# Patient Record
Sex: MALE | Race: WHITE | NOT HISPANIC OR LATINO | URBAN - METROPOLITAN AREA
[De-identification: names, ages, dates, MRNs, and addresses within clinical notes are randomized per-mention and may not be internally consistent; named-entity substitution may affect disease eponyms.]

---

## 2017-09-07 ENCOUNTER — INPATIENT (INPATIENT)
Facility: HOSPITAL | Age: 82
LOS: 6 days | Discharge: NURSING, REHAB OR LONG-TERM CARE | DRG: 871 | End: 2017-09-14
Attending: INTERNAL MEDICINE | Admitting: INTERNAL MEDICINE
Payer: MEDICARE

## 2017-09-07 VITALS
HEIGHT: 72 IN | RESPIRATION RATE: 15 BRPM | TEMPERATURE: 98 F | HEART RATE: 100 BPM | DIASTOLIC BLOOD PRESSURE: 62 MMHG | SYSTOLIC BLOOD PRESSURE: 120 MMHG | WEIGHT: 164.02 LBS | OXYGEN SATURATION: 98 %

## 2017-09-07 DIAGNOSIS — D72.829 ELEVATED WHITE BLOOD CELL COUNT, UNSPECIFIED: ICD-10-CM

## 2017-09-07 DIAGNOSIS — I10 ESSENTIAL (PRIMARY) HYPERTENSION: ICD-10-CM

## 2017-09-07 DIAGNOSIS — A41.9 SEPSIS, UNSPECIFIED ORGANISM: ICD-10-CM

## 2017-09-07 DIAGNOSIS — F01.50 VASCULAR DEMENTIA WITHOUT BEHAVIORAL DISTURBANCE: ICD-10-CM

## 2017-09-07 DIAGNOSIS — I50.22 CHRONIC SYSTOLIC (CONGESTIVE) HEART FAILURE: ICD-10-CM

## 2017-09-07 DIAGNOSIS — C61 MALIGNANT NEOPLASM OF PROSTATE: ICD-10-CM

## 2017-09-07 DIAGNOSIS — I48.2 CHRONIC ATRIAL FIBRILLATION: ICD-10-CM

## 2017-09-07 LAB
ALBUMIN SERPL ELPH-MCNC: 3.4 G/DL — SIGNIFICANT CHANGE UP (ref 3.3–5)
ALP SERPL-CCNC: 101 U/L — SIGNIFICANT CHANGE UP (ref 40–120)
ALT FLD-CCNC: 18 U/L — SIGNIFICANT CHANGE UP (ref 12–78)
ANION GAP SERPL CALC-SCNC: 10 MMOL/L — SIGNIFICANT CHANGE UP (ref 5–17)
APTT BLD: 37.6 SEC — HIGH (ref 27.5–37.4)
AST SERPL-CCNC: 13 U/L — LOW (ref 15–37)
BILIRUB SERPL-MCNC: 0.6 MG/DL — SIGNIFICANT CHANGE UP (ref 0.2–1.2)
BUN SERPL-MCNC: 17 MG/DL — SIGNIFICANT CHANGE UP (ref 7–23)
CALCIUM SERPL-MCNC: 9 MG/DL — SIGNIFICANT CHANGE UP (ref 8.5–10.1)
CHLORIDE SERPL-SCNC: 98 MMOL/L — SIGNIFICANT CHANGE UP (ref 96–108)
CO2 SERPL-SCNC: 30 MMOL/L — SIGNIFICANT CHANGE UP (ref 22–31)
CREAT SERPL-MCNC: 0.68 MG/DL — SIGNIFICANT CHANGE UP (ref 0.5–1.3)
GLUCOSE SERPL-MCNC: 152 MG/DL — HIGH (ref 70–99)
HCT VFR BLD CALC: 40.4 % — SIGNIFICANT CHANGE UP (ref 39–50)
HGB BLD-MCNC: 14.1 G/DL — SIGNIFICANT CHANGE UP (ref 13–17)
INR BLD: 3.22 RATIO — HIGH (ref 0.88–1.16)
LACTATE SERPL-SCNC: 1.5 MMOL/L — SIGNIFICANT CHANGE UP (ref 0.7–2)
LEGIONELLA AG UR QL: NEGATIVE — SIGNIFICANT CHANGE UP
LYMPHOCYTES # BLD AUTO: 2 % — LOW (ref 13–44)
MCHC RBC-ENTMCNC: 32.8 PG — SIGNIFICANT CHANGE UP (ref 27–34)
MCHC RBC-ENTMCNC: 34.9 GM/DL — SIGNIFICANT CHANGE UP (ref 32–36)
MCV RBC AUTO: 94 FL — SIGNIFICANT CHANGE UP (ref 80–100)
MONOCYTES NFR BLD AUTO: 6 % — SIGNIFICANT CHANGE UP (ref 1–9)
NEUTROPHILS NFR BLD AUTO: 70 % — SIGNIFICANT CHANGE UP (ref 43–77)
PLATELET # BLD AUTO: 294 K/UL — SIGNIFICANT CHANGE UP (ref 150–400)
POTASSIUM SERPL-MCNC: 4.1 MMOL/L — SIGNIFICANT CHANGE UP (ref 3.5–5.3)
POTASSIUM SERPL-SCNC: 4.1 MMOL/L — SIGNIFICANT CHANGE UP (ref 3.5–5.3)
PROCALCITONIN SERPL-MCNC: 0.18 NG/ML — HIGH (ref 0–0.04)
PROT SERPL-MCNC: 6.9 G/DL — SIGNIFICANT CHANGE UP (ref 6–8.3)
PROTHROM AB SERPL-ACNC: 36 SEC — HIGH (ref 9.8–12.7)
RAPID RVP RESULT: DETECTED
RBC # BLD: 4.3 M/UL — SIGNIFICANT CHANGE UP (ref 4.2–5.8)
RBC # FLD: 12.6 % — SIGNIFICANT CHANGE UP (ref 10.3–14.5)
RV+EV RNA SPEC QL NAA+PROBE: DETECTED
SODIUM SERPL-SCNC: 138 MMOL/L — SIGNIFICANT CHANGE UP (ref 135–145)
WBC # BLD: 16.7 K/UL — HIGH (ref 3.8–10.5)
WBC # FLD AUTO: 16.7 K/UL — HIGH (ref 3.8–10.5)

## 2017-09-07 PROCEDURE — 71010: CPT | Mod: 26

## 2017-09-07 PROCEDURE — 93010 ELECTROCARDIOGRAM REPORT: CPT

## 2017-09-07 PROCEDURE — 99285 EMERGENCY DEPT VISIT HI MDM: CPT

## 2017-09-07 RX ORDER — DEXTROSE 50 % IN WATER 50 %
25 SYRINGE (ML) INTRAVENOUS ONCE
Qty: 0 | Refills: 0 | Status: DISCONTINUED | OUTPATIENT
Start: 2017-09-07 | End: 2017-09-14

## 2017-09-07 RX ORDER — DONEPEZIL HYDROCHLORIDE 10 MG/1
5 TABLET, FILM COATED ORAL AT BEDTIME
Qty: 0 | Refills: 0 | Status: DISCONTINUED | OUTPATIENT
Start: 2017-09-07 | End: 2017-09-14

## 2017-09-07 RX ORDER — SODIUM CHLORIDE 9 MG/ML
1000 INJECTION INTRAMUSCULAR; INTRAVENOUS; SUBCUTANEOUS ONCE
Qty: 0 | Refills: 0 | Status: COMPLETED | OUTPATIENT
Start: 2017-09-07 | End: 2017-09-07

## 2017-09-07 RX ORDER — AZITHROMYCIN 500 MG/1
500 TABLET, FILM COATED ORAL ONCE
Qty: 0 | Refills: 0 | Status: COMPLETED | OUTPATIENT
Start: 2017-09-07 | End: 2017-09-07

## 2017-09-07 RX ORDER — SODIUM CHLORIDE 9 MG/ML
1000 INJECTION, SOLUTION INTRAVENOUS
Qty: 0 | Refills: 0 | Status: DISCONTINUED | OUTPATIENT
Start: 2017-09-07 | End: 2017-09-12

## 2017-09-07 RX ORDER — INSULIN LISPRO 100/ML
VIAL (ML) SUBCUTANEOUS
Qty: 0 | Refills: 0 | Status: DISCONTINUED | OUTPATIENT
Start: 2017-09-07 | End: 2017-09-14

## 2017-09-07 RX ORDER — DEXTROSE 50 % IN WATER 50 %
1 SYRINGE (ML) INTRAVENOUS ONCE
Qty: 0 | Refills: 0 | Status: DISCONTINUED | OUTPATIENT
Start: 2017-09-07 | End: 2017-09-14

## 2017-09-07 RX ORDER — VANCOMYCIN HCL 1 G
1000 VIAL (EA) INTRAVENOUS ONCE
Qty: 0 | Refills: 0 | Status: COMPLETED | OUTPATIENT
Start: 2017-09-07 | End: 2017-09-07

## 2017-09-07 RX ORDER — PIPERACILLIN AND TAZOBACTAM 4; .5 G/20ML; G/20ML
3.38 INJECTION, POWDER, LYOPHILIZED, FOR SOLUTION INTRAVENOUS ONCE
Qty: 0 | Refills: 0 | Status: COMPLETED | OUTPATIENT
Start: 2017-09-07 | End: 2017-09-07

## 2017-09-07 RX ORDER — LISINOPRIL 2.5 MG/1
5 TABLET ORAL DAILY
Qty: 0 | Refills: 0 | Status: DISCONTINUED | OUTPATIENT
Start: 2017-09-07 | End: 2017-09-14

## 2017-09-07 RX ORDER — SIMVASTATIN 20 MG/1
20 TABLET, FILM COATED ORAL AT BEDTIME
Qty: 0 | Refills: 0 | Status: DISCONTINUED | OUTPATIENT
Start: 2017-09-07 | End: 2017-09-14

## 2017-09-07 RX ORDER — AZITHROMYCIN 500 MG/1
500 TABLET, FILM COATED ORAL EVERY 24 HOURS
Qty: 0 | Refills: 0 | Status: DISCONTINUED | OUTPATIENT
Start: 2017-09-08 | End: 2017-09-12

## 2017-09-07 RX ORDER — VENLAFAXINE HCL 75 MG
75 CAPSULE, EXT RELEASE 24 HR ORAL
Qty: 0 | Refills: 0 | Status: DISCONTINUED | OUTPATIENT
Start: 2017-09-07 | End: 2017-09-07

## 2017-09-07 RX ORDER — VENLAFAXINE HCL 75 MG
75 CAPSULE, EXT RELEASE 24 HR ORAL
Qty: 0 | Refills: 0 | Status: DISCONTINUED | OUTPATIENT
Start: 2017-09-07 | End: 2017-09-14

## 2017-09-07 RX ORDER — ACETAMINOPHEN 500 MG
650 TABLET ORAL ONCE
Qty: 0 | Refills: 0 | Status: COMPLETED | OUTPATIENT
Start: 2017-09-07 | End: 2017-09-07

## 2017-09-07 RX ORDER — SODIUM CHLORIDE 9 MG/ML
3 INJECTION INTRAMUSCULAR; INTRAVENOUS; SUBCUTANEOUS ONCE
Qty: 0 | Refills: 0 | Status: COMPLETED | OUTPATIENT
Start: 2017-09-07 | End: 2017-09-07

## 2017-09-07 RX ORDER — GLUCAGON INJECTION, SOLUTION 0.5 MG/.1ML
1 INJECTION, SOLUTION SUBCUTANEOUS ONCE
Qty: 0 | Refills: 0 | Status: DISCONTINUED | OUTPATIENT
Start: 2017-09-07 | End: 2017-09-14

## 2017-09-07 RX ORDER — SODIUM CHLORIDE 9 MG/ML
1000 INJECTION, SOLUTION INTRAVENOUS
Qty: 0 | Refills: 0 | Status: DISCONTINUED | OUTPATIENT
Start: 2017-09-07 | End: 2017-09-14

## 2017-09-07 RX ORDER — PIPERACILLIN AND TAZOBACTAM 4; .5 G/20ML; G/20ML
3.38 INJECTION, POWDER, LYOPHILIZED, FOR SOLUTION INTRAVENOUS EVERY 8 HOURS
Qty: 0 | Refills: 0 | Status: DISCONTINUED | OUTPATIENT
Start: 2017-09-07 | End: 2017-09-12

## 2017-09-07 RX ORDER — DEXTROSE 50 % IN WATER 50 %
12.5 SYRINGE (ML) INTRAVENOUS ONCE
Qty: 0 | Refills: 0 | Status: DISCONTINUED | OUTPATIENT
Start: 2017-09-07 | End: 2017-09-14

## 2017-09-07 RX ORDER — AZITHROMYCIN 500 MG/1
TABLET, FILM COATED ORAL
Qty: 0 | Refills: 0 | Status: DISCONTINUED | OUTPATIENT
Start: 2017-09-07 | End: 2017-09-12

## 2017-09-07 RX ORDER — DIGOXIN 250 MCG
0.25 TABLET ORAL DAILY
Qty: 0 | Refills: 0 | Status: DISCONTINUED | OUTPATIENT
Start: 2017-09-07 | End: 2017-09-12

## 2017-09-07 RX ADMIN — SODIUM CHLORIDE 2000 MILLILITER(S): 9 INJECTION INTRAMUSCULAR; INTRAVENOUS; SUBCUTANEOUS at 10:23

## 2017-09-07 RX ADMIN — SODIUM CHLORIDE 3 MILLILITER(S): 9 INJECTION INTRAMUSCULAR; INTRAVENOUS; SUBCUTANEOUS at 10:15

## 2017-09-07 RX ADMIN — PIPERACILLIN AND TAZOBACTAM 25 GRAM(S): 4; .5 INJECTION, POWDER, LYOPHILIZED, FOR SOLUTION INTRAVENOUS at 19:39

## 2017-09-07 RX ADMIN — Medication 30 MILLIGRAM(S): at 17:40

## 2017-09-07 RX ADMIN — Medication 75 MILLIGRAM(S): at 17:38

## 2017-09-07 RX ADMIN — Medication 250 MILLIGRAM(S): at 11:45

## 2017-09-07 RX ADMIN — AZITHROMYCIN 255 MILLIGRAM(S): 500 TABLET, FILM COATED ORAL at 13:40

## 2017-09-07 RX ADMIN — SODIUM CHLORIDE 2000 MILLILITER(S): 9 INJECTION INTRAMUSCULAR; INTRAVENOUS; SUBCUTANEOUS at 10:29

## 2017-09-07 RX ADMIN — SIMVASTATIN 20 MILLIGRAM(S): 20 TABLET, FILM COATED ORAL at 21:23

## 2017-09-07 RX ADMIN — SODIUM CHLORIDE 100 MILLILITER(S): 9 INJECTION, SOLUTION INTRAVENOUS at 15:24

## 2017-09-07 RX ADMIN — PIPERACILLIN AND TAZOBACTAM 25 GRAM(S): 4; .5 INJECTION, POWDER, LYOPHILIZED, FOR SOLUTION INTRAVENOUS at 19:38

## 2017-09-07 RX ADMIN — SODIUM CHLORIDE 2000 MILLILITER(S): 9 INJECTION INTRAMUSCULAR; INTRAVENOUS; SUBCUTANEOUS at 10:20

## 2017-09-07 RX ADMIN — Medication 650 MILLIGRAM(S): at 10:21

## 2017-09-07 RX ADMIN — PIPERACILLIN AND TAZOBACTAM 200 GRAM(S): 4; .5 INJECTION, POWDER, LYOPHILIZED, FOR SOLUTION INTRAVENOUS at 11:06

## 2017-09-07 RX ADMIN — DONEPEZIL HYDROCHLORIDE 5 MILLIGRAM(S): 10 TABLET, FILM COATED ORAL at 21:23

## 2017-09-07 NOTE — ED ADULT NURSE NOTE - OBJECTIVE STATEMENT
Presents to ER w fever/cough.  Rectal temp here in .3. Sepsis workup obtained. Pt has a hx of dementia and is currently A.O x2. Knows his name and is aware he is in the hospital. Presents to ER w fever/cough.  Rectal temp here in .3. Unproductive wet cough noted. BSC bilat. Sepsis workup obtained. Pt has a hx of dementia and is currently A.O x2. Knows his name and is aware he is in the hospital.

## 2017-09-07 NOTE — ED PROVIDER NOTE - ATTENDING CONTRIBUTION TO CARE
Cough and fever from ANF. Exam revealed male in NAD, febrile with coarse BSs. I agree with plan and management outlined by R-1

## 2017-09-07 NOTE — H&P ADULT - PMH
Atrial fibrillation    CHF (congestive heart failure)    Dementia    Diabetes    Hypertension    Prostate ca

## 2017-09-07 NOTE — ED ADULT NURSE NOTE - PMH
Atrial fibrillation    Dementia    Diabetes    Hypertension    Prostate ca Atrial fibrillation    CHF (congestive heart failure)    Dementia    Diabetes    Hypertension    Prostate ca

## 2017-09-07 NOTE — H&P ADULT - NSHPLABSRESULTS_GEN_ALL_CORE
14.1   16.7  )-----------( 294      ( 07 Sep 2017 10:14 )             40.4     07 Sep 2017 10:24    138    |  98     |  17     ----------------------------<  152    4.1     |  30     |  0.68     Ca    9.0        07 Sep 2017 10:24    TPro  6.9    /  Alb  3.4    /  TBili  0.6    /  DBili  x      /  AST  13     /  ALT  18     /  AlkPhos  101    07 Sep 2017 10:24    LIVER FUNCTIONS - ( 07 Sep 2017 10:24 )  Alb: 3.4 g/dL / Pro: 6.9 g/dL / ALK PHOS: 101 U/L / ALT: 18 U/L / AST: 13 U/L / GGT: x           PT/INR - ( 07 Sep 2017 10:14 )   PT: 36.0 sec;   INR: 3.22 ratio         PTT - ( 07 Sep 2017 10:14 )  PTT:37.6 sec  CAPILLARY BLOOD GLUCOSE  118 (07 Sep 2017 15:58)

## 2017-09-07 NOTE — ED ADULT TRIAGE NOTE - CHIEF COMPLAINT QUOTE
sent from [facility] fellowship to be evaluated for fever they had 101.4  oral at 8 am  now in 98.4 oral.  pt also c/o weakness

## 2017-09-07 NOTE — ED ADULT NURSE NOTE - CHIEF COMPLAINT QUOTE
sent from [facility] fellowship to be evaluated for fever they had 101.4  oral at 8 am  now in 98.4 oral.  pt also c/o weakness

## 2017-09-07 NOTE — CONSULT NOTE ADULT - SUBJECTIVE AND OBJECTIVE BOX
Chart reviewed: Assessment plan is as below Note will be updated as more  patient data is gathered         Patient                                            TERESA FRANCIS [facility]   [phone] 10/16/1926  CONTACT Vinicio Bradford [phone]  ALLERGY Late[employer]  REASON FOR VISIT   Initial evaluation/Pulmonary consultation requested  9/7/2017 by Dr Ring from Dr Quintana   Patient examined chart reviewed  HOSPITAL ADMISSION [facility] P Dr Ring 9/7/2017  PATIENT CAME  FROM (if information available)  CF home  VITALS/LABS  9/7 9a 102f 90 116/60  9/7/2017 W 16.7 Hb 14.1 Plt 294  Na 138 K 4.1 CO23 30 Cr .6   PATIENT DESCRIPTION PMH SOCIAL   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 [facility] HO  injury L shin remote past 3/18/2014 V duplex legs –ben Chr swelling leg  OBS Prostate CA LV Systolic dysfunction CHF 10/24/2014 ECHO EF 50% Mild hypokinesia septum PASP 40 Mod MR    A fib  (on coumadin) Htn Depression Lower extr edema Chr swelling L leg DM      LUNG NODULE 10/21/2014 CT Ch  1 cm hypoattenuating thyroid nodule   Trace symmetrical layering bl pl effusions with dependent atelectasis bases  Ca granuloma both RML and l lower lobes  5 mm subpleural parenchymal nodule R apex    Admitted [facility] 10/20-10/24/2014 with fever 102 Poss 2/2 Viral syndrome Had episode hypoglycemia 10/21 Rxed Rocephin Zithro empirically CT H CT ch  10/21/14  n 10/22 DW dr Perlman Endo Adv Metformin 500 bid and dc Sulfonylureas 10/22 Speech  mervin Reg thin liq   9/7/2017 Patient was sent from CF home for cough URT symptoms x 2 d  Was supposed to be started on Zpak 9/7 but was noted to have T 101   HOSPITAL COURSE PLAN 9/7/2017 ADMISSION [facility]   SEPSIS Source unclear Likely Resp tract infection Zosyn vanco started by ER MD 9/7 pending bld c uc will continue NS 3l given 9/7   DEHYDRATION IVF   DM Sl scale   PROBLEM ASSESSMENT PLAN   RESP   9/7 RA 95%  SEPSIS   9/7 W 16.7   9/7 LA 1.5   9/7 CXR NAPD   CHF  10/24/2014 ECHO EF 50% Mild hypokinesia septum PASP 40 Mod MR   Lisinopril 5 (3/19)   A FIB   Dig .25 (3/19) Coumadin  CAD  Simvastatin 20  (9/7)   ANXIETY    Buspar 30 x 2 (9/7)    OBS   Donepezil 5 (9/7)  DEPRESSION  Venlafaxine 75 x 2 (9/7)   GLOBAL ISSUE/BEST PRACTICE:        PROBLEM:      Analgesia:     na                        PROBLEM: Sedation:     na               PROBLEM: HOB elevation:   y             PROBLEM: Stress ulcer proph:    na                      PROBLEM: VTE prophylaxis:      On coumadin                   PROBLEM: Glycemic control:    na   PROBLEM: Nutrition:    diabetic (9/7)           PROBLEM: Advanced directive: na     PROBLEM: Allergies:  na  TIME SPENT Over 55 minutes aggregate  care time spent on encounter; activities included   direct patient care, counseling and/or coordinating care reviewing notes, lab data/ imaging , discussion with multidisciplinary team/ patient  /family. Risks, benefits, alternatives  discussed in detail. Questions/concerns  were addressed  to the best of my ability .

## 2017-09-07 NOTE — ED PROVIDER NOTE - OBJECTIVE STATEMENT
89yo male with PMH of dementia, HTN, CHF, DM2, prostate ca, and Afib BIB EMS for evaluation of cough and fever. As per EMS, nursing home reported that the patient had been coughing and had a temp of 101.4 this morning. Unable to obtain history or ROS.

## 2017-09-07 NOTE — H&P ADULT - HISTORY OF PRESENT ILLNESS
This is a 89 YO W Male brought to ER because of cough, fever, weakness and increased confusion. Patient with past medical history of dementia, HTN, CHF, DM2, prostate CA, and A [facility],    Nursing home reported that the patient had been coughing and had a temp of 101.4 this morning.

## 2017-09-08 DIAGNOSIS — Z29.9 ENCOUNTER FOR PROPHYLACTIC MEASURES, UNSPECIFIED: ICD-10-CM

## 2017-09-08 DIAGNOSIS — F03.90 UNSPECIFIED DEMENTIA, UNSPECIFIED SEVERITY, WITHOUT BEHAVIORAL DISTURBANCE, PSYCHOTIC DISTURBANCE, MOOD DISTURBANCE, AND ANXIETY: ICD-10-CM

## 2017-09-08 LAB
ALBUMIN SERPL ELPH-MCNC: 2.9 G/DL — LOW (ref 3.3–5)
ALP SERPL-CCNC: 74 U/L — SIGNIFICANT CHANGE UP (ref 40–120)
ALT FLD-CCNC: 17 U/L — SIGNIFICANT CHANGE UP (ref 12–78)
ANION GAP SERPL CALC-SCNC: 7 MMOL/L — SIGNIFICANT CHANGE UP (ref 5–17)
AST SERPL-CCNC: 16 U/L — SIGNIFICANT CHANGE UP (ref 15–37)
BILIRUB DIRECT SERPL-MCNC: 0.2 MG/DL — SIGNIFICANT CHANGE UP (ref 0.05–0.2)
BILIRUB INDIRECT FLD-MCNC: 0.5 MG/DL — SIGNIFICANT CHANGE UP (ref 0.2–1)
BILIRUB SERPL-MCNC: 0.7 MG/DL — SIGNIFICANT CHANGE UP (ref 0.2–1.2)
BUN SERPL-MCNC: 13 MG/DL — SIGNIFICANT CHANGE UP (ref 7–23)
CALCIUM SERPL-MCNC: 8.3 MG/DL — LOW (ref 8.5–10.1)
CHLORIDE SERPL-SCNC: 102 MMOL/L — SIGNIFICANT CHANGE UP (ref 96–108)
CO2 SERPL-SCNC: 31 MMOL/L — SIGNIFICANT CHANGE UP (ref 22–31)
CREAT SERPL-MCNC: 0.56 MG/DL — SIGNIFICANT CHANGE UP (ref 0.5–1.3)
DIGOXIN SERPL-MCNC: 1.4 NG/ML — SIGNIFICANT CHANGE UP (ref 0.8–2)
GLUCOSE SERPL-MCNC: 98 MG/DL — SIGNIFICANT CHANGE UP (ref 70–99)
HBA1C BLD-MCNC: 5.5 % — SIGNIFICANT CHANGE UP (ref 4–5.6)
HCT VFR BLD CALC: 32.4 % — LOW (ref 39–50)
HGB BLD-MCNC: 11.1 G/DL — LOW (ref 13–17)
INR BLD: 2.95 RATIO — HIGH (ref 0.88–1.16)
MAGNESIUM SERPL-MCNC: 1.3 MG/DL — LOW (ref 1.6–2.6)
MCHC RBC-ENTMCNC: 32.3 PG — SIGNIFICANT CHANGE UP (ref 27–34)
MCHC RBC-ENTMCNC: 34.3 GM/DL — SIGNIFICANT CHANGE UP (ref 32–36)
MCV RBC AUTO: 94.3 FL — SIGNIFICANT CHANGE UP (ref 80–100)
PHOSPHATE SERPL-MCNC: 1.9 MG/DL — LOW (ref 2.5–4.5)
PLATELET # BLD AUTO: 210 K/UL — SIGNIFICANT CHANGE UP (ref 150–400)
POTASSIUM SERPL-MCNC: 3.8 MMOL/L — SIGNIFICANT CHANGE UP (ref 3.5–5.3)
POTASSIUM SERPL-SCNC: 3.8 MMOL/L — SIGNIFICANT CHANGE UP (ref 3.5–5.3)
PROT SERPL-MCNC: 5.9 G/DL — LOW (ref 6–8.3)
PROTHROM AB SERPL-ACNC: 32.9 SEC — HIGH (ref 9.8–12.7)
RBC # BLD: 3.44 M/UL — LOW (ref 4.2–5.8)
RBC # FLD: 12.5 % — SIGNIFICANT CHANGE UP (ref 10.3–14.5)
SODIUM SERPL-SCNC: 140 MMOL/L — SIGNIFICANT CHANGE UP (ref 135–145)
WBC # BLD: 8.3 K/UL — SIGNIFICANT CHANGE UP (ref 3.8–10.5)
WBC # FLD AUTO: 8.3 K/UL — SIGNIFICANT CHANGE UP (ref 3.8–10.5)

## 2017-09-08 RX ADMIN — Medication 75 MILLIGRAM(S): at 08:30

## 2017-09-08 RX ADMIN — Medication 75 MILLIGRAM(S): at 17:34

## 2017-09-08 RX ADMIN — AZITHROMYCIN 255 MILLIGRAM(S): 500 TABLET, FILM COATED ORAL at 12:51

## 2017-09-08 RX ADMIN — Medication 1: at 12:25

## 2017-09-08 RX ADMIN — SIMVASTATIN 20 MILLIGRAM(S): 20 TABLET, FILM COATED ORAL at 21:11

## 2017-09-08 RX ADMIN — LISINOPRIL 5 MILLIGRAM(S): 2.5 TABLET ORAL at 05:26

## 2017-09-08 RX ADMIN — Medication 30 MILLIGRAM(S): at 05:26

## 2017-09-08 RX ADMIN — PIPERACILLIN AND TAZOBACTAM 25 GRAM(S): 4; .5 INJECTION, POWDER, LYOPHILIZED, FOR SOLUTION INTRAVENOUS at 21:11

## 2017-09-08 RX ADMIN — Medication 0.25 MILLIGRAM(S): at 05:26

## 2017-09-08 RX ADMIN — PIPERACILLIN AND TAZOBACTAM 25 GRAM(S): 4; .5 INJECTION, POWDER, LYOPHILIZED, FOR SOLUTION INTRAVENOUS at 05:25

## 2017-09-08 RX ADMIN — PIPERACILLIN AND TAZOBACTAM 25 GRAM(S): 4; .5 INJECTION, POWDER, LYOPHILIZED, FOR SOLUTION INTRAVENOUS at 14:10

## 2017-09-08 RX ADMIN — Medication 30 MILLIGRAM(S): at 17:34

## 2017-09-08 RX ADMIN — DONEPEZIL HYDROCHLORIDE 5 MILLIGRAM(S): 10 TABLET, FILM COATED ORAL at 21:11

## 2017-09-08 NOTE — PROGRESS NOTE ADULT - SUBJECTIVE AND OBJECTIVE BOX
PROGRESS NOTE    OVERNIGHT    SUBJECTIVE    PAST MEDICAL & SURGICAL HISTORY:  CHF (congestive heart failure)  Dementia  Prostate ca  Diabetes  Hypertension  Atrial fibrillation  No significant past surgical history    HEALTH ISSUES - PROBLEM Dx:  Leukocytosis, unspecified type: Leukocytosis, unspecified type  Chronic systolic congestive heart failure: Chronic systolic congestive heart failure  Prostate ca: Prostate ca  Vascular dementia without behavioral disturbance: Vascular dementia without behavioral disturbance  Essential hypertension: Essential hypertension  Chronic atrial fibrillation: Chronic atrial fibrillation  Sepsis, due to unspecified organism: Sepsis, due to unspecified organism          MEDICATIONS  (STANDING):  sodium chloride 0.45%. 1000 milliLiter(s) (100 mL/Hr) IV Continuous <Continuous>  insulin lispro (HumaLOG) corrective regimen sliding scale   SubCutaneous three times a day before meals  dextrose 5%. 1000 milliLiter(s) (50 mL/Hr) IV Continuous <Continuous>  dextrose 50% Injectable 12.5 Gram(s) IV Push once  dextrose 50% Injectable 25 Gram(s) IV Push once  dextrose 50% Injectable 25 Gram(s) IV Push once  piperacillin/tazobactam IVPB. 3.375 Gram(s) IV Intermittent every 8 hours  azithromycin  IVPB   IV Intermittent   azithromycin  IVPB 500 milliGRAM(s) IV Intermittent every 24 hours  lisinopril 5 milliGRAM(s) Oral daily  digoxin     Tablet 0.25 milliGRAM(s) Oral daily  simvastatin 20 milliGRAM(s) Oral at bedtime  busPIRone 30 milliGRAM(s) Oral two times a day  donepezil 5 milliGRAM(s) Oral at bedtime  venlafaxine 75 milliGRAM(s) Oral two times a day with meals    MEDICATIONS  (PRN):  dextrose Gel 1 Dose(s) Oral once PRN Blood Glucose LESS THAN 70 milliGRAM(s)/deciliter  glucagon  Injectable 1 milliGRAM(s) IntraMuscular once PRN Glucose LESS THAN 70 milligrams/deciliter      OBJECTIVE    T(C): 37.4 (09-08-17 @ 05:31), Max: 37.4 (09-08-17 @ 05:31)  HR: 71 (09-08-17 @ 05:31) (59 - 71)  BP: 106/56 (09-08-17 @ 05:33) (101/50 - 120/64)  RR: 18 (09-08-17 @ 05:31) (17 - 18)  SpO2: 96% (09-08-17 @ 05:31) (92% - 96%)  Wt(kg): --  I&O's Summary    07 Sep 2017 07:01  -  08 Sep 2017 07:00  --------------------------------------------------------  IN: 540 mL / OUT: 0 mL / NET: 540 mL          REVIEW OF SYSTEMS:  CONSTITUTIONAL: No fever, weight loss, or fatigue  EYES: No eye pain, visual disturbances, or discharge  ENMT:  No difficulty hearing, tinnitus, vertigo; No sinus or throat pain  NECK: No pain or stiffness  BREASTS: No pain, masses, or nipple discharge  RESPIRATORY: No cough, wheezing, chills or hemoptysis; No shortness of breath  CARDIOVASCULAR: No chest pain, palpitations, dizziness, or leg swelling  GASTROINTESTINAL: No abdominal pain. No nausea, vomiting; No diarrhea or constipation. No melena or hematochezia.  GENITOURINARY: No dysuria, frequency, hematuria, or incontinence  NEUROLOGICAL: No headaches, memory loss, loss of strength, numbness, or tremors  SKIN: No itching, burning, rashes, or lesions   LYMPH NODES: No enlarged glands  MUSCULOSKELETAL: No joint pain or swelling; No muscle, back, or extremity pain  HEME/LYMPH: No easy bruising, or bleeding gums  ALLERY AND IMMUNOLOGIC: No hives or eczema      PHYSICAL EXAM:  Appearance: NAD.   HEENT:   Moist oral mucosa. Conjunctiva clear b/l.   Neck : supple  Cardiovascular: RRR ,S1S2 present  Respiratory: Lungs CTAB.  Gastrointestinal:  Soft, nontender. No rebound. No rigidity. BS present	  Skin: No rashes, No ecchymoses, No cyanosis.	  Neurologic: Non-focal. Moving all extremities. Following commands.  Extremities: No edema. No erythema. No calf tenderness.  	  LABS:                        11.1   8.3   )-----------( 210      ( 08 Sep 2017 06:50 )             32.4     09-08    140  |  102  |  13  ----------------------------<  98  3.8   |  31  |  0.56    Ca    8.3<L>      08 Sep 2017 06:50  Phos  1.9     09-08  Mg     1.3     09-08    TPro  5.9<L>  /  Alb  2.9<L>  /  TBili  0.7  /  DBili  .20  /  AST  16  /  ALT  17  /  AlkPhos  74  09-08    PT/INR - ( 08 Sep 2017 06:49 )   PT: 32.9 sec;   INR: 2.95 ratio         PTT - ( 07 Sep 2017 10:14 )  PTT:37.6 sec      RADIOLOGY & ADDITIONAL TESTS:    ASSESSMENT/PLAN: PROGRESS NOTE    OVERNIGHT  No new issues reported by medical staff . All above noted SUBJECTIVE Patient is resting in a bed comfortably .Confused ,poor mentation .No distress noted     PAST MEDICAL & SURGICAL HISTORY:  CHF (congestive heart failure)  Dementia  Prostate ca  Diabetes  Hypertension  Atrial fibrillation  No significant past surgical history    HEALTH ISSUES - PROBLEM Dx:  Leukocytosis, unspecified type: Leukocytosis, unspecified type  Chronic systolic congestive heart failure: Chronic systolic congestive heart failure  Prostate ca: Prostate ca  Vascular dementia without behavioral disturbance: Vascular dementia without behavioral disturbance  Essential hypertension: Essential hypertension  Chronic atrial fibrillation: Chronic atrial fibrillation  Sepsis, due to unspecified organism: Sepsis, due to unspecified organism          MEDICATIONS  (STANDING):  sodium chloride 0.45%. 1000 milliLiter(s) (100 mL/Hr) IV Continuous <Continuous>  insulin lispro (HumaLOG) corrective regimen sliding scale   SubCutaneous three times a day before meals  dextrose 5%. 1000 milliLiter(s) (50 mL/Hr) IV Continuous <Continuous>  dextrose 50% Injectable 12.5 Gram(s) IV Push once  dextrose 50% Injectable 25 Gram(s) IV Push once  dextrose 50% Injectable 25 Gram(s) IV Push once  piperacillin/tazobactam IVPB. 3.375 Gram(s) IV Intermittent every 8 hours  azithromycin  IVPB   IV Intermittent   azithromycin  IVPB 500 milliGRAM(s) IV Intermittent every 24 hours  lisinopril 5 milliGRAM(s) Oral daily  digoxin     Tablet 0.25 milliGRAM(s) Oral daily  simvastatin 20 milliGRAM(s) Oral at bedtime  busPIRone 30 milliGRAM(s) Oral two times a day  donepezil 5 milliGRAM(s) Oral at bedtime  venlafaxine 75 milliGRAM(s) Oral two times a day with meals    MEDICATIONS  (PRN):  dextrose Gel 1 Dose(s) Oral once PRN Blood Glucose LESS THAN 70 milliGRAM(s)/deciliter  glucagon  Injectable 1 milliGRAM(s) IntraMuscular once PRN Glucose LESS THAN 70 milligrams/deciliter      OBJECTIVE    T(C): 37.4 (09-08-17 @ 05:31), Max: 37.4 (09-08-17 @ 05:31)  HR: 71 (09-08-17 @ 05:31) (59 - 71)  BP: 106/56 (09-08-17 @ 05:33) (101/50 - 120/64)  RR: 18 (09-08-17 @ 05:31) (17 - 18)  SpO2: 96% (09-08-17 @ 05:31) (92% - 96%)  Wt(kg): --  I&O's Summary    07 Sep 2017 07:01  -  08 Sep 2017 07:00  --------------------------------------------------------  IN: 540 mL / OUT: 0 mL / NET: 540 mL          REVIEW OF SYSTEMS:  ROS is unobtainable dur to lethargy and confusion   PHYSICAL EXAM:  Appearance: NAD.   HEENT:   Moist oral mucosa. Conjunctiva clear b/l.   Neck : supple  Cardiovascular: RRR ,S1S2 present  Respiratory: Lungs CTAB.  Gastrointestinal:  Soft, nontender. No rebound. No rigidity. BS present	  Skin: No rashes, No ecchymoses, No cyanosis.	  Neurologic: Non-focal. Moving all extremities. Following commands.  Extremities: No edema. No erythema. No calf tenderness.  	  LABS:                        11.1   8.3   )-----------( 210      ( 08 Sep 2017 06:50 )             32.4     09-08    140  |  102  |  13  ----------------------------<  98  3.8   |  31  |  0.56    Ca    8.3<L>      08 Sep 2017 06:50  Phos  1.9     09-08  Mg     1.3     09-08    TPro  5.9<L>  /  Alb  2.9<L>  /  TBili  0.7  /  DBili  .20  /  AST  16  /  ALT  17  /  AlkPhos  74  09-08    PT/INR - ( 08 Sep 2017 06:49 )   PT: 32.9 sec;   INR: 2.95 ratio         PTT - ( 07 Sep 2017 10:14 )  PTT:37.6 sec      RADIOLOGY & ADDITIONAL TESTS:    ASSESSMENT/PLAN:

## 2017-09-08 NOTE — PROGRESS NOTE ADULT - SUBJECTIVE AND OBJECTIVE BOX
Patient seen and examined today Plan discussed/Questions answered  (with patient/ancillary staff/colleagues) Chart notes reviewd More detailed Pulm/Critical Care notes, assessment and plan  will be added later today as needed after reviewing further labs as they become available     Notable interval events reviewed    ROS/PE  . REVIEW OF SYMPTOMS    Able to give ROS  Yes     RELIABLE NO   Weakness Yes   Chills No   Vision changes No  Lymph nodes No enlarged glands   Endocrine No unexplained hair loss No het or cold intolerance   Allergy No hives   Sore throat No   Coughing blood No  Headache No  Confusion YES  Chest pain No  Palpitations No   Pain abdomen NO   Shortness of breath YES  Vomiting NO  Pain neck No  Pain abdomen NO     PHYSICAL EXAM    HEENT Unremarkable PERRLA atraumatic  RESP Fair air entry EXP prolonged    Harsh breath sound Resp distres mild  CARDIAC S1 S2 No S3     NO JVD   Awake Alert and ORIENTED x tw  ABDOMEN SOFT BS PRESENT NOT DISTENDED No hepatosplenomegaly  PEDAL EDEMA present No calf tenderness  NO rash GENERAL Not TOXIC looking  . Patient seen and examined today Plan discussed/Questions answered  (with patient/ancillary staff/colleagues) Chart notes reviewd More detailed Pulm/Critical Care notes, assessment and plan  will be added later today as needed after reviewing further labs as they become available     Notable interval events reviewed    ROS/PE  . REVIEW OF SYMPTOMS    Able to give ROS  Yes     RELIABLE NO   Weakness Yes   Chills No   Vision changes No  Lymph nodes No enlarged glands   Endocrine No unexplained hair loss No het or cold intolerance   Allergy No hives   Sore throat No   Coughing blood No  Headache No  Confusion YES  Chest pain No  Palpitations No   Pain abdomen NO   Shortness of breath YES  Vomiting NO  Pain neck No  Pain abdomen NO     PHYSICAL EXAM    HEENT Unremarkable PERRLA atraumatic  RESP Fair air entry EXP prolonged    Harsh breath sound Resp distres mild  CARDIAC S1 S2 No S3     NO JVD   Awake Alert and ORIENTED x tw  ABDOMEN SOFT BS PRESENT NOT DISTENDED No hepatosplenomegaly  PEDAL EDEMA present No calf tenderness  NO rash GENERAL Not TOXIC looking  . VITALS/LABS  9/8/2017 99f 71 100/50 18 96%   9/8 acc 157-99   9/8 IV 1/2  (9/7)   9/8/2017 W 8.3 Hb 11.1 Plt 210  Na 140 K 3.8 CO2 31 Cr .5 PO4 1.9   PROBLEM ASSESSMENT PLAN   RESP   9/7 RA 95%  SEPSIS   9/7 W 16.7   9/7 LA 1.5  9/7 PCT .18 9/7 RVP Entero/Rhinovirus detect  9/7 Leg n   9/7 CXR NAPD   CHF  10/24/2014 ECHO EF 50% Mild hypokinesia septum PASP 40 Mod MR   Lisinopril 5 (3/19)   A FIB   Dig .25 (3/19) Coumadin  CAD  Simvastatin 20  (9/7)   ANXIETY    Buspar 30 x 2 (9/7)    OBS   Donepezil 5 (9/7)  DEPRESSION  Venlafaxine 75 x 2 (9/7)   GLOBAL ISSUE/BEST PRACTICE:        PROBLEM:      Analgesia:     na                        PROBLEM: Sedation:     na               PROBLEM: HOB elevation:   y             PROBLEM: Stress ulcer proph:    na                      PROBLEM: VTE prophylaxis:      On coumadin                   PROBLEM: Glycemic control:    na   PROBLEM: Nutrition:    diabetic (9/7)           PROBLEM: Advanced directive: na     PROBLEM: Allergies:  na  PATIENT DESCRIPTION [facility] SOCIAL   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 [facility] HO  injury L shin remote past 3/18/2014 V duplex legs –ben Chr swelling leg  OBS Prostate CA LV Systolic dysfunction CHF 10/24/2014 ECHO EF 50% Mild hypokinesia septum PASP 40 Mod MR    A fib  (on coumadin) Htn Depression Lower extr edema Chr swelling L leg DM      LUNG NODULE 10/21/2014 CT Ch  1 cm hypoattenuating thyroid nodule  Trace symmetrical layering bl pl effusions with dependent atelectasis bases  Ca granuloma both RML and l lower lobes  5 mm subpleural parenchymal nodule R apex    9/7/2017 Patient was sent from [facility] home for cough URT symptoms x 2 d  Was supposed to be started on Zpak 9/7 but was noted to have T 101   HOSPITAL COURSE PLAN 9/7/2017 ADMISSION [facility]   SEPSIS Source unclear Likely Resp tract infection Zosyn vanco started by ER MD 9/7 pending bld c uc will continue NS 3l given 9/7 9/7 PCT .18 9/7 RVP Entero/Rhinovirus detect  9/7 Leg n Rxed Zithro (9/7) zosyn (9/7)   DEHYDRATION IVF   DM Sl scale   TIME SPENT Over 25 minutes aggregate  care time spent on encounter; activities included   direct patient care, counseling and/or coordinating care reviewing notes, lab data/ imaging , discussion with multidisciplinary team/ patient  /family. Risks, benefits, alternatives  discussed in detail. Questions/concerns  were addressed  to the best of my ability .

## 2017-09-08 NOTE — PROGRESS NOTE ADULT - PROBLEM SELECTOR PLAN 1
IV Abx, Blood and Urine C&S Admitted for septic workup and evaluation,send blood and urine cx,serial lactate levels,monitor vitals closley,ivfs hydration,monitor urine output and renal profile,iv abx initiated IV Abx, Blood and Urine C&S

## 2017-09-09 LAB
ANION GAP SERPL CALC-SCNC: 12 MMOL/L — SIGNIFICANT CHANGE UP (ref 5–17)
BUN SERPL-MCNC: 9 MG/DL — SIGNIFICANT CHANGE UP (ref 7–23)
CALCIUM SERPL-MCNC: 8.5 MG/DL — SIGNIFICANT CHANGE UP (ref 8.5–10.1)
CHLORIDE SERPL-SCNC: 101 MMOL/L — SIGNIFICANT CHANGE UP (ref 96–108)
CO2 SERPL-SCNC: 25 MMOL/L — SIGNIFICANT CHANGE UP (ref 22–31)
CREAT SERPL-MCNC: 0.49 MG/DL — LOW (ref 0.5–1.3)
CULTURE RESULTS: SIGNIFICANT CHANGE UP
GLUCOSE SERPL-MCNC: 134 MG/DL — HIGH (ref 70–99)
HCT VFR BLD CALC: 33.2 % — LOW (ref 39–50)
HGB BLD-MCNC: 11.5 G/DL — LOW (ref 13–17)
INR BLD: 1.86 RATIO — HIGH (ref 0.88–1.16)
MCHC RBC-ENTMCNC: 32.4 PG — SIGNIFICANT CHANGE UP (ref 27–34)
MCHC RBC-ENTMCNC: 34.6 GM/DL — SIGNIFICANT CHANGE UP (ref 32–36)
MCV RBC AUTO: 93.8 FL — SIGNIFICANT CHANGE UP (ref 80–100)
PLATELET # BLD AUTO: 203 K/UL — SIGNIFICANT CHANGE UP (ref 150–400)
POTASSIUM SERPL-MCNC: 3.2 MMOL/L — LOW (ref 3.5–5.3)
POTASSIUM SERPL-SCNC: 3.2 MMOL/L — LOW (ref 3.5–5.3)
PROTHROM AB SERPL-ACNC: 20.5 SEC — HIGH (ref 9.8–12.7)
RBC # BLD: 3.54 M/UL — LOW (ref 4.2–5.8)
RBC # FLD: 12.2 % — SIGNIFICANT CHANGE UP (ref 10.3–14.5)
SODIUM SERPL-SCNC: 138 MMOL/L — SIGNIFICANT CHANGE UP (ref 135–145)
SPECIMEN SOURCE: SIGNIFICANT CHANGE UP
WBC # BLD: 6.1 K/UL — SIGNIFICANT CHANGE UP (ref 3.8–10.5)
WBC # FLD AUTO: 6.1 K/UL — SIGNIFICANT CHANGE UP (ref 3.8–10.5)

## 2017-09-09 RX ORDER — POTASSIUM CHLORIDE 20 MEQ
40 PACKET (EA) ORAL ONCE
Qty: 0 | Refills: 0 | Status: COMPLETED | OUTPATIENT
Start: 2017-09-09 | End: 2017-09-09

## 2017-09-09 RX ORDER — WARFARIN SODIUM 2.5 MG/1
5 TABLET ORAL ONCE
Qty: 0 | Refills: 0 | Status: COMPLETED | OUTPATIENT
Start: 2017-09-09 | End: 2017-09-09

## 2017-09-09 RX ADMIN — WARFARIN SODIUM 5 MILLIGRAM(S): 2.5 TABLET ORAL at 21:42

## 2017-09-09 RX ADMIN — Medication 75 MILLIGRAM(S): at 11:43

## 2017-09-09 RX ADMIN — AZITHROMYCIN 255 MILLIGRAM(S): 500 TABLET, FILM COATED ORAL at 13:22

## 2017-09-09 RX ADMIN — Medication 75 MILLIGRAM(S): at 18:11

## 2017-09-09 RX ADMIN — Medication 30 MILLIGRAM(S): at 06:00

## 2017-09-09 RX ADMIN — SIMVASTATIN 20 MILLIGRAM(S): 20 TABLET, FILM COATED ORAL at 21:42

## 2017-09-09 RX ADMIN — DONEPEZIL HYDROCHLORIDE 5 MILLIGRAM(S): 10 TABLET, FILM COATED ORAL at 21:58

## 2017-09-09 RX ADMIN — PIPERACILLIN AND TAZOBACTAM 25 GRAM(S): 4; .5 INJECTION, POWDER, LYOPHILIZED, FOR SOLUTION INTRAVENOUS at 13:25

## 2017-09-09 RX ADMIN — LISINOPRIL 5 MILLIGRAM(S): 2.5 TABLET ORAL at 06:00

## 2017-09-09 RX ADMIN — Medication 30 MILLIGRAM(S): at 18:11

## 2017-09-09 RX ADMIN — Medication 0.25 MILLIGRAM(S): at 06:00

## 2017-09-09 RX ADMIN — Medication 40 MILLIEQUIVALENT(S): at 18:11

## 2017-09-09 RX ADMIN — PIPERACILLIN AND TAZOBACTAM 25 GRAM(S): 4; .5 INJECTION, POWDER, LYOPHILIZED, FOR SOLUTION INTRAVENOUS at 06:00

## 2017-09-09 RX ADMIN — Medication 1: at 13:04

## 2017-09-09 RX ADMIN — PIPERACILLIN AND TAZOBACTAM 25 GRAM(S): 4; .5 INJECTION, POWDER, LYOPHILIZED, FOR SOLUTION INTRAVENOUS at 21:44

## 2017-09-09 NOTE — PROGRESS NOTE ADULT - SUBJECTIVE AND OBJECTIVE BOX
Patient is a 89y old  Male who presents with a chief complaint of Weakness and increased confusion. (07 Sep 2017 17:35)      INTERVAL OVER NIGHT EVENTS:    MEDICATIONS  (STANDING):  sodium chloride 0.45%. 1000 milliLiter(s) (100 mL/Hr) IV Continuous <Continuous>  insulin lispro (HumaLOG) corrective regimen sliding scale   SubCutaneous three times a day before meals  dextrose 5%. 1000 milliLiter(s) (50 mL/Hr) IV Continuous <Continuous>  dextrose 50% Injectable 12.5 Gram(s) IV Push once  dextrose 50% Injectable 25 Gram(s) IV Push once  dextrose 50% Injectable 25 Gram(s) IV Push once  azithromycin  IVPB 500 milliGRAM(s) IV Intermittent every 24 hours  piperacillin/tazobactam IVPB. 3.375 Gram(s) IV Intermittent every 8 hours  azithromycin  IVPB   IV Intermittent   lisinopril 5 milliGRAM(s) Oral daily  digoxin     Tablet 0.25 milliGRAM(s) Oral daily  simvastatin 20 milliGRAM(s) Oral at bedtime  busPIRone 30 milliGRAM(s) Oral two times a day  donepezil 5 milliGRAM(s) Oral at bedtime  venlafaxine 75 milliGRAM(s) Oral two times a day with meals  warfarin 5 milliGRAM(s) Oral once  magnesium sulfate  IVPB 1 Gram(s) IV Intermittent once    MEDICATIONS  (PRN):  dextrose Gel 1 Dose(s) Oral once PRN Blood Glucose LESS THAN 70 milliGRAM(s)/deciliter  glucagon  Injectable 1 milliGRAM(s) IntraMuscular once PRN Glucose LESS THAN 70 milligrams/deciliter      Allergies    latex (Rash)  No Known Drug Allergies    Intolerances        REVIEW OF SYSTEMS:  CONSTITUTIONAL: No fever, weight loss, or fatigue  EYES: No eye pain, visual disturbances, or discharge  ENMT:  No difficulty hearing, tinnitus, vertigo; No sinus or throat pain  NECK: No pain or stiffness  BREASTS: No pain, masses, or nipple discharge  RESPIRATORY: No cough, wheezing, chills or hemoptysis; No shortness of breath  CARDIOVASCULAR: No chest pain, palpitations, dizziness, or leg swelling  GASTROINTESTINAL: No abdominal or epigastric pain. No nausea, vomiting, or hematemesis; No diarrhea or constipation. No melena or hematochezia.  GENITOURINARY: No dysuria, frequency, hematuria, or incontinence  NEUROLOGICAL: No headaches, memory loss, loss of strength, numbness, or tremors  SKIN: No itching, burning, rashes, or lesions   LYMPH NODES: No enlarged glands  ENDOCRINE: No heat or cold intolerance; No hair loss  MUSCULOSKELETAL: No joint pain or swelling; No muscle, back, or extremity pain  PSYCHIATRIC: No depression, anxiety, mood swings, or difficulty sleeping  HEME/LYMPH: No easy bruising, or bleeding gums  ALLERGY AND IMMUNOLOGIC: No hives or eczema    Vital Signs Last 24 Hrs  T(C): 36.7 (10 Sep 2017 19:49), Max: 37.2 (10 Sep 2017 14:30)  T(F): 98.1 (10 Sep 2017 19:49), Max: 99 (10 Sep 2017 14:30)  HR: 66 (10 Sep 2017 19:49) (66 - 84)  BP: 122/60 (10 Sep 2017 19:49) (122/60 - 133/83)  BP(mean): --  RR: 16 (10 Sep 2017 19:49) (16 - 18)  SpO2: 94% (10 Sep 2017 19:49) (94% - 99%)    PHYSICAL EXAM:  GENERAL: NAD, well-groomed, well-developed  HEAD:  Atraumatic, Normocephalic  EYES: EOMI, PERRLA, conjunctiva and sclera clear  ENMT: No tonsillar erythema, exudates, or enlargement; Moist mucous membranes, Good dentition, No lesions  NECK: Supple, No JVD, Normal thyroid  NERVOUS SYSTEM:  Alert & Oriented X3, Motor Strength 5/5 B/L upper and lower extremities; DTRs 2+ intact and symmetric  CHEST/LUNG: Clear to percussion bilaterally; No rales, rhonchi, wheezing, or rubs  HEART: Regular rate and rhythm; No murmurs, rubs, or gallops  ABDOMEN: Soft, Nontender, Nondistended; Bowel sounds present  EXTREMITIES:  2+ Peripheral Pulses, No clubbing, cyanosis, or edema  LYMPH: No lymphadenopathy noted  SKIN: No rashes or lesions    LABS:    CAPILLARY BLOOD GLUCOSE    118 (09 Sep 2017 21:34)          RADIOLOGY & ADDITIONAL TESTS:    Imaging Personally Reviewed:  [x] YES  [ ] NO    Consultant(s) Notes Reviewed:  [x] YES  [ ] NO    Care Discussed with Consultants/Other Providers [x] YES  [ ] NO

## 2017-09-09 NOTE — PROGRESS NOTE ADULT - SUBJECTIVE AND OBJECTIVE BOX
Patient seen and examined today Plan discussed/Questions answered  (with patient/ancillary staff/colleagues) Chart notes reviewd More detailed Pulm/Critical Care notes, assessment and plan  will be added later today as needed after reviewing further labs as they become available     Notable interval events reviewed    ROS/PE  . REVIEW OF SYMPTOMS    Able to give ROS  Yes     RELIABLE NO   Weakness Yes   Chills No   Vision changes No  Lymph nodes No enlarged glands   Endocrine No unexplained hair loss No het or cold intolerance   Allergy No hives   Sore throat No   Coughing blood No  Headache No  Confusion YES  Chest pain No  Palpitations No   Pain abdomen NO   Shortness of breath YES  Vomiting NO  Pain neck No  Pain abdomen NO     PHYSICAL EXAM    HEENT Unremarkable PERRLA atraumatic  RESP Fair air entry EXP prolonged    Harsh breath sound Resp distres mild  CARDIAC S1 S2 No S3     NO JVD   Awake Alert and ORIENTED x tw  ABDOMEN SOFT BS PRESENT NOT DISTENDED No hepatosplenomegaly  PEDAL EDEMA present No calf tenderness  NO rash GENERAL Not TOXIC looking  . Patient seen and examined today Plan discussed/Questions answered  (with patient/ancillary staff/colleagues) Chart notes reviewd More detailed Pulm/Critical Care notes, assessment and plan  will be added later today as needed after reviewing further labs as they become available     Notable interval events reviewed    ROS/PE  . REVIEW OF SYMPTOMS    Able to give ROS  Yes     RELIABLE NO   Weakness Yes   Chills No   Vision changes No  Lymph nodes No enlarged glands   Endocrine No unexplained hair loss No het or cold intolerance   Allergy No hives   Sore throat No   Coughing blood No  Headache No  Confusion YES  Chest pain No  Palpitations No   Pain abdomen NO   Shortness of breath YES  Vomiting NO  Pain neck No  Pain abdomen NO     PHYSICAL EXAM    HEENT Unremarkable PERRLA atraumatic  RESP Fair air entry EXP prolonged    Harsh breath sound Resp distres mild  CARDIAC S1 S2 No S3     NO JVD   Awake Alert and ORIENTED x tw  ABDOMEN SOFT BS PRESENT NOT DISTENDED No hepatosplenomegaly  PEDAL EDEMA present No calf tenderness    VITALS/LABS  9/9/2017 afeb 64 120/60 18 90%  9/9/2017 acc [id number]   9/9/2017 IV 1/2  (9/7)   9/9/2017 W 6.1 Hb 11.5 Plt 203  Na 138 K 3.2 CO2 25 Cr .4  PROBLEM ASSESSMENT PLAN   RESP   9/7 RA 95% 9/8 RA 90%   SEPSIS   Zithro (9/7) zosyn (9/7) 9/7 bc n 9.7 uc n   9/7-9/9 W 16.7-6.1   9/7 LA 1.5  9/7 PCT .18 9/7 RVP Entero/Rhinovirus detect  9/7 Leg n   9/7 CXR NAPD   CHF  10/24/2014 ECHO EF 50% Mild hypokinesia septum PASP 40 Mod MR   Lisinopril 5 (3/19)   A FIB   Dig .25 (3/19) Coumadin  CAD  Simvastatin 20  (9/7)   ANXIETY    Buspar 30 x 2 (9/7)    OBS   Donepezil 5 (9/7)  DEPRESSION  Venlafaxine 75 x 2 (9/7)   GLOBAL ISSUE/BEST PRACTICE:        PROBLEM:      Analgesia:     na                        PROBLEM: Sedation:     na               PROBLEM: HOB elevation:   y             PROBLEM: Stress ulcer proph:    na                      PROBLEM: VTE prophylaxis:      On coumadin                   PROBLEM: Glycemic control:    na   PROBLEM: Nutrition:    diabetic (9/7)           PROBLEM: Advanced directive: na     PROBLEM: Allergies:  na  PATIENT DESCRIPTION PMH SOCIAL   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 [facility] [employer] HO  injury L shin remote past 3/18/2014 V duplex legs –ben Chr swelling leg  OBS Prostate CA LV Systolic dysfunction CHF 10/24/2014 ECHO EF 50% Mild hypokinesia septum PASP 40 Mod MR    A fib  (on coumadin) Htn Depression Lower extr edema Chr swelling L leg DM      LUNG NODULE 10/21/2014 CT Ch  1 cm hypoattenuating thyroid nodule  Trace symmetrical layering bl pl effusions with dependent atelectasis bases  Ca granuloma both RML and l lower lobes  5 mm subpleural parenchymal nodule R apex    9/7/2017 Patient was sent from [facility] home for cough URT symptoms x 2 d  Was supposed to be started on Zpak 9/7 but was noted to have T 101   HOSPITAL COURSE PLAN 9/7/2017 ADMISSION [facility]   SEPSIS Source unclear Likely Resp tract infection Zosyn vanco started by ER MD 9/7 pending bld c uc will continue NS 3l given 9/7 9/7 PCT .18 9/7 RVP Entero/Rhinovirus detect  9/7 Leg n Rxed Zithro (9/7) zosyn (9/7)   DEHYDRATION IVF   DM Sl scale   TIME SPENT Over 25 minutes aggregate  care time spent on encounter; activities included   direct patient care, counseling and/or coordinating care reviewing notes, lab data/ imaging , discussion with multidisciplinary team/ patient  /family. Risks, benefits, alternatives  discussed in detail. Questions/concerns  were addressed  to the best of my ability .    NO rash GENERAL Not TOXIC looking  .

## 2017-09-10 LAB
ALBUMIN SERPL ELPH-MCNC: 2.8 G/DL — LOW (ref 3.3–5)
ALP SERPL-CCNC: 79 U/L — SIGNIFICANT CHANGE UP (ref 40–120)
ALT FLD-CCNC: 16 U/L — SIGNIFICANT CHANGE UP (ref 12–78)
ANION GAP SERPL CALC-SCNC: 9 MMOL/L — SIGNIFICANT CHANGE UP (ref 5–17)
AST SERPL-CCNC: 15 U/L — SIGNIFICANT CHANGE UP (ref 15–37)
BILIRUB DIRECT SERPL-MCNC: 0.3 MG/DL — HIGH (ref 0.05–0.2)
BILIRUB INDIRECT FLD-MCNC: 0.4 MG/DL — SIGNIFICANT CHANGE UP (ref 0.2–1)
BILIRUB SERPL-MCNC: 0.7 MG/DL — SIGNIFICANT CHANGE UP (ref 0.2–1.2)
BUN SERPL-MCNC: 8 MG/DL — SIGNIFICANT CHANGE UP (ref 7–23)
CALCIUM SERPL-MCNC: 9 MG/DL — SIGNIFICANT CHANGE UP (ref 8.5–10.1)
CHLORIDE SERPL-SCNC: 101 MMOL/L — SIGNIFICANT CHANGE UP (ref 96–108)
CO2 SERPL-SCNC: 28 MMOL/L — SIGNIFICANT CHANGE UP (ref 22–31)
CREAT SERPL-MCNC: 0.53 MG/DL — SIGNIFICANT CHANGE UP (ref 0.5–1.3)
GLUCOSE SERPL-MCNC: 139 MG/DL — HIGH (ref 70–99)
HCT VFR BLD CALC: 36.4 % — LOW (ref 39–50)
HGB BLD-MCNC: 12.3 G/DL — LOW (ref 13–17)
INR BLD: 1.76 RATIO — HIGH (ref 0.88–1.16)
MAGNESIUM SERPL-MCNC: 1.4 MG/DL — LOW (ref 1.6–2.6)
MCHC RBC-ENTMCNC: 31.2 PG — SIGNIFICANT CHANGE UP (ref 27–34)
MCHC RBC-ENTMCNC: 33.8 GM/DL — SIGNIFICANT CHANGE UP (ref 32–36)
MCV RBC AUTO: 92.5 FL — SIGNIFICANT CHANGE UP (ref 80–100)
PHOSPHATE SERPL-MCNC: 2.6 MG/DL — SIGNIFICANT CHANGE UP (ref 2.5–4.5)
PLATELET # BLD AUTO: 242 K/UL — SIGNIFICANT CHANGE UP (ref 150–400)
POTASSIUM SERPL-MCNC: 3.9 MMOL/L — SIGNIFICANT CHANGE UP (ref 3.5–5.3)
POTASSIUM SERPL-SCNC: 3.9 MMOL/L — SIGNIFICANT CHANGE UP (ref 3.5–5.3)
PROT SERPL-MCNC: 6.3 G/DL — SIGNIFICANT CHANGE UP (ref 6–8.3)
PROTHROM AB SERPL-ACNC: 19.4 SEC — HIGH (ref 9.8–12.7)
RBC # BLD: 3.94 M/UL — LOW (ref 4.2–5.8)
RBC # FLD: 12 % — SIGNIFICANT CHANGE UP (ref 10.3–14.5)
S PNEUM AG SER QL: SIGNIFICANT CHANGE UP
SODIUM SERPL-SCNC: 138 MMOL/L — SIGNIFICANT CHANGE UP (ref 135–145)
WBC # BLD: 7.7 K/UL — SIGNIFICANT CHANGE UP (ref 3.8–10.5)
WBC # FLD AUTO: 7.7 K/UL — SIGNIFICANT CHANGE UP (ref 3.8–10.5)

## 2017-09-10 RX ORDER — WARFARIN SODIUM 2.5 MG/1
5 TABLET ORAL ONCE
Qty: 0 | Refills: 0 | Status: COMPLETED | OUTPATIENT
Start: 2017-09-10 | End: 2017-09-10

## 2017-09-10 RX ORDER — MAGNESIUM SULFATE 500 MG/ML
1 VIAL (ML) INJECTION ONCE
Qty: 0 | Refills: 0 | Status: COMPLETED | OUTPATIENT
Start: 2017-09-10 | End: 2017-09-10

## 2017-09-10 RX ADMIN — Medication 0.25 MILLIGRAM(S): at 05:48

## 2017-09-10 RX ADMIN — PIPERACILLIN AND TAZOBACTAM 25 GRAM(S): 4; .5 INJECTION, POWDER, LYOPHILIZED, FOR SOLUTION INTRAVENOUS at 13:32

## 2017-09-10 RX ADMIN — SODIUM CHLORIDE 100 MILLILITER(S): 9 INJECTION, SOLUTION INTRAVENOUS at 08:49

## 2017-09-10 RX ADMIN — PIPERACILLIN AND TAZOBACTAM 25 GRAM(S): 4; .5 INJECTION, POWDER, LYOPHILIZED, FOR SOLUTION INTRAVENOUS at 21:44

## 2017-09-10 RX ADMIN — PIPERACILLIN AND TAZOBACTAM 25 GRAM(S): 4; .5 INJECTION, POWDER, LYOPHILIZED, FOR SOLUTION INTRAVENOUS at 05:49

## 2017-09-10 RX ADMIN — DONEPEZIL HYDROCHLORIDE 5 MILLIGRAM(S): 10 TABLET, FILM COATED ORAL at 21:45

## 2017-09-10 RX ADMIN — AZITHROMYCIN 255 MILLIGRAM(S): 500 TABLET, FILM COATED ORAL at 13:18

## 2017-09-10 RX ADMIN — Medication 75 MILLIGRAM(S): at 08:50

## 2017-09-10 RX ADMIN — Medication 30 MILLIGRAM(S): at 17:18

## 2017-09-10 RX ADMIN — SIMVASTATIN 20 MILLIGRAM(S): 20 TABLET, FILM COATED ORAL at 21:44

## 2017-09-10 RX ADMIN — Medication 1: at 13:18

## 2017-09-10 RX ADMIN — LISINOPRIL 5 MILLIGRAM(S): 2.5 TABLET ORAL at 05:48

## 2017-09-10 RX ADMIN — Medication 75 MILLIGRAM(S): at 17:18

## 2017-09-10 RX ADMIN — WARFARIN SODIUM 5 MILLIGRAM(S): 2.5 TABLET ORAL at 21:47

## 2017-09-10 RX ADMIN — Medication 30 MILLIGRAM(S): at 05:48

## 2017-09-10 RX ADMIN — Medication 100 GRAM(S): at 21:43

## 2017-09-10 NOTE — PROGRESS NOTE ADULT - PROBLEM SELECTOR PLAN 9
Gastrointestina stress ulcer prophylaxis l and DVT prophylaxis administrated
Gastrointestina stress ulcer prophylaxis l and DVT prophylaxis administrated

## 2017-09-10 NOTE — PROGRESS NOTE ADULT - PROBLEM SELECTOR PLAN 1
Admitted for septic workup and evaluation,send blood and urine cx,serial lactate levels,monitor vitals closley,ivfs hydration,monitor urine output and renal profile,iv abx initiated IV Abx, Blood and Urine C&S

## 2017-09-10 NOTE — PROGRESS NOTE ADULT - SUBJECTIVE AND OBJECTIVE BOX
Patient is a 89y old  Male who presents with a chief complaint of Weakness and increased confusion. (07 Sep 2017 17:35)      INTERVAL OVER NIGHT EVENTS:    MEDICATIONS  (STANDING):  sodium chloride 0.45%. 1000 milliLiter(s) (100 mL/Hr) IV Continuous <Continuous>  insulin lispro (HumaLOG) corrective regimen sliding scale   SubCutaneous three times a day before meals  dextrose 5%. 1000 milliLiter(s) (50 mL/Hr) IV Continuous <Continuous>  dextrose 50% Injectable 12.5 Gram(s) IV Push once  dextrose 50% Injectable 25 Gram(s) IV Push once  dextrose 50% Injectable 25 Gram(s) IV Push once  azithromycin  IVPB 500 milliGRAM(s) IV Intermittent every 24 hours  piperacillin/tazobactam IVPB. 3.375 Gram(s) IV Intermittent every 8 hours  azithromycin  IVPB   IV Intermittent   lisinopril 5 milliGRAM(s) Oral daily  digoxin     Tablet 0.25 milliGRAM(s) Oral daily  simvastatin 20 milliGRAM(s) Oral at bedtime  busPIRone 30 milliGRAM(s) Oral two times a day  donepezil 5 milliGRAM(s) Oral at bedtime  venlafaxine 75 milliGRAM(s) Oral two times a day with meals    MEDICATIONS  (PRN):  dextrose Gel 1 Dose(s) Oral once PRN Blood Glucose LESS THAN 70 milliGRAM(s)/deciliter  glucagon  Injectable 1 milliGRAM(s) IntraMuscular once PRN Glucose LESS THAN 70 milligrams/deciliter      Allergies    latex (Rash)  No Known Drug Allergies    Intolerances        Vital Signs Last 24 Hrs  T(C): 36.7 (10 Sep 2017 19:49), Max: 37.2 (10 Sep 2017 14:30)  T(F): 98.1 (10 Sep 2017 19:49), Max: 99 (10 Sep 2017 14:30)  HR: 66 (10 Sep 2017 19:49) (66 - 84)  BP: 122/60 (10 Sep 2017 19:49) (122/60 - 133/83)  BP(mean): --  RR: 16 (10 Sep 2017 19:49) (16 - 18)  SpO2: 94% (10 Sep 2017 19:49) (94% - 99%)    PHYSICAL EXAM:  GENERAL: NAD, well-groomed, well-developed  HEAD:  Atraumatic, Normocephalic  EYES: EOMI, PERRLA, conjunctiva and sclera clear  ENMT: No tonsillar erythema, exudates, or enlargement; Moist mucous membranes, Good dentition, No lesions  NECK: Supple, No JVD, Normal thyroid  NERVOUS SYSTEM:  Alert & Oriented X3, Motor Strength weakness upper and lower extremities; DTRs 2+ intact and symmetric  CHEST/LUNG: Clear to percussion bilaterally; No rales, rhonchi, wheezing, or rubs  HEART: Regular rate and rhythm; No murmurs, rubs, or gallops  ABDOMEN: Soft, Nontender, Nondistended; Bowel sounds present  EXTREMITIES:  2+ Peripheral Pulses, No clubbing, cyanosis, or edema  LYMPH: No lymphadenopathy noted  SKIN: No rashes or lesions    LABS:                        12.3   7.7   )-----------( 242      ( 10 Sep 2017 06:48 )             36.4     10 Sep 2017 06:48    138    |  101    |  8      ----------------------------<  139    3.9     |  28     |  0.53     Ca    9.0        10 Sep 2017 06:48  Phos  2.6       10 Sep 2017 06:48  Mg     1.4       10 Sep 2017 06:48    TPro  6.3    /  Alb  2.8    /  TBili  0.7    /  DBili  .30    /  AST  15     /  ALT  16     /  AlkPhos  79     10 Sep 2017 06:48    LIVER FUNCTIONS - ( 10 Sep 2017 06:48 )  Alb: 2.8 g/dL / Pro: 6.3 g/dL / ALK PHOS: 79 U/L / ALT: 16 U/L / AST: 15 U/L / GGT: x           PT/INR - ( 10 Sep 2017 06:48 )   PT: 19.4 sec;   INR: 1.76 ratio           CAPILLARY BLOOD GLUCOSE  117 (10 Sep 2017 21:17)  147 (10 Sep 2017 16:59)  183 (10 Sep 2017 12:41)  128 (10 Sep 2017 07:57)                                  12.3   7.7   )-----------( 242      ( 10 Sep 2017 06:48 )             36.4   )  147 (10 Sep 2017 16:59)  183 (10 Sep 2017 12:41)  128 (10 Sep 2017 07:57)          RADIOLOGY & ADDITIONAL TESTS:    Imaging Personally Reviewed:  [ ] YES  [ ] NO    Consultant(s) Notes Reviewed:  [ ] YES  [ ] NO    Care Discussed with Consultants/Other Providers [ ] YES  [ ] NO Patient is a 89y old  Male who presents with a chief complaint of Weakness and increased confusion. (07 Sep 2017 17:35)      INTERVAL OVER NIGHT EVENTS:    MEDICATIONS  (STANDING):  sodium chloride 0.45%. 1000 milliLiter(s) (100 mL/Hr) IV Continuous <Continuous>  insulin lispro (HumaLOG) corrective regimen sliding scale   SubCutaneous three times a day before meals  dextrose 5%. 1000 milliLiter(s) (50 mL/Hr) IV Continuous <Continuous>  dextrose 50% Injectable 12.5 Gram(s) IV Push once  dextrose 50% Injectable 25 Gram(s) IV Push once  dextrose 50% Injectable 25 Gram(s) IV Push once  azithromycin  IVPB 500 milliGRAM(s) IV Intermittent every 24 hours  piperacillin/tazobactam IVPB. 3.375 Gram(s) IV Intermittent every 8 hours  azithromycin  IVPB   IV Intermittent   lisinopril 5 milliGRAM(s) Oral daily  digoxin     Tablet 0.25 milliGRAM(s) Oral daily  simvastatin 20 milliGRAM(s) Oral at bedtime  busPIRone 30 milliGRAM(s) Oral two times a day  donepezil 5 milliGRAM(s) Oral at bedtime  venlafaxine 75 milliGRAM(s) Oral two times a day with meals    MEDICATIONS  (PRN):  dextrose Gel 1 Dose(s) Oral once PRN Blood Glucose LESS THAN 70 milliGRAM(s)/deciliter  glucagon  Injectable 1 milliGRAM(s) IntraMuscular once PRN Glucose LESS THAN 70 milligrams/deciliter      Allergies    latex (Rash)  No Known Drug Allergies    Intolerances        Vital Signs Last 24 Hrs  T(C): 36.7 (10 Sep 2017 19:49), Max: 37.2 (10 Sep 2017 14:30)  T(F): 98.1 (10 Sep 2017 19:49), Max: 99 (10 Sep 2017 14:30)  HR: 66 (10 Sep 2017 19:49) (66 - 84)  BP: 122/60 (10 Sep 2017 19:49) (122/60 - 133/83)  BP(mean): --  RR: 16 (10 Sep 2017 19:49) (16 - 18)  SpO2: 94% (10 Sep 2017 19:49) (94% - 99%)    PHYSICAL EXAM:  GENERAL: NAD, well-groomed, well-developed  HEAD:  Atraumatic, Normocephalic  EYES: EOMI, PERRLA, conjunctiva and sclera clear  ENMT: No tonsillar erythema, exudates, or enlargement; Moist mucous membranes, Good dentition, No lesions  NECK: Supple, No JVD, Normal thyroid  NERVOUS SYSTEM:  Alert & Oriented X3, Motor Strength weakness upper and lower extremities; DTRs 2+ intact and symmetric  CHEST/LUNG: Clear to percussion bilaterally; No rales, rhonchi, wheezing, or rubs  HEART: Regular rate and rhythm; No murmurs, rubs, or gallops  ABDOMEN: Soft, Nontender, Nondistended; Bowel sounds present  EXTREMITIES:  2+ Peripheral Pulses, No clubbing, cyanosis, or edema  LYMPH: No lymphadenopathy noted  SKIN: No rashes or lesions    LABS:                        12.3   7.7   )-----------( 242      ( 10 Sep 2017 06:48 )             36.4     10 Sep 2017 06:48    138    |  101    |  8      ----------------------------<  139    3.9     |  28     |  0.53     Ca    9.0        10 Sep 2017 06:48  Phos  2.6       10 Sep 2017 06:48  Mg     1.4       10 Sep 2017 06:48    TPro  6.3    /  Alb  2.8    /  TBili  0.7    /  DBili  .30    /  AST  15     /  ALT  16     /  AlkPhos  79     10 Sep 2017 06:48    LIVER FUNCTIONS - ( 10 Sep 2017 06:48 )  Alb: 2.8 g/dL / Pro: 6.3 g/dL / ALK PHOS: 79 U/L / ALT: 16 U/L / AST: 15 U/L / GGT: x           PT/INR - ( 10 Sep 2017 06:48 )   PT: 19.4 sec;   INR: 1.76 ratio           CAPILLARY BLOOD GLUCOSE  117 (10 Sep 2017 21:17)  147 (10 Sep 2017 16:59)  183 (10 Sep 2017 12:41)  128 (10 Sep 2017 07:57)                        12.3   7.7   )-----------( 242      ( 10 Sep 2017 06:48 )             36.4   )  147 (10 Sep 2017 16:59)  183 (10 Sep 2017 12:41)  128 (10 Sep 2017 07:57)          RADIOLOGY & ADDITIONAL TESTS:    Imaging Personally Reviewed:  [ ] YES  [ ] NO    Consultant(s) Notes Reviewed:  [ ] YES  [ ] NO    Care Discussed with Consultants/Other Providers [ ] YES  [ ] NO

## 2017-09-10 NOTE — PROGRESS NOTE ADULT - SUBJECTIVE AND OBJECTIVE BOX
Patient seen and examined today Plan discussed/Questions answered  (with patient/ancillary staff/colleagues) Chart notes reviewd More detailed Pulm/Critical Care notes, assessment and plan  will be added later today as needed after reviewing further labs as they become available     Notable interval events reviewed    ROS/PE  . REVIEW OF SYMPTOMS    Able to give ROS  Yes     RELIABLE NO   Weakness Yes   Chills No   Vision changes No  Lymph nodes No enlarged glands   Endocrine No unexplained hair loss No het or cold intolerance   Allergy No hives   Sore throat No   Coughing blood No  Headache No  Confusion YES  Chest pain No  Palpitations No   Pain abdomen NO   Shortness of breath YES  Vomiting NO  Pain neck No  Pain abdomen NO     PHYSICAL EXAM    HEENT Unremarkable PERRLA atraumatic  RESP Fair air entry EXP prolonged    Harsh breath sound Resp distres mild  CARDIAC S1 S2 No S3     NO JVD   Awake Alert and ORIENTED x tw  ABDOMEN SOFT BS PRESENT NOT DISTENDED No hepatosplenomegaly  PEDAL EDEMA present No calf tenderness  NO rash GENERAL Not TOXIC looking  . Patient seen and examined today Plan discussed/Questions answered  (with patient/ancillary staff/colleagues) Chart notes reviewd More detailed Pulm/Critical Care notes, assessment and plan  will be added later today as needed after reviewing further labs as they become available     Notable interval events reviewed    ROS/PE  . REVIEW OF SYMPTOMS    Able to give ROS  Yes     RELIABLE NO   Weakness Yes   Chills No   Vision changes No  Lymph nodes No enlarged glands   Endocrine No unexplained hair loss No het or cold intolerance   Allergy No hives   Sore throat No   Coughing blood No  Headache No  Confusion YES  Chest pain No  Palpitations No   Pain abdomen NO   Shortness of breath YES  Vomiting NO  Pain neck No  Pain abdomen NO     PHYSICAL EXAM    HEENT Unremarkable PERRLA atraumatic  RESP Fair air entry EXP prolonged    Harsh breath sound Resp distres mild  CARDIAC S1 S2 No S3     NO JVD   Awake Alert and ORIENTED x tw  ABDOMEN SOFT BS PRESENT NOT DISTENDED No hepatosplenomegaly  PEDAL EDEMA present No calf tenderness    VITALS/LABS  9/10/2017 99f 84 130/70 16 99%   9/10/2017 W 7.7 Hb 12.3 Plt 242  Na 138 K 3.9 CO2 28 Cr .5 G 139 Mg 1.4   PROBLEM ASSESSMENT PLAN   RESP   9/7 RA 95% 9/8 RA 90%   SEPSIS   Zithro (9/7) zosyn (9/7) 9/7 bc n 9.7 uc n   9/7-9/9 W 16.7-6.1   9/7 LA 1.5  9/7 PCT .18 9/7 RVP Entero/Rhinovirus detect  9/7 Leg n 9/8 Str pneum n  9/7 bc n 9/7 uc n   9/7 CXR NAPD   CHF  10/24/2014 ECHO EF 50% Mild hypokinesia septum PASP 40 Mod MR   Lisinopril 5 (3/19)   A FIB   Dig .25 (3/19) Coumadin  CAD  Simvastatin 20  (9/7)   ANXIETY    Buspar 30 x 2 (9/7)    OBS   Donepezil 5 (9/7)  DEPRESSION  Venlafaxine 75 x 2 (9/7)   GLOBAL ISSUE/BEST PRACTICE:        PROBLEM:      Analgesia:     na                        PROBLEM: Sedation:     na               PROBLEM: HOB elevation:   y             PROBLEM: Stress ulcer proph:    na                      PROBLEM: VTE prophylaxis:      On coumadin                   PROBLEM: Glycemic control:    na   PROBLEM: Nutrition:    diabetic (9/7)           PROBLEM: Advanced directive: na     PROBLEM: Allergies:  na  PATIENT DESCRIPTION PMH SOCIAL   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 [facility] HO  injury L shin remote past 3/18/2014 V duplex legs –ben Chr swelling leg  OBS Prostate CA LV Systolic dysfunction CHF 10/24/2014 ECHO EF 50% Mild hypokinesia septum PASP 40 Mod MR    A fib  (on coumadin) Htn Depression Lower extr edema Chr swelling L leg DM      LUNG NODULE 10/21/2014 CT Ch  1 cm hypoattenuating thyroid nodule  Trace symmetrical layering bl pl effusions with dependent atelectasis bases  Ca granuloma both RML and l lower lobes  5 mm subpleural parenchymal nodule R apex    9/7/2017 Patient was sent from  home for cough URT symptoms x 2 d  Was supposed to be started on Zpak 9/7 but was noted to have T 101   HOSPITAL COURSE PLAN 9/7/2017 ADMISSION [facility] P   SEPSIS Source unclear Likely Resp tract infection Zosyn vanco started by ER MD 9/7 pending bld c uc will continue NS 3l given 9/7 9/7 PCT .18 9/7 RVP Entero/Rhinovirus detect  9/7 Leg n Rxed Zithro (9/7) zosyn (9/7)   DEHYDRATION IVF   DM Sl scale   TIME SPENT Over 25 minutes aggregate  care time spent on encounter; activities included   direct patient care, counseling and/or coordinating care reviewing notes, lab data/ imaging , discussion with multidisciplinary team/ patient  /family. Risks, benefits, alternatives  discussed in detail. Questions/concerns  were addressed  to the best of my ability .    NO rash GENERAL Not TOXIC looking  .

## 2017-09-11 LAB
ALBUMIN SERPL ELPH-MCNC: 2.6 G/DL — LOW (ref 3.3–5)
ALP SERPL-CCNC: 78 U/L — SIGNIFICANT CHANGE UP (ref 40–120)
ALT FLD-CCNC: 11 U/L — LOW (ref 12–78)
ANION GAP SERPL CALC-SCNC: 10 MMOL/L — SIGNIFICANT CHANGE UP (ref 5–17)
AST SERPL-CCNC: 12 U/L — LOW (ref 15–37)
BILIRUB SERPL-MCNC: 0.6 MG/DL — SIGNIFICANT CHANGE UP (ref 0.2–1.2)
BUN SERPL-MCNC: 6 MG/DL — LOW (ref 7–23)
CALCIUM SERPL-MCNC: 8.7 MG/DL — SIGNIFICANT CHANGE UP (ref 8.5–10.1)
CHLORIDE SERPL-SCNC: 101 MMOL/L — SIGNIFICANT CHANGE UP (ref 96–108)
CO2 SERPL-SCNC: 27 MMOL/L — SIGNIFICANT CHANGE UP (ref 22–31)
CREAT SERPL-MCNC: 0.52 MG/DL — SIGNIFICANT CHANGE UP (ref 0.5–1.3)
GLUCOSE SERPL-MCNC: 127 MG/DL — HIGH (ref 70–99)
HCT VFR BLD CALC: 35.1 % — LOW (ref 39–50)
HGB BLD-MCNC: 12.3 G/DL — LOW (ref 13–17)
INR BLD: 2.84 RATIO — HIGH (ref 0.88–1.16)
MAGNESIUM SERPL-MCNC: 1.7 MG/DL — SIGNIFICANT CHANGE UP (ref 1.6–2.6)
MCHC RBC-ENTMCNC: 32.6 PG — SIGNIFICANT CHANGE UP (ref 27–34)
MCHC RBC-ENTMCNC: 35 GM/DL — SIGNIFICANT CHANGE UP (ref 32–36)
MCV RBC AUTO: 93.2 FL — SIGNIFICANT CHANGE UP (ref 80–100)
PHOSPHATE SERPL-MCNC: 1.7 MG/DL — LOW (ref 2.5–4.5)
PLATELET # BLD AUTO: 264 K/UL — SIGNIFICANT CHANGE UP (ref 150–400)
POTASSIUM SERPL-MCNC: 3.5 MMOL/L — SIGNIFICANT CHANGE UP (ref 3.5–5.3)
POTASSIUM SERPL-SCNC: 3.5 MMOL/L — SIGNIFICANT CHANGE UP (ref 3.5–5.3)
PROT SERPL-MCNC: 5.9 G/DL — LOW (ref 6–8.3)
PROTHROM AB SERPL-ACNC: 31.6 SEC — HIGH (ref 9.8–12.7)
RBC # BLD: 3.77 M/UL — LOW (ref 4.2–5.8)
RBC # FLD: 12 % — SIGNIFICANT CHANGE UP (ref 10.3–14.5)
SODIUM SERPL-SCNC: 138 MMOL/L — SIGNIFICANT CHANGE UP (ref 135–145)
WBC # BLD: 8 K/UL — SIGNIFICANT CHANGE UP (ref 3.8–10.5)
WBC # FLD AUTO: 8 K/UL — SIGNIFICANT CHANGE UP (ref 3.8–10.5)

## 2017-09-11 RX ORDER — WARFARIN SODIUM 2.5 MG/1
3 TABLET ORAL ONCE
Qty: 0 | Refills: 0 | Status: DISCONTINUED | OUTPATIENT
Start: 2017-09-11 | End: 2017-09-11

## 2017-09-11 RX ORDER — POTASSIUM PHOSPHATE, MONOBASIC POTASSIUM PHOSPHATE, DIBASIC 236; 224 MG/ML; MG/ML
15 INJECTION, SOLUTION INTRAVENOUS ONCE
Qty: 0 | Refills: 0 | Status: COMPLETED | OUTPATIENT
Start: 2017-09-11 | End: 2017-09-11

## 2017-09-11 RX ADMIN — Medication 0.25 MILLIGRAM(S): at 05:16

## 2017-09-11 RX ADMIN — SIMVASTATIN 20 MILLIGRAM(S): 20 TABLET, FILM COATED ORAL at 21:37

## 2017-09-11 RX ADMIN — Medication 30 MILLIGRAM(S): at 05:16

## 2017-09-11 RX ADMIN — Medication 1: at 17:11

## 2017-09-11 RX ADMIN — LISINOPRIL 5 MILLIGRAM(S): 2.5 TABLET ORAL at 05:16

## 2017-09-11 RX ADMIN — PIPERACILLIN AND TAZOBACTAM 25 GRAM(S): 4; .5 INJECTION, POWDER, LYOPHILIZED, FOR SOLUTION INTRAVENOUS at 13:45

## 2017-09-11 RX ADMIN — POTASSIUM PHOSPHATE, MONOBASIC POTASSIUM PHOSPHATE, DIBASIC 63.75 MILLIMOLE(S): 236; 224 INJECTION, SOLUTION INTRAVENOUS at 17:16

## 2017-09-11 RX ADMIN — Medication 75 MILLIGRAM(S): at 17:22

## 2017-09-11 RX ADMIN — SODIUM CHLORIDE 100 MILLILITER(S): 9 INJECTION, SOLUTION INTRAVENOUS at 22:08

## 2017-09-11 RX ADMIN — AZITHROMYCIN 255 MILLIGRAM(S): 500 TABLET, FILM COATED ORAL at 17:10

## 2017-09-11 RX ADMIN — Medication 75 MILLIGRAM(S): at 09:20

## 2017-09-11 RX ADMIN — PIPERACILLIN AND TAZOBACTAM 25 GRAM(S): 4; .5 INJECTION, POWDER, LYOPHILIZED, FOR SOLUTION INTRAVENOUS at 21:37

## 2017-09-11 RX ADMIN — SODIUM CHLORIDE 100 MILLILITER(S): 9 INJECTION, SOLUTION INTRAVENOUS at 17:22

## 2017-09-11 RX ADMIN — DONEPEZIL HYDROCHLORIDE 5 MILLIGRAM(S): 10 TABLET, FILM COATED ORAL at 21:37

## 2017-09-11 RX ADMIN — PIPERACILLIN AND TAZOBACTAM 25 GRAM(S): 4; .5 INJECTION, POWDER, LYOPHILIZED, FOR SOLUTION INTRAVENOUS at 05:16

## 2017-09-11 RX ADMIN — Medication 30 MILLIGRAM(S): at 17:22

## 2017-09-11 NOTE — PROGRESS NOTE ADULT - SUBJECTIVE AND OBJECTIVE BOX
Chart reviewed: Assessment plan is as below Note will be updated as more  patient data is gathered Chart reviewed: Assessment plan is as below Note will be updated as more  patient data is gathered       Patient seen and examined today Plan discussed/Questions answered  (with patient/ancillary staff/colleagues) Chart notes reviewd More detailed Pulm/Critical Care notes, assessment and plan  will be added later today as needed after reviewing further labs as they become available     Notable interval events reviewed    ROS/PE  . REVIEW OF SYMPTOMS    Able to give ROS  Yes     RELIABLE NO   Weakness Yes   Chills No   Vision changes No  Lymph nodes No enlarged glands   Endocrine No unexplained hair loss No het or cold intolerance   Allergy No hives   Sore throat No   Coughing blood No  Headache No  Confusion YES  Chest pain No  Palpitations No   Pain abdomen NO   Shortness of breath YES  Vomiting NO  Pain neck No  Pain abdomen NO    PHYSICAL EXAM    HEENT Unremarkable PERRLA atraumatic  RESP Fair air entry EXP prolonged    Harsh breath sound Resp distres mild  CARDIAC S1 S2 No S3     NO JVD   Awake Alert and ORIENTED x on  ABDOMEN SOFT BS PRESENT NOT DISTENDED No hepatosplenomegaly  PEDAL EDEMA present No calf tenderness  NO rash GENERAL Not TOXIC looking  .  VITALS/LABS  9/11/2017 99f 66 120/70 18 94%   9/11/2017 W 8 Hb 12.3 Plt 264  Na 138 K 3.5 CO2 27 Cr .52   9/11 KPO4 replaced   PROBLEM ASSESSMENT PLAN   RESP   9/7 RA 95% 9/8 RA 90%   SEPSIS   Zithro (9/7) zosyn (9/7) 9/7 bc n 9.7 uc n   9/7-9/9 W 16.7-6.1   9/7 LA 1.5  9/7 PCT .18 9/7 RVP Entero/Rhinovirus detect  9/7 Leg n 9/8 Str pneum n  9/7 bc n 9/7 uc n   9/7 CXR NAPD   CHF  10/24/2014 ECHO EF 50% Mild hypokinesia septum PASP 40 Mod MR   Lisinopril 5 (3/19)   A FIB   Dig .25 (3/19) Coumadin  CAD  Simvastatin 20  (9/7)   ANXIETY    Buspar 30 x 2 (9/7)    OBS   Donepezil 5 (9/7)  DEPRESSION  Venlafaxine 75 x 2 (9/7)   GLOBAL ISSUE/BEST PRACTICE:        PROBLEM:      Analgesia:     na                        PROBLEM: Sedation:     na               PROBLEM: HOB elevation:   y             PROBLEM: Stress ulcer proph:    na                      PROBLEM: VTE prophylaxis:      On coumadin                   PROBLEM: Glycemic control:    na   PROBLEM: Nutrition:    diabetic (9/7)           PROBLEM: Advanced directive: na     PROBLEM: Allergies:  na  PATIENT DESCRIPTION PMH SOCIAL   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 PMH HO  injury L shin remote past 3/18/2014 V duplex legs –ben Chr swelling leg  OBS Prostate CA LV Systolic dysfunction CHF 10/24/2014 ECHO EF 50% Mild hypokinesia septum PASP 40 Mod MR    A fib  (on coumadin) Htn Depression Lower extr edema Chr swelling L leg DM      LUNG NODULE 10/21/2014 CT Ch  1 cm hypoattenuating thyroid nodule  Trace symmetrical layering bl pl effusions with dependent atelectasis bases  Ca granuloma both RML and l lower lobes  5 mm subpleural parenchymal nodule R apex    9/7/2017 Patient was sent from CF home for cough URT symptoms x 2 d  Was supposed to be started on Zpak 9/7 but was noted to have T 101   HOSPITAL COURSE PLAN 9/7/2017 ADMISSION [facility] P   SEPSIS Source unclear Likely Resp tract infection Zosyn vanco started by ER MD 9/7 pending bld c uc will continue NS 3l given 9/7 9/7 PCT .18 9/7 RVP Entero/Rhinovirus detect  9/7 Leg n Rxed Zithro (9/7) zosyn (9/7)   DEHYDRATION IVF   DM Sl scale   TIME SPENT Over 25 minutes aggregate  care time spent on encounter; activities included   direct patient care, counseling and/or coordinating care reviewing notes, lab data/ imaging , discussion with multidisciplinary team/ patient  /family. Risks, benefits, alternatives  discussed in detail. Questions/concerns  were addressed  to the best of my ability .

## 2017-09-11 NOTE — PROGRESS NOTE ADULT - SUBJECTIVE AND OBJECTIVE BOX
Late entry--due to malfunctioning [employer] Clinical  downtime       Patient seen and examined today Plan discussed/Questions answered  (with patient/ancillary staff/colleagues) Chart notes reviewd   No new issues reported by medical staff . All above noted Patient is resting in a bed comfortably .Confused ,poor mentation .No distress noted   Notable interval events reviewed    ROS/PE  . REVIEW OF SYMPTOMS    Able to give ROS  Yes     RELIABLE NO   Weakness Yes   Chills No   Vision changes No  Lymph nodes No enlarged glands   Endocrine No unexplained hair loss No het or cold intolerance   Allergy No hives   Sore throat No   Coughing blood No  Headache No  Confusion YES  Chest pain No  Palpitations No   Pain abdomen NO   Shortness of breath YES  Vomiting NO  Pain neck No  Pain abdomen NO    PHYSICAL EXAM    HEENT Unremarkable PERRLA atraumatic  RESP Fair air entry EXP prolonged    Harsh breath sound Resp distres mild  CARDIAC S1 S2 No S3     NO JVD   Awake Alert and ORIENTED x on  ABDOMEN SOFT BS PRESENT NOT DISTENDED No hepatosplenomegaly  PEDAL EDEMA present No calf tenderness  NO rash GENERAL Not TOXIC looking  .  VITALS/LABS  9/11/2017 99f 66 120/70 18 94%   9/11/2017 W 8 Hb 12.3 Plt 264  Na 138 K 3.5 CO2 27 Cr .52   9/11 KPO4 replaced   PROBLEM ASSESSMENT PLAN   RESP   9/7 RA 95% 9/8 RA 90%   SEPSIS   Zithro (9/7) zosyn (9/7) 9/7 bc n 9.7 uc n   9/7-9/9 W 16.7-6.1   9/7 LA 1.5  9/7 PCT .18 9/7 RVP Entero/Rhinovirus detect  9/7 Leg n 9/8 Str pneum n  9/7 bc n 9/7 uc n   9/7 CXR NAPD   CHF  10/24/2014 ECHO EF 50% Mild hypokinesia septum PASP 40 Mod MR   Lisinopril 5 (3/19)   A FIB   Dig .25 (3/19) Coumadin  CAD  Simvastatin 20  (9/7)   ANXIETY    Buspar 30 x 2 (9/7)    OBS   Donepezil 5 (9/7)  DEPRESSION  Venlafaxine 75 x 2 (9/7)     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 PMH HO  injury L shin remote past 3/18/2014 V duplex legs –ben Chr swelling leg  OBS Prostate CA LV Systolic dysfunction CHF 10/24/2014 ECHO EF 50% Mild hypokinesia septum PASP 40 Mod MR    A fib  (on coumadin) Htn Depression Lower extr edema Chr swelling L leg DM      LUNG NODULE 10/21/2014 CT Ch  1 cm hypoattenuating thyroid nodule  Trace symmetrical layering bl pl effusions with dependent atelectasis bases  Ca granuloma both RML and l lower lobes  5 mm subpleural parenchymal nodule R apex    9/7/2017 Patient was sent from CF home for cough URT symptoms x 2 d  Was supposed to be started on Zpak 9/7 but was noted to have T 101   HOSPITAL COURSE PLAN 9/7/2017 ADMISSION [facility]   SEPSIS Source unclear Likely Resp tract infection Zosyn vanco started by ER MD 9/7 pending bld c uc will continue NS 3l given 9/7 9/7 PCT .18 9/7 RVP Entero/Rhinovirus detect  9/7 Leg n Rxed Zithro (9/7) zosyn (9/7)   DEHYDRATION IVF   DM Sl scale

## 2017-09-12 ENCOUNTER — TRANSCRIPTION ENCOUNTER (OUTPATIENT)
Age: 82
End: 2017-09-12

## 2017-09-12 DIAGNOSIS — J40 BRONCHITIS, NOT SPECIFIED AS ACUTE OR CHRONIC: ICD-10-CM

## 2017-09-12 DIAGNOSIS — E11.9 TYPE 2 DIABETES MELLITUS WITHOUT COMPLICATIONS: ICD-10-CM

## 2017-09-12 LAB
ANION GAP SERPL CALC-SCNC: 7 MMOL/L — SIGNIFICANT CHANGE UP (ref 5–17)
BUN SERPL-MCNC: 5 MG/DL — LOW (ref 7–23)
CALCIUM SERPL-MCNC: 8.4 MG/DL — LOW (ref 8.5–10.1)
CHLORIDE SERPL-SCNC: 101 MMOL/L — SIGNIFICANT CHANGE UP (ref 96–108)
CO2 SERPL-SCNC: 29 MMOL/L — SIGNIFICANT CHANGE UP (ref 22–31)
CREAT SERPL-MCNC: 0.43 MG/DL — LOW (ref 0.5–1.3)
CULTURE RESULTS: SIGNIFICANT CHANGE UP
DIGOXIN SERPL-MCNC: 0.8 NG/ML — SIGNIFICANT CHANGE UP (ref 0.8–2)
GLUCOSE SERPL-MCNC: 118 MG/DL — HIGH (ref 70–99)
HCT VFR BLD CALC: 34.2 % — LOW (ref 39–50)
HGB BLD-MCNC: 11.7 G/DL — LOW (ref 13–17)
INR BLD: 2.66 RATIO — HIGH (ref 0.88–1.16)
MCHC RBC-ENTMCNC: 31.9 PG — SIGNIFICANT CHANGE UP (ref 27–34)
MCHC RBC-ENTMCNC: 34.2 GM/DL — SIGNIFICANT CHANGE UP (ref 32–36)
MCV RBC AUTO: 93 FL — SIGNIFICANT CHANGE UP (ref 80–100)
NT-PROBNP SERPL-SCNC: 1793 PG/ML — HIGH (ref 0–450)
PLATELET # BLD AUTO: 271 K/UL — SIGNIFICANT CHANGE UP (ref 150–400)
POTASSIUM SERPL-MCNC: 3.7 MMOL/L — SIGNIFICANT CHANGE UP (ref 3.5–5.3)
POTASSIUM SERPL-SCNC: 3.7 MMOL/L — SIGNIFICANT CHANGE UP (ref 3.5–5.3)
PROTHROM AB SERPL-ACNC: 29.6 SEC — HIGH (ref 9.8–12.7)
RBC # BLD: 3.68 M/UL — LOW (ref 4.2–5.8)
RBC # FLD: 12 % — SIGNIFICANT CHANGE UP (ref 10.3–14.5)
SODIUM SERPL-SCNC: 137 MMOL/L — SIGNIFICANT CHANGE UP (ref 135–145)
SPECIMEN SOURCE: SIGNIFICANT CHANGE UP
WBC # BLD: 8.7 K/UL — SIGNIFICANT CHANGE UP (ref 3.8–10.5)
WBC # FLD AUTO: 8.7 K/UL — SIGNIFICANT CHANGE UP (ref 3.8–10.5)

## 2017-09-12 PROCEDURE — 71010: CPT | Mod: 26

## 2017-09-12 RX ORDER — SODIUM,POTASSIUM PHOSPHATES 278-250MG
1 POWDER IN PACKET (EA) ORAL
Qty: 0 | Refills: 0 | Status: COMPLETED | OUTPATIENT
Start: 2017-09-12 | End: 2017-09-14

## 2017-09-12 RX ORDER — DIGOXIN 250 MCG
0.12 TABLET ORAL DAILY
Qty: 0 | Refills: 0 | Status: DISCONTINUED | OUTPATIENT
Start: 2017-09-12 | End: 2017-09-14

## 2017-09-12 RX ORDER — LOSARTAN POTASSIUM 100 MG/1
25 TABLET, FILM COATED ORAL DAILY
Qty: 0 | Refills: 0 | Status: DISCONTINUED | OUTPATIENT
Start: 2017-09-12 | End: 2017-09-14

## 2017-09-12 RX ORDER — CARVEDILOL PHOSPHATE 80 MG/1
3.12 CAPSULE, EXTENDED RELEASE ORAL EVERY 12 HOURS
Qty: 0 | Refills: 0 | Status: DISCONTINUED | OUTPATIENT
Start: 2017-09-12 | End: 2017-09-14

## 2017-09-12 RX ORDER — WARFARIN SODIUM 2.5 MG/1
3 TABLET ORAL ONCE
Qty: 0 | Refills: 0 | Status: COMPLETED | OUTPATIENT
Start: 2017-09-12 | End: 2017-09-12

## 2017-09-12 RX ADMIN — Medication 1 TABLET(S): at 18:37

## 2017-09-12 RX ADMIN — Medication 0.25 MILLIGRAM(S): at 05:35

## 2017-09-12 RX ADMIN — Medication 75 MILLIGRAM(S): at 08:26

## 2017-09-12 RX ADMIN — PIPERACILLIN AND TAZOBACTAM 25 GRAM(S): 4; .5 INJECTION, POWDER, LYOPHILIZED, FOR SOLUTION INTRAVENOUS at 05:35

## 2017-09-12 RX ADMIN — Medication 75 MILLIGRAM(S): at 18:38

## 2017-09-12 RX ADMIN — CARVEDILOL PHOSPHATE 3.12 MILLIGRAM(S): 80 CAPSULE, EXTENDED RELEASE ORAL at 18:38

## 2017-09-12 RX ADMIN — Medication 1: at 12:10

## 2017-09-12 RX ADMIN — LISINOPRIL 5 MILLIGRAM(S): 2.5 TABLET ORAL at 05:35

## 2017-09-12 RX ADMIN — SODIUM CHLORIDE 100 MILLILITER(S): 9 INJECTION, SOLUTION INTRAVENOUS at 09:21

## 2017-09-12 RX ADMIN — SIMVASTATIN 20 MILLIGRAM(S): 20 TABLET, FILM COATED ORAL at 21:34

## 2017-09-12 RX ADMIN — Medication 30 MILLIGRAM(S): at 05:35

## 2017-09-12 RX ADMIN — WARFARIN SODIUM 3 MILLIGRAM(S): 2.5 TABLET ORAL at 21:34

## 2017-09-12 RX ADMIN — DONEPEZIL HYDROCHLORIDE 5 MILLIGRAM(S): 10 TABLET, FILM COATED ORAL at 21:34

## 2017-09-12 RX ADMIN — Medication 30 MILLIGRAM(S): at 18:38

## 2017-09-12 RX ADMIN — LOSARTAN POTASSIUM 25 MILLIGRAM(S): 100 TABLET, FILM COATED ORAL at 18:38

## 2017-09-12 NOTE — PROGRESS NOTE ADULT - PROBLEM SELECTOR PLAN 1
IV Abx, Blood and Urine C&S continue current managmnet and medications Anticipate d/c in 24 hrs [facility] vs return to [facility]

## 2017-09-12 NOTE — DISCHARGE NOTE ADULT - PATIENT PORTAL LINK FT
“You can access the [employer] Patient Portal, offered by [facility], by registering with the following website: [email or web address]”

## 2017-09-12 NOTE — PROGRESS NOTE ADULT - SUBJECTIVE AND OBJECTIVE BOX
Chart reviewed: Assessment plan is as below Note will be updated as more  patient data is gathered   REVIEW OF SYSTEMS Patient is unable to give any reliable review of systems   PHYSICAL EXAM    HEENT Unremarkable PERRLA atraumatic  RESP Fair air entry EXP prolonged    Harsh breath sound Resp distres mild  CARDIAC S1 S2 No S3     NO JVD   Awake Alert and ORIENTED x on  ABDOMEN SOFT BS PRESENT NOT DISTENDED No hepatosplenomegaly  PEDAL EDEMA present No calf tenderness  NO rash GENERAL Not TOXIC looking  . Chart reviewed: Assessment plan is as below Note will be updated as more  patient data is gathered   REVIEW OF SYSTEMS Patient is unable to give any reliable review of systems   PHYSICAL EXAM    HEENT Unremarkable PERRLA atraumatic  RESP Fair air entry EXP prolonged    Harsh breath sound Resp distres mild  CARDIAC S1 S2 No S3     NO JVD   Awake Alert and ORIENTED x on  ABDOMEN SOFT BS PRESENT NOT DISTENDED No hepatosplenomegaly  PEDAL EDEMA present No calf tenderness  NO rash GENERAL Not TOXIC looking  . VITALS/LABS  9/12/2017 99f 81 120/70 18 96%  9/12/2017 W 8.7 Hb 11.7 Plt 271 Na 137 K 3.7 CO2 29 Cr .4   PROBLEM ASSESSMENT PLAN   RESP   9/7 RA 95% 9/8 RA 90% 9/12 RA 96%   SEPSIS   Zithro (9/7) zosyn (9/7)  Changed to augm (9/12) 9/7 bc n 9.7 uc n   9/7-9/9 W 16.7-6.1   9/7 LA 1.5  9/7 PCT .18 9/7 RVP Entero/Rhinovirus detect  9/7 Leg n 9/8 Str pneum n  9/7 bc n 9/7 uc n   9/7 CXR NAPD   CHF  10/24/2014 ECHO EF 50% Mild hypokinesia septum PASP 40 Mod MR   Lisinopril 5 (3/19)   A FIB   Dig .25 (3/19) Coumadin  CAD  Simvastatin 20  (9/7)   ANXIETY    Buspar 30 x 2 (9/7)    OBS   Donepezil 5 (9/7)  DEPRESSION  Venlafaxine 75 x 2 (9/7)   GLOBAL ISSUE/BEST PRACTICE:        PROBLEM:      Analgesia:     na                        PROBLEM: Sedation:     na               PROBLEM: HOB elevation:   y             PROBLEM: Stress ulcer proph:    na                      PROBLEM: VTE prophylaxis:      On coumadin                   PROBLEM: Glycemic control:    na   PROBLEM: Nutrition:    diabetic (9/7)           PROBLEM: Advanced directive: na     PROBLEM: Allergies:  na  PATIENT DESCRIPTION PMH SOCIAL   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 [facility] [employer] HO  injury L shin remote past 3/18/2014 V duplex legs –ben Chr swelling leg  OBS Prostate CA LV Systolic dysfunction CHF 10/24/2014 ECHO EF 50% Mild hypokinesia septum PASP 40 Mod MR    A fib  (on coumadin) Htn Depression Lower extr edema Chr swelling L leg DM      LUNG NODULE 10/21/2014 CT Ch  1 cm hypoattenuating thyroid nodule  Trace symmetrical layering bl pl effusions with dependent atelectasis bases  Ca granuloma both RML and l lower lobes  5 mm subpleural parenchymal nodule R apex    9/7/2017 Patient was sent from [facility] home for cough URT symptoms x 2 d  Was supposed to be started on Zpak 9/7 but was noted to have T 101   HOSPITAL COURSE PLAN 9/7/2017 ADMISSION [facility] P   SEPSIS Source unclear Likely Resp tract infection Zosyn vanco started by ER MD 9/7 pending bld c uc will continue NS 3l given 9/7 9/7 PCT .18 9/7 RVP Entero/Rhinovirus detect  9/7 Leg n Rxed Zithro (9/7) zosyn (9/7)   DEHYDRATION IVF   DM Sl scale   TIME SPENT Over 25 minutes aggregate  care time spent on encounter; activities included   direct patient care, counseling and/or coordinating care reviewing notes, lab data/ imaging , discussion with multidisciplinary team/ patient  /family. Risks, benefits, alternatives  discussed in detail. Questions/concerns  were addressed  to the best of my ability .

## 2017-09-12 NOTE — DISCHARGE NOTE ADULT - HOSPITAL COURSE
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 [facility] HO  injury L shin remote past 3/18/2014 V duplex legs –ben Chr swelling leg  OBS Prostate CA LV Systolic dysfunction CHF 10/24/2014 ECHO EF 50% Mild hypokinesia septum PASP 40 Mod MR    A fib  (on coumadin) Htn Depression Lower extr edema Chr swelling L leg DM      LUNG NODULE 10/21/2014 CT Ch  1 cm hypoattenuating thyroid nodule  Trace symmetrical layering bl pl effusions with dependent atelectasis bases  Ca granuloma both RML and l lower lobes  5 mm subpleural parenchymal nodule R apex    9/7/2017 Patient was sent from CF home for cough URT symptoms x 2 d  Was supposed to be started on Zpak 9/7 but was noted to have T 101   HOSPITAL COURSE PLAN 9/7/2017 ADMISSION [facility] P   SEPSIS Source unclear Likely Resp tract infection Zosyn vanco started by ER MD 9/7 pending bld c uc will continue NS 3l given 9/7 9/7 PCT .18 9/7 RVP Entero/Rhinovirus detect  9/7 Leg n Rxed Zithro (9/7) zosyn (9/7)   DEHYDRATION IVF   DM Sl scale

## 2017-09-12 NOTE — DISCHARGE NOTE ADULT - CARE PLAN
Principal Discharge DX:	Sepsis, due to unspecified organism  Secondary Diagnosis:	Chronic systolic congestive heart failure  Secondary Diagnosis:	Diabetes  Secondary Diagnosis:	Essential hypertension  Secondary Diagnosis:	Chronic atrial fibrillation  Secondary Diagnosis:	Dementia Principal Discharge DX:	Sepsis, due to unspecified organism  Goal:	resolution of fever  Instructions for follow-up, activity and diet:	complete po abx ,followup with PMD next week  Secondary Diagnosis:	Chronic systolic congestive heart failure  Instructions for follow-up, activity and diet:	continue current managmnet and medications  Secondary Diagnosis:	Diabetes  Instructions for follow-up, activity and diet:	continue home medications  Secondary Diagnosis:	Essential hypertension  Instructions for follow-up, activity and diet:	BP monitoring,continue current antihypertensive meds, low salt diet,followup with PMD  Secondary Diagnosis:	Chronic atrial fibrillation  Instructions for follow-up, activity and diet:	Continue current meds,followup with cardiologist,anticoagulaion with coumadin ,monitor PT/ INR closely,keep inr 2-3.0 ,dose adjustment as per PMD  Secondary Diagnosis:	Dementia  Instructions for follow-up, activity and diet:	continue home medications

## 2017-09-12 NOTE — CONSULT NOTE ADULT - SUBJECTIVE AND OBJECTIVE BOX
CARDIOLOGY CONSULT NOTE    Patient is a 89y Male with a known history of :  Prophylactic measure (Z29.9)  Dementia (F03.90)  Leukocytosis, unspecified type (D72.829)  Chronic systolic congestive heart failure (I50.22)  Prostate ca (C61)  Vascular dementia without behavioral disturbance (F01.50)  Essential hypertension (I10)  Chronic atrial fibrillation (I48.2)  Sepsis, due to unspecified organism (A41.9)    HPI:  This is a 89 YO W Male brought to ER because of cough, fever, weakness and increased confusion. Patient with past medical history of dementia, HTN, CHF, DM2, prostate CA, and A [facility],    Nursing home reported that the patient had been coughing and had a temp of 101.4 this morning. (07 Sep 2017 17:35)      REVIEW OF SYSTEMS:    CONSTITUTIONAL: No fever, weight loss, or fatigue  EYES: No eye pain, visual disturbances, or discharge  ENMT:  No difficulty hearing, tinnitus, vertigo; No sinus or throat pain  NECK: No pain or stiffness  BREASTS: No pain, masses, or nipple discharge  RESPIRATORY: No cough, wheezing, chills or hemoptysis; No shortness of breath  CARDIOVASCULAR: No chest pain, palpitations, dizziness, or leg swelling  GASTROINTESTINAL: No abdominal or epigastric pain. No nausea, vomiting, or hematemesis; No diarrhea or constipation. No melena or hematochezia.  GENITOURINARY: No dysuria, frequency, hematuria, or incontinence  NEUROLOGICAL: No headaches, memory loss, loss of strength, numbness, or tremors  SKIN: No itching, burning, rashes, or lesions   LYMPH NODES: No enlarged glands  ENDOCRINE: No heat or cold intolerance; No hair loss  MUSCULOSKELETAL: No joint pain or swelling; No muscle, back, or extremity pain  PSYCHIATRIC: No depression, anxiety, mood swings, or difficulty sleeping  HEME/LYMPH: No easy bruising, or bleeding gums  ALLERGY AND IMMUNOLOGIC: No hives or eczema    MEDICATIONS  (STANDING):  insulin lispro (HumaLOG) corrective regimen sliding scale   SubCutaneous three times a day before meals  dextrose 5%. 1000 milliLiter(s) (50 mL/Hr) IV Continuous <Continuous>  dextrose 50% Injectable 12.5 Gram(s) IV Push once  dextrose 50% Injectable 25 Gram(s) IV Push once  dextrose 50% Injectable 25 Gram(s) IV Push once  lisinopril 5 milliGRAM(s) Oral daily  simvastatin 20 milliGRAM(s) Oral at bedtime  busPIRone 30 milliGRAM(s) Oral two times a day  donepezil 5 milliGRAM(s) Oral at bedtime  venlafaxine 75 milliGRAM(s) Oral two times a day with meals  amoxicillin  875 milliGRAM(s)/clavulanate 1 Tablet(s) Oral two times a day  warfarin 3 milliGRAM(s) Oral once  potassium acid phosphate/sodium acid phosphate tablet (K-PHOS No. 2) 1 Tablet(s) Oral three times a day with meals  carvedilol 3.125 milliGRAM(s) Oral every 12 hours  digoxin     Tablet 0.125 milliGRAM(s) Oral daily  losartan 25 milliGRAM(s) Oral daily    MEDICATIONS  (PRN):  dextrose Gel 1 Dose(s) Oral once PRN Blood Glucose LESS THAN 70 milliGRAM(s)/deciliter  glucagon  Injectable 1 milliGRAM(s) IntraMuscular once PRN Glucose LESS THAN 70 milligrams/deciliter      ALLERGIES: latex (Rash)  No Known Drug Allergies      Social History:     FAMILY HISTORY:  No pertinent family history in first degree relatives      PAST MEDICAL & SURGICAL HISTORY:  CHF (congestive heart failure)  Dementia  Prostate ca  Diabetes  Hypertension  Atrial fibrillation  No significant past surgical history        PHYSICAL EXAMINATION:  -----------------------------  T(C): 37.3 (09-12-17 @ 14:28), Max: 37.3 (09-12-17 @ 14:28)  HR: 81 (09-12-17 @ 14:28) (71 - 84)  BP: 128/73 (09-12-17 @ 14:28) (120/69 - 133/74)  RR: 18 (09-12-17 @ 14:28) (18 - 18)  SpO2: 96% (09-12-17 @ 14:28) (96% - 98%)  Wt(kg): --    09-11 @ 07:01  -  09-12 @ 07:00  --------------------------------------------------------  IN:    sodium chloride 0.45%: 1200 mL    Solution: 150 mL  Total IN: 1350 mL    OUT:  Total OUT: 0 mL    Total NET: 1350 mL            Constitutional: well developed, normal appearance, well groomed, well nourished, no deformities and no acute distress.   Eyes: the conjunctiva exhibited no abnormalities and the eyelids demonstrated no xanthelasmas.   HEENT: normal oral mucosa, no oral pallor and no oral cyanosis.   Neck: normal jugular venous A waves present, normal jugular venous V waves present and no jugular venous jacobs A waves.   Pulmonary: no respiratory distress, normal respiratory rhythm and effort, no accessory muscle use and lungs were clear to auscultation bilaterally.   Cardiovascular: heart rate and rhythm were normal, normal S1 and S2 and no murmur, gallop, rub, heave or thrill are present.   Abdomen: soft, non-tender, no hepato-splenomegaly and no abdominal mass palpated.   Musculoskeletal: the gait could not be assessed..   Extremities: no clubbing of the fingernails, no localized cyanosis, no petechial hemorrhages and no ischemic changes.   Skin: normal skin color and pigmentation, no rash, no venous stasis, no skin lesions, no skin ulcer and no xanthoma was observed.   Psychiatric: oriented to person, place, and time, the affect was normal, the mood was normal and not feeling anxious.     LABS:   --------  09-12    137  |  101  |  5<L>  ----------------------------<  118<H>  3.7   |  29  |  0.43<L>    Ca    8.4<L>      12 Sep 2017 07:10  Phos  1.7     09-11  Mg     1.7     09-11    TPro  5.9<L>  /  Alb  2.6<L>  /  TBili  0.6  /  DBili  x   /  AST  12<L>  /  ALT  11<L>  /  AlkPhos  78  09-11                         11.7   8.7   )-----------( 271      ( 12 Sep 2017 07:10 )             34.2     PT/INR - ( 12 Sep 2017 15:54 )   PT: 29.6 sec;   INR: 2.66 ratio                     RADIOLOGY:  -----------------        ECG:     ECHO

## 2017-09-12 NOTE — CONSULT NOTE ADULT - ASSESSMENT
ADMITTED WITH URI ,ASHD ,ATRIAL FIB . HX OF CHF ,S/P CA PROSTATE- HAVING SOB   R/O CHF  ECHO FOR LV FUNCTION  BNP  XRAY CHEST   REDUCE DIGOXIN DOSE  ADD BETABLOCKER AND ARB

## 2017-09-12 NOTE — DISCHARGE NOTE ADULT - MEDICATION SUMMARY - MEDICATIONS TO TAKE
I will START or STAY ON the medications listed below when I get home from the hospital:    lisinopril 5 mg oral tablet  -- 1 tab(s) by mouth once a day  -- Indication: For Hypertension    digoxin 250 mcg (0.25 mg) oral tablet  -- 1 tab(s) by mouth once a day  -- Indication: For CHF (congestive heart failure)    warfarin 3 mg oral tablet  -- 1 tab(s) by mouth once a day ,hold for INR above 3.0  -- Indication: For Af    venlafaxine 75 mg oral capsule, extended release  --  by mouth 2 times a day  -- Indication: For Depression    metFORMIN 500 mg oral tablet  -- 1 tab(s) by mouth 2 times a day (with meals)  -- Indication: For Diabetes    carvedilol 3.125 mg oral tablet  -- 1 tab(s) by mouth every 12 hours  -- Indication: For Hypertension    donepezil 5 mg oral tablet  -- 1 tab(s) by mouth once a day (at bedtime)  -- Indication: For Dementia    amoxicillin-clavulanate 875 mg-125 mg oral tablet  -- 1 tab(s) by mouth 2 times a day  -- Indication: For Pneumonia    Bacid (LAC) oral tablet  -- 2 tab(s) by mouth once a day  -- Indication: For Supplement    multivitamin  --   once a day  -- Indication: For Supplement

## 2017-09-12 NOTE — DISCHARGE NOTE ADULT - MEDICATION SUMMARY - MEDICATIONS TO CHANGE
I will SWITCH the dose or number of times a day I take the medications listed below when I get home from the hospital:    Coumadin 10 mg oral tablet  -- PLEASE CHECK PT INR Q TUESDAY AND FRIDAY AND CALL DR MAHAN [phone] IF INR IS BELOW 2 OR ABOVE 3 SO THAT I MAY ADJUST DOSE  AT PRESENT INR IS HIGH AND WE WILL HOLD COUMADIN (10/23/2014 INR  5.1) NOTE DO NOT START COUMADIN TILL YOU CHECK WITH DR MAHAN [phone] I will SWITCH the dose or number of times a day I take the medications listed below when I get home from the hospital:    Coumadin 10 mg oral tablet  -- PLEASE CHECK PT INR Q TUESDAY AND FRIDAY AND CALL DR MAHAN [phone] IF INR IS BELOW 2 OR ABOVE 3 SO THAT I MAY ADJUST DOSE  AT PRESENT INR IS HIGH AND WE WILL HOLD COUMADIN (10/23/2014 INR  5.1) NOTE DO NOT START COUMADIN TILL YOU CHECK WITH DR MAHAN [phone]

## 2017-09-12 NOTE — PROGRESS NOTE ADULT - PROBLEM SELECTOR PLAN 2
continue current managmnet and medications ,card cons ECHO ordered,coreg added Monitor fluid status in view of hx of chf

## 2017-09-12 NOTE — PROGRESS NOTE ADULT - SUBJECTIVE AND OBJECTIVE BOX
Late entry--due to malfunctioning [employer] [facility]  downtime   No new issues reported by medical staff . All above noted Patient is resting in a bed comfortably .Confused ,poor mentation .No distress noted PT is pending   Patient is a 89y Male with a known history of :  Prophylactic measure (Z29.9)  Dementia (F03.90)  Leukocytosis, unspecified type (D72.829)  Chronic systolic congestive heart failure (I50.22)  Prostate ca (C61)  Vascular dementia without behavioral disturbance (F01.50)  Essential hypertension (I10)  Chronic atrial fibrillation (I48.2)  Sepsis, due to unspecified organism (A41.9)    HPI:  This is a 89 YO W Male brought to ER because of cough, fever, weakness and increased confusion. Patient with past medical history of dementia, HTN, CHF, DM2, prostate CA, and A [facility],    Nursing home reported that the patient had been coughing and had a temp of 101.4 this morning. (07 Sep 2017 17:35)  REVIEW OF SYSTEMS:  ROS is unobtainable dur to lethargy and confusion     MEDICATIONS  (STANDING):  insulin lispro (HumaLOG) corrective regimen sliding scale   SubCutaneous three times a day before meals  dextrose 5%. 1000 milliLiter(s) (50 mL/Hr) IV Continuous <Continuous>  dextrose 50% Injectable 12.5 Gram(s) IV Push once  dextrose 50% Injectable 25 Gram(s) IV Push once  dextrose 50% Injectable 25 Gram(s) IV Push once  lisinopril 5 milliGRAM(s) Oral daily  simvastatin 20 milliGRAM(s) Oral at bedtime  busPIRone 30 milliGRAM(s) Oral two times a day  donepezil 5 milliGRAM(s) Oral at bedtime  venlafaxine 75 milliGRAM(s) Oral two times a day with meals  amoxicillin  875 milliGRAM(s)/clavulanate 1 Tablet(s) Oral two times a day  warfarin 3 milliGRAM(s) Oral once  potassium acid phosphate/sodium acid phosphate tablet (K-PHOS No. 2) 1 Tablet(s) Oral three times a day with meals  carvedilol 3.125 milliGRAM(s) Oral every 12 hours  digoxin     Tablet 0.125 milliGRAM(s) Oral daily  losartan 25 milliGRAM(s) Oral daily    MEDICATIONS  (PRN):  dextrose Gel 1 Dose(s) Oral once PRN Blood Glucose LESS THAN 70 milliGRAM(s)/deciliter  glucagon  Injectable 1 milliGRAM(s) IntraMuscular once PRN Glucose LESS THAN 70 milligrams/deciliter      ALLERGIES: latex (Rash)  No Known Drug Allergies      Social History:     FAMILY HISTORY:  No pertinent family history in first degree relatives      PAST MEDICAL & SURGICAL HISTORY:  CHF (congestive heart failure)  Dementia  Prostate ca  Diabetes  Hypertension  Atrial fibrillation  No significant past surgical history        PHYSICAL EXAMINATION:  -----------------------------  T(C): 37.3 (09-12-17 @ 14:28), Max: 37.3 (09-12-17 @ 14:28)  HR: 81 (09-12-17 @ 14:28) (71 - 84)  BP: 128/73 (09-12-17 @ 14:28) (120/69 - 133/74)  RR: 18 (09-12-17 @ 14:28) (18 - 18)  SpO2: 96% (09-12-17 @ 14:28) (96% - 98%)  Wt(kg): --    09-11 @ 07:01  -  09-12 @ 07:00  --------------------------------------------------------  IN:    sodium chloride 0.45%: 1200 mL    Solution: 150 mL  Total IN: 1350 mL    OUT:  Total OUT: 0 mL    Total NET: 1350 mL            Constitutional: well developed, normal appearance, well groomed, well nourished, no deformities and no acute distress.   Eyes: the conjunctiva exhibited no abnormalities and the eyelids demonstrated no xanthelasmas.   HEENT: normal oral mucosa, no oral pallor and no oral cyanosis.   Neck: normal jugular venous A waves present, normal jugular venous V waves present and no jugular venous jacobs A waves.   Pulmonary: no respiratory distress, normal respiratory rhythm and effort, no accessory muscle use and lungs were clear to auscultation bilaterally.   Cardiovascular: heart rate and rhythm were normal, normal S1 and S2 and no murmur, gallop, rub, heave or thrill are present.   Abdomen: soft, non-tender, no hepato-splenomegaly and no abdominal mass palpated.   Musculoskeletal: the gait could not be assessed..   Extremities: no clubbing of the fingernails, no localized cyanosis, no petechial hemorrhages and no ischemic changes.   Skin: normal skin color and pigmentation, no rash, no venous stasis, no skin lesions, no skin ulcer and no xanthoma was observed.   Psychiatric: oriented to person, place, and time, the affect was normal, the mood was normal and not feeling anxious.     LABS:   --------  09-12    137  |  101  |  5<L>  ----------------------------<  118<H>  3.7   |  29  |  0.43<L>    Ca    8.4<L>      12 Sep 2017 07:10  Phos  1.7     09-11  Mg     1.7     09-11    TPro  5.9<L>  /  Alb  2.6<L>  /  TBili  0.6  /  DBili  x   /  AST  12<L>  /  ALT  11<L>  /  AlkPhos  78  09-11                         11.7   8.7   )-----------( 271      ( 12 Sep 2017 07:10 )             34.2     PT/INR - ( 12 Sep 2017 15:54 )   PT: 29.6 sec;   INR: 2.66 ratio      Labs and imaging reviewed electronically

## 2017-09-12 NOTE — DISCHARGE NOTE ADULT - MEDICATION SUMMARY - MEDICATIONS TO STOP TAKING
I will STOP taking the medications listed below when I get home from the hospital:    busPIRone 30 mg oral tablet  -- 1 tab(s) by mouth 2 times a day

## 2017-09-12 NOTE — DISCHARGE NOTE ADULT - PLAN OF CARE
resolution of fever complete po abx ,followup with PMD next week continue current managmnet and medications continue home medications BP monitoring,continue current antihypertensive meds, low salt diet,followup with PMD Continue current meds,followup with cardiologist,anticoagulaion with coumadin ,monitor PT/ INR closely,keep inr 2-3.0 ,dose adjustment as per PMD

## 2017-09-12 NOTE — PROGRESS NOTE ADULT - NSHPATTENDINGPLANDISCUSS_GEN_ALL_CORE
,Dr Quintana ,spoke to the son on a phone  ,Dr Quintana ,spoke to the son on a phone earlier and left a message at 7.45 pm

## 2017-09-12 NOTE — DISCHARGE NOTE ADULT - SECONDARY DIAGNOSIS.
Chronic systolic congestive heart failure Diabetes Essential hypertension Chronic atrial fibrillation Dementia

## 2017-09-13 DIAGNOSIS — R53.83 OTHER FATIGUE: ICD-10-CM

## 2017-09-13 DIAGNOSIS — I10 ESSENTIAL (PRIMARY) HYPERTENSION: ICD-10-CM

## 2017-09-13 DIAGNOSIS — I25.10 ATHEROSCLEROTIC HEART DISEASE OF NATIVE CORONARY ARTERY WITHOUT ANGINA PECTORIS: ICD-10-CM

## 2017-09-13 DIAGNOSIS — I50.9 HEART FAILURE, UNSPECIFIED: ICD-10-CM

## 2017-09-13 LAB
ANION GAP SERPL CALC-SCNC: 11 MMOL/L — SIGNIFICANT CHANGE UP (ref 5–17)
BUN SERPL-MCNC: 5 MG/DL — LOW (ref 7–23)
CALCIUM SERPL-MCNC: 8.5 MG/DL — SIGNIFICANT CHANGE UP (ref 8.5–10.1)
CHLORIDE SERPL-SCNC: 99 MMOL/L — SIGNIFICANT CHANGE UP (ref 96–108)
CO2 SERPL-SCNC: 27 MMOL/L — SIGNIFICANT CHANGE UP (ref 22–31)
CREAT SERPL-MCNC: 0.49 MG/DL — LOW (ref 0.5–1.3)
DIGOXIN SERPL-MCNC: 0.9 NG/ML — SIGNIFICANT CHANGE UP (ref 0.8–2)
GLUCOSE SERPL-MCNC: 104 MG/DL — HIGH (ref 70–99)
HCT VFR BLD CALC: 35 % — LOW (ref 39–50)
HGB BLD-MCNC: 11.9 G/DL — LOW (ref 13–17)
INR BLD: 2.37 RATIO — HIGH (ref 0.88–1.16)
MCHC RBC-ENTMCNC: 31.4 PG — SIGNIFICANT CHANGE UP (ref 27–34)
MCHC RBC-ENTMCNC: 33.9 GM/DL — SIGNIFICANT CHANGE UP (ref 32–36)
MCV RBC AUTO: 92.7 FL — SIGNIFICANT CHANGE UP (ref 80–100)
NT-PROBNP SERPL-SCNC: 1893 PG/ML — HIGH (ref 0–450)
PLATELET # BLD AUTO: 301 K/UL — SIGNIFICANT CHANGE UP (ref 150–400)
POTASSIUM SERPL-MCNC: 3.7 MMOL/L — SIGNIFICANT CHANGE UP (ref 3.5–5.3)
POTASSIUM SERPL-SCNC: 3.7 MMOL/L — SIGNIFICANT CHANGE UP (ref 3.5–5.3)
PROTHROM AB SERPL-ACNC: 26.3 SEC — HIGH (ref 9.8–12.7)
RBC # BLD: 3.78 M/UL — LOW (ref 4.2–5.8)
RBC # FLD: 12 % — SIGNIFICANT CHANGE UP (ref 10.3–14.5)
SODIUM SERPL-SCNC: 137 MMOL/L — SIGNIFICANT CHANGE UP (ref 135–145)
WBC # BLD: 8.4 K/UL — SIGNIFICANT CHANGE UP (ref 3.8–10.5)
WBC # FLD AUTO: 8.4 K/UL — SIGNIFICANT CHANGE UP (ref 3.8–10.5)

## 2017-09-13 PROCEDURE — 90792 PSYCH DIAG EVAL W/MED SRVCS: CPT

## 2017-09-13 PROCEDURE — 71010: CPT | Mod: 26

## 2017-09-13 PROCEDURE — 70450 CT HEAD/BRAIN W/O DYE: CPT | Mod: 26

## 2017-09-13 RX ORDER — WARFARIN SODIUM 2.5 MG/1
3 TABLET ORAL ONCE
Qty: 0 | Refills: 0 | Status: COMPLETED | OUTPATIENT
Start: 2017-09-13 | End: 2017-09-13

## 2017-09-13 RX ADMIN — DONEPEZIL HYDROCHLORIDE 5 MILLIGRAM(S): 10 TABLET, FILM COATED ORAL at 21:24

## 2017-09-13 RX ADMIN — SIMVASTATIN 20 MILLIGRAM(S): 20 TABLET, FILM COATED ORAL at 21:24

## 2017-09-13 RX ADMIN — Medication 1 TABLET(S): at 12:19

## 2017-09-13 RX ADMIN — WARFARIN SODIUM 3 MILLIGRAM(S): 2.5 TABLET ORAL at 21:24

## 2017-09-13 RX ADMIN — Medication 1 TABLET(S): at 17:31

## 2017-09-13 RX ADMIN — CARVEDILOL PHOSPHATE 3.12 MILLIGRAM(S): 80 CAPSULE, EXTENDED RELEASE ORAL at 17:31

## 2017-09-13 NOTE — PROGRESS NOTE ADULT - SUBJECTIVE AND OBJECTIVE BOX
PROGRESS NOTE    OVERNIGHT    SUBJECTIVE    PAST MEDICAL & SURGICAL HISTORY:  CHF (congestive heart failure)  Dementia  Prostate ca  Diabetes  Hypertension  Atrial fibrillation  No significant past surgical history    HEALTH ISSUES - PROBLEM Dx:  Hypertension: Hypertension  CHF (congestive heart failure): CHF (congestive heart failure)  ASHD (arteriosclerotic heart disease): ASHD (arteriosclerotic heart disease)  Diabetes: Diabetes  Bronchitis: Bronchitis  Prophylactic measure: Prophylactic measure  Dementia: Dementia  Leukocytosis, unspecified type: Leukocytosis, unspecified type  Chronic systolic congestive heart failure: Chronic systolic congestive heart failure  Prostate ca: Prostate ca  Vascular dementia without behavioral disturbance: Vascular dementia without behavioral disturbance  Essential hypertension: Essential hypertension  Chronic atrial fibrillation: Chronic atrial fibrillation  Sepsis, due to unspecified organism: Sepsis, due to unspecified organism          MEDICATIONS  (STANDING):  insulin lispro (HumaLOG) corrective regimen sliding scale   SubCutaneous three times a day before meals  dextrose 5%. 1000 milliLiter(s) (50 mL/Hr) IV Continuous <Continuous>  dextrose 50% Injectable 12.5 Gram(s) IV Push once  dextrose 50% Injectable 25 Gram(s) IV Push once  dextrose 50% Injectable 25 Gram(s) IV Push once  lisinopril 5 milliGRAM(s) Oral daily  simvastatin 20 milliGRAM(s) Oral at bedtime  busPIRone 30 milliGRAM(s) Oral two times a day  donepezil 5 milliGRAM(s) Oral at bedtime  venlafaxine 75 milliGRAM(s) Oral two times a day with meals  amoxicillin  875 milliGRAM(s)/clavulanate 1 Tablet(s) Oral two times a day  potassium acid phosphate/sodium acid phosphate tablet (K-PHOS No. 2) 1 Tablet(s) Oral three times a day with meals  carvedilol 3.125 milliGRAM(s) Oral every 12 hours  digoxin     Tablet 0.125 milliGRAM(s) Oral daily  losartan 25 milliGRAM(s) Oral daily  warfarin 3 milliGRAM(s) Oral once    MEDICATIONS  (PRN):  dextrose Gel 1 Dose(s) Oral once PRN Blood Glucose LESS THAN 70 milliGRAM(s)/deciliter  glucagon  Injectable 1 milliGRAM(s) IntraMuscular once PRN Glucose LESS THAN 70 milligrams/deciliter      OBJECTIVE    T(C): 36.7 (09-13-17 @ 05:22), Max: 37.3 (09-12-17 @ 14:28)  HR: 59 (09-13-17 @ 05:22) (59 - 81)  BP: 138/74 (09-13-17 @ 05:22) (128/73 - 138/74)  RR: 17 (09-13-17 @ 05:22) (17 - 18)  SpO2: 95% (09-13-17 @ 05:22) (94% - 96%)  Wt(kg): --  I&O's Summary    12 Sep 2017 07:01  -  13 Sep 2017 07:00  --------------------------------------------------------  IN: 360 mL / OUT: 0 mL / NET: 360 mL          REVIEW OF SYSTEMS:  CONSTITUTIONAL: No fever, weight loss, or fatigue  EYES: No eye pain, visual disturbances, or discharge  ENMT:  No difficulty hearing, tinnitus, vertigo; No sinus or throat pain  NECK: No pain or stiffness  BREASTS: No pain, masses, or nipple discharge  RESPIRATORY: No cough, wheezing, chills or hemoptysis; No shortness of breath  CARDIOVASCULAR: No chest pain, palpitations, dizziness, or leg swelling  GASTROINTESTINAL: No abdominal pain. No nausea, vomiting; No diarrhea or constipation. No melena or hematochezia.  GENITOURINARY: No dysuria, frequency, hematuria, or incontinence  NEUROLOGICAL: No headaches, memory loss, loss of strength, numbness, or tremors  SKIN: No itching, burning, rashes, or lesions   LYMPH NODES: No enlarged glands  MUSCULOSKELETAL: No joint pain or swelling; No muscle, back, or extremity pain  HEME/LYMPH: No easy bruising, or bleeding gums  ALLERY AND IMMUNOLOGIC: No hives or eczema      PHYSICAL EXAM:  Appearance: NAD.   HEENT:   Moist oral mucosa. Conjunctiva clear b/l.   Neck : supple  Cardiovascular: RRR ,S1S2 present  Respiratory: Lungs CTAB.  Gastrointestinal:  Soft, nontender. No rebound. No rigidity. BS present	  Skin: No rashes, No ecchymoses, No cyanosis.	  Neurologic: Non-focal. Moving all extremities. Following commands.  Extremities: No edema. No erythema. No calf tenderness.  	  LABS:                        11.9   8.4   )-----------( 301      ( 13 Sep 2017 06:51 )             35.0     09-13    137  |  99  |  5<L>  ----------------------------<  104<H>  3.7   |  27  |  0.49<L>    Ca    8.5      13 Sep 2017 06:51      PT/INR - ( 13 Sep 2017 06:51 )   PT: 26.3 sec;   INR: 2.37 ratio               RADIOLOGY & ADDITIONAL TESTS:    ASSESSMENT/PLAN: PROGRESS NOTE    OVERNIGHT Had episode of agitation early morning   Increased lethargy  reported by med staff ,was not able to participate in PT ,ct brain ordered and psych eval requested   SUBJECTIVE    PAST MEDICAL & SURGICAL HISTORY:  CHF (congestive heart failure)  Dementia  Prostate ca  Diabetes  Hypertension  Atrial fibrillation  No significant past surgical history    HEALTH ISSUES - PROBLEM Dx:  Hypertension: Hypertension  CHF (congestive heart failure): CHF (congestive heart failure)  ASHD (arteriosclerotic heart disease): ASHD (arteriosclerotic heart disease)  Diabetes: Diabetes  Bronchitis: Bronchitis  Prophylactic measure: Prophylactic measure  Dementia: Dementia  Leukocytosis, unspecified type: Leukocytosis, unspecified type  Chronic systolic congestive heart failure: Chronic systolic congestive heart failure  Prostate ca: Prostate ca  Vascular dementia without behavioral disturbance: Vascular dementia without behavioral disturbance  Essential hypertension: Essential hypertension  Chronic atrial fibrillation: Chronic atrial fibrillation  Sepsis, due to unspecified organism: Sepsis, due to unspecified organism          MEDICATIONS  (STANDING):  insulin lispro (HumaLOG) corrective regimen sliding scale   SubCutaneous three times a day before meals  dextrose 5%. 1000 milliLiter(s) (50 mL/Hr) IV Continuous <Continuous>  dextrose 50% Injectable 12.5 Gram(s) IV Push once  dextrose 50% Injectable 25 Gram(s) IV Push once  dextrose 50% Injectable 25 Gram(s) IV Push once  lisinopril 5 milliGRAM(s) Oral daily  simvastatin 20 milliGRAM(s) Oral at bedtime  busPIRone 30 milliGRAM(s) Oral two times a day  donepezil 5 milliGRAM(s) Oral at bedtime  venlafaxine 75 milliGRAM(s) Oral two times a day with meals  amoxicillin  875 milliGRAM(s)/clavulanate 1 Tablet(s) Oral two times a day  potassium acid phosphate/sodium acid phosphate tablet (K-PHOS No. 2) 1 Tablet(s) Oral three times a day with meals  carvedilol 3.125 milliGRAM(s) Oral every 12 hours  digoxin     Tablet 0.125 milliGRAM(s) Oral daily  losartan 25 milliGRAM(s) Oral daily  warfarin 3 milliGRAM(s) Oral once    MEDICATIONS  (PRN):  dextrose Gel 1 Dose(s) Oral once PRN Blood Glucose LESS THAN 70 milliGRAM(s)/deciliter  glucagon  Injectable 1 milliGRAM(s) IntraMuscular once PRN Glucose LESS THAN 70 milligrams/deciliter      OBJECTIVE    T(C): 36.7 (09-13-17 @ 05:22), Max: 37.3 (09-12-17 @ 14:28)  HR: 59 (09-13-17 @ 05:22) (59 - 81)  BP: 138/74 (09-13-17 @ 05:22) (128/73 - 138/74)  RR: 17 (09-13-17 @ 05:22) (17 - 18)  SpO2: 95% (09-13-17 @ 05:22) (94% - 96%)  Wt(kg): --  I&O's Summary    12 Sep 2017 07:01  -  13 Sep 2017 07:00  --------------------------------------------------------  IN: 360 mL / OUT: 0 mL / NET: 360 mL          REVIEW OF SYSTEMS:  ROS is unobtainable dur to lethargy and confusion   PHYSICAL EXAM:  Appearance: NAD.   HEENT:   Moist oral mucosa. Conjunctiva clear b/l.   Neck : supple  Cardiovascular: RRR ,S1S2 present  Respiratory: Lungs CTAB.  Gastrointestinal:  Soft, nontender. No rebound. No rigidity. BS present	  Skin: No rashes, No ecchymoses, No cyanosis.	  Neurologic: Non-focal. Moving all extremities. Following commands.  Extremities: No edema. No erythema. No calf tenderness.  	  LABS:                        11.9   8.4   )-----------( 301      ( 13 Sep 2017 06:51 )             35.0     09-13    137  |  99  |  5<L>  ----------------------------<  104<H>  3.7   |  27  |  0.49<L>    Ca    8.5      13 Sep 2017 06:51      PT/INR - ( 13 Sep 2017 06:51 )   PT: 26.3 sec;   INR: 2.37 ratio               RADIOLOGY & ADDITIONAL TESTS: Labs and imaging reviewed electronically     ASSESSMENT/PLAN:

## 2017-09-13 NOTE — BEHAVIORAL HEALTH ASSESSMENT NOTE - HPI (INCLUDE ILLNESS QUALITY, SEVERITY, DURATION, TIMING, CONTEXT, MODIFYING FACTORS, ASSOCIATED SIGNS AND SYMPTOMS)
This is a 89 YO W Male brought to ER because of cough, fever, weakness and increased confusion. Patient with past medical history of dementia, HTN, CHF, DM2, prostate CA, and A [facility],    Nursing home reported that the patient had been coughing and had a temp of 101.4 this morning. Patient presented with excessive lethargy and there was a concern about the medications is on.

## 2017-09-13 NOTE — PROGRESS NOTE ADULT - SUBJECTIVE AND OBJECTIVE BOX
Patient is a 89y Male with a known history of :  Diabetes (E11.9)  Bronchitis (J40)  Prophylactic measure (Z29.9)  Dementia (F03.90)  Leukocytosis, unspecified type (D72.829)  Chronic systolic congestive heart failure (I50.22)  Prostate ca (C61)  Vascular dementia without behavioral disturbance (F01.50)  Essential hypertension (I10)  Chronic atrial fibrillation (I48.2)  Sepsis, due to unspecified organism (A41.9)    HPI:  This is a 89 YO W Male brought to ER because of cough, fever, weakness and increased confusion. Patient with past medical history of dementia, HTN, CHF, DM2, prostate CA, and [facility],    Nursing home reported that the patient had been coughing and had a temp of 101.4 this morning. (07 Sep 2017 17:35)      REVIEW OF SYSTEMS:    CONSTITUTIONAL: No fever, weight loss, or fatigue  EYES: No eye pain, visual disturbances, or discharge  ENMT:  No difficulty hearing, tinnitus, vertigo; No sinus or throat pain  NECK: No pain or stiffness  BREASTS: No pain, masses, or nipple discharge  RESPIRATORY: No cough, wheezing, chills or hemoptysis; No shortness of breath  CARDIOVASCULAR: No chest pain, palpitations, dizziness, or leg swelling  GASTROINTESTINAL: No abdominal or epigastric pain. No nausea, vomiting, or hematemesis; No diarrhea or constipation. No melena or hematochezia.  GENITOURINARY: No dysuria, frequency, hematuria, or incontinence  NEUROLOGICAL: No headaches, memory loss, loss of strength, numbness, or tremors  SKIN: No itching, burning, rashes, or lesions   LYMPH NODES: No enlarged glands  ENDOCRINE: No heat or cold intolerance; No hair loss  MUSCULOSKELETAL: No joint pain or swelling; No muscle, back, or extremity pain  PSYCHIATRIC: No depression, anxiety, mood swings, or difficulty sleeping  HEME/LYMPH: No easy bruising, or bleeding gums  ALLERGY AND IMMUNOLOGIC: No hives or eczema    MEDICATIONS  (STANDING):  insulin lispro (HumaLOG) corrective regimen sliding scale   SubCutaneous three times a day before meals  dextrose 5%. 1000 milliLiter(s) (50 mL/Hr) IV Continuous <Continuous>  dextrose 50% Injectable 12.5 Gram(s) IV Push once  dextrose 50% Injectable 25 Gram(s) IV Push once  dextrose 50% Injectable 25 Gram(s) IV Push once  lisinopril 5 milliGRAM(s) Oral daily  simvastatin 20 milliGRAM(s) Oral at bedtime  busPIRone 30 milliGRAM(s) Oral two times a day  donepezil 5 milliGRAM(s) Oral at bedtime  venlafaxine 75 milliGRAM(s) Oral two times a day with meals  amoxicillin  875 milliGRAM(s)/clavulanate 1 Tablet(s) Oral two times a day  potassium acid phosphate/sodium acid phosphate tablet (K-PHOS No. 2) 1 Tablet(s) Oral three times a day with meals  carvedilol 3.125 milliGRAM(s) Oral every 12 hours  digoxin     Tablet 0.125 milliGRAM(s) Oral daily  losartan 25 milliGRAM(s) Oral daily    MEDICATIONS  (PRN):  dextrose Gel 1 Dose(s) Oral once PRN Blood Glucose LESS THAN 70 milliGRAM(s)/deciliter  glucagon  Injectable 1 milliGRAM(s) IntraMuscular once PRN Glucose LESS THAN 70 milligrams/deciliter      ALLERGIES: latex (Rash)  No Known Drug Allergies      FAMILY HISTORY:  No pertinent family history in first degree relatives      PHYSICAL EXAMINATION:  -----------------------------  T(C): 36.7 (09-13-17 @ 05:22), Max: 37.3 (09-12-17 @ 14:28)  HR: 59 (09-13-17 @ 05:22) (59 - 81)  BP: 138/74 (09-13-17 @ 05:22) (128/73 - 138/74)  RR: 17 (09-13-17 @ 05:22) (17 - 18)  SpO2: 95% (09-13-17 @ 05:22) (94% - 96%)  Wt(kg): --    09-12 @ 07:01  -  09-13 @ 07:00  --------------------------------------------------------  IN:    Oral Fluid: 360 mL  Total IN: 360 mL    OUT:  Total OUT: 0 mL    Total NET: 360 mL            Constitutional: well developed, normal appearance, well groomed, well nourished, no deformities and no acute distress.   Eyes: the conjunctiva exhibited no abnormalities and the eyelids demonstrated no xanthelasmas.   HEENT: normal oral mucosa, no oral pallor and no oral cyanosis.   Neck: normal jugular venous A waves present, normal jugular venous V waves present and no jugular venous jacobs A waves.   Pulmonary: no respiratory distress, normal respiratory rhythm and effort, no accessory muscle use and lungs were clear to auscultation bilaterally.   Cardiovascular: heart rate and rhythm were normal, normal S1 and S2 and no murmur, gallop, rub, heave or thrill are present.   Abdomen: soft, non-tender, no hepato-splenomegaly and no abdominal mass palpated.   Musculoskeletal: the gait could not be assessed..   Extremities: no clubbing of the fingernails, no localized cyanosis, no petechial hemorrhages and no ischemic changes.   Skin: normal skin color and pigmentation, no rash, no venous stasis, no skin lesions, no skin ulcer and no xanthoma was observed.   Psychiatric: oriented to person, place, and time, the affect was normal, the mood was normal and not feeling anxious.     LABS:   --------  09-13    137  |  99  |  5<L>  ----------------------------<  104<H>  3.7   |  27  |  0.49<L>    Ca    8.5      13 Sep 2017 06:51                           11.9   8.4   )-----------( 301      ( 13 Sep 2017 06:51 )             35.0     PT/INR - ( 13 Sep 2017 06:51 )   PT: 26.3 sec;   INR: 2.37 ratio           09-13 @ 06:51 BNP: 1893 pg/mL  09-12 @ 18:22 BNP: 1793 pg/mL            RADIOLOGY:  -----------------        ECG:

## 2017-09-13 NOTE — PROVIDER CONTACT NOTE (MEDICATION) - SITUATION
Upon entering room for morning meds pt was not arousing to voice or commands. VSS. Pt was restless and talking a lot until 4am when he fell asleep. MD Guy aware.

## 2017-09-13 NOTE — PROGRESS NOTE ADULT - SUBJECTIVE AND OBJECTIVE BOX
Patient seen and examined today Plan discussed/Questions answered  (with patient/ancillary staff/colleagues) Chart notes reviewd More detailed Pulm/Critical Care notes, assessment and plan  will be added later today as needed after reviewing further labs as they become available     Notable interval events reviewed    ROS/PE  . REVIEW OF SYMPTOMS    Able to give ROS  Yes     RELIABLE NO   Weakness Yes   Chills No   Vision changes No  Lymph nodes No enlarged glands   Endocrine No unexplained hair loss No het or cold intolerance   Allergy No hives   Sore throat No   Coughing blood No  Headache No  Confusion YES  Chest pain No  Palpitations No   Pain abdomen NO   Shortness of breath YES  Vomiting NO  Pain neck No  Pain abdomen NO     PHYSICAL EXAM    HEENT Unremarkable PERRLA atraumatic  RESP Fair air entry EXP prolonged    Harsh breath sound Resp distres mild  CARDIAC S1 S2 No S3     NO JVD   Awake Alert and ORIENTED x tw  ABDOMEN SOFT BS PRESENT NOT DISTENDED No hepatosplenomegaly  PEDAL EDEMA present No calf tenderness  NO rash GENERAL Not TOXIC looking  . Patient seen and examined today Plan discussed/Questions answered  (with patient/ancillary staff/colleagues) Chart notes reviewd More detailed Pulm/Critical Care notes, assessment and plan  will be added later today as needed after reviewing further labs as they become available     Notable interval events reviewed    ROS/PE  . REVIEW OF SYMPTOMS    Able to give ROS  Yes     RELIABLE NO   Weakness Yes   Chills No   Vision changes No  Lymph nodes No enlarged glands   Endocrine No unexplained hair loss No het or cold intolerance   Allergy No hives   Sore throat No   Coughing blood No  Headache No  Confusion YES  Chest pain No  Palpitations No   Pain abdomen NO   Shortness of breath YES  Vomiting NO  Pain neck No  Pain abdomen NO     PHYSICAL EXAM    HEENT Unremarkable PERRLA atraumatic  RESP Fair air entry EXP prolonged    Harsh breath sound Resp distres mild  CARDIAC S1 S2 No S3     NO JVD   Awake Alert and ORIENTED x tw  ABDOMEN SOFT BS PRESENT NOT DISTENDED No hepatosplenomegaly  PEDAL EDEMA present No calf tenderness  VITALS/LABS  9/13/2017 afeb 59 138/74 17 95% 74  9/13/2017 W 8.4 Hb 11.9 Plt 301  Na 137 K 3.7 CO2 27 Cr .4   PROBLEM ASSESSMENT PLAN   RESP   9/7 RA 95% 9/8 RA 90% 9/12 RA 96%   SEPSIS   Zithro (9/7) zosyn (9/7)  Changed to augm (9/12) 9/7 bc n 9.7 uc n   9/7-9/9 W 16.7-6.1   9/7 LA 1.5  9/7 PCT .18 9/7 RVP Entero/Rhinovirus detect  9/7 Leg n 9/8 Str pneum n  9/7 bc n 9/7 uc n   9/7 CXR NAPD 9/13 CXR napd   CHF  10/24/2014 ECHO EF 50% Mild hypokinesia septum PASP 40 Mod MR   Lisinopril 5 (3/19)   A FIB   Dig .125 (9/12) Coumadin coreg 3125x2 (9/12)   CAD  Simvastatin 20  (9/7)   ANXIETY    Buspar 30 x 2 (9/7)    OBS   Donepezil 5 (9/7)  DEPRESSION  Venlafaxine 75 x 2 (9/7)   GLOBAL ISSUE/BEST PRACTICE:        PROBLEM:      Analgesia:     na                        PROBLEM: Sedation:     na               PROBLEM: HOB elevation:   y             PROBLEM: Stress ulcer proph:    na                      PROBLEM: VTE prophylaxis:      On coumadin                   PROBLEM: Glycemic control:    na   PROBLEM: Nutrition:    diabetic (9/7)           PROBLEM: Advanced directive: na     PROBLEM: Allergies:  na  PATIENT DESCRIPTION PMH SOCIAL   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 PMH HO  injury L shin remote past 3/18/2014 V duplex legs –ben Chr swelling leg  OBS Prostate CA LV Systolic dysfunction CHF 10/24/2014 ECHO EF 50% Mild hypokinesia septum PASP 40 Mod MR    A fib  (on coumadin) Htn Depression Lower extr edema Chr swelling L leg DM      LUNG NODULE 10/21/2014 CT Ch  1 cm hypoattenuating thyroid nodule  Trace symmetrical layering bl pl effusions with dependent atelectasis bases  Ca granuloma both RML and l lower lobes  5 mm subpleural parenchymal nodule R apex    9/7/2017 Patient was sent from  home for cough URT symptoms x 2 d  Was supposed to be started on Zpak 9/7 but was noted to have T 101   HOSPITAL COURSE PLAN 9/7/2017 ADMISSION [facility] P   SEPSIS Source unclear Likely Resp tract infection Zosyn vanco started by ER MD 9/7 pending bld c uc will continue NS 3l given 9/7 9/7 PCT .18 9/7 RVP Entero/Rhinovirus detect  9/7 Leg n Rxed Zithro (9/7) zosyn (9/7)   DEHYDRATION IVF   DM Sl scale   TIME SPENT Over 25 minutes aggregate  care time spent on encounter; activities included   direct patient care, counseling and/or coordinating care reviewing notes, lab data/ imaging , discussion with multidisciplinary team/ patient  /family. Risks, benefits, alternatives  discussed in detail. Questions/concerns  were addressed  to the best of my ability .      NO rash GENERAL Not TOXIC looking  .

## 2017-09-13 NOTE — PROVIDER CONTACT NOTE (MEDICATION) - ACTION/TREATMENT ORDERED:
Dr. Guy assessed pt. Pt did not react to voice or commands. Pt reacted to deep sternum rub. Pt refused all morning medications and MD aware. Continue to monitor.

## 2017-09-13 NOTE — SWALLOW BEDSIDE ASSESSMENT ADULT - COMMENTS
This is a 89 YO W Male brought to ER because of cough, fever, weakness and increased confusion. Patient with past medical history of dementia, HTN, CHF, DM2, prostate CA, and A fib.  pt awake but fatigued, pt c poor labial strippage of puree c anterior oral holding of trials c poor trigger of swallow

## 2017-09-14 VITALS
OXYGEN SATURATION: 94 % | SYSTOLIC BLOOD PRESSURE: 115 MMHG | HEART RATE: 86 BPM | RESPIRATION RATE: 18 BRPM | TEMPERATURE: 98 F | DIASTOLIC BLOOD PRESSURE: 72 MMHG

## 2017-09-14 LAB
ALBUMIN SERPL ELPH-MCNC: 2.7 G/DL — LOW (ref 3.3–5)
ALP SERPL-CCNC: 86 U/L — SIGNIFICANT CHANGE UP (ref 40–120)
ALT FLD-CCNC: 17 U/L — SIGNIFICANT CHANGE UP (ref 12–78)
ANION GAP SERPL CALC-SCNC: 9 MMOL/L — SIGNIFICANT CHANGE UP (ref 5–17)
AST SERPL-CCNC: 20 U/L — SIGNIFICANT CHANGE UP (ref 15–37)
BILIRUB SERPL-MCNC: 0.4 MG/DL — SIGNIFICANT CHANGE UP (ref 0.2–1.2)
BUN SERPL-MCNC: 6 MG/DL — LOW (ref 7–23)
CALCIUM SERPL-MCNC: 8.5 MG/DL — SIGNIFICANT CHANGE UP (ref 8.5–10.1)
CHLORIDE SERPL-SCNC: 102 MMOL/L — SIGNIFICANT CHANGE UP (ref 96–108)
CO2 SERPL-SCNC: 28 MMOL/L — SIGNIFICANT CHANGE UP (ref 22–31)
CREAT SERPL-MCNC: 0.51 MG/DL — SIGNIFICANT CHANGE UP (ref 0.5–1.3)
GLUCOSE SERPL-MCNC: 122 MG/DL — HIGH (ref 70–99)
HCT VFR BLD CALC: 37.6 % — LOW (ref 39–50)
HGB BLD-MCNC: 12.7 G/DL — LOW (ref 13–17)
INR BLD: 2.15 RATIO — HIGH (ref 0.88–1.16)
MAGNESIUM SERPL-MCNC: 1.6 MG/DL — SIGNIFICANT CHANGE UP (ref 1.6–2.6)
MCHC RBC-ENTMCNC: 31.6 PG — SIGNIFICANT CHANGE UP (ref 27–34)
MCHC RBC-ENTMCNC: 33.9 GM/DL — SIGNIFICANT CHANGE UP (ref 32–36)
MCV RBC AUTO: 93.1 FL — SIGNIFICANT CHANGE UP (ref 80–100)
PHOSPHATE SERPL-MCNC: 2.4 MG/DL — LOW (ref 2.5–4.5)
PLATELET # BLD AUTO: 318 K/UL — SIGNIFICANT CHANGE UP (ref 150–400)
POTASSIUM SERPL-MCNC: 3.8 MMOL/L — SIGNIFICANT CHANGE UP (ref 3.5–5.3)
POTASSIUM SERPL-SCNC: 3.8 MMOL/L — SIGNIFICANT CHANGE UP (ref 3.5–5.3)
PROT SERPL-MCNC: 6 G/DL — SIGNIFICANT CHANGE UP (ref 6–8.3)
PROTHROM AB SERPL-ACNC: 23.8 SEC — HIGH (ref 9.8–12.7)
RBC # BLD: 4.04 M/UL — LOW (ref 4.2–5.8)
RBC # FLD: 12.6 % — SIGNIFICANT CHANGE UP (ref 10.3–14.5)
SODIUM SERPL-SCNC: 139 MMOL/L — SIGNIFICANT CHANGE UP (ref 135–145)
WBC # BLD: 8 K/UL — SIGNIFICANT CHANGE UP (ref 3.8–10.5)
WBC # FLD AUTO: 8 K/UL — SIGNIFICANT CHANGE UP (ref 3.8–10.5)

## 2017-09-14 RX ORDER — LACTOBACILLUS ACIDOPHILUS 100MM CELL
2 CAPSULE ORAL
Qty: 20 | Refills: 0
Start: 2017-09-14 | End: 2017-09-24

## 2017-09-14 RX ORDER — WARFARIN SODIUM 2.5 MG/1
1 TABLET ORAL
Qty: 5 | Refills: 0
Start: 2017-09-14 | End: 2017-09-19

## 2017-09-14 RX ORDER — CARVEDILOL PHOSPHATE 80 MG/1
1 CAPSULE, EXTENDED RELEASE ORAL
Qty: 60 | Refills: 0
Start: 2017-09-14 | End: 2017-10-14

## 2017-09-14 RX ADMIN — Medication 1: at 12:25

## 2017-09-14 RX ADMIN — Medication 75 MILLIGRAM(S): at 08:43

## 2017-09-14 RX ADMIN — Medication 1 TABLET(S): at 08:43

## 2017-09-14 RX ADMIN — CARVEDILOL PHOSPHATE 3.12 MILLIGRAM(S): 80 CAPSULE, EXTENDED RELEASE ORAL at 05:38

## 2017-09-14 RX ADMIN — Medication 1 TABLET(S): at 05:38

## 2017-09-14 RX ADMIN — LOSARTAN POTASSIUM 25 MILLIGRAM(S): 100 TABLET, FILM COATED ORAL at 05:42

## 2017-09-14 RX ADMIN — Medication 0.12 MILLIGRAM(S): at 05:38

## 2017-09-14 RX ADMIN — Medication 1 TABLET(S): at 11:49

## 2017-09-14 RX ADMIN — LISINOPRIL 5 MILLIGRAM(S): 2.5 TABLET ORAL at 05:38

## 2017-09-14 NOTE — PROGRESS NOTE ADULT - PROBLEM SELECTOR PROBLEM 7
Leukocytosis, unspecified type
Prophylactic measure
Leukocytosis, unspecified type
Prophylactic measure
Chronic systolic congestive heart failure
Hypertension
Hypertension

## 2017-09-14 NOTE — PROGRESS NOTE ADULT - SUBJECTIVE AND OBJECTIVE BOX
PROGRESS NOTE    OVERNIGHT    SUBJECTIVE    PAST MEDICAL & SURGICAL HISTORY:  CHF (congestive heart failure)  Dementia  Prostate ca  Diabetes  Hypertension  Atrial fibrillation  No significant past surgical history    HEALTH ISSUES - PROBLEM Dx:  Lethargy: Lethargy  Hypertension: Hypertension  CHF (congestive heart failure): CHF (congestive heart failure)  ASHD (arteriosclerotic heart disease): ASHD (arteriosclerotic heart disease)  Diabetes: Diabetes  Bronchitis: Bronchitis  Prophylactic measure: Prophylactic measure  Dementia: Dementia  Leukocytosis, unspecified type: Leukocytosis, unspecified type  Chronic systolic congestive heart failure: Chronic systolic congestive heart failure  Prostate ca: Prostate ca  Vascular dementia without behavioral disturbance: Vascular dementia without behavioral disturbance  Essential hypertension: Essential hypertension  Chronic atrial fibrillation: Chronic atrial fibrillation  Sepsis, due to unspecified organism: Sepsis, due to unspecified organism          MEDICATIONS  (STANDING):  insulin lispro (HumaLOG) corrective regimen sliding scale   SubCutaneous three times a day before meals  dextrose 5%. 1000 milliLiter(s) (50 mL/Hr) IV Continuous <Continuous>  dextrose 50% Injectable 12.5 Gram(s) IV Push once  dextrose 50% Injectable 25 Gram(s) IV Push once  dextrose 50% Injectable 25 Gram(s) IV Push once  lisinopril 5 milliGRAM(s) Oral daily  simvastatin 20 milliGRAM(s) Oral at bedtime  donepezil 5 milliGRAM(s) Oral at bedtime  venlafaxine 75 milliGRAM(s) Oral two times a day with meals  amoxicillin  875 milliGRAM(s)/clavulanate 1 Tablet(s) Oral two times a day  potassium acid phosphate/sodium acid phosphate tablet (K-PHOS No. 2) 1 Tablet(s) Oral three times a day with meals  carvedilol 3.125 milliGRAM(s) Oral every 12 hours  digoxin     Tablet 0.125 milliGRAM(s) Oral daily  losartan 25 milliGRAM(s) Oral daily    MEDICATIONS  (PRN):  dextrose Gel 1 Dose(s) Oral once PRN Blood Glucose LESS THAN 70 milliGRAM(s)/deciliter  glucagon  Injectable 1 milliGRAM(s) IntraMuscular once PRN Glucose LESS THAN 70 milligrams/deciliter      OBJECTIVE    T(C): 36.6 (09-14-17 @ 05:35), Max: 36.8 (09-13-17 @ 14:15)  HR: 67 (09-14-17 @ 05:35) (64 - 74)  BP: 142/90 (09-14-17 @ 05:35) (103/63 - 142/90)  RR: 18 (09-14-17 @ 05:35) (18 - 18)  SpO2: 92% (09-14-17 @ 05:35) (92% - 94%)  Wt(kg): --  I&O's Summary        REVIEW OF SYSTEMS:  CONSTITUTIONAL: No fever, weight loss, or fatigue  EYES: No eye pain, visual disturbances, or discharge  ENMT:  No difficulty hearing, tinnitus, vertigo; No sinus or throat pain  NECK: No pain or stiffness  BREASTS: No pain, masses, or nipple discharge  RESPIRATORY: No cough, wheezing, chills or hemoptysis; No shortness of breath  CARDIOVASCULAR: No chest pain, palpitations, dizziness, or leg swelling  GASTROINTESTINAL: No abdominal pain. No nausea, vomiting; No diarrhea or constipation. No melena or hematochezia.  GENITOURINARY: No dysuria, frequency, hematuria, or incontinence  NEUROLOGICAL: No headaches, memory loss, loss of strength, numbness, or tremors  SKIN: No itching, burning, rashes, or lesions   LYMPH NODES: No enlarged glands  MUSCULOSKELETAL: No joint pain or swelling; No muscle, back, or extremity pain  HEME/LYMPH: No easy bruising, or bleeding gums  ALLERY AND IMMUNOLOGIC: No hives or eczema      PHYSICAL EXAM:  Appearance: NAD.   HEENT:   Moist oral mucosa. Conjunctiva clear b/l.   Neck : supple  Cardiovascular: RRR ,S1S2 present  Respiratory: Lungs CTAB.  Gastrointestinal:  Soft, nontender. No rebound. No rigidity. BS present	  Skin: No rashes, No ecchymoses, No cyanosis.	  Neurologic: Non-focal. Moving all extremities. Following commands.  Extremities: No edema. No erythema. No calf tenderness.  	  LABS:                        12.7   8.0   )-----------( 318      ( 14 Sep 2017 06:24 )             37.6     09-14    139  |  102  |  6<L>  ----------------------------<  122<H>  3.8   |  28  |  0.51    Ca    8.5      14 Sep 2017 06:24  Phos  2.4     09-14  Mg     1.6     09-14    TPro  6.0  /  Alb  2.7<L>  /  TBili  0.4  /  DBili  x   /  AST  20  /  ALT  17  /  AlkPhos  86  09-14    PT/INR - ( 14 Sep 2017 06:24 )   PT: 23.8 sec;   INR: 2.15 ratio               RADIOLOGY & ADDITIONAL TESTS:    ASSESSMENT/PLAN: PROGRESS NOTE    OVERNIGHT  No new issues reported by medical staff . All above noted Buspar stopped by psych cons yesterday   SUBJECTIVE  Feels better,ambulated 40 feet on PT session   PAST MEDICAL & SURGICAL HISTORY:  CHF (congestive heart failure)  Dementia  Prostate ca  Diabetes  Hypertension  Atrial fibrillation  No significant past surgical history    HEALTH ISSUES - PROBLEM Dx:  Lethargy: Lethargy  Hypertension: Hypertension  CHF (congestive heart failure): CHF (congestive heart failure)  ASHD (arteriosclerotic heart disease): ASHD (arteriosclerotic heart disease)  Diabetes: Diabetes  Bronchitis: Bronchitis  Prophylactic measure: Prophylactic measure  Dementia: Dementia  Leukocytosis, unspecified type: Leukocytosis, unspecified type  Chronic systolic congestive heart failure: Chronic systolic congestive heart failure  Prostate ca: Prostate ca  Vascular dementia without behavioral disturbance: Vascular dementia without behavioral disturbance  Essential hypertension: Essential hypertension  Chronic atrial fibrillation: Chronic atrial fibrillation  Sepsis, due to unspecified organism: Sepsis, due to unspecified organism          MEDICATIONS  (STANDING):  insulin lispro (HumaLOG) corrective regimen sliding scale   SubCutaneous three times a day before meals  dextrose 5%. 1000 milliLiter(s) (50 mL/Hr) IV Continuous <Continuous>  dextrose 50% Injectable 12.5 Gram(s) IV Push once  dextrose 50% Injectable 25 Gram(s) IV Push once  dextrose 50% Injectable 25 Gram(s) IV Push once  lisinopril 5 milliGRAM(s) Oral daily  simvastatin 20 milliGRAM(s) Oral at bedtime  donepezil 5 milliGRAM(s) Oral at bedtime  venlafaxine 75 milliGRAM(s) Oral two times a day with meals  amoxicillin  875 milliGRAM(s)/clavulanate 1 Tablet(s) Oral two times a day  potassium acid phosphate/sodium acid phosphate tablet (K-PHOS No. 2) 1 Tablet(s) Oral three times a day with meals  carvedilol 3.125 milliGRAM(s) Oral every 12 hours  digoxin     Tablet 0.125 milliGRAM(s) Oral daily  losartan 25 milliGRAM(s) Oral daily    MEDICATIONS  (PRN):  dextrose Gel 1 Dose(s) Oral once PRN Blood Glucose LESS THAN 70 milliGRAM(s)/deciliter  glucagon  Injectable 1 milliGRAM(s) IntraMuscular once PRN Glucose LESS THAN 70 milligrams/deciliter      OBJECTIVE    T(C): 36.6 (09-14-17 @ 05:35), Max: 36.8 (09-13-17 @ 14:15)  HR: 67 (09-14-17 @ 05:35) (64 - 74)  BP: 142/90 (09-14-17 @ 05:35) (103/63 - 142/90)  RR: 18 (09-14-17 @ 05:35) (18 - 18)  SpO2: 92% (09-14-17 @ 05:35) (92% - 94%)  Wt(kg): --  I&O's Summary        REVIEW OF SYSTEMS:  ROS is unobtainable due confusion       PHYSICAL EXAM:  Appearance: NAD.   HEENT:   Moist oral mucosa. Conjunctiva clear b/l.   Neck : supple  Cardiovascular: RRR ,S1S2 present  Respiratory: Lungs CTAB.  Gastrointestinal:  Soft, nontender. No rebound. No rigidity. BS present	  Skin: No rashes, No ecchymoses, No cyanosis.	  Neurologic: Non-focal. Moving all extremities. Following commands.  Extremities: No edema. No erythema. No calf tenderness.  	  LABS:                        12.7   8.0   )-----------( 318      ( 14 Sep 2017 06:24 )             37.6     09-14    139  |  102  |  6<L>  ----------------------------<  122<H>  3.8   |  28  |  0.51    Ca    8.5      14 Sep 2017 06:24  Phos  2.4     09-14  Mg     1.6     09-14    TPro  6.0  /  Alb  2.7<L>  /  TBili  0.4  /  DBili  x   /  AST  20  /  ALT  17  /  AlkPhos  86  09-14    PT/INR - ( 14 Sep 2017 06:24 )   PT: 23.8 sec;   INR: 2.15 ratio               RADIOLOGY & ADDITIONAL TESTS:    ASSESSMENT/PLAN:

## 2017-09-14 NOTE — PROGRESS NOTE ADULT - PROBLEM SELECTOR PLAN 7
as above
Gastrointestina stress ulcer prophylaxis l and DVT prophylaxis administrated
Gastrointestina stress ulcer prophylaxis l and DVT prophylaxis administrated
as above
continue current managmnet and medications
continue current managmnet and medications ,card followup ,check bnp in am
controlled
controlled

## 2017-09-14 NOTE — PROGRESS NOTE ADULT - SUBJECTIVE AND OBJECTIVE BOX
Patient is a 89y Male with a known history of :  Lethargy (R53.83)  Hypertension (I10)  CHF (congestive heart failure) (I50.9)  ASHD (arteriosclerotic heart disease) (I25.10)  Diabetes (E11.9)  Bronchitis (J40)  Prophylactic measure (Z29.9)  Dementia (F03.90)  Leukocytosis, unspecified type (D72.829)  Chronic systolic congestive heart failure (I50.22)  Prostate ca (C61)  Vascular dementia without behavioral disturbance (F01.50)  Essential hypertension (I10)  Chronic atrial fibrillation (I48.2)  Sepsis, due to unspecified organism (A41.9)    HPI:  This is a 89 YO W Male brought to ER because of cough, fever, weakness and increased confusion. Patient with past medical history of dementia, HTN, CHF, DM2, prostate CA, and [facility],    Nursing home reported that the patient had been coughing and had a temp of 101.4 this morning. (07 Sep 2017 17:35)      REVIEW OF SYSTEMS:    CONSTITUTIONAL: No fever, weight loss, or fatigue  EYES: No eye pain, visual disturbances, or discharge  ENMT:  No difficulty hearing, tinnitus, vertigo; No sinus or throat pain  NECK: No pain or stiffness  BREASTS: No pain, masses, or nipple discharge  RESPIRATORY: No cough, wheezing, chills or hemoptysis; No shortness of breath  CARDIOVASCULAR: No chest pain, palpitations, dizziness, or leg swelling  GASTROINTESTINAL: No abdominal or epigastric pain. No nausea, vomiting, or hematemesis; No diarrhea or constipation. No melena or hematochezia.  GENITOURINARY: No dysuria, frequency, hematuria, or incontinence  NEUROLOGICAL: No headaches, memory loss, loss of strength, numbness, or tremors  SKIN: No itching, burning, rashes, or lesions   LYMPH NODES: No enlarged glands  ENDOCRINE: No heat or cold intolerance; No hair loss  MUSCULOSKELETAL: No joint pain or swelling; No muscle, back, or extremity pain  PSYCHIATRIC: No depression, anxiety, mood swings, or difficulty sleeping  HEME/LYMPH: No easy bruising, or bleeding gums  ALLERGY AND IMMUNOLOGIC: No hives or eczema    MEDICATIONS  (STANDING):  insulin lispro (HumaLOG) corrective regimen sliding scale   SubCutaneous three times a day before meals  dextrose 5%. 1000 milliLiter(s) (50 mL/Hr) IV Continuous <Continuous>  dextrose 50% Injectable 12.5 Gram(s) IV Push once  dextrose 50% Injectable 25 Gram(s) IV Push once  dextrose 50% Injectable 25 Gram(s) IV Push once  lisinopril 5 milliGRAM(s) Oral daily  simvastatin 20 milliGRAM(s) Oral at bedtime  donepezil 5 milliGRAM(s) Oral at bedtime  venlafaxine 75 milliGRAM(s) Oral two times a day with meals  amoxicillin  875 milliGRAM(s)/clavulanate 1 Tablet(s) Oral two times a day  potassium acid phosphate/sodium acid phosphate tablet (K-PHOS No. 2) 1 Tablet(s) Oral three times a day with meals  carvedilol 3.125 milliGRAM(s) Oral every 12 hours  digoxin     Tablet 0.125 milliGRAM(s) Oral daily  losartan 25 milliGRAM(s) Oral daily    MEDICATIONS  (PRN):  dextrose Gel 1 Dose(s) Oral once PRN Blood Glucose LESS THAN 70 milliGRAM(s)/deciliter  glucagon  Injectable 1 milliGRAM(s) IntraMuscular once PRN Glucose LESS THAN 70 milligrams/deciliter      ALLERGIES: latex (Rash)  No Known Drug Allergies      FAMILY HISTORY:  No pertinent family history in first degree relatives      PHYSICAL EXAMINATION:  -----------------------------  T(C): 36.6 (09-14-17 @ 05:35), Max: 36.8 (09-13-17 @ 14:15)  HR: 67 (09-14-17 @ 05:35) (64 - 74)  BP: 142/90 (09-14-17 @ 05:35) (103/63 - 142/90)  RR: 18 (09-14-17 @ 05:35) (18 - 18)  SpO2: 92% (09-14-17 @ 05:35) (92% - 94%)  Wt(kg): --        Constitutional: well developed, normal appearance, well groomed, well nourished, no deformities and no acute distress.   Eyes: the conjunctiva exhibited no abnormalities and the eyelids demonstrated no xanthelasmas.   HEENT: normal oral mucosa, no oral pallor and no oral cyanosis.   Neck: normal jugular venous A waves present, normal jugular venous V waves present and no jugular venous jacobs A waves.   Pulmonary: no respiratory distress, normal respiratory rhythm and effort, no accessory muscle use and lungs were clear to auscultation bilaterally.   Cardiovascular: heart rate and rhythm were normal, normal S1 and S2 and no murmur, gallop, rub, heave or thrill are present.   Abdomen: soft, non-tender, no hepato-splenomegaly and no abdominal mass palpated.   Musculoskeletal: the gait could not be assessed..   Extremities: no clubbing of the fingernails, no localized cyanosis, no petechial hemorrhages and no ischemic changes.   Skin: normal skin color and pigmentation, no rash, no venous stasis, no skin lesions, no skin ulcer and no xanthoma was observed.   Psychiatric: oriented to person, place, and time, the affect was normal, the mood was normal and not feeling anxious.     LABS:   --------  09-14    139  |  102  |  6<L>  ----------------------------<  122<H>  3.8   |  28  |  0.51    Ca    8.5      14 Sep 2017 06:24  Phos  2.4     09-14  Mg     1.6     09-14    TPro  6.0  /  Alb  2.7<L>  /  TBili  0.4  /  DBili  x   /  AST  20  /  ALT  17  /  AlkPhos  86  09-14                         12.7   8.0   )-----------( 318      ( 14 Sep 2017 06:24 )             37.6     PT/INR - ( 14 Sep 2017 06:24 )   PT: 23.8 sec;   INR: 2.15 ratio                     RADIOLOGY:  -----------------        ECG:

## 2017-09-14 NOTE — PROGRESS NOTE ADULT - PROBLEM SELECTOR PLAN 5
continue home medications
Cholesterase inhibitor
on coverage insulin
on coverage insulin
continue home medications

## 2017-09-14 NOTE — PHYSICAL THERAPY INITIAL EVALUATION ADULT - ADDITIONAL COMMENTS
Pt lives at [facility] . Pt was ambulating with a cane to meals prior to admission

## 2017-09-14 NOTE — PROGRESS NOTE ADULT - PROBLEM SELECTOR PROBLEM 1
Sepsis, due to unspecified organism
Bronchitis
Bronchitis
Sepsis, due to unspecified organism

## 2017-09-14 NOTE — PROGRESS NOTE ADULT - ATTENDING COMMENTS
Patient's DTR called and she wants to keep her mother comfortable.  AS known wishes of patient she does wants any aggressive measures to keep her alive. Patient has MOLST form which signed by patient.  No medications including IV fluid, No other medications except Pain meds ie, Tylenol. All other meds d/josie. Patient is DNR/DNI.
Patient was seen and examined on a day of discharge . Plan of care , discharge medications and recommendations discussed with consultants and clearance for discharge obtained .Social service , case management  and medical staff are aware of plan. Family is notified. Discharge summary  is  prepared electronically

## 2017-09-14 NOTE — PROGRESS NOTE ADULT - PROBLEM SELECTOR PROBLEM 3
Essential hypertension
ASHD (arteriosclerotic heart disease)
ASHD (arteriosclerotic heart disease)
Chronic atrial fibrillation
Essential hypertension

## 2017-09-14 NOTE — PROGRESS NOTE ADULT - PROBLEM SELECTOR PROBLEM 2
Chronic atrial fibrillation
Lethargy
Chronic atrial fibrillation

## 2017-09-14 NOTE — PROGRESS NOTE ADULT - PROBLEM SELECTOR PLAN 4
Cholesterase inhibitor
Stable
on coreg and arb
on coreg and arb  xray chest 9/13 - no gross chf  to have echo
Cholesterase inhibitor

## 2017-09-14 NOTE — DIETITIAN INITIAL EVALUATION ADULT. - OTHER INFO
Pt's LOS 7 days. Pt's appetite noted to be fair ~50% of meals consumed. Pt noted to sleep through meals at times. 9/13 speech and swallow eval rx for pt to be NPO for now. To follow.

## 2017-09-14 NOTE — PROGRESS NOTE ADULT - PROBLEM SELECTOR PROBLEM 4
Vascular dementia without behavioral disturbance
CHF (congestive heart failure)
CHF (congestive heart failure)
Essential hypertension
Vascular dementia without behavioral disturbance

## 2017-09-14 NOTE — PROGRESS NOTE ADULT - PROBLEM SELECTOR PROBLEM 5
Prostate ca
Diabetes
Diabetes
Vascular dementia without behavioral disturbance
Prostate ca

## 2017-09-14 NOTE — PROGRESS NOTE ADULT - PROBLEM SELECTOR PROBLEM 6
Chronic systolic congestive heart failure
Dementia
Dementia
Prostate ca

## 2017-09-14 NOTE — PROGRESS NOTE ADULT - PROBLEM SELECTOR PLAN 1
IV Abx, Blood and Urine C&S continue current managmnet and medications Anticipate d/c in 24 hrs [facility] vs return to [facility] likely secondary to aspiration pneumonia ,resolving D/w Dr Quintana ,complet course of abx at home Dysphagia diet as per speech and swallow eval advised

## 2017-09-14 NOTE — PROGRESS NOTE ADULT - SUBJECTIVE AND OBJECTIVE BOX
Patient seen and examined today Plan discussed/Questions answered  (with patient/ancillary staff/colleagues) Chart notes reviewd More detailed Pulm/Critical Care notes, assessment and plan  will be added later today as needed after reviewing further labs as they become available     Notable interval events reviewed    ROS/PE  . REVIEW OF SYMPTOMS    Able to give ROS  Yes     RELIABLE NO   Weakness Yes   Chills No   Vision changes No  Lymph nodes No enlarged glands   Endocrine No unexplained hair loss No het or cold intolerance   Allergy No hives   Sore throat No   Coughing blood No  Headache No  Confusion YES  Chest pain No  Palpitations No   Pain abdomen NO   Shortness of breath YES  Vomiting NO  Pain neck No  Pain abdomen NO     PHYSICAL EXAM    HEENT Unremarkable PERRLA atraumatic  RESP Fair air entry EXP prolonged    Harsh breath sound Resp distres mild  CARDIAC S1 S2 No S3     NO JVD   Awake Alert and ORIENTED x one  ABDOMEN SOFT BS PRESENT NOT DISTENDED No hepatosplenomegaly  PEDAL EDEMA present No calf tenderness  NO rash GENERAL Not TOXIC looking  . Patient seen and examined today Plan discussed/Questions answered  (with patient/ancillary staff/colleagues) Chart notes reviewd More detailed Pulm/Critical Care notes, assessment and plan  will be added later today as needed after reviewing further labs as they become available     Notable interval events reviewed    ROS/PE  . REVIEW OF SYMPTOMS    Able to give ROS  Yes     RELIABLE NO   Weakness Yes   Chills No   Vision changes No  Lymph nodes No enlarged glands   Endocrine No unexplained hair loss No het or cold intolerance   Allergy No hives   Sore throat No   Coughing blood No  Headache No  Confusion YES  Chest pain No  Palpitations No   Pain abdomen NO   Shortness of breath YES  Vomiting NO  Pain neck No  Pain abdomen NO     PHYSICAL EXAM    HEENT Unremarkable PERRLA atraumatic  RESP Fair air entry EXP prolonged    Harsh breath sound Resp distres mild  CARDIAC S1 S2 No S3     NO JVD   Awake Alert and ORIENTED x one  ABDOMEN SOFT BS PRESENT NOT DISTENDED No hepatosplenomegaly  PEDAL EDEMA present No calf tenderness  NO rash GENERAL Not TOXIC looking  . VITALS/LABS  9/14/2017 afeb 86 115/70 18 94%   9/14 acc 122-162   9/14/2017 W 8 Hb 12.7 Plt 318  Na 139 K 3.8 CO2 28 Cr .5   PROBLEM ASSESSMENT PLAN   RESP   9/7 RA 95% 9/8 RA 90% 9/12 RA 96%   SEPSIS   Zithro (9/7) zosyn (9/7)  Changed to augm (9/12) 9/7 bc n 9.7 uc n   9/7-9/9 W 16.7-6.1   9/7 LA 1.5  9/7 PCT .18 9/7 RVP Entero/Rhinovirus detect  9/7 Leg n 9/8 Str pneum n  9/7 bc n 9/7 uc n   9/7 CXR NAPD 9/13 CXR napd   CHF  10/24/2014 ECHO EF 50% Mild hypokinesia septum PASP 40 Mod MR   Lisinopril 5 (3/19)   A FIB   Dig .125 (9/12) Coumadin coreg 3125x2 (9/12)   CAD  Simvastatin 20  (9/7)   ANXIETY    Buspar 30 x 2 (9/7)    OBS   Donepezil 5 (9/7) 9/13 CT H n   DEPRESSION  Venlafaxine 75 x 2 (9/7)   GLOBAL ISSUE/BEST PRACTICE:        PROBLEM:      Analgesia:     na                        PROBLEM: Sedation:     na               PROBLEM: HOB elevation:   y             PROBLEM: Stress ulcer proph:    na                      PROBLEM: VTE prophylaxis:      On coumadin                   PROBLEM: Glycemic control:    na   PROBLEM: Nutrition:    diabetic (9/7)           PROBLEM: Advanced directive: na     PROBLEM: Allergies:  na  PATIENT DESCRIPTION PMH SOCIAL   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 PM HO  injury L shin remote past 3/18/2014 V duplex legs –ben Chr swelling leg  OBS Prostate CA LV Systolic dysfunction CHF 10/24/2014 ECHO EF 50% Mild hypokinesia septum PASP 40 Mod MR    A fib  (on coumadin) Htn Depression Lower extr edema Chr swelling L leg DM      LUNG NODULE 10/21/2014 CT Ch  1 cm hypoattenuating thyroid nodule  Trace symmetrical layering bl pl effusions with dependent atelectasis bases  Ca granuloma both RML and l lower lobes  5 mm subpleural parenchymal nodule R apex    9/7/2017 Patient was sent from  home for cough URT symptoms x 2 d  Was supposed to be started on Zpak 9/7 but was noted to have T 101   HOSPITAL COURSE PLAN 9/7/2017 ADMISSION [facility] P   SEPSIS Source unclear Likely Resp tract infection Zosyn vanco started by ER MD 9/7 pending bld c uc will continue NS 3l given 9/7 9/7 PCT .18 9/7 RVP Entero/Rhinovirus detect  9/7 Leg n Rxed Zithro (9/7) zosyn (9/7)   DEHYDRATION IVF   DM Sl scale   TIME SPENT Over 25 minutes aggregate  care time spent on encounter; activities included   direct patient care, counseling and/or coordinating care reviewing notes, lab data/ imaging , discussion with multidisciplinary team/ patient  /family. Risks, benefits, alternatives  discussed in detail. Questions/concerns  were addressed  to the best of my ability .

## 2017-09-14 NOTE — PROGRESS NOTE ADULT - PROBLEM SELECTOR PLAN 6
continue current managmnet and medications ,card followup ,check bnp in am continue current managmnet and medications Chest xray is stable

## 2017-09-14 NOTE — PROGRESS NOTE ADULT - PROBLEM SELECTOR PLAN 8
continue home medications
Gastrointestina stress ulcer prophylaxis l and DVT prophylaxis administrated
stable
stable
continue home medications

## 2017-09-16 DIAGNOSIS — J69.0 PNEUMONITIS DUE TO INHALATION OF FOOD AND VOMIT: ICD-10-CM

## 2017-09-16 DIAGNOSIS — I48.2 CHRONIC ATRIAL FIBRILLATION: ICD-10-CM

## 2017-09-16 DIAGNOSIS — E11.9 TYPE 2 DIABETES MELLITUS WITHOUT COMPLICATIONS: ICD-10-CM

## 2017-09-16 DIAGNOSIS — F01.50 VASCULAR DEMENTIA WITHOUT BEHAVIORAL DISTURBANCE: ICD-10-CM

## 2017-09-16 DIAGNOSIS — A41.9 SEPSIS, UNSPECIFIED ORGANISM: ICD-10-CM

## 2017-09-16 DIAGNOSIS — J40 BRONCHITIS, NOT SPECIFIED AS ACUTE OR CHRONIC: ICD-10-CM

## 2017-09-16 DIAGNOSIS — Z79.01 LONG TERM (CURRENT) USE OF ANTICOAGULANTS: ICD-10-CM

## 2017-09-16 DIAGNOSIS — F41.9 ANXIETY DISORDER, UNSPECIFIED: ICD-10-CM

## 2017-09-16 DIAGNOSIS — E86.0 DEHYDRATION: ICD-10-CM

## 2017-09-16 DIAGNOSIS — I34.0 NONRHEUMATIC MITRAL (VALVE) INSUFFICIENCY: ICD-10-CM

## 2017-09-16 DIAGNOSIS — B97.10 UNSPECIFIED ENTEROVIRUS AS THE CAUSE OF DISEASES CLASSIFIED ELSEWHERE: ICD-10-CM

## 2017-09-16 DIAGNOSIS — Z79.4 LONG TERM (CURRENT) USE OF INSULIN: ICD-10-CM

## 2017-09-16 DIAGNOSIS — I25.10 ATHEROSCLEROTIC HEART DISEASE OF NATIVE CORONARY ARTERY WITHOUT ANGINA PECTORIS: ICD-10-CM

## 2017-09-16 DIAGNOSIS — Z91.040 LATEX ALLERGY STATUS: ICD-10-CM

## 2017-09-16 DIAGNOSIS — I11.0 HYPERTENSIVE HEART DISEASE WITH HEART FAILURE: ICD-10-CM

## 2017-09-16 DIAGNOSIS — F03.90 UNSPECIFIED DEMENTIA, UNSPECIFIED SEVERITY, WITHOUT BEHAVIORAL DISTURBANCE, PSYCHOTIC DISTURBANCE, MOOD DISTURBANCE, AND ANXIETY: ICD-10-CM

## 2017-09-16 DIAGNOSIS — C61 MALIGNANT NEOPLASM OF PROSTATE: ICD-10-CM

## 2017-09-16 DIAGNOSIS — I50.22 CHRONIC SYSTOLIC (CONGESTIVE) HEART FAILURE: ICD-10-CM

## 2017-10-19 PROCEDURE — 87449 NOS EACH ORGANISM AG IA: CPT

## 2017-10-19 PROCEDURE — 84100 ASSAY OF PHOSPHORUS: CPT

## 2017-10-19 PROCEDURE — 87040 BLOOD CULTURE FOR BACTERIA: CPT

## 2017-10-19 PROCEDURE — 80162 ASSAY OF DIGOXIN TOTAL: CPT

## 2017-10-19 PROCEDURE — 36415 COLL VENOUS BLD VENIPUNCTURE: CPT

## 2017-10-19 PROCEDURE — 84145 PROCALCITONIN (PCT): CPT

## 2017-10-19 PROCEDURE — 93005 ELECTROCARDIOGRAM TRACING: CPT

## 2017-10-19 PROCEDURE — 87486 CHLMYD PNEUM DNA AMP PROBE: CPT

## 2017-10-19 PROCEDURE — 87899 AGENT NOS ASSAY W/OPTIC: CPT

## 2017-10-19 PROCEDURE — 70450 CT HEAD/BRAIN W/O DYE: CPT

## 2017-10-19 PROCEDURE — 97162 PT EVAL MOD COMPLEX 30 MIN: CPT

## 2017-10-19 PROCEDURE — 83605 ASSAY OF LACTIC ACID: CPT

## 2017-10-19 PROCEDURE — 85730 THROMBOPLASTIN TIME PARTIAL: CPT

## 2017-10-19 PROCEDURE — 83036 HEMOGLOBIN GLYCOSYLATED A1C: CPT

## 2017-10-19 PROCEDURE — 87798 DETECT AGENT NOS DNA AMP: CPT

## 2017-10-19 PROCEDURE — 82248 BILIRUBIN DIRECT: CPT

## 2017-10-19 PROCEDURE — 87581 M.PNEUMON DNA AMP PROBE: CPT

## 2017-10-19 PROCEDURE — 85027 COMPLETE CBC AUTOMATED: CPT

## 2017-10-19 PROCEDURE — 99285 EMERGENCY DEPT VISIT HI MDM: CPT | Mod: 25

## 2017-10-19 PROCEDURE — 96365 THER/PROPH/DIAG IV INF INIT: CPT

## 2017-10-19 PROCEDURE — 96367 TX/PROPH/DG ADDL SEQ IV INF: CPT

## 2017-10-19 PROCEDURE — 80053 COMPREHEN METABOLIC PANEL: CPT

## 2017-10-19 PROCEDURE — 83735 ASSAY OF MAGNESIUM: CPT

## 2017-10-19 PROCEDURE — 71045 X-RAY EXAM CHEST 1 VIEW: CPT

## 2017-10-19 PROCEDURE — 80048 BASIC METABOLIC PNL TOTAL CA: CPT

## 2017-10-19 PROCEDURE — 87633 RESP VIRUS 12-25 TARGETS: CPT

## 2017-10-19 PROCEDURE — 83880 ASSAY OF NATRIURETIC PEPTIDE: CPT

## 2017-10-19 PROCEDURE — 85610 PROTHROMBIN TIME: CPT

## 2017-10-19 PROCEDURE — 87086 URINE CULTURE/COLONY COUNT: CPT

## 2018-01-25 ENCOUNTER — EMERGENCY (EMERGENCY)
Facility: HOSPITAL | Age: 83
LOS: 1 days | Discharge: HOME | End: 2018-01-25
Attending: EMERGENCY MEDICINE | Admitting: INTERNAL MEDICINE
Payer: MEDICARE

## 2018-01-25 VITALS
RESPIRATION RATE: 16 BRPM | WEIGHT: 179.9 LBS | TEMPERATURE: 99 F | OXYGEN SATURATION: 98 % | DIASTOLIC BLOOD PRESSURE: 74 MMHG | HEART RATE: 75 BPM | SYSTOLIC BLOOD PRESSURE: 120 MMHG

## 2018-01-25 LAB
ALBUMIN SERPL ELPH-MCNC: 3.2 G/DL — LOW (ref 3.3–5)
ALP SERPL-CCNC: 111 U/L — SIGNIFICANT CHANGE UP (ref 40–120)
ALT FLD-CCNC: 21 U/L — SIGNIFICANT CHANGE UP (ref 12–78)
AMYLASE P1 CFR SERPL: 54 U/L — SIGNIFICANT CHANGE UP (ref 25–115)
ANION GAP SERPL CALC-SCNC: 6 MMOL/L — SIGNIFICANT CHANGE UP (ref 5–17)
APPEARANCE UR: ABNORMAL
APTT BLD: 36.9 SEC — SIGNIFICANT CHANGE UP (ref 27.5–37.4)
AST SERPL-CCNC: 28 U/L — SIGNIFICANT CHANGE UP (ref 15–37)
BASOPHILS # BLD AUTO: 0.1 K/UL — SIGNIFICANT CHANGE UP (ref 0–0.2)
BASOPHILS NFR BLD AUTO: 0.7 % — SIGNIFICANT CHANGE UP (ref 0–2)
BILIRUB SERPL-MCNC: 0.5 MG/DL — SIGNIFICANT CHANGE UP (ref 0.2–1.2)
BILIRUB UR-MCNC: NEGATIVE — SIGNIFICANT CHANGE UP
BUN SERPL-MCNC: 22 MG/DL — SIGNIFICANT CHANGE UP (ref 7–23)
CALCIUM SERPL-MCNC: 8.8 MG/DL — SIGNIFICANT CHANGE UP (ref 8.5–10.1)
CHLORIDE SERPL-SCNC: 100 MMOL/L — SIGNIFICANT CHANGE UP (ref 96–108)
CO2 SERPL-SCNC: 30 MMOL/L — SIGNIFICANT CHANGE UP (ref 22–31)
COLOR SPEC: YELLOW — SIGNIFICANT CHANGE UP
CREAT SERPL-MCNC: 0.55 MG/DL — SIGNIFICANT CHANGE UP (ref 0.5–1.3)
DIFF PNL FLD: ABNORMAL
EOSINOPHIL # BLD AUTO: 0 K/UL — SIGNIFICANT CHANGE UP (ref 0–0.5)
EOSINOPHIL NFR BLD AUTO: 0.6 % — SIGNIFICANT CHANGE UP (ref 0–6)
GLUCOSE SERPL-MCNC: 101 MG/DL — HIGH (ref 70–99)
GLUCOSE UR QL: NEGATIVE — SIGNIFICANT CHANGE UP
HCT VFR BLD CALC: 37.8 % — LOW (ref 39–50)
HGB BLD-MCNC: 12.5 G/DL — LOW (ref 13–17)
INR BLD: 2.58 RATIO — HIGH (ref 0.88–1.16)
KETONES UR-MCNC: ABNORMAL
LACTATE SERPL-SCNC: 1.8 MMOL/L — SIGNIFICANT CHANGE UP (ref 0.7–2)
LEUKOCYTE ESTERASE UR-ACNC: ABNORMAL
LIDOCAIN IGE QN: 188 U/L — SIGNIFICANT CHANGE UP (ref 73–393)
LYMPHOCYTES # BLD AUTO: 0.7 K/UL — LOW (ref 1–3.3)
LYMPHOCYTES # BLD AUTO: 8.5 % — LOW (ref 13–44)
MCHC RBC-ENTMCNC: 29.8 PG — SIGNIFICANT CHANGE UP (ref 27–34)
MCHC RBC-ENTMCNC: 33 GM/DL — SIGNIFICANT CHANGE UP (ref 32–36)
MCV RBC AUTO: 90.1 FL — SIGNIFICANT CHANGE UP (ref 80–100)
MONOCYTES # BLD AUTO: 0.6 K/UL — SIGNIFICANT CHANGE UP (ref 0–0.9)
MONOCYTES NFR BLD AUTO: 7.4 % — SIGNIFICANT CHANGE UP (ref 1–9)
NEUTROPHILS # BLD AUTO: 6.7 K/UL — SIGNIFICANT CHANGE UP (ref 1.8–7.4)
NEUTROPHILS NFR BLD AUTO: 82.8 % — HIGH (ref 43–77)
NITRITE UR-MCNC: NEGATIVE — SIGNIFICANT CHANGE UP
PH UR: 6.5 — SIGNIFICANT CHANGE UP (ref 5–8)
PLATELET # BLD AUTO: 244 K/UL — SIGNIFICANT CHANGE UP (ref 150–400)
POTASSIUM SERPL-MCNC: 5 MMOL/L — SIGNIFICANT CHANGE UP (ref 3.5–5.3)
POTASSIUM SERPL-SCNC: 5 MMOL/L — SIGNIFICANT CHANGE UP (ref 3.5–5.3)
PROCALCITONIN SERPL-MCNC: <0.05 — SIGNIFICANT CHANGE UP (ref 0–0.04)
PROT SERPL-MCNC: 6.7 G/DL — SIGNIFICANT CHANGE UP (ref 6–8.3)
PROT UR-MCNC: 25 MG/DL
PROTHROM AB SERPL-ACNC: 28.7 SEC — HIGH (ref 9.8–12.7)
RAPID RVP RESULT: SIGNIFICANT CHANGE UP
RBC # BLD: 4.2 M/UL — SIGNIFICANT CHANGE UP (ref 4.2–5.8)
RBC # FLD: 12.7 % — SIGNIFICANT CHANGE UP (ref 10.3–14.5)
SODIUM SERPL-SCNC: 136 MMOL/L — SIGNIFICANT CHANGE UP (ref 135–145)
SP GR SPEC: 1.01 — SIGNIFICANT CHANGE UP (ref 1.01–1.02)
UROBILINOGEN FLD QL: NEGATIVE — SIGNIFICANT CHANGE UP
WBC # BLD: 8.1 K/UL — SIGNIFICANT CHANGE UP (ref 3.8–10.5)
WBC # FLD AUTO: 8.1 K/UL — SIGNIFICANT CHANGE UP (ref 3.8–10.5)

## 2018-01-25 PROCEDURE — 83605 ASSAY OF LACTIC ACID: CPT

## 2018-01-25 PROCEDURE — 71045 X-RAY EXAM CHEST 1 VIEW: CPT | Mod: 26

## 2018-01-25 PROCEDURE — 71045 X-RAY EXAM CHEST 1 VIEW: CPT

## 2018-01-25 PROCEDURE — 74176 CT ABD & PELVIS W/O CONTRAST: CPT

## 2018-01-25 PROCEDURE — 81001 URINALYSIS AUTO W/SCOPE: CPT

## 2018-01-25 PROCEDURE — 87798 DETECT AGENT NOS DNA AMP: CPT

## 2018-01-25 PROCEDURE — 85027 COMPLETE CBC AUTOMATED: CPT

## 2018-01-25 PROCEDURE — 93005 ELECTROCARDIOGRAM TRACING: CPT

## 2018-01-25 PROCEDURE — 80053 COMPREHEN METABOLIC PANEL: CPT

## 2018-01-25 PROCEDURE — 99284 EMERGENCY DEPT VISIT MOD MDM: CPT

## 2018-01-25 PROCEDURE — 87086 URINE CULTURE/COLONY COUNT: CPT

## 2018-01-25 PROCEDURE — 87486 CHLMYD PNEUM DNA AMP PROBE: CPT

## 2018-01-25 PROCEDURE — 87040 BLOOD CULTURE FOR BACTERIA: CPT

## 2018-01-25 PROCEDURE — 82150 ASSAY OF AMYLASE: CPT

## 2018-01-25 PROCEDURE — 96374 THER/PROPH/DIAG INJ IV PUSH: CPT

## 2018-01-25 PROCEDURE — 85610 PROTHROMBIN TIME: CPT

## 2018-01-25 PROCEDURE — 84145 PROCALCITONIN (PCT): CPT

## 2018-01-25 PROCEDURE — 83690 ASSAY OF LIPASE: CPT

## 2018-01-25 PROCEDURE — 99284 EMERGENCY DEPT VISIT MOD MDM: CPT | Mod: 25

## 2018-01-25 PROCEDURE — 87633 RESP VIRUS 12-25 TARGETS: CPT

## 2018-01-25 PROCEDURE — 85730 THROMBOPLASTIN TIME PARTIAL: CPT

## 2018-01-25 PROCEDURE — 87581 M.PNEUMON DNA AMP PROBE: CPT

## 2018-01-25 RX ORDER — SODIUM CHLORIDE 9 MG/ML
3 INJECTION INTRAMUSCULAR; INTRAVENOUS; SUBCUTANEOUS ONCE
Qty: 0 | Refills: 0 | Status: COMPLETED | OUTPATIENT
Start: 2018-01-25 | End: 2018-01-25

## 2018-01-25 RX ORDER — ACETAMINOPHEN 500 MG
650 TABLET ORAL ONCE
Qty: 0 | Refills: 0 | Status: COMPLETED | OUTPATIENT
Start: 2018-01-25 | End: 2018-01-25

## 2018-01-25 RX ORDER — SODIUM CHLORIDE 9 MG/ML
1000 INJECTION INTRAMUSCULAR; INTRAVENOUS; SUBCUTANEOUS ONCE
Qty: 0 | Refills: 0 | Status: COMPLETED | OUTPATIENT
Start: 2018-01-25 | End: 2018-01-25

## 2018-01-25 RX ORDER — IBUPROFEN 200 MG
600 TABLET ORAL ONCE
Qty: 0 | Refills: 0 | Status: COMPLETED | OUTPATIENT
Start: 2018-01-25 | End: 2018-01-25

## 2018-01-25 RX ORDER — CEFTRIAXONE 500 MG/1
1 INJECTION, POWDER, FOR SOLUTION INTRAMUSCULAR; INTRAVENOUS ONCE
Qty: 0 | Refills: 0 | Status: COMPLETED | OUTPATIENT
Start: 2018-01-25 | End: 2018-01-25

## 2018-01-25 RX ADMIN — Medication 650 MILLIGRAM(S): at 20:47

## 2018-01-25 RX ADMIN — SODIUM CHLORIDE 3 MILLILITER(S): 9 INJECTION INTRAMUSCULAR; INTRAVENOUS; SUBCUTANEOUS at 20:40

## 2018-01-25 RX ADMIN — Medication 600 MILLIGRAM(S): at 23:02

## 2018-01-25 RX ADMIN — SODIUM CHLORIDE 1000 MILLILITER(S): 9 INJECTION INTRAMUSCULAR; INTRAVENOUS; SUBCUTANEOUS at 20:40

## 2018-01-25 RX ADMIN — CEFTRIAXONE 100 GRAM(S): 500 INJECTION, POWDER, FOR SOLUTION INTRAMUSCULAR; INTRAVENOUS at 23:01

## 2018-01-25 NOTE — ED ADULT NURSE NOTE - OBJECTIVE STATEMENT
@2015 Received and assumed care of pt at this time alert awake disoriented ( pt with hx of dementia unsure of baseline), in no apparent distress, follows all commands, febrile TMAX 102.7 rectally. 89 y/o m sent from [facility] for evaluation of shivering. seen and evaluated by dr. Ruelas. iv established with labs/cultures/lactate collected & sent, IVF's infusing, tylenol given as ordered, ekg & cxr called, will continue to monitor , dispo pending

## 2018-01-25 NOTE — ED PROVIDER NOTE - OBJECTIVE STATEMENT
Pt is a 89 yo male who presents to the ED with a cc of shaking chills.  PMHx of A-fib on Coumadin, CHF unknown EF, dementia, DM, HTN, prostate cancer.  Pt suffers from advanced dementia and is unable to provide history.  He resides at [facility].  Per staff notes pt left the facility to have dinner with family and when he returned he was noted to have shaking chills.  He was sent to the ED or further work up Pt is a 89 yo male who presents to the ED with a cc of shaking chills.  PMHx of A-fib on Coumadin, CHF unknown EF, dementia, DM, HTN, prostate cancer.  Pt suffers from advanced dementia and is unable to provide history.  He resides at [facility].  Per staff notes pt left the facility to have dinner with family and when he returned he was noted to have shaking chills.  He was sent to the ED for further work up

## 2018-01-25 NOTE — ED ADULT TRIAGE NOTE - CHIEF COMPLAINT QUOTE
Pt sent from [facility] for shivering-EMS state afebrile pt was outside with son and began shivering stopped when he went inside -

## 2018-01-25 NOTE — ED PROVIDER NOTE - CHIEF COMPLAINT
The patient is a 89y Male complaining of see chief complaint quote. The patient is a 89y Male complaining of shaking chills

## 2018-01-25 NOTE — ED ADULT NURSE NOTE - CHIEF COMPLAINT QUOTE
Pt sent from [facility] for shivering-EMS state afebrile pt was outside with son and began shivering stopped when he went inside -

## 2018-01-26 VITALS
HEART RATE: 63 BPM | DIASTOLIC BLOOD PRESSURE: 71 MMHG | RESPIRATION RATE: 16 BRPM | SYSTOLIC BLOOD PRESSURE: 130 MMHG | TEMPERATURE: 98 F | OXYGEN SATURATION: 96 %

## 2018-01-26 PROBLEM — I50.9 HEART FAILURE, UNSPECIFIED: Chronic | Status: ACTIVE | Noted: 2017-09-07

## 2018-01-26 PROCEDURE — 74176 CT ABD & PELVIS W/O CONTRAST: CPT | Mod: 26

## 2018-01-26 RX ORDER — CEFUROXIME AXETIL 250 MG
1 TABLET ORAL
Qty: 20 | Refills: 0
Start: 2018-01-26 | End: 2018-02-04

## 2018-01-26 RX ADMIN — Medication 600 MILLIGRAM(S): at 00:48

## 2018-01-26 NOTE — ED ADULT NURSE REASSESSMENT NOTE - NS ED NURSE REASSESS COMMENT FT1
incontinence care provided, awaiting ems transport back to facility, will continue to monitor.
patient resting comfortably, dispo pending, will continue to monitor
patient resting comfortably, sent to ct scan, dispo pending, will continue to monitor.

## 2018-01-27 LAB
CULTURE RESULTS: NO GROWTH — SIGNIFICANT CHANGE UP
SPECIMEN SOURCE: SIGNIFICANT CHANGE UP

## 2018-01-31 LAB
CULTURE RESULTS: SIGNIFICANT CHANGE UP
CULTURE RESULTS: SIGNIFICANT CHANGE UP
SPECIMEN SOURCE: SIGNIFICANT CHANGE UP
SPECIMEN SOURCE: SIGNIFICANT CHANGE UP

## 2018-04-06 ENCOUNTER — INPATIENT (INPATIENT)
Facility: HOSPITAL | Age: 83
LOS: 4 days | Discharge: HOME HEALTH | DRG: 243 | End: 2018-04-11
Attending: FAMILY MEDICINE | Admitting: FAMILY MEDICINE
Payer: MEDICARE

## 2018-04-06 VITALS
TEMPERATURE: 97 F | WEIGHT: 160.06 LBS | DIASTOLIC BLOOD PRESSURE: 64 MMHG | OXYGEN SATURATION: 95 % | SYSTOLIC BLOOD PRESSURE: 121 MMHG | HEART RATE: 51 BPM | RESPIRATION RATE: 14 BRPM

## 2018-04-06 DIAGNOSIS — C61 MALIGNANT NEOPLASM OF PROSTATE: ICD-10-CM

## 2018-04-06 DIAGNOSIS — Z29.9 ENCOUNTER FOR PROPHYLACTIC MEASURES, UNSPECIFIED: ICD-10-CM

## 2018-04-06 DIAGNOSIS — F32.9 MAJOR DEPRESSIVE DISORDER, SINGLE EPISODE, UNSPECIFIED: ICD-10-CM

## 2018-04-06 DIAGNOSIS — I50.22 CHRONIC SYSTOLIC (CONGESTIVE) HEART FAILURE: ICD-10-CM

## 2018-04-06 DIAGNOSIS — I48.91 UNSPECIFIED ATRIAL FIBRILLATION: ICD-10-CM

## 2018-04-06 DIAGNOSIS — E11.9 TYPE 2 DIABETES MELLITUS WITHOUT COMPLICATIONS: ICD-10-CM

## 2018-04-06 DIAGNOSIS — R53.1 WEAKNESS: ICD-10-CM

## 2018-04-06 DIAGNOSIS — R00.1 BRADYCARDIA, UNSPECIFIED: ICD-10-CM

## 2018-04-06 DIAGNOSIS — F03.90 UNSPECIFIED DEMENTIA WITHOUT BEHAVIORAL DISTURBANCE: ICD-10-CM

## 2018-04-06 DIAGNOSIS — I10 ESSENTIAL (PRIMARY) HYPERTENSION: ICD-10-CM

## 2018-04-06 LAB
ALBUMIN SERPL ELPH-MCNC: 3.5 G/DL — SIGNIFICANT CHANGE UP (ref 3.3–5)
ALP SERPL-CCNC: 123 U/L — HIGH (ref 40–120)
ALT FLD-CCNC: 17 U/L — SIGNIFICANT CHANGE UP (ref 12–78)
ANION GAP SERPL CALC-SCNC: 6 MMOL/L — SIGNIFICANT CHANGE UP (ref 5–17)
APPEARANCE UR: CLEAR — SIGNIFICANT CHANGE UP
APTT BLD: 41.6 SEC — HIGH (ref 27.5–37.4)
AST SERPL-CCNC: 19 U/L — SIGNIFICANT CHANGE UP (ref 15–37)
BASOPHILS # BLD AUTO: 0.1 K/UL — SIGNIFICANT CHANGE UP (ref 0–0.2)
BASOPHILS NFR BLD AUTO: 1.1 % — SIGNIFICANT CHANGE UP (ref 0–2)
BILIRUB SERPL-MCNC: 0.4 MG/DL — SIGNIFICANT CHANGE UP (ref 0.2–1.2)
BILIRUB UR-MCNC: NEGATIVE — SIGNIFICANT CHANGE UP
BUN SERPL-MCNC: 13 MG/DL — SIGNIFICANT CHANGE UP (ref 7–23)
CALCIUM SERPL-MCNC: 9.1 MG/DL — SIGNIFICANT CHANGE UP (ref 8.5–10.1)
CHLORIDE SERPL-SCNC: 103 MMOL/L — SIGNIFICANT CHANGE UP (ref 96–108)
CO2 SERPL-SCNC: 28 MMOL/L — SIGNIFICANT CHANGE UP (ref 22–31)
COLOR SPEC: YELLOW — SIGNIFICANT CHANGE UP
CREAT SERPL-MCNC: 0.57 MG/DL — SIGNIFICANT CHANGE UP (ref 0.5–1.3)
DIFF PNL FLD: NEGATIVE — SIGNIFICANT CHANGE UP
DIGOXIN SERPL-MCNC: 0.8 NG/ML — SIGNIFICANT CHANGE UP (ref 0.8–2)
EOSINOPHIL # BLD AUTO: 0 K/UL — SIGNIFICANT CHANGE UP (ref 0–0.5)
EOSINOPHIL NFR BLD AUTO: 0.8 % — SIGNIFICANT CHANGE UP (ref 0–6)
GLUCOSE SERPL-MCNC: 110 MG/DL — HIGH (ref 70–99)
GLUCOSE UR QL: NEGATIVE — SIGNIFICANT CHANGE UP
HCT VFR BLD CALC: 39.8 % — SIGNIFICANT CHANGE UP (ref 39–50)
HGB BLD-MCNC: 13.3 G/DL — SIGNIFICANT CHANGE UP (ref 13–17)
INR BLD: 2.67 RATIO — HIGH (ref 0.88–1.16)
KETONES UR-MCNC: NEGATIVE — SIGNIFICANT CHANGE UP
LEUKOCYTE ESTERASE UR-ACNC: NEGATIVE — SIGNIFICANT CHANGE UP
LIDOCAIN IGE QN: 152 U/L — SIGNIFICANT CHANGE UP (ref 73–393)
LYMPHOCYTES # BLD AUTO: 1.4 K/UL — SIGNIFICANT CHANGE UP (ref 1–3.3)
LYMPHOCYTES # BLD AUTO: 26.7 % — SIGNIFICANT CHANGE UP (ref 13–44)
MCHC RBC-ENTMCNC: 30.4 PG — SIGNIFICANT CHANGE UP (ref 27–34)
MCHC RBC-ENTMCNC: 33.4 GM/DL — SIGNIFICANT CHANGE UP (ref 32–36)
MCV RBC AUTO: 91 FL — SIGNIFICANT CHANGE UP (ref 80–100)
MONOCYTES # BLD AUTO: 0.5 K/UL — SIGNIFICANT CHANGE UP (ref 0–0.9)
MONOCYTES NFR BLD AUTO: 9.9 % — HIGH (ref 1–9)
NEUTROPHILS # BLD AUTO: 3.2 K/UL — SIGNIFICANT CHANGE UP (ref 1.8–7.4)
NEUTROPHILS NFR BLD AUTO: 61.5 % — SIGNIFICANT CHANGE UP (ref 43–77)
NITRITE UR-MCNC: NEGATIVE — SIGNIFICANT CHANGE UP
NT-PROBNP SERPL-SCNC: 680 PG/ML — HIGH (ref 0–450)
PH UR: 7 — SIGNIFICANT CHANGE UP (ref 5–8)
PLATELET # BLD AUTO: 225 K/UL — SIGNIFICANT CHANGE UP (ref 150–400)
POTASSIUM SERPL-MCNC: 4.5 MMOL/L — SIGNIFICANT CHANGE UP (ref 3.5–5.3)
POTASSIUM SERPL-SCNC: 4.5 MMOL/L — SIGNIFICANT CHANGE UP (ref 3.5–5.3)
PROCALCITONIN SERPL-MCNC: <0.05 — SIGNIFICANT CHANGE UP (ref 0–0.04)
PROT SERPL-MCNC: 7.3 G/DL — SIGNIFICANT CHANGE UP (ref 6–8.3)
PROT UR-MCNC: NEGATIVE — SIGNIFICANT CHANGE UP
PROTHROM AB SERPL-ACNC: 29.7 SEC — HIGH (ref 9.8–12.7)
RAPID RVP RESULT: SIGNIFICANT CHANGE UP
RBC # BLD: 4.37 M/UL — SIGNIFICANT CHANGE UP (ref 4.2–5.8)
RBC # FLD: 13.2 % — SIGNIFICANT CHANGE UP (ref 10.3–14.5)
SODIUM SERPL-SCNC: 137 MMOL/L — SIGNIFICANT CHANGE UP (ref 135–145)
SP GR SPEC: 1 — LOW (ref 1.01–1.02)
UROBILINOGEN FLD QL: NEGATIVE — SIGNIFICANT CHANGE UP
WBC # BLD: 5.2 K/UL — SIGNIFICANT CHANGE UP (ref 3.8–10.5)
WBC # FLD AUTO: 5.2 K/UL — SIGNIFICANT CHANGE UP (ref 3.8–10.5)

## 2018-04-06 PROCEDURE — 99223 1ST HOSP IP/OBS HIGH 75: CPT | Mod: AI,GC

## 2018-04-06 PROCEDURE — 99285 EMERGENCY DEPT VISIT HI MDM: CPT

## 2018-04-06 RX ORDER — DEXTROSE 50 % IN WATER 50 %
25 SYRINGE (ML) INTRAVENOUS ONCE
Qty: 0 | Refills: 0 | Status: DISCONTINUED | OUTPATIENT
Start: 2018-04-06 | End: 2018-04-09

## 2018-04-06 RX ORDER — DEXTROSE 50 % IN WATER 50 %
12.5 SYRINGE (ML) INTRAVENOUS ONCE
Qty: 0 | Refills: 0 | Status: DISCONTINUED | OUTPATIENT
Start: 2018-04-06 | End: 2018-04-09

## 2018-04-06 RX ORDER — INSULIN LISPRO 100/ML
VIAL (ML) SUBCUTANEOUS AT BEDTIME
Qty: 0 | Refills: 0 | Status: DISCONTINUED | OUTPATIENT
Start: 2018-04-06 | End: 2018-04-09

## 2018-04-06 RX ORDER — GLUCAGON INJECTION, SOLUTION 0.5 MG/.1ML
1 INJECTION, SOLUTION SUBCUTANEOUS ONCE
Qty: 0 | Refills: 0 | Status: DISCONTINUED | OUTPATIENT
Start: 2018-04-06 | End: 2018-04-09

## 2018-04-06 RX ORDER — DEXTROSE 50 % IN WATER 50 %
1 SYRINGE (ML) INTRAVENOUS ONCE
Qty: 0 | Refills: 0 | Status: DISCONTINUED | OUTPATIENT
Start: 2018-04-06 | End: 2018-04-09

## 2018-04-06 RX ORDER — VENLAFAXINE HCL 75 MG
75 CAPSULE, EXT RELEASE 24 HR ORAL
Qty: 0 | Refills: 0 | Status: DISCONTINUED | OUTPATIENT
Start: 2018-04-06 | End: 2018-04-09

## 2018-04-06 RX ORDER — SIMVASTATIN 20 MG/1
20 TABLET, FILM COATED ORAL AT BEDTIME
Qty: 0 | Refills: 0 | Status: DISCONTINUED | OUTPATIENT
Start: 2018-04-06 | End: 2018-04-09

## 2018-04-06 RX ORDER — DONEPEZIL HYDROCHLORIDE 10 MG/1
5 TABLET, FILM COATED ORAL AT BEDTIME
Qty: 0 | Refills: 0 | Status: DISCONTINUED | OUTPATIENT
Start: 2018-04-06 | End: 2018-04-09

## 2018-04-06 RX ORDER — WARFARIN SODIUM 2.5 MG/1
10 TABLET ORAL ONCE
Qty: 0 | Refills: 0 | Status: COMPLETED | OUTPATIENT
Start: 2018-04-06 | End: 2018-04-06

## 2018-04-06 RX ORDER — SODIUM CHLORIDE 9 MG/ML
1000 INJECTION, SOLUTION INTRAVENOUS
Qty: 0 | Refills: 0 | Status: DISCONTINUED | OUTPATIENT
Start: 2018-04-06 | End: 2018-04-09

## 2018-04-06 RX ORDER — VENLAFAXINE HCL 75 MG
75 CAPSULE, EXT RELEASE 24 HR ORAL
Qty: 0 | Refills: 0 | Status: DISCONTINUED | OUTPATIENT
Start: 2018-04-06 | End: 2018-04-06

## 2018-04-06 RX ORDER — DIGOXIN 250 MCG
0.25 TABLET ORAL DAILY
Qty: 0 | Refills: 0 | Status: DISCONTINUED | OUTPATIENT
Start: 2018-04-06 | End: 2018-04-07

## 2018-04-06 RX ORDER — INSULIN LISPRO 100/ML
VIAL (ML) SUBCUTANEOUS
Qty: 0 | Refills: 0 | Status: DISCONTINUED | OUTPATIENT
Start: 2018-04-06 | End: 2018-04-09

## 2018-04-06 RX ORDER — LISINOPRIL 2.5 MG/1
5 TABLET ORAL DAILY
Qty: 0 | Refills: 0 | Status: DISCONTINUED | OUTPATIENT
Start: 2018-04-06 | End: 2018-04-09

## 2018-04-06 RX ADMIN — WARFARIN SODIUM 10 MILLIGRAM(S): 2.5 TABLET ORAL at 21:36

## 2018-04-06 RX ADMIN — Medication 75 MILLIGRAM(S): at 18:35

## 2018-04-06 RX ADMIN — SIMVASTATIN 20 MILLIGRAM(S): 20 TABLET, FILM COATED ORAL at 21:36

## 2018-04-06 RX ADMIN — DONEPEZIL HYDROCHLORIDE 5 MILLIGRAM(S): 10 TABLET, FILM COATED ORAL at 21:36

## 2018-04-06 NOTE — H&P ADULT - PROBLEM SELECTOR PLAN 8
DVT PPx: pt is therpeutically anticoagulated with coumadin for afib  IMPROVE VTE Individual Risk Assessment          RISK       Points    [  ] Previous VTE 3  [  ] Thrombophilia  2  [  ] Lower limb paralysis  2        (unable to hold up >15 seconds)    [  ] Current Cancer    2         (within 6 months)  [  ] Immobilization > 24 hrs 1  [  ] ICU/CCU stay > 24 hours   1  [ x ] Age > 60  1  IMPROVE VTE Score: 1 -will substitute effexor 75mg BID immediate release for home extended release dose as ER is non-formulary -chronic, stable  -will substitute effexor 75mg BID immediate release for home extended release dose as ER is non-formulary

## 2018-04-06 NOTE — H&P ADULT - PROBLEM SELECTOR PLAN 7
-will substitute effexor 75mg BID immediate release for home extended release dose as ER is non-formulary -chronic, controlled  -continue lisinopril with hold parameters

## 2018-04-06 NOTE — ED PROVIDER NOTE - NEUROLOGICAL, MLM
Alert to person and place confused to year and recent events pt with dementia, pt following commands at this time

## 2018-04-06 NOTE — ED PROVIDER NOTE - MEDICAL DECISION MAKING DETAILS
cbc, cmp, lipase, BNP, procalcitonin, INR, RVP, EKG, chest x-ray, UA, urine culture, saline lock, observation

## 2018-04-06 NOTE — ED PROVIDER NOTE - CARE PLAN
Principal Discharge DX:	Bradycardia  Assessment and plan of treatment:	admit  Secondary Diagnosis:	CHF (congestive heart failure)  Secondary Diagnosis:	Dementia

## 2018-04-06 NOTE — H&P ADULT - PROBLEM SELECTOR PLAN 6
-chronic, controlled  -continue lisinopril with hold parameters -continue home donepezil -chronic  -continue home donepezil

## 2018-04-06 NOTE — ED PROVIDER NOTE - CONDUCTED A DETAILED DISCUSSION WITH PATIENT AND/OR GUARDIAN REGARDING, MDM
lab results/need to admit/return to ED if symptoms worsen, persist or questions arise/radiology results

## 2018-04-06 NOTE — H&P ADULT - PROBLEM SELECTOR PLAN 4
-hold home metformin  -low dose SSI -unable to find recent TTE/EF.    -continue home digoxin, lisinpril -unable to find recent TTE/EF.    -continue home digoxin, lisinopril

## 2018-04-06 NOTE — H&P ADULT - ATTENDING COMMENTS
89M PMH Prostate Ca, systolic CHF (no recent TTE in records), anxiety/depression, DM type 2, afib (on carvedilol, coumadin), HTN, DM2, chronic LE edema, dementia sent from [facility] for complaint of weakness.  Admitted for bradycardia, likely medication induced. Plan: monitor on tele, apprec cardio recs, correct underlying bradycardia, consider PT if weakness not improved, monitor clinical course, anticoagulation management as above

## 2018-04-06 NOTE — H&P ADULT - ASSESSMENT
89M PMH Prostate Ca, systolic CHF (no recent TTE in records), anxiety/depression, DM type 2, afib (on carvedilol, coumadin), HTN, DM2, chronic LE edema, dementia sent from [facility] for complaint of weakness.  Admitted for bradycardia, likely medication induced. 89M PMH Prostate Ca, systolic CHF (no recent TTE in records), anxiety/depression, DM type 2, chronic afib (on carvedilol, coumadin), HTN, DM2, chronic LE edema, dementia sent from [facility] for complaint of weakness.  Admitted for bradycardia, likely medication induced.

## 2018-04-06 NOTE — ED PROVIDER NOTE - OBJECTIVE STATEMENT
Pt is a 89 yo male who presents to the ED with a cc of weakness.  PMHx of A-fib on Coumadin, CHF, dementia, DM, HTN, prostate cancer.  Pt suffers from advanced dementia and is unable to provide history.  Per transfer reports pt was sent to the ED for generalized weakness

## 2018-04-06 NOTE — H&P ADULT - PROBLEM SELECTOR PLAN 3
-unable to find recent TTE/EF.    -continue home digoxin, lisinpril -continue home warfarin  -hold home carvedilol as above  -monitor HR closely   -INR in am; titrate coumadin dose as appropriate -chronic  -continue home warfarin dose confirmed by pharmacy with facility pt takes 10mg daily, at therapeutic level of inr 2-3 range  -hold home carvedilol as above  -monitor HR closely   -INR in am; apprec pharmacy recommendations Brandon in ED

## 2018-04-06 NOTE — ED PROVIDER NOTE - CONSTITUTIONAL MENTATION
awake/alert/ALTERED LOC/alert to person and place confused to year and recent events pt with dementia

## 2018-04-06 NOTE — H&P ADULT - NSHPSOCIALHISTORY_GEN_ALL_CORE
Pt lives at [facility]. States he walks with cane at baseline but unsure if this is accurate given mental status.   Tobacco: former smoker  Alcohol: patient denies

## 2018-04-06 NOTE — H&P ADULT - PROBLEM SELECTOR PLAN 5
-continue home donepezil -hold home metformin  -low dose SSI, FS, hypoglycemia protocol  -consistent carb/DASH TLC diet

## 2018-04-06 NOTE — ED ADULT NURSE NOTE - CHPI ED SYMPTOMS NEG
no chills/no fever/no tingling/no numbness/no nausea/no pain/no vomiting/no dizziness/no decreased eating/drinking

## 2018-04-06 NOTE — H&P ADULT - PROBLEM SELECTOR PLAN 1
-admit to tele  -symptomatic/weakness  -will hold carvedilol for now; monitor HR   -cardio (Dr. López) aware; f/u recs  -continue home digoxin  -f/u am labs  -PT consult for deconditioning -admit to tele  -symptomatic/weakness; unclear etiology but may be 2/2 medication  -will hold carvedilol for now; monitor HR   -cardio (Dr. López) aware; f/u recs  -continue home digoxin  -f/u am labs  -PT consult for deconditioning -admit to tele  -symptomatic/weakness; unclear etiology but may be 2/2 medication  -will hold carvedilol for now; monitor HR   -cardio (Dr. Foreman aware; f/u recs  -continue home digoxin  -f/u am labs  -PT consult for deconditioning

## 2018-04-06 NOTE — H&P ADULT - NSHPPHYSICALEXAM_GEN_ALL_CORE
Physical Exam:  General: Well developed, well nourished, NAD  HEENT: NCAT, EOMI bl, moist mucous membranes   Neck: Supple, nontender, no mass  Neurology: A&Ox1(person), CN II-XII grossly intact  Respiratory: CTA B/L, No W/R/R  CV: IRRR, bradycardic, +S1/S2, no murmurs, rubs or gallops  Abdominal: Soft, NT, ND +BSx4  Extremities: No C/C/E, + peripheral pulses  MSK: no joint erythema or warmth, no joint swelling   Skin: warm, dry, normal color, no rash or abnormal lesions Physical Exam:  General: Well developed, well nourished, NAD  HEENT: NCAT, EOMI bl, moist mucous membranes   Neck: Supple, nontender, no mass  Neurology: A&Ox1 (person), CN II-XII grossly intact  Respiratory: CTA B/L, No W/R/R  CV: IRRR, bradycardic, +S1/S2, no murmurs, rubs or gallops  Abdominal: Soft, NT, ND +BSx4  Extremities: No C/C/E, + peripheral pulses  MSK: no joint erythema or warmth, no joint swelling   Skin: warm, dry, normal color, no rash or abnormal lesions Physical Exam:  General: elder, confused,  NAD  HEENT: NCAT, EOMI bl, moist mucous membranes   Neck: Supple, nontender, no mass  Neurology: A&Ox1 (person), CN II-XII grossly intact  Respiratory: CTA B/L, No W/R/R  CV: IRRR, bradycardic, +S1/S2, no murmurs, rubs or gallops  Abdominal: Soft, NT, ND +BSx4  Extremities: No C/C/E, + peripheral pulses  MSK: no joint erythema or warmth, no joint swelling   Skin: warm, dry, normal color, no rash or abnormal lesions

## 2018-04-06 NOTE — H&P ADULT - PROBLEM SELECTOR PLAN 2
-continue home warfarin  -hold home carvedilol as above  -monitor HR closely   -INR in am; titrate coumadin dose as appropriate -likely 2/2 bradycardia  -f/u cardio recs  -f/u thyroid panel

## 2018-04-06 NOTE — ED ADULT NURSE NOTE - OBJECTIVE STATEMENT
patient presenting to ED for evaluation and treatment of generalized weakness, pt denies pain or discomfort, will continue to monitor.

## 2018-04-06 NOTE — H&P ADULT - HISTORY OF PRESENT ILLNESS
89M PMH Prostate Ca, systolic CHF (no recent TTE in records), anxiety/depression, afib (on carvedilol, coumadin), HTN, DM2, chronic LE edema, dementia sent from [facility] for complaint of weakness.  Patient suffers from advanced dementia and is unable to provide additional history.  Transfer paperwork indicates pt was sent to ED fr generalized weakness.      ED Course:   Vitals on presentation: T97.3F oral, HR51, /64, RR14, SpO2 95% on RA.  Labs notable for: CBC WNL, INR 2.67, CMP grossly normal, pro-calcitonin <0.05, pro-, UA (-), Dig level 0.8 (therapeutic), RVP (-), CXR: no active disease. EKG: Afib w/ slow RVR @46bpm.  Urine culture collected. 89M PMH Prostate Ca, systolic CHF (no recent TTE in records), anxiety/depression, DM type 2, afib (on carvedilol, coumadin), HTN, DM2, chronic LE edema, dementia sent from [facility] for complaint of weakness.  Patient suffers from advanced dementia and is unable to provide additional history.  Transfer paperwork indicates pt was sent to ED fr generalized weakness.      ED Course:   Vitals on presentation: T97.3F oral, HR51, /64, RR14, SpO2 95% on RA.  Labs notable for: CBC WNL, INR 2.67, CMP grossly normal, pro-calcitonin <0.05, pro-, UA (-), Dig level 0.8 (therapeutic), RVP (-), CXR: no active disease. EKG: Afib w/ slow RVR @46bpm.  Urine culture collected. 89M PMH Prostate Ca, systolic CHF (no recent TTE in records), anxiety/depression, DM type 2, afib (on carvedilol, coumadin), HTN, DM2, chronic LE edema, dementia sent from [facility] for complaint of weakness.  Patient suffers from advanced dementia and is unable to provide additional history.  Transfer paperwork indicates pt was sent to ED for generalized weakness.      ED Course:   Vitals on presentation: T97.3F oral, HR51, /64, RR14, SpO2 95% on RA.  Labs notable for: CBC WNL, INR 2.67, CMP grossly normal, pro-calcitonin <0.05, pro-, UA (-), Dig level 0.8 (therapeutic), RVP (-), CXR: no active disease. EKG: Afib w/ slow RVR @46bpm.  Urine culture collected.

## 2018-04-06 NOTE — H&P ADULT - PROBLEM SELECTOR PLAN 9
DVT PPx: pt is therapeutically anticoagulated with coumadin for afib  IMPROVE VTE Individual Risk Assessment          RISK       Points    [  ] Previous VTE 3  [  ] Thrombophilia  2  [  ] Lower limb paralysis  2        (unable to hold up >15 seconds)    [  ] Current Cancer    2         (within 6 months)  [  ] Immobilization > 24 hrs 1  [  ] ICU/CCU stay > 24 hours   1  [ x ] Age > 60  1  IMPROVE VTE Score: 1

## 2018-04-07 LAB
ALBUMIN SERPL ELPH-MCNC: 3.2 G/DL — LOW (ref 3.3–5)
ALP SERPL-CCNC: 114 U/L — SIGNIFICANT CHANGE UP (ref 40–120)
ALT FLD-CCNC: 19 U/L — SIGNIFICANT CHANGE UP (ref 12–78)
ANION GAP SERPL CALC-SCNC: 8 MMOL/L — SIGNIFICANT CHANGE UP (ref 5–17)
APTT BLD: 38.7 SEC — HIGH (ref 27.5–37.4)
AST SERPL-CCNC: 16 U/L — SIGNIFICANT CHANGE UP (ref 15–37)
BASOPHILS # BLD AUTO: 0.1 K/UL — SIGNIFICANT CHANGE UP (ref 0–0.2)
BASOPHILS NFR BLD AUTO: 1.1 % — SIGNIFICANT CHANGE UP (ref 0–2)
BILIRUB SERPL-MCNC: 0.4 MG/DL — SIGNIFICANT CHANGE UP (ref 0.2–1.2)
BUN SERPL-MCNC: 14 MG/DL — SIGNIFICANT CHANGE UP (ref 7–23)
CALCIUM SERPL-MCNC: 8.9 MG/DL — SIGNIFICANT CHANGE UP (ref 8.5–10.1)
CHLORIDE SERPL-SCNC: 106 MMOL/L — SIGNIFICANT CHANGE UP (ref 96–108)
CO2 SERPL-SCNC: 28 MMOL/L — SIGNIFICANT CHANGE UP (ref 22–31)
CREAT SERPL-MCNC: 0.53 MG/DL — SIGNIFICANT CHANGE UP (ref 0.5–1.3)
CULTURE RESULTS: NO GROWTH — SIGNIFICANT CHANGE UP
EOSINOPHIL # BLD AUTO: 0.1 K/UL — SIGNIFICANT CHANGE UP (ref 0–0.5)
EOSINOPHIL NFR BLD AUTO: 0.9 % — SIGNIFICANT CHANGE UP (ref 0–6)
GLUCOSE SERPL-MCNC: 99 MG/DL — SIGNIFICANT CHANGE UP (ref 70–99)
HBA1C BLD-MCNC: 6 % — HIGH (ref 4–5.6)
HCT VFR BLD CALC: 38.2 % — LOW (ref 39–50)
HGB BLD-MCNC: 12.8 G/DL — LOW (ref 13–17)
INR BLD: 2.64 RATIO — HIGH (ref 0.88–1.16)
LYMPHOCYTES # BLD AUTO: 1.5 K/UL — SIGNIFICANT CHANGE UP (ref 1–3.3)
LYMPHOCYTES # BLD AUTO: 24.5 % — SIGNIFICANT CHANGE UP (ref 13–44)
MCHC RBC-ENTMCNC: 30.3 PG — SIGNIFICANT CHANGE UP (ref 27–34)
MCHC RBC-ENTMCNC: 33.5 GM/DL — SIGNIFICANT CHANGE UP (ref 32–36)
MCV RBC AUTO: 90.5 FL — SIGNIFICANT CHANGE UP (ref 80–100)
MONOCYTES # BLD AUTO: 0.6 K/UL — SIGNIFICANT CHANGE UP (ref 0–0.9)
MONOCYTES NFR BLD AUTO: 10.3 % — HIGH (ref 1–9)
NEUTROPHILS # BLD AUTO: 3.8 K/UL — SIGNIFICANT CHANGE UP (ref 1.8–7.4)
NEUTROPHILS NFR BLD AUTO: 63.2 % — SIGNIFICANT CHANGE UP (ref 43–77)
PLATELET # BLD AUTO: 204 K/UL — SIGNIFICANT CHANGE UP (ref 150–400)
POTASSIUM SERPL-MCNC: 4.1 MMOL/L — SIGNIFICANT CHANGE UP (ref 3.5–5.3)
POTASSIUM SERPL-SCNC: 4.1 MMOL/L — SIGNIFICANT CHANGE UP (ref 3.5–5.3)
PROT SERPL-MCNC: 6.6 G/DL — SIGNIFICANT CHANGE UP (ref 6–8.3)
PROTHROM AB SERPL-ACNC: 29.4 SEC — HIGH (ref 9.8–12.7)
RBC # BLD: 4.22 M/UL — SIGNIFICANT CHANGE UP (ref 4.2–5.8)
RBC # FLD: 13.2 % — SIGNIFICANT CHANGE UP (ref 10.3–14.5)
SODIUM SERPL-SCNC: 142 MMOL/L — SIGNIFICANT CHANGE UP (ref 135–145)
SPECIMEN SOURCE: SIGNIFICANT CHANGE UP
T3 SERPL-MCNC: 94 NG/DL — SIGNIFICANT CHANGE UP (ref 80–200)
T4 AB SER-ACNC: 7.3 UG/DL — SIGNIFICANT CHANGE UP (ref 4.6–12)
WBC # BLD: 6.1 K/UL — SIGNIFICANT CHANGE UP (ref 3.8–10.5)
WBC # FLD AUTO: 6.1 K/UL — SIGNIFICANT CHANGE UP (ref 3.8–10.5)

## 2018-04-07 PROCEDURE — 99233 SBSQ HOSP IP/OBS HIGH 50: CPT

## 2018-04-07 RX ORDER — WARFARIN SODIUM 2.5 MG/1
10 TABLET ORAL ONCE
Qty: 0 | Refills: 0 | Status: COMPLETED | OUTPATIENT
Start: 2018-04-07 | End: 2018-04-07

## 2018-04-07 RX ADMIN — Medication 0.25 MILLIGRAM(S): at 05:09

## 2018-04-07 RX ADMIN — LISINOPRIL 5 MILLIGRAM(S): 2.5 TABLET ORAL at 05:09

## 2018-04-07 RX ADMIN — SIMVASTATIN 20 MILLIGRAM(S): 20 TABLET, FILM COATED ORAL at 22:14

## 2018-04-07 RX ADMIN — Medication 75 MILLIGRAM(S): at 09:12

## 2018-04-07 RX ADMIN — DONEPEZIL HYDROCHLORIDE 5 MILLIGRAM(S): 10 TABLET, FILM COATED ORAL at 22:14

## 2018-04-07 RX ADMIN — Medication 75 MILLIGRAM(S): at 17:13

## 2018-04-07 RX ADMIN — WARFARIN SODIUM 10 MILLIGRAM(S): 2.5 TABLET ORAL at 22:14

## 2018-04-07 NOTE — PROGRESS NOTE ADULT - PROBLEM SELECTOR PLAN 1
-admit to tele  -symptomatic/weakness; unclear etiology but may be 2/2 medication  -will hold carvedilol for now; monitor HR   -cardio (Dr. Foreman aware; f/u recs  -continue home digoxin  -f/u am labs  -PT consult for deconditioning

## 2018-04-07 NOTE — PHYSICAL THERAPY INITIAL EVALUATION ADULT - PERTINENT HX OF CURRENT PROBLEM, REHAB EVAL
89M PMH Prostate Ca, systolic CHF (no recent TTE in records), anxiety/depression, DM type 2, afib (on carvedilol, coumadin), HTN, DM2, chronic LE edema, dementia sent from [facility] for complaint of weakness.  Patient suffers from advanced dementia and is unable to provide additional history.  Transfer paperwork indicates pt was sent to ED for generalized weakness.

## 2018-04-07 NOTE — PROGRESS NOTE ADULT - ASSESSMENT
pt with atrial fib - slow vr  had 3.6 seconds pause    r/o sick sinus syndrome  no more pauses since yesterday 3/6  to monitor for any pauses

## 2018-04-07 NOTE — PROGRESS NOTE ADULT - SUBJECTIVE AND OBJECTIVE BOX
Patient is a 89y Male with a known history of :  Prostate ca (C61)  Weakness (R53.1)  Need for prophylactic measure (Z29.9)  Depression (F32.9)  Hypertension (I10)  Dementia (F03.90)  Diabetes (E11.9)  Chronic systolic congestive heart failure (I50.22)  Atrial fibrillation (I48.91)  Bradycardia (R00.1)    HPI:  89M PMH Prostate Ca, systolic CHF (no recent TTE in records), anxiety/depression, DM type 2, afib (on carvedilol, coumadin), HTN, DM2, chronic LE edema, dementia sent from [facility] for complaint of weakness.  Patient suffers from advanced dementia and is unable to provide additional history.  Transfer paperwork indicates pt was sent to ED for generalized weakness.      ED Course:   Vitals on presentation: T97.3F oral, HR51, /64, RR14, SpO2 95% on RA.  Labs notable for: CBC WNL, INR 2.67, CMP grossly normal, pro-calcitonin <0.05, pro-, UA (-), Dig level 0.8 (therapeutic), RVP (-), CXR: no active disease. EKG: Afib w/ slow RVR @46bpm.  Urine culture collected. (06 Apr 2018 14:41)      REVIEW OF SYSTEMS:    CONSTITUTIONAL: No fever, weight loss, or fatigue  EYES: No eye pain, visual disturbances, or discharge  ENMT:  No difficulty hearing, tinnitus, vertigo; No sinus or throat pain  NECK: No pain or stiffness  BREASTS: No pain, masses, or nipple discharge  RESPIRATORY: No cough, wheezing, chills or hemoptysis; No shortness of breath  CARDIOVASCULAR: No chest pain, palpitations, dizziness, or leg swelling  GASTROINTESTINAL: No abdominal or epigastric pain. No nausea, vomiting, or hematemesis; No diarrhea or constipation. No melena or hematochezia.  GENITOURINARY: No dysuria, frequency, hematuria, or incontinence  NEUROLOGICAL: No headaches, memory loss, loss of strength, numbness, or tremors  SKIN: No itching, burning, rashes, or lesions   LYMPH NODES: No enlarged glands  ENDOCRINE: No heat or cold intolerance; No hair loss  MUSCULOSKELETAL: No joint pain or swelling; No muscle, back, or extremity pain  PSYCHIATRIC: No depression, anxiety, mood swings, or difficulty sleeping  HEME/LYMPH: No easy bruising, or bleeding gums  ALLERGY AND IMMUNOLOGIC: No hives or eczema    MEDICATIONS  (STANDING):  dextrose 5%. 1000 milliLiter(s) (50 mL/Hr) IV Continuous <Continuous>  dextrose 50% Injectable 12.5 Gram(s) IV Push once  dextrose 50% Injectable 25 Gram(s) IV Push once  dextrose 50% Injectable 25 Gram(s) IV Push once  donepezil 5 milliGRAM(s) Oral at bedtime  insulin lispro (HumaLOG) corrective regimen sliding scale   SubCutaneous three times a day before meals  insulin lispro (HumaLOG) corrective regimen sliding scale   SubCutaneous at bedtime  lisinopril 5 milliGRAM(s) Oral daily  simvastatin 20 milliGRAM(s) Oral at bedtime  venlafaxine 75 milliGRAM(s) Oral two times a day with meals    MEDICATIONS  (PRN):  dextrose Gel 1 Dose(s) Oral once PRN Blood Glucose LESS THAN 70 milliGRAM(s)/deciliter  glucagon  Injectable 1 milliGRAM(s) IntraMuscular once PRN Glucose LESS THAN 70 milligrams/deciliter      ALLERGIES: latex (Rash)  No Known Drug Allergies      FAMILY HISTORY:  No pertinent family history in first degree relatives      PHYSICAL EXAMINATION:  -----------------------------  T(C): 36.4 (04-07-18 @ 07:47), Max: 36.9 (04-06-18 @ 20:04)  HR: 62 (04-07-18 @ 07:47) (50 - 71)  BP: 123/73 (04-07-18 @ 07:47) (108/69 - 127/66)  RR: 18 (04-07-18 @ 07:47) (14 - 18)  SpO2: 96% (04-07-18 @ 07:47) (94% - 100%)  Wt(kg): --      Weight (kg): 72.6 (04-06 @ 11:08)    Constitutional: well developed, normal appearance, well groomed, well nourished, no deformities and no acute distress.   Eyes: the conjunctiva exhibited no abnormalities and the eyelids demonstrated no xanthelasmas.   HEENT: normal oral mucosa, no oral pallor and no oral cyanosis.   Neck: normal jugular venous A waves present, normal jugular venous V waves present and no jugular venous jacobs A waves.   Pulmonary: no respiratory distress, normal respiratory rhythm and effort, no accessory muscle use and lungs were clear to auscultation bilaterally.   Cardiovascular: heart rate and rhythm were normal, normal S1 and S2 and no murmur, gallop, rub, heave or thrill are present.   Abdomen: soft, non-tender, no hepato-splenomegaly and no abdominal mass palpated.   Musculoskeletal: the gait could not be assessed..   Extremities: no clubbing of the fingernails, no localized cyanosis, no petechial hemorrhages and no ischemic changes.   Skin: normal skin color and pigmentation, no rash, no venous stasis, no skin lesions, no skin ulcer and no xanthoma was observed.   Psychiatric: oriented to person, place, and time, the affect was normal, the mood was normal and not feeling anxious.     LABS:   --------  04-07    142  |  106  |  14  ----------------------------<  99  4.1   |  28  |  0.53    Ca    8.9      07 Apr 2018 06:30    TPro  6.6  /  Alb  3.2<L>  /  TBili  0.4  /  DBili  x   /  AST  16  /  ALT  19  /  AlkPhos  114  04-07                         12.8   6.1   )-----------( 204      ( 07 Apr 2018 06:30 )             38.2     PT/INR - ( 07 Apr 2018 06:30 )   PT: 29.4 sec;   INR: 2.64 ratio         PTT - ( 07 Apr 2018 06:30 )  PTT:38.7 sec  04-06 @ 12:10 BNP: 680 pg/mL            RADIOLOGY:  -----------------        ECG:

## 2018-04-07 NOTE — PROGRESS NOTE ADULT - SUBJECTIVE AND OBJECTIVE BOX
Patient is a 89y old  Male who presents with a chief complaint of Weakness, Bradycardia (2018 20:12)      INTERVAL HPI: Pt seen and examined. Pt has dementia, history limited, denies any acute complaints at this time.     OVERNIGHT EVENTS: none noted  T(F): 97.4 (18 @ 12:08), Max: 98.4 (18 @ 20:04)  HR: 54 (18 @ 12:08) (50 - 71)  BP: 117/73 (18 @ 12:08) (112/61 - 127/66)  RR: 18 (18 @ 12:08) (16 - 18)  SpO2: 98% (18 @ 12:08) (94% - 100%)  I&O's Summary      REVIEW OF SYSTEMS: unable as pt has dementia    PHYSICAL EXAM:  GENERAL: NAD, well-groomed, well-developed  HEAD:  Atraumatic, Normocephalic  EYES: EOMI, PERRLA, conjunctiva and sclera clear  ENMT: No tonsillar erythema, exudates, or enlargement; Moist mucous membranes, Good dentition, No lesions  NECK: Supple, No JVD  NERVOUS SYSTEM:  Alert & Oriented X1, Good concentration; Motor Strength 4/5 B/L upper and lower extremities  CHEST/LUNG: Clear to percussion bilaterally; No rales, rhonchi, wheezing, or rubs  HEART: Regular rate and irregular rhythm; No murmurs, rubs, or gallops  ABDOMEN: Soft, Nontender, Nondistended; Bowel sounds present  EXTREMITIES:  2+ Peripheral Pulses, No clubbing, cyanosis, or edema  SKIN: No rashes or lesions    LABS:                        12.8   6.1   )-----------( 204      ( 2018 06:30 )             38.2         142  |  106  |  14  ----------------------------<  99  4.1   |  28  |  0.53    Ca    8.9      2018 06:30    TPro  6.6  /  Alb  3.2<L>  /  TBili  0.4  /  DBili  x   /  AST  16  /  ALT  19  /  AlkPhos  114      PT/INR - ( 2018 06:30 )   PT: 29.4 sec;   INR: 2.64 ratio         PTT - ( 2018 06:30 )  PTT:38.7 sec  Urinalysis Basic - ( 2018 12:29 )    Color: Yellow / Appearance: Clear / S.005 / pH: x  Gluc: x / Ketone: Negative  / Bili: Negative / Urobili: Negative   Blood: x / Protein: Negative / Nitrite: Negative   Leuk Esterase: Negative / RBC: x / WBC x   Sq Epi: x / Non Sq Epi: x / Bacteria: x      CAPILLARY BLOOD GLUCOSE      POCT Blood Glucose.: 138 mg/dL (2018 12:49)  POCT Blood Glucose.: 161 mg/dL (2018 11:24)  POCT Blood Glucose.: 101 mg/dL (2018 08:22)  POCT Blood Glucose.: 153 mg/dL (2018 21:40)  POCT Blood Glucose.: 99 mg/dL (2018 17:08)  POCT Blood Glucose.: 103 mg/dL (2018 15:56)              MEDICATIONS  (STANDING):  dextrose 5%. 1000 milliLiter(s) (50 mL/Hr) IV Continuous <Continuous>  dextrose 50% Injectable 12.5 Gram(s) IV Push once  dextrose 50% Injectable 25 Gram(s) IV Push once  dextrose 50% Injectable 25 Gram(s) IV Push once  donepezil 5 milliGRAM(s) Oral at bedtime  insulin lispro (HumaLOG) corrective regimen sliding scale   SubCutaneous three times a day before meals  insulin lispro (HumaLOG) corrective regimen sliding scale   SubCutaneous at bedtime  lisinopril 5 milliGRAM(s) Oral daily  simvastatin 20 milliGRAM(s) Oral at bedtime  venlafaxine 75 milliGRAM(s) Oral two times a day with meals  warfarin 10 milliGRAM(s) Oral once    MEDICATIONS  (PRN):  dextrose Gel 1 Dose(s) Oral once PRN Blood Glucose LESS THAN 70 milliGRAM(s)/deciliter  glucagon  Injectable 1 milliGRAM(s) IntraMuscular once PRN Glucose LESS THAN 70 milligrams/deciliter

## 2018-04-07 NOTE — PROGRESS NOTE ADULT - PROBLEM SELECTOR PLAN 3
-chronic  -continue home warfarin dose confirmed by pharmacy with facility pt takes 10mg daily, at therapeutic level of inr 2-3 range; as no pauses last night and rate improving will continue if needs ppm monday for persistent jessica or pauses detected on tele will give vit  k in anticipation for monday procedure if clinically indicated; cardio Dr. Alexis berry  -hold home carvedilol as above  -monitor HR closely on tele  -INR in am; apprec pharmacy recommendations Brandon in ED

## 2018-04-07 NOTE — PROGRESS NOTE ADULT - ASSESSMENT
89M PMH Prostate Ca, systolic CHF (no recent TTE in records), anxiety/depression, DM type 2, chronic afib (on carvedilol, coumadin), HTN, DM2, chronic LE edema, dementia sent from [facility] for complaint of weakness.  Admitted for bradycardia, likely medication induced.

## 2018-04-08 ENCOUNTER — TRANSCRIPTION ENCOUNTER (OUTPATIENT)
Age: 83
End: 2018-04-08

## 2018-04-08 LAB
ANION GAP SERPL CALC-SCNC: 7 MMOL/L — SIGNIFICANT CHANGE UP (ref 5–17)
BLD GP AB SCN SERPL QL: SIGNIFICANT CHANGE UP
BUN SERPL-MCNC: 17 MG/DL — SIGNIFICANT CHANGE UP (ref 7–23)
CALCIUM SERPL-MCNC: 8.7 MG/DL — SIGNIFICANT CHANGE UP (ref 8.5–10.1)
CHLORIDE SERPL-SCNC: 103 MMOL/L — SIGNIFICANT CHANGE UP (ref 96–108)
CO2 SERPL-SCNC: 29 MMOL/L — SIGNIFICANT CHANGE UP (ref 22–31)
CREAT SERPL-MCNC: 0.48 MG/DL — LOW (ref 0.5–1.3)
GLUCOSE SERPL-MCNC: 102 MG/DL — HIGH (ref 70–99)
HCT VFR BLD CALC: 38.6 % — LOW (ref 39–50)
HGB BLD-MCNC: 12.8 G/DL — LOW (ref 13–17)
INR BLD: 2.78 RATIO — HIGH (ref 0.88–1.16)
INR BLD: 2.86 RATIO — HIGH (ref 0.88–1.16)
MCHC RBC-ENTMCNC: 29.8 PG — SIGNIFICANT CHANGE UP (ref 27–34)
MCHC RBC-ENTMCNC: 33.1 GM/DL — SIGNIFICANT CHANGE UP (ref 32–36)
MCV RBC AUTO: 89.9 FL — SIGNIFICANT CHANGE UP (ref 80–100)
PLATELET # BLD AUTO: 196 K/UL — SIGNIFICANT CHANGE UP (ref 150–400)
POTASSIUM SERPL-MCNC: 4.1 MMOL/L — SIGNIFICANT CHANGE UP (ref 3.5–5.3)
POTASSIUM SERPL-SCNC: 4.1 MMOL/L — SIGNIFICANT CHANGE UP (ref 3.5–5.3)
PROTHROM AB SERPL-ACNC: 30.9 SEC — HIGH (ref 9.8–12.7)
PROTHROM AB SERPL-ACNC: 31.8 SEC — HIGH (ref 9.8–12.7)
RBC # BLD: 4.3 M/UL — SIGNIFICANT CHANGE UP (ref 4.2–5.8)
RBC # FLD: 12.9 % — SIGNIFICANT CHANGE UP (ref 10.3–14.5)
SODIUM SERPL-SCNC: 139 MMOL/L — SIGNIFICANT CHANGE UP (ref 135–145)
WBC # BLD: 7.2 K/UL — SIGNIFICANT CHANGE UP (ref 3.8–10.5)
WBC # FLD AUTO: 7.2 K/UL — SIGNIFICANT CHANGE UP (ref 3.8–10.5)

## 2018-04-08 PROCEDURE — 99233 SBSQ HOSP IP/OBS HIGH 50: CPT

## 2018-04-08 RX ORDER — PHYTONADIONE (VIT K1) 5 MG
5 TABLET ORAL ONCE
Qty: 0 | Refills: 0 | Status: DISCONTINUED | OUTPATIENT
Start: 2018-04-08 | End: 2018-04-08

## 2018-04-08 RX ORDER — PHYTONADIONE (VIT K1) 5 MG
5 TABLET ORAL ONCE
Qty: 0 | Refills: 0 | Status: COMPLETED | OUTPATIENT
Start: 2018-04-08 | End: 2018-04-08

## 2018-04-08 RX ORDER — PHYTONADIONE (VIT K1) 5 MG
2.5 TABLET ORAL ONCE
Qty: 0 | Refills: 0 | Status: COMPLETED | OUTPATIENT
Start: 2018-04-08 | End: 2018-04-08

## 2018-04-08 RX ADMIN — DONEPEZIL HYDROCHLORIDE 5 MILLIGRAM(S): 10 TABLET, FILM COATED ORAL at 21:40

## 2018-04-08 RX ADMIN — Medication 100.5 MILLIGRAM(S): at 22:50

## 2018-04-08 RX ADMIN — Medication 75 MILLIGRAM(S): at 08:02

## 2018-04-08 RX ADMIN — Medication 75 MILLIGRAM(S): at 17:12

## 2018-04-08 RX ADMIN — Medication 101 MILLIGRAM(S): at 11:36

## 2018-04-08 RX ADMIN — SIMVASTATIN 20 MILLIGRAM(S): 20 TABLET, FILM COATED ORAL at 21:40

## 2018-04-08 NOTE — PROGRESS NOTE ADULT - ASSESSMENT
89M PMH Prostate Ca, systolic CHF (no recent TTE in records), anxiety/depression, DM type 2, chronic afib (on carvedilol, coumadin), HTN, DM2, chronic LE edema, dementia sent from [facility] for complaint of weakness.  Admitted for bradycardia, likely medication induced requiring pacemaker on 4/9

## 2018-04-08 NOTE — PROGRESS NOTE ADULT - PROBLEM SELECTOR PLAN 5
-type 2  -hold home metformin  -low dose SSI, FS, hypoglycemia protocol, npo after mno  -consistent carb/DASH TLC diet

## 2018-04-08 NOTE — CONSULT NOTE ADULT - ASSESSMENT
PT WAS ADMITTED WITH WEAKNESS - HAD A FIB SLOW VR   HAS HAD ATRIAL FIB -WAS ON COREG AND DIGOXIN  DM2  DYSLIPIDEMIA  S/P CA PROSTATE  HX OF SYNCOPE- HAD HAD DIZZIINESS  COEG AND DIGOXIN ARE WITHHELD  HAD OVER 3 SECONDS PAUSE   R/O SIC SINUS SYNDROME  DISCUSSED WITH SON AT BED SIDE   MAY NEED PACEMAKER
89 y.o. male with atrial  fibrillation present with sick sinus syndrome and symptomatic bradyarrhythmias. He is scheduled for PPM tomorrow. The patient and his son (Vinicio) were given informed consent. The case was discussed with Dr. Foreman and Dr. Leblanc who concur with the planned procedure.

## 2018-04-08 NOTE — CONSULT NOTE ADULT - SUBJECTIVE AND OBJECTIVE BOX
CARDIOLOGY CONSULT NOTE    Patient is a 89y Male with a known history of :  Weakness (R53.1)  Need for prophylactic measure (Z29.9)  Depression (F32.9)  Hypertension (I10)  Dementia (F03.90)  Diabetes (E11.9)  Chronic systolic congestive heart failure (I50.22)  Atrial fibrillation (I48.91)  Bradycardia (R00.1)    HPI:  89M PMH Prostate Ca, systolic CHF (no recent TTE in records), anxiety/depression, DM type 2, afib (on carvedilol, coumadin), HTN, DM2, chronic LE edema, dementia sent from [facility] for complaint of weakness.  Patient suffers from advanced dementia and is unable to provide additional history.  Transfer paperwork indicates pt was sent to ED for generalized weakness.      ED Course:   Vitals on presentation: T97.3F oral, HR51, /64, RR14, SpO2 95% on RA.  Labs notable for: CBC WNL, INR 2.67, CMP grossly normal, pro-calcitonin <0.05, pro-, UA (-), Dig level 0.8 (therapeutic), RVP (-), CXR: no active disease. EKG: Afib w/ slow RVR @46bpm.  Urine culture collected. (06 Apr 2018 14:41)      REVIEW OF SYSTEMS:    CONSTITUTIONAL: No fever, weight loss, or fatigue  EYES: No eye pain, visual disturbances, or discharge  ENMT:  No difficulty hearing, tinnitus, vertigo; No sinus or throat pain  NECK: No pain or stiffness  BREASTS: No pain, masses, or nipple discharge  RESPIRATORY: No cough, wheezing, chills or hemoptysis; No shortness of breath  CARDIOVASCULAR: No chest pain, palpitations, dizziness, or leg swelling  GASTROINTESTINAL: No abdominal or epigastric pain. No nausea, vomiting, or hematemesis; No diarrhea or constipation. No melena or hematochezia.  GENITOURINARY: No dysuria, frequency, hematuria, or incontinence  NEUROLOGICAL: No headaches, memory loss, loss of strength, numbness, or tremors  SKIN: No itching, burning, rashes, or lesions   LYMPH NODES: No enlarged glands  ENDOCRINE: No heat or cold intolerance; No hair loss  MUSCULOSKELETAL: No joint pain or swelling; No muscle, back, or extremity pain  PSYCHIATRIC: No depression, anxiety, mood swings, or difficulty sleeping  HEME/LYMPH: No easy bruising, or bleeding gums  ALLERGY AND IMMUNOLOGIC: No hives or eczema    MEDICATIONS  (STANDING):  dextrose 5%. 1000 milliLiter(s) (50 mL/Hr) IV Continuous <Continuous>  dextrose 50% Injectable 12.5 Gram(s) IV Push once  dextrose 50% Injectable 25 Gram(s) IV Push once  dextrose 50% Injectable 25 Gram(s) IV Push once  digoxin     Tablet 0.25 milliGRAM(s) Oral daily  donepezil 5 milliGRAM(s) Oral at bedtime  insulin lispro (HumaLOG) corrective regimen sliding scale   SubCutaneous three times a day before meals  insulin lispro (HumaLOG) corrective regimen sliding scale   SubCutaneous at bedtime  lisinopril 5 milliGRAM(s) Oral daily  simvastatin 20 milliGRAM(s) Oral at bedtime  venlafaxine 75 milliGRAM(s) Oral two times a day with meals  warfarin 10 milliGRAM(s) Oral once    MEDICATIONS  (PRN):  dextrose Gel 1 Dose(s) Oral once PRN Blood Glucose LESS THAN 70 milliGRAM(s)/deciliter  glucagon  Injectable 1 milliGRAM(s) IntraMuscular once PRN Glucose LESS THAN 70 milligrams/deciliter      ALLERGIES: latex (Rash)  No Known Drug Allergies      Social History:     FAMILY HISTORY:  No pertinent family history in first degree relatives      PAST MEDICAL & SURGICAL HISTORY:  CHF (congestive heart failure)  Dementia  Prostate ca  Diabetes  Hypertension  Atrial fibrillation  No significant past surgical history        PHYSICAL EXAMINATION:  -----------------------------  T(C): 36.6 (04-06-18 @ 16:47), Max: 36.7 (04-06-18 @ 15:21)  HR: 52 (04-06-18 @ 16:47) (51 - 63)  BP: 123/67 (04-06-18 @ 16:47) (108/69 - 123/67)  RR: 16 (04-06-18 @ 16:47) (14 - 16)  SpO2: 94% (04-06-18 @ 16:47) (94% - 97%)  Wt(kg): --      Weight (kg): 72.6 (04-06 @ 11:08)    Constitutional: well developed, normal appearance, well groomed, well nourished, no deformities and no acute distress.   Eyes: the conjunctiva exhibited no abnormalities and the eyelids demonstrated no xanthelasmas.   HEENT: normal oral mucosa, no oral pallor and no oral cyanosis.   Neck: normal jugular venous A waves present, normal jugular venous V waves present and no jugular venous jacobs A waves.   Pulmonary: no respiratory distress, normal respiratory rhythm and effort, no accessory muscle use and lungs were clear to auscultation bilaterally.   Cardiovascular: heart rate and rhythm were normal, normal S1 and S2 and no murmur, gallop, rub, heave or thrill are present.   Abdomen: soft, non-tender, no hepato-splenomegaly and no abdominal mass palpated.   Musculoskeletal: the gait could not be assessed..   Extremities: no clubbing of the fingernails, no localized cyanosis, no petechial hemorrhages and no ischemic changes.   Skin: normal skin color and pigmentation, no rash, no venous stasis, no skin lesions, no skin ulcer and no xanthoma was observed.   Psychiatric: oriented to person, place, and time, the affect was normal, the mood was normal and not feeling anxious.     LABS:   --------  04-06    137  |  103  |  13  ----------------------------<  110<H>  4.5   |  28  |  0.57    Ca    9.1      06 Apr 2018 12:10    TPro  7.3  /  Alb  3.5  /  TBili  0.4  /  DBili  x   /  AST  19  /  ALT  17  /  AlkPhos  123<H>  04-06                         13.3   5.2   )-----------( 225      ( 06 Apr 2018 12:10 )             39.8     PT/INR - ( 06 Apr 2018 12:10 )   PT: 29.7 sec;   INR: 2.67 ratio         PTT - ( 06 Apr 2018 12:10 )  PTT:41.6 sec  04-06 @ 12:10 BNP: 680 pg/mL            RADIOLOGY:  -----------------        ECG:     ECHO
History of Present Illness:  89y Male with a history of  atrial fibrillation ,diabetes , prostate Ca and dementia  was admitted with generalized weakness and found to have significant bradyarrhythmias and 3 second pauses. He was seen by Dr. Foreman who has recommended a PPM.  I was requested to evaluate him for PPM. He denies chest pain or SOB.    PAST MEDICAL & SURGICAL HISTORY:  CHF (congestive heart failure)  Dementia  Prostate ca  Diabetes  Hypertension  Atrial fibrillation  No significant past surgical history      Allergies    latex (Rash)  No Known Drug Allergies    Intolerances        MEDICATIONS  (STANDING):  dextrose 5%. 1000 milliLiter(s) (50 mL/Hr) IV Continuous <Continuous>  dextrose 50% Injectable 12.5 Gram(s) IV Push once  dextrose 50% Injectable 25 Gram(s) IV Push once  dextrose 50% Injectable 25 Gram(s) IV Push once  donepezil 5 milliGRAM(s) Oral at bedtime  insulin lispro (HumaLOG) corrective regimen sliding scale   SubCutaneous three times a day before meals  insulin lispro (HumaLOG) corrective regimen sliding scale   SubCutaneous at bedtime  lisinopril 5 milliGRAM(s) Oral daily  simvastatin 20 milliGRAM(s) Oral at bedtime  venlafaxine 75 milliGRAM(s) Oral two times a day with meals    MEDICATIONS  (PRN):  dextrose Gel 1 Dose(s) Oral once PRN Blood Glucose LESS THAN 70 milliGRAM(s)/deciliter  glucagon  Injectable 1 milliGRAM(s) IntraMuscular once PRN Glucose LESS THAN 70 milligrams/deciliter      Social History:  Smoking History: denies  Alcohol Use: denies    REVIEW OF SYSTEMS:  CONSTITUTIONAL: +++ weakness. No  fevers or chills  EYES/ENT: No visual changes;  No vertigo or throat pain   NECK: No pain or stiffness  RESPIRATORY: No cough, wheezing, hemoptysis; No shortness of breath  CARDIOVASCULAR: No chest pain or palpitations  GASTROINTESTINAL: No abdominal or epigastric pain. No nausea, vomiting, or hematemesis; No diarrhea or constipation. No melena or hematochezia.  GENITOURINARY: No dysuria, frequency or hematuria  NEUROLOGICAL: No numbness or weakness  SKIN: No itching, burning, rashes, or lesions   Vascular:  No lower extremity claudication, pedal rest pain or digital ulcers  All other review of systems is negative unless indicated above.    PHYSICAL EXAM:  General:  On exam, the patient is awake  Male in no acute distress.  Vital Signs Last 24 Hrs  T(C): 36.3 (08 Apr 2018 08:09), Max: 36.5 (07 Apr 2018 19:32)  T(F): 97.4 (08 Apr 2018 08:09), Max: 97.7 (07 Apr 2018 19:32)  HR: 65 (08 Apr 2018 08:09) (58 - 94)  BP: 112/64 (08 Apr 2018 08:09) (101/62 - 112/64)  BP(mean): --  RR: 18 (08 Apr 2018 08:09) (17 - 18)  SpO2: 94% (08 Apr 2018 08:09) (94% - 98%)    Neck:  4+/4+ bilateral carotid pulses; no carotid bruit, no palpable cervical masses.  Heart: Irregular at 46/ min, no murmurs, rubs or gallops.    Lungs:  Clear to auscultation.    Chest:  No chest wall deformities  Symmetrical chest expansion.   Abdomen: Soft and nontender.  No palpable masses.  No rebound, guarding or rigidity.  Extremities:  Feet are warm, pink with normal capillary refill times.  There are no digital ulcers or heel decubiti.  The calf and thigh muscles are soft and nontender.  There are no palpable cords or limb cellulitis.  Elisabet's sign is negative bilaterally.  There is no lower extremity edema, cyanosis, or rubor.  On examination of the peripheral pulses:  Left leg femoral pulse is 4/4   , popliteal pulse is 4/4   ,PT Pulse is 3/4   , DP Pulse is 3/4   Right leg femoral pulse is 4/4   ,popliteal pulse is  4/4  , PT Pulse is 3/4  , DP Pulse is 3/4   Neurological:  There are no motor or sensory deficits in either lower extremity.                          12.8   7.2   )-----------( 196      ( 08 Apr 2018 06:48 )             38.6     04-08    139  |  103  |  17  ----------------------------<  102<H>  4.1   |  29  |  0.48<L>    Ca    8.7      08 Apr 2018 06:48    TPro  6.6  /  Alb  3.2<L>  /  TBili  0.4  /  DBili  x   /  AST  16  /  ALT  19  /  AlkPhos  114  04-07        PT/INR - ( 08 Apr 2018 06:48 )   PT: 30.9 sec;   INR: 2.78 ratio         PTT - ( 07 Apr 2018 06:30 )  PTT:38.7 sec    Radiology:

## 2018-04-08 NOTE — PROGRESS NOTE ADULT - PROBLEM SELECTOR PLAN 1
-acute  -symptomatic/weakness; due to progressive cardiac disease requiring pacemaker by vascular Dr. Farris  -medical optimization to be performed by primary team tomrorow  -will hold carvedilol for now; monitor HR   -cardio (Dr. Foreman aware; f/u recs  -continue home digoxin  -f/u am labs  -PT/INR to be monitored for value of INR <2.0 for surgical optimization  -PT consult for deconditioning  -npo mno

## 2018-04-08 NOTE — PROGRESS NOTE ADULT - SUBJECTIVE AND OBJECTIVE BOX
Patient is a 89y old  Male who presents with a chief complaint of Weakness, Bradycardia (06 Apr 2018 20:12)      INTERVAL HPI: Pt seen and examined. unable to obtain history as pt confused    OVERNIGHT EVENTS: pt had another pause and bradycardic episode on monitor  T(F): 97.8 (04-08-18 @ 19:19), Max: 97.8 (04-08-18 @ 19:19)  HR: 57 (04-08-18 @ 19:19) (57 - 94)  BP: 114/63 (04-08-18 @ 19:19) (101/62 - 127/77)  RR: 17 (04-08-18 @ 19:19) (16 - 18)  SpO2: 98% (04-08-18 @ 19:19) (94% - 98%)  I&O's Summary    07 Apr 2018 07:01  -  08 Apr 2018 07:00  --------------------------------------------------------  IN: 200 mL / OUT: 0 mL / NET: 200 mL        REVIEW OF SYSTEMS: unable to obtain as pt confused    PHYSICAL EXAM:  GENERAL: NAD, elder  HEAD:  Atraumatic, Normocephalic  EYES: EOMI, PERRLA, conjunctiva and sclera clear  ENMT: No tonsillar erythema, exudates, or enlargement; Moist mucous membranes, Good dentition, No lesions  NECK: Supple, No JVD  NERVOUS SYSTEM:  Alert & Oriented X1, Good concentration; Motor Strength 3/5 B/L upper and lower extremities  CHEST/LUNG: Clear to percussion bilaterally; No rales, rhonchi, wheezing, or rubs  HEART: slow rate and rhythm; No murmurs, rubs, or gallops  ABDOMEN: Soft, Nontender, Nondistended; Bowel sounds present  EXTREMITIES:  2+ Peripheral Pulses, No clubbing, cyanosis, or edema  SKIN: No rashes or lesions    LABS:                        12.8   7.2   )-----------( 196      ( 08 Apr 2018 06:48 )             38.6     04-08    139  |  103  |  17  ----------------------------<  102<H>  4.1   |  29  |  0.48<L>    Ca    8.7      08 Apr 2018 06:48    TPro  6.6  /  Alb  3.2<L>  /  TBili  0.4  /  DBili  x   /  AST  16  /  ALT  19  /  AlkPhos  114  04-07    PT/INR - ( 08 Apr 2018 06:48 )   PT: 30.9 sec;   INR: 2.78 ratio         PTT - ( 07 Apr 2018 06:30 )  PTT:38.7 sec    CAPILLARY BLOOD GLUCOSE      POCT Blood Glucose.: 116 mg/dL (08 Apr 2018 16:56)  POCT Blood Glucose.: 149 mg/dL (08 Apr 2018 11:35)  POCT Blood Glucose.: 98 mg/dL (08 Apr 2018 07:14)  POCT Blood Glucose.: 146 mg/dL (07 Apr 2018 21:29)      04-06 @ 16:41   No growth  --  --          MEDICATIONS  (STANDING):  dextrose 5%. 1000 milliLiter(s) (50 mL/Hr) IV Continuous <Continuous>  dextrose 50% Injectable 12.5 Gram(s) IV Push once  dextrose 50% Injectable 25 Gram(s) IV Push once  dextrose 50% Injectable 25 Gram(s) IV Push once  donepezil 5 milliGRAM(s) Oral at bedtime  insulin lispro (HumaLOG) corrective regimen sliding scale   SubCutaneous three times a day before meals  insulin lispro (HumaLOG) corrective regimen sliding scale   SubCutaneous at bedtime  lisinopril 5 milliGRAM(s) Oral daily  simvastatin 20 milliGRAM(s) Oral at bedtime  venlafaxine 75 milliGRAM(s) Oral two times a day with meals    MEDICATIONS  (PRN):  dextrose Gel 1 Dose(s) Oral once PRN Blood Glucose LESS THAN 70 milliGRAM(s)/deciliter  glucagon  Injectable 1 milliGRAM(s) IntraMuscular once PRN Glucose LESS THAN 70 milligrams/deciliter

## 2018-04-08 NOTE — PROGRESS NOTE ADULT - PROBLEM SELECTOR PLAN 3
-chronic  -pt given IV vit K 5mg, last dose was on 4/7 10mg home dose, repeat vit K at 2100hrs, if INR still elevated give another dose of vit k pending on INR level, intern resident physician Dr. Polo to coordinate with pharmacy, as per vascular ok to proceed with procedure once INR <2, repeat PT/INR in AM as well; restart procedure post ppm placement as per cardio and primary team  -hold home carvedilol as above  -monitor HR closely on tele  -INR in am; apprec pharmacy recommendations Brandon in ED

## 2018-04-08 NOTE — PROGRESS NOTE ADULT - SUBJECTIVE AND OBJECTIVE BOX
Patient is a 89y Male with a known history of :  Prostate ca (C61)  Weakness (R53.1)  Need for prophylactic measure (Z29.9)  Depression (F32.9)  Hypertension (I10)  Dementia (F03.90)  Diabetes (E11.9)  Chronic systolic congestive heart failure (I50.22)  Atrial fibrillation (I48.91)  Bradycardia (R00.1)    HPI:  89M PMH Prostate Ca, systolic CHF (no recent TTE in records), anxiety/depression, DM type 2, afib (on carvedilol, coumadin), HTN, DM2, chronic LE edema, dementia sent from [facility] for complaint of weakness.  Patient suffers from advanced dementia and is unable to provide additional history.  Transfer paperwork indicates pt was sent to ED for generalized weakness.      ED Course:   Vitals on presentation: T97.3F oral, HR51, /64, RR14, SpO2 95% on RA.  Labs notable for: CBC WNL, INR 2.67, CMP grossly normal, pro-calcitonin <0.05, pro-, UA (-), Dig level 0.8 (therapeutic), RVP (-), CXR: no active disease. EKG: Afib w/ slow RVR @46bpm.  Urine culture collected. (06 Apr 2018 14:41)      REVIEW OF SYSTEMS:    CONSTITUTIONAL: No fever, weight loss, or fatigue  EYES: No eye pain, visual disturbances, or discharge  ENMT:  No difficulty hearing, tinnitus, vertigo; No sinus or throat pain  NECK: No pain or stiffness  BREASTS: No pain, masses, or nipple discharge  RESPIRATORY: No cough, wheezing, chills or hemoptysis; No shortness of breath  CARDIOVASCULAR: No chest pain, palpitations, dizziness, or leg swelling  GASTROINTESTINAL: No abdominal or epigastric pain. No nausea, vomiting, or hematemesis; No diarrhea or constipation. No melena or hematochezia.  GENITOURINARY: No dysuria, frequency, hematuria, or incontinence  NEUROLOGICAL: No headaches, memory loss, loss of strength, numbness, or tremors  SKIN: No itching, burning, rashes, or lesions   LYMPH NODES: No enlarged glands  ENDOCRINE: No heat or cold intolerance; No hair loss  MUSCULOSKELETAL: No joint pain or swelling; No muscle, back, or extremity pain  PSYCHIATRIC: No depression, anxiety, mood swings, or difficulty sleeping  HEME/LYMPH: No easy bruising, or bleeding gums  ALLERGY AND IMMUNOLOGIC: No hives or eczema    MEDICATIONS  (STANDING):  dextrose 5%. 1000 milliLiter(s) (50 mL/Hr) IV Continuous <Continuous>  dextrose 50% Injectable 12.5 Gram(s) IV Push once  dextrose 50% Injectable 25 Gram(s) IV Push once  dextrose 50% Injectable 25 Gram(s) IV Push once  donepezil 5 milliGRAM(s) Oral at bedtime  insulin lispro (HumaLOG) corrective regimen sliding scale   SubCutaneous three times a day before meals  insulin lispro (HumaLOG) corrective regimen sliding scale   SubCutaneous at bedtime  lisinopril 5 milliGRAM(s) Oral daily  simvastatin 20 milliGRAM(s) Oral at bedtime  venlafaxine 75 milliGRAM(s) Oral two times a day with meals    MEDICATIONS  (PRN):  dextrose Gel 1 Dose(s) Oral once PRN Blood Glucose LESS THAN 70 milliGRAM(s)/deciliter  glucagon  Injectable 1 milliGRAM(s) IntraMuscular once PRN Glucose LESS THAN 70 milligrams/deciliter      ALLERGIES: latex (Rash)  No Known Drug Allergies      FAMILY HISTORY:  No pertinent family history in first degree relatives      PHYSICAL EXAMINATION:  -----------------------------  T(C): 36.3 (04-08-18 @ 08:09), Max: 36.5 (04-07-18 @ 19:32)  HR: 65 (04-08-18 @ 08:09) (54 - 94)  BP: 112/64 (04-08-18 @ 08:09) (101/62 - 117/73)  RR: 18 (04-08-18 @ 08:09) (17 - 18)  SpO2: 94% (04-08-18 @ 08:09) (94% - 98%)  Wt(kg): --    04-07 @ 07:01  -  04-08 @ 07:00  --------------------------------------------------------  IN:    Oral Fluid: 200 mL  Total IN: 200 mL    OUT:  Total OUT: 0 mL    Total NET: 200 mL            Constitutional: well developed, normal appearance, well groomed, well nourished, no deformities and no acute distress.   Eyes: the conjunctiva exhibited no abnormalities and the eyelids demonstrated no xanthelasmas.   HEENT: normal oral mucosa, no oral pallor and no oral cyanosis.   Neck: normal jugular venous A waves present, normal jugular venous V waves present and no jugular venous jacobs A waves.   Pulmonary: no respiratory distress, normal respiratory rhythm and effort, no accessory muscle use and lungs were clear to auscultation bilaterally.   Cardiovascular: heart rate and rhythm were normal, normal S1 and S2 and no murmur, gallop, rub, heave or thrill are present.   Abdomen: soft, non-tender, no hepato-splenomegaly and no abdominal mass palpated.   Musculoskeletal: the gait could not be assessed..   Extremities: no clubbing of the fingernails, no localized cyanosis, no petechial hemorrhages and no ischemic changes.   Skin: normal skin color and pigmentation, no rash, no venous stasis, no skin lesions, no skin ulcer and no xanthoma was observed.   Psychiatric: oriented to person, place, and time, the affect was normal, the mood was normal and not feeling anxious.     LABS:   --------  04-08    139  |  103  |  17  ----------------------------<  102<H>  4.1   |  29  |  0.48<L>    Ca    8.7      08 Apr 2018 06:48    TPro  6.6  /  Alb  3.2<L>  /  TBili  0.4  /  DBili  x   /  AST  16  /  ALT  19  /  AlkPhos  114  04-07                         12.8   7.2   )-----------( 196      ( 08 Apr 2018 06:48 )             38.6     PT/INR - ( 08 Apr 2018 06:48 )   PT: 30.9 sec;   INR: 2.78 ratio         PTT - ( 07 Apr 2018 06:30 )  PTT:38.7 sec        Culture Results:   No growth (04-06 @ 16:41)      RADIOLOGY:  -----------------        ECG:

## 2018-04-09 LAB
ANION GAP SERPL CALC-SCNC: 8 MMOL/L — SIGNIFICANT CHANGE UP (ref 5–17)
BUN SERPL-MCNC: 14 MG/DL — SIGNIFICANT CHANGE UP (ref 7–23)
CALCIUM SERPL-MCNC: 8.6 MG/DL — SIGNIFICANT CHANGE UP (ref 8.5–10.1)
CHLORIDE SERPL-SCNC: 102 MMOL/L — SIGNIFICANT CHANGE UP (ref 96–108)
CO2 SERPL-SCNC: 30 MMOL/L — SIGNIFICANT CHANGE UP (ref 22–31)
CREAT SERPL-MCNC: 0.46 MG/DL — LOW (ref 0.5–1.3)
GLUCOSE SERPL-MCNC: 147 MG/DL — HIGH (ref 70–99)
HCT VFR BLD CALC: 38.7 % — LOW (ref 39–50)
HGB BLD-MCNC: 12.9 G/DL — LOW (ref 13–17)
INR BLD: 1.98 RATIO — HIGH (ref 0.88–1.16)
MCHC RBC-ENTMCNC: 30.2 PG — SIGNIFICANT CHANGE UP (ref 27–34)
MCHC RBC-ENTMCNC: 33.3 GM/DL — SIGNIFICANT CHANGE UP (ref 32–36)
MCV RBC AUTO: 90.6 FL — SIGNIFICANT CHANGE UP (ref 80–100)
PLATELET # BLD AUTO: 232 K/UL — SIGNIFICANT CHANGE UP (ref 150–400)
POTASSIUM SERPL-MCNC: 4 MMOL/L — SIGNIFICANT CHANGE UP (ref 3.5–5.3)
POTASSIUM SERPL-SCNC: 4 MMOL/L — SIGNIFICANT CHANGE UP (ref 3.5–5.3)
PROTHROM AB SERPL-ACNC: 21.9 SEC — HIGH (ref 9.8–12.7)
RBC # BLD: 4.27 M/UL — SIGNIFICANT CHANGE UP (ref 4.2–5.8)
RBC # FLD: 12.8 % — SIGNIFICANT CHANGE UP (ref 10.3–14.5)
SODIUM SERPL-SCNC: 140 MMOL/L — SIGNIFICANT CHANGE UP (ref 135–145)
WBC # BLD: 8.2 K/UL — SIGNIFICANT CHANGE UP (ref 3.8–10.5)
WBC # FLD AUTO: 8.2 K/UL — SIGNIFICANT CHANGE UP (ref 3.8–10.5)

## 2018-04-09 PROCEDURE — 71045 X-RAY EXAM CHEST 1 VIEW: CPT | Mod: 26

## 2018-04-09 PROCEDURE — 99233 SBSQ HOSP IP/OBS HIGH 50: CPT | Mod: GC

## 2018-04-09 RX ORDER — CEFAZOLIN SODIUM 1 G
2000 VIAL (EA) INJECTION ONCE
Qty: 0 | Refills: 0 | Status: COMPLETED | OUTPATIENT
Start: 2018-04-09 | End: 2018-04-09

## 2018-04-09 RX ORDER — ONDANSETRON 8 MG/1
4 TABLET, FILM COATED ORAL ONCE
Qty: 0 | Refills: 0 | Status: DISCONTINUED | OUTPATIENT
Start: 2018-04-09 | End: 2018-04-09

## 2018-04-09 RX ORDER — LISINOPRIL 2.5 MG/1
5 TABLET ORAL DAILY
Qty: 0 | Refills: 0 | Status: DISCONTINUED | OUTPATIENT
Start: 2018-04-09 | End: 2018-04-11

## 2018-04-09 RX ORDER — SIMVASTATIN 20 MG/1
20 TABLET, FILM COATED ORAL AT BEDTIME
Qty: 0 | Refills: 0 | Status: DISCONTINUED | OUTPATIENT
Start: 2018-04-09 | End: 2018-04-11

## 2018-04-09 RX ORDER — DONEPEZIL HYDROCHLORIDE 10 MG/1
5 TABLET, FILM COATED ORAL AT BEDTIME
Qty: 0 | Refills: 0 | Status: DISCONTINUED | OUTPATIENT
Start: 2018-04-09 | End: 2018-04-11

## 2018-04-09 RX ORDER — OXYCODONE AND ACETAMINOPHEN 5; 325 MG/1; MG/1
1 TABLET ORAL EVERY 4 HOURS
Qty: 0 | Refills: 0 | Status: DISCONTINUED | OUTPATIENT
Start: 2018-04-09 | End: 2018-04-11

## 2018-04-09 RX ORDER — OXYCODONE AND ACETAMINOPHEN 5; 325 MG/1; MG/1
2 TABLET ORAL EVERY 6 HOURS
Qty: 0 | Refills: 0 | Status: DISCONTINUED | OUTPATIENT
Start: 2018-04-09 | End: 2018-04-11

## 2018-04-09 RX ORDER — SODIUM CHLORIDE 9 MG/ML
1000 INJECTION, SOLUTION INTRAVENOUS
Qty: 0 | Refills: 0 | Status: DISCONTINUED | OUTPATIENT
Start: 2018-04-09 | End: 2018-04-09

## 2018-04-09 RX ORDER — INSULIN LISPRO 100/ML
VIAL (ML) SUBCUTANEOUS
Qty: 0 | Refills: 0 | Status: DISCONTINUED | OUTPATIENT
Start: 2018-04-09 | End: 2018-04-11

## 2018-04-09 RX ORDER — VENLAFAXINE HCL 75 MG
75 CAPSULE, EXT RELEASE 24 HR ORAL
Qty: 0 | Refills: 0 | Status: DISCONTINUED | OUTPATIENT
Start: 2018-04-09 | End: 2018-04-11

## 2018-04-09 RX ORDER — ONDANSETRON 8 MG/1
4 TABLET, FILM COATED ORAL EVERY 6 HOURS
Qty: 0 | Refills: 0 | Status: DISCONTINUED | OUTPATIENT
Start: 2018-04-09 | End: 2018-04-11

## 2018-04-09 RX ORDER — HYDROMORPHONE HYDROCHLORIDE 2 MG/ML
0.25 INJECTION INTRAMUSCULAR; INTRAVENOUS; SUBCUTANEOUS
Qty: 0 | Refills: 0 | Status: DISCONTINUED | OUTPATIENT
Start: 2018-04-09 | End: 2018-04-09

## 2018-04-09 RX ADMIN — DONEPEZIL HYDROCHLORIDE 5 MILLIGRAM(S): 10 TABLET, FILM COATED ORAL at 21:25

## 2018-04-09 RX ADMIN — SIMVASTATIN 20 MILLIGRAM(S): 20 TABLET, FILM COATED ORAL at 21:25

## 2018-04-09 NOTE — PROGRESS NOTE ADULT - PROBLEM SELECTOR PLAN 3
-chronic  -rate controlled, AC resume after OR   -hold home carvedilol as above  -monitor HR closely on tele  -INR in am; apprec pharmacy recommendations Brandon in ED -chronic  -rate controlled, AC resume tomorrow 4/10 after OR   -hold home carvedilol as above  -monitor HR closely on tele  -INR in am

## 2018-04-09 NOTE — PROGRESS NOTE ADULT - ATTENDING COMMENTS
Patient went to OR today for PPM placement for symptomatic bradycardia. Resume anticoagulation with Coumadin tomorrow.   PT eval for deconditioning.

## 2018-04-09 NOTE — PROGRESS NOTE ADULT - SUBJECTIVE AND OBJECTIVE BOX
Resident Progress Note    Patient is a 89y old  Male who presents with a chief complaint of Weakness, Bradycardia (06 Apr 2018 20:12)    HPI: Patient seen and examined at bedside. No acute events overnight. Pt does now have any acute complaints at this time.  Pt is going to OR for pacemaker by Dr. Farris today.       ROS:  Constitutional: denies fever, chills, diaphoresis   HEENT: denies blurry vision, difficulty hearing  Respiratory: denies SOB, ABRAHAM, cough,   Cardiovascular: denies chest pain, palpitations, edema  Gastrointestinal: denies nausea, vomiting, diarrhea, constipation, abdominal pain  Genitourinary: denies dysuria, frequency, urgency, hematuria   Musculoskeletal: denies myalgias,   Neurologic: denies headache, weakness, dizziness, paresthesias, numbness/tingling  ROS negative except as noted above    Vital Signs Last 24 Hrs  T(C): 37 (04-09-18 @ 14:33), Max: 37 (04-09-18 @ 00:46)  T(F): 98.6 (04-09-18 @ 14:33), Max: 98.6 (04-09-18 @ 00:46)  HR: 72 (04-09-18 @ 14:33) (57 - 98)  BP: 122/70 (04-09-18 @ 14:33) (105/67 - 129/81)  BP(mean): --  RR: 14 (04-09-18 @ 14:33) (14 - 17)  SpO2: 97% (04-09-18 @ 14:33) (94% - 99%)    PHYSICAL EXAM:  GENERAL: NAD, elder  HEAD:  Atraumatic, Normocephalic  EYES: EOMI, PERRLA, conjunctiva and sclera clear  ENMT: No tonsillar erythema, exudates, or enlargement; Moist mucous membranes, Good dentition, No lesions  NECK: Supple, No JVD  NERVOUS SYSTEM:  Alert & Oriented X2, Good concentration; Motor Strength 3/5 B/L upper and lower extremities  CHEST/LUNG: Clear to auscultation bilaterally; No rales, rhonchi, wheezing, or rubs  HEART: slow rate and rhythm; No murmurs, rubs, or gallops  ABDOMEN: Soft, Nontender, Nondistended; Bowel sounds present  EXTREMITIES:  2+ Peripheral Pulses, No clubbing, cyanosis, or edema  SKIN: No rashes or lesions      LABS:                        12.9   8.2   )-----------( 232      ( 09 Apr 2018 06:54 )             38.7     09 Apr 2018 06:54    140    |  102    |  14     ----------------------------<  147    4.0     |  30     |  0.46     Ca    8.6        09 Apr 2018 06:54      PT/INR - ( 09 Apr 2018 06:54 )   PT: 21.9 sec;   INR: 1.98 ratio        CAPILLARY BLOOD GLUCOSE    POCT Blood Glucose.: 103 mg/dL (09 Apr 2018 16:17)  POCT Blood Glucose.: 148 mg/dL (09 Apr 2018 06:00)  POCT Blood Glucose.: 134 mg/dL (08 Apr 2018 21:19)    RADIOLOGY & ADDITIONAL TESTS:        Allergies    latex (Rash)  No Known Drug Allergies    Intolerances Resident Progress Note    Patient is a 89y old  Male who presents with a chief complaint of Weakness, Bradycardia (06 Apr 2018 20:12)    HPI: Patient seen and examined at bedside. No acute events overnight. Pt does now have any acute complaints at this time.  Pt is going to OR for pacemaker by Dr. Farris today.       ROS:  Constitutional: denies fever, chills, diaphoresis   HEENT: denies blurry vision, difficulty hearing  Respiratory: denies SOB, ABRAHAM, cough,   Cardiovascular: denies chest pain, palpitations, edema  Gastrointestinal: denies nausea, vomiting, diarrhea, constipation, abdominal pain  Genitourinary: denies dysuria, frequency, urgency, hematuria   Musculoskeletal: denies myalgias,   Neurologic: denies headache, weakness, dizziness, paresthesias, numbness/tingling  ROS negative except as noted above    Vital Signs Last 24 Hrs  T(C): 37 (04-09-18 @ 14:33), Max: 37 (04-09-18 @ 00:46)  T(F): 98.6 (04-09-18 @ 14:33), Max: 98.6 (04-09-18 @ 00:46)  HR: 72 (04-09-18 @ 14:33) (57 - 98)  BP: 122/70 (04-09-18 @ 14:33) (105/67 - 129/81)  BP(mean): --  RR: 14 (04-09-18 @ 14:33) (14 - 17)  SpO2: 97% (04-09-18 @ 14:33) (94% - 99%)    PHYSICAL EXAM:  GENERAL: NAD, elder  HEAD:  Atraumatic, Normocephalic  EYES: EOMI, PERRLA, conjunctiva and sclera clear  ENMT: No tonsillar erythema, exudates, or enlargement; Moist mucous membranes, Good dentition, No lesions  NECK: Supple, No JVD  NERVOUS SYSTEM:  Alert & Oriented X1 (self), Good concentration; Motor Strength 3/5 B/L upper and lower extremities  CHEST/LUNG: Clear to auscultation bilaterally; No rales, rhonchi, wheezing, or rubs  HEART: slow rate and rhythm; No murmurs, rubs, or gallops  ABDOMEN: Soft, Nontender, Nondistended; Bowel sounds present  EXTREMITIES:  2+ Peripheral Pulses, No clubbing, cyanosis, or edema  SKIN: No rashes or lesions      LABS:                        12.9   8.2   )-----------( 232      ( 09 Apr 2018 06:54 )             38.7     09 Apr 2018 06:54    140    |  102    |  14     ----------------------------<  147    4.0     |  30     |  0.46     Ca    8.6        09 Apr 2018 06:54      PT/INR - ( 09 Apr 2018 06:54 )   PT: 21.9 sec;   INR: 1.98 ratio        CAPILLARY BLOOD GLUCOSE    POCT Blood Glucose.: 103 mg/dL (09 Apr 2018 16:17)  POCT Blood Glucose.: 148 mg/dL (09 Apr 2018 06:00)  POCT Blood Glucose.: 134 mg/dL (08 Apr 2018 21:19)    RADIOLOGY & ADDITIONAL TESTS:        Allergies    latex (Rash)  No Known Drug Allergies    Intolerances

## 2018-04-09 NOTE — PROGRESS NOTE ADULT - PROBLEM SELECTOR PLAN 9
DVT PPx: pt is therapeutically anticoagulated with coumadin for afib DVT PPx: SCDs until back on Coumadin

## 2018-04-09 NOTE — PROGRESS NOTE ADULT - PROBLEM SELECTOR PLAN 5
-type 2  -hold home metformin  -low dose SSI, FS, hypoglycemia protocol, NPO   -consistent carb/DASH TLC diet

## 2018-04-09 NOTE — PROGRESS NOTE ADULT - PROBLEM SELECTOR PLAN 1
-acute, had over 3 second pauses previously, r/o sick sinus syndrome,   -monitor for any pauses  -symptomatic / weakness, due to progressive cardiac disease requiring pacemaker by vascular Dr. Farris  -Patient is at elevated risk due to his comorbidities and advanced age, but is optimized for PPM today  -will hold carvedilol for now; monitor HR   -cardio Dr. Foreman aware  -continue home digoxin  -f/u am labs  -PT/INR to be monitored for value of INR <2.0 for surgical optimization  -PT consult for deconditioning

## 2018-04-09 NOTE — PROGRESS NOTE ADULT - SUBJECTIVE AND OBJECTIVE BOX
Patient is a 89y Male with a known history of :  Prostate ca (C61)  Weakness (R53.1)  Need for prophylactic measure (Z29.9)  Depression (F32.9)  Hypertension (I10)  Dementia (F03.90)  Diabetes (E11.9)  Chronic systolic congestive heart failure (I50.22)  Atrial fibrillation (I48.91)  Bradycardia (R00.1)    HPI:  89M PMH Prostate Ca, systolic CHF (no recent TTE in records), anxiety/depression, DM type 2, afib (on carvedilol, coumadin), HTN, DM2, chronic LE edema, dementia sent from [facility] for complaint of weakness.  Patient suffers from advanced dementia and is unable to provide additional history.  Transfer paperwork indicates pt was sent to ED for generalized weakness.      ED Course:   Vitals on presentation: T97.3F oral, HR51, /64, RR14, SpO2 95% on RA.  Labs notable for: CBC WNL, INR 2.67, CMP grossly normal, pro-calcitonin <0.05, pro-, UA (-), Dig level 0.8 (therapeutic), RVP (-), CXR: no active disease. EKG: Afib w/ slow RVR @46bpm.  Urine culture collected. (06 Apr 2018 14:41)      REVIEW OF SYSTEMS:    CONSTITUTIONAL: No fever, weight loss, or fatigue  EYES: No eye pain, visual disturbances, or discharge  ENMT:  No difficulty hearing, tinnitus, vertigo; No sinus or throat pain  NECK: No pain or stiffness  BREASTS: No pain, masses, or nipple discharge  RESPIRATORY: No cough, wheezing, chills or hemoptysis; No shortness of breath  CARDIOVASCULAR: No chest pain, palpitations, dizziness, or leg swelling  GASTROINTESTINAL: No abdominal or epigastric pain. No nausea, vomiting, or hematemesis; No diarrhea or constipation. No melena or hematochezia.  GENITOURINARY: No dysuria, frequency, hematuria, or incontinence  NEUROLOGICAL: No headaches, memory loss, loss of strength, numbness, or tremors  SKIN: No itching, burning, rashes, or lesions   LYMPH NODES: No enlarged glands  ENDOCRINE: No heat or cold intolerance; No hair loss  MUSCULOSKELETAL: No joint pain or swelling; No muscle, back, or extremity pain  PSYCHIATRIC: No depression, anxiety, mood swings, or difficulty sleeping  HEME/LYMPH: No easy bruising, or bleeding gums  ALLERGY AND IMMUNOLOGIC: No hives or eczema    MEDICATIONS  (STANDING):  dextrose 5%. 1000 milliLiter(s) (50 mL/Hr) IV Continuous <Continuous>  dextrose 50% Injectable 12.5 Gram(s) IV Push once  dextrose 50% Injectable 25 Gram(s) IV Push once  dextrose 50% Injectable 25 Gram(s) IV Push once  donepezil 5 milliGRAM(s) Oral at bedtime  insulin lispro (HumaLOG) corrective regimen sliding scale   SubCutaneous three times a day before meals  insulin lispro (HumaLOG) corrective regimen sliding scale   SubCutaneous at bedtime  lisinopril 5 milliGRAM(s) Oral daily  simvastatin 20 milliGRAM(s) Oral at bedtime  venlafaxine 75 milliGRAM(s) Oral two times a day with meals    MEDICATIONS  (PRN):  dextrose Gel 1 Dose(s) Oral once PRN Blood Glucose LESS THAN 70 milliGRAM(s)/deciliter  glucagon  Injectable 1 milliGRAM(s) IntraMuscular once PRN Glucose LESS THAN 70 milligrams/deciliter      ALLERGIES: latex (Rash)  No Known Drug Allergies      FAMILY HISTORY:  No pertinent family history in first degree relatives      PHYSICAL EXAMINATION:  -----------------------------  T(C): 36.8 (04-09-18 @ 08:00), Max: 36.8 (04-09-18 @ 08:00)  HR: 74 (04-09-18 @ 08:00) (57 - 80)  BP: 116/63 (04-09-18 @ 08:00) (104/69 - 129/81)  RR: 16 (04-09-18 @ 08:00) (16 - 17)  SpO2: 96% (04-09-18 @ 08:00) (95% - 99%)  Wt(kg): --    04-08 @ 07:01  -  04-09 @ 07:00  --------------------------------------------------------  IN:    Oral Fluid: 100 mL  Total IN: 100 mL    OUT:  Total OUT: 0 mL    Total NET: 100 mL            Constitutional: well developed, normal appearance, well groomed, well nourished, no deformities and no acute distress.   Eyes: the conjunctiva exhibited no abnormalities and the eyelids demonstrated no xanthelasmas.   HEENT: normal oral mucosa, no oral pallor and no oral cyanosis.   Neck: normal jugular venous A waves present, normal jugular venous V waves present and no jugular venous jacobs A waves.   Pulmonary: no respiratory distress, normal respiratory rhythm and effort, no accessory muscle use and lungs were clear to auscultation bilaterally.   Cardiovascular: heart rate and rhythm were normal, normal S1 and S2 and no murmur, gallop, rub, heave or thrill are present.   Abdomen: soft, non-tender, no hepato-splenomegaly and no abdominal mass palpated.   Musculoskeletal: the gait could not be assessed..   Extremities: no clubbing of the fingernails, no localized cyanosis, no petechial hemorrhages and no ischemic changes.   Skin: normal skin color and pigmentation, no rash, no venous stasis, no skin lesions, no skin ulcer and no xanthoma was observed.   Psychiatric: oriented to person, place, and time, the affect was normal, the mood was normal and not feeling anxious.     LABS:   --------  04-09    140  |  102  |  14  ----------------------------<  147<H>  4.0   |  30  |  0.46<L>    Ca    8.6      09 Apr 2018 06:54                           12.9   8.2   )-----------( 232      ( 09 Apr 2018 06:54 )             38.7     PT/INR - ( 09 Apr 2018 06:54 )   PT: 21.9 sec;   INR: 1.98 ratio                     RADIOLOGY:  -----------------        ECG:

## 2018-04-09 NOTE — BRIEF OPERATIVE NOTE - PROCEDURE
<<-----Click on this checkbox to enter Procedure Pacemaker insertion  04/09/2018  SINGLE LEAD PACEMAKER  Active  HNOOROLLAH

## 2018-04-09 NOTE — PROGRESS NOTE ADULT - PROBLEM SELECTOR PLAN 4
-unable to find recent TTE/EF,   - TTecho  -continue home digoxin, lisinopril -unable to find recent TTE/EF,   - echo  -continue home digoxin, lisinopril

## 2018-04-09 NOTE — PROGRESS NOTE ADULT - ASSESSMENT
89M PMH Prostate Ca, systolic CHF (no recent TTE in records), anxiety/depression, DM type 2, chronic afib (on carvedilol, coumadin), HTN, DM2, chronic LE edema, dementia sent from [facility] for complaint of weakness.  Admitted for bradycardia, likely medication induced requiring pacemaker in OR 4/9 with Dr. Farris, 89M PMH Prostate Ca, systolic CHF (no recent TTE in records), anxiety/depression, DM type 2, chronic afib (on carvedilol, coumadin), HTN, DM2, chronic LE edema, dementia sent from [facility] for complaint of weakness.  Admitted for bradycardia requiring pacemaker placement

## 2018-04-09 NOTE — CHART NOTE - NSCHARTNOTEFT_GEN_A_CORE
Patient seen and examined. Labs, vital signs, and telemetry events reviewed. Patient is at elevated risk due to his comorbidities and advanced age, but is optimized for PPM today. Cardiology has cleared as well.

## 2018-04-09 NOTE — PROGRESS NOTE ADULT - ASSESSMENT
pt with atrial fib - slow vr  had 3.6 seconds pause    r/o sick sinus syndrome  no more pauses since yesterday 3/6  to monitor for any pauses   noted 2 more episodes of pauses over 3 seconds-has had dizziness and syncope in the past   needs pacemaker  discussed with son  pt with sick sinus syndrome.  discussed with dr barney for pacemaker  inr being corrected  cardiac status stable for pacemaker

## 2018-04-10 ENCOUNTER — TRANSCRIPTION ENCOUNTER (OUTPATIENT)
Age: 83
End: 2018-04-10

## 2018-04-10 LAB
ANION GAP SERPL CALC-SCNC: 7 MMOL/L — SIGNIFICANT CHANGE UP (ref 5–17)
BUN SERPL-MCNC: 12 MG/DL — SIGNIFICANT CHANGE UP (ref 7–23)
CALCIUM SERPL-MCNC: 8.8 MG/DL — SIGNIFICANT CHANGE UP (ref 8.5–10.1)
CHLORIDE SERPL-SCNC: 101 MMOL/L — SIGNIFICANT CHANGE UP (ref 96–108)
CO2 SERPL-SCNC: 31 MMOL/L — SIGNIFICANT CHANGE UP (ref 22–31)
CREAT SERPL-MCNC: 0.58 MG/DL — SIGNIFICANT CHANGE UP (ref 0.5–1.3)
GLUCOSE SERPL-MCNC: 99 MG/DL — SIGNIFICANT CHANGE UP (ref 70–99)
HCT VFR BLD CALC: 37.7 % — LOW (ref 39–50)
HGB BLD-MCNC: 12.6 G/DL — LOW (ref 13–17)
INR BLD: 1.43 RATIO — HIGH (ref 0.88–1.16)
MCHC RBC-ENTMCNC: 30.4 PG — SIGNIFICANT CHANGE UP (ref 27–34)
MCHC RBC-ENTMCNC: 33.5 GM/DL — SIGNIFICANT CHANGE UP (ref 32–36)
MCV RBC AUTO: 90.6 FL — SIGNIFICANT CHANGE UP (ref 80–100)
PLATELET # BLD AUTO: 227 K/UL — SIGNIFICANT CHANGE UP (ref 150–400)
POTASSIUM SERPL-MCNC: 4 MMOL/L — SIGNIFICANT CHANGE UP (ref 3.5–5.3)
POTASSIUM SERPL-SCNC: 4 MMOL/L — SIGNIFICANT CHANGE UP (ref 3.5–5.3)
PROTHROM AB SERPL-ACNC: 15.7 SEC — HIGH (ref 9.8–12.7)
RBC # BLD: 4.16 M/UL — LOW (ref 4.2–5.8)
RBC # FLD: 12.8 % — SIGNIFICANT CHANGE UP (ref 10.3–14.5)
SODIUM SERPL-SCNC: 139 MMOL/L — SIGNIFICANT CHANGE UP (ref 135–145)
WBC # BLD: 7.8 K/UL — SIGNIFICANT CHANGE UP (ref 3.8–10.5)
WBC # FLD AUTO: 7.8 K/UL — SIGNIFICANT CHANGE UP (ref 3.8–10.5)

## 2018-04-10 PROCEDURE — 99233 SBSQ HOSP IP/OBS HIGH 50: CPT | Mod: GC

## 2018-04-10 RX ORDER — CARVEDILOL PHOSPHATE 80 MG/1
3.12 CAPSULE, EXTENDED RELEASE ORAL EVERY 12 HOURS
Qty: 0 | Refills: 0 | Status: DISCONTINUED | OUTPATIENT
Start: 2018-04-10 | End: 2018-04-11

## 2018-04-10 RX ORDER — WARFARIN SODIUM 2.5 MG/1
10 TABLET ORAL ONCE
Qty: 0 | Refills: 0 | Status: COMPLETED | OUTPATIENT
Start: 2018-04-10 | End: 2018-04-10

## 2018-04-10 RX ORDER — DIGOXIN 250 MCG
0.25 TABLET ORAL DAILY
Qty: 0 | Refills: 0 | Status: DISCONTINUED | OUTPATIENT
Start: 2018-04-10 | End: 2018-04-11

## 2018-04-10 RX ADMIN — Medication 0.25 MILLIGRAM(S): at 17:23

## 2018-04-10 RX ADMIN — DONEPEZIL HYDROCHLORIDE 5 MILLIGRAM(S): 10 TABLET, FILM COATED ORAL at 21:44

## 2018-04-10 RX ADMIN — CARVEDILOL PHOSPHATE 3.12 MILLIGRAM(S): 80 CAPSULE, EXTENDED RELEASE ORAL at 17:23

## 2018-04-10 RX ADMIN — Medication 75 MILLIGRAM(S): at 08:48

## 2018-04-10 RX ADMIN — Medication 2: at 12:02

## 2018-04-10 RX ADMIN — Medication 75 MILLIGRAM(S): at 17:23

## 2018-04-10 RX ADMIN — LISINOPRIL 5 MILLIGRAM(S): 2.5 TABLET ORAL at 05:05

## 2018-04-10 RX ADMIN — SIMVASTATIN 20 MILLIGRAM(S): 20 TABLET, FILM COATED ORAL at 21:44

## 2018-04-10 RX ADMIN — WARFARIN SODIUM 10 MILLIGRAM(S): 2.5 TABLET ORAL at 21:44

## 2018-04-10 NOTE — DISCHARGE NOTE ADULT - PLAN OF CARE
resolutoin -you received a pacemaker and your heart rate has been stable since  -Please follow up with your PMD as prescribed -continue your mediations carvedilol for rate control and your warfarin blood thinner  -please follow up with your PMD and cardiologist in 1-2 weeks -you have been stable, please continue your mediations digoxin, lisinopril, carvedilol as prescribed   -please see your cardiologist to review your echocardiogram results in 1-2 weeks  -please follow up with your PMD -continue your metformin medications  -please follow up with your PMD in 1-2 weeks -continue your donepezil medications  -please follow up with your PMD in 1-2 weeks -continue your lisinopril, carvedilol medications  -please follow up with your PMD in 1-2 weeks --continue your medication venlafaxine  -please follow up with your PMD in 1-2 weeks resolution -you received a pacemaker and your heart rate has been stable   -Please follow up with your PMD and cardiologist in 1-2 weeks

## 2018-04-10 NOTE — PROGRESS NOTE ADULT - PROBLEM SELECTOR PLAN 1
-acute, had over 3 second pauses previously, r/o sick sinus syndrome, symptomatic / weakness,   -POD1 s/p pacemaker placement by vascular Dr. Farris  -may resume coumadin, -PT/INR to be monitored for value of INR  -will resume carvidelol, monitor HR   -cardio Dr. Foreman aware  -continue home digoxin    -PT consult for deconditioning -acute, had over 3 second pauses previously, r/o sick sinus syndrome, symptomatic, weakness,   -s/p pacemaker placement by vascular Dr. Farris, now POD1  -may resume coumadin, PT/INR to be monitored for value of INR  -will resume carvedilol, monitor HR   -cardio Dr. Foreman aware  -continue home digoxin  -PT consult for deconditioning -acute, had over 3 second pauses previously, r/o sick sinus syndrome, symptomatic, weakness,   -s/p pacemaker placement by vascular Dr. Farris, now POD1  -restarted on coumadin, PT/INR to be monitored for INR range 2-3  -will resume carvedilol, digoxin, monitor HR   -cardio Dr. Foreman aware  -PT consult for deconditioning -acute, had over 3 second pauses previously, likely sick sinus syndrome, symptomatic with weakness  -s/p pacemaker placement by vascular Dr. Farris, now POD1  -restarted on coumadin, PT/INR to be monitored for INR range 2-3  -will resume carvedilol, digoxin, monitor HR   -cardio Dr. Foreman aware  -PT re-consult for deconditioning

## 2018-04-10 NOTE — DISCHARGE NOTE ADULT - MEDICATION SUMMARY - MEDICATIONS TO TAKE
I will START or STAY ON the medications listed below when I get home from the hospital:    lisinopril 5 mg oral tablet  -- 1 tab(s) by mouth once a day  -- Indication: For Hypertension    digoxin 250 mcg (0.25 mg) oral tablet  -- 1 tab(s) by mouth once a day  -- Indication: For CHF (congestive heart failure)    warfarin 10 mg oral tablet  -- 1 tab(s) by mouth once a day  -- Indication: For Atrial fibrillation    venlafaxine 75 mg oral capsule, extended release  --  by mouth 2 times a day  -- Indication: For Depression    metFORMIN 500 mg oral tablet  -- 1 tab(s) by mouth 2 times a day (with meals)  -- Indication: For Diabetes    simvastatin 20 mg oral tablet  -- 1 tab(s) by mouth once a day (at bedtime)  -- Indication: For Hyperlipidemia    carvedilol 3.125 mg oral tablet  -- 1 tab(s) by mouth every 12 hours  -- Indication: For Atrial fibrillation    donepezil 5 mg oral tablet  -- 1 tab(s) by mouth once a day (at bedtime)  -- Indication: For Dementia    multivitamin  --   once a day  -- Indication: For Preventitive

## 2018-04-10 NOTE — PROGRESS NOTE ADULT - PROBLEM SELECTOR PLAN 2
-likely 2/2 bradycardia  -cardio recs, pacemaker today  -thyroid panel T4 T3 wnl -likely 2/2 bradycardia  -cardio recs, pacemaker placement  -thyroid panel T4 T3 wnl  -PT eval

## 2018-04-10 NOTE — DISCHARGE NOTE ADULT - CARE PROVIDER_API CALL
Nadia Espinoza), Internal Medicine  [address] [facility]  [address]  [place] [address]  Phone: [phone]  Fax: [phone]    Kamran Berry), Cardiovascular Disease; Internal Medicine  [address] [place] [address]  [address] [place] [address]  Phone: [phone]  Fax: [phone]    Kamran Jaimes (DPBRIAN), Podiatric Medicine and Surgery  [address] [place] [address]  Phone: [phone]  Fax: [phone]

## 2018-04-10 NOTE — DISCHARGE NOTE ADULT - ADDITIONAL INSTRUCTIONS
Left lower leg wound:  -please follow up with Dr. DAVID Jaimes Wound care/ podiatry for drainange of the wound in 1-2 weeks. Left lower leg wound:  -please follow up with Dr. DAVID Jaimes Wound care/ podiatry for drainage of the wound in 1-2 weeks Left lower leg sebatious cyst.   -please follow up with Dr. DAVID Jaimes Wound care/ podiatry for drainage of the wound in 1-2 weeks

## 2018-04-10 NOTE — PROGRESS NOTE ADULT - ASSESSMENT
pt with atrial fib - slow vr  had 3.6 seconds pause    r/o sick sinus syndrome  no more pauses since yesterday 3/6  to monitor for any pauses   noted 2 more episodes of pauses over 3 seconds-has had dizziness and syncope in the past   needs pacemaker  discussed with son  pt with sick sinus syndrome.  discussed with dr barney for pacemaker  inr being corrected  cardiac status stable for pacemaker  pt had implantation of ppm3/9  tolerated it well  restart coumadin and betablocker

## 2018-04-10 NOTE — PROGRESS NOTE ADULT - PROBLEM SELECTOR PLAN 5
-type 2  -hold home metformin  -low dose SSI, FS, hypoglycemia protocol, NPO   -consistent carb/DASH TLC diet -type 2  -hold home metformin  -low dose SSI, FS, hypoglycemia protocol  -consistent carb/DASH TLC diet

## 2018-04-10 NOTE — PROGRESS NOTE ADULT - SUBJECTIVE AND OBJECTIVE BOX
DEPARTMENT OF ANESTHESIA  POST ANESTHETIC EVALUATION    The Patient was interviewed and evaluated    Vital Signs Last 24 Hrs  T(C): 36.4 (10 Apr 2018 07:33), Max: 37.3 (09 Apr 2018 23:50)  T(F): 97.6 (10 Apr 2018 07:33), Max: 99.1 (09 Apr 2018 23:50)  HR: 79 (10 Apr 2018 07:33) (72 - 98)  BP: 97/58 (10 Apr 2018 07:33) (97/58 - 134/84)  BP(mean): --  RR: 18 (10 Apr 2018 07:33) (12 - 18)  SpO2: 96% (10 Apr 2018 07:33) (94% - 100%)    Evaluation:  s/p PPM    (x ) No apparent complications regarding anesthesia care at this time    Condition:  (x ) Stable      ( ) Guarded      ( ) Critical    Recommendations:  (x ) None     ( ) Other:

## 2018-04-10 NOTE — PROGRESS NOTE ADULT - SUBJECTIVE AND OBJECTIVE BOX
Resident Progress Note    Patient is a 89y old  Male who presents with a chief complaint of Weakness, Bradycardia (06 Apr 2018 20:12)      HPI: Patient seen and examined at bedside. No acute events overnight. Reports. Denies.    ROS:  Constitutional: denies fever, chills, diaphoresis   HEENT: denies blurry vision, difficulty hearing  Respiratory: denies SOB, ABRAHAM, cough,   Cardiovascular: denies chest pain, palpitations, edema  Gastrointestinal: denies nausea, vomiting, diarrhea, constipation, abdominal pain  Genitourinary: denies dysuria, frequency, urgency, hematuria   Musculoskeletal: denies myalgias,   Neurologic: denies headache, weakness, dizziness, paresthesias, numbness/tingling  ROS negative except as noted above    Vital Signs Last 24 Hrs  T(C): 36.7 (04-10-18 @ 05:08), Max: 37.3 (04-09-18 @ 23:50)  T(F): 98 (04-10-18 @ 05:08), Max: 99.1 (04-09-18 @ 23:50)  HR: 88 (04-10-18 @ 05:08) (72 - 98)  BP: 115/63 (04-10-18 @ 05:08) (103/79 - 134/84)  BP(mean): --  RR: 16 (04-10-18 @ 05:08) (12 - 17)  SpO2: 94% (04-10-18 @ 05:08) (94% - 100%)    Physical Exam:  General: Well developed, well nourished, NAD  HEENT: NCAT, PERRLA, EOMI bl, moist mucous membranes   Neck: Supple, nontender, no mass  Neurology: A&Ox3, nonfocal, CN II-XII grossly intact, sensation intact, no gait abnormalities   Respiratory: CTA B/L, No W/R/R  CV: RRR, +S1/S2, no murmurs, rubs or gallops  Abdominal: Soft, NT, ND +BSx4  Extremities: No C/C/E, + peripheral pulses  MSK: Normal ROM, no joint erythema or warmth, no joint swelling   Skin: warm, dry, normal color, no rash or abnormal lesions    LABS:                        12.9   8.2   )-----------( 232      ( 09 Apr 2018 06:54 )             38.7     09 Apr 2018 06:54    140    |  102    |  14     ----------------------------<  147    4.0     |  30     |  0.46     Ca    8.6        09 Apr 2018 06:54      PT/INR - ( 09 Apr 2018 06:54 )   PT: 21.9 sec;   INR: 1.98 ratio                 CAPILLARY BLOOD GLUCOSE      POCT Blood Glucose.: 118 mg/dL (09 Apr 2018 21:41)  POCT Blood Glucose.: 115 mg/dL (09 Apr 2018 18:02)  POCT Blood Glucose.: 103 mg/dL (09 Apr 2018 16:17)        RADIOLOGY & ADDITIONAL TESTS:    MEDICATIONS  (STANDING):  donepezil 5 milliGRAM(s) Oral at bedtime  insulin lispro (HumaLOG) corrective regimen sliding scale   SubCutaneous three times a day before meals  lisinopril 5 milliGRAM(s) Oral daily  simvastatin 20 milliGRAM(s) Oral at bedtime  venlafaxine 75 milliGRAM(s) Oral two times a day with meals    MEDICATIONS  (PRN):  ondansetron Injectable 4 milliGRAM(s) IV Push every 6 hours PRN Nausea  oxyCODONE    5 mG/acetaminophen 325 mG 1 Tablet(s) Oral every 4 hours PRN Moderate Pain  oxyCODONE    5 mG/acetaminophen 325 mG 2 Tablet(s) Oral every 6 hours PRN Severe Pain      Allergies    latex (Rash)  No Known Drug Allergies    Intolerances Resident Progress Note    Patient is a 89y old  Male who presents with a chief complaint of Weakness, Bradycardia (06 Apr 2018 20:12)    HPI: Patient seen and examined at bedside. Pt was agitated and confused over night and remained in the left arm brace. Pt reports mild headache. HR stable in 70s-80s. Denies any pain.    ROS:  Constitutional: denies fever, chills,   HEENT: denies blurry vision, difficulty hearing  Respiratory: denies SOB, ABRAHAM,   Cardiovascular: denies chest pain, palpitations,   Gastrointestinal: denies nausea, vomiting, diarrhea, constipation, abdominal pain  Genitourinary: denies dysuria, frequency, urgency, h  Musculoskeletal: denies myalgias,   Neurologic: denies headache, weakness, dizziness,   ROS negative except as noted above    Vital Signs Last 24 Hrs  T(C): 36.7 (04-10-18 @ 05:08), Max: 37.3 (04-09-18 @ 23:50)  T(F): 98 (04-10-18 @ 05:08), Max: 99.1 (04-09-18 @ 23:50)  HR: 88 (04-10-18 @ 05:08) (72 - 98)  BP: 115/63 (04-10-18 @ 05:08) (103/79 - 134/84)  BP(mean): --  RR: 16 (04-10-18 @ 05:08) (12 - 17)  SpO2: 94% (04-10-18 @ 05:08) (94% - 100%)    Physical Exam:  General: confused, NAD  HEENT: NCAT, PERRL, EOMI bl, moist mucous membranes   Neck: Supple, nontender, no mass  Neurology: A&Ox1-2, nonfocal, sensation intact,   Respiratory: CTA B/L, No W/R/R  CV: RRR, +S1/S2, no murmurs, rubs or gallops  Abdominal: Soft, NT, ND +BS  Extremities: No C/C/E, + peripheral pulses  Chest: L anterior chest wall, dressing clean dry intact, No bleeding or hematoma noted  Skin: warm, dry, normal color,    LABS:                        12.9   8.2   )-----------( 232      ( 09 Apr 2018 06:54 )             38.7     09 Apr 2018 06:54    140    |  102    |  14     ----------------------------<  147    4.0     |  30     |  0.46     Ca    8.6        09 Apr 2018 06:54      PT/INR - ( 09 Apr 2018 06:54 )   PT: 21.9 sec;   INR: 1.98 ratio      CAPILLARY BLOOD GLUCOSE    POCT Blood Glucose.: 118 mg/dL (09 Apr 2018 21:41)  POCT Blood Glucose.: 115 mg/dL (09 Apr 2018 18:02)  POCT Blood Glucose.: 103 mg/dL (09 Apr 2018 16:17)      RADIOLOGY & ADDITIONAL TESTS:    MEDICATIONS  (STANDING):  donepezil 5 milliGRAM(s) Oral at bedtime  insulin lispro (HumaLOG) corrective regimen sliding scale   SubCutaneous three times a day before meals  lisinopril 5 milliGRAM(s) Oral daily  simvastatin 20 milliGRAM(s) Oral at bedtime  venlafaxine 75 milliGRAM(s) Oral two times a day with meals    MEDICATIONS  (PRN):  ondansetron Injectable 4 milliGRAM(s) IV Push every 6 hours PRN Nausea  oxyCODONE    5 mG/acetaminophen 325 mG 1 Tablet(s) Oral every 4 hours PRN Moderate Pain  oxyCODONE    5 mG/acetaminophen 325 mG 2 Tablet(s) Oral every 6 hours PRN Severe Pain      Allergies  latex (Rash)  No Known Drug Allergies    Intolerances Resident Progress Note    Patient is a 89y old  Male who presents with a chief complaint of Weakness, Bradycardia (06 Apr 2018 20:12)    HPI: Patient seen and examined at bedside. Pt was agitated and confused over night and remained in the left arm brace. Pt reports mild headache. HR stable in 70s-80s. Denies any pain.    ROS:  Constitutional: denies fever, chills,   HEENT: denies blurry vision, difficulty hearing  Respiratory: denies SOB, ABRAHAM,   Cardiovascular: denies chest pain, palpitations,   Gastrointestinal: denies nausea, vomiting, diarrhea, constipation, abdominal pain  Genitourinary: denies dysuria, frequency, urgency, h  Musculoskeletal: denies myalgias,   Neurologic: denies headache, weakness, dizziness,   ROS negative except as noted above    Vital Signs Last 24 Hrs  T(C): 36.7 (04-10-18 @ 05:08), Max: 37.3 (04-09-18 @ 23:50)  T(F): 98 (04-10-18 @ 05:08), Max: 99.1 (04-09-18 @ 23:50)  HR: 88 (04-10-18 @ 05:08) (72 - 98)  BP: 115/63 (04-10-18 @ 05:08) (103/79 - 134/84)  BP(mean): --  RR: 16 (04-10-18 @ 05:08) (12 - 17)  SpO2: 94% (04-10-18 @ 05:08) (94% - 100%)    Physical Exam:  General: confused, NAD  HEENT: NCAT, PERRL, EOMI bl, moist mucous membranes   Neck: Supple, nontender, no mass  Neurology: A&Ox1-2, nonfocal, sensation intact,   Respiratory: CTA B/L, No W/R/R  CV: RRR, +S1/S2, no murmurs, rubs or gallops  Abdominal: Soft, NT, ND +BS  Extremities: No C/C/E, + peripheral pulses  Chest: L anterior chest wall, dressing clean dry intact, No bleeding   Skin: warm, dry, normal color,    LABS:               12.6   7.8   )-----------( 227      ( 10 Apr 2018 06:42 )             37.7     10 Apr 2018 06:42    139    |  101    |  12     ----------------------------<  99     4.0     |  31     |  0.58     Ca    8.8        10 Apr 2018 06:42      PT/INR - ( 10 Apr 2018 06:42 )   PT: 15.7 sec;   INR: 1.43 ratio        CAPILLARY BLOOD GLUCOSE      POCT Blood Glucose.: 241 mg/dL (10 Apr 2018 11:49)  POCT Blood Glucose.: 96 mg/dL (10 Apr 2018 07:29)  POCT Blood Glucose.: 118 mg/dL (09 Apr 2018 21:41)  POCT Blood Glucose.: 115 mg/dL (09 Apr 2018 18:02)  POCT Blood Glucose.: 103 mg/dL (09 Apr 2018 16:17)      MEDICATIONS  (STANDING):  donepezil 5 milliGRAM(s) Oral at bedtime  insulin lispro (HumaLOG) corrective regimen sliding scale   SubCutaneous three times a day before meals  lisinopril 5 milliGRAM(s) Oral daily  simvastatin 20 milliGRAM(s) Oral at bedtime  venlafaxine 75 milliGRAM(s) Oral two times a day with meals    MEDICATIONS  (PRN):  ondansetron Injectable 4 milliGRAM(s) IV Push every 6 hours PRN Nausea  oxyCODONE    5 mG/acetaminophen 325 mG 1 Tablet(s) Oral every 4 hours PRN Moderate Pain  oxyCODONE    5 mG/acetaminophen 325 mG 2 Tablet(s) Oral every 6 hours PRN Severe Pain      Allergies  latex (Rash)  No Known Drug Allergies    Intolerances Resident Progress Note    Patient is a 89y old  Male who presents with a chief complaint of Weakness, Bradycardia (06 Apr 2018 20:12)    HPI: Patient seen and examined at bedside. Pt was agitated and confused over night and remained in the left arm brace. Pt reports mild headache. HR stable in 70s-80s. Denies any pain.    ROS:  Constitutional: denies fever, chills,   Respiratory: denies SOB, ABRAHAM  Cardiovascular: denies chest pain, palpitations  Gastrointestinal: denies nausea, vomiting, diarrhea, constipation, abdominal pain  Genitourinary: denies dysuria, frequency, urgency, hematuria  Musculoskeletal: denies myalgias,   Neurologic: denies headache, weakness, dizziness  ROS negative except as noted above    Notably, patient is a poor historian.    Vital Signs Last 24 Hrs  T(C): 36.7 (04-10-18 @ 05:08), Max: 37.3 (04-09-18 @ 23:50)  T(F): 98 (04-10-18 @ 05:08), Max: 99.1 (04-09-18 @ 23:50)  HR: 88 (04-10-18 @ 05:08) (72 - 98)  BP: 115/63 (04-10-18 @ 05:08) (103/79 - 134/84)  BP(mean): --  RR: 16 (04-10-18 @ 05:08) (12 - 17)  SpO2: 94% (04-10-18 @ 05:08) (94% - 100%)    Physical Exam:  General: confused, NAD  HEENT: NCAT, PERRL, EOMI bl, moist mucous membranes   Neck: Supple, nontender, no mass  Neurology: A&Ox1-2, nonfocal, sensation intact   Respiratory: CTA B/L, No W/R/R  CV: RRR, +S1/S2, no murmurs, rubs or gallops  Abdominal: Soft, NT, ND +BS  Extremities: No C/C/E, + peripheral pulses  Chest: L anterior chest wall, dressing clean dry intact, No bleeding   Skin: warm, dry, normal color,    LABS:               12.6   7.8   )-----------( 227      ( 10 Apr 2018 06:42 )             37.7     10 Apr 2018 06:42    139    |  101    |  12     ----------------------------<  99     4.0     |  31     |  0.58     Ca    8.8        10 Apr 2018 06:42      PT/INR - ( 10 Apr 2018 06:42 )   PT: 15.7 sec;   INR: 1.43 ratio        CAPILLARY BLOOD GLUCOSE      POCT Blood Glucose.: 241 mg/dL (10 Apr 2018 11:49)  POCT Blood Glucose.: 96 mg/dL (10 Apr 2018 07:29)  POCT Blood Glucose.: 118 mg/dL (09 Apr 2018 21:41)  POCT Blood Glucose.: 115 mg/dL (09 Apr 2018 18:02)  POCT Blood Glucose.: 103 mg/dL (09 Apr 2018 16:17)      MEDICATIONS  (STANDING):  donepezil 5 milliGRAM(s) Oral at bedtime  insulin lispro (HumaLOG) corrective regimen sliding scale   SubCutaneous three times a day before meals  lisinopril 5 milliGRAM(s) Oral daily  simvastatin 20 milliGRAM(s) Oral at bedtime  venlafaxine 75 milliGRAM(s) Oral two times a day with meals    MEDICATIONS  (PRN):  ondansetron Injectable 4 milliGRAM(s) IV Push every 6 hours PRN Nausea  oxyCODONE    5 mG/acetaminophen 325 mG 1 Tablet(s) Oral every 4 hours PRN Moderate Pain  oxyCODONE    5 mG/acetaminophen 325 mG 2 Tablet(s) Oral every 6 hours PRN Severe Pain      Allergies  latex (Rash)  No Known Drug Allergies    Intolerances

## 2018-04-10 NOTE — DISCHARGE NOTE ADULT - CARE PLAN
Principal Discharge DX:	Bradycardia  Secondary Diagnosis:	Atrial fibrillation  Secondary Diagnosis:	CHF (congestive heart failure)  Secondary Diagnosis:	Diabetes  Secondary Diagnosis:	Dementia  Secondary Diagnosis:	Hypertension  Secondary Diagnosis:	Depression Principal Discharge DX:	Bradycardia  Goal:	resolutoin  Assessment and plan of treatment:	-you received a pacemaker and your heart rate has been stable since  -Please follow up with your PMD as prescribed  Secondary Diagnosis:	Atrial fibrillation  Assessment and plan of treatment:	-continue your mediations carvedilol for rate control and your warfarin blood thinner  -please follow up with your PMD and cardiologist in 1-2 weeks  Secondary Diagnosis:	CHF (congestive heart failure)  Assessment and plan of treatment:	-you have been stable, please continue your mediations digoxin, lisinopril, carvedilol as prescribed   -please see your cardiologist to review your echocardiogram results in 1-2 weeks  -please follow up with your PMD  Secondary Diagnosis:	Diabetes  Assessment and plan of treatment:	-continue your metformin medications  -please follow up with your PMD in 1-2 weeks  Secondary Diagnosis:	Dementia  Assessment and plan of treatment:	-continue your donepezil medications  -please follow up with your PMD in 1-2 weeks  Secondary Diagnosis:	Hypertension  Assessment and plan of treatment:	-continue your lisinopril, carvedilol medications  -please follow up with your PMD in 1-2 weeks  Secondary Diagnosis:	Depression  Assessment and plan of treatment:	--continue your medication venlafaxine  -please follow up with your PMD in 1-2 weeks Principal Discharge DX:	Bradycardia  Goal:	resolution  Assessment and plan of treatment:	-you received a pacemaker and your heart rate has been stable   -Please follow up with your PMD and cardiologist in 1-2 weeks  Secondary Diagnosis:	Atrial fibrillation  Assessment and plan of treatment:	-continue your mediations carvedilol for rate control and your warfarin blood thinner  -please follow up with your PMD and cardiologist in 1-2 weeks  Secondary Diagnosis:	CHF (congestive heart failure)  Assessment and plan of treatment:	-you have been stable, please continue your mediations digoxin, lisinopril, carvedilol as prescribed   -please see your cardiologist to review your echocardiogram results in 1-2 weeks  -please follow up with your PMD  Secondary Diagnosis:	Diabetes  Assessment and plan of treatment:	-continue your metformin medications  -please follow up with your PMD in 1-2 weeks  Secondary Diagnosis:	Dementia  Assessment and plan of treatment:	-continue your donepezil medications  -please follow up with your PMD in 1-2 weeks  Secondary Diagnosis:	Hypertension  Assessment and plan of treatment:	-continue your lisinopril, carvedilol medications  -please follow up with your PMD in 1-2 weeks  Secondary Diagnosis:	Depression  Assessment and plan of treatment:	--continue your medication venlafaxine  -please follow up with your PMD in 1-2 weeks

## 2018-04-10 NOTE — DISCHARGE NOTE ADULT - CARE PROVIDERS DIRECT ADDRESSES
,DirectAddress_Unknown,[email or web address],DirectAddress_Unknown

## 2018-04-10 NOTE — PROGRESS NOTE ADULT - PROBLEM SELECTOR PLAN 7
-chronic, controlled  -continue lisinopril with hold parameters -chronic, controlled  -continue lisinopril, carvedilol with hold parameters

## 2018-04-10 NOTE — DISCHARGE NOTE ADULT - HOSPITAL COURSE
89M PMH Prostate Ca, systolic CHF (no recent TTE in records), anxiety/depression, DM type 2, afib (on carvedilol, coumadin), HTN, DM2, chronic LE edema, dementia sent from [facility] for complaint of weakness.  Patient suffers from advanced dementia and is unable to provide additional history.  Transfer paperwork indicates pt was sent to ED for generalized weakness.      ED Course: Vitals on presentation: T97.3F oral, HR51, /64, RR14, SpO2 95% on RA.  Labs notable for: CBC WNL, INR 2.67, CMP grossly normal, pro-calcitonin <0.05, pro-, UA (-), Dig level 0.8 (therapeutic), RVP (-), CXR: no active disease. EKG: Afib w/ slow RVR @46bpm.  Urine culture collected.    Admitted for symptomatic bradycardia, likely medication induced. Initially CCB were held. After cardiac and medical clearance, Pt underwent pacemaker placement with Dr. Farris. Pt resumed anticoagulation with coumadin, INR monitored for therapeutic level 2-3.     A Fib restarted on coumadin after procedure for anticoagulation, carvedilol for rate control. CHF Systolic dysfunction continue on home digoxin and lisinopril. Pt was started on low dose sliding scale for Diabetes, and continued on carb consistent DASH diet. Pt continued donepezil for demential. Pt continued to take lisinopril for HTN. Depression was controlled with effexor. 89M PMH Prostate Ca, systolic CHF (no recent TTE in records), anxiety/depression, DM type 2, afib (on carvedilol, coumadin), HTN, DM2, chronic LE edema, dementia sent from [facility] for complaint of weakness.  Patient suffers from advanced dementia and is unable to provide additional history.  Transfer paperwork indicates pt was sent to ED for generalized weakness.      ED Course: Vitals on presentation: T97.3F oral, HR51, /64, RR14, SpO2 95% on RA.  Labs notable for: CBC WNL, INR 2.67, CMP grossly normal, pro-calcitonin <0.05, pro-, UA (-), Dig level 0.8 (therapeutic), RVP (-), CXR: no active disease. EKG: Afib w/ slow RVR @46bpm.  Urine culture collected.    Admitted for symptomatic bradycardia, likely medication induced. Initially CCB were held. After cardiac and medical clearance, Pt underwent pacemaker placement with Dr. Farris. Pt resumed anticoagulation with coumadin, INR monitored for therapeutic level 2-3. For A Fib restarted on coumadin after procedure for anticoagulation, carvedilol for rate control. CHF Systolic dysfunction continue on home digoxin and lisinopril. Pt was started on low dose sliding scale for Diabetes, and continued on carb consistent DASH diet. Pt continued donepezil for dementia. Pt continued to take lisinopril for HTN. For depression was controlled with effexor.     Vital Signs Last 24 Hrs  T(C): 36.4 (04-11-18 @ 11:54)  T(F): 97.6 (04-11-18 @ 11:54), Max: 97.6 (04-11-18 @ 11:54)  HR: 76 (04-11-18 @ 11:54) (64 - 76)  BP: 125/74 (04-11-18 @ 11:54)  BP(mean): --  RR: 18 (04-11-18 @ 11:54) (16 - 18)  SpO2: 94% (04-11-18 @ 11:54) (94% - 100%)      Physical Exam:  General: drowsy, NAD  HEENT: NCAT, PERRL, EOMI bl, moist mucous membranes   Neck: Supple, nontender, no mass  Neurology: A&Ox1-2, nonfocal, sensation intact   Respiratory: CTA B/L, No W/R/R  CV: Irregular , +S1/S2, no murmurs, rubs or gallops  Abdominal: Soft, NT, ND +BS  Extremities: No C/C/E, + peripheral pulses  Chest: L anterior chest wall, dressing clean dry intact, No bleeding   Skin: warm, dry, normal color, 89M PMH Prostate Ca, systolic CHF (no recent TTE in records), anxiety/depression, DM type 2, afib (on carvedilol, coumadin), HTN, DM2, chronic LE edema, dementia sent from [facility] for complaint of weakness.  Patient suffers from advanced dementia and is unable to provide additional history.  Transfer paperwork indicates pt was sent to ED for generalized weakness.      ED Course: Vitals on presentation: T97.3F oral, HR51, /64, RR14, SpO2 95% on RA.  Labs notable for: CBC WNL, INR 2.67, CMP grossly normal, pro-calcitonin <0.05, pro-, UA (-), Dig level 0.8 (therapeutic), RVP (-), CXR: no active disease. EKG: Afib w/ slow RVR @46bpm.  Urine culture collected.    Admitted for symptomatic bradycardia, now though to be likely due to sick sinus syndrome. Initially CCB was held as it was the suspected cause. After cardiac and medical clearance, Pt underwent pacemaker placement with Dr. Farris. Pt resumed anticoagulation with coumadin, INR monitored for therapeutic level 2-3. For A Fib restarted on coumadin after procedure for anticoagulation, carvedilol for rate control. CHF Systolic dysfunction continue on home digoxin and lisinopril. Pt was started on low dose sliding scale for Diabetes, and continued on carb consistent DASH diet. Pt continued donepezil for dementia. Pt continued to take lisinopril for HTN. For depression was controlled with effexor.     Seen and examined on day of discharge:    Vital Signs Last 24 Hrs  T(C): 36.4 (04-11-18 @ 11:54)  T(F): 97.6 (04-11-18 @ 11:54), Max: 97.6 (04-11-18 @ 11:54)  HR: 76 (04-11-18 @ 11:54) (64 - 76)  BP: 125/74 (04-11-18 @ 11:54)  BP(mean): --  RR: 18 (04-11-18 @ 11:54) (16 - 18)  SpO2: 94% (04-11-18 @ 11:54) (94% - 100%)      Physical Exam:  General: drowsy, NAD  HEENT: NCAT, PERRL, EOMI bl, moist mucous membranes   Neck: Supple, nontender, no mass  Neurology: A&Ox1-2, nonfocal, sensation intact   Respiratory: CTA B/L, No W/R/R  CV: Irregular , +S1/S2, no murmurs, rubs or gallops  Abdominal: Soft, NT, ND +BS  Extremities: No C/C/E, + peripheral pulses +sebacious cyst vs comedone on left mid shin. No signs of infection or bleeding.  Chest: L anterior chest wall, dressing clean dry intact, No bleeding   Skin: warm, dry, normal color,    Patient stable for discharge back to [facility]. PCP Jamal will be notified--d/c to be faxed to him.

## 2018-04-10 NOTE — PROGRESS NOTE ADULT - PROBLEM SELECTOR PLAN 3
-chronic  -rate controlled, AC resume tomorrow 4/10 after OR   -hold home carvedilol as above  -monitor HR closely on tele  -INR in am -chronic  -rate controlled carvedilol, AC with warfarin tonight   -monitor HR closely on tele  -F/U INR

## 2018-04-10 NOTE — PROGRESS NOTE ADULT - ASSESSMENT
89M PMH Prostate Ca, systolic CHF (no recent TTE in records), anxiety/depression, DM type 2, chronic A fib (on carvedilol, coumadin), HTN, chronic LE edema, dementia sent from [facility] for complaint of weakness.  Admitted for bradycardia S/P pacemaker placement, now POD1 89M PMH Prostate Ca, systolic CHF (no recent TTE in records), anxiety/depression, DM type 2, chronic A fib (on carvedilol, coumadin), HTN, chronic LE edema, dementia sent from [facility] assisted living for complaint of weakness.  Admitted for bradycardia, now S/P pacemaker placement, POD1.

## 2018-04-10 NOTE — GOALS OF CARE CONVERSATION - PERSONAL ADVANCE DIRECTIVE - CONVERSATION DETAILS
met pt, confirmed hcp from prior hospital EMR. hcp placed on chart. spoke to pt son, pt w some dementia, sonVinicio confirms hcp.

## 2018-04-10 NOTE — DISCHARGE NOTE ADULT - NS AS ACTIVITY OBS
Walking-Indoors allowed/Walking-Outdoors allowed/Bathing allowed/Showering allowed/No Heavy lifting/straining

## 2018-04-10 NOTE — DISCHARGE NOTE ADULT - PATIENT PORTAL LINK FT
You can access the [facility] Portal, offered by [facility], by registering with the following website: [email or web address]

## 2018-04-10 NOTE — PROGRESS NOTE ADULT - PROBLEM SELECTOR PLAN 4
-unable to find recent TTE/EF,   - echo  -continue home digoxin, lisinopril -unable to find recent TTE/EF,   -F/U echo  -continue home digoxin, lisinopril, carvedilol

## 2018-04-10 NOTE — PROGRESS NOTE ADULT - SUBJECTIVE AND OBJECTIVE BOX
TITO RAY  89y  MRN-[id number]    VASCULAR SUGJUNIOR/ DR. FAUSTIN    POD # 1    Vital Signs Last 24 Hrs  T(C): 36.7 (10 Apr 2018 05:08), Max: 37.3 (09 Apr 2018 23:50)  T(F): 98 (10 Apr 2018 05:08), Max: 99.1 (09 Apr 2018 23:50)  HR: 88 (10 Apr 2018 05:08) (72 - 98)  BP: 115/63 (10 Apr 2018 05:08) (103/79 - 134/84)  BP(mean): --  RR: 16 (10 Apr 2018 05:08) (12 - 17)  SpO2: 94% (10 Apr 2018 05:08) (94% - 100%)     LEFT ANTERIOR CHEST WALL , DRESSING DRY AND INTACT  NO BLEEDING / HEMATOMA  NOTED     ASSESSMENT AND PLAN       POD # 1 S/P PACEMAKER INSERTION    STABLE  MAY RESUME COUMADIN TODAY   WILL F/U

## 2018-04-10 NOTE — PROGRESS NOTE ADULT - SUBJECTIVE AND OBJECTIVE BOX
Patient is a 89y Male with a known history of :  Prostate ca (C61)  Weakness (R53.1)  Need for prophylactic measure (Z29.9)  Depression (F32.9)  Hypertension (I10)  Dementia (F03.90)  Diabetes (E11.9)  Chronic systolic congestive heart failure (I50.22)  Atrial fibrillation (I48.91)  Bradycardia (R00.1)    HPI:  89M PMH Prostate Ca, systolic CHF (no recent TTE in records), anxiety/depression, DM type 2, afib (on carvedilol, coumadin), HTN, DM2, chronic LE edema, dementia sent from [facility] for complaint of weakness.  Patient suffers from advanced dementia and is unable to provide additional history.  Transfer paperwork indicates pt was sent to ED for generalized weakness.      ED Course:   Vitals on presentation: T97.3F oral, HR51, /64, RR14, SpO2 95% on RA.  Labs notable for: CBC WNL, INR 2.67, CMP grossly normal, pro-calcitonin <0.05, pro-, UA (-), Dig level 0.8 (therapeutic), RVP (-), CXR: no active disease. EKG: Afib w/ slow RVR @46bpm.  Urine culture collected. (06 Apr 2018 14:41)      REVIEW OF SYSTEMS:    CONSTITUTIONAL: No fever, weight loss, or fatigue  EYES: No eye pain, visual disturbances, or discharge  ENMT:  No difficulty hearing, tinnitus, vertigo; No sinus or throat pain  NECK: No pain or stiffness  BREASTS: No pain, masses, or nipple discharge  RESPIRATORY: No cough, wheezing, chills or hemoptysis; No shortness of breath  CARDIOVASCULAR: No chest pain, palpitations, dizziness, or leg swelling  GASTROINTESTINAL: No abdominal or epigastric pain. No nausea, vomiting, or hematemesis; No diarrhea or constipation. No melena or hematochezia.  GENITOURINARY: No dysuria, frequency, hematuria, or incontinence  NEUROLOGICAL: No headaches, memory loss, loss of strength, numbness, or tremors  SKIN: No itching, burning, rashes, or lesions   LYMPH NODES: No enlarged glands  ENDOCRINE: No heat or cold intolerance; No hair loss  MUSCULOSKELETAL: No joint pain or swelling; No muscle, back, or extremity pain  PSYCHIATRIC: No depression, anxiety, mood swings, or difficulty sleeping  HEME/LYMPH: No easy bruising, or bleeding gums  ALLERGY AND IMMUNOLOGIC: No hives or eczema    MEDICATIONS  (STANDING):  carvedilol 3.125 milliGRAM(s) Oral every 12 hours  donepezil 5 milliGRAM(s) Oral at bedtime  insulin lispro (HumaLOG) corrective regimen sliding scale   SubCutaneous three times a day before meals  lisinopril 5 milliGRAM(s) Oral daily  simvastatin 20 milliGRAM(s) Oral at bedtime  venlafaxine 75 milliGRAM(s) Oral two times a day with meals    MEDICATIONS  (PRN):  ondansetron Injectable 4 milliGRAM(s) IV Push every 6 hours PRN Nausea  oxyCODONE    5 mG/acetaminophen 325 mG 1 Tablet(s) Oral every 4 hours PRN Moderate Pain  oxyCODONE    5 mG/acetaminophen 325 mG 2 Tablet(s) Oral every 6 hours PRN Severe Pain      ALLERGIES: latex (Rash)  No Known Drug Allergies      FAMILY HISTORY:  No pertinent family history in first degree relatives      PHYSICAL EXAMINATION:  -----------------------------  T(C): 36.4 (04-10-18 @ 07:33), Max: 37.3 (04-09-18 @ 23:50)  HR: 79 (04-10-18 @ 07:33) (72 - 98)  BP: 97/58 (04-10-18 @ 07:33) (97/58 - 134/84)  RR: 18 (04-10-18 @ 07:33) (12 - 18)  SpO2: 96% (04-10-18 @ 07:33) (94% - 100%)  Wt(kg): --    04-09 @ 07:01  -  04-10 @ 07:00  --------------------------------------------------------  IN:    lactated ringers.: 60 mL    Oral Fluid: 50 mL  Total IN: 110 mL    OUT:  Total OUT: 0 mL    Total NET: 110 mL        Height (cm): 182.88 (04-09 @ 14:33)  Weight (kg): 72.6 (04-09 @ 14:33)  BMI (kg/m2): 21.7 (04-09 @ 14:33)  BSA (m2): 1.94 (04-09 @ 14:33)    Constitutional: well developed, normal appearance, well groomed, well nourished, no deformities and no acute distress.   Eyes: the conjunctiva exhibited no abnormalities and the eyelids demonstrated no xanthelasmas.   HEENT: normal oral mucosa, no oral pallor and no oral cyanosis.   Neck: normal jugular venous A waves present, normal jugular venous V waves present and no jugular venous jacobs A waves.   Pulmonary: no respiratory distress, normal respiratory rhythm and effort, no accessory muscle use and lungs were clear to auscultation bilaterally.   Cardiovascular: heart rate and rhythm were normal, normal S1 and S2 and no murmur, gallop, rub, heave or thrill are present.   Abdomen: soft, non-tender, no hepato-splenomegaly and no abdominal mass palpated.   Musculoskeletal: the gait could not be assessed..   Extremities: no clubbing of the fingernails, no localized cyanosis, no petechial hemorrhages and no ischemic changes.   Skin: normal skin color and pigmentation, no rash, no venous stasis, no skin lesions, no skin ulcer and no xanthoma was observed.   Psychiatric: oriented to person, place, and time, the affect was normal, the mood was normal and not feeling anxious.     LABS:   --------  04-10    139  |  101  |  12  ----------------------------<  99  4.0   |  31  |  0.58    Ca    8.8      10 Apr 2018 06:42                           12.6   7.8   )-----------( 227      ( 10 Apr 2018 06:42 )             37.7     PT/INR - ( 10 Apr 2018 06:42 )   PT: 15.7 sec;   INR: 1.43 ratio                     RADIOLOGY:  -----------------        ECG:

## 2018-04-11 VITALS
SYSTOLIC BLOOD PRESSURE: 108 MMHG | RESPIRATION RATE: 18 BRPM | TEMPERATURE: 97 F | OXYGEN SATURATION: 99 % | DIASTOLIC BLOOD PRESSURE: 69 MMHG | HEART RATE: 62 BPM

## 2018-04-11 LAB
ANION GAP SERPL CALC-SCNC: 8 MMOL/L — SIGNIFICANT CHANGE UP (ref 5–17)
APTT BLD: 30.1 SEC — SIGNIFICANT CHANGE UP (ref 27.5–37.4)
BUN SERPL-MCNC: 20 MG/DL — SIGNIFICANT CHANGE UP (ref 7–23)
CALCIUM SERPL-MCNC: 9 MG/DL — SIGNIFICANT CHANGE UP (ref 8.5–10.1)
CHLORIDE SERPL-SCNC: 100 MMOL/L — SIGNIFICANT CHANGE UP (ref 96–108)
CO2 SERPL-SCNC: 29 MMOL/L — SIGNIFICANT CHANGE UP (ref 22–31)
CREAT SERPL-MCNC: 0.56 MG/DL — SIGNIFICANT CHANGE UP (ref 0.5–1.3)
GLUCOSE SERPL-MCNC: 113 MG/DL — HIGH (ref 70–99)
HCT VFR BLD CALC: 40.1 % — SIGNIFICANT CHANGE UP (ref 39–50)
HGB BLD-MCNC: 13.5 G/DL — SIGNIFICANT CHANGE UP (ref 13–17)
INR BLD: 1.27 RATIO — HIGH (ref 0.88–1.16)
MCHC RBC-ENTMCNC: 30.6 PG — SIGNIFICANT CHANGE UP (ref 27–34)
MCHC RBC-ENTMCNC: 33.7 GM/DL — SIGNIFICANT CHANGE UP (ref 32–36)
MCV RBC AUTO: 90.8 FL — SIGNIFICANT CHANGE UP (ref 80–100)
PLATELET # BLD AUTO: 207 K/UL — SIGNIFICANT CHANGE UP (ref 150–400)
POTASSIUM SERPL-MCNC: 4.1 MMOL/L — SIGNIFICANT CHANGE UP (ref 3.5–5.3)
POTASSIUM SERPL-SCNC: 4.1 MMOL/L — SIGNIFICANT CHANGE UP (ref 3.5–5.3)
PROTHROM AB SERPL-ACNC: 13.9 SEC — HIGH (ref 9.8–12.7)
RBC # BLD: 4.41 M/UL — SIGNIFICANT CHANGE UP (ref 4.2–5.8)
RBC # FLD: 12.7 % — SIGNIFICANT CHANGE UP (ref 10.3–14.5)
SODIUM SERPL-SCNC: 137 MMOL/L — SIGNIFICANT CHANGE UP (ref 135–145)
WBC # BLD: 7.7 K/UL — SIGNIFICANT CHANGE UP (ref 3.8–10.5)
WBC # FLD AUTO: 7.7 K/UL — SIGNIFICANT CHANGE UP (ref 3.8–10.5)

## 2018-04-11 PROCEDURE — 86850 RBC ANTIBODY SCREEN: CPT

## 2018-04-11 PROCEDURE — 99285 EMERGENCY DEPT VISIT HI MDM: CPT | Mod: 25

## 2018-04-11 PROCEDURE — 96376 TX/PRO/DX INJ SAME DRUG ADON: CPT

## 2018-04-11 PROCEDURE — 97116 GAIT TRAINING THERAPY: CPT

## 2018-04-11 PROCEDURE — 84480 ASSAY TRIIODOTHYRONINE (T3): CPT

## 2018-04-11 PROCEDURE — 80048 BASIC METABOLIC PNL TOTAL CA: CPT

## 2018-04-11 PROCEDURE — 86901 BLOOD TYPING SEROLOGIC RH(D): CPT

## 2018-04-11 PROCEDURE — 85027 COMPLETE CBC AUTOMATED: CPT

## 2018-04-11 PROCEDURE — 86900 BLOOD TYPING SEROLOGIC ABO: CPT

## 2018-04-11 PROCEDURE — 97530 THERAPEUTIC ACTIVITIES: CPT

## 2018-04-11 PROCEDURE — C1786: CPT

## 2018-04-11 PROCEDURE — 80053 COMPREHEN METABOLIC PANEL: CPT

## 2018-04-11 PROCEDURE — 85610 PROTHROMBIN TIME: CPT

## 2018-04-11 PROCEDURE — 99239 HOSP IP/OBS DSCHRG MGMT >30: CPT

## 2018-04-11 PROCEDURE — 83036 HEMOGLOBIN GLYCOSYLATED A1C: CPT

## 2018-04-11 PROCEDURE — 82962 GLUCOSE BLOOD TEST: CPT

## 2018-04-11 PROCEDURE — 93306 TTE W/DOPPLER COMPLETE: CPT

## 2018-04-11 PROCEDURE — 76000 FLUOROSCOPY <1 HR PHYS/QHP: CPT

## 2018-04-11 PROCEDURE — 97162 PT EVAL MOD COMPLEX 30 MIN: CPT

## 2018-04-11 PROCEDURE — 85730 THROMBOPLASTIN TIME PARTIAL: CPT

## 2018-04-11 PROCEDURE — 71045 X-RAY EXAM CHEST 1 VIEW: CPT

## 2018-04-11 PROCEDURE — C1898: CPT

## 2018-04-11 PROCEDURE — C1894: CPT

## 2018-04-11 PROCEDURE — 84436 ASSAY OF TOTAL THYROXINE: CPT

## 2018-04-11 PROCEDURE — 96374 THER/PROPH/DIAG INJ IV PUSH: CPT

## 2018-04-11 RX ADMIN — CARVEDILOL PHOSPHATE 3.12 MILLIGRAM(S): 80 CAPSULE, EXTENDED RELEASE ORAL at 05:23

## 2018-04-11 RX ADMIN — Medication 0.5 MILLIGRAM(S): at 02:23

## 2018-04-11 RX ADMIN — LISINOPRIL 5 MILLIGRAM(S): 2.5 TABLET ORAL at 05:23

## 2018-04-11 RX ADMIN — Medication 75 MILLIGRAM(S): at 07:30

## 2018-04-11 RX ADMIN — Medication 1: at 12:04

## 2018-04-11 RX ADMIN — Medication 0.25 MILLIGRAM(S): at 05:23

## 2018-04-11 NOTE — PROGRESS NOTE ADULT - PROBLEM SELECTOR PLAN 6
-chronic  -continue home donepezil -chronic,  -confused/agitated overnight, please give zyprexa or haldol instead of ativan   -continue home donepezil

## 2018-04-11 NOTE — PROGRESS NOTE ADULT - PROBLEM SELECTOR PLAN 2
-likely 2/2 bradycardia  -pacemaker placement  -thyroid panel T4 T3 within normal limites  -PT eval: Return to previous facility with in-house PT with a RW

## 2018-04-11 NOTE — PROGRESS NOTE ADULT - PROBLEM SELECTOR PLAN 8
-chronic, stable  -will substitute effexor 75mg BID immediate release for home extended release dose as ER is non-formulary

## 2018-04-11 NOTE — PROGRESS NOTE ADULT - SUBJECTIVE AND OBJECTIVE BOX
Patient is a 89y Male with a known history of :  Prostate ca (C61)  Weakness (R53.1)  Need for prophylactic measure (Z29.9)  Depression (F32.9)  Hypertension (I10)  Dementia (F03.90)  Diabetes (E11.9)  Chronic systolic congestive heart failure (I50.22)  Atrial fibrillation (I48.91)  Bradycardia (R00.1)    HPI:  89M PMH Prostate Ca, systolic CHF (no recent TTE in records), anxiety/depression, DM type 2, afib (on carvedilol, coumadin), HTN, DM2, chronic LE edema, dementia sent from [facility] for complaint of weakness.  Patient suffers from advanced dementia and is unable to provide additional history.  Transfer paperwork indicates pt was sent to ED for generalized weakness.      ED Course:   Vitals on presentation: T97.3F oral, HR51, /64, RR14, SpO2 95% on RA.  Labs notable for: CBC WNL, INR 2.67, CMP grossly normal, pro-calcitonin <0.05, pro-, UA (-), Dig level 0.8 (therapeutic), RVP (-), CXR: no active disease. EKG: Afib w/ slow RVR @46bpm.  Urine culture collected. (06 Apr 2018 14:41)      REVIEW OF SYSTEMS:    CONSTITUTIONAL: No fever, weight loss, or fatigue  EYES: No eye pain, visual disturbances, or discharge  ENMT:  No difficulty hearing, tinnitus, vertigo; No sinus or throat pain  NECK: No pain or stiffness  BREASTS: No pain, masses, or nipple discharge  RESPIRATORY: No cough, wheezing, chills or hemoptysis; No shortness of breath  CARDIOVASCULAR: No chest pain, palpitations, dizziness, or leg swelling  GASTROINTESTINAL: No abdominal or epigastric pain. No nausea, vomiting, or hematemesis; No diarrhea or constipation. No melena or hematochezia.  GENITOURINARY: No dysuria, frequency, hematuria, or incontinence  NEUROLOGICAL: No headaches, memory loss, loss of strength, numbness, or tremors  SKIN: No itching, burning, rashes, or lesions   LYMPH NODES: No enlarged glands  ENDOCRINE: No heat or cold intolerance; No hair loss  MUSCULOSKELETAL: No joint pain or swelling; No muscle, back, or extremity pain  PSYCHIATRIC: No depression, anxiety, mood swings, or difficulty sleeping  HEME/LYMPH: No easy bruising, or bleeding gums  ALLERGY AND IMMUNOLOGIC: No hives or eczema    MEDICATIONS  (STANDING):  carvedilol 3.125 milliGRAM(s) Oral every 12 hours  digoxin     Tablet 0.25 milliGRAM(s) Oral daily  donepezil 5 milliGRAM(s) Oral at bedtime  insulin lispro (HumaLOG) corrective regimen sliding scale   SubCutaneous three times a day before meals  lisinopril 5 milliGRAM(s) Oral daily  simvastatin 20 milliGRAM(s) Oral at bedtime  venlafaxine 75 milliGRAM(s) Oral two times a day with meals    MEDICATIONS  (PRN):  ondansetron Injectable 4 milliGRAM(s) IV Push every 6 hours PRN Nausea  oxyCODONE    5 mG/acetaminophen 325 mG 1 Tablet(s) Oral every 4 hours PRN Moderate Pain  oxyCODONE    5 mG/acetaminophen 325 mG 2 Tablet(s) Oral every 6 hours PRN Severe Pain      ALLERGIES: latex (Rash)  No Known Drug Allergies      FAMILY HISTORY:  No pertinent family history in first degree relatives      PHYSICAL EXAMINATION:  -----------------------------  T(C): 35.9 (04-11-18 @ 07:19), Max: 37.1 (04-11-18 @ 00:03)  HR: 64 (04-11-18 @ 07:19) (64 - 85)  BP: 126/68 (04-11-18 @ 07:19) (98/58 - 126/70)  RR: 18 (04-11-18 @ 07:19) (16 - 18)  SpO2: 97% (04-11-18 @ 07:19) (93% - 100%)  Wt(kg): --    04-10 @ 07:01  -  04-11 @ 07:00  --------------------------------------------------------  IN:    Oral Fluid: 880 mL  Total IN: 880 mL    OUT:  Total OUT: 0 mL    Total NET: 880 mL            Constitutional: well developed, normal appearance, well groomed, well nourished, no deformities and no acute distress.   Eyes: the conjunctiva exhibited no abnormalities and the eyelids demonstrated no xanthelasmas.   HEENT: normal oral mucosa, no oral pallor and no oral cyanosis.   Neck: normal jugular venous A waves present, normal jugular venous V waves present and no jugular venous jacobs A waves.   Pulmonary: no respiratory distress, normal respiratory rhythm and effort, no accessory muscle use and lungs were clear to auscultation bilaterally.   Cardiovascular: heart rate and rhythm were normal, normal S1 and S2 and no murmur, gallop, rub, heave or thrill are present.   Abdomen: soft, non-tender, no hepato-splenomegaly and no abdominal mass palpated.   Musculoskeletal: the gait could not be assessed..   Extremities: no clubbing of the fingernails, no localized cyanosis, no petechial hemorrhages and no ischemic changes.   Skin: normal skin color and pigmentation, no rash, no venous stasis, no skin lesions, no skin ulcer and no xanthoma was observed.   Psychiatric: oriented to person, place, and time, the affect was normal, the mood was normal and not feeling anxious.     LABS:   --------  04-11    137  |  100  |  20  ----------------------------<  113<H>  4.1   |  29  |  0.56    Ca    9.0      11 Apr 2018 05:46                           13.5   7.7   )-----------( 207      ( 11 Apr 2018 05:46 )             40.1     PT/INR - ( 11 Apr 2018 05:46 )   PT: 13.9 sec;   INR: 1.27 ratio         PTT - ( 11 Apr 2018 05:46 )  PTT:30.1 sec            RADIOLOGY:  -----------------        ECG:

## 2018-04-11 NOTE — PROGRESS NOTE ADULT - PROBLEM SELECTOR PROBLEM 2
Bradycardia
Weakness
Bradycardia
Weakness
Weakness

## 2018-04-11 NOTE — PROGRESS NOTE ADULT - PROBLEM SELECTOR PLAN 1
-acute, improved  -s/p pacemaker placement by vascular Dr. Farris, now POD 2  -restarted on coumadin,  INR 1.27 now, INR to be monitored for range 2-3,  -will resume carvedilol, digoxin, monitor HR   -cardio Dr. Foreman aware  -PT: Return to previous facility with in-house PT with a SANDEEP

## 2018-04-11 NOTE — PROGRESS NOTE ADULT - ASSESSMENT
89M PMH Prostate Ca, systolic CHF (no recent TTE in records), anxiety/depression, DM type 2, chronic A fib (on carvedilol, coumadin), HTN, chronic LE edema, dementia sent from [facility] assisted living for complaint of weakness.  Admitted for bradycardia, now S/P pacemaker placement, POD 2

## 2018-04-11 NOTE — PROGRESS NOTE ADULT - PROBLEM SELECTOR PLAN 5
-type 2  -hold home metformin  -low dose SSI, FS, hypoglycemia protocol  -consistent carb/DASH TLC diet

## 2018-04-11 NOTE — PROGRESS NOTE ADULT - PROBLEM SELECTOR PROBLEM 4
Chronic systolic congestive heart failure

## 2018-04-11 NOTE — PROGRESS NOTE ADULT - PROBLEM SELECTOR PLAN 3
-chronic  -rate controlled carvedilol, AC with warfarin tonight   -monitor HR closely on tele  -F/U INR

## 2018-04-11 NOTE — PROGRESS NOTE ADULT - PROVIDER SPECIALTY LIST ADULT
Anesthesia
Cardiology
Hospitalist
Vascular Surgery
Cardiology
Hospitalist
Cardiology
Hospitalist

## 2018-04-11 NOTE — PROGRESS NOTE ADULT - PROBLEM SELECTOR PROBLEM 1
Atrial fibrillation
Bradycardia
Atrial fibrillation
Atrial fibrillation
Bradycardia
Bradycardia

## 2018-04-11 NOTE — PROGRESS NOTE ADULT - SUBJECTIVE AND OBJECTIVE BOX
Patient is a 89y old  Male who presents with a chief complaint of Weakness, Bradycardia (10 Apr 2018 15:05)    HPI:  Patient seen and examined at bedside. Pt was agitated and confused over night recieved ativan 0.5 at about 2:30 AM. Pt has been sleeping since. Pt denies any pain.   Tele monitor: A. Fib at 60-70s, no acute events    ROS:  Constitutional: denies fever, chills,   Respiratory: denies SOB, ABRAHAM  Cardiovascular: denies chest pain, palpitations  Gastrointestinal: denies nausea, vomiting, diarrhea, constipation, abdominal pain  Genitourinary: denies dysuria, frequency, urgency, hematuria  Musculoskeletal: denies myalgias,   Neurologic: denies headache, weakness, dizziness  ROS negative except as noted above    Vital Signs Last 24 Hrs  T(C): 35.9 (04-11-18 @ 07:19), Max: 37.1 (04-11-18 @ 00:03)  T(F): 96.7 (04-11-18 @ 07:19), Max: 98.7 (04-11-18 @ 00:03)  HR: 64 (04-11-18 @ 07:19) (64 - 85)  BP: 126/68 (04-11-18 @ 07:19) (98/58 - 126/70)  BP(mean): --  RR: 18 (04-11-18 @ 07:19) (16 - 18)  SpO2: 97% (04-11-18 @ 07:19) (93% - 100%)    Physical Exam:  General: drowsy, NAD  HEENT: NCAT, PERRL, EOMI bl, moist mucous membranes   Neck: Supple, nontender, no mass  Neurology: A&Ox1-2, nonfocal, sensation intact   Respiratory: CTA B/L, No W/R/R  CV: Irregular , +S1/S2, no murmurs, rubs or gallops  Abdominal: Soft, NT, ND +BS  Extremities: No C/C/E, + peripheral pulses  Chest: L anterior chest wall, dressing clean dry intact, No bleeding   Skin: warm, dry, normal color,      LABS:                        13.5   7.7   )-----------( 207      ( 11 Apr 2018 05:46 )             40.1     11 Apr 2018 05:46    137    |  100    |  20     ----------------------------<  113    4.1     |  29     |  0.56     Ca    9.0        11 Apr 2018 05:46      PT/INR - ( 11 Apr 2018 05:46 )   PT: 13.9 sec;   INR: 1.27 ratio         PTT - ( 11 Apr 2018 05:46 )  PTT:30.1 sec        CAPILLARY BLOOD GLUCOSE      POCT Blood Glucose.: 109 mg/dL (11 Apr 2018 07:14)  POCT Blood Glucose.: 138 mg/dL (10 Apr 2018 21:53)  POCT Blood Glucose.: 129 mg/dL (10 Apr 2018 16:40)  POCT Blood Glucose.: 241 mg/dL (10 Apr 2018 11:49)        RADIOLOGY & ADDITIONAL TESTS:    MEDICATIONS  (STANDING):  carvedilol 3.125 milliGRAM(s) Oral every 12 hours  digoxin     Tablet 0.25 milliGRAM(s) Oral daily  donepezil 5 milliGRAM(s) Oral at bedtime  insulin lispro (HumaLOG) corrective regimen sliding scale   SubCutaneous three times a day before meals  lisinopril 5 milliGRAM(s) Oral daily  simvastatin 20 milliGRAM(s) Oral at bedtime  venlafaxine 75 milliGRAM(s) Oral two times a day with meals    MEDICATIONS  (PRN):  ondansetron Injectable 4 milliGRAM(s) IV Push every 6 hours PRN Nausea  oxyCODONE    5 mG/acetaminophen 325 mG 1 Tablet(s) Oral every 4 hours PRN Moderate Pain  oxyCODONE    5 mG/acetaminophen 325 mG 2 Tablet(s) Oral every 6 hours PRN Severe Pain      Allergies    latex (Rash)  No Known Drug Allergies    Intolerances

## 2018-04-11 NOTE — PROGRESS NOTE ADULT - PROBLEM SELECTOR PROBLEM 6
Dementia
Dementia
Prostate ca
Dementia
Prostate ca
Prostate ca

## 2018-07-19 ENCOUNTER — EMERGENCY (EMERGENCY)
Facility: HOSPITAL | Age: 83
LOS: 0 days | Discharge: HOME | End: 2018-07-19
Attending: EMERGENCY MEDICINE
Payer: MEDICARE

## 2018-07-19 VITALS
OXYGEN SATURATION: 100 % | SYSTOLIC BLOOD PRESSURE: 128 MMHG | TEMPERATURE: 98 F | HEART RATE: 58 BPM | RESPIRATION RATE: 18 BRPM | DIASTOLIC BLOOD PRESSURE: 88 MMHG

## 2018-07-19 VITALS
DIASTOLIC BLOOD PRESSURE: 67 MMHG | HEART RATE: 60 BPM | TEMPERATURE: 98 F | SYSTOLIC BLOOD PRESSURE: 113 MMHG | RESPIRATION RATE: 18 BRPM | OXYGEN SATURATION: 100 %

## 2018-07-19 DIAGNOSIS — Z79.01 LONG TERM (CURRENT) USE OF ANTICOAGULANTS: ICD-10-CM

## 2018-07-19 DIAGNOSIS — I10 ESSENTIAL (PRIMARY) HYPERTENSION: ICD-10-CM
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
## 2018-07-19 DIAGNOSIS — R94.31 ABNORMAL ELECTROCARDIOGRAM [ECG] [EKG]: ICD-10-CM

## 2018-07-19 DIAGNOSIS — M50.30 OTHER CERVICAL DISC DEGENERATION, UNSPECIFIED CERVICAL REGION: ICD-10-CM

## 2018-07-19 DIAGNOSIS — Z23 ENCOUNTER FOR IMMUNIZATION: ICD-10-CM

## 2018-07-19 DIAGNOSIS — Z95.0 PRESENCE OF CARDIAC PACEMAKER: ICD-10-CM

## 2018-07-19 DIAGNOSIS — Z79.84 LONG TERM (CURRENT) USE OF ORAL HYPOGLYCEMIC DRUGS: ICD-10-CM

## 2018-07-19 DIAGNOSIS — E78.5 HYPERLIPIDEMIA, UNSPECIFIED: ICD-10-CM

## 2018-07-19 DIAGNOSIS — S09.90XA UNSPECIFIED INJURY OF HEAD, INITIAL ENCOUNTER: ICD-10-CM
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
## 2018-07-19 DIAGNOSIS — E11.9 TYPE 2 DIABETES MELLITUS WITHOUT COMPLICATIONS: ICD-10-CM

## 2018-07-19 DIAGNOSIS — I50.9 HEART FAILURE, UNSPECIFIED: ICD-10-CM

## 2018-07-19 DIAGNOSIS — F03.90 UNSPECIFIED DEMENTIA WITHOUT BEHAVIORAL DISTURBANCE: ICD-10-CM
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
## 2018-07-19 LAB
ALBUMIN SERPL ELPH-MCNC: 3.4 G/DL — SIGNIFICANT CHANGE UP (ref 3.3–5)
ALP SERPL-CCNC: 107 U/L — SIGNIFICANT CHANGE UP (ref 40–120)
ALT FLD-CCNC: 22 U/L — SIGNIFICANT CHANGE UP (ref 12–78)
ANION GAP SERPL CALC-SCNC: 7 MMOL/L — SIGNIFICANT CHANGE UP (ref 5–17)
APPEARANCE UR: CLEAR — SIGNIFICANT CHANGE UP
APTT BLD: 36.6 SEC — SIGNIFICANT CHANGE UP (ref 27.5–37.4)
AST SERPL-CCNC: 19 U/L — SIGNIFICANT CHANGE UP (ref 15–37)
BASOPHILS # BLD AUTO: 0.02 K/UL — SIGNIFICANT CHANGE UP (ref 0–0.2)
BASOPHILS NFR BLD AUTO: 0.3 % — SIGNIFICANT CHANGE UP (ref 0–2)
BILIRUB SERPL-MCNC: 0.4 MG/DL — SIGNIFICANT CHANGE UP (ref 0.2–1.2)
BILIRUB UR-MCNC: NEGATIVE — SIGNIFICANT CHANGE UP
BUN SERPL-MCNC: 22 MG/DL — SIGNIFICANT CHANGE UP (ref 7–23)
CALCIUM SERPL-MCNC: 9.1 MG/DL — SIGNIFICANT CHANGE UP (ref 8.5–10.1)
CHLORIDE SERPL-SCNC: 100 MMOL/L — SIGNIFICANT CHANGE UP (ref 96–108)
CO2 SERPL-SCNC: 31 MMOL/L — SIGNIFICANT CHANGE UP (ref 22–31)
COLOR SPEC: YELLOW — SIGNIFICANT CHANGE UP
CREAT SERPL-MCNC: 0.58 MG/DL — SIGNIFICANT CHANGE UP (ref 0.5–1.3)
DIFF PNL FLD: ABNORMAL
EOSINOPHIL # BLD AUTO: 0.05 K/UL — SIGNIFICANT CHANGE UP (ref 0–0.5)
EOSINOPHIL NFR BLD AUTO: 0.6 % — SIGNIFICANT CHANGE UP (ref 0–6)
GLUCOSE SERPL-MCNC: 134 MG/DL — HIGH (ref 70–99)
GLUCOSE UR QL: 250 MG/DL
HCT VFR BLD CALC: 39.6 % — SIGNIFICANT CHANGE UP (ref 39–50)
HGB BLD-MCNC: 13.2 G/DL — SIGNIFICANT CHANGE UP (ref 13–17)
IMM GRANULOCYTES NFR BLD AUTO: 0.6 % — SIGNIFICANT CHANGE UP (ref 0–1.5)
INR BLD: 2.4 RATIO — HIGH (ref 0.88–1.16)
KETONES UR-MCNC: NEGATIVE — SIGNIFICANT CHANGE UP
LEUKOCYTE ESTERASE UR-ACNC: NEGATIVE — SIGNIFICANT CHANGE UP
LYMPHOCYTES # BLD AUTO: 1.44 K/UL — SIGNIFICANT CHANGE UP (ref 1–3.3)
LYMPHOCYTES # BLD AUTO: 18.5 % — SIGNIFICANT CHANGE UP (ref 13–44)
MCHC RBC-ENTMCNC: 30.3 PG — SIGNIFICANT CHANGE UP (ref 27–34)
MCHC RBC-ENTMCNC: 33.3 GM/DL — SIGNIFICANT CHANGE UP (ref 32–36)
MCV RBC AUTO: 90.8 FL — SIGNIFICANT CHANGE UP (ref 80–100)
MONOCYTES # BLD AUTO: 0.83 K/UL — SIGNIFICANT CHANGE UP (ref 0–0.9)
MONOCYTES NFR BLD AUTO: 10.6 % — SIGNIFICANT CHANGE UP (ref 2–14)
NEUTROPHILS # BLD AUTO: 5.41 K/UL — SIGNIFICANT CHANGE UP (ref 1.8–7.4)
NEUTROPHILS NFR BLD AUTO: 69.4 % — SIGNIFICANT CHANGE UP (ref 43–77)
NITRITE UR-MCNC: NEGATIVE — SIGNIFICANT CHANGE UP
PH UR: 5 — SIGNIFICANT CHANGE UP (ref 5–8)
PLATELET # BLD AUTO: 245 K/UL — SIGNIFICANT CHANGE UP (ref 150–400)
POTASSIUM SERPL-MCNC: 4.7 MMOL/L — SIGNIFICANT CHANGE UP (ref 3.5–5.3)
POTASSIUM SERPL-SCNC: 4.7 MMOL/L — SIGNIFICANT CHANGE UP (ref 3.5–5.3)
PROT SERPL-MCNC: 7.4 GM/DL — SIGNIFICANT CHANGE UP (ref 6–8.3)
PROT UR-MCNC: 15 MG/DL
PROTHROM AB SERPL-ACNC: 26.4 SEC — HIGH (ref 9.8–12.7)
RBC # BLD: 4.36 M/UL — SIGNIFICANT CHANGE UP (ref 4.2–5.8)
RBC # FLD: 13.4 % — SIGNIFICANT CHANGE UP (ref 10.3–14.5)
SODIUM SERPL-SCNC: 138 MMOL/L — SIGNIFICANT CHANGE UP (ref 135–145)
SP GR SPEC: 1.01 — SIGNIFICANT CHANGE UP (ref 1.01–1.02)
TYPE + AB SCN PNL BLD: SIGNIFICANT CHANGE UP
UROBILINOGEN FLD QL: NEGATIVE MG/DL — SIGNIFICANT CHANGE UP
WBC # BLD: 7.8 K/UL — SIGNIFICANT CHANGE UP (ref 3.8–10.5)
WBC # FLD AUTO: 7.8 K/UL — SIGNIFICANT CHANGE UP (ref 3.8–10.5)

## 2018-07-19 PROCEDURE — 70450 CT HEAD/BRAIN W/O DYE: CPT | Mod: 26

## 2018-07-19 PROCEDURE — 73080 X-RAY EXAM OF ELBOW: CPT | Mod: 26,RT

## 2018-07-19 PROCEDURE — 99285 EMERGENCY DEPT VISIT HI MDM: CPT

## 2018-07-19 PROCEDURE — 72125 CT NECK SPINE W/O DYE: CPT | Mod: 26

## 2018-07-19 PROCEDURE — 93010 ELECTROCARDIOGRAM REPORT: CPT

## 2018-07-19 PROCEDURE — 71045 X-RAY EXAM CHEST 1 VIEW: CPT | Mod: 26

## 2018-07-19 PROCEDURE — 72190 X-RAY EXAM OF PELVIS: CPT | Mod: 26

## 2018-07-19 RX ORDER — TETANUS TOXOID, REDUCED DIPHTHERIA TOXOID AND ACELLULAR PERTUSSIS VACCINE, ADSORBED 5; 2.5; 8; 8; 2.5 [IU]/.5ML; [IU]/.5ML; UG/.5ML; UG/.5ML; UG/.5ML
0.5 SUSPENSION INTRAMUSCULAR ONCE
Qty: 0 | Refills: 0 | Status: COMPLETED | OUTPATIENT
Start: 2018-07-19 | End: 2018-07-19

## 2018-07-19 RX ORDER — ACETAMINOPHEN 500 MG
650 TABLET ORAL ONCE
Qty: 0 | Refills: 0 | Status: COMPLETED | OUTPATIENT
Start: 2018-07-19 | End: 2018-07-19

## 2018-07-19 RX ADMIN — TETANUS TOXOID, REDUCED DIPHTHERIA TOXOID AND ACELLULAR PERTUSSIS VACCINE, ADSORBED 0.5 MILLILITER(S): 5; 2.5; 8; 8; 2.5 SUSPENSION INTRAMUSCULAR at 13:12

## 2018-07-19 RX ADMIN — Medication 650 MILLIGRAM(S): at 13:12

## 2018-07-19 NOTE — ED PROVIDER NOTE - OBJECTIVE STATEMENT
89 y/o male with a PMHx of DM on Metformin, HTN on Coumadin, HLD, CHF, pacemaker, defibrillator, dementia presents to the ED s/p trip and fall today. As per EMS, pt fell today walking in his backyard and hit his head on concrete. The fall was witnessed by family member. No LOC. 89 y/o male with a PMHx of DM on Metformin, HTN on Coumadin, HLD, CHF, pacemaker, defibrillator, dementia presents to the ED s/p trip and fall today. As per EMS, pt fell today walking in his backyard and hit his head on concrete. The fall was witnessed by family member. No LOC. pt was able to get up and ambulate into the house. now pt with abrasion to right forearm. pt arrived in c-collar

## 2018-07-19 NOTE — ED PROVIDER NOTE - CARE PLAN
Principal Discharge DX:	Fall, initial encounter  Assessment and plan of treatment:	1. return for worsening symptoms or anything concerning to you  2. take all home meds as prescribed  3. follow up with your pmd call to make an appointment  Secondary Diagnosis:	Closed head injury, initial encounter

## 2018-07-19 NOTE — ED ADULT TRIAGE NOTE - CHIEF COMPLAINT QUOTE
Pt was a witnessed fall on coumadin with increased lethargy. Confused at baseline. Trauma alert called at 1205

## 2018-07-19 NOTE — ED ADULT NURSE NOTE - OBJECTIVE STATEMENT
BIBA s/p fall. Alert & oriented to self. Hx dementia. Currently on coumadin. Skin tear noted to right arm/elbow, minimal bleeding noted. Drsg applied. Pt denies pain/discomfort at this time. Unable to recall fall. ROM of extremities noted. FINESSE s/p fall. From [facility]. Alert & oriented to self. Hx dementia. Currently on coumadin. Skin tear noted to right arm/elbow, minimal bleeding noted. Drsg applied. Pt denies pain/discomfort at this time. Unable to recall fall. ROM of extremities noted. C-collar on & in place. Labs, CT, & xray obtained. Will continue to monitor BIBA s/p fall hit head on concrete, no LOC according to EMS. From [facility]. Alert & oriented to self. Hx dementia. Currently on coumadin. Skin tear noted to right arm/elbow, minimal bleeding noted. Drsg applied. Pt denies pain/discomfort at this time. Unable to recall fall. ROM of extremities noted. C-collar on & in place. Labs, CT, & xray obtained. Will continue to monitor

## 2018-07-19 NOTE — ED ADULT NURSE REASSESSMENT NOTE - NS ED NURSE REASSESS COMMENT FT1
patient ambulated in hallway greater than 200 ft with 2 person assist for safety.  No SOB, dizziness or change in ECG pattern with ambulation.  son at bedside, updated on plan of care and in agreement with patient being sent back to assisted living.

## 2018-07-19 NOTE — ED PROVIDER NOTE - NS_ ATTENDINGSCRIBEDETAILS _ED_A_ED_FT
I, Nael Alas MD,  performed the initial face to face bedside interview with this patient regarding history of present illness, review of symptoms and relevant past medical, social and family history.  I completed an independent physical examination.  I was the initial provider who evaluated this patient.  The history, relevant review of systems, past medical and surgical history, medical decision making, and physical examination was documented by the scribe in my presence and I attest to the accuracy of the documentation.

## 2018-07-19 NOTE — ED PROVIDER NOTE - PROGRESS NOTE DETAILS
workup negative. pt ambulating without issue. wound cleaned wrapped and right arm placed in sling for comfort. will d/c with follow up. Nael Alas M.D., Attending Physician

## 2018-07-19 NOTE — ED PROVIDER NOTE - MEDICAL DECISION MAKING DETAILS
89 year old M hx HTN, DM, HLD, AFib on Coumadin, dementia presents to the ED s/p trip and fall, hit R head and elbow. Witnessed by family member pt able to get up and ambulate. Now pt has abrasion over R elbow but no pother complaint will obtain CT head and neck XR Chest and pevlix XR right elbow update tetauns Sx control and reassess. 89 year old M hx HTN, DM, HLD, AFib on Coumadin, dementia presents to the ED s/p trip and fall, hit R head and elbow. Witnessed by family member pt able to get up and ambulate. Now pt has abrasion over R elbow but no other complaint will obtain CT head and neck XR Chest and pevlix XR right elbow update tetauns Sx control and reassess.

## 2018-07-19 NOTE — ED PROVIDER NOTE - PHYSICAL EXAMINATION
AAOx1. Pt arrived in collar. Abrasion to R 5th digit. Skin tear to R forearm. No bony TTP. No midspinal TTP. no TTP of chest wall. Pelvis is stable. FROM of hips no pain of axial loading. Constitutional: mild distress AAOx1  Pt arrived in collar.  Eyes: PERRLA EOMI  Head: Normocephalic atraumatic  Mouth: MMM  Cardiac: regular rate   Resp: Lungs CTAB  GI: Abd s/nt/nd  Neuro: CN2-12 intact  Skin: Abrasion to R 5th digit. Skin tear to R forearm.   MSK: No bony TTP. No spinal TTP. no TTP of chest wall. Pelvis is stable. FROM of hips no pain of axial loading.

## 2019-01-30 ENCOUNTER — INPATIENT (INPATIENT)
Facility: HOSPITAL | Age: 84
LOS: 6 days | Discharge: OTHER INSTITUTION | DRG: 871 | End: 2019-02-06
Attending: INTERNAL MEDICINE | Admitting: INTERNAL MEDICINE
Payer: MEDICARE

## 2019-01-30 VITALS
OXYGEN SATURATION: 95 % | DIASTOLIC BLOOD PRESSURE: 66 MMHG | TEMPERATURE: 98 F | HEART RATE: 76 BPM | RESPIRATION RATE: 14 BRPM | SYSTOLIC BLOOD PRESSURE: 108 MMHG

## 2019-01-30 DIAGNOSIS — I48.91 UNSPECIFIED ATRIAL FIBRILLATION: ICD-10-CM

## 2019-01-30 DIAGNOSIS — J20.5 ACUTE BRONCHITIS DUE TO RESPIRATORY SYNCYTIAL VIRUS: ICD-10-CM

## 2019-01-30 DIAGNOSIS — I10 ESSENTIAL (PRIMARY) HYPERTENSION: ICD-10-CM

## 2019-01-30 DIAGNOSIS — F03.90 UNSPECIFIED DEMENTIA WITHOUT BEHAVIORAL DISTURBANCE: ICD-10-CM

## 2019-01-30 DIAGNOSIS — Z29.9 ENCOUNTER FOR PROPHYLACTIC MEASURES, UNSPECIFIED: ICD-10-CM

## 2019-01-30 DIAGNOSIS — I50.9 HEART FAILURE, UNSPECIFIED: ICD-10-CM

## 2019-01-30 PROBLEM — E11.9 TYPE 2 DIABETES MELLITUS WITHOUT COMPLICATIONS: Chronic | Status: ACTIVE | Noted: 2018-07-19

## 2019-01-30 PROBLEM — F32.9 MAJOR DEPRESSIVE DISORDER, SINGLE EPISODE, UNSPECIFIED: Chronic | Status: ACTIVE | Noted: 2018-07-19

## 2019-01-30 PROBLEM — E78.5 HYPERLIPIDEMIA, UNSPECIFIED: Chronic | Status: ACTIVE | Noted: 2018-07-19

## 2019-01-30 LAB
ALBUMIN SERPL ELPH-MCNC: 3.4 G/DL — SIGNIFICANT CHANGE UP (ref 3.3–5)
ALP SERPL-CCNC: 117 U/L — SIGNIFICANT CHANGE UP (ref 40–120)
ALT FLD-CCNC: 17 U/L — SIGNIFICANT CHANGE UP (ref 12–78)
ANION GAP SERPL CALC-SCNC: 7 MMOL/L — SIGNIFICANT CHANGE UP (ref 5–17)
APPEARANCE UR: CLEAR — SIGNIFICANT CHANGE UP
APTT BLD: 34 SEC — SIGNIFICANT CHANGE UP (ref 28.5–37)
AST SERPL-CCNC: 15 U/L — SIGNIFICANT CHANGE UP (ref 15–37)
BASOPHILS # BLD AUTO: 0.01 K/UL — SIGNIFICANT CHANGE UP (ref 0–0.2)
BASOPHILS NFR BLD AUTO: 0.1 % — SIGNIFICANT CHANGE UP (ref 0–2)
BILIRUB SERPL-MCNC: 0.6 MG/DL — SIGNIFICANT CHANGE UP (ref 0.2–1.2)
BILIRUB UR-MCNC: NEGATIVE — SIGNIFICANT CHANGE UP
BUN SERPL-MCNC: 19 MG/DL — SIGNIFICANT CHANGE UP (ref 7–23)
CALCIUM SERPL-MCNC: 8.5 MG/DL — SIGNIFICANT CHANGE UP (ref 8.5–10.1)
CHLORIDE SERPL-SCNC: 100 MMOL/L — SIGNIFICANT CHANGE UP (ref 96–108)
CO2 SERPL-SCNC: 30 MMOL/L — SIGNIFICANT CHANGE UP (ref 22–31)
COLOR SPEC: YELLOW — SIGNIFICANT CHANGE UP
CREAT SERPL-MCNC: 0.77 MG/DL — SIGNIFICANT CHANGE UP (ref 0.5–1.3)
DIFF PNL FLD: ABNORMAL
DIGOXIN SERPL-MCNC: 1.4 NG/ML — SIGNIFICANT CHANGE UP (ref 0.8–2)
EOSINOPHIL # BLD AUTO: 0 K/UL — SIGNIFICANT CHANGE UP (ref 0–0.5)
EOSINOPHIL NFR BLD AUTO: 0 % — SIGNIFICANT CHANGE UP (ref 0–6)
FLU A RESULT: SIGNIFICANT CHANGE UP
FLU A RESULT: SIGNIFICANT CHANGE UP
FLUAV AG NPH QL: SIGNIFICANT CHANGE UP
FLUBV AG NPH QL: SIGNIFICANT CHANGE UP
GLUCOSE SERPL-MCNC: 139 MG/DL — HIGH (ref 70–99)
GLUCOSE UR QL: NEGATIVE — SIGNIFICANT CHANGE UP
HCT VFR BLD CALC: 35.5 % — LOW (ref 39–50)
HGB BLD-MCNC: 11.8 G/DL — LOW (ref 13–17)
IMM GRANULOCYTES NFR BLD AUTO: 0.6 % — SIGNIFICANT CHANGE UP (ref 0–1.5)
INR BLD: 2.22 RATIO — HIGH (ref 0.88–1.16)
KETONES UR-MCNC: ABNORMAL
LACTATE SERPL-SCNC: 1.4 MMOL/L — SIGNIFICANT CHANGE UP (ref 0.7–2)
LEUKOCYTE ESTERASE UR-ACNC: ABNORMAL
LIDOCAIN IGE QN: 109 U/L — SIGNIFICANT CHANGE UP (ref 73–393)
LYMPHOCYTES # BLD AUTO: 0.59 K/UL — LOW (ref 1–3.3)
LYMPHOCYTES # BLD AUTO: 5.6 % — LOW (ref 13–44)
MCHC RBC-ENTMCNC: 31.2 PG — SIGNIFICANT CHANGE UP (ref 27–34)
MCHC RBC-ENTMCNC: 33.2 GM/DL — SIGNIFICANT CHANGE UP (ref 32–36)
MCV RBC AUTO: 93.9 FL — SIGNIFICANT CHANGE UP (ref 80–100)
MONOCYTES # BLD AUTO: 0.99 K/UL — HIGH (ref 0–0.9)
MONOCYTES NFR BLD AUTO: 9.3 % — SIGNIFICANT CHANGE UP (ref 2–14)
NEUTROPHILS # BLD AUTO: 8.96 K/UL — HIGH (ref 1.8–7.4)
NEUTROPHILS NFR BLD AUTO: 84.4 % — HIGH (ref 43–77)
NITRITE UR-MCNC: NEGATIVE — SIGNIFICANT CHANGE UP
NT-PROBNP SERPL-SCNC: 1123 PG/ML — HIGH (ref 0–450)
PH UR: 5 — SIGNIFICANT CHANGE UP (ref 5–8)
PLATELET # BLD AUTO: 211 K/UL — SIGNIFICANT CHANGE UP (ref 150–400)
POTASSIUM SERPL-MCNC: 4.4 MMOL/L — SIGNIFICANT CHANGE UP (ref 3.5–5.3)
POTASSIUM SERPL-SCNC: 4.4 MMOL/L — SIGNIFICANT CHANGE UP (ref 3.5–5.3)
PROCALCITONIN SERPL-MCNC: 0.11 NG/ML — HIGH (ref 0–0.04)
PROT SERPL-MCNC: 7 G/DL — SIGNIFICANT CHANGE UP (ref 6–8.3)
PROT UR-MCNC: 25 MG/DL
PROTHROM AB SERPL-ACNC: 25.9 SEC — HIGH (ref 10–12.9)
RBC # BLD: 3.78 M/UL — LOW (ref 4.2–5.8)
RBC # FLD: 13.2 % — SIGNIFICANT CHANGE UP (ref 10.3–14.5)
RSV RESULT: DETECTED
RSV RNA RESP QL NAA+PROBE: DETECTED
SODIUM SERPL-SCNC: 137 MMOL/L — SIGNIFICANT CHANGE UP (ref 135–145)
SP GR SPEC: 1.01 — SIGNIFICANT CHANGE UP (ref 1.01–1.02)
UROBILINOGEN FLD QL: 1
WBC # BLD: 10.61 K/UL — HIGH (ref 3.8–10.5)
WBC # FLD AUTO: 10.61 K/UL — HIGH (ref 3.8–10.5)

## 2019-01-30 PROCEDURE — 93010 ELECTROCARDIOGRAM REPORT: CPT

## 2019-01-30 PROCEDURE — 99285 EMERGENCY DEPT VISIT HI MDM: CPT

## 2019-01-30 PROCEDURE — 71045 X-RAY EXAM CHEST 1 VIEW: CPT | Mod: 26

## 2019-01-30 RX ORDER — SODIUM CHLORIDE 9 MG/ML
1000 INJECTION, SOLUTION INTRAVENOUS
Qty: 0 | Refills: 0 | Status: DISCONTINUED | OUTPATIENT
Start: 2019-01-30 | End: 2019-02-06

## 2019-01-30 RX ORDER — CEFTRIAXONE 500 MG/1
1 INJECTION, POWDER, FOR SOLUTION INTRAMUSCULAR; INTRAVENOUS EVERY 24 HOURS
Qty: 0 | Refills: 0 | Status: DISCONTINUED | OUTPATIENT
Start: 2019-01-31 | End: 2019-02-01

## 2019-01-30 RX ORDER — ALBUTEROL 90 UG/1
2.5 AEROSOL, METERED ORAL ONCE
Qty: 0 | Refills: 0 | Status: COMPLETED | OUTPATIENT
Start: 2019-01-30 | End: 2019-01-30

## 2019-01-30 RX ORDER — BUDESONIDE, MICRONIZED 100 %
0.5 POWDER (GRAM) MISCELLANEOUS
Qty: 0 | Refills: 0 | Status: DISCONTINUED | OUTPATIENT
Start: 2019-01-30 | End: 2019-02-06

## 2019-01-30 RX ORDER — DEXTROSE 50 % IN WATER 50 %
12.5 SYRINGE (ML) INTRAVENOUS ONCE
Qty: 0 | Refills: 0 | Status: DISCONTINUED | OUTPATIENT
Start: 2019-01-30 | End: 2019-02-06

## 2019-01-30 RX ORDER — SIMVASTATIN 20 MG/1
20 TABLET, FILM COATED ORAL AT BEDTIME
Qty: 0 | Refills: 0 | Status: DISCONTINUED | OUTPATIENT
Start: 2019-01-30 | End: 2019-02-06

## 2019-01-30 RX ORDER — LACTOBACILLUS ACIDOPHILUS 100MM CELL
1 CAPSULE ORAL EVERY 8 HOURS
Qty: 0 | Refills: 0 | Status: DISCONTINUED | OUTPATIENT
Start: 2019-01-30 | End: 2019-02-06

## 2019-01-30 RX ORDER — CEFTRIAXONE 500 MG/1
1 INJECTION, POWDER, FOR SOLUTION INTRAMUSCULAR; INTRAVENOUS ONCE
Qty: 0 | Refills: 0 | Status: COMPLETED | OUTPATIENT
Start: 2019-01-30 | End: 2019-01-30

## 2019-01-30 RX ORDER — FAMOTIDINE 10 MG/ML
20 INJECTION INTRAVENOUS DAILY
Qty: 0 | Refills: 0 | Status: DISCONTINUED | OUTPATIENT
Start: 2019-01-30 | End: 2019-02-06

## 2019-01-30 RX ORDER — INSULIN LISPRO 100/ML
VIAL (ML) SUBCUTANEOUS
Qty: 0 | Refills: 0 | Status: DISCONTINUED | OUTPATIENT
Start: 2019-01-30 | End: 2019-02-06

## 2019-01-30 RX ORDER — CARVEDILOL PHOSPHATE 80 MG/1
3.12 CAPSULE, EXTENDED RELEASE ORAL EVERY 12 HOURS
Qty: 0 | Refills: 0 | Status: DISCONTINUED | OUTPATIENT
Start: 2019-01-30 | End: 2019-02-04

## 2019-01-30 RX ORDER — SODIUM CHLORIDE 9 MG/ML
3 INJECTION INTRAMUSCULAR; INTRAVENOUS; SUBCUTANEOUS ONCE
Qty: 0 | Refills: 0 | Status: COMPLETED | OUTPATIENT
Start: 2019-01-30 | End: 2019-01-30

## 2019-01-30 RX ORDER — ACETAMINOPHEN 500 MG
650 TABLET ORAL ONCE
Qty: 0 | Refills: 0 | Status: COMPLETED | OUTPATIENT
Start: 2019-01-30 | End: 2019-01-30

## 2019-01-30 RX ORDER — DEXTROSE 50 % IN WATER 50 %
15 SYRINGE (ML) INTRAVENOUS ONCE
Qty: 0 | Refills: 0 | Status: DISCONTINUED | OUTPATIENT
Start: 2019-01-30 | End: 2019-02-06

## 2019-01-30 RX ORDER — SODIUM CHLORIDE 9 MG/ML
1000 INJECTION, SOLUTION INTRAVENOUS
Qty: 0 | Refills: 0 | Status: DISCONTINUED | OUTPATIENT
Start: 2019-01-30 | End: 2019-01-31

## 2019-01-30 RX ORDER — ACETAMINOPHEN 500 MG
325 TABLET ORAL EVERY 6 HOURS
Qty: 0 | Refills: 0 | Status: DISCONTINUED | OUTPATIENT
Start: 2019-01-30 | End: 2019-02-06

## 2019-01-30 RX ORDER — SODIUM CHLORIDE 9 MG/ML
1000 INJECTION INTRAMUSCULAR; INTRAVENOUS; SUBCUTANEOUS ONCE
Qty: 0 | Refills: 0 | Status: COMPLETED | OUTPATIENT
Start: 2019-01-30 | End: 2019-01-30

## 2019-01-30 RX ORDER — DEXTROSE 50 % IN WATER 50 %
25 SYRINGE (ML) INTRAVENOUS ONCE
Qty: 0 | Refills: 0 | Status: DISCONTINUED | OUTPATIENT
Start: 2019-01-30 | End: 2019-02-06

## 2019-01-30 RX ORDER — LISINOPRIL 2.5 MG/1
5 TABLET ORAL DAILY
Qty: 0 | Refills: 0 | Status: DISCONTINUED | OUTPATIENT
Start: 2019-01-30 | End: 2019-02-02

## 2019-01-30 RX ORDER — IPRATROPIUM/ALBUTEROL SULFATE 18-103MCG
3 AEROSOL WITH ADAPTER (GRAM) INHALATION EVERY 6 HOURS
Qty: 0 | Refills: 0 | Status: DISCONTINUED | OUTPATIENT
Start: 2019-01-30 | End: 2019-02-06

## 2019-01-30 RX ORDER — DONEPEZIL HYDROCHLORIDE 10 MG/1
5 TABLET, FILM COATED ORAL AT BEDTIME
Qty: 0 | Refills: 0 | Status: DISCONTINUED | OUTPATIENT
Start: 2019-01-30 | End: 2019-02-06

## 2019-01-30 RX ORDER — GLUCAGON INJECTION, SOLUTION 0.5 MG/.1ML
1 INJECTION, SOLUTION SUBCUTANEOUS ONCE
Qty: 0 | Refills: 0 | Status: DISCONTINUED | OUTPATIENT
Start: 2019-01-30 | End: 2019-02-06

## 2019-01-30 RX ADMIN — CEFTRIAXONE 100 GRAM(S): 500 INJECTION, POWDER, FOR SOLUTION INTRAMUSCULAR; INTRAVENOUS at 13:55

## 2019-01-30 RX ADMIN — ALBUTEROL 2.5 MILLIGRAM(S): 90 AEROSOL, METERED ORAL at 13:12

## 2019-01-30 RX ADMIN — Medication 0.5 MILLIGRAM(S): at 20:17

## 2019-01-30 RX ADMIN — Medication 3 MILLILITER(S): at 20:17

## 2019-01-30 RX ADMIN — SIMVASTATIN 20 MILLIGRAM(S): 20 TABLET, FILM COATED ORAL at 21:57

## 2019-01-30 RX ADMIN — Medication 650 MILLIGRAM(S): at 12:20

## 2019-01-30 RX ADMIN — DONEPEZIL HYDROCHLORIDE 5 MILLIGRAM(S): 10 TABLET, FILM COATED ORAL at 21:58

## 2019-01-30 RX ADMIN — Medication 650 MILLIGRAM(S): at 11:53

## 2019-01-30 RX ADMIN — SODIUM CHLORIDE 500 MILLILITER(S): 9 INJECTION INTRAMUSCULAR; INTRAVENOUS; SUBCUTANEOUS at 12:13

## 2019-01-30 RX ADMIN — SODIUM CHLORIDE 50 MILLILITER(S): 9 INJECTION, SOLUTION INTRAVENOUS at 18:31

## 2019-01-30 RX ADMIN — SODIUM CHLORIDE 3 MILLILITER(S): 9 INJECTION INTRAMUSCULAR; INTRAVENOUS; SUBCUTANEOUS at 11:31

## 2019-01-30 RX ADMIN — Medication 1 TABLET(S): at 21:58

## 2019-01-30 RX ADMIN — SODIUM CHLORIDE 1000 MILLILITER(S): 9 INJECTION INTRAMUSCULAR; INTRAVENOUS; SUBCUTANEOUS at 13:40

## 2019-01-30 NOTE — CONSULT NOTE ADULT - SUBJECTIVE AND OBJECTIVE BOX
[facility] [place]     Patient is a 89y old  Male who presents with a chief complaint of weakness and lethargy (2019 19:52)      HPI:  Pt is a 89 yo WM  who presents to the ED with   PMHx of Atrial fibrillation on coumadin, h/o CHF, dementia, depression, DM, HLD, HTN, prostate cancer brought to ED from [facility] for evaluation of weakness and lethargy .  Information is obtained via chart and EMS.  Per [facility] pt appeared very weak today and did not eat breakfast.  They also noted that he had a temperature of 100.1 around 9 am.  Pt received a flu vaccine 18.  Currently pt alert to person only falling asleep at bedside and unable to provide history .Found to have RSV infection and diagnosed with COPD exacerbation and acute on chronic bronchitis exacerbation ,puljill biggs requested ,Dr Quintana called [facility] for antibacterial managmnet ,nebulised bronchodilators and pulmonology workup,O2 supply,serial labs and chest xrays,obtain ECHO to evaluate LVEF and rule out chf component Palliative care consult requested ,to discuss advance directives and complete MOLST (2019 17:01)      HPI:    89y Male for Cardiology Consult    PAST MEDICAL & SURGICAL HISTORY:  Dementia  Depression  Diabetes  Hyperlipemia  CHF (congestive heart failure)  Dementia  Prostate ca  Diabetes  Hypertension  Atrial fibrillation  No significant past surgical history      FAMILY HISTORY:  No pertinent family history in first degree relatives      SOCIAL HISTORY:   Alcohol:  Smoking:    Allergies    latex (Rash)  latex (Unknown)  No Known Drug Allergies    Intolerances        MEDICATIONS  (STANDING):  ALBUTerol/ipratropium for Nebulization 3 milliLiter(s) Nebulizer every 6 hours  buDESOnide   0.5 milliGRAM(s) Respule 0.5 milliGRAM(s) Inhalation two times a day  carvedilol 3.125 milliGRAM(s) Oral every 12 hours  dextrose 5%. 1000 milliLiter(s) (50 mL/Hr) IV Continuous <Continuous>  dextrose 50% Injectable 12.5 Gram(s) IV Push once  dextrose 50% Injectable 25 Gram(s) IV Push once  dextrose 50% Injectable 25 Gram(s) IV Push once  donepezil 5 milliGRAM(s) Oral at bedtime  famotidine    Tablet 20 milliGRAM(s) Oral daily  guaiFENesin ER 1200 milliGRAM(s) Oral every 12 hours  insulin lispro (HumaLOG) corrective regimen sliding scale   SubCutaneous three times a day before meals  lactobacillus acidophilus 1 Tablet(s) Oral every 8 hours  lisinopril 5 milliGRAM(s) Oral daily  simvastatin 20 milliGRAM(s) Oral at bedtime  sodium chloride 0.45%. 1000 milliLiter(s) (50 mL/Hr) IV Continuous <Continuous>    MEDICATIONS  (PRN):  acetaminophen  Suppository .. 325 milliGRAM(s) Rectal every 6 hours PRN Temp greater or equal to 38C (100.4F), Mild Pain (1 - 3)  dextrose 40% Gel 15 Gram(s) Oral once PRN Blood Glucose LESS THAN 70 milliGRAM(s)/deciliter  glucagon  Injectable 1 milliGRAM(s) IntraMuscular once PRN Glucose LESS THAN 70 milligrams/deciliter      REVIEW OF SYSTEMS:  CONSTITUTIONAL: No fever, weight loss, chills, shakes, or fat  RESPIRATORY: No cough, wheezing, hemoptysis, or shortness of breath  CARDIOVASCULAR: No chest pain, dyspnea, palpitations, dizziness, syncope, paroxysmal nocturnal dyspnea, orthopnea, or arm or leg swelling  GASTROINTESTINAL: No abdominal  or epigastric pain, nausea, vomiting, hematemesis, diarrhea, constipation, melena or bright red bloo  NEUROLOGICAL: No headaches, memory loss, slurred speech, limb weakness, loss of strength, numbness, or tremors  SKIN: No itching, burning, rashes, or lesions  ENDOCRINE: No heat or cold intolerance, or hair loss  MUSCULOSKELETAL: No joint pain or swelling, muscle, back, or extremity pain  HEME/LYMPH: No easy bruising or bleeding gums  ALLERY AND IMMUNOLOGIC: No hives or rash.    Vital Signs Last 24 Hrs  T(C): 39.1 (2019 23:43), Max: 39.1 (2019 23:43)  T(F): 102.3 (2019 23:43), Max: 102.3 (2019 23:43)  HR: 91 (2019 23:43) (67 - 91)  BP: 109/66 (2019 23:43) (99/33 - 152/78)  BP(mean): --  RR: 17 (2019 23:43) (14 - 20)  SpO2: 90% (2019 23:43) (90% - 99%)    PHYSICAL EXAM:  HEAD:  Atraumatic, Normocephalic  EYES: EOMI, PERRLA, conjunctiva and sclera clear  NECK: Supple and normal thyroid.  No JVD or carotid bruit.   HEART: S1, S2 regular , 1/6 soft ejection systolic murmur in mitral area , no thrill and no gallops .  PULMONARY: Bilateral vesicular breathing , few scattered ronchi and few scattered rales are present .  ABDOMEN: Soft nontender and positive bowl sounds   EXTREMITIES:  No clubbing, cyanosis, or pedal  edema  NEUROLOGICAL: AAOX3 , no focal deficit .    LABS:                        11.8   10.61 )-----------( 211      ( 2019 11:41 )             35.5         137  |  100  |  19  ----------------------------<  139<H>  4.4   |  30  |  0.77    Ca    8.5      2019 11:41    TPro  7.0  /  Alb  3.4  /  TBili  0.6  /  DBili  x   /  AST  15  /  ALT  17  /  AlkPhos  117          PT/INR - ( 2019 11:41 )   PT: 25.9 sec;   INR: 2.22 ratio         PTT - ( 2019 11:41 )  PTT:34.0 sec  Urinalysis Basic - ( 2019 13:20 )    Color: Yellow / Appearance: Clear / S.015 / pH: x  Gluc: x / Ketone: Trace  / Bili: Negative / Urobili: 1   Blood: x / Protein: 25 mg/dL / Nitrite: Negative   Leuk Esterase: Trace / RBC: 11-25 /HPF / WBC 0-2   Sq Epi: x / Non Sq Epi: Occasional / Bacteria: Few      BNPSerum Pro-Brain Natriuretic Peptide: 1123 pg/mL ( @ 11:41)        EKG:  ECHO:  IMAGING:    Assessment and Plan :     Will continue to follow during hospital course and give further recommendations as needed . Thanks for your referral . if any questions please contact me at office [phone] [phone]) [facility] [place]     Patient is a 89y old  Male who presents with a chief complaint of weakness and lethargy (2019 19:52)      HPI:  Pt is a 89 yo WM  who presents to the ED with   PMHx of Atrial fibrillation on coumadin, h/o CHF, dementia, depression, DM, HLD, HTN, prostate cancer brought to ED from [facility] for evaluation of weakness and lethargy .  Information is obtained via chart and EMS.  Per [facility] pt appeared very weak today and did not eat breakfast.  They also noted that he had a temperature of 100.1 around 9 am.  Pt received a flu vaccine 18.  Currently pt alert to person only falling asleep at bedside and unable to provide history .Found to have RSV infection and diagnosed with COPD exacerbation and acute on chronic bronchitis exacerbation ,puljill biggs requested ,Dr Quintana called [facility] for antibacterial managmnet ,nebulised bronchodilators and pulmonology workup,O2 supply,serial labs and chest xrays,obtain ECHO to evaluate LVEF and rule out chf component Palliative care consult requested ,to discuss advance directives and complete MOLST (2019 17:01)      HPI:    89y Male for Cardiology Consult    PAST MEDICAL & SURGICAL HISTORY:  Dementia  Depression  Diabetes  Hyperlipemia  CHF (congestive heart failure)  Dementia  Prostate ca  Diabetes  Hypertension  Atrial fibrillation  No significant past surgical history      FAMILY HISTORY:  No pertinent family history in first degree relatives      SOCIAL HISTORY:   Alcohol:  Smoking:    Allergies    latex (Rash)  latex (Unknown)  No Known Drug Allergies    Intolerances        MEDICATIONS  (STANDING):  ALBUTerol/ipratropium for Nebulization 3 milliLiter(s) Nebulizer every 6 hours  buDESOnide   0.5 milliGRAM(s) Respule 0.5 milliGRAM(s) Inhalation two times a day  carvedilol 3.125 milliGRAM(s) Oral every 12 hours  dextrose 5%. 1000 milliLiter(s) (50 mL/Hr) IV Continuous <Continuous>  dextrose 50% Injectable 12.5 Gram(s) IV Push once  dextrose 50% Injectable 25 Gram(s) IV Push once  dextrose 50% Injectable 25 Gram(s) IV Push once  donepezil 5 milliGRAM(s) Oral at bedtime  famotidine    Tablet 20 milliGRAM(s) Oral daily  guaiFENesin ER 1200 milliGRAM(s) Oral every 12 hours  insulin lispro (HumaLOG) corrective regimen sliding scale   SubCutaneous three times a day before meals  lactobacillus acidophilus 1 Tablet(s) Oral every 8 hours  lisinopril 5 milliGRAM(s) Oral daily  simvastatin 20 milliGRAM(s) Oral at bedtime  sodium chloride 0.45%. 1000 milliLiter(s) (50 mL/Hr) IV Continuous <Continuous>    MEDICATIONS  (PRN):  acetaminophen  Suppository .. 325 milliGRAM(s) Rectal every 6 hours PRN Temp greater or equal to 38C (100.4F), Mild Pain (1 - 3)  dextrose 40% Gel 15 Gram(s) Oral once PRN Blood Glucose LESS THAN 70 milliGRAM(s)/deciliter  glucagon  Injectable 1 milliGRAM(s) IntraMuscular once PRN Glucose LESS THAN 70 milligrams/deciliter        Vital Signs Last 24 Hrs  T(C): 39.1 (2019 23:43), Max: 39.1 (2019 23:43)  T(F): 102.3 (2019 23:43), Max: 102.3 (2019 23:43)  HR: 91 (2019 23:43) (67 - 91)  BP: 109/66 (2019 23:43) (99/33 - 152/78)  BP(mean): --  RR: 17 (2019 23:43) (14 - 20)  SpO2: 90% (  LABS:                        11.8   10.61 )-----------( 211      ( 2019 11:41 )             35.5     -30    137  |  100  |  19  ----------------------------<  139<H>  4.4   |  30  |  0.77    Ca    8.5      2019 11:41    TPro  7.0  /  Alb  3.4  /  TBili  0.6  /  DBili  x   /  AST  15  /  ALT  17  /  AlkPhos  117          PT/INR - ( 2019 11:41 )   PT: 25.9 sec;   INR: 2.22 ratio         PTT - ( 2019 11:41 )  PTT:34.0 sec  Urinalysis Basic - ( 2019 13:20 )    Color: Yellow / Appearance: Clear / S.015 / pH: x  Gluc: x / Ketone: Trace  / Bili: Negative / Urobili: 1   Blood: x / Protein: 25 mg/dL / Nitrite: Negative   Leuk Esterase: Trace / RBC: 11-25 /HPF / WBC 0-2   Sq Epi: x / Non Sq Epi: Occasional / Bacteria: Few      BNPSerum Pro-Brain Natriuretic Peptide: 1123 pg/mL ( @ 11:41)      Assessment and Plan :   FULL CONSULT DICTATED .  89 YEARS old male has SOB , cough , wheezing and has pneumonia , COPD and some element of CHF . Patient has atrial fibrillation .  Will continue to follow during hospital course and give further recommendations as needed . Thanks for your referral . if any questions please contact me at office [phone] [phone])

## 2019-01-30 NOTE — H&P ADULT - NSHPLABSRESULTS_GEN_ALL_CORE
< from: Xray Chest 1 View- PORTABLE-Urgent (01.30.19 @ 11:57) >  EXAM:  XR CHEST PORTABLE URGENT 1V                        PROCEDURE DATE:  01/30/2019    INTERPRETATION:  AP semierect chest on January 30, 2019 11:41 AM. Patient   has weakness and fever.  There may be heart enlargement. Clips in the right upper quadrant again   noted.  Dense calcification in the right upper quadrant laterally again seen.   Left-sided pacemaker again noted.  There is no sense of lung consolidation or layering effusion.  Chest is similar to July 19, 2018.  IMPRESSION: No acute infiltrate. Stable chest as above.  < end of copied text >

## 2019-01-30 NOTE — H&P ADULT - ASSESSMENT
Pt is a 89 yo WM  who presents to the ED with   PMHx of Atrial fibrillation on coumadin, h/o CHF, dementia, depression, DM, HLD, HTN, prostate cancer brought to ED from [facility] for evaluation of weakness and lethargy .  Information is obtained via chart and EMS.  Per [facility] pt appeared very weak today and did not eat breakfast.  They also noted that he had a temperature of 100.1 around 9 am.  Pt received a flu vaccine 11/4/18.  Currently pt alert to person only falling asleep at bedside and unable to provide history .Found to have RSV infection and diagnosed with COPD exacerbation and acute on chronic bronchitis exacerbation ,puljill biggs requested ,Dr Quintana called [facility] for antibacterial managmnet ,nebulised bronchodilators and pulmonology workup,O2 supply,serial labs and chest xrays,obtain ECHO to evaluate LVEF and rule out chf component Palliative care consult requested ,to discuss advance directives and complete MOLST

## 2019-01-30 NOTE — H&P ADULT - HISTORY OF PRESENT ILLNESS
Pt is a 89 yo WM  who presents to the ED with   PMHx of Atrial fibrillation on coumadin, h/o CHF, dementia, depression, DM, HLD, HTN, prostate cancer brought to ED from [facility] for evaluation of weakness and lethargy .  Information is obtained via chart and EMS.  Per [facility] pt appeared very weak today and did not eat breakfast.  They also noted that he had a temperature of 100.1 around 9 am.  Pt received a flu vaccine 11/4/18.  Currently pt alert to person only falling asleep at bedside and unable to provide history .Found to have RSV infection and diagnosed with COPD exacerbation and acute on chronic bronchitis exacerbation ,puljill biggs requested ,Dr Quintana called [facility] for antibacterial managmnet ,nebulised bronchodilators and pulmonology workup,O2 supply,serial labs and chest xrays,obtain ECHO to evaluate LVEF and rule out chf component Palliative care consult requested ,to discuss advance directives and complete MOLST

## 2019-01-30 NOTE — ED ADULT NURSE NOTE - CHIEF COMPLAINT QUOTE
pt from [facility], per staff he has been weak and lethargic, "more out of it" and has had a fever

## 2019-01-30 NOTE — ED ADULT NURSE NOTE - NSIMPLEMENTINTERV_GEN_ALL_ED
Implemented All Fall with Harm Risk Interventions:  [place] to call system. Call bell, personal items and telephone within reach. Instruct patient to call for assistance. Room bathroom lighting operational. Non-slip footwear when patient is off stretcher. Physically safe environment: no spills, clutter or unnecessary equipment. Stretcher in lowest position, wheels locked, appropriate side rails in place. Provide visual cue, wrist band, yellow gown, etc. Monitor gait and stability. Monitor for mental status changes and reorient to person, place, and time. Review medications for side effects contributing to fall risk. Reinforce activity limits and safety measures with patient and family. Provide visual clues: red socks.

## 2019-01-30 NOTE — ED PROVIDER NOTE - OBJECTIVE STATEMENT
Pt is a 89 yo male who presents to the ED with a cc of weakness.  PMHx of Atrial fibrillation on coumadin, h/o CHF, dementia, depression, DM, HLD, HTN, prostate cancer. Pt is a 89 yo male who presents to the ED with a cc of weakness.  PMHx of Atrial fibrillation on coumadin, h/o CHF, dementia, depression, DM, HLD, HTN, prostate cancer.  Information is obtained via chart and EMS.  Per [employer] [facility] pt appeared weak today and would not eat breakfast.  They also noted that he had a temperature of 100.1.  Pt received a flu vaccine 11/4/18. Pt is a 89 yo male who presents to the ED with a cc of weakness.  PMHx of Atrial fibrillation on coumadin, h/o CHF, dementia, depression, DM, HLD, HTN, prostate cancer.  Information is obtained via chart and EMS.  Per [employer] pt appeared weak today and would not eat breakfast.  They also noted that he had a temperature of 100.1 around 9 am.  Pt received a flu vaccine 11/4/18.  Currently pt alert to person only falling asleep at bedside and unable to provide history

## 2019-01-30 NOTE — H&P ADULT - PROBLEM SELECTOR PLAN 2
obtain echo to evaluate LVEF,monitor ins/outs,monitor renal profile and electrolytes closely,cardiology consult,send 3 sets of ensymes,O2 supply,serial chest xrays,monitor weights and oral intake of fluids,nutritionist consult CHECK DIGOXIN SERUM LEVEL

## 2019-01-30 NOTE — ED PROVIDER NOTE - MEDICAL DECISION MAKING DETAILS
Pt is a 89 yo male who presents to the ED with a cc of weakness.  PMHx of Atrial fibrillation on coumadin, h/o CHF, dementia, depression, DM, HLD, HTN, prostate cancer.  Pt with fever, weakness, cough, congestion.  Will obtain screening labs, chest x-ray, control fever, and monitor.  Will likely require admission

## 2019-01-30 NOTE — ED ADULT NURSE NOTE - PMH
Atrial fibrillation    CHF (congestive heart failure)    Dementia    Dementia    Depression    Diabetes    Diabetes    Hyperlipemia    Hypertension    Prostate ca

## 2019-01-30 NOTE — H&P ADULT - PROBLEM SELECTOR PLAN 1
[facility] for antibacterial managmnet nebulised bronchodilators and pulmonology evaluation,O2 supply,serial labs and chest xrays,obtain ECHO to evaluate LVEF and rule out chf component

## 2019-01-30 NOTE — ED ADULT NURSE NOTE - OBJECTIVE STATEMENT
89 y/o M patient presents to ED from [facility] via EMS c/o weakness and fever. As per EMS patient was not eating breakfast this morning and had an oral temperature of 101. Patient A&Ox0, drowsy. crackles auscultated in b/l lobes. redness noted to b/l buttocks. stage 1 pressure ulcer noted to right buttock. abdomen soft, non tender, non distended. Non verbal indicator of pain not present. Safety and comfort measures provided and maintained. MD bedside.

## 2019-01-30 NOTE — ED PROVIDER NOTE - CARE PLAN
Principal Discharge DX:	RSV bronchitis  Assessment and plan of treatment:	admit  Secondary Diagnosis:	Dementia  Secondary Diagnosis:	Fever

## 2019-01-30 NOTE — ED ADULT TRIAGE NOTE - CHIEF COMPLAINT QUOTE
pt from [facility], per staff he has been weak and lethargic, "more out of it" and has had a fever

## 2019-01-30 NOTE — H&P ADULT - RS GEN PE MLT RESP DETAILS PC
diminished breath sounds, R/wheezes/diminished breath sounds, L/breath sounds equal/airway patent/normal

## 2019-01-30 NOTE — CONSULT NOTE ADULT - ASSESSMENT
TERESA FRANCIS [facility]  [phone]    CONTACT Vinicio Bradford [phone]  ALLERGY Latex  REASON FOR VISIT       Initial evaluation/Pulmonary Critical Care consultation requested on 2019   by Dr Frias from Dr Quintana   Patient examined chart reviewed  HOSPITAL ADMISSION [facility] 2019     PATIENT CAME  FROM (if information available)      PAST MEDICAL SURGICAL HISTORY       Past Medical History:  Atrial fibrillation    CHF (congestive heart failure)      Dementia    Depression    Diabetes    Diabetes    Hyperlipemia    Hypertension    Prostate ca.    VITALS/LABS       2019 W 10.6 Hb 11.8 Plt 211  Na 137 K 4.44 CO2 30 Cr .7   2019 pc .11   2019 bnp 1123   2019 la 1.4   2019 rsv detec  2019 ua w 0-2 r 11-25     REVIEW OF SYMPTOMS    Able to give ROS  NO     PHYSICAL EXAM    HEENT Unremarkable PERRLA atraumatic   RESP Fair air entry EXP prolonged    Harsh breath sound Resp distres mild   CARDIAC S1 S2 No S3     NO JVD    ABDOMEN SOFT BS PRESENT NOT DISTENDED No hepatosplenomegaly PEDAL EDEMA present No calf tenderness  NO rash   GENERAL Not TOXIC looking    GLOBAL ISSUE/BEST PRACTICE:      PROBLEM: HOB elevation:   y            PROBLEM: Stress ulcer proph:    pepcid 20 ()                       PROBLEM: VTE prophylaxis:      on coumadin poa 2019   PROBLEM: Glycemic control:    iss ()   PROBLEM: Nutrition:    cons carb dys 1 puree honey ()   PROBLEM: Advanced directive: na     PROBLEM: Allergies:  latex     PATIENT DESCRIPTION PATIENT TERESA FRANCIS  10/16/1926, 2019 ADMISSION [facility] DR ARCADIO FRIAS  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 [facility] HO  injury L shin remote past 3/18/2014 V duplex legs –ben Chr swelling leg  OBS Prostate CA LV Systolic dysfunction CHF 10/24/2014 ECHO EF 50% Mild hypokinesia septum PASP 40 Mod MR    A fib  (on coumadin) Htn Depression Lower extr edema Chr swelling L leg DM      LUNG NODULE THYROID NODULE 10/21/2014 CT Ch  1 cm hypoattenuating thyroid nodule  Trace symmetrical layering bl pl effusions with dependent atelectasis bases  Ca granuloma both RML and l lower lobes  5 mm subpleural parenchymal nodule R apex  was admitted [facility] 2019 with dyspnea fever 100 He did getflu vaccine 2018   Pulm consulted 2019   carolin meds coreg 3125x2 donepezil 5 lisinopril 5 metformin 500.2 dig 25 venlafaxine 75.2 simvastat 20 warfarin 10   er vitals afeb 76 108/66 95%     HOSPITAL COURSE ASSESSMENT PLAN PATIENT TITO RAY 2019 ADMISSION [facility]  DR ARCADIO FRIAS   UTI 2019 ua w 0-2 r 11-25    Rocephin ()   RESP Monitor po Target 90-95%  RSV RTI Supp care   COPD   Duoneb.4 () Pulmicort ()   AF   Coreg 3125.2 ()   S CHF 10/24/2014 ECHO EF 50% Mild hypokinesia septum PASP 40 Mod MR      Lisinopril 5 ()   OBS Doneppezil 5 ()   ABIO   Rocephin ()   IV F   1/2 NS 50 ()                     TIME SPENT Over 55 minutes aggregate care time spent on encounter; activities included   direct patient care, counseling and/or coordinating care reviewing notes, lab data/ imaging , discussion with multidisciplinary team/ patient  /family. Risks, benefits, alternatives  discussed in detail.

## 2019-01-30 NOTE — ED PROVIDER NOTE - INTERPRETATION
Spoke to patient and Ubaldo in [facility]. Patient will be giving herself the injection at home.   demand pacemaker

## 2019-01-31 DIAGNOSIS — N39.0 URINARY TRACT INFECTION, SITE NOT SPECIFIED: ICD-10-CM

## 2019-01-31 DIAGNOSIS — I21.4 NON-ST ELEVATION (NSTEMI) MYOCARDIAL INFARCTION: ICD-10-CM

## 2019-01-31 LAB
ANION GAP SERPL CALC-SCNC: 12 MMOL/L — SIGNIFICANT CHANGE UP (ref 5–17)
BUN SERPL-MCNC: 20 MG/DL — SIGNIFICANT CHANGE UP (ref 7–23)
CALCIUM SERPL-MCNC: 8.4 MG/DL — LOW (ref 8.5–10.1)
CHLORIDE SERPL-SCNC: 102 MMOL/L — SIGNIFICANT CHANGE UP (ref 96–108)
CHOLEST SERPL-MCNC: 102 MG/DL — SIGNIFICANT CHANGE UP (ref 10–199)
CK SERPL-CCNC: 133 U/L — SIGNIFICANT CHANGE UP (ref 26–308)
CK SERPL-CCNC: 191 U/L — SIGNIFICANT CHANGE UP (ref 26–308)
CO2 SERPL-SCNC: 25 MMOL/L — SIGNIFICANT CHANGE UP (ref 22–31)
CREAT SERPL-MCNC: 0.66 MG/DL — SIGNIFICANT CHANGE UP (ref 0.5–1.3)
CULTURE RESULTS: NO GROWTH — SIGNIFICANT CHANGE UP
DIGOXIN SERPL-MCNC: 0.8 NG/ML — SIGNIFICANT CHANGE UP (ref 0.8–2)
GLUCOSE SERPL-MCNC: 132 MG/DL — HIGH (ref 70–99)
HBA1C BLD-MCNC: 6 % — HIGH (ref 4–5.6)
HCT VFR BLD CALC: 37.9 % — LOW (ref 39–50)
HDLC SERPL-MCNC: 43 MG/DL — SIGNIFICANT CHANGE UP
HGB BLD-MCNC: 12.7 G/DL — LOW (ref 13–17)
INR BLD: 2.08 RATIO — HIGH (ref 0.88–1.16)
LIPID PNL WITH DIRECT LDL SERPL: 49 MG/DL — SIGNIFICANT CHANGE UP
MAGNESIUM SERPL-MCNC: 1.3 MG/DL — LOW (ref 1.6–2.6)
MCHC RBC-ENTMCNC: 31.1 PG — SIGNIFICANT CHANGE UP (ref 27–34)
MCHC RBC-ENTMCNC: 33.5 GM/DL — SIGNIFICANT CHANGE UP (ref 32–36)
MCV RBC AUTO: 92.9 FL — SIGNIFICANT CHANGE UP (ref 80–100)
NRBC # BLD: 0 /100 WBCS — SIGNIFICANT CHANGE UP (ref 0–0)
PLATELET # BLD AUTO: 222 K/UL — SIGNIFICANT CHANGE UP (ref 150–400)
POTASSIUM SERPL-MCNC: 3.9 MMOL/L — SIGNIFICANT CHANGE UP (ref 3.5–5.3)
POTASSIUM SERPL-SCNC: 3.9 MMOL/L — SIGNIFICANT CHANGE UP (ref 3.5–5.3)
PROCALCITONIN SERPL-MCNC: 14.83 NG/ML — HIGH (ref 0–0.04)
PROTHROM AB SERPL-ACNC: 24.1 SEC — HIGH (ref 10–12.9)
RBC # BLD: 4.08 M/UL — LOW (ref 4.2–5.8)
RBC # FLD: 13.2 % — SIGNIFICANT CHANGE UP (ref 10.3–14.5)
SODIUM SERPL-SCNC: 139 MMOL/L — SIGNIFICANT CHANGE UP (ref 135–145)
SPECIMEN SOURCE: SIGNIFICANT CHANGE UP
TOTAL CHOLESTEROL/HDL RATIO MEASUREMENT: 2.4 RATIO — LOW (ref 3.4–9.6)
TRIGL SERPL-MCNC: 52 MG/DL — SIGNIFICANT CHANGE UP (ref 10–149)
TROPONIN I SERPL-MCNC: 4.97 NG/ML — HIGH (ref 0.01–0.04)
TSH SERPL-MCNC: 0.38 UIU/ML — SIGNIFICANT CHANGE UP (ref 0.36–3.74)
WBC # BLD: 12.48 K/UL — HIGH (ref 3.8–10.5)
WBC # FLD AUTO: 12.48 K/UL — HIGH (ref 3.8–10.5)

## 2019-01-31 RX ORDER — FUROSEMIDE 40 MG
20 TABLET ORAL
Qty: 0 | Refills: 0 | Status: DISCONTINUED | OUTPATIENT
Start: 2019-01-31 | End: 2019-02-05

## 2019-01-31 RX ORDER — FUROSEMIDE 40 MG
40 TABLET ORAL DAILY
Qty: 0 | Refills: 0 | Status: DISCONTINUED | OUTPATIENT
Start: 2019-01-31 | End: 2019-01-31

## 2019-01-31 RX ORDER — CLOPIDOGREL BISULFATE 75 MG/1
75 TABLET, FILM COATED ORAL DAILY
Qty: 0 | Refills: 0 | Status: DISCONTINUED | OUTPATIENT
Start: 2019-01-31 | End: 2019-02-05

## 2019-01-31 RX ORDER — MAGNESIUM OXIDE 400 MG ORAL TABLET 241.3 MG
400 TABLET ORAL
Qty: 0 | Refills: 0 | Status: DISCONTINUED | OUTPATIENT
Start: 2019-01-31 | End: 2019-02-06

## 2019-01-31 RX ORDER — MAGNESIUM SULFATE 500 MG/ML
1 VIAL (ML) INJECTION ONCE
Qty: 0 | Refills: 0 | Status: COMPLETED | OUTPATIENT
Start: 2019-01-31 | End: 2019-01-31

## 2019-01-31 RX ORDER — METOPROLOL TARTRATE 50 MG
25 TABLET ORAL
Qty: 0 | Refills: 0 | Status: DISCONTINUED | OUTPATIENT
Start: 2019-01-31 | End: 2019-01-31

## 2019-01-31 RX ORDER — POTASSIUM CHLORIDE 20 MEQ
10 PACKET (EA) ORAL DAILY
Qty: 0 | Refills: 0 | Status: DISCONTINUED | OUTPATIENT
Start: 2019-01-31 | End: 2019-02-06

## 2019-01-31 RX ORDER — ASPIRIN/CALCIUM CARB/MAGNESIUM 324 MG
81 TABLET ORAL DAILY
Qty: 0 | Refills: 0 | Status: DISCONTINUED | OUTPATIENT
Start: 2019-01-31 | End: 2019-02-06

## 2019-01-31 RX ORDER — DEXTROSE MONOHYDRATE, SODIUM CHLORIDE, AND POTASSIUM CHLORIDE 50; .745; 4.5 G/1000ML; G/1000ML; G/1000ML
1000 INJECTION, SOLUTION INTRAVENOUS
Qty: 0 | Refills: 0 | Status: DISCONTINUED | OUTPATIENT
Start: 2019-01-31 | End: 2019-02-01

## 2019-01-31 RX ADMIN — DONEPEZIL HYDROCHLORIDE 5 MILLIGRAM(S): 10 TABLET, FILM COATED ORAL at 22:16

## 2019-01-31 RX ADMIN — CARVEDILOL PHOSPHATE 3.12 MILLIGRAM(S): 80 CAPSULE, EXTENDED RELEASE ORAL at 17:29

## 2019-01-31 RX ADMIN — Medication 3 MILLILITER(S): at 08:00

## 2019-01-31 RX ADMIN — Medication 1 TABLET(S): at 13:12

## 2019-01-31 RX ADMIN — MAGNESIUM OXIDE 400 MG ORAL TABLET 400 MILLIGRAM(S): 241.3 TABLET ORAL at 17:31

## 2019-01-31 RX ADMIN — SIMVASTATIN 20 MILLIGRAM(S): 20 TABLET, FILM COATED ORAL at 22:16

## 2019-01-31 RX ADMIN — Medication 1 TABLET(S): at 06:21

## 2019-01-31 RX ADMIN — CEFTRIAXONE 100 GRAM(S): 500 INJECTION, POWDER, FOR SOLUTION INTRAMUSCULAR; INTRAVENOUS at 14:16

## 2019-01-31 RX ADMIN — Medication 1: at 13:10

## 2019-01-31 RX ADMIN — Medication 81 MILLIGRAM(S): at 13:11

## 2019-01-31 RX ADMIN — FAMOTIDINE 20 MILLIGRAM(S): 10 INJECTION INTRAVENOUS at 13:13

## 2019-01-31 RX ADMIN — Medication 0.5 MILLIGRAM(S): at 21:07

## 2019-01-31 RX ADMIN — Medication 325 MILLIGRAM(S): at 00:54

## 2019-01-31 RX ADMIN — Medication 1 TABLET(S): at 22:16

## 2019-01-31 RX ADMIN — Medication 0.5 MILLIGRAM(S): at 08:00

## 2019-01-31 RX ADMIN — Medication 3 MILLILITER(S): at 21:07

## 2019-01-31 RX ADMIN — Medication 10 MILLIEQUIVALENT(S): at 13:12

## 2019-01-31 RX ADMIN — Medication 100 GRAM(S): at 13:13

## 2019-01-31 RX ADMIN — CLOPIDOGREL BISULFATE 75 MILLIGRAM(S): 75 TABLET, FILM COATED ORAL at 13:12

## 2019-01-31 RX ADMIN — Medication 1200 MILLIGRAM(S): at 06:21

## 2019-01-31 RX ADMIN — Medication 3 MILLILITER(S): at 14:35

## 2019-01-31 RX ADMIN — Medication 20 MILLIGRAM(S): at 13:12

## 2019-01-31 RX ADMIN — Medication 1200 MILLIGRAM(S): at 17:30

## 2019-01-31 RX ADMIN — Medication 1: at 17:30

## 2019-01-31 NOTE — SWALLOW BEDSIDE ASSESSMENT ADULT - ASR SWALLOW ASPIRATION MONITOR
change of breathing pattern/position upright (89Y)/cough/gurgly voice/oral hygiene/pneumonia/fever/upper respiratory infection/throat clearing

## 2019-01-31 NOTE — PROGRESS NOTE ADULT - SUBJECTIVE AND OBJECTIVE BOX
Chart and available morning labs /imaging are reviewed electronically , urgent issues addressed . More information  is being added upon completion of rounds , when more information is collected and management discussed with consultants , medical staff and social service/case management on the floor   No new issues reported by medical staff . All above noted Patient is resting in a bed comfortably .Confused ,poor mentation .No distress noted   Troponin and procalcitonin elevation is noted Patient is more awake today   HPI:  Pt is a 89 yo WM  who presents to the ED with   PMHx of Atrial fibrillation on coumadin, h/o CHF, dementia, depression, DM, HLD, HTN, prostate cancer brought to ED from [facility] for evaluation of weakness and lethargy .  Information is obtained via chart and EMS.  Per [facility] pt appeared very weak today and did not eat breakfast.  They also noted that he had a temperature of 100.1 around 9 am.  Pt received a flu vaccine 18.  Currently pt alert to person only falling asleep at bedside and unable to provide history .Found to have RSV infection and diagnosed with COPD exacerbation and acute on chronic bronchitis exacerbation ,puljill biggs requested ,Dr Quintana called [facility] for antibacterial managmnet ,nebulised bronchodilators and pulmonology workup,O2 supply,serial labs and chest xrays,obtain ECHO to evaluate LVEF and rule out chf component Palliative care consult requested ,to discuss advance directives and complete MOLST (2019 17:01)        SUBJECTIVE: Patient SOB and has cough and phlegm       ALLERGIES:  Allergies    latex (Rash)  latex (Unknown)  No Known Drug Allergies    Intolerances          MEDICATIONS  (STANDING):  ALBUTerol/ipratropium for Nebulization 3 milliLiter(s) Nebulizer every 6 hours  aspirin  chewable 81 milliGRAM(s) Oral daily  buDESOnide   0.5 milliGRAM(s) Respule 0.5 milliGRAM(s) Inhalation two times a day  carvedilol 3.125 milliGRAM(s) Oral every 12 hours  cefTRIAXone   IVPB 1 Gram(s) IV Intermittent every 24 hours  clopidogrel Tablet 75 milliGRAM(s) Oral daily  dextrose 5%. 1000 milliLiter(s) (50 mL/Hr) IV Continuous <Continuous>  dextrose 50% Injectable 12.5 Gram(s) IV Push once  dextrose 50% Injectable 25 Gram(s) IV Push once  dextrose 50% Injectable 25 Gram(s) IV Push once  donepezil 5 milliGRAM(s) Oral at bedtime  famotidine    Tablet 20 milliGRAM(s) Oral daily  furosemide    Tablet 40 milliGRAM(s) Oral daily  guaiFENesin ER 1200 milliGRAM(s) Oral every 12 hours  insulin lispro (HumaLOG) corrective regimen sliding scale   SubCutaneous three times a day before meals  lactobacillus acidophilus 1 Tablet(s) Oral every 8 hours  lisinopril 5 milliGRAM(s) Oral daily  magnesium sulfate  IVPB 1 Gram(s) IV Intermittent once  potassium chloride    Tablet ER 10 milliEquivalent(s) Oral daily  simvastatin 20 milliGRAM(s) Oral at bedtime  sodium chloride 0.45%. 1000 milliLiter(s) (50 mL/Hr) IV Continuous <Continuous>    MEDICATIONS  (PRN):  acetaminophen  Suppository .. 325 milliGRAM(s) Rectal every 6 hours PRN Temp greater or equal to 38C (100.4F), Mild Pain (1 - 3)  dextrose 40% Gel 15 Gram(s) Oral once PRN Blood Glucose LESS THAN 70 milliGRAM(s)/deciliter  glucagon  Injectable 1 milliGRAM(s) IntraMuscular once PRN Glucose LESS THAN 70 milligrams/deciliter      REVIEW OF SYSTEMS:  ROS is unobtainable due to lethargy and confusion     Vital Signs Last 24 Hrs  T(C): 37.2 (2019 11:05), Max: 39.1 (2019 23:43)  T(F): 98.9 (2019 11:05), Max: 102.3 (2019 23:43)  HR: 95 (2019 11:05) (73 - 95)  BP: 100/65 (2019 11:05) (98/65 - 152/78)  BP(mean): --  RR: 17 (2019 11:05) (16 - 20)  SpO2: 93% (2019 11:05) (90% - 99%)    PHYSICAL EXAM:  HEAD:  Atraumatic, Normocephalic  NECK: Supple and normal thyroid.  No JVD or carotid bruit.   HEART: S1, S2 irregular , 1/6 soft ejection systolic murmur in mitral area , no thrill and no gallops .  PULMONARY: Bilateral vesicular breathing , few scattered ronchi and few scattered rales are present .  ABDOMEN: Soft nontender and positive bowl sounds   EXTREMITIES:  No clubbing, cyanosis, or pedal  edema  NEUROLOGICAL: AA and confused  , no focal deficit .    LABS:                        12.7   12.48 )-----------( 222      ( 2019 06:34 )             37.9         139  |  102  |  20  ----------------------------<  132<H>  3.9   |  25  |  0.66    Ca    8.4<L>      2019 06:34  Mg     1.3         TPro  7.0  /  Alb  3.4  /  TBili  0.6  /  DBili  x   /  AST  15  /  ALT  17  /  AlkPhos  117      CARDIAC MARKERS ( 2019 06:34 )  5.350 ng/mL / x     / 133 U/L / x     / x          PT/INR - ( 2019 09:42 )   PT: 24.1 sec;   INR: 2.08 ratio         PTT - ( 2019 11:41 )  PTT:34.0 sec  Urinalysis Basic - ( 2019 13:20 )    Color: Yellow / Appearance: Clear / S.015 / pH: x  Gluc: x / Ketone: Trace  / Bili: Negative / Urobili: 1   Blood: x / Protein: 25 mg/dL / Nitrite: Negative   Leuk Esterase: Trace / RBC: 11-25 /HPF / WBC 0-2   Sq Epi: x / Non Sq Epi: Occasional / Bacteria: Few      < from: Xray Chest 1 View- PORTABLE-Urgent (19 @ 11:57) >  EXAM:  XR CHEST PORTABLE URGENT 1V                            PROCEDURE DATE:  2019          INTERPRETATION:  AP semierect chest on 2019 11:41 AM. Patient   has weakness and fever.    There may be heart enlargement. Clips in the right upper quadrant again   noted.    Dense calcification in the right upper quadrant laterally again seen.   Left-sided pacemaker again noted.    There is no sense of lung consolidation or layering effusion.    Chest is similar to 2018.    IMPRESSION: No acute infiltrate. Stable chest as above.    < end of copied text >  < from: TTE Echo Doppler w/o Cont (04.10.18 @ 19:38) >   EXAM:  ECHO TTE W/O CON COMP W/DOPPLR         PROCEDURE DATE:  04/10/2018        INTERPRETATION:  Ordering Physician: CLAIRE IBRAHIM [id number]    Indication: LV function    Study Quality: Difficult study   A complete echocardiographic study was performed utilizing standard   protocol including spectral and color Doppler in all echocardiographic   windows.    Height:182.8 cm  Weight: 72.6 KG  BSA:     1.94 sq m  Blood Pressure: 111/59    MEASUREMENTS  IVS: 1.0cm  PWT: 1.1 cm  LA:    cm 5.3  AO: 3.6cm  AV Cusp Separation:   cm  LVIDd: 5.0cm  LVIDs: 3.7cm  LVOT:    cm    LVEF: 50%  RVSP: 31.9mmHg    FINDINGS  Left Ventricle: Left ventricle was of normal size, mild LV systolic   dysfunction EF about 50%  Aortic Valve: Vertex sclerosis withmild aortic regurgitation  Mitral Valve: Mild mitral regurgitation  Tricuspid Valve: Trace tricuspid regurgitation  Pulmonic Valve: Normal  Left Atrium: Moderate left atrial enlargement  Right Ventricle: Normal  Right Atrium: Normal  Diastolic Function: Normal  Pericardium/Pleura: Normal      CONCLUSIONS:  Left ventricle was of normal size, mild LV systolic dysfunction EF 50%  Aortic sclerosis with trace aortic regurgitation  Mitral valve is thickened with mild mitral regurgitation  Trace tricuspid regurgitation          < end of copied text >

## 2019-01-31 NOTE — DIETITIAN INITIAL EVALUATION ADULT. - PT NOT SOURCE
Pt with dementia, disoriented, poor historian, A&O x 2, unable to answer questions, spoke to RN/confused

## 2019-01-31 NOTE — SWALLOW BEDSIDE ASSESSMENT ADULT - PHARYNGEAL PHASE
Delayed pharyngeal swallow Delayed pharyngeal swallow/Cough post oral intake Cough post oral intake/Delayed pharyngeal swallow

## 2019-01-31 NOTE — GOALS OF CARE CONVERSATION - PERSONAL ADVANCE DIRECTIVE - NS PRO AD PATIENT TYPE
Living Will/Health Care Proxy (HCP) Do Not Resuscitate (DNR)/Living Will/Health Care Proxy (HCP)/Medical Orders for Life-Sustaining Treatment (MOLST)

## 2019-01-31 NOTE — GOALS OF CARE CONVERSATION - PERSONAL ADVANCE DIRECTIVE - CONVERSATION DETAILS
contacted pt sonWilber regarding AD, confirmed hcp from EMR last hospitalization.  further discussion of directives: dnr dni, simple lay terms. son will further discuss with his sister, will return call to PC RN if want dnr dni. await return call. contacted pt sonVinicio regarding AD, confirmed hcp from EMR last hospitalization.  further discussion of directives: dnr dni, simple lay terms. son will further discuss with his sister, will return call to PC RN if want dnr dni. await return call.      1/31/19: 1250hrs: pt sonVinicio contacted PC RN, further discussion of AD, son spoke to his sister, Eris reviewed: son agrees to dnr dni no TF, wants treatment for pt, IVF, antibiotics. Dr Ring to complete molst form, order received. PC will follow as needed.

## 2019-01-31 NOTE — PROGRESS NOTE ADULT - SUBJECTIVE AND OBJECTIVE BOX
TERESA FRANCIS [facility] P  [phone]    CONTACT Vinicio Bradford [phone]  ALLERGY Latex  REASON FOR VISIT     subsequent pulm fu   Initial evaluation/Pulmonary Critical Care consultation requested on 2019   by Dr Frias from Dr Quintana   Patient examined chart reviewed  HOSPITAL ADMISSION [facility] 2019     PATIENT CAME  FROM (if information available)      VITALS/LABS      2019 99 89 98/65 16 90%   2019 W 12.4 Hb 12.7 Plt 222 Na 139 K 3.9 CO2 25 Cr .6    2019 W 10.6 Hb 11.8 Plt 211  Na 137 K 4.44 CO2 30 Cr .7   2019 pc .11     REVIEW OF SYMPTOMS    Able to give ROS  NO     PHYSICAL EXAM    HEENT Unremarkable PERRLA atraumatic   RESP Fair air entry EXP prolonged    Harsh breath sound Resp distres mild   CARDIAC S1 S2 No S3     NO JVD    ABDOMEN SOFT BS PRESENT NOT DISTENDED No hepatosplenomegaly PEDAL EDEMA present No calf tenderness  NO rash   GENERAL Not TOXIC looking    GLOBAL ISSUE/BEST PRACTICE:      PROBLEM: HOB elevation:   y            PROBLEM: Stress ulcer proph:    pepcid 20 ()                       PROBLEM: VTE prophylaxis:      on coumadin poa 2019   PROBLEM: Glycemic control:    iss ()   PROBLEM: Nutrition:    cons carb dys 1 puree honey ()   PROBLEM: Advanced directive: na     PROBLEM: Allergies:  latex     PATIENT DESCRIPTION PATIENT TERESA FRANCIS  10/16/1926, 2019 ADMISSION [facility] DR ARCADIO FRIAS  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 [employer] [facility] [employer] HO  injury L shin remote past 3/18/2014 V duplex legs –ben Chr swelling leg  OBS Prostate CA LV Systolic dysfunction CHF 10/24/2014 ECHO EF 50% Mild hypokinesia septum PASP 40 Mod MR    A fib  (on coumadin) Htn Depression Lower extr edema Chr swelling L leg DM      LUNG NODULE THYROID NODULE 10/21/2014 CT Ch  1 cm hypoattenuating thyroid nodule  Trace symmetrical layering bl pl effusions with dependent atelectasis bases  Ca granuloma both RML and l lower lobes  5 mm subpleural parenchymal nodule R apex  was admitted [facility] 2019 with dyspnea fever 100 He did getflu vaccine 2018   Pulm consulted 2019   [facility] meds coreg 3125x2 donepezil 5 lisinopril 5 metformin 500.2 dig 25 venlafaxine 75.2 simvastat 20 warfarin 10   er vitals afeb 76 108/66 95%     HOSPITAL COURSE   Resp syncytial viral resp tract infection managed with supp care   Troponin elevation was noted Pt kept on ASA Plavix Followed by Dr Humberto EID CHF Chronic managed with lasix 40 () lisinopril 5 (1/3)   COPD managed with bd ics   A fib managed with couamdin     ASSESSMENT PLAN PATIENT YNGSTROM RAY 2019 ADMISSION [facility]  DR ARCADIO FRIAS   UTI 2019 ua w 0-2 r 11-25    Rocephin ()   RESP Monitor po Target 90-95%  RSV RTI Supp care   COPD   Duoneb.4 () Pulmicort ()   AF   Coreg 3125.2 ()   TROPONIN ELEVATION   2019 Tr 1 5.3   ASA 81 () Plavix 75 ()   S CHF 10/24/2014 ECHO EF 50% Mild hypokinesia septum PASP 40 Mod MR      Lisinopril 5 () Lasix 40 ()   OBS Doneppezil 5 ()     PLAN  Supp care for rsv and DC planning     TIME SPENT Over 25 minutes aggregate care time spent on encounter; activities included   direct patient care, counseling and/or coordinating care reviewing notes, lab data/ imaging , discussion with multidisciplinary team/ patient  /family. Risks, benefits, alternatives  discussed in detail.

## 2019-01-31 NOTE — GOALS OF CARE CONVERSATION - PERSONAL ADVANCE DIRECTIVE - NS PRO AD PATIENT TYPE ON CHART
Health Care Proxy (HCP) Health Care Proxy (HCP)/molst completed 1/31/19/Medical Orders for Life-Sustaining Treatment (MOLST)/Do Not Resuscitate (DNR)

## 2019-01-31 NOTE — DIETITIAN INITIAL EVALUATION ADULT. - NS AS NUTRI INTERV FEED ASSISTANCE
Continue to provide pt with feeding assistance with meals. Provide pt with meal encouragement. RD to remain available./Feeding Assistance

## 2019-01-31 NOTE — DIETITIAN INITIAL EVALUATION ADULT. - ADHERENCE
Per SLP note pt was consuming regular texture foods with thin liquids, however question pt's reliability. Per chart pt is on Metformin PTA, current HgbA1c 6.0%, indicates good control./n/a

## 2019-01-31 NOTE — SWALLOW BEDSIDE ASSESSMENT ADULT - DIET PRIOR TO ADMI
Pt reported he ate a regular diet with thin liquids at home PTA, however, pt not a reliable historian

## 2019-01-31 NOTE — SWALLOW BEDSIDE ASSESSMENT ADULT - SWALLOW EVAL: RECOMMENDED FEEDING/EATING TECHNIQUES
alternate food with liquid/crush medication (when feasible)/position upright (90 degrees)/small sips/bites/maintain upright posture during/after eating for 30 mins

## 2019-01-31 NOTE — DIETITIAN INITIAL EVALUATION ADULT. - PERTINENT MEDS FT
Lasix, Magnesium sulfate, Potassium Chloride, Coreg, Pepcid, Lactobacillus acidophilus, Zocor, Humalog sliding scale

## 2019-01-31 NOTE — PROGRESS NOTE ADULT - SUBJECTIVE AND OBJECTIVE BOX
[facility].CZechariah [place]       HPI:  Pt is a 89 yo WM  who presents to the ED with   PMHx of Atrial fibrillation on coumadin, h/o CHF, dementia, depression, DM, HLD, HTN, prostate cancer brought to ED from [facility] for evaluation of weakness and lethargy .  Information is obtained via chart and EMS.  Per [facility] pt appeared very weak today and did not eat breakfast.  They also noted that he had a temperature of 100.1 around 9 am.  Pt received a flu vaccine 18.  Currently pt alert to person only falling asleep at bedside and unable to provide history .Found to have RSV infection and diagnosed with COPD exacerbation and acute on chronic bronchitis exacerbation ,puljill biggs requested ,Dr Quintana called [facility] for antibacterial managmnet ,nebulised bronchodilators and pulmonology workup,O2 supply,serial labs and chest xrays,obtain ECHO to evaluate LVEF and rule out chf component Palliative care consult requested ,to discuss advance directives and complete MOLST (2019 17:01)        SUBJECTIVE:      ALLERGIES:  Allergies    latex (Rash)  latex (Unknown)  No Known Drug Allergies    Intolerances          MEDICATIONS  (STANDING):  ALBUTerol/ipratropium for Nebulization 3 milliLiter(s) Nebulizer every 6 hours  aspirin  chewable 81 milliGRAM(s) Oral daily  buDESOnide   0.5 milliGRAM(s) Respule 0.5 milliGRAM(s) Inhalation two times a day  carvedilol 3.125 milliGRAM(s) Oral every 12 hours  cefTRIAXone   IVPB 1 Gram(s) IV Intermittent every 24 hours  clopidogrel Tablet 75 milliGRAM(s) Oral daily  dextrose 5%. 1000 milliLiter(s) (50 mL/Hr) IV Continuous <Continuous>  dextrose 50% Injectable 12.5 Gram(s) IV Push once  dextrose 50% Injectable 25 Gram(s) IV Push once  dextrose 50% Injectable 25 Gram(s) IV Push once  donepezil 5 milliGRAM(s) Oral at bedtime  famotidine    Tablet 20 milliGRAM(s) Oral daily  furosemide    Tablet 40 milliGRAM(s) Oral daily  guaiFENesin ER 1200 milliGRAM(s) Oral every 12 hours  insulin lispro (HumaLOG) corrective regimen sliding scale   SubCutaneous three times a day before meals  lactobacillus acidophilus 1 Tablet(s) Oral every 8 hours  lisinopril 5 milliGRAM(s) Oral daily  magnesium sulfate  IVPB 1 Gram(s) IV Intermittent once  potassium chloride    Tablet ER 10 milliEquivalent(s) Oral daily  simvastatin 20 milliGRAM(s) Oral at bedtime  sodium chloride 0.45%. 1000 milliLiter(s) (50 mL/Hr) IV Continuous <Continuous>    MEDICATIONS  (PRN):  acetaminophen  Suppository .. 325 milliGRAM(s) Rectal every 6 hours PRN Temp greater or equal to 38C (100.4F), Mild Pain (1 - 3)  dextrose 40% Gel 15 Gram(s) Oral once PRN Blood Glucose LESS THAN 70 milliGRAM(s)/deciliter  glucagon  Injectable 1 milliGRAM(s) IntraMuscular once PRN Glucose LESS THAN 70 milligrams/deciliter      REVIEW OF SYSTEMS:  CONSTITUTIONAL: No fever,  RESPIRATORY: No cough, wheezing, shortness of breath  CARDIOVASCULAR: No chest pain, dyspnea, palpitations, dizziness, syncope, paroxysmal nocturnal dyspnea, orthopnea, or arm or leg swelling  GASTROINTESTINAL: No abdominal  or epigastric pain, nausea, vomiting,  diarrhea  NEUROLOGICAL: No headaches,  loss of strength, numbness, or tremors    Vital Signs Last 24 Hrs  T(C): 37.2 (2019 11:05), Max: 39.1 (2019 23:43)  T(F): 98.9 (2019 11:05), Max: 102.3 (2019 23:43)  HR: 95 (2019 11:05) (73 - 95)  BP: 100/65 (2019 11:05) (98/65 - 152/78)  BP(mean): --  RR: 17 (2019 11:05) (16 - 20)  SpO2: 93% (2019 11:05) (90% - 99%)    PHYSICAL EXAM:  HEAD:  Atraumatic, Normocephalic  NECK: Supple and normal thyroid.  No JVD or carotid bruit.   HEART: S1, S2 regular , 1/6 soft ejection systolic murmur in mitral area , no thrill and no gallops .  PULMONARY: Bilateral vesicular breathing , few scattered ronchi and few scattered rales are present .  ABDOMEN: Soft nontender and positive bowl sounds   EXTREMITIES:  No clubbing, cyanosis, or pedal  edema  NEUROLOGICAL: AAOX3 , no focal deficit .    LABS:                        12.7   12.48 )-----------( 222      ( 2019 06:34 )             37.9         139  |  102  |  20  ----------------------------<  132<H>  3.9   |  25  |  0.66    Ca    8.4<L>      2019 06:34  Mg     1.3         TPro  7.0  /  Alb  3.4  /  TBili  0.6  /  DBili  x   /  AST  15  /  ALT  17  /  AlkPhos  117      CARDIAC MARKERS ( 2019 06:34 )  5.350 ng/mL / x     / 133 U/L / x     / x          PT/INR - ( 2019 09:42 )   PT: 24.1 sec;   INR: 2.08 ratio         PTT - ( 2019 11:41 )  PTT:34.0 sec  Urinalysis Basic - ( 2019 13:20 )    Color: Yellow / Appearance: Clear / S.015 / pH: x  Gluc: x / Ketone: Trace  / Bili: Negative / Urobili: 1   Blood: x / Protein: 25 mg/dL / Nitrite: Negative   Leuk Esterase: Trace / RBC: 11-25 /HPF / WBC 0-2   Sq Epi: x / Non Sq Epi: Occasional / Bacteria: Few      BNP      EKG:  ECHO:  IMAGING:    Assessment/Plan    Will continue to follow during hospital course and give further recommendations as needed . Thanks for your referral . if any questions please contact me at office ([phone] [phone]) [facility]CZechariah [place]       HPI:  Pt is a 89 yo WM  who presents to the ED with   PMHx of Atrial fibrillation on coumadin, h/o CHF, dementia, depression, DM, HLD, HTN, prostate cancer brought to ED from [facility] for evaluation of weakness and lethargy .  Information is obtained via chart and EMS.  Per [facility] pt appeared very weak today and did not eat breakfast.  They also noted that he had a temperature of 100.1 around 9 am.  Pt received a flu vaccine 18.  Currently pt alert to person only falling asleep at bedside and unable to provide history .Found to have RSV infection and diagnosed with COPD exacerbation and acute on chronic bronchitis exacerbation ,puljill biggs requested ,Dr Quintana called [facility] for antibacterial managmnet ,nebulised bronchodilators and pulmonology workup,O2 supply,serial labs and chest xrays,obtain ECHO to evaluate LVEF and rule out chf component Palliative care consult requested ,to discuss advance directives and complete MOLST (2019 17:01)        SUBJECTIVE: Patient SOB and has cough and phlegm       ALLERGIES:  Allergies    latex (Rash)  latex (Unknown)  No Known Drug Allergies    Intolerances          MEDICATIONS  (STANDING):  ALBUTerol/ipratropium for Nebulization 3 milliLiter(s) Nebulizer every 6 hours  aspirin  chewable 81 milliGRAM(s) Oral daily  buDESOnide   0.5 milliGRAM(s) Respule 0.5 milliGRAM(s) Inhalation two times a day  carvedilol 3.125 milliGRAM(s) Oral every 12 hours  cefTRIAXone   IVPB 1 Gram(s) IV Intermittent every 24 hours  clopidogrel Tablet 75 milliGRAM(s) Oral daily  dextrose 5%. 1000 milliLiter(s) (50 mL/Hr) IV Continuous <Continuous>  dextrose 50% Injectable 12.5 Gram(s) IV Push once  dextrose 50% Injectable 25 Gram(s) IV Push once  dextrose 50% Injectable 25 Gram(s) IV Push once  donepezil 5 milliGRAM(s) Oral at bedtime  famotidine    Tablet 20 milliGRAM(s) Oral daily  furosemide    Tablet 40 milliGRAM(s) Oral daily  guaiFENesin ER 1200 milliGRAM(s) Oral every 12 hours  insulin lispro (HumaLOG) corrective regimen sliding scale   SubCutaneous three times a day before meals  lactobacillus acidophilus 1 Tablet(s) Oral every 8 hours  lisinopril 5 milliGRAM(s) Oral daily  magnesium sulfate  IVPB 1 Gram(s) IV Intermittent once  potassium chloride    Tablet ER 10 milliEquivalent(s) Oral daily  simvastatin 20 milliGRAM(s) Oral at bedtime  sodium chloride 0.45%. 1000 milliLiter(s) (50 mL/Hr) IV Continuous <Continuous>    MEDICATIONS  (PRN):  acetaminophen  Suppository .. 325 milliGRAM(s) Rectal every 6 hours PRN Temp greater or equal to 38C (100.4F), Mild Pain (1 - 3)  dextrose 40% Gel 15 Gram(s) Oral once PRN Blood Glucose LESS THAN 70 milliGRAM(s)/deciliter  glucagon  Injectable 1 milliGRAM(s) IntraMuscular once PRN Glucose LESS THAN 70 milligrams/deciliter      REVIEW OF SYSTEMS:  CONSTITUTIONAL: No fever,  RESPIRATORY:  Mild  cough, wheezing, shortness of breath  CARDIOVASCULAR: No chest pain, dyspnea, palpitations, dizziness, syncope, paroxysmal nocturnal dyspnea, orthopnea, or arm or leg swelling  GASTROINTESTINAL: No abdominal  or epigastric pain, nausea, vomiting,  diarrhea  NEUROLOGICAL: No headaches,  loss of strength, numbness, or tremors    Vital Signs Last 24 Hrs  T(C): 37.2 (2019 11:05), Max: 39.1 (2019 23:43)  T(F): 98.9 (2019 11:05), Max: 102.3 (2019 23:43)  HR: 95 (2019 11:05) (73 - 95)  BP: 100/65 (2019 11:05) (98/65 - 152/78)  BP(mean): --  RR: 17 (2019 11:05) (16 - 20)  SpO2: 93% (2019 11:05) (90% - 99%)    PHYSICAL EXAM:  HEAD:  Atraumatic, Normocephalic  NECK: Supple and normal thyroid.  No JVD or carotid bruit.   HEART: S1, S2 irregular , 1/6 soft ejection systolic murmur in mitral area , no thrill and no gallops .  PULMONARY: Bilateral vesicular breathing , few scattered ronchi and few scattered rales are present .  ABDOMEN: Soft nontender and positive bowl sounds   EXTREMITIES:  No clubbing, cyanosis, or pedal  edema  NEUROLOGICAL: AA and confused  , no focal deficit .    LABS:                        12.7   12.48 )-----------( 222      ( 2019 06:34 )             37.9         139  |  102  |  20  ----------------------------<  132<H>  3.9   |  25  |  0.66    Ca    8.4<L>      2019 06:34  Mg     1.3         TPro  7.0  /  Alb  3.4  /  TBili  0.6  /  DBili  x   /  AST  15  /  ALT  17  /  AlkPhos  117      CARDIAC MARKERS ( 2019 06:34 )  5.350 ng/mL / x     / 133 U/L / x     / x          PT/INR - ( 2019 09:42 )   PT: 24.1 sec;   INR: 2.08 ratio         PTT - ( 2019 11:41 )  PTT:34.0 sec  Urinalysis Basic - ( 2019 13:20 )    Color: Yellow / Appearance: Clear / S.015 / pH: x  Gluc: x / Ketone: Trace  / Bili: Negative / Urobili: 1   Blood: x / Protein: 25 mg/dL / Nitrite: Negative   Leuk Esterase: Trace / RBC: 11-25 /HPF / WBC 0-2   Sq Epi: x / Non Sq Epi: Occasional / Bacteria: Few      Assessment/Plan  Patient has :  1) Pneumonia .  2) CHF .  3) NON -STEMI . Troponin I significantly elevated .  4) Dementia ,  5) Atrial fibrillation under control and PPM .  Plan 1) Discussed with son who is health care proxy and do not want cardiac acth and continue medical management for NON -STEMI . 2) Agree with decision , patient very high risk other wise also . 2) Continue Plavix and aspirin also . 3) Rest of medications as such . 4) prognosis guarded .  Will continue to follow during hospital course and give further recommendations as needed . Thanks for your referral . if any questions please contact me at office ([phone] [phone])

## 2019-01-31 NOTE — SWALLOW BEDSIDE ASSESSMENT ADULT - ORAL PHASE
Decreased anterior-posterior movement of the bolus Delayed oral transit time/Decreased anterior-posterior movement of the bolus Decreased anterior-posterior movement of the bolus/Delayed oral transit time

## 2019-01-31 NOTE — DIETITIAN INITIAL EVALUATION ADULT. - OTHER INFO
Pt seen for dysphagia. As per chart pt is a 89 year old male with a PMH of Atrial fibrillation on coumadin, h/o CHF, dementia, depression, DM, HLD, HTN, prostate cancer brought to ED from [facility] for evaluation of weakness and lethargy. Pt admitted with RSV bronchitis. Pt seen eating lunch at time of visit with assistance from nurse. Pt was doing well with the texture, ate well with encouragement and assistance. Pt's admission wt per chart 162.1lbs. Of note pt is currently on Lasix. Per previous RD note pt's weight (9/14/17) 164.2lbs, indicates pt's weight has been fairly stable over the past few years. Pt with no GI distress, +BM 1/30.

## 2019-01-31 NOTE — DIETITIAN INITIAL EVALUATION ADULT. - NS AS NUTRI INTERV MEALS SNACK
General/healthful diet/Continue with current Consistent CHO w/evening snack diet, dysphagia 1 pureed with honey thickened liquids as per SLP recommendations.

## 2019-02-01 DIAGNOSIS — R50.9 FEVER, UNSPECIFIED: ICD-10-CM

## 2019-02-01 LAB
ALBUMIN SERPL ELPH-MCNC: 2.7 G/DL — LOW (ref 3.3–5)
ALBUMIN SERPL ELPH-MCNC: 2.8 G/DL — LOW (ref 3.3–5)
ALP SERPL-CCNC: 77 U/L — SIGNIFICANT CHANGE UP (ref 40–120)
ALP SERPL-CCNC: 86 U/L — SIGNIFICANT CHANGE UP (ref 40–120)
ALT FLD-CCNC: 18 U/L — SIGNIFICANT CHANGE UP (ref 12–78)
ALT FLD-CCNC: 19 U/L — SIGNIFICANT CHANGE UP (ref 12–78)
ANION GAP SERPL CALC-SCNC: 7 MMOL/L — SIGNIFICANT CHANGE UP (ref 5–17)
ANION GAP SERPL CALC-SCNC: 8 MMOL/L — SIGNIFICANT CHANGE UP (ref 5–17)
AST SERPL-CCNC: 39 U/L — HIGH (ref 15–37)
AST SERPL-CCNC: 41 U/L — HIGH (ref 15–37)
BASOPHILS # BLD AUTO: 0.01 K/UL — SIGNIFICANT CHANGE UP (ref 0–0.2)
BASOPHILS NFR BLD AUTO: 0.1 % — SIGNIFICANT CHANGE UP (ref 0–2)
BILIRUB SERPL-MCNC: 0.7 MG/DL — SIGNIFICANT CHANGE UP (ref 0.2–1.2)
BILIRUB SERPL-MCNC: 0.8 MG/DL — SIGNIFICANT CHANGE UP (ref 0.2–1.2)
BUN SERPL-MCNC: 26 MG/DL — HIGH (ref 7–23)
BUN SERPL-MCNC: 27 MG/DL — HIGH (ref 7–23)
CALCIUM SERPL-MCNC: 8.2 MG/DL — LOW (ref 8.5–10.1)
CALCIUM SERPL-MCNC: 8.3 MG/DL — LOW (ref 8.5–10.1)
CHLORIDE SERPL-SCNC: 103 MMOL/L — SIGNIFICANT CHANGE UP (ref 96–108)
CHLORIDE SERPL-SCNC: 105 MMOL/L — SIGNIFICANT CHANGE UP (ref 96–108)
CO2 SERPL-SCNC: 27 MMOL/L — SIGNIFICANT CHANGE UP (ref 22–31)
CO2 SERPL-SCNC: 29 MMOL/L — SIGNIFICANT CHANGE UP (ref 22–31)
CREAT SERPL-MCNC: 0.76 MG/DL — SIGNIFICANT CHANGE UP (ref 0.5–1.3)
CREAT SERPL-MCNC: 0.84 MG/DL — SIGNIFICANT CHANGE UP (ref 0.5–1.3)
EOSINOPHIL # BLD AUTO: 0 K/UL — SIGNIFICANT CHANGE UP (ref 0–0.5)
EOSINOPHIL NFR BLD AUTO: 0 % — SIGNIFICANT CHANGE UP (ref 0–6)
GLUCOSE SERPL-MCNC: 173 MG/DL — HIGH (ref 70–99)
GLUCOSE SERPL-MCNC: 175 MG/DL — HIGH (ref 70–99)
HCT VFR BLD CALC: 33.3 % — LOW (ref 39–50)
HCT VFR BLD CALC: 37.5 % — LOW (ref 39–50)
HGB BLD-MCNC: 11.3 G/DL — LOW (ref 13–17)
HGB BLD-MCNC: 12.3 G/DL — LOW (ref 13–17)
IMM GRANULOCYTES NFR BLD AUTO: 0.3 % — SIGNIFICANT CHANGE UP (ref 0–1.5)
INR BLD: 1.58 RATIO — HIGH (ref 0.88–1.16)
LACTATE SERPL-SCNC: 1.8 MMOL/L — SIGNIFICANT CHANGE UP (ref 0.7–2)
LYMPHOCYTES # BLD AUTO: 0.81 K/UL — LOW (ref 1–3.3)
LYMPHOCYTES # BLD AUTO: 11.1 % — LOW (ref 13–44)
MAGNESIUM SERPL-MCNC: 1.7 MG/DL — SIGNIFICANT CHANGE UP (ref 1.6–2.6)
MAGNESIUM SERPL-MCNC: 1.8 MG/DL — SIGNIFICANT CHANGE UP (ref 1.6–2.6)
MCHC RBC-ENTMCNC: 31.1 PG — SIGNIFICANT CHANGE UP (ref 27–34)
MCHC RBC-ENTMCNC: 31.1 PG — SIGNIFICANT CHANGE UP (ref 27–34)
MCHC RBC-ENTMCNC: 32.8 GM/DL — SIGNIFICANT CHANGE UP (ref 32–36)
MCHC RBC-ENTMCNC: 33.9 GM/DL — SIGNIFICANT CHANGE UP (ref 32–36)
MCV RBC AUTO: 91.7 FL — SIGNIFICANT CHANGE UP (ref 80–100)
MCV RBC AUTO: 94.9 FL — SIGNIFICANT CHANGE UP (ref 80–100)
MONOCYTES # BLD AUTO: 0.58 K/UL — SIGNIFICANT CHANGE UP (ref 0–0.9)
MONOCYTES NFR BLD AUTO: 8 % — SIGNIFICANT CHANGE UP (ref 2–14)
NEUTROPHILS # BLD AUTO: 5.85 K/UL — SIGNIFICANT CHANGE UP (ref 1.8–7.4)
NEUTROPHILS NFR BLD AUTO: 80.5 % — HIGH (ref 43–77)
NRBC # BLD: 0 /100 WBCS — SIGNIFICANT CHANGE UP (ref 0–0)
NT-PROBNP SERPL-SCNC: 8486 PG/ML — HIGH (ref 0–450)
PHOSPHATE SERPL-MCNC: 1.7 MG/DL — LOW (ref 2.5–4.5)
PHOSPHATE SERPL-MCNC: 1.9 MG/DL — LOW (ref 2.5–4.5)
PLATELET # BLD AUTO: 178 K/UL — SIGNIFICANT CHANGE UP (ref 150–400)
PLATELET # BLD AUTO: 195 K/UL — SIGNIFICANT CHANGE UP (ref 150–400)
POTASSIUM SERPL-MCNC: 3.6 MMOL/L — SIGNIFICANT CHANGE UP (ref 3.5–5.3)
POTASSIUM SERPL-MCNC: 3.8 MMOL/L — SIGNIFICANT CHANGE UP (ref 3.5–5.3)
POTASSIUM SERPL-SCNC: 3.6 MMOL/L — SIGNIFICANT CHANGE UP (ref 3.5–5.3)
POTASSIUM SERPL-SCNC: 3.8 MMOL/L — SIGNIFICANT CHANGE UP (ref 3.5–5.3)
PROT SERPL-MCNC: 6 G/DL — SIGNIFICANT CHANGE UP (ref 6–8.3)
PROT SERPL-MCNC: 6.3 G/DL — SIGNIFICANT CHANGE UP (ref 6–8.3)
PROTHROM AB SERPL-ACNC: 18.3 SEC — HIGH (ref 10–12.9)
RBC # BLD: 3.63 M/UL — LOW (ref 4.2–5.8)
RBC # BLD: 3.95 M/UL — LOW (ref 4.2–5.8)
RBC # FLD: 13 % — SIGNIFICANT CHANGE UP (ref 10.3–14.5)
RBC # FLD: 13.2 % — SIGNIFICANT CHANGE UP (ref 10.3–14.5)
SODIUM SERPL-SCNC: 139 MMOL/L — SIGNIFICANT CHANGE UP (ref 135–145)
SODIUM SERPL-SCNC: 140 MMOL/L — SIGNIFICANT CHANGE UP (ref 135–145)
TROPONIN I SERPL-MCNC: 2.22 NG/ML — HIGH (ref 0.01–0.04)
WBC # BLD: 7.27 K/UL — SIGNIFICANT CHANGE UP (ref 3.8–10.5)
WBC # BLD: 7.85 K/UL — SIGNIFICANT CHANGE UP (ref 3.8–10.5)
WBC # FLD AUTO: 7.27 K/UL — SIGNIFICANT CHANGE UP (ref 3.8–10.5)
WBC # FLD AUTO: 7.85 K/UL — SIGNIFICANT CHANGE UP (ref 3.8–10.5)

## 2019-02-01 PROCEDURE — 71045 X-RAY EXAM CHEST 1 VIEW: CPT | Mod: 26,76

## 2019-02-01 RX ORDER — PIPERACILLIN AND TAZOBACTAM 4; .5 G/20ML; G/20ML
4.5 INJECTION, POWDER, LYOPHILIZED, FOR SOLUTION INTRAVENOUS EVERY 8 HOURS
Qty: 0 | Refills: 0 | Status: DISCONTINUED | OUTPATIENT
Start: 2019-02-01 | End: 2019-02-01

## 2019-02-01 RX ORDER — SODIUM CHLORIDE 9 MG/ML
500 INJECTION INTRAMUSCULAR; INTRAVENOUS; SUBCUTANEOUS ONCE
Qty: 0 | Refills: 0 | Status: COMPLETED | OUTPATIENT
Start: 2019-02-01 | End: 2019-02-01

## 2019-02-01 RX ORDER — PIPERACILLIN AND TAZOBACTAM 4; .5 G/20ML; G/20ML
3.38 INJECTION, POWDER, LYOPHILIZED, FOR SOLUTION INTRAVENOUS EVERY 8 HOURS
Qty: 0 | Refills: 0 | Status: DISCONTINUED | OUTPATIENT
Start: 2019-02-01 | End: 2019-02-04

## 2019-02-01 RX ORDER — ACETAMINOPHEN 500 MG
650 TABLET ORAL ONCE
Qty: 0 | Refills: 0 | Status: DISCONTINUED | OUTPATIENT
Start: 2019-02-01 | End: 2019-02-01

## 2019-02-01 RX ORDER — MIDODRINE HYDROCHLORIDE 2.5 MG/1
5 TABLET ORAL EVERY 8 HOURS
Qty: 0 | Refills: 0 | Status: DISCONTINUED | OUTPATIENT
Start: 2019-02-01 | End: 2019-02-06

## 2019-02-01 RX ORDER — WARFARIN SODIUM 2.5 MG/1
2.5 TABLET ORAL ONCE
Qty: 0 | Refills: 0 | Status: COMPLETED | OUTPATIENT
Start: 2019-02-01 | End: 2019-02-01

## 2019-02-01 RX ORDER — SODIUM,POTASSIUM PHOSPHATES 278-250MG
1 POWDER IN PACKET (EA) ORAL
Qty: 0 | Refills: 0 | Status: DISCONTINUED | OUTPATIENT
Start: 2019-02-01 | End: 2019-02-06

## 2019-02-01 RX ORDER — PIPERACILLIN AND TAZOBACTAM 4; .5 G/20ML; G/20ML
3.38 INJECTION, POWDER, LYOPHILIZED, FOR SOLUTION INTRAVENOUS ONCE
Qty: 0 | Refills: 0 | Status: COMPLETED | OUTPATIENT
Start: 2019-02-01 | End: 2019-02-01

## 2019-02-01 RX ORDER — POTASSIUM PHOSPHATE, MONOBASIC POTASSIUM PHOSPHATE, DIBASIC 236; 224 MG/ML; MG/ML
15 INJECTION, SOLUTION INTRAVENOUS ONCE
Qty: 0 | Refills: 0 | Status: COMPLETED | OUTPATIENT
Start: 2019-02-01 | End: 2019-02-01

## 2019-02-01 RX ADMIN — MAGNESIUM OXIDE 400 MG ORAL TABLET 400 MILLIGRAM(S): 241.3 TABLET ORAL at 18:00

## 2019-02-01 RX ADMIN — MIDODRINE HYDROCHLORIDE 5 MILLIGRAM(S): 2.5 TABLET ORAL at 13:35

## 2019-02-01 RX ADMIN — WARFARIN SODIUM 2.5 MILLIGRAM(S): 2.5 TABLET ORAL at 21:15

## 2019-02-01 RX ADMIN — Medication 3 MILLILITER(S): at 15:04

## 2019-02-01 RX ADMIN — DONEPEZIL HYDROCHLORIDE 5 MILLIGRAM(S): 10 TABLET, FILM COATED ORAL at 21:15

## 2019-02-01 RX ADMIN — Medication 325 MILLIGRAM(S): at 20:19

## 2019-02-01 RX ADMIN — Medication 0.5 MILLIGRAM(S): at 20:00

## 2019-02-01 RX ADMIN — Medication 325 MILLIGRAM(S): at 17:00

## 2019-02-01 RX ADMIN — Medication 1 TABLET(S): at 21:15

## 2019-02-01 RX ADMIN — MIDODRINE HYDROCHLORIDE 5 MILLIGRAM(S): 2.5 TABLET ORAL at 21:15

## 2019-02-01 RX ADMIN — CLOPIDOGREL BISULFATE 75 MILLIGRAM(S): 75 TABLET, FILM COATED ORAL at 13:33

## 2019-02-01 RX ADMIN — Medication 1 TABLET(S): at 13:35

## 2019-02-01 RX ADMIN — MAGNESIUM OXIDE 400 MG ORAL TABLET 400 MILLIGRAM(S): 241.3 TABLET ORAL at 13:33

## 2019-02-01 RX ADMIN — Medication 3 MILLILITER(S): at 20:00

## 2019-02-01 RX ADMIN — Medication 81 MILLIGRAM(S): at 13:33

## 2019-02-01 RX ADMIN — MIDODRINE HYDROCHLORIDE 5 MILLIGRAM(S): 2.5 TABLET ORAL at 06:45

## 2019-02-01 RX ADMIN — SODIUM CHLORIDE 1000 MILLILITER(S): 9 INJECTION INTRAMUSCULAR; INTRAVENOUS; SUBCUTANEOUS at 01:37

## 2019-02-01 RX ADMIN — POTASSIUM PHOSPHATE, MONOBASIC POTASSIUM PHOSPHATE, DIBASIC 62.5 MILLIMOLE(S): 236; 224 INJECTION, SOLUTION INTRAVENOUS at 17:59

## 2019-02-01 RX ADMIN — PIPERACILLIN AND TAZOBACTAM 25 GRAM(S): 4; .5 INJECTION, POWDER, LYOPHILIZED, FOR SOLUTION INTRAVENOUS at 18:01

## 2019-02-01 RX ADMIN — Medication 10 MILLIEQUIVALENT(S): at 13:33

## 2019-02-01 RX ADMIN — FAMOTIDINE 20 MILLIGRAM(S): 10 INJECTION INTRAVENOUS at 13:32

## 2019-02-01 RX ADMIN — Medication 1200 MILLIGRAM(S): at 18:00

## 2019-02-01 RX ADMIN — Medication 1 TABLET(S): at 05:53

## 2019-02-01 RX ADMIN — SIMVASTATIN 20 MILLIGRAM(S): 20 TABLET, FILM COATED ORAL at 21:15

## 2019-02-01 RX ADMIN — PIPERACILLIN AND TAZOBACTAM 25 GRAM(S): 4; .5 INJECTION, POWDER, LYOPHILIZED, FOR SOLUTION INTRAVENOUS at 13:32

## 2019-02-01 RX ADMIN — Medication 1200 MILLIGRAM(S): at 05:53

## 2019-02-01 RX ADMIN — PIPERACILLIN AND TAZOBACTAM 200 GRAM(S): 4; .5 INJECTION, POWDER, LYOPHILIZED, FOR SOLUTION INTRAVENOUS at 03:04

## 2019-02-01 RX ADMIN — Medication 1 TABLET(S): at 18:00

## 2019-02-01 RX ADMIN — MAGNESIUM OXIDE 400 MG ORAL TABLET 400 MILLIGRAM(S): 241.3 TABLET ORAL at 13:25

## 2019-02-01 RX ADMIN — Medication 3 MILLILITER(S): at 07:06

## 2019-02-01 RX ADMIN — Medication 1 TABLET(S): at 13:32

## 2019-02-01 RX ADMIN — Medication 3 MILLILITER(S): at 02:03

## 2019-02-01 RX ADMIN — Medication 0.5 MILLIGRAM(S): at 07:06

## 2019-02-01 NOTE — CONSULT NOTE ADULT - SUBJECTIVE AND OBJECTIVE BOX
Patient is a 89y old  Male who presents with a chief complaint of weakness and lethargy (31 Jan 2019 14:30)      The patient  is a 89 yo male with a history of dementia, depression, CHF, DM, Afib and prostate cancer who was brought to ED for weakness and fevers. In the ED he had a fever of 102F. The patient was noted to have very elevated procalcitonin level of 14. He had a positive RSV in the ED but the CXR did not show clear evidence of pneumonia. He was admitted and placed on IV Rocephin. His UA was unremarkable. The patient still had a fever of 101F last night.       PAST MEDICAL & SURGICAL HISTORY:  Dementia  Depression  Diabetes  Hyperlipemia  CHF (congestive heart failure)  Dementia  Prostate ca  Diabetes  Hypertension  Atrial fibrillation  No significant past surgical history      Allergies    latex (Rash)  latex (Unknown)  No Known Drug Allergies    Intolerances        REVIEW OF SYSTEMS:  No diarrhea or vomiting.  No chest pain    HOME MEDICATIONS:    MEDICATIONS  (STANDING):  ALBUTerol/ipratropium for Nebulization 3 milliLiter(s) Nebulizer every 6 hours  aspirin  chewable 81 milliGRAM(s) Oral daily  buDESOnide   0.5 milliGRAM(s) Respule 0.5 milliGRAM(s) Inhalation two times a day  carvedilol 3.125 milliGRAM(s) Oral every 12 hours  clopidogrel Tablet 75 milliGRAM(s) Oral daily  dextrose 5%. 1000 milliLiter(s) (50 mL/Hr) IV Continuous <Continuous>  dextrose 50% Injectable 12.5 Gram(s) IV Push once  dextrose 50% Injectable 25 Gram(s) IV Push once  dextrose 50% Injectable 25 Gram(s) IV Push once  donepezil 5 milliGRAM(s) Oral at bedtime  famotidine    Tablet 20 milliGRAM(s) Oral daily  furosemide   Injectable 20 milliGRAM(s) IV Push two times a day  guaiFENesin ER 1200 milliGRAM(s) Oral every 12 hours  insulin lispro (HumaLOG) corrective regimen sliding scale   SubCutaneous three times a day before meals  lactobacillus acidophilus 1 Tablet(s) Oral every 8 hours  lisinopril 5 milliGRAM(s) Oral daily  magnesium oxide 400 milliGRAM(s) Oral three times a day with meals  midodrine 5 milliGRAM(s) Oral every 8 hours  piperacillin/tazobactam IVPB. 3.375 Gram(s) IV Intermittent every 8 hours  potassium chloride    Tablet ER 10 milliEquivalent(s) Oral daily  simvastatin 20 milliGRAM(s) Oral at bedtime    MEDICATIONS  (PRN):  acetaminophen  Suppository .. 325 milliGRAM(s) Rectal every 6 hours PRN Temp greater or equal to 38C (100.4F), Mild Pain (1 - 3)  dextrose 40% Gel 15 Gram(s) Oral once PRN Blood Glucose LESS THAN 70 milliGRAM(s)/deciliter  glucagon  Injectable 1 milliGRAM(s) IntraMuscular once PRN Glucose LESS THAN 70 milligrams/deciliter      Vital Signs Last 24 Hrs  T(C): 37.6 (01 Feb 2019 04:57), Max: 38.5 (01 Feb 2019 00:32)  T(F): 99.6 (01 Feb 2019 04:57), Max: 101.3 (01 Feb 2019 00:32)  HR: 64 (01 Feb 2019 07:07) (60 - 97)  BP: 88/50 (01 Feb 2019 04:57) (80/52 - 101/61)  BP(mean): --  RR: 17 (01 Feb 2019 04:57) (17 - 18)  SpO2: 94% (01 Feb 2019 04:57) (90% - 96%)    PHYSICAL EXAM:  HEENT: Nc/AT  Neck: Soft  Lungs: Decreased BS bilaterally; no wheezing.   Heart: RRR; no murmurs.  Abdomen: soft; no masses. No tenderness.   Extremities: No ulcers or edema.  Neurologic: Awake.       LABORATORY:                          11.3   7.27  )-----------( 195      ( 01 Feb 2019 02:07 )             33.3       ESR:                   01-31 @ 06:34  --    C-Reactive Protein:     01-31 @ 06:34  --    Procalcitonin:           01-31 @ 06:34   14.83  ESR:                   01-30 @ 11:41  --    C-Reactive Protein:     01-30 @ 11:41  --    Procalcitonin:           01-30 @ 11:41   0.11      02-01    139  |  105  |  27<H>  ----------------------------<  175<H>  3.8   |  27  |  0.76    Ca    8.3<L>      01 Feb 2019 02:07  Phos  1.7     02-01  Mg     1.7     02-01    TPro  6.0  /  Alb  2.7<L>  /  TBili  0.7  /  DBili  x   /  AST  41<H>  /  ALT  18  /  AlkPhos  77  02-01          LABORATORY:    CBC Full  -  ( 01 Feb 2019 02:07 )  WBC Count : 7.27 K/uL  Hemoglobin : 11.3 g/dL  Hematocrit : 33.3 %  Platelet Count - Automated : 195 K/uL  Mean Cell Volume : 91.7 fl  Mean Cell Hemoglobin : 31.1 pg  Mean Cell Hemoglobin Concentration : 33.9 gm/dL  Auto Neutrophil # : 5.85 K/uL  Auto Lymphocyte # : 0.81 K/uL  Auto Monocyte # : 0.58 K/uL  Auto Eosinophil # : 0.00 K/uL  Auto Basophil # : 0.01 K/uL  Auto Neutrophil % : 80.5 %  Auto Lymphocyte % : 11.1 %  Auto Monocyte % : 8.0 %  Auto Eosinophil % : 0.0 %  Auto Basophil % : 0.1 %        ESR:                   01-31 @ 06:34  --    C-Reactive Protein:     01-31 @ 06:34  --    Procalcitonin:           01-31 @ 06:34   14.83    ESR:                   01-30 @ 11:41  --    C-Reactive Protein:     01-30 @ 11:41  --    Procalcitonin:           01-30 @ 11:41   0.11      02-01    139  |  105  |  27<H>  ----------------------------<  175<H>  3.8   |  27  |  0.76    Ca    8.3<L>      01 Feb 2019 02:07  Phos  1.7     02-01  Mg     1.7     02-01    TPro  6.0  /  Alb  2.7<L>  /  TBili  0.7  /  DBili  x   /  AST  41<H>  /  ALT  18  /  AlkPhos  77  02-01                    Wound culture:                01-30 @ 18:41  Organism --  Culture w/ gram stain --  Specimen Source .Urine Clean Catch (Midstream)    Wound culture:                01-30 @ 15:16  Organism --  Culture w/ gram stain --  Specimen Source .Blood Blood-Venous    Wound culture:                01-30 @ 15:12  Organism --  Culture w/ gram stain --  Specimen Source .Blood Blood-Venous        Abscess culture:             01-30 @ 18:41  Organism --  Gram Stain --  Specimen Source .Urine Clean Catch (Midstream)    Abscess culture:             01-30 @ 15:16  Organism --  Gram Stain --  Specimen Source .Blood Blood-Venous    Abscess culture:             01-30 @ 15:12  Organism --  Gram Stain --  Specimen Source .Blood Blood-Venous            Tissue culture:           01-30 @ 18:41  Organism --  Gram Stain --  Specimen Source .Urine Clean Catch (Midstream)    Tissue culture:           01-30 @ 15:16  Organism --  Gram Stain --  Specimen Source .Blood Blood-Venous    Tissue culture:           01-30 @ 15:12  Organism --  Gram Stain --  Specimen Source .Blood Blood-Venous      Assessment and Plan:    1. RSV.  2. Recurrent fevers; r/o sepsis.    . Unclear sources of fevers and elevated procalcitonin level.  . Low suspicion for UTI with unremarkable UA.  . Repeat CXR to evaluate for pneumonia.  . Change IV Rocephin to IV Zosyn.  . Follow all cultures.    Thank you for the courtesy of this consultation.

## 2019-02-01 NOTE — PROGRESS NOTE ADULT - SUBJECTIVE AND OBJECTIVE BOX
[facility] [place]       HPI:  Pt is a 89 yo WM  who presents to the ED with   PMHx of Atrial fibrillation on coumadin, h/o CHF, dementia, depression, DM, HLD, HTN, prostate cancer brought to ED from [facility] for evaluation of weakness and lethargy .  Information is obtained via chart and EMS.  Per [facility] pt appeared very weak today and did not eat breakfast.  They also noted that he had a temperature of 100.1 around 9 am.  Pt received a flu vaccine 18.  Currently pt alert to person only falling asleep at bedside and unable to provide history .Found to have RSV infection and diagnosed with COPD exacerbation and acute on chronic bronchitis exacerbation ,puljill biggs requested ,Dr Quintana called [facility] for antibacterial managmnet ,nebulised bronchodilators and pulmonology workup,O2 supply,serial labs and chest xrays,obtain ECHO to evaluate LVEF and rule out chf component Palliative care consult requested ,to discuss advance directives and complete MOLST (2019 17:01)        SUBJECTIVE:      ALLERGIES:  Allergies    latex (Rash)  latex (Unknown)  No Known Drug Allergies    Intolerances          MEDICATIONS  (STANDING):  ALBUTerol/ipratropium for Nebulization 3 milliLiter(s) Nebulizer every 6 hours  aspirin  chewable 81 milliGRAM(s) Oral daily  buDESOnide   0.5 milliGRAM(s) Respule 0.5 milliGRAM(s) Inhalation two times a day  carvedilol 3.125 milliGRAM(s) Oral every 12 hours  clopidogrel Tablet 75 milliGRAM(s) Oral daily  dextrose 5%. 1000 milliLiter(s) (50 mL/Hr) IV Continuous <Continuous>  dextrose 50% Injectable 12.5 Gram(s) IV Push once  dextrose 50% Injectable 25 Gram(s) IV Push once  dextrose 50% Injectable 25 Gram(s) IV Push once  donepezil 5 milliGRAM(s) Oral at bedtime  famotidine    Tablet 20 milliGRAM(s) Oral daily  furosemide   Injectable 20 milliGRAM(s) IV Push two times a day  guaiFENesin ER 1200 milliGRAM(s) Oral every 12 hours  insulin lispro (HumaLOG) corrective regimen sliding scale   SubCutaneous three times a day before meals  lactobacillus acidophilus 1 Tablet(s) Oral every 8 hours  lisinopril 5 milliGRAM(s) Oral daily  magnesium oxide 400 milliGRAM(s) Oral three times a day with meals  midodrine 5 milliGRAM(s) Oral every 8 hours  piperacillin/tazobactam IVPB. 3.375 Gram(s) IV Intermittent every 8 hours  potassium acid phosphate/sodium acid phosphate tablet (K-PHOS No. 2) 1 Tablet(s) Oral three times a day with meals  potassium chloride    Tablet ER 10 milliEquivalent(s) Oral daily  potassium phosphate IVPB 15 milliMole(s) IV Intermittent once  simvastatin 20 milliGRAM(s) Oral at bedtime  warfarin 2.5 milliGRAM(s) Oral once    MEDICATIONS  (PRN):  acetaminophen  Suppository .. 325 milliGRAM(s) Rectal every 6 hours PRN Temp greater or equal to 38C (100.4F), Mild Pain (1 - 3)  dextrose 40% Gel 15 Gram(s) Oral once PRN Blood Glucose LESS THAN 70 milliGRAM(s)/deciliter  glucagon  Injectable 1 milliGRAM(s) IntraMuscular once PRN Glucose LESS THAN 70 milligrams/deciliter      REVIEW OF SYSTEMS:  CONSTITUTIONAL: No fever,  RESPIRATORY: No cough, wheezing, shortness of breath  CARDIOVASCULAR: No chest pain, dyspnea, palpitations, dizziness, syncope, paroxysmal nocturnal dyspnea, orthopnea, or arm or leg swelling  GASTROINTESTINAL: No abdominal  or epigastric pain, nausea, vomiting,  diarrhea  NEUROLOGICAL: No headaches,  loss of strength, numbness, or tremors    Vital Signs Last 24 Hrs  T(C): 36.8 (2019 09:36), Max: 38.5 (2019 00:32)  T(F): 98.2 (2019 09:36), Max: 101.3 (2019 00:32)  HR: 65 (2019 12:48) (60 - 97)  BP: 102/56 (2019 12:48) (80/52 - 102/56)  BP(mean): --  RR: 18 (2019 12:48) (17 - 18)  SpO2: 97% (2019 12:48) (92% - 100%)    PHYSICAL EXAM:  HEAD:  Atraumatic, Normocephalic  NECK: Supple and normal thyroid.  No JVD or carotid bruit.   HEART: S1, S2 regular , 1/6 soft ejection systolic murmur in mitral area , no thrill and no gallops .  PULMONARY: Bilateral vesicular breathing , few scattered ronchi and few scattered rales are present .  ABDOMEN: Soft nontender and positive bowl sounds   EXTREMITIES:  No clubbing, cyanosis, or pedal  edema  NEUROLOGICAL: AAOX3 , no focal deficit .    LABS:                        12.3   7.85  )-----------( 178      ( 2019 08:09 )             37.5     02-    140  |  103  |  26<H>  ----------------------------<  173<H>  3.6   |  29  |  0.84    Ca    8.2<L>      2019 08:09  Phos  1.9     -  Mg     1.8         TPro  6.3  /  Alb  2.8<L>  /  TBili  0.8  /  DBili  x   /  AST  39<H>  /  ALT  19  /  AlkPhos  86      CARDIAC MARKERS ( 2019 08:09 )  2.220 ng/mL / x     / x     / x     / x      CARDIAC MARKERS ( 2019 16:30 )  4.970 ng/mL / x     / 191 U/L / x     / x      CARDIAC MARKERS ( 2019 06:34 )  5.350 ng/mL / x     / 133 U/L / x     / x          PT/INR - ( 2019 08:09 )   PT: 18.3 sec;   INR: 1.58 ratio           Urinalysis Basic - ( 2019 13:20 )    Color: Yellow / Appearance: Clear / S.015 / pH: x  Gluc: x / Ketone: Trace  / Bili: Negative / Urobili: 1   Blood: x / Protein: 25 mg/dL / Nitrite: Negative   Leuk Esterase: Trace / RBC: 11-25 /HPF / WBC 0-2   Sq Epi: x / Non Sq Epi: Occasional / Bacteria: Few      BNPSerum Pro-Brain Natriuretic Peptide: 8486 pg/mL ( @ 08:09)        EKG:  ECHO:  IMAGING:    Assessment/Plan    Will continue to follow during hospital course and give further recommendations as needed . Thanks for your referral . if any questions please contact me at office [phone])cell [phone]) [facility] [place]       HPI:  Pt is a 89 yo WM  who presents to the ED with   PMHx of Atrial fibrillation on coumadin, h/o CHF, dementia, depression, DM, HLD, HTN, prostate cancer brought to ED from [facility] for evaluation of weakness and lethargy .  Information is obtained via chart and EMS.  Per [facility] pt appeared very weak today and did not eat breakfast.  They also noted that he had a temperature of 100.1 around 9 am.  Pt received a flu vaccine 18.  Currently pt alert to person only falling asleep at bedside and unable to provide history .Found to have RSV infection and diagnosed with COPD exacerbation and acute on chronic bronchitis exacerbation ,puljill biggs requested ,Dr Quintana called [facility] for antibacterial managmnet ,nebulised bronchodilators and pulmonology workup,O2 supply,serial labs and chest xrays,obtain ECHO to evaluate LVEF and rule out chf component Palliative care consult requested ,to discuss advance directives and complete MOLST (2019 17:01)        SUBJECTIVE: Patient mild SOB and also has cough .      ALLERGIES:  Allergies    latex (Rash)  latex (Unknown)  No Known Drug Allergies    Intolerances          MEDICATIONS  (STANDING):  ALBUTerol/ipratropium for Nebulization 3 milliLiter(s) Nebulizer every 6 hours  aspirin  chewable 81 milliGRAM(s) Oral daily  buDESOnide   0.5 milliGRAM(s) Respule 0.5 milliGRAM(s) Inhalation two times a day  carvedilol 3.125 milliGRAM(s) Oral every 12 hours  clopidogrel Tablet 75 milliGRAM(s) Oral daily  dextrose 5%. 1000 milliLiter(s) (50 mL/Hr) IV Continuous <Continuous>  dextrose 50% Injectable 12.5 Gram(s) IV Push once  dextrose 50% Injectable 25 Gram(s) IV Push once  dextrose 50% Injectable 25 Gram(s) IV Push once  donepezil 5 milliGRAM(s) Oral at bedtime  famotidine    Tablet 20 milliGRAM(s) Oral daily  furosemide   Injectable 20 milliGRAM(s) IV Push two times a day  guaiFENesin ER 1200 milliGRAM(s) Oral every 12 hours  insulin lispro (HumaLOG) corrective regimen sliding scale   SubCutaneous three times a day before meals  lactobacillus acidophilus 1 Tablet(s) Oral every 8 hours  lisinopril 5 milliGRAM(s) Oral daily  magnesium oxide 400 milliGRAM(s) Oral three times a day with meals  midodrine 5 milliGRAM(s) Oral every 8 hours  piperacillin/tazobactam IVPB. 3.375 Gram(s) IV Intermittent every 8 hours  potassium acid phosphate/sodium acid phosphate tablet (K-PHOS No. 2) 1 Tablet(s) Oral three times a day with meals  potassium chloride    Tablet ER 10 milliEquivalent(s) Oral daily  potassium phosphate IVPB 15 milliMole(s) IV Intermittent once  simvastatin 20 milliGRAM(s) Oral at bedtime  warfarin 2.5 milliGRAM(s) Oral once    MEDICATIONS  (PRN):  acetaminophen  Suppository .. 325 milliGRAM(s) Rectal every 6 hours PRN Temp greater or equal to 38C (100.4F), Mild Pain (1 - 3)  dextrose 40% Gel 15 Gram(s) Oral once PRN Blood Glucose LESS THAN 70 milliGRAM(s)/deciliter  glucagon  Injectable 1 milliGRAM(s) IntraMuscular once PRN Glucose LESS THAN 70 milligrams/deciliter      REVIEW OF SYSTEMS:  CONSTITUTIONAL: No fever,  RESPIRATORY: Mild  cough, wheezing, shortness of breath  CARDIOVASCULAR: No chest pain, dyspnea, palpitations, dizziness, syncope, paroxysmal nocturnal dyspnea, orthopnea, or arm or leg swelling  GASTROINTESTINAL: No abdominal  or epigastric pain, nausea, vomiting,  diarrhea  NEUROLOGICAL: No headaches,  loss of strength, numbness, or tremors    Vital Signs Last 24 Hrs  T(C): 36.8 (2019 09:36), Max: 38.5 (2019 00:32)  T(F): 98.2 (2019 09:36), Max: 101.3 (2019 00:32)  HR: 65 (2019 12:48) (60 - 97)  BP: 102/56 (2019 12:48) (80/52 - 102/56)  BP(mean): --  RR: 18 (2019 12:48) (17 - 18)  SpO2: 97% (2019 12:48) (92% - 100%)    PHYSICAL EXAM:  HEAD:  Atraumatic, Normocephalic  NECK: Supple and normal thyroid.  No JVD or carotid bruit.   HEART: S1, S2 irregular , 1/6 soft ejection systolic murmur in mitral area , no thrill and no gallops .  PULMONARY: Bilateral vesicular breathing , few scattered ronchi and few scattered rales are present .  ABDOMEN: Soft nontender and positive bowl sounds   EXTREMITIES:  No clubbing, cyanosis, or pedal  edema  NEUROLOGICAL: AA and confused  , no focal deficit .    LABS:                        12.3   7.85  )-----------( 178      ( 2019 08:09 )             37.5         140  |  103  |  26<H>  ----------------------------<  173<H>  3.6   |  29  |  0.84    Ca    8.2<L>      2019 08:09  Phos  1.9       Mg     1.8         TPro  6.3  /  Alb  2.8<L>  /  TBili  0.8  /  DBili  x   /  AST  39<H>  /  ALT  19  /  AlkPhos  86      CARDIAC MARKERS ( 2019 08:09 )  2.220 ng/mL / x     / x     / x     / x      CARDIAC MARKERS ( 2019 16:30 )  4.970 ng/mL / x     / 191 U/L / x     / x      CARDIAC MARKERS ( 2019 06:34 )  5.350 ng/mL / x     / 133 U/L / x     / x          PT/INR - ( 2019 08:09 )   PT: 18.3 sec;   INR: 1.58 ratio           Urinalysis Basic - ( 2019 13:20 )    Color: Yellow / Appearance: Clear / S.015 / pH: x  Gluc: x / Ketone: Trace  / Bili: Negative / Urobili: 1   Blood: x / Protein: 25 mg/dL / Nitrite: Negative   Leuk Esterase: Trace / RBC: 11-25 /HPF / WBC 0-2   Sq Epi: x / Non Sq Epi: Occasional / Bacteria: Few      BNPSerum Pro-Brain Natriuretic Peptide: 8486 pg/mL ( @ 08:09)      Assessment/Plan  Patient has :  1) NON-STEMI .  2) Pneumonia .  3) S/P hypotension .  4) CHF   Plan : 1 ) monitor electrolytes . 2) Rest of medications as such . 3) prognosis guarded .  Will continue to follow during hospital course and give further recommendations as needed . Thanks for your referral . if any questions please contact me at office ([phone] [phone])

## 2019-02-01 NOTE — CHART NOTE - NSCHARTNOTEFT_GEN_A_CORE
PGY-3 On Call Note    Called by RN, pt found to be hypotensive on routine vitals check. Pt seen and examined at bedside. Patient is lethargic, difficult to arouse, he is alert to sternal rub and painful stimuli, however he reports he has no complaints at this time when he answers. Patient has PMHx of Atrial fibrillation on coumadin, Chronic Diastolic CHF with last known EF 50 % in 2018, dementia, depression, DM, HLD, HTN, prostate cancer brought to ED from [facility] for evaluation of weakness and lethargy. Patient is currently being medically managed for Acute NSTEMI, Acute on Chronic CHF exacerbation, due to have repeat ECHO on this admission, Acute RSV Bronchiolitis, and UTI on IV Rocephin.     Vitals:  Temp orally 99.1. Rectal Temp 101.3  HR 73  BP 80/52  RR 17  SpO2 94 % on supplemental O2    T(F): 99.1 (01-31-19 @ 23:35), Max: 99.4 (01-31-19 @ 20:46)  HR: 73 (02-01-19 @ 00:32) (73 - 97)  BP: 80/52 (02-01-19 @ 00:32) (80/52 - 101/61)  RR: 17 (01-31-19 @ 23:35) (17 - 18)  SpO2: 94% (01-31-19 @ 23:35) (92% - 96%)    Physical Exam:  Gen: asleep, lethargic, arousable to sternal rubbing and painful stimuli  HEENT: PERRLA  Cardio: +S1, +S2, RRR  Lungs: CTA B/L, No w/r/r  Abd: soft, NT/ND, +BS x 4 quadrants  Ext: No pedal edema, no calf tenderness                          12.7   12.48 )-----------( 222      ( 31 Jan 2019 06:34 )             37.9     01-31    139  |  102  |  20  ----------------------------<  132<H>  3.9   |  25  |  0.66    Ca    8.4<L>      31 Jan 2019 06:34  Mg     1.3     01-31    TPro  7.0  /  Alb  3.4  /  TBili  0.6  /  DBili  x   /  AST  15  /  ALT  17  /  AlkPhos  117  01-30      A/P:   -Sepsis given Hypotension and Fever in setting of RSV and UTI, hypotension likely also due to overdiuresis for acute on chronic CHF exacerbation.   -Lethargy, possible aspiration pneumonia?   -Will give STAT 500 ml NS bolus  -Will hold AM doses of Lasix, Lisinopril, and Coreg  -Continue dual antiplatelet therapy  -STAT Tylenol suppository for fever  -STAT CBC w diff, CMP, Lactate, Blood cultures x 2, UA, Urine culture, Mg, Phos  -No need to repeat cardiac enzymes at this time.   -STAT Chest x ray  -Continue current antibiotics  -Will make patient NPO. Note patient just seen by speech and swallow and deemed appropriate for pureed diet with thickened liquids which patient was put on.   -Will repeat BP, if BP still low and tolerates 500 ml bolus well, can give additional 250 ml NS bolus.  -Call placed to ID Dr. Pruett, awaiting call back.  -Discussed with Dr. Ring, aware and agrees with current plan. Requests to not change antibiotics at this time, awaiting response from Dr. Pruett.    -Discussed plan with Dr. Paul cardiologist, agrees with plan.  -RN aware of plan, to call with any further changes. PGY-3 On Call Note    Called by RN, pt found to be hypotensive on routine vitals check. Pt seen and examined at bedside. Patient is lethargic, difficult to arouse, he is alert to sternal rub and painful stimuli, however he reports he has no complaints at this time when he answers. Patient has PMHx of Atrial fibrillation on coumadin, Chronic Diastolic CHF with last known EF 50 % in 2018, dementia, depression, DM, HLD, HTN, prostate cancer brought to ED from [facility] for evaluation of weakness and lethargy. Patient is currently being medically managed for Acute NSTEMI, Acute on Chronic CHF exacerbation, due to have repeat ECHO on this admission, Acute RSV Bronchiolitis, and UTI on IV Rocephin.     Vitals:  Temp orally 99.1. Rectal Temp 101.3  HR 73  BP 80/52  RR 17  SpO2 94 % on supplemental O2    T(F): 99.1 (01-31-19 @ 23:35), Max: 99.4 (01-31-19 @ 20:46)  HR: 73 (02-01-19 @ 00:32) (73 - 97)  BP: 80/52 (02-01-19 @ 00:32) (80/52 - 101/61)  RR: 17 (01-31-19 @ 23:35) (17 - 18)  SpO2: 94% (01-31-19 @ 23:35) (92% - 96%)    Physical Exam:  Gen: asleep, lethargic, arousable to sternal rubbing and painful stimuli  HEENT: PERRLA  Cardio: +S1, +S2, RRR  Lungs: CTA B/L, No w/r/r  Abd: soft, NT/ND, +BS x 4 quadrants  Ext: No pedal edema, no calf tenderness                          12.7   12.48 )-----------( 222      ( 31 Jan 2019 06:34 )             37.9     01-31    139  |  102  |  20  ----------------------------<  132<H>  3.9   |  25  |  0.66    Ca    8.4<L>      31 Jan 2019 06:34  Mg     1.3     01-31    TPro  7.0  /  Alb  3.4  /  TBili  0.6  /  DBili  x   /  AST  15  /  ALT  17  /  AlkPhos  117  01-30      A/P:   -Sepsis given Hypotension and Fever in setting of RSV and UTI, hypotension likely also due to overdiuresis for acute on chronic CHF exacerbation.   -Lethargy, possible aspiration pneumonia?   -Will give STAT 500 ml NS bolus  -Will hold AM doses of Lasix, Lisinopril, and Coreg  -Continue dual antiplatelet therapy  -STAT Tylenol suppository for fever  -STAT CBC w diff, CMP, Lactate, Blood cultures x 2, UA, Urine culture, Mg, Phos  -No need to repeat cardiac enzymes at this time.   -STAT Chest x ray  -Continue current antibiotics  -Will make patient NPO. Note patient just seen by speech and swallow and deemed appropriate for pureed diet with thickened liquids which patient was put on.   -Will repeat BP, if BP still low and tolerates 500 ml bolus well, can give additional 250 ml NS bolus.  -Call placed to ID Dr. Pruett, awaiting call back.  -Discussed with Dr. Ring, aware and agrees with current plan. Requests to not change antibiotics at this time, awaiting response from Dr. Pruett.    -Discussed plan with Dr. Paul cardiologist, agrees with plan.  -RN aware of plan, to call with any further changes.    Addendum:  -Note Dr. Pruett placed orders for Zosyn first dose now, discontinued Rocephin.  -Lab results remain pending. Chest x ray unofficial read appears to have new/worsening right lobe infiltrate,  -Repeat BP manually 91/53 so far, will repeat again shortly. note patients baseline SBP around 100.  Repeat Temp 100.3. PGY-3 On Call Note    Called by RN, pt found to be hypotensive on routine vitals check. Pt seen and examined at bedside. Patient is lethargic, difficult to arouse, he is alert to sternal rub and painful stimuli, however he reports he has no complaints at this time when he answers. Patient has PMHx of Atrial fibrillation on coumadin, Chronic Diastolic CHF with last known EF 50 % in 2018, dementia, depression, DM, HLD, HTN, prostate cancer brought to ED from [facility] for evaluation of weakness and lethargy. Patient is currently being medically managed for Acute NSTEMI, Acute on Chronic CHF exacerbation, due to have repeat ECHO on this admission, Acute RSV Bronchiolitis, and UTI on IV Rocephin.     Vitals:  Temp orally 99.1. Rectal Temp 101.3  HR 73  BP 80/52  RR 17  SpO2 94 % on supplemental O2    T(F): 99.1 (01-31-19 @ 23:35), Max: 99.4 (01-31-19 @ 20:46)  HR: 73 (02-01-19 @ 00:32) (73 - 97)  BP: 80/52 (02-01-19 @ 00:32) (80/52 - 101/61)  RR: 17 (01-31-19 @ 23:35) (17 - 18)  SpO2: 94% (01-31-19 @ 23:35) (92% - 96%)    Physical Exam:  Gen: asleep, lethargic, arousable to sternal rubbing and painful stimuli  HEENT: PERRLA  Cardio: +S1, +S2, RRR  Lungs: CTA B/L, No w/r/r  Abd: soft, NT/ND, +BS x 4 quadrants  Ext: No pedal edema, no calf tenderness                          12.7   12.48 )-----------( 222      ( 31 Jan 2019 06:34 )             37.9     01-31    139  |  102  |  20  ----------------------------<  132<H>  3.9   |  25  |  0.66    Ca    8.4<L>      31 Jan 2019 06:34  Mg     1.3     01-31    TPro  7.0  /  Alb  3.4  /  TBili  0.6  /  DBili  x   /  AST  15  /  ALT  17  /  AlkPhos  117  01-30      A/P:   -Sepsis given Hypotension and Fever in setting of RSV and UTI, hypotension likely also due to overdiuresis for acute on chronic CHF exacerbation.   -Lethargy, possible aspiration pneumonia?   -Will give STAT 500 ml NS bolus  -Will hold AM doses of Lasix, Lisinopril, and Coreg  -Continue dual antiplatelet therapy  -STAT Tylenol suppository for fever  -STAT CBC w diff, CMP, Lactate, Blood cultures x 2, UA, Urine culture, Mg, Phos  -No need to repeat cardiac enzymes at this time.   -STAT Chest x ray  -Continue current antibiotics  -Will make patient NPO. Note patient just seen by speech and swallow and deemed appropriate for pureed diet with thickened liquids which patient was put on.   -Will repeat BP, if BP still low and tolerates 500 ml bolus well, can give additional 250 ml NS bolus.  -Call placed to ID Dr. Pruett, awaiting call back.  -Discussed with Dr. Ring, aware and agrees with current plan. Requests to not change antibiotics at this time, awaiting response from Dr. Pruett.    -Discussed plan with Dr. Paul cardiologist, agrees with plan.  -RN aware of plan, to call with any further changes.    Addendum:  -Note Dr. Pruett placed orders for Zosyn first dose now, discontinued Rocephin.  -Lab results remain pending. Chest x ray unofficial read appears to have new/worsening right lobe infiltrate,  -Repeat BP manually 91/53 so far, will repeat again shortly. note patients baseline SBP around 100.  Repeat Temp 100.3.      Update:  RN called regarding patient BP now manually 86/50, notes patient requiring suctioning as well. Discussed with Dr. Paul, will discontinue all IVF and will start Midodrine 5 mg TID first dose STAT. Will continue to monitor patient closely.

## 2019-02-01 NOTE — PROGRESS NOTE ADULT - SUBJECTIVE AND OBJECTIVE BOX
PROGRESS NOTE  Patient is a 89y old  Male who presents with a chief complaint of weakness and lethargy (01 Feb 2019 13:45)  Chart and available morning labs /imaging are reviewed electronically , urgent issues addressed . More information  is being added upon completion of rounds , when more information is collected and management discussed with consultants , medical staff and social service/case management on the floor     OVERNIGHT  Patient is resting in a bed comfortably .Confused ,poor mentation .No distress noted   Febrile overnight   HPI:  Pt is a 89 yo WM  who presents to the ED with   PMHx of Atrial fibrillation on coumadin, h/o CHF, dementia, depression, DM, HLD, HTN, prostate cancer brought to ED from [facility] for evaluation of weakness and lethargy .  Information is obtained via chart and EMS.  Per [facility] pt appeared very weak today and did not eat breakfast.  They also noted that he had a temperature of 100.1 around 9 am.  Pt received a flu vaccine 11/4/18.  Currently pt alert to person only falling asleep at bedside and unable to provide history .Found to have RSV infection and diagnosed with COPD exacerbation and acute on chronic bronchitis exacerbation ,puljill biggs requested ,Dr Quintana called [facility] for antibacterial managmnet ,nebulised bronchodilators and pulmonology workup,O2 supply,serial labs and chest xrays,obtain ECHO to evaluate LVEF and rule out chf component Palliative care consult requested ,to discuss advance directives and complete MOLST (30 Jan 2019 17:01)    PAST MEDICAL & SURGICAL HISTORY:  Dementia  Depression  Diabetes  Hyperlipemia  CHF (congestive heart failure)  Dementia  Prostate ca  Diabetes  Hypertension  Atrial fibrillation  No significant past surgical history      MEDICATIONS  (STANDING):  ALBUTerol/ipratropium for Nebulization 3 milliLiter(s) Nebulizer every 6 hours  aspirin  chewable 81 milliGRAM(s) Oral daily  buDESOnide   0.5 milliGRAM(s) Respule 0.5 milliGRAM(s) Inhalation two times a day  carvedilol 3.125 milliGRAM(s) Oral every 12 hours  clopidogrel Tablet 75 milliGRAM(s) Oral daily  dextrose 5%. 1000 milliLiter(s) (50 mL/Hr) IV Continuous <Continuous>  dextrose 50% Injectable 12.5 Gram(s) IV Push once  dextrose 50% Injectable 25 Gram(s) IV Push once  dextrose 50% Injectable 25 Gram(s) IV Push once  donepezil 5 milliGRAM(s) Oral at bedtime  famotidine    Tablet 20 milliGRAM(s) Oral daily  furosemide   Injectable 20 milliGRAM(s) IV Push two times a day  guaiFENesin ER 1200 milliGRAM(s) Oral every 12 hours  insulin lispro (HumaLOG) corrective regimen sliding scale   SubCutaneous three times a day before meals  lactobacillus acidophilus 1 Tablet(s) Oral every 8 hours  lisinopril 5 milliGRAM(s) Oral daily  magnesium oxide 400 milliGRAM(s) Oral three times a day with meals  midodrine 5 milliGRAM(s) Oral every 8 hours  piperacillin/tazobactam IVPB. 3.375 Gram(s) IV Intermittent every 8 hours  potassium acid phosphate/sodium acid phosphate tablet (K-PHOS No. 2) 1 Tablet(s) Oral three times a day with meals  potassium chloride    Tablet ER 10 milliEquivalent(s) Oral daily  potassium phosphate IVPB 15 milliMole(s) IV Intermittent once  simvastatin 20 milliGRAM(s) Oral at bedtime  warfarin 2.5 milliGRAM(s) Oral once    MEDICATIONS  (PRN):  acetaminophen  Suppository .. 325 milliGRAM(s) Rectal every 6 hours PRN Temp greater or equal to 38C (100.4F), Mild Pain (1 - 3)  dextrose 40% Gel 15 Gram(s) Oral once PRN Blood Glucose LESS THAN 70 milliGRAM(s)/deciliter  glucagon  Injectable 1 milliGRAM(s) IntraMuscular once PRN Glucose LESS THAN 70 milligrams/deciliter      OBJECTIVE    T(C): 36.8 (02-01-19 @ 09:36), Max: 38.5 (02-01-19 @ 00:32)  HR: 65 (02-01-19 @ 12:48) (60 - 97)  BP: 102/56 (02-01-19 @ 12:48) (80/52 - 102/56)  RR: 18 (02-01-19 @ 12:48) (17 - 18)  SpO2: 97% (02-01-19 @ 12:48) (92% - 100%)  Wt(kg): --  I&O's Summary        REVIEW OF SYSTEMS:  ROS is unobtainable due to lethargy and confusion     PHYSICAL EXAM:  Appearance: NAD. VS past 24 hrs -as above   HEENT:   Moist oral mucosa. Conjunctiva clear b/l.   Neck : supple  Respiratory: Lungs -b/basilar rales   Gastrointestinal:  Soft, nontender. No rebound. No rigidity. BS present	  Cardiovascular: RRR ,S1S2 present  Neurologic: Non-focal. Moving all extremities.  Extremities: No edema. No erythema. No calf tenderness.  Skin: No rashes, No ecchymoses, No cyanosis.	  wounds ,skin lesions-See skin assesment flow sheet   LABS:                        12.3   7.85  )-----------( 178      ( 01 Feb 2019 08:09 )             37.5     02-01    140  |  103  |  26<H>  ----------------------------<  173<H>  3.6   |  29  |  0.84    Ca    8.2<L>      01 Feb 2019 08:09  Phos  1.9     02-01  Mg     1.8     02-01    TPro  6.3  /  Alb  2.8<L>  /  TBili  0.8  /  DBili  x   /  AST  39<H>  /  ALT  19  /  AlkPhos  86  02-01    CAPILLARY BLOOD GLUCOSE      POCT Blood Glucose.: 130 mg/dL (01 Feb 2019 11:46)  POCT Blood Glucose.: 146 mg/dL (01 Feb 2019 08:18)  POCT Blood Glucose.: 184 mg/dL (31 Jan 2019 22:00)  POCT Blood Glucose.: 183 mg/dL (31 Jan 2019 16:54)    PT/INR - ( 01 Feb 2019 08:09 )   PT: 18.3 sec;   INR: 1.58 ratio               Culture - Urine (collected 30 Jan 2019 18:41)  Source: .Urine Clean Catch (Midstream)  Final Report (31 Jan 2019 20:02):    No growth    Culture - Blood (collected 30 Jan 2019 15:16)  Source: .Blood Blood-Venous  Preliminary Report (31 Jan 2019 16:01):    No growth to date.    Culture - Blood (collected 30 Jan 2019 15:12)  Source: .Blood Blood-Venous  Preliminary Report (31 Jan 2019 16:01):    No growth to date.      RADIOLOGY & ADDITIONAL TESTS:< from: Xray Chest 1 View AP/PA (02.01.19 @ 09:11) >  EXAM:  XR CHEST AP OR PA 1V                            PROCEDURE DATE:  02/01/2019          INTERPRETATION:  Clinical information: Shortness of breath. Evaluate for   pneumonia.    Technique: Frontal view of the chest.    Comparison: Prior chest x-ray examination from earlier today at 1:28 AM.    Finding: Examination is limited as the patient is rotated to the left and   as the patient's chin obscures the left lung apex.    There is no change in pulmonary edema and/or vascular congestion. The   heart size is at the upper limits of normal. There is a cardiac pacemaker   overlying the left chest wall. Multilevel degenerative changes are noted   within the imaged potions of the spine.     IMPRESSION: As above, no interval change.    < end of copied text >     reviewed elctronically  ASSESSMENT/PLAN:

## 2019-02-02 LAB
ALBUMIN SERPL ELPH-MCNC: 2.7 G/DL — LOW (ref 3.3–5)
ALP SERPL-CCNC: 81 U/L — SIGNIFICANT CHANGE UP (ref 40–120)
ALT FLD-CCNC: 25 U/L — SIGNIFICANT CHANGE UP (ref 12–78)
ANION GAP SERPL CALC-SCNC: 5 MMOL/L — SIGNIFICANT CHANGE UP (ref 5–17)
APPEARANCE UR: CLEAR — SIGNIFICANT CHANGE UP
AST SERPL-CCNC: 34 U/L — SIGNIFICANT CHANGE UP (ref 15–37)
BILIRUB SERPL-MCNC: 0.6 MG/DL — SIGNIFICANT CHANGE UP (ref 0.2–1.2)
BILIRUB UR-MCNC: NEGATIVE — SIGNIFICANT CHANGE UP
BUN SERPL-MCNC: 26 MG/DL — HIGH (ref 7–23)
CALCIUM SERPL-MCNC: 8.6 MG/DL — SIGNIFICANT CHANGE UP (ref 8.5–10.1)
CHLORIDE SERPL-SCNC: 106 MMOL/L — SIGNIFICANT CHANGE UP (ref 96–108)
CO2 SERPL-SCNC: 32 MMOL/L — HIGH (ref 22–31)
COLOR SPEC: YELLOW — SIGNIFICANT CHANGE UP
CREAT SERPL-MCNC: 0.66 MG/DL — SIGNIFICANT CHANGE UP (ref 0.5–1.3)
DIFF PNL FLD: ABNORMAL
GLUCOSE SERPL-MCNC: 131 MG/DL — HIGH (ref 70–99)
GLUCOSE UR QL: NEGATIVE — SIGNIFICANT CHANGE UP
HCT VFR BLD CALC: 34.9 % — LOW (ref 39–50)
HGB BLD-MCNC: 11.5 G/DL — LOW (ref 13–17)
INR BLD: 1.49 RATIO — HIGH (ref 0.88–1.16)
KETONES UR-MCNC: ABNORMAL
LEUKOCYTE ESTERASE UR-ACNC: ABNORMAL
MCHC RBC-ENTMCNC: 31.3 PG — SIGNIFICANT CHANGE UP (ref 27–34)
MCHC RBC-ENTMCNC: 33 GM/DL — SIGNIFICANT CHANGE UP (ref 32–36)
MCV RBC AUTO: 95.1 FL — SIGNIFICANT CHANGE UP (ref 80–100)
MRSA PCR RESULT.: SIGNIFICANT CHANGE UP
NITRITE UR-MCNC: NEGATIVE — SIGNIFICANT CHANGE UP
NRBC # BLD: 0 /100 WBCS — SIGNIFICANT CHANGE UP (ref 0–0)
PH UR: 5 — SIGNIFICANT CHANGE UP (ref 5–8)
PHOSPHATE SERPL-MCNC: 1.8 MG/DL — LOW (ref 2.5–4.5)
PLATELET # BLD AUTO: 195 K/UL — SIGNIFICANT CHANGE UP (ref 150–400)
POTASSIUM SERPL-MCNC: 3.8 MMOL/L — SIGNIFICANT CHANGE UP (ref 3.5–5.3)
POTASSIUM SERPL-SCNC: 3.8 MMOL/L — SIGNIFICANT CHANGE UP (ref 3.5–5.3)
PROT SERPL-MCNC: 6.3 G/DL — SIGNIFICANT CHANGE UP (ref 6–8.3)
PROT UR-MCNC: 25 MG/DL
PROTHROM AB SERPL-ACNC: 17.1 SEC — HIGH (ref 10–12.9)
RBC # BLD: 3.67 M/UL — LOW (ref 4.2–5.8)
RBC # FLD: 12.9 % — SIGNIFICANT CHANGE UP (ref 10.3–14.5)
S AUREUS DNA NOSE QL NAA+PROBE: SIGNIFICANT CHANGE UP
SODIUM SERPL-SCNC: 143 MMOL/L — SIGNIFICANT CHANGE UP (ref 135–145)
SP GR SPEC: 1.02 — SIGNIFICANT CHANGE UP (ref 1.01–1.02)
UROBILINOGEN FLD QL: NEGATIVE — SIGNIFICANT CHANGE UP
WBC # BLD: 7.42 K/UL — SIGNIFICANT CHANGE UP (ref 3.8–10.5)
WBC # FLD AUTO: 7.42 K/UL — SIGNIFICANT CHANGE UP (ref 3.8–10.5)

## 2019-02-02 RX ORDER — WARFARIN SODIUM 2.5 MG/1
4 TABLET ORAL ONCE
Qty: 0 | Refills: 0 | Status: COMPLETED | OUTPATIENT
Start: 2019-02-02 | End: 2019-02-02

## 2019-02-02 RX ADMIN — DONEPEZIL HYDROCHLORIDE 5 MILLIGRAM(S): 10 TABLET, FILM COATED ORAL at 21:41

## 2019-02-02 RX ADMIN — Medication 0.5 MILLIGRAM(S): at 20:40

## 2019-02-02 RX ADMIN — PIPERACILLIN AND TAZOBACTAM 25 GRAM(S): 4; .5 INJECTION, POWDER, LYOPHILIZED, FOR SOLUTION INTRAVENOUS at 03:13

## 2019-02-02 RX ADMIN — Medication 1 TABLET(S): at 11:28

## 2019-02-02 RX ADMIN — MIDODRINE HYDROCHLORIDE 5 MILLIGRAM(S): 2.5 TABLET ORAL at 06:07

## 2019-02-02 RX ADMIN — MAGNESIUM OXIDE 400 MG ORAL TABLET 400 MILLIGRAM(S): 241.3 TABLET ORAL at 17:06

## 2019-02-02 RX ADMIN — Medication 1 TABLET(S): at 06:07

## 2019-02-02 RX ADMIN — CLOPIDOGREL BISULFATE 75 MILLIGRAM(S): 75 TABLET, FILM COATED ORAL at 11:27

## 2019-02-02 RX ADMIN — SIMVASTATIN 20 MILLIGRAM(S): 20 TABLET, FILM COATED ORAL at 21:42

## 2019-02-02 RX ADMIN — Medication 1 TABLET(S): at 13:16

## 2019-02-02 RX ADMIN — MAGNESIUM OXIDE 400 MG ORAL TABLET 400 MILLIGRAM(S): 241.3 TABLET ORAL at 11:28

## 2019-02-02 RX ADMIN — MIDODRINE HYDROCHLORIDE 5 MILLIGRAM(S): 2.5 TABLET ORAL at 21:41

## 2019-02-02 RX ADMIN — Medication 325 MILLIGRAM(S): at 13:16

## 2019-02-02 RX ADMIN — Medication 1200 MILLIGRAM(S): at 06:07

## 2019-02-02 RX ADMIN — PIPERACILLIN AND TAZOBACTAM 25 GRAM(S): 4; .5 INJECTION, POWDER, LYOPHILIZED, FOR SOLUTION INTRAVENOUS at 20:16

## 2019-02-02 RX ADMIN — Medication 3 MILLILITER(S): at 20:40

## 2019-02-02 RX ADMIN — Medication 1 TABLET(S): at 21:41

## 2019-02-02 RX ADMIN — Medication 3 MILLILITER(S): at 07:43

## 2019-02-02 RX ADMIN — Medication 1200 MILLIGRAM(S): at 17:06

## 2019-02-02 RX ADMIN — MAGNESIUM OXIDE 400 MG ORAL TABLET 400 MILLIGRAM(S): 241.3 TABLET ORAL at 07:36

## 2019-02-02 RX ADMIN — WARFARIN SODIUM 4 MILLIGRAM(S): 2.5 TABLET ORAL at 21:41

## 2019-02-02 RX ADMIN — Medication 325 MILLIGRAM(S): at 11:27

## 2019-02-02 RX ADMIN — Medication 3 MILLILITER(S): at 00:42

## 2019-02-02 RX ADMIN — Medication 1 TABLET(S): at 17:05

## 2019-02-02 RX ADMIN — Medication 0.5 MILLIGRAM(S): at 07:43

## 2019-02-02 RX ADMIN — Medication 81 MILLIGRAM(S): at 11:27

## 2019-02-02 RX ADMIN — Medication 1 TABLET(S): at 07:36

## 2019-02-02 RX ADMIN — MIDODRINE HYDROCHLORIDE 5 MILLIGRAM(S): 2.5 TABLET ORAL at 13:15

## 2019-02-02 RX ADMIN — Medication 10 MILLIEQUIVALENT(S): at 11:27

## 2019-02-02 RX ADMIN — PIPERACILLIN AND TAZOBACTAM 25 GRAM(S): 4; .5 INJECTION, POWDER, LYOPHILIZED, FOR SOLUTION INTRAVENOUS at 11:25

## 2019-02-02 RX ADMIN — FAMOTIDINE 20 MILLIGRAM(S): 10 INJECTION INTRAVENOUS at 11:27

## 2019-02-02 RX ADMIN — Medication 3 MILLILITER(S): at 13:03

## 2019-02-02 NOTE — PROGRESS NOTE ADULT - SUBJECTIVE AND OBJECTIVE BOX
[facility] [place]       HPI:  Pt is a 89 yo WM  who presents to the ED with   PMHx of Atrial fibrillation on coumadin, h/o CHF, dementia, depression, DM, HLD, HTN, prostate cancer brought to ED from [facility] for evaluation of weakness and lethargy .  Information is obtained via chart and EMS.  Per [facility] pt appeared very weak today and did not eat breakfast.  They also noted that he had a temperature of 100.1 around 9 am.  Pt received a flu vaccine 18.  Currently pt alert to person only falling asleep at bedside and unable to provide history .Found to have RSV infection and diagnosed with COPD exacerbation and acute on chronic bronchitis exacerbation ,puljill biggs requested ,Dr Quintana called [facility] for antibacterial managmnet ,nebulised bronchodilators and pulmonology workup,O2 supply,serial labs and chest xrays,obtain ECHO to evaluate LVEF and rule out chf component Palliative care consult requested ,to discuss advance directives and complete MOLST (2019 17:01)        SUBJECTIVE: Patient lying in bed and no distress and SOB and cough improved .      ALLERGIES:  Allergies    latex (Rash)  latex (Unknown)  No Known Drug Allergies    Intolerances          MEDICATIONS  (STANDING):  ALBUTerol/ipratropium for Nebulization 3 milliLiter(s) Nebulizer every 6 hours  aspirin  chewable 81 milliGRAM(s) Oral daily  buDESOnide   0.5 milliGRAM(s) Respule 0.5 milliGRAM(s) Inhalation two times a day  carvedilol 3.125 milliGRAM(s) Oral every 12 hours  clopidogrel Tablet 75 milliGRAM(s) Oral daily  dextrose 5%. 1000 milliLiter(s) (50 mL/Hr) IV Continuous <Continuous>  dextrose 50% Injectable 12.5 Gram(s) IV Push once  dextrose 50% Injectable 25 Gram(s) IV Push once  dextrose 50% Injectable 25 Gram(s) IV Push once  donepezil 5 milliGRAM(s) Oral at bedtime  famotidine    Tablet 20 milliGRAM(s) Oral daily  furosemide   Injectable 20 milliGRAM(s) IV Push two times a day  guaiFENesin ER 1200 milliGRAM(s) Oral every 12 hours  insulin lispro (HumaLOG) corrective regimen sliding scale   SubCutaneous three times a day before meals  lactobacillus acidophilus 1 Tablet(s) Oral every 8 hours  lisinopril 5 milliGRAM(s) Oral daily  magnesium oxide 400 milliGRAM(s) Oral three times a day with meals  midodrine 5 milliGRAM(s) Oral every 8 hours  piperacillin/tazobactam IVPB. 3.375 Gram(s) IV Intermittent every 8 hours  potassium acid phosphate/sodium acid phosphate tablet (K-PHOS No. 2) 1 Tablet(s) Oral three times a day with meals  potassium chloride    Tablet ER 10 milliEquivalent(s) Oral daily  simvastatin 20 milliGRAM(s) Oral at bedtime    MEDICATIONS  (PRN):  acetaminophen  Suppository .. 325 milliGRAM(s) Rectal every 6 hours PRN Temp greater or equal to 38C (100.4F), Mild Pain (1 - 3)  dextrose 40% Gel 15 Gram(s) Oral once PRN Blood Glucose LESS THAN 70 milliGRAM(s)/deciliter  glucagon  Injectable 1 milliGRAM(s) IntraMuscular once PRN Glucose LESS THAN 70 milligrams/deciliter      REVIEW OF SYSTEMS:  CONSTITUTIONAL: No fever,  RESPIRATORY: No cough, wheezing, shortness of breath  CARDIOVASCULAR: No chest pain, dyspnea, palpitations, dizziness, syncope, paroxysmal nocturnal dyspnea, orthopnea, or arm or leg swelling  GASTROINTESTINAL: No abdominal  or epigastric pain, nausea, vomiting,  diarrhea  NEUROLOGICAL: No headaches,  loss of strength, numbness, or tremors    Vital Signs Last 24 Hrs  T(C): 36.9 (2019 11:44), Max: 38 (2019 15:51)  T(F): 98.5 (2019 11:44), Max: 100.4 (2019 15:51)  HR: 71 (2019 11:44) (65 - 81)  BP: 105/69 (2019 11:44) (96/53 - 110/66)  BP(mean): --  RR: 20 (2019 11:44) (17 - 21)  SpO2: 97% (2019 11:44) (95% - 98%)    PHYSICAL EXAM:  HEAD:  Atraumatic, Normocephalic  NECK: Supple and normal thyroid.  No JVD or carotid bruit.   HEART: S1, S2 irregular , 1/6 soft ejection systolic murmur in mitral area , no thrill and no gallops .  PULMONARY: Bilateral vesicular breathing , few scattered ronchi and few scattered rales are present .  ABDOMEN: Soft nontender and positive bowl sounds   EXTREMITIES:  No clubbing, cyanosis, or pedal  edema  NEUROLOGICAL: AA and mild confused  , no focal deficit .    LABS:                        11.5   7.42  )-----------( 195      ( 2019 08:59 )             34.9     02-02    143  |  106  |  26<H>  ----------------------------<  131<H>  3.8   |  32<H>  |  0.66    Ca    8.6      2019 08:59  Phos  1.8     02-  Mg     1.8     02-    TPro  6.3  /  Alb  2.7<L>  /  TBili  0.6  /  DBili  x   /  AST  34  /  ALT  25  /  AlkPhos  81  02-02    CARDIAC MARKERS ( 2019 08:09 )  2.220 ng/mL / x     / x     / x     / x      CARDIAC MARKERS ( 2019 16:30 )  4.970 ng/mL / x     / 191 U/L / x     / x          PT/INR - ( 2019 08:59 )   PT: 17.1 sec;   INR: 1.49 ratio           Urinalysis Basic - ( 2019 06:21 )    Color: Yellow / Appearance: Clear / S.020 / pH: x  Gluc: x / Ketone: Small  / Bili: Negative / Urobili: Negative   Blood: x / Protein: 25 mg/dL / Nitrite: Negative   Leuk Esterase: Trace / RBC: 11-25 /HPF / WBC 6-10   Sq Epi: x / Non Sq Epi: Few / Bacteria: Occasional      Assessment/Plan  Patient has :  1) NON-STEMI .  2) CHF and better .  3) Pneumonia .  4) Hypotension and better possible secondary to pneumonia   Plan : 1) ACE inhibitor discontinued due to low BP . 2) Continue rest of medications as such . 3) Discussed with health care proxy son and he does not eant cardiac cath . 4) prognosis guarded .  Will continue to follow during hospital course and give further recommendations as needed . Thanks for your referral . if any questions please contact me at office ([phone]

## 2019-02-02 NOTE — PROGRESS NOTE ADULT - SUBJECTIVE AND OBJECTIVE BOX
Patient was examined on 2/2/2019 Chart will be reviewed Latest data will be evaluated and detailed recommendations will be inserted beneath this paragraph later today Meanwhile please refer to my previous note for Pulmonary assessment recommendations   Remains comfortable Patient was examined on 2019 Chart will be reviewed Latest data will be evaluated and detailed recommendations will be inserted beneath this paragraph later today Meanwhile please refer to my previous note for Pulmonary assessment recommendations   Remains comfortable       TERESA FRANCIS [facility] P  [phone]    CONTACT Vinicio Bradford [phone]  ALLERGY Latex  REASON FOR VISIT     subsequent pulm fu   Initial evaluation/Pulmonary Critical Care consultation requested on 2019   by Dr Frias from Dr Quintana   Patient examined chart reviewed  HOSPITAL ADMISSION [facility] 2019     PATIENT CAME  FROM (if information available)      VITALS/LABS      2019 afeb 66 99/59 28 96%   2019 W 7.4 Hb 11.5 Plt 195  Na 143 K 3.8 CO2 32 Cr .6     GLOBAL ISSUE/BEST PRACTICE:      PROBLEM: HOB elevation:   y            PROBLEM: Stress ulcer proph:    pepcid 20 ()                       PROBLEM: VTE prophylaxis:      on coumadin poa 2019   PROBLEM: Glycemic control:    iss ()   PROBLEM: Nutrition:    cons carb dys 1 puree honey ()   PROBLEM: Advanced directive: na     PROBLEM: Allergies:  latex   EVENT 2019 6 beat v  tach     REVIEW OF SYMPTOMS    Able to give ROS  NO     PHYSICAL EXAM    HEENT Unremarkable PERRLA atraumatic   RESP Fair air entry EXP prolonged    Harsh breath sound Resp distres mild   CARDIAC S1 S2 No S3     NO JVD    ABDOMEN SOFT BS PRESENT NOT DISTENDED No hepatosplenomegaly PEDAL EDEMA present No calf tenderness  NO rash   GENERAL Not TOXIC looking    PATIENT DESCRIPTION PATIENT TERESA FRANCIS  10/16/1926, [address] ADMISS[address] [facility] DR ARCADIO FRIAS  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 [facility] HO  injury L shin remote past 3/18/2014 V duplex legs –ben Chr swelling leg  OBS Prostate CA LV Systolic dysfunction CHF 10/24/2014 ECHO EF 50% Mild hypokinesia septum PASP 40 Mod MR    A fib  (on coumadin) Htn Depression Lower extr edema Chr swelling L leg DM      LUNG NODULE THYROID NODULE 10/21/2014 CT Ch  1 cm hypoattenuating thyroid nodule  Trace symmetrical layering bl pl effusions with dependent atelectasis bases  Ca granuloma both RML and l lower lobes  5 mm subpleural parenchymal nodule R apex  was admitted [facility] 2019 with dyspnea fever 100 He did getflu vaccine 2018   Pulm consulted 2019   [facility] meds coreg 3125x2 donepezil 5 lisinopril 5 metformin 500.2 dig 25 venlafaxine 75.2 simvastat 20 warfarin 10   er vitals afeb 76 108/66 95%     HOSPITAL COURSE   Resp syncytial viral resp tract infection managed with supp care   Troponin elevation was noted Pt kept on ASA Plavix Followed by Dr Paul Lisinopril 5 () added   S CHF Chronic managed with lasix 40 () lisinopril 5 (1/3)   COPD managed with bd ics   A fib managed with couamdin   Pneumonia pc 14 () CX  rll opac Placed on zosyn nu Dr Barrios on 2019     ASSESSMENT PLAN PATIENT YNGSTROM RAY 2019 ADMISSION [facility]  DR ARCADIO FRIAS   UTI 2019 ua w 0-2 r 11-25    Rocephin () ch to zosyn ()   RESP Monitor po Target 90-95%  RSV RTI Supp care   PNEA 2019 w 7.8  pc 14.8 zosyn () (Dr Barrios)   COPD  Duoneb.4 () Pulmicort ()   AF  Coreg 3125.2 ()   TROPONIN ELEVATION POSSIBLE NSTE MI   -2019 Tr 1 5.3 - 4.9 - 2.2   ASA 81 () Plavix 75 () coreg 3125x2 ()   S CHF 10/24/2014 ECHO EF 50% Mild hypokinesia septum PASP 40 Mod MR      Lisinopril 5 () Lasix 40 () ch lasix 20.2 ()   LOW BP Midodrine 5.3 started (2019)   OBS Doneppezil 5 ()   MICROBIO   2019 mrsa pcr n  ABIO   Zosyn ()   Rocephin (-)   IV F    NS 50 (-)                     PLAN  2019 Besides rsv rti pt seems to hav asp pneum pc 14 and nstemi with low bp and   Supp care for rsv and DC planning     TIME SPENT Over 25 minutes aggregate care time spent on encounter; activities included   direct patient care, counseling and/or coordinating care reviewing notes, lab data/ imaging , discussion with multidisciplinary team/ patient  /family. Risks, benefits, alternatives  discussed in detail.

## 2019-02-02 NOTE — PROGRESS NOTE ADULT - SUBJECTIVE AND OBJECTIVE BOX
PROGRESS NOTE  Patient is a 89y old  Male who presents with a chief complaint of weakness and lethargy (2019 11:55)  Chart and available morning labs /imaging are reviewed electronically , urgent issues addressed . More information  is being added upon completion of rounds , when more information is collected and management discussed with consultants , medical staff and social service/case management on the floor     OVERNIGHT  No new issues reported by medical staff . All above noted Patient is resting in a bed comfortably .Confused ,poor mentation .No distress noted     HPI:  Pt is a 89 yo WM  who presents to the ED with   PMHx of Atrial fibrillation on coumadin, h/o CHF, dementia, depression, DM, HLD, HTN, prostate cancer brought to ED from [facility] for evaluation of weakness and lethargy .  Information is obtained via chart and EMS.  Per [facility] pt appeared very weak today and did not eat breakfast.  They also noted that he had a temperature of 100.1 around 9 am.  Pt received a flu vaccine 18.  Currently pt alert to person only falling asleep at bedside and unable to provide history .Found to have RSV infection and diagnosed with COPD exacerbation and acute on chronic bronchitis exacerbation ,puljill biggs requested ,Dr Quintana called [facility] for antibacterial managmnet ,nebulised bronchodilators and pulmonology workup,O2 supply,serial labs and chest xrays,obtain ECHO to evaluate LVEF and rule out chf component Palliative care consult requested ,to discuss advance directives and complete MOLST (2019 17:01)    PAST MEDICAL & SURGICAL HISTORY:  Dementia  Depression  Diabetes  Hyperlipemia  CHF (congestive heart failure)  Dementia  Prostate ca  Diabetes  Hypertension  Atrial fibrillation  No significant past surgical history      MEDICATIONS  (STANDING):  ALBUTerol/ipratropium for Nebulization 3 milliLiter(s) Nebulizer every 6 hours  aspirin  chewable 81 milliGRAM(s) Oral daily  buDESOnide   0.5 milliGRAM(s) Respule 0.5 milliGRAM(s) Inhalation two times a day  carvedilol 3.125 milliGRAM(s) Oral every 12 hours  clopidogrel Tablet 75 milliGRAM(s) Oral daily  dextrose 5%. 1000 milliLiter(s) (50 mL/Hr) IV Continuous <Continuous>  dextrose 50% Injectable 12.5 Gram(s) IV Push once  dextrose 50% Injectable 25 Gram(s) IV Push once  dextrose 50% Injectable 25 Gram(s) IV Push once  donepezil 5 milliGRAM(s) Oral at bedtime  famotidine    Tablet 20 milliGRAM(s) Oral daily  furosemide   Injectable 20 milliGRAM(s) IV Push two times a day  guaiFENesin ER 1200 milliGRAM(s) Oral every 12 hours  insulin lispro (HumaLOG) corrective regimen sliding scale   SubCutaneous three times a day before meals  lactobacillus acidophilus 1 Tablet(s) Oral every 8 hours  magnesium oxide 400 milliGRAM(s) Oral three times a day with meals  midodrine 5 milliGRAM(s) Oral every 8 hours  piperacillin/tazobactam IVPB. 3.375 Gram(s) IV Intermittent every 8 hours  potassium acid phosphate/sodium acid phosphate tablet (K-PHOS No. 2) 1 Tablet(s) Oral three times a day with meals  potassium chloride    Tablet ER 10 milliEquivalent(s) Oral daily  simvastatin 20 milliGRAM(s) Oral at bedtime    MEDICATIONS  (PRN):  acetaminophen  Suppository .. 325 milliGRAM(s) Rectal every 6 hours PRN Temp greater or equal to 38C (100.4F), Mild Pain (1 - 3)  dextrose 40% Gel 15 Gram(s) Oral once PRN Blood Glucose LESS THAN 70 milliGRAM(s)/deciliter  glucagon  Injectable 1 milliGRAM(s) IntraMuscular once PRN Glucose LESS THAN 70 milligrams/deciliter      OBJECTIVE    T(C): 36.9 (19 @ 11:44), Max: 38 (19 @ 15:51)  HR: 74 (19 @ 13:03) (70 - 81)  BP: 105/69 (19 @ 11:44) (96/53 - 110/66)  RR: 20 (19 @ 11:44) (17 - 21)  SpO2: 97% (19 @ 11:44) (95% - 98%)  Wt(kg): --  I&O's Summary    2019 07:01  -  2019 07:00  --------------------------------------------------------  IN: 0 mL / OUT: 150 mL / NET: -150 mL          REVIEW OF SYSTEMS:  ROS is unobtainable due to lethargy and confusion     PHYSICAL EXAM:  Appearance: NAD. VS past 24 hrs -as above   HEENT:   Moist oral mucosa. Conjunctiva clear b/l.   Neck : supple  Respiratory: Lungs CTAB.  Gastrointestinal:  Soft, nontender. No rebound. No rigidity. BS present	  Cardiovascular: RRR ,S1S2 present  Neurologic: Non-focal. Moving all extremities.  Extremities: No edema. No erythema. No calf tenderness.  Skin: No rashes, No ecchymoses, No cyanosis.	  wounds ,skin lesions-See skin assesment flow sheet   LABS:                        11.5   7.42  )-----------( 195      ( 2019 08:59 )             34.9     02    143  |  106  |  26<H>  ----------------------------<  131<H>  3.8   |  32<H>  |  0.66    Ca    8.6      2019 08:59  Phos  1.8       Mg     1.8         TPro  6.3  /  Alb  2.7<L>  /  TBili  0.6  /  DBili  x   /  AST  34  /  ALT  25  /  AlkPhos  81  02-02    CAPILLARY BLOOD GLUCOSE      POCT Blood Glucose.: 122 mg/dL (2019 07:44)  POCT Blood Glucose.: 122 mg/dL (2019 21:18)  POCT Blood Glucose.: 129 mg/dL (2019 17:41)    PT/INR - ( 2019 08:59 )   PT: 17.1 sec;   INR: 1.49 ratio           Urinalysis Basic - ( 2019 06:21 )    Color: Yellow / Appearance: Clear / S.020 / pH: x  Gluc: x / Ketone: Small  / Bili: Negative / Urobili: Negative   Blood: x / Protein: 25 mg/dL / Nitrite: Negative   Leuk Esterase: Trace / RBC: 11-25 /HPF / WBC 6-10   Sq Epi: x / Non Sq Epi: Few / Bacteria: Occasional        Culture - Blood (collected 2019 07:34)  Source: .Blood Blood-Peripheral  Preliminary Report (2019 08:00):    No growth to date.    Culture - Blood (collected 2019 07:34)  Source: .Blood Blood-Peripheral  Preliminary Report (2019 08:00):    No growth to date.    Culture - Urine (collected 2019 18:41)  Source: .Urine Clean Catch (Midstream)  Final Report (2019 20:02):    No growth    Culture - Blood (collected 2019 15:16)  Source: .Blood Blood-Venous  Preliminary Report (2019 16:01):    No growth to date.    Culture - Blood (collected 2019 15:12)  Source: .Blood Blood-Venous  Preliminary Report (2019 16:01):    No growth to date.      RADIOLOGY & ADDITIONAL TESTS:   reviewed elctronically  ASSESSMENT/PLAN:

## 2019-02-03 LAB
ALBUMIN SERPL ELPH-MCNC: 2.6 G/DL — LOW (ref 3.3–5)
ALP SERPL-CCNC: 84 U/L — SIGNIFICANT CHANGE UP (ref 40–120)
ALT FLD-CCNC: 24 U/L — SIGNIFICANT CHANGE UP (ref 12–78)
ANION GAP SERPL CALC-SCNC: 7 MMOL/L — SIGNIFICANT CHANGE UP (ref 5–17)
AST SERPL-CCNC: 30 U/L — SIGNIFICANT CHANGE UP (ref 15–37)
BILIRUB SERPL-MCNC: 0.6 MG/DL — SIGNIFICANT CHANGE UP (ref 0.2–1.2)
BUN SERPL-MCNC: 20 MG/DL — SIGNIFICANT CHANGE UP (ref 7–23)
CALCIUM SERPL-MCNC: 8.5 MG/DL — SIGNIFICANT CHANGE UP (ref 8.5–10.1)
CHLORIDE SERPL-SCNC: 108 MMOL/L — SIGNIFICANT CHANGE UP (ref 96–108)
CO2 SERPL-SCNC: 30 MMOL/L — SIGNIFICANT CHANGE UP (ref 22–31)
CREAT SERPL-MCNC: 0.51 MG/DL — SIGNIFICANT CHANGE UP (ref 0.5–1.3)
CULTURE RESULTS: NO GROWTH — SIGNIFICANT CHANGE UP
GLUCOSE SERPL-MCNC: 128 MG/DL — HIGH (ref 70–99)
HCT VFR BLD CALC: 32.3 % — LOW (ref 39–50)
HGB BLD-MCNC: 10.7 G/DL — LOW (ref 13–17)
INR BLD: 1.98 RATIO — HIGH (ref 0.88–1.16)
MCHC RBC-ENTMCNC: 30.8 PG — SIGNIFICANT CHANGE UP (ref 27–34)
MCHC RBC-ENTMCNC: 33.1 GM/DL — SIGNIFICANT CHANGE UP (ref 32–36)
MCV RBC AUTO: 93.1 FL — SIGNIFICANT CHANGE UP (ref 80–100)
NRBC # BLD: 0 /100 WBCS — SIGNIFICANT CHANGE UP (ref 0–0)
PHOSPHATE SERPL-MCNC: 1.8 MG/DL — LOW (ref 2.5–4.5)
PLATELET # BLD AUTO: 218 K/UL — SIGNIFICANT CHANGE UP (ref 150–400)
POTASSIUM SERPL-MCNC: 3.5 MMOL/L — SIGNIFICANT CHANGE UP (ref 3.5–5.3)
POTASSIUM SERPL-SCNC: 3.5 MMOL/L — SIGNIFICANT CHANGE UP (ref 3.5–5.3)
PROT SERPL-MCNC: 6.1 G/DL — SIGNIFICANT CHANGE UP (ref 6–8.3)
PROTHROM AB SERPL-ACNC: 23 SEC — HIGH (ref 10–12.9)
RBC # BLD: 3.47 M/UL — LOW (ref 4.2–5.8)
RBC # FLD: 13 % — SIGNIFICANT CHANGE UP (ref 10.3–14.5)
SODIUM SERPL-SCNC: 145 MMOL/L — SIGNIFICANT CHANGE UP (ref 135–145)
SPECIMEN SOURCE: SIGNIFICANT CHANGE UP
WBC # BLD: 6.62 K/UL — SIGNIFICANT CHANGE UP (ref 3.8–10.5)
WBC # FLD AUTO: 6.62 K/UL — SIGNIFICANT CHANGE UP (ref 3.8–10.5)

## 2019-02-03 PROCEDURE — 71045 X-RAY EXAM CHEST 1 VIEW: CPT | Mod: 26

## 2019-02-03 RX ORDER — POTASSIUM PHOSPHATE, MONOBASIC POTASSIUM PHOSPHATE, DIBASIC 236; 224 MG/ML; MG/ML
15 INJECTION, SOLUTION INTRAVENOUS ONCE
Qty: 0 | Refills: 0 | Status: COMPLETED | OUTPATIENT
Start: 2019-02-03 | End: 2019-02-03

## 2019-02-03 RX ADMIN — Medication 1 TABLET(S): at 17:11

## 2019-02-03 RX ADMIN — Medication 3 MILLILITER(S): at 00:56

## 2019-02-03 RX ADMIN — FAMOTIDINE 20 MILLIGRAM(S): 10 INJECTION INTRAVENOUS at 07:55

## 2019-02-03 RX ADMIN — Medication 1 TABLET(S): at 11:08

## 2019-02-03 RX ADMIN — Medication 3 MILLILITER(S): at 07:40

## 2019-02-03 RX ADMIN — MIDODRINE HYDROCHLORIDE 5 MILLIGRAM(S): 2.5 TABLET ORAL at 05:50

## 2019-02-03 RX ADMIN — Medication 1: at 17:11

## 2019-02-03 RX ADMIN — Medication 1 TABLET(S): at 05:50

## 2019-02-03 RX ADMIN — Medication 3 MILLILITER(S): at 13:08

## 2019-02-03 RX ADMIN — Medication 0.5 MILLIGRAM(S): at 20:58

## 2019-02-03 RX ADMIN — Medication 81 MILLIGRAM(S): at 11:07

## 2019-02-03 RX ADMIN — Medication 1 TABLET(S): at 07:55

## 2019-02-03 RX ADMIN — Medication 1200 MILLIGRAM(S): at 05:50

## 2019-02-03 RX ADMIN — Medication 1 TABLET(S): at 11:53

## 2019-02-03 RX ADMIN — Medication 1200 MILLIGRAM(S): at 17:10

## 2019-02-03 RX ADMIN — PIPERACILLIN AND TAZOBACTAM 25 GRAM(S): 4; .5 INJECTION, POWDER, LYOPHILIZED, FOR SOLUTION INTRAVENOUS at 02:30

## 2019-02-03 RX ADMIN — CLOPIDOGREL BISULFATE 75 MILLIGRAM(S): 75 TABLET, FILM COATED ORAL at 11:07

## 2019-02-03 RX ADMIN — SIMVASTATIN 20 MILLIGRAM(S): 20 TABLET, FILM COATED ORAL at 21:24

## 2019-02-03 RX ADMIN — PIPERACILLIN AND TAZOBACTAM 25 GRAM(S): 4; .5 INJECTION, POWDER, LYOPHILIZED, FOR SOLUTION INTRAVENOUS at 11:16

## 2019-02-03 RX ADMIN — CARVEDILOL PHOSPHATE 3.12 MILLIGRAM(S): 80 CAPSULE, EXTENDED RELEASE ORAL at 17:12

## 2019-02-03 RX ADMIN — Medication 1 TABLET(S): at 21:24

## 2019-02-03 RX ADMIN — CARVEDILOL PHOSPHATE 3.12 MILLIGRAM(S): 80 CAPSULE, EXTENDED RELEASE ORAL at 05:50

## 2019-02-03 RX ADMIN — Medication 10 MILLIEQUIVALENT(S): at 11:07

## 2019-02-03 RX ADMIN — PIPERACILLIN AND TAZOBACTAM 25 GRAM(S): 4; .5 INJECTION, POWDER, LYOPHILIZED, FOR SOLUTION INTRAVENOUS at 19:34

## 2019-02-03 RX ADMIN — MAGNESIUM OXIDE 400 MG ORAL TABLET 400 MILLIGRAM(S): 241.3 TABLET ORAL at 17:11

## 2019-02-03 RX ADMIN — Medication 0.5 MILLIGRAM(S): at 07:40

## 2019-02-03 RX ADMIN — MAGNESIUM OXIDE 400 MG ORAL TABLET 400 MILLIGRAM(S): 241.3 TABLET ORAL at 07:55

## 2019-02-03 RX ADMIN — Medication 1: at 11:52

## 2019-02-03 RX ADMIN — POTASSIUM PHOSPHATE, MONOBASIC POTASSIUM PHOSPHATE, DIBASIC 62.5 MILLIMOLE(S): 236; 224 INJECTION, SOLUTION INTRAVENOUS at 13:01

## 2019-02-03 RX ADMIN — Medication 20 MILLIGRAM(S): at 05:51

## 2019-02-03 RX ADMIN — DONEPEZIL HYDROCHLORIDE 5 MILLIGRAM(S): 10 TABLET, FILM COATED ORAL at 21:24

## 2019-02-03 RX ADMIN — MIDODRINE HYDROCHLORIDE 5 MILLIGRAM(S): 2.5 TABLET ORAL at 21:24

## 2019-02-03 RX ADMIN — MIDODRINE HYDROCHLORIDE 5 MILLIGRAM(S): 2.5 TABLET ORAL at 13:16

## 2019-02-03 RX ADMIN — Medication 20 MILLIGRAM(S): at 17:10

## 2019-02-03 RX ADMIN — MAGNESIUM OXIDE 400 MG ORAL TABLET 400 MILLIGRAM(S): 241.3 TABLET ORAL at 11:14

## 2019-02-03 RX ADMIN — Medication 3 MILLILITER(S): at 20:58

## 2019-02-03 NOTE — PROGRESS NOTE ADULT - SUBJECTIVE AND OBJECTIVE BOX
Patient is a 89y Male with a known history of :  Febrile illness, acute (R50.9)  UTI (urinary tract infection) (N39.0)  NSTEMI (non-ST elevated myocardial infarction) (I21.4)  Prophylactic measure (Z29.9)  Dementia (F03.90)  Hypertension (I10)  Atrial fibrillation (I48.91)  CHF (congestive heart failure) (I50.9)  RSV bronchitis (J20.5)    HPI:  Pt is a 89 yo WM  who presents to the ED with   PMHx of Atrial fibrillation on coumadin, h/o CHF, dementia, depression, DM, HLD, HTN, prostate cancer brought to ED from [facility] for evaluation of weakness and lethargy .  Information is obtained via chart and EMS.  Per [facility] pt appeared very weak today and did not eat breakfast.  They also noted that he had a temperature of 100.1 around 9 am.  Pt received a flu vaccine 11/4/18.  Currently pt alert to person only falling asleep at bedside and unable to provide history .Found to have RSV infection and diagnosed with COPD exacerbation and acute on chronic bronchitis exacerbation ,puljill biggs requested ,Dr Quintana called [facility] for antibacterial managmnet ,nebulised bronchodilators and pulmonology workup,O2 supply,serial labs and chest xrays,obtain ECHO to evaluate LVEF and rule out chf component Palliative care consult requested ,to discuss advance directives and complete MOLST (30 Jan 2019 17:01)      REVIEW OF SYSTEMS:    CONSTITUTIONAL: No fever, weight loss, or fatigue  EYES: No eye pain, visual disturbances, or discharge  ENMT:  No difficulty hearing, tinnitus, vertigo; No sinus or throat pain  NECK: No pain or stiffness  BREASTS: No pain, masses, or nipple discharge  RESPIRATORY: No cough, wheezing, chills or hemoptysis; No shortness of breath  CARDIOVASCULAR: No chest pain, palpitations, dizziness, or leg swelling  GASTROINTESTINAL: No abdominal or epigastric pain. No nausea, vomiting, or hematemesis; No diarrhea or constipation. No melena or hematochezia.  GENITOURINARY: No dysuria, frequency, hematuria, or incontinence  NEUROLOGICAL: No headaches, memory loss, loss of strength, numbness, or tremors  SKIN: No itching, burning, rashes, or lesions   LYMPH NODES: No enlarged glands  ENDOCRINE: No heat or cold intolerance; No hair loss  MUSCULOSKELETAL: No joint pain or swelling; No muscle, back, or extremity pain  PSYCHIATRIC: No depression, anxiety, mood swings, or difficulty sleeping  HEME/LYMPH: No easy bruising, or bleeding gums  ALLERGY AND IMMUNOLOGIC: No hives or eczema    MEDICATIONS  (STANDING):  ALBUTerol/ipratropium for Nebulization 3 milliLiter(s) Nebulizer every 6 hours  aspirin  chewable 81 milliGRAM(s) Oral daily  buDESOnide   0.5 milliGRAM(s) Respule 0.5 milliGRAM(s) Inhalation two times a day  carvedilol 3.125 milliGRAM(s) Oral every 12 hours  clopidogrel Tablet 75 milliGRAM(s) Oral daily  dextrose 5%. 1000 milliLiter(s) (50 mL/Hr) IV Continuous <Continuous>  dextrose 50% Injectable 12.5 Gram(s) IV Push once  dextrose 50% Injectable 25 Gram(s) IV Push once  dextrose 50% Injectable 25 Gram(s) IV Push once  donepezil 5 milliGRAM(s) Oral at bedtime  famotidine    Tablet 20 milliGRAM(s) Oral daily  furosemide   Injectable 20 milliGRAM(s) IV Push two times a day  guaiFENesin ER 1200 milliGRAM(s) Oral every 12 hours  insulin lispro (HumaLOG) corrective regimen sliding scale   SubCutaneous three times a day before meals  lactobacillus acidophilus 1 Tablet(s) Oral every 8 hours  magnesium oxide 400 milliGRAM(s) Oral three times a day with meals  midodrine 5 milliGRAM(s) Oral every 8 hours  piperacillin/tazobactam IVPB. 3.375 Gram(s) IV Intermittent every 8 hours  potassium acid phosphate/sodium acid phosphate tablet (K-PHOS No. 2) 1 Tablet(s) Oral three times a day with meals  potassium chloride    Tablet ER 10 milliEquivalent(s) Oral daily  simvastatin 20 milliGRAM(s) Oral at bedtime    MEDICATIONS  (PRN):  acetaminophen  Suppository .. 325 milliGRAM(s) Rectal every 6 hours PRN Temp greater or equal to 38C (100.4F), Mild Pain (1 - 3)  dextrose 40% Gel 15 Gram(s) Oral once PRN Blood Glucose LESS THAN 70 milliGRAM(s)/deciliter  glucagon  Injectable 1 milliGRAM(s) IntraMuscular once PRN Glucose LESS THAN 70 milligrams/deciliter      ALLERGIES: latex (Rash)  latex (Unknown)  No Known Drug Allergies      FAMILY HISTORY:  No pertinent family history in first degree relatives      PHYSICAL EXAMINATION:  -----------------------------  T(C): 36.6 (02-03-19 @ 07:59), Max: 37.2 (02-02-19 @ 19:04)  HR: 87 (02-03-19 @ 07:59) (66 - 104)  BP: 109/77 (02-03-19 @ 07:59) (99/59 - 110/60)  RR: 20 (02-03-19 @ 07:59) (18 - 20)  SpO2: 97% (02-03-19 @ 07:59) (96% - 100%)  Wt(kg): --    02-02 @ 07:01  -  02-03 @ 07:00  --------------------------------------------------------  IN:    IV PiggyBack: 200 mL  Total IN: 200 mL    OUT:  Total OUT: 0 mL    Total NET: 200 mL            Constitutional: well developed, normal appearance, well groomed, well nourished, no deformities and no acute distress.   Eyes: the conjunctiva exhibited no abnormalities and the eyelids demonstrated no xanthelasmas.   HEENT: normal oral mucosa, no oral pallor and no oral cyanosis.   Neck: normal jugular venous A waves present, normal jugular venous V waves present and no jugular venous jacobs A waves.   Pulmonary: no respiratory distress, normal respiratory rhythm and effort, no accessory muscle use and lungs were clear to auscultation bilaterally.   Cardiovascular: heart rate and rhythm were normal, normal S1 and S2 and no murmur, gallop, rub, heave or thrill are present.   Abdomen: soft, non-tender, no hepato-splenomegaly and no abdominal mass palpated.   Musculoskeletal: the gait could not be assessed..   Extremities: no clubbing of the fingernails, no localized cyanosis, no petechial hemorrhages and no ischemic changes.   Skin: normal skin color and pigmentation, no rash, no venous stasis, no skin lesions, no skin ulcer and no xanthoma was observed.   Psychiatric: oriented to person, place, and time, the affect was normal, the mood was normal and not feeling anxious.     LABS:   --------  02-03    145  |  108  |  20  ----------------------------<  128<H>  3.5   |  30  |  0.51    Ca    8.5      03 Feb 2019 07:07  Phos  1.8     02-03    TPro  6.1  /  Alb  2.6<L>  /  TBili  0.6  /  DBili  x   /  AST  30  /  ALT  24  /  AlkPhos  84  02-03                         10.7   6.62  )-----------( 218      ( 03 Feb 2019 07:07 )             32.3     PT/INR - ( 03 Feb 2019 07:07 )   PT: 23.0 sec;   INR: 1.98 ratio                     RADIOLOGY:  -----------------        ECG:

## 2019-02-03 NOTE — PROGRESS NOTE ADULT - SUBJECTIVE AND OBJECTIVE BOX
TERESA FRANCIS [facility] P  [phone]    CONTACT Vinicio Bradford [phone]  ALLERGY Latex  REASON FOR VISIT     subsequent pulm fu   Initial evaluation/Pulmonary Critical Care consultation requested on 2019   by Dr Frias from Dr Quintana   Patient examined chart reviewed  HOSPITAL ADMISSION [facility] 2019     PATIENT CAME  FROM (if information available)      VITALS/LABS      2/3/2019 afeb 87 109/77 20 97%   2/3/2019 w 6.6 Hb 10.7 Plt 218  Na 145 K 3.5 CO2 30 Cr .5   2/3/2019 CXR chf overlying patchy infiltrates sl improved     REVIEW OF SYMPTOMS    Able to give ROS  NO     PHYSICAL EXAM    HEENT Unremarkable PERRLA atraumatic   RESP Fair air entry EXP prolonged    Harsh breath sound Resp distres mild   CARDIAC S1 S2 No S3     NO JVD    ABDOMEN SOFT BS PRESENT NOT DISTENDED No hepatosplenomegaly PEDAL EDEMA present No calf tenderness  NO rash   GENERAL Not TOXIC looking    GLOBAL ISSUE/BEST PRACTICE:      PROBLEM: HOB elevation:   y            PROBLEM: Stress ulcer proph:    pepcid 20 ()                       PROBLEM: VTE prophylaxis:      on coumadin poa 2019   PROBLEM: Glycemic control:    iss ()   PROBLEM: Nutrition:    cons carb dys 1 puree honey () ch npo () (2019 Speech mervin puree honey thickened) cons carb dys 1 puree honey (2/3)   PROBLEM: Advanced directive: dnr ()      PROBLEM: Allergies:  latex   EVENT 2019 6 beat v  tach     PATIENT DESCRIPTION PATIENT TERESA FRANCIS  10/16/1926, 2019 ADMISSION [facility] DR ARCADIO FRIAS  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 [employer] HO  injury L shin remote past 3/18/2014 V duplex legs –ben Chr swelling leg  OBS Prostate CA LV Systolic dysfunction CHF 10/24/2014 ECHO EF 50% Mild hypokinesia septum PASP 40 Mod MR    A fib  (on coumadin) Htn Depression Lower extr edema Chr swelling L leg DM      LUNG NODULE THYROID NODULE 10/21/2014 CT Ch  1 cm hypoattenuating thyroid nodule  Trace symmetrical layering bl pl effusions with dependent atelectasis bases  Ca granuloma both RML and l lower lobes  5 mm subpleural parenchymal nodule R apex  was admitted [facility] 2019 with dyspnea fever 100 He did getflu vaccine 2018   Pulm consulted 2019   [facility] meds coreg 3125x2 donepezil 5 lisinopril 5 metformin 500.2 dig 25 venlafaxine 75.2 simvastat 20 warfarin 10   er vitals afeb 76 108/66 95%     HOSPITAL COURSE   Resp syncytial viral resp tract infection managed with supp care   Troponin elevation was noted Pt kept on ASA Plavix Followed by Dr Paul Lisinopril 5 () added   6 BEAT V TACH ON 2019  S CHF Chronic managed with lasix 40 ()   lisinopril 5 (-) was deced (low bp)  COPD managed with bd ics   A fib managed with couamdin   Pneumonia pc 14 () CX  rll opac Placed on zosyn nu Dr Barrios on 2019   DYSPHAGIA (2019 Speech mervin puree honey thickened)     ASSESSMENT PLAN PATIENT YNGSTROM RAY 2019 ADMISSION [facility]  DR ARCADIO FRIAS   UTI 2019 ua w 0-2 r 11-25    Rocephin () ch to zosyn ()   RESP Monitor po Target 90-95%  RSV RTI Supp care   PNEA 2019 w 7.8  pc 14.8 zosyn () (Dr Barrios)   COPD  Duoneb.4 () Pulmicort ()   AF  Coreg 3125.2 ()   TROPONIN ELEVATION POSSIBLE NSTE MI   -2019 Tr 1 5.3 - 4.9 - 2.2   ASA 81 () Plavix 75 () coreg 3125x2 ()   S CHF 10/24/2014 ECHO EF 50% Mild hypokinesia septum PASP 40 Mod MR      Lisinopril 5 () Lasix 40 () ch lasix 20.2 ()   LOW BP Midodrine 5.3 started (2019)   OBS Doneppezil 5 ()   MICROBIO   2019 mrsa pcr n  ABIO   Zosyn () (asp pneu)     PLAN  2/3/2019 DW sonin detail  Diet restarted dysphagia puree honey thickened   2019 Besides rsv rti pt seems to hav asp pneum pc 14 and nstemi with low bp and   Supp care for rsv and DC planning     TIME SPENT Over 25 minutes aggregate care time spent on encounter; activities included   direct patient care, counseling and/or coordinating care reviewing notes, lab data/ imaging , discussion with multidisciplinary team/ patient  /family. Risks, benefits, alternatives  discussed in detail.

## 2019-02-03 NOTE — PROGRESS NOTE ADULT - SUBJECTIVE AND OBJECTIVE BOX
Interval History:    CENTRAL LINE:   [  ] YES       [  ] NO  RAMOS:                 [  ] YES       [  ] NO         REVIEW OF SYSTEMS:  All Systems below were reviewed and are negative [  ]  HEENT:  ID:  Pulmonary:  Cardiac:  GI:  Renal:  Musculoskeletal:  All other systems above were reviewed and are negative   [  ]      MEDICATIONS  (STANDING):  ALBUTerol/ipratropium for Nebulization 3 milliLiter(s) Nebulizer every 6 hours  aspirin  chewable 81 milliGRAM(s) Oral daily  buDESOnide   0.5 milliGRAM(s) Respule 0.5 milliGRAM(s) Inhalation two times a day  carvedilol 3.125 milliGRAM(s) Oral every 12 hours  clopidogrel Tablet 75 milliGRAM(s) Oral daily  dextrose 5%. 1000 milliLiter(s) (50 mL/Hr) IV Continuous <Continuous>  dextrose 50% Injectable 12.5 Gram(s) IV Push once  dextrose 50% Injectable 25 Gram(s) IV Push once  dextrose 50% Injectable 25 Gram(s) IV Push once  donepezil 5 milliGRAM(s) Oral at bedtime  famotidine    Tablet 20 milliGRAM(s) Oral daily  furosemide   Injectable 20 milliGRAM(s) IV Push two times a day  guaiFENesin ER 1200 milliGRAM(s) Oral every 12 hours  insulin lispro (HumaLOG) corrective regimen sliding scale   SubCutaneous three times a day before meals  lactobacillus acidophilus 1 Tablet(s) Oral every 8 hours  magnesium oxide 400 milliGRAM(s) Oral three times a day with meals  midodrine 5 milliGRAM(s) Oral every 8 hours  piperacillin/tazobactam IVPB. 3.375 Gram(s) IV Intermittent every 8 hours  potassium acid phosphate/sodium acid phosphate tablet (K-PHOS No. 2) 1 Tablet(s) Oral three times a day with meals  potassium chloride    Tablet ER 10 milliEquivalent(s) Oral daily  simvastatin 20 milliGRAM(s) Oral at bedtime    MEDICATIONS  (PRN):  acetaminophen  Suppository .. 325 milliGRAM(s) Rectal every 6 hours PRN Temp greater or equal to 38C (100.4F), Mild Pain (1 - 3)  dextrose 40% Gel 15 Gram(s) Oral once PRN Blood Glucose LESS THAN 70 milliGRAM(s)/deciliter  glucagon  Injectable 1 milliGRAM(s) IntraMuscular once PRN Glucose LESS THAN 70 milligrams/deciliter      Vital Signs Last 24 Hrs  T(C): 36.8 (03 Feb 2019 15:36), Max: 36.8 (03 Feb 2019 15:36)  T(F): 98.2 (03 Feb 2019 15:36), Max: 98.2 (03 Feb 2019 15:36)  HR: 72 (03 Feb 2019 15:36) (72 - 104)  BP: 107/61 (03 Feb 2019 15:36) (107/61 - 116/59)  BP(mean): --  RR: 19 (03 Feb 2019 15:36) (18 - 20)  SpO2: 97% (03 Feb 2019 15:36) (96% - 100%)    I&O's Summary    02 Feb 2019 07:01  -  03 Feb 2019 07:00  --------------------------------------------------------  IN: 200 mL / OUT: 0 mL / NET: 200 mL        PHYSICAL EXAM:  HEENT: NC/AT; PERRLA  Neck: Soft; no tenderness  Lungs: CTA bilaterally; no wheezing.   Heart:  Abdomen:  Genital/ Rectal:  Extremities:  Neurologic:  Vascular:      LABORATORY:    CBC Full  -  ( 03 Feb 2019 07:07 )  WBC Count : 6.62 K/uL  Hemoglobin : 10.7 g/dL  Hematocrit : 32.3 %  Platelet Count - Automated : 218 K/uL  Mean Cell Volume : 93.1 fl  Mean Cell Hemoglobin : 30.8 pg  Mean Cell Hemoglobin Concentration : 33.1 gm/dL  Auto Neutrophil # : x  Auto Lymphocyte # : x  Auto Monocyte # : x  Auto Eosinophil # : x  Auto Basophil # : x  Auto Neutrophil % : x  Auto Lymphocyte % : x  Auto Monocyte % : x  Auto Eosinophil % : x  Auto Basophil % : x      ESR:                   01-31 @ 06:34  --    C-Reactive Protein:     01-31 @ 06:34  --    Procalcitonin:           01-31 @ 06:34   14.83  ESR:                   01-30 @ 11:41  --    C-Reactive Protein:     01-30 @ 11:41  --    Procalcitonin:           01-30 @ 11:41   0.11      02-03    145  |  108  |  20  ----------------------------<  128<H>  3.5   |  30  |  0.51    Ca    8.5      03 Feb 2019 07:07  Phos  1.8     02-03    TPro  6.1  /  Alb  2.6<L>  /  TBili  0.6  /  DBili  x   /  AST  30  /  ALT  24  /  AlkPhos  84  02-03          Assessment and Plan:          Stephan Pruett MD   [phone]. The patient is afebrile today.  Comfortable.     MEDICATIONS  (STANDING):  ALBUTerol/ipratropium for Nebulization 3 milliLiter(s) Nebulizer every 6 hours  aspirin  chewable 81 milliGRAM(s) Oral daily  buDESOnide   0.5 milliGRAM(s) Respule 0.5 milliGRAM(s) Inhalation two times a day  carvedilol 3.125 milliGRAM(s) Oral every 12 hours  clopidogrel Tablet 75 milliGRAM(s) Oral daily  dextrose 5%. 1000 milliLiter(s) (50 mL/Hr) IV Continuous <Continuous>  dextrose 50% Injectable 12.5 Gram(s) IV Push once  dextrose 50% Injectable 25 Gram(s) IV Push once  dextrose 50% Injectable 25 Gram(s) IV Push once  donepezil 5 milliGRAM(s) Oral at bedtime  famotidine    Tablet 20 milliGRAM(s) Oral daily  furosemide   Injectable 20 milliGRAM(s) IV Push two times a day  guaiFENesin ER 1200 milliGRAM(s) Oral every 12 hours  insulin lispro (HumaLOG) corrective regimen sliding scale   SubCutaneous three times a day before meals  lactobacillus acidophilus 1 Tablet(s) Oral every 8 hours  magnesium oxide 400 milliGRAM(s) Oral three times a day with meals  midodrine 5 milliGRAM(s) Oral every 8 hours  piperacillin/tazobactam IVPB. 3.375 Gram(s) IV Intermittent every 8 hours  potassium acid phosphate/sodium acid phosphate tablet (K-PHOS No. 2) 1 Tablet(s) Oral three times a day with meals  potassium chloride    Tablet ER 10 milliEquivalent(s) Oral daily  simvastatin 20 milliGRAM(s) Oral at bedtime    MEDICATIONS  (PRN):  acetaminophen  Suppository .. 325 milliGRAM(s) Rectal every 6 hours PRN Temp greater or equal to 38C (100.4F), Mild Pain (1 - 3)  dextrose 40% Gel 15 Gram(s) Oral once PRN Blood Glucose LESS THAN 70 milliGRAM(s)/deciliter  glucagon  Injectable 1 milliGRAM(s) IntraMuscular once PRN Glucose LESS THAN 70 milligrams/deciliter      Vital Signs Last 24 Hrs  T(C): 36.8 (03 Feb 2019 15:36), Max: 36.8 (03 Feb 2019 15:36)  T(F): 98.2 (03 Feb 2019 15:36), Max: 98.2 (03 Feb 2019 15:36)  HR: 72 (03 Feb 2019 15:36) (72 - 104)  BP: 107/61 (03 Feb 2019 15:36) (107/61 - 116/59)  BP(mean): --  RR: 19 (03 Feb 2019 15:36) (18 - 20)  SpO2: 97% (03 Feb 2019 15:36) (96% - 100%)    I&O's Summary    02 Feb 2019 07:01  -  03 Feb 2019 07:00  --------------------------------------------------------  IN: 200 mL / OUT: 0 mL / NET: 200 mL      PHYSICAL EXAM:  HEENT: NC/AT; PERRLA  Neck: Soft; no tenderness  Lungs: Decreased BS bilaterally; no wheezing.   Heart: RRR; no murmurs.   Abdomen: Soft; no masses.  Extremities: No ulcers.  Neurologic: Awake.       LABORATORY:    CBC Full  -  ( 03 Feb 2019 07:07 )  WBC Count : 6.62 K/uL  Hemoglobin : 10.7 g/dL  Hematocrit : 32.3 %  Platelet Count - Automated : 218 K/uL  Mean Cell Volume : 93.1 fl  Mean Cell Hemoglobin : 30.8 pg  Mean Cell Hemoglobin Concentration : 33.1 gm/dL  Auto Neutrophil # : x  Auto Lymphocyte # : x  Auto Monocyte # : x  Auto Eosinophil # : x  Auto Basophil # : x  Auto Neutrophil % : x  Auto Lymphocyte % : x  Auto Monocyte % : x  Auto Eosinophil % : x  Auto Basophil % : x      14.83  ESR:                   01-30 @ 11:41  --    C-Reactive Protein:     01-30 @ 11:41  --    Procalcitonin:           01-30 @ 11:41   0.11      02-03    145  |  108  |  20  ----------------------------<  128<H>  3.5   |  30  |  0.51    Ca    8.5      03 Feb 2019 07:07  Phos  1.8     02-03    TPro  6.1  /  Alb  2.6<L>  /  TBili  0.6  /  DBili  x   /  AST  30  /  ALT  24  /  AlkPhos  84  02-03      Assessment and Plan:    1. RSV.  2. Recurrent fevers; r/o sepsis.    . Unclear sources of fevers and elevated procalcitonin level.  . Low suspicion for UTI with unremarkable UA and negative urine culture.   . Repeated CXR was suggestive of pneumonia. Will get chest CT.   . Continue IV Zosyn.         Stephan Pruett MD   [phone].

## 2019-02-03 NOTE — PROGRESS NOTE ADULT - SUBJECTIVE AND OBJECTIVE BOX
[facility]     SUBJECTIVE: Sitting chair, no card c/o. Son at bedside.      ALLERGIES:  Allergies    latex (Rash)  latex (Unknown)  No Known Drug Allergies    Intolerances        Vital Signs Last 24 Hrs  T(C): 36.4 (2019 11:51), Max: 37.2 (2019 19:04)  T(F): 97.6 (2019 11:51), Max: 98.9 (2019 19:04)  HR: 100 (2019 13:08) (66 - 104)  BP: 116/59 (2019 11:51) (99/59 - 116/59)  BP(mean): --  RR: 20 (2019 07:59) (18 - 20)  SpO2: 97% (2019 11:51) (96% - 100%)    PHYSICAL EXAM:  HEAD:  Atraumatic, Normocephalic  NECK: Supple and normal thyroid.  No JVD or carotid bruit.   HEART: S1, S2 normal, no S3  PULMONARY: few scattered rhonchi b/l  ABDOMEN: Soft nontender and positive bowel sounds   EXTREMITIES: no edema b/l at ankles   NEUROLOGICAL: no focal deficits     LABS:                        10.7   6.62  )-----------( 218      ( 2019 07:07 )             32.3     02-03    145  |  108  |  20  ----------------------------<  128<H>  3.5   |  30  |  0.51    Ca    8.5      2019 07:07  Phos  1.8     02-03    TPro  6.1  /  Alb  2.6<L>  /  TBili  0.6  /  DBili  x   /  AST  30  /  ALT  24  /  AlkPhos  84  02-03        PT/INR - ( 2019 07:07 )   PT: 23.0 sec;   INR: 1.98 ratio           Urinalysis Basic - ( 2019 06:21 )    Color: Yellow / Appearance: Clear / S.020 / pH: x  Gluc: x / Ketone: Small  / Bili: Negative / Urobili: Negative   Blood: x / Protein: 25 mg/dL / Nitrite: Negative   Leuk Esterase: Trace / RBC: 11-25 /HPF / WBC 6-10   Sq Epi: x / Non Sq Epi: Few / Bacteria: Occasional      BNP  TSHThyroid Stimulating Hormone, Serum: 0.38 uIU/mL ( @ 06:34)    LIVER FUNCTIONS - ( 2019 07:07 )  Alb: 2.6 g/dL / Pro: 6.1 g/dL / ALK PHOS: 84 U/L / ALT: 24 U/L / AST: 30 U/L / GGT: x             EKG: NSR, intermittent paced beats    ECHO:  EXAM:  ECHO TTE W/O CON COMP W/DOPPLR         PROCEDURE DATE:  04/10/2018        INTERPRETATION:  Ordering Physician: CLAIRE IBRAHIM [id number]    Indication: LV function    Study Quality: Difficult study   A complete echocardiographic study was performed utilizing standard   protocol including spectral and color Doppler in all echocardiographic   windows.    Height:182.8 cm  Weight: 72.6 KG  BSA:     1.94 sq m  Blood Pressure: 111/59    MEASUREMENTS  IVS: 1.0cm  PWT: 1.1 cm  LA:    cm 5.3  AO: 3.6cm  AV Cusp Separation:   cm  LVIDd: 5.0cm  LVIDs: 3.7cm  LVOT:    cm    LVEF: 50%  RVSP: 31.9mmHg    FINDINGS  Left Ventricle: Left ventricle was of normal size, mild LV systolic   dysfunction EF about 50%  Aortic Valve: Vertex sclerosis withmild aortic regurgitation  Mitral Valve: Mild mitral regurgitation  Tricuspid Valve: Trace tricuspid regurgitation  Pulmonic Valve: Normal  Left Atrium: Moderate left atrial enlargement  Right Ventricle: Normal  Right Atrium: Normal  Diastolic Function: Normal  Pericardium/Pleura: Normal      CONCLUSIONS:  Left ventricle was of normal size, mild LV systolic dysfunction EF 50%  Aortic sclerosis with trace aortic regurgitation  Mitral valve is thickened with mild mitral regurgitation  Trace tricuspid regurgitation    ROSA QUEVEDO M.D., ATTENDING CARDIOLOGIST  This document has been electronically signed. 2018 10:12AM        IMAGING:EXAM:  XR CHEST AP OR PA 1V                            PROCEDURE DATE:  2019          INTERPRETATION:  Clinical information: Shortness of breath. Evaluate for   pneumonia.    Technique: Frontal view of the chest.    Comparison: Prior chest x-ray examination from earlier today at 1:28 AM.    Finding: Examination is limited as the patient is rotated to the left and   as the patient's chin obscures the left lung apex.    There is no change in pulmonary edema and/or vascular congestion. The   heart size is at the upper limits of normal. There is a cardiac pacemaker   overlying the left chest wall. Multilevel degenerative changes are noted   within the imaged potions of the spine.     IMPRESSION: As above, no interval change.      MEG HOWELL M.D., ATTENDING RADIOLOGIST  This document has been electronically signed. 2019  9:41AM          MEDICATIONS  (STANDING):  ALBUTerol/ipratropium for Nebulization 3 milliLiter(s) Nebulizer every 6 hours  aspirin  chewable 81 milliGRAM(s) Oral daily  buDESOnide   0.5 milliGRAM(s) Respule 0.5 milliGRAM(s) Inhalation two times a day  carvedilol 3.125 milliGRAM(s) Oral every 12 hours  clopidogrel Tablet 75 milliGRAM(s) Oral daily  dextrose 5%. 1000 milliLiter(s) (50 mL/Hr) IV Continuous <Continuous>  dextrose 50% Injectable 12.5 Gram(s) IV Push once  dextrose 50% Injectable 25 Gram(s) IV Push once  dextrose 50% Injectable 25 Gram(s) IV Push once  donepezil 5 milliGRAM(s) Oral at bedtime  famotidine    Tablet 20 milliGRAM(s) Oral daily  furosemide   Injectable 20 milliGRAM(s) IV Push two times a day  guaiFENesin ER 1200 milliGRAM(s) Oral every 12 hours  insulin lispro (HumaLOG) corrective regimen sliding scale   SubCutaneous three times a day before meals  lactobacillus acidophilus 1 Tablet(s) Oral every 8 hours  magnesium oxide 400 milliGRAM(s) Oral three times a day with meals  midodrine 5 milliGRAM(s) Oral every 8 hours  piperacillin/tazobactam IVPB. 3.375 Gram(s) IV Intermittent every 8 hours  potassium acid phosphate/sodium acid phosphate tablet (K-PHOS No. 2) 1 Tablet(s) Oral three times a day with meals  potassium chloride    Tablet ER 10 milliEquivalent(s) Oral daily  simvastatin 20 milliGRAM(s) Oral at bedtime    MEDICATIONS  (PRN):  acetaminophen  Suppository .. 325 milliGRAM(s) Rectal every 6 hours PRN Temp greater or equal to 38C (100.4F), Mild Pain (1 - 3)  dextrose 40% Gel 15 Gram(s) Oral once PRN Blood Glucose LESS THAN 70 milliGRAM(s)/deciliter  glucagon  Injectable 1 milliGRAM(s) IntraMuscular once PRN Glucose LESS THAN 70 milligrams/deciliter

## 2019-02-04 LAB
ALBUMIN SERPL ELPH-MCNC: 2.9 G/DL — LOW (ref 3.3–5)
ALP SERPL-CCNC: 103 U/L — SIGNIFICANT CHANGE UP (ref 40–120)
ALT FLD-CCNC: 23 U/L — SIGNIFICANT CHANGE UP (ref 12–78)
ANION GAP SERPL CALC-SCNC: 7 MMOL/L — SIGNIFICANT CHANGE UP (ref 5–17)
AST SERPL-CCNC: 25 U/L — SIGNIFICANT CHANGE UP (ref 15–37)
BILIRUB SERPL-MCNC: 0.7 MG/DL — SIGNIFICANT CHANGE UP (ref 0.2–1.2)
BUN SERPL-MCNC: 20 MG/DL — SIGNIFICANT CHANGE UP (ref 7–23)
CALCIUM SERPL-MCNC: 8.7 MG/DL — SIGNIFICANT CHANGE UP (ref 8.5–10.1)
CHLORIDE SERPL-SCNC: 104 MMOL/L — SIGNIFICANT CHANGE UP (ref 96–108)
CO2 SERPL-SCNC: 32 MMOL/L — HIGH (ref 22–31)
CREAT SERPL-MCNC: 0.73 MG/DL — SIGNIFICANT CHANGE UP (ref 0.5–1.3)
CULTURE RESULTS: SIGNIFICANT CHANGE UP
CULTURE RESULTS: SIGNIFICANT CHANGE UP
GLUCOSE SERPL-MCNC: 169 MG/DL — HIGH (ref 70–99)
HCT VFR BLD CALC: 36.3 % — LOW (ref 39–50)
HGB BLD-MCNC: 12 G/DL — LOW (ref 13–17)
INR BLD: 2.77 RATIO — HIGH (ref 0.88–1.16)
MCHC RBC-ENTMCNC: 30.8 PG — SIGNIFICANT CHANGE UP (ref 27–34)
MCHC RBC-ENTMCNC: 33.1 GM/DL — SIGNIFICANT CHANGE UP (ref 32–36)
MCV RBC AUTO: 93.3 FL — SIGNIFICANT CHANGE UP (ref 80–100)
NRBC # BLD: 0 /100 WBCS — SIGNIFICANT CHANGE UP (ref 0–0)
PHOSPHATE SERPL-MCNC: 2.1 MG/DL — LOW (ref 2.5–4.5)
PLATELET # BLD AUTO: 268 K/UL — SIGNIFICANT CHANGE UP (ref 150–400)
POTASSIUM SERPL-MCNC: 3.6 MMOL/L — SIGNIFICANT CHANGE UP (ref 3.5–5.3)
POTASSIUM SERPL-SCNC: 3.6 MMOL/L — SIGNIFICANT CHANGE UP (ref 3.5–5.3)
PROT SERPL-MCNC: 6.6 G/DL — SIGNIFICANT CHANGE UP (ref 6–8.3)
PROTHROM AB SERPL-ACNC: 32.3 SEC — HIGH (ref 10–12.9)
RBC # BLD: 3.89 M/UL — LOW (ref 4.2–5.8)
RBC # FLD: 12.9 % — SIGNIFICANT CHANGE UP (ref 10.3–14.5)
SODIUM SERPL-SCNC: 143 MMOL/L — SIGNIFICANT CHANGE UP (ref 135–145)
SPECIMEN SOURCE: SIGNIFICANT CHANGE UP
SPECIMEN SOURCE: SIGNIFICANT CHANGE UP
WBC # BLD: 9.35 K/UL — SIGNIFICANT CHANGE UP (ref 3.8–10.5)
WBC # FLD AUTO: 9.35 K/UL — SIGNIFICANT CHANGE UP (ref 3.8–10.5)

## 2019-02-04 RX ORDER — METOPROLOL TARTRATE 50 MG
50 TABLET ORAL EVERY 6 HOURS
Qty: 0 | Refills: 0 | Status: DISCONTINUED | OUTPATIENT
Start: 2019-02-04 | End: 2019-02-06

## 2019-02-04 RX ORDER — METOPROLOL TARTRATE 50 MG
50 TABLET ORAL ONCE
Qty: 0 | Refills: 0 | Status: COMPLETED | OUTPATIENT
Start: 2019-02-04 | End: 2019-02-04

## 2019-02-04 RX ADMIN — MIDODRINE HYDROCHLORIDE 5 MILLIGRAM(S): 2.5 TABLET ORAL at 13:13

## 2019-02-04 RX ADMIN — Medication 10 MILLIEQUIVALENT(S): at 11:13

## 2019-02-04 RX ADMIN — Medication 20 MILLIGRAM(S): at 17:08

## 2019-02-04 RX ADMIN — Medication 1 TABLET(S): at 17:08

## 2019-02-04 RX ADMIN — Medication 0.5 MILLIGRAM(S): at 07:52

## 2019-02-04 RX ADMIN — Medication 81 MILLIGRAM(S): at 11:07

## 2019-02-04 RX ADMIN — MAGNESIUM OXIDE 400 MG ORAL TABLET 400 MILLIGRAM(S): 241.3 TABLET ORAL at 11:13

## 2019-02-04 RX ADMIN — MIDODRINE HYDROCHLORIDE 5 MILLIGRAM(S): 2.5 TABLET ORAL at 05:51

## 2019-02-04 RX ADMIN — MIDODRINE HYDROCHLORIDE 5 MILLIGRAM(S): 2.5 TABLET ORAL at 21:24

## 2019-02-04 RX ADMIN — Medication 1: at 08:07

## 2019-02-04 RX ADMIN — Medication 1 TABLET(S): at 17:07

## 2019-02-04 RX ADMIN — MAGNESIUM OXIDE 400 MG ORAL TABLET 400 MILLIGRAM(S): 241.3 TABLET ORAL at 17:07

## 2019-02-04 RX ADMIN — Medication 1 TABLET(S): at 11:09

## 2019-02-04 RX ADMIN — CARVEDILOL PHOSPHATE 3.12 MILLIGRAM(S): 80 CAPSULE, EXTENDED RELEASE ORAL at 05:51

## 2019-02-04 RX ADMIN — Medication 1 TABLET(S): at 11:56

## 2019-02-04 RX ADMIN — FAMOTIDINE 20 MILLIGRAM(S): 10 INJECTION INTRAVENOUS at 11:07

## 2019-02-04 RX ADMIN — Medication 50 MILLIGRAM(S): at 11:05

## 2019-02-04 RX ADMIN — Medication 1200 MILLIGRAM(S): at 17:07

## 2019-02-04 RX ADMIN — DONEPEZIL HYDROCHLORIDE 5 MILLIGRAM(S): 10 TABLET, FILM COATED ORAL at 21:24

## 2019-02-04 RX ADMIN — Medication 3 MILLILITER(S): at 02:45

## 2019-02-04 RX ADMIN — MAGNESIUM OXIDE 400 MG ORAL TABLET 400 MILLIGRAM(S): 241.3 TABLET ORAL at 07:44

## 2019-02-04 RX ADMIN — Medication 20 MILLIGRAM(S): at 05:51

## 2019-02-04 RX ADMIN — Medication 1 TABLET(S): at 07:44

## 2019-02-04 RX ADMIN — Medication 3 MILLILITER(S): at 14:00

## 2019-02-04 RX ADMIN — Medication 4: at 11:55

## 2019-02-04 RX ADMIN — Medication 1200 MILLIGRAM(S): at 05:51

## 2019-02-04 RX ADMIN — Medication 50 MILLIGRAM(S): at 11:09

## 2019-02-04 RX ADMIN — Medication 3 MILLILITER(S): at 07:52

## 2019-02-04 RX ADMIN — Medication 1 TABLET(S): at 21:24

## 2019-02-04 RX ADMIN — PIPERACILLIN AND TAZOBACTAM 25 GRAM(S): 4; .5 INJECTION, POWDER, LYOPHILIZED, FOR SOLUTION INTRAVENOUS at 02:02

## 2019-02-04 RX ADMIN — Medication 3 MILLILITER(S): at 20:14

## 2019-02-04 RX ADMIN — SIMVASTATIN 20 MILLIGRAM(S): 20 TABLET, FILM COATED ORAL at 21:24

## 2019-02-04 RX ADMIN — Medication 0.5 MILLIGRAM(S): at 20:14

## 2019-02-04 RX ADMIN — Medication 50 MILLIGRAM(S): at 23:17

## 2019-02-04 RX ADMIN — CLOPIDOGREL BISULFATE 75 MILLIGRAM(S): 75 TABLET, FILM COATED ORAL at 11:07

## 2019-02-04 RX ADMIN — Medication 1 TABLET(S): at 05:51

## 2019-02-04 NOTE — PROGRESS NOTE ADULT - SUBJECTIVE AND OBJECTIVE BOX
Interval History:    CENTRAL LINE:   [  ] YES       [  ] NO  RAMOS:                 [  ] YES       [  ] NO         REVIEW OF SYSTEMS:  All Systems below were reviewed and are negative [  ]  HEENT:  ID:  Pulmonary:  Cardiac:  GI:  Renal:  Musculoskeletal:  All other systems above were reviewed and are negative   [  ]      MEDICATIONS  (STANDING):  ALBUTerol/ipratropium for Nebulization 3 milliLiter(s) Nebulizer every 6 hours  amoxicillin  875 milliGRAM(s)/clavulanate 1 Tablet(s) Oral two times a day  aspirin  chewable 81 milliGRAM(s) Oral daily  buDESOnide   0.5 milliGRAM(s) Respule 0.5 milliGRAM(s) Inhalation two times a day  clopidogrel Tablet 75 milliGRAM(s) Oral daily  dextrose 5%. 1000 milliLiter(s) (50 mL/Hr) IV Continuous <Continuous>  dextrose 50% Injectable 12.5 Gram(s) IV Push once  dextrose 50% Injectable 25 Gram(s) IV Push once  dextrose 50% Injectable 25 Gram(s) IV Push once  donepezil 5 milliGRAM(s) Oral at bedtime  famotidine    Tablet 20 milliGRAM(s) Oral daily  furosemide   Injectable 20 milliGRAM(s) IV Push two times a day  guaiFENesin ER 1200 milliGRAM(s) Oral every 12 hours  insulin lispro (HumaLOG) corrective regimen sliding scale   SubCutaneous three times a day before meals  lactobacillus acidophilus 1 Tablet(s) Oral every 8 hours  magnesium oxide 400 milliGRAM(s) Oral three times a day with meals  metoprolol tartrate 50 milliGRAM(s) Oral every 6 hours  midodrine 5 milliGRAM(s) Oral every 8 hours  potassium acid phosphate/sodium acid phosphate tablet (K-PHOS No. 2) 1 Tablet(s) Oral three times a day with meals  potassium chloride    Tablet ER 10 milliEquivalent(s) Oral daily  simvastatin 20 milliGRAM(s) Oral at bedtime    MEDICATIONS  (PRN):  acetaminophen  Suppository .. 325 milliGRAM(s) Rectal every 6 hours PRN Temp greater or equal to 38C (100.4F), Mild Pain (1 - 3)  dextrose 40% Gel 15 Gram(s) Oral once PRN Blood Glucose LESS THAN 70 milliGRAM(s)/deciliter  glucagon  Injectable 1 milliGRAM(s) IntraMuscular once PRN Glucose LESS THAN 70 milligrams/deciliter      Vital Signs Last 24 Hrs  T(C): 36.9 (04 Feb 2019 16:06), Max: 37.1 (04 Feb 2019 12:05)  T(F): 98.4 (04 Feb 2019 16:06), Max: 98.8 (04 Feb 2019 12:05)  HR: 83 (04 Feb 2019 20:15) (60 - 90)  BP: 144/81 (04 Feb 2019 16:06) (112/67 - 144/81)  BP(mean): --  RR: 18 (04 Feb 2019 16:06) (18 - 20)  SpO2: 93% (04 Feb 2019 20:15) (90% - 100%)    I&O's Summary    03 Feb 2019 07:01  -  04 Feb 2019 07:00  --------------------------------------------------------  IN: 440 mL / OUT: 0 mL / NET: 440 mL    04 Feb 2019 07:01  -  04 Feb 2019 21:22  --------------------------------------------------------  IN: 360 mL / OUT: 0 mL / NET: 360 mL        PHYSICAL EXAM:  HEENT: NC/AT; PERRLA  Neck: Soft; no tenderness  Lungs: CTA bilaterally; no wheezing.   Heart:  Abdomen:  Genital/ Rectal:  Extremities:  Neurologic:  Vascular:      LABORATORY:    CBC Full  -  ( 04 Feb 2019 07:41 )  WBC Count : 9.35 K/uL  Hemoglobin : 12.0 g/dL  Hematocrit : 36.3 %  Platelet Count - Automated : 268 K/uL  Mean Cell Volume : 93.3 fl  Mean Cell Hemoglobin : 30.8 pg  Mean Cell Hemoglobin Concentration : 33.1 gm/dL  Auto Neutrophil # : x  Auto Lymphocyte # : x  Auto Monocyte # : x  Auto Eosinophil # : x  Auto Basophil # : x  Auto Neutrophil % : x  Auto Lymphocyte % : x  Auto Monocyte % : x  Auto Eosinophil % : x  Auto Basophil % : x      ESR:                   01-31 @ 06:34  --    C-Reactive Protein:     01-31 @ 06:34  --    Procalcitonin:           01-31 @ 06:34   14.83  ESR:                   01-30 @ 11:41  --    C-Reactive Protein:     01-30 @ 11:41  --    Procalcitonin:           01-30 @ 11:41   0.11      02-04    143  |  104  |  20  ----------------------------<  169<H>  3.6   |  32<H>  |  0.73    Ca    8.7      04 Feb 2019 07:41  Phos  2.1     02-04    TPro  6.6  /  Alb  2.9<L>  /  TBili  0.7  /  DBili  x   /  AST  25  /  ALT  23  /  AlkPhos  103  02-04          Assessment and Plan:          Stephan Pruett MD   [phone]. He is lethargic.  He had an episode of tachycardia of 140 today and was placed on metoprolol.   No fevers     MEDICATIONS  (STANDING):  ALBUTerol/ipratropium for Nebulization 3 milliLiter(s) Nebulizer every 6 hours  amoxicillin  875 milliGRAM(s)/clavulanate 1 Tablet(s) Oral two times a day  aspirin  chewable 81 milliGRAM(s) Oral daily  buDESOnide   0.5 milliGRAM(s) Respule 0.5 milliGRAM(s) Inhalation two times a day  clopidogrel Tablet 75 milliGRAM(s) Oral daily  dextrose 5%. 1000 milliLiter(s) (50 mL/Hr) IV Continuous <Continuous>  dextrose 50% Injectable 12.5 Gram(s) IV Push once  dextrose 50% Injectable 25 Gram(s) IV Push once  dextrose 50% Injectable 25 Gram(s) IV Push once  donepezil 5 milliGRAM(s) Oral at bedtime  famotidine    Tablet 20 milliGRAM(s) Oral daily  furosemide   Injectable 20 milliGRAM(s) IV Push two times a day  guaiFENesin ER 1200 milliGRAM(s) Oral every 12 hours  insulin lispro (HumaLOG) corrective regimen sliding scale   SubCutaneous three times a day before meals  lactobacillus acidophilus 1 Tablet(s) Oral every 8 hours  magnesium oxide 400 milliGRAM(s) Oral three times a day with meals  metoprolol tartrate 50 milliGRAM(s) Oral every 6 hours  midodrine 5 milliGRAM(s) Oral every 8 hours  potassium acid phosphate/sodium acid phosphate tablet (K-PHOS No. 2) 1 Tablet(s) Oral three times a day with meals  potassium chloride    Tablet ER 10 milliEquivalent(s) Oral daily  simvastatin 20 milliGRAM(s) Oral at bedtime    MEDICATIONS  (PRN):  acetaminophen  Suppository .. 325 milliGRAM(s) Rectal every 6 hours PRN Temp greater or equal to 38C (100.4F), Mild Pain (1 - 3)  dextrose 40% Gel 15 Gram(s) Oral once PRN Blood Glucose LESS THAN 70 milliGRAM(s)/deciliter  glucagon  Injectable 1 milliGRAM(s) IntraMuscular once PRN Glucose LESS THAN 70 milligrams/deciliter      Vital Signs Last 24 Hrs  T(C): 36.9 (04 Feb 2019 16:06), Max: 37.1 (04 Feb 2019 12:05)  T(F): 98.4 (04 Feb 2019 16:06), Max: 98.8 (04 Feb 2019 12:05)  HR: 83 (04 Feb 2019 20:15) (60 - 90)  BP: 144/81 (04 Feb 2019 16:06) (112/67 - 144/81)  BP(mean): --  RR: 18 (04 Feb 2019 16:06) (18 - 20)  SpO2: 93% (04 Feb 2019 20:15) (90% - 100%)    I&O's Summary    03 Feb 2019 07:01  -  04 Feb 2019 07:00  --------------------------------------------------------  IN: 440 mL / OUT: 0 mL / NET: 440 mL    04 Feb 2019 07:01  -  04 Feb 2019 21:22  --------------------------------------------------------  IN: 360 mL / OUT: 0 mL / NET: 360 mL      PHYSICAL EXAM:  HEENT: NC/AT; PERRLA  Neck: Soft; no tenderness  Lungs: Coarse BS bilaterally; no wheezing.   Heart: RRR; no murmurs.  Abdomen: Soft; no mases  Extremities: No ulcers.   Neurologic: Confused        LABORATORY:    CBC Full  -  ( 04 Feb 2019 07:41 )  WBC Count : 9.35 K/uL  Hemoglobin : 12.0 g/dL  Hematocrit : 36.3 %  Platelet Count - Automated : 268 K/uL  Mean Cell Volume : 93.3 fl  Mean Cell Hemoglobin : 30.8 pg  Mean Cell Hemoglobin Concentration : 33.1 gm/dL  Auto Neutrophil # : x  Auto Lymphocyte # : x  Auto Monocyte # : x  Auto Eosinophil # : x  Auto Basophil # : x  Auto Neutrophil % : x  Auto Lymphocyte % : x  Auto Monocyte % : x  Auto Eosinophil % : x  Auto Basophil % : x      ESR:                   01-31 @ 06:34  --    C-Reactive Protein:     01-31 @ 06:34  --    Procalcitonin:           01-31 @ 06:34   14.83  ESR:                   01-30 @ 11:41  --    C-Reactive Protein:     01-30 @ 11:41  --    Procalcitonin:           01-30 @ 11:41   0.11      02-04    143  |  104  |  20  ----------------------------<  169<H>  3.6   |  32<H>  |  0.73    Ca    8.7      04 Feb 2019 07:41  Phos  2.1     02-04    TPro  6.6  /  Alb  2.9<L>  /  TBili  0.7  /  DBili  x   /  AST  25  /  ALT  23  /  AlkPhos  103  02-04      Assessment and Plan:      1. RSV.  2. Recurrent fevers.  3. Elevated procalcitonin.     . Unclear sources of fevers and elevated procalcitonin level. He had procalcitonin of 14 on admission which was suggestive of sepsis.   . Low suspicion for UTI with unremarkable UA and negative urine culture.   . Repeated CXR was suggestive of pneumonia. Waiting for chest CT.  , Tachycardic and remained confused and lethargic. Waiting to be seen by cardiology.  . Pulmonary has changed Zosyn to Augmenti today.  . Since no clear diagnosis so far, the patient is not stable for discharge from ID's view.           Stephan Pruett MD   [phone].

## 2019-02-04 NOTE — PROGRESS NOTE ADULT - SUBJECTIVE AND OBJECTIVE BOX
TERESA FRANCIS [facility] P  [phone]    CONTACT Vinicio Bradford [phone]  ALLERGY Latex  REASON FOR VISIT     subsequent pulm fu   Initial evaluation/Pulmonary Critical Care consultation requested on 2019   by Dr Frias from Dr Quintana   Patient examined chart reviewed  HOSPITAL ADMISSION [facility] 2019     PATIENT CAME  FROM (if information available)      VITALS/LABS      2019 afeb 84 114/64 20 97%   2019 W 9.3 Hb 12 Plt 268  Na 143 K 3.6 CO2 32 Cr .7 PO4 2.1     REVIEW OF SYMPTOMS    Able to give ROS  NO     PHYSICAL EXAM    HEENT Unremarkable PERRLA atraumatic   RESP Fair air entry EXP prolonged    Harsh breath sound Resp distres mild   CARDIAC S1 S2 No S3     NO JVD    ABDOMEN SOFT BS PRESENT NOT DISTENDED No hepatosplenomegaly PEDAL EDEMA present No calf tenderness  NO rash   GENERAL Not TOXIC looking    GLOBAL ISSUE/BEST PRACTICE:      PROBLEM: HOB elevation:   y            PROBLEM: Stress ulcer proph:    pepcid 20 ()                       PROBLEM: VTE prophylaxis:      on coumadin poa 2019   PROBLEM: Glycemic control:    iss ()   PROBLEM: Nutrition:    cons carb dys 1 puree honey () ch npo () (2019 Speech mervin puree honey thickened) cons carb dys 1 puree honey (2/3)   PROBLEM: Advanced directive: dnr ()      PROBLEM: Allergies:  latex   EVENT 2019 6 beat v  tach     PATIENT DESCRIPTION PATIENT TERESA FRANCIS  10/16/1926, 2019 ADMISSION [facility] DR ARCADIO FRIAS  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 [facility] HO  injury L shin remote past 3/18/2014 V duplex legs –ben Chr swelling leg  OBS Prostate CA LV Systolic dysfunction CHF 10/24/2014 ECHO EF 50% Mild hypokinesia septum PASP 40 Mod MR    A fib  (on coumadin) Htn Depression Lower extr edema Chr swelling L leg DM      LUNG NODULE THYROID NODULE 10/21/2014 CT Ch  1 cm hypoattenuating thyroid nodule  Trace symmetrical layering bl pl effusions with dependent atelectasis bases  Ca granuloma both RML and l lower lobes  5 mm subpleural parenchymal nodule R apex  was admitted [facility] 2019 with dyspnea fever 100 He did getflu vaccine 2018   Pulm consulted 2019   jail meds coreg 3125x2 donepezil 5 lisinopril 5 metformin 500.2 dig 25 venlafaxine 75.2 simvastat 20 warfarin 10   er vitals afeb 76 108/66 95%     HOSPITAL COURSE   Resp syncytial viral resp tract infection managed with supp care   Troponin elevation was noted Pt kept on ASA Plavix Followed by Dr Paul Lisinopril 5 () added   6 BEAT V TACH ON 2019  S CHF Chronic managed with lasix 40 ()   lisinopril 5 (-) was deced (low bp)  COPD managed with bd ics   A fib managed with couamdin   Pneumonia pc 14 () CX  rll opac Placed on zosyn nu Dr Barrios on 2019 May change to po augmentin x 5 d   DYSPHAGIA (2019 Speech mervin puree honey thickened)     ASSESSMENT PLAN PATIENT YNGSTROM RAY 2019 ADMISSION [facility] P  DR ARCADIO FRIAS   UTI 2019 ua w 0-2 r 11-25    Rocephin () ch to zosyn ()   RESP Monitor po Target 90-95% 2019 ra 97%   RSV RTI Supp care   PNEA 2019 w 7.8  pc 14.8 zosyn () (Dr Barrios)   COPD  Duoneb.4 () Pulmicort ()   AF  Coreg 3125.2 ()   TROPONIN ELEVATION POSSIBLE NSTE MI   -2019 Tr 1 5.3 - 4.9 - 2.2   ASA 81 () Plavix 75 () coreg 3125x2 ()   S CHF 10/24/2014 ECHO EF 50% Mild hypokinesia septum PASP 40 Mod MR      Lisinopril 5 () Lasix 40 () ch lasix 20.2 ()   LOW BP Midodrine 5.3 started (2019)   OBS Doneppezil 5 ()   MICROBIO   2019 mrsa pcr n  ABIO   Zosyn () (asp pneu)   Rocephin (-)   IV F    NS 50 (-)                     PLAN  2019 Cleared for dc from Pulm standpoint if Cardio clears [t  2/3/2019 [place] garay detail  Diet restarted dysphagia puree honey thickened   2019 Besides rsv rti pt seems to hav asp pneum pc 14 and nstemi with low bp and   Supp care for rsv and DC planning     TIME SPENT Over 25 minutes aggregate care time spent on encounter; activities included   direct patient care, counseling and/or coordinating care reviewing notes, lab data/ imaging , discussion with multidisciplinary team/ patient  /family. Risks, benefits, alternatives  discussed in detail.

## 2019-02-04 NOTE — PROGRESS NOTE ADULT - SUBJECTIVE AND OBJECTIVE BOX
Chart and available morning labs /imaging are reviewed electronically , urgent issues addressed . More information  is being added upon completion of rounds , when more information is collected and management discussed with consultants , medical staff and social service/case management on the floor PROGRESS NOTE  Patient is a 89y old  Male who presents with a chief complaint of weakness and lethargy (04 Feb 2019 09:43)  Chart and available morning labs /imaging are reviewed electronically , urgent issues addressed . More information  is being added upon completion of rounds , when more information is collected and management discussed with consultants , medical staff and social service/case management on the floor     OVERNIGHT  No new issues reported by medical staff . All above noted Patient is resting in a bed comfortably .Confused ,poor mentation .No distress noted     HPI:  Pt is a 89 yo WM  who presents to the ED with   PMHx of Atrial fibrillation on coumadin, h/o CHF, dementia, depression, DM, HLD, HTN, prostate cancer brought to ED from [facility] for evaluation of weakness and lethargy .  Information is obtained via chart and EMS.  Per [facility] pt appeared very weak today and did not eat breakfast.  They also noted that he had a temperature of 100.1 around 9 am.  Pt received a flu vaccine 11/4/18.  Currently pt alert to person only falling asleep at bedside and unable to provide history .Found to have RSV infection and diagnosed with COPD exacerbation and acute on chronic bronchitis exacerbation ,puljill biggs requested ,Dr Quintana called Admit for antibacterial managmnet ,nebulised bronchodilators and pulmonology workup,O2 supply,serial labs and chest xrays,obtain ECHO to evaluate LVEF and rule out chf component Palliative care consult requested ,to discuss advance directives and complete MOLST (30 Jan 2019 17:01)    PAST MEDICAL & SURGICAL HISTORY:  Dementia  Depression  Diabetes  Hyperlipemia  CHF (congestive heart failure)  Dementia  Prostate ca  Diabetes  Hypertension  Atrial fibrillation  No significant past surgical history      MEDICATIONS  (STANDING):  ALBUTerol/ipratropium for Nebulization 3 milliLiter(s) Nebulizer every 6 hours  amoxicillin  875 milliGRAM(s)/clavulanate 1 Tablet(s) Oral two times a day  aspirin  chewable 81 milliGRAM(s) Oral daily  buDESOnide   0.5 milliGRAM(s) Respule 0.5 milliGRAM(s) Inhalation two times a day  clopidogrel Tablet 75 milliGRAM(s) Oral daily  dextrose 5%. 1000 milliLiter(s) (50 mL/Hr) IV Continuous <Continuous>  dextrose 50% Injectable 12.5 Gram(s) IV Push once  dextrose 50% Injectable 25 Gram(s) IV Push once  dextrose 50% Injectable 25 Gram(s) IV Push once  donepezil 5 milliGRAM(s) Oral at bedtime  famotidine    Tablet 20 milliGRAM(s) Oral daily  furosemide   Injectable 20 milliGRAM(s) IV Push two times a day  guaiFENesin ER 1200 milliGRAM(s) Oral every 12 hours  insulin lispro (HumaLOG) corrective regimen sliding scale   SubCutaneous three times a day before meals  lactobacillus acidophilus 1 Tablet(s) Oral every 8 hours  magnesium oxide 400 milliGRAM(s) Oral three times a day with meals  metoprolol tartrate 50 milliGRAM(s) Oral every 6 hours  midodrine 5 milliGRAM(s) Oral every 8 hours  potassium acid phosphate/sodium acid phosphate tablet (K-PHOS No. 2) 1 Tablet(s) Oral three times a day with meals  potassium chloride    Tablet ER 10 milliEquivalent(s) Oral daily  simvastatin 20 milliGRAM(s) Oral at bedtime    MEDICATIONS  (PRN):  acetaminophen  Suppository .. 325 milliGRAM(s) Rectal every 6 hours PRN Temp greater or equal to 38C (100.4F), Mild Pain (1 - 3)  dextrose 40% Gel 15 Gram(s) Oral once PRN Blood Glucose LESS THAN 70 milliGRAM(s)/deciliter  glucagon  Injectable 1 milliGRAM(s) IntraMuscular once PRN Glucose LESS THAN 70 milligrams/deciliter      OBJECTIVE    T(C): 36.9 (02-04-19 @ 16:06), Max: 37.1 (02-04-19 @ 12:05)  HR: 60 (02-04-19 @ 16:06) (60 - 90)  BP: 144/81 (02-04-19 @ 16:06) (112/67 - 144/81)  RR: 18 (02-04-19 @ 16:06) (18 - 20)  SpO2: 100% (02-04-19 @ 16:06) (90% - 100%)  Wt(kg): --  I&O's Summary    03 Feb 2019 07:01  -  04 Feb 2019 07:00  --------------------------------------------------------  IN: 440 mL / OUT: 0 mL / NET: 440 mL    04 Feb 2019 07:01  -  04 Feb 2019 20:10  --------------------------------------------------------  IN: 360 mL / OUT: 0 mL / NET: 360 mL          REVIEW OF SYSTEMS:  ROS is unobtainable due to lethargy and confusion     PHYSICAL EXAM:  Appearance: NAD. VS past 24 hrs -as above   HEENT:   Moist oral mucosa. Conjunctiva clear b/l.   Neck : supple  Respiratory: Lungs CTAB.  Gastrointestinal:  Soft, nontender. No rebound. No rigidity. BS present	  Cardiovascular: RRR ,S1S2 present  Neurologic: Non-focal. Moving all extremities.  Extremities: No edema. No erythema. No calf tenderness.  Skin: No rashes, No ecchymoses, No cyanosis.	  wounds ,skin lesions-See skin assesment flow sheet   LABS:                        12.0   9.35  )-----------( 268      ( 04 Feb 2019 07:41 )             36.3     02-04    143  |  104  |  20  ----------------------------<  169<H>  3.6   |  32<H>  |  0.73    Ca    8.7      04 Feb 2019 07:41  Phos  2.1     02-04    TPro  6.6  /  Alb  2.9<L>  /  TBili  0.7  /  DBili  x   /  AST  25  /  ALT  23  /  AlkPhos  103  02-04    CAPILLARY BLOOD GLUCOSE      POCT Blood Glucose.: 120 mg/dL (04 Feb 2019 17:07)  POCT Blood Glucose.: 331 mg/dL (04 Feb 2019 11:52)  POCT Blood Glucose.: 170 mg/dL (04 Feb 2019 07:50)  POCT Blood Glucose.: 137 mg/dL (03 Feb 2019 21:12)    PT/INR - ( 04 Feb 2019 07:41 )   PT: 32.3 sec;   INR: 2.77 ratio               Culture - Urine (collected 02 Feb 2019 11:36)  Source: .Urine Clean Catch (Midstream)  Final Report (03 Feb 2019 14:12):    No growth    Culture - Blood (collected 01 Feb 2019 07:34)  Source: .Blood Blood-Peripheral  Preliminary Report (02 Feb 2019 08:00):    No growth to date.    Culture - Blood (collected 01 Feb 2019 07:34)  Source: .Blood Blood-Peripheral  Preliminary Report (02 Feb 2019 08:00):    No growth to date.    Culture - Urine (collected 30 Jan 2019 18:41)  Source: .Urine Clean Catch (Midstream)  Final Report (31 Jan 2019 20:02):    No growth    Culture - Blood (collected 30 Jan 2019 15:16)  Source: .Blood Blood-Venous  Final Report (04 Feb 2019 16:09):    No growth at 5 days.    Culture - Blood (collected 30 Jan 2019 15:12)  Source: .Blood Blood-Venous  Final Report (04 Feb 2019 16:09):    No growth at 5 days.      RADIOLOGY & ADDITIONAL TESTS:< from: Xray Chest 1 View- PORTABLE-Routine (02.03.19 @ 07:26) >  EXAM:  XR CHEST PORTABLE ROUTINE 1V                            PROCEDURE DATE:  02/03/2019          INTERPRETATION:  History: Acute bronchitis, CHF, pneumonia    Portable chest radiograph is compared to 2/1/2019.    The heart is enlarged. Single lead pacemaker is again seen entering from   the left. There is again suggestion of mild pulmonary vascular   congestion. There is additional patchy infiltrate in the right mid to   lower lung perhaps slightly improved from the prior study. There are   degenerative changes of bony structures. There is no pleural effusion.    Impression: Suggestion of mild CHF with overlying patchy right   infiltrates slightly improved.    < end of copied text >     reviewed elctronically  ASSESSMENT/PLAN:

## 2019-02-05 DIAGNOSIS — J15.9 UNSPECIFIED BACTERIAL PNEUMONIA: ICD-10-CM

## 2019-02-05 LAB
ANION GAP SERPL CALC-SCNC: 6 MMOL/L — SIGNIFICANT CHANGE UP (ref 5–17)
BUN SERPL-MCNC: 20 MG/DL — SIGNIFICANT CHANGE UP (ref 7–23)
CALCIUM SERPL-MCNC: 8.8 MG/DL — SIGNIFICANT CHANGE UP (ref 8.5–10.1)
CHLORIDE SERPL-SCNC: 104 MMOL/L — SIGNIFICANT CHANGE UP (ref 96–108)
CO2 SERPL-SCNC: 32 MMOL/L — HIGH (ref 22–31)
CREAT SERPL-MCNC: 0.64 MG/DL — SIGNIFICANT CHANGE UP (ref 0.5–1.3)
ERYTHROCYTE [SEDIMENTATION RATE] IN BLOOD: 39 MM/HR — HIGH (ref 0–20)
GLUCOSE SERPL-MCNC: 147 MG/DL — HIGH (ref 70–99)
HCT VFR BLD CALC: 33.9 % — LOW (ref 39–50)
HGB BLD-MCNC: 11.2 G/DL — LOW (ref 13–17)
INR BLD: 2.22 RATIO — HIGH (ref 0.88–1.16)
MAGNESIUM SERPL-MCNC: 1.7 MG/DL — SIGNIFICANT CHANGE UP (ref 1.6–2.6)
MCHC RBC-ENTMCNC: 30.9 PG — SIGNIFICANT CHANGE UP (ref 27–34)
MCHC RBC-ENTMCNC: 33 GM/DL — SIGNIFICANT CHANGE UP (ref 32–36)
MCV RBC AUTO: 93.4 FL — SIGNIFICANT CHANGE UP (ref 80–100)
NRBC # BLD: 0 /100 WBCS — SIGNIFICANT CHANGE UP (ref 0–0)
NT-PROBNP SERPL-SCNC: 5828 PG/ML — HIGH (ref 0–450)
PHOSPHATE SERPL-MCNC: 2.6 MG/DL — SIGNIFICANT CHANGE UP (ref 2.5–4.5)
PLATELET # BLD AUTO: 254 K/UL — SIGNIFICANT CHANGE UP (ref 150–400)
POTASSIUM SERPL-MCNC: 3.5 MMOL/L — SIGNIFICANT CHANGE UP (ref 3.5–5.3)
POTASSIUM SERPL-SCNC: 3.5 MMOL/L — SIGNIFICANT CHANGE UP (ref 3.5–5.3)
PROCALCITONIN SERPL-MCNC: 0.91 NG/ML — HIGH (ref 0–0.04)
PROTHROM AB SERPL-ACNC: 25.9 SEC — HIGH (ref 10–12.9)
RBC # BLD: 3.63 M/UL — LOW (ref 4.2–5.8)
RBC # FLD: 12.6 % — SIGNIFICANT CHANGE UP (ref 10.3–14.5)
SODIUM SERPL-SCNC: 142 MMOL/L — SIGNIFICANT CHANGE UP (ref 135–145)
WBC # BLD: 7.2 K/UL — SIGNIFICANT CHANGE UP (ref 3.8–10.5)
WBC # FLD AUTO: 7.2 K/UL — SIGNIFICANT CHANGE UP (ref 3.8–10.5)

## 2019-02-05 PROCEDURE — 71250 CT THORAX DX C-: CPT | Mod: 26

## 2019-02-05 PROCEDURE — 71045 X-RAY EXAM CHEST 1 VIEW: CPT | Mod: 26

## 2019-02-05 RX ORDER — WARFARIN SODIUM 2.5 MG/1
2.5 TABLET ORAL ONCE
Qty: 0 | Refills: 0 | Status: COMPLETED | OUTPATIENT
Start: 2019-02-05 | End: 2019-02-05

## 2019-02-05 RX ORDER — FUROSEMIDE 40 MG
40 TABLET ORAL DAILY
Qty: 0 | Refills: 0 | Status: DISCONTINUED | OUTPATIENT
Start: 2019-02-05 | End: 2019-02-06

## 2019-02-05 RX ORDER — WARFARIN SODIUM 2.5 MG/1
5 TABLET ORAL ONCE
Qty: 0 | Refills: 0 | Status: DISCONTINUED | OUTPATIENT
Start: 2019-02-05 | End: 2019-02-05

## 2019-02-05 RX ORDER — WARFARIN SODIUM 2.5 MG/1
2 TABLET ORAL ONCE
Qty: 0 | Refills: 0 | Status: DISCONTINUED | OUTPATIENT
Start: 2019-02-05 | End: 2019-02-05

## 2019-02-05 RX ADMIN — Medication 1 TABLET(S): at 15:02

## 2019-02-05 RX ADMIN — Medication 10 MILLIEQUIVALENT(S): at 11:49

## 2019-02-05 RX ADMIN — DONEPEZIL HYDROCHLORIDE 5 MILLIGRAM(S): 10 TABLET, FILM COATED ORAL at 21:07

## 2019-02-05 RX ADMIN — Medication 1 TABLET(S): at 05:40

## 2019-02-05 RX ADMIN — WARFARIN SODIUM 2.5 MILLIGRAM(S): 2.5 TABLET ORAL at 21:07

## 2019-02-05 RX ADMIN — Medication 3 MILLILITER(S): at 20:39

## 2019-02-05 RX ADMIN — MIDODRINE HYDROCHLORIDE 5 MILLIGRAM(S): 2.5 TABLET ORAL at 05:40

## 2019-02-05 RX ADMIN — Medication 0.5 MILLIGRAM(S): at 20:39

## 2019-02-05 RX ADMIN — Medication 50 MILLIGRAM(S): at 05:40

## 2019-02-05 RX ADMIN — MIDODRINE HYDROCHLORIDE 5 MILLIGRAM(S): 2.5 TABLET ORAL at 15:02

## 2019-02-05 RX ADMIN — Medication 40 MILLIGRAM(S): at 05:40

## 2019-02-05 RX ADMIN — Medication 1 TABLET(S): at 17:03

## 2019-02-05 RX ADMIN — Medication 81 MILLIGRAM(S): at 11:50

## 2019-02-05 RX ADMIN — FAMOTIDINE 20 MILLIGRAM(S): 10 INJECTION INTRAVENOUS at 13:24

## 2019-02-05 RX ADMIN — Medication 1 TABLET(S): at 07:56

## 2019-02-05 RX ADMIN — Medication 1 TABLET(S): at 11:49

## 2019-02-05 RX ADMIN — Medication 1: at 17:27

## 2019-02-05 RX ADMIN — SIMVASTATIN 20 MILLIGRAM(S): 20 TABLET, FILM COATED ORAL at 21:07

## 2019-02-05 RX ADMIN — Medication 1200 MILLIGRAM(S): at 17:03

## 2019-02-05 RX ADMIN — Medication 1: at 07:55

## 2019-02-05 RX ADMIN — Medication 1 TABLET(S): at 21:07

## 2019-02-05 RX ADMIN — Medication 3 MILLILITER(S): at 08:15

## 2019-02-05 RX ADMIN — MAGNESIUM OXIDE 400 MG ORAL TABLET 400 MILLIGRAM(S): 241.3 TABLET ORAL at 13:25

## 2019-02-05 RX ADMIN — Medication 1 TABLET(S): at 17:02

## 2019-02-05 RX ADMIN — MAGNESIUM OXIDE 400 MG ORAL TABLET 400 MILLIGRAM(S): 241.3 TABLET ORAL at 17:02

## 2019-02-05 RX ADMIN — MIDODRINE HYDROCHLORIDE 5 MILLIGRAM(S): 2.5 TABLET ORAL at 21:07

## 2019-02-05 RX ADMIN — Medication 0.5 MILLIGRAM(S): at 08:15

## 2019-02-05 RX ADMIN — Medication 3 MILLILITER(S): at 14:01

## 2019-02-05 RX ADMIN — MAGNESIUM OXIDE 400 MG ORAL TABLET 400 MILLIGRAM(S): 241.3 TABLET ORAL at 07:56

## 2019-02-05 RX ADMIN — Medication 1200 MILLIGRAM(S): at 05:40

## 2019-02-05 NOTE — PROGRESS NOTE ADULT - SUBJECTIVE AND OBJECTIVE BOX
Patient was examined Chart reviewed Detailed note will be inserted below after latest data has been analyzed Meanwhile please refer to my last note for Pulmonary assessment and recommendations Patient was examined Chart reviewed Detailed note will be inserted below after latest data has been analyzed Meanwhile please refer to my last note for Pulmonary assessment and recommendations       TERESA FRANCIS [facility] P  [phone]    CONTACT Vinicio Bradford [phone]  ALLERGY Latex  REASON FOR VISIT     subsequent pulm fu   Initial evaluation/Pulmonary Critical Care consultation requested on 2019   by Dr Frias from Dr Quintana   Patient examined chart reviewed  HOSPITAL ADMISSION [facility] 2019     PATIENT CAME  FROM (if information available)      VITALS/LABS      2019 afeb 72 96/52 18 96%   2019 w 7.2 Hb 11.2 Plt 254 Na 142 K 3.5 CO2 32 Cr .6     REVIEW OF SYMPTOMS    Able to give ROS  NO     PHYSICAL EXAM    HEENT Unremarkable PERRLA atraumatic   RESP Fair air entry EXP prolonged    Harsh breath sound Resp distres mild   CARDIAC S1 S2 No S3     NO JVD    ABDOMEN SOFT BS PRESENT NOT DISTENDED No hepatosplenomegaly PEDAL EDEMA present No calf tenderness  NO rash   GENERAL Not TOXIC looking    GLOBAL ISSUE/BEST PRACTICE:      PROBLEM: HOB elevation:   y            PROBLEM: Stress ulcer proph:    pepcid 20 ()                       PROBLEM: VTE prophylaxis:      on coumadin poa 2019   PROBLEM: Glycemic control:    iss ()   PROBLEM: Nutrition:    cons carb dys 1 puree honey () ch npo () (2019 Speech mervin puree honey thickened) cons carb dys 1 puree honey (2/3)   PROBLEM: Advanced directive: dnr ()      PROBLEM: Allergies:  latex   EVENT 2019 6 beat v  tach     PATIENT DESCRIPTION PATIENT TERESA FRANCIS  10/16/1926, 2019 ADMISS[address] [facility] P DR ARCADIO FRIAS  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 [employer] HO  injury L shin remote past 3/18/2014 V duplex legs –ben Chr swelling leg  OBS Prostate CA LV Systolic dysfunction CHF 10/24/2014 ECHO EF 50% Mild hypokinesia septum PASP 40 Mod MR    A fib  (on coumadin) Htn Depression Lower extr edema Chr swelling L leg DM      LUNG NODULE THYROID NODULE 10/21/2014 CT Ch  1 cm hypoattenuating thyroid nodule  Trace symmetrical layering bl pl effusions with dependent atelectasis bases  Ca granuloma both RML and l lower lobes  5 mm subpleural parenchymal nodule R apex  was admitted [facility] P 2019 with dyspnea fever 100 He did getflu vaccine 2018   Pulm consulted 2019   carolin meds coreg 3125x2 donepezil 5 lisinopril 5 metformin 500.2 dig 25 venlafaxine 75.2 simvastat 20 warfarin 10   er vitals afeb 76 108/66 95%     ASSESSMENT PLAN PATIENT YNGSTROM RAY 2019 ADMISSION [facility]  DR ARCADIO FRIAS   UTI 2019 ua w 0-2 r 11-25    Rocephin () ch to zosyn ()   RESP Monitor po Target 90-95% 2019 ra 97%   RSV RTI Supp care   PNEA 2019 w 7.8  pc 14.8  2019 ct ch mf pneuzosyn () (Dr Barrios)   COPD  Duoneb.4 () Pulmicort ()   AF  Coreg 3125.2 () warfarin asa 81   TROPONIN ELEVATION POSSIBLE NSTE MI   -2019 Tr 1 5.3 - 4.9 - 2.2   ASA 81 () Plavix 75 () coreg 3125x2 () ch metoprolol 50.4 (2.5)   S CHF 10/24/2014 ECHO EF 50% Mild hypokinesia septum PASP 40 Mod MR      Lisinopril 5 () Lasix 40 () ch lasix 20.2 () ch lasix 40 ()   LOW BP Midodrine 5.3 started (2019)   OBS Doneppezil 5 ()   MICROBIO   2019 mrsa pcr n  ABIO   augmentin (2.4)   Zosyn (-) (asp pneu)   Rocephin (-)   IV F    NS 50 (-)                     PLAN  2019 Cleared for dc from Pulm standpoint if Cardio clears [t  2/3/2019 DW sonin detail  Diet restarted dysphagia puree honey thickened   2019 Besides rsv rti pt seems to hav asp pneum pc 14 and nstemi with low bp and   Supp care for rsv and DC planning     TIME SPENT Over 25 minutes aggregate care time spent on encounter; activities included   direct patient care, counseling and/or coordinating care reviewing notes, lab data/ imaging , discussion with multidisciplinary team/ patient  /family. Risks, benefits, alternatives  discussed in detail.

## 2019-02-05 NOTE — CHART NOTE - NSCHARTNOTEFT_GEN_A_CORE
Assessment: 89 y/o male adm with acute bronchitis. Pt confused. Spoke with RN. Pt ate well today at breakfast and lunch. Fecal incontinence.     Factors impacting intake: [ x] none [ ] nausea  [ ] vomiting [ ] diarrhea [ ] constipation  [ ]chewing problems [ ] swallowing issues  [ ] other:     Diet Presciption: Diet, Consistent Carbohydrate w/Evening Snack:   Dysphagia 1, Pureed, Honey Consistency Fluid (DYS1HC) (02-03-19 @ 11:35)    Intake: ate well today, as per RN.     Current Weight: 2/5 170.6# adm 162.1# (1+ edema noted to right/left legs)  % Weight Change    Pertinent Medications: MEDICATIONS  (STANDING):  ALBUTerol/ipratropium for Nebulization 3 milliLiter(s) Nebulizer every 6 hours  amoxicillin  875 milliGRAM(s)/clavulanate 1 Tablet(s) Oral two times a day  aspirin  chewable 81 milliGRAM(s) Oral daily  buDESOnide   0.5 milliGRAM(s) Respule 0.5 milliGRAM(s) Inhalation two times a day  dextrose 5%. 1000 milliLiter(s) (50 mL/Hr) IV Continuous <Continuous>  dextrose 50% Injectable 12.5 Gram(s) IV Push once  dextrose 50% Injectable 25 Gram(s) IV Push once  dextrose 50% Injectable 25 Gram(s) IV Push once  donepezil 5 milliGRAM(s) Oral at bedtime  famotidine    Tablet 20 milliGRAM(s) Oral daily  furosemide    Tablet 40 milliGRAM(s) Oral daily  guaiFENesin ER 1200 milliGRAM(s) Oral every 12 hours  insulin lispro (HumaLOG) corrective regimen sliding scale   SubCutaneous three times a day before meals  lactobacillus acidophilus 1 Tablet(s) Oral every 8 hours  magnesium oxide 400 milliGRAM(s) Oral three times a day with meals  metoprolol tartrate 50 milliGRAM(s) Oral every 6 hours  midodrine 5 milliGRAM(s) Oral every 8 hours  potassium acid phosphate/sodium acid phosphate tablet (K-PHOS No. 2) 1 Tablet(s) Oral three times a day with meals  potassium chloride    Tablet ER 10 milliEquivalent(s) Oral daily  simvastatin 20 milliGRAM(s) Oral at bedtime    MEDICATIONS  (PRN):  acetaminophen  Suppository .. 325 milliGRAM(s) Rectal every 6 hours PRN Temp greater or equal to 38C (100.4F), Mild Pain (1 - 3)  dextrose 40% Gel 15 Gram(s) Oral once PRN Blood Glucose LESS THAN 70 milliGRAM(s)/deciliter  glucagon  Injectable 1 milliGRAM(s) IntraMuscular once PRN Glucose LESS THAN 70 milligrams/deciliter    Pertinent Labs: 02-05 Na142 mmol/L Glu 147 mg/dL<H> K+ 3.5 mmol/L Cr  0.64 mg/dL BUN 20 mg/dL 02-05 Phos 2.6 mg/dL 02-04 Alb 2.9 g/dL<L> 01-31 [id number] 6.0 %<H> 01-31 Chol 102 mg/dL LDL 49 mg/dL HDL 43 mg/dL Trig 52 mg/dL     CAPILLARY BLOOD GLUCOSE      POCT Blood Glucose.: 132 mg/dL (05 Feb 2019 11:50)  POCT Blood Glucose.: 173 mg/dL (05 Feb 2019 07:49)  POCT Blood Glucose.: 204 mg/dL (04 Feb 2019 22:01)  POCT Blood Glucose.: 120 mg/dL (04 Feb 2019 17:07)    Skin: Sacrum Stage I     Estimated Needs:   [x ] no change since previous assessment  [ ] recalculated:     Previous Nutrition Diagnosis:   [ ] Inadequate Energy Intake [ ]Inadequate Oral Intake [ ] Excessive Energy Intake   [ ] Underweight [ ] Increased Nutrient Needs [ ] Overweight/Obesity   [ ] Altered GI Function [ ] Unintended Weight Loss [ ] Food & Nutrition Related Knowledge Deficit [ ] Malnutrition   [x] swallowing difficulty    Nutrition Diagnosis is [x ] ongoing  [ ] resolved [ ] not applicable     New Nutrition Diagnosis: [x ] not applicable       Interventions:   Recommend  [ ] Change Diet To:  [ ] Nutrition Supplement  [ ] Nutrition Support  [x ] Other: continue current diet order.     Monitoring and Evaluation:   [x ] PO intake [ x ] Tolerance to diet prescription [ x ] weights [ x ] labs[ x ] follow up per protocol  [ ] other:

## 2019-02-05 NOTE — PROGRESS NOTE ADULT - PROBLEM SELECTOR PROBLEM 3
CHF (congestive heart failure)
NSTEMI (non-ST elevated myocardial infarction)
RSV bronchitis

## 2019-02-05 NOTE — PROGRESS NOTE ADULT - PROBLEM SELECTOR PROBLEM 4
CHF (congestive heart failure)
UTI (urinary tract infection)
CHF (congestive heart failure)
NSTEMI (non-ST elevated myocardial infarction)

## 2019-02-05 NOTE — PROGRESS NOTE ADULT - PROBLEM SELECTOR PLAN 7
BP monitoring ,continue current antihypertensive meds, low salt diet ,followup with PMD
continue current management and medications
continue current management and medications

## 2019-02-05 NOTE — PROGRESS NOTE ADULT - PROBLEM SELECTOR PLAN 5
Admitted for septic workup and evaluation,send blood and urine cx,serial lactate levels,monitor vitals closley,ivfs hydration,monitor urine output and renal profile,iv abx initiated -on ceftriaxone
continue home medications , coumadin accordingly to PT/INR
METOPROLOL 50 MG PO Q 6 HRS , D/C COREG as per Dr Paul recommendation , continue home medications , coumadin accordingly to PT/INR
ACUTE ON CHRONIC DIASTOLIC HF EXACERBAYION ,EF WAS 50 % IN 2018 -obtain echo this admission  to evaluate LVEF,monitor ins/outs,monitor renal profile and electrolytes closely,cardiology consult,send 3 sets of ensymes,O2 supply,serial chest xrays,monitor weights and oral intake of fluids,nutritionist consult CHECK DIGOXIN SERUM LEVEL

## 2019-02-05 NOTE — PROGRESS NOTE ADULT - PROBLEM SELECTOR PLAN 3
ACUTE ON CHRONIC DIASTOLIC HF EXACERBAYION ,EF WAS 50 % IN 2018 -obtain echo this admission  to evaluate LVEF,monitor ins/outs,monitor renal profile and electrolytes closely,cardiology consult,send 3 sets of ensymes,O2 supply,serial chest xrays,monitor weights and oral intake of fluids,nutritionist consult CHECK DIGOXIN SERUM LEVEL
RULED IN FOR ACUTE MI -Admit to telemetry unit for monitoring,send 3 sets of cardiac ensymes to rule out coronary event,obtain ECHO to evaluate LVEF,cardiology consult,continue current managmnet,O2 supply,anticoagulation plan as per cardiology consult Family is refusing card cath and requests conservative maangement
RESOLVED ,CTT SHOWS PERSISTENT  INFILTRATES ,SPEECH AND SW REEVAL ORDERED DUE TO POSSIBLE RECURRENT ASPIRATION

## 2019-02-05 NOTE — PROGRESS NOTE ADULT - ASSESSMENT
*NSTEMI: based on enzymes, pt asymptomatic  *CAF: HE OK, on ac  *Dementia: per primary team  *HLD: on statin  *HTN: stable at present  *DM: on coverage, per primary team  *RSV Bronchitis  *S/P PPM  - stable cardiacwise  - spoke with Son  - meds and notes reviewed  - cont supp care
Pt is a 89 yo WM  who presents to the ED with   PMHx of Atrial fibrillation on coumadin, h/o CHF, dementia, depression, DM, HLD, HTN, prostate cancer brought to ED from [facility] for evaluation of weakness and lethargy .  Information is obtained via chart and EMS.  Per [facility] pt appeared very weak today and did not eat breakfast.  They also noted that he had a temperature of 100.1 around 9 am.  Pt received a flu vaccine 11/4/18.  Currently pt alert to person only falling asleep at bedside and unable to provide history .Found to have RSV infection and diagnosed with COPD exacerbation and acute on chronic bronchitis exacerbation ,puljill biggs requested ,Dr Quintana called [facility] for antibacterial managmnet ,nebulised bronchodilators and pulmonology workup,O2 supply,serial labs and chest xrays,obtain ECHO to evaluate LVEF and rule out chf component Palliative care consult requested ,to discuss advance directives and complete MOLST
Pt is a 89 yo WM  who presents to the ED with   PMHx of Atrial fibrillation on coumadin, h/o CHF, dementia, depression, DM, HLD, HTN, prostate cancer brought to ED from [facility] for evaluation of weakness and lethargy .  Information is obtained via chart and EMS.  Per [facility] pt appeared very weak today and did not eat breakfast.  They also noted that he had a temperature of 100.1 around 9 am.  Pt received a flu vaccine 11/4/18.  Currently pt alert to person only falling asleep at bedside and unable to provide history .Found to have RSV infection and diagnosed with COPD exacerbation and acute on chronic bronchitis exacerbation ,puljill biggs requested ,Dr Quintana called [facility] for antibacterial managmnet ,nebulised bronchodilators and pulmonology workup,O2 supply,serial labs and chest xrays,obtain ECHO to evaluate LVEF and rule out chf component Palliative care consult requested ,to discuss advance directives and complete MOLST
Pt is a 89 yo WM  who presents to the ED with   PMHx of Atrial fibrillation on coumadin, h/o CHF, dementia, depression, DM, HLD, HTN, prostate cancer brought to ED from [facility] for evaluation of weakness and lethargy .  Information is obtained via chart and EMS.  Per [employer] pt appeared very weak today and did not eat breakfast.  They also noted that he had a temperature of 100.1 around 9 am.  Pt received a flu vaccine 11/4/18.  Currently pt alert to person only falling asleep at bedside and unable to provide history .Found to have RSV infection and diagnosed with COPD exacerbation and acute on chronic bronchitis exacerbation ,puljill biggs requested ,Dr Quintana called [facility] for antibacterial managmnet ,nebulised bronchodilators and pulmonology workup,O2 supply,serial labs and chest xrays,obtain ECHO to evaluate LVEF and rule out chf component Palliative care consult requested ,to discuss advance directives and complete MOLST
Pt is a 89 yo WM  who presents to the ED with   PMHx of Atrial fibrillation on coumadin, h/o CHF, dementia, depression, DM, HLD, HTN, prostate cancer brought to ED from [facility] for evaluation of weakness and lethargy .  Information is obtained via chart and EMS.  Per [facility] pt appeared very weak today and did not eat breakfast.  They also noted that he had a temperature of 100.1 around 9 am.  Pt received a flu vaccine 11/4/18.  Currently pt alert to person only falling asleep at bedside and unable to provide history .Found to have RSV infection and diagnosed with COPD exacerbation and acute on chronic bronchitis exacerbation ,puljill biggs requested ,Dr Quintana called [facility] for antibacterial managmnet ,nebulised bronchodilators and pulmonology workup,O2 supply,serial labs and chest xrays,obtain ECHO to evaluate LVEF and rule out chf component Palliative care consult requested ,to discuss advance directives and complete MOLST
Pt is a 89 yo WM  who presents to the ED with   PMHx of Atrial fibrillation on coumadin, h/o CHF, dementia, depression, DM, HLD, HTN, prostate cancer brought to ED from [facility] for evaluation of weakness and lethargy .  Information is obtained via chart and EMS.  Per [facility] pt appeared very weak today and did not eat breakfast.  They also noted that he had a temperature of 100.1 around 9 am.  Pt received a flu vaccine 11/4/18.  Currently pt alert to person only falling asleep at bedside and unable to provide history .Found to have RSV infection and diagnosed with COPD exacerbation and acute on chronic bronchitis exacerbation ,puljill biggs requested ,Dr Quintana called [facility] for antibacterial managmnet ,nebulised bronchodilators and pulmonology workup,O2 supply,serial labs and chest xrays,obtain ECHO to evaluate LVEF and rule out chf component Palliative care consult requested ,to discuss advance directives and complete MOLST
Pt is a 89 yo WM  who presents to the ED with   PMHx of Atrial fibrillation on coumadin, h/o CHF, dementia, depression, DM, HLD, HTN, prostate cancer brought to ED from [facility] for evaluation of weakness and lethargy .  Information is obtained via chart and EMS.  Per [facility] pt appeared very weak today and did not eat breakfast.  They also noted that he had a temperature of 100.1 around 9 am.  Pt received a flu vaccine 11/4/18.  Currently pt alert to person only falling asleep at bedside and unable to provide history .Found to have RSV infection and diagnosed with COPD exacerbation and acute on chronic bronchitis exacerbation ,puljill biggs requested ,Dr Quintana called [facility] for antibacterial managmnet ,nebulised bronchodilators and pulmonology workup,O2 supply,serial labs and chest xrays,obtain ECHO to evaluate LVEF and rule out chf component Palliative care consult requested ,to discuss advance directives and complete MOLST

## 2019-02-05 NOTE — PROGRESS NOTE ADULT - PROBLEM SELECTOR PLAN 4
ACUTE ON CHRONIC DIASTOLIC HF EXACERBAYION ,EF WAS 50 % IN 2018 -obtain echo this admission  to evaluate LVEF,monitor ins/outs,monitor renal profile and electrolytes closely,cardiology consult,send 3 sets of ensymes,O2 supply,serial chest xrays,monitor weights and oral intake of fluids,nutritionist consult CHECK DIGOXIN SERUM LEVEL
Admitted for septic workup and evaluation,send blood and urine cx,serial lactate levels,monitor vitals closley,ivfs hydration,monitor urine output and renal profile,iv abx initiated -on ceftriaxone
ACUTE ON CHRONIC DIASTOLIC HF EXACERBAYION ,EF WAS 50 % IN 2018 -obtain echo this admission  to evaluate LVEF,monitor ins/outs,monitor renal profile and electrolytes closely,cardiology consult,send 3 sets of ensymes,O2 supply,serial chest xrays,monitor weights and oral intake of fluids,nutritionist consult CHECK DIGOXIN SERUM LEVEL
RULED IN FOR ACUTE MI -Admit to telemetry unit for monitoring,send 3 sets of cardiac ensymes to rule out coronary event,obtain ECHO to evaluate LVEF,cardiology consult,continue current managmnet,O2 supply,anticoagulation plan as per cardiology consult Family is refusing card cath and requests conservative maangement

## 2019-02-05 NOTE — PROGRESS NOTE ADULT - PROBLEM SELECTOR PLAN 6
BP monitoring ,continue current antihypertensive meds, low salt diet ,followup with PMD
continue home medications , coumadin accordingly to PT/INR
BP monitoring ,continue current antihypertensive meds, low salt diet ,followup with PMD
METOPROLOL 50 MG PO Q 6 HRS , D/C COREG as per Dr Paul recommendation , continue home medications , coumadin accordingly to PT/INR

## 2019-02-05 NOTE — PROGRESS NOTE ADULT - SUBJECTIVE AND OBJECTIVE BOX
PROGRESS NOTE  Patient is a 89y old  Male who presents with a chief complaint of weakness and lethargy (05 Feb 2019 11:27)  Chart and available morning labs /imaging are reviewed electronically , urgent issues addressed . More information  is being added upon completion of rounds , when more information is collected and management discussed with consultants , medical staff and social service/case management on the floor   OVERNIGHT  No new issues reported by medical staff . All above noted Patient is resting in a bed comfortably .Confused ,poor mentation .No distress noted   HPI:  Pt is a 89 yo WM  who presents to the ED with   PMHx of Atrial fibrillation on coumadin, h/o CHF, dementia, depression, DM, HLD, HTN, prostate cancer brought to ED from [facility] for evaluation of weakness and lethargy .  Information is obtained via chart and EMS.  Per [facility] pt appeared very weak today and did not eat breakfast.  They also noted that he had a temperature of 100.1 around 9 am.  Pt received a flu vaccine 11/4/18.  Currently pt alert to person only falling asleep at bedside and unable to provide history .Found to have RSV infection and diagnosed with COPD exacerbation and acute on chronic bronchitis exacerbation ,puljill biggs requested ,Dr Quintana called [facility] for antibacterial managmnet ,nebulised bronchodilators and pulmonology workup,O2 supply,serial labs and chest xrays,obtain ECHO to evaluate LVEF and rule out chf component Palliative care consult requested ,to discuss advance directives and complete MOLST (30 Jan 2019 17:01)  PAST MEDICAL & SURGICAL HISTORY:  Dementia  Depression  Diabetes  Hyperlipemia  CHF (congestive heart failure)  Dementia  Prostate ca  Diabetes  Hypertension  Atrial fibrillation  No significant past surgical history  MEDICATIONS  (STANDING):  ALBUTerol/ipratropium for Nebulization 3 milliLiter(s) Nebulizer every 6 hours  amoxicillin  875 milliGRAM(s)/clavulanate 1 Tablet(s) Oral two times a day  aspirin  chewable 81 milliGRAM(s) Oral daily  buDESOnide   0.5 milliGRAM(s) Respule 0.5 milliGRAM(s) Inhalation two times a day  dextrose 5%. 1000 milliLiter(s) (50 mL/Hr) IV Continuous <Continuous>  dextrose 50% Injectable 12.5 Gram(s) IV Push once  dextrose 50% Injectable 25 Gram(s) IV Push once  dextrose 50% Injectable 25 Gram(s) IV Push once  donepezil 5 milliGRAM(s) Oral at bedtime  famotidine    Tablet 20 milliGRAM(s) Oral daily  furosemide    Tablet 40 milliGRAM(s) Oral daily  guaiFENesin ER 1200 milliGRAM(s) Oral every 12 hours  insulin lispro (HumaLOG) corrective regimen sliding scale   SubCutaneous three times a day before meals  lactobacillus acidophilus 1 Tablet(s) Oral every 8 hours  magnesium oxide 400 milliGRAM(s) Oral three times a day with meals  metoprolol tartrate 50 milliGRAM(s) Oral every 6 hours  midodrine 5 milliGRAM(s) Oral every 8 hours  potassium acid phosphate/sodium acid phosphate tablet (K-PHOS No. 2) 1 Tablet(s) Oral three times a day with meals  potassium chloride    Tablet ER 10 milliEquivalent(s) Oral daily  simvastatin 20 milliGRAM(s) Oral at bedtime  MEDICATIONS  (PRN):  acetaminophen  Suppository .. 325 milliGRAM(s) Rectal every 6 hours PRN Temp greater or equal to 38C (100.4F), Mild Pain (1 - 3)  dextrose 40% Gel 15 Gram(s) Oral once PRN Blood Glucose LESS THAN 70 milliGRAM(s)/deciliter  glucagon  Injectable 1 milliGRAM(s) IntraMuscular once PRN Glucose LESS THAN 70 milligrams/deciliter  OBJECTIVE  T(C): 36.7 (02-05-19 @ 15:23), Max: 36.9 (02-04-19 @ 16:06)  HR: 67 (02-05-19 @ 15:23) (60 - 88)  BP: 89/53 (02-05-19 @ 15:23) (89/53 - 144/81)  RR: 15 (02-05-19 @ 15:23) (15 - 18)  SpO2: 95% (02-05-19 @ 15:23) (93% - 100%)  Wt(kg): --  I&O's Summary  04 Feb 2019 07:01  -  05 Feb 2019 07:00  --------------------------------------------------------  IN: 360 mL / OUT: 0 mL / NET: 360 mL  REVIEW OF SYSTEMS:  CONSTITUTIONAL: No fever, weight loss, or fatigue  EYES: No eye pain, visual disturbances, or discharge  ENMT:   No sinus or throat pain  NECK: No pain or stiffness  RESPIRATORY: No cough, wheezing, chills or hemoptysis; No shortness of breath  CARDIOVASCULAR: No chest pain, palpitations, dizziness, or leg swelling  GASTROINTESTINAL: No abdominal pain. No nausea, vomiting; No diarrhea or constipation. No melena or hematochezia.  GENITOURINARY: No dysuria, frequency, hematuria, or incontinence  NEUROLOGICAL: No headaches, memory loss, loss of strength, numbness, or tremors  SKIN: No itching, burning, rashes, or lesions   MUSCULOSKELETAL: No joint pain or swelling; No muscle, back, or extremity pain  PHYSICAL EXAM:  Appearance: NAD. VS past 24 hrs -as above   HEENT:   Moist oral mucosa. Conjunctiva clear b/l.   Neck : supple  Respiratory: Lungs CTAB.  Gastrointestinal:  Soft, nontender. No rebound. No rigidity. BS present	  Cardiovascular: RRR ,S1S2 present  Neurologic: Non-focal. Moving all extremities.  Extremities: No edema. No erythema. No calf tenderness.  Skin: No rashes, No ecchymoses, No cyanosis.	  wounds ,skin lesions-See skin assesment flow sheet   LABS:                        11.2   7.20  )-----------( 254      ( 05 Feb 2019 07:09 )             33.9   02-05  142  |  104  |  20  ----------------------------<  147<H>  3.5   |  32<H>  |  0.64  Ca    8.8      05 Feb 2019 07:09  Phos  2.6     02-05  Mg     1.7     02-05  TPro  6.6  /  Alb  2.9<L>  /  TBili  0.7  /  DBili  x   /  AST  25  /  ALT  23  /  AlkPhos  103  02-04  CAPILLARY BLOOD GLUCOSE  POCT Blood Glucose.: 132 mg/dL (05 Feb 2019 11:50)  POCT Blood Glucose.: 173 mg/dL (05 Feb 2019 07:49)  POCT Blood Glucose.: 204 mg/dL (04 Feb 2019 22:01)  POCT Blood Glucose.: 120 mg/dL (04 Feb 2019 17:07)  PT/INR - ( 05 Feb 2019 14:22 )   PT: 25.9 sec;   INR: 2.22 ratio    Culture - Urine (collected 02 Feb 2019 11:36)  Source: .Urine Clean Catch (Midstream)  Final Report (03 Feb 2019 14:12):    No growth  Culture - Blood (collected 01 Feb 2019 07:34)  Source: .Blood Blood-Peripheral  Preliminary Report (02 Feb 2019 08:00):    No growth to date.  Culture - Blood (collected 01 Feb 2019 07:34)  Source: .Blood Blood-Peripheral  Preliminary Report (02 Feb 2019 08:00):    No growth to date.  Culture - Urine (collected 30 Jan 2019 18:41)  Source: .Urine Clean Catch (Midstream)  Final Report (31 Jan 2019 20:02):    No growth  Culture - Blood (collected 30 Jan 2019 15:16)  Source: .Blood Blood-Venous  Final Report (04 Feb 2019 16:09):    No growth at 5 days.  Culture - Blood (collected 30 Jan 2019 15:12)  Source: .Blood Blood-Venous  Final Report (04 Feb 2019 16:09):    No growth at 5 days.  RADIOLOGY & ADDITIONAL TESTS:   reviewed elctronically  ASSESSMENT/PLAN:

## 2019-02-05 NOTE — PROGRESS NOTE ADULT - SUBJECTIVE AND OBJECTIVE BOX
Interval History:    CENTRAL LINE:   [  ] YES       [  ] NO  RAMOS:                 [  ] YES       [  ] NO         REVIEW OF SYSTEMS:  All Systems below were reviewed and are negative [  ]  HEENT:  ID:  Pulmonary:  Cardiac:  GI:  Renal:  Musculoskeletal:  All other systems above were reviewed and are negative   [  ]      MEDICATIONS  (STANDING):  ALBUTerol/ipratropium for Nebulization 3 milliLiter(s) Nebulizer every 6 hours  amoxicillin  875 milliGRAM(s)/clavulanate 1 Tablet(s) Oral two times a day  aspirin  chewable 81 milliGRAM(s) Oral daily  buDESOnide   0.5 milliGRAM(s) Respule 0.5 milliGRAM(s) Inhalation two times a day  dextrose 5%. 1000 milliLiter(s) (50 mL/Hr) IV Continuous <Continuous>  dextrose 50% Injectable 12.5 Gram(s) IV Push once  dextrose 50% Injectable 25 Gram(s) IV Push once  dextrose 50% Injectable 25 Gram(s) IV Push once  donepezil 5 milliGRAM(s) Oral at bedtime  famotidine    Tablet 20 milliGRAM(s) Oral daily  furosemide    Tablet 40 milliGRAM(s) Oral daily  guaiFENesin ER 1200 milliGRAM(s) Oral every 12 hours  insulin lispro (HumaLOG) corrective regimen sliding scale   SubCutaneous three times a day before meals  lactobacillus acidophilus 1 Tablet(s) Oral every 8 hours  magnesium oxide 400 milliGRAM(s) Oral three times a day with meals  metoprolol tartrate 50 milliGRAM(s) Oral every 6 hours  midodrine 5 milliGRAM(s) Oral every 8 hours  potassium acid phosphate/sodium acid phosphate tablet (K-PHOS No. 2) 1 Tablet(s) Oral three times a day with meals  potassium chloride    Tablet ER 10 milliEquivalent(s) Oral daily  simvastatin 20 milliGRAM(s) Oral at bedtime  warfarin 2.5 milliGRAM(s) Oral once    MEDICATIONS  (PRN):  acetaminophen  Suppository .. 325 milliGRAM(s) Rectal every 6 hours PRN Temp greater or equal to 38C (100.4F), Mild Pain (1 - 3)  dextrose 40% Gel 15 Gram(s) Oral once PRN Blood Glucose LESS THAN 70 milliGRAM(s)/deciliter  glucagon  Injectable 1 milliGRAM(s) IntraMuscular once PRN Glucose LESS THAN 70 milligrams/deciliter      Vital Signs Last 24 Hrs  T(C): 36.6 (05 Feb 2019 17:40), Max: 36.9 (05 Feb 2019 04:42)  T(F): 97.8 (05 Feb 2019 17:40), Max: 98.5 (05 Feb 2019 04:42)  HR: 72 (05 Feb 2019 17:40) (63 - 88)  BP: 96/52 (05 Feb 2019 17:40) (89/53 - 107/68)  BP(mean): --  RR: 18 (05 Feb 2019 17:40) (15 - 18)  SpO2: 96% (05 Feb 2019 17:40) (94% - 98%)    I&O's Summary    04 Feb 2019 07:01  -  05 Feb 2019 07:00  --------------------------------------------------------  IN: 360 mL / OUT: 0 mL / NET: 360 mL        PHYSICAL EXAM:  HEENT: NC/AT; PERRLA  Neck: Soft; no tenderness  Lungs: CTA bilaterally; no wheezing.   Heart:  Abdomen:  Genital/ Rectal:  Extremities:  Neurologic:  Vascular:      LABORATORY:    CBC Full  -  ( 05 Feb 2019 07:09 )  WBC Count : 7.20 K/uL  Hemoglobin : 11.2 g/dL  Hematocrit : 33.9 %  Platelet Count - Automated : 254 K/uL  Mean Cell Volume : 93.4 fl  Mean Cell Hemoglobin : 30.9 pg  Mean Cell Hemoglobin Concentration : 33.0 gm/dL  Auto Neutrophil # : x  Auto Lymphocyte # : x  Auto Monocyte # : x  Auto Eosinophil # : x  Auto Basophil # : x  Auto Neutrophil % : x  Auto Lymphocyte % : x  Auto Monocyte % : x  Auto Eosinophil % : x  Auto Basophil % : x      ESR:                   02-05 @ 07:09  39    C-Reactive Protein:     02-05 @ 07:09  --    Procalcitonin:           02-05 @ 07:09   0.91  ESR:                   01-31 @ 06:34  --    C-Reactive Protein:     01-31 @ 06:34  --    Procalcitonin:           01-31 @ 06:34   14.83  ESR:                   01-30 @ 11:41  --    C-Reactive Protein:     01-30 @ 11:41  --    Procalcitonin:           01-30 @ 11:41   0.11      02-05    142  |  104  |  20  ----------------------------<  147<H>  3.5   |  32<H>  |  0.64    Ca    8.8      05 Feb 2019 07:09  Phos  2.6     02-05  Mg     1.7     02-05    TPro  6.6  /  Alb  2.9<L>  /  TBili  0.7  /  DBili  x   /  AST  25  /  ALT  23  /  AlkPhos  103  02-04          Assessment and Plan:          Stephan Pruett MD   [phone]. He is afebrile. No fevers or tachycardia today.      MEDICATIONS  (STANDING):  ALBUTerol/ipratropium for Nebulization 3 milliLiter(s) Nebulizer every 6 hours  amoxicillin  875 milliGRAM(s)/clavulanate 1 Tablet(s) Oral two times a day  aspirin  chewable 81 milliGRAM(s) Oral daily  buDESOnide   0.5 milliGRAM(s) Respule 0.5 milliGRAM(s) Inhalation two times a day  dextrose 5%. 1000 milliLiter(s) (50 mL/Hr) IV Continuous <Continuous>  dextrose 50% Injectable 12.5 Gram(s) IV Push once  dextrose 50% Injectable 25 Gram(s) IV Push once  dextrose 50% Injectable 25 Gram(s) IV Push once  donepezil 5 milliGRAM(s) Oral at bedtime  famotidine    Tablet 20 milliGRAM(s) Oral daily  furosemide    Tablet 40 milliGRAM(s) Oral daily  guaiFENesin ER 1200 milliGRAM(s) Oral every 12 hours  insulin lispro (HumaLOG) corrective regimen sliding scale   SubCutaneous three times a day before meals  lactobacillus acidophilus 1 Tablet(s) Oral every 8 hours  magnesium oxide 400 milliGRAM(s) Oral three times a day with meals  metoprolol tartrate 50 milliGRAM(s) Oral every 6 hours  midodrine 5 milliGRAM(s) Oral every 8 hours  potassium acid phosphate/sodium acid phosphate tablet (K-PHOS No. 2) 1 Tablet(s) Oral three times a day with meals  potassium chloride    Tablet ER 10 milliEquivalent(s) Oral daily  simvastatin 20 milliGRAM(s) Oral at bedtime  warfarin 2.5 milliGRAM(s) Oral once    MEDICATIONS  (PRN):  acetaminophen  Suppository .. 325 milliGRAM(s) Rectal every 6 hours PRN Temp greater or equal to 38C (100.4F), Mild Pain (1 - 3)  dextrose 40% Gel 15 Gram(s) Oral once PRN Blood Glucose LESS THAN 70 milliGRAM(s)/deciliter  glucagon  Injectable 1 milliGRAM(s) IntraMuscular once PRN Glucose LESS THAN 70 milligrams/deciliter      Vital Signs Last 24 Hrs  T(C): 36.6 (05 Feb 2019 17:40), Max: 36.9 (05 Feb 2019 04:42)  T(F): 97.8 (05 Feb 2019 17:40), Max: 98.5 (05 Feb 2019 04:42)  HR: 72 (05 Feb 2019 17:40) (63 - 88)  BP: 96/52 (05 Feb 2019 17:40) (89/53 - 107/68)  BP(mean): --  RR: 18 (05 Feb 2019 17:40) (15 - 18)  SpO2: 96% (05 Feb 2019 17:40) (94% - 98%)    I&O's Summary    04 Feb 2019 07:01  -  05 Feb 2019 07:00  --------------------------------------------------------  IN: 360 mL / OUT: 0 mL / NET: 360 mL      PHYSICAL EXAM:  HEENT: NC/AT; PERRLA  Neck: Soft; no tenderness  Lungs: Fair entry bilaterally; no wheezing.   Heart: RRR; no murmurs  Abdomen: Soft; no tenderness.  Extremities: No ulcers.   Neurologic: awake.       LABORATORY:    CBC Full  -  ( 05 Feb 2019 07:09 )  WBC Count : 7.20 K/uL  Hemoglobin : 11.2 g/dL  Hematocrit : 33.9 %  Platelet Count - Automated : 254 K/uL  Mean Cell Volume : 93.4 fl  Mean Cell Hemoglobin : 30.9 pg  Mean Cell Hemoglobin Concentration : 33.0 gm/dL  Auto Neutrophil # : x  Auto Lymphocyte # : x  Auto Monocyte # : x  Auto Eosinophil # : x  Auto Basophil # : x  Auto Neutrophil % : x  Auto Lymphocyte % : x  Auto Monocyte % : x  Auto Eosinophil % : x  Auto Basophil % : x      ESR:                   02-05 @ 07:09  39    C-Reactive Protein:     02-05 @ 07:09  --    Procalcitonin:           02-05 @ 07:09   0.91  ESR:                   01-31 @ 06:34  --    C-Reactive Protein:     01-31 @ 06:34  --    Procalcitonin:           01-31 @ 06:34   14.83  ESR:                   01-30 @ 11:41  --    C-Reactive Protein:     01-30 @ 11:41  --    Procalcitonin:           01-30 @ 11:41   0.11      02-05    142  |  104  |  20  ----------------------------<  147<H>  3.5   |  32<H>  |  0.64    Ca    8.8      05 Feb 2019 07:09  Phos  2.6     02-05  Mg     1.7     02-05    TPro  6.6  /  Alb  2.9<L>  /  TBili  0.7  /  DBili  x   /  AST  25  /  ALT  23  /  AlkPhos  103  02-04      Assessment and Plan:  1. RSV  2. Extensive pneumonia in lower lobes and RUL.    . Chest CT today showed extensive bilateral pneumonia.  . Procalcitonin level has decreased to .99 from 14.  . The patient is afebrile. Appears improving.  . Stable for discharge with po Augmentin 875 mg bid for 7 more days from tomorrow to complete a 2 week course  . Aspiration precautions.            Stephan Pruett MD   [phone].

## 2019-02-05 NOTE — PROGRESS NOTE ADULT - PROBLEM SELECTOR PROBLEM 6
Atrial fibrillation
Hypertension
Hypertension
Atrial fibrillation

## 2019-02-05 NOTE — PROGRESS NOTE ADULT - PROBLEM SELECTOR PROBLEM 2
Febrile illness, acute
NSTEMI (non-ST elevated myocardial infarction)
Febrile illness, acute
Febrile illness, acute

## 2019-02-05 NOTE — PROGRESS NOTE ADULT - PROBLEM SELECTOR PLAN 1
Admit for antibacterial managmnet nebulised bronchodilators and pulmonology evaluation,O2 supply,serial labs and chest xrays,obtain ECHO to evaluate LVEF and rule out chf component
RESOLVED ,CTT TO EVALUATE INFILTRATE ( not stable for test due to  )
CTT SHOWS PERSISTENT  INFILTRATES, SPEECH AND SW REEVAL ORDERED DUE TO POSSIBLE RECURRENT ASPIRATION ,ON AUGMENTIN

## 2019-02-05 NOTE — PROGRESS NOTE ADULT - PROBLEM SELECTOR PLAN 9
Gastrointestinal stress ulcer prophylaxis l and DVT prophylaxis administrated

## 2019-02-05 NOTE — PROGRESS NOTE ADULT - SUBJECTIVE AND OBJECTIVE BOX
[facility].CZechariah Glasco       HPI:  Pt is a 89 yo WM  who presents to the ED with   PMHx of Atrial fibrillation on coumadin, h/o CHF, dementia, depression, DM, HLD, HTN, prostate cancer brought to ED from [facility] for evaluation of weakness and lethargy .  Information is obtained via chart and EMS.  Per [facility] pt appeared very weak today and did not eat breakfast.  They also noted that he had a temperature of 100.1 around 9 am.  Pt received a flu vaccine 11/4/18.  Currently pt alert to person only falling asleep at bedside and unable to provide history .Found to have RSV infection and diagnosed with COPD exacerbation and acute on chronic bronchitis exacerbation ,puljill biggs requested ,Dr Quintana called [facility] for antibacterial managmnet ,nebulised bronchodilators and pulmonology workup,O2 supply,serial labs and chest xrays,obtain ECHO to evaluate LVEF and rule out chf component Palliative care consult requested ,to discuss advance directives and complete MOLST (30 Jan 2019 17:01)        SUBJECTIVE:      ALLERGIES:  Allergies    latex (Rash)  latex (Unknown)  No Known Drug Allergies    Intolerances          MEDICATIONS  (STANDING):  ALBUTerol/ipratropium for Nebulization 3 milliLiter(s) Nebulizer every 6 hours  amoxicillin  875 milliGRAM(s)/clavulanate 1 Tablet(s) Oral two times a day  aspirin  chewable 81 milliGRAM(s) Oral daily  buDESOnide   0.5 milliGRAM(s) Respule 0.5 milliGRAM(s) Inhalation two times a day  clopidogrel Tablet 75 milliGRAM(s) Oral daily  dextrose 5%. 1000 milliLiter(s) (50 mL/Hr) IV Continuous <Continuous>  dextrose 50% Injectable 12.5 Gram(s) IV Push once  dextrose 50% Injectable 25 Gram(s) IV Push once  dextrose 50% Injectable 25 Gram(s) IV Push once  donepezil 5 milliGRAM(s) Oral at bedtime  famotidine    Tablet 20 milliGRAM(s) Oral daily  furosemide   Injectable 20 milliGRAM(s) IV Push two times a day  guaiFENesin ER 1200 milliGRAM(s) Oral every 12 hours  insulin lispro (HumaLOG) corrective regimen sliding scale   SubCutaneous three times a day before meals  lactobacillus acidophilus 1 Tablet(s) Oral every 8 hours  magnesium oxide 400 milliGRAM(s) Oral three times a day with meals  metoprolol tartrate 50 milliGRAM(s) Oral every 6 hours  midodrine 5 milliGRAM(s) Oral every 8 hours  potassium acid phosphate/sodium acid phosphate tablet (K-PHOS No. 2) 1 Tablet(s) Oral three times a day with meals  potassium chloride    Tablet ER 10 milliEquivalent(s) Oral daily  simvastatin 20 milliGRAM(s) Oral at bedtime    MEDICATIONS  (PRN):  acetaminophen  Suppository .. 325 milliGRAM(s) Rectal every 6 hours PRN Temp greater or equal to 38C (100.4F), Mild Pain (1 - 3)  dextrose 40% Gel 15 Gram(s) Oral once PRN Blood Glucose LESS THAN 70 milliGRAM(s)/deciliter  glucagon  Injectable 1 milliGRAM(s) IntraMuscular once PRN Glucose LESS THAN 70 milligrams/deciliter      REVIEW OF SYSTEMS:  CONSTITUTIONAL: No fever,  RESPIRATORY: No cough, wheezing, shortness of breath  CARDIOVASCULAR: No chest pain, dyspnea, palpitations, dizziness, syncope, paroxysmal nocturnal dyspnea, orthopnea, or arm or leg swelling  GASTROINTESTINAL: No abdominal  or epigastric pain, nausea, vomiting,  diarrhea  NEUROLOGICAL: No headaches,  loss of strength, numbness, or tremors    Vital Signs Last 24 Hrs  T(C): 36.6 (04 Feb 2019 23:10), Max: 37.1 (04 Feb 2019 12:05)  T(F): 97.8 (04 Feb 2019 23:10), Max: 98.8 (04 Feb 2019 12:05)  HR: 87 (04 Feb 2019 23:10) (60 - 87)  BP: 103/60 (04 Feb 2019 23:10) (98/63 - 144/81)  BP(mean): --  RR: 18 (04 Feb 2019 23:10) (18 - 20)  SpO2: 97% (04 Feb 2019 23:10) (90% - 100%)    PHYSICAL EXAM:  HEAD:  Atraumatic, Normocephalic  NECK: Supple and normal thyroid.  No JVD or carotid bruit.   HEART: S1, S2 regular , 1/6 soft ejection systolic murmur in mitral area , no thrill and no gallops .  PULMONARY: Bilateral vesicular breathing , few scattered ronchi and few scattered rales are present .  ABDOMEN: Soft nontender and positive bowl sounds   EXTREMITIES:  No clubbing, cyanosis, or pedal  edema  NEUROLOGICAL: AAOX3 , no focal deficit .    LABS:                        12.0   9.35  )-----------( 268      ( 04 Feb 2019 07:41 )             36.3     02-04    143  |  104  |  20  ----------------------------<  169<H>  3.6   |  32<H>  |  0.73    Ca    8.7      04 Feb 2019 07:41  Phos  2.1     02-04    TPro  6.6  /  Alb  2.9<L>  /  TBili  0.7  /  DBili  x   /  AST  25  /  ALT  23  /  AlkPhos  103  02-04        PT/INR - ( 04 Feb 2019 07:41 )   PT: 32.3 sec;   INR: 2.77 ratio             BNP      EKG:  ECHO:  IMAGING:    Assessment/Plan    Will continue to follow during hospital course and give further recommendations as needed . Thanks for your referral . if any questions please contact me at office [phone])cell [phone]) [facility].CZechariah Tyler       HPI:  Pt is a 89 yo WM  who presents to the ED with   PMHx of Atrial fibrillation on coumadin, h/o CHF, dementia, depression, DM, HLD, HTN, prostate cancer brought to ED from [facility] for evaluation of weakness and lethargy .  Information is obtained via chart and EMS.  Per [facility] pt appeared very weak today and did not eat breakfast.  They also noted that he had a temperature of 100.1 around 9 am.  Pt received a flu vaccine 11/4/18.  Currently pt alert to person only falling asleep at bedside and unable to provide history .Found to have RSV infection and diagnosed with COPD exacerbation and acute on chronic bronchitis exacerbation ,puljill biggs requested ,Dr Quintana called [facility] for antibacterial managmnet ,nebulised bronchodilators and pulmonology workup,O2 supply,serial labs and chest xrays,obtain ECHO to evaluate LVEF and rule out chf component Palliative care consult requested ,to discuss advance directives and complete MOLST (30 Jan 2019 17:01)        SUBJECTIVE: Patient feeling better . SOB and cough better .        ALLERGIES:  Allergies    latex (Rash)  latex (Unknown)  No Known Drug Allergies    Intolerances          MEDICATIONS  (STANDING):  ALBUTerol/ipratropium for Nebulization 3 milliLiter(s) Nebulizer every 6 hours  amoxicillin  875 milliGRAM(s)/clavulanate 1 Tablet(s) Oral two times a day  aspirin  chewable 81 milliGRAM(s) Oral daily  buDESOnide   0.5 milliGRAM(s) Respule 0.5 milliGRAM(s) Inhalation two times a day  clopidogrel Tablet 75 milliGRAM(s) Oral daily  dextrose 5%. 1000 milliLiter(s) (50 mL/Hr) IV Continuous <Continuous>  dextrose 50% Injectable 12.5 Gram(s) IV Push once  dextrose 50% Injectable 25 Gram(s) IV Push once  dextrose 50% Injectable 25 Gram(s) IV Push once  donepezil 5 milliGRAM(s) Oral at bedtime  famotidine    Tablet 20 milliGRAM(s) Oral daily  furosemide   Injectable 20 milliGRAM(s) IV Push two times a day  guaiFENesin ER 1200 milliGRAM(s) Oral every 12 hours  insulin lispro (HumaLOG) corrective regimen sliding scale   SubCutaneous three times a day before meals  lactobacillus acidophilus 1 Tablet(s) Oral every 8 hours  magnesium oxide 400 milliGRAM(s) Oral three times a day with meals  metoprolol tartrate 50 milliGRAM(s) Oral every 6 hours  midodrine 5 milliGRAM(s) Oral every 8 hours  potassium acid phosphate/sodium acid phosphate tablet (K-PHOS No. 2) 1 Tablet(s) Oral three times a day with meals  potassium chloride    Tablet ER 10 milliEquivalent(s) Oral daily  simvastatin 20 milliGRAM(s) Oral at bedtime    MEDICATIONS  (PRN):  acetaminophen  Suppository .. 325 milliGRAM(s) Rectal every 6 hours PRN Temp greater or equal to 38C (100.4F), Mild Pain (1 - 3)  dextrose 40% Gel 15 Gram(s) Oral once PRN Blood Glucose LESS THAN 70 milliGRAM(s)/deciliter  glucagon  Injectable 1 milliGRAM(s) IntraMuscular once PRN Glucose LESS THAN 70 milligrams/deciliter      REVIEW OF SYSTEMS:  CONSTITUTIONAL: No fever,  RESPIRATORY:  Mild cough, wheezing, shortness of breath but better .  CARDIOVASCULAR: No chest pain, dyspnea, palpitations, dizziness, syncope, paroxysmal nocturnal dyspnea, orthopnea, or arm or leg swelling  GASTROINTESTINAL: No abdominal  or epigastric pain, nausea, vomiting,  diarrhea  NEUROLOGICAL: No headaches,  loss of strength, numbness, or tremors    Vital Signs Last 24 Hrs  T(C): 36.6 (04 Feb 2019 23:10), Max: 37.1 (04 Feb 2019 12:05)  T(F): 97.8 (04 Feb 2019 23:10), Max: 98.8 (04 Feb 2019 12:05)  HR: 87 (04 Feb 2019 23:10) (60 - 87)  BP: 103/60 (04 Feb 2019 23:10) (98/63 - 144/81)  BP(mean): --  RR: 18 (04 Feb 2019 23:10) (18 - 20)  SpO2: 97% (04 Feb 2019 23:10) (90% - 100%)    PHYSICAL EXAM:  HEAD:  Atraumatic, Normocephalic  NECK: Supple and normal thyroid.  No JVD or carotid bruit.   HEART: S1, S2 irregular , 1/6 soft ejection systolic murmur in mitral area , no thrill and no gallops .  PULMONARY: Bilateral vesicular breathing , few scattered rhonchi and few scattered rales are present .  ABDOMEN: Soft nontender and positive bowl sounds   EXTREMITIES:  No clubbing, cyanosis, or pedal  edema  NEUROLOGICAL: AA and confused , no focal deficit .    LABS:                        12.0   9.35  )-----------( 268      ( 04 Feb 2019 07:41 )             36.3     02-04    143  |  104  |  20  ----------------------------<  169<H>  3.6   |  32<H>  |  0.73    Ca    8.7      04 Feb 2019 07:41  Phos  2.1     02-04    TPro  6.6  /  Alb  2.9<L>  /  TBili  0.7  /  DBili  x   /  AST  25  /  ALT  23  /  AlkPhos  103  02-04        PT/INR - ( 04 Feb 2019 07:41 )   PT: 32.3 sec;   INR: 2.77 ratio             Assessment/Plan  Patient has :  1) CHF and under control .  2 ) Atrial fibrillation with rapid ventricular rate .  3) Pneumonia .  4) PPM and functioning fine .  5) S/P NON STEMI   Plan : 1) Cardiac stable for rehab . 2) Change lasix hodan 40 mg po 3) Continue coumadin and aspirin and discontinue plavix .  Will continue to follow during hospital course and give further recommendations as needed . Thanks for your referral . if any questions please contact me at office ([phone])cell [phone])

## 2019-02-05 NOTE — PROGRESS NOTE ADULT - PROBLEM SELECTOR PLAN 8
continue current management and medications
Gastrointestinal stress ulcer prophylaxis l and DVT prophylaxis administrated
continue current management and medications
Gastrointestinal stress ulcer prophylaxis l and DVT prophylaxis administrated

## 2019-02-05 NOTE — PROGRESS NOTE ADULT - PROBLEM SELECTOR PROBLEM 5
Atrial fibrillation
UTI (urinary tract infection)
Atrial fibrillation
CHF (congestive heart failure)

## 2019-02-05 NOTE — PROGRESS NOTE ADULT - PROBLEM SELECTOR PLAN 2
RULED IN FOR ACUTE MI -Admit to telemetry unit for monitoring,send 3 sets of cardiac ensymes to rule out coronary event,obtain ECHO to evaluate LVEF,cardiology consult,continue current managmnet,O2 supply,anticoagulation plan as per cardiology consult Family is refusing card cath and requests conservative maangement
Seen by ID CONS -. Unclear sources of fevers and elevated procalcitonin level.  . Low suspicion for UTI with unremarkable UA.  . Repeat CXR to evaluate for pneumonia.  . Change IV Rocephin to IV Zosyn.  . Follow all cultures.
Seen by ID CONS -. Unclear sources of fevers and elevated procalcitonin level.  . Low suspicion for UTI with unremarkable UA.  . Repeat CXR to evaluate for pneumonia.  . Change  IV Zosyn to augmentin  id and pulm followup ,may need CTT
FEVER RESOLVED , CONTINUE AUGMENTIN

## 2019-02-06 ENCOUNTER — TRANSCRIPTION ENCOUNTER (OUTPATIENT)
Age: 84
End: 2019-02-06

## 2019-02-06 VITALS
HEART RATE: 77 BPM | OXYGEN SATURATION: 94 % | SYSTOLIC BLOOD PRESSURE: 99 MMHG | DIASTOLIC BLOOD PRESSURE: 64 MMHG | TEMPERATURE: 98 F | RESPIRATION RATE: 18 BRPM

## 2019-02-06 LAB
ANION GAP SERPL CALC-SCNC: 6 MMOL/L — SIGNIFICANT CHANGE UP (ref 5–17)
BUN SERPL-MCNC: 19 MG/DL — SIGNIFICANT CHANGE UP (ref 7–23)
CALCIUM SERPL-MCNC: 8.7 MG/DL — SIGNIFICANT CHANGE UP (ref 8.5–10.1)
CHLORIDE SERPL-SCNC: 101 MMOL/L — SIGNIFICANT CHANGE UP (ref 96–108)
CO2 SERPL-SCNC: 34 MMOL/L — HIGH (ref 22–31)
CREAT SERPL-MCNC: 0.63 MG/DL — SIGNIFICANT CHANGE UP (ref 0.5–1.3)
CULTURE RESULTS: SIGNIFICANT CHANGE UP
CULTURE RESULTS: SIGNIFICANT CHANGE UP
GLUCOSE SERPL-MCNC: 148 MG/DL — HIGH (ref 70–99)
HCT VFR BLD CALC: 32.8 % — LOW (ref 39–50)
HGB BLD-MCNC: 10.9 G/DL — LOW (ref 13–17)
INR BLD: 2.07 RATIO — HIGH (ref 0.88–1.16)
MCHC RBC-ENTMCNC: 30.9 PG — SIGNIFICANT CHANGE UP (ref 27–34)
MCHC RBC-ENTMCNC: 33.2 GM/DL — SIGNIFICANT CHANGE UP (ref 32–36)
MCV RBC AUTO: 92.9 FL — SIGNIFICANT CHANGE UP (ref 80–100)
NRBC # BLD: 0 /100 WBCS — SIGNIFICANT CHANGE UP (ref 0–0)
PLATELET # BLD AUTO: 263 K/UL — SIGNIFICANT CHANGE UP (ref 150–400)
POTASSIUM SERPL-MCNC: 3.8 MMOL/L — SIGNIFICANT CHANGE UP (ref 3.5–5.3)
POTASSIUM SERPL-SCNC: 3.8 MMOL/L — SIGNIFICANT CHANGE UP (ref 3.5–5.3)
PROTHROM AB SERPL-ACNC: 23.9 SEC — HIGH (ref 10–12.9)
RBC # BLD: 3.53 M/UL — LOW (ref 4.2–5.8)
RBC # FLD: 12.7 % — SIGNIFICANT CHANGE UP (ref 10.3–14.5)
SODIUM SERPL-SCNC: 141 MMOL/L — SIGNIFICANT CHANGE UP (ref 135–145)
SPECIMEN SOURCE: SIGNIFICANT CHANGE UP
SPECIMEN SOURCE: SIGNIFICANT CHANGE UP
WBC # BLD: 7.29 K/UL — SIGNIFICANT CHANGE UP (ref 3.8–10.5)
WBC # FLD AUTO: 7.29 K/UL — SIGNIFICANT CHANGE UP (ref 3.8–10.5)

## 2019-02-06 PROCEDURE — 99221 1ST HOSP IP/OBS SF/LOW 40: CPT

## 2019-02-06 PROCEDURE — 84443 ASSAY THYROID STIM HORMONE: CPT

## 2019-02-06 PROCEDURE — 85652 RBC SED RATE AUTOMATED: CPT

## 2019-02-06 PROCEDURE — 84145 PROCALCITONIN (PCT): CPT

## 2019-02-06 PROCEDURE — 87086 URINE CULTURE/COLONY COUNT: CPT

## 2019-02-06 PROCEDURE — 84484 ASSAY OF TROPONIN QUANT: CPT

## 2019-02-06 PROCEDURE — 97163 PT EVAL HIGH COMPLEX 45 MIN: CPT

## 2019-02-06 PROCEDURE — 83690 ASSAY OF LIPASE: CPT

## 2019-02-06 PROCEDURE — 87641 MR-STAPH DNA AMP PROBE: CPT

## 2019-02-06 PROCEDURE — 71045 X-RAY EXAM CHEST 1 VIEW: CPT

## 2019-02-06 PROCEDURE — 87640 STAPH A DNA AMP PROBE: CPT

## 2019-02-06 PROCEDURE — 81001 URINALYSIS AUTO W/SCOPE: CPT

## 2019-02-06 PROCEDURE — 85027 COMPLETE CBC AUTOMATED: CPT

## 2019-02-06 PROCEDURE — 84100 ASSAY OF PHOSPHORUS: CPT

## 2019-02-06 PROCEDURE — 83880 ASSAY OF NATRIURETIC PEPTIDE: CPT

## 2019-02-06 PROCEDURE — 85610 PROTHROMBIN TIME: CPT

## 2019-02-06 PROCEDURE — 80061 LIPID PANEL: CPT

## 2019-02-06 PROCEDURE — 94640 AIRWAY INHALATION TREATMENT: CPT

## 2019-02-06 PROCEDURE — 71250 CT THORAX DX C-: CPT

## 2019-02-06 PROCEDURE — 83036 HEMOGLOBIN GLYCOSYLATED A1C: CPT

## 2019-02-06 PROCEDURE — 80053 COMPREHEN METABOLIC PANEL: CPT

## 2019-02-06 PROCEDURE — 80162 ASSAY OF DIGOXIN TOTAL: CPT

## 2019-02-06 PROCEDURE — 99285 EMERGENCY DEPT VISIT HI MDM: CPT | Mod: 25

## 2019-02-06 PROCEDURE — 80048 BASIC METABOLIC PNL TOTAL CA: CPT

## 2019-02-06 PROCEDURE — 83605 ASSAY OF LACTIC ACID: CPT

## 2019-02-06 PROCEDURE — 87040 BLOOD CULTURE FOR BACTERIA: CPT

## 2019-02-06 PROCEDURE — 93005 ELECTROCARDIOGRAM TRACING: CPT

## 2019-02-06 PROCEDURE — 87631 RESP VIRUS 3-5 TARGETS: CPT

## 2019-02-06 PROCEDURE — 85730 THROMBOPLASTIN TIME PARTIAL: CPT

## 2019-02-06 PROCEDURE — 36415 COLL VENOUS BLD VENIPUNCTURE: CPT

## 2019-02-06 PROCEDURE — 82550 ASSAY OF CK (CPK): CPT

## 2019-02-06 PROCEDURE — 93306 TTE W/DOPPLER COMPLETE: CPT

## 2019-02-06 PROCEDURE — 94760 N-INVAS EAR/PLS OXIMETRY 1: CPT

## 2019-02-06 PROCEDURE — 83735 ASSAY OF MAGNESIUM: CPT

## 2019-02-06 PROCEDURE — 82962 GLUCOSE BLOOD TEST: CPT

## 2019-02-06 RX ORDER — IPRATROPIUM/ALBUTEROL SULFATE 18-103MCG
3 AEROSOL WITH ADAPTER (GRAM) INHALATION
Qty: 0 | Refills: 0 | DISCHARGE
Start: 2019-02-06

## 2019-02-06 RX ORDER — POTASSIUM CHLORIDE 20 MEQ
1 PACKET (EA) ORAL
Qty: 0 | Refills: 0 | DISCHARGE
Start: 2019-02-06

## 2019-02-06 RX ORDER — WARFARIN SODIUM 2.5 MG/1
1 TABLET ORAL
Qty: 0 | Refills: 0 | DISCHARGE
Start: 2019-02-06

## 2019-02-06 RX ORDER — ASPIRIN/CALCIUM CARB/MAGNESIUM 324 MG
1 TABLET ORAL
Qty: 0 | Refills: 0 | DISCHARGE
Start: 2019-02-06

## 2019-02-06 RX ORDER — WARFARIN SODIUM 2.5 MG/1
2.5 TABLET ORAL ONCE
Qty: 0 | Refills: 0 | Status: DISCONTINUED | OUTPATIENT
Start: 2019-02-06 | End: 2019-02-06

## 2019-02-06 RX ORDER — BUDESONIDE, MICRONIZED 100 %
0 POWDER (GRAM) MISCELLANEOUS
Qty: 0 | Refills: 0 | DISCHARGE
Start: 2019-02-06

## 2019-02-06 RX ORDER — FUROSEMIDE 40 MG
1 TABLET ORAL
Qty: 0 | Refills: 0 | DISCHARGE
Start: 2019-02-06

## 2019-02-06 RX ORDER — LACTOBACILLUS ACIDOPHILUS 100MM CELL
0 CAPSULE ORAL
Qty: 0 | Refills: 0 | COMMUNITY
Start: 2019-02-06

## 2019-02-06 RX ORDER — MIDODRINE HYDROCHLORIDE 2.5 MG/1
2 TABLET ORAL
Qty: 0 | Refills: 0 | DISCHARGE
Start: 2019-02-06

## 2019-02-06 RX ORDER — DONEPEZIL HYDROCHLORIDE 10 MG/1
1 TABLET, FILM COATED ORAL
Qty: 0 | Refills: 0 | DISCHARGE
Start: 2019-02-06

## 2019-02-06 RX ORDER — METOPROLOL TARTRATE 50 MG
1 TABLET ORAL
Qty: 0 | Refills: 0 | DISCHARGE
Start: 2019-02-06

## 2019-02-06 RX ADMIN — Medication 81 MILLIGRAM(S): at 11:33

## 2019-02-06 RX ADMIN — Medication 50 MILLIGRAM(S): at 11:35

## 2019-02-06 RX ADMIN — Medication 1200 MILLIGRAM(S): at 17:31

## 2019-02-06 RX ADMIN — Medication 10 MILLIEQUIVALENT(S): at 11:33

## 2019-02-06 RX ADMIN — MAGNESIUM OXIDE 400 MG ORAL TABLET 400 MILLIGRAM(S): 241.3 TABLET ORAL at 08:34

## 2019-02-06 RX ADMIN — Medication 1 TABLET(S): at 14:10

## 2019-02-06 RX ADMIN — MAGNESIUM OXIDE 400 MG ORAL TABLET 400 MILLIGRAM(S): 241.3 TABLET ORAL at 17:31

## 2019-02-06 RX ADMIN — Medication 1: at 08:33

## 2019-02-06 RX ADMIN — Medication 1 TABLET(S): at 11:38

## 2019-02-06 RX ADMIN — MIDODRINE HYDROCHLORIDE 5 MILLIGRAM(S): 2.5 TABLET ORAL at 14:10

## 2019-02-06 RX ADMIN — Medication 0.5 MILLIGRAM(S): at 07:25

## 2019-02-06 RX ADMIN — MAGNESIUM OXIDE 400 MG ORAL TABLET 400 MILLIGRAM(S): 241.3 TABLET ORAL at 11:33

## 2019-02-06 RX ADMIN — Medication 3 MILLILITER(S): at 13:20

## 2019-02-06 RX ADMIN — Medication 3 MILLILITER(S): at 01:13

## 2019-02-06 RX ADMIN — MIDODRINE HYDROCHLORIDE 5 MILLIGRAM(S): 2.5 TABLET ORAL at 05:46

## 2019-02-06 RX ADMIN — Medication 1200 MILLIGRAM(S): at 05:46

## 2019-02-06 RX ADMIN — FAMOTIDINE 20 MILLIGRAM(S): 10 INJECTION INTRAVENOUS at 11:33

## 2019-02-06 RX ADMIN — Medication 1 TABLET(S): at 08:33

## 2019-02-06 RX ADMIN — Medication 1: at 17:30

## 2019-02-06 RX ADMIN — Medication 50 MILLIGRAM(S): at 05:46

## 2019-02-06 RX ADMIN — Medication 3 MILLILITER(S): at 07:25

## 2019-02-06 RX ADMIN — Medication 40 MILLIGRAM(S): at 05:46

## 2019-02-06 RX ADMIN — Medication 2: at 12:02

## 2019-02-06 RX ADMIN — Medication 1 TABLET(S): at 17:31

## 2019-02-06 RX ADMIN — Medication 1 TABLET(S): at 05:46

## 2019-02-06 NOTE — PROGRESS NOTE ADULT - NSHPATTENDINGPLANDISCUSS_GEN_ALL_CORE
med staff , sw on 3 n
med staff , Dr Quintana , Dr Paul , palliative care RN ,left 2 messages for scott Mooney [phone]
med staff , Dr Quintana , Dr Paul , palliative care RN

## 2019-02-06 NOTE — PHYSICAL THERAPY INITIAL EVALUATION ADULT - DID THE PATIENT HAVE SURGERY?
Chest X-ray: (+) pulmonary edema & or vascular congestion. Multilevel degenerative changes, suggestion of mild CHF. Patchy bilateral perihilar & lower lobe intersitial & airspace consolidation/n/a

## 2019-02-06 NOTE — DISCHARGE NOTE ADULT - MEDICATION SUMMARY - MEDICATIONS TO TAKE
I will START or STAY ON the medications listed below when I get home from the hospital:    budesonide 0.5 mg/2 mL inhalation suspension  -- inhaled 2 times a day  -- Indication: For Copd    aspirin 81 mg oral tablet, chewable  -- 1 tab(s) by mouth once a day  -- Indication: For Cad    warfarin 2.5 mg oral tablet  -- 1 tab(s) by mouth once a day (in the evening) HOLD FOR INR ABOVE 3.0  -- Indication: For Af    metFORMIN 500 mg oral tablet  -- 1 tab(s) by mouth 2 times a day (with meals)  -- Indication: For Dm    simvastatin 20 mg oral tablet  -- 1 tab(s) by mouth once a day (at bedtime)  -- Indication: For Hld    metoprolol tartrate 50 mg oral tablet  -- 1 tab(s) by mouth every 6 hours  -- Indication: For Htn    ipratropium-albuterol 0.5 mg-2.5 mg/3 mLinhalation solution  -- 3 milliliter(s) inhaled every 6 hours  -- Indication: For Copd    donepezil 5 mg oral tablet  -- 1 tab(s) by mouth once a day (at bedtime)  -- Indication: For Dementia    furosemide 40 mg oral tablet  -- 1 tab(s) by mouth once a day  -- Indication: For CHF (congestive heart failure)    guaiFENesin 1200 mg oral tablet, extended release  -- 1 tab(s) by mouth every 12 hours  -- Indication: For Cough    potassium chloride 10 mEq oral tablet, extended release  -- 1 tab(s) by mouth once a day  -- Indication: For supplement    amoxicillin-clavulanate 875 mg-125 mg oral tablet  -- 1 tab(s) by mouth 2 times a day X 7 DAYS   -- Indication: For Pneumonia, bacterial    lactobacillus acidophilus oral capsule  -- orally 2 times a day  -- Indication: For supplement I will START or STAY ON the medications listed below when I get home from the hospital:    budesonide 0.5 mg/2 mL inhalation suspension  -- inhaled 2 times a day  -- Indication: For Copd    aspirin 81 mg oral tablet, chewable  -- 1 tab(s) by mouth once a day  -- Indication: For Cad    warfarin 2.5 mg oral tablet  -- 1 tab(s) by mouth once a day (in the evening) HOLD FOR INR ABOVE 3.0  -- Indication: For Af    metFORMIN 500 mg oral tablet  -- 1 tab(s) by mouth 2 times a day (with meals)  -- Indication: For Dm    simvastatin 20 mg oral tablet  -- 1 tab(s) by mouth once a day (at bedtime)  -- Indication: For Hld    metoprolol tartrate 50 mg oral tablet  -- 1 tab(s) by mouth every 6 hours  -- Indication: For Htn hold for sbp below 95 or hr below 60    ipratropium-albuterol 0.5 mg-2.5 mg/3 mLinhalation solution  -- 3 milliliter(s) inhaled every 6 hours  -- Indication: For Copd    donepezil 5 mg oral tablet  -- 1 tab(s) by mouth once a day (at bedtime)  -- Indication: For Dementia    furosemide 40 mg oral tablet  -- 1 tab(s) by mouth once a day  -- Indication: For CHF (congestive heart failure) hold for sbp below 100    guaiFENesin 1200 mg oral tablet, extended release  -- 1 tab(s) by mouth every 12 hours  -- Indication: For Cough    potassium chloride 10 mEq oral tablet, extended release  -- 1 tab(s) by mouth once a day  -- Indication: For supplement    midodrine 5 mg oral tablet  -- 2 tab(s) by mouth every 8 hours  -- Indication: For Hypotension    amoxicillin-clavulanate 875 mg-125 mg oral tablet  -- 1 tab(s) by mouth 2 times a day X 7 DAYS   -- Indication: For Pneumonia, bacterial    lactobacillus acidophilus oral capsule  -- orally 2 times a day  -- Indication: For supplement

## 2019-02-06 NOTE — PHYSICAL THERAPY INITIAL EVALUATION ADULT - TRANSFER SAFETY CONCERNS NOTED: SIT/STAND, REHAB EVAL
decreased sequencing ability/stand pivot/decreased step length/decreased balance during turns/losing balance/excessive retropulsion, unsteady and uncoordinated/decreased safety awareness/decreased proprioception/decreased weight-shifting ability

## 2019-02-06 NOTE — DISCHARGE NOTE ADULT - PLAN OF CARE
resolution of sob continue current managmnet and medications Recommended low salt,lowfat diet, continue current cardiac meds, f/,up with cardilogist,, BP control and monitoring, Echocardiogramm every 6-12 mnth to evaluate LV ef, monitor fluid status,electrolytes  and renal profile closely due to diuretics administartion. Weight control, notify physicain about weight gain 2lbs in 2-3 days continue current management and medications BP monitoring ,continue current antihypertensive meds, low salt diet,followup with PMD

## 2019-02-06 NOTE — PHYSICAL THERAPY INITIAL EVALUATION ADULT - PHYSICAL ASSIST/NONPHYSICAL ASSIST: SUPINE/SIT, REHAB EVAL
nonverbal cues (demo/gestures)/Tactile, manual and verbal cues for safety and confusion/verbal cues/set-up required/1 person assist

## 2019-02-06 NOTE — PROGRESS NOTE ADULT - ATTENDING COMMENTS
Patient was seen and examined on a day of discharge . Plan of care , discharge medications and recommendations discussed with consultants and clearance for discharge obtained .Social service , case management  and medical staff are aware of plan. Family is notified. Discharge summary  is  prepared electronically After impovement of symptoms and stabilisation  patient was cleared for rehabilation . Social service input requested for [facility] placement . Patient is medically stable to be transferred to rehabilitation facility ,when the bed is available and arranged by social service
Pt is a 89 yo WM  who presents to the ED with   PMHx of Atrial fibrillation on coumadin, h/o CHF, dementia, depression, DM, HLD, HTN, prostate cancer brought to ED from [facility] for evaluation of weakness and lethargy .  Information is obtained via chart and EMS.  Per [facility] pt appeared very weak today and did not eat breakfast.  They also noted that he had a temperature of 100.1 around 9 am.  Pt received a flu vaccine 11/4/18.  Currently pt alert to person only falling asleep at bedside and unable to provide history .Found to have RSV infection and diagnosed with COPD exacerbation and acute on chronic bronchitis exacerbation ,puljill biggs requested ,Dr Quintana called [facility] for antibacterial management ,nebulized bronchodilators and pulmonology workup,O2 supply ,serial labs and chest xrays ,obtain ECHO to evaluate LVEF and rule out chf component Palliative care consult requested ,to discuss advance directives and complete MOLST ,son & HCP signed DNR/DNI ,refused card cath ANTICIPATE D/C IN 24 HRS IF CLEARED BY ID AND PULM cons , LEFT 2 MESSAGES FOR DAT ANSARI
Pt is a 89 yo WM  who presents to the ED with   PMHx of Atrial fibrillation on coumadin, h/o CHF, dementia, depression, DM, HLD, HTN, prostate cancer brought to ED from [place] for evaluation of weakness and lethargy .  Information is obtained via chart and EMS.  Per [facility] pt appeared very weak today and did not eat breakfast.  They also noted that he had a temperature of 100.1 around 9 am.  Pt received a flu vaccine 11/4/18.  Currently pt alert to person only falling asleep at bedside and unable to provide history .Found to have RSV infection and diagnosed with COPD exacerbation and acute on chronic bronchitis exacerbation ,puljill biggs requested ,Dr Quintana called [facility] for antibacterial managmnet ,nebulised bronchodilators and pulmonology workup,O2 supply,serial labs and chest xrays,obtain ECHO to evaluate LVEF and rule out chf component Palliative care consult requested ,to discuss advance directives and complete MOLST ,son & HCP signed DNR/DNI ,refused card cath
Pt is a 89 yo WM  who presents to the ED with   PMHx of Atrial fibrillation on coumadin, h/o CHF, dementia, depression, DM, HLD, HTN, prostate cancer brought to ED from [facility] for evaluation of weakness and lethargy .  Information is obtained via chart and EMS.  Per [facility] pt appeared very weak today and did not eat breakfast.  They also noted that he had a temperature of 100.1 around 9 am.  Pt received a flu vaccine 11/4/18.  Currently pt alert to person only falling asleep at bedside and unable to provide history .Found to have RSV infection and diagnosed with COPD exacerbation and acute on chronic bronchitis exacerbation ,puljill biggs requested ,Dr Quintana called [facility] for antibacterial management ,nebulized bronchodilators and pulmonology workup,O2 supply ,serial labs and chest xrays ,obtain ECHO to evaluate LVEF and rule out chf component Palliative care consult requested ,to discuss advance directives and complete MOLST ,son & HCP signed DNR/DNI ,refused card cath
Pt is a 89 yo WM  who presents to the ED with   PMHx of Atrial fibrillation on coumadin, h/o CHF, dementia, depression, DM, HLD, HTN, prostate cancer brought to ED from [facility] for evaluation of weakness and lethargy .  Information is obtained via chart and EMS.  Per [facility] pt appeared very weak today and did not eat breakfast.  They also noted that he had a temperature of 100.1 around 9 am.  Pt received a flu vaccine 11/4/18.  Currently pt alert to person only falling asleep at bedside and unable to provide history .Found to have RSV infection and diagnosed with COPD exacerbation and acute on chronic bronchitis exacerbation ,puljill biggs requested ,Dr Quintana called [facility] for antibacterial managmnet ,nebulised bronchodilators and pulmonology workup,O2 supply,serial labs and chest xrays,obtain ECHO to evaluate LVEF and rule out chf component Palliative care consult requested ,to discuss advance directives and complete MOLST ,son & HCP signed DNR/DNI ,refused card cath
Pt is a 89 yo WM  who presents to the ED with   PMHx of Atrial fibrillation on coumadin, h/o CHF, dementia, depression, DM, HLD, HTN, prostate cancer brought to ED from [facility] for evaluation of weakness and lethargy .  Information is obtained via chart and EMS.  Per [facility] pt appeared very weak today and did not eat breakfast.  They also noted that he had a temperature of 100.1 around 9 am.  Pt received a flu vaccine 11/4/18.  Currently pt alert to person only falling asleep at bedside and unable to provide history .Found to have RSV infection and diagnosed with COPD exacerbation and acute on chronic bronchitis exacerbation ,puljill biggs requested ,Dr Quintana called [facility] for antibacterial managmnet ,nebulised bronchodilators and pulmonology workup,O2 supply,serial labs and chest xrays,obtain ECHO to evaluate LVEF and rule out chf component Palliative care consult requested ,to discuss advance directives and complete MOLST ,son & HCP signed DNR/DNI ,refused card cath

## 2019-02-06 NOTE — DISCHARGE NOTE ADULT - MEDICATION SUMMARY - MEDICATIONS TO STOP TAKING
I will STOP taking the medications listed below when I get home from the hospital:    digoxin 250 mcg (0.25 mg) oral tablet  -- 1 tab(s) by mouth once a day    lisinopril 5 mg oral tablet  -- 1 tab(s) by mouth once a day    carvedilol 3.125 mg oral tablet  -- 1 tab(s) by mouth every 12 hours

## 2019-02-06 NOTE — DISCHARGE NOTE ADULT - SECONDARY DIAGNOSIS.
CHF (congestive heart failure) Atrial fibrillation NSTEMI (non-ST elevated myocardial infarction) RSV bronchitis Hypertension Diabetes

## 2019-02-06 NOTE — PHYSICAL THERAPY INITIAL EVALUATION ADULT - BALANCE DISTURBANCE, IDENTIFIED IMPAIRMENT CONTRIBUTE, REHAB EVAL
impaired coordination/decreased ROM/decreased strength/impaired postural control/impaired motor control/abnormal muscle tone

## 2019-02-06 NOTE — PHYSICAL THERAPY INITIAL EVALUATION ADULT - IMPAIRMENTS CONTRIBUTING TO GAIT DEVIATIONS, PT EVAL
decreased strength/cognition/impaired coordination/decreased flexibility/abnormal muscle tone/impaired postural control/impaired balance/impaired motor control/narrow base of support

## 2019-02-06 NOTE — PHYSICAL THERAPY INITIAL EVALUATION ADULT - IMPAIRMENTS FOUND, PT EVAL
cognitive impairment/gross motor/posture/poor safety awareness/muscle strength/gait, locomotion, and balance/aerobic capacity/endurance/arousal, attention, and cognition/fine motor

## 2019-02-06 NOTE — PHYSICAL THERAPY INITIAL EVALUATION ADULT - PHYSICAL ASSIST/NONPHYSICAL ASSIST: STAND/SIT, REHAB EVAL
set-up required/Tactile, manual and verbal cues for safety and confusion/verbal cues/1 person assist/nonverbal cues (demo/gestures)

## 2019-02-06 NOTE — PHYSICAL THERAPY INITIAL EVALUATION ADULT - GENERAL OBSERVATIONS, REHAB EVAL
Pt rec'd semi-jimenez in tele bed /c NAD or c/o. Pt is noted to be very confused, hypertonicity to BLE. Pt was agreeable to be assessed.

## 2019-02-06 NOTE — PHYSICAL THERAPY INITIAL EVALUATION ADULT - ADDITIONAL COMMENTS
Per EMR and CM, pt living at [facility] and was independent /c ambulation and transfers /c rolling walker. Pt /c history of Dementia. Pt may hav4e needed assistance with ADLs?

## 2019-02-06 NOTE — DISCHARGE NOTE ADULT - OTHER SIGNIFICANT FINDINGS
< from: TTE Echo Doppler w/o Cont (02.01.19 @ 17:45) >  FINDINGS  Left Ventricle: Borderline LV size with  moderate depressed LV systolic   function with estimated LVEF 35-40%.  Right Ventricle: Normal in size and normal RV systolic function.  Left Atrium: Mild dilated.  Right Atrium: Normal in size.  Mitral Valve: Mild mitral annular calcification is present. Mitral valve   is mildly calcified and no evidence of any mitral stenosis mild mitral   regurgitation is present.  Aortic Valve: Aortic Root is mild sclerotic and of normal dimension.   Aortic valve is calcified with  mild aortic stenosis present with the   aortic valve area calculated 1.7 sq cm and peak gradient across the   aortic valve is 15 mmHg and mean gradient 7 mmHg is present. Mild aortic   regurgitation is present.  Tricuspid Valve: Mild tricuspid regurgitation with calculated  PA   systolic pressure 40 mmHg suggestive of mild pulmonary hypertension is   present.  Pulmonic Valve:Trace  Pulmonary regurgitation ispresent.  Diastolic Function: Cannot evaluate because of underlying atrial   fibrillation.  Pericardium/Pleura: No evidence of pericardial effusion.  No intracardiac thrombus or vegetations and  tumor.  CONCLUSIONS:  Normal LV size with  moderate depressed LV systolic function with LVEF   35-40% .    Mild concentric left ventricular hypertrophy is present.< from: TTE Echo Doppler w/o Cont (02.01.19 @ 17:45) >  FINDINGS  Left Ventricle: Borderline LV size with  moderate depressed LV systolic   function with estimated LVEF 35-40%.  Right Ventricle: Normal in size and normal RV systolic function.  Left Atrium: Mild dilated.  Right Atrium: Normal in size.  Mitral Valve: Mild mitral annular calcification is present. Mitral valve   is mildly calcified and no evidence of any mitral stenosis mild mitral   regurgitation is present.  Aortic Valve: Aortic Root is mild sclerotic and of normal dimension.   Aortic valve is calcified with  mild aortic stenosis present with the   aortic valve area calculated 1.7 sq cm and peak gradient across the   aortic valve is 15 mmHg and mean gradient 7 mmHg is present. Mild aortic   regurgitation is present.  Tricuspid Valve: Mild tricuspid regurgitation with calculated  PA   systolic pressure 40 mmHg suggestive of mild pulmonary hypertension is   present.  Pulmonic Valve:Trace  Pulmonary regurgitation ispresent.  Diastolic Function: Cannot evaluate because of underlying atrial   fibrillation.  Pericardium/Pleura: No evidence of pericardial effusion.  No intracardiac thrombus or vegetations and  tumor.  CONCLUSIONS:  Normal LV size with  moderate depressed LV systolic function with LVEF   35-40% .    Mild concentric left ventricular hypertrophy is present.    Mild tricuspid regurgitation with  mild pulmonary hypertension is present.     mild mitral regurgitation is present.    Technical difficultstudy    < end of copied text >      Mild tricuspid regurgitation with  mild pulmonary hypertension is present.     mild mitral regurgitation is present.    Technical difficultstudy    < end of copied text >

## 2019-02-06 NOTE — PROGRESS NOTE ADULT - PROVIDER SPECIALTY LIST ADULT
Cardiology
Hospitalist
Infectious Disease
Internal Medicine
Pulmonology
Cardiology
Pulmonology
Hospitalist

## 2019-02-06 NOTE — PHYSICAL THERAPY INITIAL EVALUATION ADULT - ORIENTATION, REHAB EVAL
Pt giving another name, pt did not know his name, place, date, or situation. Pt is confused and /c history of Dementia/not oriented to person, place, time or situation

## 2019-02-06 NOTE — PHYSICAL THERAPY INITIAL EVALUATION ADULT - PERTINENT HX OF CURRENT PROBLEM, REHAB EVAL
As per H&P:"Pt is a 89 yo WM  who presents to the ED with   PMHx of Atrial fibrillation on coumadin, h/o CHF, dementia, depression, DM, HLD, HTN, prostate cancer brought to ED from [facility] for evaluation of weakness and lethargy .  Information is obtained via chart and EMS.  Per [facility] pt appeared very weak today and did not eat breakfast.  They also noted that he had a temperature of 100.1 around 9 am. "

## 2019-02-06 NOTE — PHYSICAL THERAPY INITIAL EVALUATION ADULT - PHYSICAL ASSIST/NONPHYSICAL ASSIST: SIT/STAND, REHAB EVAL
Tactile, manual and verbal cues for safety and confusion/verbal cues/1 person assist/set-up required/nonverbal cues (demo/gestures)

## 2019-02-06 NOTE — DISCHARGE NOTE ADULT - HOSPITAL COURSE
Pt is a 89 yo WM  who presents to the ED with   PMHx of Atrial fibrillation on coumadin, h/o CHF, dementia, depression, DM, HLD, HTN, prostate cancer brought to ED from [facility] for evaluation of weakness and lethargy .  Information is obtained via chart and EMS.  Per [facility] pt appeared very weak today and did not eat breakfast.  They also noted that he had a temperature of 100.1 around 9 am.  Pt received a flu vaccine 11/4/18.  Currently pt alert to person only falling asleep at bedside and unable to provide history .Found to have RSV infection and diagnosed with COPD exacerbation and acute on chronic bronchitis exacerbation ,puljill biggs requested ,Dr Quintana called [facility] for antibacterial managmnet ,nebulised bronchodilators and pulmonology workup,O2 supply,serial labs and chest xrays,obtain ECHO to evaluate LVEF and rule out chf component Palliative care consult requested ,to discuss advance directives and complete MOLST     Problem/Plan - 1:  ·  Problem: Pneumonia, bacterial.  Plan: CTT SHOWS PERSISTENT  INFILTRATES, SPEECH AND SW REEVAL ORDERED DUE TO POSSIBLE RECURRENT ASPIRATION ,ON AUGMENTIN.     Problem/Plan - 2:  ·  Problem: Febrile illness, acute.  Plan: FEVER RESOLVED , CONTINUE AUGMENTIN.     Problem/Plan - 3:  ·  Problem: RSV bronchitis.  Plan: RESOLVED ,CTT SHOWS PERSISTENT  INFILTRATES ,SPEECH AND SW REEVAL ORDERED DUE TO POSSIBLE RECURRENT ASPIRATION.     Problem/Plan - 4:  ·  Problem: NSTEMI (non-ST elevated myocardial infarction).  Plan: RULED IN FOR ACUTE MI -Admit to telemetry unit for monitoring,send 3 sets of cardiac ensymes to rule out coronary event,obtain ECHO to evaluate LVEF,cardiology consult,continue current managmnet,O2 supply,anticoagulation plan as per cardiology consult Family is refusing card cath and requests conservative maangement.     Problem/Plan - 5:  ·  Problem: CHF (congestive heart failure).  Plan: ACUTE ON CHRONIC DIASTOLIC HF EXACERBAYION ,EF WAS 50 % IN 2018 -obtain echo this admission  to evaluate LVEF,monitor ins/outs,monitor renal profile and electrolytes closely,cardiology consult,send 3 sets of ensymes,O2 supply,serial chest xrays,monitor weights and oral intake of fluids,nutritionist consult CHECK DIGOXIN SERUM LEVEL.     Problem/Plan - 6:  Problem: Atrial fibrillation. Plan: METOPROLOL 50 MG PO Q 6 HRS , D/C COREG as per Dr Paul recommendation , continue home medications , coumadin accordingly to PT/INR.    Problem/Plan - 7:  ·  Problem: Hypertension.  Plan: BP monitoring ,continue current antihypertensive meds, low salt diet ,followup with PMD.     Problem/Plan - 8:  ·  Problem: Dementia.  Plan: continue current management and medications.     Problem/Plan - 9:  ·  Problem: Prophylactic measure.  Plan: Gastrointestinal stress ulcer prophylaxis l and DVT prophylaxis administrated.

## 2019-02-06 NOTE — PROGRESS NOTE ADULT - SUBJECTIVE AND OBJECTIVE BOX
PROGRESS NOTE  Patient is a 89y old  Male who presents with a chief complaint of weakness and lethargy (06 Feb 2019 08:58)    Chart and available morning labs /imaging are reviewed electronically , urgent issues addressed . More information  is being added upon completion of rounds , when more information is collected and management discussed with consultants , medical staff and social service/case management on the floor   OVERNIGHT  No new issues reported by medical staff . All above noted Patient is resting in a bed comfortably .Confused ,poor mentation .No distress noted     HPI:  Pt is a 89 yo WM  who presents to the ED with   PMHx of Atrial fibrillation on coumadin, h/o CHF, dementia, depression, DM, HLD, HTN, prostate cancer brought to ED from [facility] for evaluation of weakness and lethargy .  Information is obtained via chart and EMS.  Per [facility] pt appeared very weak today and did not eat breakfast.  They also noted that he had a temperature of 100.1 around 9 am.  Pt received a flu vaccine 11/4/18.  Currently pt alert to person only falling asleep at bedside and unable to provide history .Found to have RSV infection and diagnosed with COPD exacerbation and acute on chronic bronchitis exacerbation ,puljill biggs requested ,Dr Quintana called [facility] for antibacterial managmnet ,nebulised bronchodilators and pulmonology workup,O2 supply,serial labs and chest xrays,obtain ECHO to evaluate LVEF and rule out chf component Palliative care consult requested ,to discuss advance directives and complete MOLST (30 Jan 2019 17:01)    PAST MEDICAL & SURGICAL HISTORY:  Dementia  Depression  Diabetes  Hyperlipemia  CHF (congestive heart failure)  Dementia  Prostate ca  Diabetes  Hypertension  Atrial fibrillation  No significant past surgical history      MEDICATIONS  (STANDING):  ALBUTerol/ipratropium for Nebulization 3 milliLiter(s) Nebulizer every 6 hours  amoxicillin  875 milliGRAM(s)/clavulanate 1 Tablet(s) Oral two times a day  aspirin  chewable 81 milliGRAM(s) Oral daily  buDESOnide   0.5 milliGRAM(s) Respule 0.5 milliGRAM(s) Inhalation two times a day  dextrose 5%. 1000 milliLiter(s) (50 mL/Hr) IV Continuous <Continuous>  dextrose 50% Injectable 12.5 Gram(s) IV Push once  dextrose 50% Injectable 25 Gram(s) IV Push once  dextrose 50% Injectable 25 Gram(s) IV Push once  donepezil 5 milliGRAM(s) Oral at bedtime  famotidine    Tablet 20 milliGRAM(s) Oral daily  furosemide    Tablet 40 milliGRAM(s) Oral daily  guaiFENesin ER 1200 milliGRAM(s) Oral every 12 hours  insulin lispro (HumaLOG) corrective regimen sliding scale   SubCutaneous three times a day before meals  lactobacillus acidophilus 1 Tablet(s) Oral every 8 hours  magnesium oxide 400 milliGRAM(s) Oral three times a day with meals  metoprolol tartrate 50 milliGRAM(s) Oral every 6 hours  midodrine 5 milliGRAM(s) Oral every 8 hours  potassium acid phosphate/sodium acid phosphate tablet (K-PHOS No. 2) 1 Tablet(s) Oral three times a day with meals  potassium chloride    Tablet ER 10 milliEquivalent(s) Oral daily  simvastatin 20 milliGRAM(s) Oral at bedtime  warfarin 2.5 milliGRAM(s) Oral once    MEDICATIONS  (PRN):  acetaminophen  Suppository .. 325 milliGRAM(s) Rectal every 6 hours PRN Temp greater or equal to 38C (100.4F), Mild Pain (1 - 3)  dextrose 40% Gel 15 Gram(s) Oral once PRN Blood Glucose LESS THAN 70 milliGRAM(s)/deciliter  glucagon  Injectable 1 milliGRAM(s) IntraMuscular once PRN Glucose LESS THAN 70 milligrams/deciliter      OBJECTIVE    T(C): 36.5 (02-06-19 @ 11:37), Max: 36.7 (02-05-19 @ 15:23)  HR: 62 (02-06-19 @ 13:23) (61 - 109)  BP: 100/64 (02-06-19 @ 11:37) (89/53 - 100/64)  RR: 18 (02-06-19 @ 11:37) (15 - 18)  SpO2: 96% (02-06-19 @ 13:23) (90% - 100%)  Wt(kg): --  I&O's Summary        REVIEW OF SYSTEMS:  ROS is unobtainable due to lethargy and confusion     PHYSICAL EXAM:  Appearance: NAD. VS past 24 hrs -as above   HEENT:   Moist oral mucosa. Conjunctiva clear b/l.   Neck : supple  Respiratory: Lungs CTAB.  Gastrointestinal:  Soft, nontender. No rebound. No rigidity. BS present	  Cardiovascular: RRR ,S1S2 present  Neurologic: Non-focal. Moving all extremities.  Extremities: No edema. No erythema. No calf tenderness.  Skin: No rashes, No ecchymoses, No cyanosis.	  wounds ,skin lesions-See skin assesment flow sheet   LABS:                        10.9   7.29  )-----------( 263      ( 06 Feb 2019 06:37 )             32.8     02-06    141  |  101  |  19  ----------------------------<  148<H>  3.8   |  34<H>  |  0.63    Ca    8.7      06 Feb 2019 06:37  Phos  2.6     02-05  Mg     1.7     02-05      CAPILLARY BLOOD GLUCOSE      POCT Blood Glucose.: 201 mg/dL (06 Feb 2019 11:50)  POCT Blood Glucose.: 166 mg/dL (06 Feb 2019 08:24)  POCT Blood Glucose.: 166 mg/dL (06 Feb 2019 01:36)  POCT Blood Glucose.: 173 mg/dL (05 Feb 2019 17:06)    PT/INR - ( 06 Feb 2019 06:37 )   PT: 23.9 sec;   INR: 2.07 ratio               Culture - Urine (collected 02 Feb 2019 11:36)  Source: .Urine Clean Catch (Midstream)  Final Report (03 Feb 2019 14:12):    No growth    Culture - Blood (collected 01 Feb 2019 07:34)  Source: .Blood Blood-Peripheral  Final Report (06 Feb 2019 08:00):    No growth at 5 days.    Culture - Blood (collected 01 Feb 2019 07:34)  Source: .Blood Blood-Peripheral  Final Report (06 Feb 2019 08:00):    No growth at 5 days.    Culture - Urine (collected 30 Jan 2019 18:41)  Source: .Urine Clean Catch (Midstream)  Final Report (31 Jan 2019 20:02):    No growth    Culture - Blood (collected 30 Jan 2019 15:16)  Source: .Blood Blood-Venous  Final Report (04 Feb 2019 16:09):    No growth at 5 days.    Culture - Blood (collected 30 Jan 2019 15:12)  Source: .Blood Blood-Venous  Final Report (04 Feb 2019 16:09):    No growth at 5 days.      RADIOLOGY & ADDITIONAL TESTS:< from: CT Chest No Cont (02.05.19 @ 10:31) >  EXAM:  CT CHEST                            PROCEDURE DATE:  02/05/2019          INTERPRETATION:  History: Short of breath    Noncontrast chest CT    Prior 10/21/2014.    Extensive bilateral lower lobe dependent airspace infiltrates, and  right   upper lobe perihilar airspace infiltrate consistent with pneumonia in the   appropriate clinical setting. Please image to resolution.  Central airways patent. Nonaneurysmal thoracic aorta. Nonspecific   scattered subcentimeter mediastinal lymph nodes. Lack of IV contrast   limits hilar evaluation.  Heart is enlarged with substantial coronary artery calcification. In situ   cardiac pacer. No pericardial effusion. Decrease in cardiac chamber blood   pool attenuation suggests an anemic state.   status post cholecystectomy.  Nonspecific curvilinear right hepatic lobe calcination likely related to   a cyst.  No acute skeletal abnormality.    Impression:    Multifocal bilateral pneumonia as described. Please image to resolution.  Additional findings as discussed    < end of copied text >     reviewed elctronically  ASSESSMENT/PLAN:

## 2019-02-06 NOTE — PHYSICAL THERAPY INITIAL EVALUATION ADULT - GAIT DEVIATIONS NOTED, PT EVAL
decreased velocity of limb motion/decreased step length/decreased mima/decreased stride length/decreased weight-shifting ability

## 2019-02-06 NOTE — PHYSICAL THERAPY INITIAL EVALUATION ADULT - RANGE OF MOTION EXAMINATION, REHAB EVAL
arthritic changes noted, kyphotic trunk, limitations /c extension due to hypertonicity but WFL (flexed posture and knees)/bilateral upper extremity ROM was WFL (within functional limits)/bilateral lower extremity ROM was WFL (within functional limits)/deficits as listed below

## 2019-02-06 NOTE — PHYSICAL THERAPY INITIAL EVALUATION ADULT - IMPAIRED TRANSFERS: SIT/STAND, REHAB EVAL
impaired coordination/impaired motor control/abnormal muscle tone/narrow base of support/decreased ROM/impaired balance/impaired postural control/decreased strength/cognition/decreased flexibility

## 2019-02-06 NOTE — DISCHARGE NOTE ADULT - CARE PLAN
Principal Discharge DX:	Pneumonia, bacterial  Goal:	resolution of sob  Assessment and plan of treatment:	continue current managmnet and medications  Secondary Diagnosis:	CHF (congestive heart failure)  Assessment and plan of treatment:	Recommended low salt,lowfat diet, continue current cardiac meds, f/,up with cardilogist,, BP control and monitoring, Echocardiogramm every 6-12 mnth to evaluate LV ef, monitor fluid status,electrolytes  and renal profile closely due to diuretics administartion. Weight control, notify physicain about weight gain 2lbs in 2-3 days  Secondary Diagnosis:	Atrial fibrillation  Assessment and plan of treatment:	continue current managmnet and medications  Secondary Diagnosis:	NSTEMI (non-ST elevated myocardial infarction)  Assessment and plan of treatment:	continue current managmnet and medications  Secondary Diagnosis:	RSV bronchitis  Secondary Diagnosis:	Hypertension  Secondary Diagnosis:	Diabetes Principal Discharge DX:	Pneumonia, bacterial  Goal:	resolution of sob  Assessment and plan of treatment:	continue current managmnet and medications  Secondary Diagnosis:	CHF (congestive heart failure)  Assessment and plan of treatment:	Recommended low salt,lowfat diet, continue current cardiac meds, f/,up with cardilogist,, BP control and monitoring, Echocardiogramm every 6-12 mnth to evaluate LV ef, monitor fluid status,electrolytes  and renal profile closely due to diuretics administartion. Weight control, notify physicain about weight gain 2lbs in 2-3 days  Secondary Diagnosis:	Atrial fibrillation  Assessment and plan of treatment:	continue current management and medications  Secondary Diagnosis:	NSTEMI (non-ST elevated myocardial infarction)  Assessment and plan of treatment:	continue current management and medications  Secondary Diagnosis:	RSV bronchitis  Assessment and plan of treatment:	continue current management and medications  Secondary Diagnosis:	Hypertension  Assessment and plan of treatment:	BP monitoring ,continue current antihypertensive meds, low salt diet,followup with PMD  Secondary Diagnosis:	Diabetes  Assessment and plan of treatment:	continue current management and medications

## 2019-02-06 NOTE — PROGRESS NOTE ADULT - SUBJECTIVE AND OBJECTIVE BOX
Patient was examined Chart reviewed Detailed note will be inserted below after latest data has been analyzed Meanwhile please refer to my last note for Pulmonary assessment and recommendations Patient was examined Chart reviewed Detailed note will be inserted below after latest data has been analyzed Meanwhile please refer to my last note for Pulmonary assessment and recommendations         ADIEL GUERRERO [facility] [phone] 9/19/1958  ALLERGY nka   CONTACT Mother Glo IGNACIO   self Audrey R    REASON FOR VISIT     subsequent pulm fu  Initial evaluation/Pulmonary Critical Care consultation requested on 2/1/2019  by Dr Melanie Marie   from Dr Quintana   Patient examined chart reviewed  HOSPITAL ADMISSION [facility] 2/1/2019    PATIENT CAME  FROM (if information available)      VITALS/LABS      2/6/2019 99f 86 130/70 18 97%   2/6/2019 w 6.6 Hb 12.9 Plt 299 Na 141 K 3.6 CO2 27 Cr .6     REVIEW OF SYMPTOMS    Able to give ROS  NO     PHYSICAL EXAM    HEENT Unremarkable PERRLA atraumatic   RESP Fair air entry EXP prolonged    Harsh breath sound Resp distres mild   CARDIAC S1 S2 No S3     NO JVD    ABDOMEN SOFT BS PRESENT NOT DISTENDED No hepatosplenomegaly PEDAL EDEMA present No calf tenderness  NO rash   GENERAL Not TOXIC looking    GLOBAL ISSUE/BEST PRACTICE:      PROBLEM: HOB elevation:   y            PROBLEM: Stress ulcer proph:    protonix 40 (2/1)                       PROBLEM: VTE prophylaxis:      hpsc (2/1)   PROBLEM: Glycemic control:    na  PROBLEM: Nutrition:    npo (2/1) ch clear liq (2/2) ch full liq (2/3)   PROBLEM: Advanced directive: na     PROBLEM: Allergies:  na  PROCEDURE 2/5/2019 esophagogram n     PATIENT DESCRIPTION PATIENT ADIEL GUERRERO 2/1/2019 ADMISSION [facility] DR MELANIE MARIE            60 f PMH depression dystonia and mental retardation goes for ect at o but when she go there she was febrile and was diaphoretic, HR- 120, having seizure like activity, noted temp of 100F, unable to perform test, went to urgent care and sent to ER. Patient received Ativan 1mg, olanzapine 10mg, omeprazole 20mg, calcium 500mg, sertaline 150mg at 12pm In ER pt was given iv benadryl 25.2 and improved    CT cap showed oneumomediastinum r paratracheal mass l hydro and Pulm was consulted 2/1/2019 along with CTS Dr Pratt and pt was started on zosyn     er vitals afeb 130/80     Home meds omeprazole 40 sertraline 100 OLANZapine 10 mg OLANZapine 5 clonazePAM 1.3 propranolol 40.2     ASSESSMENT PLAN PATIENT ADIEL GUERRERO 2/1/2019 ADMISSION [facility]  DR MELANIE MARIE       RESP GAS EXCHANGE 2/2/2019 RA 96% Cl looks ok   PNEUMOMEDIASTINUM Serial CXR May need fu CT ch CTS eval if worse 2/2/2019 cxr no pneumomediast Follow with serial cxr 2/5/2019 cl stable   R PARATRACHEAL MASS CTS eval  THYROID NODULES 2/3/2019 US thyroid r upper solid nodule 1.8x1.6 cm r l nodule 3.8 x 2.8 cm 2/4/2019 Needs fna Endo eval   SEPSIS Source unclear Esophag perf unlikely im view of neg CT esophagogram (2/1) Poss asp pneum v uti v leukocytosis caused by quasi seizure like activity on arrival  2/1/2019    Agree with zosyn pending b c 2/1 b c n 2/2 b c n 2/3/2019 Consider dc abio  RO ESOPHAGEAL PERF 2/1/2019 Unlikely in view of neg CT es npo till cleared by GI 2/2/2019 diet cleared by gi after speech eval 2/2/2019 clear liq started 2/5/2019 esophagogram n   HEMATURIA No RBCs ro Rhabdo from muscle activity   2/1/2019 Check CK   L HYDRO 2/1/2019 Consider  eval   HYPERTHYROID 2/2/2019 tsh .14 2/2/2019 Consider endo eval   2/6/2019 Pt can be followed for various issues as outpt at thistime           TIME SPENT Over 25 minutes aggregate care time spent on encounter; activities included   direct patient care, counseling and/or coordinating care reviewing notes, lab data/ imaging , discussion with multidisciplinary team/ patient  /family. Risks, benefits, alternatives  discussed in detail.

## 2019-02-06 NOTE — PHYSICAL THERAPY INITIAL EVALUATION ADULT - ANKLE STRATEGY ASSESSMENT, REHAB EVAL
SaO2 at rest was 94% and HR 83 bpm, during standing and ambulation max HR was 130 bpm as per tele monitor and now  bpm. NAD or c/o.

## 2019-02-06 NOTE — DISCHARGE NOTE ADULT - CARE PROVIDER_API CALL
Lisa Ring (DO)  Internal Medicine  [address] [place] [id number]  Phone: [phone]  Fax: [phone]  Follow Up Time: Lisa Ring ()  Internal Medicine  [address] [place] [address]  Phone: [phone]  Fax: [phone]  Follow Up Time:     Doug Paul)  Cardiology Medicine  [address] [facility] [place] [address]  Phone: [phone]  Fax: [phone]  Follow Up Time:

## 2019-02-06 NOTE — PROGRESS NOTE ADULT - REASON FOR ADMISSION
weakness and lethargy

## 2019-04-12 ENCOUNTER — INPATIENT (INPATIENT)
Facility: HOSPITAL | Age: 84
LOS: 5 days | Discharge: HOME | DRG: 872 | End: 2019-04-18
Attending: INTERNAL MEDICINE | Admitting: INTERNAL MEDICINE
Payer: MEDICARE

## 2019-04-12 VITALS
OXYGEN SATURATION: 95 % | DIASTOLIC BLOOD PRESSURE: 64 MMHG | HEART RATE: 90 BPM | SYSTOLIC BLOOD PRESSURE: 103 MMHG | WEIGHT: 146.61 LBS | HEIGHT: 68 IN | RESPIRATION RATE: 18 BRPM | TEMPERATURE: 99 F

## 2019-04-12 DIAGNOSIS — R31.9 HEMATURIA, UNSPECIFIED: ICD-10-CM

## 2019-04-12 DIAGNOSIS — N30.90 CYSTITIS, UNSPECIFIED WITHOUT HEMATURIA: ICD-10-CM

## 2019-04-12 DIAGNOSIS — E11.9 TYPE 2 DIABETES MELLITUS WITHOUT COMPLICATIONS: ICD-10-CM

## 2019-04-12 DIAGNOSIS — I48.91 UNSPECIFIED ATRIAL FIBRILLATION: ICD-10-CM

## 2019-04-12 DIAGNOSIS — F03.90 UNSPECIFIED DEMENTIA WITHOUT BEHAVIORAL DISTURBANCE: ICD-10-CM

## 2019-04-12 DIAGNOSIS — I25.10 ATHEROSCLEROTIC HEART DISEASE OF NATIVE CORONARY ARTERY WITHOUT ANGINA PECTORIS: ICD-10-CM

## 2019-04-12 DIAGNOSIS — Z29.9 ENCOUNTER FOR PROPHYLACTIC MEASURES, UNSPECIFIED: ICD-10-CM

## 2019-04-12 LAB
ALBUMIN SERPL ELPH-MCNC: 3.1 G/DL — LOW (ref 3.3–5)
ALP SERPL-CCNC: 94 U/L — SIGNIFICANT CHANGE UP (ref 30–120)
ALT FLD-CCNC: 16 U/L DA — SIGNIFICANT CHANGE UP (ref 10–60)
ANION GAP SERPL CALC-SCNC: 10 MMOL/L — SIGNIFICANT CHANGE UP (ref 5–17)
APTT BLD: 34.7 SEC — SIGNIFICANT CHANGE UP (ref 28.5–37)
AST SERPL-CCNC: 16 U/L — SIGNIFICANT CHANGE UP (ref 10–40)
BASOPHILS # BLD AUTO: 0.02 K/UL — SIGNIFICANT CHANGE UP (ref 0–0.2)
BASOPHILS NFR BLD AUTO: 0.2 % — SIGNIFICANT CHANGE UP (ref 0–2)
BILIRUB SERPL-MCNC: 0.7 MG/DL — SIGNIFICANT CHANGE UP (ref 0.2–1.2)
BUN SERPL-MCNC: 22 MG/DL — SIGNIFICANT CHANGE UP (ref 7–23)
CALCIUM SERPL-MCNC: 9.5 MG/DL — SIGNIFICANT CHANGE UP (ref 8.4–10.5)
CHLORIDE SERPL-SCNC: 101 MMOL/L — SIGNIFICANT CHANGE UP (ref 96–108)
CO2 SERPL-SCNC: 32 MMOL/L — HIGH (ref 22–31)
CREAT SERPL-MCNC: 0.78 MG/DL — SIGNIFICANT CHANGE UP (ref 0.5–1.3)
EOSINOPHIL # BLD AUTO: 0 K/UL — SIGNIFICANT CHANGE UP (ref 0–0.5)
EOSINOPHIL NFR BLD AUTO: 0 % — SIGNIFICANT CHANGE UP (ref 0–6)
GLUCOSE BLDC GLUCOMTR-MCNC: 118 MG/DL — HIGH (ref 70–99)
GLUCOSE BLDC GLUCOMTR-MCNC: 127 MG/DL — HIGH (ref 70–99)
GLUCOSE SERPL-MCNC: 179 MG/DL — HIGH (ref 70–99)
HCT VFR BLD CALC: 32.9 % — LOW (ref 39–50)
HGB BLD-MCNC: 10.8 G/DL — LOW (ref 13–17)
IMM GRANULOCYTES NFR BLD AUTO: 0.5 % — SIGNIFICANT CHANGE UP (ref 0–1.5)
INR BLD: 2.01 RATIO — HIGH (ref 0.88–1.16)
LACTATE SERPL-SCNC: 1.4 MMOL/L — SIGNIFICANT CHANGE UP (ref 0.7–2)
LYMPHOCYTES # BLD AUTO: 0.75 K/UL — LOW (ref 1–3.3)
LYMPHOCYTES # BLD AUTO: 5.9 % — LOW (ref 13–44)
MCHC RBC-ENTMCNC: 29.9 PG — SIGNIFICANT CHANGE UP (ref 27–34)
MCHC RBC-ENTMCNC: 32.8 GM/DL — SIGNIFICANT CHANGE UP (ref 32–36)
MCV RBC AUTO: 91.1 FL — SIGNIFICANT CHANGE UP (ref 80–100)
MONOCYTES # BLD AUTO: 1.04 K/UL — HIGH (ref 0–0.9)
MONOCYTES NFR BLD AUTO: 8.2 % — SIGNIFICANT CHANGE UP (ref 2–14)
NEUTROPHILS # BLD AUTO: 10.76 K/UL — HIGH (ref 1.8–7.4)
NEUTROPHILS NFR BLD AUTO: 85.2 % — HIGH (ref 43–77)
NRBC # BLD: 0 /100 WBCS — SIGNIFICANT CHANGE UP (ref 0–0)
PLATELET # BLD AUTO: 269 K/UL — SIGNIFICANT CHANGE UP (ref 150–400)
POTASSIUM SERPL-MCNC: 3.6 MMOL/L — SIGNIFICANT CHANGE UP (ref 3.5–5.3)
POTASSIUM SERPL-SCNC: 3.6 MMOL/L — SIGNIFICANT CHANGE UP (ref 3.5–5.3)
PROT SERPL-MCNC: 7.1 G/DL — SIGNIFICANT CHANGE UP (ref 6–8.3)
PROTHROM AB SERPL-ACNC: 22.4 SEC — HIGH (ref 10–12.9)
RBC # BLD: 3.61 M/UL — LOW (ref 4.2–5.8)
RBC # FLD: 13.2 % — SIGNIFICANT CHANGE UP (ref 10.3–14.5)
SODIUM SERPL-SCNC: 143 MMOL/L — SIGNIFICANT CHANGE UP (ref 135–145)
WBC # BLD: 12.63 K/UL — HIGH (ref 3.8–10.5)
WBC # FLD AUTO: 12.63 K/UL — HIGH (ref 3.8–10.5)

## 2019-04-12 PROCEDURE — 99285 EMERGENCY DEPT VISIT HI MDM: CPT

## 2019-04-12 PROCEDURE — 71045 X-RAY EXAM CHEST 1 VIEW: CPT | Mod: 26

## 2019-04-12 RX ORDER — MIDODRINE HYDROCHLORIDE 2.5 MG/1
10 TABLET ORAL EVERY 8 HOURS
Qty: 0 | Refills: 0 | Status: DISCONTINUED | OUTPATIENT
Start: 2019-04-12 | End: 2019-04-12

## 2019-04-12 RX ORDER — ALBUTEROL 90 UG/1
2 AEROSOL, METERED ORAL EVERY 6 HOURS
Qty: 0 | Refills: 0 | Status: DISCONTINUED | OUTPATIENT
Start: 2019-04-12 | End: 2019-04-18

## 2019-04-12 RX ORDER — CEFTRIAXONE 500 MG/1
1 INJECTION, POWDER, FOR SOLUTION INTRAMUSCULAR; INTRAVENOUS EVERY 24 HOURS
Qty: 0 | Refills: 0 | Status: DISCONTINUED | OUTPATIENT
Start: 2019-04-13 | End: 2019-04-13

## 2019-04-12 RX ORDER — SODIUM CHLORIDE 9 MG/ML
1000 INJECTION INTRAMUSCULAR; INTRAVENOUS; SUBCUTANEOUS
Qty: 0 | Refills: 0 | Status: COMPLETED | OUTPATIENT
Start: 2019-04-12 | End: 2019-04-12

## 2019-04-12 RX ORDER — DEXTROSE 50 % IN WATER 50 %
25 SYRINGE (ML) INTRAVENOUS ONCE
Qty: 0 | Refills: 0 | Status: DISCONTINUED | OUTPATIENT
Start: 2019-04-12 | End: 2019-04-18

## 2019-04-12 RX ORDER — DEXTROSE 50 % IN WATER 50 %
12.5 SYRINGE (ML) INTRAVENOUS ONCE
Qty: 0 | Refills: 0 | Status: DISCONTINUED | OUTPATIENT
Start: 2019-04-12 | End: 2019-04-18

## 2019-04-12 RX ORDER — DEXTROSE 50 % IN WATER 50 %
15 SYRINGE (ML) INTRAVENOUS ONCE
Qty: 0 | Refills: 0 | Status: DISCONTINUED | OUTPATIENT
Start: 2019-04-12 | End: 2019-04-18

## 2019-04-12 RX ORDER — CEFTRIAXONE 500 MG/1
1 INJECTION, POWDER, FOR SOLUTION INTRAMUSCULAR; INTRAVENOUS ONCE
Qty: 0 | Refills: 0 | Status: COMPLETED | OUTPATIENT
Start: 2019-04-12 | End: 2019-04-12

## 2019-04-12 RX ORDER — SODIUM CHLORIDE 9 MG/ML
1000 INJECTION INTRAMUSCULAR; INTRAVENOUS; SUBCUTANEOUS
Qty: 0 | Refills: 0 | Status: DISCONTINUED | OUTPATIENT
Start: 2019-04-12 | End: 2019-04-18

## 2019-04-12 RX ORDER — SODIUM CHLORIDE 9 MG/ML
1000 INJECTION, SOLUTION INTRAVENOUS
Qty: 0 | Refills: 0 | Status: DISCONTINUED | OUTPATIENT
Start: 2019-04-12 | End: 2019-04-18

## 2019-04-12 RX ORDER — GLUCAGON INJECTION, SOLUTION 0.5 MG/.1ML
1 INJECTION, SOLUTION SUBCUTANEOUS ONCE
Qty: 0 | Refills: 0 | Status: DISCONTINUED | OUTPATIENT
Start: 2019-04-12 | End: 2019-04-18

## 2019-04-12 RX ORDER — INSULIN LISPRO 100/ML
VIAL (ML) SUBCUTANEOUS
Qty: 0 | Refills: 0 | Status: DISCONTINUED | OUTPATIENT
Start: 2019-04-12 | End: 2019-04-18

## 2019-04-12 RX ORDER — SIMVASTATIN 20 MG/1
20 TABLET, FILM COATED ORAL AT BEDTIME
Qty: 0 | Refills: 0 | Status: DISCONTINUED | OUTPATIENT
Start: 2019-04-12 | End: 2019-04-18

## 2019-04-12 RX ORDER — MIDODRINE HYDROCHLORIDE 2.5 MG/1
5 TABLET ORAL EVERY 8 HOURS
Qty: 0 | Refills: 0 | Status: DISCONTINUED | OUTPATIENT
Start: 2019-04-12 | End: 2019-04-17

## 2019-04-12 RX ORDER — ACETAMINOPHEN 500 MG
650 TABLET ORAL EVERY 6 HOURS
Qty: 0 | Refills: 0 | Status: DISCONTINUED | OUTPATIENT
Start: 2019-04-12 | End: 2019-04-18

## 2019-04-12 RX ORDER — BUDESONIDE, MICRONIZED 100 %
0.5 POWDER (GRAM) MISCELLANEOUS
Qty: 0 | Refills: 0 | Status: DISCONTINUED | OUTPATIENT
Start: 2019-04-12 | End: 2019-04-16

## 2019-04-12 RX ADMIN — SIMVASTATIN 20 MILLIGRAM(S): 20 TABLET, FILM COATED ORAL at 21:49

## 2019-04-12 RX ADMIN — SODIUM CHLORIDE 50 MILLILITER(S): 9 INJECTION INTRAMUSCULAR; INTRAVENOUS; SUBCUTANEOUS at 14:02

## 2019-04-12 RX ADMIN — CEFTRIAXONE 100 GRAM(S): 500 INJECTION, POWDER, FOR SOLUTION INTRAMUSCULAR; INTRAVENOUS at 08:27

## 2019-04-12 RX ADMIN — SODIUM CHLORIDE 1000 MILLILITER(S): 9 INJECTION INTRAMUSCULAR; INTRAVENOUS; SUBCUTANEOUS at 13:06

## 2019-04-12 RX ADMIN — MIDODRINE HYDROCHLORIDE 10 MILLIGRAM(S): 2.5 TABLET ORAL at 15:20

## 2019-04-12 RX ADMIN — MIDODRINE HYDROCHLORIDE 5 MILLIGRAM(S): 2.5 TABLET ORAL at 23:27

## 2019-04-12 RX ADMIN — CEFTRIAXONE 1 GRAM(S): 500 INJECTION, POWDER, FOR SOLUTION INTRAMUSCULAR; INTRAVENOUS at 09:00

## 2019-04-12 RX ADMIN — Medication 650 MILLIGRAM(S): at 14:01

## 2019-04-12 RX ADMIN — Medication 0.5 MILLIGRAM(S): at 20:07

## 2019-04-12 RX ADMIN — SODIUM CHLORIDE 1000 MILLILITER(S): 9 INJECTION INTRAMUSCULAR; INTRAVENOUS; SUBCUTANEOUS at 08:10

## 2019-04-12 NOTE — ED PROVIDER NOTE - PROGRESS NOTE DETAILS
Pt stable , awaiting Dr Maynard who was consulted by Dr. Ring. 3way inserted by urologist. Plan - admit for further treatment to Dr. Ring.

## 2019-04-12 NOTE — CONSULT NOTE ADULT - SUBJECTIVE AND OBJECTIVE BOX
CHIEF COMPLAINT:  89y Male with chief complaint of developed gross hematuria          HISTORY OF PRESENT ILLNESS:  89 w male with dementia  has hx ca prostate with RT about 20 years ago and hx hormone therapy  hx uti  is on blood thinners and developed gross hematuria er staff could not place fitch i was able to eventually place a 3 way 20 fr fitch irrigated out clots and started cbi         *************************************************************************************************  PAST MEDICAL & SURGICAL HISTORY:      MEDICATIONS  (STANDING):  sodium chloride 0.9% Bolus 1000 milliLiter(s) IV Bolus every 1 hour    MEDICATIONS  (PRN):      ALLERGIES:  latex (Unknown)  No Known Drug Allergies      SOCIAL HISTORY:          ETOH:                                  Smoking:                                   Drugs:                                         Occupation:    FAMILY HISTORY:      CONSTITUTIONAL: No weakness, fevers or chills    EYES/ENT: No visual changes;  No vertigo or throat pain     NECK: No pain or stiffness    RESPIRATORY: No cough, wheezing, hemoptysis; No shortness of breath    CARDIOVASCULAR: No chest pain or palpitations    GASTROINTESTINAL: No abdominal or epigastric pain. No nausea, vomiting, or hematemesis; No diarrhea or constipation. No melena or hematochezia.    GENITOURINARY: hx ca prostate and uti 's    NEUROLOGICAL: No numbness or weakness    SKIN: No itching, burning, rashes, or lesions     All other review of systems is negative unless indicated above.    ****************************************************************************************************  PHYSICAL EXAM:    Vital Signs Last 24 Hrs  T(C): 37.2 (12 Apr 2019 07:16), Max: 37.2 (12 Apr 2019 07:16)  T(F): 98.9 (12 Apr 2019 07:16), Max: 98.9 (12 Apr 2019 07:16)  HR: 83 (12 Apr 2019 08:45) (83 - 90)  BP: 120/31 (12 Apr 2019 08:45) (103/64 - 120/31)  BP(mean): --  RR: 18 (12 Apr 2019 08:45) (18 - 18)  SpO2: 100% (12 Apr 2019 08:45) (95% - 100%)    GENERAL: non verbal     PENIS: circ with bleed all over groin pad     TESTES: soft     PROSTATE/ RECTAL: 20 gms flat     ABDOMEN: soft     BACK: no cva tenderness    LOWER EXTREMITIES: no leg edema     NEUROLOGICAL:      *******************************************************************************************************  LABS:                        10.8   12.63 )-----------( 269      ( 12 Apr 2019 08:18 )             32.9     04-12    143  |  101  |  22  ----------------------------<  179<H>  3.6   |  32<H>  |  0.78    Ca    9.5      12 Apr 2019 08:18    TPro  7.1  /  Alb  3.1<L>  /  TBili  0.7  /  DBili  x   /  AST  16  /  ALT  16  /  AlkPhos  94  04-12    PT/INR - ( 12 Apr 2019 08:18 )   PT: 22.4 sec;   INR: 2.01 ratio         PTT - ( 12 Apr 2019 08:18 )  PTT:34.7 sec    Urine Culture:     RADIOLOGY & ADDITIONAL STUDIES:      *********************************************************************************************************  IMPRESSION: gross hematuria     PLAN: 20 [place] 3 way with cbi    try to reduce blood thinners   will need ct scan abdomen and pelvis with IV constrast perfered CHIEF COMPLAINT:  89y Male with chief complaint of developed gross hematuria          HISTORY OF PRESENT ILLNESS:  89 w male with dementia  has hx ca prostate with RT about 20 years ago and hx hormone therapy  hx uti  is on blood thinners and developed gross hematuria er staff could not place fitch i was able to eventually place a 3 way 20 fr fitch irrigated out clots and started cbi         *************************************************************************************************  PAST MEDICAL & SURGICAL HISTORY:      MEDICATIONS  (STANDING):  sodium chloride 0.9% Bolus 1000 milliLiter(s) IV Bolus every 1 hour    MEDICATIONS  (PRN):      ALLERGIES:  latex (Unknown)  No Known Drug Allergies      SOCIAL HISTORY:          ETOH:                                  Smoking:                                   Drugs:                                         Occupation:    FAMILY HISTORY:      CONSTITUTIONAL: No weakness, fevers or chills    EYES/ENT: No visual changes;  No vertigo or throat pain     NECK: No pain or stiffness    RESPIRATORY: No cough, wheezing, hemoptysis; No shortness of breath    CARDIOVASCULAR: No chest pain or palpitations    GASTROINTESTINAL: No abdominal or epigastric pain. No nausea, vomiting, or hematemesis; No diarrhea or constipation. No melena or hematochezia.    GENITOURINARY: hx ca prostate and uti 's    NEUROLOGICAL: No numbness or weakness    SKIN: No itching, burning, rashes, or lesions     All other review of systems is negative unless indicated above.    ****************************************************************************************************  PHYSICAL EXAM:    Vital Signs Last 24 Hrs  T(C): 37.2 (12 Apr 2019 07:16), Max: 37.2 (12 Apr 2019 07:16)  T(F): 98.9 (12 Apr 2019 07:16), Max: 98.9 (12 Apr 2019 07:16)  HR: 83 (12 Apr 2019 08:45) (83 - 90)  BP: 120/31 (12 Apr 2019 08:45) (103/64 - 120/31)  BP(mean): --  RR: 18 (12 Apr 2019 08:45) (18 - 18)  SpO2: 100% (12 Apr 2019 08:45) (95% - 100%)    GENERAL: non verbal     PENIS: circ with bleed all over groin pad     TESTES: soft     PROSTATE/ RECTAL: 20 gms flat     ABDOMEN: soft     BACK: no cva tenderness    LOWER EXTREMITIES: no leg edema     NEUROLOGICAL:      *******************************************************************************************************  LABS:                        10.8   12.63 )-----------( 269      ( 12 Apr 2019 08:18 )             32.9     04-12    143  |  101  |  22  ----------------------------<  179<H>  3.6   |  32<H>  |  0.78    Ca    9.5      12 Apr 2019 08:18    TPro  7.1  /  Alb  3.1<L>  /  TBili  0.7  /  DBili  x   /  AST  16  /  ALT  16  /  AlkPhos  94  04-12    PT/INR - ( 12 Apr 2019 08:18 )   PT: 22.4 sec;   INR: 2.01 ratio         PTT - ( 12 Apr 2019 08:18 )  PTT:34.7 sec    Urine Culture:     RADIOLOGY & ADDITIONAL STUDIES:      *********************************************************************************************************  IMPRESSION: gross hematuria     PLAN: 20 [place] 3 way with cbi    try to reduce blood thinners   will need ct scan abdomen and pelvis with IV constrast perfered   used latex 20 fr 3 way  monitor for allergy  trying to get 20 fr silicone if needed CHIEF COMPLAINT:  89y Male with chief complaint of developed gross hematuria          HISTORY OF PRESENT ILLNESS:  89 w male with dementia  has hx ca prostate with RT about 20 years ago and hx hormone therapy  hx uti  is on blood thinners and developed gross hematuria er staff could not place fitch i was able to eventually place a 3 way 20 fr fitch irrigated out clots and started cbi         *************************************************************************************************  PAST MEDICAL & SURGICAL HISTORY:      MEDICATIONS  (STANDING):  sodium chloride 0.9% Bolus 1000 milliLiter(s) IV Bolus every 1 hour    MEDICATIONS  (PRN):      ALLERGIES:  latex (Unknown)  No Known Drug Allergies      SOCIAL HISTORY:          ETOH:                                  Smoking:                                   Drugs:                                         Occupation:    FAMILY HISTORY:      CONSTITUTIONAL: No weakness, fevers or chills    EYES/ENT: No visual changes;  No vertigo or throat pain     NECK: No pain or stiffness    RESPIRATORY: No cough, wheezing, hemoptysis; No shortness of breath    CARDIOVASCULAR: No chest pain or palpitations    GASTROINTESTINAL: No abdominal or epigastric pain. No nausea, vomiting, or hematemesis; No diarrhea or constipation. No melena or hematochezia.    GENITOURINARY: hx ca prostate and uti 's    NEUROLOGICAL: No numbness or weakness    SKIN: No itching, burning, rashes, or lesions     All other review of systems is negative unless indicated above.    ****************************************************************************************************  PHYSICAL EXAM:    Vital Signs Last 24 Hrs  T(C): 37.2 (12 Apr 2019 07:16), Max: 37.2 (12 Apr 2019 07:16)  T(F): 98.9 (12 Apr 2019 07:16), Max: 98.9 (12 Apr 2019 07:16)  HR: 83 (12 Apr 2019 08:45) (83 - 90)  BP: 120/31 (12 Apr 2019 08:45) (103/64 - 120/31)  BP(mean): --  RR: 18 (12 Apr 2019 08:45) (18 - 18)  SpO2: 100% (12 Apr 2019 08:45) (95% - 100%)    GENERAL: non verbal     PENIS: circ with bleed all over groin pad     TESTES: soft     PROSTATE/ RECTAL: 20 gms flat     ABDOMEN: soft     BACK: no cva tenderness    LOWER EXTREMITIES: no leg edema     NEUROLOGICAL:      *******************************************************************************************************  LABS:                        10.8   12.63 )-----------( 269      ( 12 Apr 2019 08:18 )             32.9     04-12    143  |  101  |  22  ----------------------------<  179<H>  3.6   |  32<H>  |  0.78    Ca    9.5      12 Apr 2019 08:18    TPro  7.1  /  Alb  3.1<L>  /  TBili  0.7  /  DBili  x   /  AST  16  /  ALT  16  /  AlkPhos  94  04-12    PT/INR - ( 12 Apr 2019 08:18 )   PT: 22.4 sec;   INR: 2.01 ratio         PTT - ( 12 Apr 2019 08:18 )  PTT:34.7 sec    Urine Culture:     RADIOLOGY & ADDITIONAL STUDIES:      *********************************************************************************************************  IMPRESSION: gross hematuria     PLAN: 20 [place] 3 way with cbi    try to reduce blood thinners   will need ct scan abdomen and pelvis with IV constrast perfered but on metformin   would need ti clamp fitch for ct scan to fill bladder   used latex 20 fr 3 way  monitor for allergy  trying to get 20 fr silicone if needed CHIEF COMPLAINT:  89y Male with chief complaint of developed gross hematuria          HISTORY OF PRESENT ILLNESS:  89 w male with dementia  has hx ca prostate with RT about 20 years ago and hx hormone therapy  hx uti  is on blood thinners and developed gross hematuria er staff could not place fitch i was able to eventually place a 3 way 20 fr fitch irrigated out clots and started cbi         *************************************************************************************************  PAST MEDICAL & SURGICAL HISTORY:      MEDICATIONS  (STANDING):  sodium chloride 0.9% Bolus 1000 milliLiter(s) IV Bolus every 1 hour    MEDICATIONS  (PRN):      ALLERGIES:  latex (Unknown)  No Known Drug Allergies      SOCIAL HISTORY:          ETOH:                                  Smoking:                                   Drugs:                                         Occupation:    FAMILY HISTORY:      CONSTITUTIONAL: No weakness, fevers or chills    EYES/ENT: No visual changes;  No vertigo or throat pain     NECK: No pain or stiffness    RESPIRATORY: No cough, wheezing, hemoptysis; No shortness of breath    CARDIOVASCULAR: No chest pain or palpitations    GASTROINTESTINAL: No abdominal or epigastric pain. No nausea, vomiting, or hematemesis; No diarrhea or constipation. No melena or hematochezia.    GENITOURINARY: hx ca prostate and uti 's    NEUROLOGICAL: No numbness or weakness    SKIN: No itching, burning, rashes, or lesions     All other review of systems is negative unless indicated above.    ****************************************************************************************************  PHYSICAL EXAM:    Vital Signs Last 24 Hrs  T(C): 37.2 (12 Apr 2019 07:16), Max: 37.2 (12 Apr 2019 07:16)  T(F): 98.9 (12 Apr 2019 07:16), Max: 98.9 (12 Apr 2019 07:16)  HR: 83 (12 Apr 2019 08:45) (83 - 90)  BP: 120/31 (12 Apr 2019 08:45) (103/64 - 120/31)  BP(mean): --  RR: 18 (12 Apr 2019 08:45) (18 - 18)  SpO2: 100% (12 Apr 2019 08:45) (95% - 100%)    GENERAL: non verbal     PENIS: circ with bleed all over groin pad     TESTES: soft     PROSTATE/ RECTAL: 20 gms flat     ABDOMEN: soft     BACK: no cva tenderness    LOWER EXTREMITIES: no leg edema     NEUROLOGICAL:      *******************************************************************************************************  LABS:                        10.8   12.63 )-----------( 269      ( 12 Apr 2019 08:18 )             32.9     04-12    143  |  101  |  22  ----------------------------<  179<H>  3.6   |  32<H>  |  0.78    Ca    9.5      12 Apr 2019 08:18    TPro  7.1  /  Alb  3.1<L>  /  TBili  0.7  /  DBili  x   /  AST  16  /  ALT  16  /  AlkPhos  94  04-12    PT/INR - ( 12 Apr 2019 08:18 )   PT: 22.4 sec;   INR: 2.01 ratio         PTT - ( 12 Apr 2019 08:18 )  PTT:34.7 sec    Urine Culture:     RADIOLOGY & ADDITIONAL STUDIES:      *********************************************************************************************************  IMPRESSION: gross hematuria  very difficult insertion  tried and failed with Bard obstructed urethra kit and finally used a curved metal guide for placement     PLAN: 20 [place] 3 way with cbi   irrigated out many clots until clear   try to reduce blood thinners   will need ct scan abdomen and pelvis with IV constrast perfered but on metformin   would need ti clamp fitch for ct scan to fill bladder   used latex 20 fr 3 way  monitor for allergy  trying to get 20 fr silicone if needed  I did speak with family and dr samuel  about latex allergy and it was questionable. They agreed to continue with the fitch in place due to the difficulty to insert and lack on exact replacement in silicone.

## 2019-04-12 NOTE — H&P ADULT - PROBLEM SELECTOR PLAN 4
Admitted  to telemetry unit for monitoring , send 3 sets of cardiac ensymes to rule out coronary event, obtain ECHO to evaluate LVEF, cardiology consult ,continue current management, O2 supply, anticoagulation plan as per cardiology consult
No

## 2019-04-12 NOTE — H&P ADULT - HISTORY OF PRESENT ILLNESS
89 YO W male with hx of from [facility]  with hematuria today. Pt with dementia cannot give hx. Denies any symptoms at present.Admitted for management of hemorrhagic cystitis Admitted for septic workup and evaluation,send blood and urine cx,serial lactate levels,monitor vitals closley,ivfs hydration,monitor urine output and renal profile,iv abx initiated Seen by urologist Dr Maynard and fitch cath was placed Palliative care consult requested ,to discuss advance directives and complete MOLST

## 2019-04-12 NOTE — H&P ADULT - ASSESSMENT
89 YO W male with hx of from [facility]  with hematuria today. Pt with dementia cannot give hx. Denies any symptoms at present.Admitted for management of hemorrhagic cystitis Admitted for septic workup and evaluation,send blood and urine cx,serial lactate levels,monitor vitals closley,ivfs hydration,monitor urine output and renal profile,iv abx initiated Seen by urologist Dr Maynard and fitch cath was placed Palliative care consult requested ,to discuss advance directives and complete MOLST

## 2019-04-12 NOTE — ED ADULT NURSE NOTE - NSIMPLEMENTINTERV_GEN_ALL_ED
Implemented All Fall with Harm Risk Interventions:  [place] to call system. Call bell, personal items and telephone within reach. Instruct patient to call for assistance. Room bathroom lighting operational. Non-slip footwear when patient is off stretcher. Physically safe environment: no spills, clutter or unnecessary equipment. Stretcher in lowest position, wheels locked, appropriate side rails in place. Provide visual cue, wrist band, yellow gown, etc. Monitor gait and stability. Monitor for mental status changes and reorient to person, place, and time. Review medications for side effects contributing to fall risk. Reinforce activity limits and safety measures with patient and family. Provide visual clues: red socks.

## 2019-04-12 NOTE — H&P ADULT - PROBLEM SELECTOR PLAN 2
Admitted for septic workup and evaluation,send blood and urine cx,serial lactate levels,monitor vitals closley,ivfs hydration,monitor urine output and renal profile,iv abx initiated -on ceftriaxone

## 2019-04-12 NOTE — H&P ADULT - RS GEN PE MLT RESP DETAILS PC
good air movement/airway patent/normal/breath sounds equal/diminished breath sounds, L/clear to auscultation bilaterally/diminished breath sounds, R/respirations non-labored

## 2019-04-12 NOTE — ED PROVIDER NOTE - OBJECTIVE STATEMENT
89 male from [facility] with hematuria today. Pt with dementia cannot give hx. Denies any symptoms at present.

## 2019-04-12 NOTE — GOALS OF CARE CONVERSATION - PERSONAL ADVANCE DIRECTIVE - NS PRO AD PATIENT TYPE
COPY NOT IN CHART; confirmed by D-I-L/Health Care Proxy (HCP)/Medical Orders for Life-Sustaining Treatment (MOLST)

## 2019-04-12 NOTE — H&P ADULT - ATTENDING COMMENTS
89 YO W male with hx of from [facility]  with hematuria today. Pt with dementia cannot give hx. Denies any symptoms at present.Admitted for management of hemorrhagic cystitis Admitted for septic workup and evaluation,send blood and urine cx,serial lactate levels,monitor vitals closley,ivfs hydration,monitor urine output and renal profile,iv abx initiated Seen by urologist Dr Maynard and fitch cath was placed Palliative care consult requested ,to discuss advance directives and complete MOLST

## 2019-04-12 NOTE — H&P ADULT - NSHPLABSRESULTS_GEN_ALL_CORE
< from: Xray Chest 1 View AP/PA (04.12.19 @ 07:54) >    < from: Xray Chest 1 View AP/PA (04.12.19 @ 07:54) >    EXAM:  XR CHEST AP OR PA 1V                                  PROCEDURE DATE:  04/12/2019          INTERPRETATION:  Clinical information: Hematuria, weakness    AP study of the chest    No prior studies present for comparison    Cardiac device overlies left axilla. Mild prominence of interstitial lung   markings. No lobar consolidation vascular congestion or effusion. Heart   size appears mildly enlarged magnified secondary to AP technique.   Atherosclerotic changes aorta. No acute osseous abnormalities. Surgical   clips visible in the right upper quadrant. Surgical clips overlie midline   upper abdomen. A round radiopacity in the right upper quadrant is of   uncertain etiology, could represent a hepatic calcification?    IMPRESSION: See above report    < end of copied text >        < end of copied text > < from: Xray Chest 1 View AP/PA (04.12.19 @ 07:54) >  < from: Xray Chest 1 View AP/PA (04.12.19 @ 07:54) >  EXAM:  XR CHEST AP OR PA 1V                        PROCEDURE DATE:  04/12/2019    INTERPRETATION:  Clinical information: Hematuria, weakness  AP study of the chest  No prior studies present for comparison  Cardiac device overlies left axilla. Mild prominence of interstitial lung   markings. No lobar consolidation vascular congestion or effusion. Heart   size appears mildly enlarged magnified secondary to AP technique.   Atherosclerotic changes aorta. No acute osseous abnormalities. Surgical   clips visible in the right upper quadrant. Surgical clips overlie midline   upper abdomen. A round radiopacity in the right upper quadrant is of   uncertain etiology, could represent a hepatic calcification?  IMPRESSION: See above report  < end of copied text >  < end of copied text >

## 2019-04-12 NOTE — ED PROVIDER NOTE - CLINICAL SUMMARY MEDICAL DECISION MAKING FREE TEXT BOX
89 yo male from [place] with hematuria. Plan - UA, labs, may need admission for IV antibiotics/urology eval.

## 2019-04-12 NOTE — H&P ADULT - NSICDXPASTMEDICALHX_GEN_ALL_CORE_FT
PAST MEDICAL HISTORY:  Arteriosclerotic heart disease (ASHD)     Atrial fibrillation     Dementia     DM (diabetes mellitus)     Prostate CA

## 2019-04-12 NOTE — ED ADULT NURSE REASSESSMENT NOTE - NS ED NURSE REASSESS COMMENT FT1
1200:  Dr. Maynard at bedside to place 3 way fitch.  MD aware pt with unspecified Latex allergy. As per family many years ago [place] and became "almost fainted" and was seen at a hospital who stated it was probably an allergic reaction to the gloves he wears for work.  No #20 latex free fitch available at this [facility].  Nurse Manager made aware and is ordering.  Dr. Maynard to be called if we are able to get it.  Pt remains nonsymptomatic, is supine on stretcher, CM in place with ongoing CBI that has lightened in color.  WCM. 1200:  Dr. Maynard at bedside to place 3 way fitch.  MD aware pt with unspecified Latex allergy. As per family many years ago pt [place] from [place] and  "almost fainted" and was seen at a hospital, as per hospital it was probably an allergic reaction to the gloves he wears for work.  Pt never had any reaction in the past. #20 latex free fitch unavailable at this [facility].  Nurse Manager made aware and is ordering.  Dr. Maynard to be called if we are able to get it.  Family, MD and NM aware present fitch contains latex.  Pt remains nonsymptomatic, is supine on stretcher, CM in place with ongoing CBI that has lightened in color.  WCM.

## 2019-04-12 NOTE — H&P ADULT - PROBLEM SELECTOR PLAN 1
RAMOS PLACED BY  , MONITOR UO ,FOLLOW UA AND URINE CX ,CT SCAN WITH CONTRAST ( HOLD METFORMIN ) ,IV HYDRATION

## 2019-04-13 LAB
ANION GAP SERPL CALC-SCNC: 8 MMOL/L — SIGNIFICANT CHANGE UP (ref 5–17)
APPEARANCE UR: ABNORMAL
BASOPHILS # BLD AUTO: 0.03 K/UL — SIGNIFICANT CHANGE UP (ref 0–0.2)
BASOPHILS NFR BLD AUTO: 0.3 % — SIGNIFICANT CHANGE UP (ref 0–2)
BILIRUB UR-MCNC: NEGATIVE — SIGNIFICANT CHANGE UP
BUN SERPL-MCNC: 15 MG/DL — SIGNIFICANT CHANGE UP (ref 7–23)
CALCIUM SERPL-MCNC: 9 MG/DL — SIGNIFICANT CHANGE UP (ref 8.4–10.5)
CHLORIDE SERPL-SCNC: 105 MMOL/L — SIGNIFICANT CHANGE UP (ref 96–108)
CO2 SERPL-SCNC: 29 MMOL/L — SIGNIFICANT CHANGE UP (ref 22–31)
COLOR SPEC: ABNORMAL
CREAT SERPL-MCNC: 0.54 MG/DL — SIGNIFICANT CHANGE UP (ref 0.5–1.3)
DIFF PNL FLD: ABNORMAL
E COLI DNA BLD POS QL NAA+NON-PROBE: SIGNIFICANT CHANGE UP
EOSINOPHIL # BLD AUTO: 0.1 K/UL — SIGNIFICANT CHANGE UP (ref 0–0.5)
EOSINOPHIL NFR BLD AUTO: 1 % — SIGNIFICANT CHANGE UP (ref 0–6)
GLUCOSE BLDC GLUCOMTR-MCNC: 110 MG/DL — HIGH (ref 70–99)
GLUCOSE BLDC GLUCOMTR-MCNC: 138 MG/DL — HIGH (ref 70–99)
GLUCOSE BLDC GLUCOMTR-MCNC: 141 MG/DL — HIGH (ref 70–99)
GLUCOSE BLDC GLUCOMTR-MCNC: 162 MG/DL — HIGH (ref 70–99)
GLUCOSE SERPL-MCNC: 180 MG/DL — HIGH (ref 70–99)
GLUCOSE UR QL: NEGATIVE MG/DL — SIGNIFICANT CHANGE UP
GRAM STN FLD: SIGNIFICANT CHANGE UP
HBA1C BLD-MCNC: 6.1 % — HIGH (ref 4–5.6)
HCT VFR BLD CALC: 30.2 % — LOW (ref 39–50)
HGB BLD-MCNC: 9.9 G/DL — LOW (ref 13–17)
IMM GRANULOCYTES NFR BLD AUTO: 0.5 % — SIGNIFICANT CHANGE UP (ref 0–1.5)
INR BLD: 1.97 RATIO — HIGH (ref 0.88–1.16)
KETONES UR-MCNC: NEGATIVE — SIGNIFICANT CHANGE UP
LEUKOCYTE ESTERASE UR-ACNC: ABNORMAL
LYMPHOCYTES # BLD AUTO: 1.3 K/UL — SIGNIFICANT CHANGE UP (ref 1–3.3)
LYMPHOCYTES # BLD AUTO: 13.3 % — SIGNIFICANT CHANGE UP (ref 13–44)
MCHC RBC-ENTMCNC: 30.1 PG — SIGNIFICANT CHANGE UP (ref 27–34)
MCHC RBC-ENTMCNC: 32.8 GM/DL — SIGNIFICANT CHANGE UP (ref 32–36)
MCV RBC AUTO: 91.8 FL — SIGNIFICANT CHANGE UP (ref 80–100)
METHOD TYPE: SIGNIFICANT CHANGE UP
MONOCYTES # BLD AUTO: 0.69 K/UL — SIGNIFICANT CHANGE UP (ref 0–0.9)
MONOCYTES NFR BLD AUTO: 7.1 % — SIGNIFICANT CHANGE UP (ref 2–14)
MRSA PCR RESULT.: SIGNIFICANT CHANGE UP
NEUTROPHILS # BLD AUTO: 7.61 K/UL — HIGH (ref 1.8–7.4)
NEUTROPHILS NFR BLD AUTO: 77.8 % — HIGH (ref 43–77)
NITRITE UR-MCNC: POSITIVE
NRBC # BLD: 0 /100 WBCS — SIGNIFICANT CHANGE UP (ref 0–0)
PH UR: 5 — SIGNIFICANT CHANGE UP (ref 5–8)
PLATELET # BLD AUTO: 265 K/UL — SIGNIFICANT CHANGE UP (ref 150–400)
POTASSIUM SERPL-MCNC: 3.5 MMOL/L — SIGNIFICANT CHANGE UP (ref 3.5–5.3)
POTASSIUM SERPL-SCNC: 3.5 MMOL/L — SIGNIFICANT CHANGE UP (ref 3.5–5.3)
PROT UR-MCNC: 30 MG/DL
PROTHROM AB SERPL-ACNC: 21.9 SEC — HIGH (ref 10–12.9)
RBC # BLD: 3.29 M/UL — LOW (ref 4.2–5.8)
RBC # FLD: 13.4 % — SIGNIFICANT CHANGE UP (ref 10.3–14.5)
RBC CASTS # UR COMP ASSIST: ABNORMAL /HPF (ref 0–4)
S AUREUS DNA NOSE QL NAA+PROBE: SIGNIFICANT CHANGE UP
SODIUM SERPL-SCNC: 142 MMOL/L — SIGNIFICANT CHANGE UP (ref 135–145)
SP GR SPEC: 1 — LOW (ref 1.01–1.02)
SPECIMEN SOURCE: SIGNIFICANT CHANGE UP
UROBILINOGEN FLD QL: NEGATIVE MG/DL — SIGNIFICANT CHANGE UP
WBC # BLD: 9.78 K/UL — SIGNIFICANT CHANGE UP (ref 3.8–10.5)
WBC # FLD AUTO: 9.78 K/UL — SIGNIFICANT CHANGE UP (ref 3.8–10.5)
WBC UR QL: SIGNIFICANT CHANGE UP

## 2019-04-13 PROCEDURE — 76770 US EXAM ABDO BACK WALL COMP: CPT | Mod: 26

## 2019-04-13 RX ORDER — PIPERACILLIN AND TAZOBACTAM 4; .5 G/20ML; G/20ML
3.38 INJECTION, POWDER, LYOPHILIZED, FOR SOLUTION INTRAVENOUS ONCE
Qty: 0 | Refills: 0 | Status: COMPLETED | OUTPATIENT
Start: 2019-04-13 | End: 2019-04-13

## 2019-04-13 RX ORDER — PIPERACILLIN AND TAZOBACTAM 4; .5 G/20ML; G/20ML
3.38 INJECTION, POWDER, LYOPHILIZED, FOR SOLUTION INTRAVENOUS EVERY 8 HOURS
Qty: 0 | Refills: 0 | Status: COMPLETED | OUTPATIENT
Start: 2019-04-13 | End: 2019-04-14

## 2019-04-13 RX ADMIN — MIDODRINE HYDROCHLORIDE 5 MILLIGRAM(S): 2.5 TABLET ORAL at 22:18

## 2019-04-13 RX ADMIN — PIPERACILLIN AND TAZOBACTAM 25 GRAM(S): 4; .5 INJECTION, POWDER, LYOPHILIZED, FOR SOLUTION INTRAVENOUS at 13:10

## 2019-04-13 RX ADMIN — PIPERACILLIN AND TAZOBACTAM 25 GRAM(S): 4; .5 INJECTION, POWDER, LYOPHILIZED, FOR SOLUTION INTRAVENOUS at 07:28

## 2019-04-13 RX ADMIN — PIPERACILLIN AND TAZOBACTAM 200 GRAM(S): 4; .5 INJECTION, POWDER, LYOPHILIZED, FOR SOLUTION INTRAVENOUS at 01:36

## 2019-04-13 RX ADMIN — PIPERACILLIN AND TAZOBACTAM 25 GRAM(S): 4; .5 INJECTION, POWDER, LYOPHILIZED, FOR SOLUTION INTRAVENOUS at 22:18

## 2019-04-13 RX ADMIN — MIDODRINE HYDROCHLORIDE 5 MILLIGRAM(S): 2.5 TABLET ORAL at 13:10

## 2019-04-13 RX ADMIN — Medication 0.5 MILLIGRAM(S): at 07:35

## 2019-04-13 RX ADMIN — SIMVASTATIN 20 MILLIGRAM(S): 20 TABLET, FILM COATED ORAL at 22:18

## 2019-04-13 RX ADMIN — Medication 0.5 MILLIGRAM(S): at 19:33

## 2019-04-13 RX ADMIN — MIDODRINE HYDROCHLORIDE 5 MILLIGRAM(S): 2.5 TABLET ORAL at 07:29

## 2019-04-13 RX ADMIN — Medication 1: at 16:56

## 2019-04-13 NOTE — PHYSICAL THERAPY INITIAL EVALUATION ADULT - ADDITIONAL COMMENTS
The background information was obtained from the pt's son (the pt was unable to answer due to confusion); the pt lives in [facility]. He was independent with ambulation using a Single Axis Cane. He was able to perform bed mobility and transfers independently at times though he typically needed assistance.

## 2019-04-13 NOTE — DIETITIAN INITIAL EVALUATION ADULT. - PROBLEM SELECTOR PLAN 4
Admitted  to telemetry unit for monitoring , send 3 sets of cardiac ensymes to rule out coronary event, obtain ECHO to evaluate LVEF, cardiology consult ,continue current management, O2 supply, anticoagulation plan as per cardiology consult

## 2019-04-13 NOTE — DIETITIAN INITIAL EVALUATION ADULT. - OTHER INFO
Pt seen for nutrition consult for pressure injury >stage 2. As per chart pt is year old male with a PMH of dementia, ASHD, afib, diabetes, present from [facility] with hematuria. Pt seen in ED. Unable to obtain subjective information at this time as the pt has dementia and no family members present at bedside. Per chart pt is a DNR/ DNI, no tube feeding. Observed pt's lunch tray with >25% consumed. Pt's admission weight per chart 146.10lbs. No GI distress noted at this time, +BM today.

## 2019-04-13 NOTE — PROVIDER CONTACT NOTE (CRITICAL VALUE NOTIFICATION) - ACTION/TREATMENT ORDERED:
will place call to admitting doctor. primary nurse notified will place call to admitting doctor. primary nurse notified  addendum: this writer spoke with Dr. Quintana and MD made aware of the above results. MD to order antibiotics

## 2019-04-13 NOTE — DIETITIAN INITIAL EVALUATION ADULT. - NS AS NUTRI INTERV MEALS SNACK
General/healthful diet/Continue with current Dysphagia 3 soft, thin liquids, Consistent CHO diet, recommend to d/c cardiac restriction to assist with PO intake. Encourage good PO intake. RD to remain available.

## 2019-04-13 NOTE — DIETITIAN INITIAL EVALUATION ADULT. - ENERGY NEEDS
Wt: 146.10lbs (admission), Ht: 68in, BMI: 22.2, IBW: 154lbs (+/-10%), %IBW: 95   Stage 2 left buttock pressure injury  No edema noted at this time

## 2019-04-13 NOTE — CONSULT NOTE ADULT - SUBJECTIVE AND OBJECTIVE BOX
HPI:  89 YO W male [facility] Dementia resident of [facility] presented to the hospital   with CC of gross hematuria. + fever no n/v/d. Pt is a poor historian  Seen by Urology and CBI   started. Blood cultures with GNR.    Infectious Disease consult was called to evaluate pt and for antibiotic management.    Past Medical & Surgical Hx:  PAST MEDICAL & SURGICAL HISTORY:  Dementia  Arteriosclerotic heart disease (ASHD)  Prostate CA  Atrial fibrillation  DM (diabetes mellitus)      Social History--  EtOH: denies   Tobacco: denies  Drug Use: denies    FAMILY HISTORY:  Noncontributory    Allergies  latex (Unknown)  No Known Drug Allergies    Home Medications:  aspirin 81 mg oral tablet: 1 tab(s) orally once a day (12 Apr 2019 12:20)  budesonide 0.5 mg/2 mL inhalation suspension: 2 milliliter(s) inhaled 2 times a day, As Needed (12 Apr 2019 12:20)  donepezil 5 mg oral tablet: 1 tab(s) orally once a day (at bedtime) (12 Apr 2019 12:20)  DuoNeb 0.5 mg-2.5 mg/3 mL inhalation solution: 3 milliliter(s) inhaled 4 times a day, As Needed (12 Apr 2019 12:20)  furosemide 40 mg oral tablet: 1 tab(s) orally once a day (12 Apr 2019 12:20)  metFORMIN 500 mg oral tablet: 1 tab(s) orally 2 times a day (12 Apr 2019 12:20)  midodrine 10 mg oral tablet: 1 tab(s) orally 3 times a day (12 Apr 2019 12:20)  potassium chloride 10 mEq oral capsule, extended release: 1 cap(s) orally once a day (12 Apr 2019 12:20)  simvastatin 20 mg oral tablet: 1 tab(s) orally once a day (at bedtime) (12 Apr 2019 12:20)  warfarin 6 mg oral tablet: 1 tab(s) orally once a day (12 Apr 2019 12:20)      Current Inpatient Medications :    ANTIBIOTICS:   piperacillin/tazobactam IVPB. 3.375 Gram(s) IV Intermittent every 8 hours      OTHER RELEVANT MEDICATIONS :  acetaminophen   Tablet .. 650 milliGRAM(s) Oral every 6 hours PRN  ALBUTerol    90 MICROgram(s) HFA Inhaler 2 Puff(s) Inhalation every 6 hours PRN  buDESOnide   0.5 milliGRAM(s) Respule 0.5 milliGRAM(s) Inhalation two times a day  dextrose 40% Gel 15 Gram(s) Oral once PRN  dextrose 5%. 1000 milliLiter(s) IV Continuous <Continuous>  dextrose 50% Injectable 12.5 Gram(s) IV Push once  dextrose 50% Injectable 25 Gram(s) IV Push once  dextrose 50% Injectable 25 Gram(s) IV Push once  glucagon  Injectable 1 milliGRAM(s) IntraMuscular once PRN  insulin lispro (HumaLOG) corrective regimen sliding scale   SubCutaneous three times a day before meals  midodrine 5 milliGRAM(s) Oral every 8 hours  simvastatin 20 milliGRAM(s) Oral at bedtime  sodium chloride 0.9%. 1000 milliLiter(s) IV Continuous <Continuous>      ROS:  CONSTITUTIONAL:  Negative fever or chills  EYES:  Negative  blurry vision or double vision  CARDIOVASCULAR:  Negative for chest pain or palpitations  RESPIRATORY:  Negative for cough, wheezing, or SOB   GASTROINTESTINAL:  Negative for nausea, vomiting, diarrhea, constipation, or abdominal pain  GENITOURINARY:  Negative frequency, urgency , dysuria++ hematuria   NEUROLOGIC:  No headache, dizziness, lightheadedness confusion  All other systems were reviewed and are negative          I&O's Detail    12 Apr 2019 07:01  -  13 Apr 2019 07:00  --------------------------------------------------------  IN:    Continuous Bladder Irrigation: 72317 mL  Total IN: 86023 mL    OUT:    Continuous Bladder Irrigation: 32285 mL    Voided: 5000 mL  Total OUT: 09889 mL    Total NET: -17011 mL      13 Apr 2019 07:01  -  13 Apr 2019 23:51  --------------------------------------------------------  IN:    Continuous Bladder Irrigation: 84868 mL    IV PiggyBack: 100 mL    sodium chloride 0.9%.: 650 mL  Total IN: 99327 mL    OUT:    Continuous Bladder Irrigation: 2000 mL  Total OUT: 2000 mL    Total NET: 53779 mL    Physical Exam:  Vital Signs Last 24 Hrs  T(C): 36.9 (13 Apr 2019 21:20), Max: 36.9 (13 Apr 2019 15:22)  T(F): 98.4 (13 Apr 2019 21:20), Max: 98.5 (13 Apr 2019 15:22)  HR: 88 (13 Apr 2019 21:20) (59 - 88)  BP: 126/74 (13 Apr 2019 21:20) (95/59 - 126/74)  RR: 19 (13 Apr 2019 21:20) (16 - 23)  SpO2: 93% (13 Apr 2019 21:20) (93% - 98%)      General: well developed well nourished, in no acute distress  Eyes: sclera anicteric, pupils equal and reactive to light  ENMT: buccal mucosa moist, pharynx not injected  Neck: supple, trachea midline  Lungs: clear, no wheeze/rhonchi  Cardiovascular: regular rate and rhythm, S1 S2  Abdomen: soft, nontender, no organomegaly present, bowel sounds normal   fitch with hematuria  Neurological:  alert and oriented x0, Cranial Nerves II-XII grossly intact  Skin:no increased ecchymosis/petechiae/purpura  Lymph Nodes: no palpable cervical/supraclavicular lymph nodes enlargements  Extremities: no cyanosis/clubbing/edema    Labs:   Complete Blood Count + Automated Diff (04.12.19 @ 08:18)    WBC Count: 12.63 K/uL    RBC Count: 3.61 M/uL    Hemoglobin: 10.8 g/dL    Hematocrit: 32.9 %    Mean Cell Volume: 91.1 fl    Mean Cell Hemoglobin: 29.9 pg    Mean Cell Hemoglobin Conc: 32.8 gm/dL    Red Cell Distrib Width: 13.2 %    Platelet Count - Automated: 269 K/uL    Auto Neutrophil #: 10.76 K/uL    Auto Lymphocyte #: 0.75 K/uL    Auto Monocyte #: 1.04 K/uL    Auto Eosinophil #: 0.00 K/uL    Auto Basophil #: 0.02 K/uL    Auto Neutrophil %: 85.2: Differential percentages must be correlated with absolute numbers for  clinical significance. %    Auto Lymphocyte %: 5.9 %    Auto Monocyte %: 8.2 %    Auto Eosinophil %: 0.0 %    Auto Basophil %: 0.2 %    Auto Immature Granulocyte %: 0.5 %    Nucleated RBC: 0 /100 WBCs                            9.9    9.78  )-----------( 265      ( 13 Apr 2019 10:54 )             30.2   04-13    142  |  105  |  15  ----------------------------<  180<H>  3.5   |  29  |  0.54    Ca    9.0      13 Apr 2019 10:54    TPro  7.1  /  Alb  3.1<L>  /  TBili  0.7  /  DBili  x   /  AST  16  /  ALT  16  /  AlkPhos  94  04-12    RECENT CULTURES:  	  Culture - Blood (collected 12 Apr 2019 11:25)  Source: .Blood  Preliminary Report (13 Apr 2019 12:01):    No growth to date.    Culture - Blood (collected 12 Apr 2019 11:25)  Source: .Blood  Gram Stain (13 Apr 2019 00:48):    Growth in anaerobic bottle: Gram Negative Rods  Preliminary Report (13 Apr 2019 17:45):    Growth in anaerobic bottle: Escherichia coli Susceptibility to follow.    "Due to technical problems, Proteus sp. will Not be reported as part of    the BCID panel until further notice"    ***Blood Panel PCR results on this specimen are available    approximately 3 hours after the Gram stain result.***    Gram stain, PCR, and/or culture results may not always    correspond due to difference in methodologies.    ************************************************************    This PCR assay was performed using [employer].    The following targets are tested for: Enterococcus,    vancomycin resistant enterococci, Listeria monocytogenes,    coagulase negative staphylococci, S. aureus,    methicillin resistant S. aureus, Streptococcus agalactiae    (Group B), S. pneumoniae, S. pyogenes (Group A),    Acinetobacter baumannii, Enterobacter cloacae, E. coli,    Klebsiella oxytoca, K. pneumoniae, Proteus sp.,    Serratia marcescens, Haemophilus influenzae,    Neisseria meningitidis, Pseudomonas aeruginosa, Candida    albicans, C. glabrata, C krusei, C parapsilosis,    C. tropicalis and the KPC resistance gene.  Organism: Blood Culture PCR (13 Apr 2019 02:28)  Organism: Blood Culture PCR (13 Apr 2019 02:28)      -  Escherichia coli: Detec      Method Type: PCR      RADIOLOGY & ADDITIONAL STUDIES:    EXAM:  US KIDNEYS AND BLADDER                                  PROCEDURE DATE:  04/13/2019          INTERPRETATION:  CLINICAL INFORMATION: Hematuria. Prostate cancer.    COMPARISON: None available.    TECHNIQUE: Sonography of the kidneys and bladder.     FINDINGS:    Right kidney:  10.2 cm. No hydronephrosis or intrarenal calcification is   noted.    Left kidney:  10.9 cm. No hydronephrosis or intrarenal calcification is   noted. Evaluation of the left kidney is limited.    Urinary bladder:   There is a balloon Fitch catheter within the urinary bladder. The Fitch   catheter was clamped prior to exam.  Bladder volume measures approximately 287 cc.  There is a minimal, 3 cc post void residual.    IMPRESSION:     No sonographic evidence for hydronephrosis.       Assessment :   89 YO W male [facility] Dementia resident of [facility] admitted with hemorrhagic   cystitis/UTi wih GNR sepsis  Seen by  and started on CBI   Still with hematuria  Sp fever    Plan :   Cont Zosyn  Fu cultures  Repeat blood cultures  Trend temp and wbc  Urology following      Continue with present regime .  Approptiate use of antibiotics and adverse effects reviewed.      I have discussed the above plan of care with patient/family in detail. They expressed understanding of the treatment plan . Risks, benefits and alternatives discussed in detail. I have asked if they have any questions or concerns and appropriately addressed them to the best of my ability .      > 45 minutes spent in direct patient care reviewing  the notes, lab data/ imaging , discussion with multidisciplinary team. All questions were addressed and answered to the best of my capacity .    Thank you for allowing me to participate in the care of your patient .      Heber Issa MD  Infectious Disease  [phone]

## 2019-04-13 NOTE — PHYSICAL THERAPY INITIAL EVALUATION ADULT - GAIT DEVIATIONS NOTED, PT EVAL
decreased stride length/decreased weight-shifting ability/increased time in double stance/retropulsion/decreased mima

## 2019-04-14 LAB
-  AMIKACIN: SIGNIFICANT CHANGE UP
-  AMPICILLIN/SULBACTAM: SIGNIFICANT CHANGE UP
-  AMPICILLIN: SIGNIFICANT CHANGE UP
-  AZTREONAM: SIGNIFICANT CHANGE UP
-  CEFAZOLIN: SIGNIFICANT CHANGE UP
-  CEFEPIME: SIGNIFICANT CHANGE UP
-  CEFOXITIN: SIGNIFICANT CHANGE UP
-  CEFTRIAXONE: SIGNIFICANT CHANGE UP
-  CIPROFLOXACIN: SIGNIFICANT CHANGE UP
-  ERTAPENEM: SIGNIFICANT CHANGE UP
-  GENTAMICIN: SIGNIFICANT CHANGE UP
-  IMIPENEM: SIGNIFICANT CHANGE UP
-  LEVOFLOXACIN: SIGNIFICANT CHANGE UP
-  MEROPENEM: SIGNIFICANT CHANGE UP
-  PIPERACILLIN/TAZOBACTAM: SIGNIFICANT CHANGE UP
-  TOBRAMYCIN: SIGNIFICANT CHANGE UP
-  TRIMETHOPRIM/SULFAMETHOXAZOLE: SIGNIFICANT CHANGE UP
ANION GAP SERPL CALC-SCNC: 7 MMOL/L — SIGNIFICANT CHANGE UP (ref 5–17)
BASOPHILS # BLD AUTO: 0.01 K/UL — SIGNIFICANT CHANGE UP (ref 0–0.2)
BASOPHILS NFR BLD AUTO: 0.1 % — SIGNIFICANT CHANGE UP (ref 0–2)
BUN SERPL-MCNC: 13 MG/DL — SIGNIFICANT CHANGE UP (ref 7–23)
CALCIUM SERPL-MCNC: 9.1 MG/DL — SIGNIFICANT CHANGE UP (ref 8.4–10.5)
CHLORIDE SERPL-SCNC: 104 MMOL/L — SIGNIFICANT CHANGE UP (ref 96–108)
CO2 SERPL-SCNC: 29 MMOL/L — SIGNIFICANT CHANGE UP (ref 22–31)
CREAT SERPL-MCNC: 0.58 MG/DL — SIGNIFICANT CHANGE UP (ref 0.5–1.3)
CULTURE RESULTS: SIGNIFICANT CHANGE UP
EOSINOPHIL # BLD AUTO: 0.14 K/UL — SIGNIFICANT CHANGE UP (ref 0–0.5)
EOSINOPHIL NFR BLD AUTO: 2 % — SIGNIFICANT CHANGE UP (ref 0–6)
GLUCOSE BLDC GLUCOMTR-MCNC: 111 MG/DL — HIGH (ref 70–99)
GLUCOSE BLDC GLUCOMTR-MCNC: 148 MG/DL — HIGH (ref 70–99)
GLUCOSE BLDC GLUCOMTR-MCNC: 151 MG/DL — HIGH (ref 70–99)
GLUCOSE BLDC GLUCOMTR-MCNC: 195 MG/DL — HIGH (ref 70–99)
GLUCOSE SERPL-MCNC: 122 MG/DL — HIGH (ref 70–99)
HCT VFR BLD CALC: 28.7 % — LOW (ref 39–50)
HGB BLD-MCNC: 9.3 G/DL — LOW (ref 13–17)
IMM GRANULOCYTES NFR BLD AUTO: 0.4 % — SIGNIFICANT CHANGE UP (ref 0–1.5)
INR BLD: 1.86 RATIO — HIGH (ref 0.88–1.16)
LYMPHOCYTES # BLD AUTO: 1.13 K/UL — SIGNIFICANT CHANGE UP (ref 1–3.3)
LYMPHOCYTES # BLD AUTO: 16.2 % — SIGNIFICANT CHANGE UP (ref 13–44)
MCHC RBC-ENTMCNC: 29.6 PG — SIGNIFICANT CHANGE UP (ref 27–34)
MCHC RBC-ENTMCNC: 32.4 GM/DL — SIGNIFICANT CHANGE UP (ref 32–36)
MCV RBC AUTO: 91.4 FL — SIGNIFICANT CHANGE UP (ref 80–100)
METHOD TYPE: SIGNIFICANT CHANGE UP
MONOCYTES # BLD AUTO: 0.58 K/UL — SIGNIFICANT CHANGE UP (ref 0–0.9)
MONOCYTES NFR BLD AUTO: 8.3 % — SIGNIFICANT CHANGE UP (ref 2–14)
NEUTROPHILS # BLD AUTO: 5.1 K/UL — SIGNIFICANT CHANGE UP (ref 1.8–7.4)
NEUTROPHILS NFR BLD AUTO: 73 % — SIGNIFICANT CHANGE UP (ref 43–77)
NRBC # BLD: 0 /100 WBCS — SIGNIFICANT CHANGE UP (ref 0–0)
ORGANISM # SPEC MICROSCOPIC CNT: SIGNIFICANT CHANGE UP
PLATELET # BLD AUTO: 251 K/UL — SIGNIFICANT CHANGE UP (ref 150–400)
POTASSIUM SERPL-MCNC: 3.5 MMOL/L — SIGNIFICANT CHANGE UP (ref 3.5–5.3)
POTASSIUM SERPL-SCNC: 3.5 MMOL/L — SIGNIFICANT CHANGE UP (ref 3.5–5.3)
PROTHROM AB SERPL-ACNC: 20.7 SEC — HIGH (ref 10–12.9)
RBC # BLD: 3.14 M/UL — LOW (ref 4.2–5.8)
RBC # FLD: 13.4 % — SIGNIFICANT CHANGE UP (ref 10.3–14.5)
SODIUM SERPL-SCNC: 140 MMOL/L — SIGNIFICANT CHANGE UP (ref 135–145)
SPECIMEN SOURCE: SIGNIFICANT CHANGE UP
WBC # BLD: 6.99 K/UL — SIGNIFICANT CHANGE UP (ref 3.8–10.5)
WBC # FLD AUTO: 6.99 K/UL — SIGNIFICANT CHANGE UP (ref 3.8–10.5)

## 2019-04-14 RX ORDER — CEFTRIAXONE 500 MG/1
1 INJECTION, POWDER, FOR SOLUTION INTRAMUSCULAR; INTRAVENOUS EVERY 24 HOURS
Qty: 0 | Refills: 0 | Status: COMPLETED | OUTPATIENT
Start: 2019-04-15 | End: 2019-04-16

## 2019-04-14 RX ADMIN — Medication 0.5 MILLIGRAM(S): at 18:35

## 2019-04-14 RX ADMIN — MIDODRINE HYDROCHLORIDE 5 MILLIGRAM(S): 2.5 TABLET ORAL at 22:10

## 2019-04-14 RX ADMIN — MIDODRINE HYDROCHLORIDE 5 MILLIGRAM(S): 2.5 TABLET ORAL at 06:43

## 2019-04-14 RX ADMIN — PIPERACILLIN AND TAZOBACTAM 25 GRAM(S): 4; .5 INJECTION, POWDER, LYOPHILIZED, FOR SOLUTION INTRAVENOUS at 14:21

## 2019-04-14 RX ADMIN — Medication 1: at 17:20

## 2019-04-14 RX ADMIN — PIPERACILLIN AND TAZOBACTAM 25 GRAM(S): 4; .5 INJECTION, POWDER, LYOPHILIZED, FOR SOLUTION INTRAVENOUS at 22:10

## 2019-04-14 RX ADMIN — Medication 1: at 12:54

## 2019-04-14 RX ADMIN — MIDODRINE HYDROCHLORIDE 5 MILLIGRAM(S): 2.5 TABLET ORAL at 14:21

## 2019-04-14 RX ADMIN — PIPERACILLIN AND TAZOBACTAM 25 GRAM(S): 4; .5 INJECTION, POWDER, LYOPHILIZED, FOR SOLUTION INTRAVENOUS at 06:36

## 2019-04-14 RX ADMIN — Medication 0.5 MILLIGRAM(S): at 07:57

## 2019-04-14 RX ADMIN — SIMVASTATIN 20 MILLIGRAM(S): 20 TABLET, FILM COATED ORAL at 22:10

## 2019-04-15 LAB
CULTURE RESULTS: NO GROWTH — SIGNIFICANT CHANGE UP
GLUCOSE BLDC GLUCOMTR-MCNC: 119 MG/DL — HIGH (ref 70–99)
GLUCOSE BLDC GLUCOMTR-MCNC: 121 MG/DL — HIGH (ref 70–99)
GLUCOSE BLDC GLUCOMTR-MCNC: 134 MG/DL — HIGH (ref 70–99)
GLUCOSE BLDC GLUCOMTR-MCNC: 261 MG/DL — HIGH (ref 70–99)
INR BLD: 1.78 RATIO — HIGH (ref 0.88–1.16)
PROTHROM AB SERPL-ACNC: 19.8 SEC — HIGH (ref 10–12.9)
SPECIMEN SOURCE: SIGNIFICANT CHANGE UP

## 2019-04-15 RX ADMIN — MIDODRINE HYDROCHLORIDE 5 MILLIGRAM(S): 2.5 TABLET ORAL at 21:30

## 2019-04-15 RX ADMIN — MIDODRINE HYDROCHLORIDE 5 MILLIGRAM(S): 2.5 TABLET ORAL at 13:04

## 2019-04-15 RX ADMIN — SIMVASTATIN 20 MILLIGRAM(S): 20 TABLET, FILM COATED ORAL at 21:30

## 2019-04-15 RX ADMIN — CEFTRIAXONE 100 GRAM(S): 500 INJECTION, POWDER, FOR SOLUTION INTRAMUSCULAR; INTRAVENOUS at 05:11

## 2019-04-15 RX ADMIN — Medication 0.5 MILLIGRAM(S): at 08:03

## 2019-04-15 RX ADMIN — Medication 3: at 12:03

## 2019-04-15 RX ADMIN — MIDODRINE HYDROCHLORIDE 5 MILLIGRAM(S): 2.5 TABLET ORAL at 05:11

## 2019-04-15 RX ADMIN — Medication 0.5 MILLIGRAM(S): at 20:06

## 2019-04-15 NOTE — DISCHARGE NOTE NURSING/CASE MANAGEMENT/SOCIAL WORK - NSDCCRTYPESERV_GEN_ALL_CORE_FT
You are a resident of [facility] [phone]/ fax [phone]. Utilizes [facility] pharmacy [phone].

## 2019-04-15 NOTE — DISCHARGE NOTE NURSING/CASE MANAGEMENT/SOCIAL WORK - CAREGIVER NAME
assisted living facility staff: Kevin Robles [facility] [phone]/ fax [phone].

## 2019-04-15 NOTE — DISCHARGE NOTE NURSING/CASE MANAGEMENT/SOCIAL WORK - NSSCCARECORD_GEN_ALL_CORE
Durable Medical Equipment Agency/Community Resource Community Resource/Durable Medical Equipment Agency/Home Care Agency

## 2019-04-15 NOTE — DISCHARGE NOTE NURSING/CASE MANAGEMENT/SOCIAL WORK - NSDCDPATPORTLINK_GEN_ALL_CORE
You can access the [employer] Patient Portal, offered by [facility], by registering with the following website: [email or web address]

## 2019-04-15 NOTE — PROVIDER CONTACT NOTE (CHANGE IN STATUS NOTIFICATION) - ACTION/TREATMENT ORDERED:
RN educated in care of this patients skin, Maintain moisture free environment apply cavilon daily and critic aide every shift T&P q shift

## 2019-04-16 LAB
ANION GAP SERPL CALC-SCNC: 10 MMOL/L — SIGNIFICANT CHANGE UP (ref 5–17)
BUN SERPL-MCNC: 12 MG/DL — SIGNIFICANT CHANGE UP (ref 7–23)
CALCIUM SERPL-MCNC: 9 MG/DL — SIGNIFICANT CHANGE UP (ref 8.4–10.5)
CHLORIDE SERPL-SCNC: 103 MMOL/L — SIGNIFICANT CHANGE UP (ref 96–108)
CO2 SERPL-SCNC: 26 MMOL/L — SIGNIFICANT CHANGE UP (ref 22–31)
CREAT SERPL-MCNC: 0.48 MG/DL — LOW (ref 0.5–1.3)
GLUCOSE BLDC GLUCOMTR-MCNC: 105 MG/DL — HIGH (ref 70–99)
GLUCOSE BLDC GLUCOMTR-MCNC: 155 MG/DL — HIGH (ref 70–99)
GLUCOSE BLDC GLUCOMTR-MCNC: 175 MG/DL — HIGH (ref 70–99)
GLUCOSE SERPL-MCNC: 117 MG/DL — HIGH (ref 70–99)
HCT VFR BLD CALC: 27.7 % — LOW (ref 39–50)
HGB BLD-MCNC: 9.3 G/DL — LOW (ref 13–17)
MCHC RBC-ENTMCNC: 30 PG — SIGNIFICANT CHANGE UP (ref 27–34)
MCHC RBC-ENTMCNC: 33.6 GM/DL — SIGNIFICANT CHANGE UP (ref 32–36)
MCV RBC AUTO: 89.4 FL — SIGNIFICANT CHANGE UP (ref 80–100)
NRBC # BLD: 0 /100 WBCS — SIGNIFICANT CHANGE UP (ref 0–0)
PLATELET # BLD AUTO: 287 K/UL — SIGNIFICANT CHANGE UP (ref 150–400)
POTASSIUM SERPL-MCNC: 3.8 MMOL/L — SIGNIFICANT CHANGE UP (ref 3.5–5.3)
POTASSIUM SERPL-SCNC: 3.8 MMOL/L — SIGNIFICANT CHANGE UP (ref 3.5–5.3)
RBC # BLD: 3.1 M/UL — LOW (ref 4.2–5.8)
RBC # FLD: 13.2 % — SIGNIFICANT CHANGE UP (ref 10.3–14.5)
SODIUM SERPL-SCNC: 139 MMOL/L — SIGNIFICANT CHANGE UP (ref 135–145)
WBC # BLD: 7.92 K/UL — SIGNIFICANT CHANGE UP (ref 3.8–10.5)
WBC # FLD AUTO: 7.92 K/UL — SIGNIFICANT CHANGE UP (ref 3.8–10.5)

## 2019-04-16 RX ORDER — CEFPODOXIME PROXETIL 100 MG
100 TABLET ORAL EVERY 12 HOURS
Qty: 0 | Refills: 0 | Status: DISCONTINUED | OUTPATIENT
Start: 2019-04-17 | End: 2019-04-18

## 2019-04-16 RX ORDER — BUDESONIDE AND FORMOTEROL FUMARATE DIHYDRATE 160; 4.5 UG/1; UG/1
2 AEROSOL RESPIRATORY (INHALATION)
Qty: 0 | Refills: 0 | Status: DISCONTINUED | OUTPATIENT
Start: 2019-04-16 | End: 2019-04-18

## 2019-04-16 RX ADMIN — CEFTRIAXONE 100 GRAM(S): 500 INJECTION, POWDER, FOR SOLUTION INTRAMUSCULAR; INTRAVENOUS at 05:14

## 2019-04-16 RX ADMIN — Medication 1: at 16:56

## 2019-04-16 RX ADMIN — MIDODRINE HYDROCHLORIDE 5 MILLIGRAM(S): 2.5 TABLET ORAL at 05:14

## 2019-04-16 RX ADMIN — Medication 0.5 MILLIGRAM(S): at 08:00

## 2019-04-16 RX ADMIN — Medication 1: at 12:38

## 2019-04-16 RX ADMIN — MIDODRINE HYDROCHLORIDE 5 MILLIGRAM(S): 2.5 TABLET ORAL at 21:13

## 2019-04-16 RX ADMIN — SIMVASTATIN 20 MILLIGRAM(S): 20 TABLET, FILM COATED ORAL at 21:13

## 2019-04-16 RX ADMIN — MIDODRINE HYDROCHLORIDE 5 MILLIGRAM(S): 2.5 TABLET ORAL at 13:07

## 2019-04-16 RX ADMIN — BUDESONIDE AND FORMOTEROL FUMARATE DIHYDRATE 2 PUFF(S): 160; 4.5 AEROSOL RESPIRATORY (INHALATION) at 20:38

## 2019-04-17 DIAGNOSIS — A41.50 GRAM-NEGATIVE SEPSIS, UNSPECIFIED: ICD-10-CM

## 2019-04-17 LAB
CULTURE RESULTS: SIGNIFICANT CHANGE UP
GLUCOSE BLDC GLUCOMTR-MCNC: 131 MG/DL — HIGH (ref 70–99)
GLUCOSE BLDC GLUCOMTR-MCNC: 148 MG/DL — HIGH (ref 70–99)
GLUCOSE BLDC GLUCOMTR-MCNC: 167 MG/DL — HIGH (ref 70–99)
GLUCOSE BLDC GLUCOMTR-MCNC: 172 MG/DL — HIGH (ref 70–99)
SPECIMEN SOURCE: SIGNIFICANT CHANGE UP

## 2019-04-17 RX ADMIN — BUDESONIDE AND FORMOTEROL FUMARATE DIHYDRATE 2 PUFF(S): 160; 4.5 AEROSOL RESPIRATORY (INHALATION) at 06:30

## 2019-04-17 RX ADMIN — SIMVASTATIN 20 MILLIGRAM(S): 20 TABLET, FILM COATED ORAL at 21:38

## 2019-04-17 RX ADMIN — BUDESONIDE AND FORMOTEROL FUMARATE DIHYDRATE 2 PUFF(S): 160; 4.5 AEROSOL RESPIRATORY (INHALATION) at 19:38

## 2019-04-17 RX ADMIN — Medication 100 MILLIGRAM(S): at 17:00

## 2019-04-17 RX ADMIN — MIDODRINE HYDROCHLORIDE 5 MILLIGRAM(S): 2.5 TABLET ORAL at 05:49

## 2019-04-17 RX ADMIN — Medication 1: at 16:59

## 2019-04-17 RX ADMIN — Medication 100 MILLIGRAM(S): at 05:49

## 2019-04-18 VITALS
OXYGEN SATURATION: 94 % | SYSTOLIC BLOOD PRESSURE: 121 MMHG | HEART RATE: 98 BPM | DIASTOLIC BLOOD PRESSURE: 73 MMHG | RESPIRATION RATE: 18 BRPM | TEMPERATURE: 100 F

## 2019-04-18 LAB
CULTURE RESULTS: SIGNIFICANT CHANGE UP
GLUCOSE BLDC GLUCOMTR-MCNC: 122 MG/DL — HIGH (ref 70–99)
GLUCOSE BLDC GLUCOMTR-MCNC: 209 MG/DL — HIGH (ref 70–99)
SPECIMEN SOURCE: SIGNIFICANT CHANGE UP

## 2019-04-18 PROCEDURE — 97110 THERAPEUTIC EXERCISES: CPT

## 2019-04-18 PROCEDURE — 83605 ASSAY OF LACTIC ACID: CPT

## 2019-04-18 PROCEDURE — 87086 URINE CULTURE/COLONY COUNT: CPT

## 2019-04-18 PROCEDURE — 80048 BASIC METABOLIC PNL TOTAL CA: CPT

## 2019-04-18 PROCEDURE — 82962 GLUCOSE BLOOD TEST: CPT

## 2019-04-18 PROCEDURE — 36415 COLL VENOUS BLD VENIPUNCTURE: CPT

## 2019-04-18 PROCEDURE — 97161 PT EVAL LOW COMPLEX 20 MIN: CPT

## 2019-04-18 PROCEDURE — 93005 ELECTROCARDIOGRAM TRACING: CPT

## 2019-04-18 PROCEDURE — 99285 EMERGENCY DEPT VISIT HI MDM: CPT | Mod: 25

## 2019-04-18 PROCEDURE — 94640 AIRWAY INHALATION TREATMENT: CPT

## 2019-04-18 PROCEDURE — 85027 COMPLETE CBC AUTOMATED: CPT

## 2019-04-18 PROCEDURE — 83036 HEMOGLOBIN GLYCOSYLATED A1C: CPT

## 2019-04-18 PROCEDURE — 94664 DEMO&/EVAL PT USE INHALER: CPT

## 2019-04-18 PROCEDURE — 85730 THROMBOPLASTIN TIME PARTIAL: CPT

## 2019-04-18 PROCEDURE — 87040 BLOOD CULTURE FOR BACTERIA: CPT

## 2019-04-18 PROCEDURE — 87150 DNA/RNA AMPLIFIED PROBE: CPT

## 2019-04-18 PROCEDURE — 81001 URINALYSIS AUTO W/SCOPE: CPT

## 2019-04-18 PROCEDURE — 71045 X-RAY EXAM CHEST 1 VIEW: CPT

## 2019-04-18 PROCEDURE — 97530 THERAPEUTIC ACTIVITIES: CPT

## 2019-04-18 PROCEDURE — 97116 GAIT TRAINING THERAPY: CPT

## 2019-04-18 PROCEDURE — 87186 SC STD MICRODIL/AGAR DIL: CPT

## 2019-04-18 PROCEDURE — 80053 COMPREHEN METABOLIC PANEL: CPT

## 2019-04-18 PROCEDURE — 96365 THER/PROPH/DIAG IV INF INIT: CPT

## 2019-04-18 PROCEDURE — 87640 STAPH A DNA AMP PROBE: CPT

## 2019-04-18 PROCEDURE — 94760 N-INVAS EAR/PLS OXIMETRY 1: CPT

## 2019-04-18 PROCEDURE — 85610 PROTHROMBIN TIME: CPT

## 2019-04-18 PROCEDURE — 76770 US EXAM ABDO BACK WALL COMP: CPT

## 2019-04-18 RX ORDER — LACTOBACILLUS ACIDOPHILUS 100MM CELL
2 CAPSULE ORAL
Qty: 60 | Refills: 0
Start: 2019-04-18 | End: 2019-05-17

## 2019-04-18 RX ORDER — CEFPODOXIME PROXETIL 100 MG
1 TABLET ORAL
Qty: 10 | Refills: 0
Start: 2019-04-18 | End: 2019-04-22

## 2019-04-18 RX ADMIN — Medication 100 MILLIGRAM(S): at 06:18

## 2019-04-18 RX ADMIN — SODIUM CHLORIDE 50 MILLILITER(S): 9 INJECTION INTRAMUSCULAR; INTRAVENOUS; SUBCUTANEOUS at 10:15

## 2019-04-18 RX ADMIN — BUDESONIDE AND FORMOTEROL FUMARATE DIHYDRATE 2 PUFF(S): 160; 4.5 AEROSOL RESPIRATORY (INHALATION) at 09:27

## 2019-04-18 RX ADMIN — Medication 2: at 12:40

## 2019-04-18 NOTE — PROGRESS NOTE ADULT - PROBLEM SELECTOR PROBLEM 4
Atrial fibrillation
Arteriosclerotic heart disease (ASHD)

## 2019-04-18 NOTE — PROGRESS NOTE ADULT - ATTENDING COMMENTS
89 YO W male with hx of from [facility]  with hematuria . Pt with dementia cannot give hx. Denies any symptoms at present.Admitted for management of hemorrhagic cystitis Admitted for septic workup and evaluation,send blood and urine cx,serial lactate levels,monitor vitals closley,ivfs hydration,monitor urine output and renal profile,iv abx initiated Seen by urologist Dr Maynard and fitch cath was placed Palliative care consult requested ,to discuss advance directives and complete MOLST
60 minutes spent on this visit, 50% visit time spent in care co-ordination with other attendings and counselling patient  I have discussed care plan with patient and HCP,expressed understanding of problems treatment and their effect and side effects, alternatives in detail,I have asked if they have any questions and concerns and appropriately addressed them to best of my ability  Reviewed all diagonostic tests, lab results and drug drug interactions, and medications
89 YO W male with hx of from [facility]  with hematuria . Pt with dementia cannot give hx. Denies any symptoms at present.Admitted for management of hemorrhagic cystitis Admitted for septic workup and evaluation,send blood and urine cx,serial lactate levels,monitor vitals closley,ivfs hydration,monitor urine output and renal profile,iv abx initiated Seen by urologist Dr Maynard and fitch cath was placed Palliative care consult requested ,to discuss advance directives and complete MOLST
60 minutes spent on this visit, 50% visit time spent in care co-ordination with other attendings and counselling patient  I have discussed care plan with patient and HCP,expressed understanding of problems treatment and their effect and side effects, alternatives in detail,I have asked if they have any questions and concerns and appropriately addressed them to best of my ability  Reviewed all diagonostic tests, lab results and drug drug interactions, and medications
60 minutes spent on this visit, 50% visit time spent in care co-ordination with other attendings and counselling patient  I have discussed care plan with patient and HCP,expressed understanding of problems treatment and their effect and side effects, alternatives in detail,I have asked if they have any questions and concerns and appropriately addressed them to best of my ability  Reviewed all diagonostic tests, lab results and drug drug interactions, and medications
89 YO W male with hx of from [facility]  with hematuria today. Pt with dementia cannot give hx. Denies any symptoms at present.Admitted for management of hemorrhagic cystitis Admitted for septic workup and evaluation,send blood and urine cx,serial lactate levels,monitor vitals closley,ivfs hydration,monitor urine output and renal profile,iv abx initiated Seen by urologist Dr Maynard and fitch cath was placed Palliative care consult requested ,to discuss advance directives and complete MOLST

## 2019-04-18 NOTE — PROGRESS NOTE ADULT - PROBLEM SELECTOR PLAN 4
Admitted  to telemetry unit for monitoring , send 3 sets of cardiac ensymes to rule out coronary event, obtain ECHO to evaluate LVEF, cardiology consult ,continue current management, O2 supply, anticoagulation plan as per cardiology consult
Admitted  to telemetry unit for monitoring , send 3 sets of cardiac ensymes to rule out coronary event, obtain ECHO to evaluate LVEF, cardiology consult ,continue current management, O2 supply, anticoagulation plan as per cardiology out pt
continue home medications

## 2019-04-18 NOTE — PROGRESS NOTE ADULT - PROVIDER SPECIALTY LIST ADULT
Hospitalist
Infectious Disease
Internal Medicine
Internal Medicine
Urology
Infectious Disease
Urology
Hospitalist
Hospitalist
Internal Medicine

## 2019-04-18 NOTE — DISCHARGE NOTE PROVIDER - NSDCHHNEEDSERVICE_GEN_ALL_CORE
Rehabilitation services/Observation and assessment Medication teaching and assessment/Observation and assessment/Rehabilitation services/Teaching and training

## 2019-04-18 NOTE — PROGRESS NOTE ADULT - PROBLEM SELECTOR PLAN 7
Gastrointestinal stress ulcer prophylaxis and DVT prophylaxis administrated
continue current management and present  medications

## 2019-04-18 NOTE — PROGRESS NOTE ADULT - PROBLEM SELECTOR PROBLEM 5
DM (diabetes mellitus)
Atrial fibrillation

## 2019-04-18 NOTE — PROGRESS NOTE ADULT - PROBLEM SELECTOR PLAN 5
continue current management and present  medications ,check hba 1 c
Admitted  to telemetry unit for monitoring , send 3 sets of cardiac ensymes to rule out coronary event, obtain ECHO to evaluate LVEF, cardiology consult ,continue current management, O2 supply, anticoagulation plan as per cardiology consult

## 2019-04-18 NOTE — PROGRESS NOTE ADULT - PROBLEM SELECTOR PLAN 2
Admitted for septic workup and evaluation,send blood and urine cx,serial lactate levels,monitor vitals closley,ivfs hydration,monitor urine output and renal profile,iv abx initiated ,followed by ID CONS
Admitted for septic workup and evaluation,send blood and urine cx,serial lactate levels,monitor vitals closley,ivfs hydration,monitor urine output and renal profile,iv abx initiated ,followed by ID CONS
Admitted for septic workup and evaluation,send blood and urine cx,serial lactate levels,monitor vitals closley,ivfs hydration,monitor urine output and renal profile,iv abx initiated ,followed by ID CONS, abx as per ID
Admitted for septic workup and evaluation,send blood and urine cx,serial lactate levels,monitor vitals closley,ivfs hydration,monitor urine output and renal profile,iv abx initiated ,followed by ID CONS, abx as per ID
Admitted for septic workup and evaluation,send blood and urine cx,serial lactate levels,monitor vitals closley,ivfs hydration,monitor urine output and renal profile,iv abx initiated -on ceftriaxone
2/2 TO UTI & CYSTITIS ,  POA  .Admitted for ID  evaluation,send blood and urine cx,serial lactate levels,monitor vitals closley,ivfs hydration,monitor urine output and renal profile,iv abx initiated as per Dr Partida

## 2019-04-18 NOTE — PROGRESS NOTE ADULT - PROBLEM SELECTOR PROBLEM 3
Arteriosclerotic heart disease (ASHD)
Cystitis

## 2019-04-18 NOTE — PROGRESS NOTE ADULT - REASON FOR ADMISSION
hematuria

## 2019-04-18 NOTE — PROGRESS NOTE ADULT - PROBLEM SELECTOR PROBLEM 7
Prophylactic measure
Dementia

## 2019-04-18 NOTE — DISCHARGE NOTE PROVIDER - HOSPITAL COURSE
89 YO W male with hx of Dementia,ASHD, prostate ca A fib,dm2,copd, from [facility]  with hematuria  Pt with dementia cannot give hx. Denies any symptoms at present.Admitted for management of hemorrhagic cystitis Admitted for septic workup and evaluation,send blood and urine cx,serial lactate levels,monitor vitals closley,ivfs hydration,monitor urine output and renal profile,iv abx initiated Seen by urologist Dr Maynard and fitch cath and had CBI, was placed Palliative care consult requested ,to discuss advance directives and complete MOLST     Had ID consult received ceftriaxone zosyn and chsnged to vantin PO, fitch removed and pt voiding and is incontinent, Midodrine started on admission with orthostais with hematuria and hypotension improved and midodrine stopped, which  helped patient void.Dementia with behavioural disorder, pt is not a candidate for anticoagulation for now for AFIB only rate control out pt f up with cardio and urology for further decision to resume AC.     admitted with  sepsis gram negative , E coli ,due to acute haemorrhagic cystitis , had CBI, hematuria resolved, has anemia likely due to blood loss, having difficulty voiding as per urologist, try stopping Midodrine if possible , pt had orthostasis on admission with hypotension likley with bleeding hematuria, as bleeding has resolved and hypotension resolved will try and observe off midodrine, CAD, COPD ASHD, CH AFIB,DM@ h/o prostate ca,    Son explained in Detail why coumadin stopped and will follow up with cardiologist     stable for DC

## 2019-04-18 NOTE — PROGRESS NOTE ADULT - NSHPATTENDINGPLANDISCUSS_GEN_ALL_CORE
med staff , ,OUTAPTIENT PCP 
med staff
staff Dr Jhonathan Issa
med staff ,outpatient PCP , daughter at bedside , Dr Maynard , RN ,\[facility]\"" care team
staff Dr Jhonathan Issa
staff Dr Jhonathan Issa

## 2019-04-18 NOTE — PROGRESS NOTE ADULT - PROBLEM SELECTOR PLAN 6
continue current management and present  medications
continue current management and present  medications, supportive care
continue current management and present  medications ,check hba 1 c

## 2019-04-18 NOTE — PROGRESS NOTE ADULT - PROBLEM SELECTOR PLAN 3
continue home medications
Admitted for septic workup and evaluation,send blood and urine cx,serial lactate levels,monitor vitals closley,ivfs hydration,monitor urine output and renal profile,iv abx initiated ,followed by ID CONS

## 2019-04-18 NOTE — DISCHARGE NOTE PROVIDER - NSDCCPCAREPLAN_GEN_ALL_CORE_FT
PRINCIPAL DISCHARGE DIAGNOSIS  Diagnosis: Sepsis due to Gram negative bacteria  Assessment and Plan of Treatment: resolved vantin for 5 more days      SECONDARY DISCHARGE DIAGNOSES  Diagnosis: Cystitis  Assessment and Plan of Treatment: resolved

## 2019-04-18 NOTE — PROGRESS NOTE ADULT - PROBLEM SELECTOR PLAN 1
RAMOS PLACED BY  , MONITOR UO ,FOLLOW UA AND URINE CX ,CT SCAN WITH CONTRAST ( HOLD METFORMIN ) ,IV HYDRATION
RAMOS PLACED BY  , MONITOR UO ,FOLLOW UA AND URINE CX ,CT SCAN WITH CONTRAST ( HOLD METFORMIN ) ,IV HYDRATION  Improved
RAMOS PLACED BY  , MONITOR UO ,FOLLOW UA AND URINE CX ,CT SCAN WITH CONTRAST ( HOLD METFORMIN ) ,IV HYDRATION  Improved
multifactorial 2/2 to Hemorrhargic cystitis & due to OAC ( COUMADIN ) RAMOS PLACED BY  , MONITOR UO ,FOLLOW UA AND URINE CX ,CT SCAN WITH CONTRAST ( HOLD METFORMIN ) ,IV HYDRATION

## 2019-05-07 ENCOUNTER — EMERGENCY (EMERGENCY)
Facility: HOSPITAL | Age: 84
LOS: 1 days | Discharge: HOME | End: 2019-05-07
Attending: EMERGENCY MEDICINE | Admitting: STUDENT IN AN ORGANIZED HEALTH CARE EDUCATION/TRAINING PROGRAM
Payer: MEDICARE

## 2019-05-07 VITALS
SYSTOLIC BLOOD PRESSURE: 107 MMHG | WEIGHT: 149.91 LBS | HEIGHT: 72 IN | TEMPERATURE: 98 F | RESPIRATION RATE: 18 BRPM | OXYGEN SATURATION: 99 % | HEART RATE: 72 BPM | DIASTOLIC BLOOD PRESSURE: 62 MMHG

## 2019-05-07 LAB
HCT VFR BLD CALC: 30.8 % — LOW (ref 39–50)
HGB BLD-MCNC: 9.9 G/DL — LOW (ref 13–17)
MCHC RBC-ENTMCNC: 28 PG — SIGNIFICANT CHANGE UP (ref 27–34)
MCHC RBC-ENTMCNC: 32.1 GM/DL — SIGNIFICANT CHANGE UP (ref 32–36)
MCV RBC AUTO: 87 FL — SIGNIFICANT CHANGE UP (ref 80–100)
NRBC # BLD: 0 /100 WBCS — SIGNIFICANT CHANGE UP (ref 0–0)
PLATELET # BLD AUTO: 264 K/UL — SIGNIFICANT CHANGE UP (ref 150–400)
RBC # BLD: 3.54 M/UL — LOW (ref 4.2–5.8)
RBC # FLD: 13.5 % — SIGNIFICANT CHANGE UP (ref 10.3–14.5)
WBC # BLD: 5.78 K/UL — SIGNIFICANT CHANGE UP (ref 3.8–10.5)
WBC # FLD AUTO: 5.78 K/UL — SIGNIFICANT CHANGE UP (ref 3.8–10.5)

## 2019-05-07 PROCEDURE — 99283 EMERGENCY DEPT VISIT LOW MDM: CPT

## 2019-05-07 PROCEDURE — 85027 COMPLETE CBC AUTOMATED: CPT

## 2019-05-07 PROCEDURE — 36415 COLL VENOUS BLD VENIPUNCTURE: CPT

## 2019-05-07 NOTE — ED PROVIDER NOTE - OBJECTIVE STATEMENT
89 yo white male with profound dementia and Arteriosclerotic heart disease (ASHD)    Atrial fibrillation    CHF (congestive heart failure)    Dementia    Depression    DM (diabetes mellitus)    Hyperlipemia    Hypertension and   Prostate CA sent here for evaluation of pale finger nails. No additional history available secmondary to patients condition/dementia.

## 2019-05-07 NOTE — ED ADULT NURSE REASSESSMENT NOTE - NS ED NURSE REASSESS COMMENT FT1
Report received from [facility] patient is discharge awaiting transport ambulance to [facility] already given report to the staff in the facility by [facility].

## 2019-05-07 NOTE — ED ADULT NURSE NOTE - PMH
Arteriosclerotic heart disease (ASHD)    Atrial fibrillation    Atrial fibrillation    CHF (congestive heart failure)    Dementia    Dementia    Dementia    Depression    Diabetes    Diabetes    DM (diabetes mellitus)    Hyperlipemia    Hypertension    Prostate CA    Prostate ca

## 2019-05-07 NOTE — ED ADULT TRIAGE NOTE - CHIEF COMPLAINT QUOTE
Pt. brought to ED from [facility]. Per EMS facility stated patients finger tips were "white" and pale. Per EMS capillary refill present.

## 2019-05-07 NOTE — ED PROVIDER NOTE - CARE PROVIDER_API CALL
Toby Quintana)  Critical Care Medicine; Internal Medicine; Pulmonary Disease  [address] [place] [address]  Phone: [phone]  Fax: [phone]  Follow Up Time:

## 2019-05-07 NOTE — ED ADULT NURSE NOTE - CHIEF COMPLAINT QUOTE
Pt. brought to ED from [facility]. Per EMS facility stated patients finger tips were "white" and pale. Per EMS capillary refill present.

## 2019-05-07 NOTE — ED ADULT NURSE NOTE - NSIMPLEMENTINTERV_GEN_ALL_ED
Implemented All Fall with Harm Risk Interventions:  [place] to call system. Call bell, personal items and telephone within reach. Instruct patient to call for assistance. Room bathroom lighting operational. Non-slip footwear when patient is off stretcher. Physically safe environment: no spills, clutter or unnecessary equipment. Stretcher in lowest position, wheels locked, appropriate side rails in place. Provide visual cue, wrist band, yellow gown, etc. Monitor gait and stability. Monitor for mental status changes and reorient to person, place, and time. Review medications for side effects contributing to fall risk. Reinforce activity limits and safety measures with patient and family. Provide visual clues: red socks.

## 2019-05-08 VITALS
DIASTOLIC BLOOD PRESSURE: 70 MMHG | OXYGEN SATURATION: 98 % | RESPIRATION RATE: 16 BRPM | TEMPERATURE: 98 F | SYSTOLIC BLOOD PRESSURE: 121 MMHG | HEART RATE: 72 BPM

## 2019-05-08 PROBLEM — I48.91 UNSPECIFIED ATRIAL FIBRILLATION: Chronic | Status: ACTIVE | Noted: 2019-04-12

## 2019-05-08 PROBLEM — E11.9 TYPE 2 DIABETES MELLITUS WITHOUT COMPLICATIONS: Chronic | Status: ACTIVE | Noted: 2019-04-12

## 2019-05-08 PROBLEM — I25.10 ATHEROSCLEROTIC HEART DISEASE OF NATIVE CORONARY ARTERY WITHOUT ANGINA PECTORIS: Chronic | Status: ACTIVE | Noted: 2019-04-12

## 2019-05-08 PROBLEM — C61 MALIGNANT NEOPLASM OF PROSTATE: Chronic | Status: ACTIVE | Noted: 2019-04-12

## 2019-05-08 PROBLEM — F03.90 UNSPECIFIED DEMENTIA WITHOUT BEHAVIORAL DISTURBANCE: Chronic | Status: ACTIVE | Noted: 2019-04-12

## 2019-05-17 ENCOUNTER — EMERGENCY (EMERGENCY)
Facility: HOSPITAL | Age: 84
LOS: 1 days | Discharge: HOME | End: 2019-05-17
Attending: EMERGENCY MEDICINE | Admitting: EMERGENCY MEDICINE
Payer: MEDICARE

## 2019-05-17 VITALS
RESPIRATION RATE: 15 BRPM | OXYGEN SATURATION: 97 % | SYSTOLIC BLOOD PRESSURE: 115 MMHG | HEART RATE: 70 BPM | TEMPERATURE: 98 F | DIASTOLIC BLOOD PRESSURE: 72 MMHG

## 2019-05-17 VITALS
TEMPERATURE: 98 F | HEART RATE: 68 BPM | DIASTOLIC BLOOD PRESSURE: 70 MMHG | RESPIRATION RATE: 15 BRPM | WEIGHT: 149.91 LBS | OXYGEN SATURATION: 96 % | SYSTOLIC BLOOD PRESSURE: 134 MMHG | HEIGHT: 67 IN

## 2019-05-17 PROCEDURE — 10060 I&D ABSCESS SIMPLE/SINGLE: CPT

## 2019-05-17 PROCEDURE — 99283 EMERGENCY DEPT VISIT LOW MDM: CPT | Mod: 25

## 2019-05-17 PROCEDURE — 87205 SMEAR GRAM STAIN: CPT

## 2019-05-17 PROCEDURE — 87070 CULTURE OTHR SPECIMN AEROBIC: CPT

## 2019-05-17 RX ORDER — CEPHALEXIN 500 MG
500 CAPSULE ORAL ONCE
Refills: 0 | Status: COMPLETED | OUTPATIENT
Start: 2019-05-17 | End: 2019-05-17

## 2019-05-17 RX ORDER — CEPHALEXIN 500 MG
1 CAPSULE ORAL
Qty: 28 | Refills: 0
Start: 2019-05-17 | End: 2019-05-23

## 2019-05-17 RX ADMIN — Medication 500 MILLIGRAM(S): at 14:51

## 2019-05-17 NOTE — ED PROVIDER NOTE - CARE PROVIDER_API CALL
Toby Quintana)  Critical Care Medicine; Internal Medicine; Pulmonary Disease  [address] [place] [address]  Phone: [phone]  Fax: [phone]  Follow Up Time:

## 2019-05-17 NOTE — ED ADULT NURSE NOTE - NSIMPLEMENTINTERV_GEN_ALL_ED
Implemented All Fall with Harm Risk Interventions:  [place] to call system. Call bell, personal items and telephone within reach. Instruct patient to call for assistance. Room bathroom lighting operational. Non-slip footwear when patient is off stretcher. Physically safe environment: no spills, clutter or unnecessary equipment. Stretcher in lowest position, wheels locked, appropriate side rails in place. Provide visual cue, wrist band, yellow gown, etc. Monitor gait and stability. Monitor for mental status changes and reorient to person, place, and time. Review medications for side effects contributing to fall risk. Reinforce activity limits and safety measures with patient and family. Provide visual clues: red socks.

## 2019-05-17 NOTE — ED PROVIDER NOTE - NSFOLLOWUPINSTRUCTIONS_ED_ALL_ED_FT
1) Return to the ER tomorrow for wound check. Call today / next business day for prompt follow-up.  2) Return to Emergency room for any worsening or persistent pain, weakness, fever, or any other concerning symptoms.  3) See attached instruction sheets for additional information, including information regarding signs and symptoms to look out for, reasons to seek immediate care and other important instructions.  4) Take keflex 500mg every 6 hours for 7 days

## 2019-05-17 NOTE — ED PROVIDER NOTE - PHYSICAL EXAMINATION
Gen: demented but pleasant  Head/eyes: NC/AT, PERRL, EOMI  ENT: airway patent  Neck: supple, no tenderness/meningismus/JVD, Trachea midline  Pulm/lung: Bilateral clear BS, normal resp effort, no wheeze/stridor/retractions  CV/heart: RRR, no M/R/G, +2 dist pulses (radial, pedal DP/PT, popliteal)  GI/Abd: soft, NT/ND, +BS, no guarding/rebound tenderness  Musculoskeletal: no edema/erythema/cyanosis, FROM in all extremities, no C/T/L spine ttp  Skin: +upper back around T3 5cm x 5cm cyst/abscess right paraspinal region, +fluctuance, +mild purulent drainage in right lower quadrant of abscess with ulceration  Neuro: demented, moving all extremities

## 2019-05-17 NOTE — ED PROVIDER NOTE - OBJECTIVE STATEMENT
89 yo male hx of [facility], afib not on AC, sent from [facility] for upper back abscess of unknown duration.  No further information given from outside facility.  Denies any fever/chills.

## 2019-05-17 NOTE — ED PROVIDER NOTE - PROGRESS NOTE DETAILS
discussed case with Vinicio Mcgarry (son) [phone], aware of I&D procedure. discussed case with Dr. Quintana, will notify facility to send patient back for wound check.  discussed case with Dr. Frederick who saw picture of abscess, recommend I I&D make 1cm incision, and follow up with wound care

## 2019-05-18 ENCOUNTER — EMERGENCY (EMERGENCY)
Facility: HOSPITAL | Age: 84
LOS: 1 days | Discharge: HOME | End: 2019-05-18
Attending: EMERGENCY MEDICINE | Admitting: EMERGENCY MEDICINE
Payer: MEDICARE

## 2019-05-18 VITALS
TEMPERATURE: 99 F | RESPIRATION RATE: 16 BRPM | OXYGEN SATURATION: 97 % | DIASTOLIC BLOOD PRESSURE: 70 MMHG | SYSTOLIC BLOOD PRESSURE: 110 MMHG | HEART RATE: 88 BPM

## 2019-05-18 VITALS
SYSTOLIC BLOOD PRESSURE: 108 MMHG | HEIGHT: 67 IN | HEART RATE: 89 BPM | TEMPERATURE: 99 F | RESPIRATION RATE: 16 BRPM | WEIGHT: 160.06 LBS | OXYGEN SATURATION: 96 % | DIASTOLIC BLOOD PRESSURE: 68 MMHG

## 2019-05-18 DIAGNOSIS — Z48.01 ENCOUNTER FOR CHANGE OR REMOVAL OF SURGICAL WOUND DRESSING: ICD-10-CM

## 2019-05-18 DIAGNOSIS — Z91.040 LATEX ALLERGY STATUS: ICD-10-CM

## 2019-05-18 DIAGNOSIS — Z87.891 PERSONAL HISTORY OF NICOTINE DEPENDENCE: ICD-10-CM

## 2019-05-18 DIAGNOSIS — E11.9 TYPE 2 DIABETES MELLITUS WITHOUT COMPLICATIONS: ICD-10-CM

## 2019-05-18 DIAGNOSIS — I48.91 UNSPECIFIED ATRIAL FIBRILLATION: ICD-10-CM

## 2019-05-18 DIAGNOSIS — I50.9 HEART FAILURE, UNSPECIFIED: ICD-10-CM

## 2019-05-18 DIAGNOSIS — I10 ESSENTIAL (PRIMARY) HYPERTENSION: ICD-10-CM

## 2019-05-18 DIAGNOSIS — E78.5 HYPERLIPIDEMIA, UNSPECIFIED: ICD-10-CM

## 2019-05-18 DIAGNOSIS — F41.9 ANXIETY DISORDER, UNSPECIFIED: ICD-10-CM

## 2019-05-18 DIAGNOSIS — I25.10 ATHEROSCLEROTIC HEART DISEASE OF NATIVE CORONARY ARTERY WITHOUT ANGINA PECTORIS: ICD-10-CM

## 2019-05-18 DIAGNOSIS — Z85.46 PERSONAL HISTORY OF MALIGNANT NEOPLASM OF PROSTATE: ICD-10-CM

## 2019-05-18 DIAGNOSIS — F03.90 UNSPECIFIED DEMENTIA WITHOUT BEHAVIORAL DISTURBANCE: ICD-10-CM

## 2019-05-18 PROCEDURE — G0463: CPT

## 2019-05-18 NOTE — ED PROVIDER NOTE - SKIN, MLM
Skin normal color for race, warm, dry. upper back abscess with packing in place small purulent d/c on dressing

## 2019-05-18 NOTE — ED ADULT NURSE NOTE - OBJECTIVE STATEMENT
pt aox2, H x Dementia" upper back abscess evaluation, seen by ER MD Yesterday" Dsg on place, no drainage

## 2019-05-18 NOTE — ED PROVIDER NOTE - OBJECTIVE STATEMENT
pt bib son from [facility] for wound check/packing change 1 day s/p I&D upper back abscess. per son no fevers, chills. no further hx available.  pmd - aylin

## 2019-05-18 NOTE — ED PROVIDER NOTE - CARE PROVIDER_API CALL
Toby Quintana)  Critical Care Medicine; Internal Medicine; Pulmonary Disease  [address] [place] [address]  Phone: [phone]  Fax: [phone]  Follow Up Time: 1-3 Days

## 2019-05-22 LAB
CULTURE RESULTS: SIGNIFICANT CHANGE UP
SPECIMEN SOURCE: SIGNIFICANT CHANGE UP

## 2019-07-16 ENCOUNTER — EMERGENCY (EMERGENCY)
Facility: HOSPITAL | Age: 84
LOS: 1 days | Discharge: HOME | End: 2019-07-16
Attending: EMERGENCY MEDICINE | Admitting: EMERGENCY MEDICINE
Payer: MEDICARE

## 2019-07-16 VITALS
RESPIRATION RATE: 17 BRPM | SYSTOLIC BLOOD PRESSURE: 119 MMHG | TEMPERATURE: 98 F | DIASTOLIC BLOOD PRESSURE: 75 MMHG | HEART RATE: 68 BPM

## 2019-07-16 VITALS
RESPIRATION RATE: 18 BRPM | HEART RATE: 70 BPM | TEMPERATURE: 98 F | SYSTOLIC BLOOD PRESSURE: 125 MMHG | DIASTOLIC BLOOD PRESSURE: 78 MMHG

## 2019-07-16 LAB
ALBUMIN SERPL ELPH-MCNC: 3.1 G/DL — LOW (ref 3.3–5)
ALP SERPL-CCNC: 105 U/L — SIGNIFICANT CHANGE UP (ref 40–120)
ALT FLD-CCNC: 19 U/L — SIGNIFICANT CHANGE UP (ref 12–78)
ANION GAP SERPL CALC-SCNC: 4 MMOL/L — LOW (ref 5–17)
AST SERPL-CCNC: 16 U/L — SIGNIFICANT CHANGE UP (ref 15–37)
BASOPHILS # BLD AUTO: 0.02 K/UL — SIGNIFICANT CHANGE UP (ref 0–0.2)
BASOPHILS NFR BLD AUTO: 0.4 % — SIGNIFICANT CHANGE UP (ref 0–2)
BILIRUB SERPL-MCNC: 0.3 MG/DL — SIGNIFICANT CHANGE UP (ref 0.2–1.2)
BUN SERPL-MCNC: 17 MG/DL — SIGNIFICANT CHANGE UP (ref 7–23)
CALCIUM SERPL-MCNC: 9 MG/DL — SIGNIFICANT CHANGE UP (ref 8.5–10.1)
CHLORIDE SERPL-SCNC: 105 MMOL/L — SIGNIFICANT CHANGE UP (ref 96–108)
CO2 SERPL-SCNC: 33 MMOL/L — HIGH (ref 22–31)
CREAT SERPL-MCNC: 0.65 MG/DL — SIGNIFICANT CHANGE UP (ref 0.5–1.3)
EOSINOPHIL # BLD AUTO: 0.03 K/UL — SIGNIFICANT CHANGE UP (ref 0–0.5)
EOSINOPHIL NFR BLD AUTO: 0.6 % — SIGNIFICANT CHANGE UP (ref 0–6)
GLUCOSE SERPL-MCNC: 101 MG/DL — HIGH (ref 70–99)
HCT VFR BLD CALC: 31.7 % — LOW (ref 39–50)
HGB BLD-MCNC: 9.9 G/DL — LOW (ref 13–17)
IMM GRANULOCYTES NFR BLD AUTO: 0.4 % — SIGNIFICANT CHANGE UP (ref 0–1.5)
LYMPHOCYTES # BLD AUTO: 1.16 K/UL — SIGNIFICANT CHANGE UP (ref 1–3.3)
LYMPHOCYTES # BLD AUTO: 22.6 % — SIGNIFICANT CHANGE UP (ref 13–44)
MCHC RBC-ENTMCNC: 25.9 PG — LOW (ref 27–34)
MCHC RBC-ENTMCNC: 31.2 GM/DL — LOW (ref 32–36)
MCV RBC AUTO: 83 FL — SIGNIFICANT CHANGE UP (ref 80–100)
MONOCYTES # BLD AUTO: 0.53 K/UL — SIGNIFICANT CHANGE UP (ref 0–0.9)
MONOCYTES NFR BLD AUTO: 10.3 % — SIGNIFICANT CHANGE UP (ref 2–14)
NEUTROPHILS # BLD AUTO: 3.38 K/UL — SIGNIFICANT CHANGE UP (ref 1.8–7.4)
NEUTROPHILS NFR BLD AUTO: 65.7 % — SIGNIFICANT CHANGE UP (ref 43–77)
NRBC # BLD: 0 /100 WBCS — SIGNIFICANT CHANGE UP (ref 0–0)
PLATELET # BLD AUTO: 245 K/UL — SIGNIFICANT CHANGE UP (ref 150–400)
POTASSIUM SERPL-MCNC: 4.6 MMOL/L — SIGNIFICANT CHANGE UP (ref 3.5–5.3)
POTASSIUM SERPL-SCNC: 4.6 MMOL/L — SIGNIFICANT CHANGE UP (ref 3.5–5.3)
PROT SERPL-MCNC: 6.3 G/DL — SIGNIFICANT CHANGE UP (ref 6–8.3)
RBC # BLD: 3.82 M/UL — LOW (ref 4.2–5.8)
RBC # FLD: 15.3 % — HIGH (ref 10.3–14.5)
SODIUM SERPL-SCNC: 142 MMOL/L — SIGNIFICANT CHANGE UP (ref 135–145)
WBC # BLD: 5.14 K/UL — SIGNIFICANT CHANGE UP (ref 3.8–10.5)
WBC # FLD AUTO: 5.14 K/UL — SIGNIFICANT CHANGE UP (ref 3.8–10.5)

## 2019-07-16 PROCEDURE — 36415 COLL VENOUS BLD VENIPUNCTURE: CPT

## 2019-07-16 PROCEDURE — 80053 COMPREHEN METABOLIC PANEL: CPT

## 2019-07-16 PROCEDURE — 72070 X-RAY EXAM THORAC SPINE 2VWS: CPT | Mod: 26

## 2019-07-16 PROCEDURE — 99283 EMERGENCY DEPT VISIT LOW MDM: CPT | Mod: 25

## 2019-07-16 PROCEDURE — 72070 X-RAY EXAM THORAC SPINE 2VWS: CPT

## 2019-07-16 PROCEDURE — 85027 COMPLETE CBC AUTOMATED: CPT

## 2019-07-16 PROCEDURE — 99283 EMERGENCY DEPT VISIT LOW MDM: CPT

## 2019-07-16 RX ORDER — CEPHALEXIN 500 MG
1 CAPSULE ORAL
Qty: 21 | Refills: 0
Start: 2019-07-16 | End: 2019-07-22

## 2019-07-16 NOTE — ED PROVIDER NOTE - OBJECTIVE STATEMENT
89 y/o male, hx of dementia, poor historian, was sent in from [facility] for evaluation of an abscess to the back.  As per records, pt has a visiting nurse who perform wound checks, noticed some drainage from the site and called pt's PCP, Dr. Quintana who advised for patient to be sent to the hospital.  No fevers were documented.  unable to obtain more history

## 2019-07-16 NOTE — ED PROVIDER NOTE - SKIN, MLM
abscess noted on the mid upper back with packing in place, mild purulent discharge noted, no surrounding erythema noted, non-malodorous, not warm to touch.

## 2019-07-16 NOTE — ED ADULT NURSE NOTE - OBJECTIVE STATEMENT
Sent from [facility]. PResent to Er with oozing cyst from the back. Denies any other injury

## 2019-07-16 NOTE — ED PROVIDER NOTE - ATTENDING CONTRIBUTION TO CARE
Pt is a 89 yo male who presents to the ED with a cc of wound check.  PMHx of ASHD, A-fib, CHF, dementia, depression, DM, HLD, HTN, prostate cancer.  Pt is unable to provide history secondary to dementia.  Per documentation sent for wound check.  Pt had increased drainage from lesion of back.  Nurse has been dressing daily.  On exam pt alert to person and is aware that he is in the hospital.  NCAT, heart RRR with systolic murmur, lungs CTA, abd soft NT/ND. Open draining lesion to upper mid thoracic region noted.  No TTP. No overlying cellulitis.  Will obtain screening labs, and x-ray.  Will begin abx and redress.  Agree with above plan of care.

## 2019-07-16 NOTE — ED ADULT NURSE NOTE - NSIMPLEMENTINTERV_GEN_ALL_ED
Implemented All Fall Risk Interventions:  [place] to call system. Call bell, personal items and telephone within reach. Instruct patient to call for assistance. Room bathroom lighting operational. Non-slip footwear when patient is off stretcher. Physically safe environment: no spills, clutter or unnecessary equipment. Stretcher in lowest position, wheels locked, appropriate side rails in place. Provide visual cue, wrist band, yellow gown, etc. Monitor gait and stability. Monitor for mental status changes and reorient to person, place, and time. Review medications for side effects contributing to fall risk. Reinforce activity limits and safety measures with patient and family.

## 2019-07-16 NOTE — ED PROVIDER NOTE - PROGRESS NOTE DETAILS
pt is currently comfortable in ED, in no distress, packing was replaced.  will discharge back to the facility.  all results will be sent.  Will start on Keflex

## 2019-07-25 ENCOUNTER — INPATIENT (INPATIENT)
Facility: HOSPITAL | Age: 84
LOS: 2 days | Discharge: NURSING, REHAB OR LONG-TERM CARE | DRG: 395 | End: 2019-07-28
Attending: INTERNAL MEDICINE | Admitting: INTERNAL MEDICINE
Payer: MEDICARE

## 2019-07-25 VITALS
SYSTOLIC BLOOD PRESSURE: 112 MMHG | OXYGEN SATURATION: 98 % | RESPIRATION RATE: 15 BRPM | WEIGHT: 145.95 LBS | HEART RATE: 74 BPM | DIASTOLIC BLOOD PRESSURE: 67 MMHG | TEMPERATURE: 98 F

## 2019-07-25 DIAGNOSIS — I10 ESSENTIAL (PRIMARY) HYPERTENSION: ICD-10-CM

## 2019-07-25 DIAGNOSIS — C61 MALIGNANT NEOPLASM OF PROSTATE: ICD-10-CM

## 2019-07-25 DIAGNOSIS — K92.2 GASTROINTESTINAL HEMORRHAGE, UNSPECIFIED: ICD-10-CM

## 2019-07-25 DIAGNOSIS — Z29.9 ENCOUNTER FOR PROPHYLACTIC MEASURES, UNSPECIFIED: ICD-10-CM

## 2019-07-25 DIAGNOSIS — I50.9 HEART FAILURE, UNSPECIFIED: ICD-10-CM

## 2019-07-25 DIAGNOSIS — D64.9 ANEMIA, UNSPECIFIED: ICD-10-CM

## 2019-07-25 DIAGNOSIS — I48.91 UNSPECIFIED ATRIAL FIBRILLATION: ICD-10-CM

## 2019-07-25 DIAGNOSIS — F03.90 UNSPECIFIED DEMENTIA WITHOUT BEHAVIORAL DISTURBANCE: ICD-10-CM

## 2019-07-25 DIAGNOSIS — R19.5 OTHER FECAL ABNORMALITIES: ICD-10-CM

## 2019-07-25 DIAGNOSIS — E11.9 TYPE 2 DIABETES MELLITUS WITHOUT COMPLICATIONS: ICD-10-CM

## 2019-07-25 LAB
ALBUMIN SERPL ELPH-MCNC: 3.5 G/DL — SIGNIFICANT CHANGE UP (ref 3.3–5)
ALP SERPL-CCNC: 116 U/L — SIGNIFICANT CHANGE UP (ref 40–120)
ALT FLD-CCNC: 27 U/L — SIGNIFICANT CHANGE UP (ref 12–78)
ANION GAP SERPL CALC-SCNC: 3 MMOL/L — LOW (ref 5–17)
APTT BLD: 32.1 SEC — SIGNIFICANT CHANGE UP (ref 28.5–37)
AST SERPL-CCNC: 23 U/L — SIGNIFICANT CHANGE UP (ref 15–37)
BASOPHILS # BLD AUTO: 0.03 K/UL — SIGNIFICANT CHANGE UP (ref 0–0.2)
BASOPHILS NFR BLD AUTO: 0.6 % — SIGNIFICANT CHANGE UP (ref 0–2)
BILIRUB SERPL-MCNC: 0.4 MG/DL — SIGNIFICANT CHANGE UP (ref 0.2–1.2)
BUN SERPL-MCNC: 15 MG/DL — SIGNIFICANT CHANGE UP (ref 7–23)
CALCIUM SERPL-MCNC: 8.9 MG/DL — SIGNIFICANT CHANGE UP (ref 8.5–10.1)
CHLORIDE SERPL-SCNC: 104 MMOL/L — SIGNIFICANT CHANGE UP (ref 96–108)
CO2 SERPL-SCNC: 32 MMOL/L — HIGH (ref 22–31)
CREAT SERPL-MCNC: 0.67 MG/DL — SIGNIFICANT CHANGE UP (ref 0.5–1.3)
EOSINOPHIL # BLD AUTO: 0.03 K/UL — SIGNIFICANT CHANGE UP (ref 0–0.5)
EOSINOPHIL NFR BLD AUTO: 0.6 % — SIGNIFICANT CHANGE UP (ref 0–6)
GLUCOSE SERPL-MCNC: 129 MG/DL — HIGH (ref 70–99)
HCT VFR BLD CALC: 31.8 % — LOW (ref 39–50)
HCT VFR BLD CALC: 34.2 % — LOW (ref 39–50)
HGB BLD-MCNC: 10.5 G/DL — LOW (ref 13–17)
HGB BLD-MCNC: 9.8 G/DL — LOW (ref 13–17)
IMM GRANULOCYTES NFR BLD AUTO: 0.2 % — SIGNIFICANT CHANGE UP (ref 0–1.5)
INR BLD: 1.18 RATIO — HIGH (ref 0.88–1.16)
LYMPHOCYTES # BLD AUTO: 1.13 K/UL — SIGNIFICANT CHANGE UP (ref 1–3.3)
LYMPHOCYTES # BLD AUTO: 21.4 % — SIGNIFICANT CHANGE UP (ref 13–44)
MCHC RBC-ENTMCNC: 25.5 PG — LOW (ref 27–34)
MCHC RBC-ENTMCNC: 30.7 GM/DL — LOW (ref 32–36)
MCV RBC AUTO: 83 FL — SIGNIFICANT CHANGE UP (ref 80–100)
MONOCYTES # BLD AUTO: 0.55 K/UL — SIGNIFICANT CHANGE UP (ref 0–0.9)
MONOCYTES NFR BLD AUTO: 10.4 % — SIGNIFICANT CHANGE UP (ref 2–14)
NEUTROPHILS # BLD AUTO: 3.54 K/UL — SIGNIFICANT CHANGE UP (ref 1.8–7.4)
NEUTROPHILS NFR BLD AUTO: 66.8 % — SIGNIFICANT CHANGE UP (ref 43–77)
NRBC # BLD: 0 /100 WBCS — SIGNIFICANT CHANGE UP (ref 0–0)
OB PNL STL: POSITIVE
PLATELET # BLD AUTO: 258 K/UL — SIGNIFICANT CHANGE UP (ref 150–400)
POTASSIUM SERPL-MCNC: 4.3 MMOL/L — SIGNIFICANT CHANGE UP (ref 3.5–5.3)
POTASSIUM SERPL-SCNC: 4.3 MMOL/L — SIGNIFICANT CHANGE UP (ref 3.5–5.3)
PROT SERPL-MCNC: 7 G/DL — SIGNIFICANT CHANGE UP (ref 6–8.3)
PROTHROM AB SERPL-ACNC: 13.4 SEC — HIGH (ref 10–12.9)
RBC # BLD: 4.12 M/UL — LOW (ref 4.2–5.8)
RBC # FLD: 15.7 % — HIGH (ref 10.3–14.5)
SODIUM SERPL-SCNC: 139 MMOL/L — SIGNIFICANT CHANGE UP (ref 135–145)
WBC # BLD: 5.29 K/UL — SIGNIFICANT CHANGE UP (ref 3.8–10.5)
WBC # FLD AUTO: 5.29 K/UL — SIGNIFICANT CHANGE UP (ref 3.8–10.5)

## 2019-07-25 PROCEDURE — 93010 ELECTROCARDIOGRAM REPORT: CPT

## 2019-07-25 PROCEDURE — 71045 X-RAY EXAM CHEST 1 VIEW: CPT | Mod: 26

## 2019-07-25 PROCEDURE — 99285 EMERGENCY DEPT VISIT HI MDM: CPT

## 2019-07-25 RX ORDER — SODIUM CHLORIDE 9 MG/ML
1000 INJECTION INTRAMUSCULAR; INTRAVENOUS; SUBCUTANEOUS ONCE
Refills: 0 | Status: COMPLETED | OUTPATIENT
Start: 2019-07-25 | End: 2019-07-25

## 2019-07-25 RX ORDER — FAMOTIDINE 10 MG/ML
20 INJECTION INTRAVENOUS ONCE
Refills: 0 | Status: COMPLETED | OUTPATIENT
Start: 2019-07-25 | End: 2019-07-25

## 2019-07-25 RX ORDER — PANTOPRAZOLE SODIUM 20 MG/1
40 TABLET, DELAYED RELEASE ORAL DAILY
Refills: 0 | Status: DISCONTINUED | OUTPATIENT
Start: 2019-07-26 | End: 2019-07-28

## 2019-07-25 RX ORDER — BUDESONIDE, MICRONIZED 100 %
0.5 POWDER (GRAM) MISCELLANEOUS
Refills: 0 | Status: DISCONTINUED | OUTPATIENT
Start: 2019-07-25 | End: 2019-07-28

## 2019-07-25 RX ORDER — ALBUTEROL 90 UG/1
2 AEROSOL, METERED ORAL EVERY 6 HOURS
Refills: 0 | Status: DISCONTINUED | OUTPATIENT
Start: 2019-07-25 | End: 2019-07-28

## 2019-07-25 RX ORDER — HYDROCORTISONE 1 %
1 OINTMENT (GRAM) TOPICAL AT BEDTIME
Refills: 0 | Status: DISCONTINUED | OUTPATIENT
Start: 2019-07-25 | End: 2019-07-26

## 2019-07-25 RX ORDER — DONEPEZIL HYDROCHLORIDE 10 MG/1
5 TABLET, FILM COATED ORAL AT BEDTIME
Refills: 0 | Status: DISCONTINUED | OUTPATIENT
Start: 2019-07-25 | End: 2019-07-28

## 2019-07-25 RX ORDER — LACTOBACILLUS ACIDOPHILUS 100MM CELL
1 CAPSULE ORAL THREE TIMES A DAY
Refills: 0 | Status: DISCONTINUED | OUTPATIENT
Start: 2019-07-25 | End: 2019-07-28

## 2019-07-25 RX ORDER — SODIUM CHLORIDE 9 MG/ML
1000 INJECTION INTRAMUSCULAR; INTRAVENOUS; SUBCUTANEOUS
Refills: 0 | Status: DISCONTINUED | OUTPATIENT
Start: 2019-07-25 | End: 2019-07-28

## 2019-07-25 RX ORDER — SIMVASTATIN 20 MG/1
20 TABLET, FILM COATED ORAL AT BEDTIME
Refills: 0 | Status: DISCONTINUED | OUTPATIENT
Start: 2019-07-25 | End: 2019-07-28

## 2019-07-25 RX ORDER — ACETAMINOPHEN 500 MG
650 TABLET ORAL EVERY 6 HOURS
Refills: 0 | Status: DISCONTINUED | OUTPATIENT
Start: 2019-07-25 | End: 2019-07-28

## 2019-07-25 RX ADMIN — FAMOTIDINE 20 MILLIGRAM(S): 10 INJECTION INTRAVENOUS at 12:58

## 2019-07-25 RX ADMIN — Medication 0.5 MILLIGRAM(S): at 20:05

## 2019-07-25 RX ADMIN — SODIUM CHLORIDE 1000 MILLILITER(S): 9 INJECTION INTRAMUSCULAR; INTRAVENOUS; SUBCUTANEOUS at 14:17

## 2019-07-25 RX ADMIN — SIMVASTATIN 20 MILLIGRAM(S): 20 TABLET, FILM COATED ORAL at 21:58

## 2019-07-25 RX ADMIN — Medication 1 SUPPOSITORY(S): at 21:57

## 2019-07-25 RX ADMIN — DONEPEZIL HYDROCHLORIDE 5 MILLIGRAM(S): 10 TABLET, FILM COATED ORAL at 21:57

## 2019-07-25 RX ADMIN — SODIUM CHLORIDE 50 MILLILITER(S): 9 INJECTION INTRAMUSCULAR; INTRAVENOUS; SUBCUTANEOUS at 20:45

## 2019-07-25 RX ADMIN — Medication 1 TABLET(S): at 15:35

## 2019-07-25 RX ADMIN — Medication 1 TABLET(S): at 21:57

## 2019-07-25 RX ADMIN — SODIUM CHLORIDE 1000 MILLILITER(S): 9 INJECTION INTRAMUSCULAR; INTRAVENOUS; SUBCUTANEOUS at 12:58

## 2019-07-25 NOTE — ED ADULT NURSE NOTE - NSFALLRSKHMRSKTYOTFT_ED_ALL_ED
Physical Therapy Discharge Note  Date: 9/10/2018    Patient Name: Irish Murillo  : 1948  MRN: [id number]    Pertinent Medical History:type 2 diabetes, RA, R OLIVIA, Sarcoidosis of lungs, Seizures, DDD Lumbar  Medical/Treatment Diagnosis Information:  · Diagnosis: H/O total hip arthroplasty, right (W28.757 ICD-10-CM V43.64 ICD-9-CM); Arthritis of left knee (M17.12 ICD-10-.96 ICD-9-CM); Arthritis of right knee (M17.11 ICD-10-.96 ICD-9-CM)  · Treatment Diagnosis: Gait and mobility dysfunction due to RLE and core weakness  Insurance/Certification information:  PT Insurance Information: [facility]  Physician Information:  Referring Practitioner: Dr Gaviota Keller    Dates of Service:18 to 18   Total # of Visits:2  Cancel/No Shows to date:2 can    Medicare Cap Total for 2018:    G-code (if applicable):    Date G-code Applied:       Treatment to Date:  [x] Therapeutic Exercise  [] Modalities:  [x] Therapeutic Activity     [] Ultrasound  [] Electrical Stim   [] Gait Training      [] Cervical Traction    [] Lumbar Traction  [x] Neuromuscular Re-education  [] Cold/hotpack [] Iontophoresis  [x] Instruction in HEP      Other:  [x] Manual Therapy       []    [] Aquatic Therapy       []                    ?         Significant Findings At Last Visit:    Subjective:18 Pt reports she feels about the same as the last PT rx, but her right knee swelling seems to be less.             Objective:  Observation:  Test measurements:  See initial PT eval.       Goal Status:  [] Achieved [] Partially Achieved  [x] Not Achieved     Reason for Discharge:  [] Goals Met   [] Patient did not return after initial evaluation   [] Progress Plateaued [x] Missed _last 2____ scheduled appointments   [] No insurance coverage [] Patient terminated therapy   [] Other:        PT recommendation:   [x] Patient now discharged with HEP      [] Other:    Discharge discussed with:  [] Patient  [] Caregiver      [x] Other PT discussed PT program incomplete        Electronically signed by:  Roge Ahumada PT    If you have any questions or concerns, please don't hesitate to call.   Thank you for your referral. confusion

## 2019-07-25 NOTE — CONSULT NOTE ADULT - ASSESSMENT
89 yo m w pmx as above, including afib not on ac per noted presented to ed w cc of  bloody stool; gi consulted for ob+anemia 89 yo m w pmhx as above, including dementia,  afib (on asa not ac per notes) presented to ed w cc of  bloody stool; gi consulted for ob+anemia

## 2019-07-25 NOTE — H&P ADULT - ASSESSMENT
89 y male hx of Arteriosclerotic heart disease (ASHD)  ,Atrial fibrillation  ,CHF (congestive heart failure)  ,Dementia    ,Depression  ,Diabetes     Hyperlipemia  ,Hypertension  ,Prostate CA ,dementia, sent to ED from [facility]( PMD Dr Toby Quintana )for GI evaluation due to  blood in stool.  Patient is a very poor historian due to dementia ,he was HD stable on arrival to ER GI eval requested and cardiology consult called for clearance for possible endoscopy Palliative care consult requested ,to discuss advance directives and complete MOLST

## 2019-07-25 NOTE — ED ADULT NURSE NOTE - NSIMPLEMENTINTERV_GEN_ALL_ED
Implemented All Fall with Harm Risk Interventions:  [place] to call system. Call bell, personal items and telephone within reach. Instruct patient to call for assistance. Room bathroom lighting operational. Non-slip footwear when patient is off stretcher. Physically safe environment: no spills, clutter or unnecessary equipment. Stretcher in lowest position, wheels locked, appropriate side rails in place. Provide visual cue, wrist band, yellow gown, etc. Monitor gait and stability. Monitor for mental status changes and reorient to person, place, and time. Review medications for side effects contributing to fall risk. Reinforce activity limits and safety measures with patient and family. Provide visual clues: red socks.

## 2019-07-25 NOTE — CONSULT NOTE ADULT - PROBLEM SELECTOR RECOMMENDATION 9
suspected lgib  hgb around recent baseline in emr; currently HD stable  check coags  rec ct a/p w con as able for further eval  rec ppi qd, anusol qhs  clears/ivf  serial cbc, transfuse prn  hold asa if ok w cardiology  icu eval if decompensates  goc discussions (per notes, during 4/2019 admission pt dnr/dni no tf)  further recs pending clinical course, will follow suspected lgib, ?hemorrhoidal vs diverticular vs other  hgb around recent baseline in emr; currently HD stable  check coags  rec ppi qd, anusol qhs  clears/ivf  serial cbc, transfuse prn  hold asa if ok w cardiology  if w further s/s gib/drop in hgb rec ct a/p for further eval  icu eval if decompensates  goc discussions (per notes, during 4/2019 admission pt dnr/dni no tf)  further recs pending clinical course, will follow

## 2019-07-25 NOTE — PHYSICAL THERAPY INITIAL EVALUATION ADULT - PLANNED THERAPY INTERVENTIONS, PT EVAL
gait training/transfer training/bed mobility training gait training/transfer training/bed mobility training/balance training

## 2019-07-25 NOTE — H&P ADULT - NSHPLABSRESULTS_GEN_ALL_CORE
< from: Xray Thoracic Spine 1 View (07.16.19 @ 16:00) >    EXAM:  THORACIC SPINE                            PROCEDURE DATE:  07/16/2019          INTERPRETATION:  History: Pain, abscesses upper back.    2 views thoracic spine.  Pronounced thoracic kyphosis. Diffuse mild chronic/degenerative loss of   vertebral height. No definite acute fracture. No gross focal bone   destruction. Multilevel degenerative spondylosis and disc space   narrowing. If there is clinical suspicion for osteomyelitis/discitis,   paraspinal abscess,  or epidural abscess recommend MR for additional   characterization.    Impression: No acute osseous abnormality. Multilevel degenerative change.    Recommend MR for additional evaluation as clinically indicated    < end of copied text >    < from: TTE Echo Doppler w/o Cont (02.01.19 @ 17:45) >    INTERPRETATION:  Ordering Physician: IVETTE VAZQUEZ [id number]  Indication: CHF.  Technician: Jeff.  Study Quality: Technical difficult study.   A complete echocardiographic study was performed utilizing standard   protocol including spectral and color Doppler in all echocardiographic   windows.  Height: 182.8cm  Weight: 73.5kg  BSA: 1.95m2  Blood Pressure: 101/62  MEASUREMENTS  IVS: 1.2cm  PWT: 1.2cm  LA: 4.2cm  AO:3.6cm  LVIDd: 5.4 cm.  LVIDs: 3.9cm  LVEF: 35-40%.  PASP: 40mm Hg  FINDINGS  Left Ventricle: Borderline LV size with  moderate depressed LV systolic   function with estimated LVEF 35-40%.  Right Ventricle: Normal in size and normal RV systolic function.  Left Atrium: Mild dilated.  Right Atrium: Normal in size.  Mitral Valve: Mild mitral annular calcification is present. Mitral valve   is mildly calcified and no evidence of any mitral stenosis mild mitral   regurgitation is present.  Aortic Valve: Aortic Root is mild sclerotic and of normal dimension.   Aortic valve is calcified with  mild aortic stenosis present with the   aortic valve area calculated 1.7 sq cm and peak gradient across the   aortic valve is 15 mmHg and mean gradient 7 mmHg is present. Mild aortic   regurgitation is present.  Tricuspid Valve: Mild tricuspid regurgitation with calculated  PA   systolic pressure 40 mmHg suggestive of mild pulmonary hypertension is   present.  Pulmonic Valve:Trace  Pulmonary regurgitation ispresent.  Diastolic Function: Cannot evaluate because of underlying atrial   fibrillation.  Pericardium/Pleura: No evidence of pericardial effusion.  No intracardiac thrombus or vegetations and  tumor.  CONCLUSIONS:  Normal LV size with  moderate depressed LV systolic function with LVEF   35-40% .    Mild concentric left ventricular hypertrophy is present.    Mild tricuspid regurgitation with  mild pulmonary hypertension is present.     mild mitral regurgitation is present.    < end of copied text >

## 2019-07-25 NOTE — PHYSICAL THERAPY INITIAL EVALUATION ADULT - ADDITIONAL COMMENTS
Pt. was unable to give a history secondary to dementia. Attempted to call pt's son however, was not able to be reached. As per pt's chart: Pt. lives in [facility]. Prior to admission, pt. was independent with ambulation and uses a single axis cane. At times he can perform bed mobility and transfers on his own however he typically needs assistance with these tasks.

## 2019-07-25 NOTE — ED ADULT NURSE NOTE - CHIEF COMPLAINT QUOTE
brought in by EMS from [facility] for blood in the stool this morning. patient denies pain and is without complaints

## 2019-07-25 NOTE — ED ADULT NURSE NOTE - OBJECTIVE STATEMENT
From [facility] present to ER with c/o of blood in the stool. P From [facility] present to ER with c/o of blood in the stool. Staff at his assistive living noticed blood while changing him. Pt is a poor historian and does not want to answer any question.

## 2019-07-25 NOTE — ED PROVIDER NOTE - OBJECTIVE STATEMENT
89 y male hx of dementia, sent to ED from [facility], patient hx of dementia, poor historian, sent to ED for blood in stool.  patient vss, denies pain.  PMD Dr Toby Quintana    note of patient meds, patient is not on coumadin, hx of afib

## 2019-07-25 NOTE — ED ADULT TRIAGE NOTE - CHIEF COMPLAINT QUOTE
brought in by EMS from [facility] for blood in the stool this morning. patient denies pain and is without complaints

## 2019-07-25 NOTE — H&P ADULT - ATTENDING COMMENTS
89 y male hx of Arteriosclerotic heart disease (ASHD)  ,Atrial fibrillation  ,CHF (congestive heart failure)  ,Dementia    ,Depression  ,Diabetes     Hyperlipemia  ,Hypertension  ,Prostate CA ,dementia, sent to ED from [facility]( PMD Dr Toby Quintana )for GI evaluation due to  blood in stool.  Patient is a very poor historian due to dementia ,he was HD stable on arrival to ER GI eval requested and cardiology consult called for clearance for possible endoscopy Palliative care consult requested ,to discuss advance directives and complete MOLST

## 2019-07-25 NOTE — CONSULT NOTE ADULT - SUBJECTIVE AND OBJECTIVE BOX
Chief Complaint:  Patient is a 89y old  Male who presents with a chief complaint of   Dementia  Arteriosclerotic heart disease (ASHD)  Prostate CA  Atrial fibrillation  DM (diabetes mellitus)  No pertinent past medical history  Dementia  Depression  Diabetes  Hyperlipemia  CHF (congestive heart failure)  Dementia  Prostate ca  Diabetes  Hypertension  Atrial fibrillation  No significant past surgical history  No significant past surgical history     HPI:  89 y male hx of dementia, sent to ED from [facility], patient hx of dementia, poor historian, sent to ED for blood in stool.  patient vss, denies pain.  PMD Dr Toby Quintana. note of patient meds, patient is not on coumadin, hx of afib    gi consulted for ob+ anemia. chart reveiwed-  pt seen and examined.    recent vs/labs/imaging reviewed- avss  ob+   hgb 10.5 (prior 7/16 hgb 9.9; per sunrise hgb runs 9-12 range)  plts wnl  no coags  no imaging    latex (Rash)  latex (Unknown)  No Known Drug Allergies            FAMILY HISTORY:        Review of Systems:    unable to obtain    Relevant Family History:   n/c    Relevant Social History: n/c      Physical Exam:    Vital Signs:  Vital Signs Last 24 Hrs  T(C): 36.6 (25 Jul 2019 11:39), Max: 36.6 (25 Jul 2019 11:39)  T(F): 97.8 (25 Jul 2019 11:39), Max: 97.8 (25 Jul 2019 11:39)  HR: 74 (25 Jul 2019 11:39) (74 - 74)  BP: 112/67 (25 Jul 2019 11:39) (112/67 - 112/67)  BP(mean): --  RR: 15 (25 Jul 2019 11:39) (15 - 15)  SpO2: 98% (25 Jul 2019 11:39) (98% - 98%)  Daily     Daily     General:  Appears stated age, well-groomed, nad  HEENT:  NC/AT,  conjunctivae clear and pink, no thyromegaly, nodules, adenopathy, no JVD  Chest:  Full & symmetric excursion, no increased effort, breath sounds clear  Cardiovascular:  Regular rhythm, S1, S2, no murmur/rub/S3/S4, no abdominal bruit, no edema  Abdomen:  Soft, non-tender, non-distended, normoactive bowel sounds,  no masses ,no hepatosplenomeagaly, no signs of chronic liver disease  Extremities:  no cyanosis,clubbing or edema  Skin:  No rash/erythema/ecchymoses/petechiae/wounds/abscess/warm/dry  Neuro/Psych:  A&O  , no asterixis, no tremor, no encephalopathy    Laboratory:                            10.5   5.29  )-----------( 258      ( 25 Jul 2019 12:21 )             34.2     07-25    139  |  104  |  15  ----------------------------<  129<H>  4.3   |  32<H>  |  0.67    Ca    8.9      25 Jul 2019 12:21    TPro  7.0  /  Alb  3.5  /  TBili  0.4  /  DBili  x   /  AST  23  /  ALT  27  /  AlkPhos  116  07-25    LIVER FUNCTIONS - ( 25 Jul 2019 12:21 )  Alb: 3.5 g/dL / Pro: 7.0 g/dL / ALK PHOS: 116 U/L / ALT: 27 U/L / AST: 23 U/L / GGT: x                 Imaging:    none Chief Complaint:  Patient is a 89y old  Male who presents with a chief complaint of   Dementia  Arteriosclerotic heart disease (ASHD)  Prostate CA  Atrial fibrillation  DM (diabetes mellitus)  No pertinent past medical history  Dementia  Depression  Diabetes  Hyperlipemia  CHF (congestive heart failure)  Dementia  Prostate ca  Diabetes  Hypertension  Atrial fibrillation  No significant past surgical history  No significant past surgical history     HPI:  89 y male hx of dementia, sent to ED from [facility], patient hx of dementia, poor historian, sent to ED for blood in stool.  patient vss, denies pain.  PMD Dr Toby Quintana. note of patient meds, patient is not on coumadin, hx of afib    gi consulted for ob+ anemia. chart reveiwed- as per most recent med list from [facility] pt off ac and on asa 81mg. pt seen and examined. hx of dementia, unable to provide meaningful hx. states he feels fine. does not have the 'slightest idea' as to why he is in the hospital. is unsure if his stools have been bloody. denies n/v/d/c/abd pain.    recent vs/labs/imaging reviewed- avss  ob+   hgb 10.5 (prior 7/16 hgb 9.9; per sunrise hgb runs 9-12 range)  plts wnl  no coags  no imaging    latex (Rash)  latex (Unknown)  No Known Drug Allergies            FAMILY HISTORY:        Review of Systems:    unable to obtain    Relevant Family History:   n/c    Relevant Social History: n/c      Physical Exam:    Vital Signs:  Vital Signs Last 24 Hrs  T(C): 36.6 (25 Jul 2019 11:39), Max: 36.6 (25 Jul 2019 11:39)  T(F): 97.8 (25 Jul 2019 11:39), Max: 97.8 (25 Jul 2019 11:39)  HR: 74 (25 Jul 2019 11:39) (74 - 74)  BP: 112/67 (25 Jul 2019 11:39) (112/67 - 112/67)  BP(mean): --  RR: 15 (25 Jul 2019 11:39) (15 - 15)  SpO2: 98% (25 Jul 2019 11:39) (98% - 98%)  Daily     Daily     General:  lying in bed in nad  HEENT:  NC/AT  Chest:  dec bs  Cardiovascular:  Regular rhythm, S1, S2  Abdomen:  Soft, non-tender, non-distended on yaneth: external skin tags and mix of brown stool w small amounts of blood, no gross bleeding no clots no melena appreciated  Extremities:  no  edema  Skin:  No rash  Neuro/Psych:  Awake alert confused    Laboratory:                            10.5   5.29  )-----------( 258      ( 25 Jul 2019 12:21 )             34.2     07-25    139  |  104  |  15  ----------------------------<  129<H>  4.3   |  32<H>  |  0.67    Ca    8.9      25 Jul 2019 12:21    TPro  7.0  /  Alb  3.5  /  TBili  0.4  /  DBili  x   /  AST  23  /  ALT  27  /  AlkPhos  116  07-25    LIVER FUNCTIONS - ( 25 Jul 2019 12:21 )  Alb: 3.5 g/dL / Pro: 7.0 g/dL / ALK PHOS: 116 U/L / ALT: 27 U/L / AST: 23 U/L / GGT: x                 Imaging:    none

## 2019-07-25 NOTE — PHYSICAL THERAPY INITIAL EVALUATION ADULT - PERTINENT HX OF CURRENT PROBLEM, REHAB EVAL
Pt. admitted to ED from [facility] assisted living after blood was found in stool.

## 2019-07-25 NOTE — H&P ADULT - PROBLEM SELECTOR PLAN 7
continue home medications Palliative care consult requested ,to discuss advance directives and complete /update MOLST

## 2019-07-25 NOTE — H&P ADULT - HISTORY OF PRESENT ILLNESS
89 y male hx of Arteriosclerotic heart disease (ASHD)  ,Atrial fibrillation  ,CHF (congestive heart failure)  ,Dementia    ,Depression  ,Diabetes     Hyperlipemia  ,Hypertension  ,Prostate CA ,dementia, sent to ED from [facility]( PMD Dr Toby Quintana )for GI evaluation due to  blood in stool.  Patient is a very poor historian due to dementia ,he was HD stable on arrival to ER GI eval requested and cardiology consult called for clearance for possible endoscopy Palliative care consult requested ,to discuss advance directives and complete MOLST

## 2019-07-25 NOTE — ED PROVIDER NOTE - ATTENDING CONTRIBUTION TO CARE
89 yo male presents to ED for evaluation of diarrhea admixed with blood x few days. No documented fever or chills. Exam revealed elderly white male in NAD with clear lungs and no murmur on cardiac evaluation. Abdomen revealed non tender abdomen. I agree with plan and management outlined by PA.

## 2019-07-25 NOTE — PHYSICAL THERAPY INITIAL EVALUATION ADULT - TRANSFER TRAINING, PT EVAL
In 1 week pt. will transfer from sit to stand with RW In 1 week pt. will transfer from sit to stand with RW with supervision

## 2019-07-25 NOTE — H&P ADULT - NSICDXPASTMEDICALHX_GEN_ALL_CORE_FT
PAST MEDICAL HISTORY:  Arteriosclerotic heart disease (ASHD)     Atrial fibrillation     Atrial fibrillation     CHF (congestive heart failure)     Dementia     Dementia     Dementia     Depression     Diabetes     Diabetes     DM (diabetes mellitus)     Hyperlipemia     Hypertension     Prostate ca     Prostate CA

## 2019-07-25 NOTE — ED PROVIDER NOTE - PROGRESS NOTE DETAILS
call out to Dr Quintana, awaiting call back spoke with Dr Quintana, case discussed, advised call Dr Ring for admission  spoke with Dr Ring, case discussed, will admit patient, stated she will call GI to see patient

## 2019-07-26 DIAGNOSIS — I50.810 RIGHT HEART FAILURE, UNSPECIFIED: ICD-10-CM

## 2019-07-26 DIAGNOSIS — Z95.0 PRESENCE OF CARDIAC PACEMAKER: ICD-10-CM

## 2019-07-26 LAB
ANION GAP SERPL CALC-SCNC: 6 MMOL/L — SIGNIFICANT CHANGE UP (ref 5–17)
BUN SERPL-MCNC: 11 MG/DL — SIGNIFICANT CHANGE UP (ref 7–23)
CALCIUM SERPL-MCNC: 8.6 MG/DL — SIGNIFICANT CHANGE UP (ref 8.5–10.1)
CHLORIDE SERPL-SCNC: 107 MMOL/L — SIGNIFICANT CHANGE UP (ref 96–108)
CO2 SERPL-SCNC: 29 MMOL/L — SIGNIFICANT CHANGE UP (ref 22–31)
CREAT SERPL-MCNC: 0.49 MG/DL — LOW (ref 0.5–1.3)
GLUCOSE SERPL-MCNC: 83 MG/DL — SIGNIFICANT CHANGE UP (ref 70–99)
HCT VFR BLD CALC: 29.7 % — LOW (ref 39–50)
HCT VFR BLD CALC: 31.4 % — LOW (ref 39–50)
HGB BLD-MCNC: 9.5 G/DL — LOW (ref 13–17)
HGB BLD-MCNC: 9.9 G/DL — LOW (ref 13–17)
MCHC RBC-ENTMCNC: 25.7 PG — LOW (ref 27–34)
MCHC RBC-ENTMCNC: 31.5 GM/DL — LOW (ref 32–36)
MCV RBC AUTO: 81.6 FL — SIGNIFICANT CHANGE UP (ref 80–100)
NRBC # BLD: 0 /100 WBCS — SIGNIFICANT CHANGE UP (ref 0–0)
PLATELET # BLD AUTO: 246 K/UL — SIGNIFICANT CHANGE UP (ref 150–400)
POTASSIUM SERPL-MCNC: 3.9 MMOL/L — SIGNIFICANT CHANGE UP (ref 3.5–5.3)
POTASSIUM SERPL-SCNC: 3.9 MMOL/L — SIGNIFICANT CHANGE UP (ref 3.5–5.3)
RBC # BLD: 3.85 M/UL — LOW (ref 4.2–5.8)
RBC # FLD: 15.6 % — HIGH (ref 10.3–14.5)
SODIUM SERPL-SCNC: 142 MMOL/L — SIGNIFICANT CHANGE UP (ref 135–145)
WBC # BLD: 4.93 K/UL — SIGNIFICANT CHANGE UP (ref 3.8–10.5)
WBC # FLD AUTO: 4.93 K/UL — SIGNIFICANT CHANGE UP (ref 3.8–10.5)

## 2019-07-26 RX ORDER — HYDROCORTISONE 1 %
1 OINTMENT (GRAM) TOPICAL
Refills: 0 | Status: DISCONTINUED | OUTPATIENT
Start: 2019-07-26 | End: 2019-07-28

## 2019-07-26 RX ADMIN — SIMVASTATIN 20 MILLIGRAM(S): 20 TABLET, FILM COATED ORAL at 21:39

## 2019-07-26 RX ADMIN — PANTOPRAZOLE SODIUM 40 MILLIGRAM(S): 20 TABLET, DELAYED RELEASE ORAL at 11:08

## 2019-07-26 RX ADMIN — DONEPEZIL HYDROCHLORIDE 5 MILLIGRAM(S): 10 TABLET, FILM COATED ORAL at 21:39

## 2019-07-26 RX ADMIN — Medication 0.5 MILLIGRAM(S): at 07:20

## 2019-07-26 RX ADMIN — Medication 1 SUPPOSITORY(S): at 19:20

## 2019-07-26 RX ADMIN — Medication 1 TABLET(S): at 13:34

## 2019-07-26 RX ADMIN — Medication 0.5 MILLIGRAM(S): at 20:17

## 2019-07-26 RX ADMIN — Medication 1 TABLET(S): at 05:46

## 2019-07-26 RX ADMIN — Medication 1 TABLET(S): at 21:39

## 2019-07-26 RX ADMIN — SODIUM CHLORIDE 50 MILLILITER(S): 9 INJECTION INTRAMUSCULAR; INTRAVENOUS; SUBCUTANEOUS at 21:39

## 2019-07-26 NOTE — PROGRESS NOTE ADULT - PROBLEM SELECTOR PLAN 1
suspected lgib, ?hemorrhoidal vs diverticular vs other  HD stable, hgb trend noted sp ivf  no evidence of active gib per nursing   await am labs  cont ppi qd, anusol qhs  monitor cbc, transfuse prn  hold asa   cont ivf/clears, if hgb stable on am labs advance diet as tolerated  if w s/s active gib/drop in hgb, rec ct a/p for further eval  goc discussions  will follow possibly hemorrhoidal, resolving  HD stable, hgb trend noted sp ivf  no evidence of active gib per nursing   await am labs  cont ppi qd, anusol bid  monitor cbc, transfuse prn  hold asa if ok w cardiology  cont ivf/clears, if hgb stable on am labs advance diet as tolerated  if w s/s active gib/drop in hgb, rec ct a/p for further eval  goc discussions  will follow

## 2019-07-26 NOTE — DIETITIAN INITIAL EVALUATION ADULT. - OTHER INFO
89 yo male admitted for GIB.  Pt tolerated breakfast 100% consumed this morning. Pt reports height of 6'.  States usually eats well.

## 2019-07-27 LAB
ANION GAP SERPL CALC-SCNC: 6 MMOL/L — SIGNIFICANT CHANGE UP (ref 5–17)
BUN SERPL-MCNC: 13 MG/DL — SIGNIFICANT CHANGE UP (ref 7–23)
CALCIUM SERPL-MCNC: 8.6 MG/DL — SIGNIFICANT CHANGE UP (ref 8.5–10.1)
CHLORIDE SERPL-SCNC: 107 MMOL/L — SIGNIFICANT CHANGE UP (ref 96–108)
CO2 SERPL-SCNC: 29 MMOL/L — SIGNIFICANT CHANGE UP (ref 22–31)
CREAT SERPL-MCNC: 0.59 MG/DL — SIGNIFICANT CHANGE UP (ref 0.5–1.3)
GLUCOSE SERPL-MCNC: 117 MG/DL — HIGH (ref 70–99)
HCT VFR BLD CALC: 31.5 % — LOW (ref 39–50)
HGB BLD-MCNC: 10 G/DL — LOW (ref 13–17)
MCHC RBC-ENTMCNC: 26 PG — LOW (ref 27–34)
MCHC RBC-ENTMCNC: 31.7 GM/DL — LOW (ref 32–36)
MCV RBC AUTO: 81.8 FL — SIGNIFICANT CHANGE UP (ref 80–100)
NRBC # BLD: 0 /100 WBCS — SIGNIFICANT CHANGE UP (ref 0–0)
PLATELET # BLD AUTO: 249 K/UL — SIGNIFICANT CHANGE UP (ref 150–400)
POTASSIUM SERPL-MCNC: 4.4 MMOL/L — SIGNIFICANT CHANGE UP (ref 3.5–5.3)
POTASSIUM SERPL-SCNC: 4.4 MMOL/L — SIGNIFICANT CHANGE UP (ref 3.5–5.3)
RBC # BLD: 3.85 M/UL — LOW (ref 4.2–5.8)
RBC # FLD: 15.9 % — HIGH (ref 10.3–14.5)
SODIUM SERPL-SCNC: 142 MMOL/L — SIGNIFICANT CHANGE UP (ref 135–145)
WBC # BLD: 6.03 K/UL — SIGNIFICANT CHANGE UP (ref 3.8–10.5)
WBC # FLD AUTO: 6.03 K/UL — SIGNIFICANT CHANGE UP (ref 3.8–10.5)

## 2019-07-27 RX ADMIN — Medication 1 TABLET(S): at 05:48

## 2019-07-27 RX ADMIN — Medication 0.5 MILLIGRAM(S): at 20:06

## 2019-07-27 RX ADMIN — DONEPEZIL HYDROCHLORIDE 5 MILLIGRAM(S): 10 TABLET, FILM COATED ORAL at 21:38

## 2019-07-27 RX ADMIN — SIMVASTATIN 20 MILLIGRAM(S): 20 TABLET, FILM COATED ORAL at 21:38

## 2019-07-27 RX ADMIN — Medication 1 TABLET(S): at 21:38

## 2019-07-27 RX ADMIN — Medication 1 TABLET(S): at 13:27

## 2019-07-27 RX ADMIN — Medication 0.5 MILLIGRAM(S): at 09:00

## 2019-07-27 RX ADMIN — Medication 1 SUPPOSITORY(S): at 05:48

## 2019-07-27 RX ADMIN — Medication 1 SUPPOSITORY(S): at 21:38

## 2019-07-27 RX ADMIN — PANTOPRAZOLE SODIUM 40 MILLIGRAM(S): 20 TABLET, DELAYED RELEASE ORAL at 11:03

## 2019-07-27 NOTE — PROGRESS NOTE ADULT - PROBLEM SELECTOR PLAN 1
possibly hemorrhoidal, resolving  no evidence of active gib per nursing, passing brown stool  await am labs  cont ppi qd, anusol bid  monitor cbc, transfuse prn  hold asa if ok w cardiology  diet as tolerated  if w s/s active gib/drop in hgb, rec ct a/p for further eval  goc noted, dnr/dni no tf

## 2019-07-28 ENCOUNTER — TRANSCRIPTION ENCOUNTER (OUTPATIENT)
Age: 84
End: 2019-07-28

## 2019-07-28 VITALS
TEMPERATURE: 98 F | OXYGEN SATURATION: 97 % | DIASTOLIC BLOOD PRESSURE: 61 MMHG | RESPIRATION RATE: 18 BRPM | HEART RATE: 88 BPM | SYSTOLIC BLOOD PRESSURE: 110 MMHG

## 2019-07-28 LAB
ANION GAP SERPL CALC-SCNC: 5 MMOL/L — SIGNIFICANT CHANGE UP (ref 5–17)
BUN SERPL-MCNC: 17 MG/DL — SIGNIFICANT CHANGE UP (ref 7–23)
CALCIUM SERPL-MCNC: 8.5 MG/DL — SIGNIFICANT CHANGE UP (ref 8.5–10.1)
CHLORIDE SERPL-SCNC: 106 MMOL/L — SIGNIFICANT CHANGE UP (ref 96–108)
CO2 SERPL-SCNC: 30 MMOL/L — SIGNIFICANT CHANGE UP (ref 22–31)
CREAT SERPL-MCNC: 0.6 MG/DL — SIGNIFICANT CHANGE UP (ref 0.5–1.3)
GLUCOSE SERPL-MCNC: 105 MG/DL — HIGH (ref 70–99)
HCT VFR BLD CALC: 29.6 % — LOW (ref 39–50)
HGB BLD-MCNC: 9.2 G/DL — LOW (ref 13–17)
MCHC RBC-ENTMCNC: 25.4 PG — LOW (ref 27–34)
MCHC RBC-ENTMCNC: 31.1 GM/DL — LOW (ref 32–36)
MCV RBC AUTO: 81.8 FL — SIGNIFICANT CHANGE UP (ref 80–100)
NRBC # BLD: 0 /100 WBCS — SIGNIFICANT CHANGE UP (ref 0–0)
PLATELET # BLD AUTO: 236 K/UL — SIGNIFICANT CHANGE UP (ref 150–400)
POTASSIUM SERPL-MCNC: 4 MMOL/L — SIGNIFICANT CHANGE UP (ref 3.5–5.3)
POTASSIUM SERPL-SCNC: 4 MMOL/L — SIGNIFICANT CHANGE UP (ref 3.5–5.3)
RBC # BLD: 3.62 M/UL — LOW (ref 4.2–5.8)
RBC # FLD: 15.8 % — HIGH (ref 10.3–14.5)
SODIUM SERPL-SCNC: 141 MMOL/L — SIGNIFICANT CHANGE UP (ref 135–145)
WBC # BLD: 5.63 K/UL — SIGNIFICANT CHANGE UP (ref 3.8–10.5)
WBC # FLD AUTO: 5.63 K/UL — SIGNIFICANT CHANGE UP (ref 3.8–10.5)

## 2019-07-28 PROCEDURE — 97162 PT EVAL MOD COMPLEX 30 MIN: CPT

## 2019-07-28 PROCEDURE — 93005 ELECTROCARDIOGRAM TRACING: CPT

## 2019-07-28 PROCEDURE — 85018 HEMOGLOBIN: CPT

## 2019-07-28 PROCEDURE — 97530 THERAPEUTIC ACTIVITIES: CPT

## 2019-07-28 PROCEDURE — 80053 COMPREHEN METABOLIC PANEL: CPT

## 2019-07-28 PROCEDURE — 94640 AIRWAY INHALATION TREATMENT: CPT

## 2019-07-28 PROCEDURE — 86900 BLOOD TYPING SEROLOGIC ABO: CPT

## 2019-07-28 PROCEDURE — 99285 EMERGENCY DEPT VISIT HI MDM: CPT | Mod: 25

## 2019-07-28 PROCEDURE — 85027 COMPLETE CBC AUTOMATED: CPT

## 2019-07-28 PROCEDURE — 80048 BASIC METABOLIC PNL TOTAL CA: CPT

## 2019-07-28 PROCEDURE — 82272 OCCULT BLD FECES 1-3 TESTS: CPT

## 2019-07-28 PROCEDURE — 85730 THROMBOPLASTIN TIME PARTIAL: CPT

## 2019-07-28 PROCEDURE — 71045 X-RAY EXAM CHEST 1 VIEW: CPT

## 2019-07-28 PROCEDURE — 36415 COLL VENOUS BLD VENIPUNCTURE: CPT

## 2019-07-28 PROCEDURE — 85014 HEMATOCRIT: CPT

## 2019-07-28 PROCEDURE — 86850 RBC ANTIBODY SCREEN: CPT

## 2019-07-28 PROCEDURE — 96374 THER/PROPH/DIAG INJ IV PUSH: CPT

## 2019-07-28 PROCEDURE — 85610 PROTHROMBIN TIME: CPT

## 2019-07-28 PROCEDURE — 86901 BLOOD TYPING SEROLOGIC RH(D): CPT

## 2019-07-28 PROCEDURE — 97116 GAIT TRAINING THERAPY: CPT

## 2019-07-28 RX ORDER — SENNA PLUS 8.6 MG/1
2 TABLET ORAL
Qty: 60 | Refills: 0
Start: 2019-07-28 | End: 2019-08-26

## 2019-07-28 RX ORDER — POLYETHYLENE GLYCOL 3350 17 G/17G
17 POWDER, FOR SOLUTION ORAL
Qty: 340 | Refills: 0
Start: 2019-07-28

## 2019-07-28 RX ORDER — PANTOPRAZOLE SODIUM 20 MG/1
1 TABLET, DELAYED RELEASE ORAL
Qty: 30 | Refills: 0
Start: 2019-07-28 | End: 2019-08-26

## 2019-07-28 RX ADMIN — Medication 1 TABLET(S): at 05:46

## 2019-07-28 RX ADMIN — Medication 1 TABLET(S): at 14:38

## 2019-07-28 RX ADMIN — Medication 0.5 MILLIGRAM(S): at 07:54

## 2019-07-28 RX ADMIN — Medication 1 SUPPOSITORY(S): at 05:46

## 2019-07-28 RX ADMIN — PANTOPRAZOLE SODIUM 40 MILLIGRAM(S): 20 TABLET, DELAYED RELEASE ORAL at 12:10

## 2019-07-28 NOTE — PROGRESS NOTE ADULT - PROBLEM SELECTOR PROBLEM 1
Fecal occult blood test positive
GIB (gastrointestinal bleeding)

## 2019-07-28 NOTE — DISCHARGE NOTE PROVIDER - PROVIDER TOKENS
PROVIDER:[TOKEN:[[id number]],FOLLOWUP:[1 week]],PROVIDER:[TOKEN:[[id number]],FOLLOWUP:[1-3 days]]

## 2019-07-28 NOTE — PROGRESS NOTE ADULT - PROBLEM SELECTOR PROBLEM 3
Atrial fibrillation
Anemia
Atrial fibrillation
Anemia

## 2019-07-28 NOTE — PROGRESS NOTE ADULT - PROVIDER SPECIALTY LIST ADULT
Cardiology
Gastroenterology
Gastroenterology
Hospitalist
Hospitalist
Gastroenterology
Hospitalist

## 2019-07-28 NOTE — PROGRESS NOTE ADULT - PROBLEM SELECTOR PLAN 4
continue home medications ,cardiology consult and clearance for possible endoscopy
care per primary
continue home medications ,cardiology consult and clearance for possible endoscopy
on coverage insulin

## 2019-07-28 NOTE — PROGRESS NOTE ADULT - PROBLEM SELECTOR PLAN 8
/home Palliative care consult requested ,to discuss advance directives and complete/update  MOLST Seen by PT -AMRIK VS HOME OT RECOMMENDED
normal function
normal function
/home Palliative care consult requested ,to discuss advance directives and complete/update  MOLST
/home Palliative care consult requested ,to discuss advance directives and complete/update  MOLST Seen by PT -AMRIK VS HOME OT RECOMMENDED

## 2019-07-28 NOTE — PROGRESS NOTE ADULT - PROBLEM SELECTOR PLAN 1
suspect hemorrhoidal, resolved  hgb has been stable, no s/s overt gib, passing brown stool  cont ppi qd, anusol bid  monitor cbc, transfuse prn  diet as tolerated  if w s/s active gib/drop in hgb, rec ct a/p for further eval  goc noted, dnr/dni no tf

## 2019-07-28 NOTE — DISCHARGE NOTE NURSING/CASE MANAGEMENT/SOCIAL WORK - NSDCDPATPORTLINK_GEN_ALL_CORE
You can access the [facility] Patient Portal, offered by [facility], by registering with the following website: [email or web address]

## 2019-07-28 NOTE — PROGRESS NOTE ADULT - ATTENDING COMMENTS
Advanced care planning was discussed with patient and family.  Advanced care planning forms were reviewed and discussed.  Risks, benefits and alternatives of gastroenterologic procedures were discussed in detail and all questions were answered.    30 minutes spent.
89 y male hx of Arteriosclerotic heart disease (ASHD)  ,Atrial fibrillation  ,CHF (congestive heart failure)  ,Dementia    ,Depression  ,Diabetes     Hyperlipemia  ,Hypertension  ,Prostate CA ,dementia, sent to ED from [facility]( PMD Dr oTby Quintana )for GI evaluation due to  blood in stool.  Patient is a very poor historian due to dementia ,he was HD stable on arrival to ER GI eval requested and cardiology consult called for clearance for possible endoscopy Palliative care consult requested ,to discuss advance directives and complete MOLST
89 y male hx of Arteriosclerotic heart disease (ASHD)  ,Atrial fibrillation  ,CHF (congestive heart failure)  ,Dementia    ,Depression  ,Diabetes     Hyperlipemia  ,Hypertension  ,Prostate CA ,dementia, sent to ED from [facility]( PMD Dr Toby Quintana )for GI evaluation due to  blood in stool.  Patient is a very poor historian due to dementia ,he was HD stable on arrival to ER GI eval requested and cardiology consult called for clearance for possible endoscopy Palliative care consult requested ,to discuss advance directives and complete MOLST
Patient was seen and examined on a day of discharge . Plan of care , discharge medications and recommendations discussed with consultants and clearance for discharge obtained .Social service , case management  and medical staff are aware of plan. Family is notified. Discharge summary  is  prepared electronically As per GI team recommended to hold ASA for 1 week ,resume in one week if ok with PCP Dr Quintana ,d/w Dr Espinoza and PA from GI team .

## 2019-07-28 NOTE — PROGRESS NOTE ADULT - NSHPATTENDINGPLANDISCUSS_GEN_ALL_CORE
med staff , Dr Quintana ( outpatient PCP), GI TEAM , ,left a message for scott Vinicio
med staff , Dr Quintana ( outpatient PCP), GI TEAM ,
med staff , Dr Quintana ( outpatient PCP), GI TEAM , , Lora , spoke to the son  Vinicio

## 2019-07-28 NOTE — PROGRESS NOTE ADULT - PROBLEM SELECTOR PROBLEM 4
CHF (congestive heart failure)
CHF (congestive heart failure)
DM (diabetes mellitus)
Dementia

## 2019-07-28 NOTE — PROGRESS NOTE ADULT - PROBLEM SELECTOR PLAN 3
SERIAL H/H , TRANSFUSE PRN
care per cardiology
SERIAL H/H , TRANSFUSE PRN
care per cardiology
not on oac

## 2019-07-28 NOTE — DISCHARGE NOTE PROVIDER - HOSPITAL COURSE
89 y male hx of Arteriosclerotic heart disease (ASHD)  ,Atrial fibrillation  ,CHF (congestive heart failure)  ,Dementia    ,Depression  ,Diabetes       Hyperlipemia  ,Hypertension  ,Prostate CA ,dementia, sent to ED from [facility]( PMD Dr Toby Quintana )for GI evaluation due to  blood in stool.  Patient is a very poor historian due to dementia ,he was HD stable on arrival to ER GI eval requested and cardiology consult called for clearance for possible endoscopy Palliative care consult requested ,to discuss advance directives and complete MOLST     ·  Problem: GIB (gastrointestinal bleeding),LIKELY 2/2 TO HEMORRHOIDS -BOWEL REGIMEN RECOMMENDED , HOLD ASA FOR 1 WEEK ,RESUME IF OK WITH PCP  .  FOLLOWUP WITH GI CONS IN 1-2 WEEKS     ·  Problem: Atrial fibrillation.  Plan: continue current management and present  medications ,monitoring ,cardiology evaluation.     ·  Problem: Anemia.  Plan: SERIAL H/H , TRANSFUSE PRN.     ·  Problem: CHF (congestive heart failure).  Plan: continue home medications ,cardiology consult and clearance for possible endoscopy.     ·  Problem: Hypertension.  Plan: continue home medications. :    Problem: DM (diabetes mellitus). Plan: corrective regimen with sliding scale coverage    ·  Problem: Prostate ca.  Plan: continue home medications Palliative care consult requested ,to discuss advance directives and complete /update MOLST.     ·  Problem: Dementia.  Plan: /home Palliative care consult requested ,to discuss advance directives and complete/update  MOLST Seen by PT -AMRIK VS HOME OT RECOMMENDED.     ·  Problem: Prophylactic measure.  Plan: Gastrointestinal stress ulcer prophylaxis and DVT prophylaxis administrated.

## 2019-07-28 NOTE — PROGRESS NOTE ADULT - PROBLEM SELECTOR PLAN 5
continue home medications
compensated
continue home medications
continue home medications
compensated

## 2019-07-28 NOTE — PROGRESS NOTE ADULT - ASSESSMENT
89 yo m w pmhx as above, including dementia,  afib (on asa not ac per notes) presented to ed w cc of  bloody stool; gi consulted for ob+anemia
89 yo m w pmhx as above, including dementia,  afib (on asa not ac per notes) presented to ed w cc of  bloody stool; gi consulted for ob+anemia
89 y male hx of Arteriosclerotic heart disease (ASHD)  ,Atrial fibrillation  ,CHF (congestive heart failure)  ,Dementia    ,Depression  ,Diabetes     Hyperlipemia  ,Hypertension  ,Prostate CA ,dementia, sent to ED from [facility]( PMD Dr Toby Quintana )for GI evaluation due to  blood in stool.  Patient is a very poor historian due to dementia ,he was HD stable on arrival to ER GI eval requested and cardiology consult called for clearance for possible endoscopy Palliative care consult requested ,to discuss advance directives and complete MOLST
89 yo m w pmhx as above, including dementia,  afib (on asa not ac per notes) presented to ed w cc of  bloody stool; gi consulted for ob+anemia
89 yo m w pmhx as above, including dementia,  afib (on asa not ac per notes) presented to ed w cc of  bloody stool; gi consulted for ob+anemia
pt with gib- management as per gi  no active bleeding  ashd  atrial fib -not on oac  s/p ppm   chf - compensated  dm2   s/p prostate cancer dementia
pt with gib-not active  hb 9.5  dm2  ashd  atrial fib  s/p ppm  chf  dyslipidemia  dementia
89 y male hx of Arteriosclerotic heart disease (ASHD)  ,Atrial fibrillation  ,CHF (congestive heart failure)  ,Dementia    ,Depression  ,Diabetes     Hyperlipemia  ,Hypertension  ,Prostate CA ,dementia, sent to ED from [facility]( PMD Dr Toby Quintana )for GI evaluation due to  blood in stool.  Patient is a very poor historian due to dementia ,he was HD stable on arrival to ER GI eval requested and cardiology consult called for clearance for possible endoscopy Palliative care consult requested ,to discuss advance directives and complete MOLST

## 2019-07-28 NOTE — DISCHARGE NOTE PROVIDER - NSDCCPCAREPLAN_GEN_ALL_CORE_FT
PRINCIPAL DISCHARGE DIAGNOSIS  Diagnosis: GI bleeding  Assessment and Plan of Treatment:       SECONDARY DISCHARGE DIAGNOSES  Diagnosis: Anemia  Assessment and Plan of Treatment: Anemia    Diagnosis: Hypertension  Assessment and Plan of Treatment: Hypertension    Diagnosis: Prostate ca  Assessment and Plan of Treatment: Prostate ca    Diagnosis: CHF (congestive heart failure)  Assessment and Plan of Treatment: CHF (congestive heart failure)    Diagnosis: Pacemaker  Assessment and Plan of Treatment: Pacemaker

## 2019-07-28 NOTE — DISCHARGE NOTE PROVIDER - NSDCFUADDINST_GEN_ALL_CORE_FT
BOWEL REGIMEN RECOMMENDED , HOLD ASA FOR 1 WEEK ,RESUME IF OK WITH PCP  .  FOLLOWUP WITH GI CONS IN 1-2 WEEKS

## 2019-07-28 NOTE — PROGRESS NOTE ADULT - SUBJECTIVE AND OBJECTIVE BOX
INTERVAL HPI/OVERNIGHT EVENTS:  pt seen and examined  denies abd pain  per rn no gib overnight, passed large brown bm yesterday  labs pending    MEDICATIONS  (STANDING):  buDESOnide    Inhalation Suspension 0.5 milliGRAM(s) Inhalation two times a day  donepezil 5 milliGRAM(s) Oral at bedtime  hydrocortisone hemorrhoidal Suppository 1 Suppository(s) Rectal two times a day  lactobacillus acidophilus 1 Tablet(s) Oral three times a day  pantoprazole  Injectable 40 milliGRAM(s) IV Push daily  simvastatin 20 milliGRAM(s) Oral at bedtime  sodium chloride 0.9%. 1000 milliLiter(s) (50 mL/Hr) IV Continuous <Continuous>    MEDICATIONS  (PRN):  acetaminophen   Tablet .. 650 milliGRAM(s) Oral every 6 hours PRN Temp greater or equal to 38C (100.4F), Mild Pain (1 - 3)  ALBUTerol    90 MICROgram(s) HFA Inhaler 2 Puff(s) Inhalation every 6 hours PRN Shortness of Breath      Allergies    latex (Rash)  latex (Unknown)  No Known Drug Allergies    Intolerances        Review of Systems:  unable to obtain in entirety      Vital Signs Last 24 Hrs  T(C): 36.4 (27 Jul 2019 04:56), Max: 36.8 (26 Jul 2019 20:32)  T(F): 97.5 (27 Jul 2019 04:56), Max: 98.2 (26 Jul 2019 20:32)  HR: 66 (27 Jul 2019 04:56) (57 - 78)  BP: 107/64 (27 Jul 2019 04:56) (107/64 - 112/66)  BP(mean): --  RR: 17 (27 Jul 2019 04:56) (16 - 19)  SpO2: 93% (27 Jul 2019 04:56) (93% - 98%)    PHYSICAL EXAM:  General:  lying in bed in nad  HEENT:  NC/AT  Chest:  dec bs  Cardiovascular:  Regular rhythm, S1, S2  Abdomen:  Soft, non-tender, non-distended   Extremities:  no  edema  Skin:  No rash  Neuro/Psych:  Awake alert confused        LABS:                        9.9    4.93  )-----------( 246      ( 26 Jul 2019 08:24 )             31.4     07-26    142  |  107  |  11  ----------------------------<  83  3.9   |  29  |  0.49<L>    Ca    8.6      26 Jul 2019 08:24    TPro  7.0  /  Alb  3.5  /  TBili  0.4  /  DBili  x   /  AST  23  /  ALT  27  /  AlkPhos  116  07-25    PT/INR - ( 25 Jul 2019 14:21 )   PT: 13.4 sec;   INR: 1.18 ratio         PTT - ( 25 Jul 2019 14:21 )  PTT:32.1 sec      RADIOLOGY & ADDITIONAL TESTS:
CARDIOLOGY CONSULT NOTE    Patient is a 89y Male with a known history of :  Prophylactic measure (Z29.9)  Prostate ca (C61)  DM (diabetes mellitus) (E11.9)  Hypertension (I10)  CHF (congestive heart failure) (I50.9)  GIB (gastrointestinal bleeding) (K92.2)  Dementia (F03.90)  Atrial fibrillation (I48.91)  Anemia (D64.9)  Fecal occult blood test positive (R19.5)    HPI:  89 y male hx of Arteriosclerotic heart disease (ASHD)  ,Atrial fibrillation  ,CHF (congestive heart failure)  ,Dementia    ,Depression  ,Diabetes     Hyperlipemia  ,Hypertension  ,Prostate CA ,dementia, sent to ED from [facility]( PMD Dr Toby Quintana )for GI evaluation due to  blood in stool.  Patient is a very poor historian due to dementia ,he was HD stable on arrival to ER GI eval requested and cardiology consult called for clearance for possible endoscopy Palliative care consult requested ,to discuss advance directives and complete MOLST (25 Jul 2019 14:57)      REVIEW OF SYSTEMS:    CONSTITUTIONAL: No fever, weight loss, or fatigue  EYES: No eye pain, visual disturbances, or discharge  ENMT:  No difficulty hearing, tinnitus, vertigo; No sinus or throat pain  NECK: No pain or stiffness  BREASTS: No pain, masses, or nipple discharge  RESPIRATORY: No cough, wheezing, chills or hemoptysis; No shortness of breath  CARDIOVASCULAR: No chest pain, palpitations, dizziness, or leg swelling  GASTROINTESTINAL: No abdominal or epigastric pain. No nausea, vomiting, or hematemesis; No diarrhea or constipation. No melena or hematochezia.  GENITOURINARY: No dysuria, frequency, hematuria, or incontinence  NEUROLOGICAL: No headaches, memory loss, loss of strength, numbness, or tremors  SKIN: No itching, burning, rashes, or lesions   LYMPH NODES: No enlarged glands  ENDOCRINE: No heat or cold intolerance; No hair loss  MUSCULOSKELETAL: No joint pain or swelling; No muscle, back, or extremity pain  PSYCHIATRIC: No depression, anxiety, mood swings, or difficulty sleeping  HEME/LYMPH: No easy bruising, or bleeding gums  ALLERGY AND IMMUNOLOGIC: No hives or eczema    MEDICATIONS  (STANDING):  buDESOnide    Inhalation Suspension 0.5 milliGRAM(s) Inhalation two times a day  donepezil 5 milliGRAM(s) Oral at bedtime  hydrocortisone hemorrhoidal Suppository 1 Suppository(s) Rectal at bedtime  lactobacillus acidophilus 1 Tablet(s) Oral three times a day  simvastatin 20 milliGRAM(s) Oral at bedtime  sodium chloride 0.9%. 1000 milliLiter(s) (50 mL/Hr) IV Continuous <Continuous>    MEDICATIONS  (PRN):  acetaminophen   Tablet .. 650 milliGRAM(s) Oral every 6 hours PRN Temp greater or equal to 38C (100.4F), Mild Pain (1 - 3)  ALBUTerol    90 MICROgram(s) HFA Inhaler 2 Puff(s) Inhalation every 6 hours PRN Shortness of Breath      ALLERGIES: latex (Rash)  latex (Unknown)  No Known Drug Allergies      Social History:     FAMILY HISTORY:      PAST MEDICAL & SURGICAL HISTORY:  Dementia  Arteriosclerotic heart disease (ASHD)  Prostate CA  Atrial fibrillation  DM (diabetes mellitus)  Dementia  Depression  Diabetes  Hyperlipemia  CHF (congestive heart failure)  Dementia  Prostate ca  Diabetes  Hypertension  Atrial fibrillation  No significant past surgical history        PHYSICAL EXAMINATION:  -----------------------------  T(C): 36.4 (07-25-19 @ 15:38), Max: 36.6 (07-25-19 @ 11:39)  HR: 78 (07-25-19 @ 15:38) (74 - 78)  BP: 113/70 (07-25-19 @ 15:38) (112/67 - 113/70)  RR: 16 (07-25-19 @ 15:38) (15 - 16)  SpO2: 100% (07-25-19 @ 15:38) (98% - 100%)  Wt(kg): --      Weight (kg): 66.2 (07-25 @ 11:39)    Constitutional: well developed, normal appearance, well groomed, well nourished, no deformities and no acute distress.   Eyes: the conjunctiva exhibited no abnormalities and the eyelids demonstrated no xanthelasmas.   HEENT: normal oral mucosa, no oral pallor and no oral cyanosis.   Neck: normal jugular venous A waves present, normal jugular venous V waves present and no jugular venous jacobs A waves.   Pulmonary: no respiratory distress, normal respiratory rhythm and effort, no accessory muscle use and lungs were clear to auscultation bilaterally.   Cardiovascular: heart rate and rhythm were normal, normal S1 and S2 and no murmur, gallop, rub, heave or thrill are present.   Abdomen: soft, non-tender, no hepato-splenomegaly and no abdominal mass palpated.   Musculoskeletal: the gait could not be assessed..   Extremities: no clubbing of the fingernails, no localized cyanosis, no petechial hemorrhages and no ischemic changes.   Skin: normal skin color and pigmentation, no rash, no venous stasis, no skin lesions, no skin ulcer and no xanthoma was observed.   Psychiatric: oriented to person, place, and time, the affect was normal, the mood was normal and not feeling anxious.     LABS:   --------  07-25    139  |  104  |  15  ----------------------------<  129<H>  4.3   |  32<H>  |  0.67    Ca    8.9      25 Jul 2019 12:21    TPro  7.0  /  Alb  3.5  /  TBili  0.4  /  DBili  x   /  AST  23  /  ALT  27  /  AlkPhos  116  07-25                         10.5   5.29  )-----------( 258      ( 25 Jul 2019 12:21 )             34.2     PT/INR - ( 25 Jul 2019 14:21 )   PT: 13.4 sec;   INR: 1.18 ratio         PTT - ( 25 Jul 2019 14:21 )  PTT:32.1 sec            RADIOLOGY:  -----------------        ECG:     ECHO
INTERVAL HPI/OVERNIGHT EVENTS:  pt seen and examined  confused, resting in bed  per overnight rn no bm no s/s active gib   am labs pending    MEDICATIONS  (STANDING):  buDESOnide    Inhalation Suspension 0.5 milliGRAM(s) Inhalation two times a day  donepezil 5 milliGRAM(s) Oral at bedtime  hydrocortisone hemorrhoidal Suppository 1 Suppository(s) Rectal at bedtime  lactobacillus acidophilus 1 Tablet(s) Oral three times a day  pantoprazole  Injectable 40 milliGRAM(s) IV Push daily  simvastatin 20 milliGRAM(s) Oral at bedtime  sodium chloride 0.9%. 1000 milliLiter(s) (50 mL/Hr) IV Continuous <Continuous>    MEDICATIONS  (PRN):  acetaminophen   Tablet .. 650 milliGRAM(s) Oral every 6 hours PRN Temp greater or equal to 38C (100.4F), Mild Pain (1 - 3)  ALBUTerol    90 MICROgram(s) HFA Inhaler 2 Puff(s) Inhalation every 6 hours PRN Shortness of Breath      Allergies    latex (Rash)  latex (Unknown)  No Known Drug Allergies    Intolerances        Review of Systems:  unable to obtain in entirety      Vital Signs Last 24 Hrs  T(C): 36.6 (26 Jul 2019 04:15), Max: 36.7 (25 Jul 2019 18:43)  T(F): 97.9 (26 Jul 2019 04:15), Max: 98.1 (25 Jul 2019 20:56)  HR: 50 (26 Jul 2019 07:22) (50 - 78)  BP: 124/50 (26 Jul 2019 04:15) (110/57 - 124/50)  BP(mean): --  RR: 16 (26 Jul 2019 04:15) (15 - 17)  SpO2: 94% (26 Jul 2019 07:22) (94% - 100%)    PHYSICAL EXAM:    General:  lying in bed in nad  HEENT:  NC/AT  Chest:  dec bs  Cardiovascular:  Regular rhythm, S1, S2  Abdomen:  Soft, non-tender, non-distended   Extremities:  no  edema  Skin:  No rash  Neuro/Psych:  Awake alert confused      LABS:                        9.5    x     )-----------( x        ( 26 Jul 2019 00:17 )             29.7     07-25    139  |  104  |  15  ----------------------------<  129<H>  4.3   |  32<H>  |  0.67    Ca    8.9      25 Jul 2019 12:21    TPro  7.0  /  Alb  3.5  /  TBili  0.4  /  DBili  x   /  AST  23  /  ALT  27  /  AlkPhos  116  07-25    PT/INR - ( 25 Jul 2019 14:21 )   PT: 13.4 sec;   INR: 1.18 ratio         PTT - ( 25 Jul 2019 14:21 )  PTT:32.1 sec      RADIOLOGY & ADDITIONAL TESTS:
INTERVAL HPI/OVERNIGHT EVENTS:  pt seen and examined  resting in bed  denies abd pain  per rn no s/s active gib, had brown bm yesterday    MEDICATIONS  (STANDING):  buDESOnide    Inhalation Suspension 0.5 milliGRAM(s) Inhalation two times a day  donepezil 5 milliGRAM(s) Oral at bedtime  hydrocortisone hemorrhoidal Suppository 1 Suppository(s) Rectal two times a day  lactobacillus acidophilus 1 Tablet(s) Oral three times a day  pantoprazole  Injectable 40 milliGRAM(s) IV Push daily  simvastatin 20 milliGRAM(s) Oral at bedtime  sodium chloride 0.9%. 1000 milliLiter(s) (50 mL/Hr) IV Continuous <Continuous>    MEDICATIONS  (PRN):  acetaminophen   Tablet .. 650 milliGRAM(s) Oral every 6 hours PRN Temp greater or equal to 38C (100.4F), Mild Pain (1 - 3)  ALBUTerol    90 MICROgram(s) HFA Inhaler 2 Puff(s) Inhalation every 6 hours PRN Shortness of Breath      Allergies    latex (Rash)  latex (Unknown)  No Known Drug Allergies    Intolerances        Review of Systems:    General:  No wt loss, fevers, chills, night sweats, fatigue   Eyes:  Good vision, no reported pain  ENT:  No sore throat, pain, runny nose, dysphagia  CV:  No pain, palpitations, hypo/hypertension  Resp:  No dyspnea, cough, tachypnea, wheezing  GI:  No pain, No nausea, No vomiting, No diarrhea, No constipation, No weight loss, No fever, No pruritis, No rectal bleeding, No melena, No dysphagia  :  No pain, bleeding, incontinence, nocturia  Muscle:  No pain, weakness  Neuro:  No weakness, tingling, memory problems  Psych:  No fatigue, insomnia, mood problems, depression  Endocrine:  No polyuria, polydypsia, cold/heat intolerance  Heme:  No petechiae, ecchymosis, easy bruisability  Skin:  No rash, tattoos, scars, edema      Vital Signs Last 24 Hrs  T(C): 36.3 (28 Jul 2019 05:05), Max: 36.5 (27 Jul 2019 13:09)  T(F): 97.4 (28 Jul 2019 05:05), Max: 97.7 (27 Jul 2019 13:09)  HR: 69 (28 Jul 2019 05:05) (66 - 83)  BP: 106/60 (28 Jul 2019 05:05) (106/57 - 111/61)  BP(mean): --  RR: 18 (28 Jul 2019 05:05) (18 - 19)  SpO2: 96% (28 Jul 2019 05:05) (92% - 99%)    PHYSICAL EXAM:    General:  lying in bed in nad  HEENT:  NC/AT  Chest:  dec bs  Cardiovascular:  Regular rhythm, S1, S2  Abdomen:  Soft, non-tender, non-distended   Extremities:  no  edema  Skin:  No rash  Neuro/Psych:  Awake alert confused        LABS:                        10.0   6.03  )-----------( 249      ( 27 Jul 2019 11:59 )             31.5     07-27    142  |  107  |  13  ----------------------------<  117<H>  4.4   |  29  |  0.59    Ca    8.6      27 Jul 2019 11:59            RADIOLOGY & ADDITIONAL TESTS:
PROGRESS NOTE  Patient is a 89y old  Male who presents with a chief complaint of GIB (26 Jul 2019 08:31)  Chart and available morning labs /imaging are reviewed electronically , urgent issues addressed . More information  is being added upon completion of rounds , when more information is collected and management discussed with consultants , medical staff and social service/case management on the floor     OVERNIGHT  No new issues reported by medical staff . All above noted Patient is resting in a bed comfortably .Confused ,poor mentation .No distress noted   No GIB reported overnight   HPI:  89 y male hx of Arteriosclerotic heart disease (ASHD)  ,Atrial fibrillation  ,CHF (congestive heart failure)  ,Dementia    ,Depression  ,Diabetes     Hyperlipemia  ,Hypertension  ,Prostate CA ,dementia, sent to ED from [facility]( PMD Dr Toby Quintana )for GI evaluation due to  blood in stool.  Patient is a very poor historian due to dementia ,he was HD stable on arrival to ER GI eval requested and cardiology consult called for clearance for possible endoscopy Palliative care consult requested ,to discuss advance directives and complete MOLST (25 Jul 2019 14:57)    PAST MEDICAL & SURGICAL HISTORY:  Dementia  Arteriosclerotic heart disease (ASHD)  Prostate CA  Atrial fibrillation  DM (diabetes mellitus)  Dementia  Depression  Diabetes  Hyperlipemia  CHF (congestive heart failure)  Dementia  Prostate ca  Diabetes  Hypertension  Atrial fibrillation  No significant past surgical history      MEDICATIONS  (STANDING):  buDESOnide    Inhalation Suspension 0.5 milliGRAM(s) Inhalation two times a day  donepezil 5 milliGRAM(s) Oral at bedtime  hydrocortisone hemorrhoidal Suppository 1 Suppository(s) Rectal two times a day  lactobacillus acidophilus 1 Tablet(s) Oral three times a day  pantoprazole  Injectable 40 milliGRAM(s) IV Push daily  simvastatin 20 milliGRAM(s) Oral at bedtime  sodium chloride 0.9%. 1000 milliLiter(s) (50 mL/Hr) IV Continuous <Continuous>    MEDICATIONS  (PRN):  acetaminophen   Tablet .. 650 milliGRAM(s) Oral every 6 hours PRN Temp greater or equal to 38C (100.4F), Mild Pain (1 - 3)  ALBUTerol    90 MICROgram(s) HFA Inhaler 2 Puff(s) Inhalation every 6 hours PRN Shortness of Breath      OBJECTIVE    T(C): 36.6 (07-26-19 @ 04:15), Max: 36.7 (07-25-19 @ 18:43)  HR: 50 (07-26-19 @ 07:22) (50 - 78)  BP: 124/50 (07-26-19 @ 04:15) (110/57 - 124/50)  RR: 16 (07-26-19 @ 04:15) (15 - 17)  SpO2: 94% (07-26-19 @ 07:22) (94% - 100%)  Wt(kg): --  I&O's Summary        REVIEW OF SYSTEMS:  ROS is unobtainable due to dementia and confusion     PHYSICAL EXAM:  Appearance: NAD. VS past 24 hrs -as above   HEENT:   Moist oral mucosa. Conjunctiva clear b/l.   Neck : supple  Respiratory: Lungs CTAB.  Gastrointestinal:  Soft, nontender. No rebound. No rigidity. BS present	  Cardiovascular: RRR ,S1S2 present  Neurologic: Non-focal. Moving all extremities.  Extremities: No edema. No erythema. No calf tenderness.  Skin: No rashes, No ecchymoses, No cyanosis.	  wounds ,skin lesions-See skin assesment flow sheet   LABS:                        9.9    4.93  )-----------( 246      ( 26 Jul 2019 08:24 )             31.4     07-26    142  |  107  |  11  ----------------------------<  83  3.9   |  29  |  0.49<L>    Ca    8.6      26 Jul 2019 08:24    TPro  7.0  /  Alb  3.5  /  TBili  0.4  /  DBili  x   /  AST  23  /  ALT  27  /  AlkPhos  116  07-25    CAPILLARY BLOOD GLUCOSE        PT/INR - ( 25 Jul 2019 14:21 )   PT: 13.4 sec;   INR: 1.18 ratio         PTT - ( 25 Jul 2019 14:21 )  PTT:32.1 sec      RADIOLOGY & ADDITIONAL TESTS:< from: Xray Chest 1 View- PORTABLE-Routine (07.25.19 @ 15:23) >  EXAM:  XR CHEST PORTABLE ROUTINE 1V                            PROCEDURE DATE:  07/25/2019          INTERPRETATION:  CHF.    AP chest. Prior 4/12/2019.    Low lung volumes. Patient's chin obscures portion left apex. Left cardiac   pacer in situ. Nochange heart mediastinum. Grossly clear lungs. No   consolidation or effusion. Severe degenerative change left glenohumeral   joint.    Impression: Grossly clear lungs.    < end of copied text >     reviewed elctronically < from: TTE Echo Doppler w/o Cont (02.01.19 @ 17:45) >   EXAM:  ECHO TTE WO CON COMP W DOPPLR         PROCEDURE DATE:  02/01/2019        INTERPRETATION:  Ordering Physician: IVETTE VAZQUEZ [id number]  Indication: CHF.  Technician: Jeff.  Study Quality: Technical difficult study.   A complete echocardiographic study was performed utilizing standard   protocol including spectral and color Doppler in all echocardiographic   windows.  Height: 182.8cm  Weight: 73.5kg  BSA: 1.95m2  Blood Pressure: 101/62  MEASUREMENTS  IVS: 1.2cm  PWT: 1.2cm  LA: 4.2cm  AO:3.6cm  LVIDd: 5.4 cm.  LVIDs: 3.9cm  LVEF: 35-40%.  PASP: 40mm Hg  FINDINGS  Left Ventricle: Borderline LV size with  moderate depressed LV systolic   function with estimated LVEF 35-40%.  Right Ventricle: Normal in size and normal RV systolic function.  Left Atrium: Mild dilated.  Right Atrium: Normal in size.  Mitral Valve: Mild mitral annular calcification is present. Mitral valve   is mildly calcified and no evidence of any mitral stenosis mild mitral   regurgitation is present.  Aortic Valve: Aortic Root is mild sclerotic and of normal dimension.   Aortic valve is calcified with  mild aortic stenosis present with the   aortic valve area calculated 1.7 sq cm and peak gradient across the   aortic valve is 15 mmHg and mean gradient 7 mmHg is present. Mild aortic   regurgitation is present.  Tricuspid Valve: Mild tricuspid regurgitation with calculated  PA   systolic pressure 40 mmHg suggestive of mild pulmonary hypertension is   present.  Pulmonic Valve:Trace  Pulmonary regurgitation ispresent.  Diastolic Function: Cannot evaluate because of underlying atrial   fibrillation.  Pericardium/Pleura: No evidence of pericardial effusion.  No intracardiac thrombus or vegetations and  tumor.  CONCLUSIONS:  Normal LV size with  moderate depressed LV systolic function with LVEF   35-40% .    Mild concentric left ventricular hypertrophy is present.    Mild tricuspid regurgitation with  mild pulmonary hypertension is present.     mild mitral regurgitation is present.    Technical difficultstudy    < end of copied text >  45minutes spent on this visit, 50% visit time spent in care co-ordination with other attendings and counselling patient  I have discussed care plan with patient and HCP,expressed understanding of problems treatment and their effect and side effects, alternatives in detail,I have asked if they have any questions and concerns and appropriately addressed them to best of my ability
PROGRESS NOTE  Patient is a 89y old  Male who presents with a chief complaint of GIB (28 Jul 2019 12:10)  Chart and available morning labs /imaging are reviewed electronically , urgent issues addressed . More information  is being added upon completion of rounds , when more information is collected and management discussed with consultants , medical staff and social service/case management on the floor   OVERNIGHT  No new issues reported by medical staff . All above noted Patient is resting in a bed comfortably .Confused ,poor mentation .No distress noted denies abd pain  per rn no s/s active gib, had brown bm yesterday  HPI:  89 y male hx of Arteriosclerotic heart disease (ASHD)  ,Atrial fibrillation  ,CHF (congestive heart failure)  ,Dementia    ,Depression  ,Diabetes     Hyperlipemia  ,Hypertension  ,Prostate CA ,dementia, sent to ED from [facility]( PMD Dr Toby Quintana )for GI evaluation due to  blood in stool.  Patient is a very poor historian due to dementia ,he was HD stable on arrival to ER GI eval requested and cardiology consult called for clearance for possible endoscopy Palliative care consult requested ,to discuss advance directives and complete MOLST (25 Jul 2019 14:57)  PAST MEDICAL & SURGICAL HISTORY:  Dementia  Arteriosclerotic heart disease (ASHD)  Prostate CA  Atrial fibrillation  DM (diabetes mellitus)  Dementia  Depression  Diabetes  Hyperlipemia  CHF (congestive heart failure)  Dementia  Prostate ca  Diabetes  Hypertension  Atrial fibrillation  No significant past surgical history      MEDICATIONS  (STANDING):  buDESOnide    Inhalation Suspension 0.5 milliGRAM(s) Inhalation two times a day  donepezil 5 milliGRAM(s) Oral at bedtime  hydrocortisone hemorrhoidal Suppository 1 Suppository(s) Rectal two times a day  lactobacillus acidophilus 1 Tablet(s) Oral three times a day  pantoprazole  Injectable 40 milliGRAM(s) IV Push daily  simvastatin 20 milliGRAM(s) Oral at bedtime  sodium chloride 0.9%. 1000 milliLiter(s) (50 mL/Hr) IV Continuous <Continuous>    MEDICATIONS  (PRN):  acetaminophen   Tablet .. 650 milliGRAM(s) Oral every 6 hours PRN Temp greater or equal to 38C (100.4F), Mild Pain (1 - 3)  ALBUTerol    90 MICROgram(s) HFA Inhaler 2 Puff(s) Inhalation every 6 hours PRN Shortness of Breath      OBJECTIVE    T(C): 36.3 (07-28-19 @ 05:05), Max: 36.5 (07-27-19 @ 20:22)  HR: 62 (07-28-19 @ 07:55) (62 - 83)  BP: 106/60 (07-28-19 @ 05:05) (106/57 - 106/60)  RR: 18 (07-28-19 @ 05:05) (18 - 18)  SpO2: 97% (07-28-19 @ 07:55) (95% - 97%)  Wt(kg): --  I&O's Summary    27 Jul 2019 07:01  -  28 Jul 2019 07:00  --------------------------------------------------------  IN: 600 mL / OUT: 1600 mL / NET: -1000 mL          REVIEW OF SYSTEMS:  ROS is unobtainable due to dementia and confusion     PHYSICAL EXAM:  Appearance: NAD. VS past 24 hrs -as above   HEENT:   Moist oral mucosa. Conjunctiva clear b/l.   Neck : supple  Respiratory: Lungs CTAB.  Gastrointestinal:  Soft, nontender. No rebound. No rigidity. BS present	  Cardiovascular: RRR ,S1S2 present  Neurologic: Non-focal. Moving all extremities.  Extremities: No edema. No erythema. No calf tenderness.  Skin: No rashes, No ecchymoses, No cyanosis.	  wounds ,skin lesions-See skin assesment flow sheet   LABS:                        9.2    5.63  )-----------( 236      ( 28 Jul 2019 08:36 )             29.6     07-28    141  |  106  |  17  ----------------------------<  105<H>  4.0   |  30  |  0.60    Ca    8.5      28 Jul 2019 08:36      CAPILLARY BLOOD GLUCOSE              RADIOLOGY & ADDITIONAL TESTS:< from: Xray Chest 1 View- PORTABLE-Routine (07.25.19 @ 15:23) >  EXAM:  XR CHEST PORTABLE ROUTINE 1V                            PROCEDURE DATE:  07/25/2019          INTERPRETATION:  CHF.    AP chest. Prior 4/12/2019.    Low lung volumes. Patient's chin obscures portion left apex. Left cardiac   pacer in situ. Nochange heart mediastinum. Grossly clear lungs. No   consolidation or effusion. Severe degenerative change left glenohumeral   joint.    Impression: Grossly clear lungs.    < end of copied text >     reviewed elctronically  Patient was seen and examined on a day of discharge . Plan of care , discharge medications and recommendations discussed with consultants and clearance for discharge obtained .Social service , case management  and medical staff are aware of plan. Family is notified. Discharge summary  is  prepared electronically OOBTC/PT .Advance care planning was d/w the family.Pain is accessed and addresed.Pt was screened for signs of clinical depression .Appropriate referral made.Risk of falls accessed.Fall prevention d/w family .Pt is screened for tobacco and etoh,counseling was done for etoh and tobacco cessation.Use of narcotic pain meds was discussed.Pt is advised to use narcotic meds  wisely and to avoid overusing them.Plan of care d/w family and all questions answered to best of my knowledge , 75minutes spent on this visit, 50% visit time spent in care co-ordination with other attendings and counselling patient  I have discussed care plan with patient and HCP,expressed understanding of problems treatment and their effect and side effects, alternatives in detail,I have asked if they have any questions and concerns and appropriately addressed them to best of my ability Patient was seen and examined on a day of discharge . Plan of care , discharge medications and recommendations discussed with consultants and clearance for discharge obtained .Social service , case management  and medical staff are aware of plan. Family is notified. Discharge summary  is  prepared electronically -see separate document attached
Patient is a 89y Male with a known history of :  Pacemaker (Z95.0)  Right-sided congestive heart failure, unspecified HF chronicity (I50.810)  Prophylactic measure (Z29.9)  Prostate ca (C61)  DM (diabetes mellitus) (E11.9)  Hypertension (I10)  CHF (congestive heart failure) (I50.9)  GIB (gastrointestinal bleeding) (K92.2)  Dementia (F03.90)  Atrial fibrillation (I48.91)  Anemia (D64.9)  Fecal occult blood test positive (R19.5)    HPI:  89 y male hx of Arteriosclerotic heart disease (ASHD)  ,Atrial fibrillation  ,CHF (congestive heart failure)  ,Dementia    ,Depression  ,Diabetes     Hyperlipemia  ,Hypertension  ,Prostate CA ,dementia, sent to ED from [facility]( PMD Dr Toby Quintana )for GI evaluation due to  blood in stool.  Patient is a very poor historian due to dementia ,he was HD stable on arrival to ER GI eval requested and cardiology consult called for clearance for possible endoscopy Palliative care consult requested ,to discuss advance directives and complete MOLST (25 Jul 2019 14:57)      REVIEW OF SYSTEMS:    CONSTITUTIONAL: No fever, weight loss, or fatigue  EYES: No eye pain, visual disturbances, or discharge  ENMT:  No difficulty hearing, tinnitus, vertigo; No sinus or throat pain  NECK: No pain or stiffness  BREASTS: No pain, masses, or nipple discharge  RESPIRATORY: No cough, wheezing, chills or hemoptysis; No shortness of breath  CARDIOVASCULAR: No chest pain, palpitations, dizziness, or leg swelling  GASTROINTESTINAL: No abdominal or epigastric pain. No nausea, vomiting, or hematemesis; No diarrhea or constipation. No melena or hematochezia.  GENITOURINARY: No dysuria, frequency, hematuria, or incontinence  NEUROLOGICAL: No headaches, memory loss, loss of strength, numbness, or tremors  SKIN: No itching, burning, rashes, or lesions   LYMPH NODES: No enlarged glands  ENDOCRINE: No heat or cold intolerance; No hair loss  MUSCULOSKELETAL: No joint pain or swelling; No muscle, back, or extremity pain  PSYCHIATRIC: No depression, anxiety, mood swings, or difficulty sleeping  HEME/LYMPH: No easy bruising, or bleeding gums  ALLERGY AND IMMUNOLOGIC: No hives or eczema    MEDICATIONS  (STANDING):  buDESOnide    Inhalation Suspension 0.5 milliGRAM(s) Inhalation two times a day  donepezil 5 milliGRAM(s) Oral at bedtime  hydrocortisone hemorrhoidal Suppository 1 Suppository(s) Rectal two times a day  lactobacillus acidophilus 1 Tablet(s) Oral three times a day  pantoprazole  Injectable 40 milliGRAM(s) IV Push daily  simvastatin 20 milliGRAM(s) Oral at bedtime  sodium chloride 0.9%. 1000 milliLiter(s) (50 mL/Hr) IV Continuous <Continuous>    MEDICATIONS  (PRN):  acetaminophen   Tablet .. 650 milliGRAM(s) Oral every 6 hours PRN Temp greater or equal to 38C (100.4F), Mild Pain (1 - 3)  ALBUTerol    90 MICROgram(s) HFA Inhaler 2 Puff(s) Inhalation every 6 hours PRN Shortness of Breath      ALLERGIES: latex (Rash)  latex (Unknown)  No Known Drug Allergies      FAMILY HISTORY:      PHYSICAL EXAMINATION:  -----------------------------  T(C): 36.5 (07-27-19 @ 13:09), Max: 36.8 (07-26-19 @ 20:32)  HR: 73 (07-27-19 @ 13:09) (57 - 78)  BP: 111/61 (07-27-19 @ 13:09) (107/64 - 111/61)  RR: 19 (07-27-19 @ 13:09) (17 - 19)  SpO2: 92% (07-27-19 @ 13:09) (92% - 98%)  Wt(kg): --    07-26 @ 07:01  -  07-27 @ 07:00  --------------------------------------------------------  IN:    sodium chloride 0.9%.: 650 mL  Total IN: 650 mL    OUT:  Total OUT: 0 mL    Total NET: 650 mL            Constitutional: well developed, normal appearance, well groomed, well nourished, no deformities and no acute distress.   Eyes: the conjunctiva exhibited no abnormalities and the eyelids demonstrated no xanthelasmas.   HEENT: normal oral mucosa, no oral pallor and no oral cyanosis.   Neck: normal jugular venous A waves present, normal jugular venous V waves present and no jugular venous jacobs A waves.   Pulmonary: no respiratory distress, normal respiratory rhythm and effort, no accessory muscle use and lungs were clear to auscultation bilaterally.   Cardiovascular: heart rate and rhythm were normal, normal S1 and S2 and no murmur, gallop, rub, heave or thrill are present.   Abdomen: soft, non-tender, no hepato-splenomegaly and no abdominal mass palpated.   Musculoskeletal: the gait could not be assessed..   Extremities: no clubbing of the fingernails, no localized cyanosis, no petechial hemorrhages and no ischemic changes.   Skin: normal skin color and pigmentation, no rash, no venous stasis, no skin lesions, no skin ulcer and no xanthoma was observed.   Psychiatric: oriented to person, place, and time, the affect was normal, the mood was normal and not feeling anxious.     LABS:   --------  07-27    142  |  107  |  13  ----------------------------<  117<H>  4.4   |  29  |  0.59    Ca    8.6      27 Jul 2019 11:59                           10.0   6.03  )-----------( 249      ( 27 Jul 2019 11:59 )             31.5     PT/INR - ( 25 Jul 2019 14:21 )   PT: 13.4 sec;   INR: 1.18 ratio         PTT - ( 25 Jul 2019 14:21 )  PTT:32.1 sec            RADIOLOGY:  -----------------        ECG:
Patient is a 89y Male with a known history of :  Pacemaker (Z95.0)  Right-sided congestive heart failure, unspecified HF chronicity (I50.810)  Prophylactic measure (Z29.9)  Prostate ca (C61)  DM (diabetes mellitus) (E11.9)  Hypertension (I10)  CHF (congestive heart failure) (I50.9)  GIB (gastrointestinal bleeding) (K92.2)  Dementia (F03.90)  Atrial fibrillation (I48.91)  Anemia (D64.9)  Fecal occult blood test positive (R19.5)    HPI:  89 y male hx of Arteriosclerotic heart disease (ASHD)  ,Atrial fibrillation  ,CHF (congestive heart failure)  ,Dementia    ,Depression  ,Diabetes     Hyperlipemia  ,Hypertension  ,Prostate CA ,dementia, sent to ED from [facility]( PMD Dr Toby Quintana )for GI evaluation due to  blood in stool.  Patient is a very poor historian due to dementia ,he was HD stable on arrival to ER GI eval requested and cardiology consult called for clearance for possible endoscopy Palliative care consult requested ,to discuss advance directives and complete MOLST (25 Jul 2019 14:57)      REVIEW OF SYSTEMS:    CONSTITUTIONAL: No fever, weight loss, or fatigue  EYES: No eye pain, visual disturbances, or discharge  ENMT:  No difficulty hearing, tinnitus, vertigo; No sinus or throat pain  NECK: No pain or stiffness  BREASTS: No pain, masses, or nipple discharge  RESPIRATORY: No cough, wheezing, chills or hemoptysis; No shortness of breath  CARDIOVASCULAR: No chest pain, palpitations, dizziness, or leg swelling  GASTROINTESTINAL: No abdominal or epigastric pain. No nausea, vomiting, or hematemesis; No diarrhea or constipation. No melena or hematochezia.  GENITOURINARY: No dysuria, frequency, hematuria, or incontinence  NEUROLOGICAL: No headaches, memory loss, loss of strength, numbness, or tremors  SKIN: No itching, burning, rashes, or lesions   LYMPH NODES: No enlarged glands  ENDOCRINE: No heat or cold intolerance; No hair loss  MUSCULOSKELETAL: No joint pain or swelling; No muscle, back, or extremity pain  PSYCHIATRIC: No depression, anxiety, mood swings, or difficulty sleeping  HEME/LYMPH: No easy bruising, or bleeding gums  ALLERGY AND IMMUNOLOGIC: No hives or eczema    MEDICATIONS  (STANDING):  buDESOnide    Inhalation Suspension 0.5 milliGRAM(s) Inhalation two times a day  donepezil 5 milliGRAM(s) Oral at bedtime  hydrocortisone hemorrhoidal Suppository 1 Suppository(s) Rectal two times a day  lactobacillus acidophilus 1 Tablet(s) Oral three times a day  pantoprazole  Injectable 40 milliGRAM(s) IV Push daily  simvastatin 20 milliGRAM(s) Oral at bedtime  sodium chloride 0.9%. 1000 milliLiter(s) (50 mL/Hr) IV Continuous <Continuous>    MEDICATIONS  (PRN):  acetaminophen   Tablet .. 650 milliGRAM(s) Oral every 6 hours PRN Temp greater or equal to 38C (100.4F), Mild Pain (1 - 3)  ALBUTerol    90 MICROgram(s) HFA Inhaler 2 Puff(s) Inhalation every 6 hours PRN Shortness of Breath      ALLERGIES: latex (Rash)  latex (Unknown)  No Known Drug Allergies      FAMILY HISTORY:      PHYSICAL EXAMINATION:  -----------------------------  T(C): 36.3 (07-28-19 @ 05:05), Max: 36.5 (07-27-19 @ 13:09)  HR: 62 (07-28-19 @ 07:55) (62 - 83)  BP: 106/60 (07-28-19 @ 05:05) (106/57 - 111/61)  RR: 18 (07-28-19 @ 05:05) (18 - 19)  SpO2: 97% (07-28-19 @ 07:55) (92% - 97%)  Wt(kg): --    07-27 @ 07:01  -  07-28 @ 07:00  --------------------------------------------------------  IN:    sodium chloride 0.9%.: 600 mL  Total IN: 600 mL    OUT:    Voided: 1600 mL  Total OUT: 1600 mL    Total NET: -1000 mL            Constitutional: well developed, normal appearance, well groomed, well nourished, no deformities and no acute distress.   Eyes: the conjunctiva exhibited no abnormalities and the eyelids demonstrated no xanthelasmas.   HEENT: normal oral mucosa, no oral pallor and no oral cyanosis.   Neck: normal jugular venous A waves present, normal jugular venous V waves present and no jugular venous jacobs A waves.   Pulmonary: no respiratory distress, normal respiratory rhythm and effort, no accessory muscle use and lungs were clear to auscultation bilaterally.   Cardiovascular: heart rate and rhythm were normal, normal S1 and S2 and no murmur, gallop, rub, heave or thrill are present.   Abdomen: soft, non-tender, no hepato-splenomegaly and no abdominal mass palpated.   Musculoskeletal: the gait could not be assessed..   Extremities: no clubbing of the fingernails, no localized cyanosis, no petechial hemorrhages and no ischemic changes.   Skin: normal skin color and pigmentation, no rash, no venous stasis, no skin lesions, no skin ulcer and no xanthoma was observed.   Psychiatric: oriented to person, place, and time, the affect was normal, the mood was normal and not feeling anxious.     LABS:   --------  07-28    141  |  106  |  17  ----------------------------<  105<H>  4.0   |  30  |  0.60    Ca    8.5      28 Jul 2019 08:36                           9.2    5.63  )-----------( 236      ( 28 Jul 2019 08:36 )             29.6                 RADIOLOGY:  -----------------        ECG:
Patient is a 89y Male with a known history of :  Prophylactic measure (Z29.9)  Prostate ca (C61)  DM (diabetes mellitus) (E11.9)  Hypertension (I10)  CHF (congestive heart failure) (I50.9)  GIB (gastrointestinal bleeding) (K92.2)  Dementia (F03.90)  Atrial fibrillation (I48.91)  Anemia (D64.9)  Fecal occult blood test positive (R19.5)    HPI:  89 y male hx of Arteriosclerotic heart disease (ASHD)  ,Atrial fibrillation  ,CHF (congestive heart failure)  ,Dementia    ,Depression  ,Diabetes     Hyperlipemia  ,Hypertension  ,Prostate CA ,dementia, sent to ED from [facility]( PMD Dr Toby Quintana )for GI evaluation due to  blood in stool.  Patient is a very poor historian due to dementia ,he was HD stable on arrival to ER GI eval requested and cardiology consult called for clearance for possible endoscopy Palliative care consult requested ,to discuss advance directives and complete MOLST (25 Jul 2019 14:57)      REVIEW OF SYSTEMS:    CONSTITUTIONAL: No fever, weight loss, or fatigue  EYES: No eye pain, visual disturbances, or discharge  ENMT:  No difficulty hearing, tinnitus, vertigo; No sinus or throat pain  NECK: No pain or stiffness  BREASTS: No pain, masses, or nipple discharge  RESPIRATORY: No cough, wheezing, chills or hemoptysis; No shortness of breath  CARDIOVASCULAR: No chest pain, palpitations, dizziness, or leg swelling  GASTROINTESTINAL: No abdominal or epigastric pain. No nausea, vomiting, or hematemesis; No diarrhea or constipation. No melena or hematochezia.  GENITOURINARY: No dysuria, frequency, hematuria, or incontinence  NEUROLOGICAL: No headaches, memory loss, loss of strength, numbness, or tremors  SKIN: No itching, burning, rashes, or lesions   LYMPH NODES: No enlarged glands  ENDOCRINE: No heat or cold intolerance; No hair loss  MUSCULOSKELETAL: No joint pain or swelling; No muscle, back, or extremity pain  PSYCHIATRIC: No depression, anxiety, mood swings, or difficulty sleeping  HEME/LYMPH: No easy bruising, or bleeding gums  ALLERGY AND IMMUNOLOGIC: No hives or eczema    MEDICATIONS  (STANDING):  buDESOnide    Inhalation Suspension 0.5 milliGRAM(s) Inhalation two times a day  donepezil 5 milliGRAM(s) Oral at bedtime  hydrocortisone hemorrhoidal Suppository 1 Suppository(s) Rectal at bedtime  lactobacillus acidophilus 1 Tablet(s) Oral three times a day  pantoprazole  Injectable 40 milliGRAM(s) IV Push daily  simvastatin 20 milliGRAM(s) Oral at bedtime  sodium chloride 0.9%. 1000 milliLiter(s) (50 mL/Hr) IV Continuous <Continuous>    MEDICATIONS  (PRN):  acetaminophen   Tablet .. 650 milliGRAM(s) Oral every 6 hours PRN Temp greater or equal to 38C (100.4F), Mild Pain (1 - 3)  ALBUTerol    90 MICROgram(s) HFA Inhaler 2 Puff(s) Inhalation every 6 hours PRN Shortness of Breath      ALLERGIES: latex (Rash)  latex (Unknown)  No Known Drug Allergies      FAMILY HISTORY:      PHYSICAL EXAMINATION:  -----------------------------  T(C): 36.6 (07-26-19 @ 04:15), Max: 36.7 (07-25-19 @ 18:43)  HR: 50 (07-26-19 @ 07:22) (50 - 78)  BP: 124/50 (07-26-19 @ 04:15) (110/57 - 124/50)  RR: 16 (07-26-19 @ 04:15) (15 - 17)  SpO2: 94% (07-26-19 @ 07:22) (94% - 100%)  Wt(kg): --      Weight (kg): 66.2 (07-25 @ 11:39)    Constitutional: well developed, normal appearance, well groomed, well nourished, no deformities and no acute distress.   Eyes: the conjunctiva exhibited no abnormalities and the eyelids demonstrated no xanthelasmas.   HEENT: normal oral mucosa, no oral pallor and no oral cyanosis.   Neck: normal jugular venous A waves present, normal jugular venous V waves present and no jugular venous jacobs A waves.   Pulmonary: no respiratory distress, normal respiratory rhythm and effort, no accessory muscle use and lungs were clear to auscultation bilaterally.   Cardiovascular: heart rate and rhythm were normal, normal S1 and S2 and no murmur, gallop, rub, heave or thrill are present.   Abdomen: soft, non-tender, no hepato-splenomegaly and no abdominal mass palpated.   Musculoskeletal: the gait could not be assessed..   Extremities: no clubbing of the fingernails, no localized cyanosis, no petechial hemorrhages and no ischemic changes.   Skin: normal skin color and pigmentation, no rash, no venous stasis, no skin lesions, no skin ulcer and no xanthoma was observed.   Psychiatric: oriented to person, place, and time, the affect was normal, the mood was normal and not feeling anxious.     LABS:   --------  07-25    139  |  104  |  15  ----------------------------<  129<H>  4.3   |  32<H>  |  0.67    Ca    8.9      25 Jul 2019 12:21    TPro  7.0  /  Alb  3.5  /  TBili  0.4  /  DBili  x   /  AST  23  /  ALT  27  /  AlkPhos  116  07-25                         9.5    x     )-----------( x        ( 26 Jul 2019 00:17 )             29.7     PT/INR - ( 25 Jul 2019 14:21 )   PT: 13.4 sec;   INR: 1.18 ratio         PTT - ( 25 Jul 2019 14:21 )  PTT:32.1 sec            RADIOLOGY:  -----------------        ECG:
PROGRESS NOTE  Patient is a 89y old  Male who presents with a chief complaint of GIB (27 Jul 2019 08:19)  Chart and available morning labs /imaging are reviewed electronically , urgent issues addressed . More information  is being added upon completion of rounds , when more information is collected and management discussed with consultants , medical staff and social service/case management on the floor     OVERNIGHT  No new issues reported by medical staff . All above noted Patient is resting in a bed comfortably .Confused ,poor mentation .No distress noted   denies abd pain  per rn no gib overnight, passed large brown bm yesterday  HPI:  89 y male hx of Arteriosclerotic heart disease (ASHD)  ,Atrial fibrillation  ,CHF (congestive heart failure)  ,Dementia    ,Depression  ,Diabetes     Hyperlipemia  ,Hypertension  ,Prostate CA ,dementia, sent to ED from [facility]( PMD Dr Toby Quintana )for GI evaluation due to  blood in stool.  Patient is a very poor historian due to dementia ,he was HD stable on arrival to ER GI eval requested and cardiology consult called for clearance for possible endoscopy Palliative care consult requested ,to discuss advance directives and complete MOLST (25 Jul 2019 14:57)    PAST MEDICAL & SURGICAL HISTORY:  Dementia  Arteriosclerotic heart disease (ASHD)  Prostate CA  Atrial fibrillation  DM (diabetes mellitus)  Dementia  Depression  Diabetes  Hyperlipemia  CHF (congestive heart failure)  Dementia  Prostate ca  Diabetes  Hypertension  Atrial fibrillation  No significant past surgical history      MEDICATIONS  (STANDING):  buDESOnide    Inhalation Suspension 0.5 milliGRAM(s) Inhalation two times a day  donepezil 5 milliGRAM(s) Oral at bedtime  hydrocortisone hemorrhoidal Suppository 1 Suppository(s) Rectal two times a day  lactobacillus acidophilus 1 Tablet(s) Oral three times a day  pantoprazole  Injectable 40 milliGRAM(s) IV Push daily  simvastatin 20 milliGRAM(s) Oral at bedtime  sodium chloride 0.9%. 1000 milliLiter(s) (50 mL/Hr) IV Continuous <Continuous>    MEDICATIONS  (PRN):  acetaminophen   Tablet .. 650 milliGRAM(s) Oral every 6 hours PRN Temp greater or equal to 38C (100.4F), Mild Pain (1 - 3)  ALBUTerol    90 MICROgram(s) HFA Inhaler 2 Puff(s) Inhalation every 6 hours PRN Shortness of Breath      OBJECTIVE    T(C): 36.5 (07-27-19 @ 13:09), Max: 36.8 (07-26-19 @ 20:32)  HR: 73 (07-27-19 @ 13:09) (57 - 78)  BP: 111/61 (07-27-19 @ 13:09) (107/64 - 112/66)  RR: 19 (07-27-19 @ 13:09) (16 - 19)  SpO2: 92% (07-27-19 @ 13:09) (92% - 98%)  Wt(kg): --  I&O's Summary    26 Jul 2019 07:01  -  27 Jul 2019 07:00  --------------------------------------------------------  IN: 650 mL / OUT: 0 mL / NET: 650 mL          REVIEW OF SYSTEMS:  ROS is unobtainable due to dementia and confusion     PHYSICAL EXAM:  Appearance: NAD. VS past 24 hrs -as above   HEENT:   Moist oral mucosa. Conjunctiva clear b/l.   Neck : supple  Respiratory: Lungs CTAB.  Gastrointestinal:  Soft, nontender. No rebound. No rigidity. BS present	  Cardiovascular: RRR ,S1S2 present  Neurologic: Non-focal. Moving all extremities.  Extremities: No edema. No erythema. No calf tenderness.  Skin: No rashes, No ecchymoses, No cyanosis.	  wounds ,skin lesions-See skin assesment flow sheet   LABS:                        10.0   6.03  )-----------( 249      ( 27 Jul 2019 11:59 )             31.5     07-27    142  |  107  |  13  ----------------------------<  117<H>  4.4   |  29  |  0.59    Ca    8.6      27 Jul 2019 11:59      CAPILLARY BLOOD GLUCOSE        PT/INR - ( 25 Jul 2019 14:21 )   PT: 13.4 sec;   INR: 1.18 ratio         PTT - ( 25 Jul 2019 14:21 )  PTT:32.1 sec      RADIOLOGY & ADDITIONAL TESTS:< from: Xray Chest 1 View- PORTABLE-Routine (07.25.19 @ 15:23) >  EXAM:  XR CHEST PORTABLE ROUTINE 1V                            PROCEDURE DATE:  07/25/2019          INTERPRETATION:  CHF.    AP chest. Prior 4/12/2019.    Low lung volumes. Patient's chin obscures portion left apex. Left cardiac   pacer in situ. Nochange heart mediastinum. Grossly clear lungs. No   consolidation or effusion. Severe degenerative change left glenohumeral   joint.    Impression: Grossly clear lungs.    < end of copied text >     reviewed elctronically  45minutes spent on this visit, 50% visit time spent in care co-ordination with other attendings and counselling patient  I have discussed care plan with patient and HCP,expressed understanding of problems treatment and their effect and side effects, alternatives in detail,I have asked if they have any questions and concerns and appropriately addressed them to best of my ability

## 2019-07-28 NOTE — DISCHARGE NOTE NURSING/CASE MANAGEMENT/SOCIAL WORK - NSDCVIVACCINE_GEN_ALL_CORE_FT
Pneumococcal , 2014/6/18 09:36 , Lamar Gonzalez (JACOB)  Tdap , 2018/7/19 13:12 , Arelen Wallace) Pneumococcal , 2014/6/18 09:36 , Lamar Gonzalez (JACOB)  Tdap , 2018/7/19 13:12 , Arleen Wallace)

## 2019-07-28 NOTE — PROGRESS NOTE ADULT - PROBLEM SELECTOR PROBLEM 2
Anemia
Atrial fibrillation
Hypertension
Atrial fibrillation

## 2019-08-11 NOTE — ED ADULT NURSE NOTE - CAS DISCH ACCOMP BY
Transporter

## 2019-08-12 ENCOUNTER — EMERGENCY (EMERGENCY)
Facility: HOSPITAL | Age: 84
LOS: 1 days | Discharge: HOME | End: 2019-08-12
Attending: EMERGENCY MEDICINE | Admitting: EMERGENCY MEDICINE
Payer: MEDICARE

## 2019-08-12 VITALS
DIASTOLIC BLOOD PRESSURE: 74 MMHG | HEART RATE: 80 BPM | OXYGEN SATURATION: 98 % | SYSTOLIC BLOOD PRESSURE: 119 MMHG | RESPIRATION RATE: 14 BRPM | TEMPERATURE: 97 F

## 2019-08-12 VITALS
HEART RATE: 76 BPM | OXYGEN SATURATION: 98 % | RESPIRATION RATE: 15 BRPM | DIASTOLIC BLOOD PRESSURE: 74 MMHG | SYSTOLIC BLOOD PRESSURE: 131 MMHG | TEMPERATURE: 98 F

## 2019-08-12 PROCEDURE — 73110 X-RAY EXAM OF WRIST: CPT

## 2019-08-12 PROCEDURE — 73130 X-RAY EXAM OF HAND: CPT | Mod: 26,LT

## 2019-08-12 PROCEDURE — 99284 EMERGENCY DEPT VISIT MOD MDM: CPT | Mod: 25

## 2019-08-12 PROCEDURE — 99283 EMERGENCY DEPT VISIT LOW MDM: CPT

## 2019-08-12 PROCEDURE — 73110 X-RAY EXAM OF WRIST: CPT | Mod: 26,LT

## 2019-08-12 PROCEDURE — 73130 X-RAY EXAM OF HAND: CPT

## 2019-08-12 NOTE — ED ADULT NURSE NOTE - NSIMPLEMENTINTERV_GEN_ALL_ED
Implemented All Fall with Harm Risk Interventions:  [place] to call system. Call bell, personal items and telephone within reach. Instruct patient to call for assistance. Room bathroom lighting operational. Non-slip footwear when patient is off stretcher. Physically safe environment: no spills, clutter or unnecessary equipment. Stretcher in lowest position, wheels locked, appropriate side rails in place. Provide visual cue, wrist band, yellow gown, etc. Monitor gait and stability. Monitor for mental status changes and reorient to person, place, and time. Review medications for side effects contributing to fall risk. Reinforce activity limits and safety measures with patient and family. Provide visual clues: red socks.

## 2019-08-12 NOTE — ED PROVIDER NOTE - OBJECTIVE STATEMENT
89 yo M p/w L hand pain , ? swelling which was noticed in PT today. Pt sent in from SNF for eval. No other co, no other hx avail.

## 2019-08-12 NOTE — ED PROVIDER NOTE - NSFOLLOWUPINSTRUCTIONS_ED_ALL_ED_FT
1) Follow-up with Orthopedics, See referred doctor. Call today / next business day for close, prompt follow-up.  2) Return to Emergency room for any worsening or persistent pain, weakness, numbness, fever, color change to extremity, or any other concerning symptoms.  3) See attached instruction sheets for additional information, including information regarding signs and symptoms to look out for, reasons to seek immediate care and other important instructions.   4) Rest, Ice, Elevate injured area  5) Wear splint to Left hand / wrist  6) Follow-up with your doctor at the facility , call in morning

## 2019-08-12 NOTE — ED ADULT NURSE NOTE - ED STAT RN HANDOFF DETAILS
Pt stable and resting in bed. Pt denies any pain or discomfort as of this time. Pt handoff report giver to JACOB Iraheta  for continuum of care. No sign of distress noted  Pt waiting for transport

## 2019-08-12 NOTE — ED PROVIDER NOTE - CARE PROVIDER_API CALL
Robert Rocha)  Orthopaedic Surgery  [address] [place] [address]  Phone: [phone]  Fax: [phone]  Follow Up Time:

## 2019-08-12 NOTE — ED ADULT NURSE NOTE - OBJECTIVE STATEMENT
Received pt in bed alert and confused. Pt unable to answer questions.  C/O of left hand pain.  Pt poor historian.  Left hand slightly swollen.  Pt sent for xrays. Ongoing nursing care and safety maintained.

## 2019-08-12 NOTE — ED PROVIDER NOTE - CLINICAL SUMMARY MEDICAL DECISION MAKING FREE TEXT BOX
L hand swelling noticed today during PT at [facility] - no known fall / trauma. NO other acute traumatic findings on exam. Check xr, outpt fu

## 2019-08-12 NOTE — ED PROVIDER NOTE - PHYSICAL EXAMINATION
L hand: pos mild swelling to dorsum of hand, no crepitus. Inc pain with flexion of all MCP. No erythema. Nl wrist / forearm. Nl dist str/sens equal bl, 2+ pulses.

## 2019-09-07 NOTE — DIETITIAN INITIAL EVALUATION ADULT. - NS AS NUTRI INTERV MEDICAL AND FOOD SUPPLEMENTS
ADMIT If pt's PO intake remains poor recommend Glucerna, BID to assist with PO intake and increased protein needs./[employer]

## 2019-09-09 NOTE — PROGRESS NOTE ADULT - SUBJECTIVE AND OBJECTIVE BOX
TERESA FRANCIS [facility]  [phone]    CONTACT Vinicio Bradford [phone]  ALLERGY Latex  REASON FOR VISIT     subsequent pulm fu   Initial evaluation/Pulmonary Critical Care consultation requested on 2019   by Dr Frias from Dr Quintana   Patient examined chart reviewed  HOSPITAL ADMISSION [facility] 2019     PATIENT CAME  FROM (if information available)      VITALS/LABS      2019 afeb 65 102/52 17 94%  2019 W 7.8 Hb 12.3 Plt 178  Na 140 K 3.6 CO2 29 Cr .8     REVIEW OF SYMPTOMS    Able to give ROS  NO     PHYSICAL EXAM    HEENT Unremarkable PERRLA atraumatic   RESP Fair air entry EXP prolonged    Harsh breath sound Resp distres mild   CARDIAC S1 S2 No S3     NO JVD    ABDOMEN SOFT BS PRESENT NOT DISTENDED No hepatosplenomegaly PEDAL EDEMA present No calf tenderness  NO rash   GENERAL Not TOXIC looking    GLOBAL ISSUE/BEST PRACTICE:      PROBLEM: HOB elevation:   y            PROBLEM: Stress ulcer proph:    pepcid 20 ()                       PROBLEM: VTE prophylaxis:      on coumadin poa 2019   PROBLEM: Glycemic control:    iss ()   PROBLEM: Nutrition:    cons carb dys 1 puree honey ()   PROBLEM: Advanced directive: na     PROBLEM: Allergies:  latex   EVENT 2019 6 beat v  tach     PATIENT DESCRIPTION PATIENT TERESA FRANCIS  10/16/1926, 2019 ADMISSION [facility] DR ARCADIO FRIAS  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 [employer] [facility] HO  injury L shin remote past 3/18/2014 V duplex legs –ben Chr swelling leg  OBS Prostate CA LV Systolic dysfunction CHF 10/24/2014 ECHO EF 50% Mild hypokinesia septum PASP 40 Mod MR    A fib  (on coumadin) Htn Depression Lower extr edema Chr swelling L leg DM      LUNG NODULE THYROID NODULE 10/21/2014 CT Ch  1 cm hypoattenuating thyroid nodule  Trace symmetrical layering bl pl effusions with dependent atelectasis bases  Ca granuloma both RML and l lower lobes  5 mm subpleural parenchymal nodule R apex  was admitted [facility] 2019 with dyspnea fever 100 He did getflu vaccine 2018   Pulm consulted 2019   carolin meds coreg 3125x2 donepezil 5 lisinopril 5 metformin 500.2 dig 25 venlafaxine 75.2 simvastat 20 warfarin 10   er vitals afeb 76 108/66 95%     HOSPITAL COURSE   Resp syncytial viral resp tract infection managed with supp care   Troponin elevation was noted Pt kept on ASA Plavix Followed by Dr Paul Lisinopril 5 () added   S CHF Chronic managed with lasix 40 () lisinopril 5 (1/3)   COPD managed with bd ics   A fib managed with couamdin   Pneumonia pc 14 () CXR1/31  rll opac Placed on zosyn nu Dr Barrios on 2019     ASSESSMENT PLAN PATIENT YNGSTROM RAY 2019 ADMISSION [facility]  DR ERVIN SCHNALINI   UTI 2019 ua w 0-2 r 11-25    Rocephin () ch to zosyn ()   RESP Monitor po Target 90-95%  RSV RTI Supp care   PNEA 2019 w 7.8  pc 14.8 zosyn () (Dr Barrios)   COPD  Duoneb.4 () Pulmicort ()   AF  Coreg 3125.2 ()   TROPONIN ELEVATION POSSIBLE NSTE MI   -2019 Tr 1 5.3 - 4.9 - 2.2   ASA 81 () Plavix 75 () coreg 3125x2 ()   S CHF 10/24/2014 ECHO EF 50% Mild hypokinesia septum PASP 40 Mod MR      Lisinopril 5 () Lasix 40 () ch lasix 20.2 ()   LOW BP Midodrine 5.3 started (2019)   OBS Doneppezil 5 ()   ABIO   Zosyn ()   Rocephin (-)   IV F    NS 50 (-)                     PLAN  2019 Besides rsv rti pt seems to hav asp pneum pc 14 and nstemi with low bp and   Supp care for rsv and DC planning     TIME SPENT Over 25 minutes aggregate care time spent on encounter; activities included   direct patient care, counseling and/or coordinating care reviewing notes, lab data/ imaging , discussion with multidisciplinary team/ patient  /family. Risks, benefits, alternatives  discussed in detail. surgery retina right eye Right eye retinal detchhment

## 2019-09-12 ENCOUNTER — EMERGENCY (EMERGENCY)
Facility: HOSPITAL | Age: 84
LOS: 1 days | Discharge: HOME | End: 2019-09-12
Attending: STUDENT IN AN ORGANIZED HEALTH CARE EDUCATION/TRAINING PROGRAM | Admitting: STUDENT IN AN ORGANIZED HEALTH CARE EDUCATION/TRAINING PROGRAM
Payer: MEDICARE

## 2019-09-12 VITALS
HEART RATE: 68 BPM | TEMPERATURE: 98 F | RESPIRATION RATE: 18 BRPM | SYSTOLIC BLOOD PRESSURE: 122 MMHG | OXYGEN SATURATION: 97 % | DIASTOLIC BLOOD PRESSURE: 71 MMHG

## 2019-09-12 VITALS
WEIGHT: 179.9 LBS | OXYGEN SATURATION: 100 % | TEMPERATURE: 98 F | HEIGHT: 68 IN | SYSTOLIC BLOOD PRESSURE: 115 MMHG | DIASTOLIC BLOOD PRESSURE: 75 MMHG | HEART RATE: 65 BPM | RESPIRATION RATE: 17 BRPM

## 2019-09-12 PROCEDURE — 99283 EMERGENCY DEPT VISIT LOW MDM: CPT

## 2019-09-12 PROCEDURE — 87070 CULTURE OTHR SPECIMN AEROBIC: CPT

## 2019-09-12 NOTE — ED PROVIDER NOTE - ATTENDING CONTRIBUTION TO CARE
89yo M sent from NH for drainage from back. home care nurse noted serous drainage from back and sent him to ED for eval. no erythema or warmth surrounding area, no fevers, no chills, no complaint.   drainage not purulent  Gen: Well appearing in NAD, oriented to self  Head: NC/AT  Neck: trachea midline  Resp:  No distress  Ext: no deformities  Neuro:  A&O to self appears non focal  Skin:  punctate area on upper back draining serous fluid, no fluctuance, no cyst or abscess palpated. drainage nonpurulent, no erythema or warmth to skin, no tenderness  Psych:  Normal affect and mood  89yo with clear drainage from back, likely small draining cyst that drained, culture sent from area, no concern for abscess or cellulitis, will dw Nh and dc home with instructions for warm compresses and return if drainage purulent, cellulitis or abscess develops

## 2019-09-12 NOTE — ED PROVIDER NOTE - NSFOLLOWUPINSTRUCTIONS_ED_ALL_ED_FT
Follow up with your Primary Care Physician within the next 2-3 days  Bring a copy of your test results with you to your appointment  Continue your current medication regimen  Return to the Emergency Room if you experience new or worsening symptoms - redness or warmth of the skin around the drainage, fever, purulent drainage  warm compresses to the area

## 2019-09-12 NOTE — ED PROVIDER NOTE - OBJECTIVE STATEMENT
89 y/o male hx dementia, COPD, CAD, CHF, DM who present from [facility] for a draining "cyst on back". patient receives home care and has had a cyst on his back for a long time. it has been draining and the home nurse reported back to the PMD that they felt it needed to be evaluated by a surgeon. patient has otherwise been in usual state of health.    hx obtained from Sabiha given patient unable to provide hx 2/2 dementia. Sabiha is the [employer] at his group home who works with patients PMD.

## 2019-09-12 NOTE — ED ADULT NURSE REASSESSMENT NOTE - NS ED NURSE REASSESS COMMENT FT1
evaluated at bedside by Dr Javier and QUINN Vazquez.  Dressing removed, skin, flat no erythema no obvious drainage noted, no fluctuance.   scant drainage noted with deep expression of skin.  straw color. no odor. culture obtained and sent to lab as ordered.  QUINN Vazquez s/w Dr Quintana and Kevin Robles.  warm compress to be applied, pt arranged transportation to be sent back to facility.

## 2019-09-12 NOTE — ED ADULT NURSE NOTE - OBJECTIVE STATEMENT
pt sent from [facility] for a "cyst on his back that is draining"  dressing noted from [facility] clean and dry. no drainage noted on dressing.

## 2019-09-12 NOTE — ED PROVIDER NOTE - PROGRESS NOTE DETAILS
relayed plan for warm compresses to betsy, advised we sent culture but no signs of secondary infection so no abx. discussed return precautions. She will relay to pmd and home care aide.- Opal Vazquez PA-C

## 2019-09-12 NOTE — ED PROVIDER NOTE - CLINICAL SUMMARY MEDICAL DECISION MAKING FREE TEXT BOX
89 y/o male hx dementia, COPD, CAD, CHF, DM who present from [facility] for a draining "cyst on back". upper mid back with punctate area draining straw colored serous fluid, no surrounding erythema, warmth. no purulent drainage. no area of induration or fluctuance. education to home care for warm compresses and dc home

## 2019-09-12 NOTE — ED PROVIDER NOTE - PATIENT PORTAL LINK FT
You can access the FollowMyHealth Patient Portal offered by [facility] by registering at the following website: [email or web address]. By joining [employer]’s FollowMyHealth portal, you will also be able to view your health information using other applications (apps) compatible with our system.

## 2019-09-12 NOTE — ED PROVIDER NOTE - SKIN, MLM
upper mid back with punctate area draining straw colored serous fluid, no surrounding erythema, warmth. no purulent drainage. no area of induration or fluctuance.

## 2019-09-15 LAB
CULTURE RESULTS: SIGNIFICANT CHANGE UP
SPECIMEN SOURCE: SIGNIFICANT CHANGE UP

## 2019-10-22 NOTE — ED PROVIDER NOTE - CLINICAL SUMMARY MEDICAL DECISION MAKING FREE TEXT BOX
"/61 (BP Location: Right arm)   Pulse 70   Temp 97.1  F (36.2  C) (Oral)   Resp 20   Ht 1.626 m (5' 4\")   Wt 67.2 kg (148 lb 3.2 oz)   LMP 06/07/2014 (Exact Date)   SpO2 93%   BMI 25.44 kg/m     Time: 2284-0775     Reason for admission: RADHA and ascites   Activity: Independent to SBA  Pain: No reports of pain but reports abdominal discomfort  Neuro: WDL  Cardiac: WDL  Respiratory: Dyspnea with exertion  GI/: No void this shift. MD aware  Diet: Renal diet  Lines: PIV SL  Wounds: Right Paracentesis site  Labs/imaging: Abdominal and Renal US completed. Creating continues to trend up @ 5.90.     New changes this shift: Nephrology consulted. Tacro level critically elevated @ 30.2. Discontinued bladder scanning. Increased parameters for BP meds. Paracentesis completed today, removed 2 L and sent diagnostics.     Plan:.Continue to monitor renal function and ascites. Possibly starting diuretics tomorrow. Trending tacro. Ascites fluid pending.     Continue to monitor and follow POC   " pt comes in for evaluation of abscess to the back, will perform labs, x-ray and change packing.

## 2019-12-07 ENCOUNTER — EMERGENCY (EMERGENCY)
Facility: HOSPITAL | Age: 84
LOS: 1 days | Discharge: HOME | End: 2019-12-07
Attending: EMERGENCY MEDICINE | Admitting: EMERGENCY MEDICINE
Payer: MEDICARE

## 2019-12-07 VITALS
HEART RATE: 65 BPM | SYSTOLIC BLOOD PRESSURE: 117 MMHG | DIASTOLIC BLOOD PRESSURE: 77 MMHG | RESPIRATION RATE: 16 BRPM | OXYGEN SATURATION: 99 % | TEMPERATURE: 97 F | WEIGHT: 147.05 LBS

## 2019-12-07 LAB
ALBUMIN SERPL ELPH-MCNC: 3.1 G/DL — LOW (ref 3.3–5)
ALP SERPL-CCNC: 113 U/L — SIGNIFICANT CHANGE UP (ref 40–120)
ALT FLD-CCNC: 12 U/L — SIGNIFICANT CHANGE UP (ref 12–78)
ANION GAP SERPL CALC-SCNC: 6 MMOL/L — SIGNIFICANT CHANGE UP (ref 5–17)
APTT BLD: 30.8 SEC — SIGNIFICANT CHANGE UP (ref 28.5–37)
AST SERPL-CCNC: 16 U/L — SIGNIFICANT CHANGE UP (ref 15–37)
BASOPHILS # BLD AUTO: 0.02 K/UL — SIGNIFICANT CHANGE UP (ref 0–0.2)
BASOPHILS NFR BLD AUTO: 0.3 % — SIGNIFICANT CHANGE UP (ref 0–2)
BILIRUB SERPL-MCNC: 0.2 MG/DL — SIGNIFICANT CHANGE UP (ref 0.2–1.2)
BUN SERPL-MCNC: 22 MG/DL — SIGNIFICANT CHANGE UP (ref 7–23)
CALCIUM SERPL-MCNC: 9.1 MG/DL — SIGNIFICANT CHANGE UP (ref 8.5–10.1)
CHLORIDE SERPL-SCNC: 105 MMOL/L — SIGNIFICANT CHANGE UP (ref 96–108)
CO2 SERPL-SCNC: 29 MMOL/L — SIGNIFICANT CHANGE UP (ref 22–31)
CREAT SERPL-MCNC: 0.6 MG/DL — SIGNIFICANT CHANGE UP (ref 0.5–1.3)
EOSINOPHIL # BLD AUTO: 0.09 K/UL — SIGNIFICANT CHANGE UP (ref 0–0.5)
EOSINOPHIL NFR BLD AUTO: 1.4 % — SIGNIFICANT CHANGE UP (ref 0–6)
GLUCOSE SERPL-MCNC: 123 MG/DL — HIGH (ref 70–99)
HCT VFR BLD CALC: 31.1 % — LOW (ref 39–50)
HGB BLD-MCNC: 9.7 G/DL — LOW (ref 13–17)
IMM GRANULOCYTES NFR BLD AUTO: 0.3 % — SIGNIFICANT CHANGE UP (ref 0–1.5)
INR BLD: 1.15 RATIO — SIGNIFICANT CHANGE UP (ref 0.88–1.16)
LYMPHOCYTES # BLD AUTO: 1.51 K/UL — SIGNIFICANT CHANGE UP (ref 1–3.3)
LYMPHOCYTES # BLD AUTO: 23.3 % — SIGNIFICANT CHANGE UP (ref 13–44)
MCHC RBC-ENTMCNC: 26.1 PG — LOW (ref 27–34)
MCHC RBC-ENTMCNC: 31.2 GM/DL — LOW (ref 32–36)
MCV RBC AUTO: 83.8 FL — SIGNIFICANT CHANGE UP (ref 80–100)
MONOCYTES # BLD AUTO: 0.65 K/UL — SIGNIFICANT CHANGE UP (ref 0–0.9)
MONOCYTES NFR BLD AUTO: 10 % — SIGNIFICANT CHANGE UP (ref 2–14)
NEUTROPHILS # BLD AUTO: 4.18 K/UL — SIGNIFICANT CHANGE UP (ref 1.8–7.4)
NEUTROPHILS NFR BLD AUTO: 64.7 % — SIGNIFICANT CHANGE UP (ref 43–77)
NRBC # BLD: 0 /100 WBCS — SIGNIFICANT CHANGE UP (ref 0–0)
PLATELET # BLD AUTO: 266 K/UL — SIGNIFICANT CHANGE UP (ref 150–400)
POTASSIUM SERPL-MCNC: 4.2 MMOL/L — SIGNIFICANT CHANGE UP (ref 3.5–5.3)
POTASSIUM SERPL-SCNC: 4.2 MMOL/L — SIGNIFICANT CHANGE UP (ref 3.5–5.3)
PROT SERPL-MCNC: 6.5 G/DL — SIGNIFICANT CHANGE UP (ref 6–8.3)
PROTHROM AB SERPL-ACNC: 13.2 SEC — HIGH (ref 10–12.9)
RBC # BLD: 3.71 M/UL — LOW (ref 4.2–5.8)
RBC # FLD: 15.8 % — HIGH (ref 10.3–14.5)
SODIUM SERPL-SCNC: 140 MMOL/L — SIGNIFICANT CHANGE UP (ref 135–145)
WBC # BLD: 6.47 K/UL — SIGNIFICANT CHANGE UP (ref 3.8–10.5)
WBC # FLD AUTO: 6.47 K/UL — SIGNIFICANT CHANGE UP (ref 3.8–10.5)

## 2019-12-07 PROCEDURE — 99284 EMERGENCY DEPT VISIT MOD MDM: CPT

## 2019-12-07 RX ORDER — SODIUM CHLORIDE 9 MG/ML
1000 INJECTION INTRAMUSCULAR; INTRAVENOUS; SUBCUTANEOUS ONCE
Refills: 0 | Status: COMPLETED | OUTPATIENT
Start: 2019-12-07 | End: 2019-12-07

## 2019-12-07 RX ADMIN — SODIUM CHLORIDE 1000 MILLILITER(S): 9 INJECTION INTRAMUSCULAR; INTRAVENOUS; SUBCUTANEOUS at 22:30

## 2019-12-07 NOTE — ED ADULT NURSE NOTE - OBJECTIVE STATEMENT
Patient BIBA from [facility] with c/o hematuria and weakness, on ASA per paperwork from facility. Patient with history of atrial fibrillation, CHF, dementia and prostate CA BIBA from [facility] with c/o hematuria and weakness, on ASA per paperwork from facility.  Patient unable to provide further information.

## 2019-12-07 NOTE — ED PROVIDER NOTE - NSFOLLOWUPINSTRUCTIONS_ED_ALL_ED_FT
1) Follow-up with your Primary Medical Doctor or referred doctor. Call today / next business day for prompt follow-up.  2) Return to Emergency room for any worsening or persistent pain, weakness, fever, or any other concerning symptoms.  3) See attached instruction sheets for additional information, including information regarding signs and symptoms to look out for, reasons to seek immediate care and other important instructions.  4) ceftin 500mg every 12 hours for 7 days.

## 2019-12-07 NOTE — ED ADULT NURSE NOTE - CHIEF COMPLAINT QUOTE
patient came in ED from [facility] BIBA. as per EMS patient was reported to have hematuria and weakness.

## 2019-12-07 NOTE — ED ADULT NURSE REASSESSMENT NOTE - NS ED NURSE REASSESS COMMENT FT1
Multiple attempts made to straight catheterize patient; resistance met both times.  ED MD Nieto aware of this.  Condom cath to be placed on patient to obtain urine specimen.

## 2019-12-07 NOTE — ED PROVIDER NOTE - PATIENT PORTAL LINK FT
You can access the FollowMyHealth Patient Portal offered by [facility] by registering at the following website: [email or web address]. By joining [employer]’s FollowMyHealth portal, you will also be able to view your health information using other applications (apps) compatible with our system.

## 2019-12-07 NOTE — ED PROVIDER NOTE - CARE PLAN
Principal Discharge DX:	Hematuria Principal Discharge DX:	Urinary tract infection with hematuria, site unspecified

## 2019-12-07 NOTE — ED ADULT TRIAGE NOTE - CHIEF COMPLAINT QUOTE
patient came in ED from [facility] BIBA. as per EMS patient was reported to have hematuria and weakness.

## 2019-12-07 NOTE — ED ADULT NURSE NOTE - NSIMPLEMENTINTERV_GEN_ALL_ED
Implemented All Fall with Harm Risk Interventions:  [place] to call system. Call bell, personal items and telephone within reach. Instruct patient to call for assistance. Room bathroom lighting operational. Non-slip footwear when patient is off stretcher. Physically safe environment: no spills, clutter or unnecessary equipment. Stretcher in lowest position, wheels locked, appropriate side rails in place. Provide visual cue, wrist band, yellow gown, etc. Monitor gait and stability. Monitor for mental status changes and reorient to person, place, and time. Review medications for side effects contributing to fall risk. Reinforce activity limits and safety measures with patient and family. Provide visual clues: red socks.

## 2019-12-08 VITALS
DIASTOLIC BLOOD PRESSURE: 68 MMHG | HEART RATE: 70 BPM | RESPIRATION RATE: 19 BRPM | OXYGEN SATURATION: 99 % | SYSTOLIC BLOOD PRESSURE: 121 MMHG | TEMPERATURE: 97 F

## 2019-12-08 LAB
APPEARANCE UR: ABNORMAL
BACTERIA # UR AUTO: ABNORMAL
BILIRUB UR-MCNC: ABNORMAL
COLOR SPEC: ABNORMAL
COMMENT - URINE: SIGNIFICANT CHANGE UP
DIFF PNL FLD: ABNORMAL
EPI CELLS # UR: SIGNIFICANT CHANGE UP
GLUCOSE UR QL: NEGATIVE — SIGNIFICANT CHANGE UP
KETONES UR-MCNC: ABNORMAL
LEUKOCYTE ESTERASE UR-ACNC: ABNORMAL
NITRITE UR-MCNC: POSITIVE
PH UR: 8 — SIGNIFICANT CHANGE UP (ref 5–8)
PROT UR-MCNC: 150 MG/DL
RBC CASTS # UR COMP ASSIST: ABNORMAL /HPF (ref 0–4)
SP GR SPEC: 1.01 — SIGNIFICANT CHANGE UP (ref 1.01–1.02)
UROBILINOGEN FLD QL: 1
WBC UR QL: ABNORMAL

## 2019-12-08 PROCEDURE — 36415 COLL VENOUS BLD VENIPUNCTURE: CPT

## 2019-12-08 PROCEDURE — 87186 SC STD MICRODIL/AGAR DIL: CPT

## 2019-12-08 PROCEDURE — 99284 EMERGENCY DEPT VISIT MOD MDM: CPT | Mod: 25

## 2019-12-08 PROCEDURE — 87086 URINE CULTURE/COLONY COUNT: CPT

## 2019-12-08 PROCEDURE — 85730 THROMBOPLASTIN TIME PARTIAL: CPT

## 2019-12-08 PROCEDURE — 85027 COMPLETE CBC AUTOMATED: CPT

## 2019-12-08 PROCEDURE — 81001 URINALYSIS AUTO W/SCOPE: CPT

## 2019-12-08 PROCEDURE — 80053 COMPREHEN METABOLIC PANEL: CPT

## 2019-12-08 PROCEDURE — 96374 THER/PROPH/DIAG INJ IV PUSH: CPT

## 2019-12-08 PROCEDURE — 85610 PROTHROMBIN TIME: CPT

## 2019-12-08 PROCEDURE — 96361 HYDRATE IV INFUSION ADD-ON: CPT

## 2019-12-08 RX ORDER — CEFUROXIME AXETIL 250 MG
1 TABLET ORAL
Qty: 14 | Refills: 0
Start: 2019-12-08 | End: 2019-12-14

## 2019-12-08 RX ORDER — CEFTRIAXONE 500 MG/1
1000 INJECTION, POWDER, FOR SOLUTION INTRAMUSCULAR; INTRAVENOUS ONCE
Refills: 0 | Status: COMPLETED | OUTPATIENT
Start: 2019-12-08 | End: 2019-12-08

## 2019-12-08 RX ADMIN — SODIUM CHLORIDE 1000 MILLILITER(S): 9 INJECTION INTRAMUSCULAR; INTRAVENOUS; SUBCUTANEOUS at 01:30

## 2019-12-08 RX ADMIN — CEFTRIAXONE 100 MILLIGRAM(S): 500 INJECTION, POWDER, FOR SOLUTION INTRAMUSCULAR; INTRAVENOUS at 01:50

## 2019-12-08 NOTE — ED ADULT NURSE REASSESSMENT NOTE - NS ED NURSE REASSESS COMMENT FT1
Report given to Sabiha at [facility] about patient's care and estimated time of arrival per EMS.  Pharmacy also verified with Sabiha.

## 2019-12-08 NOTE — ED ADULT NURSE REASSESSMENT NOTE - NS ED NURSE REASSESS COMMENT FT1
[id number] Pt awake & responsive , says name. denies pain. Pt has bloody urine in drainage bag 400ml. emptied same. Ambulance crew arrived to transport pt back to [facility]. [place] cath removed. Vital signs taken, afebrile.

## 2019-12-10 LAB
-  AMIKACIN: SIGNIFICANT CHANGE UP
-  AMPICILLIN/SULBACTAM: SIGNIFICANT CHANGE UP
-  AMPICILLIN: SIGNIFICANT CHANGE UP
-  AZTREONAM: SIGNIFICANT CHANGE UP
-  CEFAZOLIN: SIGNIFICANT CHANGE UP
-  CEFEPIME: SIGNIFICANT CHANGE UP
-  CEFOXITIN: SIGNIFICANT CHANGE UP
-  CEFTRIAXONE: SIGNIFICANT CHANGE UP
-  CIPROFLOXACIN: SIGNIFICANT CHANGE UP
-  GENTAMICIN: SIGNIFICANT CHANGE UP
-  LEVOFLOXACIN: SIGNIFICANT CHANGE UP
-  MEROPENEM: SIGNIFICANT CHANGE UP
-  NITROFURANTOIN: SIGNIFICANT CHANGE UP
-  PIPERACILLIN/TAZOBACTAM: SIGNIFICANT CHANGE UP
-  TOBRAMYCIN: SIGNIFICANT CHANGE UP
-  TRIMETHOPRIM/SULFAMETHOXAZOLE: SIGNIFICANT CHANGE UP
CULTURE RESULTS: SIGNIFICANT CHANGE UP
METHOD TYPE: SIGNIFICANT CHANGE UP
ORGANISM # SPEC MICROSCOPIC CNT: SIGNIFICANT CHANGE UP
ORGANISM # SPEC MICROSCOPIC CNT: SIGNIFICANT CHANGE UP
SPECIMEN SOURCE: SIGNIFICANT CHANGE UP

## 2020-01-03 NOTE — PROGRESS NOTE ADULT - ASSESSMENT
89 YO W male with hx of from [facility]  with hematuria today. Pt with dementia cannot give hx. Denies any symptoms at present.Admitted for management of hemorrhagic cystitis Admitted for septic workup and evaluation,send blood and urine cx,serial lactate levels,monitor vitals closley,ivfs hydration,monitor urine output and renal profile,iv abx initiated Seen by urologist Dr Maynard and fitch cath was placed Palliative care consult requested ,to discuss advance directives and complete MOLST
89 YO W male with hx of Dementia,ASHD, prostate ca A fib,dm2,copd, from [facility]  with hematuria today. Pt with dementia cannot give hx. Denies any symptoms at present.Admitted for management of hemorrhagic cystitis Admitted for septic workup and evaluation,send blood and urine cx,serial lactate levels,monitor vitals closley,ivfs hydration,monitor urine output and renal profile,iv abx initiated Seen by urologist Dr Maynard and fitch cath was placed Palliative care consult requested ,to discuss advance directives and complete MOLST   admitted with  sepsis gram negative , E coli ,due to acute haemorrhagic cystitis , had CBI, hematuria resolved, on voiding trial today, ID f up noted for change to PO in AM.
89 YO W male with hx of Dementia,ASHD, prostate ca A fib,dm2,copd, from [facility]  with hematuria today. Pt with dementia cannot give hx. Denies any symptoms at present.Admitted for management of hemorrhagic cystitis Admitted for septic workup and evaluation,send blood and urine cx,serial lactate levels,monitor vitals ashley,ivfs hydration,monitor urine output and renal profile,iv abx initiated Seen by urologist Dr Maynard and fitch cath was placed Palliative care consult requested ,to discuss advance directives and complete MOLST   admitted with  sepsis gram negative , E coli ,due to acute haemorrhagic cystitis , had CBI, hematuria resolved, has anemia likely due to blood loss, having difficulty voiding as per urologist, try stopping Midodrine if possible , pt had orthostasis on admission with hypotension henryley with bleeding hematuria, as bleeding has resolved and hypotension resolved will try and observe off midodrine,
89 YO W male with hx of Dementia,ASHD, prostate ca A fib,dm2,copd, from [facility]  with hematuria  Pt with dementia cannot give hx. Denies any symptoms at present.Admitted for management of hemorrhagic cystitis Admitted for septic workup and evaluation,send blood and urine cx,serial lactate levels,monitor vitals closley,ivfs hydration,monitor urine output and renal profile,iv abx initiated Seen by urologist Dr Maynard and fitch cath and had CBI, was placed Palliative care consult requested ,to discuss advance directives and complete MOLST   Had ID consult received ceftriaxone zosyn and chsnged to vantin PO, fitch removed and pt voiding and is incontinent, Midodrine started on admission with orthostais with hematuria and hypotension improved and midodrine stopped, which  helped patient void.Dementia with behavioural disorder, pt is not a candidate for anticoagulation for now for AFIB only rate control out pt f up with cardio and urology for further decision to resume AC.   admitted with  sepsis gram negative , E coli ,due to acute haemorrhagic cystitis , had CBI, hematuria resolved, has anemia likely due to blood loss, having difficulty voiding as per urologist, try stopping Midodrine if possible , pt had orthostasis on admission with hypotension likley with bleeding hematuria, as bleeding has resolved and hypotension resolved will try and observe off midodrine, CAD, COPD ASHD, CH AFIB,DM@ h/o prostate ca,  Son explained in Detail why coumadin stopped and will follow up with cardiologist   stable for DC
89 YO W male with hx of from [facility]  with hematuria today. Pt with dementia cannot give hx. Denies any symptoms at present.Admitted for management of hemorrhagic cystitis Admitted for septic workup and evaluation,send blood and urine cx,serial lactate levels,monitor vitals closley,ivfs hydration,monitor urine output and renal profile,iv abx initiated Seen by urologist Dr Maynard and fitch cath was placed Palliative care consult requested ,to discuss advance directives and complete MOLST
89 YO W male with hx of from [facility]  with hematuria today. Pt with dementia cannot give hx. Denies any symptoms at present.Admitted for management of hemorrhagic cystitis Admitted for septic workup and evaluation,send blood and urine cx,serial lactate levels,monitor vitals closley,ivfs hydration,monitor urine output and renal profile,iv abx initiated Seen by urologist Dr Maynard and fitch cath was placed Palliative care consult requested ,to discuss advance directives and complete MOLST
15 yo M with left ankle pain

## 2020-01-30 ENCOUNTER — INPATIENT (INPATIENT)
Facility: HOSPITAL | Age: 85
LOS: 4 days | Discharge: HOME | DRG: 699 | End: 2020-02-04
Attending: INTERNAL MEDICINE | Admitting: INTERNAL MEDICINE
Payer: MEDICARE

## 2020-01-30 VITALS
OXYGEN SATURATION: 98 % | HEART RATE: 76 BPM | SYSTOLIC BLOOD PRESSURE: 111 MMHG | DIASTOLIC BLOOD PRESSURE: 58 MMHG | WEIGHT: 145.95 LBS | RESPIRATION RATE: 16 BRPM | TEMPERATURE: 97 F

## 2020-01-30 DIAGNOSIS — N30.90 CYSTITIS, UNSPECIFIED WITHOUT HEMATURIA: ICD-10-CM

## 2020-01-30 LAB
ALBUMIN SERPL ELPH-MCNC: 3.3 G/DL — SIGNIFICANT CHANGE UP (ref 3.3–5)
ALP SERPL-CCNC: 128 U/L — HIGH (ref 40–120)
ALT FLD-CCNC: 14 U/L — SIGNIFICANT CHANGE UP (ref 12–78)
ANION GAP SERPL CALC-SCNC: 7 MMOL/L — SIGNIFICANT CHANGE UP (ref 5–17)
APPEARANCE UR: ABNORMAL
AST SERPL-CCNC: 13 U/L — LOW (ref 15–37)
BASOPHILS # BLD AUTO: 0.02 K/UL — SIGNIFICANT CHANGE UP (ref 0–0.2)
BASOPHILS NFR BLD AUTO: 0.3 % — SIGNIFICANT CHANGE UP (ref 0–2)
BILIRUB SERPL-MCNC: 0.3 MG/DL — SIGNIFICANT CHANGE UP (ref 0.2–1.2)
BILIRUB UR-MCNC: NEGATIVE — SIGNIFICANT CHANGE UP
BUN SERPL-MCNC: 19 MG/DL — SIGNIFICANT CHANGE UP (ref 7–23)
CALCIUM SERPL-MCNC: 9.1 MG/DL — SIGNIFICANT CHANGE UP (ref 8.5–10.1)
CHLORIDE SERPL-SCNC: 107 MMOL/L — SIGNIFICANT CHANGE UP (ref 96–108)
CO2 SERPL-SCNC: 28 MMOL/L — SIGNIFICANT CHANGE UP (ref 22–31)
COLOR SPEC: ABNORMAL
CREAT SERPL-MCNC: 0.77 MG/DL — SIGNIFICANT CHANGE UP (ref 0.5–1.3)
DIFF PNL FLD: ABNORMAL
EOSINOPHIL # BLD AUTO: 0.07 K/UL — SIGNIFICANT CHANGE UP (ref 0–0.5)
EOSINOPHIL NFR BLD AUTO: 1 % — SIGNIFICANT CHANGE UP (ref 0–6)
GLUCOSE SERPL-MCNC: 109 MG/DL — HIGH (ref 70–99)
GLUCOSE UR QL: NEGATIVE — SIGNIFICANT CHANGE UP
HCT VFR BLD CALC: 32 % — LOW (ref 39–50)
HGB BLD-MCNC: 9.8 G/DL — LOW (ref 13–17)
IMM GRANULOCYTES NFR BLD AUTO: 0.3 % — SIGNIFICANT CHANGE UP (ref 0–1.5)
KETONES UR-MCNC: ABNORMAL
LEUKOCYTE ESTERASE UR-ACNC: ABNORMAL
LYMPHOCYTES # BLD AUTO: 1.28 K/UL — SIGNIFICANT CHANGE UP (ref 1–3.3)
LYMPHOCYTES # BLD AUTO: 18.1 % — SIGNIFICANT CHANGE UP (ref 13–44)
MCHC RBC-ENTMCNC: 25.3 PG — LOW (ref 27–34)
MCHC RBC-ENTMCNC: 30.6 GM/DL — LOW (ref 32–36)
MCV RBC AUTO: 82.5 FL — SIGNIFICANT CHANGE UP (ref 80–100)
MONOCYTES # BLD AUTO: 0.76 K/UL — SIGNIFICANT CHANGE UP (ref 0–0.9)
MONOCYTES NFR BLD AUTO: 10.7 % — SIGNIFICANT CHANGE UP (ref 2–14)
NEUTROPHILS # BLD AUTO: 4.93 K/UL — SIGNIFICANT CHANGE UP (ref 1.8–7.4)
NEUTROPHILS NFR BLD AUTO: 69.6 % — SIGNIFICANT CHANGE UP (ref 43–77)
NITRITE UR-MCNC: NEGATIVE — SIGNIFICANT CHANGE UP
NRBC # BLD: 0 /100 WBCS — SIGNIFICANT CHANGE UP (ref 0–0)
OB PNL STL: NEGATIVE — SIGNIFICANT CHANGE UP
PH UR: 8 — SIGNIFICANT CHANGE UP (ref 5–8)
PLATELET # BLD AUTO: 301 K/UL — SIGNIFICANT CHANGE UP (ref 150–400)
POTASSIUM SERPL-MCNC: 4 MMOL/L — SIGNIFICANT CHANGE UP (ref 3.5–5.3)
POTASSIUM SERPL-SCNC: 4 MMOL/L — SIGNIFICANT CHANGE UP (ref 3.5–5.3)
PROT SERPL-MCNC: 7.1 G/DL — SIGNIFICANT CHANGE UP (ref 6–8.3)
PROT UR-MCNC: 500 MG/DL
RBC # BLD: 3.88 M/UL — LOW (ref 4.2–5.8)
RBC # FLD: 15.7 % — HIGH (ref 10.3–14.5)
SODIUM SERPL-SCNC: 142 MMOL/L — SIGNIFICANT CHANGE UP (ref 135–145)
SP GR SPEC: 1.01 — SIGNIFICANT CHANGE UP (ref 1.01–1.02)
UROBILINOGEN FLD QL: NEGATIVE — SIGNIFICANT CHANGE UP
WBC # BLD: 7.08 K/UL — SIGNIFICANT CHANGE UP (ref 3.8–10.5)
WBC # FLD AUTO: 7.08 K/UL — SIGNIFICANT CHANGE UP (ref 3.8–10.5)

## 2020-01-30 PROCEDURE — 99285 EMERGENCY DEPT VISIT HI MDM: CPT

## 2020-01-30 PROCEDURE — 74176 CT ABD & PELVIS W/O CONTRAST: CPT | Mod: 26

## 2020-01-30 RX ORDER — METFORMIN HYDROCHLORIDE 850 MG/1
500 TABLET ORAL
Refills: 0 | Status: DISCONTINUED | OUTPATIENT
Start: 2020-01-30 | End: 2020-02-04

## 2020-01-30 RX ORDER — DEXTROSE 50 % IN WATER 50 %
15 SYRINGE (ML) INTRAVENOUS ONCE
Refills: 0 | Status: DISCONTINUED | OUTPATIENT
Start: 2020-01-30 | End: 2020-02-04

## 2020-01-30 RX ORDER — GLUCAGON INJECTION, SOLUTION 0.5 MG/.1ML
1 INJECTION, SOLUTION SUBCUTANEOUS ONCE
Refills: 0 | Status: DISCONTINUED | OUTPATIENT
Start: 2020-01-30 | End: 2020-02-04

## 2020-01-30 RX ORDER — ACETAMINOPHEN 500 MG
650 TABLET ORAL EVERY 6 HOURS
Refills: 0 | Status: DISCONTINUED | OUTPATIENT
Start: 2020-01-30 | End: 2020-02-04

## 2020-01-30 RX ORDER — INSULIN LISPRO 100/ML
VIAL (ML) SUBCUTANEOUS AT BEDTIME
Refills: 0 | Status: DISCONTINUED | OUTPATIENT
Start: 2020-01-30 | End: 2020-02-04

## 2020-01-30 RX ORDER — SODIUM CHLORIDE 9 MG/ML
1000 INJECTION, SOLUTION INTRAVENOUS
Refills: 0 | Status: DISCONTINUED | OUTPATIENT
Start: 2020-01-30 | End: 2020-01-31

## 2020-01-30 RX ORDER — CEFTRIAXONE 500 MG/1
1000 INJECTION, POWDER, FOR SOLUTION INTRAMUSCULAR; INTRAVENOUS EVERY 24 HOURS
Refills: 0 | Status: DISCONTINUED | OUTPATIENT
Start: 2020-01-31 | End: 2020-02-02

## 2020-01-30 RX ORDER — CEFTRIAXONE 500 MG/1
1000 INJECTION, POWDER, FOR SOLUTION INTRAMUSCULAR; INTRAVENOUS ONCE
Refills: 0 | Status: COMPLETED | OUTPATIENT
Start: 2020-01-30 | End: 2020-01-30

## 2020-01-30 RX ORDER — CEFTRIAXONE 500 MG/1
1000 INJECTION, POWDER, FOR SOLUTION INTRAMUSCULAR; INTRAVENOUS EVERY 24 HOURS
Refills: 0 | Status: DISCONTINUED | OUTPATIENT
Start: 2020-01-30 | End: 2020-01-30

## 2020-01-30 RX ORDER — MAGNESIUM OXIDE 400 MG ORAL TABLET 241.3 MG
400 TABLET ORAL DAILY
Refills: 0 | Status: DISCONTINUED | OUTPATIENT
Start: 2020-01-30 | End: 2020-01-31

## 2020-01-30 RX ORDER — DONEPEZIL HYDROCHLORIDE 10 MG/1
5 TABLET, FILM COATED ORAL AT BEDTIME
Refills: 0 | Status: DISCONTINUED | OUTPATIENT
Start: 2020-01-30 | End: 2020-02-04

## 2020-01-30 RX ORDER — DEXTROSE 50 % IN WATER 50 %
25 SYRINGE (ML) INTRAVENOUS ONCE
Refills: 0 | Status: DISCONTINUED | OUTPATIENT
Start: 2020-01-30 | End: 2020-02-04

## 2020-01-30 RX ORDER — SODIUM CHLORIDE 9 MG/ML
1000 INJECTION, SOLUTION INTRAVENOUS
Refills: 0 | Status: DISCONTINUED | OUTPATIENT
Start: 2020-01-30 | End: 2020-02-04

## 2020-01-30 RX ORDER — DEXTROSE 50 % IN WATER 50 %
12.5 SYRINGE (ML) INTRAVENOUS ONCE
Refills: 0 | Status: DISCONTINUED | OUTPATIENT
Start: 2020-01-30 | End: 2020-02-04

## 2020-01-30 RX ORDER — BUDESONIDE AND FORMOTEROL FUMARATE DIHYDRATE 160; 4.5 UG/1; UG/1
2 AEROSOL RESPIRATORY (INHALATION)
Refills: 0 | Status: DISCONTINUED | OUTPATIENT
Start: 2020-01-30 | End: 2020-02-04

## 2020-01-30 RX ORDER — SIMVASTATIN 20 MG/1
20 TABLET, FILM COATED ORAL AT BEDTIME
Refills: 0 | Status: DISCONTINUED | OUTPATIENT
Start: 2020-01-30 | End: 2020-02-04

## 2020-01-30 RX ADMIN — CEFTRIAXONE 1000 MILLIGRAM(S): 500 INJECTION, POWDER, FOR SOLUTION INTRAMUSCULAR; INTRAVENOUS at 19:30

## 2020-01-30 RX ADMIN — CEFTRIAXONE 100 MILLIGRAM(S): 500 INJECTION, POWDER, FOR SOLUTION INTRAMUSCULAR; INTRAVENOUS at 18:57

## 2020-01-30 NOTE — ED PROVIDER NOTE - ATTENDING CONTRIBUTION TO CARE
Pt is a 89 yo male who presents to the ED with a cc of hematuria.  PMHx of Arteriosclerotic heart disease (ASHD), A-fib not on AC, CHF, dementia, depression, DM, HLD, HTN, prostate cancer.   Pt presents from [facility] with a cc of gross hematuria.  Pt has indwelling Gal[place] noted. Pt is a 89 yo male who presents to the ED with a cc of hematuria.  PMHx of Arteriosclerotic heart disease (ASHD), A-fib not on AC, CHF, dementia, depression, DM, HLD, HTN, prostate cancer.   Pt presents from [facility] with a cc of gross hematuria.  Pt has indwelling Galicia noted.  Pt is unable to provide history secondary to advance dementia.  Of note it appears that the pt was seen this past December for similar issue.  Was diagnosed with hemorrhagic cystitis and treated.  Cultures were positive for Proteus.  On exam pt lying in bed NAD, alert oriented to person only, NCAT, PERRL, EOMI, heart irregularly irregular, lungs diminished at the bases, abd soft NT/ND.  Indwelling Galicia noted.  Dark concentrated hematuria appreciated.  Will attempt to irrigate Galicia.  If unable to clear will require admission as pt at increased risk for forming clots and  urinary retention.  Will obtain screening labs to check H/H.

## 2020-01-30 NOTE — ED ADULT NURSE NOTE - OBJECTIVE STATEMENT
pt biba from pt jaja from [facility] for hematuria - pt is alert but confused - blood noted in diaper - pt denies pain - bladder non distended -

## 2020-01-30 NOTE — ED PROVIDER NOTE - OBJECTIVE STATEMENT
89 y male presents sent to ED from [facility] for hematuria,  poor historian,  PMD Dr Quintana,  no anticoagulants noted patient med list.   PMH:  Arteriosclerotic heart disease (ASHD)    Atrial fibrillation    CHF (congestive heart failure)    Dementia    Depression    DM (diabetes mellitus)    Hyperlipemia    Hypertension    Prostate ca

## 2020-01-30 NOTE — ED ADULT NURSE NOTE - NSIMPLEMENTINTERV_GEN_ALL_ED
Implemented All Fall with Harm Risk Interventions:  [place] to call system. Call bell, personal items and telephone within reach. Instruct patient to call for assistance. Room bathroom lighting operational. Non-slip footwear when patient is off stretcher. Physically safe environment: no spills, clutter or unnecessary equipment. Stretcher in lowest position, wheels locked, appropriate side rails in place. Provide visual cue, wrist band, yellow gown, etc. Monitor gait and stability. Monitor for mental status changes and reorient to person, place, and time. Review medications for side effects contributing to fall risk. Reinforce activity limits and safety measures with patient and family. Provide visual clues: red socks.

## 2020-01-30 NOTE — ED PROVIDER NOTE - UNABLE TO OBTAIN
hx dementia, sent to ED from [facility] for hematuria Dementia hx dementia, sent to ED for hematuria

## 2020-01-30 NOTE — ED PROVIDER NOTE - PROGRESS NOTE DETAILS
spoke with Dr Quintana, case discussed, advised call Dr Ring for admission  call out to Dr Ring, awaiting call back 2nd call Dr Ring awaiting call back spoke with Dr Paul, covering Dr Ring, case discussed, will admit patient

## 2020-01-30 NOTE — CONSULT NOTE ADULT - SUBJECTIVE AND OBJECTIVE BOX
[facility]     Patient is a 89y old  Male who presents with a chief complaint of     HPI:      HPI:    89y Male for Cardiology Consult    PAST MEDICAL & SURGICAL HISTORY:  Dementia  Arteriosclerotic heart disease (ASHD)  Prostate CA  Atrial fibrillation  DM (diabetes mellitus)  Dementia  Depression  Diabetes  Hyperlipemia  CHF (congestive heart failure)  Dementia  Prostate ca  Diabetes  Hypertension  Atrial fibrillation  No significant past surgical history      FAMILY HISTORY:      SOCIAL HISTORY:   Alcohol:  Smoking:    Allergies    latex (Rash)  latex (Unknown)  No Known Drug Allergies    Intolerances        MEDICATIONS  (STANDING):    MEDICATIONS  (PRN):      REVIEW OF SYSTEMS:  CONSTITUTIONAL: No fever, weight loss, chills, shakes, or fat  RESPIRATORY: No cough, wheezing, hemoptysis, or shortness of breath  CARDIOVASCULAR: No chest pain, dyspnea, palpitations, dizziness, syncope, paroxysmal nocturnal dyspnea, orthopnea, or arm or leg swelling  GASTROINTESTINAL: No abdominal  or epigastric pain, nausea, vomiting, hematemesis, diarrhea, constipation, melena or bright red bloo  NEUROLOGICAL: No headaches, memory loss, slurred speech, limb weakness, loss of strength, numbness, or tremors  SKIN: No itching, burning, rashes, or lesions  ENDOCRINE: No heat or cold intolerance, or hair loss  MUSCULOSKELETAL: No joint pain or swelling, muscle, back, or extremity pain  HEME/LYMPH: No easy bruising or bleeding gums  ALLERY AND IMMUNOLOGIC: No hives or rash.    Vital Signs Last 24 Hrs  T(C): 36.8 (2020 22:19), Max: 37 (2020 20:50)  T(F): 98.3 (2020 22:19), Max: 98.6 (2020 20:50)  HR: 63 (2020 22:19) (63 - 90)  BP: 120/62 (2020 22:19) (104/56 - 136/63)  BP(mean): --  RR: 17 (2020 22:19) (15 - 18)  SpO2: 98% (2020 22:19) (96% - 98%)    PHYSICAL EXAM:  HEAD:  Atraumatic, Normocephalic  EYES: EOMI, PERRLA, conjunctiva and sclera clear  NECK: Supple and normal thyroid.  No JVD or carotid bruit.   HEART: S1, S2 regular , 1/6 soft ejection systolic murmur in mitral area , no thrill and no gallops .  PULMONARY: Bilateral vesicular breathing , few scattered ronchi and few scattered rales are present .  ABDOMEN: Soft nontender and positive bowl sounds   EXTREMITIES:  No clubbing, cyanosis, or pedal  edema  NEUROLOGICAL: AAOX3 , no focal deficit .    LABS:                        9.8    7.08  )-----------( 301      ( 2020 16:11 )             32.0         142  |  107  |  19  ----------------------------<  109<H>  4.0   |  28  |  0.77    Ca    9.1      2020 16:11    TPro  7.1  /  Alb  3.3  /  TBili  0.3  /  DBili  x   /  AST  13<L>  /  ALT  14  /  AlkPhos  128<H>            Urinalysis Basic - ( 2020 17:12 )    Color: Red / Appearance: bloody / S.010 / pH: x  Gluc: x / Ketone: Trace  / Bili: Negative / Urobili: Negative   Blood: x / Protein: 500 mg/dL / Nitrite: Negative   Leuk Esterase: Moderate / RBC: >50 /HPF / WBC 26-50   Sq Epi: x / Non Sq Epi: Occasional / Bacteria: Many      BNP      EKG:  ECHO:  IMAGING:    Assessment and Plan :     Will continue to follow during hospital course and give further recommendations as needed . Thanks for your referral . if any questions please contact me at office ([phone] [phone]) [facility] [place]     Patient is a 89y old  Male who presents with a chief complaint of     HPI:      HPI:    89y Male for Cardiology Consult    PAST MEDICAL & SURGICAL HISTORY:  Dementia  Arteriosclerotic heart disease (ASHD)  Prostate CA  Atrial fibrillation  DM (diabetes mellitus)  Dementia  Depression  Diabetes  Hyperlipemia  CHF (congestive heart failure)  Dementia  Prostate ca  Diabetes  Hypertension  Atrial fibrillation  No significant past surgical history      FAMILY HISTORY:      SOCIAL HISTORY:   Alcohol:  Smoking:    Allergies    latex (Rash)  latex (Unknown)  No Known Drug Allergies    Intolerances        MEDICATIONS  (STANDING):    MEDICATIONS  (PRN):    Vital Signs Last 24 Hrs  T(C): 36.8 (2020 22:19), Max: 37 (2020 20:50)  T(F): 98.3 (2020 22:19), Max: 98.6 (2020 20:50)  HR: 63 (2020 22:19) (63 - 90)  BP: 120/62 (2020 22:19) (104/56 - 136/63)  BP(mean): --  RR: 17 (2020 22:19) (15 - 18)  SpO2: 98% (2020 22:19) (96% - 98%)  LABS:                        9.8    7.08  )-----------( 301      ( 2020 16:11 )             32.0         142  |  107  |  19  ----------------------------<  109<H>  4.0   |  28  |  0.77    Ca    9.1      2020 16:11    TPro  7.1  /  Alb  3.3  /  TBili  0.3  /  DBili  x   /  AST  13<L>  /  ALT  14  /  AlkPhos  128<H>            Urinalysis Basic - ( 2020 17:12 )    Color: Red / Appearance: bloody / S.010 / pH: x  Gluc: x / Ketone: Trace  / Bili: Negative / Urobili: Negative   Blood: x / Protein: 500 mg/dL / Nitrite: Negative   Leuk Esterase: Moderate / RBC: >50 /HPF / WBC 26-50   Sq Epi: x / Non Sq Epi: Occasional / Bacteria: Many    Assessment and Plan :   FULL CONSULT DICTATED .  89 years old male has cloudy urine and blood in urine and has urosepsis . Patient ness H/O CHF and paroxysmal atrial fibrillation . Continue I/V antibiotics . Patient cardiac wise stable and cleared for urological procedures cystoscopy if needed . Antiplatelet and anticoagulation contraindicated due to hematuria .  Will continue to follow during hospital course and give further recommendations as needed . Thanks for your referral . if any questions please contact me at office ([phone] [phone])

## 2020-01-30 NOTE — ED PROVIDER NOTE - CLINICAL SUMMARY MEDICAL DECISION MAKING FREE TEXT BOX
hx dementia, sent to ED for hematuria,  will obtain labs, ct , fitch cath placed, r/o uti vs, kidney stone

## 2020-01-30 NOTE — ED PROVIDER NOTE - CARE PLAN
Principal Discharge DX:	Hematuria Principal Discharge DX:	Cystitis  Secondary Diagnosis:	UTI (urinary tract infection)  Secondary Diagnosis:	Hematuria

## 2020-01-31 DIAGNOSIS — E11.9 TYPE 2 DIABETES MELLITUS WITHOUT COMPLICATIONS: ICD-10-CM

## 2020-01-31 DIAGNOSIS — I48.91 UNSPECIFIED ATRIAL FIBRILLATION: ICD-10-CM

## 2020-01-31 DIAGNOSIS — R31.9 HEMATURIA, UNSPECIFIED: ICD-10-CM

## 2020-01-31 DIAGNOSIS — I50.9 HEART FAILURE, UNSPECIFIED: ICD-10-CM

## 2020-01-31 DIAGNOSIS — F03.90 UNSPECIFIED DEMENTIA WITHOUT BEHAVIORAL DISTURBANCE: ICD-10-CM

## 2020-01-31 DIAGNOSIS — I10 ESSENTIAL (PRIMARY) HYPERTENSION: ICD-10-CM

## 2020-01-31 DIAGNOSIS — N39.0 URINARY TRACT INFECTION, SITE NOT SPECIFIED: ICD-10-CM

## 2020-01-31 DIAGNOSIS — Z29.9 ENCOUNTER FOR PROPHYLACTIC MEASURES, UNSPECIFIED: ICD-10-CM

## 2020-01-31 DIAGNOSIS — I25.10 ATHEROSCLEROTIC HEART DISEASE OF NATIVE CORONARY ARTERY WITHOUT ANGINA PECTORIS: ICD-10-CM

## 2020-01-31 LAB
ALBUMIN SERPL ELPH-MCNC: 2.9 G/DL — LOW (ref 3.3–5)
ALBUMIN SERPL ELPH-MCNC: 3 G/DL — LOW (ref 3.3–5)
ALP SERPL-CCNC: 115 U/L — SIGNIFICANT CHANGE UP (ref 40–120)
ALP SERPL-CCNC: 115 U/L — SIGNIFICANT CHANGE UP (ref 40–120)
ALT FLD-CCNC: 10 U/L — LOW (ref 12–78)
ALT FLD-CCNC: 11 U/L — LOW (ref 12–78)
ANION GAP SERPL CALC-SCNC: 4 MMOL/L — LOW (ref 5–17)
ANION GAP SERPL CALC-SCNC: 5 MMOL/L — SIGNIFICANT CHANGE UP (ref 5–17)
AST SERPL-CCNC: 12 U/L — LOW (ref 15–37)
AST SERPL-CCNC: 13 U/L — LOW (ref 15–37)
BILIRUB SERPL-MCNC: 0.3 MG/DL — SIGNIFICANT CHANGE UP (ref 0.2–1.2)
BILIRUB SERPL-MCNC: 0.4 MG/DL — SIGNIFICANT CHANGE UP (ref 0.2–1.2)
BUN SERPL-MCNC: 15 MG/DL — SIGNIFICANT CHANGE UP (ref 7–23)
BUN SERPL-MCNC: 17 MG/DL — SIGNIFICANT CHANGE UP (ref 7–23)
CALCIUM SERPL-MCNC: 8.8 MG/DL — SIGNIFICANT CHANGE UP (ref 8.5–10.1)
CALCIUM SERPL-MCNC: 8.9 MG/DL — SIGNIFICANT CHANGE UP (ref 8.5–10.1)
CHLORIDE SERPL-SCNC: 106 MMOL/L — SIGNIFICANT CHANGE UP (ref 96–108)
CHLORIDE SERPL-SCNC: 107 MMOL/L — SIGNIFICANT CHANGE UP (ref 96–108)
CHOLEST SERPL-MCNC: 109 MG/DL — SIGNIFICANT CHANGE UP (ref 10–199)
CHOLEST SERPL-MCNC: 109 MG/DL — SIGNIFICANT CHANGE UP (ref 10–199)
CO2 SERPL-SCNC: 30 MMOL/L — SIGNIFICANT CHANGE UP (ref 22–31)
CO2 SERPL-SCNC: 32 MMOL/L — HIGH (ref 22–31)
CREAT SERPL-MCNC: 0.58 MG/DL — SIGNIFICANT CHANGE UP (ref 0.5–1.3)
CREAT SERPL-MCNC: 0.64 MG/DL — SIGNIFICANT CHANGE UP (ref 0.5–1.3)
GLUCOSE SERPL-MCNC: 94 MG/DL — SIGNIFICANT CHANGE UP (ref 70–99)
GLUCOSE SERPL-MCNC: 99 MG/DL — SIGNIFICANT CHANGE UP (ref 70–99)
HBA1C BLD-MCNC: 5.9 % — HIGH (ref 4–5.6)
HCT VFR BLD CALC: 27.8 % — LOW (ref 39–50)
HDLC SERPL-MCNC: 38 MG/DL — LOW
HDLC SERPL-MCNC: 39 MG/DL — LOW
HGB BLD-MCNC: 8.6 G/DL — LOW (ref 13–17)
LIPID PNL WITH DIRECT LDL SERPL: 60 MG/DL — SIGNIFICANT CHANGE UP
LIPID PNL WITH DIRECT LDL SERPL: 61 MG/DL — SIGNIFICANT CHANGE UP
MAGNESIUM SERPL-MCNC: 1.5 MG/DL — LOW (ref 1.6–2.6)
MCHC RBC-ENTMCNC: 25.1 PG — LOW (ref 27–34)
MCHC RBC-ENTMCNC: 30.9 GM/DL — LOW (ref 32–36)
MCV RBC AUTO: 81 FL — SIGNIFICANT CHANGE UP (ref 80–100)
NRBC # BLD: 0 /100 WBCS — SIGNIFICANT CHANGE UP (ref 0–0)
PLATELET # BLD AUTO: 275 K/UL — SIGNIFICANT CHANGE UP (ref 150–400)
POTASSIUM SERPL-MCNC: 4 MMOL/L — SIGNIFICANT CHANGE UP (ref 3.5–5.3)
POTASSIUM SERPL-MCNC: 4.1 MMOL/L — SIGNIFICANT CHANGE UP (ref 3.5–5.3)
POTASSIUM SERPL-SCNC: 4 MMOL/L — SIGNIFICANT CHANGE UP (ref 3.5–5.3)
POTASSIUM SERPL-SCNC: 4.1 MMOL/L — SIGNIFICANT CHANGE UP (ref 3.5–5.3)
PROT SERPL-MCNC: 6.2 G/DL — SIGNIFICANT CHANGE UP (ref 6–8.3)
PROT SERPL-MCNC: 6.4 G/DL — SIGNIFICANT CHANGE UP (ref 6–8.3)
RBC # BLD: 3.43 M/UL — LOW (ref 4.2–5.8)
RBC # FLD: 15.4 % — HIGH (ref 10.3–14.5)
SODIUM SERPL-SCNC: 141 MMOL/L — SIGNIFICANT CHANGE UP (ref 135–145)
SODIUM SERPL-SCNC: 143 MMOL/L — SIGNIFICANT CHANGE UP (ref 135–145)
TOTAL CHOLESTEROL/HDL RATIO MEASUREMENT: 2.8 RATIO — LOW (ref 3.4–9.6)
TOTAL CHOLESTEROL/HDL RATIO MEASUREMENT: 2.9 RATIO — LOW (ref 3.4–9.6)
TRIGL SERPL-MCNC: 44 MG/DL — SIGNIFICANT CHANGE UP (ref 10–149)
TRIGL SERPL-MCNC: 57 MG/DL — SIGNIFICANT CHANGE UP (ref 10–149)
TSH SERPL-MCNC: 1.28 UIU/ML — SIGNIFICANT CHANGE UP (ref 0.36–3.74)
WBC # BLD: 6.91 K/UL — SIGNIFICANT CHANGE UP (ref 3.8–10.5)
WBC # FLD AUTO: 6.91 K/UL — SIGNIFICANT CHANGE UP (ref 3.8–10.5)

## 2020-01-31 PROCEDURE — 93010 ELECTROCARDIOGRAM REPORT: CPT

## 2020-01-31 RX ORDER — PANTOPRAZOLE SODIUM 20 MG/1
40 TABLET, DELAYED RELEASE ORAL
Refills: 0 | Status: DISCONTINUED | OUTPATIENT
Start: 2020-01-31 | End: 2020-02-04

## 2020-01-31 RX ORDER — LACTOBACILLUS ACIDOPHILUS 100MM CELL
1 CAPSULE ORAL
Refills: 0 | Status: DISCONTINUED | OUTPATIENT
Start: 2020-01-31 | End: 2020-02-04

## 2020-01-31 RX ORDER — POLYETHYLENE GLYCOL 3350 17 G/17G
17 POWDER, FOR SOLUTION ORAL DAILY
Refills: 0 | Status: DISCONTINUED | OUTPATIENT
Start: 2020-01-31 | End: 2020-02-04

## 2020-01-31 RX ORDER — MAGNESIUM OXIDE 400 MG ORAL TABLET 241.3 MG
400 TABLET ORAL
Refills: 0 | Status: DISCONTINUED | OUTPATIENT
Start: 2020-01-31 | End: 2020-02-04

## 2020-01-31 RX ORDER — INSULIN LISPRO 100/ML
VIAL (ML) SUBCUTANEOUS
Refills: 0 | Status: DISCONTINUED | OUTPATIENT
Start: 2020-01-31 | End: 2020-02-04

## 2020-01-31 RX ORDER — SENNA PLUS 8.6 MG/1
2 TABLET ORAL AT BEDTIME
Refills: 0 | Status: DISCONTINUED | OUTPATIENT
Start: 2020-01-31 | End: 2020-02-04

## 2020-01-31 RX ADMIN — PANTOPRAZOLE SODIUM 40 MILLIGRAM(S): 20 TABLET, DELAYED RELEASE ORAL at 06:58

## 2020-01-31 RX ADMIN — DONEPEZIL HYDROCHLORIDE 5 MILLIGRAM(S): 10 TABLET, FILM COATED ORAL at 22:45

## 2020-01-31 RX ADMIN — METFORMIN HYDROCHLORIDE 500 MILLIGRAM(S): 850 TABLET ORAL at 06:59

## 2020-01-31 RX ADMIN — SIMVASTATIN 20 MILLIGRAM(S): 20 TABLET, FILM COATED ORAL at 22:45

## 2020-01-31 RX ADMIN — Medication 1: at 12:16

## 2020-01-31 RX ADMIN — Medication 1 TABLET(S): at 18:27

## 2020-01-31 RX ADMIN — Medication 650 MILLIGRAM(S): at 19:05

## 2020-01-31 RX ADMIN — SENNA PLUS 2 TABLET(S): 8.6 TABLET ORAL at 22:45

## 2020-01-31 RX ADMIN — MAGNESIUM OXIDE 400 MG ORAL TABLET 400 MILLIGRAM(S): 241.3 TABLET ORAL at 18:27

## 2020-01-31 RX ADMIN — BUDESONIDE AND FORMOTEROL FUMARATE DIHYDRATE 2 PUFF(S): 160; 4.5 AEROSOL RESPIRATORY (INHALATION) at 19:37

## 2020-01-31 RX ADMIN — POLYETHYLENE GLYCOL 3350 17 GRAM(S): 17 POWDER, FOR SOLUTION ORAL at 16:38

## 2020-01-31 RX ADMIN — Medication 650 MILLIGRAM(S): at 18:35

## 2020-01-31 RX ADMIN — METFORMIN HYDROCHLORIDE 500 MILLIGRAM(S): 850 TABLET ORAL at 18:27

## 2020-01-31 RX ADMIN — CEFTRIAXONE 100 MILLIGRAM(S): 500 INJECTION, POWDER, FOR SOLUTION INTRAMUSCULAR; INTRAVENOUS at 18:27

## 2020-01-31 NOTE — H&P ADULT - NSHPLABSRESULTS_GEN_ALL_CORE
< from: CT Renal Stone Hunt (01.30.20 @ 17:13) >    EXAM:  CT RENAL STONE HUNT                            PROCEDURE DATE:  01/30/2020          INTERPRETATION:  CLINICAL INFORMATION: Hematuria.    COMPARISON: CT scan abdomen pelvis 1/26/2018.    PROCEDURE:   CT of the Abdomen and Pelvis was performedwithout intravenous contrast.   Intravenous contrast: None.  Oral contrast: None.  Sagittal and coronal reformats were performed.    FINDINGS:    LOWER CHEST: Cardiomegaly. Cardiac pacemaker lead.  Peribronchiolar thickening left lower lobe, may reflect inflammatory/infectious airway disease.  3 mm nodule peripherally left lower lobe.   3 mm calcified nodule right middle lobe.    The evaluation of the solid organ parenchyma is limited without intravenous contrast.  In addition streak artifact from patient's arms degrades image quality limiting evaluation.    LIVER: Approximately 3 cm densely calcified lesion peripherally right hepatic lobe, stable in appearance.  BILE DUCTS: Normal caliber.  GALLBLADDER: Prior cholecystectomy.  SPLEEN: Grossly unremarkable.  PANCREAS: Within normal limits.  ADRENALS: Fusiform thickening of the adrenal glands bilaterally.  KIDNEYS/URETERS:   No hydroureteronephrosis or obstructing ureteral calculus noted.    BLADDER: Balloon Galicia catheter within the bladder. Urinary bladder is nondistended, however, cannot exclude bladder wall thickening.  REPRODUCTIVE ORGANS: Enlarged prostate gland with coarse central calcifications.    BOWEL: Evaluation the gastrointestinal tract is limited without distention.  Moderate fecal retention throughout the colon; no bowel obstruction. Mild perirectal presacral stranding, stable in appearance.   Appendix not visualized on this exam.  PERITONEUM: No ascites.  VESSELS: Atherosclerotic calcification.  RETROPERITONEUM/LYMPH NODES: No lymphadenopathy.    ABDOMINAL WALL: Postsurgical changes.  Small, bilateral fat-containing inguinal hernias.    BONES: Multilevel degenerative changes.  No acute osseous abnormality.    IMPRESSION:     No hydronephrosis or evidence of obstructive uropathy.  Galicia catheter within a nondistended urinary bladder, possible bladder wall thickening, which may reflect chronic bladder outlet obstruction as discussed present on prior exam and there is an enlarged prostate gland.    Other findings as discussed above.    < end of copied text >    < from: TTE Echo Doppler w/o Cont (02.01.19 @ 17:45) >    LVEF: 35-40%.  PASP: 40mm Hg  FINDINGS  Left Ventricle: Borderline LV size with  moderate depressed LV systolic   function with estimated LVEF 35-40%.  Right Ventricle: Normal in size and normal RV systolic function.  Left Atrium: Mild dilated.  Right Atrium: Normal in size.  Mitral Valve: Mild mitral annular calcification is present. Mitral valve   is mildly calcified and no evidence of any mitral stenosis mild mitral   regurgitation is present.  Aortic Valve: Aortic Root is mild sclerotic and of normal dimension.   Aortic valve is calcified with  mild aortic stenosis present with the   aortic valve area calculated 1.7 sq cm and peak gradient across the   aortic valve is 15 mmHg and mean gradient 7 mmHg is present. Mild aortic   regurgitation is present.  Tricuspid Valve: Mild tricuspid regurgitation with calculated  PA   systolic pressure 40 mmHg suggestive of mild pulmonary hypertension is   present.  Pulmonic Valve:Trace  Pulmonary regurgitation ispresent.  Diastolic Function: Cannot evaluate because of underlying atrial   fibrillation.  Pericardium/Pleura: No evidence of pericardial effusion.  No intracardiac thrombus or vegetations and  tumor.  CONCLUSIONS:  Normal LV size with  moderate depressed LV systolic function with LVEF   35-40% .    Mild concentric left ventricular hypertrophy is present.    Mild tricuspid regurgitation with  mild pulmonary hypertension is present.     mild mitral regurgitation is present.    < end of copied text >

## 2020-01-31 NOTE — SWALLOW BEDSIDE ASSESSMENT ADULT - COMMENTS
Pt was awake and cooperative for a clinical assessment of swallow function this PM. Per charting, pt is a "89 y o WM  presents sent to ED from [facility] for evaluation of hematuria,  poor historian,  PMD Dr Quintana,  no anticoagulants noted in  patient med list ,patient was admitted with GIB last year and BASA was stopped at that time .His  PMH is significant for :  Arteriosclerotic heart disease (ASHD) Atrial fibrillation  ,CHF (congestive heart failure)  ,Dementia  ,Depression  ,DM (diabetes mellitus)  ,Hyperlipemia  ,Hypertension  ,Prostate ca .UTI suspected Admitted for septic workup and evaluation ,send blood and urine cx, serial lactate levels ,monitor vitals closely hydration, monitor urine output and renal profile,iv abx initiated -on ceftriaxone . Renal stone hunt study was performed and he was found to have bladder wall thickening and prostate enlargement . evaluation requested Palliative care consult requested ,to discuss advance directives and complete MOLST." There is no recent chest imaging on file for this pt.     At the time of today's assessment, pt denies dysphagia prior to admission. He denies any overt s/s of penetration/aspiration. He denies pain and appears NAD.

## 2020-01-31 NOTE — PATIENT PROFILE ADULT - ABILITY TO HEAR (WITH HEARING AID OR HEARING APPLIANCE IF NORMALLY USED):
Adequate: hears normal conversation without difficulty Adequate: hears normal conversation without difficulty/COPIED FORWARD july 2019 AND EDITED AS NECESSARY

## 2020-01-31 NOTE — H&P ADULT - ATTENDING COMMENTS
89 y o WM  presents sent to ED from [facility] for evaluation of hematuria,  poor historian,  PMD Dr Quintana,  no anticoagulants noted in  patient med list ,patient was admitted with GIB last year and BASA was stopped at that time .His  PMH is significant for :  Arteriosclerotic heart disease (ASHD) ,	Atrial fibrillation  ,CHF (congestive heart failure)  ,Dementia  ,Depression  ,DM (diabetes mellitus)  ,Hyperlipemia  ,Hypertension  ,Prostate ca .UTI suspected Admitted for septic workup and evaluation ,send blood and urine cx, serial lactate levels ,monitor vitals closely hydration, monitor urine output and renal profile,iv abx initiated -on ceftriaxone . Renal stone hunt study was performed and he was found to have bladder wall thickenning and prostate enlargement . evaluation requested Palliative care consult requested ,to discuss advance directives and complete MOLST

## 2020-01-31 NOTE — CONSULT NOTE ADULT - SUBJECTIVE AND OBJECTIVE BOX
CHIEF COMPLAINT:  89y Male with chief complaint of  hematuria with hx prostate cancer and chronic fitch         HISTORY OF PRESENT ILLNESS:  89 y o WM  presents sent to ED from [facility] for evaluation of hematuria,  poor historian,  PMD Dr Quintana,  no anticoagulants noted in  patient med list ,patient was admitted with GIB last year and BASA was stopped at that time .His  PMH is significant for :  Arteriosclerotic heart disease (ASHD)    Atrial fibrillation  ,CHF (congestive heart failure)  ,Dementia  ,Depression  ,DM (diabetes mellitus)  ,Hyperlipemia  ,Hypertension  ,Prostate ca .UTI suspected Admitted for septic workup and evaluation ,send blood and urine cx, serial lactate levels ,monitor vitals closely hydration, monitor urine output and renal profile,iv abx initiated -on ceftriaxone . Renal stone hunt study was performed and he was found to have on CT can not rule out  bladder wall thickening and prostate enlargement with fitch in bladder  . evaluation requested Palliative care consult requested ,to discuss advance directives and complete MOLST    *************************************************************************************************  PAST MEDICAL & SURGICAL HISTORY:  Dementia  Arteriosclerotic heart disease (ASHD)  Prostate CA  Atrial fibrillation  DM (diabetes mellitus)  Dementia  Depression  Diabetes  Hyperlipemia  CHF (congestive heart failure)  Dementia  Prostate ca  Diabetes  Hypertension  Atrial fibrillation  No significant past surgical history      MEDICATIONS  (STANDING):  budesonide  80 MICROgram(s)/formoterol 4.5 MICROgram(s) Inhaler 2 Puff(s) Inhalation two times a day  cefTRIAXone   IVPB 1000 milliGRAM(s) IV Intermittent every 24 hours  dextrose 5% + sodium chloride 0.45%. 1000 milliLiter(s) (20 mL/Hr) IV Continuous <Continuous>  dextrose 5%. 1000 milliLiter(s) (50 mL/Hr) IV Continuous <Continuous>  dextrose 50% Injectable 12.5 Gram(s) IV Push once  dextrose 50% Injectable 25 Gram(s) IV Push once  dextrose 50% Injectable 25 Gram(s) IV Push once  donepezil 5 milliGRAM(s) Oral at bedtime  insulin lispro (HumaLOG) corrective regimen sliding scale   SubCutaneous at bedtime  insulin lispro (HumaLOG) corrective regimen sliding scale   SubCutaneous three times a day before meals  lactobacillus acidophilus 1 Tablet(s) Oral two times a day  magnesium oxide 400 milliGRAM(s) Oral daily  metFORMIN 500 milliGRAM(s) Oral two times a day  pantoprazole    Tablet 40 milliGRAM(s) Oral before breakfast  polyethylene glycol 3350 17 Gram(s) Oral daily  senna 2 Tablet(s) Oral at bedtime  simvastatin 20 milliGRAM(s) Oral at bedtime    MEDICATIONS  (PRN):  acetaminophen   Tablet .. 650 milliGRAM(s) Oral every 6 hours PRN Temp greater or equal to 38C (100.4F), Mild Pain (1 - 3)  dextrose 40% Gel 15 Gram(s) Oral once PRN Blood Glucose LESS THAN 70 milliGRAM(s)/deciLiter  glucagon  Injectable 1 milliGRAM(s) IntraMuscular once PRN Glucose <70 milliGRAM(s)/deciLiter      ALLERGIES:  latex (Rash)  latex (Unknown)  No Known Drug Allergies      SOCIAL HISTORY:          ETOH:                                  Smoking:                                   Drugs:                                         Occupation:    FAMILY HISTORY:      CONSTITUTIONAL: No weakness, fevers or chills    EYES/ENT: No visual changes;  No vertigo or throat pain     NECK: No pain or stiffness    RESPIRATORY: No cough, wheezing, hemoptysis; No shortness of breath    CARDIOVASCULAR: No chest pain or palpitations    GASTROINTESTINAL: No abdominal or epigastric pain. No nausea, vomiting, or hematemesis; No diarrhea or constipation. No melena or hematochezia.    GENITOURINARY: hematuria  chronic fitch  ca prostate     NEUROLOGICAL: No numbness or weakness    SKIN: No itching, burning, rashes, or lesions     All other review of systems is negative unless indicated above.    ****************************************************************************************************  PHYSICAL EXAM:    Vital Signs Last 24 Hrs  T(C): 36.6 (2020 00:14), Max: 37 (2020 20:50)  T(F): 97.8 (2020 00:14), Max: 98.6 (2020 20:50)  HR: 83 (2020 00:14) (63 - 90)  BP: 113/69 (2020 00:14) (104/56 - 136/63)  BP(mean): --  RR: 17 (2020 00:14) (15 - 18)  SpO2: 95% (2020 00:14) (95% - 98%)    GENERAL: resting comfortably n bed having breakfast  has dementia  unable to answer questions     PENIS: circ with new 16 fr fitch draining ice tea colored urine     TESTES: soft     PROSTATE/ RECTAL: deferred     ABDOMEN: soft     BACK: no cva tenderness     LOWER EXTREMITIES:    NEUROLOGICAL:      *******************************************************************************************************  LABS:                        8.6    6.91  )-----------( 275      ( 2020 06:36 )             27.8         141  |  106  |  15  ----------------------------<  94  4.0   |  30  |  0.58    Ca    8.8      2020 06:36  Mg     1.5         TPro  6.2  /  Alb  2.9<L>  /  TBili  0.4  /  DBili  x   /  AST  12<L>  /  ALT  10<L>  /  AlkPhos  115        Urinalysis Basic - ( 2020 17:12 )    Color: Red / Appearance: bloody / S.010 / pH: x  Gluc: x / Ketone: Trace  / Bili: Negative / Urobili: Negative   Blood: x / Protein: 500 mg/dL / Nitrite: Negative   Leuk Esterase: Moderate / RBC: >50 /HPF / WBC 26-50   Sq Epi: x / Non Sq Epi: Occasional / Bacteria: Many      Urine Culture:     RADIOLOGY & ADDITIONAL STUDIES:  EXAM:  CT RENAL STONE HUNT                            PROCEDURE DATE:  2020          INTERPRETATION:  CLINICAL INFORMATION: Hematuria.    COMPARISON: CT scan abdomen pelvis 2018.    PROCEDURE:   CT of the Abdomen and Pelvis was performedwithout intravenous contrast.   Intravenous contrast: None.  Oral contrast: None.  Sagittal and coronal reformats were performed.    FINDINGS:    LOWER CHEST: Cardiomegaly. Cardiac pacemaker lead.  Peribronchiolar thickening left lower lobe, may reflect inflammatory/infectious airway disease.  3 mm nodule peripherally left lower lobe.   3 mm calcified nodule right middle lobe.    The evaluation of the solid organ parenchyma is limited without intravenous contrast.  In addition streak artifact from patient's arms degrades image quality limiting evaluation.    LIVER: Approximately 3 cm densely calcified lesion peripherally right hepatic lobe, stable in appearance.  BILE DUCTS: Normal caliber.  GALLBLADDER: Prior cholecystectomy.  SPLEEN: Grossly unremarkable.  PANCREAS: Within normal limits.  ADRENALS: Fusiform thickening of the adrenal glands bilaterally.  KIDNEYS/URETERS:   No hydroureteronephrosis or obstructing ureteral calculus noted.    BLADDER: Balloon Fitch catheter within the bladder. Urinary bladder is nondistended, however, cannot exclude bladder wall thickening.  REPRODUCTIVE ORGANS: Enlarged prostate gland with coarse central calcifications.    BOWEL: Evaluation the gastrointestinal tract is limited without distention.  Moderate fecal retention throughout the colon; no bowel obstruction. Mild perirectal presacral stranding, stable in appearance.   Appendix not visualized on this exam.  PERITONEUM: No ascites.  VESSELS: Atherosclerotic calcification.  RETROPERITONEUM/LYMPH NODES: No lymphadenopathy.    ABDOMINAL WALL: Postsurgical changes.  Small, bilateral fat-containing inguinal hernias.    BONES: Multilevel degenerative changes.  No acute osseous abnormality.    IMPRESSION:     No hydronephrosis or evidence of obstructive uropathy.  Fitch catheter within a nondistended urinary bladder, possible bladder wall thickening, which may reflect chronic bladder outlet obstruction as discussed present on prior exam and there is an enlarged prostate gland.    Other findings as discussed above.                    SOCO MIRANDA M.D., ATTENDING RADIOLOGIST  This document has been electronically signed. 2020  5:40PM      *********************************************************************************************************  IMPRESSION: darren fitch with UTI likely     PLAN: on rocephin   has fitch change in er   fitch change q 4-6 weeks recommended CHIEF COMPLAINT:  89y Male with chief complaint of  hematuria with hx prostate cancer and chronic fitch         HISTORY OF PRESENT ILLNESS:  89 y o WM  presents sent to ED from [facility] for evaluation of hematuria,  poor historian,  PMD Dr Quintana,  no anticoagulants noted in  patient med list ,patient was admitted with GIB last year and BASA was stopped at that time .His  PMH is significant for :  Arteriosclerotic heart disease (ASHD)    Atrial fibrillation  ,CHF (congestive heart failure)  ,Dementia  ,Depression  ,DM (diabetes mellitus)  ,Hyperlipemia  ,Hypertension  ,Prostate ca .UTI suspected Admitted for septic workup and evaluation ,send blood and urine cx, serial lactate levels ,monitor vitals closely hydration, monitor urine output and renal profile,iv abx initiated -on ceftriaxone . Renal stone hunt study was performed and he was found to have on CT can not rule out  bladder wall thickening and prostate enlargement with fitch in bladder  . evaluation requested Palliative care consult requested ,to discuss advance directives and complete MOLST    *************************************************************************************************  PAST MEDICAL & SURGICAL HISTORY:  Dementia  Arteriosclerotic heart disease (ASHD)  Prostate CA  Atrial fibrillation  DM (diabetes mellitus)  Dementia  Depression  Diabetes  Hyperlipemia  CHF (congestive heart failure)  Dementia  Prostate ca  Diabetes  Hypertension  Atrial fibrillation  No significant past surgical history      MEDICATIONS  (STANDING):  budesonide  80 MICROgram(s)/formoterol 4.5 MICROgram(s) Inhaler 2 Puff(s) Inhalation two times a day  cefTRIAXone   IVPB 1000 milliGRAM(s) IV Intermittent every 24 hours  dextrose 5% + sodium chloride 0.45%. 1000 milliLiter(s) (20 mL/Hr) IV Continuous <Continuous>  dextrose 5%. 1000 milliLiter(s) (50 mL/Hr) IV Continuous <Continuous>  dextrose 50% Injectable 12.5 Gram(s) IV Push once  dextrose 50% Injectable 25 Gram(s) IV Push once  dextrose 50% Injectable 25 Gram(s) IV Push once  donepezil 5 milliGRAM(s) Oral at bedtime  insulin lispro (HumaLOG) corrective regimen sliding scale   SubCutaneous at bedtime  insulin lispro (HumaLOG) corrective regimen sliding scale   SubCutaneous three times a day before meals  lactobacillus acidophilus 1 Tablet(s) Oral two times a day  magnesium oxide 400 milliGRAM(s) Oral daily  metFORMIN 500 milliGRAM(s) Oral two times a day  pantoprazole    Tablet 40 milliGRAM(s) Oral before breakfast  polyethylene glycol 3350 17 Gram(s) Oral daily  senna 2 Tablet(s) Oral at bedtime  simvastatin 20 milliGRAM(s) Oral at bedtime    MEDICATIONS  (PRN):  acetaminophen   Tablet .. 650 milliGRAM(s) Oral every 6 hours PRN Temp greater or equal to 38C (100.4F), Mild Pain (1 - 3)  dextrose 40% Gel 15 Gram(s) Oral once PRN Blood Glucose LESS THAN 70 milliGRAM(s)/deciLiter  glucagon  Injectable 1 milliGRAM(s) IntraMuscular once PRN Glucose <70 milliGRAM(s)/deciLiter      ALLERGIES:  latex (Rash)  latex (Unknown)  No Known Drug Allergies      SOCIAL HISTORY:          ETOH:                                  Smoking:                                   Drugs:                                         Occupation:    FAMILY HISTORY:      CONSTITUTIONAL: No weakness, fevers or chills    EYES/ENT: No visual changes;  No vertigo or throat pain     NECK: No pain or stiffness    RESPIRATORY: No cough, wheezing, hemoptysis; No shortness of breath    CARDIOVASCULAR: No chest pain or palpitations    GASTROINTESTINAL: No abdominal or epigastric pain. No nausea, vomiting, or hematemesis; No diarrhea or constipation. No melena or hematochezia.    GENITOURINARY: hematuria  chronic fitch  ca prostate     NEUROLOGICAL: No numbness or weakness    SKIN: No itching, burning, rashes, or lesions     All other review of systems is negative unless indicated above.    ****************************************************************************************************  PHYSICAL EXAM:    Vital Signs Last 24 Hrs  T(C): 36.6 (2020 00:14), Max: 37 (2020 20:50)  T(F): 97.8 (2020 00:14), Max: 98.6 (2020 20:50)  HR: 83 (2020 00:14) (63 - 90)  BP: 113/69 (2020 00:14) (104/56 - 136/63)  BP(mean): --  RR: 17 (2020 00:14) (15 - 18)  SpO2: 95% (2020 00:14) (95% - 98%)    GENERAL: resting comfortably n bed having breakfast  has dementia  unable to answer questions     PENIS: circ with new 16 fr fitch draining ice tea colored urine     TESTES: soft     PROSTATE/ RECTAL: deferred     ABDOMEN: soft     BACK: no cva tenderness     LOWER EXTREMITIES:    NEUROLOGICAL:      *******************************************************************************************************  LABS:                        8.6    6.91  )-----------( 275      ( 2020 06:36 )             27.8         141  |  106  |  15  ----------------------------<  94  4.0   |  30  |  0.58    Ca    8.8      2020 06:36  Mg     1.5         TPro  6.2  /  Alb  2.9<L>  /  TBili  0.4  /  DBili  x   /  AST  12<L>  /  ALT  10<L>  /  AlkPhos  115        Urinalysis Basic - ( 2020 17:12 )    Color: Red / Appearance: bloody / S.010 / pH: x  Gluc: x / Ketone: Trace  / Bili: Negative / Urobili: Negative   Blood: x / Protein: 500 mg/dL / Nitrite: Negative   Leuk Esterase: Moderate / RBC: >50 /HPF / WBC 26-50   Sq Epi: x / Non Sq Epi: Occasional / Bacteria: Many      Urine Culture:     RADIOLOGY & ADDITIONAL STUDIES:  EXAM:  CT RENAL STONE HUNT                            PROCEDURE DATE:  2020          INTERPRETATION:  CLINICAL INFORMATION: Hematuria.    COMPARISON: CT scan abdomen pelvis 2018.    PROCEDURE:   CT of the Abdomen and Pelvis was performedwithout intravenous contrast.   Intravenous contrast: None.  Oral contrast: None.  Sagittal and coronal reformats were performed.    FINDINGS:    LOWER CHEST: Cardiomegaly. Cardiac pacemaker lead.  Peribronchiolar thickening left lower lobe, may reflect inflammatory/infectious airway disease.  3 mm nodule peripherally left lower lobe.   3 mm calcified nodule right middle lobe.    The evaluation of the solid organ parenchyma is limited without intravenous contrast.  In addition streak artifact from patient's arms degrades image quality limiting evaluation.    LIVER: Approximately 3 cm densely calcified lesion peripherally right hepatic lobe, stable in appearance.  BILE DUCTS: Normal caliber.  GALLBLADDER: Prior cholecystectomy.  SPLEEN: Grossly unremarkable.  PANCREAS: Within normal limits.  ADRENALS: Fusiform thickening of the adrenal glands bilaterally.  KIDNEYS/URETERS:   No hydroureteronephrosis or obstructing ureteral calculus noted.    BLADDER: Balloon Fitch catheter within the bladder. Urinary bladder is nondistended, however, cannot exclude bladder wall thickening.  REPRODUCTIVE ORGANS: Enlarged prostate gland with coarse central calcifications.    BOWEL: Evaluation the gastrointestinal tract is limited without distention.  Moderate fecal retention throughout the colon; no bowel obstruction. Mild perirectal presacral stranding, stable in appearance.   Appendix not visualized on this exam.  PERITONEUM: No ascites.  VESSELS: Atherosclerotic calcification.  RETROPERITONEUM/LYMPH NODES: No lymphadenopathy.    ABDOMINAL WALL: Postsurgical changes.  Small, bilateral fat-containing inguinal hernias.    BONES: Multilevel degenerative changes.  No acute osseous abnormality.    IMPRESSION:     No hydronephrosis or evidence of obstructive uropathy.  Fitch catheter within a nondistended urinary bladder, possible bladder wall thickening, which may reflect chronic bladder outlet obstruction as discussed present on prior exam and there is an enlarged prostate gland.    Other findings as discussed above.                    SOCO MIRANDA M.D., ATTENDING RADIOLOGIST  This document has been electronically signed. 2020  5:40PM    EXAM:  US KIDNEYS AND BLADDER                                  PROCEDURE DATE:  2019   OLD SONOGRAM ON RECORD         INTERPRETATION:  CLINICAL INFORMATION: Hematuria. Prostate cancer.    COMPARISON: None available.    TECHNIQUE: Sonography of the kidneys and bladder.     FINDINGS:    Right kidney:  10.2 cm. No hydronephrosis or intrarenal calcification is   noted.    Left kidney:  10.9 cm. No hydronephrosis or intrarenal calcification is   noted. Evaluation of the left kidney is limited.    Urinary bladder:   There is a balloon Fitch catheter within the urinary bladder. The Fitch   catheter was clamped prior to exam.  Bladder volume measures approximately 287 cc.  There is a minimal, 3 cc post void residual.    IMPRESSION:     No sonographic evidence for hydronephrosis.                      SOCO MIRANDA M.D., ATTENDING RADIOLOGIST  This document has been electronically signed. 2019 10:58AM        *********************************************************************************************************  IMPRESSION: chronic fitch with UTI likely   ols sonogram no bladder issues noted when distended     PLAN: on rocephin   has fitch change in er which is latex but no reaction noted    fitch change q 4-6 weeks recommended CHIEF COMPLAINT:  89y Male with chief complaint of  hematuria with hx prostate cancer hx RT and hormones about 20 years ago and chronic fitch  had gross hematuria in 2019 with difficult fitch insertion and was a latex fitch then without issues          HISTORY OF PRESENT ILLNESS:  89 y o WM  presents sent to ED from [facility] for evaluation of hematuria,  poor historian,  PMD Dr Quintana,  no anticoagulants noted in  patient med list ,patient was admitted with GIB last year and BASA was stopped at that time .His  PMH is significant for :  Arteriosclerotic heart disease (ASHD)    Atrial fibrillation  ,CHF (congestive heart failure)  ,Dementia  ,Depression  ,DM (diabetes mellitus)  ,Hyperlipemia  ,Hypertension  ,Prostate ca .UTI suspected Admitted for septic workup and evaluation ,send blood and urine cx, serial lactate levels ,monitor vitals closely hydration, monitor urine output and renal profile,iv abx initiated -on ceftriaxone . Renal stone hunt study was performed and he was found to have on CT can not rule out  bladder wall thickening and prostate enlargement with fitch in bladder  . evaluation requested Palliative care consult requested ,to discuss advance directives and complete MOLST     *************************************************************************************************  PAST MEDICAL & SURGICAL HISTORY:  Dementia  Arteriosclerotic heart disease (ASHD)  Prostate CA  Atrial fibrillation  DM (diabetes mellitus)  Dementia  Depression  Diabetes  Hyperlipemia  CHF (congestive heart failure)  Dementia  Prostate ca  Diabetes  Hypertension  Atrial fibrillation  No significant past surgical history      MEDICATIONS  (STANDING):  budesonide  80 MICROgram(s)/formoterol 4.5 MICROgram(s) Inhaler 2 Puff(s) Inhalation two times a day  cefTRIAXone   IVPB 1000 milliGRAM(s) IV Intermittent every 24 hours  dextrose 5% + sodium chloride 0.45%. 1000 milliLiter(s) (20 mL/Hr) IV Continuous <Continuous>  dextrose 5%. 1000 milliLiter(s) (50 mL/Hr) IV Continuous <Continuous>  dextrose 50% Injectable 12.5 Gram(s) IV Push once  dextrose 50% Injectable 25 Gram(s) IV Push once  dextrose 50% Injectable 25 Gram(s) IV Push once  donepezil 5 milliGRAM(s) Oral at bedtime  insulin lispro (HumaLOG) corrective regimen sliding scale   SubCutaneous at bedtime  insulin lispro (HumaLOG) corrective regimen sliding scale   SubCutaneous three times a day before meals  lactobacillus acidophilus 1 Tablet(s) Oral two times a day  magnesium oxide 400 milliGRAM(s) Oral daily  metFORMIN 500 milliGRAM(s) Oral two times a day  pantoprazole    Tablet 40 milliGRAM(s) Oral before breakfast  polyethylene glycol 3350 17 Gram(s) Oral daily  senna 2 Tablet(s) Oral at bedtime  simvastatin 20 milliGRAM(s) Oral at bedtime    MEDICATIONS  (PRN):  acetaminophen   Tablet .. 650 milliGRAM(s) Oral every 6 hours PRN Temp greater or equal to 38C (100.4F), Mild Pain (1 - 3)  dextrose 40% Gel 15 Gram(s) Oral once PRN Blood Glucose LESS THAN 70 milliGRAM(s)/deciLiter  glucagon  Injectable 1 milliGRAM(s) IntraMuscular once PRN Glucose <70 milliGRAM(s)/deciLiter      ALLERGIES:  latex (Rash)  latex (Unknown)  No Known Drug Allergies      SOCIAL HISTORY:          ETOH:                                  Smoking:                                   Drugs:                                         Occupation:    FAMILY HISTORY:      CONSTITUTIONAL: No weakness, fevers or chills    EYES/ENT: No visual changes;  No vertigo or throat pain     NECK: No pain or stiffness    RESPIRATORY: No cough, wheezing, hemoptysis; No shortness of breath    CARDIOVASCULAR: No chest pain or palpitations    GASTROINTESTINAL: No abdominal or epigastric pain. No nausea, vomiting, or hematemesis; No diarrhea or constipation. No melena or hematochezia.    GENITOURINARY: hematuria  chronic fitch  ca prostate     NEUROLOGICAL: No numbness or weakness    SKIN: No itching, burning, rashes, or lesions     All other review of systems is negative unless indicated above.    ****************************************************************************************************  PHYSICAL EXAM:    Vital Signs Last 24 Hrs  T(C): 36.6 (2020 00:14), Max: 37 (2020 20:50)  T(F): 97.8 (2020 00:14), Max: 98.6 (2020 20:50)  HR: 83 (2020 00:14) (63 - 90)  BP: 113/69 (2020 00:14) (104/56 - 136/63)  BP(mean): --  RR: 17 (2020 00:14) (15 - 18)  SpO2: 95% (2020 00:14) (95% - 98%)    GENERAL: resting comfortably n bed having breakfast  has dementia  unable to answer questions     PENIS: circ with new 16 fr fitch draining ice tea colored urine     TESTES: soft     PROSTATE/ RECTAL: deferred     ABDOMEN: soft     BACK: no cva tenderness     LOWER EXTREMITIES:    NEUROLOGICAL:      *******************************************************************************************************  LABS:                        8.6    6.91  )-----------( 275      ( 2020 06:36 )             27.8     -    141  |  106  |  15  ----------------------------<  94  4.0   |  30  |  0.58    Ca    8.8      2020 06:36  Mg     1.5         TPro  6.2  /  Alb  2.9<L>  /  TBili  0.4  /  DBili  x   /  AST  12<L>  /  ALT  10<L>  /  AlkPhos  115  0131      Urinalysis Basic - ( 2020 17:12 )    Color: Red / Appearance: bloody / S.010 / pH: x  Gluc: x / Ketone: Trace  / Bili: Negative / Urobili: Negative   Blood: x / Protein: 500 mg/dL / Nitrite: Negative   Leuk Esterase: Moderate / RBC: >50 /HPF / WBC 26-50   Sq Epi: x / Non Sq Epi: Occasional / Bacteria: Many      Urine Culture:     RADIOLOGY & ADDITIONAL STUDIES:  EXAM:  CT RENAL STONE HUNT                            PROCEDURE DATE:  2020          INTERPRETATION:  CLINICAL INFORMATION: Hematuria.    COMPARISON: CT scan abdomen pelvis 2018.    PROCEDURE:   CT of the Abdomen and Pelvis was performedwithout intravenous contrast.   Intravenous contrast: None.  Oral contrast: None.  Sagittal and coronal reformats were performed.    FINDINGS:    LOWER CHEST: Cardiomegaly. Cardiac pacemaker lead.  Peribronchiolar thickening left lower lobe, may reflect inflammatory/infectious airway disease.  3 mm nodule peripherally left lower lobe.   3 mm calcified nodule right middle lobe.    The evaluation of the solid organ parenchyma is limited without intravenous contrast.  In addition streak artifact from patient's arms degrades image quality limiting evaluation.    LIVER: Approximately 3 cm densely calcified lesion peripherally right hepatic lobe, stable in appearance.  BILE DUCTS: Normal caliber.  GALLBLADDER: Prior cholecystectomy.  SPLEEN: Grossly unremarkable.  PANCREAS: Within normal limits.  ADRENALS: Fusiform thickening of the adrenal glands bilaterally.  KIDNEYS/URETERS:   No hydroureteronephrosis or obstructing ureteral calculus noted.    BLADDER: Balloon Fitch catheter within the bladder. Urinary bladder is nondistended, however, cannot exclude bladder wall thickening.  REPRODUCTIVE ORGANS: Enlarged prostate gland with coarse central calcifications.    BOWEL: Evaluation the gastrointestinal tract is limited without distention.  Moderate fecal retention throughout the colon; no bowel obstruction. Mild perirectal presacral stranding, stable in appearance.   Appendix not visualized on this exam.  PERITONEUM: No ascites.  VESSELS: Atherosclerotic calcification.  RETROPERITONEUM/LYMPH NODES: No lymphadenopathy.    ABDOMINAL WALL: Postsurgical changes.  Small, bilateral fat-containing inguinal hernias.    BONES: Multilevel degenerative changes.  No acute osseous abnormality.    IMPRESSION:     No hydronephrosis or evidence of obstructive uropathy.  Fitch catheter within a nondistended urinary bladder, possible bladder wall thickening, which may reflect chronic bladder outlet obstruction as discussed present on prior exam and there is an enlarged prostate gland.    Other findings as discussed above.                    SOCO MIRANDA M.D., ATTENDING RADIOLOGIST  This document has been electronically signed. 2020  5:40PM    EXAM:  US KIDNEYS AND BLADDER                                  PROCEDURE DATE:  2019   OLD SONOGRAM ON RECORD         INTERPRETATION:  CLINICAL INFORMATION: Hematuria. Prostate cancer.    COMPARISON: None available.    TECHNIQUE: Sonography of the kidneys and bladder.     FINDINGS:    Right kidney:  10.2 cm. No hydronephrosis or intrarenal calcification is   noted.    Left kidney:  10.9 cm. No hydronephrosis or intrarenal calcification is   noted. Evaluation of the left kidney is limited.    Urinary bladder:   There is a balloon Fitch catheter within the urinary bladder. The Fitch   catheter was clamped prior to exam.  Bladder volume measures approximately 287 cc.  There is a minimal, 3 cc post void residual.    IMPRESSION:     No sonographic evidence for hydronephrosis.                      SOCO MIRANDA M.D., ATTENDING RADIOLOGIST  This document has been electronically signed. 2019 10:58AM        *********************************************************************************************************  IMPRESSION: chronic fitch with UTI likely   old sonogram no bladder issues noted when distended   I dont think latex allergy is true for catheter  hx ca prostate  hX RT     PLAN: on rocephin   has fitch change in er which is latex but no reaction noted this time or the last time     fitch change q 4-6 weeks recommended

## 2020-01-31 NOTE — SWALLOW BEDSIDE ASSESSMENT ADULT - ORAL PHASE
stasis clears liquid wash/Lingual stasis/Decreased anterior-posterior movement of the bolus/Delayed oral transit time Within functional limits

## 2020-01-31 NOTE — SWALLOW BEDSIDE ASSESSMENT ADULT - ASR SWALLOW ASPIRATION MONITOR
oral hygiene/gurgly voice/upper respiratory infection/fever/pneumonia/throat clearing/change of breathing pattern/position upright (89Y)/cough

## 2020-01-31 NOTE — SWALLOW BEDSIDE ASSESSMENT ADULT - SWALLOW EVAL: DIAGNOSIS
1- functional oral management given puree, nectar thick liquid and thin liquid textures marked by adequate bolus collection, transfer and transport. 2- mild oral dysphagia given solids marked by delayed oral preparation/AP transit with trace lingual residue remaining post swallow which clears given liquid wash. 3- mild pharyngeal dysphagia given solid, puree, nectar thick and thin liquids marked by mildly delayed pharyngeal swallow trigger and reduced hyolaryngeal excursion without any overt s/s of penetration/aspiration noted.

## 2020-01-31 NOTE — H&P ADULT - RS GEN PE MLT RESP DETAILS PC
diminished breath sounds, R/diminished breath sounds, L/airway patent/breath sounds equal/good air movement/normal

## 2020-01-31 NOTE — H&P ADULT - PROBLEM SELECTOR PLAN 1
likely secondary to hemorrhagic cystitis and prostate cx ,no evidence of kidney stones . evaluation requested

## 2020-01-31 NOTE — H&P ADULT - HISTORY OF PRESENT ILLNESS
89 y o WM  presents sent to ED from [facility] for evaluation of hematuria,  poor historian,  PMD Dr Quintana,  no anticoagulants noted in  patient med list ,patient was admitted with GIB last year and BASA was stopped at that time .His  PMH is significant for :  Arteriosclerotic heart disease (ASHD)    	Atrial fibrillation  ,CHF (congestive heart failure)  ,Dementia  ,Depression  ,DM (diabetes mellitus)  ,Hyperlipemia  ,Hypertension  ,Prostate ca .UTI suspected Admitted for septic workup and evaluation ,send blood and urine cx, serial lactate levels ,monitor vitals closely hydration, monitor urine output and renal profile,iv abx initiated -on ceftriaxone . Renal stone hunt study was performed and he was found to have bladder wall thickenning and prostate enlargement . evaluation requested Palliative care consult requested ,to discuss advance directives and complete MOLST

## 2020-01-31 NOTE — H&P ADULT - ASSESSMENT
89 y o WM  presents sent to ED from [facility] for evaluation of hematuria,  poor historian,  PMD Dr Quintana,  no anticoagulants noted in  patient med list ,patient was admitted with GIB last year and BASA was stopped at that time .His  PMH is significant for :  Arteriosclerotic heart disease (ASHD)    	Atrial fibrillation  ,CHF (congestive heart failure)  ,Dementia  ,Depression  ,DM (diabetes mellitus)  ,Hyperlipemia  ,Hypertension  ,Prostate ca .UTI suspected Admitted for septic workup and evaluation ,send blood and urine cx, serial lactate levels ,monitor vitals closely hydration, monitor urine output and renal profile,iv abx initiated -on ceftriaxone . Renal stone hunt study was performed and he was found to have bladder wall thickenning and prostate enlargement . evaluation requested Palliative care consult requested ,to discuss advance directives and complete MOLST

## 2020-01-31 NOTE — CONSULT NOTE ADULT - SUBJECTIVE AND OBJECTIVE BOX
Patient is a 89y old  Male who presents with a chief complaint of HEMATURIA (31 Jan 2020 14:13)      HPI:  89 y o WM  presents sent to ED from [facility] for evaluation of hematuria,  poor historian,  PMD Dr Quintana,  no anticoagulants noted in  patient med list ,patient was admitted with GIB last year and BASA was stopped at that time .His  PMH is significant for :  Arteriosclerotic heart disease (ASHD)    	Atrial fibrillation  ,CHF (congestive heart failure)  ,Dementia  ,Depression  ,DM (diabetes mellitus)  ,Hyperlipemia  ,Hypertension  ,Prostate ca .UTI suspected Admitted for septic workup and evaluation ,send blood and urine cx, serial lactate levels ,monitor vitals closely hydration, monitor urine output and renal profile,iv abx initiated -on ceftriaxone . Renal stone hunt study was performed and he was found to have bladder wall thickenning and prostate enlargement . evaluation requested Palliative care consult requested ,to discuss advance directives and complete MOLST (31 Jan 2020 06:11)      Asked to see patient for ID Consult    PAST MEDICAL & SURGICAL HISTORY:  Dementia  Arteriosclerotic heart disease (ASHD)  Prostate CA  Atrial fibrillation  DM (diabetes mellitus)  Dementia  Depression  Diabetes  Hyperlipemia  CHF (congestive heart failure)  Dementia  Prostate ca  Diabetes  Hypertension  Atrial fibrillation  No significant past surgical history      Allergies    latex (Rash)  latex (Unknown)  No Known Drug Allergies    Intolerances        REVIEW OF SYSTEMS:  All systems below were reviewed and are negative [  ]  HEENT:  ID:  Pulmonary:  Cardiac:  GI:  Renal:  Musculoskeletal:  All other systems above were reviewed and are negative   [  ]    HOME MEDICATIONS:    MEDICATIONS  (STANDING):  budesonide  80 MICROgram(s)/formoterol 4.5 MICROgram(s) Inhaler 2 Puff(s) Inhalation two times a day  cefTRIAXone   IVPB 1000 milliGRAM(s) IV Intermittent every 24 hours  dextrose 5%. 1000 milliLiter(s) (50 mL/Hr) IV Continuous <Continuous>  dextrose 50% Injectable 12.5 Gram(s) IV Push once  dextrose 50% Injectable 25 Gram(s) IV Push once  dextrose 50% Injectable 25 Gram(s) IV Push once  donepezil 5 milliGRAM(s) Oral at bedtime  insulin lispro (HumaLOG) corrective regimen sliding scale   SubCutaneous at bedtime  insulin lispro (HumaLOG) corrective regimen sliding scale   SubCutaneous three times a day before meals  lactobacillus acidophilus 1 Tablet(s) Oral two times a day  magnesium oxide 400 milliGRAM(s) Oral two times a day with meals  metFORMIN 500 milliGRAM(s) Oral two times a day  pantoprazole    Tablet 40 milliGRAM(s) Oral before breakfast  polyethylene glycol 3350 17 Gram(s) Oral daily  senna 2 Tablet(s) Oral at bedtime  simvastatin 20 milliGRAM(s) Oral at bedtime    MEDICATIONS  (PRN):  acetaminophen   Tablet .. 650 milliGRAM(s) Oral every 6 hours PRN Temp greater or equal to 38C (100.4F), Mild Pain (1 - 3)  dextrose 40% Gel 15 Gram(s) Oral once PRN Blood Glucose LESS THAN 70 milliGRAM(s)/deciLiter  glucagon  Injectable 1 milliGRAM(s) IntraMuscular once PRN Glucose <70 milliGRAM(s)/deciLiter      Vital Signs Last 24 Hrs  T(C): 37.9 (31 Jan 2020 16:32), Max: 37.9 (31 Jan 2020 16:32)  T(F): 100.3 (31 Jan 2020 16:32), Max: 100.3 (31 Jan 2020 16:32)  HR: 80 (31 Jan 2020 16:32) (63 - 90)  BP: 117/62 (31 Jan 2020 16:32) (104/56 - 136/63)  BP(mean): --  RR: 17 (31 Jan 2020 16:32) (15 - 19)  SpO2: 99% (31 Jan 2020 16:32) (95% - 99%)    PHYSICAL EXAM:  HEENT:  Neck:  Lungs:  Heart:  Abdomen:  Genital/ Rectal:  Extremities:  Neurologic:  Vascular:    I&O's Summary    31 Jan 2020 07:01  -  31 Jan 2020 19:19  --------------------------------------------------------  IN: 0 mL / OUT: 300 mL / NET: -300 mL        LABORATORY:                          8.6    6.91  )-----------( 275      ( 31 Jan 2020 06:36 )             27.8           01-31    141  |  106  |  15  ----------------------------<  94  4.0   |  30  |  0.58    Ca    8.8      31 Jan 2020 06:36  Mg     1.5     01-31    TPro  6.2  /  Alb  2.9<L>  /  TBili  0.4  /  DBili  x   /  AST  12<L>  /  ALT  10<L>  /  AlkPhos  115  01-31          LABORATORY:    CBC Full  -  ( 31 Jan 2020 06:36 )  WBC Count : 6.91 K/uL  RBC Count : 3.43 M/uL  Hemoglobin : 8.6 g/dL  Hematocrit : 27.8 %  Platelet Count - Automated : 275 K/uL  Mean Cell Volume : 81.0 fl  Mean Cell Hemoglobin : 25.1 pg  Mean Cell Hemoglobin Concentration : 30.9 gm/dL  Auto Neutrophil # : x  Auto Lymphocyte # : x  Auto Monocyte # : x  Auto Eosinophil # : x  Auto Basophil # : x  Auto Neutrophil % : x  Auto Lymphocyte % : x  Auto Monocyte % : x  Auto Eosinophil % : x  Auto Basophil % : x          01-31    141  |  106  |  15  ----------------------------<  94  4.0   |  30  |  0.58    Ca    8.8      31 Jan 2020 06:36  Mg     1.5     01-31    TPro  6.2  /  Alb  2.9<L>  /  TBili  0.4  /  DBili  x   /  AST  12<L>  /  ALT  10<L>  /  AlkPhos  115  01-31 Patient is a 89y old  Male who presents with a chief complaint of HEMATURIA (31 Jan 2020 14:13)        The patient is a 89 year old male with a history of prostate cancer, urinary retention with indwelling fitch catheter, Afib, DM, CHF, dementia and depression who was sent to the ED for hematuria. In the ED he had fitch catheter changed, He was admitted and placed on IV Rocephin for UTI, He has been seen by urology. The patient has been afebrile.       PAST MEDICAL & SURGICAL HISTORY:  Dementia  Arteriosclerotic heart disease (ASHD)  Prostate CA  Atrial fibrillation  DM (diabetes mellitus)  Dementia  Depression  Diabetes  Hyperlipemia  CHF (congestive heart failure)  Dementia  Prostate ca  Diabetes  Hypertension  Atrial fibrillation  No significant past surgical history      Allergies    latex (Rash)  latex (Unknown)  No Known Drug Allergies    Intolerances        REVIEW OF SYSTEMS:  No vomiting or diarrhea  No fevers or SOB.    HOME MEDICATIONS:    MEDICATIONS  (STANDING):  budesonide  80 MICROgram(s)/formoterol 4.5 MICROgram(s) Inhaler 2 Puff(s) Inhalation two times a day  cefTRIAXone   IVPB 1000 milliGRAM(s) IV Intermittent every 24 hours  dextrose 5%. 1000 milliLiter(s) (50 mL/Hr) IV Continuous <Continuous>  dextrose 50% Injectable 12.5 Gram(s) IV Push once  dextrose 50% Injectable 25 Gram(s) IV Push once  dextrose 50% Injectable 25 Gram(s) IV Push once  donepezil 5 milliGRAM(s) Oral at bedtime  insulin lispro (HumaLOG) corrective regimen sliding scale   SubCutaneous at bedtime  insulin lispro (HumaLOG) corrective regimen sliding scale   SubCutaneous three times a day before meals  lactobacillus acidophilus 1 Tablet(s) Oral two times a day  magnesium oxide 400 milliGRAM(s) Oral two times a day with meals  metFORMIN 500 milliGRAM(s) Oral two times a day  pantoprazole    Tablet 40 milliGRAM(s) Oral before breakfast  polyethylene glycol 3350 17 Gram(s) Oral daily  senna 2 Tablet(s) Oral at bedtime  simvastatin 20 milliGRAM(s) Oral at bedtime    MEDICATIONS  (PRN):  acetaminophen   Tablet .. 650 milliGRAM(s) Oral every 6 hours PRN Temp greater or equal to 38C (100.4F), Mild Pain (1 - 3)  dextrose 40% Gel 15 Gram(s) Oral once PRN Blood Glucose LESS THAN 70 milliGRAM(s)/deciLiter  glucagon  Injectable 1 milliGRAM(s) IntraMuscular once PRN Glucose <70 milliGRAM(s)/deciLiter      Vital Signs Last 24 Hrs  T(C): 37.9 (31 Jan 2020 16:32), Max: 37.9 (31 Jan 2020 16:32)  T(F): 100.3 (31 Jan 2020 16:32), Max: 100.3 (31 Jan 2020 16:32)  HR: 80 (31 Jan 2020 16:32) (63 - 90)  BP: 117/62 (31 Jan 2020 16:32) (104/56 - 136/63)  BP(mean): --  RR: 17 (31 Jan 2020 16:32) (15 - 19)  SpO2: 99% (31 Jan 2020 16:32) (95% - 99%)    PHYSICAL EXAM:  HEENT: NC/AT  Neck: Soft  Lungs: Coarse BS bilaterally; no wheezing,   Heart: RRR; no murmurs.  Abdomen: Soft; no tenderness.  Genital/ Rectal: Fitch with hematuria.   Extremities: No ulcers.  Neurologic: Confused.         LABORATORY:                          8.6    6.91  )-----------( 275      ( 31 Jan 2020 06:36 )             27.8           01-31    141  |  106  |  15  ----------------------------<  94  4.0   |  30  |  0.58    Ca    8.8      31 Jan 2020 06:36  Mg     1.5     01-31    TPro  6.2  /  Alb  2.9<L>  /  TBili  0.4  /  DBili  x   /  AST  12<L>  /  ALT  10<L>  /  AlkPhos  115  01-31      LABORATORY:    CBC Full  -  ( 31 Jan 2020 06:36 )  WBC Count : 6.91 K/uL  RBC Count : 3.43 M/uL  Hemoglobin : 8.6 g/dL  Hematocrit : 27.8 %  Platelet Count - Automated : 275 K/uL  Mean Cell Volume : 81.0 fl  Mean Cell Hemoglobin : 25.1 pg  Mean Cell Hemoglobin Concentration : 30.9 gm/dL  Auto Neutrophil # : x  Auto Lymphocyte # : x  Auto Monocyte # : x  Auto Eosinophil # : x  Auto Basophil # : x  Auto Neutrophil % : x  Auto Lymphocyte % : x  Auto Monocyte % : x  Auto Eosinophil % : x  Auto Basophil % : x          01-31    141  |  106  |  15  ----------------------------<  94  4.0   |  30  |  0.58    Ca    8.8      31 Jan 2020 06:36  Mg     1.5     01-31    TPro  6.2  /  Alb  2.9<L>  /  TBili  0.4  /  DBili  x   /  AST  12<L>  /  ALT  10<L>  /  AlkPhos  115  01-31    Assessment and Plan:    1. Hematuria.  2. Probable UTI.  3. Indwelling fitch catheter.    . Continue Rocephin 1 gm daily.  . Follow urine culture.  . Continue fitch as per urology. Monitor the progression of hematuria.    Thank you for the courtesy of this consultation.

## 2020-01-31 NOTE — PROGRESS NOTE ADULT - SUBJECTIVE AND OBJECTIVE BOX
[facility]     SUBJECTIVE: Sitting in chair, no card c/o.      ALLERGIES:  Allergies    latex (Rash)  latex (Unknown)  No Known Drug Allergies    Intolerances        Vital Signs Last 24 Hrs  T(C): 36.7 (2020 09:25), Max: 37 (2020 20:50)  T(F): 98 (2020 09:25), Max: 98.6 (2020 20:50)  HR: 89 (2020 09:25) (63 - 90)  BP: 105/64 (2020 09:25) (104/56 - 136/63)  BP(mean): --  RR: 19 (2020 09:25) (15 - 19)  SpO2: 95% (2020 09:25) (95% - 98%)    PHYSICAL EXAM:  HEAD:  Atraumatic, Normocephalic  NECK: Supple and normal thyroid.  No JVD or carotid bruit.   HEART: S1, S2 irreg, no S3  PULMONARY: fairly clear b/l  ABDOMEN: Soft nontender and positive bowel sounds   EXTREMITIES: no ankle edema b/l   NEUROLOGICAL: no focal deficits     LABS:                        8.6    6.91  )-----------( 275      ( 2020 06:36 )             27.8         141  |  106  |  15  ----------------------------<  94  4.0   |  30  |  0.58    Ca    8.8      2020 06:36  Mg     1.5         TPro  6.2  /  Alb  2.9<L>  /  TBili  0.4  /  DBili  x   /  AST  12<L>  /  ALT  10<L>  /  AlkPhos  115            Urinalysis Basic - ( 2020 17:12 )    Color: Red / Appearance: bloody / S.010 / pH: x  Gluc: x / Ketone: Trace  / Bili: Negative / Urobili: Negative   Blood: x / Protein: 500 mg/dL / Nitrite: Negative   Leuk Esterase: Moderate / RBC: >50 /HPF / WBC 26-50   Sq Epi: x / Non Sq Epi: Occasional / Bacteria: Many      BNPSerum Pro-Brain Natriuretic Peptide: 696 pg/mL ( @ 06:36)    TSHThyroid Stimulating Hormone, Serum: 1.28 uIU/mL ( @ 06:36)    LIVER FUNCTIONS - ( 2020 06:36 )  Alb: 2.9 g/dL / Pro: 6.2 g/dL / ALK PHOS: 115 U/L / ALT: 10 U/L / AST: 12 U/L / GGT: x             EKG: AF    ECHO:   EXAM:  ECHO TTE WO CON COMP W DOPPLR         PROCEDURE DATE:  2020        INTERPRETATION:  Ordering Physician: ARCADIO FRIAS [id number]    Indication: Atrial fibrillation    Study Quality: Fair   A complete echocardiographic study was performed utilizing standard protocol including spectral and color Doppler in all echocardiographic windows.    Height: 1 72-cm  Weight: 66 kg   BSA: 1.8 sq m  Blood Pressure: 105/64    MEASUREMENTS  IVS: 1.0cm  PWT: 0.0cm  LA: 4.4cm  AO: 3.6cm  AV Cusp Separation:1.1cm  LVIDd: 5.0cm  LVIDs: 3.8cm  LVOT: 2.0cm    LVEF: 55%  RVSP: Less than 35mmHg    FINDINGS  Left Ventricle: Had normal cavity size and wall thickness. There was normal overall LV systolic function and wall motion. Ejection fraction was55%  Aortic Valve: Appeared mild to moderately sclerotic. Doppler exam was unremarkable  Mitral Valve: Had normal structure and motion. Doppler exam revealed mild MR  Tricuspid Valve: Had normal structure and motion. Doppler exam revealed mild to moderate TR with a peak RVSP of 33 mmHg  Pulmonic Valve: Was not well visualized. Doppler exam revealed trace DC  Left Atrium: Was mild to moderately dilated  Right Ventricle: Had normal structure and motion. A pacemaker wire was seen traversing the right heart.  Right Atrium: Was of normal size  Diastolic Function: Could not be calculated because of atrial fibrillation  Pericardium/Pleura: Subcostal view was suboptimal however no obvious pericardial effusion was noted      CONCLUSIONS:  This echo demonstrates normal LV systolic function, ejection fraction 55%, trace DC, mild MR, mild to moderate TR, normal RVSP, aortic sclerosis and presence of pacemaker wire.    SALINAS HAGER M.D., ATTENDING CARDIOLOGIST  This document has beenelectronically signed. 2020  1:12PM      IMAGING:EXAM:  CT RENAL STONE HUNT                            PROCEDURE DATE:  2020          INTERPRETATION:  CLINICAL INFORMATION: Hematuria.    COMPARISON: CT scan abdomen pelvis 2018.    PROCEDURE:   CT of the Abdomen and Pelvis was performedwithout intravenous contrast.   Intravenous contrast: None.  Oral contrast: None.  Sagittal and coronal reformats were performed.    FINDINGS:    LOWER CHEST: Cardiomegaly. Cardiac pacemaker lead.  Peribronchiolar thickening left lower lobe, may reflect inflammatory/infectious airway disease.  3 mm nodule peripherally left lower lobe.   3 mm calcified nodule right middle lobe.    The evaluation of the solid organ parenchyma is limited without intravenous contrast.  In addition streak artifact from patient's arms degrades image quality limiting evaluation.    LIVER: Approximately 3 cm densely calcified lesion peripherally right hepatic lobe, stable in appearance.  BILE DUCTS: Normal caliber.  GALLBLADDER: Prior cholecystectomy.  SPLEEN: Grossly unremarkable.  PANCREAS: Within normal limits.  ADRENALS: Fusiform thickening of the adrenal glands bilaterally.  KIDNEYS/URETERS:   No hydroureteronephrosis or obstructing ureteral calculus noted.    BLADDER: Balloon Galicia catheter within the bladder. Urinary bladder is nondistended, however, cannot exclude bladder wall thickening.  REPRODUCTIVE ORGANS: Enlarged prostate gland with coarse central calcifications.    BOWEL: Evaluation the gastrointestinal tract is limited without distention.  Moderate fecal retention throughout the colon; no bowel obstruction. Mild perirectal presacral stranding, stable in appearance.   Appendix not visualized on this exam.  PERITONEUM: No ascites.  VESSELS: Atherosclerotic calcification.  RETROPERITONEUM/LYMPH NODES: No lymphadenopathy.    ABDOMINAL WALL: Postsurgical changes.  Small, bilateral fat-containing inguinal hernias.    BONES: Multilevel degenerative changes.  No acute osseous abnormality.    IMPRESSION:     No hydronephrosis or evidence of obstructive uropathy.  Galicia catheter within a nondistended urinary bladder, possible bladder wall thickening, which may reflect chronic bladder outlet obstruction as discussed present on prior exam and there is an enlarged prostate gland.    Other findings as discussed above.    SOCO MIRANDA M.D., ATTENDING RADIOLOGIST  This document has been electronically signed. 2020  5:40PM      MEDICATIONS  (STANDING):  budesonide  80 MICROgram(s)/formoterol 4.5 MICROgram(s) Inhaler 2 Puff(s) Inhalation two times a day  cefTRIAXone   IVPB 1000 milliGRAM(s) IV Intermittent every 24 hours  dextrose 5% + sodium chloride 0.45%. 1000 milliLiter(s) (20 mL/Hr) IV Continuous <Continuous>  dextrose 5%. 1000 milliLiter(s) (50 mL/Hr) IV Continuous <Continuous>  dextrose 50% Injectable 12.5 Gram(s) IV Push once  dextrose 50% Injectable 25 Gram(s) IV Push once  dextrose 50% Injectable 25 Gram(s) IV Push once  donepezil 5 milliGRAM(s) Oral at bedtime  insulin lispro (HumaLOG) corrective regimen sliding scale   SubCutaneous at bedtime  insulin lispro (HumaLOG) corrective regimen sliding scale   SubCutaneous three times a day before meals  lactobacillus acidophilus 1 Tablet(s) Oral two times a day  magnesium oxide 400 milliGRAM(s) Oral daily  metFORMIN 500 milliGRAM(s) Oral two times a day  pantoprazole    Tablet 40 milliGRAM(s) Oral before breakfast  polyethylene glycol 3350 17 Gram(s) Oral daily  senna 2 Tablet(s) Oral at bedtime  simvastatin 20 milliGRAM(s) Oral at bedtime    MEDICATIONS  (PRN):  acetaminophen   Tablet .. 650 milliGRAM(s) Oral every 6 hours PRN Temp greater or equal to 38C (100.4F), Mild Pain (1 - 3)  dextrose 40% Gel 15 Gram(s) Oral once PRN Blood Glucose LESS THAN 70 milliGRAM(s)/deciLiter  glucagon  Injectable 1 milliGRAM(s) IntraMuscular once PRN Glucose <70 milliGRAM(s)/deciLiter

## 2020-01-31 NOTE — GOALS OF CARE CONVERSATION - ADVANCED CARE PLANNING - CONVERSATION DETAILS
spoke to Kellee at [facility], pt has only copy of molst, not original.    spoke to pt sonWilber on phone, molst reviewed son agrees to dnr dni no tf, wants treatment: IV fluids & antibiotics, Dr Ring to complete molst form, order received.

## 2020-01-31 NOTE — SWALLOW BEDSIDE ASSESSMENT ADULT - SWALLOW EVAL: RECOMMENDED FEEDING/EATING TECHNIQUES
position upright (90 degrees)/small sips/bites/alternate food with liquid/allow for swallow between intakes/maintain upright posture during/after eating for 30 mins/oral hygiene

## 2020-01-31 NOTE — PHYSICAL THERAPY INITIAL EVALUATION ADULT - ADDITIONAL COMMENTS
As per , pt was able to ambulate short distance with SC and [facility] assist [facility].

## 2020-02-01 LAB
ANION GAP SERPL CALC-SCNC: 6 MMOL/L — SIGNIFICANT CHANGE UP (ref 5–17)
BUN SERPL-MCNC: 16 MG/DL — SIGNIFICANT CHANGE UP (ref 7–23)
CALCIUM SERPL-MCNC: 8.8 MG/DL — SIGNIFICANT CHANGE UP (ref 8.5–10.1)
CHLORIDE SERPL-SCNC: 104 MMOL/L — SIGNIFICANT CHANGE UP (ref 96–108)
CO2 SERPL-SCNC: 30 MMOL/L — SIGNIFICANT CHANGE UP (ref 22–31)
CREAT SERPL-MCNC: 0.66 MG/DL — SIGNIFICANT CHANGE UP (ref 0.5–1.3)
FERRITIN SERPL-MCNC: 22 NG/ML — LOW (ref 30–400)
FOLATE SERPL-MCNC: 12.6 NG/ML — SIGNIFICANT CHANGE UP
GLUCOSE SERPL-MCNC: 97 MG/DL — SIGNIFICANT CHANGE UP (ref 70–99)
HAPTOGLOB SERPL-MCNC: 218 MG/DL — HIGH (ref 34–200)
HBA1C BLD-MCNC: 6 % — HIGH (ref 4–5.6)
HCT VFR BLD CALC: 27.5 % — LOW (ref 39–50)
HGB BLD-MCNC: 8.7 G/DL — LOW (ref 13–17)
IRON SATN MFR SERPL: 22 UG/DL — LOW (ref 45–165)
IRON SATN MFR SERPL: 7 % — LOW (ref 16–55)
LDH SERPL L TO P-CCNC: 174 U/L — SIGNIFICANT CHANGE UP (ref 50–242)
MAGNESIUM SERPL-MCNC: 1.7 MG/DL — SIGNIFICANT CHANGE UP (ref 1.6–2.6)
MCHC RBC-ENTMCNC: 25.6 PG — LOW (ref 27–34)
MCHC RBC-ENTMCNC: 31.6 GM/DL — LOW (ref 32–36)
MCV RBC AUTO: 80.9 FL — SIGNIFICANT CHANGE UP (ref 80–100)
NRBC # BLD: 0 /100 WBCS — SIGNIFICANT CHANGE UP (ref 0–0)
PHOSPHATE SERPL-MCNC: 2.8 MG/DL — SIGNIFICANT CHANGE UP (ref 2.5–4.5)
PLATELET # BLD AUTO: 257 K/UL — SIGNIFICANT CHANGE UP (ref 150–400)
POTASSIUM SERPL-MCNC: 4.1 MMOL/L — SIGNIFICANT CHANGE UP (ref 3.5–5.3)
POTASSIUM SERPL-SCNC: 4.1 MMOL/L — SIGNIFICANT CHANGE UP (ref 3.5–5.3)
RBC # BLD: 3.4 M/UL — LOW (ref 4.2–5.8)
RBC # BLD: 3.4 M/UL — LOW (ref 4.2–5.8)
RBC # FLD: 15.6 % — HIGH (ref 10.3–14.5)
RETICS #: 31.6 K/UL — SIGNIFICANT CHANGE UP (ref 25–125)
RETICS/RBC NFR: 0.9 % — SIGNIFICANT CHANGE UP (ref 0.5–2.5)
SODIUM SERPL-SCNC: 140 MMOL/L — SIGNIFICANT CHANGE UP (ref 135–145)
TIBC SERPL-MCNC: 301 UG/DL — SIGNIFICANT CHANGE UP (ref 220–430)
UIBC SERPL-MCNC: 279 UG/DL — SIGNIFICANT CHANGE UP (ref 110–370)
VIT B12 SERPL-MCNC: 361 PG/ML — SIGNIFICANT CHANGE UP (ref 232–1245)
WBC # BLD: 5.84 K/UL — SIGNIFICANT CHANGE UP (ref 3.8–10.5)
WBC # FLD AUTO: 5.84 K/UL — SIGNIFICANT CHANGE UP (ref 3.8–10.5)

## 2020-02-01 RX ORDER — IRON SUCROSE 20 MG/ML
100 INJECTION, SOLUTION INTRAVENOUS EVERY 24 HOURS
Refills: 0 | Status: COMPLETED | OUTPATIENT
Start: 2020-02-01 | End: 2020-02-03

## 2020-02-01 RX ADMIN — CEFTRIAXONE 100 MILLIGRAM(S): 500 INJECTION, POWDER, FOR SOLUTION INTRAMUSCULAR; INTRAVENOUS at 18:01

## 2020-02-01 RX ADMIN — PANTOPRAZOLE SODIUM 40 MILLIGRAM(S): 20 TABLET, DELAYED RELEASE ORAL at 06:51

## 2020-02-01 RX ADMIN — BUDESONIDE AND FORMOTEROL FUMARATE DIHYDRATE 2 PUFF(S): 160; 4.5 AEROSOL RESPIRATORY (INHALATION) at 06:49

## 2020-02-01 RX ADMIN — METFORMIN HYDROCHLORIDE 500 MILLIGRAM(S): 850 TABLET ORAL at 08:13

## 2020-02-01 RX ADMIN — POLYETHYLENE GLYCOL 3350 17 GRAM(S): 17 POWDER, FOR SOLUTION ORAL at 13:00

## 2020-02-01 RX ADMIN — SENNA PLUS 2 TABLET(S): 8.6 TABLET ORAL at 21:27

## 2020-02-01 RX ADMIN — SIMVASTATIN 20 MILLIGRAM(S): 20 TABLET, FILM COATED ORAL at 21:27

## 2020-02-01 RX ADMIN — IRON SUCROSE 100 MILLIGRAM(S): 20 INJECTION, SOLUTION INTRAVENOUS at 13:01

## 2020-02-01 RX ADMIN — DONEPEZIL HYDROCHLORIDE 5 MILLIGRAM(S): 10 TABLET, FILM COATED ORAL at 21:27

## 2020-02-01 RX ADMIN — BUDESONIDE AND FORMOTEROL FUMARATE DIHYDRATE 2 PUFF(S): 160; 4.5 AEROSOL RESPIRATORY (INHALATION) at 19:02

## 2020-02-01 RX ADMIN — Medication 1 TABLET(S): at 05:35

## 2020-02-01 RX ADMIN — METFORMIN HYDROCHLORIDE 500 MILLIGRAM(S): 850 TABLET ORAL at 18:03

## 2020-02-01 RX ADMIN — MAGNESIUM OXIDE 400 MG ORAL TABLET 400 MILLIGRAM(S): 241.3 TABLET ORAL at 18:01

## 2020-02-01 RX ADMIN — Medication 1 TABLET(S): at 18:01

## 2020-02-01 RX ADMIN — MAGNESIUM OXIDE 400 MG ORAL TABLET 400 MILLIGRAM(S): 241.3 TABLET ORAL at 08:13

## 2020-02-01 NOTE — DIETITIAN INITIAL EVALUATION ADULT. - PHYSICAL APPEARANCE
BMI 19.7 (based on admission weight and ht per transfer record of 72in, however noted with different heights in past notes, ? accuracy)/well nourished

## 2020-02-01 NOTE — PROGRESS NOTE ADULT - SUBJECTIVE AND OBJECTIVE BOX
[facility]     SUBJECTIVE: Awake, in NAD. Offers no card c/o.      ALLERGIES:  Allergies    latex (Rash)  latex (Unknown)  No Known Drug Allergies    Intolerances        Vital Signs Last 24 Hrs  T(C): 37.3 (2020 16:21), Max: 37.3 (2020 16:21)  T(F): 99.2 (2020 16:21), Max: 99.2 (2020 16:21)  HR: 71 (2020 16:21) (68 - 71)  BP: 106/67 (2020 16:21) (98/57 - 106/67)  BP(mean): --  RR: 18 (2020 16:21) (17 - 18)  SpO2: 97% (2020 16:21) (97% - 98%)    PHYSICAL EXAM:  HEAD:  Atraumatic, Normocephalic  NECK: Supple and normal thyroid.  No JVD or carotid bruit.   HEART: S1, S2 irreg, no S3  PULMONARY: CTA  ABDOMEN: Soft nontender and positive bowel sounds   EXTREMITIES: no ankle edema b/l   NEUROLOGICAL: no focal deficits     LABS:                        8.7    5.84  )-----------( 257      ( 2020 07:08 )             27.5     02-01    140  |  104  |  16  ----------------------------<  97  4.1   |  30  |  0.66    Ca    8.8      2020 07:08  Phos  2.8     02-01  Mg     1.7     02-01    TPro  6.2  /  Alb  2.9<L>  /  TBili  0.4  /  DBili  x   /  AST  12<L>  /  ALT  10<L>  /  AlkPhos  115            Urinalysis Basic - ( 2020 17:12 )    Color: Red / Appearance: bloody / S.010 / pH: x  Gluc: x / Ketone: Trace  / Bili: Negative / Urobili: Negative   Blood: x / Protein: 500 mg/dL / Nitrite: Negative   Leuk Esterase: Moderate / RBC: >50 /HPF / WBC 26-50   Sq Epi: x / Non Sq Epi: Occasional / Bacteria: Many      BNP  TSHThyroid Stimulating Hormone, Serum: 1.28 uIU/mL ( @ 06:36)    LIVER FUNCTIONS - ( 2020 06:36 )  Alb: 2.9 g/dL / Pro: 6.2 g/dL / ALK PHOS: 115 U/L / ALT: 10 U/L / AST: 12 U/L / GGT: x             EKG:    ECHO: see prev note    IMAGING: none new today    MEDICATIONS  (STANDING):  budesonide  80 MICROgram(s)/formoterol 4.5 MICROgram(s) Inhaler 2 Puff(s) Inhalation two times a day  cefTRIAXone   IVPB 1000 milliGRAM(s) IV Intermittent every 24 hours  dextrose 5%. 1000 milliLiter(s) (50 mL/Hr) IV Continuous <Continuous>  dextrose 50% Injectable 12.5 Gram(s) IV Push once  dextrose 50% Injectable 25 Gram(s) IV Push once  dextrose 50% Injectable 25 Gram(s) IV Push once  donepezil 5 milliGRAM(s) Oral at bedtime  insulin lispro (HumaLOG) corrective regimen sliding scale   SubCutaneous at bedtime  insulin lispro (HumaLOG) corrective regimen sliding scale   SubCutaneous three times a day before meals  iron sucrose Injectable 100 milliGRAM(s) IV Push every 24 hours  lactobacillus acidophilus 1 Tablet(s) Oral two times a day  magnesium oxide 400 milliGRAM(s) Oral two times a day with meals  metFORMIN 500 milliGRAM(s) Oral two times a day  pantoprazole    Tablet 40 milliGRAM(s) Oral before breakfast  polyethylene glycol 3350 17 Gram(s) Oral daily  senna 2 Tablet(s) Oral at bedtime  simvastatin 20 milliGRAM(s) Oral at bedtime    MEDICATIONS  (PRN):  acetaminophen   Tablet .. 650 milliGRAM(s) Oral every 6 hours PRN Temp greater or equal to 38C (100.4F), Mild Pain (1 - 3)  dextrose 40% Gel 15 Gram(s) Oral once PRN Blood Glucose LESS THAN 70 milliGRAM(s)/deciLiter  glucagon  Injectable 1 milliGRAM(s) IntraMuscular once PRN Glucose <70 milliGRAM(s)/deciLiter

## 2020-02-01 NOTE — DIETITIAN INITIAL EVALUATION ADULT. - PERTINENT MEDS FT
Humalog sliding scale, Metformin, Lactobacillus acidophilous, Mag-Ox 400, Protonix, Miralax, Senna, Zocor

## 2020-02-01 NOTE — CONSULT NOTE ADULT - SUBJECTIVE AND OBJECTIVE BOX
Hematology/Oncology consult     Patient is seen and examined  notes/labs reviewed  pt family is at bedside and d/w family.  consult dictated,  Job #    contact #  son/daughter----  phone #    IMPRESSION:      RECOMMENDATION:              ,thanks for courtsey of this consult,will follow this pt with you for hem/onc issue while pt is in hospital. Hematology/Oncology consult     Patient is seen and examined  notes/labs reviewed  consult dictated,  Job # [id number]      IMPRESSION:  anemia  iron panel--iron def  hx prostate cancer  s/p ct renal stone hunt 1/2020    RECOMMENDATION:  1/ iv venofer  2/ monitor h/h  3/ gi/dvtp    Dr samuel ,thanks for courtesy of this consult, will follow this pt with you for hem/onc issue while pt is in hospital.

## 2020-02-01 NOTE — DIETITIAN INITIAL EVALUATION ADULT. - ADD RECOMMEND
1) Continue with current Consistent CHO, DASH/TLC diet, defer texture and consistency to SLP/MD as pt was previously on a chopped diet PTA and SLP rx for soft, 2) Encourage adequate PO intake, 3) Will add health shakes x 1 day to assist with increased protein needs, 4) Recommend MVI, Vitamin C, 5) Encourage adequate PO intake, 6) Monitor pt's PO intake, weight, skin, edema, GI distress

## 2020-02-01 NOTE — DIETITIAN INITIAL EVALUATION ADULT. - OTHER INFO
As per chart pt is a 89 year old male who [facility] with a PMH of Arteriosclerotic heart disease (ASHD), Atrial fibrillation  ,CHF (congestive heart failure)  ,Dementia  ,Depression  ,DM (diabetes mellitus)  ,Hyperlipemia  ,Hypertension  ,Prostate ca .UTI suspected Admitted for septic workup and evaluation.    Per transfer records pt was on a No added salt, No concentrated sweets, Chopped diet PTA. Per transfer records pt was also on Metformin PTA, current HgbA1c 5.9%, indicates good control however on the low side given pt's advanced age. NKFA.    Pt currently with fair appetite and PO intake. Pt's admission weight per chart 145.15lbs. Pt's weight per transfer record 146.5lbs. Per previous RD notes pt's weights are as follows: (7/26/19) 158lbs, (4/13/19) 146.0lbs, (1/31/19) 162.1lbs, pt noted with fluctuating weights, question accuracy of weights however currently pt appears well nourished, recommend to monitor daily weights. No GI distress noted at this time, no BM since admission.     Stage 1 b/l buttock pressure injury  No edema noted at this time

## 2020-02-01 NOTE — PROGRESS NOTE ADULT - SUBJECTIVE AND OBJECTIVE BOX
CHIEF COMPLAINT/ PRESENT FINDINGS:  hx hematuria   fitch draining clear        ****************************************************************************************************  PHYSICAL EXAM:    Vital Signs Last 24 Hrs  T(C): 36.6 (2020 08:13), Max: 37.9 (2020 16:32)  T(F): 97.8 (2020 08:), Max: 100.3 (2020 16:32)  HR: 70 (2020 08:) (68 - 89)  BP: 100/58 (2020 08:) (98/57 - 117/62)  BP(mean): --  RR: 18 (2020 08:13) (17 - 19)  SpO2: 98% (2020 08:13) (95% - 99%)    GENERAL: dementia     PENIS: 16 fr fitch drains clear     TESTES: soft     PROSTATE/ RECTAL:    ABDOMEN: soft     BACK:    LOWER EXTREMITIES:    NEUROLOGICAL:    **********************************************************************************************************  LABS:                        8.7    5.84  )-----------( 257      ( 2020 07:08 )             27.5     02-01    140  |  104  |  16  ----------------------------<  97  4.1   |  30  |  0.66    Ca    8.8      2020 07:08  Phos  2.8     02-01  Mg     1.7     02-01    TPro  6.2  /  Alb  2.9<L>  /  TBili  0.4  /  DBili  x   /  AST  12<L>  /  ALT  10<L>  /  AlkPhos  115        Urinalysis Basic - ( 2020 17:12 )    Color: Red / Appearance: bloody / S.010 / pH: x  Gluc: x / Ketone: Trace  / Bili: Negative / Urobili: Negative   Blood: x / Protein: 500 mg/dL / Nitrite: Negative   Leuk Esterase: Moderate / RBC: >50 /HPF / WBC 26-50   Sq Epi: x / Non Sq Epi: Occasional / Bacteria: Many      Urine Culture:  @ 21:46  Urine Culure Results   >100,000 CFU/ml Gram Negative Rods  Organism --      RADIOLOGY & ADDITIONAL STUDIES:      IMPRESSION: uti   hx ca prostate chronic fitch     PLAN: maintain chronic fitch CHIEF COMPLAINT/ PRESENT FINDINGS:  hx hematuria   fitch draining clear        ****************************************************************************************************  PHYSICAL EXAM:    Vital Signs Last 24 Hrs  T(C): 36.6 (2020 08:13), Max: 37.9 (2020 16:32)  T(F): 97.8 (2020 08:), Max: 100.3 (2020 16:32)  HR: 70 (2020 08:) (68 - 89)  BP: 100/58 (2020 08:) (98/57 - 117/62)  BP(mean): --  RR: 18 (2020 08:13) (17 - 19)  SpO2: 98% (2020 08:13) (95% - 99%)    GENERAL: dementia     PENIS: 16 fr fitch drains clear     TESTES: soft     PROSTATE/ RECTAL:    ABDOMEN: soft     BACK:    LOWER EXTREMITIES:    NEUROLOGICAL:    **********************************************************************************************************  LABS:                        8.7    5.84  )-----------( 257      ( 2020 07:08 )             27.5     02-01    140  |  104  |  16  ----------------------------<  97  4.1   |  30  |  0.66    Ca    8.8      2020 07:08  Phos  2.8     02-01  Mg     1.7     02-01    TPro  6.2  /  Alb  2.9<L>  /  TBili  0.4  /  DBili  x   /  AST  12<L>  /  ALT  10<L>  /  AlkPhos  115        Urinalysis Basic - ( 2020 17:12 )    Color: Red / Appearance: bloody / S.010 / pH: x  Gluc: x / Ketone: Trace  / Bili: Negative / Urobili: Negative   Blood: x / Protein: 500 mg/dL / Nitrite: Negative   Leuk Esterase: Moderate / RBC: >50 /HPF / WBC 26-50   Sq Epi: x / Non Sq Epi: Occasional / Bacteria: Many      Urine Culture:  @ 21:46  Urine Culure Results   >100,000 CFU/ml Gram Negative Rods  Organism --      RADIOLOGY & ADDITIONAL STUDIES:      IMPRESSION: uti   hx ca prostate chronic fitch     PLAN: maintain chronic fitch  on rocephin

## 2020-02-01 NOTE — PROGRESS NOTE ADULT - ATTENDING COMMENTS
89 y o WM  presents sent to ED from [facility] for evaluation of hematuria,  poor historian,  PMD Dr Quintana,  no anticoagulants noted in  patient med list ,patient was admitted with GIB last year and BASA was stopped at that time .His  PMH is significant for :  Arteriosclerotic heart disease (ASHD) ,	Atrial fibrillation  ,CHF (congestive heart failure)  ,Dementia  ,Depression  ,DM (diabetes mellitus)  ,Hyperlipemia  ,Hypertension  ,Prostate ca .UTI suspected Admitted for septic workup and evaluation ,send blood and urine cx, serial lactate levels ,monitor vitals closely hydration, monitor urine output and renal profile,iv abx initiated -on ceftriaxone . Renal stone hunt study was performed and he was found to have bladder wall thickenning and prostate enlargement . evaluation requested Palliative care consult requested ,to discuss advance directives and complete MOLST

## 2020-02-01 NOTE — PROGRESS NOTE ADULT - SUBJECTIVE AND OBJECTIVE BOX
PROGRESS NOTE  Patient is a 89y old  Male who presents with a chief complaint of HEMATURIA (2020 11:54)  Chart and available morning labs /imaging are reviewed electronically , urgent issues addressed . More information  is being added upon completion of rounds , when more information is collected and management discussed with consultants , medical staff and social service/case management on the floor   LATE ENTRY- patient was seen and examined earlier today . Plan of care was discussed with med staff and unit coordinator .   OVERNIGHT  No new issues reported by medical staff . All above noted Patient is resting in a bed comfortably .Confused ,poor mentation .No distress noted   Hematuria is resolving   HPI:  89 y o WM  presents sent to ED from [facility] for evaluation of hematuria,  poor historian,  PMD Dr Quintana,  no anticoagulants noted in  patient med list ,patient was admitted with GIB last year and BASA was stopped at that time .His  PMH is significant for :  Arteriosclerotic heart disease (ASHD)    	Atrial fibrillation  ,CHF (congestive heart failure)  ,Dementia  ,Depression  ,DM (diabetes mellitus)  ,Hyperlipemia  ,Hypertension  ,Prostate ca .UTI suspected Admitted for septic workup and evaluation ,send blood and urine cx, serial lactate levels ,monitor vitals closely hydration, monitor urine output and renal profile,iv abx initiated -on ceftriaxone . Renal stone hunt study was performed and he was found to have bladder wall thickenning and prostate enlargement . evaluation requested Palliative care consult requested ,to discuss advance directives and complete MOLST (2020 06:11)    PAST MEDICAL & SURGICAL HISTORY:  Dementia  Arteriosclerotic heart disease (ASHD)  Prostate CA  Atrial fibrillation  DM (diabetes mellitus)  Dementia  Depression  Diabetes  Hyperlipemia  CHF (congestive heart failure)  Dementia  Prostate ca  Diabetes  Hypertension  Atrial fibrillation  No significant past surgical history      MEDICATIONS  (STANDING):  budesonide  80 MICROgram(s)/formoterol 4.5 MICROgram(s) Inhaler 2 Puff(s) Inhalation two times a day  cefTRIAXone   IVPB 1000 milliGRAM(s) IV Intermittent every 24 hours  dextrose 5%. 1000 milliLiter(s) (50 mL/Hr) IV Continuous <Continuous>  dextrose 50% Injectable 12.5 Gram(s) IV Push once  dextrose 50% Injectable 25 Gram(s) IV Push once  dextrose 50% Injectable 25 Gram(s) IV Push once  donepezil 5 milliGRAM(s) Oral at bedtime  insulin lispro (HumaLOG) corrective regimen sliding scale   SubCutaneous at bedtime  insulin lispro (HumaLOG) corrective regimen sliding scale   SubCutaneous three times a day before meals  iron sucrose Injectable 100 milliGRAM(s) IV Push every 24 hours  lactobacillus acidophilus 1 Tablet(s) Oral two times a day  magnesium oxide 400 milliGRAM(s) Oral two times a day with meals  metFORMIN 500 milliGRAM(s) Oral two times a day  pantoprazole    Tablet 40 milliGRAM(s) Oral before breakfast  polyethylene glycol 3350 17 Gram(s) Oral daily  senna 2 Tablet(s) Oral at bedtime  simvastatin 20 milliGRAM(s) Oral at bedtime    MEDICATIONS  (PRN):  acetaminophen   Tablet .. 650 milliGRAM(s) Oral every 6 hours PRN Temp greater or equal to 38C (100.4F), Mild Pain (1 - 3)  dextrose 40% Gel 15 Gram(s) Oral once PRN Blood Glucose LESS THAN 70 milliGRAM(s)/deciLiter  glucagon  Injectable 1 milliGRAM(s) IntraMuscular once PRN Glucose <70 milliGRAM(s)/deciLiter      OBJECTIVE    T(C): 36.6 (20 @ 08:13), Max: 37.9 (20 @ 16:32)  HR: 70 (20 @ 08:13) (68 - 80)  BP: 100/58 (20 @ 08:13) (98/57 - 117/62)  RR: 18 (20 @ 08:13) (17 - 18)  SpO2: 98% (20 @ 08:13) (98% - 99%)  Wt(kg): --  I&O's Summary    2020 07:01  -  2020 07:00  --------------------------------------------------------  IN: 0 mL / OUT: 1050 mL / NET: -1050 mL          REVIEW OF SYSTEMS:  ROS is unobtainable due to dementia and confusion     PHYSICAL EXAM:  Appearance: NAD. VS past 24 hrs -as above   HEENT:   Moist oral mucosa. Conjunctiva clear b/l.   Neck : supple  Respiratory: Lungs CTAB.  Gastrointestinal:  Soft, nontender. No rebound. No rigidity. BS present	  Cardiovascular: RRR ,S1S2 present  Neurologic: Non-focal. Moving all extremities.  Extremities: No edema. No erythema. No calf tenderness.  Skin: No rashes, No ecchymoses, No cyanosis.	  wounds ,skin lesions-See skin assesment flow sheet   LABS:                        8.7    5.84  )-----------( 257      ( 2020 07:08 )             27.5     02-    140  |  104  |  16  ----------------------------<  97  4.1   |  30  |  0.66    Ca    8.8      2020 07:08  Phos  2.8     02-  Mg     1.7     -    TPro  6.2  /  Alb  2.9<L>  /  TBili  0.4  /  DBili  x   /  AST  12<L>  /  ALT  10<L>  /  AlkPhos  115      CAPILLARY BLOOD GLUCOSE      POCT Blood Glucose.: 106 mg/dL (2020 12:10)  POCT Blood Glucose.: 108 mg/dL (2020 07:50)  POCT Blood Glucose.: 122 mg/dL (2020 21:30)  POCT Blood Glucose.: 71 mg/dL (2020 17:34)      Urinalysis Basic - ( 2020 17:12 )    Color: Red / Appearance: bloody / S.010 / pH: x  Gluc: x / Ketone: Trace  / Bili: Negative / Urobili: Negative   Blood: x / Protein: 500 mg/dL / Nitrite: Negative   Leuk Esterase: Moderate / RBC: >50 /HPF / WBC 26-50   Sq Epi: x / Non Sq Epi: Occasional / Bacteria: Many        Culture - Urine (collected 2020 21:46)  Source: .Urine Catheterized  Preliminary Report (2020 05:47):    >100,000 CFU/ml Gram Negative Rods      RADIOLOGY & ADDITIONAL TESTS:< from: CT Renal Stone Hunt (20 @ 17:13) >  EXAM:  CT RENAL STONE HUNT                            PROCEDURE DATE:  2020          INTERPRETATION:  CLINICAL INFORMATION: Hematuria.    COMPARISON: CT scan abdomen pelvis 2018.    PROCEDURE:   CT of the Abdomen and Pelvis was performedwithout intravenous contrast.   Intravenous contrast: None.  Oral contrast: None.  Sagittal and coronal reformats were performed.    FINDINGS:    LOWER CHEST: Cardiomegaly. Cardiac pacemaker lead.  Peribronchiolar thickening left lower lobe, may reflect inflammatory/infectious airway disease.  3 mm nodule peripherally left lower lobe.   3 mm calcified nodule right middle lobe.    The evaluation of the solid organ parenchyma is limited without intravenous contrast.  In addition streak artifact from patient's arms degrades image quality limiting evaluation.    LIVER: Approximately 3 cm densely calcified lesion peripherally right hepatic lobe, stable in appearance.  BILE DUCTS: Normal caliber.  GALLBLADDER: Prior cholecystectomy.  SPLEEN: Grossly unremarkable.  PANCREAS: Within normal limits.  ADRENALS: Fusiform thickening of the adrenal glands bilaterally.  KIDNEYS/URETERS:   No hydroureteronephrosis or obstructing ureteral calculus noted.    BLADDER: Balloon Galicia catheter within the bladder. Urinary bladder is nondistended, however, cannot exclude bladder wall thickening.  REPRODUCTIVE ORGANS: Enlarged prostate gland with coarse central calcifications.    BOWEL: Evaluation the gastrointestinal tract is limited without distention.  Moderate fecal retention throughout the colon; no bowel obstruction. Mild perirectal presacral stranding, stable in appearance.   Appendix not visualized on this exam.  PERITONEUM: No ascites.  VESSELS: Atherosclerotic calcification.  RETROPERITONEUM/LYMPH NODES: No lymphadenopathy.    ABDOMINAL WALL: Postsurgical changes.  Small, bilateral fat-containing inguinal hernias.    BONES: Multilevel degenerative changes.  No acute osseous abnormality.    IMPRESSION:     No hydronephrosis or evidence of obstructive uropathy.  Galicia catheter within a nondistended urinary bladder, possible bladder wall thickening, which may reflect chronic bladder outlet obstruction as discussed present on prior exam and there is an enlarged prostate gland.    Other findings as discussed above.    < end of copied text >     reviewed elctronically  ASSESSMENT/PLAN: 	    45minutes spent on this visit, 50% visit time spent in care co-ordination with other attendings and counselling patient  I have discussed care plan with patient and HCP,expressed understanding of problems treatment and their effect and side effects, alternatives in detail,I have asked if they have any questions and concerns and appropriately addressed them to best of my ability

## 2020-02-02 LAB
-  AMIKACIN: SIGNIFICANT CHANGE UP
-  AMPICILLIN/SULBACTAM: SIGNIFICANT CHANGE UP
-  AMPICILLIN: SIGNIFICANT CHANGE UP
-  AZTREONAM: SIGNIFICANT CHANGE UP
-  CEFAZOLIN: SIGNIFICANT CHANGE UP
-  CEFEPIME: SIGNIFICANT CHANGE UP
-  CEFOXITIN: SIGNIFICANT CHANGE UP
-  CEFTRIAXONE: SIGNIFICANT CHANGE UP
-  CIPROFLOXACIN: SIGNIFICANT CHANGE UP
-  GENTAMICIN: SIGNIFICANT CHANGE UP
-  LEVOFLOXACIN: SIGNIFICANT CHANGE UP
-  MEROPENEM: SIGNIFICANT CHANGE UP
-  NITROFURANTOIN: SIGNIFICANT CHANGE UP
-  PIPERACILLIN/TAZOBACTAM: SIGNIFICANT CHANGE UP
-  TOBRAMYCIN: SIGNIFICANT CHANGE UP
-  TRIMETHOPRIM/SULFAMETHOXAZOLE: SIGNIFICANT CHANGE UP
CULTURE RESULTS: SIGNIFICANT CHANGE UP
HCT VFR BLD CALC: 27.9 % — LOW (ref 39–50)
HGB BLD-MCNC: 8.7 G/DL — LOW (ref 13–17)
MCHC RBC-ENTMCNC: 25.1 PG — LOW (ref 27–34)
MCHC RBC-ENTMCNC: 31.2 GM/DL — LOW (ref 32–36)
MCV RBC AUTO: 80.4 FL — SIGNIFICANT CHANGE UP (ref 80–100)
METHOD TYPE: SIGNIFICANT CHANGE UP
NRBC # BLD: 0 /100 WBCS — SIGNIFICANT CHANGE UP (ref 0–0)
ORGANISM # SPEC MICROSCOPIC CNT: SIGNIFICANT CHANGE UP
ORGANISM # SPEC MICROSCOPIC CNT: SIGNIFICANT CHANGE UP
PLATELET # BLD AUTO: 259 K/UL — SIGNIFICANT CHANGE UP (ref 150–400)
RBC # BLD: 3.47 M/UL — LOW (ref 4.2–5.8)
RBC # FLD: 15.6 % — HIGH (ref 10.3–14.5)
SPECIMEN SOURCE: SIGNIFICANT CHANGE UP
WBC # BLD: 5.68 K/UL — SIGNIFICANT CHANGE UP (ref 3.8–10.5)
WBC # FLD AUTO: 5.68 K/UL — SIGNIFICANT CHANGE UP (ref 3.8–10.5)

## 2020-02-02 RX ADMIN — Medication 1 TABLET(S): at 05:35

## 2020-02-02 RX ADMIN — Medication 1 TABLET(S): at 17:17

## 2020-02-02 RX ADMIN — METFORMIN HYDROCHLORIDE 500 MILLIGRAM(S): 850 TABLET ORAL at 07:08

## 2020-02-02 RX ADMIN — METFORMIN HYDROCHLORIDE 500 MILLIGRAM(S): 850 TABLET ORAL at 17:17

## 2020-02-02 RX ADMIN — CEFTRIAXONE 100 MILLIGRAM(S): 500 INJECTION, POWDER, FOR SOLUTION INTRAMUSCULAR; INTRAVENOUS at 17:39

## 2020-02-02 RX ADMIN — BUDESONIDE AND FORMOTEROL FUMARATE DIHYDRATE 2 PUFF(S): 160; 4.5 AEROSOL RESPIRATORY (INHALATION) at 18:55

## 2020-02-02 RX ADMIN — POLYETHYLENE GLYCOL 3350 17 GRAM(S): 17 POWDER, FOR SOLUTION ORAL at 12:30

## 2020-02-02 RX ADMIN — PANTOPRAZOLE SODIUM 40 MILLIGRAM(S): 20 TABLET, DELAYED RELEASE ORAL at 05:35

## 2020-02-02 RX ADMIN — MAGNESIUM OXIDE 400 MG ORAL TABLET 400 MILLIGRAM(S): 241.3 TABLET ORAL at 08:17

## 2020-02-02 RX ADMIN — DONEPEZIL HYDROCHLORIDE 5 MILLIGRAM(S): 10 TABLET, FILM COATED ORAL at 22:55

## 2020-02-02 RX ADMIN — BUDESONIDE AND FORMOTEROL FUMARATE DIHYDRATE 2 PUFF(S): 160; 4.5 AEROSOL RESPIRATORY (INHALATION) at 06:44

## 2020-02-02 RX ADMIN — MAGNESIUM OXIDE 400 MG ORAL TABLET 400 MILLIGRAM(S): 241.3 TABLET ORAL at 18:55

## 2020-02-02 RX ADMIN — IRON SUCROSE 100 MILLIGRAM(S): 20 INJECTION, SOLUTION INTRAVENOUS at 13:31

## 2020-02-02 RX ADMIN — SIMVASTATIN 20 MILLIGRAM(S): 20 TABLET, FILM COATED ORAL at 22:55

## 2020-02-02 RX ADMIN — SENNA PLUS 2 TABLET(S): 8.6 TABLET ORAL at 22:55

## 2020-02-02 NOTE — PROGRESS NOTE ADULT - SUBJECTIVE AND OBJECTIVE BOX
Patient is a 89y Male with a known history of :  Prophylactic measure (Z29.9)  Dementia (F03.90)  Hypertension (I10)  CHF (congestive heart failure) (I50.9)  Atrial fibrillation (I48.91)  DM (diabetes mellitus) (E11.9)  Arteriosclerotic heart disease (ASHD) (I25.10)  UTI (urinary tract infection) (N39.0)  Hematuria (R31.9)    HPI:  89 y o WM  presents sent to ED from [facility] for evaluation of hematuria,  poor historian,  PMD Dr Quintana,  no anticoagulants noted in  patient med list ,patient was admitted with GIB last year and BASA was stopped at that time .His  PMH is significant for :  Arteriosclerotic heart disease (ASHD)    	Atrial fibrillation  ,CHF (congestive heart failure)  ,Dementia  ,Depression  ,DM (diabetes mellitus)  ,Hyperlipemia  ,Hypertension  ,Prostate ca .UTI suspected Admitted for septic workup and evaluation ,send blood and urine cx, serial lactate levels ,monitor vitals closely hydration, monitor urine output and renal profile,iv abx initiated -on ceftriaxone . Renal stone hunt study was performed and he was found to have bladder wall thickenning and prostate enlargement . evaluation requested Palliative care consult requested ,to discuss advance directives and complete MOLST (31 Jan 2020 06:11)      REVIEW OF SYSTEMS:    CONSTITUTIONAL: No fever, weight loss, or fatigue  EYES: No eye pain, visual disturbances, or discharge  ENMT:  No difficulty hearing, tinnitus, vertigo; No sinus or throat pain  NECK: No pain or stiffness  BREASTS: No pain, masses, or nipple discharge  RESPIRATORY: No cough, wheezing, chills or hemoptysis; No shortness of breath  CARDIOVASCULAR: No chest pain, palpitations, dizziness, or leg swelling  GASTROINTESTINAL: No abdominal or epigastric pain. No nausea, vomiting, or hematemesis; No diarrhea or constipation. No melena or hematochezia.  GENITOURINARY: No dysuria, frequency, hematuria, or incontinence  NEUROLOGICAL: No headaches, memory loss, loss of strength, numbness, or tremors  SKIN: No itching, burning, rashes, or lesions   LYMPH NODES: No enlarged glands  ENDOCRINE: No heat or cold intolerance; No hair loss  MUSCULOSKELETAL: No joint pain or swelling; No muscle, back, or extremity pain  PSYCHIATRIC: No depression, anxiety, mood swings, or difficulty sleeping  HEME/LYMPH: No easy bruising, or bleeding gums  ALLERGY AND IMMUNOLOGIC: No hives or eczema    MEDICATIONS  (STANDING):  budesonide  80 MICROgram(s)/formoterol 4.5 MICROgram(s) Inhaler 2 Puff(s) Inhalation two times a day  cefTRIAXone   IVPB 1000 milliGRAM(s) IV Intermittent every 24 hours  dextrose 5%. 1000 milliLiter(s) (50 mL/Hr) IV Continuous <Continuous>  dextrose 50% Injectable 12.5 Gram(s) IV Push once  dextrose 50% Injectable 25 Gram(s) IV Push once  dextrose 50% Injectable 25 Gram(s) IV Push once  donepezil 5 milliGRAM(s) Oral at bedtime  insulin lispro (HumaLOG) corrective regimen sliding scale   SubCutaneous at bedtime  insulin lispro (HumaLOG) corrective regimen sliding scale   SubCutaneous three times a day before meals  iron sucrose Injectable 100 milliGRAM(s) IV Push every 24 hours  lactobacillus acidophilus 1 Tablet(s) Oral two times a day  magnesium oxide 400 milliGRAM(s) Oral two times a day with meals  metFORMIN 500 milliGRAM(s) Oral two times a day  pantoprazole    Tablet 40 milliGRAM(s) Oral before breakfast  polyethylene glycol 3350 17 Gram(s) Oral daily  senna 2 Tablet(s) Oral at bedtime  simvastatin 20 milliGRAM(s) Oral at bedtime    MEDICATIONS  (PRN):  acetaminophen   Tablet .. 650 milliGRAM(s) Oral every 6 hours PRN Temp greater or equal to 38C (100.4F), Mild Pain (1 - 3)  dextrose 40% Gel 15 Gram(s) Oral once PRN Blood Glucose LESS THAN 70 milliGRAM(s)/deciLiter  glucagon  Injectable 1 milliGRAM(s) IntraMuscular once PRN Glucose <70 milliGRAM(s)/deciLiter      ALLERGIES: latex (Rash)  latex (Unknown)  No Known Drug Allergies      FAMILY HISTORY:      PHYSICAL EXAMINATION:  -----------------------------  T(C): 36.9 (02-02-20 @ 07:40), Max: 37.3 (02-01-20 @ 16:21)  HR: 75 (02-02-20 @ 07:40) (71 - 75)  BP: 117/63 (02-02-20 @ 07:40) (106/67 - 117/63)  RR: 16 (02-02-20 @ 07:40) (16 - 18)  SpO2: 94% (02-02-20 @ 07:40) (94% - 97%)  Wt(kg): --    02-01 @ 07:01  -  02-02 @ 07:00  --------------------------------------------------------  IN:    IV PiggyBack: 50 mL  Total IN: 50 mL    OUT:    Indwelling Catheter - Urethral: 1500 mL  Total OUT: 1500 mL    Total NET: -1450 mL            Constitutional: well developed, normal appearance, well groomed, well nourished, no deformities and no acute distress.   Eyes: the conjunctiva exhibited no abnormalities and the eyelids demonstrated no xanthelasmas.   HEENT: normal oral mucosa, no oral pallor and no oral cyanosis.   Neck: normal jugular venous A waves present, normal jugular venous V waves present and no jugular venous jacobs A waves.   Pulmonary: no respiratory distress, normal respiratory rhythm and effort, no accessory muscle use and lungs were clear to auscultation bilaterally.   Cardiovascular: heart rate and rhythm were normal, normal S1 and S2 and no murmur, gallop, rub, heave or thrill are present.   Abdomen: soft, non-tender, no hepato-splenomegaly and no abdominal mass palpated.   Musculoskeletal: the gait could not be assessed..   Extremities: no clubbing of the fingernails, no localized cyanosis, no petechial hemorrhages and no ischemic changes.   Skin: normal skin color and pigmentation, no rash, no venous stasis, no skin lesions, no skin ulcer and no xanthoma was observed.   Psychiatric: oriented to person, place, and time, the affect was normal, the mood was normal and not feeling anxious.     LABS:   --------  02-01    140  |  104  |  16  ----------------------------<  97  4.1   |  30  |  0.66    Ca    8.8      01 Feb 2020 07:08  Phos  2.8     02-01  Mg     1.7     02-01                           8.7    5.68  )-----------( 259      ( 02 Feb 2020 06:28 )             27.9                 RADIOLOGY:  -----------------        ECG:

## 2020-02-02 NOTE — PROGRESS NOTE ADULT - SUBJECTIVE AND OBJECTIVE BOX
Interval History:    CENTRAL LINE:   [  ] YES       [  ] NO  RAMOS:                 [  ] YES       [  ] NO         REVIEW OF SYSTEMS:  All Systems below were reviewed and are negative [  ]  HEENT:  ID:  Pulmonary:  Cardiac:  GI:  Renal:  Musculoskeletal:  All other systems above were reviewed and are negative   [  ]      MEDICATIONS  (STANDING):  budesonide  80 MICROgram(s)/formoterol 4.5 MICROgram(s) Inhaler 2 Puff(s) Inhalation two times a day  dextrose 5%. 1000 milliLiter(s) (50 mL/Hr) IV Continuous <Continuous>  dextrose 50% Injectable 12.5 Gram(s) IV Push once  dextrose 50% Injectable 25 Gram(s) IV Push once  dextrose 50% Injectable 25 Gram(s) IV Push once  donepezil 5 milliGRAM(s) Oral at bedtime  insulin lispro (HumaLOG) corrective regimen sliding scale   SubCutaneous at bedtime  insulin lispro (HumaLOG) corrective regimen sliding scale   SubCutaneous three times a day before meals  iron sucrose Injectable 100 milliGRAM(s) IV Push every 24 hours  lactobacillus acidophilus 1 Tablet(s) Oral two times a day  magnesium oxide 400 milliGRAM(s) Oral two times a day with meals  metFORMIN 500 milliGRAM(s) Oral two times a day  pantoprazole    Tablet 40 milliGRAM(s) Oral before breakfast  polyethylene glycol 3350 17 Gram(s) Oral daily  senna 2 Tablet(s) Oral at bedtime  simvastatin 20 milliGRAM(s) Oral at bedtime    MEDICATIONS  (PRN):  acetaminophen   Tablet .. 650 milliGRAM(s) Oral every 6 hours PRN Temp greater or equal to 38C (100.4F), Mild Pain (1 - 3)  dextrose 40% Gel 15 Gram(s) Oral once PRN Blood Glucose LESS THAN 70 milliGRAM(s)/deciLiter  glucagon  Injectable 1 milliGRAM(s) IntraMuscular once PRN Glucose <70 milliGRAM(s)/deciLiter      Vital Signs Last 24 Hrs  T(C): 36.7 (02 Feb 2020 16:19), Max: 36.9 (02 Feb 2020 07:40)  T(F): 98 (02 Feb 2020 16:19), Max: 98.5 (02 Feb 2020 07:40)  HR: 65 (02 Feb 2020 16:19) (65 - 75)  BP: 115/66 (02 Feb 2020 16:19) (107/62 - 117/63)  BP(mean): --  RR: 17 (02 Feb 2020 16:19) (16 - 17)  SpO2: 100% (02 Feb 2020 16:19) (94% - 100%)    I&O's Summary    01 Feb 2020 07:01  -  02 Feb 2020 07:00  --------------------------------------------------------  IN: 50 mL / OUT: 1500 mL / NET: -1450 mL    02 Feb 2020 07:01  -  02 Feb 2020 19:38  --------------------------------------------------------  IN: 50 mL / OUT: 750 mL / NET: -700 mL        PHYSICAL EXAM:  HEENT: NC/AT; PERRLA  Neck: Soft; no tenderness  Lungs: CTA bilaterally; no wheezing.   Heart:  Abdomen:  Genital/ Rectal:  Extremities:  Neurologic:  Vascular:      LABORATORY:    CBC Full  -  ( 02 Feb 2020 06:28 )  WBC Count : 5.68 K/uL  RBC Count : 3.47 M/uL  Hemoglobin : 8.7 g/dL  Hematocrit : 27.9 %  Platelet Count - Automated : 259 K/uL  Mean Cell Volume : 80.4 fl  Mean Cell Hemoglobin : 25.1 pg  Mean Cell Hemoglobin Concentration : 31.2 gm/dL  Auto Neutrophil # : x  Auto Lymphocyte # : x  Auto Monocyte # : x  Auto Eosinophil # : x  Auto Basophil # : x  Auto Neutrophil % : x  Auto Lymphocyte % : x  Auto Monocyte % : x  Auto Eosinophil % : x  Auto Basophil % : x          02-01    140  |  104  |  16  ----------------------------<  97  4.1   |  30  |  0.66    Ca    8.8      01 Feb 2020 07:08  Phos  2.8     02-01  Mg     1.7     02-01            Assessment and Plan:          Stephan Pruett MD   [phone]. He is afebrile  Comfortable     MEDICATIONS  (STANDING):  budesonide  80 MICROgram(s)/formoterol 4.5 MICROgram(s) Inhaler 2 Puff(s) Inhalation two times a day  dextrose 5%. 1000 milliLiter(s) (50 mL/Hr) IV Continuous <Continuous>  dextrose 50% Injectable 12.5 Gram(s) IV Push once  dextrose 50% Injectable 25 Gram(s) IV Push once  dextrose 50% Injectable 25 Gram(s) IV Push once  donepezil 5 milliGRAM(s) Oral at bedtime  insulin lispro (HumaLOG) corrective regimen sliding scale   SubCutaneous at bedtime  insulin lispro (HumaLOG) corrective regimen sliding scale   SubCutaneous three times a day before meals  iron sucrose Injectable 100 milliGRAM(s) IV Push every 24 hours  lactobacillus acidophilus 1 Tablet(s) Oral two times a day  magnesium oxide 400 milliGRAM(s) Oral two times a day with meals  metFORMIN 500 milliGRAM(s) Oral two times a day  pantoprazole    Tablet 40 milliGRAM(s) Oral before breakfast  polyethylene glycol 3350 17 Gram(s) Oral daily  senna 2 Tablet(s) Oral at bedtime  simvastatin 20 milliGRAM(s) Oral at bedtime    MEDICATIONS  (PRN):  acetaminophen   Tablet .. 650 milliGRAM(s) Oral every 6 hours PRN Temp greater or equal to 38C (100.4F), Mild Pain (1 - 3)  dextrose 40% Gel 15 Gram(s) Oral once PRN Blood Glucose LESS THAN 70 milliGRAM(s)/deciLiter  glucagon  Injectable 1 milliGRAM(s) IntraMuscular once PRN Glucose <70 milliGRAM(s)/deciLiter      Vital Signs Last 24 Hrs  T(C): 36.7 (02 Feb 2020 16:19), Max: 36.9 (02 Feb 2020 07:40)  T(F): 98 (02 Feb 2020 16:19), Max: 98.5 (02 Feb 2020 07:40)  HR: 65 (02 Feb 2020 16:19) (65 - 75)  BP: 115/66 (02 Feb 2020 16:19) (107/62 - 117/63)  BP(mean): --  RR: 17 (02 Feb 2020 16:19) (16 - 17)  SpO2: 100% (02 Feb 2020 16:19) (94% - 100%)    I&O's Summary    01 Feb 2020 07:01  -  02 Feb 2020 07:00  --------------------------------------------------------  IN: 50 mL / OUT: 1500 mL / NET: -1450 mL    02 Feb 2020 07:01  -  02 Feb 2020 19:38  --------------------------------------------------------  IN: 50 mL / OUT: 750 mL / NET: -700 mL      PHYSICAL EXAM:  HEENT: NC/AT; PERRLA  Neck: Soft; no tenderness  Lungs: Coarse BS  bilaterally; no wheezing.   Heart: RRR; no murmurs.  Abdomen: Soft; no tenderness.   Extremities: No ulcers      LABORATORY:    CBC Full  -  ( 02 Feb 2020 06:28 )  WBC Count : 5.68 K/uL  RBC Count : 3.47 M/uL  Hemoglobin : 8.7 g/dL  Hematocrit : 27.9 %  Platelet Count - Automated : 259 K/uL  Mean Cell Volume : 80.4 fl  Mean Cell Hemoglobin : 25.1 pg  Mean Cell Hemoglobin Concentration : 31.2 gm/dL  Auto Neutrophil # : x  Auto Lymphocyte # : x  Auto Monocyte # : x  Auto Eosinophil # : x  Auto Basophil # : x  Auto Neutrophil % : x  Auto Lymphocyte % : x  Auto Monocyte % : x  Auto Eosinophil % : x  Auto Basophil % : x      140  |  104  |  16  ----------------------------<  97  4.1   |  30  |  0.66    Ca    8.8      01 Feb 2020 07:08  Phos  2.8     02-01  Mg     1.7     02-01      Assessment and Plan:    1. Hematuria.  2. Probable UTI.  3. Indwelling fitch catheter.    . Urine culture grew Proteus mirabilis.   . Discontinue IV Rocephin. Add po Augmentin 875 mh bid for 5 days.   . Continue fitch as per urology. Discharge planning.       Stephan Pruett MD   [phone].

## 2020-02-02 NOTE — PROGRESS NOTE ADULT - SUBJECTIVE AND OBJECTIVE BOX
Patient is a 89y old  Male who presents with a chief complaint of HEMATURIA (02 Feb 2020 10:08)       Pt is seen and examined  pt is awake and lying in bed/out of bed to chair  pt seems comfortable and denies any complaints at this time    HPI:  89 y o WM  presents sent to ED from [facility] for evaluation of hematuria,  poor historian,  PMD Dr Quintana,  no anticoagulants noted in  patient med list ,patient was admitted with GIB last year and BASA was stopped at that time .His  PMH is significant for :  Arteriosclerotic heart disease (ASHD)    	Atrial fibrillation  ,CHF (congestive heart failure)  ,Dementia  ,Depression  ,DM (diabetes mellitus)  ,Hyperlipemia  ,Hypertension  ,Prostate ca .UTI suspected Admitted for septic workup and evaluation ,send blood and urine cx, serial lactate levels ,monitor vitals closely hydration, monitor urine output and renal profile,iv abx initiated -on ceftriaxone . Renal stone hunt study was performed and he was found to have bladder wall thickenning and prostate enlargement . evaluation requested Palliative care consult requested ,to discuss advance directives and complete MOLST (31 Jan 2020 06:11)         ROS:  Negative except for:    MEDICATIONS  (STANDING):  budesonide  80 MICROgram(s)/formoterol 4.5 MICROgram(s) Inhaler 2 Puff(s) Inhalation two times a day  cefTRIAXone   IVPB 1000 milliGRAM(s) IV Intermittent every 24 hours  dextrose 5%. 1000 milliLiter(s) (50 mL/Hr) IV Continuous <Continuous>  dextrose 50% Injectable 12.5 Gram(s) IV Push once  dextrose 50% Injectable 25 Gram(s) IV Push once  dextrose 50% Injectable 25 Gram(s) IV Push once  donepezil 5 milliGRAM(s) Oral at bedtime  insulin lispro (HumaLOG) corrective regimen sliding scale   SubCutaneous at bedtime  insulin lispro (HumaLOG) corrective regimen sliding scale   SubCutaneous three times a day before meals  iron sucrose Injectable 100 milliGRAM(s) IV Push every 24 hours  lactobacillus acidophilus 1 Tablet(s) Oral two times a day  magnesium oxide 400 milliGRAM(s) Oral two times a day with meals  metFORMIN 500 milliGRAM(s) Oral two times a day  pantoprazole    Tablet 40 milliGRAM(s) Oral before breakfast  polyethylene glycol 3350 17 Gram(s) Oral daily  senna 2 Tablet(s) Oral at bedtime  simvastatin 20 milliGRAM(s) Oral at bedtime    MEDICATIONS  (PRN):  acetaminophen   Tablet .. 650 milliGRAM(s) Oral every 6 hours PRN Temp greater or equal to 38C (100.4F), Mild Pain (1 - 3)  dextrose 40% Gel 15 Gram(s) Oral once PRN Blood Glucose LESS THAN 70 milliGRAM(s)/deciLiter  glucagon  Injectable 1 milliGRAM(s) IntraMuscular once PRN Glucose <70 milliGRAM(s)/deciLiter      Allergies    latex (Rash)  latex (Unknown)  No Known Drug Allergies    Intolerances        Vital Signs Last 24 Hrs  T(C): 36.9 (02 Feb 2020 07:40), Max: 37.3 (01 Feb 2020 16:21)  T(F): 98.5 (02 Feb 2020 07:40), Max: 99.2 (01 Feb 2020 16:21)  HR: 75 (02 Feb 2020 07:40) (71 - 75)  BP: 117/63 (02 Feb 2020 07:40) (106/67 - 117/63)  BP(mean): --  RR: 16 (02 Feb 2020 07:40) (16 - 18)  SpO2: 94% (02 Feb 2020 07:40) (94% - 97%)    PHYSICAL EXAM  General: adult in NAD  HEENT: clear oropharynx, anicteric sclera, pink conjunctiva  Neck: supple  CV: normal S1/S2 with no murmur rubs or gallops  Lungs: positive air movement b/l ant lungs,clear to auscultation, no wheezes, no rales  Abdomen: soft non-tender non-distended, no hepatosplenomegaly  Ext: no clubbing cyanosis or edema  Skin: no rashes and no petechiae  Neuro: alert and oriented X 4, no focal deficits  LABS:                          8.7    5.68  )-----------( 259      ( 02 Feb 2020 06:28 )             27.9         Mean Cell Volume : 80.4 fl  Mean Cell Hemoglobin : 25.1 pg  Mean Cell Hemoglobin Concentration : 31.2 gm/dL  Auto Neutrophil # : x  Auto Lymphocyte # : x  Auto Monocyte # : x  Auto Eosinophil # : x  Auto Basophil # : x  Auto Neutrophil % : x  Auto Lymphocyte % : x  Auto Monocyte % : x  Auto Eosinophil % : x  Auto Basophil % : x    Serial CBC's  02-02 @ 06:28  Hct-27.9 / Hgb-8.7 / Plat-259 / RBC-3.47 / WBC-5.68          Serial CBC's  02-01 @ 07:08  Hct-27.5 / Hgb-8.7 / Plat-257 / RBC-3.40 / WBC-5.84          Serial CBC's  01-31 @ 06:36  Hct-27.8 / Hgb-8.6 / Plat-275 / RBC-3.43 / WBC-6.91          Serial CBC's  01-30 @ 16:11  Hct-32.0 / Hgb-9.8 / Plat-301 / RBC-3.88 / WBC-7.08            02-01    140  |  104  |  16  ----------------------------<  97  4.1   |  30  |  0.66    Ca    8.8      01 Feb 2020 07:08  Phos  2.8     02-01  Mg     1.7     02-01            Iron - Total Binding Capacity.: 301 ug/dL (02-01-20 @ 11:17)  Ferritin, Serum: 22 ng/mL (02-01-20 @ 11:17)  Vitamin B12, Serum: 361 pg/mL (02-01-20 @ 11:17)  Folate, Serum: 12.6 ng/mL (02-01-20 @ 11:17)  Reticulocyte Percent: 0.9 % (02-01-20 @ 07:08)                BLOOD SMEAR INTERPRETATION:       RADIOLOGY & ADDITIONAL STUDIES: Patient is a 89y old  Male who presents with a chief complaint of HEMATURIA (02 Feb 2020 10:08)       Pt is seen and examined  pt is awake and is out of bed to chair  pt seems comfortable and denies any complaints at this time    HPI:  89 y o WM  presents sent to ED from [facility] for evaluation of hematuria,  poor historian,  PMD Dr Quintana,  no anticoagulants noted in  patient med list ,patient was admitted with GIB last year and BASA was stopped at that time .His  PMH is significant for :  Arteriosclerotic heart disease (ASHD)    	Atrial fibrillation  ,CHF (congestive heart failure)  ,Dementia  ,Depression  ,DM (diabetes mellitus)  ,Hyperlipemia  ,Hypertension  ,Prostate ca .UTI suspected Admitted for septic workup and evaluation ,send blood and urine cx, serial lactate levels ,monitor vitals closely hydration, monitor urine output and renal profile,iv abx initiated -on ceftriaxone . Renal stone hunt study was performed and he was found to have bladder wall thickenning and prostate enlargement . evaluation requested Palliative care consult requested ,to discuss advance directives and complete MOLST (31 Jan 2020 06:11)         MEDICATIONS  (STANDING):  budesonide  80 MICROgram(s)/formoterol 4.5 MICROgram(s) Inhaler 2 Puff(s) Inhalation two times a day  cefTRIAXone   IVPB 1000 milliGRAM(s) IV Intermittent every 24 hours  dextrose 5%. 1000 milliLiter(s) (50 mL/Hr) IV Continuous <Continuous>  dextrose 50% Injectable 12.5 Gram(s) IV Push once  dextrose 50% Injectable 25 Gram(s) IV Push once  dextrose 50% Injectable 25 Gram(s) IV Push once  donepezil 5 milliGRAM(s) Oral at bedtime  insulin lispro (HumaLOG) corrective regimen sliding scale   SubCutaneous at bedtime  insulin lispro (HumaLOG) corrective regimen sliding scale   SubCutaneous three times a day before meals  iron sucrose Injectable 100 milliGRAM(s) IV Push every 24 hours  lactobacillus acidophilus 1 Tablet(s) Oral two times a day  magnesium oxide 400 milliGRAM(s) Oral two times a day with meals  metFORMIN 500 milliGRAM(s) Oral two times a day  pantoprazole    Tablet 40 milliGRAM(s) Oral before breakfast  polyethylene glycol 3350 17 Gram(s) Oral daily  senna 2 Tablet(s) Oral at bedtime  simvastatin 20 milliGRAM(s) Oral at bedtime    MEDICATIONS  (PRN):  acetaminophen   Tablet .. 650 milliGRAM(s) Oral every 6 hours PRN Temp greater or equal to 38C (100.4F), Mild Pain (1 - 3)  dextrose 40% Gel 15 Gram(s) Oral once PRN Blood Glucose LESS THAN 70 milliGRAM(s)/deciLiter  glucagon  Injectable 1 milliGRAM(s) IntraMuscular once PRN Glucose <70 milliGRAM(s)/deciLiter      Allergies    latex (Rash)  latex (Unknown)  No Known Drug Allergies    Intolerances        Vital Signs Last 24 Hrs  T(C): 36.9 (02 Feb 2020 07:40), Max: 37.3 (01 Feb 2020 16:21)  T(F): 98.5 (02 Feb 2020 07:40), Max: 99.2 (01 Feb 2020 16:21)  HR: 75 (02 Feb 2020 07:40) (71 - 75)  BP: 117/63 (02 Feb 2020 07:40) (106/67 - 117/63)  BP(mean): --  RR: 16 (02 Feb 2020 07:40) (16 - 18)  SpO2: 94% (02 Feb 2020 07:40) (94% - 97%)    PHYSICAL EXAM  General: adult in NAD  HEENT: clear oropharynx, anicteric sclera, pink conjunctiva  Neck: supple  CV: normal S1/S2 with no murmur rubs or gallops  Lungs: positive air movement b/l ant lungs,clear to auscultation, no wheezes, no rales  Abdomen: soft non-tender non-distended, no hepatosplenomegaly  Ext: no clubbing cyanosis or edema  Skin: no rashes and no petechiae  Neuro: alert and oriented X 4, no focal deficits  LABS:                          8.7    5.68  )-----------( 259      ( 02 Feb 2020 06:28 )             27.9         Mean Cell Volume : 80.4 fl  Mean Cell Hemoglobin : 25.1 pg  Mean Cell Hemoglobin Concentration : 31.2 gm/dL  Auto Neutrophil # : x  Auto Lymphocyte # : x  Auto Monocyte # : x  Auto Eosinophil # : x  Auto Basophil # : x  Auto Neutrophil % : x  Auto Lymphocyte % : x  Auto Monocyte % : x  Auto Eosinophil % : x  Auto Basophil % : x    Serial CBC's  02-02 @ 06:28  Hct-27.9 / Hgb-8.7 / Plat-259 / RBC-3.47 / WBC-5.68          Serial CBC's  02-01 @ 07:08  Hct-27.5 / Hgb-8.7 / Plat-257 / RBC-3.40 / WBC-5.84          Serial CBC's  01-31 @ 06:36  Hct-27.8 / Hgb-8.6 / Plat-275 / RBC-3.43 / WBC-6.91          Serial CBC's  01-30 @ 16:11  Hct-32.0 / Hgb-9.8 / Plat-301 / RBC-3.88 / WBC-7.08            02-01    140  |  104  |  16  ----------------------------<  97  4.1   |  30  |  0.66    Ca    8.8      01 Feb 2020 07:08  Phos  2.8     02-01  Mg     1.7     02-01            Iron - Total Binding Capacity.: 301 ug/dL (02-01-20 @ 11:17)  Ferritin, Serum: 22 ng/mL (02-01-20 @ 11:17)  Vitamin B12, Serum: 361 pg/mL (02-01-20 @ 11:17)  Folate, Serum: 12.6 ng/mL (02-01-20 @ 11:17)  Reticulocyte Percent: 0.9 % (02-01-20 @ 07:08)                BLOOD SMEAR INTERPRETATION: Normal WBC series and morphology, no apparent blast cells or immature wbc, no schistocytes or fragmented rbc, platelets are adequate, no clumping of platelets or giant platelets.

## 2020-02-03 LAB
ANION GAP SERPL CALC-SCNC: 6 MMOL/L — SIGNIFICANT CHANGE UP (ref 5–17)
BUN SERPL-MCNC: 14 MG/DL — SIGNIFICANT CHANGE UP (ref 7–23)
CALCIUM SERPL-MCNC: 9.5 MG/DL — SIGNIFICANT CHANGE UP (ref 8.5–10.1)
CHLORIDE SERPL-SCNC: 102 MMOL/L — SIGNIFICANT CHANGE UP (ref 96–108)
CO2 SERPL-SCNC: 30 MMOL/L — SIGNIFICANT CHANGE UP (ref 22–31)
CREAT SERPL-MCNC: 0.64 MG/DL — SIGNIFICANT CHANGE UP (ref 0.5–1.3)
GLUCOSE SERPL-MCNC: 92 MG/DL — SIGNIFICANT CHANGE UP (ref 70–99)
HCT VFR BLD CALC: 29.7 % — LOW (ref 39–50)
HGB BLD-MCNC: 9.3 G/DL — LOW (ref 13–17)
MCHC RBC-ENTMCNC: 25 PG — LOW (ref 27–34)
MCHC RBC-ENTMCNC: 31.3 GM/DL — LOW (ref 32–36)
MCV RBC AUTO: 79.8 FL — LOW (ref 80–100)
NRBC # BLD: 0 /100 WBCS — SIGNIFICANT CHANGE UP (ref 0–0)
PLATELET # BLD AUTO: 285 K/UL — SIGNIFICANT CHANGE UP (ref 150–400)
POTASSIUM SERPL-MCNC: 4 MMOL/L — SIGNIFICANT CHANGE UP (ref 3.5–5.3)
POTASSIUM SERPL-SCNC: 4 MMOL/L — SIGNIFICANT CHANGE UP (ref 3.5–5.3)
RBC # BLD: 3.72 M/UL — LOW (ref 4.2–5.8)
RBC # FLD: 15.5 % — HIGH (ref 10.3–14.5)
SODIUM SERPL-SCNC: 138 MMOL/L — SIGNIFICANT CHANGE UP (ref 135–145)
WBC # BLD: 6.49 K/UL — SIGNIFICANT CHANGE UP (ref 3.8–10.5)
WBC # FLD AUTO: 6.49 K/UL — SIGNIFICANT CHANGE UP (ref 3.8–10.5)

## 2020-02-03 RX ORDER — FERROUS SULFATE 325(65) MG
325 TABLET ORAL DAILY
Refills: 0 | Status: DISCONTINUED | OUTPATIENT
Start: 2020-02-04 | End: 2020-02-04

## 2020-02-03 RX ADMIN — METFORMIN HYDROCHLORIDE 500 MILLIGRAM(S): 850 TABLET ORAL at 17:12

## 2020-02-03 RX ADMIN — MAGNESIUM OXIDE 400 MG ORAL TABLET 400 MILLIGRAM(S): 241.3 TABLET ORAL at 17:12

## 2020-02-03 RX ADMIN — BUDESONIDE AND FORMOTEROL FUMARATE DIHYDRATE 2 PUFF(S): 160; 4.5 AEROSOL RESPIRATORY (INHALATION) at 20:08

## 2020-02-03 RX ADMIN — Medication 1 TABLET(S): at 17:12

## 2020-02-03 RX ADMIN — BUDESONIDE AND FORMOTEROL FUMARATE DIHYDRATE 2 PUFF(S): 160; 4.5 AEROSOL RESPIRATORY (INHALATION) at 05:48

## 2020-02-03 RX ADMIN — Medication 1 TABLET(S): at 05:48

## 2020-02-03 RX ADMIN — IRON SUCROSE 100 MILLIGRAM(S): 20 INJECTION, SOLUTION INTRAVENOUS at 12:17

## 2020-02-03 RX ADMIN — METFORMIN HYDROCHLORIDE 500 MILLIGRAM(S): 850 TABLET ORAL at 08:35

## 2020-02-03 RX ADMIN — SENNA PLUS 2 TABLET(S): 8.6 TABLET ORAL at 21:43

## 2020-02-03 RX ADMIN — MAGNESIUM OXIDE 400 MG ORAL TABLET 400 MILLIGRAM(S): 241.3 TABLET ORAL at 08:35

## 2020-02-03 RX ADMIN — PANTOPRAZOLE SODIUM 40 MILLIGRAM(S): 20 TABLET, DELAYED RELEASE ORAL at 05:48

## 2020-02-03 RX ADMIN — SIMVASTATIN 20 MILLIGRAM(S): 20 TABLET, FILM COATED ORAL at 21:43

## 2020-02-03 RX ADMIN — DONEPEZIL HYDROCHLORIDE 5 MILLIGRAM(S): 10 TABLET, FILM COATED ORAL at 21:43

## 2020-02-03 RX ADMIN — POLYETHYLENE GLYCOL 3350 17 GRAM(S): 17 POWDER, FOR SOLUTION ORAL at 12:17

## 2020-02-03 NOTE — PROGRESS NOTE ADULT - PROBLEM SELECTOR PLAN 2
. Urine culture grew Proteus mirabilis.   . Discontinue IV Rocephin. Add po Augmentin 875 mh bid for 5 days.

## 2020-02-03 NOTE — PROGRESS NOTE ADULT - SUBJECTIVE AND OBJECTIVE BOX
PROGRESS NOTE  Patient is a 89y old  Male who presents with a chief complaint of HEMATURIA (03 Feb 2020 19:21)  Chart and available morning labs /imaging are reviewed electronically , urgent issues addressed . More information  is being added upon completion of rounds , when more information is collected and management discussed with consultants , medical staff and social service/case management on the floor   LATE ENTRY- patient was seen and examined earlier today . Plan of care was discussed with med staff and unit coordinator .   OVERNIGHT  No new issues reported by medical staff . All above noted Patient is resting in a bed comfortably .Confused ,poor mentation .No distress noted   Hematuria resolved ,TOV ordered   HPI:  89 y o WM  presents sent to ED from [facility] for evaluation of hematuria,  poor historian,  PMD Dr Quintana,  no anticoagulants noted in  patient med list ,patient was admitted with GIB last year and BASA was stopped at that time .His  PMH is significant for :  Arteriosclerotic heart disease (ASHD)    	Atrial fibrillation  ,CHF (congestive heart failure)  ,Dementia  ,Depression  ,DM (diabetes mellitus)  ,Hyperlipemia  ,Hypertension  ,Prostate ca .UTI suspected Admitted for septic workup and evaluation ,send blood and urine cx, serial lactate levels ,monitor vitals closely hydration, monitor urine output and renal profile,iv abx initiated -on ceftriaxone . Renal stone hunt study was performed and he was found to have bladder wall thickenning and prostate enlargement . evaluation requested Palliative care consult requested ,to discuss advance directives and complete MOLST (31 Jan 2020 06:11)    PAST MEDICAL & SURGICAL HISTORY:  Dementia  Arteriosclerotic heart disease (ASHD)  Prostate CA  Atrial fibrillation  DM (diabetes mellitus)  Dementia  Depression  Diabetes  Hyperlipemia  CHF (congestive heart failure)  Dementia  Prostate ca  Diabetes  Hypertension  Atrial fibrillation  No significant past surgical history      MEDICATIONS  (STANDING):  amoxicillin  875 milliGRAM(s)/clavulanate 1 Tablet(s) Oral two times a day  budesonide  80 MICROgram(s)/formoterol 4.5 MICROgram(s) Inhaler 2 Puff(s) Inhalation two times a day  dextrose 5%. 1000 milliLiter(s) (50 mL/Hr) IV Continuous <Continuous>  dextrose 50% Injectable 12.5 Gram(s) IV Push once  dextrose 50% Injectable 25 Gram(s) IV Push once  dextrose 50% Injectable 25 Gram(s) IV Push once  donepezil 5 milliGRAM(s) Oral at bedtime  insulin lispro (HumaLOG) corrective regimen sliding scale   SubCutaneous at bedtime  insulin lispro (HumaLOG) corrective regimen sliding scale   SubCutaneous three times a day before meals  lactobacillus acidophilus 1 Tablet(s) Oral two times a day  magnesium oxide 400 milliGRAM(s) Oral two times a day with meals  metFORMIN 500 milliGRAM(s) Oral two times a day  pantoprazole    Tablet 40 milliGRAM(s) Oral before breakfast  polyethylene glycol 3350 17 Gram(s) Oral daily  senna 2 Tablet(s) Oral at bedtime  simvastatin 20 milliGRAM(s) Oral at bedtime    MEDICATIONS  (PRN):  acetaminophen   Tablet .. 650 milliGRAM(s) Oral every 6 hours PRN Temp greater or equal to 38C (100.4F), Mild Pain (1 - 3)  dextrose 40% Gel 15 Gram(s) Oral once PRN Blood Glucose LESS THAN 70 milliGRAM(s)/deciLiter  glucagon  Injectable 1 milliGRAM(s) IntraMuscular once PRN Glucose <70 milliGRAM(s)/deciLiter      OBJECTIVE    T(C): 37.2 (02-03-20 @ 16:21), Max: 37.2 (02-03-20 @ 16:21)  HR: 74 (02-03-20 @ 16:21) (73 - 78)  BP: 105/67 (02-03-20 @ 16:21) (105/67 - 108/69)  RR: 19 (02-03-20 @ 16:21) (17 - 19)  SpO2: 96% (02-03-20 @ 16:21) (95% - 97%)  Wt(kg): --  I&O's Summary    02 Feb 2020 07:01  -  03 Feb 2020 07:00  --------------------------------------------------------  IN: 50 mL / OUT: 1900 mL / NET: -1850 mL          REVIEW OF SYSTEMS:  ROS is unobtainable due to dementia and confusion     PHYSICAL EXAM:  Appearance: NAD. VS past 24 hrs -as above   HEENT:   Moist oral mucosa. Conjunctiva clear b/l.   Neck : supple  Respiratory: Lungs CTAB.  Gastrointestinal:  Soft, nontender. No rebound. No rigidity. BS present	  Cardiovascular: RRR ,S1S2 present  Neurologic: Non-focal. Moving all extremities.  Extremities: No edema. No erythema. No calf tenderness.  Skin: No rashes, No ecchymoses, No cyanosis.	  wounds ,skin lesions-See skin assesment flow sheet   LABS:                        9.3    6.49  )-----------( 285      ( 03 Feb 2020 06:34 )             29.7     02-03    138  |  102  |  14  ----------------------------<  92  4.0   |  30  |  0.64    Ca    9.5      03 Feb 2020 06:34      CAPILLARY BLOOD GLUCOSE      POCT Blood Glucose.: 107 mg/dL (03 Feb 2020 16:44)  POCT Blood Glucose.: 127 mg/dL (03 Feb 2020 11:33)  POCT Blood Glucose.: 114 mg/dL (03 Feb 2020 07:43)  POCT Blood Glucose.: 118 mg/dL (02 Feb 2020 21:49)          Culture - Urine (collected 30 Jan 2020 21:46)  Source: .Urine Catheterized  Final Report (02 Feb 2020 08:11):    >100,000 CFU/ml Proteus mirabilis  Organism: Proteus mirabilis (02 Feb 2020 08:11)  Organism: Proteus mirabilis (02 Feb 2020 08:11)      RADIOLOGY & ADDITIONAL TESTS:   reviewed elctronically  ASSESSMENT/PLAN: 	    45minutes spent on this visit, 50% visit time spent in care co-ordination with other attendings and counselling patient  I have discussed care plan with patient and HCP,expressed understanding of problems treatment and their effect and side effects, alternatives in detail,I have asked if they have any questions and concerns and appropriately addressed them to best of my ability

## 2020-02-03 NOTE — PROGRESS NOTE ADULT - ATTENDING COMMENTS
89 y o WM  presents sent to ED from [facility] for evaluation of hematuria,  poor historian,  PMD Dr Quintana,  no anticoagulants noted in  patient med list ,patient was admitted with GIB last year and BASA was stopped at that time .His  PMH is significant for :  Arteriosclerotic heart disease (ASHD) ,	Atrial fibrillation  ,CHF (congestive heart failure)  ,Dementia  ,Depression  ,DM (diabetes mellitus)  ,Hyperlipemia  ,Hypertension  ,Prostate ca .UTI suspected Admitted for septic workup and evaluation ,send blood and urine cx, serial lactate levels ,monitor vitals closely hydration, monitor urine output and renal profile,iv abx initiated -on ceftriaxone . Renal stone hunt study was performed and he was found to have bladder wall thickenning and prostate enlargement . evaluation requested Palliative care consult requested ,to discuss advance directives and complete MOLST

## 2020-02-03 NOTE — PROGRESS NOTE ADULT - SUBJECTIVE AND OBJECTIVE BOX
Patient is a 89y old  Male who presents with a chief complaint of HEMATURIA (02 Feb 2020 19:37)       Pt is seen and examined  pt is awake and lying in bed/out of bed to chair  pt seems comfortable and denies any complaints at this time    HPI:  89 y o WM  presents sent to ED from [facility] for evaluation of hematuria,  poor historian,  PMD Dr Quintana,  no anticoagulants noted in  patient med list ,patient was admitted with GIB last year and BASA was stopped at that time .His  PMH is significant for :  Arteriosclerotic heart disease (ASHD)    	Atrial fibrillation  ,CHF (congestive heart failure)  ,Dementia  ,Depression  ,DM (diabetes mellitus)  ,Hyperlipemia  ,Hypertension  ,Prostate ca .UTI suspected Admitted for septic workup and evaluation ,send blood and urine cx, serial lactate levels ,monitor vitals closely hydration, monitor urine output and renal profile,iv abx initiated -on ceftriaxone . Renal stone hunt study was performed and he was found to have bladder wall thickenning and prostate enlargement . evaluation requested Palliative care consult requested ,to discuss advance directives and complete MOLST (31 Jan 2020 06:11)         ROS:  Negative except for:    MEDICATIONS  (STANDING):  amoxicillin  875 milliGRAM(s)/clavulanate 1 Tablet(s) Oral two times a day  budesonide  80 MICROgram(s)/formoterol 4.5 MICROgram(s) Inhaler 2 Puff(s) Inhalation two times a day  dextrose 5%. 1000 milliLiter(s) (50 mL/Hr) IV Continuous <Continuous>  dextrose 50% Injectable 12.5 Gram(s) IV Push once  dextrose 50% Injectable 25 Gram(s) IV Push once  dextrose 50% Injectable 25 Gram(s) IV Push once  donepezil 5 milliGRAM(s) Oral at bedtime  insulin lispro (HumaLOG) corrective regimen sliding scale   SubCutaneous at bedtime  insulin lispro (HumaLOG) corrective regimen sliding scale   SubCutaneous three times a day before meals  lactobacillus acidophilus 1 Tablet(s) Oral two times a day  magnesium oxide 400 milliGRAM(s) Oral two times a day with meals  metFORMIN 500 milliGRAM(s) Oral two times a day  pantoprazole    Tablet 40 milliGRAM(s) Oral before breakfast  polyethylene glycol 3350 17 Gram(s) Oral daily  senna 2 Tablet(s) Oral at bedtime  simvastatin 20 milliGRAM(s) Oral at bedtime    MEDICATIONS  (PRN):  acetaminophen   Tablet .. 650 milliGRAM(s) Oral every 6 hours PRN Temp greater or equal to 38C (100.4F), Mild Pain (1 - 3)  dextrose 40% Gel 15 Gram(s) Oral once PRN Blood Glucose LESS THAN 70 milliGRAM(s)/deciLiter  glucagon  Injectable 1 milliGRAM(s) IntraMuscular once PRN Glucose <70 milliGRAM(s)/deciLiter      Allergies    latex (Rash)  latex (Unknown)  No Known Drug Allergies    Intolerances        Vital Signs Last 24 Hrs  T(C): 37.2 (03 Feb 2020 16:21), Max: 37.2 (03 Feb 2020 16:21)  T(F): 98.9 (03 Feb 2020 16:21), Max: 98.9 (03 Feb 2020 16:21)  HR: 74 (03 Feb 2020 16:21) (73 - 78)  BP: 105/67 (03 Feb 2020 16:21) (105/67 - 108/69)  BP(mean): --  RR: 19 (03 Feb 2020 16:21) (17 - 19)  SpO2: 96% (03 Feb 2020 16:21) (95% - 97%)    PHYSICAL EXAM  General: adult in NAD  HEENT: clear oropharynx, anicteric sclera, pink conjunctiva  Neck: supple  CV: normal S1/S2 with no murmur rubs or gallops  Lungs: positive air movement b/l ant lungs,clear to auscultation, no wheezes, no rales  Abdomen: soft non-tender non-distended, no hepatosplenomegaly  Ext: no clubbing cyanosis or edema  Skin: no rashes and no petechiae  Neuro: alert and oriented X 4, no focal deficits  LABS:                          9.3    6.49  )-----------( 285      ( 03 Feb 2020 06:34 )             29.7         Mean Cell Volume : 79.8 fl  Mean Cell Hemoglobin : 25.0 pg  Mean Cell Hemoglobin Concentration : 31.3 gm/dL  Auto Neutrophil # : x  Auto Lymphocyte # : x  Auto Monocyte # : x  Auto Eosinophil # : x  Auto Basophil # : x  Auto Neutrophil % : x  Auto Lymphocyte % : x  Auto Monocyte % : x  Auto Eosinophil % : x  Auto Basophil % : x    Serial CBC's  02-03 @ 06:34  Hct-29.7 / Hgb-9.3 / Plat-285 / RBC-3.72 / WBC-6.49          Serial CBC's  02-02 @ 06:28  Hct-27.9 / Hgb-8.7 / Plat-259 / RBC-3.47 / WBC-5.68          Serial CBC's  02-01 @ 07:08  Hct-27.5 / Hgb-8.7 / Plat-257 / RBC-3.40 / WBC-5.84          Serial CBC's  01-31 @ 06:36  Hct-27.8 / Hgb-8.6 / Plat-275 / RBC-3.43 / WBC-6.91            02-03    138  |  102  |  14  ----------------------------<  92  4.0   |  30  |  0.64    Ca    9.5      03 Feb 2020 06:34            Iron - Total Binding Capacity.: 301 ug/dL (02-01-20 @ 11:17)  Ferritin, Serum: 22 ng/mL (02-01-20 @ 11:17)  Vitamin B12, Serum: 361 pg/mL (02-01-20 @ 11:17)  Folate, Serum: 12.6 ng/mL (02-01-20 @ 11:17)  Reticulocyte Percent: 0.9 % (02-01-20 @ 07:08)                BLOOD SMEAR INTERPRETATION:       RADIOLOGY & ADDITIONAL STUDIES: Patient is a 89y old  Male who presents with a chief complaint of HEMATURIA (02 Feb 2020 19:37)       Pt is seen and examined  pt is awake and lying in bed  pt seems comfortable and denies any complaints at this time    HPI:  89 y o WM  presents sent to ED from [facility] for evaluation of hematuria,  poor historian,  PMD Dr Quintana,  no anticoagulants noted in  patient med list ,patient was admitted with GIB last year and BASA was stopped at that time .His  PMH is significant for :  Arteriosclerotic heart disease (ASHD)    	Atrial fibrillation  ,CHF (congestive heart failure)  ,Dementia  ,Depression  ,DM (diabetes mellitus)  ,Hyperlipemia  ,Hypertension  ,Prostate ca .UTI suspected Admitted for septic workup and evaluation ,send blood and urine cx, serial lactate levels ,monitor vitals closely hydration, monitor urine output and renal profile,iv abx initiated -on ceftriaxone . Renal stone hunt study was performed and he was found to have bladder wall thickenning and prostate enlargement . evaluation requested Palliative care consult requested ,to discuss advance directives and complete MOLST (31 Jan 2020 06:11)       MEDICATIONS  (STANDING):  amoxicillin  875 milliGRAM(s)/clavulanate 1 Tablet(s) Oral two times a day  budesonide  80 MICROgram(s)/formoterol 4.5 MICROgram(s) Inhaler 2 Puff(s) Inhalation two times a day  dextrose 5%. 1000 milliLiter(s) (50 mL/Hr) IV Continuous <Continuous>  dextrose 50% Injectable 12.5 Gram(s) IV Push once  dextrose 50% Injectable 25 Gram(s) IV Push once  dextrose 50% Injectable 25 Gram(s) IV Push once  donepezil 5 milliGRAM(s) Oral at bedtime  insulin lispro (HumaLOG) corrective regimen sliding scale   SubCutaneous at bedtime  insulin lispro (HumaLOG) corrective regimen sliding scale   SubCutaneous three times a day before meals  lactobacillus acidophilus 1 Tablet(s) Oral two times a day  magnesium oxide 400 milliGRAM(s) Oral two times a day with meals  metFORMIN 500 milliGRAM(s) Oral two times a day  pantoprazole    Tablet 40 milliGRAM(s) Oral before breakfast  polyethylene glycol 3350 17 Gram(s) Oral daily  senna 2 Tablet(s) Oral at bedtime  simvastatin 20 milliGRAM(s) Oral at bedtime    MEDICATIONS  (PRN):  acetaminophen   Tablet .. 650 milliGRAM(s) Oral every 6 hours PRN Temp greater or equal to 38C (100.4F), Mild Pain (1 - 3)  dextrose 40% Gel 15 Gram(s) Oral once PRN Blood Glucose LESS THAN 70 milliGRAM(s)/deciLiter  glucagon  Injectable 1 milliGRAM(s) IntraMuscular once PRN Glucose <70 milliGRAM(s)/deciLiter      Allergies    latex (Rash)  latex (Unknown)  No Known Drug Allergies    Intolerances        Vital Signs Last 24 Hrs  T(C): 37.2 (03 Feb 2020 16:21), Max: 37.2 (03 Feb 2020 16:21)  T(F): 98.9 (03 Feb 2020 16:21), Max: 98.9 (03 Feb 2020 16:21)  HR: 74 (03 Feb 2020 16:21) (73 - 78)  BP: 105/67 (03 Feb 2020 16:21) (105/67 - 108/69)  BP(mean): --  RR: 19 (03 Feb 2020 16:21) (17 - 19)  SpO2: 96% (03 Feb 2020 16:21) (95% - 97%)    PHYSICAL EXAM  General: adult in NAD  HEENT: clear oropharynx, anicteric sclera, pink conjunctiva  Neck: supple  CV: normal S1/S2 with no murmur rubs or gallops  Lungs: positive air movement b/l ant lungs,clear to auscultation, no wheezes, no rales  Abdomen: soft non-tender non-distended, no hepatosplenomegaly  Ext: no clubbing cyanosis or edema  Skin: no rashes and no petechiae  Neuro: alert and oriented X 4, no focal deficits  LABS:                          9.3    6.49  )-----------( 285      ( 03 Feb 2020 06:34 )             29.7         Mean Cell Volume : 79.8 fl  Mean Cell Hemoglobin : 25.0 pg  Mean Cell Hemoglobin Concentration : 31.3 gm/dL  Auto Neutrophil # : x  Auto Lymphocyte # : x  Auto Monocyte # : x  Auto Eosinophil # : x  Auto Basophil # : x  Auto Neutrophil % : x  Auto Lymphocyte % : x  Auto Monocyte % : x  Auto Eosinophil % : x  Auto Basophil % : x    Serial CBC's  02-03 @ 06:34  Hct-29.7 / Hgb-9.3 / Plat-285 / RBC-3.72 / WBC-6.49          Serial CBC's  02-02 @ 06:28  Hct-27.9 / Hgb-8.7 / Plat-259 / RBC-3.47 / WBC-5.68          Serial CBC's  02-01 @ 07:08  Hct-27.5 / Hgb-8.7 / Plat-257 / RBC-3.40 / WBC-5.84          Serial CBC's  01-31 @ 06:36  Hct-27.8 / Hgb-8.6 / Plat-275 / RBC-3.43 / WBC-6.91            02-03    138  |  102  |  14  ----------------------------<  92  4.0   |  30  |  0.64    Ca    9.5      03 Feb 2020 06:34            Iron - Total Binding Capacity.: 301 ug/dL (02-01-20 @ 11:17)  Ferritin, Serum: 22 ng/mL (02-01-20 @ 11:17)  Vitamin B12, Serum: 361 pg/mL (02-01-20 @ 11:17)  Folate, Serum: 12.6 ng/mL (02-01-20 @ 11:17)  Reticulocyte Percent: 0.9 % (02-01-20 @ 07:08)  Occult Blood, Feces (01.30.20 @ 16:15)    Occult Blood, Feces: Negative: Method: Peroxidase Catalyzation Activity    Ferritin, Serum in AM (02.01.20 @ 11:17)    Ferritin, Serum: 22 ng/mL    Prostate Ca Screen, PSA Total (02.01.20 @ 11:17)    Prostate Ca Screen, PSA Total: 0.14: Method: Roche Electrochemiluminescence Immuno Assay  Values obtained with different assay methods or kits cannot be  used interchangeably.  Results cannot be interpreted as absolute evidence of the presence  or absence of malignant disease. ng/mL

## 2020-02-03 NOTE — PROGRESS NOTE ADULT - SUBJECTIVE AND OBJECTIVE BOX
Interval History:    CENTRAL LINE:   [  ] YES       [  ] NO  RAMOS:                 [  ] YES       [  ] NO         REVIEW OF SYSTEMS:  All Systems below were reviewed and are negative [  ]  HEENT:  ID:  Pulmonary:  Cardiac:  GI:  Renal:  Musculoskeletal:  All other systems above were reviewed and are negative   [  ]      MEDICATIONS  (STANDING):  amoxicillin  875 milliGRAM(s)/clavulanate 1 Tablet(s) Oral two times a day  budesonide  80 MICROgram(s)/formoterol 4.5 MICROgram(s) Inhaler 2 Puff(s) Inhalation two times a day  dextrose 5%. 1000 milliLiter(s) (50 mL/Hr) IV Continuous <Continuous>  dextrose 50% Injectable 12.5 Gram(s) IV Push once  dextrose 50% Injectable 25 Gram(s) IV Push once  dextrose 50% Injectable 25 Gram(s) IV Push once  donepezil 5 milliGRAM(s) Oral at bedtime  insulin lispro (HumaLOG) corrective regimen sliding scale   SubCutaneous at bedtime  insulin lispro (HumaLOG) corrective regimen sliding scale   SubCutaneous three times a day before meals  lactobacillus acidophilus 1 Tablet(s) Oral two times a day  magnesium oxide 400 milliGRAM(s) Oral two times a day with meals  metFORMIN 500 milliGRAM(s) Oral two times a day  pantoprazole    Tablet 40 milliGRAM(s) Oral before breakfast  polyethylene glycol 3350 17 Gram(s) Oral daily  senna 2 Tablet(s) Oral at bedtime  simvastatin 20 milliGRAM(s) Oral at bedtime    MEDICATIONS  (PRN):  acetaminophen   Tablet .. 650 milliGRAM(s) Oral every 6 hours PRN Temp greater or equal to 38C (100.4F), Mild Pain (1 - 3)  dextrose 40% Gel 15 Gram(s) Oral once PRN Blood Glucose LESS THAN 70 milliGRAM(s)/deciLiter  glucagon  Injectable 1 milliGRAM(s) IntraMuscular once PRN Glucose <70 milliGRAM(s)/deciLiter      Vital Signs Last 24 Hrs  T(C): 37.2 (03 Feb 2020 16:21), Max: 37.2 (03 Feb 2020 16:21)  T(F): 98.9 (03 Feb 2020 16:21), Max: 98.9 (03 Feb 2020 16:21)  HR: 74 (03 Feb 2020 16:21) (73 - 78)  BP: 105/67 (03 Feb 2020 16:21) (105/67 - 108/69)  BP(mean): --  RR: 19 (03 Feb 2020 16:21) (17 - 19)  SpO2: 96% (03 Feb 2020 16:21) (95% - 97%)    I&O's Summary    02 Feb 2020 07:01  -  03 Feb 2020 07:00  --------------------------------------------------------  IN: 50 mL / OUT: 1900 mL / NET: -1850 mL        PHYSICAL EXAM:  HEENT: NC/AT; PERRLA  Neck: Soft; no tenderness  Lungs: CTA bilaterally; no wheezing.   Heart:  Abdomen:  Genital/ Rectal:  Extremities:  Neurologic:  Vascular:      LABORATORY:    CBC Full  -  ( 03 Feb 2020 06:34 )  WBC Count : 6.49 K/uL  RBC Count : 3.72 M/uL  Hemoglobin : 9.3 g/dL  Hematocrit : 29.7 %  Platelet Count - Automated : 285 K/uL  Mean Cell Volume : 79.8 fl  Mean Cell Hemoglobin : 25.0 pg  Mean Cell Hemoglobin Concentration : 31.3 gm/dL  Auto Neutrophil # : x  Auto Lymphocyte # : x  Auto Monocyte # : x  Auto Eosinophil # : x  Auto Basophil # : x  Auto Neutrophil % : x  Auto Lymphocyte % : x  Auto Monocyte % : x  Auto Eosinophil % : x  Auto Basophil % : x          02-03    138  |  102  |  14  ----------------------------<  92  4.0   |  30  |  0.64    Ca    9.5      03 Feb 2020 06:34            Assessment and Plan:          Stephan Pruett MD   [phone]. He is afebrile  Comfortable.     MEDICATIONS  (STANDING):  amoxicillin  875 milliGRAM(s)/clavulanate 1 Tablet(s) Oral two times a day  budesonide  80 MICROgram(s)/formoterol 4.5 MICROgram(s) Inhaler 2 Puff(s) Inhalation two times a day  dextrose 5%. 1000 milliLiter(s) (50 mL/Hr) IV Continuous <Continuous>  dextrose 50% Injectable 12.5 Gram(s) IV Push once  dextrose 50% Injectable 25 Gram(s) IV Push once  dextrose 50% Injectable 25 Gram(s) IV Push once  donepezil 5 milliGRAM(s) Oral at bedtime  insulin lispro (HumaLOG) corrective regimen sliding scale   SubCutaneous at bedtime  insulin lispro (HumaLOG) corrective regimen sliding scale   SubCutaneous three times a day before meals  lactobacillus acidophilus 1 Tablet(s) Oral two times a day  magnesium oxide 400 milliGRAM(s) Oral two times a day with meals  metFORMIN 500 milliGRAM(s) Oral two times a day  pantoprazole    Tablet 40 milliGRAM(s) Oral before breakfast  polyethylene glycol 3350 17 Gram(s) Oral daily  senna 2 Tablet(s) Oral at bedtime  simvastatin 20 milliGRAM(s) Oral at bedtime    MEDICATIONS  (PRN):  acetaminophen   Tablet .. 650 milliGRAM(s) Oral every 6 hours PRN Temp greater or equal to 38C (100.4F), Mild Pain (1 - 3)  dextrose 40% Gel 15 Gram(s) Oral once PRN Blood Glucose LESS THAN 70 milliGRAM(s)/deciLiter  glucagon  Injectable 1 milliGRAM(s) IntraMuscular once PRN Glucose <70 milliGRAM(s)/deciLiter      Vital Signs Last 24 Hrs  T(C): 37.2 (03 Feb 2020 16:21), Max: 37.2 (03 Feb 2020 16:21)  T(F): 98.9 (03 Feb 2020 16:21), Max: 98.9 (03 Feb 2020 16:21)  HR: 74 (03 Feb 2020 16:21) (73 - 78)  BP: 105/67 (03 Feb 2020 16:21) (105/67 - 108/69)  BP(mean): --  RR: 19 (03 Feb 2020 16:21) (17 - 19)  SpO2: 96% (03 Feb 2020 16:21) (95% - 97%)    I&O's Summary    02 Feb 2020 07:01  -  03 Feb 2020 07:00  --------------------------------------------------------  IN: 50 mL / OUT: 1900 mL / NET: -1850 mL    PHYSICAL EXAM:  HEENT: NC/AT; PERRLA  Neck: Soft; no tenderness  Lungs: Coarse BS bilaterally; no wheezing.   Heart: RRR; no murmurs.   Abdomen: Soft; no tenderness.   Extremities: No ulcers.     LABORATORY:    CBC Full  -  ( 03 Feb 2020 06:34 )  WBC Count : 6.49 K/uL  RBC Count : 3.72 M/uL  Hemoglobin : 9.3 g/dL  Hematocrit : 29.7 %  Platelet Count - Automated : 285 K/uL  Mean Cell Volume : 79.8 fl  Mean Cell Hemoglobin : 25.0 pg  Mean Cell Hemoglobin Concentration : 31.3 gm/dL  Auto Neutrophil # : x  Auto Lymphocyte # : x  Auto Monocyte # : x  Auto Eosinophil # : x  Auto Basophil # : x  Auto Neutrophil % : x  Auto Lymphocyte % : x  Auto Monocyte % : x  Auto Eosinophil % : x  Auto Basophil % : x    138  |  102  |  14  ----------------------------<  92  4.0   |  30  |  0.64    Ca    9.5      03 Feb 2020 06:34      Assessment and Plan:    1. Hematuria.  2. Probable UTI.  3. Indwelling fitch catheter.    . Urine culture grew Proteus mirabilis.   . Continue po Augmentin 875 mh bid for 5 days.   . Continue fitch as per urology. Discharge planning.         Stephan Pruett MD   [phone].

## 2020-02-04 ENCOUNTER — TRANSCRIPTION ENCOUNTER (OUTPATIENT)
Age: 85
End: 2020-02-04

## 2020-02-04 VITALS
RESPIRATION RATE: 20 BRPM | HEART RATE: 77 BPM | TEMPERATURE: 98 F | OXYGEN SATURATION: 95 % | DIASTOLIC BLOOD PRESSURE: 60 MMHG | SYSTOLIC BLOOD PRESSURE: 98 MMHG

## 2020-02-04 LAB
ANION GAP SERPL CALC-SCNC: 6 MMOL/L — SIGNIFICANT CHANGE UP (ref 5–17)
BUN SERPL-MCNC: 16 MG/DL — SIGNIFICANT CHANGE UP (ref 7–23)
CALCIUM SERPL-MCNC: 9 MG/DL — SIGNIFICANT CHANGE UP (ref 8.5–10.1)
CHLORIDE SERPL-SCNC: 100 MMOL/L — SIGNIFICANT CHANGE UP (ref 96–108)
CO2 SERPL-SCNC: 31 MMOL/L — SIGNIFICANT CHANGE UP (ref 22–31)
CREAT SERPL-MCNC: 0.53 MG/DL — SIGNIFICANT CHANGE UP (ref 0.5–1.3)
GLUCOSE SERPL-MCNC: 105 MG/DL — HIGH (ref 70–99)
HCT VFR BLD CALC: 30.3 % — LOW (ref 39–50)
HGB BLD-MCNC: 9.3 G/DL — LOW (ref 13–17)
MCHC RBC-ENTMCNC: 25.1 PG — LOW (ref 27–34)
MCHC RBC-ENTMCNC: 30.7 GM/DL — LOW (ref 32–36)
MCV RBC AUTO: 81.9 FL — SIGNIFICANT CHANGE UP (ref 80–100)
NRBC # BLD: 0 /100 WBCS — SIGNIFICANT CHANGE UP (ref 0–0)
PLATELET # BLD AUTO: 290 K/UL — SIGNIFICANT CHANGE UP (ref 150–400)
POTASSIUM SERPL-MCNC: 3.9 MMOL/L — SIGNIFICANT CHANGE UP (ref 3.5–5.3)
POTASSIUM SERPL-SCNC: 3.9 MMOL/L — SIGNIFICANT CHANGE UP (ref 3.5–5.3)
RBC # BLD: 3.7 M/UL — LOW (ref 4.2–5.8)
RBC # FLD: 15.6 % — HIGH (ref 10.3–14.5)
SODIUM SERPL-SCNC: 137 MMOL/L — SIGNIFICANT CHANGE UP (ref 135–145)
WBC # BLD: 7.66 K/UL — SIGNIFICANT CHANGE UP (ref 3.8–10.5)
WBC # FLD AUTO: 7.66 K/UL — SIGNIFICANT CHANGE UP (ref 3.8–10.5)

## 2020-02-04 PROCEDURE — 82746 ASSAY OF FOLIC ACID SERUM: CPT

## 2020-02-04 PROCEDURE — G0103: CPT

## 2020-02-04 PROCEDURE — 96365 THER/PROPH/DIAG IV INF INIT: CPT

## 2020-02-04 PROCEDURE — 83880 ASSAY OF NATRIURETIC PEPTIDE: CPT

## 2020-02-04 PROCEDURE — 83615 LACTATE (LD) (LDH) ENZYME: CPT

## 2020-02-04 PROCEDURE — 80061 LIPID PANEL: CPT

## 2020-02-04 PROCEDURE — 85027 COMPLETE CBC AUTOMATED: CPT

## 2020-02-04 PROCEDURE — 80053 COMPREHEN METABOLIC PANEL: CPT

## 2020-02-04 PROCEDURE — 83036 HEMOGLOBIN GLYCOSYLATED A1C: CPT

## 2020-02-04 PROCEDURE — 84443 ASSAY THYROID STIM HORMONE: CPT

## 2020-02-04 PROCEDURE — 93005 ELECTROCARDIOGRAM TRACING: CPT

## 2020-02-04 PROCEDURE — 82272 OCCULT BLD FECES 1-3 TESTS: CPT

## 2020-02-04 PROCEDURE — 82962 GLUCOSE BLOOD TEST: CPT

## 2020-02-04 PROCEDURE — 87186 SC STD MICRODIL/AGAR DIL: CPT

## 2020-02-04 PROCEDURE — 74176 CT ABD & PELVIS W/O CONTRAST: CPT

## 2020-02-04 PROCEDURE — 83540 ASSAY OF IRON: CPT

## 2020-02-04 PROCEDURE — 82728 ASSAY OF FERRITIN: CPT

## 2020-02-04 PROCEDURE — 83735 ASSAY OF MAGNESIUM: CPT

## 2020-02-04 PROCEDURE — 97162 PT EVAL MOD COMPLEX 30 MIN: CPT

## 2020-02-04 PROCEDURE — 81001 URINALYSIS AUTO W/SCOPE: CPT

## 2020-02-04 PROCEDURE — 94640 AIRWAY INHALATION TREATMENT: CPT

## 2020-02-04 PROCEDURE — 92610 EVALUATE SWALLOWING FUNCTION: CPT

## 2020-02-04 PROCEDURE — 99285 EMERGENCY DEPT VISIT HI MDM: CPT | Mod: 25

## 2020-02-04 PROCEDURE — 84100 ASSAY OF PHOSPHORUS: CPT

## 2020-02-04 PROCEDURE — 82607 VITAMIN B-12: CPT

## 2020-02-04 PROCEDURE — 83550 IRON BINDING TEST: CPT

## 2020-02-04 PROCEDURE — 83010 ASSAY OF HAPTOGLOBIN QUANT: CPT

## 2020-02-04 PROCEDURE — 36415 COLL VENOUS BLD VENIPUNCTURE: CPT

## 2020-02-04 PROCEDURE — 85045 AUTOMATED RETICULOCYTE COUNT: CPT

## 2020-02-04 PROCEDURE — 87086 URINE CULTURE/COLONY COUNT: CPT

## 2020-02-04 PROCEDURE — 80048 BASIC METABOLIC PNL TOTAL CA: CPT

## 2020-02-04 PROCEDURE — 93306 TTE W/DOPPLER COMPLETE: CPT

## 2020-02-04 RX ORDER — TAMSULOSIN HYDROCHLORIDE 0.4 MG/1
0.4 CAPSULE ORAL AT BEDTIME
Refills: 0 | Status: DISCONTINUED | OUTPATIENT
Start: 2020-02-04 | End: 2020-02-04

## 2020-02-04 RX ORDER — TAMSULOSIN HYDROCHLORIDE 0.4 MG/1
1 CAPSULE ORAL
Qty: 30 | Refills: 0
Start: 2020-02-04 | End: 2020-03-04

## 2020-02-04 RX ORDER — FERROUS SULFATE 325(65) MG
1 TABLET ORAL
Qty: 30 | Refills: 0
Start: 2020-02-04 | End: 2020-03-04

## 2020-02-04 RX ADMIN — PANTOPRAZOLE SODIUM 40 MILLIGRAM(S): 20 TABLET, DELAYED RELEASE ORAL at 06:25

## 2020-02-04 RX ADMIN — BUDESONIDE AND FORMOTEROL FUMARATE DIHYDRATE 2 PUFF(S): 160; 4.5 AEROSOL RESPIRATORY (INHALATION) at 06:22

## 2020-02-04 RX ADMIN — Medication 1 TABLET(S): at 06:25

## 2020-02-04 RX ADMIN — POLYETHYLENE GLYCOL 3350 17 GRAM(S): 17 POWDER, FOR SOLUTION ORAL at 11:17

## 2020-02-04 RX ADMIN — METFORMIN HYDROCHLORIDE 500 MILLIGRAM(S): 850 TABLET ORAL at 06:25

## 2020-02-04 RX ADMIN — MAGNESIUM OXIDE 400 MG ORAL TABLET 400 MILLIGRAM(S): 241.3 TABLET ORAL at 08:13

## 2020-02-04 RX ADMIN — Medication 325 MILLIGRAM(S): at 11:17

## 2020-02-04 NOTE — PROGRESS NOTE ADULT - SUBJECTIVE AND OBJECTIVE BOX
Not appropriately interactive.     A/O x1  Frail-appearing  RRR, nml S1/S2, No clear S3  CTA b/l  Abd soft/NT/ND  No LE pitting edema  No rashes

## 2020-02-04 NOTE — PROGRESS NOTE ADULT - SUBJECTIVE AND OBJECTIVE BOX
PROGRESS NOTE  Patient is a 89y old  Male who presents with a chief complaint of HEMATURIA (04 Feb 2020 10:46)  LATE ENTRY- patient was seen and examined earlier today . Plan of care was discussed with med staff and unit coordinator .   Chart and available morning labs /imaging are reviewed electronically , urgent issues addressed . More information  is being added upon completion of rounds , when more information is collected and management discussed with consultants , medical staff and social service/case management on the floor   OVERNIGHT    No new issues reported by medical staff . All above noted Patient is resting in a bed comfortably .Confused ,poor mentation .No distress noted Voiding after cath was removed   HPI:  89 y o WM  presents sent to ED from [facility] for evaluation of hematuria,  poor historian,  PMD Dr Quintana,  no anticoagulants noted in  patient med list ,patient was admitted with GIB last year and BASA was stopped at that time .His  PMH is significant for :  Arteriosclerotic heart disease (ASHD)    	Atrial fibrillation  ,CHF (congestive heart failure)  ,Dementia  ,Depression  ,DM (diabetes mellitus)  ,Hyperlipemia  ,Hypertension  ,Prostate ca .UTI suspected Admitted for septic workup and evaluation ,send blood and urine cx, serial lactate levels ,monitor vitals closely hydration, monitor urine output and renal profile,iv abx initiated -on ceftriaxone . Renal stone hunt study was performed and he was found to have bladder wall thickenning and prostate enlargement . evaluation requested Palliative care consult requested ,to discuss advance directives and complete MOLST (31 Jan 2020 06:11)    PAST MEDICAL & SURGICAL HISTORY:  Dementia  Arteriosclerotic heart disease (ASHD)  Prostate CA  Atrial fibrillation  DM (diabetes mellitus)  Dementia  Depression  Diabetes  Hyperlipemia  CHF (congestive heart failure)  Dementia  Prostate ca  Diabetes  Hypertension  Atrial fibrillation  No significant past surgical history      MEDICATIONS  (STANDING):  amoxicillin  875 milliGRAM(s)/clavulanate 1 Tablet(s) Oral two times a day  budesonide  80 MICROgram(s)/formoterol 4.5 MICROgram(s) Inhaler 2 Puff(s) Inhalation two times a day  dextrose 5%. 1000 milliLiter(s) (50 mL/Hr) IV Continuous <Continuous>  dextrose 50% Injectable 12.5 Gram(s) IV Push once  dextrose 50% Injectable 25 Gram(s) IV Push once  dextrose 50% Injectable 25 Gram(s) IV Push once  donepezil 5 milliGRAM(s) Oral at bedtime  ferrous    sulfate 325 milliGRAM(s) Oral daily  insulin lispro (HumaLOG) corrective regimen sliding scale   SubCutaneous at bedtime  insulin lispro (HumaLOG) corrective regimen sliding scale   SubCutaneous three times a day before meals  lactobacillus acidophilus 1 Tablet(s) Oral two times a day  magnesium oxide 400 milliGRAM(s) Oral two times a day with meals  metFORMIN 500 milliGRAM(s) Oral two times a day  pantoprazole    Tablet 40 milliGRAM(s) Oral before breakfast  polyethylene glycol 3350 17 Gram(s) Oral daily  senna 2 Tablet(s) Oral at bedtime  simvastatin 20 milliGRAM(s) Oral at bedtime  tamsulosin 0.4 milliGRAM(s) Oral at bedtime    MEDICATIONS  (PRN):  acetaminophen   Tablet .. 650 milliGRAM(s) Oral every 6 hours PRN Temp greater or equal to 38C (100.4F), Mild Pain (1 - 3)  dextrose 40% Gel 15 Gram(s) Oral once PRN Blood Glucose LESS THAN 70 milliGRAM(s)/deciLiter  glucagon  Injectable 1 milliGRAM(s) IntraMuscular once PRN Glucose <70 milliGRAM(s)/deciLiter      OBJECTIVE    T(C): 36.7 (02-04-20 @ 08:09), Max: 36.8 (02-03-20 @ 23:45)  HR: 77 (02-04-20 @ 08:09) (74 - 77)  BP: 98/60 (02-04-20 @ 08:09) (98/60 - 156/75)  RR: 20 (02-04-20 @ 08:09) (18 - 20)  SpO2: 95% (02-04-20 @ 08:09) (95% - 97%)  Wt(kg): --  I&O's Summary        REVIEW OF SYSTEMS:  CONSTITUTIONAL: No fever, weight loss, or fatigue  EYES: No eye pain, visual disturbances, or discharge  ENMT:   No sinus or throat pain  NECK: No pain or stiffness  RESPIRATORY: No cough, wheezing, chills or hemoptysis; No shortness of breath  CARDIOVASCULAR: No chest pain, palpitations, dizziness, or leg swelling  GASTROINTESTINAL: No abdominal pain. No nausea, vomiting; No diarrhea or constipation. No melena or hematochezia.  GENITOURINARY: No dysuria, frequency, hematuria, or incontinence  NEUROLOGICAL: No headaches, memory loss, loss of strength, numbness, or tremors  SKIN: No itching, burning, rashes, or lesions   MUSCULOSKELETAL: No joint pain or swelling; No muscle, back, or extremity pain    PHYSICAL EXAM:  Appearance: NAD. VS past 24 hrs -as above   HEENT:   Moist oral mucosa. Conjunctiva clear b/l.   Neck : supple  Respiratory: Lungs CTAB.  Gastrointestinal:  Soft, nontender. No rebound. No rigidity. BS present	  Cardiovascular: RRR ,S1S2 present  Neurologic: Non-focal. Moving all extremities.  Extremities: No edema. No erythema. No calf tenderness.  Skin: No rashes, No ecchymoses, No cyanosis.	  wounds ,skin lesions-See skin assesment flow sheet   LABS:                        9.3    7.66  )-----------( 290      ( 04 Feb 2020 06:30 )             30.3     02-04    137  |  100  |  16  ----------------------------<  105<H>  3.9   |  31  |  0.53    Ca    9.0      04 Feb 2020 06:30      CAPILLARY BLOOD GLUCOSE      POCT Blood Glucose.: 150 mg/dL (04 Feb 2020 11:28)  POCT Blood Glucose.: 100 mg/dL (04 Feb 2020 07:53)  POCT Blood Glucose.: 127 mg/dL (03 Feb 2020 21:19)          Culture - Urine (collected 30 Jan 2020 21:46)  Source: .Urine Catheterized  Final Report (02 Feb 2020 08:11):    >100,000 CFU/ml Proteus mirabilis  Organism: Proteus mirabilis (02 Feb 2020 08:11)  Organism: Proteus mirabilis (02 Feb 2020 08:11)      RADIOLOGY & ADDITIONAL TESTS:   reviewed elctronically  ASSESSMENT/PLAN: 	  Patient was seen and examined on a day of discharge . Plan of care , discharge medications and recommendations discussed with consultants and clearance for discharge obtained .Social service , case management  and medical staff are aware of plan. Family is notified. Discharge summary  is  prepared electronically  , 75 minutes spent on this visit, 50% visit time spent in care co-ordination with other attendings and counselling patient  I have discussed care plan with patient and HCP,expressed understanding of problems treatment and their effect and side effects, alternatives in detail,I have asked if they have any questions and concerns and appropriately addressed them to best of my ability

## 2020-02-04 NOTE — PROGRESS NOTE ADULT - ASSESSMENT
*Cystitis: on IVAB  *HLD: on statin  *AF: HR OK, need for ac?  *DM: on coverage, per primary team  *Dementia: per primary team    - stable cardiacwise  - EF normal on Echo  - cont supp care
*Cystitis: on IVAB  *HLD: on statin  *AF: HR OK, no ac as he is a fall risk  *DM: on coverage, per primary team  *Dementia: per primary team    - stable cardiacwise  - EF normal on Echo  - cont supp care  - mild to mod card risk for urologic procedures, if needed
89 yo man with a h/o paroxysmal atrial fibrillation (not on anticoagulation), who presented with evidence of UTI. Currently in sinus. Avoiding antithrombotic medications due to h/o GI bleed and perceived fall risk. Hemodynamically stable, although with labile BPs. Will sign off for now. Please reconsult as needed.
89 y o WM  presents sent to ED from [facility] for evaluation of hematuria,  poor historian,  PMD Dr Quintana,  no anticoagulants noted in  patient med list ,patient was admitted with GIB last year and BASA was stopped at that time .His  PMH is significant for :  Arteriosclerotic heart disease (ASHD)  Atrial fibrillation  ,CHF (congestive heart failure)  ,Dementia  ,Depression  ,DM (diabetes mellitus)  ,Hyperlipemia  ,Hypertension  ,Prostate ca .UTI suspected Admitted for septic workup and evaluation ,send blood and urine cx, serial lactate levels ,monitor vitals closely hydration, monitor urine output and renal profile,iv abx initiated -on ceftriaxone . Renal stone hunt study was performed and he was found to have bladder wall thickenning and prostate enlargement . evaluation requested Palliative care consult requested ,to discuss advance directives and complete MOLST
89 y o WM  presents sent to ED from [facility] for evaluation of hematuria,  poor historian,  PMD Dr Quintana,  no anticoagulants noted in  patient med list ,patient was admitted with GIB last year and BASA was stopped at that time .His  PMH is significant for :  Arteriosclerotic heart disease (ASHD)    	Atrial fibrillation  ,CHF (congestive heart failure)  ,Dementia  ,Depression  ,DM (diabetes mellitus)  ,Hyperlipemia  ,Hypertension  ,Prostate ca .UTI suspected Admitted for septic workup and evaluation ,send blood and urine cx, serial lactate levels ,monitor vitals closely hydration, monitor urine output and renal profile,iv abx initiated -on ceftriaxone . Renal stone hunt study was performed and he was found to have bladder wall thickenning and prostate enlargement . evaluation requested Palliative care consult requested ,to discuss advance directives and complete MOLST
89 y o WM  presents sent to ED from [facility] for evaluation of hematuria,  poor historian,  PMD Dr Quintana,  no anticoagulants noted in  patient med list ,patient was admitted with GIB last year and BASA was stopped at that time .His  PMH is significant for :  Arteriosclerotic heart disease (ASHD)  Atrial fibrillation  ,CHF (congestive heart failure)  ,Dementia  ,Depression  ,DM (diabetes mellitus)  ,Hyperlipemia  ,Hypertension  ,Prostate ca .UTI suspected Admitted for septic workup and evaluation ,send blood and urine cx, serial lactate levels ,monitor vitals closely hydration, monitor urine output and renal profile,iv abx initiated -on ceftriaxone . Renal stone hunt study was performed and he was found to have bladder wall thickenning and prostate enlargement . evaluation requested Palliative care consult requested ,to discuss advance directives and complete MOLST
89 y o WM  presents sent to ED from [facility] for evaluation of hematuria,  poor historian,  PMD Dr Quintana,  no anticoagulants noted in  patient med list ,patient was admitted with GIB last year and BASA was stopped at that time .His  PMH is significant for :  Arteriosclerotic heart disease (ASHD)    	Atrial fibrillation  ,CHF (congestive heart failure)  ,Dementia  ,Depression  ,DM (diabetes mellitus)  ,Hyperlipemia  ,Hypertension  ,Prostate ca .UTI suspected Admitted for septic workup and evaluation ,send blood and urine cx, serial lactate levels ,monitor vitals closely hydration, monitor urine output and renal profile,iv abx initiated -on ceftriaxone . Renal stone hunt study was performed and he was found to have bladder wall thickenning and prostate enlargement . evaluation requested Palliative care consult requested ,to discuss advance directives and complete MOLST
IMPRESSION:  89 y o WM  presents sent to ED from [facility] for evaluation of hematuria,  poor historian,  PMD Dr Quintana,  no anticoagulants noted in  patient med list ,patient was admitted with GIB last year and BASA was stopped at that time .His  PMH is significant for :  Arteriosclerotic heart disease (ASHD) , Atrial fibrillation  ,CHF (congestive heart failure)  ,Dementia  ,Depression  ,DM (diabetes mellitus)  ,Hyperlipemia  ,Hypertension  ,Prostate ca .UTI suspected Admitted for septic workup and evaluation ,send blood and urine cx, serial lactate levels ,monitor vitals closely hydration, monitor urine output and renal profile,iv abx initiated -on ceftriaxone . Renal stone hunt study was performed and he was found to have bladder wall thickenning and prostate enlargement . evaluation requested BY PCP. Hematology was consulted for anemia.    hx prostate cancer/hematuria--seen by urology  s/p ct renal stone hunt 1/2020    RECOMMENDATION:  1/ Anemia--likely multifactorial  iron panel suggestive of iron def  hb is at 9.3--low but stable, patient is on iv venofer, day 3 (3/3) today, po fe from tomorrow  monitor h/h--transfuse prbc if hb is under 7 or symptomatic anemia  2/ hx prostate cancer/hematuria--seen by urology  s/p ct renal stone hunt 1/2020, unremarkable psa  final decision, work up as per urology  3/ gi/dvtp
IMPRESSION:  89 y o WM  presents sent to ED from [facility] for evaluation of hematuria,  poor historian,  PMD Dr Quintana,  no anticoagulants noted in  patient med list ,patient was admitted with GIB last year and BASA was stopped at that time .His  PMH is significant for :  Arteriosclerotic heart disease (ASHD)    	Atrial fibrillation  ,CHF (congestive heart failure)  ,Dementia  ,Depression  ,DM (diabetes mellitus)  ,Hyperlipemia  ,Hypertension  ,Prostate ca .UTI suspected Admitted for septic workup and evaluation ,send blood and urine cx, serial lactate levels ,monitor vitals closely hydration, monitor urine output and renal profile,iv abx initiated -on ceftriaxone . Renal stone hunt study was performed and he was found to have bladder wall thickenning and prostate enlargement . evaluation requested  anemia--likely multifactorial  iron panel--iron def  hx prostate cancer/hematuria--seen by urology  s/p ct renal stone hunt 1/2020    RECOMMENDATION:  1/ hb at 8.7--low but stable, patient is on iv venofer, day 2 (2/3) today  2/ monitor h/h--transfuse prbc if hb is under 7 or symptomatic anemia  3/ gi/dvtp  d/w pt at bedside

## 2020-02-04 NOTE — DISCHARGE NOTE PROVIDER - NSDCMRMEDTOKEN_GEN_ALL_CORE_FT
acetaminophen 500 mg oral tablet: 1 tab(s) orally every 6 hours, As Needed  Acidophilus oral tablet: 2 tab(s) orally once a day   amoxicillin-clavulanate 875 mg-125 mg oral tablet: 1 tab(s) orally 2 times a day  aspirin 81 mg oral tablet: 1 tab(s) orally once a day RESUME IN ONE WEEK IF OK WITH PCP   budesonide 0.5 mg/2 mL inhalation suspension: 2 milliliter(s) inhaled 2 times a day, As Needed  donepezil 5 mg oral tablet: 1 tab(s) orally once a day (at bedtime)  DuoNeb 0.5 mg-2.5 mg/3 mL inhalation solution: 3 milliliter(s) inhaled every 6 hours, As Needed  FeroSul 325 mg (65 mg elemental iron) oral tablet: 1 tab(s) orally once a day  metFORMIN 500 mg oral tablet: 1 tab(s) orally 2 times a day (with meals)  MiraLax oral powder for reconstitution: 17 gram(s) orally once a day  pantoprazole 40 mg oral delayed release tablet: 1 tab(s) orally once a day  Senna 8.6 mg oral tablet: 2 tab(s) orally once a day (at bedtime)  simvastatin 20 mg oral tablet: 1 tab(s) orally once a day (at bedtime)  tamsulosin 0.4 mg oral capsule: 1 cap(s) orally once a day (at bedtime)

## 2020-02-04 NOTE — PROGRESS NOTE ADULT - ATTENDING COMMENTS
89 y o WM  presents sent to ED from [facility] for evaluation of hematuria,  poor historian,  PMD Dr Quintana,  no anticoagulants noted in  patient med list ,patient was admitted with GIB last year and BASA was stopped at that time .His  PMH is significant for :  Arteriosclerotic heart disease (ASHD) ,	Atrial fibrillation  ,CHF (congestive heart failure)  ,Dementia  ,Depression  ,DM (diabetes mellitus)  ,Hyperlipemia  ,Hypertension  ,Prostate ca .UTI suspected Admitted for septic workup and evaluation ,send blood and urine cx, serial lactate levels ,monitor vitals closely hydration, monitor urine output and renal profile,iv abx initiated -on ceftriaxone . Renal stone hunt study was performed and he was found to have bladder wall thickenning and prostate enlargement . evaluation requested Palliative care consult requested ,to discuss advance directives and complete MOLST

## 2020-02-04 NOTE — DISCHARGE NOTE PROVIDER - NSDCCPCAREPLAN_GEN_ALL_CORE_FT
PRINCIPAL DISCHARGE DIAGNOSIS  Diagnosis: Cystitis  Assessment and Plan of Treatment:       SECONDARY DISCHARGE DIAGNOSES  Diagnosis: UTI (urinary tract infection)  Assessment and Plan of Treatment:     Diagnosis: Hematuria  Assessment and Plan of Treatment:     Diagnosis: Arteriosclerotic heart disease (ASHD)  Assessment and Plan of Treatment: Arteriosclerotic heart disease (ASHD)    Diagnosis: DM (diabetes mellitus)  Assessment and Plan of Treatment: DM (diabetes mellitus)    Diagnosis: Dementia  Assessment and Plan of Treatment: Dementia    Diagnosis: Hypertension  Assessment and Plan of Treatment: Hypertension

## 2020-02-04 NOTE — DISCHARGE NOTE NURSING/CASE MANAGEMENT/SOCIAL WORK - PATIENT PORTAL LINK FT
You can access the FollowMyHealth Patient Portal offered by [facility] by registering at the following website: [email or web address]. By joining [facility]’s FollowMyHealth portal, you will also be able to view your health information using other applications (apps) compatible with our system.

## 2020-02-04 NOTE — DISCHARGE NOTE PROVIDER - PROVIDER TOKENS
PROVIDER:[TOKEN:[[id number]],FOLLOWUP:[1-3 days]],PROVIDER:[TOKEN:[[id number]],FOLLOWUP:[2 weeks]]

## 2020-02-04 NOTE — DISCHARGE NOTE PROVIDER - HOSPITAL COURSE
89 y o WM  presents sent to ED from [facility] for evaluation of hematuria,  poor historian,  PMD Dr Quintana,  no anticoagulants noted in  patient med list ,patient was admitted with GIB last year and BASA was stopped at that time .His  PMH is significant for :  Arteriosclerotic heart disease (ASHD)  Atrial fibrillation  ,CHF (congestive heart failure)  ,Dementia  ,Depression  ,DM (diabetes mellitus)  ,Hyperlipemia  ,Hypertension  ,Prostate ca .UTI suspected Admitted for septic workup and evaluation ,send blood and urine cx, serial lactate levels ,monitor vitals closely hydration, monitor urine output and renal profile,iv abx initiated -on ceftriaxone . Renal stone hunt study was performed and he was found to have bladder wall thickenning and prostate enlargement . evaluation requested Palliative care consult requested ,to discuss advance directives and complete MOLST     ·  Problem: Hematuria.  Plan: likely secondary to hemorrhagic cystitis and prostate cx ,no evidence of kidney stones . evaluation requested.     ·  Problem: UTI (urinary tract infection).  Plan: . Urine culture grew Proteus mirabilis.     . Discontinue IV Rocephin. Add po Augmentin 875 mh bid for 5 days.     ·  Problem: Arteriosclerotic heart disease (ASHD).  Plan: continue current management and present  medications.     ·  Problem: DM (diabetes mellitus).  Plan: corrective regimen sliding scale.     ·  Problem: Atrial fibrillation.  Plan: continue home medications ,not on oac as per pcp decision.     Problem: CHF (congestive heart failure). Plan: seen by cardiology consult ,clearance for possible cystoscopy requested.    ·  Problem: Hypertension.  Plan: continue current management and present  medications.     ·  Problem: Dementia.  Plan: continue home medications ,supportive care.     ·  Problem: Prophylactic measure.  Plan: Gastrointestinal stress ulcer prophylaxis and DVT prophylaxis administrated.

## 2020-02-04 NOTE — DISCHARGE NOTE NURSING/CASE MANAGEMENT/SOCIAL WORK - NSDCPEPT PROEDMA_GEN_ALL_CORE
July 5, 2017      Michael Ellsworth MD  [address] [place] [address] [address]  [facility] [address]           [facility]  [address] [facility]  [address]  [facility] [address]  Phone: [phone]  Fax: [phone]          Patient: Michael Shetty   MR Number: [id number]   YOB: 1946   Date of Visit: 7/5/2017       Dear Dr. Michael Ellsworth:    Thank you for referring Michael Shetty to me for evaluation. Attached you will find relevant portions of my assessment and plan of care.    If you have questions, please do not hesitate to call me. I look forward to following Michael Shetty along with you.    Sincerely,    Chris Cage MD    Enclosure  CC:  No Recipients    If you would like to receive this communication electronically, please contact [email or web address] or [phone] to request more information on [facility] Link access.    For providers and/or their staff who would like to refer a patient to [facility], please contact us through our one-stop-shop provider referral line, [facility], at [phone].    If you feel you have received this communication in error or would no longer like to receive these types of communications, please e-mail [email or web address]          Yes The patient is a 25y Male complaining of head injury.

## 2020-02-04 NOTE — DISCHARGE NOTE PROVIDER - CARE PROVIDER_API CALL
Toby Quintana)  Critical Care Medicine; Internal Medicine; Pulmonary Disease  [address] [place] [address]  Phone: [phone]  Fax: [phone]  Follow Up Time: 1-3 days    Luis Devries)  Urology  [address], [address]  [place] [address]  Phone: [phone]  Fax: [phone]  Follow Up Time: 2 weeks

## 2020-02-04 NOTE — PROGRESS NOTE ADULT - REASON FOR ADMISSION
HEMATURIA

## 2020-02-04 NOTE — PROGRESS NOTE ADULT - SUBJECTIVE AND OBJECTIVE BOX
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 where he lives has a no fitch policy. Therefore the fitch he has was placed in ER  I suggested a TOV so he can return to his prior location. Fitch removed and voiding clear urine with incontinence.        ****************************************************************************************************  PHYSICAL EXAM:    Vital Signs Last 24 Hrs  T(C): 36.7 (04 Feb 2020 08:09), Max: 37.2 (03 Feb 2020 16:21)  T(F): 98 (04 Feb 2020 08:09), Max: 98.9 (03 Feb 2020 16:21)  HR: 77 (04 Feb 2020 08:09) (74 - 77)  BP: 98/60 (04 Feb 2020 08:09) (98/60 - 156/75)  BP(mean): --  RR: 20 (04 Feb 2020 08:09) (18 - 20)  SpO2: 95% (04 Feb 2020 08:09) (95% - 97%)    GENERAL: confused     PENIS: neg     TESTES: neg     PROSTATE/ RECTAL:    ABDOMEN: soft  NO  distention     BACK:    LOWER EXTREMITIES:    NEUROLOGICAL:    **********************************************************************************************************  LABS:                        9.3    7.66  )-----------( 290      ( 04 Feb 2020 06:30 )             30.3     02-04    137  |  100  |  16  ----------------------------<  105<H>  3.9   |  31  |  0.53    Ca    9.0      04 Feb 2020 06:30          Urine Culture:     RADIOLOGY & ADDITIONAL STUDIES:      IMPRESSION: hx ca prostate  hx uti's     PLAN: started flomax 0.4mg daily   will return  to [facility] today   should see a private urologist for a follow up visit

## 2020-02-04 NOTE — PROGRESS NOTE ADULT - SUBJECTIVE AND OBJECTIVE BOX
Patient is a 89y old  Male who presents with a chief complaint of HEMATURIA (04 Feb 2020 10:46)       Pt is seen and examined  pt is awake and lying in bed/out of bed to chair  pt seems comfortable and denies any complaints at this time    HPI:  89 y o WM  presents sent to ED from [facility] for evaluation of hematuria,  poor historian,  PMD Dr Quintana,  no anticoagulants noted in  patient med list ,patient was admitted with GIB last year and BASA was stopped at that time .His  PMH is significant for :  Arteriosclerotic heart disease (ASHD)    	Atrial fibrillation  ,CHF (congestive heart failure)  ,Dementia  ,Depression  ,DM (diabetes mellitus)  ,Hyperlipemia  ,Hypertension  ,Prostate ca .UTI suspected Admitted for septic workup and evaluation ,send blood and urine cx, serial lactate levels ,monitor vitals closely hydration, monitor urine output and renal profile,iv abx initiated -on ceftriaxone . Renal stone hunt study was performed and he was found to have bladder wall thickenning and prostate enlargement . evaluation requested Palliative care consult requested ,to discuss advance directives and complete MOLST (31 Jan 2020 06:11)         ROS:  Negative except for:    MEDICATIONS  (STANDING):  amoxicillin  875 milliGRAM(s)/clavulanate 1 Tablet(s) Oral two times a day  budesonide  80 MICROgram(s)/formoterol 4.5 MICROgram(s) Inhaler 2 Puff(s) Inhalation two times a day  dextrose 5%. 1000 milliLiter(s) (50 mL/Hr) IV Continuous <Continuous>  dextrose 50% Injectable 12.5 Gram(s) IV Push once  dextrose 50% Injectable 25 Gram(s) IV Push once  dextrose 50% Injectable 25 Gram(s) IV Push once  donepezil 5 milliGRAM(s) Oral at bedtime  ferrous    sulfate 325 milliGRAM(s) Oral daily  insulin lispro (HumaLOG) corrective regimen sliding scale   SubCutaneous at bedtime  insulin lispro (HumaLOG) corrective regimen sliding scale   SubCutaneous three times a day before meals  lactobacillus acidophilus 1 Tablet(s) Oral two times a day  magnesium oxide 400 milliGRAM(s) Oral two times a day with meals  metFORMIN 500 milliGRAM(s) Oral two times a day  pantoprazole    Tablet 40 milliGRAM(s) Oral before breakfast  polyethylene glycol 3350 17 Gram(s) Oral daily  senna 2 Tablet(s) Oral at bedtime  simvastatin 20 milliGRAM(s) Oral at bedtime  tamsulosin 0.4 milliGRAM(s) Oral at bedtime    MEDICATIONS  (PRN):  acetaminophen   Tablet .. 650 milliGRAM(s) Oral every 6 hours PRN Temp greater or equal to 38C (100.4F), Mild Pain (1 - 3)  dextrose 40% Gel 15 Gram(s) Oral once PRN Blood Glucose LESS THAN 70 milliGRAM(s)/deciLiter  glucagon  Injectable 1 milliGRAM(s) IntraMuscular once PRN Glucose <70 milliGRAM(s)/deciLiter      Allergies    latex (Rash)  latex (Unknown)  No Known Drug Allergies    Intolerances        Vital Signs Last 24 Hrs  T(C): 36.7 (04 Feb 2020 08:09), Max: 37.2 (03 Feb 2020 16:21)  T(F): 98 (04 Feb 2020 08:09), Max: 98.9 (03 Feb 2020 16:21)  HR: 77 (04 Feb 2020 08:09) (74 - 77)  BP: 98/60 (04 Feb 2020 08:09) (98/60 - 156/75)  BP(mean): --  RR: 20 (04 Feb 2020 08:09) (18 - 20)  SpO2: 95% (04 Feb 2020 08:09) (95% - 97%)    PHYSICAL EXAM  General: adult in NAD  HEENT: clear oropharynx, anicteric sclera, pink conjunctiva  Neck: supple  CV: normal S1/S2 with no murmur rubs or gallops  Lungs: positive air movement b/l ant lungs,clear to auscultation, no wheezes, no rales  Abdomen: soft non-tender non-distended, no hepatosplenomegaly  Ext: no clubbing cyanosis or edema  Skin: no rashes and no petechiae  Neuro: alert and oriented X 4, no focal deficits  LABS:                          9.3    7.66  )-----------( 290      ( 04 Feb 2020 06:30 )             30.3         Mean Cell Volume : 81.9 fl  Mean Cell Hemoglobin : 25.1 pg  Mean Cell Hemoglobin Concentration : 30.7 gm/dL  Auto Neutrophil # : x  Auto Lymphocyte # : x  Auto Monocyte # : x  Auto Eosinophil # : x  Auto Basophil # : x  Auto Neutrophil % : x  Auto Lymphocyte % : x  Auto Monocyte % : x  Auto Eosinophil % : x  Auto Basophil % : x    Serial CBC's  02-04 @ 06:30  Hct-30.3 / Hgb-9.3 / Plat-290 / RBC-3.70 / WBC-7.66          Serial CBC's  02-03 @ 06:34  Hct-29.7 / Hgb-9.3 / Plat-285 / RBC-3.72 / WBC-6.49          Serial CBC's  02-02 @ 06:28  Hct-27.9 / Hgb-8.7 / Plat-259 / RBC-3.47 / WBC-5.68          Serial CBC's  02-01 @ 07:08  Hct-27.5 / Hgb-8.7 / Plat-257 / RBC-3.40 / WBC-5.84            02-04    137  |  100  |  16  ----------------------------<  105<H>  3.9   |  31  |  0.53    Ca    9.0      04 Feb 2020 06:30            Iron - Total Binding Capacity.: 301 ug/dL (02-01-20 @ 11:17)  Ferritin, Serum: 22 ng/mL (02-01-20 @ 11:17)  Vitamin B12, Serum: 361 pg/mL (02-01-20 @ 11:17)  Folate, Serum: 12.6 ng/mL (02-01-20 @ 11:17)  Reticulocyte Percent: 0.9 % (02-01-20 @ 07:08)                BLOOD SMEAR INTERPRETATION:       RADIOLOGY & ADDITIONAL STUDIES:

## 2020-02-25 NOTE — DIETITIAN INITIAL EVALUATION ADULT. - PROBLEM SELECTOR PROBLEM 3
Anemia You can access the FollowMyHealth Patient Portal offered by [facility] by registering at the following website: [email or web address]. By joining [employer]’s FollowMyHealth portal, you will also be able to view your health information using other applications (apps) compatible with our system.

## 2020-03-07 ENCOUNTER — EMERGENCY (EMERGENCY)
Facility: HOSPITAL | Age: 85
LOS: 1 days | Discharge: HOME | End: 2020-03-07
Attending: EMERGENCY MEDICINE | Admitting: EMERGENCY MEDICINE
Payer: MEDICARE

## 2020-03-07 VITALS
WEIGHT: 166.89 LBS | HEART RATE: 74 BPM | SYSTOLIC BLOOD PRESSURE: 120 MMHG | DIASTOLIC BLOOD PRESSURE: 68 MMHG | TEMPERATURE: 98 F | RESPIRATION RATE: 14 BRPM | OXYGEN SATURATION: 97 % | HEIGHT: 72 IN

## 2020-03-07 PROCEDURE — 99284 EMERGENCY DEPT VISIT MOD MDM: CPT

## 2020-03-07 PROCEDURE — 74176 CT ABD & PELVIS W/O CONTRAST: CPT

## 2020-03-07 PROCEDURE — 99284 EMERGENCY DEPT VISIT MOD MDM: CPT | Mod: 25

## 2020-03-07 PROCEDURE — 74176 CT ABD & PELVIS W/O CONTRAST: CPT | Mod: 26

## 2020-03-07 RX ORDER — ACETAMINOPHEN 500 MG
650 TABLET ORAL ONCE
Refills: 0 | Status: COMPLETED | OUTPATIENT
Start: 2020-03-07 | End: 2020-03-07

## 2020-03-07 RX ADMIN — Medication 650 MILLIGRAM(S): at 17:30

## 2020-03-07 NOTE — ED PROVIDER NOTE - CARE PROVIDER_API CALL
Jonel Alaniz)  Orthopaedic Surgery  [address] [place] [address]  Phone: [phone]  Fax: [phone]  Follow Up Time: 1-3 Days

## 2020-03-07 NOTE — ED PROVIDER NOTE - NSFOLLOWUPINSTRUCTIONS_ED_ALL_ED_FT
FOllow up with your primary care doctor as soon as possible. Take Tylenol for your pain. Return to ER for any worsening symptoms, difficulty walking, fever, chills, nausea, vomiting, burning with urination, blood in your urine, numbness/weakness, etc.

## 2020-03-07 NOTE — ED PROVIDER NOTE - PATIENT PORTAL LINK FT
You can access the FollowMyHealth Patient Portal offered by [facility] by registering at the following website: [email or web address]. By joining [employer]’s FollowMyHealth portal, you will also be able to view your health information using other applications (apps) compatible with our system.

## 2020-03-07 NOTE — ED PROVIDER NOTE - OBJECTIVE STATEMENT
89 y/o male with PMHx HTN, HLD, DM, and dementia presents today due to fall. Pt son at bedside, notes was taking patient out to eat in which patient fell on one step and hit his left lower back. Pt notes was recommended to come to ED from his doctor due to hematoma on left lower back. Pt son reports witnessed fall without head injury. Notes abrasion to left arm, no blood thinner use. UTD tetanus. Pt denies headache, numbness/weakness, hip pain, fever, chills, abdominal pain, vomiting.

## 2020-03-07 NOTE — ED PROVIDER NOTE - ATTENDING CONTRIBUTION TO CARE
89 yo white male with non syncopal trip and fall short while ago and hit left lower flank area of edge of curb here c/o pain and bruising to site. No chest pain. No SOB. No abdominal pains. Exam revealed white male, elderly in NAD with small area of ecchymosis at left posterior iliac crest area. Flank is non tender. Lungs clear. Abdomen is benign and non tender. I agree with plan and management outlined by PA.

## 2020-03-07 NOTE — ED PROVIDER NOTE - PHYSICAL EXAMINATION
Constitutional: Awake, Alert, non-toxic. NAD. Well appearing, well nourished.   HEAD: Normocephalic, atraumatic.   EYES: PERRL, EOM intact, conjunctiva and sclera are clear bilaterally. No raccoon eyes.   ENT: No camacho sign. No rhinorrhea, normal pharynx, patent, no tonsillar exudate or enlargement, mucous membranes pink/moist, no erythema, no drooling or stridor.   NECK: Supple, non-tender; no cervical LAD, no JVD, no goiter.  BACK: No midline or paraspinal TTP of cervical/thoracic/lumbar spine, FROM.   CARDIOVASCULAR: Normal S1, S2; regular rate and rhythm.  RESPIRATORY: Normal respiratory effort; breath sounds CTAB, no wheezes, rhonchi, or rales. Speaking in full sentences. No accessory muscle use.   ABDOMEN: Soft; non-tender, non-distended. No CVA TTP  EXTREMITIES: Full passive and active ROM in all extremities; (+) left sided posterior hematoma over iliac crest; no hip TTP, FROM hip, distal pulses palpable and symmetric, no edema, no crepitus or step off  SKIN: Warm, dry; good skin turgor, no apparent lesions or rashes, no ecchymosis, brisk capillary refill.  NEURO: A&O x3. Sensory and motor functions are grossly intact. Speech is normal. Appearance and judgement seem appropiate for gender and age. No neurological deficits. Neurovascular sensation intact motor function 5/5 of upper and lower extremities, CN II-XII grossly intact,

## 2020-05-22 NOTE — PATIENT PROFILE ADULT - HAS THE PATIENT USED TOBACCO IN THE PAST 30 DAYS?
Patient: Juan Carlos Torres MRN: [id number]  SSN: [id number] YOB: 1952  Age: 79 y.o. Sex: male DIAGNOSIS:   
 
TREATMENT SITE:   Lung cancer DOSE and FRACTIONATION: 5600 out of 6000 cGy, 24 out of 30 treatments INTERVAL HISTORY:  Juan Carlos Torres is a 79 y.o. male being treated for lung cancer. He was doing well without complaints OBJECTIVE:  No finding. There were no vitals taken for this visit. Lab Results Component Value Date/Time Sodium 138 05/11/2020 08:00 AM  
 Potassium 3.6 05/11/2020 08:00 AM  
 Chloride 108 (H) 05/11/2020 08:00 AM  
 CO2 23 05/11/2020 08:00 AM  
 Anion gap 7 05/11/2020 08:00 AM  
 Glucose 97 05/11/2020 08:00 AM  
 BUN 13 05/11/2020 08:00 AM  
 Creatinine 0.80 05/11/2020 08:00 AM  
 GFR est AA >60 05/11/2020 08:00 AM  
 GFR est non-AA >60 05/11/2020 08:00 AM  
 Calcium 9.4 05/11/2020 08:00 AM  
 Magnesium 2.0 05/06/2020 02:35 AM  
 Phosphorus 2.5 05/06/2020 02:35 AM  
 Albumin 3.4 05/11/2020 08:00 AM  
 Protein, total 6.9 05/11/2020 08:00 AM  
 Globulin 3.5 05/11/2020 08:00 AM  
 A-G Ratio 1.0 (L) 05/11/2020 08:00 AM  
 AST (SGOT) 34 05/11/2020 08:00 AM  
 ALT (SGPT) 31 05/11/2020 08:00 AM  
 
Lab Results Component Value Date/Time WBC 2.5 (L) 05/12/2020 12:54 PM  
 HGB 11.6 (L) 05/12/2020 12:54 PM  
 HCT 33.3 (L) 05/12/2020 12:54 PM  
 PLATELET 492 (L) 99/71/2542 12:54 PM  
 
 
ASSESSMENT and PLAN:  Juan Carlos Torres is tolerating radiation as anticipated for the current dose and fraction.    
 
 
Kathy Lopez MD  
 
 
 No

## 2020-08-17 ENCOUNTER — EMERGENCY (EMERGENCY)
Facility: HOSPITAL | Age: 85
LOS: 1 days | Discharge: HOME | End: 2020-08-17
Attending: EMERGENCY MEDICINE | Admitting: EMERGENCY MEDICINE
Payer: MEDICARE

## 2020-08-17 VITALS
HEART RATE: 81 BPM | RESPIRATION RATE: 19 BRPM | TEMPERATURE: 98 F | DIASTOLIC BLOOD PRESSURE: 71 MMHG | OXYGEN SATURATION: 98 % | SYSTOLIC BLOOD PRESSURE: 115 MMHG

## 2020-08-17 VITALS
SYSTOLIC BLOOD PRESSURE: 100 MMHG | DIASTOLIC BLOOD PRESSURE: 63 MMHG | HEART RATE: 72 BPM | TEMPERATURE: 98 F | WEIGHT: 160.06 LBS | RESPIRATION RATE: 15 BRPM

## 2020-08-17 LAB — SARS-COV-2 RNA SPEC QL NAA+PROBE: SIGNIFICANT CHANGE UP

## 2020-08-17 PROCEDURE — 87075 CULTR BACTERIA EXCEPT BLOOD: CPT

## 2020-08-17 PROCEDURE — 87070 CULTURE OTHR SPECIMN AEROBIC: CPT

## 2020-08-17 PROCEDURE — 82962 GLUCOSE BLOOD TEST: CPT

## 2020-08-17 PROCEDURE — 99284 EMERGENCY DEPT VISIT MOD MDM: CPT

## 2020-08-17 PROCEDURE — 99283 EMERGENCY DEPT VISIT LOW MDM: CPT

## 2020-08-17 PROCEDURE — 87205 SMEAR GRAM STAIN: CPT

## 2020-08-17 PROCEDURE — 87635 SARS-COV-2 COVID-19 AMP PRB: CPT

## 2020-08-17 RX ORDER — AZTREONAM 2 G
1 VIAL (EA) INJECTION
Qty: 20 | Refills: 0
Start: 2020-08-17 | End: 2020-08-26

## 2020-08-17 RX ADMIN — Medication 1 TABLET(S): at 17:36

## 2020-08-17 NOTE — ED ADULT NURSE NOTE - OBJECTIVE STATEMENT
Pt. received alert/awake and confused w/ chief complaint as per [facility] of abscess to left ear area. Pt. presents w/ abscess to left ear with yellow drainage.

## 2020-08-17 NOTE — ED ADULT NURSE NOTE - NSIMPLEMENTINTERV_GEN_ALL_ED
Implemented All Fall with Harm Risk Interventions:  [place] to call system. Call bell, personal items and telephone within reach. Instruct patient to call for assistance. Room bathroom lighting operational. Non-slip footwear when patient is off stretcher. Physically safe environment: no spills, clutter or unnecessary equipment. Stretcher in lowest position, wheels locked, appropriate side rails in place. Provide visual cue, wrist band, yellow gown, etc. Monitor gait and stability. Monitor for mental status changes and reorient to person, place, and time. Review medications for side effects contributing to fall risk. Reinforce activity limits and safety measures with patient and family. Provide visual clues: red socks.

## 2020-08-17 NOTE — ED PROVIDER NOTE - CLINICAL SUMMARY MEDICAL DECISION MAKING FREE TEXT BOX
left facial abscess, found to be draining white thick drainage, culture sent, area cleansed with betadine, normal saline, hydrogen peroxide, rx abx, covid 19 test sent, awaiting result, discharge back to facility when result obtained.

## 2020-08-17 NOTE — ED PROVIDER NOTE - ATTENDING CONTRIBUTION TO CARE
I have personally performed a face to face diagnostic evaluation on this patient.  I have reviewed the PA note and agree with the history, exam, and plan of care, except as noted.  History and Exam by me shows patient sent from [facility] for evaluation of skin abscess to anterior to left ear, slight drainage, no surrounding cellulitis, to clean and start bactrim, call Dr. Quintana, will need covid testing.

## 2020-08-17 NOTE — ED PROVIDER NOTE - PROVIDER TOKENS
FREE:[LAST:[Dr Toby Quintana],PHONE:[(   )    -],FAX:[(   )    -],FOLLOWUP:[1-3 Days],ESTABLISHEDPATIENT:[T]]

## 2020-08-17 NOTE — ED PROVIDER NOTE - CARE PROVIDER_API CALL
Dr Toby Quintana,   Phone: (   )    -  Fax: (   )    -  Established Patient  Follow Up Time: 1-3 Days

## 2020-08-17 NOTE — ED PROVIDER NOTE - OBJECTIVE STATEMENT
89 y male poor historian,  sent to ED from [facility] for left ear abscess,  found to abscess left, anterior to left ear.  PMD Dr Quintana

## 2020-08-17 NOTE — ED ADULT NURSE NOTE - NS ED NURSE PRESS ULCER LOCATION 11
-- DO NOT REPLY / DO NOT REPLY ALL --  -- Message is from the [facility]--    General Patient Message      Reason for Call: Patient mentioned she spoke with her Insurance and they told her [employer] was supposed to okay for her to have a one month supply on her pen needles sent to [facility] . Please contact and assist.     Caller Information       Type Contact Phone    01/28/2020 08:27 AM Phone (Incoming) Cindy Carmen (Self) [phone] (H)          Alternative phone number: None    Turnaround time given to caller:   \"This message will be sent to [state Provider's name]. The clinical team will fulfill your request as soon as they review your message.\"}     sacrum

## 2020-08-17 NOTE — ED PROVIDER NOTE - MUSCULOSKELETAL, MLM
Price (Do Not Change): 0.00 Detail Level: Simple Instructions: This plan will send the code FBSE to the PM system.  DO NOT or CHANGE the price. Spine appears normal, range of motion is not limited, no muscle or joint tenderness

## 2020-08-17 NOTE — ED ADULT NURSE NOTE - ISOLATION TYPE:
Raquel Mera RN     Allergy Injection Note:    Shannon Vega presents for an immunotherapy injection. The site of the injection was cleansed with an alcohol swab. Serum was injected into the site after pulling back on the plunger to prevent intravascular injection. After the injection and was instructed to wait in the allergy waiting area for 30 minutes. There was no problems with the injection.    Allergy Shot Questionnaire  Have you had increased asthma symptoms in past week?: no  Have you had increased allergy symptoms in the last week?: no  Have you had a cold, respiratory tract infection or flu like symptoms in the past 2 weeks?: no  Did you have any problems within 12 hours of the last injection?: no  Are you on any new medications/ eye drops?: no  Are you on any beta blockers?: no  If female, are you pregnant?: no  I have confirmed the name and birth date on my allergy vial. : yes  Number of allergy injections given: 2  Comments: Vial safety test     Injection Details:  Vial 1   Vial 1 Expiration Date: 02/16/20  Vial 1 Series: 5  Vial 1 Dose (mL): 0.05 vial test with follow up 0.05  Vial 1 Location: Right upper arm  Vial 1 Vial Test Reaction (in mm): 6  Vial 1 Reaction (in mm): 5          Raquel Mera RN  8/26/2019  2:11 PM     None

## 2020-08-17 NOTE — ED PROVIDER NOTE - PROGRESS NOTE DETAILS
left facial abscess drained, rx bactrim sent to pharmacy, call out to Dr Quintana spoke with Dr Quintana, case discussed, patient covid 19 test negative, rx bactrim sent to pharmacy, patient to be discharged back to [facility].

## 2020-08-17 NOTE — ED PROVIDER NOTE - PATIENT PORTAL LINK FT
You can access the FollowMyHealth Patient Portal offered by [facility] by registering at the following website: [email or web address]. By joining [employer]’s FollowMyHealth portal, you will also be able to view your health information using other applications (apps) compatible with our system.

## 2020-08-28 ENCOUNTER — EMERGENCY (EMERGENCY)
Facility: HOSPITAL | Age: 85
LOS: 1 days | Discharge: HOME | End: 2020-08-28
Attending: INTERNAL MEDICINE | Admitting: INTERNAL MEDICINE
Payer: MEDICARE

## 2020-08-28 VITALS
TEMPERATURE: 98 F | DIASTOLIC BLOOD PRESSURE: 69 MMHG | RESPIRATION RATE: 16 BRPM | OXYGEN SATURATION: 97 % | HEART RATE: 83 BPM | SYSTOLIC BLOOD PRESSURE: 110 MMHG

## 2020-08-28 VITALS
SYSTOLIC BLOOD PRESSURE: 119 MMHG | OXYGEN SATURATION: 98 % | HEART RATE: 88 BPM | WEIGHT: 162.04 LBS | RESPIRATION RATE: 16 BRPM | DIASTOLIC BLOOD PRESSURE: 70 MMHG | TEMPERATURE: 98 F

## 2020-08-28 LAB — SARS-COV-2 RNA SPEC QL NAA+PROBE: SIGNIFICANT CHANGE UP

## 2020-08-28 PROCEDURE — 99285 EMERGENCY DEPT VISIT HI MDM: CPT

## 2020-08-28 PROCEDURE — 70450 CT HEAD/BRAIN W/O DYE: CPT

## 2020-08-28 PROCEDURE — 99284 EMERGENCY DEPT VISIT MOD MDM: CPT | Mod: 25

## 2020-08-28 PROCEDURE — 70450 CT HEAD/BRAIN W/O DYE: CPT | Mod: 26

## 2020-08-28 PROCEDURE — U0003: CPT

## 2020-08-28 NOTE — ED ADULT NURSE NOTE - OBJECTIVE STATEMENT
pt brought by ems awake and pt found at the end of bed from [facility] and pt evaluated by Md and covid text sent to lab  and pt taken to ct scan at present

## 2020-08-28 NOTE — ED PROVIDER NOTE - OBJECTIVE STATEMENT
89 y/o male , unwitnessed fall, he was found out of bed, lying on the floor. No distress, no complaints

## 2020-08-28 NOTE — PHYSICAL THERAPY INITIAL EVALUATION ADULT - DIAGNOSIS, PT EVAL
Outpatient Medications Marked as Taking for the 8/28/20 encounter (Refill) with MARÍA Nguyen   Medication Sig Dispense Refill   • lisdexamfetamine (VYVANSE) 40 MG capsule Take 1 capsule by mouth every morning. Do not start before August 1, 2020. 30 capsule 0        Ok to leave detailed Message: Yes  Informed caller of refill policy- 24-48 hours: Yes  No call back needed unless nurse has questions.     Pharmacy: [facility]   Weakness

## 2020-08-28 NOTE — ED ADULT NURSE NOTE - NSIMPLEMENTINTERV_GEN_ALL_ED
Implemented All Fall Risk Interventions:  [place] to call system. Call bell, personal items and telephone within reach. Instruct patient to call for assistance. Room bathroom lighting operational. Non-slip footwear when patient is off stretcher. Physically safe environment: no spills, clutter or unnecessary equipment. Stretcher in lowest position, wheels locked, appropriate side rails in place. Provide visual cue, wrist band, yellow gown, etc. Monitor gait and stability. Monitor for mental status changes and reorient to person, place, and time. Review medications for side effects contributing to fall risk. Reinforce activity limits and safety measures with patient and family.

## 2020-08-28 NOTE — ED PROVIDER NOTE - MUSCULOSKELETAL, MLM
Spine appears normal, range of motion is not limited, no muscle or joint tenderness, wound care surgical dressing both feet

## 2020-08-28 NOTE — ED PROVIDER NOTE - CARE PROVIDER_API CALL
Kumar Pepper  NEUROLOGY  [address] [place] [address]  Phone: [phone]  Fax: [phone]  Follow Up Time: 1-3 Days

## 2020-08-28 NOTE — ED PROVIDER NOTE - PATIENT PORTAL LINK FT
You can access the FollowMyHealth Patient Portal offered by [facility] by registering at the following website: [email or web address]. By joining [employer]’s FollowMyHealth portal, you will also be able to view your health information using other applications (apps) compatible with our system.

## 2020-08-28 NOTE — ED ADULT NURSE REASSESSMENT NOTE - NS ED NURSE REASSESS COMMENT FT1
pt reavaluated and ct scan negative and vital signs stable pt awaiting  [facility]

## 2020-08-28 NOTE — ED PROVIDER NOTE - SIGNIFICANT NEGATIVE FINDINGS
no headache, no neck pain, no chest pain, no SOB, no palpitations, no n/v, no skeletal inquiries,  no bleeding. no neuro changes.

## 2020-08-28 NOTE — ED ADULT NURSE REASSESSMENT NOTE - NS ED NURSE REASSESS COMMENT FT1
pt d/c via EMS with no distress vital signs stable [facility] called and aware of pt coming back

## 2020-10-26 ENCOUNTER — INPATIENT (INPATIENT)
Facility: HOSPITAL | Age: 85
LOS: 3 days | Discharge: NURSING, REHAB OR LONG-TERM CARE | DRG: 728 | End: 2020-10-30
Attending: INTERNAL MEDICINE | Admitting: INTERNAL MEDICINE
Payer: MEDICARE

## 2020-10-26 VITALS
TEMPERATURE: 98 F | SYSTOLIC BLOOD PRESSURE: 112 MMHG | HEIGHT: 72 IN | DIASTOLIC BLOOD PRESSURE: 68 MMHG | RESPIRATION RATE: 17 BRPM | HEART RATE: 84 BPM | OXYGEN SATURATION: 98 % | WEIGHT: 149.91 LBS

## 2020-10-26 DIAGNOSIS — E11.9 TYPE 2 DIABETES MELLITUS WITHOUT COMPLICATIONS: ICD-10-CM

## 2020-10-26 DIAGNOSIS — I48.91 UNSPECIFIED ATRIAL FIBRILLATION: ICD-10-CM

## 2020-10-26 DIAGNOSIS — I10 ESSENTIAL (PRIMARY) HYPERTENSION: ICD-10-CM

## 2020-10-26 DIAGNOSIS — Z51.5 ENCOUNTER FOR PALLIATIVE CARE: ICD-10-CM

## 2020-10-26 DIAGNOSIS — L89.612 PRESSURE ULCER OF RIGHT HEEL, STAGE 2: ICD-10-CM

## 2020-10-26 DIAGNOSIS — I50.9 HEART FAILURE, UNSPECIFIED: ICD-10-CM

## 2020-10-26 DIAGNOSIS — L02.214 CUTANEOUS ABSCESS OF GROIN: ICD-10-CM

## 2020-10-26 DIAGNOSIS — N49.2 INFLAMMATORY DISORDERS OF SCROTUM: ICD-10-CM

## 2020-10-26 DIAGNOSIS — L89.622 PRESSURE ULCER OF LEFT HEEL, STAGE 2: ICD-10-CM

## 2020-10-26 DIAGNOSIS — Z29.9 ENCOUNTER FOR PROPHYLACTIC MEASURES, UNSPECIFIED: ICD-10-CM

## 2020-10-26 DIAGNOSIS — F03.90 UNSPECIFIED DEMENTIA, UNSPECIFIED SEVERITY, WITHOUT BEHAVIORAL DISTURBANCE, PSYCHOTIC DISTURBANCE, MOOD DISTURBANCE, AND ANXIETY: ICD-10-CM

## 2020-10-26 DIAGNOSIS — L89.619 PRESSURE ULCER OF RIGHT HEEL, UNSPECIFIED STAGE: ICD-10-CM

## 2020-10-26 DIAGNOSIS — C61 MALIGNANT NEOPLASM OF PROSTATE: ICD-10-CM

## 2020-10-26 LAB
ALBUMIN SERPL ELPH-MCNC: 2.9 G/DL — LOW (ref 3.3–5)
ALP SERPL-CCNC: 118 U/L — SIGNIFICANT CHANGE UP (ref 40–120)
ALT FLD-CCNC: 13 U/L — SIGNIFICANT CHANGE UP (ref 12–78)
ANION GAP SERPL CALC-SCNC: 4 MMOL/L — LOW (ref 5–17)
APTT BLD: 31.4 SEC — SIGNIFICANT CHANGE UP (ref 27.5–35.5)
AST SERPL-CCNC: 12 U/L — LOW (ref 15–37)
BASOPHILS # BLD AUTO: 0.02 K/UL — SIGNIFICANT CHANGE UP (ref 0–0.2)
BASOPHILS NFR BLD AUTO: 0.3 % — SIGNIFICANT CHANGE UP (ref 0–2)
BILIRUB SERPL-MCNC: 0.6 MG/DL — SIGNIFICANT CHANGE UP (ref 0.2–1.2)
BUN SERPL-MCNC: 14 MG/DL — SIGNIFICANT CHANGE UP (ref 7–23)
CALCIUM SERPL-MCNC: 8.8 MG/DL — SIGNIFICANT CHANGE UP (ref 8.5–10.1)
CHLORIDE SERPL-SCNC: 104 MMOL/L — SIGNIFICANT CHANGE UP (ref 96–108)
CO2 SERPL-SCNC: 31 MMOL/L — SIGNIFICANT CHANGE UP (ref 22–31)
CREAT SERPL-MCNC: 0.61 MG/DL — SIGNIFICANT CHANGE UP (ref 0.5–1.3)
EOSINOPHIL # BLD AUTO: 0.03 K/UL — SIGNIFICANT CHANGE UP (ref 0–0.5)
EOSINOPHIL NFR BLD AUTO: 0.5 % — SIGNIFICANT CHANGE UP (ref 0–6)
GLUCOSE SERPL-MCNC: 109 MG/DL — HIGH (ref 70–99)
HCT VFR BLD CALC: 34.1 % — LOW (ref 39–50)
HGB BLD-MCNC: 11.5 G/DL — LOW (ref 13–17)
IMM GRANULOCYTES NFR BLD AUTO: 0.3 % — SIGNIFICANT CHANGE UP (ref 0–1.5)
INR BLD: 1.18 RATIO — HIGH (ref 0.88–1.16)
LACTATE SERPL-SCNC: 0.8 MMOL/L — SIGNIFICANT CHANGE UP (ref 0.7–2)
LYMPHOCYTES # BLD AUTO: 0.86 K/UL — LOW (ref 1–3.3)
LYMPHOCYTES # BLD AUTO: 13.4 % — SIGNIFICANT CHANGE UP (ref 13–44)
MCHC RBC-ENTMCNC: 29.8 PG — SIGNIFICANT CHANGE UP (ref 27–34)
MCHC RBC-ENTMCNC: 33.7 GM/DL — SIGNIFICANT CHANGE UP (ref 32–36)
MCV RBC AUTO: 88.3 FL — SIGNIFICANT CHANGE UP (ref 80–100)
MONOCYTES # BLD AUTO: 0.59 K/UL — SIGNIFICANT CHANGE UP (ref 0–0.9)
MONOCYTES NFR BLD AUTO: 9.2 % — SIGNIFICANT CHANGE UP (ref 2–14)
NEUTROPHILS # BLD AUTO: 4.92 K/UL — SIGNIFICANT CHANGE UP (ref 1.8–7.4)
NEUTROPHILS NFR BLD AUTO: 76.3 % — SIGNIFICANT CHANGE UP (ref 43–77)
NRBC # BLD: 0 /100 WBCS — SIGNIFICANT CHANGE UP (ref 0–0)
PLATELET # BLD AUTO: 245 K/UL — SIGNIFICANT CHANGE UP (ref 150–400)
POTASSIUM SERPL-MCNC: 4 MMOL/L — SIGNIFICANT CHANGE UP (ref 3.5–5.3)
POTASSIUM SERPL-SCNC: 4 MMOL/L — SIGNIFICANT CHANGE UP (ref 3.5–5.3)
PROT SERPL-MCNC: 6.8 G/DL — SIGNIFICANT CHANGE UP (ref 6–8.3)
PROTHROM AB SERPL-ACNC: 13.7 SEC — HIGH (ref 10.6–13.6)
RBC # BLD: 3.86 M/UL — LOW (ref 4.2–5.8)
RBC # FLD: 14.9 % — HIGH (ref 10.3–14.5)
SARS-COV-2 RNA SPEC QL NAA+PROBE: SIGNIFICANT CHANGE UP
SODIUM SERPL-SCNC: 139 MMOL/L — SIGNIFICANT CHANGE UP (ref 135–145)
WBC # BLD: 6.44 K/UL — SIGNIFICANT CHANGE UP (ref 3.8–10.5)
WBC # FLD AUTO: 6.44 K/UL — SIGNIFICANT CHANGE UP (ref 3.8–10.5)

## 2020-10-26 PROCEDURE — 71045 X-RAY EXAM CHEST 1 VIEW: CPT | Mod: 26

## 2020-10-26 PROCEDURE — 93010 ELECTROCARDIOGRAM REPORT: CPT

## 2020-10-26 PROCEDURE — 99284 EMERGENCY DEPT VISIT MOD MDM: CPT

## 2020-10-26 PROCEDURE — 99223 1ST HOSP IP/OBS HIGH 75: CPT

## 2020-10-26 RX ORDER — DEXTROSE 50 % IN WATER 50 %
25 SYRINGE (ML) INTRAVENOUS ONCE
Refills: 0 | Status: DISCONTINUED | OUTPATIENT
Start: 2020-10-26 | End: 2020-10-30

## 2020-10-26 RX ORDER — ENOXAPARIN SODIUM 100 MG/ML
40 INJECTION SUBCUTANEOUS DAILY
Refills: 0 | Status: DISCONTINUED | OUTPATIENT
Start: 2020-10-26 | End: 2020-10-30

## 2020-10-26 RX ORDER — ALBUTEROL 90 UG/1
2 AEROSOL, METERED ORAL EVERY 6 HOURS
Refills: 0 | Status: DISCONTINUED | OUTPATIENT
Start: 2020-10-26 | End: 2020-10-30

## 2020-10-26 RX ORDER — SENNA PLUS 8.6 MG/1
2 TABLET ORAL AT BEDTIME
Refills: 0 | Status: DISCONTINUED | OUTPATIENT
Start: 2020-10-26 | End: 2020-10-30

## 2020-10-26 RX ORDER — FAMOTIDINE 10 MG/ML
20 INJECTION INTRAVENOUS DAILY
Refills: 0 | Status: DISCONTINUED | OUTPATIENT
Start: 2020-10-26 | End: 2020-10-30

## 2020-10-26 RX ORDER — PIPERACILLIN AND TAZOBACTAM 4; .5 G/20ML; G/20ML
3.38 INJECTION, POWDER, LYOPHILIZED, FOR SOLUTION INTRAVENOUS ONCE
Refills: 0 | Status: COMPLETED | OUTPATIENT
Start: 2020-10-26 | End: 2020-10-26

## 2020-10-26 RX ORDER — SODIUM CHLORIDE 9 MG/ML
1000 INJECTION INTRAMUSCULAR; INTRAVENOUS; SUBCUTANEOUS ONCE
Refills: 0 | Status: COMPLETED | OUTPATIENT
Start: 2020-10-26 | End: 2020-10-26

## 2020-10-26 RX ORDER — BUDESONIDE, MICRONIZED 100 %
2 POWDER (GRAM) MISCELLANEOUS
Qty: 0 | Refills: 0 | DISCHARGE

## 2020-10-26 RX ORDER — SODIUM CHLORIDE 9 MG/ML
1000 INJECTION, SOLUTION INTRAVENOUS
Refills: 0 | Status: DISCONTINUED | OUTPATIENT
Start: 2020-10-26 | End: 2020-10-30

## 2020-10-26 RX ORDER — SODIUM CHLORIDE 9 MG/ML
1000 INJECTION INTRAMUSCULAR; INTRAVENOUS; SUBCUTANEOUS
Refills: 0 | Status: DISCONTINUED | OUTPATIENT
Start: 2020-10-26 | End: 2020-10-26

## 2020-10-26 RX ORDER — PIPERACILLIN AND TAZOBACTAM 4; .5 G/20ML; G/20ML
3.38 INJECTION, POWDER, LYOPHILIZED, FOR SOLUTION INTRAVENOUS EVERY 8 HOURS
Refills: 0 | Status: COMPLETED | OUTPATIENT
Start: 2020-10-26 | End: 2020-10-29

## 2020-10-26 RX ORDER — DEXTROSE 50 % IN WATER 50 %
12.5 SYRINGE (ML) INTRAVENOUS ONCE
Refills: 0 | Status: DISCONTINUED | OUTPATIENT
Start: 2020-10-26 | End: 2020-10-30

## 2020-10-26 RX ORDER — VANCOMYCIN HCL 1 G
1000 VIAL (EA) INTRAVENOUS ONCE
Refills: 0 | Status: COMPLETED | OUTPATIENT
Start: 2020-10-26 | End: 2020-10-26

## 2020-10-26 RX ORDER — TAMSULOSIN HYDROCHLORIDE 0.4 MG/1
0.4 CAPSULE ORAL AT BEDTIME
Refills: 0 | Status: DISCONTINUED | OUTPATIENT
Start: 2020-10-26 | End: 2020-10-30

## 2020-10-26 RX ORDER — POLYETHYLENE GLYCOL 3350 17 G/17G
17 POWDER, FOR SOLUTION ORAL DAILY
Refills: 0 | Status: DISCONTINUED | OUTPATIENT
Start: 2020-10-26 | End: 2020-10-30

## 2020-10-26 RX ORDER — SIMVASTATIN 20 MG/1
20 TABLET, FILM COATED ORAL AT BEDTIME
Refills: 0 | Status: DISCONTINUED | OUTPATIENT
Start: 2020-10-26 | End: 2020-10-30

## 2020-10-26 RX ORDER — DEXTROSE 50 % IN WATER 50 %
15 SYRINGE (ML) INTRAVENOUS ONCE
Refills: 0 | Status: DISCONTINUED | OUTPATIENT
Start: 2020-10-26 | End: 2020-10-30

## 2020-10-26 RX ORDER — ASPIRIN/CALCIUM CARB/MAGNESIUM 324 MG
81 TABLET ORAL DAILY
Refills: 0 | Status: DISCONTINUED | OUTPATIENT
Start: 2020-10-26 | End: 2020-10-30

## 2020-10-26 RX ORDER — GLUCAGON INJECTION, SOLUTION 0.5 MG/.1ML
1 INJECTION, SOLUTION SUBCUTANEOUS ONCE
Refills: 0 | Status: DISCONTINUED | OUTPATIENT
Start: 2020-10-26 | End: 2020-10-30

## 2020-10-26 RX ORDER — INFLUENZA VIRUS VACCINE 15; 15; 15; 15 UG/.5ML; UG/.5ML; UG/.5ML; UG/.5ML
0.5 SUSPENSION INTRAMUSCULAR ONCE
Refills: 0 | Status: DISCONTINUED | OUTPATIENT
Start: 2020-10-26 | End: 2020-10-30

## 2020-10-26 RX ORDER — ACETAMINOPHEN 500 MG
650 TABLET ORAL EVERY 6 HOURS
Refills: 0 | Status: DISCONTINUED | OUTPATIENT
Start: 2020-10-26 | End: 2020-10-30

## 2020-10-26 RX ORDER — INSULIN LISPRO 100/ML
VIAL (ML) SUBCUTANEOUS EVERY 6 HOURS
Refills: 0 | Status: DISCONTINUED | OUTPATIENT
Start: 2020-10-26 | End: 2020-10-27

## 2020-10-26 RX ORDER — DONEPEZIL HYDROCHLORIDE 10 MG/1
5 TABLET, FILM COATED ORAL AT BEDTIME
Refills: 0 | Status: DISCONTINUED | OUTPATIENT
Start: 2020-10-26 | End: 2020-10-30

## 2020-10-26 RX ORDER — MORPHINE SULFATE 50 MG/1
2 CAPSULE, EXTENDED RELEASE ORAL EVERY 4 HOURS
Refills: 0 | Status: DISCONTINUED | OUTPATIENT
Start: 2020-10-26 | End: 2020-10-30

## 2020-10-26 RX ORDER — TRAMADOL HYDROCHLORIDE 50 MG/1
50 TABLET ORAL EVERY 6 HOURS
Refills: 0 | Status: DISCONTINUED | OUTPATIENT
Start: 2020-10-26 | End: 2020-10-30

## 2020-10-26 RX ORDER — ONDANSETRON 8 MG/1
4 TABLET, FILM COATED ORAL EVERY 4 HOURS
Refills: 0 | Status: DISCONTINUED | OUTPATIENT
Start: 2020-10-26 | End: 2020-10-30

## 2020-10-26 RX ADMIN — SODIUM CHLORIDE 1000 MILLILITER(S): 9 INJECTION INTRAMUSCULAR; INTRAVENOUS; SUBCUTANEOUS at 09:50

## 2020-10-26 RX ADMIN — DONEPEZIL HYDROCHLORIDE 5 MILLIGRAM(S): 10 TABLET, FILM COATED ORAL at 21:19

## 2020-10-26 RX ADMIN — POLYETHYLENE GLYCOL 3350 17 GRAM(S): 17 POWDER, FOR SOLUTION ORAL at 18:00

## 2020-10-26 RX ADMIN — SODIUM CHLORIDE 50 MILLILITER(S): 9 INJECTION INTRAMUSCULAR; INTRAVENOUS; SUBCUTANEOUS at 15:34

## 2020-10-26 RX ADMIN — SODIUM CHLORIDE 70 MILLILITER(S): 9 INJECTION, SOLUTION INTRAVENOUS at 18:05

## 2020-10-26 RX ADMIN — ENOXAPARIN SODIUM 40 MILLIGRAM(S): 100 INJECTION SUBCUTANEOUS at 17:59

## 2020-10-26 RX ADMIN — Medication 250 MILLIGRAM(S): at 09:50

## 2020-10-26 RX ADMIN — PIPERACILLIN AND TAZOBACTAM 25 GRAM(S): 4; .5 INJECTION, POWDER, LYOPHILIZED, FOR SOLUTION INTRAVENOUS at 21:22

## 2020-10-26 RX ADMIN — PIPERACILLIN AND TAZOBACTAM 3.38 GRAM(S): 4; .5 INJECTION, POWDER, LYOPHILIZED, FOR SOLUTION INTRAVENOUS at 12:26

## 2020-10-26 RX ADMIN — Medication 1000 MILLIGRAM(S): at 12:26

## 2020-10-26 RX ADMIN — PIPERACILLIN AND TAZOBACTAM 200 GRAM(S): 4; .5 INJECTION, POWDER, LYOPHILIZED, FOR SOLUTION INTRAVENOUS at 09:50

## 2020-10-26 RX ADMIN — SODIUM CHLORIDE 70 MILLILITER(S): 9 INJECTION, SOLUTION INTRAVENOUS at 17:59

## 2020-10-26 RX ADMIN — SENNA PLUS 2 TABLET(S): 8.6 TABLET ORAL at 21:19

## 2020-10-26 RX ADMIN — Medication 81 MILLIGRAM(S): at 17:59

## 2020-10-26 RX ADMIN — SODIUM CHLORIDE 1000 MILLILITER(S): 9 INJECTION INTRAMUSCULAR; INTRAVENOUS; SUBCUTANEOUS at 12:26

## 2020-10-26 RX ADMIN — TAMSULOSIN HYDROCHLORIDE 0.4 MILLIGRAM(S): 0.4 CAPSULE ORAL at 21:19

## 2020-10-26 RX ADMIN — SIMVASTATIN 20 MILLIGRAM(S): 20 TABLET, FILM COATED ORAL at 21:19

## 2020-10-26 NOTE — CONSULT NOTE ADULT - PROBLEM SELECTOR RECOMMENDATION 9
89 male with extensive medical hx - dementia - frailty - ataxic gait - OP - OA - depression - Prostate ca - CHF - ASHD - wounds  transferred from [facility]   labs and imaging reviewed  JOANA reviewed - updated  pt is DNR DNI
 consult for possible I&D
- Patient seen and evaluated at bedside  - Wound dressing taken off with care and to patient tolerance  - Wound dressed with normal saline and gauze, area pat dry  -
local care, c&s of wound sent, on zosyn pending cultures

## 2020-10-26 NOTE — CONSULT NOTE ADULT - PROBLEM SELECTOR PROBLEM 1
Abscess of scrotum
Palliative care encounter
Abscess of scrotum
Pressure ulcer of right heel, stage 2

## 2020-10-26 NOTE — H&P ADULT - NSICDXPASTMEDICALHX_GEN_ALL_CORE_FT
PAST MEDICAL HISTORY:  Anemia     Arteriosclerotic heart disease (ASHD)     Atrial fibrillation     Atrial fibrillation     CHF (congestive heart failure)     Constipation     Dementia     Dementia     Dementia     Depression     Diabetes     Diabetes     DM (diabetes mellitus)     Hyperlipemia     Hypertension     Prostate ca     Prostate CA

## 2020-10-26 NOTE — ED ADULT NURSE NOTE - NSIMPLEMENTINTERV_GEN_ALL_ED
Implemented All Fall with Harm Risk Interventions:  [place] to call system. Call bell, personal items and telephone within reach. Instruct patient to call for assistance. Room bathroom lighting operational. Non-slip footwear when patient is off stretcher. Physically safe environment: no spills, clutter or unnecessary equipment. Stretcher in lowest position, wheels locked, appropriate side rails in place. Provide visual cue, wrist band, yellow gown, etc. Monitor gait and stability. Monitor for mental status changes and reorient to person, place, and time. Review medications for side effects contributing to fall risk. Reinforce activity limits and safety measures with patient and family. Provide visual clues: red socks.

## 2020-10-26 NOTE — H&P ADULT - ASSESSMENT
89 y o WM  presents sent to ED from [facility] for evaluation of  R groin abscess for few days. now has opened area and has foul smelling discharge. Denies pain  No known fever/chills. no trauma. Patient is   poor historian,  PMD Dr Quintana,  no anticoagulants noted in  patient med list ,patient was admitted with GIB last year and BASA was stopped at that time .His  PMH is significant for :  Arteriosclerotic heart disease (ASHD)  Atrial fibrillation  ,CHF (congestive heart failure)  ,Dementia  ,Depression  ,DM (diabetes mellitus)  ,Hyperlipemia  ,Hypertension  ,Prostate ca . and ID consult called Seen by Dr Devries  ,no intereventions required ,continue topical care and iv abx Admitted for septic workup and evaluation,send blood and urine cx,serial lactate levels,monitor vitals clos[place],ivfs hydration,monitor urine output and renal profile,iv abx initiated -given vanco and zosyn in ER .Chest xray showed PVC and cardiology consult called Admit to monitored unit for cardiac monitoring, obtain echo to evaluate LVEF, intravenous diuresis  prn as per card consult , monitor ins/outs, monitor renal profile and electrolytes closely ,send 3 sets of enzymes, O2 supply, serial chest xrays, monitor weights and oral intake of fluids, nutritionist consult Palliative care consult requested ,to discuss advance directives and complete MOLST

## 2020-10-26 NOTE — H&P ADULT - PROBLEM SELECTOR PLAN 1
Admitted for septic workup and evaluation,send blood and urine cx,serial lactate levels,monitor vitals [place] hydration,monitor urine output and renal profile,iv abx initiated ,seen by ID CONSULT ,continue topical care ,follow cx

## 2020-10-26 NOTE — CONSULT NOTE ADULT - SUBJECTIVE AND OBJECTIVE BOX
TERESA FRANCIS    PLV APER 02    Patient is a 89y old  Male who presents with a chief complaint of groin abscess (26 Oct 2020 16:17)       Allergies    latex (Rash)  latex (Unknown)  No Known Drug Allergies    Intolerances        HPI:      PAST MEDICAL & SURGICAL HISTORY:  Dementia    Arteriosclerotic heart disease (ASHD)    Prostate CA    Atrial fibrillation    DM (diabetes mellitus)    Dementia    Depression    Diabetes    Hyperlipemia    CHF (congestive heart failure)    Dementia    Prostate ca    Diabetes    Hypertension    Atrial fibrillation    No significant past surgical history        FAMILY HISTORY:        MEDICATIONS   acetaminophen   Tablet .. 650 milliGRAM(s) Oral every 6 hours PRN  ALBUTerol    90 MICROgram(s) HFA Inhaler 2 Puff(s) Inhalation every 6 hours PRN  aspirin  chewable 81 milliGRAM(s) Oral daily  dextrose 40% Gel 15 Gram(s) Oral once PRN  dextrose 5%. 1000 milliLiter(s) IV Continuous <Continuous>  dextrose 50% Injectable 12.5 Gram(s) IV Push once  dextrose 50% Injectable 25 Gram(s) IV Push once  dextrose 50% Injectable 25 Gram(s) IV Push once  donepezil 5 milliGRAM(s) Oral at bedtime  enoxaparin Injectable 40 milliGRAM(s) SubCutaneous daily  famotidine Injectable 20 milliGRAM(s) IV Push daily  glucagon  Injectable 1 milliGRAM(s) IntraMuscular once PRN  insulin lispro (ADMELOG) corrective regimen sliding scale   SubCutaneous every 6 hours  morphine  - Injectable 2 milliGRAM(s) IV Push every 4 hours PRN  ondansetron Injectable 4 milliGRAM(s) IV Push every 4 hours PRN  polyethylene glycol 3350 17 Gram(s) Oral daily  senna 2 Tablet(s) Oral at bedtime  simvastatin 20 milliGRAM(s) Oral at bedtime  sodium chloride 0.9%. 1000 milliLiter(s) IV Continuous <Continuous>  tamsulosin 0.4 milliGRAM(s) Oral at bedtime  traMADol 50 milliGRAM(s) Oral every 6 hours PRN      Vital Signs Last 24 Hrs  T(C): 36.8 (26 Oct 2020 16:19), Max: 36.9 (26 Oct 2020 09:01)  T(F): 98.3 (26 Oct 2020 16:19), Max: 98.5 (26 Oct 2020 09:01)  HR: 76 (26 Oct 2020 16:19) (76 - 84)  BP: 123/71 (26 Oct 2020 16:19) (112/68 - 123/71)  BP(mean): --  RR: 16 (26 Oct 2020 16:19) (16 - 17)  SpO2: 98% (26 Oct 2020 16:19) (98% - 98%)            LABS:                        11.5   6.44  )-----------( 245      ( 26 Oct 2020 09:44 )             34.1     10-26    139  |  104  |  14  ----------------------------<  109<H>  4.0   |  31  |  0.61    Ca    8.8      26 Oct 2020 09:44    TPro  6.8  /  Alb  2.9<L>  /  TBili  0.6  /  DBili  x   /  AST  12<L>  /  ALT  13  /  AlkPhos  118  10-26    PT/INR - ( 26 Oct 2020 09:44 )   PT: 13.7 sec;   INR: 1.18 ratio         PTT - ( 26 Oct 2020 09:44 )  PTT:31.4 sec          WBC:  WBC Count: 6.44 K/uL (10-26 @ 09:44)      MICROBIOLOGY:  RECENT CULTURES:              PT/INR - ( 26 Oct 2020 09:44 )   PT: 13.7 sec;   INR: 1.18 ratio         PTT - ( 26 Oct 2020 09:44 )  PTT:31.4 sec    Sodium:  Sodium, Serum: 139 mmol/L (10-26 @ 09:44)      0.61 mg/dL 10-26 @ 09:44      Hemoglobin:  Hemoglobin: 11.5 g/dL (10-26 @ 09:44)      Platelets: Platelet Count - Automated: 245 K/uL (10-26 @ 09:44)      LIVER FUNCTIONS - ( 26 Oct 2020 09:44 )  Alb: 2.9 g/dL / Pro: 6.8 g/dL / ALK PHOS: 118 U/L / ALT: 13 U/L / AST: 12 U/L / GGT: x                 RADIOLOGY & ADDITIONAL STUDIES:

## 2020-10-26 NOTE — CONSULT NOTE ADULT - ASSESSMENT
Stage 2 Pressure Ulcer along the Right and Left Heel- Dressed with Betadine along the borders and Acticoat 7 flex, will change to Calcium alginate or silver alginate tomorrow. Patient to continue offloading heels. Wound cultures taken b/l and sent to microbiology for further evaluation. Patient wounds are considered podiatry stable. Will order Xrays b/l to r/o any possible bony involvement. Podiatry team will continue to follow patient while in house

## 2020-10-26 NOTE — CONSULT NOTE ADULT - SUBJECTIVE AND OBJECTIVE BOX
[facility]     Patient is a 89y old  Male who presents with a chief complaint of Right Scrotum Abscess (26 Oct 2020 13:21)      HPI:      HPI:    89y Male for Cardiology Consult    PAST MEDICAL & SURGICAL HISTORY:  Dementia    Arteriosclerotic heart disease (ASHD)    Prostate CA    Atrial fibrillation    DM (diabetes mellitus)    Dementia    Depression    Diabetes    Hyperlipemia    CHF (congestive heart failure)    Dementia    Prostate ca    Diabetes    Hypertension    Atrial fibrillation    No significant past surgical history        FAMILY HISTORY:      SOCIAL HISTORY:   Alcohol:  Smoking:    Allergies    latex (Rash)  latex (Unknown)  No Known Drug Allergies    Intolerances        MEDICATIONS  (STANDING):  aspirin  chewable 81 milliGRAM(s) Oral daily  dextrose 5%. 1000 milliLiter(s) (50 mL/Hr) IV Continuous <Continuous>  dextrose 50% Injectable 12.5 Gram(s) IV Push once  dextrose 50% Injectable 25 Gram(s) IV Push once  dextrose 50% Injectable 25 Gram(s) IV Push once  donepezil 5 milliGRAM(s) Oral at bedtime  enoxaparin Injectable 40 milliGRAM(s) SubCutaneous daily  famotidine Injectable 20 milliGRAM(s) IV Push daily  insulin lispro (ADMELOG) corrective regimen sliding scale   SubCutaneous every 6 hours  polyethylene glycol 3350 17 Gram(s) Oral daily  senna 2 Tablet(s) Oral at bedtime  simvastatin 20 milliGRAM(s) Oral at bedtime  sodium chloride 0.9%. 1000 milliLiter(s) (50 mL/Hr) IV Continuous <Continuous>  tamsulosin 0.4 milliGRAM(s) Oral at bedtime    MEDICATIONS  (PRN):  acetaminophen   Tablet .. 650 milliGRAM(s) Oral every 6 hours PRN Temp greater or equal to 38C (100.4F), Mild Pain (1 - 3)  ALBUTerol    90 MICROgram(s) HFA Inhaler 2 Puff(s) Inhalation every 6 hours PRN Shortness of Breath and/or Wheezing  dextrose 40% Gel 15 Gram(s) Oral once PRN Blood Glucose LESS THAN 70 milliGRAM(s)/deciliter  glucagon  Injectable 1 milliGRAM(s) IntraMuscular once PRN Glucose LESS THAN 70 milligrams/deciliter  morphine  - Injectable 2 milliGRAM(s) IV Push every 4 hours PRN Severe Pain (7 - 10)  ondansetron Injectable 4 milliGRAM(s) IV Push every 4 hours PRN Nausea and/or Vomiting  traMADol 50 milliGRAM(s) Oral every 6 hours PRN Moderate Pain (4 - 6)      REVIEW OF SYSTEMS:  CONSTITUTIONAL: No fever, weight loss, chills, shakes, or fat  RESPIRATORY: No cough, wheezing, hemoptysis, or shortness of breath  CARDIOVASCULAR: No chest pain, dyspnea, palpitations, dizziness, syncope, paroxysmal nocturnal dyspnea, orthopnea, or arm or leg swelling  GASTROINTESTINAL: No abdominal  or epigastric pain, nausea, vomiting, hematemesis, diarrhea, constipation, melena or bright red bloo  NEUROLOGICAL: No headaches, memory loss, slurred speech, limb weakness, loss of strength, numbness, or tremors  SKIN: No itching, burning, rashes, or lesions  ENDOCRINE: No heat or cold intolerance, or hair loss  MUSCULOSKELETAL: No joint pain or swelling, muscle, back, or extremity pain  HEME/LYMPH: No easy bruising or bleeding gums  ALLERY AND IMMUNOLOGIC: No hives or rash.    Vital Signs Last 24 Hrs  T(C): 36.7 (26 Oct 2020 15:03), Max: 36.9 (26 Oct 2020 09:01)  T(F): 98 (26 Oct 2020 15:03), Max: 98.5 (26 Oct 2020 09:01)  HR: 77 (26 Oct 2020 15:03) (77 - 84)  BP: 121/66 (26 Oct 2020 15:03) (112/68 - 121/66)  BP(mean): --  RR: 17 (26 Oct 2020 15:03) (17 - 17)  SpO2: 98% (26 Oct 2020 15:03) (98% - 98%)    PHYSICAL EXAM:  HEAD:  Atraumatic, Normocephalic  EYES: EOMI, PERRLA, conjunctiva and sclera clear  NECK: Supple and normal thyroid.  No JVD or carotid bruit.   HEART: S1, S2 regular , 1/6 soft ejection systolic murmur in mitral area , no thrill and no gallops .  PULMONARY: Bilateral vesicular breathing , few scattered ronchi and few scattered rales are present .  ABDOMEN: Soft nontender and positive bowl sounds   EXTREMITIES:  No clubbing, cyanosis, or pedal  edema  NEUROLOGICAL: AAOX3 , no focal deficit .    LABS:                        11.5   6.44  )-----------( 245      ( 26 Oct 2020 09:44 )             34.1     10-26    139  |  104  |  14  ----------------------------<  109<H>  4.0   |  31  |  0.61    Ca    8.8      26 Oct 2020 09:44    TPro  6.8  /  Alb  2.9<L>  /  TBili  0.6  /  DBili  x   /  AST  12<L>  /  ALT  13  /  AlkPhos  118  10-26        PT/INR - ( 26 Oct 2020 09:44 )   PT: 13.7 sec;   INR: 1.18 ratio         PTT - ( 26 Oct 2020 09:44 )  PTT:31.4 sec    BNP      EKG:  ECHO:  IMAGING:    Assessment and Plan :     Will continue to follow during hospital course and give further recommendations as needed . Thanks for your referral . if any questions please contact me at office ([phone])cell [phone]) IVETTE VAZQUEZ MD [place] [id number]  [address]  OFFICE : [phone]  CELL : [phone]    Patient is a 89y old  Male who presents with a chief complaint of Right Scrotum Abscess (26 Oct 2020 13:21)      HPI:      HPI:    89y Male for Cardiology Consult    PAST MEDICAL & SURGICAL HISTORY:  Dementia    Arteriosclerotic heart disease (ASHD)    Prostate CA    Atrial fibrillation    DM (diabetes mellitus)    Dementia    Depression    Diabetes    Hyperlipemia    CHF (congestive heart failure)    Dementia    Prostate ca    Diabetes    Hypertension    Atrial fibrillation    No significant past surgical history        FAMILY HISTORY:      SOCIAL HISTORY:   Alcohol:  Smoking:    Allergies    latex (Rash)  latex (Unknown)  No Known Drug Allergies    Intolerances        MEDICATIONS  (STANDING):  aspirin  chewable 81 milliGRAM(s) Oral daily  dextrose 5%. 1000 milliLiter(s) (50 mL/Hr) IV Continuous <Continuous>  dextrose 50% Injectable 12.5 Gram(s) IV Push once  dextrose 50% Injectable 25 Gram(s) IV Push once  dextrose 50% Injectable 25 Gram(s) IV Push once  donepezil 5 milliGRAM(s) Oral at bedtime  enoxaparin Injectable 40 milliGRAM(s) SubCutaneous daily  famotidine Injectable 20 milliGRAM(s) IV Push daily  insulin lispro (ADMELOG) corrective regimen sliding scale   SubCutaneous every 6 hours  polyethylene glycol 3350 17 Gram(s) Oral daily  senna 2 Tablet(s) Oral at bedtime  simvastatin 20 milliGRAM(s) Oral at bedtime  sodium chloride 0.9%. 1000 milliLiter(s) (50 mL/Hr) IV Continuous <Continuous>  tamsulosin 0.4 milliGRAM(s) Oral at bedtime    MEDICATIONS  (PRN):  acetaminophen   Tablet .. 650 milliGRAM(s) Oral every 6 hours PRN Temp greater or equal to 38C (100.4F), Mild Pain (1 - 3)  ALBUTerol    90 MICROgram(s) HFA Inhaler 2 Puff(s) Inhalation every 6 hours PRN Shortness of Breath and/or Wheezing  dextrose 40% Gel 15 Gram(s) Oral once PRN Blood Glucose LESS THAN 70 milliGRAM(s)/deciliter  glucagon  Injectable 1 milliGRAM(s) IntraMuscular once PRN Glucose LESS THAN 70 milligrams/deciliter  morphine  - Injectable 2 milliGRAM(s) IV Push every 4 hours PRN Severe Pain (7 - 10)  ondansetron Injectable 4 milliGRAM(s) IV Push every 4 hours PRN Nausea and/or Vomiting  traMADol 50 milliGRAM(s) Oral every 6 hours PRN Moderate Pain (4 - 6)      REVIEW OF SYSTEMS:  CONSTITUTIONAL: No fever, weight loss, chills, shakes, or fat  RESPIRATORY: No cough, wheezing, hemoptysis, or shortness of breath  CARDIOVASCULAR: No chest pain, dyspnea, palpitations, dizziness, syncope, paroxysmal nocturnal dyspnea, orthopnea, or arm or leg swelling  GASTROINTESTINAL: No abdominal  or epigastric pain, nausea, vomiting, hematemesis, diarrhea, constipation, melena or bright red bloo  NEUROLOGICAL: No headaches, memory loss, slurred speech, limb weakness, loss of strength, numbness, or tremors  SKIN: No itching, burning, rashes, or lesions  ENDOCRINE: No heat or cold intolerance, or hair loss  MUSCULOSKELETAL: No joint pain or swelling, muscle, back, or extremity pain  HEME/LYMPH: No easy bruising or bleeding gums  ALLERY AND IMMUNOLOGIC: No hives or rash.    Vital Signs Last 24 Hrs  T(C): 36.7 (26 Oct 2020 15:03), Max: 36.9 (26 Oct 2020 09:01)  T(F): 98 (26 Oct 2020 15:03), Max: 98.5 (26 Oct 2020 09:01)  HR: 77 (26 Oct 2020 15:03) (77 - 84)  BP: 121/66 (26 Oct 2020 15:03) (112/68 - 121/66)  BP(mean): --  RR: 17 (26 Oct 2020 15:03) (17 - 17)  SpO2: 98% (26 Oct 2020 15:03) (98% - 98%)    PHYSICAL EXAM:  HEAD:  Atraumatic, Normocephalic  EYES: EOMI, PERRLA, conjunctiva and sclera clear  NECK: Supple and normal thyroid.  No JVD or carotid bruit.   HEART: S1, S2 regular , 1/6 soft ejection systolic murmur in mitral area , no thrill and no gallops .  PULMONARY: Bilateral vesicular breathing , few scattered ronchi and few scattered rales are present .  ABDOMEN: Soft nontender and positive bowl sounds   EXTREMITIES:  No clubbing, cyanosis, or pedal  edema  NEUROLOGICAL: AAOX3 , no focal deficit .    LABS:                        11.5   6.44  )-----------( 245      ( 26 Oct 2020 09:44 )             34.1     10-26    139  |  104  |  14  ----------------------------<  109<H>  4.0   |  31  |  0.61    Ca    8.8      26 Oct 2020 09:44    TPro  6.8  /  Alb  2.9<L>  /  TBili  0.6  /  DBili  x   /  AST  12<L>  /  ALT  13  /  AlkPhos  118  10-26        PT/INR - ( 26 Oct 2020 09:44 )   PT: 13.7 sec;   INR: 1.18 ratio         PTT - ( 26 Oct 2020 09:44 )  PTT:31.4 sec    BNP      EKG:  ECHO:  IMAGING:    Assessment and Plan :     Will continue to follow during hospital course and give further recommendations as needed . Thanks for your referral . if any questions please contact me at office ([phone])cell [phone]) IVETTE VAZQUEZ MD [place] [address]  [address]  OFFICE : [phone]  CELL : [phone]    CARDIOLOGY CONSULT :    Patient is a 89y old  Male who presents with a chief complaint of Right Scrotum Abscess (26 Oct 2020 13:21)      HPI:      HPI:    89y Male for Cardiology Consult    PAST MEDICAL & SURGICAL HISTORY:  Dementia    Arteriosclerotic heart disease (ASHD)    Prostate CA    Atrial fibrillation    DM (diabetes mellitus)    Dementia    Depression    Diabetes    Hyperlipemia    CHF (congestive heart failure)    Dementia    Prostate ca    Diabetes    Hypertension    Atrial fibrillation    No significant past surgical history        FAMILY HISTORY:      SOCIAL HISTORY:   Alcohol:  Smoking:    Allergies    latex (Rash)  latex (Unknown)  No Known Drug Allergies    Intolerances        MEDICATIONS  (STANDING):  aspirin  chewable 81 milliGRAM(s) Oral daily  dextrose 5%. 1000 milliLiter(s) (50 mL/Hr) IV Continuous <Continuous>  dextrose 50% Injectable 12.5 Gram(s) IV Push once  dextrose 50% Injectable 25 Gram(s) IV Push once  dextrose 50% Injectable 25 Gram(s) IV Push once  donepezil 5 milliGRAM(s) Oral at bedtime  enoxaparin Injectable 40 milliGRAM(s) SubCutaneous daily  famotidine Injectable 20 milliGRAM(s) IV Push daily  insulin lispro (ADMELOG) corrective regimen sliding scale   SubCutaneous every 6 hours  polyethylene glycol 3350 17 Gram(s) Oral daily  senna 2 Tablet(s) Oral at bedtime  simvastatin 20 milliGRAM(s) Oral at bedtime  sodium chloride 0.9%. 1000 milliLiter(s) (50 mL/Hr) IV Continuous <Continuous>  tamsulosin 0.4 milliGRAM(s) Oral at bedtime    MEDICATIONS  (PRN):  acetaminophen   Tablet .. 650 milliGRAM(s) Oral every 6 hours PRN Temp greater or equal to 38C (100.4F), Mild Pain (1 - 3)  ALBUTerol    90 MICROgram(s) HFA Inhaler 2 Puff(s) Inhalation every 6 hours PRN Shortness of Breath and/or Wheezing  dextrose 40% Gel 15 Gram(s) Oral once PRN Blood Glucose LESS THAN 70 milliGRAM(s)/deciliter  glucagon  Injectable 1 milliGRAM(s) IntraMuscular once PRN Glucose LESS THAN 70 milligrams/deciliter  morphine  - Injectable 2 milliGRAM(s) IV Push every 4 hours PRN Severe Pain (7 - 10)  ondansetron Injectable 4 milliGRAM(s) IV Push every 4 hours PRN Nausea and/or Vomiting  traMADol 50 milliGRAM(s) Oral every 6 hours PRN Moderate Pain (4 - 6)  Vital Signs Last 24 Hrs  T(C): 36.7 (26 Oct 2020 15:03), Max: 36.9 (26 Oct 2020 09:01)  T(F): 98 (26 Oct 2020 15:03), Max: 98.5 (26 Oct 2020 09:01)  HR: 77 (26 Oct 2020 15:03) (77 - 84)  BP: 121/66 (26 Oct 2020 15:03) (112/68 - 121/66)  BP(mean): --  RR: 17 (26 Oct 2020 15:03) (17 - 17)  SpO2: 98% (26 Oct 2020 15:03) (98% - 98%)  LABS:                        11.5   6.44  )-----------( 245      ( 26 Oct 2020 09:44 )             34.1     10-26    139  |  104  |  14  ----------------------------<  109<H>  4.0   |  31  |  0.61    Ca    8.8      26 Oct 2020 09:44    TPro  6.8  /  Alb  2.9<L>  /  TBili  0.6  /  DBili  x   /  AST  12<L>  /  ALT  13  /  AlkPhos  118  10-26        PT/INR - ( 26 Oct 2020 09:44 )   PT: 13.7 sec;   INR: 1.18 ratio         PTT - ( 26 Oct 2020 09:44 )  PTT:31.4 sec  Assessment and Plan :   FULL CONSULT DICTATED .  89 years old male has H/O Atrial fibrillation , pacemaker done by [employer] company in 2018 , and now has groin abscess . Patient cardiac wise stable and cleared for groin surgery . Benefit of surgery outweigh the risks . [email or web address] company called to interrogate the pacemaker   Will continue to follow during hospital course and give further recommendations as needed . Thanks for your referral . if any questions please contact me at office ([phone])

## 2020-10-26 NOTE — ED ADULT NURSE NOTE - OBJECTIVE STATEMENT
Pt BIB EMS from [facility], for draining abscess to R groin noted today. No fever, nausea, vomiting diarrhea.

## 2020-10-26 NOTE — H&P ADULT - PROBLEM SELECTOR PLAN 3
obtain echo to evaluate LVEF, intravenous diuresis prn  as per card consult , monitor ins/outs, monitor renal profile and electrolytes closely ,send 3 sets of enzymes, O2 supply, serial chest xrays, monitor weights and oral intake of fluids, nutritionist consult

## 2020-10-26 NOTE — H&P ADULT - HISTORY OF PRESENT ILLNESS
89 y o WM  presents sent to ED from [facility] for evaluation of  R groin abscess for few days. now has opened area and has foul smelling discharge. Denies pain  No known fever/chills. no trauma. Patient is   poor historian,  PMD Dr Quintana,  no anticoagulants noted in  patient med list ,patient was admitted with GIB last year and BASA was stopped at that time .His  PMH is significant for :  Arteriosclerotic heart disease (ASHD)  Atrial fibrillation  ,CHF (congestive heart failure)  ,Dementia  ,Depression  ,DM (diabetes mellitus)  ,Hyperlipemia  ,Hypertension  ,Prostate ca . and ID consult called Seen by Dr Devries  ,no intereventions required ,continue topical care and iv abx Admitted for septic workup and evaluation,send blood and urine cx,serial lactate levels,monitor vitals closley,ivfs hydration,monitor urine output and renal profile,iv abx initiated -given vanco and zosyn in ER .Chest xray showed PVC and cardiology consult called Admit to monitored unit for cardiac monitoring, obtain echo to evaluate LVEF, intravenous diuresis  prn as per card consult , monitor ins/outs, monitor renal profile and electrolytes closely ,send 3 sets of enzymes, O2 supply, serial chest xrays, monitor weights and oral intake of fluids, nutritionist consult Palliative care consult requested ,to discuss advance directives and complete MOLST

## 2020-10-26 NOTE — H&P ADULT - ATTENDING COMMENTS
89 y o WM  presents sent to ED from [facility] for evaluation of  R groin abscess for few days. now has opened area and has foul smelling discharge. Denies pain  No known fever/chills. no trauma. Patient is   poor historian,  PMD Dr Quintana,  no anticoagulants noted in  patient med list ,patient was admitted with GIB last year and BASA was stopped at that time .His  PMH is significant for :  Arteriosclerotic heart disease (ASHD)  Atrial fibrillation  ,CHF (congestive heart failure)  ,Dementia  ,Depression  ,DM (diabetes mellitus)  ,Hyperlipemia  ,Hypertension  ,Prostate ca . and ID consult called Seen by Dr Devries  ,no intereventions required ,continue topical care and iv abx Admitted for septic workup and evaluation,send blood and urine cx,serial lactate levels,monitor vitals closley,ivfs hydration,monitor urine output and renal profile,iv abx initiated -given vanco and zosyn in ER .Chest xray showed PVC and cardiology consult called Admit to monitored unit for cardiac monitoring, obtain echo to evaluate LVEF, intravenous diuresis  prn as per card consult , monitor ins/outs, monitor renal profile and electrolytes closely ,send 3 sets of enzymes, O2 supply, serial chest xrays, monitor weights and oral intake of fluids, nutritionist consult Palliative care consult requested ,to discuss advance directives and complete MOLST

## 2020-10-26 NOTE — ED PROVIDER NOTE - PHYSICAL EXAMINATION
R groin: nl penis / scrotum - behind R sided scrotum with 3-4 cm area with erythema / warmth  - firmness with open area with small pus. Pos foul smelling.

## 2020-10-26 NOTE — CONSULT NOTE ADULT - SUBJECTIVE AND OBJECTIVE BOX
89y year old Male seen at \[facility]\"" APER 02 for B/l Pressure Heel Ulcers. The patient has a hx of Dementia and unable to answer how long the wound has been there. Denies any fever, chills, nausea, vomiting, chest pain, shortness of breath, or calf pain at this time.    REVIEW OF SYSTEMS    PAST MEDICAL & SURGICAL HISTORY:  Dementia    Arteriosclerotic heart disease (ASHD)    Prostate CA    Atrial fibrillation    DM (diabetes mellitus)    Dementia    Depression    Diabetes    Hyperlipemia    CHF (congestive heart failure)    Dementia    Prostate ca    Diabetes    Hypertension    Atrial fibrillation    No significant past surgical history        Allergies    latex (Rash)  latex (Unknown)  No Known Drug Allergies    Intolerances        MEDICATIONS  (STANDING):  dextrose 5%. 1000 milliLiter(s) (50 mL/Hr) IV Continuous <Continuous>  dextrose 50% Injectable 12.5 Gram(s) IV Push once  dextrose 50% Injectable 25 Gram(s) IV Push once  dextrose 50% Injectable 25 Gram(s) IV Push once  famotidine Injectable 20 milliGRAM(s) IV Push daily  insulin lispro (ADMELOG) corrective regimen sliding scale   SubCutaneous every 6 hours  sodium chloride 0.9%. 1000 milliLiter(s) (50 mL/Hr) IV Continuous <Continuous>    MEDICATIONS  (PRN):  acetaminophen   Tablet .. 650 milliGRAM(s) Oral every 6 hours PRN Temp greater or equal to 38C (100.4F), Mild Pain (1 - 3)  dextrose 40% Gel 15 Gram(s) Oral once PRN Blood Glucose LESS THAN 70 milliGRAM(s)/deciliter  glucagon  Injectable 1 milliGRAM(s) IntraMuscular once PRN Glucose LESS THAN 70 milligrams/deciliter  morphine  - Injectable 2 milliGRAM(s) IV Push every 4 hours PRN Severe Pain (7 - 10)  ondansetron Injectable 4 milliGRAM(s) IV Push every 4 hours PRN Nausea and/or Vomiting  traMADol 50 milliGRAM(s) Oral every 6 hours PRN Moderate Pain (4 - 6)      Social History:      FAMILY HISTORY:      Vital Signs Last 24 Hrs  T(C): 36.9 (26 Oct 2020 09:01), Max: 36.9 (26 Oct 2020 09:01)  T(F): 98.5 (26 Oct 2020 09:01), Max: 98.5 (26 Oct 2020 09:01)  HR: 84 (26 Oct 2020 09:01) (84 - 84)  BP: 112/68 (26 Oct 2020 09:01) (112/68 - 112/68)  BP(mean): --  RR: 17 (26 Oct 2020 09:01) (17 - 17)  SpO2: 98% (26 Oct 2020 09:01) (98% - 98%)    PHYSICAL EXAM:  Vascular: DP & PT non palpable bilaterally secondary to non pitting edema b/l L>R, Capillary refill 3 seconds, No pedal hair present   Neurological: Gross epicritic sensation intact   Musculoskeletal: Unable to evaluate muscle strength  Dermatological:   1. Stage II Pressure Ulcer Right posterior heel- size 0.3cmx0.2cmx0.1cm- red granular base- hyperkeratotic borders, does not probe to bone, no undermining, no tunneling, no mal odor, no active drainage, no signs of infection  2. Stage 2 pressure ulcer Left heel- size 1.0cmx0.5cmx0.1cm- red granular base - hyperkeratotic borders, does not probe to bone, no undemining, no tunneling, no malodor or active drainage, no signs of infection       CBC Full  -  ( 26 Oct 2020 09:44 )  WBC Count : 6.44 K/uL  RBC Count : 3.86 M/uL  Hemoglobin : 11.5 g/dL  Hematocrit : 34.1 %  Platelet Count - Automated : 245 K/uL  Mean Cell Volume : 88.3 fl  Mean Cell Hemoglobin : 29.8 pg  Mean Cell Hemoglobin Concentration : 33.7 gm/dL  Auto Neutrophil # : 4.92 K/uL  Auto Lymphocyte # : 0.86 K/uL  Auto Monocyte # : 0.59 K/uL  Auto Eosinophil # : 0.03 K/uL  Auto Basophil # : 0.02 K/uL  Auto Neutrophil % : 76.3 %  Auto Lymphocyte % : 13.4 %  Auto Monocyte % : 9.2 %  Auto Eosinophil % : 0.5 %  Auto Basophil % : 0.3 %      ----------CHEM PANEL----------    10-26    139  |  104  |  14  ----------------------------<  109<H>  4.0   |  31  |  0.61    Ca    8.8      26 Oct 2020 09:44    TPro  6.8  /  Alb  2.9<L>  /  TBili  0.6  /  DBili  x   /  AST  12<L>  /  ALT  13  /  AlkPhos  118  10-26      PT/INR - ( 26 Oct 2020 09:44 )   PT: 13.7 sec;   INR: 1.18 ratio         PTT - ( 26 Oct 2020 09:44 )  PTT:31.4 sec

## 2020-10-26 NOTE — CONSULT NOTE ADULT - ASSESSMENT
TITO,  89  10/16/1926 DOA 10/26/2020 DR ARCADIO FRIAS  CONTACT Vinicio Bradford [phone]      REVIEW OF SYMPTOMS      Able to give ROS  NO     PHYSICAL EXAM    HEENT Unremarkable PERRLA atraumatic   RESP Fair air entry EXP prolonged    Harsh breath sound Resp distres mild   CARDIAC S1 S2 No S3     NO JVD    ABDOMEN SOFT BS PRESENT NOT DISTENDED No hepatosplenomegaly PEDAL EDEMA present No calf tenderness  NO rash     PT DATA/BEST PRACTICE  ALLERGY     latex          WT              10/26/2020 68 k                   BMI                10/26/2020 20                         ADVANCED DIRECTIVE     HEAD OF BED ELEVATION Yes      DVT PROPHYLAXIS.    lvnx 40 (10/26/2020)     DIET. dys 3 soft thin (10/26)                                                                  SEGUNDO PROPHYLAXIS.   INFECTION PPLX                                                    OXYGENATION.   10/26/2020 ra 98%       IV F. VITALS.   IV NS 50 (10/26/2020)   10/26/2020 afeb 84 112/68            LABS.   W 10/26/2020 w 6.4   Hb 10/26/2020 Hb 11.5   INR 10/26/2020    Na 10/26/2020 Na 139   Cr 10/26/2020 Cr .6       IMAGING.   CXR 10/26/2020 cxr mild chf              MEDS.   ASA 81 (10/26/2020)   ALBUTEROL 2.6P (10/26/2020)  DONEPEZIL 5 (10/26/2020)   FAMOTIDINE 20 (10/26/2020)   MS (10/26/2020)   SIMVASTAT 20 (10/26/2020)              PATIENT SUMMARY.   88 m retd [employer] employee PMH  subcm lung nodules 10/21/2014 CT Ch  1 cm hypoattenuating thyroid nodule  Trace symmetrical layering bl pl effusions with dependent atelectasis bases  Ca granuloma both RML and l lower lobes  5 mm subpleural parenchymal nodule R apex   sCHF 10/24/2014 ECHO EF 50% Mild hypokinesia septum PASP 40 Mod MR    A fib  (on coumadin which was dced 2019 when he developed hematuria ) Htn Depression l leg lymphedema ( remote injury) OBS  was admitted 10/26/2020 with groin abscess draining pus and Pulm consulted 10/26/2020 for ho copd lung nodule    home meds Tamsulosin .4 duoneb asa pulmicort asa donepezil 5 simvastat 20 metformin       PROBLEM ASSESSMENT RECOMMENDATIONS .   PRESSURE ULCER HEEL L STAGE 2 Seen 10/26/2020 Dr Wong   PRESSURE ULCER HEEL R STAGE 2 Seen 10/26/2020 Dr Wong   SCROTAL ABSCESS NOTED 10/26/2020 Vanco ns given in er 10/26/2020   A/R 10/26/2020 Needs  and ID asap   COPD Cont meds  CHF CXR 10/26/2020 cxr mild chf   ECHO 2/1/2019 echo EF 35% pasp 40   LUNG NODULE Elective ct  ch  A fib  On ASA Cont rx     TIME SPENT   Over 55 minutes aggregate care time spent on encounter; activities included   direct patient care, counseling and/or coordinating care reviewing notes, lab data/ imaging , discussion with multidisciplinary team/ patient  /family and explaining in detail risks, benefits, alternatives  of the recommendations     TERESA FRANCIS [phone]  10/16/1926 DOA 10/26/2020 DR ARCADIO FRIAS  CONTACT Vinicio Bradford [phone]

## 2020-10-26 NOTE — CONSULT NOTE ADULT - SUBJECTIVE AND OBJECTIVE BOX
HPI:  89 y/o with hx of Ca Prostate Dementia DM CHF presented to the hospital with cc of right scrotal abscess + purulent drainage, Seen by Urology and cultures sent. No fever chills n/v/d CP SOB. Pt is nonverbal and unable to answer questions.     Infectious Disease consult was called to evaluate pt and for antibiotic management.    Past Medical & Surgical Hx:  PAST MEDICAL & SURGICAL HISTORY:  Dementia  Arteriosclerotic heart disease (ASHD)  Prostate CA  Atrial fibrillation  DM (diabetes mellitus)  Depression  Hyperlipemia  CHF (congestive heart failure)  No significant past surgical history      Social History--  EtOH: denies   Tobacco: denies   Drug Use: denies     FAMILY HISTORY:      Allergies  latex (Rash)  latex (Unknown)  No Known Drug Allergies    Intolerances  NONE      Home Medications:  acetaminophen 500 mg oral tablet: 1 tab(s) orally every 6 hours, As Needed (26 Oct 2020 12:39)  aspirin 81 mg oral tablet: 1 tab(s) orally once a day RESUME IN ONE WEEK IF OK WITH PCP  (26 Oct 2020 12:39)  donepezil 5 mg oral tablet: 1 tab(s) orally once a day (at bedtime) (26 Oct 2020 12:39)  metFORMIN 500 mg oral tablet: 1 tab(s) orally 2 times a day (with meals) (26 Oct 2020 12:39)  simvastatin 20 mg oral tablet: 1 tab(s) orally once a day (at bedtime) (26 Oct 2020 12:39)    Current Inpatient Medications :    ANTIBIOTICS:   piperacillin/tazobactam IVPB.. 3.375 Gram(s) IV Intermittent every 8 hours      OTHER RELEVANT MEDICATIONS :  acetaminophen   Tablet .. 650 milliGRAM(s) Oral every 6 hours PRN  ALBUTerol    90 MICROgram(s) HFA Inhaler 2 Puff(s) Inhalation every 6 hours PRN  aspirin  chewable 81 milliGRAM(s) Oral daily  dextrose 40% Gel 15 Gram(s) Oral once PRN  dextrose 5% + sodium chloride 0.45%. 1000 milliLiter(s) IV Continuous <Continuous>  dextrose 5%. 1000 milliLiter(s) IV Continuous <Continuous>  dextrose 50% Injectable 12.5 Gram(s) IV Push once  dextrose 50% Injectable 25 Gram(s) IV Push once  dextrose 50% Injectable 25 Gram(s) IV Push once  donepezil 5 milliGRAM(s) Oral at bedtime  enoxaparin Injectable 40 milliGRAM(s) SubCutaneous daily  famotidine Injectable 20 milliGRAM(s) IV Push daily  glucagon  Injectable 1 milliGRAM(s) IntraMuscular once PRN  insulin lispro (ADMELOG) corrective regimen sliding scale   SubCutaneous every 6 hours  morphine  - Injectable 2 milliGRAM(s) IV Push every 4 hours PRN  ondansetron Injectable 4 milliGRAM(s) IV Push every 4 hours PRN  polyethylene glycol 3350 17 Gram(s) Oral daily  senna 2 Tablet(s) Oral at bedtime  simvastatin 20 milliGRAM(s) Oral at bedtime  tamsulosin 0.4 milliGRAM(s) Oral at bedtime  traMADol 50 milliGRAM(s) Oral every 6 hours PRN      ROS:  Unable to obtain due to : pt's condition      Physical Exam:  Vital Signs Last 24 Hrs  T(C): 36.9 (26 Oct 2020 17:17), Max: 36.9 (26 Oct 2020 09:01)  T(F): 98.4 (26 Oct 2020 17:17), Max: 98.5 (26 Oct 2020 09:01)  HR: 75 (26 Oct 2020 17:17) (75 - 84)  BP: 117/62 (26 Oct 2020 17:17) (112/68 - 123/71)  RR: 17 (26 Oct 2020 17:17) (16 - 17)  SpO2: 97% (26 Oct 2020 17:17) (97% - 98%)  Height (cm): 182.9 (10-26 @ 09:01)  Weight (kg): 68 (10-26 @ 09:01)  BMI (kg/m2): 20.3 (10-26 @ 09:01)  BSA (m2): 1.88 (10-26 @ 09:01)    General: no acute distress  Eyes: sclera anicteric, pupils equal and reactive to light  ENMT: buccal mucosa moist, pharynx not injected  Neck: supple, trachea midline  Lungs: clear, no wheeze/rhonchi  Cardiovascular: regular rate and rhythm, S1 S2  Abdomen: soft, nontender, bowel sounds normal  : right scrotal swelling with abscess + purulent drainage  Neurological:  awake nonverbal  Skin:no increased ecchymosis/petechiae/purpura  Extremities: Jaun heel ulcers c/d/i    Labs:                         11.5   6.44  )-----------( 245      ( 26 Oct 2020 09:44 )             34.1   10-26    139  |  104  |  14  ----------------------------<  109<H>  4.0   |  31  |  0.61    Ca    8.8      26 Oct 2020 09:44    TPro  6.8  /  Alb  2.9<L>  /  TBili  0.6  /  DBili  x   /  AST  12<L>  /  ALT  13  /  AlkPhos  118  10-26      RECENT CULTURES:          RADIOLOGY & ADDITIONAL STUDIES:  EXAM:  XR CHEST PORTABLE IMMED 1V                            PROCEDURE DATE:  10/26/2020          INTERPRETATION:  AP semierect chest on October 26, 2020 at 10:22 AM. Patient has a right groin infection.    Heart enlargement and left-sided pacemaker again noted.    There are slightly increased central vascular markings left greater the right this suggests CHF. This has increased from July 25, 2019.    IMPRESSION: Mild central CHF presently suspect. Heart enlargement and pacemaker again noted.    Assessment :   89 y/o with hx of Ca Prostate Dementia DM CHF admitted with right scrotal abscess + purulent drainage, Seen by Urology and cultures sent.      Plan :       Continue with present regime .  Approptiate use of antibiotics and adverse effects reviewed.      I have discussed the above plan of care with patient/family in detail. They expressed understanding of the treatment plan . Risks, benefits and alternatives discussed in detail. I have asked if they have any questions or concerns and appropriately addressed them to the best of my ability .      > 45 minutes spent in direct patient care reviewing  the notes, lab data/ imaging , discussion with multidisciplinary team. All questions were addressed and answered to the best of my capacity .    Thank you for allowing me to participate in the care of your patient .      Heber Issa MD  Infectious Disease  [phone]   HPI:  89 y/o with hx of Ca Prostate Dementia DM CHF presented to the hospital with cc of right scrotal abscess + purulent drainage, Seen by Urology and cultures sent. No fever chills n/v/d CP SOB. Pt is nonverbal and unable to answer questions.     Infectious Disease consult was called to evaluate pt and for antibiotic management.    Past Medical & Surgical Hx:  PAST MEDICAL & SURGICAL HISTORY:  Dementia  Arteriosclerotic heart disease (ASHD)  Prostate CA  Atrial fibrillation  DM (diabetes mellitus)  Depression  Hyperlipemia  CHF (congestive heart failure)  No significant past surgical history      Social History--  EtOH: denies   Tobacco: denies   Drug Use: denies     FAMILY HISTORY:      Allergies  latex (Rash)  latex (Unknown)  No Known Drug Allergies    Intolerances  NONE      Home Medications:  acetaminophen 500 mg oral tablet: 1 tab(s) orally every 6 hours, As Needed (26 Oct 2020 12:39)  aspirin 81 mg oral tablet: 1 tab(s) orally once a day RESUME IN ONE WEEK IF OK WITH PCP  (26 Oct 2020 12:39)  donepezil 5 mg oral tablet: 1 tab(s) orally once a day (at bedtime) (26 Oct 2020 12:39)  metFORMIN 500 mg oral tablet: 1 tab(s) orally 2 times a day (with meals) (26 Oct 2020 12:39)  simvastatin 20 mg oral tablet: 1 tab(s) orally once a day (at bedtime) (26 Oct 2020 12:39)    Current Inpatient Medications :    ANTIBIOTICS:   piperacillin/tazobactam IVPB.. 3.375 Gram(s) IV Intermittent every 8 hours      OTHER RELEVANT MEDICATIONS :  acetaminophen   Tablet .. 650 milliGRAM(s) Oral every 6 hours PRN  ALBUTerol    90 MICROgram(s) HFA Inhaler 2 Puff(s) Inhalation every 6 hours PRN  aspirin  chewable 81 milliGRAM(s) Oral daily  dextrose 40% Gel 15 Gram(s) Oral once PRN  dextrose 5% + sodium chloride 0.45%. 1000 milliLiter(s) IV Continuous <Continuous>  dextrose 5%. 1000 milliLiter(s) IV Continuous <Continuous>  dextrose 50% Injectable 12.5 Gram(s) IV Push once  dextrose 50% Injectable 25 Gram(s) IV Push once  dextrose 50% Injectable 25 Gram(s) IV Push once  donepezil 5 milliGRAM(s) Oral at bedtime  enoxaparin Injectable 40 milliGRAM(s) SubCutaneous daily  famotidine Injectable 20 milliGRAM(s) IV Push daily  glucagon  Injectable 1 milliGRAM(s) IntraMuscular once PRN  insulin lispro (ADMELOG) corrective regimen sliding scale   SubCutaneous every 6 hours  morphine  - Injectable 2 milliGRAM(s) IV Push every 4 hours PRN  ondansetron Injectable 4 milliGRAM(s) IV Push every 4 hours PRN  polyethylene glycol 3350 17 Gram(s) Oral daily  senna 2 Tablet(s) Oral at bedtime  simvastatin 20 milliGRAM(s) Oral at bedtime  tamsulosin 0.4 milliGRAM(s) Oral at bedtime  traMADol 50 milliGRAM(s) Oral every 6 hours PRN      ROS:  Unable to obtain due to : pt's condition      Physical Exam:  Vital Signs Last 24 Hrs  T(C): 36.9 (26 Oct 2020 17:17), Max: 36.9 (26 Oct 2020 09:01)  T(F): 98.4 (26 Oct 2020 17:17), Max: 98.5 (26 Oct 2020 09:01)  HR: 75 (26 Oct 2020 17:17) (75 - 84)  BP: 117/62 (26 Oct 2020 17:17) (112/68 - 123/71)  RR: 17 (26 Oct 2020 17:17) (16 - 17)  SpO2: 97% (26 Oct 2020 17:17) (97% - 98%)  Height (cm): 182.9 (10-26 @ 09:01)  Weight (kg): 68 (10-26 @ 09:01)  BMI (kg/m2): 20.3 (10-26 @ 09:01)  BSA (m2): 1.88 (10-26 @ 09:01)    General: no acute distress  Eyes: sclera anicteric, pupils equal and reactive to light  ENMT: buccal mucosa moist, pharynx not injected  Neck: supple, trachea midline  Lungs: clear, no wheeze/rhonchi  Cardiovascular: regular rate and rhythm, S1 S2  Abdomen: soft, nontender, bowel sounds normal  : right scrotal swelling with abscess + purulent drainage  Neurological:  awake nonverbal  Skin:no increased ecchymosis/petechiae/purpura  Extremities: Jaun heel ulcers c/d/i    Labs:                         11.5   6.44  )-----------( 245      ( 26 Oct 2020 09:44 )             34.1   10-26    139  |  104  |  14  ----------------------------<  109<H>  4.0   |  31  |  0.61    Ca    8.8      26 Oct 2020 09:44    TPro  6.8  /  Alb  2.9<L>  /  TBili  0.6  /  DBili  x   /  AST  12<L>  /  ALT  13  /  AlkPhos  118  10-26      RECENT CULTURES:          RADIOLOGY & ADDITIONAL STUDIES:  EXAM:  XR CHEST PORTABLE IMMED 1V                            PROCEDURE DATE:  10/26/2020          INTERPRETATION:  AP semierect chest on October 26, 2020 at 10:22 AM. Patient has a right groin infection.    Heart enlargement and left-sided pacemaker again noted.    There are slightly increased central vascular markings left greater the right this suggests CHF. This has increased from July 25, 2019.    IMPRESSION: Mild central CHF presently suspect. Heart enlargement and pacemaker again noted.    Assessment :   89 y/o with hx of Ca Prostate Dementia DM CHF admitted with right scrotal abscess + purulent drainage, Seen by Urology and cultures sent.      Plan :   Cont Zosyn  Fu cultures  Trend temps and cbc  Asp precautions    D/w Dr Ring      Continue with present regime .  Approptiate use of antibiotics and adverse effects reviewed.      I have discussed the above plan of care with patient/family in detail. They expressed understanding of the treatment plan . Risks, benefits and alternatives discussed in detail. I have asked if they have any questions or concerns and appropriately addressed them to the best of my ability .      > 45 minutes spent in direct patient care reviewing  the notes, lab data/ imaging , discussion with multidisciplinary team. All questions were addressed and answered to the best of my capacity .    Thank you for allowing me to participate in the care of your patient .      Heber Issa MD  Infectious Disease  [phone]

## 2020-10-26 NOTE — H&P ADULT - NSHPLABSRESULTS_GEN_ALL_CORE
< from: Xray Chest 1 View-PORTABLE IMMEDIATE (10.26.20 @ 10:35) >      PROCEDURE DATE:  10/26/2020          INTERPRETATION:  AP semierect chest on October 26, 2020 at 10:22 AM. Patient has a right groin infection.    Heart enlargement and left-sided pacemaker again noted.    There are slightly increased central vascular markings left greater the right this suggests CHF. This has increased from July 25, 2019.    IMPRESSION: Mild central CHF presently suspect. Heart enlargement and pacemaker again noted.    < end of copied text >    < from: TTE Echo Doppler w/o Cont (01.31.20 @ 10:54) >    FINDINGS  Left Ventricle: Had normal cavity size and wall thickness. There was normal overall LV systolic function and wall motion. Ejection fraction was55%  Aortic Valve: Appeared mild to moderately sclerotic. Doppler exam was unremarkable  Mitral Valve: Had normal structure and motion. Doppler exam revealed mild MR  Tricuspid Valve: Had normal structure and motion. Doppler exam revealed mild to moderate TR with a peak RVSP of 33 mmHg  Pulmonic Valve: Was not well visualized. Doppler exam revealed trace NJ  Left Atrium: Was mild to moderately dilated  Right Ventricle: Had normal structure and motion. A pacemaker wire was seen traversing the right heart.  Right Atrium: Was of normal size  Diastolic Function: Could not be calculated because of atrial fibrillation  Pericardium/Pleura: Subcostal view was suboptimal however no obvious pericardial effusion was noted      CONCLUSIONS:  This echo demonstrates normal LV systolic function, ejection fraction 55%, trace NJ, mild MR, mild to moderate TR, normal RVSP, aortic sclerosis and presence of pacemaker wire.    < end of copied text >

## 2020-10-26 NOTE — ED PROVIDER NOTE - OBJECTIVE STATEMENT
89 yo M p/w reportedly has had R groin abscess for few days. now has opened and has foul smelling discharge. no acute pain,  No known fever/chills. no trauma. No other hx avail

## 2020-10-26 NOTE — ED ADULT TRIAGE NOTE - CHIEF COMPLAINT QUOTE
SHASHANK from [facility], [place] fellowship.  R groin abcess with foul smeeling fluid draining.

## 2020-10-26 NOTE — ED ADULT NURSE NOTE - CHIEF COMPLAINT QUOTE
SHASHANK from [facility], [place] fellowship.  R groin abcess with foul smeeling fluid draining.

## 2020-10-26 NOTE — CONSULT NOTE ADULT - SUBJECTIVE AND OBJECTIVE BOX
Chief Complaint: Right groin abscess, bilateral heel pressure ulcers.     HPI: Patient is bed ridden, was found to have right groin abscess, was sent to ER.     PAST MEDICAL & SURGICAL HISTORY:  Dementia    Arteriosclerotic heart disease (ASHD)    Prostate CA    Atrial fibrillation    DM (diabetes mellitus)    Dementia    Depression    Diabetes    Hyperlipemia    CHF (congestive heart failure)    Dementia    Prostate ca    Diabetes    Hypertension    Atrial fibrillation    No significant past surgical history        Allergies    latex (Rash)  latex (Unknown)  No Known Drug Allergies    Intolerances        MEDICATIONS  (STANDING):    MEDICATIONS  (PRN):      FAMILY HISTORY:          ROS:  CONSTITUTIONAL: No fever, weight loss, or fatigue  EYES: No eye pain, visual disturbances, or discharge  ENMT:  No difficulty hearing, tinnitus, vertigo; No sinus or throat pain  NECK: No pain or stiffness  BREASTS: No pain, masses, or nipple discharge  RESPIRATORY: No cough, wheezing, chills or hemoptysis; No shortness of breath  CARDIOVASCULAR: No chest pain, palpitations, dizziness, or leg swelling  GASTROINTESTINAL: No abdominal or epigastric pain. No nausea, vomiting, or hematemesis; No diarrhea or constipation. No melena or hematochezia.  GENITOURINARY: No dysuria, frequency, hematuria, or incontinence  NEUROLOGICAL: No headaches, memory loss, loss of strength, numbness, or tremors  SKIN: No itching, burning, rashes, or lesions   LYMPH NODES: No enlarged glands  ENDOCRINE: No heat or cold intolerance; No hair loss  MUSCULOSKELETAL: No joint pain or swelling; No muscle, back, or extremity pain  PSYCHIATRIC: No depression, anxiety, mood swings, or difficulty sleeping  HEME/LYMPH: No easy bruising, or bleeding gums  ALLERGY AND IMMUNOLOGIC: No hives or eczema    PHYSICAL EXAM-    Height (cm): 182.9 (10-26 @ 09:01)  Weight (kg): 68 (10-26 @ 09:01)  BMI (kg/m2): 20.3 (10-26 @ 09:01)  Vital Signs Last 24 Hrs  T(C): 36.9 (26 Oct 2020 09:01), Max: 36.9 (26 Oct 2020 09:01)  T(F): 98.5 (26 Oct 2020 09:01), Max: 98.5 (26 Oct 2020 09:01)  HR: 84 (26 Oct 2020 09:01) (84 - 84)  BP: 112/68 (26 Oct 2020 09:01) (112/68 - 112/68)  BP(mean): --  RR: 17 (26 Oct 2020 09:01) (17 - 17)  SpO2: 98% (26 Oct 2020 09:01) (98% - 98%)    Constitutional: well developed, well nourished, no apparent distress, alert, patient is bed ridden, minimally   Neck: Supple   Pulmonary: no respiratory distress, normal respiratory rhythm and effort, lungs are clear to auscultation/percussion. No CVA tenderness.  Cardiovascular: heart rate normal, normal sinus rhythm; no murmurs, gallops, rubs, heaves or thrills   Abdomen: soft, non-tender, +BS, no guarding/rebound/rigidity.  Vascular: Lower extremities are well perfused.   Extremities:  Skin: Right groin with draining purulent material from upper part of right scrotum, mild cellulitis, bilateral heels with stage 2 pressure ulcers, ulcers are dry, no drainage, no infection.                             11.5   6.44  )-----------( 245      ( 26 Oct 2020 09:44 )             34.1     10-26    139  |  104  |  14  ----------------------------<  109<H>  4.0   |  31  |  0.61    Ca    8.8      26 Oct 2020 09:44    TPro  6.8  /  Alb  2.9<L>  /  TBili  0.6  /  DBili  x   /  AST  12<L>  /  ALT  13  /  AlkPhos  118  10-26      Radiology:

## 2020-10-26 NOTE — ED ADULT NURSE NOTE - CHPI ED NUR SYMPTOMS NEG
no rectal pain/no fever/no bleeding at site/no blood in mucus/no chills/no purulent drainage/no redness/no vomiting

## 2020-10-26 NOTE — CONSULT NOTE ADULT - SUBJECTIVE AND OBJECTIVE BOX
CHIEF COMPLAINT: groin abcess    HISTORY OF PRESENT ILLNESS: 89 y/o with hx of Ca Prostate rxed in 90's with RT. He was brought to ED after abcess in groin opened after it was there for a few days    PAST MEDICAL & SURGICAL HISTORY:  Dementia    Arteriosclerotic heart disease (ASHD)    Prostate CA    Atrial fibrillation    DM (diabetes mellitus)    Dementia    Depression    Diabetes    Hyperlipemia    CHF (congestive heart failure)    Dementia    Prostate ca    Diabetes    Hypertension    Atrial fibrillation    No significant past surgical history        REVIEW OF SYSTEMS:  unable 2/2 dementia    MEDICATIONS  (STANDING):  dextrose 5%. 1000 milliLiter(s) (50 mL/Hr) IV Continuous <Continuous>  dextrose 50% Injectable 12.5 Gram(s) IV Push once  dextrose 50% Injectable 25 Gram(s) IV Push once  dextrose 50% Injectable 25 Gram(s) IV Push once  donepezil 5 milliGRAM(s) Oral at bedtime  famotidine Injectable 20 milliGRAM(s) IV Push daily  insulin lispro (ADMELOG) corrective regimen sliding scale   SubCutaneous every 6 hours  polyethylene glycol 3350 17 Gram(s) Oral daily  senna 2 Tablet(s) Oral at bedtime  simvastatin 20 milliGRAM(s) Oral at bedtime  sodium chloride 0.9%. 1000 milliLiter(s) (50 mL/Hr) IV Continuous <Continuous>  tamsulosin 0.4 milliGRAM(s) Oral at bedtime    MEDICATIONS  (PRN):  acetaminophen   Tablet .. 650 milliGRAM(s) Oral every 6 hours PRN Temp greater or equal to 38C (100.4F), Mild Pain (1 - 3)  dextrose 40% Gel 15 Gram(s) Oral once PRN Blood Glucose LESS THAN 70 milliGRAM(s)/deciliter  glucagon  Injectable 1 milliGRAM(s) IntraMuscular once PRN Glucose LESS THAN 70 milligrams/deciliter  morphine  - Injectable 2 milliGRAM(s) IV Push every 4 hours PRN Severe Pain (7 - 10)  ondansetron Injectable 4 milliGRAM(s) IV Push every 4 hours PRN Nausea and/or Vomiting  traMADol 50 milliGRAM(s) Oral every 6 hours PRN Moderate Pain (4 - 6)      Allergies    latex (Rash)  latex (Unknown)  No Known Drug Allergies    Intolerances        SOCIAL HISTORY:    FAMILY HISTORY:      Vital Signs Last 24 Hrs  T(C): 36.9 (26 Oct 2020 09:01), Max: 36.9 (26 Oct 2020 09:01)  T(F): 98.5 (26 Oct 2020 09:01), Max: 98.5 (26 Oct 2020 09:01)  HR: 84 (26 Oct 2020 09:01) (84 - 84)  BP: 112/68 (26 Oct 2020 09:01) (112/68 - 112/68)  BP(mean): --  RR: 17 (26 Oct 2020 09:01) (17 - 17)  SpO2: 98% (26 Oct 2020 09:01) (98% - 98%)    PHYSICAL EXAM:    Constitutional: NAD, well-developed  HEENT: MARGRET, EOMI, Normal Hearing, MMM  Neck: No LAD, No JVD  Back: Normal spine flexure, No CVA tenderness  Respiratory: not using accessory muscles  Abd: BS+, soft, NT/ND, No CVAT  : Normal phallus,open meatus,bilateral descended testes, no masses  Rectal:  Extremities: No peripheral edema  Vascular: 2+ peripheral pulses  Skin: No rashes    LABS:                        11.5   6.44  )-----------( 245      ( 26 Oct 2020 09:44 )             34.1     10-26    139  |  104  |  14  ----------------------------<  109<H>  4.0   |  31  |  0.61    Ca    8.8      26 Oct 2020 09:44    TPro  6.8  /  Alb  2.9<L>  /  TBili  0.6  /  DBili  x   /  AST  12<L>  /  ALT  13  /  AlkPhos  118  10-26    PT/INR - ( 26 Oct 2020 09:44 )   PT: 13.7 sec;   INR: 1.18 ratio         PTT - ( 26 Oct 2020 09:44 )  PTT:31.4 sec    Urine Culture:     RADIOLOGY & ADDITIONAL STUDIES: CHIEF COMPLAINT: groin abcess    HISTORY OF PRESENT ILLNESS: 89 y/o with hx of Ca Prostate rxed in 90's with RT. He was brought to ED after abcess in groin opened after it was there for a few days    PAST MEDICAL & SURGICAL HISTORY:  Dementia    Arteriosclerotic heart disease (ASHD)    Prostate CA    Atrial fibrillation    DM (diabetes mellitus)    Dementia    Depression    Diabetes    Hyperlipemia    CHF (congestive heart failure)    Dementia    Prostate ca    Diabetes    Hypertension    Atrial fibrillation    No significant past surgical history        REVIEW OF SYSTEMS:  unable 2/2 dementia    MEDICATIONS  (STANDING):  dextrose 5%. 1000 milliLiter(s) (50 mL/Hr) IV Continuous <Continuous>  dextrose 50% Injectable 12.5 Gram(s) IV Push once  dextrose 50% Injectable 25 Gram(s) IV Push once  dextrose 50% Injectable 25 Gram(s) IV Push once  donepezil 5 milliGRAM(s) Oral at bedtime  famotidine Injectable 20 milliGRAM(s) IV Push daily  insulin lispro (ADMELOG) corrective regimen sliding scale   SubCutaneous every 6 hours  polyethylene glycol 3350 17 Gram(s) Oral daily  senna 2 Tablet(s) Oral at bedtime  simvastatin 20 milliGRAM(s) Oral at bedtime  sodium chloride 0.9%. 1000 milliLiter(s) (50 mL/Hr) IV Continuous <Continuous>  tamsulosin 0.4 milliGRAM(s) Oral at bedtime    MEDICATIONS  (PRN):  acetaminophen   Tablet .. 650 milliGRAM(s) Oral every 6 hours PRN Temp greater or equal to 38C (100.4F), Mild Pain (1 - 3)  dextrose 40% Gel 15 Gram(s) Oral once PRN Blood Glucose LESS THAN 70 milliGRAM(s)/deciliter  glucagon  Injectable 1 milliGRAM(s) IntraMuscular once PRN Glucose LESS THAN 70 milligrams/deciliter  morphine  - Injectable 2 milliGRAM(s) IV Push every 4 hours PRN Severe Pain (7 - 10)  ondansetron Injectable 4 milliGRAM(s) IV Push every 4 hours PRN Nausea and/or Vomiting  traMADol 50 milliGRAM(s) Oral every 6 hours PRN Moderate Pain (4 - 6)      Allergies    latex (Rash)  latex (Unknown)  No Known Drug Allergies    Intolerances        SOCIAL HISTORY:    FAMILY HISTORY:      Vital Signs Last 24 Hrs  T(C): 36.9 (26 Oct 2020 09:01), Max: 36.9 (26 Oct 2020 09:01)  T(F): 98.5 (26 Oct 2020 09:01), Max: 98.5 (26 Oct 2020 09:01)  HR: 84 (26 Oct 2020 09:01) (84 - 84)  BP: 112/68 (26 Oct 2020 09:01) (112/68 - 112/68)  BP(mean): --  RR: 17 (26 Oct 2020 09:01) (17 - 17)  SpO2: 98% (26 Oct 2020 09:01) (98% - 98%)    PHYSICAL EXAM:    Constitutional: NAD, well-developed  HEENT: MARGRET, EOMI, Normal Hearing, MMM  Neck: No LAD, No JVD  Back: Normal spine flexure, No CVA tenderness  Respiratory: not using accessory muscles  Abd: BS+, soft, NT/ND, No CVAT  : Normal phallus,open meatus,bilateral descended testes, There is a draining access of the rt scrotum, erythematous and tender  Skin: No rashes, areas of ecchymosis    LABS:                        11.5   6.44  )-----------( 245      ( 26 Oct 2020 09:44 )             34.1     10-26    139  |  104  |  14  ----------------------------<  109<H>  4.0   |  31  |  0.61    Ca    8.8      26 Oct 2020 09:44    TPro  6.8  /  Alb  2.9<L>  /  TBili  0.6  /  DBili  x   /  AST  12<L>  /  ALT  13  /  AlkPhos  118  10-26    PT/INR - ( 26 Oct 2020 09:44 )   PT: 13.7 sec;   INR: 1.18 ratio         PTT - ( 26 Oct 2020 09:44 )  PTT:31.4 sec    Urine Culture:

## 2020-10-26 NOTE — H&P ADULT - RS GEN PE MLT RESP DETAILS PC
breath sounds equal/good air movement/airway patent/diminished breath sounds, R/diminished breath sounds, L/normal/respirations non-labored

## 2020-10-26 NOTE — CONSULT NOTE ADULT - SUBJECTIVE AND OBJECTIVE BOX
Date/Time Patient Seen:  		  Referring MD:   Data Reviewed	       Patient is a 89y old  Male who presents with a chief complaint of Right groin abscess. (26 Oct 2020 11:31)      Subjective/HPI  in bed  seen and examined  vs and meds reviewed  labs reviewed  h and p reviewed  er provider note reviewed  poor historian  from [facility]  non smoker  non drinker  · Chief Complaint: The patient is a 89y Male complaining of abscess.  · Unable to Obtain: Dementia  · HPI Objective Statement: 89 yo M p/w reportedly has had R groin abscess for few days. now has opened and has foul smelling discharge. no acute pain,  No known fever/chills. no trauma. No other hx avail    HIV:    HIV Test Questions:  · In accordance with [place] State law, we offer every patient who comes to our ED an HIV test. Would you like to be tested today?	Opt out       PAST MEDICAL & SURGICAL HISTORY:  Dementia    Arteriosclerotic heart disease (ASHD)    Prostate CA    Atrial fibrillation    DM (diabetes mellitus)    No pertinent past medical history    Dementia    Depression    Diabetes    Hyperlipemia    CHF (congestive heart failure)    Dementia    Prostate ca    Diabetes    Hypertension    Atrial fibrillation    No significant past surgical history    No significant past surgical history          Medication list         MEDICATIONS  (STANDING):  dextrose 5%. 1000 milliLiter(s) (50 mL/Hr) IV Continuous <Continuous>  dextrose 50% Injectable 12.5 Gram(s) IV Push once  dextrose 50% Injectable 25 Gram(s) IV Push once  dextrose 50% Injectable 25 Gram(s) IV Push once  famotidine Injectable 20 milliGRAM(s) IV Push daily  insulin lispro (ADMELOG) corrective regimen sliding scale   SubCutaneous every 6 hours  sodium chloride 0.9%. 1000 milliLiter(s) (50 mL/Hr) IV Continuous <Continuous>    MEDICATIONS  (PRN):  acetaminophen   Tablet .. 650 milliGRAM(s) Oral every 6 hours PRN Temp greater or equal to 38C (100.4F), Mild Pain (1 - 3)  dextrose 40% Gel 15 Gram(s) Oral once PRN Blood Glucose LESS THAN 70 milliGRAM(s)/deciliter  glucagon  Injectable 1 milliGRAM(s) IntraMuscular once PRN Glucose LESS THAN 70 milligrams/deciliter  morphine  - Injectable 2 milliGRAM(s) IV Push every 4 hours PRN Severe Pain (7 - 10)  ondansetron Injectable 4 milliGRAM(s) IV Push every 4 hours PRN Nausea and/or Vomiting  traMADol 50 milliGRAM(s) Oral every 6 hours PRN Moderate Pain (4 - 6)         Vitals log        ICU Vital Signs Last 24 Hrs  T(C): 36.9 (26 Oct 2020 09:01), Max: 36.9 (26 Oct 2020 09:01)  T(F): 98.5 (26 Oct 2020 09:01), Max: 98.5 (26 Oct 2020 09:01)  HR: 84 (26 Oct 2020 09:01) (84 - 84)  BP: 112/68 (26 Oct 2020 09:01) (112/68 - 112/68)  BP(mean): --  ABP: --  ABP(mean): --  RR: 17 (26 Oct 2020 09:01) (17 - 17)  SpO2: 98% (26 Oct 2020 09:01) (98% - 98%)           Input and Output:  I&O's Detail      Lab Data                        11.5   6.44  )-----------( 245      ( 26 Oct 2020 09:44 )             34.1     10-26    139  |  104  |  14  ----------------------------<  109<H>  4.0   |  31  |  0.61    Ca    8.8      26 Oct 2020 09:44    TPro  6.8  /  Alb  2.9<L>  /  TBili  0.6  /  DBili  x   /  AST  12<L>  /  ALT  13  /  AlkPhos  118  10-26            Review of Systems	  weakness      Objective     Physical Examination    heart s1s2  lung dec BS  abd soft  frail  weak      Pertinent Lab findings & Imaging      [place]:  NO   Adequate UO     I&O's Detail           Discussed with:     Cultures:	        Radiology          EXAM:  XR CHEST PORTABLE IMMED 1V                            PROCEDURE DATE:  10/26/2020          INTERPRETATION:  AP semierect chest on October 26, 2020 at 10:22 AM. Patient has a right groin infection.    Heart enlargement and left-sided pacemaker again noted.    There are slightly increased central vascular markings left greater the right this suggests CHF. This has increased from July 25, 2019.    IMPRESSION: Mild central CHF presently suspect. Heart enlargement and pacemaker again noted.            JUSTINA SINGH M.D., ATTENDING RADIOLOGIST  This document has been electronically signed. Oct 26 2020  1:04PM

## 2020-10-27 LAB
A1C WITH ESTIMATED AVERAGE GLUCOSE RESULT: 5.7 % — HIGH (ref 4–5.6)
ANION GAP SERPL CALC-SCNC: 6 MMOL/L — SIGNIFICANT CHANGE UP (ref 5–17)
BUN SERPL-MCNC: 12 MG/DL — SIGNIFICANT CHANGE UP (ref 7–23)
CALCIUM SERPL-MCNC: 8.5 MG/DL — SIGNIFICANT CHANGE UP (ref 8.5–10.1)
CHLORIDE SERPL-SCNC: 104 MMOL/L — SIGNIFICANT CHANGE UP (ref 96–108)
CHOLEST SERPL-MCNC: 97 MG/DL — SIGNIFICANT CHANGE UP
CO2 SERPL-SCNC: 30 MMOL/L — SIGNIFICANT CHANGE UP (ref 22–31)
CREAT SERPL-MCNC: 0.51 MG/DL — SIGNIFICANT CHANGE UP (ref 0.5–1.3)
ESTIMATED AVERAGE GLUCOSE: 117 MG/DL — HIGH (ref 68–114)
GLUCOSE SERPL-MCNC: 96 MG/DL — SIGNIFICANT CHANGE UP (ref 70–99)
HCT VFR BLD CALC: 30.9 % — LOW (ref 39–50)
HDLC SERPL-MCNC: 32 MG/DL — LOW
HGB BLD-MCNC: 10.3 G/DL — LOW (ref 13–17)
LIPID PNL WITH DIRECT LDL SERPL: 56 MG/DL — SIGNIFICANT CHANGE UP
MAGNESIUM SERPL-MCNC: 1.6 MG/DL — SIGNIFICANT CHANGE UP (ref 1.6–2.6)
MCHC RBC-ENTMCNC: 29.3 PG — SIGNIFICANT CHANGE UP (ref 27–34)
MCHC RBC-ENTMCNC: 33.3 GM/DL — SIGNIFICANT CHANGE UP (ref 32–36)
MCV RBC AUTO: 88 FL — SIGNIFICANT CHANGE UP (ref 80–100)
NON HDL CHOLESTEROL: 65 MG/DL — SIGNIFICANT CHANGE UP
NRBC # BLD: 0 /100 WBCS — SIGNIFICANT CHANGE UP (ref 0–0)
NT-PROBNP SERPL-SCNC: 1053 PG/ML — HIGH (ref 0–450)
PLATELET # BLD AUTO: 229 K/UL — SIGNIFICANT CHANGE UP (ref 150–400)
POTASSIUM SERPL-MCNC: 4 MMOL/L — SIGNIFICANT CHANGE UP (ref 3.5–5.3)
POTASSIUM SERPL-SCNC: 4 MMOL/L — SIGNIFICANT CHANGE UP (ref 3.5–5.3)
RBC # BLD: 3.51 M/UL — LOW (ref 4.2–5.8)
RBC # FLD: 14.8 % — HIGH (ref 10.3–14.5)
SODIUM SERPL-SCNC: 140 MMOL/L — SIGNIFICANT CHANGE UP (ref 135–145)
TRIGL SERPL-MCNC: 44 MG/DL — SIGNIFICANT CHANGE UP
TSH SERPL-MCNC: 1.48 UIU/ML — SIGNIFICANT CHANGE UP (ref 0.36–3.74)
WBC # BLD: 5.16 K/UL — SIGNIFICANT CHANGE UP (ref 3.8–10.5)
WBC # FLD AUTO: 5.16 K/UL — SIGNIFICANT CHANGE UP (ref 3.8–10.5)

## 2020-10-27 RX ORDER — ASCORBIC ACID 60 MG
500 TABLET,CHEWABLE ORAL
Refills: 0 | Status: CANCELLED | OUTPATIENT
Start: 2020-10-27 | End: 2020-10-30

## 2020-10-27 RX ORDER — MULTIVIT-MIN/FERROUS GLUCONATE 9 MG/15 ML
1 LIQUID (ML) ORAL DAILY
Refills: 0 | Status: CANCELLED | OUTPATIENT
Start: 2020-10-27 | End: 2020-10-30

## 2020-10-27 RX ORDER — INSULIN LISPRO 100/ML
VIAL (ML) SUBCUTANEOUS
Refills: 0 | Status: DISCONTINUED | OUTPATIENT
Start: 2020-10-27 | End: 2020-10-30

## 2020-10-27 RX ADMIN — SENNA PLUS 2 TABLET(S): 8.6 TABLET ORAL at 21:07

## 2020-10-27 RX ADMIN — FAMOTIDINE 20 MILLIGRAM(S): 10 INJECTION INTRAVENOUS at 12:00

## 2020-10-27 RX ADMIN — POLYETHYLENE GLYCOL 3350 17 GRAM(S): 17 POWDER, FOR SOLUTION ORAL at 12:00

## 2020-10-27 RX ADMIN — ENOXAPARIN SODIUM 40 MILLIGRAM(S): 100 INJECTION SUBCUTANEOUS at 12:00

## 2020-10-27 RX ADMIN — PIPERACILLIN AND TAZOBACTAM 25 GRAM(S): 4; .5 INJECTION, POWDER, LYOPHILIZED, FOR SOLUTION INTRAVENOUS at 15:09

## 2020-10-27 RX ADMIN — TAMSULOSIN HYDROCHLORIDE 0.4 MILLIGRAM(S): 0.4 CAPSULE ORAL at 21:07

## 2020-10-27 RX ADMIN — SIMVASTATIN 20 MILLIGRAM(S): 20 TABLET, FILM COATED ORAL at 21:08

## 2020-10-27 RX ADMIN — Medication 1: at 18:09

## 2020-10-27 RX ADMIN — PIPERACILLIN AND TAZOBACTAM 25 GRAM(S): 4; .5 INJECTION, POWDER, LYOPHILIZED, FOR SOLUTION INTRAVENOUS at 05:21

## 2020-10-27 RX ADMIN — SODIUM CHLORIDE 35 MILLILITER(S): 9 INJECTION, SOLUTION INTRAVENOUS at 15:09

## 2020-10-27 RX ADMIN — Medication 81 MILLIGRAM(S): at 12:00

## 2020-10-27 RX ADMIN — DONEPEZIL HYDROCHLORIDE 5 MILLIGRAM(S): 10 TABLET, FILM COATED ORAL at 21:07

## 2020-10-27 RX ADMIN — PIPERACILLIN AND TAZOBACTAM 25 GRAM(S): 4; .5 INJECTION, POWDER, LYOPHILIZED, FOR SOLUTION INTRAVENOUS at 21:03

## 2020-10-27 NOTE — DIETITIAN INITIAL EVALUATION ADULT. - ORAL INTAKE PTA/DIET HISTORY
Diet at [facility]  KHLOE, NCS chopped (3/1/20)  SLP rx dysphagia 3 with thin liquids (1/31/20)

## 2020-10-27 NOTE — SWALLOW BEDSIDE ASSESSMENT ADULT - ASR SWALLOW ASPIRATION MONITOR
fever/upper respiratory infection/change of breathing pattern/oral hygiene/position upright (89Y)/throat clearing/cough/gurgly voice/pneumonia

## 2020-10-27 NOTE — PROGRESS NOTE ADULT - SUBJECTIVE AND OBJECTIVE BOX
TERESA FRANCIS    PLV [id number] D1    Patient is a 89y old  Male who presents with a chief complaint of R GROIN PAIN AND SWELLING (27 Oct 2020 06:46)       Allergies    latex (Rash)  latex (Unknown)  No Known Drug Allergies    Intolerances        HPI:  89 y o WM  presents sent to ED from [facility] for evaluation of  R groin abscess for few days. now has opened area and has foul smelling discharge. Denies pain  No known fever/chills. no trauma. Patient is   poor historian,  PMD Dr Quintana,  no anticoagulants noted in  patient med list ,patient was admitted with GIB last year and BASA was stopped at that time .His  PMH is significant for :  Arteriosclerotic heart disease (ASHD)  Atrial fibrillation  ,CHF (congestive heart failure)  ,Dementia  ,Depression  ,DM (diabetes mellitus)  ,Hyperlipemia  ,Hypertension  ,Prostate ca . and ID consult called Seen by Dr Devries  ,no intereventions required ,continue topical care and iv abx Admitted for septic workup and evaluation,send blood and urine cx,serial lactate levels,monitor vitals closley,ivfs hydration,monitor urine output and renal profile,iv abx initiated -given vanco and zosyn in ER .Chest xray showed PVC and cardiology consult called Admit to monitored unit for cardiac monitoring, obtain echo to evaluate LVEF, intravenous diuresis  prn as per card consult , monitor ins/outs, monitor renal profile and electrolytes closely ,send 3 sets of enzymes, O2 supply, serial chest xrays, monitor weights and oral intake of fluids, nutritionist consult Palliative care consult requested ,to discuss advance directives and complete MOLST  (26 Oct 2020 13:23)      PAST MEDICAL & SURGICAL HISTORY:  Constipation    Anemia    Dementia    Arteriosclerotic heart disease (ASHD)    Prostate CA    Atrial fibrillation    DM (diabetes mellitus)    Dementia    Depression    Diabetes    Hyperlipemia    CHF (congestive heart failure)    Dementia    Prostate ca    Diabetes    Hypertension    Atrial fibrillation    No significant past surgical history        FAMILY HISTORY:        MEDICATIONS   acetaminophen   Tablet .. 650 milliGRAM(s) Oral every 6 hours PRN  ALBUTerol    90 MICROgram(s) HFA Inhaler 2 Puff(s) Inhalation every 6 hours PRN  aspirin  chewable 81 milliGRAM(s) Oral daily  dextrose 40% Gel 15 Gram(s) Oral once PRN  dextrose 5% + sodium chloride 0.45%. 1000 milliLiter(s) IV Continuous <Continuous>  dextrose 5%. 1000 milliLiter(s) IV Continuous <Continuous>  dextrose 50% Injectable 12.5 Gram(s) IV Push once  dextrose 50% Injectable 25 Gram(s) IV Push once  dextrose 50% Injectable 25 Gram(s) IV Push once  donepezil 5 milliGRAM(s) Oral at bedtime  enoxaparin Injectable 40 milliGRAM(s) SubCutaneous daily  famotidine Injectable 20 milliGRAM(s) IV Push daily  glucagon  Injectable 1 milliGRAM(s) IntraMuscular once PRN  influenza   Vaccine 0.5 milliLiter(s) IntraMuscular once  insulin lispro (ADMELOG) corrective regimen sliding scale   SubCutaneous every 6 hours  morphine  - Injectable 2 milliGRAM(s) IV Push every 4 hours PRN  ondansetron Injectable 4 milliGRAM(s) IV Push every 4 hours PRN  piperacillin/tazobactam IVPB.. 3.375 Gram(s) IV Intermittent every 8 hours  polyethylene glycol 3350 17 Gram(s) Oral daily  senna 2 Tablet(s) Oral at bedtime  simvastatin 20 milliGRAM(s) Oral at bedtime  tamsulosin 0.4 milliGRAM(s) Oral at bedtime  traMADol 50 milliGRAM(s) Oral every 6 hours PRN      Vital Signs Last 24 Hrs  T(C): 36.7 (27 Oct 2020 04:54), Max: 37.1 (26 Oct 2020 21:10)  T(F): 98 (27 Oct 2020 04:54), Max: 98.7 (26 Oct 2020 21:10)  HR: 71 (27 Oct 2020 04:54) (71 - 78)  BP: 106/57 (27 Oct 2020 04:54) (106/57 - 123/71)  BP(mean): --  RR: 17 (27 Oct 2020 04:54) (16 - 17)  SpO2: 93% (27 Oct 2020 04:54) (93% - 98%)            LABS:                        10.3   5.16  )-----------( 229      ( 27 Oct 2020 09:17 )             30.9     10-26    139  |  104  |  14  ----------------------------<  109<H>  4.0   |  31  |  0.61    Ca    8.8      26 Oct 2020 09:44    TPro  6.8  /  Alb  2.9<L>  /  TBili  0.6  /  DBili  x   /  AST  12<L>  /  ALT  13  /  AlkPhos  118  10-26    PT/INR - ( 26 Oct 2020 09:44 )   PT: 13.7 sec;   INR: 1.18 ratio         PTT - ( 26 Oct 2020 09:44 )  PTT:31.4 sec          WBC:  WBC Count: 5.16 K/uL (10-27 @ 09:17)  WBC Count: 6.44 K/uL (10-26 @ 09:44)      MICROBIOLOGY:  RECENT CULTURES:              PT/INR - ( 26 Oct 2020 09:44 )   PT: 13.7 sec;   INR: 1.18 ratio         PTT - ( 26 Oct 2020 09:44 )  PTT:31.4 sec    Sodium:  Sodium, Serum: 139 mmol/L (10-26 @ 09:44)      0.61 mg/dL 10-26 @ 09:44      Hemoglobin:  Hemoglobin: 10.3 g/dL (10-27 @ 09:17)  Hemoglobin: 11.5 g/dL (10-26 @ 09:44)      Platelets: Platelet Count - Automated: 229 K/uL (10-27 @ 09:17)  Platelet Count - Automated: 245 K/uL (10-26 @ 09:44)      LIVER FUNCTIONS - ( 26 Oct 2020 09:44 )  Alb: 2.9 g/dL / Pro: 6.8 g/dL / ALK PHOS: 118 U/L / ALT: 13 U/L / AST: 12 U/L / GGT: x                 RADIOLOGY & ADDITIONAL STUDIES:

## 2020-10-27 NOTE — DIETITIAN INITIAL EVALUATION ADULT. - OTHER INFO
89 y/o male adm from [facility] with cutaneous abscess of groin. PMH constipation, anemia, dementia, ASHD, prostate Ca, afib, DM, dementia, depression, HLD, HF. Visited with pt at bedside this am. Pt slightly confused. Pt consumed 100% of breakfast. Pt tolerated meal well, no complaints. SLP kalyan ordered, pending. MOLST states no TF.

## 2020-10-27 NOTE — PROGRESS NOTE ADULT - ASSESSMENT
cont rx   cont rx       LEEROYNGSTROM,  89  10/16/1926 DOA 10/26/2020 DR ARCADIO FRIAS  CONTACT Vinicio Bradford [phone]      REVIEW OF SYMPTOMS      Able to give ROS  NO     PHYSICAL EXAM    HEENT Unremarkable PERRLA atraumatic   RESP Fair air entry EXP prolonged    Harsh breath sound Resp distres mild   CARDIAC S1 S2 No S3     NO JVD    ABDOMEN SOFT BS PRESENT NOT DISTENDED No hepatosplenomegaly PEDAL EDEMA present No calf tenderness  NO rash     PT DATA/BEST PRACTICE  ALLERGY     latex          WT              10/26/2020 68 k                   BMI                10/26/2020 20                         ADVANCED DIRECTIVE  dnr   HEAD OF BED ELEVATION Yes      DVT PROPHYLAXIS.    lvnx 40 (10/26/2020)     DIET. dys 3 soft thin (10/26)                                                                  SEGUNDO PROPHYLAXIS.   INFECTION PPLX                                                    OXYGENATION.   10/26/2020 ra 98%       IV F. VITALS.   d5 1/2 ns 35 (10/26)   10/27/2020 afeb 70 100/50 17       LABS.   W 10/26/2020 w 6.4   BNP 10/27/2020 bnp 1053   Hb 10/26-10/27/2020 Hb 11.5- 10.3    INR 10/26/2020    Na 10/26/2020 Na 139   Cr 10/26/2020 Cr .6            PATIENT SUMMARY.   88 m retd [employer] PMH  subcm lung nodules 10/21/2014 CT Ch  1 cm hypoattenuating thyroid nodule  Trace symmetrical layering bl pl effusions with dependent atelectasis bases  Ca granuloma both RML and l lower lobes  5 mm subpleural parenchymal nodule R apex   sCHF 10/24/2014 ECHO EF 50% Mild hypokinesia septum PASP 40 Mod MR    A fib  (on coumadin which was dced 2019 when he developed hematuria ) Htn Depression l leg lymphedema ( remote injury) OBS  was admitted 10/26/2020 with groin abscess draining pus and Pulm consulted 10/26/2020 for ho copd lung nodule    home meds Tamsulosin .4 duoneb asa pulmicort asa donepezil 5 simvastat 20 metformin       PROBLEM ASSESSMENT RECOMMENDATIONS .   PRESSURE ULCER HEEL L STAGE 2 Seen 10/26/2020 Dr Wong   PRESSURE ULCER HEEL R STAGE 2 Seen 10/26/2020 Dr Wong   SCROTAL ABSCESS NOTED 10/26/2020 Vanco ns given in er 10/26/2020   A/R 10/27/2020 seenby hank and id stefano care and draianage and abio advised and being given   COPD Cont meds  CHF CXR 10/26/2020 cxr mild chf   ECHO 2/1/2019 echo EF 35% pasp 40   LUNG NODULE Elective ct  ch  A fib  On ASA Cont rx     TIME SPENT   Over 25 minutes aggregate care time spent on encounter; activities included   direct patient care, counseling and/or coordinating care reviewing notes, lab data/ imaging , discussion with multidisciplinary team/ patient  /family and explaining in detail risks, benefits, alternatives  of the recommendations     TERESA FRANCIS [phone]  10/16/1926 DOA 10/26/2020 DR ARCADIO FRIAS  CONTACT Vinicio Bradford [phone]

## 2020-10-27 NOTE — SWALLOW BEDSIDE ASSESSMENT ADULT - SWALLOW EVAL: RECOMMENDED FEEDING/EATING TECHNIQUES
alternate food with liquid/maintain upright posture during/after eating for 30 mins/check mouth frequently for oral residue/pocketing/crush medication (when feasible)/position upright (90 degrees)/allow for swallow between intakes/oral hygiene/small sips/bites

## 2020-10-27 NOTE — PROGRESS NOTE ADULT - SUBJECTIVE AND OBJECTIVE BOX
[facility] [place]       HPI:  89 y o WM  presents sent to ED from [facility] for evaluation of  R groin abscess for few days. now has opened area and has foul smelling discharge. Denies pain  No known fever/chills. no trauma. Patient is   poor historian,  PMD Dr Quintana,  no anticoagulants noted in  patient med list ,patient was admitted with GIB last year and BASA was stopped at that time .His  PMH is significant for :  Arteriosclerotic heart disease (ASHD)  Atrial fibrillation  ,CHF (congestive heart failure)  ,Dementia  ,Depression  ,DM (diabetes mellitus)  ,Hyperlipemia  ,Hypertension  ,Prostate ca . and ID consult called Seen by Dr Devries  ,no intereventions required ,continue topical care and iv abx Admitted for septic workup and evaluation,send blood and urine cx,serial lactate levels,monitor vitals closley,ivfs hydration,monitor urine output and renal profile,iv abx initiated -given vanco and zosyn in ER .Chest xray showed PVC and cardiology consult called Admit to monitored unit for cardiac monitoring, obtain echo to evaluate LVEF, intravenous diuresis  prn as per card consult , monitor ins/outs, monitor renal profile and electrolytes closely ,send 3 sets of enzymes, O2 supply, serial chest xrays, monitor weights and oral intake of fluids, nutritionist consult Palliative care consult requested ,to discuss advance directives and complete MOLST  (26 Oct 2020 13:23)        SUBJECTIVE:      ALLERGIES:  Allergies    latex (Rash)  latex (Unknown)  No Known Drug Allergies    Intolerances          MEDICATIONS  (STANDING):  aspirin  chewable 81 milliGRAM(s) Oral daily  dextrose 5% + sodium chloride 0.45%. 1000 milliLiter(s) (35 mL/Hr) IV Continuous <Continuous>  dextrose 5%. 1000 milliLiter(s) (50 mL/Hr) IV Continuous <Continuous>  dextrose 50% Injectable 12.5 Gram(s) IV Push once  dextrose 50% Injectable 25 Gram(s) IV Push once  dextrose 50% Injectable 25 Gram(s) IV Push once  donepezil 5 milliGRAM(s) Oral at bedtime  enoxaparin Injectable 40 milliGRAM(s) SubCutaneous daily  famotidine Injectable 20 milliGRAM(s) IV Push daily  influenza   Vaccine 0.5 milliLiter(s) IntraMuscular once  insulin lispro (ADMELOG) corrective regimen sliding scale   SubCutaneous three times a day before meals  piperacillin/tazobactam IVPB.. 3.375 Gram(s) IV Intermittent every 8 hours  polyethylene glycol 3350 17 Gram(s) Oral daily  senna 2 Tablet(s) Oral at bedtime  simvastatin 20 milliGRAM(s) Oral at bedtime  tamsulosin 0.4 milliGRAM(s) Oral at bedtime    MEDICATIONS  (PRN):  acetaminophen   Tablet .. 650 milliGRAM(s) Oral every 6 hours PRN Temp greater or equal to 38C (100.4F), Mild Pain (1 - 3)  ALBUTerol    90 MICROgram(s) HFA Inhaler 2 Puff(s) Inhalation every 6 hours PRN Shortness of Breath and/or Wheezing  dextrose 40% Gel 15 Gram(s) Oral once PRN Blood Glucose LESS THAN 70 milliGRAM(s)/deciliter  glucagon  Injectable 1 milliGRAM(s) IntraMuscular once PRN Glucose LESS THAN 70 milligrams/deciliter  morphine  - Injectable 2 milliGRAM(s) IV Push every 4 hours PRN Severe Pain (7 - 10)  ondansetron Injectable 4 milliGRAM(s) IV Push every 4 hours PRN Nausea and/or Vomiting  traMADol 50 milliGRAM(s) Oral every 6 hours PRN Moderate Pain (4 - 6)      REVIEW OF SYSTEMS:  CONSTITUTIONAL: No fever,  RESPIRATORY: No cough, wheezing, shortness of breath  CARDIOVASCULAR: No chest pain, dyspnea, palpitations, dizziness, syncope, paroxysmal nocturnal dyspnea, orthopnea, or arm or leg swelling  GASTROINTESTINAL: No abdominal  or epigastric pain, nausea, vomiting,  diarrhea  NEUROLOGICAL: No headaches,  loss of strength, numbness, or tremors    Vital Signs Last 24 Hrs  T(C): 36.5 (27 Oct 2020 13:12), Max: 37.1 (26 Oct 2020 21:10)  T(F): 97.7 (27 Oct 2020 13:12), Max: 98.7 (26 Oct 2020 21:10)  HR: 70 (27 Oct 2020 13:12) (70 - 78)  BP: 112/72 (27 Oct 2020 13:12) (106/57 - 122/72)  BP(mean): --  RR: 17 (27 Oct 2020 13:12) (17 - 17)  SpO2: 94% (27 Oct 2020 13:12) (93% - 97%)    PHYSICAL EXAM:  HEAD:  Atraumatic, Normocephalic  NECK: Supple and normal thyroid.  No JVD or carotid bruit.   HEART: S1, S2 regular , 1/6 soft ejection systolic murmur in mitral area , no thrill and no gallops .  PULMONARY: Bilateral vesicular breathing , few scattered ronchi and few scattered rales are present .  ABDOMEN: Soft nontender and positive bowl sounds   EXTREMITIES:  No clubbing, cyanosis, or pedal  edema  NEUROLOGICAL: AAOX3 , no focal deficit .    LABS:                        10.3   5.16  )-----------( 229      ( 27 Oct 2020 09:17 )             30.9     10-27    140  |  104  |  12  ----------------------------<  96  4.0   |  30  |  0.51    Ca    8.5      27 Oct 2020 09:17  Mg     1.6     10-27    TPro  6.8  /  Alb  2.9<L>  /  TBili  0.6  /  DBili  x   /  AST  12<L>  /  ALT  13  /  AlkPhos  118  10-26        PT/INR - ( 26 Oct 2020 09:44 )   PT: 13.7 sec;   INR: 1.18 ratio         PTT - ( 26 Oct 2020 09:44 )  PTT:31.4 sec    BNPSerum Pro-Brain Natriuretic Peptide: 1053 pg/mL (10-27 @ 09:17)        EKG:  ECHO:  IMAGING:    Assessment/Plan    Will continue to follow during hospital course and give further recommendations as needed . Thanks for your referral . if any questions please contact me at office ([phone] [phone])

## 2020-10-27 NOTE — PROGRESS NOTE ADULT - ATTENDING COMMENTS
89 y o WM  presents sent to ED from [facility] for evaluation of  R groin abscess for few days. now has opened area and has foul smelling discharge. Denies pain  No known fever/chills. no trauma. Patient is   poor historian,  PMD Dr Quintana,  no anticoagulants noted in  patient med list ,patient was admitted with GIB last year and BASA was stopped at that time .His  PMH is significant for :  Arteriosclerotic heart disease (ASHD)  Atrial fibrillation  ,CHF (congestive heart failure)  ,Dementia  ,Depression  ,DM (diabetes mellitus)  ,Hyperlipemia  ,Hypertension  ,Prostate ca . and ID consult called Seen by Dr Devries  ,no intereventions required ,continue topical care and iv abx Admitted for septic workup and evaluation,send blood and urine cx,serial lactate levels,monitor vitals closley,ivfs hydration,monitor urine output and renal profile,iv abx initiated -given vanco and zosyn in ER .Chest xray showed PVC and cardiology consult called Admit to monitored unit for cardiac monitoring, obtain echo to evaluate LVEF, intravenous diuresis  prn as per card consult , monitor ins/outs, monitor renal profile and electrolytes closely ,send 3 sets of enzymes, O2 supply, serial chest xrays, monitor weights and oral intake of fluids, nutritionist consult Palliative care consult requested ,to discuss advance directives and complete MOLST

## 2020-10-27 NOTE — PROGRESS NOTE ADULT - SUBJECTIVE AND OBJECTIVE BOX
PROGRESS NOTE  Patient is a 89y old  Male who presents with a chief complaint of R GROIN PAIN AND SWELLING (27 Oct 2020 09:50)  Chart and available morning labs /imaging are reviewed electronically , urgent issues addressed . More information  is being added upon completion of rounds , when more information is collected and management discussed with consultants , medical staff and social service/case management on the floor   LATE ENTRY- patient was seen and examined earlier today . Plan of care was discussed with med staff and unit coordinator .   OVERNIGHT  No new issues reported by medical staff . All above noted Patient is resting in a bed comfortably .Confused ,poor mentation .No distress noted     HPI:  89 y o WM  presents sent to ED from [facility] for evaluation of  R groin abscess for few days. now has opened area and has foul smelling discharge. Denies pain  No known fever/chills. no trauma. Patient is   poor historian,  PMD Dr Quintana,  no anticoagulants noted in  patient med list ,patient was admitted with GIB last year and BASA was stopped at that time .His  PMH is significant for :  Arteriosclerotic heart disease (ASHD)  Atrial fibrillation  ,CHF (congestive heart failure)  ,Dementia  ,Depression  ,DM (diabetes mellitus)  ,Hyperlipemia  ,Hypertension  ,Prostate ca . and ID consult called Seen by Dr Devries  ,no intereventions required ,continue topical care and iv abx Admitted for septic workup and evaluation,send blood and urine cx,serial lactate levels,monitor vitals closley,ivfs hydration,monitor urine output and renal profile,iv abx initiated -given vanco and zosyn in ER .Chest xray showed PVC and cardiology consult called Admit to monitored unit for cardiac monitoring, obtain echo to evaluate LVEF, intravenous diuresis  prn as per card consult , monitor ins/outs, monitor renal profile and electrolytes closely ,send 3 sets of enzymes, O2 supply, serial chest xrays, monitor weights and oral intake of fluids, nutritionist consult Palliative care consult requested ,to discuss advance directives and complete MOLST  (26 Oct 2020 13:23)    PAST MEDICAL & SURGICAL HISTORY:  Constipation    Anemia    Dementia    Arteriosclerotic heart disease (ASHD)    Prostate CA    Atrial fibrillation    DM (diabetes mellitus)    Dementia    Depression    Diabetes    Hyperlipemia    CHF (congestive heart failure)    Dementia    Prostate ca    Diabetes    Hypertension    Atrial fibrillation    No significant past surgical history        MEDICATIONS  (STANDING):  aspirin  chewable 81 milliGRAM(s) Oral daily  dextrose 5% + sodium chloride 0.45%. 1000 milliLiter(s) (70 mL/Hr) IV Continuous <Continuous>  dextrose 5%. 1000 milliLiter(s) (50 mL/Hr) IV Continuous <Continuous>  dextrose 50% Injectable 12.5 Gram(s) IV Push once  dextrose 50% Injectable 25 Gram(s) IV Push once  dextrose 50% Injectable 25 Gram(s) IV Push once  donepezil 5 milliGRAM(s) Oral at bedtime  enoxaparin Injectable 40 milliGRAM(s) SubCutaneous daily  famotidine Injectable 20 milliGRAM(s) IV Push daily  influenza   Vaccine 0.5 milliLiter(s) IntraMuscular once  insulin lispro (ADMELOG) corrective regimen sliding scale   SubCutaneous three times a day before meals  piperacillin/tazobactam IVPB.. 3.375 Gram(s) IV Intermittent every 8 hours  polyethylene glycol 3350 17 Gram(s) Oral daily  senna 2 Tablet(s) Oral at bedtime  simvastatin 20 milliGRAM(s) Oral at bedtime  tamsulosin 0.4 milliGRAM(s) Oral at bedtime    MEDICATIONS  (PRN):  acetaminophen   Tablet .. 650 milliGRAM(s) Oral every 6 hours PRN Temp greater or equal to 38C (100.4F), Mild Pain (1 - 3)  ALBUTerol    90 MICROgram(s) HFA Inhaler 2 Puff(s) Inhalation every 6 hours PRN Shortness of Breath and/or Wheezing  dextrose 40% Gel 15 Gram(s) Oral once PRN Blood Glucose LESS THAN 70 milliGRAM(s)/deciliter  glucagon  Injectable 1 milliGRAM(s) IntraMuscular once PRN Glucose LESS THAN 70 milligrams/deciliter  morphine  - Injectable 2 milliGRAM(s) IV Push every 4 hours PRN Severe Pain (7 - 10)  ondansetron Injectable 4 milliGRAM(s) IV Push every 4 hours PRN Nausea and/or Vomiting  traMADol 50 milliGRAM(s) Oral every 6 hours PRN Moderate Pain (4 - 6)      OBJECTIVE    T(C): 36.7 (10-27-20 @ 04:54), Max: 37.1 (10-26-20 @ 21:10)  HR: 71 (10-27-20 @ 04:54) (71 - 78)  BP: 106/57 (10-27-20 @ 04:54) (106/57 - 123/71)  RR: 17 (10-27-20 @ 04:54) (16 - 17)  SpO2: 93% (10-27-20 @ 04:54) (93% - 98%)  Wt(kg): --  I&O's Summary        REVIEW OF SYSTEMS:  ROS is unobtainable due to dementia and confusion PATIENT IS CONFUSED AND IS UNABLE TO GIVE ANY/RELIABLE HISTORY OR REVIEW OF SYSTEMS PLEASE ALSO SEE UNDER HPI AND ASSESSMENT FOR OBTAINED REVIEW OF SYSTEMS     PHYSICAL EXAM:  Appearance: NAD. VS past 24 hrs -as above   HEENT:   Moist oral mucosa. Conjunctiva clear b/l.   Neck : supple  Respiratory: Lungs CTAB.  Gastrointestinal:  Soft, nontender. No rebound. No rigidity. BS present	  Cardiovascular: RRR ,S1S2 present  Neurologic: Non-focal. Moving all extremities.  Extremities: No edema. No erythema. No calf tenderness. b/l heel ulcers   Skin: No rashes, No ecchymoses, No cyanosis.	R groin abscess with drainge   wounds ,skin lesions-See skin assesment flow sheet   LABS:                        10.3   5.16  )-----------( 229      ( 27 Oct 2020 09:17 )             30.9     10-27    140  |  104  |  12  ----------------------------<  96  4.0   |  30  |  0.51    Ca    8.5      27 Oct 2020 09:17  Mg     1.6     10-27    TPro  6.8  /  Alb  2.9<L>  /  TBili  0.6  /  DBili  x   /  AST  12<L>  /  ALT  13  /  AlkPhos  118  10-26    CAPILLARY BLOOD GLUCOSE      POCT Blood Glucose.: 144 mg/dL (27 Oct 2020 12:07)  POCT Blood Glucose.: 112 mg/dL (27 Oct 2020 06:06)  POCT Blood Glucose.: 137 mg/dL (27 Oct 2020 00:02)  POCT Blood Glucose.: 199 mg/dL (26 Oct 2020 21:19)  POCT Blood Glucose.: 108 mg/dL (26 Oct 2020 16:55)  POCT Blood Glucose.: 117 mg/dL (26 Oct 2020 15:36)    PT/INR - ( 26 Oct 2020 09:44 )   PT: 13.7 sec;   INR: 1.18 ratio         PTT - ( 26 Oct 2020 09:44 )  PTT:31.4 sec      RADIOLOGY & ADDITIONAL TESTS:< from: Xray Chest 1 View-PORTABLE IMMEDIATE (10.26.20 @ 10:35) >  EXAM:  XR CHEST PORTABLE IMMED 1V                            PROCEDURE DATE:  10/26/2020          INTERPRETATION:  AP semierect chest on October 26, 2020 at 10:22 AM. Patient has a right groin infection.    Heart enlargement and left-sided pacemaker again noted.    There are slightly increased central vascular markings left greater the right this suggests CHF. This has increased from July 25, 2019.    IMPRESSION: Mild central CHF presently suspect. Heart enlargement and pacemaker again noted.    < end of copied text >     reviewed elctronically  ASSESSMENT/PLAN: 	    45minutes spent on this visit, 50% visit time spent in care co-ordination with other attendings and counselling patient  I have discussed care plan with patient and HCP,expressed understanding of problems treatment and their effect and side effects, alternatives in detail,I have asked if they have any questions and concerns and appropriately addressed them to best of my ability

## 2020-10-27 NOTE — PROGRESS NOTE ADULT - SUBJECTIVE AND OBJECTIVE BOX
CARYLTERESA DRAKE is a 89yMale , patient examined and chart reviewed.      INTERVAL HPI/ OVERNIGHT EVENTS:   No events.  Afebrile.    PAST MEDICAL & SURGICAL HISTORY:  Constipation  Anemia  Dementia  Arteriosclerotic heart disease (ASHD)  Prostate CA  Atrial fibrillation  DM (diabetes mellitus)  Dementia  Prostate ca  Hypertension  No significant past surgical history        For details regarding the patient's social history, family history, and other miscellaneous elements, please refer the initial infectious diseases consultation and/or the admitting history and physical examination for this admission.    ROS:  Unable to obtain due to : Dementia     ALLERGIES  latex (Rash)     Current inpatient medications :    ANTIBIOTICS/RELEVANT:  piperacillin/tazobactam IVPB.. 3.375 Gram(s) IV Intermittent every 8 hours      acetaminophen   Tablet .. 650 milliGRAM(s) Oral every 6 hours PRN  ALBUTerol    90 MICROgram(s) HFA Inhaler 2 Puff(s) Inhalation every 6 hours PRN  aspirin  chewable 81 milliGRAM(s) Oral daily  dextrose 40% Gel 15 Gram(s) Oral once PRN  dextrose 5% + sodium chloride 0.45%. 1000 milliLiter(s) IV Continuous <Continuous>  dextrose 5%. 1000 milliLiter(s) IV Continuous <Continuous>  dextrose 50% Injectable 12.5 Gram(s) IV Push once  dextrose 50% Injectable 25 Gram(s) IV Push once  dextrose 50% Injectable 25 Gram(s) IV Push once  donepezil 5 milliGRAM(s) Oral at bedtime  enoxaparin Injectable 40 milliGRAM(s) SubCutaneous daily  famotidine Injectable 20 milliGRAM(s) IV Push daily  glucagon  Injectable 1 milliGRAM(s) IntraMuscular once PRN  insulin lispro (ADMELOG) corrective regimen sliding scale   SubCutaneous three times a day before meals  morphine  - Injectable 2 milliGRAM(s) IV Push every 4 hours PRN  ondansetron Injectable 4 milliGRAM(s) IV Push every 4 hours PRN  polyethylene glycol 3350 17 Gram(s) Oral daily  senna 2 Tablet(s) Oral at bedtime  simvastatin 20 milliGRAM(s) Oral at bedtime  tamsulosin 0.4 milliGRAM(s) Oral at bedtime  traMADol 50 milliGRAM(s) Oral every 6 hours PRN      Objective:    T(C): 36.5 (10-27-20 @ 13:12), Max: 37.1 (10-26-20 @ 21:10)  HR: 70 (10-27-20 @ 13:12) (70 - 78)  BP: 112/72 (10-27-20 @ 13:12) (106/57 - 123/71)  RR: 17 (10-27-20 @ 13:12) (16 - 17)  SpO2: 94% (10-27-20 @ 13:12) (93% - 98%)    Physical Exam:  General: well developed well nourished, in no acute distress  Eyes: sclera anicteric, pupils equal and reactive to light  ENMT: buccal mucosa moist, pharynx not injected  Neck: supple, trachea midline  Lungs: clear, no wheeze/rhonchi  Cardiovascular: regular rate and rhythm, S1 S2  Abdomen: soft, nontender, ND, bowel sounds normal  : Right scrotal abscess + drainage with erythema  Neurological: awake confused  Skin: no increased ecchymosis/petechiae/purpura  Lymph Nodes: no palpable cervical/supraclavicular lymph nodes enlargements  Extremities: Jaun foot drsg c/d/i          LABS:                          10.3   5.16  )-----------( 229      ( 27 Oct 2020 09:17 )             30.9       10-27    140  |  104  |  12  ----------------------------<  96  4.0   |  30  |  0.51    Ca    8.5      27 Oct 2020 09:17  Mg     1.6     10-27    TPro  6.8  /  Alb  2.9<L>  /  TBili  0.6  /  DBili  x   /  AST  12<L>  /  ALT  13  /  AlkPhos  118  10-26      PT/INR - ( 26 Oct 2020 09:44 )   PT: 13.7 sec;   INR: 1.18 ratio         PTT - ( 26 Oct 2020 09:44 )  PTT:31.4 sec      MICROBIOLOGY:    Culture - Blood (collected 26 Oct 2020 12:28)  Source: .Blood Blood-Peripheral  Preliminary Report (27 Oct 2020 13:03):    No growth to date.      RADIOLOGY & ADDITIONAL STUDIES:  EXAM:  XR CHEST PORTABLE IMMED 1V                            PROCEDURE DATE:  10/26/2020          INTERPRETATION:  AP semierect chest on October 26, 2020 at 10:22 AM. Patient has a right groin infection.    Heart enlargement and left-sided pacemaker again noted.    There are slightly increased central vascular markings left greater the right this suggests CHF. This has increased from July 25, 2019.    IMPRESSION: Mild central CHF presently suspect. Heart enlargement and pacemaker again noted.    Assessment :   89 y/o with hx of Ca Prostate Dementia DM CHF admitted with right scrotal abscess + purulent drainage, Seen by Urology and cultures sent.  Jaun heel ulcer doubt infection- seen by podiatry    Plan :   Cont Zosyn  Fu cultures  Trend temps and cbc  Asp precautions    D/w Dr Ring    Continue with present regiment.  Appropriate use of antibiotics and adverse effects reviewed.    > 35 minutes were spent in direct patient care reviewing notes, medications ,labs data/ imaging , discussion with multidisciplinary team.    Thank you for allowing me to participate in care of your patient .    Heber Issa MD  Infectious Disease  [phone]

## 2020-10-27 NOTE — SWALLOW BEDSIDE ASSESSMENT ADULT - COMMENTS
Consult received and chart reviewed. Attempted clinical swallow assessment this AM. Upon arrival, pt receiving RN care. Will f/u as schedule permits.
Pt received upright in bed, awake, on room air. Pt agreeable to assessment. Pt was unable to follow one-step commands despite max multimodal cues. Pt with minimal verbalizations, vocal quality/intensity and speech production was grossly WFL. Nonverbal pain scale 0/10.    Pt known to this service from a previous admission. Clinical swallow assessment completed 1/31/20, at which time pt was recommended soft solids with thin liquids.  CXR 10/26 revealed: "Mild central CHF presently suspect" Pt's WBC is WFL, no fever.

## 2020-10-27 NOTE — SWALLOW BEDSIDE ASSESSMENT ADULT - SLP PERTINENT HISTORY OF CURRENT PROBLEM
Per charting, 89 y o WM  presents sent to ED from [facility] for evaluation of  R groin abscess for few days. now has opened area and has foul smelling discharge. Denies pain  No known fever/chills. no trauma.

## 2020-10-27 NOTE — PROGRESS NOTE ADULT - ASSESSMENT
89 y o WM  presents sent to ED from [facility] for evaluation of  R groin abscess for few days. now has opened area and has foul smelling discharge. Denies pain  No known fever/chills. no trauma. Patient is   poor historian,  PMD Dr Quintana,  no anticoagulants noted in  patient med list ,patient was admitted with GIB last year and BASA was stopped at that time .His  PMH is significant for :  Arteriosclerotic heart disease (ASHD)  Atrial fibrillation  ,CHF (congestive heart failure)  ,Dementia  ,Depression  ,DM (diabetes mellitus)  ,Hyperlipemia  ,Hypertension  ,Prostate ca . and ID consult called Seen by Dr Devries  ,no intereventions required ,continue topical care and iv abx Admitted for septic workup and evaluation,send blood and urine cx,serial lactate levels,monitor vitals closley,ivfs hydration,monitor urine output and renal profile,iv abx initiated -given vanco and zosyn in ER .Chest xray showed PVC and cardiology consult called Admit to monitored unit for cardiac monitoring, obtain echo to evaluate LVEF, intravenous diuresis  prn as per card consult , monitor ins/outs, monitor renal profile and electrolytes closely ,send 3 sets of enzymes, O2 supply, serial chest xrays, monitor weights and oral intake of fluids, nutritionist consult Palliative care consult requested ,to discuss advance directives and complete MOLST

## 2020-10-27 NOTE — SWALLOW BEDSIDE ASSESSMENT ADULT - SWALLOW EVAL: DIAGNOSIS
Pt p/w 1. Mild to moderate oral dysphagia when given regular solids marked by adequate retrieval and containment, prolonged mastication and manipulation, delayed transfer, and adequate clearance post swallow. 2. Functional oral phase when given puree and thin liquids marked by adequate retrieval and containment, timely manipulation and transfer, and adequate clearance post swallow. 3. Mild pharyngeal dysphagia when given puree, regular solids, and thin liquids marked by suspected delayed swallow onset, +laryngeal elevation to palpation, and no overt s/sx of aspiration. 4. Recommend mechanical soft solids with thin liquids +aspiration precautions. Feed only when fully awake/alert, position pt  upright, small bite/sip, pace meal slowly.

## 2020-10-27 NOTE — PROGRESS NOTE ADULT - SUBJECTIVE AND OBJECTIVE BOX
INTERVAL HPI/OVERNIGHT EVENTS: Afebrile    MEDICATIONS  (STANDING):  aspirin  chewable 81 milliGRAM(s) Oral daily  dextrose 5% + sodium chloride 0.45%. 1000 milliLiter(s) (70 mL/Hr) IV Continuous <Continuous>  dextrose 5%. 1000 milliLiter(s) (50 mL/Hr) IV Continuous <Continuous>  dextrose 50% Injectable 12.5 Gram(s) IV Push once  dextrose 50% Injectable 25 Gram(s) IV Push once  dextrose 50% Injectable 25 Gram(s) IV Push once  donepezil 5 milliGRAM(s) Oral at bedtime  enoxaparin Injectable 40 milliGRAM(s) SubCutaneous daily  famotidine Injectable 20 milliGRAM(s) IV Push daily  influenza   Vaccine 0.5 milliLiter(s) IntraMuscular once  insulin lispro (ADMELOG) corrective regimen sliding scale   SubCutaneous every 6 hours  piperacillin/tazobactam IVPB.. 3.375 Gram(s) IV Intermittent every 8 hours  polyethylene glycol 3350 17 Gram(s) Oral daily  senna 2 Tablet(s) Oral at bedtime  simvastatin 20 milliGRAM(s) Oral at bedtime  tamsulosin 0.4 milliGRAM(s) Oral at bedtime    MEDICATIONS  (PRN):  acetaminophen   Tablet .. 650 milliGRAM(s) Oral every 6 hours PRN Temp greater or equal to 38C (100.4F), Mild Pain (1 - 3)  ALBUTerol    90 MICROgram(s) HFA Inhaler 2 Puff(s) Inhalation every 6 hours PRN Shortness of Breath and/or Wheezing  dextrose 40% Gel 15 Gram(s) Oral once PRN Blood Glucose LESS THAN 70 milliGRAM(s)/deciliter  glucagon  Injectable 1 milliGRAM(s) IntraMuscular once PRN Glucose LESS THAN 70 milligrams/deciliter  morphine  - Injectable 2 milliGRAM(s) IV Push every 4 hours PRN Severe Pain (7 - 10)  ondansetron Injectable 4 milliGRAM(s) IV Push every 4 hours PRN Nausea and/or Vomiting  traMADol 50 milliGRAM(s) Oral every 6 hours PRN Moderate Pain (4 - 6)        Vital Signs Last 24 Hrs  T(C): 36.7 (27 Oct 2020 04:54), Max: 37.1 (26 Oct 2020 21:10)  T(F): 98 (27 Oct 2020 04:54), Max: 98.7 (26 Oct 2020 21:10)  HR: 71 (27 Oct 2020 04:54) (71 - 84)  BP: 106/57 (27 Oct 2020 04:54) (106/57 - 123/71)  BP(mean): --  RR: 17 (27 Oct 2020 04:54) (16 - 17)  SpO2: 93% (27 Oct 2020 04:54) (93% - 98%)    PHYSICAL EXAM:    ABDOMEN: Soft. No SP tenderness. No erythema  GENITALIA: Right scrotal abscess with drainage. No cutaneous erythema or crepitus.    LABS:                        11.5   6.44  )-----------( 245      ( 26 Oct 2020 09:44 )             34.1     10-26    139  |  104  |  14  ----------------------------<  109<H>  4.0   |  31  |  0.61    Ca    8.8      26 Oct 2020 09:44    TPro  6.8  /  Alb  2.9<L>  /  TBili  0.6  /  DBili  x   /  AST  12<L>  /  ALT  13  /  AlkPhos  118  10-26    PT/INR - ( 26 Oct 2020 09:44 )   PT: 13.7 sec;   INR: 1.18 ratio         PTT - ( 26 Oct 2020 09:44 )  PTT:31.4 sec

## 2020-10-27 NOTE — SWALLOW BEDSIDE ASSESSMENT ADULT - SLP PERTINENT HISTORY OF CURRENT PROBLEM
Per charting, 89 y o WM  presents sent to ED from [facility] for evaluation of  R groin abscess for few days. now has opened area and has foul smelling discharge. Denies pain  No known fever/chills. no trauma.

## 2020-10-27 NOTE — PROGRESS NOTE ADULT - SUBJECTIVE AND OBJECTIVE BOX
Date/Time Patient Seen:  		  Referring MD:   Data Reviewed	       Patient is a 89y old  Male who presents with a chief complaint of R GROIN PAIN AND SWELLING (27 Oct 2020 06:37)      Subjective/HPI     PAST MEDICAL & SURGICAL HISTORY:  Constipation    Anemia    Dementia    Arteriosclerotic heart disease (ASHD)    Prostate CA    Atrial fibrillation    DM (diabetes mellitus)    No pertinent past medical history    Dementia    Depression    Diabetes    Hyperlipemia    CHF (congestive heart failure)    Dementia    Prostate ca    Diabetes    Hypertension    Atrial fibrillation    No significant past surgical history    No significant past surgical history          Medication list         MEDICATIONS  (STANDING):  aspirin  chewable 81 milliGRAM(s) Oral daily  dextrose 5% + sodium chloride 0.45%. 1000 milliLiter(s) (70 mL/Hr) IV Continuous <Continuous>  dextrose 5%. 1000 milliLiter(s) (50 mL/Hr) IV Continuous <Continuous>  dextrose 50% Injectable 12.5 Gram(s) IV Push once  dextrose 50% Injectable 25 Gram(s) IV Push once  dextrose 50% Injectable 25 Gram(s) IV Push once  donepezil 5 milliGRAM(s) Oral at bedtime  enoxaparin Injectable 40 milliGRAM(s) SubCutaneous daily  famotidine Injectable 20 milliGRAM(s) IV Push daily  influenza   Vaccine 0.5 milliLiter(s) IntraMuscular once  insulin lispro (ADMELOG) corrective regimen sliding scale   SubCutaneous every 6 hours  piperacillin/tazobactam IVPB.. 3.375 Gram(s) IV Intermittent every 8 hours  polyethylene glycol 3350 17 Gram(s) Oral daily  senna 2 Tablet(s) Oral at bedtime  simvastatin 20 milliGRAM(s) Oral at bedtime  tamsulosin 0.4 milliGRAM(s) Oral at bedtime    MEDICATIONS  (PRN):  acetaminophen   Tablet .. 650 milliGRAM(s) Oral every 6 hours PRN Temp greater or equal to 38C (100.4F), Mild Pain (1 - 3)  ALBUTerol    90 MICROgram(s) HFA Inhaler 2 Puff(s) Inhalation every 6 hours PRN Shortness of Breath and/or Wheezing  dextrose 40% Gel 15 Gram(s) Oral once PRN Blood Glucose LESS THAN 70 milliGRAM(s)/deciliter  glucagon  Injectable 1 milliGRAM(s) IntraMuscular once PRN Glucose LESS THAN 70 milligrams/deciliter  morphine  - Injectable 2 milliGRAM(s) IV Push every 4 hours PRN Severe Pain (7 - 10)  ondansetron Injectable 4 milliGRAM(s) IV Push every 4 hours PRN Nausea and/or Vomiting  traMADol 50 milliGRAM(s) Oral every 6 hours PRN Moderate Pain (4 - 6)         Vitals log        ICU Vital Signs Last 24 Hrs  T(C): 36.7 (27 Oct 2020 04:54), Max: 37.1 (26 Oct 2020 21:10)  T(F): 98 (27 Oct 2020 04:54), Max: 98.7 (26 Oct 2020 21:10)  HR: 71 (27 Oct 2020 04:54) (71 - 84)  BP: 106/57 (27 Oct 2020 04:54) (106/57 - 123/71)  BP(mean): --  ABP: --  ABP(mean): --  RR: 17 (27 Oct 2020 04:54) (16 - 17)  SpO2: 93% (27 Oct 2020 04:54) (93% - 98%)           Input and Output:  I&O's Detail      Lab Data                        11.5   6.44  )-----------( 245      ( 26 Oct 2020 09:44 )             34.1     10-26    139  |  104  |  14  ----------------------------<  109<H>  4.0   |  31  |  0.61    Ca    8.8      26 Oct 2020 09:44    TPro  6.8  /  Alb  2.9<L>  /  TBili  0.6  /  DBili  x   /  AST  12<L>  /  ALT  13  /  AlkPhos  118  10-26            Review of Systems	      Objective     Physical Examination    heart 1s2  lung dec BS  abd soft      Pertinent Lab findings & Imaging      [place]:  NO   Adequate UO     I&O's Detail           Discussed with:     Cultures:	        Radiology

## 2020-10-28 LAB
-  AMIKACIN: SIGNIFICANT CHANGE UP
-  AMOXICILLIN/CLAVULANIC ACID: SIGNIFICANT CHANGE UP
-  AMPICILLIN/SULBACTAM: SIGNIFICANT CHANGE UP
-  AMPICILLIN: SIGNIFICANT CHANGE UP
-  AZTREONAM: SIGNIFICANT CHANGE UP
-  CEFAZOLIN: SIGNIFICANT CHANGE UP
-  CEFEPIME: SIGNIFICANT CHANGE UP
-  CEFOXITIN: SIGNIFICANT CHANGE UP
-  CEFTRIAXONE: SIGNIFICANT CHANGE UP
-  CIPROFLOXACIN: SIGNIFICANT CHANGE UP
-  ERTAPENEM: SIGNIFICANT CHANGE UP
-  GENTAMICIN: SIGNIFICANT CHANGE UP
-  LEVOFLOXACIN: SIGNIFICANT CHANGE UP
-  MEROPENEM: SIGNIFICANT CHANGE UP
-  PIPERACILLIN/TAZOBACTAM: SIGNIFICANT CHANGE UP
-  TOBRAMYCIN: SIGNIFICANT CHANGE UP
-  TRIMETHOPRIM/SULFAMETHOXAZOLE: SIGNIFICANT CHANGE UP
A1C WITH ESTIMATED AVERAGE GLUCOSE RESULT: 5.8 % — HIGH (ref 4–5.6)
ANION GAP SERPL CALC-SCNC: 5 MMOL/L — SIGNIFICANT CHANGE UP (ref 5–17)
APPEARANCE UR: CLEAR — SIGNIFICANT CHANGE UP
BACTERIA # UR AUTO: ABNORMAL
BILIRUB UR-MCNC: NEGATIVE — SIGNIFICANT CHANGE UP
BUN SERPL-MCNC: 11 MG/DL — SIGNIFICANT CHANGE UP (ref 7–23)
CALCIUM SERPL-MCNC: 8.9 MG/DL — SIGNIFICANT CHANGE UP (ref 8.5–10.1)
CHLORIDE SERPL-SCNC: 104 MMOL/L — SIGNIFICANT CHANGE UP (ref 96–108)
CO2 SERPL-SCNC: 30 MMOL/L — SIGNIFICANT CHANGE UP (ref 22–31)
COLOR SPEC: YELLOW — SIGNIFICANT CHANGE UP
CREAT SERPL-MCNC: 0.61 MG/DL — SIGNIFICANT CHANGE UP (ref 0.5–1.3)
DIFF PNL FLD: NEGATIVE — SIGNIFICANT CHANGE UP
EPI CELLS # UR: SIGNIFICANT CHANGE UP
ESTIMATED AVERAGE GLUCOSE: 120 MG/DL — HIGH (ref 68–114)
GLUCOSE SERPL-MCNC: 128 MG/DL — HIGH (ref 70–99)
GLUCOSE UR QL: NEGATIVE — SIGNIFICANT CHANGE UP
HCT VFR BLD CALC: 33.6 % — LOW (ref 39–50)
HGB BLD-MCNC: 11.2 G/DL — LOW (ref 13–17)
KETONES UR-MCNC: NEGATIVE — SIGNIFICANT CHANGE UP
LEUKOCYTE ESTERASE UR-ACNC: ABNORMAL
MCHC RBC-ENTMCNC: 29.4 PG — SIGNIFICANT CHANGE UP (ref 27–34)
MCHC RBC-ENTMCNC: 33.3 GM/DL — SIGNIFICANT CHANGE UP (ref 32–36)
MCV RBC AUTO: 88.2 FL — SIGNIFICANT CHANGE UP (ref 80–100)
METHOD TYPE: SIGNIFICANT CHANGE UP
NITRITE UR-MCNC: NEGATIVE — SIGNIFICANT CHANGE UP
NRBC # BLD: 0 /100 WBCS — SIGNIFICANT CHANGE UP (ref 0–0)
PH UR: 8 — SIGNIFICANT CHANGE UP (ref 5–8)
PLATELET # BLD AUTO: 250 K/UL — SIGNIFICANT CHANGE UP (ref 150–400)
POTASSIUM SERPL-MCNC: 3.8 MMOL/L — SIGNIFICANT CHANGE UP (ref 3.5–5.3)
POTASSIUM SERPL-SCNC: 3.8 MMOL/L — SIGNIFICANT CHANGE UP (ref 3.5–5.3)
PROT UR-MCNC: NEGATIVE — SIGNIFICANT CHANGE UP
RBC # BLD: 3.81 M/UL — LOW (ref 4.2–5.8)
RBC # FLD: 14.6 % — HIGH (ref 10.3–14.5)
RBC CASTS # UR COMP ASSIST: SIGNIFICANT CHANGE UP /HPF (ref 0–4)
SARS-COV-2 IGG SERPL QL IA: POSITIVE
SARS-COV-2 IGM SERPL IA-ACNC: 19.2 INDEX — HIGH
SODIUM SERPL-SCNC: 139 MMOL/L — SIGNIFICANT CHANGE UP (ref 135–145)
SP GR SPEC: 1.01 — SIGNIFICANT CHANGE UP (ref 1.01–1.02)
UROBILINOGEN FLD QL: 1
WBC # BLD: 5.55 K/UL — SIGNIFICANT CHANGE UP (ref 3.8–10.5)
WBC # FLD AUTO: 5.55 K/UL — SIGNIFICANT CHANGE UP (ref 3.8–10.5)
WBC UR QL: ABNORMAL

## 2020-10-28 RX ADMIN — PIPERACILLIN AND TAZOBACTAM 25 GRAM(S): 4; .5 INJECTION, POWDER, LYOPHILIZED, FOR SOLUTION INTRAVENOUS at 21:09

## 2020-10-28 RX ADMIN — Medication 81 MILLIGRAM(S): at 11:49

## 2020-10-28 RX ADMIN — TAMSULOSIN HYDROCHLORIDE 0.4 MILLIGRAM(S): 0.4 CAPSULE ORAL at 21:09

## 2020-10-28 RX ADMIN — SIMVASTATIN 20 MILLIGRAM(S): 20 TABLET, FILM COATED ORAL at 21:09

## 2020-10-28 RX ADMIN — PIPERACILLIN AND TAZOBACTAM 25 GRAM(S): 4; .5 INJECTION, POWDER, LYOPHILIZED, FOR SOLUTION INTRAVENOUS at 07:00

## 2020-10-28 RX ADMIN — POLYETHYLENE GLYCOL 3350 17 GRAM(S): 17 POWDER, FOR SOLUTION ORAL at 12:09

## 2020-10-28 RX ADMIN — SENNA PLUS 2 TABLET(S): 8.6 TABLET ORAL at 21:09

## 2020-10-28 RX ADMIN — PIPERACILLIN AND TAZOBACTAM 25 GRAM(S): 4; .5 INJECTION, POWDER, LYOPHILIZED, FOR SOLUTION INTRAVENOUS at 13:43

## 2020-10-28 RX ADMIN — ENOXAPARIN SODIUM 40 MILLIGRAM(S): 100 INJECTION SUBCUTANEOUS at 11:51

## 2020-10-28 RX ADMIN — Medication 1: at 12:03

## 2020-10-28 RX ADMIN — FAMOTIDINE 20 MILLIGRAM(S): 10 INJECTION INTRAVENOUS at 11:52

## 2020-10-28 RX ADMIN — DONEPEZIL HYDROCHLORIDE 5 MILLIGRAM(S): 10 TABLET, FILM COATED ORAL at 21:09

## 2020-10-28 NOTE — PROGRESS NOTE ADULT - SUBJECTIVE AND OBJECTIVE BOX
INTERVAL HPI/OVERNIGHT EVENTS: claims to have less pain    MEDICATIONS  (STANDING):  aspirin  chewable 81 milliGRAM(s) Oral daily  dextrose 5% + sodium chloride 0.45%. 1000 milliLiter(s) (20 mL/Hr) IV Continuous <Continuous>  dextrose 5%. 1000 milliLiter(s) (50 mL/Hr) IV Continuous <Continuous>  dextrose 50% Injectable 12.5 Gram(s) IV Push once  dextrose 50% Injectable 25 Gram(s) IV Push once  dextrose 50% Injectable 25 Gram(s) IV Push once  donepezil 5 milliGRAM(s) Oral at bedtime  enoxaparin Injectable 40 milliGRAM(s) SubCutaneous daily  famotidine Injectable 20 milliGRAM(s) IV Push daily  influenza   Vaccine 0.5 milliLiter(s) IntraMuscular once  insulin lispro (ADMELOG) corrective regimen sliding scale   SubCutaneous three times a day before meals  piperacillin/tazobactam IVPB.. 3.375 Gram(s) IV Intermittent every 8 hours  polyethylene glycol 3350 17 Gram(s) Oral daily  senna 2 Tablet(s) Oral at bedtime  simvastatin 20 milliGRAM(s) Oral at bedtime  tamsulosin 0.4 milliGRAM(s) Oral at bedtime    MEDICATIONS  (PRN):  acetaminophen   Tablet .. 650 milliGRAM(s) Oral every 6 hours PRN Temp greater or equal to 38C (100.4F), Mild Pain (1 - 3)  ALBUTerol    90 MICROgram(s) HFA Inhaler 2 Puff(s) Inhalation every 6 hours PRN Shortness of Breath and/or Wheezing  dextrose 40% Gel 15 Gram(s) Oral once PRN Blood Glucose LESS THAN 70 milliGRAM(s)/deciliter  glucagon  Injectable 1 milliGRAM(s) IntraMuscular once PRN Glucose LESS THAN 70 milligrams/deciliter  morphine  - Injectable 2 milliGRAM(s) IV Push every 4 hours PRN Severe Pain (7 - 10)  ondansetron Injectable 4 milliGRAM(s) IV Push every 4 hours PRN Nausea and/or Vomiting  traMADol 50 milliGRAM(s) Oral every 6 hours PRN Moderate Pain (4 - 6)        Vital Signs Last 24 Hrs  T(C): 36.6 (28 Oct 2020 05:03), Max: 36.6 (27 Oct 2020 19:59)  T(F): 97.8 (28 Oct 2020 05:03), Max: 97.9 (27 Oct 2020 19:59)  HR: 70 (28 Oct 2020 05:03) (70 - 79)  BP: 120/76 (28 Oct 2020 05:03) (104/48 - 120/76)  BP(mean): --  RR: 17 (28 Oct 2020 05:03) (17 - 18)  SpO2: 95% (28 Oct 2020 05:03) (92% - 95%)    PHYSICAL EXAM:      GENITALIA: less swelling, wound continues to drain    LABS:                        11.2   5.55  )-----------( 250      ( 28 Oct 2020 09:08 )             33.6     10    139  |  104  |  11  ----------------------------<  128<H>  3.8   |  30  |  0.61    Ca    8.9      28 Oct 2020 09:08  Mg     1.6     1027    TPro  6.8  /  Alb  2.9<L>  /  TBili  0.6  /  DBili  x   /  AST  12<L>  /  ALT  13  /  AlkPhos  118  1026    PT/INR - ( 26 Oct 2020 09:44 )   PT: 13.7 sec;   INR: 1.18 ratio         PTT - ( 26 Oct 2020 09:44 )  PTT:31.4 sec  Urinalysis Basic - ( 28 Oct 2020 04:53 )    Color: Yellow / Appearance: Clear / S.010 / pH: x  Gluc: x / Ketone: Negative  / Bili: Negative / Urobili: 1   Blood: x / Protein: Negative / Nitrite: Negative   Leuk Esterase: Trace / RBC: 0-2 /HPF / WBC 6-10   Sq Epi: x / Non Sq Epi: Occasional / Bacteria: Occasional      Urine culture:  10-26 @ 22:10 --   Rare Proteus mirabilis  Urine culture:  10-26 @ 19:20 --   No growth to date.  Urine culture:  10-26 @ 12:28 --   No growth to date.      RADIOLOGY & ADDITIONAL TESTS:

## 2020-10-28 NOTE — PROGRESS NOTE ADULT - SUBJECTIVE AND OBJECTIVE BOX
TERESA FRANCIS    PLV [id number]    Patient is a 89y old  Male who presents with a chief complaint of R GROIN PAIN AND SWELLING (27 Oct 2020 18:43)       Allergies    latex (Rash)  latex (Unknown)  No Known Drug Allergies    Intolerances        HPI:  89 y o WM  presents sent to ED from [facility] for evaluation of  R groin abscess for few days. now has opened area and has foul smelling discharge. Denies pain  No known fever/chills. no trauma. Patient is   poor historian,  PMD Dr Quintana,  no anticoagulants noted in  patient med list ,patient was admitted with GIB last year and BASA was stopped at that time .His  PMH is significant for :  Arteriosclerotic heart disease (ASHD)  Atrial fibrillation  ,CHF (congestive heart failure)  ,Dementia  ,Depression  ,DM (diabetes mellitus)  ,Hyperlipemia  ,Hypertension  ,Prostate ca . and ID consult called Seen by Dr Devries  ,no intereventions required ,continue topical care and iv abx Admitted for septic workup and evaluation,send blood and urine cx,serial lactate levels,monitor vitals closley,ivfs hydration,monitor urine output and renal profile,iv abx initiated -given vanco and zosyn in ER .Chest xray showed PVC and cardiology consult called Admit to monitored unit for cardiac monitoring, obtain echo to evaluate LVEF, intravenous diuresis  prn as per card consult , monitor ins/outs, monitor renal profile and electrolytes closely ,send 3 sets of enzymes, O2 supply, serial chest xrays, monitor weights and oral intake of fluids, nutritionist consult Palliative care consult requested ,to discuss advance directives and complete MOLST  (26 Oct 2020 13:23)      PAST MEDICAL & SURGICAL HISTORY:  Constipation    Anemia    Dementia    Arteriosclerotic heart disease (ASHD)    Prostate CA    Atrial fibrillation    DM (diabetes mellitus)    Dementia    Depression    Diabetes    Hyperlipemia    CHF (congestive heart failure)    Dementia    Prostate ca    Diabetes    Hypertension    Atrial fibrillation    No significant past surgical history        FAMILY HISTORY:        MEDICATIONS   acetaminophen   Tablet .. 650 milliGRAM(s) Oral every 6 hours PRN  ALBUTerol    90 MICROgram(s) HFA Inhaler 2 Puff(s) Inhalation every 6 hours PRN  aspirin  chewable 81 milliGRAM(s) Oral daily  dextrose 40% Gel 15 Gram(s) Oral once PRN  dextrose 5% + sodium chloride 0.45%. 1000 milliLiter(s) IV Continuous <Continuous>  dextrose 5%. 1000 milliLiter(s) IV Continuous <Continuous>  dextrose 50% Injectable 12.5 Gram(s) IV Push once  dextrose 50% Injectable 25 Gram(s) IV Push once  dextrose 50% Injectable 25 Gram(s) IV Push once  donepezil 5 milliGRAM(s) Oral at bedtime  enoxaparin Injectable 40 milliGRAM(s) SubCutaneous daily  famotidine Injectable 20 milliGRAM(s) IV Push daily  glucagon  Injectable 1 milliGRAM(s) IntraMuscular once PRN  influenza   Vaccine 0.5 milliLiter(s) IntraMuscular once  insulin lispro (ADMELOG) corrective regimen sliding scale   SubCutaneous three times a day before meals  morphine  - Injectable 2 milliGRAM(s) IV Push every 4 hours PRN  ondansetron Injectable 4 milliGRAM(s) IV Push every 4 hours PRN  piperacillin/tazobactam IVPB.. 3.375 Gram(s) IV Intermittent every 8 hours  polyethylene glycol 3350 17 Gram(s) Oral daily  senna 2 Tablet(s) Oral at bedtime  simvastatin 20 milliGRAM(s) Oral at bedtime  tamsulosin 0.4 milliGRAM(s) Oral at bedtime  traMADol 50 milliGRAM(s) Oral every 6 hours PRN      Vital Signs Last 24 Hrs  T(C): 36.6 (28 Oct 2020 05:03), Max: 36.6 (27 Oct 2020 19:59)  T(F): 97.8 (28 Oct 2020 05:03), Max: 97.9 (27 Oct 2020 19:59)  HR: 70 (28 Oct 2020 05:03) (70 - 79)  BP: 120/76 (28 Oct 2020 05:03) (104/48 - 120/76)  BP(mean): --  RR: 17 (28 Oct 2020 05:03) (17 - 18)  SpO2: 95% (28 Oct 2020 05:03) (92% - 95%)      10-27-20 @ 07:01  -  10-28-20 @ 06:02  --------------------------------------------------------  IN: 0 mL / OUT: 900 mL / NET: -900 mL            LABS:                        10.3   5.16  )-----------( 229      ( 27 Oct 2020 09:17 )             30.9     10-27    140  |  104  |  12  ----------------------------<  96  4.0   |  30  |  0.51    Ca    8.5      27 Oct 2020 09:17  Mg     1.6     10-27    TPro  6.8  /  Alb  2.9<L>  /  TBili  0.6  /  DBili  x   /  AST  12<L>  /  ALT  13  /  AlkPhos  118  10-26    PT/INR - ( 26 Oct 2020 09:44 )   PT: 13.7 sec;   INR: 1.18 ratio         PTT - ( 26 Oct 2020 09:44 )  PTT:31.4 sec  Urinalysis Basic - ( 28 Oct 2020 04:53 )    Color: Yellow / Appearance: Clear / S.010 / pH: x  Gluc: x / Ketone: Negative  / Bili: Negative / Urobili: 1   Blood: x / Protein: Negative / Nitrite: Negative   Leuk Esterase: Trace / RBC: 0-2 /HPF / WBC 6-10   Sq Epi: x / Non Sq Epi: Occasional / Bacteria: Occasional            WBC:  WBC Count: 5.16 K/uL (10-27 @ 09:17)  WBC Count: 6.44 K/uL (10-26 @ 09:44)      MICROBIOLOGY:  RECENT CULTURES:  10-26 .Abscess Testicle - Right XXXX XXXX   Rare Proteus mirabilis    10-26 .Blood Blood-Peripheral XXXX XXXX   No growth to date.    10-26 .Blood Blood-Peripheral XXXX XXXX   No growth to date.                PT/INR - ( 26 Oct 2020 09:44 )   PT: 13.7 sec;   INR: 1.18 ratio         PTT - ( 26 Oct 2020 09:44 )  PTT:31.4 sec    Sodium:  Sodium, Serum: 140 mmol/L (10-27 @ 09:17)  Sodium, Serum: 139 mmol/L (10-26 @ 09:44)      0.51 mg/dL 10-27 @ 09:17  0.61 mg/dL 10-26 @ 09:44      Hemoglobin:  Hemoglobin: 10.3 g/dL (10-27 @ 09:17)  Hemoglobin: 11.5 g/dL (10-26 @ 09:44)      Platelets: Platelet Count - Automated: 229 K/uL (10-27 @ 09:17)  Platelet Count - Automated: 245 K/uL (10-26 @ 09:44)      LIVER FUNCTIONS - ( 26 Oct 2020 09:44 )  Alb: 2.9 g/dL / Pro: 6.8 g/dL / ALK PHOS: 118 U/L / ALT: 13 U/L / AST: 12 U/L / GGT: x             Urinalysis Basic - ( 28 Oct 2020 04:53 )    Color: Yellow / Appearance: Clear / S.010 / pH: x  Gluc: x / Ketone: Negative  / Bili: Negative / Urobili: 1   Blood: x / Protein: Negative / Nitrite: Negative   Leuk Esterase: Trace / RBC: 0-2 /HPF / WBC 6-10   Sq Epi: x / Non Sq Epi: Occasional / Bacteria: Occasional        RADIOLOGY & ADDITIONAL STUDIES:

## 2020-10-28 NOTE — PROGRESS NOTE ADULT - ATTENDING COMMENTS
89 y o WM  presents sent to ED from [facility] for evaluation of  R groin abscess for few days. now has opened area and has foul smelling discharge. Denies pain  No known fever/chills. no trauma. Patient is   poor historian,  PMD Dr Quintana,  no anticoagulants noted in  patient med list ,patient was admitted with GIB last year and BASA was stopped at that time .His  PMH is significant for :  Arteriosclerotic heart disease (ASHD)  Atrial fibrillation  ,CHF (congestive heart failure)  ,Dementia  ,Depression  ,DM (diabetes mellitus)  ,Hyperlipemia  ,Hypertension  ,Prostate ca . and ID consult called Seen by Dr Devries  ,no intereventions required ,continue topical care and iv abx Admitted for septic workup and evaluation,send blood and urine cx,serial lactate levels,monitor vitals closley,ivfs hydration,monitor urine output and renal profile,iv abx initiated -given vanco and zosyn in ER .Chest xray showed PVC and cardiology consult called Admit to monitored unit for cardiac monitoring, obtain echo to evaluate LVEF, intravenous diuresis  prn as per card consult , monitor ins/outs, monitor renal profile and electrolytes closely ,send 3 sets of enzymes, O2 supply, serial chest xrays, monitor weights and oral intake of fluids, nutritionist consult Palliative care consult requested ,to discuss advance directives and complete MOLST

## 2020-10-28 NOTE — PROGRESS NOTE ADULT - SUBJECTIVE AND OBJECTIVE BOX
PROGRESS NOTE  Patient is a 89y old  Male who presents with a chief complaint of R GROIN PAIN AND SWELLING (28 Oct 2020 09:37)  Chart and available morning labs /imaging are reviewed electronically , urgent issues addressed . More information  is being added upon completion of rounds , when more information is collected and management discussed with consultants , medical staff and social service/case management on the floor   OVERNIGHT  No new issues reported by medical staff . All above noted Patient is resting in a bed comfortably .Confused ,poor mentation .No distress noted   HPI:  89 y o WM  presents sent to ED from [facility] for evaluation of  R groin abscess for few days. now has opened area and has foul smelling discharge. Denies pain  No known fever/chills. no trauma. Patient is   poor historian,  PMD Dr Quintana,  no anticoagulants noted in  patient med list ,patient was admitted with GIB last year and BASA was stopped at that time .His  PMH is significant for :  Arteriosclerotic heart disease (ASHD)  Atrial fibrillation  ,CHF (congestive heart failure)  ,Dementia  ,Depression  ,DM (diabetes mellitus)  ,Hyperlipemia  ,Hypertension  ,Prostate ca . and ID consult called Seen by Dr Devries  ,no intereventions required ,continue topical care and iv abx Admitted for septic workup and evaluation,send blood and urine cx,serial lactate levels,monitor vitals closley,ivfs hydration,monitor urine output and renal profile,iv abx initiated -given vanco and zosyn in ER .Chest xray showed PVC and cardiology consult called Admit to monitored unit for cardiac monitoring, obtain echo to evaluate LVEF, intravenous diuresis  prn as per card consult , monitor ins/outs, monitor renal profile and electrolytes closely ,send 3 sets of enzymes, O2 supply, serial chest xrays, monitor weights and oral intake of fluids, nutritionist consult Palliative care consult requested ,to discuss advance directives and complete MOLST  (26 Oct 2020 13:23)    PAST MEDICAL & SURGICAL HISTORY:  Constipation    Anemia    Dementia    Arteriosclerotic heart disease (ASHD)    Prostate CA    Atrial fibrillation    DM (diabetes mellitus)    Dementia    Depression    Diabetes    Hyperlipemia    CHF (congestive heart failure)    Dementia    Prostate ca    Diabetes    Hypertension    Atrial fibrillation    No significant past surgical history        MEDICATIONS  (STANDING):  aspirin  chewable 81 milliGRAM(s) Oral daily  dextrose 5% + sodium chloride 0.45%. 1000 milliLiter(s) (20 mL/Hr) IV Continuous <Continuous>  dextrose 5%. 1000 milliLiter(s) (50 mL/Hr) IV Continuous <Continuous>  dextrose 50% Injectable 12.5 Gram(s) IV Push once  dextrose 50% Injectable 25 Gram(s) IV Push once  dextrose 50% Injectable 25 Gram(s) IV Push once  donepezil 5 milliGRAM(s) Oral at bedtime  enoxaparin Injectable 40 milliGRAM(s) SubCutaneous daily  famotidine Injectable 20 milliGRAM(s) IV Push daily  influenza   Vaccine 0.5 milliLiter(s) IntraMuscular once  insulin lispro (ADMELOG) corrective regimen sliding scale   SubCutaneous three times a day before meals  piperacillin/tazobactam IVPB.. 3.375 Gram(s) IV Intermittent every 8 hours  polyethylene glycol 3350 17 Gram(s) Oral daily  senna 2 Tablet(s) Oral at bedtime  simvastatin 20 milliGRAM(s) Oral at bedtime  tamsulosin 0.4 milliGRAM(s) Oral at bedtime    MEDICATIONS  (PRN):  acetaminophen   Tablet .. 650 milliGRAM(s) Oral every 6 hours PRN Temp greater or equal to 38C (100.4F), Mild Pain (1 - 3)  ALBUTerol    90 MICROgram(s) HFA Inhaler 2 Puff(s) Inhalation every 6 hours PRN Shortness of Breath and/or Wheezing  dextrose 40% Gel 15 Gram(s) Oral once PRN Blood Glucose LESS THAN 70 milliGRAM(s)/deciliter  glucagon  Injectable 1 milliGRAM(s) IntraMuscular once PRN Glucose LESS THAN 70 milligrams/deciliter  morphine  - Injectable 2 milliGRAM(s) IV Push every 4 hours PRN Severe Pain (7 - 10)  ondansetron Injectable 4 milliGRAM(s) IV Push every 4 hours PRN Nausea and/or Vomiting  traMADol 50 milliGRAM(s) Oral every 6 hours PRN Moderate Pain (4 - 6)      OBJECTIVE    T(C): 36.6 (10-28-20 @ 05:03), Max: 36.6 (10-27-20 @ 19:59)  HR: 70 (10-28-20 @ 05:03) (70 - 79)  BP: 120/76 (10-28-20 @ 05:03) (104/48 - 120/76)  RR: 17 (10-28-20 @ 05:03) (17 - 18)  SpO2: 95% (10-28-20 @ 05:03) (92% - 95%)  Wt(kg): --  I&O's Summary    27 Oct 2020 07:01  -  28 Oct 2020 07:00  --------------------------------------------------------  IN: 0 mL / OUT: 900 mL / NET: -900 mL          REVIEW OF SYSTEMS:  ROS is unobtainable due to dementia and confusion PATIENT IS CONFUSED AND IS UNABLE TO GIVE ANY/RELIABLE HISTORY OR REVIEW OF SYSTEMS PLEASE ALSO SEE UNDER HPI AND ASSESSMENT FOR OBTAINED REVIEW OF SYSTEMS     PHYSICAL EXAM:  Appearance: NAD. VS past 24 hrs -as above   HEENT:   Moist oral mucosa. Conjunctiva clear b/l.   Neck : supple  Respiratory: Lungs CTAB.  Gastrointestinal:  Soft, nontender. No rebound. No rigidity. BS present R GROIN DRESSING IS INATCT	  Cardiovascular: RRR ,S1S2 present  Neurologic: Non-focal. Moving all extremities.  Extremities: No edema. No erythema. No calf tenderness.Vascular: DP & PT non palpable bilaterally secondary to non pitting edema b/l L>R, Capillary refill 3 seconds, No pedal hair present   Neurological: Gross epicritic sensation intact   Musculoskeletal: Unable to evaluate muscle strength  Dermatological:   1. Stage II Pressure Ulcer Right posterior heel- size 0.3cmx0.2cmx0.1cm- red granular base- hyperkeratotic borders, does not probe to bone, no undermining, no tunneling, no mal odor, no active drainage, no signs of infection  2. Stage 2 pressure ulcer Left heel- size 1.0cmx0.5cmx0.1cm- red granular base - hyperkeratotic borders, does not probe to bone, no undemining, no tunneling, no malodor or active drainage, no signs of infection   Skin: No rashes, No ecchymoses, No cyanosis.	  wounds ,skin lesions-See skin assesment flow sheet   LABS:                        11.2   5.55  )-----------( 250      ( 28 Oct 2020 09:08 )             33.6     10-28    139  |  104  |  11  ----------------------------<  128<H>  3.8   |  30  |  0.61    Ca    8.9      28 Oct 2020 09:08  Mg     1.6     10-27      CAPILLARY BLOOD GLUCOSE      POCT Blood Glucose.: 130 mg/dL (28 Oct 2020 08:31)  POCT Blood Glucose.: 132 mg/dL (27 Oct 2020 21:08)  POCT Blood Glucose.: 154 mg/dL (27 Oct 2020 17:19)  POCT Blood Glucose.: 144 mg/dL (27 Oct 2020 12:07)      Urinalysis Basic - ( 28 Oct 2020 04:53 )    Color: Yellow / Appearance: Clear / S.010 / pH: x  Gluc: x / Ketone: Negative  / Bili: Negative / Urobili: 1   Blood: x / Protein: Negative / Nitrite: Negative   Leuk Esterase: Trace / RBC: 0-2 /HPF / WBC 6-10   Sq Epi: x / Non Sq Epi: Occasional / Bacteria: Occasional        Culture - Abscess with Gram Stain (collected 26 Oct 2020 22:10)  Source: .Abscess Testicle - Right  Preliminary Report (27 Oct 2020 18:14):    Rare Proteus mirabilis    Culture - Blood (collected 26 Oct 2020 19:20)  Source: .Blood Blood-Peripheral  Preliminary Report (27 Oct 2020 20:01):    No growth to date.    Culture - Blood (collected 26 Oct 2020 12:28)  Source: .Blood Blood-Peripheral  Preliminary Report (27 Oct 2020 13:03):    No growth to date.      RADIOLOGY & ADDITIONAL TESTS:   reviewed elctronically< from: Xray Chest 1 View-PORTABLE IMMEDIATE (10.26.20 @ 10:35) >  EXAM:  XR CHEST PORTABLE IMMED 1V                            PROCEDURE DATE:  10/26/2020          INTERPRETATION:  AP semierect chest on 2020 at 10:22 AM. Patient has a right groin infection.    Heart enlargement and left-sided pacemaker again noted.    There are slightly increased central vascular markings left greater the right this suggests CHF. This has increased from 2019.    IMPRESSION: Mild central CHF presently suspect. Heart enlargement and pacemaker again noted.    < end of copied text >    < from: CT Head No Cont (20 @ 03:07) >  EXAM:  CT BRAIN                            PROCEDURE DATE:  2020          INTERPRETATION:  Clinical Indication: Head trauma.    Comparison: 2018    Technique: Noncontrast axial CT images of the head were acquired. Coronal and sagittal reformats were obtained.    Findings:  The ventricles and sulci are prominent in size, compatible with generalized parenchymal volume loss. No acute intracranial hemorrhage is identified.  No extra-axial fluid collection is identified.  No mass effect or midline shift is seen.  There is no evidence of acute territorial infarct.  There are periventricular and subcortical white matter hypodensities, which are nonspecific, but likely reflect mild microvascular ischemic disease.  The visualized paranasal sinuses are clear except for small retention cyst or polyp in the far lateral left frontal sinus. The mastoid air cells are well aerated on the right and partially opacified on the left.  No acute osseous abnormality is seen.      Impression: No CT evidence of acute intracranial trauma.    < end of copied text >    45minutes spent on this visit, 50% visit time spent in care co-ordination with other attendings and counselling patient  I have discussed care plan with patient and HCP,expressed understanding of problems treatment and their effect and side effects, alternatives in detail,I have asked if they have any questions and concerns and appropriately addressed them to best of my ability

## 2020-10-28 NOTE — PROGRESS NOTE ADULT - SUBJECTIVE AND OBJECTIVE BOX
Date/Time Patient Seen:  		  Referring MD:   Data Reviewed	       Patient is a 89y old  Male who presents with a chief complaint of R GROIN PAIN AND SWELLING (28 Oct 2020 06:02)      Subjective/HPI     PAST MEDICAL & SURGICAL HISTORY:  Constipation    Anemia    Dementia    Arteriosclerotic heart disease (ASHD)    Prostate CA    Atrial fibrillation    DM (diabetes mellitus)    No pertinent past medical history    Dementia    Depression    Diabetes    Hyperlipemia    CHF (congestive heart failure)    Dementia    Prostate ca    Diabetes    Hypertension    Atrial fibrillation    No significant past surgical history    No significant past surgical history          Medication list         MEDICATIONS  (STANDING):  aspirin  chewable 81 milliGRAM(s) Oral daily  dextrose 5% + sodium chloride 0.45%. 1000 milliLiter(s) (20 mL/Hr) IV Continuous <Continuous>  dextrose 5%. 1000 milliLiter(s) (50 mL/Hr) IV Continuous <Continuous>  dextrose 50% Injectable 12.5 Gram(s) IV Push once  dextrose 50% Injectable 25 Gram(s) IV Push once  dextrose 50% Injectable 25 Gram(s) IV Push once  donepezil 5 milliGRAM(s) Oral at bedtime  enoxaparin Injectable 40 milliGRAM(s) SubCutaneous daily  famotidine Injectable 20 milliGRAM(s) IV Push daily  influenza   Vaccine 0.5 milliLiter(s) IntraMuscular once  insulin lispro (ADMELOG) corrective regimen sliding scale   SubCutaneous three times a day before meals  piperacillin/tazobactam IVPB.. 3.375 Gram(s) IV Intermittent every 8 hours  polyethylene glycol 3350 17 Gram(s) Oral daily  senna 2 Tablet(s) Oral at bedtime  simvastatin 20 milliGRAM(s) Oral at bedtime  tamsulosin 0.4 milliGRAM(s) Oral at bedtime    MEDICATIONS  (PRN):  acetaminophen   Tablet .. 650 milliGRAM(s) Oral every 6 hours PRN Temp greater or equal to 38C (100.4F), Mild Pain (1 - 3)  ALBUTerol    90 MICROgram(s) HFA Inhaler 2 Puff(s) Inhalation every 6 hours PRN Shortness of Breath and/or Wheezing  dextrose 40% Gel 15 Gram(s) Oral once PRN Blood Glucose LESS THAN 70 milliGRAM(s)/deciliter  glucagon  Injectable 1 milliGRAM(s) IntraMuscular once PRN Glucose LESS THAN 70 milligrams/deciliter  morphine  - Injectable 2 milliGRAM(s) IV Push every 4 hours PRN Severe Pain (7 - 10)  ondansetron Injectable 4 milliGRAM(s) IV Push every 4 hours PRN Nausea and/or Vomiting  traMADol 50 milliGRAM(s) Oral every 6 hours PRN Moderate Pain (4 - 6)         Vitals log        ICU Vital Signs Last 24 Hrs  T(C): 36.6 (28 Oct 2020 05:03), Max: 36.6 (27 Oct 2020 19:59)  T(F): 97.8 (28 Oct 2020 05:03), Max: 97.9 (27 Oct 2020 19:59)  HR: 70 (28 Oct 2020 05:03) (70 - 79)  BP: 120/76 (28 Oct 2020 05:03) (104/48 - 120/76)  BP(mean): --  ABP: --  ABP(mean): --  RR: 17 (28 Oct 2020 05:03) (17 - 18)  SpO2: 95% (28 Oct 2020 05:03) (92% - 95%)           Input and Output:  I&O's Detail    27 Oct 2020 07:01  -  28 Oct 2020 06:33  --------------------------------------------------------  IN:  Total IN: 0 mL    OUT:    Incontinent per Condom Catheter (mL): 900 mL  Total OUT: 900 mL    Total NET: -900 mL          Lab Data                        10.3   5.16  )-----------( 229      ( 27 Oct 2020 09:17 )             30.9     10-27    140  |  104  |  12  ----------------------------<  96  4.0   |  30  |  0.51    Ca    8.5      27 Oct 2020 09:17  Mg     1.6     10-27    TPro  6.8  /  Alb  2.9<L>  /  TBili  0.6  /  DBili  x   /  AST  12<L>  /  ALT  13  /  AlkPhos  118  10-26            Review of Systems	      Objective     Physical Examination    heart s1s2  lung dec BS  abd soft      Pertinent Lab findings & Imaging      [place]:  NO   Adequate UO     I&O's Detail    27 Oct 2020 07:01  -  28 Oct 2020 06:33  --------------------------------------------------------  IN:  Total IN: 0 mL    OUT:    Incontinent per Condom Catheter (mL): 900 mL  Total OUT: 900 mL    Total NET: -900 mL               Discussed with:     Cultures:	        Radiology

## 2020-10-28 NOTE — PROGRESS NOTE ADULT - SUBJECTIVE AND OBJECTIVE BOX
[facility] [place]       HPI:  89 y o WM  presents sent to ED from [facility] for evaluation of  R groin abscess for few days. now has opened area and has foul smelling discharge. Denies pain  No known fever/chills. no trauma. Patient is   poor historian,  PMD Dr Quintana,  no anticoagulants noted in  patient med list ,patient was admitted with GIB last year and BASA was stopped at that time .His  PMH is significant for :  Arteriosclerotic heart disease (ASHD)  Atrial fibrillation  ,CHF (congestive heart failure)  ,Dementia  ,Depression  ,DM (diabetes mellitus)  ,Hyperlipemia  ,Hypertension  ,Prostate ca . and ID consult called Seen by Dr Devries  ,no intereventions required ,continue topical care and iv abx Admitted for septic workup and evaluation,send blood and urine cx,serial lactate levels,monitor vitals closley,ivfs hydration,monitor urine output and renal profile,iv abx initiated -given vanco and zosyn in ER .Chest xray showed PVC and cardiology consult called Admit to monitored unit for cardiac monitoring, obtain echo to evaluate LVEF, intravenous diuresis  prn as per card consult , monitor ins/outs, monitor renal profile and electrolytes closely ,send 3 sets of enzymes, O2 supply, serial chest xrays, monitor weights and oral intake of fluids, nutritionist consult Palliative care consult requested ,to discuss advance directives and complete MOLST  (26 Oct 2020 13:23)        SUBJECTIVE:      ALLERGIES:  Allergies    latex (Rash)  latex (Unknown)  No Known Drug Allergies    Intolerances          MEDICATIONS  (STANDING):  aspirin  chewable 81 milliGRAM(s) Oral daily  dextrose 5% + sodium chloride 0.45%. 1000 milliLiter(s) (20 mL/Hr) IV Continuous <Continuous>  dextrose 5%. 1000 milliLiter(s) (50 mL/Hr) IV Continuous <Continuous>  dextrose 50% Injectable 12.5 Gram(s) IV Push once  dextrose 50% Injectable 25 Gram(s) IV Push once  dextrose 50% Injectable 25 Gram(s) IV Push once  donepezil 5 milliGRAM(s) Oral at bedtime  enoxaparin Injectable 40 milliGRAM(s) SubCutaneous daily  famotidine Injectable 20 milliGRAM(s) IV Push daily  influenza   Vaccine 0.5 milliLiter(s) IntraMuscular once  insulin lispro (ADMELOG) corrective regimen sliding scale   SubCutaneous three times a day before meals  piperacillin/tazobactam IVPB.. 3.375 Gram(s) IV Intermittent every 8 hours  polyethylene glycol 3350 17 Gram(s) Oral daily  senna 2 Tablet(s) Oral at bedtime  simvastatin 20 milliGRAM(s) Oral at bedtime  tamsulosin 0.4 milliGRAM(s) Oral at bedtime    MEDICATIONS  (PRN):  acetaminophen   Tablet .. 650 milliGRAM(s) Oral every 6 hours PRN Temp greater or equal to 38C (100.4F), Mild Pain (1 - 3)  ALBUTerol    90 MICROgram(s) HFA Inhaler 2 Puff(s) Inhalation every 6 hours PRN Shortness of Breath and/or Wheezing  dextrose 40% Gel 15 Gram(s) Oral once PRN Blood Glucose LESS THAN 70 milliGRAM(s)/deciliter  glucagon  Injectable 1 milliGRAM(s) IntraMuscular once PRN Glucose LESS THAN 70 milligrams/deciliter  morphine  - Injectable 2 milliGRAM(s) IV Push every 4 hours PRN Severe Pain (7 - 10)  ondansetron Injectable 4 milliGRAM(s) IV Push every 4 hours PRN Nausea and/or Vomiting  traMADol 50 milliGRAM(s) Oral every 6 hours PRN Moderate Pain (4 - 6)      REVIEW OF SYSTEMS:  CONSTITUTIONAL: No fever,  RESPIRATORY: No cough, wheezing, shortness of breath  CARDIOVASCULAR: No chest pain, dyspnea, palpitations, dizziness, syncope, paroxysmal nocturnal dyspnea, orthopnea, or arm or leg swelling  GASTROINTESTINAL: No abdominal  or epigastric pain, nausea, vomiting,  diarrhea  NEUROLOGICAL: No headaches,  loss of strength, numbness, or tremors    Vital Signs Last 24 Hrs  T(C): 37 (28 Oct 2020 20:33), Max: 37 (28 Oct 2020 20:33)  T(F): 98.6 (28 Oct 2020 20:33), Max: 98.6 (28 Oct 2020 20:33)  HR: 64 (28 Oct 2020 20:33) (64 - 75)  BP: 101/56 (28 Oct 2020 20:33) (101/56 - 120/76)  BP(mean): --  RR: 18 (28 Oct 2020 20:33) (17 - 18)  SpO2: 97% (28 Oct 2020 20:33) (95% - 97%)    PHYSICAL EXAM:  HEAD:  Atraumatic, Normocephalic  NECK: Supple and normal thyroid.  No JVD or carotid bruit.   HEART: S1, S2 regular , 1/6 soft ejection systolic murmur in mitral area , no thrill and no gallops .  PULMONARY: Bilateral vesicular breathing , few scattered ronchi and few scattered rales are present .  ABDOMEN: Soft nontender and positive bowl sounds   EXTREMITIES:  No clubbing, cyanosis, or pedal  edema  NEUROLOGICAL: AAOX3 , no focal deficit .    LABS:                        11.2   5.55  )-----------( 250      ( 28 Oct 2020 09:08 )             33.6     10-    139  |  104  |  11  ----------------------------<  128<H>  3.8   |  30  |  0.61    Ca    8.9      28 Oct 2020 09:08  Mg     1.6     10-27            Urinalysis Basic - ( 28 Oct 2020 04:53 )    Color: Yellow / Appearance: Clear / S.010 / pH: x  Gluc: x / Ketone: Negative  / Bili: Negative / Urobili: 1   Blood: x / Protein: Negative / Nitrite: Negative   Leuk Esterase: Trace / RBC: 0-2 /HPF / WBC 6-10   Sq Epi: x / Non Sq Epi: Occasional / Bacteria: Occasional      BNP      EKG:  ECHO:  IMAGING:    Assessment/Plan    Will continue to follow during hospital course and give further recommendations as needed . Thanks for your referral . if any questions please contact me at office ([phone])cell [phone]) IVTETE VAZQUEZ MD [facility]  [address] [address]  [address]  OFFICE : [phone]  CELL : [phone]    CARDIOLOGY F/U:        HPI:  89 y o WM  presents sent to ED from [facility] for evaluation of  R groin abscess for few days. now has opened area and has foul smelling discharge. Denies pain  No known fever/chills. no trauma. Patient is   poor historian,  PMD Dr Quintana,  no anticoagulants noted in  patient med list ,patient was admitted with GIB last year and BASA was stopped at that time .His  PMH is significant for :  Arteriosclerotic heart disease (ASHD)  Atrial fibrillation  ,CHF (congestive heart failure)  ,Dementia  ,Depression  ,DM (diabetes mellitus)  ,Hyperlipemia  ,Hypertension  ,Prostate ca . and ID consult called Seen by Dr Devries  ,no intereventions required ,continue topical care and iv abx Admitted for septic workup and evaluation,send blood and urine cx,serial lactate levels,monitor vitals closley,ivfs hydration,monitor urine output and renal profile,iv abx initiated -given vanco and zosyn in ER .Chest xray showed PVC and cardiology consult called Admit to monitored unit for cardiac monitoring, obtain echo to evaluate LVEF, intravenous diuresis  prn as per card consult , monitor ins/outs, monitor renal profile and electrolytes closely ,send 3 sets of enzymes, O2 supply, serial chest xrays, monitor weights and oral intake of fluids, nutritionist consult Palliative care consult requested ,to discuss advance directives and complete MOLST  (26 Oct 2020 13:23)        SUBJECTIVE: Patient confused but not in distress .      ALLERGIES:  Allergies    latex (Rash)  latex (Unknown)  No Known Drug Allergies    Intolerances          MEDICATIONS  (STANDING):  aspirin  chewable 81 milliGRAM(s) Oral daily  dextrose 5% + sodium chloride 0.45%. 1000 milliLiter(s) (20 mL/Hr) IV Continuous <Continuous>  dextrose 5%. 1000 milliLiter(s) (50 mL/Hr) IV Continuous <Continuous>  dextrose 50% Injectable 12.5 Gram(s) IV Push once  dextrose 50% Injectable 25 Gram(s) IV Push once  dextrose 50% Injectable 25 Gram(s) IV Push once  donepezil 5 milliGRAM(s) Oral at bedtime  enoxaparin Injectable 40 milliGRAM(s) SubCutaneous daily  famotidine Injectable 20 milliGRAM(s) IV Push daily  influenza   Vaccine 0.5 milliLiter(s) IntraMuscular once  insulin lispro (ADMELOG) corrective regimen sliding scale   SubCutaneous three times a day before meals  piperacillin/tazobactam IVPB.. 3.375 Gram(s) IV Intermittent every 8 hours  polyethylene glycol 3350 17 Gram(s) Oral daily  senna 2 Tablet(s) Oral at bedtime  simvastatin 20 milliGRAM(s) Oral at bedtime  tamsulosin 0.4 milliGRAM(s) Oral at bedtime    MEDICATIONS  (PRN):  acetaminophen   Tablet .. 650 milliGRAM(s) Oral every 6 hours PRN Temp greater or equal to 38C (100.4F), Mild Pain (1 - 3)  ALBUTerol    90 MICROgram(s) HFA Inhaler 2 Puff(s) Inhalation every 6 hours PRN Shortness of Breath and/or Wheezing  dextrose 40% Gel 15 Gram(s) Oral once PRN Blood Glucose LESS THAN 70 milliGRAM(s)/deciliter  glucagon  Injectable 1 milliGRAM(s) IntraMuscular once PRN Glucose LESS THAN 70 milligrams/deciliter  morphine  - Injectable 2 milliGRAM(s) IV Push every 4 hours PRN Severe Pain (7 - 10)  ondansetron Injectable 4 milliGRAM(s) IV Push every 4 hours PRN Nausea and/or Vomiting  traMADol 50 milliGRAM(s) Oral every 6 hours PRN Moderate Pain (4 - 6)      REVIEW OF SYSTEMS:  CONSTITUTIONAL: No fever,  RESPIRATORY: No cough, wheezing, shortness of breath  CARDIOVASCULAR: No chest pain, dyspnea, palpitations, dizziness, syncope, paroxysmal nocturnal dyspnea, orthopnea, or arm or leg swelling  GASTROINTESTINAL: No abdominal  or epigastric pain, nausea, vomiting,  diarrhea  NEUROLOGICAL: No headaches,  numbness, or tremors  Skin : No itching .  Hematology : No active bleeding .  Endocrinology : No heat and cold intolerance .  Psychiatry : Patient is confused .  Musculoskeletal : Patient has chronic arthritis .    Vital Signs Last 24 Hrs  T(C): 37 (28 Oct 2020 20:33), Max: 37 (28 Oct 2020 20:33)  T(F): 98.6 (28 Oct 2020 20:33), Max: 98.6 (28 Oct 2020 20:33)  HR: 64 (28 Oct 2020 20:33) (64 - 75)  BP: 101/56 (28 Oct 2020 20:33) (101/56 - 120/76)  BP(mean): --  RR: 18 (28 Oct 2020 20:33) (17 - 18)  SpO2: 97% (28 Oct 2020 20:33) (95% - 97%)    PHYSICAL EXAM:  HEAD:  Atraumatic, Normocephalic  NECK: Supple and normal thyroid.  No JVD or carotid bruit.   HEART: S1, S2 irregular , 1/6 soft ejection systolic murmur in mitral area , no thrill and no gallops .  PULMONARY: Bilateral vesicular breathing , few scattered rhonchi and few scattered rales are present .  ABDOMEN: Soft nontender and positive bowl sounds   EXTREMITIES:  No clubbing, cyanosis, or pedal  edema  NEUROLOGICAL: AA and confused .     LABS:                        11.2   5.55  )-----------( 250      ( 28 Oct 2020 09:08 )             33.6     10-28    139  |  104  |  11  ----------------------------<  128<H>  3.8   |  30  |  0.61    Ca    8.9      28 Oct 2020 09:08  Mg     1.6     10-27            Urinalysis Basic - ( 28 Oct 2020 04:53 )    Color: Yellow / Appearance: Clear / S.010 / pH: x  Gluc: x / Ketone: Negative  / Bili: Negative / Urobili: 1   Blood: x / Protein: Negative / Nitrite: Negative   Leuk Esterase: Trace / RBC: 0-2 /HPF / WBC 6-10   Sq Epi: x / Non Sq Epi: Occasional / Bacteria: Occasional      Assessment/Plan  Patient has :  1) Groin abscess  and patient getting I/V antibiotics .  2) Pacemaker and functioning well   3) H/O CHF and stable .  4) H/O CAD   5) Anemia .    Will continue to follow during hospital course and give further recommendations as needed . Thanks for your referral . if any questions please contact me at office ([phone])cell [phone]) IVETTE VAZQUEZ MD [address] [address]  [address]  OFFICE : [phone]  CELL : [phone]    CARDIOLOGY F/U: Patient confused and no distress .        HPI:  89 y o WM  presents sent to ED from [facility] for evaluation of  R groin abscess for few days. now has opened area and has foul smelling discharge. Denies pain  No known fever/chills. no trauma. Patient is   poor historian,  PMD Dr Qunitana,  no anticoagulants noted in  patient med list ,patient was admitted with GIB last year and BASA was stopped at that time .His  PMH is significant for :  Arteriosclerotic heart disease (ASHD)  Atrial fibrillation  ,CHF (congestive heart failure)  ,Dementia  ,Depression  ,DM (diabetes mellitus)  ,Hyperlipemia  ,Hypertension  ,Prostate ca . and ID consult called Seen by Dr Devries  ,no intereventions required ,continue topical care and iv abx Admitted for septic workup and evaluation,send blood and urine cx,serial lactate levels,monitor vitals closley,ivfs hydration,monitor urine output and renal profile,iv abx initiated -given vanco and zosyn in ER .Chest xray showed PVC and cardiology consult called Admit to monitored unit for cardiac monitoring, obtain echo to evaluate LVEF, intravenous diuresis  prn as per card consult , monitor ins/outs, monitor renal profile and electrolytes closely ,send 3 sets of enzymes, O2 supply, serial chest xrays, monitor weights and oral intake of fluids, nutritionist consult Palliative care consult requested ,to discuss advance directives and complete MOLST  (26 Oct 2020 13:23)        SUBJECTIVE: Patient confused but not in distress .      ALLERGIES:  Allergies    latex (Rash)  latex (Unknown)  No Known Drug Allergies    Intolerances          MEDICATIONS  (STANDING):  aspirin  chewable 81 milliGRAM(s) Oral daily  dextrose 5% + sodium chloride 0.45%. 1000 milliLiter(s) (20 mL/Hr) IV Continuous <Continuous>  dextrose 5%. 1000 milliLiter(s) (50 mL/Hr) IV Continuous <Continuous>  dextrose 50% Injectable 12.5 Gram(s) IV Push once  dextrose 50% Injectable 25 Gram(s) IV Push once  dextrose 50% Injectable 25 Gram(s) IV Push once  donepezil 5 milliGRAM(s) Oral at bedtime  enoxaparin Injectable 40 milliGRAM(s) SubCutaneous daily  famotidine Injectable 20 milliGRAM(s) IV Push daily  influenza   Vaccine 0.5 milliLiter(s) IntraMuscular once  insulin lispro (ADMELOG) corrective regimen sliding scale   SubCutaneous three times a day before meals  piperacillin/tazobactam IVPB.. 3.375 Gram(s) IV Intermittent every 8 hours  polyethylene glycol 3350 17 Gram(s) Oral daily  senna 2 Tablet(s) Oral at bedtime  simvastatin 20 milliGRAM(s) Oral at bedtime  tamsulosin 0.4 milliGRAM(s) Oral at bedtime    MEDICATIONS  (PRN):  acetaminophen   Tablet .. 650 milliGRAM(s) Oral every 6 hours PRN Temp greater or equal to 38C (100.4F), Mild Pain (1 - 3)  ALBUTerol    90 MICROgram(s) HFA Inhaler 2 Puff(s) Inhalation every 6 hours PRN Shortness of Breath and/or Wheezing  dextrose 40% Gel 15 Gram(s) Oral once PRN Blood Glucose LESS THAN 70 milliGRAM(s)/deciliter  glucagon  Injectable 1 milliGRAM(s) IntraMuscular once PRN Glucose LESS THAN 70 milligrams/deciliter  morphine  - Injectable 2 milliGRAM(s) IV Push every 4 hours PRN Severe Pain (7 - 10)  ondansetron Injectable 4 milliGRAM(s) IV Push every 4 hours PRN Nausea and/or Vomiting  traMADol 50 milliGRAM(s) Oral every 6 hours PRN Moderate Pain (4 - 6)      REVIEW OF SYSTEMS:  CONSTITUTIONAL: No fever,  RESPIRATORY: No cough, wheezing, shortness of breath  CARDIOVASCULAR: No chest pain, dyspnea, palpitations, dizziness, syncope, paroxysmal nocturnal dyspnea, orthopnea, or arm or leg swelling  GASTROINTESTINAL: No abdominal  or epigastric pain, nausea, vomiting,  diarrhea  NEUROLOGICAL: No headaches,  numbness, or tremors  Skin : No itching .  Hematology : No active bleeding .  Endocrinology : No heat and cold intolerance .  Psychiatry : Patient is confused .  Musculoskeletal : Patient has chronic arthritis .    Vital Signs Last 24 Hrs  T(C): 37 (28 Oct 2020 20:33), Max: 37 (28 Oct 2020 20:33)  T(F): 98.6 (28 Oct 2020 20:33), Max: 98.6 (28 Oct 2020 20:33)  HR: 64 (28 Oct 2020 20:33) (64 - 75)  BP: 101/56 (28 Oct 2020 20:33) (101/56 - 120/76)  BP(mean): --  RR: 18 (28 Oct 2020 20:33) (17 - 18)  SpO2: 97% (28 Oct 2020 20:33) (95% - 97%)    PHYSICAL EXAM:  HEAD:  Atraumatic, Normocephalic  NECK: Supple and normal thyroid.  No JVD or carotid bruit.   HEART: S1, S2 irregular , 1/6 soft ejection systolic murmur in mitral area , no thrill and no gallops .  PULMONARY: Bilateral vesicular breathing , few scattered rhonchi and few scattered rales are present .  ABDOMEN: Soft nontender and positive bowl sounds   EXTREMITIES:  No clubbing, cyanosis, or pedal  edema  NEUROLOGICAL: AA and confused .     LABS:                        11.2   5.55  )-----------( 250      ( 28 Oct 2020 09:08 )             33.6     10-28    139  |  104  |  11  ----------------------------<  128<H>  3.8   |  30  |  0.61    Ca    8.9      28 Oct 2020 09:08  Mg     1.6     10-27            Urinalysis Basic - ( 28 Oct 2020 04:53 )    Color: Yellow / Appearance: Clear / S.010 / pH: x  Gluc: x / Ketone: Negative  / Bili: Negative / Urobili: 1   Blood: x / Protein: Negative / Nitrite: Negative   Leuk Esterase: Trace / RBC: 0-2 /HPF / WBC 6-10   Sq Epi: x / Non Sq Epi: Occasional / Bacteria: Occasional      Assessment/Plan  Patient has :  1) Groin abscess  and patient getting I/V antibiotics .  2) Pacemaker and functioning well   3) H/O CHF and stable .  4) H/O CAD   5) Anemia .  Plan : 1) I/V antibiotics 2) Rest of medications as such 3) Monitor hemoglobin and electrolytes .  Will continue to follow during hospital course and give further recommendations as needed . Thanks for your referral . if any questions please contact me at office ([phone])cell [phone])

## 2020-10-28 NOTE — OCCUPATIONAL THERAPY INITIAL EVALUATION ADULT - ADDITIONAL COMMENTS
Patient is poor historian, confused and only oriented to self. As per EMR, patient resides at [facility]. Prior to admission, patient performed functional ambulation using SC with assist x 1. During OT evaluation, patient ambulated 5 steps using RW with mod assist x 1 and verbal cues.

## 2020-10-28 NOTE — PROGRESS NOTE ADULT - ASSESSMENT
cont rx   cont rx      PT DATA/BEST PRACTICE  ALLERGY     latex          WT              10/26/2020 68 k                   BMI                10/26/2020 20                         ADVANCED DIRECTIVE  dnr   HEAD OF BED ELEVATION Yes      DVT PROPHYLAXIS.    lvnx 40 (10/26/2020)     DIET. dys 3 soft thin (10/26)                                                                  SEGUNDO PROPHYLAXIS. famotidine (10/26)   INFECTION PPLX                                                    OXYGENATION.   10/26/2020 ra 98%       IV F. VITALS.   10/28/2020 afeb 75 110/70       IMAGING.   CXR 10/26/2020 cxr mild chf   ECHO 2/1/2019 echo EF 35% pasp 40       PATIENT SUMMARY.   88 m retd [employer] PMH  subcm lung nodules 10/21/2014 CT Ch  1 cm hypoattenuating thyroid nodule  Trace symmetrical layering bl pl effusions with dependent atelectasis bases  Ca granuloma both RML and l lower lobes  5 mm subpleural parenchymal nodule R apex   sCHF 10/24/2014 ECHO EF 50% Mild hypokinesia septum PASP 40 Mod MR    A fib  (on coumadin which was dced 2019 when he developed hematuria ) Htn Depression l leg lymphedema ( remote injury) OBS  was admitted 10/26/2020 with groin abscess draining pus and Pulm consulted 10/26/2020 for ho copd lung nodule    home meds Tamsulosin .4 duoneb asa pulmicort asa donepezil 5 simvastat 20 metformin       PROBLEM ASSESSMENT RECOMMENDATIONS .   PRESSURE ULCER HEEL L STAGE 2 Seen 10/26/2020 Dr Wong   PRESSURE ULCER HEEL R STAGE 2 Seen 10/26/2020 Dr Wong   SCROTAL ABSCESS NOTED 10/26/2020 Vanco ns given in er 10/26/2020   A/R 10/27/2020 seenby gu and id loacl care and draianage and abio advised and being given   COPD Cont meds  CHF CXR 10/26/2020 cxr mild chf   ECHO 2/1/2019 echo EF 35% pasp 40   LUNG NODULE Elective ct  ch  A fib  On ASA Cont rx     TIME SPENT   Over 25 minutes aggregate care time spent on encounter; activities included   direct patient care, counseling and/or coordinating care reviewing notes, lab data/ imaging , discussion with multidisciplinary team/ patient  /family and explaining in detail risks, benefits, alternatives  of the recommendations     TERESA FRANCIS [phone]  10/16/1926 DOA 10/26/2020 DR ARCADIO FRIAS  CONTACT Vinicio Bradford

## 2020-10-28 NOTE — PROGRESS NOTE ADULT - SUBJECTIVE AND OBJECTIVE BOX
[facility] [place]       HPI:  89 y o WM  presents sent to ED from [facility] for evaluation of  R groin abscess for few days. now has opened area and has foul smelling discharge. Denies pain  No known fever/chills. no trauma. Patient is   poor historian,  PMD Dr Quintana,  no anticoagulants noted in  patient med list ,patient was admitted with GIB last year and BASA was stopped at that time .His  PMH is significant for :  Arteriosclerotic heart disease (ASHD)  Atrial fibrillation  ,CHF (congestive heart failure)  ,Dementia  ,Depression  ,DM (diabetes mellitus)  ,Hyperlipemia  ,Hypertension  ,Prostate ca . and ID consult called Seen by Dr Devries  ,no intereventions required ,continue topical care and iv abx Admitted for septic workup and evaluation,send blood and urine cx,serial lactate levels,monitor vitals closley,ivfs hydration,monitor urine output and renal profile,iv abx initiated -given vanco and zosyn in ER .Chest xray showed PVC and cardiology consult called Admit to monitored unit for cardiac monitoring, obtain echo to evaluate LVEF, intravenous diuresis  prn as per card consult , monitor ins/outs, monitor renal profile and electrolytes closely ,send 3 sets of enzymes, O2 supply, serial chest xrays, monitor weights and oral intake of fluids, nutritionist consult Palliative care consult requested ,to discuss advance directives and complete MOLST  (26 Oct 2020 13:23)        SUBJECTIVE:      ALLERGIES:  Allergies    latex (Rash)  latex (Unknown)  No Known Drug Allergies    Intolerances          MEDICATIONS  (STANDING):  aspirin  chewable 81 milliGRAM(s) Oral daily  dextrose 5% + sodium chloride 0.45%. 1000 milliLiter(s) (20 mL/Hr) IV Continuous <Continuous>  dextrose 5%. 1000 milliLiter(s) (50 mL/Hr) IV Continuous <Continuous>  dextrose 50% Injectable 12.5 Gram(s) IV Push once  dextrose 50% Injectable 25 Gram(s) IV Push once  dextrose 50% Injectable 25 Gram(s) IV Push once  donepezil 5 milliGRAM(s) Oral at bedtime  enoxaparin Injectable 40 milliGRAM(s) SubCutaneous daily  famotidine Injectable 20 milliGRAM(s) IV Push daily  influenza   Vaccine 0.5 milliLiter(s) IntraMuscular once  insulin lispro (ADMELOG) corrective regimen sliding scale   SubCutaneous three times a day before meals  piperacillin/tazobactam IVPB.. 3.375 Gram(s) IV Intermittent every 8 hours  polyethylene glycol 3350 17 Gram(s) Oral daily  senna 2 Tablet(s) Oral at bedtime  simvastatin 20 milliGRAM(s) Oral at bedtime  tamsulosin 0.4 milliGRAM(s) Oral at bedtime    MEDICATIONS  (PRN):  acetaminophen   Tablet .. 650 milliGRAM(s) Oral every 6 hours PRN Temp greater or equal to 38C (100.4F), Mild Pain (1 - 3)  ALBUTerol    90 MICROgram(s) HFA Inhaler 2 Puff(s) Inhalation every 6 hours PRN Shortness of Breath and/or Wheezing  dextrose 40% Gel 15 Gram(s) Oral once PRN Blood Glucose LESS THAN 70 milliGRAM(s)/deciliter  glucagon  Injectable 1 milliGRAM(s) IntraMuscular once PRN Glucose LESS THAN 70 milligrams/deciliter  morphine  - Injectable 2 milliGRAM(s) IV Push every 4 hours PRN Severe Pain (7 - 10)  ondansetron Injectable 4 milliGRAM(s) IV Push every 4 hours PRN Nausea and/or Vomiting  traMADol 50 milliGRAM(s) Oral every 6 hours PRN Moderate Pain (4 - 6)      REVIEW OF SYSTEMS:  CONSTITUTIONAL: No fever,  RESPIRATORY: No cough, wheezing, shortness of breath  CARDIOVASCULAR: No chest pain, dyspnea, palpitations, dizziness, syncope, paroxysmal nocturnal dyspnea, orthopnea, or arm or leg swelling  GASTROINTESTINAL: No abdominal  or epigastric pain, nausea, vomiting,  diarrhea  NEUROLOGICAL: No headaches,  loss of strength, numbness, or tremors    Vital Signs Last 24 Hrs  T(C): 37 (28 Oct 2020 20:33), Max: 37 (28 Oct 2020 20:33)  T(F): 98.6 (28 Oct 2020 20:33), Max: 98.6 (28 Oct 2020 20:33)  HR: 64 (28 Oct 2020 20:33) (64 - 75)  BP: 101/56 (28 Oct 2020 20:33) (101/56 - 120/76)  BP(mean): --  RR: 18 (28 Oct 2020 20:33) (17 - 18)  SpO2: 97% (28 Oct 2020 20:33) (95% - 97%)    PHYSICAL EXAM:  HEAD:  Atraumatic, Normocephalic  NECK: Supple and normal thyroid.  No JVD or carotid bruit.   HEART: S1, S2 regular , 1/6 soft ejection systolic murmur in mitral area , no thrill and no gallops .  PULMONARY: Bilateral vesicular breathing , few scattered ronchi and few scattered rales are present .  ABDOMEN: Soft nontender and positive bowl sounds   EXTREMITIES:  No clubbing, cyanosis, or pedal  edema  NEUROLOGICAL: AAOX3 , no focal deficit .    LABS:                        11.2   5.55  )-----------( 250      ( 28 Oct 2020 09:08 )             33.6     10-    139  |  104  |  11  ----------------------------<  128<H>  3.8   |  30  |  0.61    Ca    8.9      28 Oct 2020 09:08  Mg     1.6     10-27            Urinalysis Basic - ( 28 Oct 2020 04:53 )    Color: Yellow / Appearance: Clear / S.010 / pH: x  Gluc: x / Ketone: Negative  / Bili: Negative / Urobili: 1   Blood: x / Protein: Negative / Nitrite: Negative   Leuk Esterase: Trace / RBC: 0-2 /HPF / WBC 6-10   Sq Epi: x / Non Sq Epi: Occasional / Bacteria: Occasional      BNP      EKG:  ECHO:  IMAGING:    Assessment/Plan    Will continue to follow during hospital course and give further recommendations as needed . Thanks for your referral . if any questions please contact me at office [phone] [phone])

## 2020-10-28 NOTE — OCCUPATIONAL THERAPY INITIAL EVALUATION ADULT - PERTINENT HX OF CURRENT PROBLEM, REHAB EVAL
89 y/o male with dementia admitted on 10/26/2020 for evaluation of right groin abscess. Patient with +pressure ulcer stage 2 of left heel, right heel and sacrum.

## 2020-10-28 NOTE — OCCUPATIONAL THERAPY INITIAL EVALUATION ADULT - RANGE OF MOTION EXAMINATION, UPPER EXTREMITY
Left UE PROM limited: 0-70 degrees flexion at shoulder, 0-180 degrees flexion at elbow, 0-45 degrees supination/pronation at forearm, wrist tight in neutral, digit 3-5 in flexion contracture/Right UE Active Assistive ROM was WFL  (within functional limits) Left UE PROM limited: 0-70 degrees flexion at shoulder, 0-145 degrees flexion at elbow, 0-45 degrees supination/pronation at forearm, wrist tight in neutral, digit 3-5 in flexion contracture/Right UE Active Assistive ROM was WFL  (within functional limits)

## 2020-10-28 NOTE — OCCUPATIONAL THERAPY INITIAL EVALUATION ADULT - PERSONAL SAFETY AND JUDGMENT, REHAB EVAL
"Schedule surgery with Dr. Burris.    It was a pleasure meeting with you today.  Thank you for allowing me and my team the privilege of caring for you today.  YOU are the reason we are here, and I truly hope we provided you with the excellent service you deserve.  Please let us know if there is anything else we can do for you so that we can be sure you are leaving completely satisfied with your care experience.      Krystal Velázquez, ANTONIETTA      AFTER YOUR CYSTOSCOPY        You have just completed a cystoscopy, or \"cysto\", which allowed your physician to learn more about your bladder (or to remove a stent placed after surgery). We suggest that you continue to avoid caffeine, fruit juice, and alcohol for the next 24 hours, however, you are encouraged to return to your normal activities.         A few things that are considered normal after your cystoscopy:     * Small amount of bleeding (or spotting) that clears within the next 24 hours     * Slight burning sensation with urination     * Sensation to of needing to avoid more frequently     * The feeling of \"air\" in your urine     * Mild discomfort that is relieved with Tylenol        Please contact our office promptly if you:     * Develop a fever above 101 degrees     * Are unable to urinate     * Develop bright red blood that does not stop     * Severe pain or swelling         Please contact our office with any concerns or questions @[email or web address].  "
impaired

## 2020-10-28 NOTE — OCCUPATIONAL THERAPY INITIAL EVALUATION ADULT - GENERAL OBSERVATIONS, REHAB EVAL
Patient found seated in bedside chair +texas catheter, NAD. Patient is poor historian, alert and confused, oriented to self only. Patient has limited AAROM of left UE with crepitus noted at left shoulder and elbow. Patient has 0-70 degrees of PROM flexion at left shoulder, 0-180 degrees of PROM flexion at left elbow, 0-45 of PROM of left forearm and digits 3, 4, 5 with flexion contractures noted. Patient found seated in bedside chair +texas catheter, NAD. Patient is poor historian, alert and confused, oriented to self only. Patient has limited AAROM of left UE with crepitus noted at left shoulder and elbow. Patient has 0-70 degrees of PROM flexion at left shoulder, 0-145 degrees of PROM flexion at left elbow, 0-45 of PROM of left forearm and digits 3, 4, 5 with flexion contractures noted.

## 2020-10-28 NOTE — PROGRESS NOTE ADULT - ASSESSMENT
89 y o WM  presents sent to ED from [facility] for evaluation of  R groin abscess for few days. now has opened area and has foul smelling discharge. Denies pain  No known fever/chills. no trauma. Patient is   poor historian,  PMD Dr Quintana,  no anticoagulants noted in  patient med list ,patient was admitted with GIB last year and BASA was stopped at that time .His  PMH is significant for :  Arteriosclerotic heart disease (ASHD)  Atrial fibrillation  ,CHF (congestive heart failure)  ,Dementia  ,Depression  ,DM (diabetes mellitus)  ,Hyperlipemia  ,Hypertension  ,Prostate ca . and ID consult called Seen by Dr Devries  ,no intereventions required ,continue topical care and iv abx Admitted for septic workup and evaluation,send blood and urine cx,serial lactate levels,monitor vitals closley,ivfs hydration,monitor urine output and renal profile,iv abx initiated -given vanco and zosyn in ER .Chest xray showed PVC and cardiology consult called Admit to monitored unit for cardiac monitoring, obtain echo to evaluate LVEF, intravenous diuresis  prn as per card consult , monitor ins/outs, monitor renal profile and electrolytes closely ,send 3 sets of enzymes, O2 supply, serial chest xrays, monitor weights and oral intake of fluids, nutritionist consult Palliative care consult requested ,to discuss advance directives and complete MOLST

## 2020-10-28 NOTE — PROGRESS NOTE ADULT - SUBJECTIVE AND OBJECTIVE BOX
TITOTERESA is a 89yMale , patient examined and chart reviewed.      INTERVAL HPI/ OVERNIGHT EVENTS:   No events. In chair. Sitting in chair.  Afebrile.    PAST MEDICAL & SURGICAL HISTORY:  Constipation  Anemia  Dementia  Arteriosclerotic heart disease (ASHD)  Prostate CA  Atrial fibrillation  DM (diabetes mellitus)  Dementia  Prostate ca  Hypertension  No significant past surgical history        For details regarding the patient's social history, family history, and other miscellaneous elements, please refer the initial infectious diseases consultation and/or the admitting history and physical examination for this admission.    ROS:  Unable to obtain due to : Dementia     ALLERGIES  latex (Rash)     Current inpatient medications :    ANTIBIOTICS/RELEVANT:  piperacillin/tazobactam IVPB.. 3.375 Gram(s) IV Intermittent every 8 hours    MEDICATIONS  (STANDING):  aspirin  chewable 81 milliGRAM(s) Oral daily  dextrose 5% + sodium chloride 0.45%. 1000 milliLiter(s) (20 mL/Hr) IV Continuous <Continuous>  dextrose 5%. 1000 milliLiter(s) (50 mL/Hr) IV Continuous <Continuous>  dextrose 50% Injectable 12.5 Gram(s) IV Push once  dextrose 50% Injectable 25 Gram(s) IV Push once  dextrose 50% Injectable 25 Gram(s) IV Push once  donepezil 5 milliGRAM(s) Oral at bedtime  enoxaparin Injectable 40 milliGRAM(s) SubCutaneous daily  famotidine Injectable 20 milliGRAM(s) IV Push daily  influenza   Vaccine 0.5 milliLiter(s) IntraMuscular once  insulin lispro (ADMELOG) corrective regimen sliding scale   SubCutaneous three times a day before meals  polyethylene glycol 3350 17 Gram(s) Oral daily  senna 2 Tablet(s) Oral at bedtime  simvastatin 20 milliGRAM(s) Oral at bedtime  tamsulosin 0.4 milliGRAM(s) Oral at bedtime    MEDICATIONS  (PRN):  acetaminophen   Tablet .. 650 milliGRAM(s) Oral every 6 hours PRN Temp greater or equal to 38C (100.4F), Mild Pain (1 - 3)  ALBUTerol    90 MICROgram(s) HFA Inhaler 2 Puff(s) Inhalation every 6 hours PRN Shortness of Breath and/or Wheezing  dextrose 40% Gel 15 Gram(s) Oral once PRN Blood Glucose LESS THAN 70 milliGRAM(s)/deciliter  glucagon  Injectable 1 milliGRAM(s) IntraMuscular once PRN Glucose LESS THAN 70 milligrams/deciliter  morphine  - Injectable 2 milliGRAM(s) IV Push every 4 hours PRN Severe Pain (7 - 10)  ondansetron Injectable 4 milliGRAM(s) IV Push every 4 hours PRN Nausea and/or Vomiting  traMADol 50 milliGRAM(s) Oral every 6 hours PRN Moderate Pain (4 - 6)      Objective:  Vital Signs Last 24 Hrs  T(C): 36.4 (28 Oct 2020 12:52), Max: 36.6 (27 Oct 2020 19:59)  T(F): 97.6 (28 Oct 2020 12:52), Max: 97.9 (27 Oct 2020 19:59)  HR: 75 (28 Oct 2020 12:52) (70 - 79)  BP: 110/70 (28 Oct 2020 12:52) (104/48 - 120/76)  RR: 17 (28 Oct 2020 12:52) (17 - 18)  SpO2: 97% (28 Oct 2020 12:52) (92% - 97%)    Physical Exam:  General: no acute distress  Eyes: sclera anicteric, pupils equal and reactive to light  ENMT: buccal mucosa moist, pharynx not injected  Neck: supple, trachea midline  Lungs: clear, no wheeze/rhonchi  Cardiovascular: regular rate and rhythm, S1 S2  Abdomen: soft, nontender, ND, bowel sounds normal  : Right scrotal abscess + drainage with erythema  Neurological: awake confused  Skin: no increased ecchymosis/petechiae/purpura  Lymph Nodes: no palpable cervical/supraclavicular lymph nodes enlargements  Extremities: Jaun foot drsg c/d/i        LABS:                        11.2   5.55  )-----------( 250      ( 28 Oct 2020 09:08 )             33.6   10-28    139  |  104  |  11  ----------------------------<  128<H>  3.8   |  30  |  0.61    Ca    8.9      28 Oct 2020 09:08  Mg     1.6     10-27      MICROBIOLOGY:    Culture - Abscess with Gram Stain (collected 26 Oct 2020 22:10)  Source: .Abscess Testicle - Right  Preliminary Report (27 Oct 2020 18:14):    Rare Proteus mirabilis    Culture - Blood (collected 26 Oct 2020 19:20)  Source: .Blood Blood-Peripheral  Preliminary Report (27 Oct 2020 20:01):    No growth to date.    Culture - Blood (collected 26 Oct 2020 12:28)  Source: .Blood Blood-Peripheral  Preliminary Report (27 Oct 2020 13:03):    No growth to date.    RADIOLOGY & ADDITIONAL STUDIES:  EXAM:  XR CHEST PORTABLE IMMED 1V                            PROCEDURE DATE:  10/26/2020          INTERPRETATION:  AP semierect chest on October 26, 2020 at 10:22 AM. Patient has a right groin infection.    Heart enlargement and left-sided pacemaker again noted.    There are slightly increased central vascular markings left greater the right this suggests CHF. This has increased from July 25, 2019.    IMPRESSION: Mild central CHF presently suspect. Heart enlargement and pacemaker again noted.    Assessment :   89 y/o with hx of Ca Prostate Dementia DM CHF admitted with right scrotal abscess + purulent drainage, Seen by Urology and cultures with proteus  Jaun heel ulcer doubt infection- seen by podiatry    Plan :   Cont Zosyn for now  Fu cultures and change to po antibiotics  Trend temps and cbc  Asp precautions    D/w Dr Ring    Continue with present regiment.  Appropriate use of antibiotics and adverse effects reviewed.    > 35 minutes were spent in direct patient care reviewing notes, medications ,labs data/ imaging , discussion with multidisciplinary team.    Thank you for allowing me to participate in care of your patient .    Heber Issa MD  Infectious Disease  [phone]

## 2020-10-29 ENCOUNTER — TRANSCRIPTION ENCOUNTER (OUTPATIENT)
Age: 85
End: 2020-10-29

## 2020-10-29 LAB
ANION GAP SERPL CALC-SCNC: 4 MMOL/L — LOW (ref 5–17)
BUN SERPL-MCNC: 11 MG/DL — SIGNIFICANT CHANGE UP (ref 7–23)
CALCIUM SERPL-MCNC: 8.9 MG/DL — SIGNIFICANT CHANGE UP (ref 8.5–10.1)
CHLORIDE SERPL-SCNC: 107 MMOL/L — SIGNIFICANT CHANGE UP (ref 96–108)
CO2 SERPL-SCNC: 31 MMOL/L — SIGNIFICANT CHANGE UP (ref 22–31)
CREAT SERPL-MCNC: 0.64 MG/DL — SIGNIFICANT CHANGE UP (ref 0.5–1.3)
CULTURE RESULTS: NO GROWTH — SIGNIFICANT CHANGE UP
CULTURE RESULTS: SIGNIFICANT CHANGE UP
GLUCOSE SERPL-MCNC: 112 MG/DL — HIGH (ref 70–99)
HCT VFR BLD CALC: 33.4 % — LOW (ref 39–50)
HGB BLD-MCNC: 10.9 G/DL — LOW (ref 13–17)
MCHC RBC-ENTMCNC: 29.1 PG — SIGNIFICANT CHANGE UP (ref 27–34)
MCHC RBC-ENTMCNC: 32.6 GM/DL — SIGNIFICANT CHANGE UP (ref 32–36)
MCV RBC AUTO: 89.1 FL — SIGNIFICANT CHANGE UP (ref 80–100)
NRBC # BLD: 0 /100 WBCS — SIGNIFICANT CHANGE UP (ref 0–0)
PLATELET # BLD AUTO: 245 K/UL — SIGNIFICANT CHANGE UP (ref 150–400)
POTASSIUM SERPL-MCNC: 3.9 MMOL/L — SIGNIFICANT CHANGE UP (ref 3.5–5.3)
POTASSIUM SERPL-SCNC: 3.9 MMOL/L — SIGNIFICANT CHANGE UP (ref 3.5–5.3)
RBC # BLD: 3.75 M/UL — LOW (ref 4.2–5.8)
RBC # FLD: 14.5 % — SIGNIFICANT CHANGE UP (ref 10.3–14.5)
SARS-COV-2 RNA SPEC QL NAA+PROBE: SIGNIFICANT CHANGE UP
SODIUM SERPL-SCNC: 142 MMOL/L — SIGNIFICANT CHANGE UP (ref 135–145)
SPECIMEN SOURCE: SIGNIFICANT CHANGE UP
SPECIMEN SOURCE: SIGNIFICANT CHANGE UP
WBC # BLD: 5.32 K/UL — SIGNIFICANT CHANGE UP (ref 3.8–10.5)
WBC # FLD AUTO: 5.32 K/UL — SIGNIFICANT CHANGE UP (ref 3.8–10.5)

## 2020-10-29 RX ORDER — CEFUROXIME AXETIL 250 MG
250 TABLET ORAL EVERY 12 HOURS
Refills: 0 | Status: DISCONTINUED | OUTPATIENT
Start: 2020-10-30 | End: 2020-10-30

## 2020-10-29 RX ADMIN — POLYETHYLENE GLYCOL 3350 17 GRAM(S): 17 POWDER, FOR SOLUTION ORAL at 12:05

## 2020-10-29 RX ADMIN — SIMVASTATIN 20 MILLIGRAM(S): 20 TABLET, FILM COATED ORAL at 21:14

## 2020-10-29 RX ADMIN — SENNA PLUS 2 TABLET(S): 8.6 TABLET ORAL at 21:15

## 2020-10-29 RX ADMIN — DONEPEZIL HYDROCHLORIDE 5 MILLIGRAM(S): 10 TABLET, FILM COATED ORAL at 21:14

## 2020-10-29 RX ADMIN — Medication 81 MILLIGRAM(S): at 12:05

## 2020-10-29 RX ADMIN — SODIUM CHLORIDE 20 MILLILITER(S): 9 INJECTION, SOLUTION INTRAVENOUS at 17:39

## 2020-10-29 RX ADMIN — PIPERACILLIN AND TAZOBACTAM 25 GRAM(S): 4; .5 INJECTION, POWDER, LYOPHILIZED, FOR SOLUTION INTRAVENOUS at 21:15

## 2020-10-29 RX ADMIN — PIPERACILLIN AND TAZOBACTAM 25 GRAM(S): 4; .5 INJECTION, POWDER, LYOPHILIZED, FOR SOLUTION INTRAVENOUS at 14:00

## 2020-10-29 RX ADMIN — ENOXAPARIN SODIUM 40 MILLIGRAM(S): 100 INJECTION SUBCUTANEOUS at 12:05

## 2020-10-29 RX ADMIN — FAMOTIDINE 20 MILLIGRAM(S): 10 INJECTION INTRAVENOUS at 12:05

## 2020-10-29 RX ADMIN — TAMSULOSIN HYDROCHLORIDE 0.4 MILLIGRAM(S): 0.4 CAPSULE ORAL at 21:15

## 2020-10-29 RX ADMIN — PIPERACILLIN AND TAZOBACTAM 25 GRAM(S): 4; .5 INJECTION, POWDER, LYOPHILIZED, FOR SOLUTION INTRAVENOUS at 05:30

## 2020-10-29 NOTE — PROGRESS NOTE ADULT - ASSESSMENT
89 y o WM  presents sent to ED from [facility] for evaluation of  R groin abscess for few days. now has opened area and has foul smelling discharge. Denies pain  No known fever/chills. no trauma. Patient is   poor historian,  PMD Dr Quintana,  no anticoagulants noted in  patient med list ,patient was admitted with GIB last year and BASA was stopped at that time .His  PMH is significant for :  Arteriosclerotic heart disease (ASHD)  Atrial fibrillation  ,CHF (congestive heart failure)  ,Dementia  ,Depression  ,DM (diabetes mellitus)  ,Hyperlipemia  ,Hypertension  ,Prostate ca . and ID consult called Seen by Dr Devries  ,no intereventions required ,continue topical care and iv abx Admitted for septic workup and evaluation,send blood and urine cx,serial lactate levels,monitor vitals closley,ivfs hydration,monitor urine output and renal profile,iv abx initiated -given vanco and zosyn in ER .Chest xray showed PVC and cardiology consult called Admit to monitored unit for cardiac monitoring, obtain echo to evaluate LVEF, intravenous diuresis  prn as per card consult , monitor ins/outs, monitor renal profile and electrolytes closely ,send 3 sets of enzymes, O2 supply, serial chest xrays, monitor weights and oral intake of fluids, nutritionist consult Palliative care consult requested ,to discuss advance directives and complete MOLST

## 2020-10-29 NOTE — DISCHARGE NOTE PROVIDER - PROVIDER TOKENS
PROVIDER:[TOKEN:[[id number]],FOLLOWUP:[1 week]],PROVIDER:[TOKEN:[[id number]],FOLLOWUP:[2 weeks]],PROVIDER:[TOKEN:[[id number]],FOLLOWUP:[1 month]],PROVIDER:[TOKEN:[[id number]],FOLLOWUP:[2 weeks]]

## 2020-10-29 NOTE — PROGRESS NOTE ADULT - ASSESSMENT
cont rx   cont rx      LEEROYNGSTROM,  283  10/16/1926 DOA 10/26/2020 DR ARCADIO FRIAS  CONTACT Vinicio Bradford [phone]      REVIEW OF SYMPTOMS      Able to give ROS  NO     PHYSICAL EXAM    HEENT Unremarkable PERRLA atraumatic   RESP Fair air entry EXP prolonged    Harsh breath sound Resp distres mild   CARDIAC S1 S2 No S3     NO JVD    ABDOMEN SOFT BS PRESENT NOT DISTENDED No hepatosplenomegaly PEDAL EDEMA present No calf tenderness  NO rash     PT DATA/BEST PRACTICE  ALLERGY     latex          WT              10/26/2020 68 k                   BMI                10/26/2020 20                         ADVANCED DIRECTIVE  dnr   HEAD OF BED ELEVATION Yes      DVT PROPHYLAXIS.    lvnx 40 (10/26/2020)     DIET. dys 3 soft thin (10/26)                                                                  SEGUNDO PROPHYLAXIS. famotidine (10/26)   INFECTION PPLX                                                    OXYGENATION.   10/26/2020 ra 98%       IV F. VITALS.   10/29/2020 afeb 60 118/70       MEDS.   ASA 81 (10/26/2020)   ALBUTEROL 2.6P (10/26/2020)  DONEPEZIL 5 (10/26/2020)   FAMOTIDINE 20 (10/26/2020)   MS (10/26/2020)   SIMVASTAT 20 (10/26/2020)              PATIENT SUMMARY.   88 m retd [employer] PMH  subcm lung nodules 10/21/2014 CT Ch  1 cm hypoattenuating thyroid nodule  Trace symmetrical layering bl pl effusions with dependent atelectasis bases  Ca granuloma both RML and l lower lobes  5 mm subpleural parenchymal nodule R apex   sCHF 10/24/2014 ECHO EF 50% Mild hypokinesia septum PASP 40 Mod MR    A fib  (on coumadin which was dced 2019 when he developed hematuria ) Htn Depression l leg lymphedema ( remote injury) OBS  was admitted 10/26/2020 with groin abscess draining pus and Pulm consulted 10/26/2020 for ho copd lung nodule    home meds Tamsulosin .4 duoneb asa pulmicort asa donepezil 5 simvastat 20 metformin       PROBLEM ASSESSMENT RECOMMENDATIONS .   PRESSURE ULCER HEEL L STAGE 2 Seen 10/26/2020 Dr Wong   PRESSURE ULCER HEEL R STAGE 2 Seen 10/26/2020 Dr Wong   SCROTAL ABSCESS NOTED 10/26/2020 Vanco ns given in er 10/26/2020   A/R 10/27/2020 seenby gu and id loacl care and draianage and abio advised and being given   Was rxed with zosyn changed t ceftin 10/29/2020 Dr Issa 7 d   COPD Cont meds stable   CHF CXR 10/26/2020 cxr mild chf   ECHO 2/1/2019 echo EF 35% pasp 40   LUNG NODULE Elective ct  ch  A fib  On ASA Cont rx     TIME SPENT   Over 25 minutes aggregate care time spent on encounter; activities included   direct patient care, counseling and/or coordinating care reviewing notes, lab data/ imaging , discussion with multidisciplinary team/ patient  /family and explaining in detail risks, benefits, alternatives  of the recommendations     TERESA FRANCIS [phone]  10/16/1926 DOA 10/26/2020 DR ARCADIO FRIAS  CONTACT Vinicio Bradford [phone]

## 2020-10-29 NOTE — PROGRESS NOTE ADULT - SUBJECTIVE AND OBJECTIVE BOX
TERESA FRANCIS    PLV [id number]    Patient is a 89y old  Male who presents with a chief complaint of R GROIN PAIN AND SWELLING (29 Oct 2020 10:52)       Allergies    latex (Rash)  latex (Unknown)  No Known Drug Allergies    Intolerances        HPI:  89 y o WM  presents sent to ED from [facility] for evaluation of  R groin abscess for few days. now has opened area and has foul smelling discharge. Denies pain  No known fever/chills. no trauma. Patient is   poor historian,  PMD Dr Quintana,  no anticoagulants noted in  patient med list ,patient was admitted with GIB last year and BASA was stopped at that time .His  PMH is significant for :  Arteriosclerotic heart disease (ASHD)  Atrial fibrillation  ,CHF (congestive heart failure)  ,Dementia  ,Depression  ,DM (diabetes mellitus)  ,Hyperlipemia  ,Hypertension  ,Prostate ca . and ID consult called Seen by Dr Devries  ,no intereventions required ,continue topical care and iv abx Admitted for septic workup and evaluation,send blood and urine cx,serial lactate levels,monitor vitals closley,ivfs hydration,monitor urine output and renal profile,iv abx initiated -given vanco and zosyn in ER .Chest xray showed PVC and cardiology consult called Admit to monitored unit for cardiac monitoring, obtain echo to evaluate LVEF, intravenous diuresis  prn as per card consult , monitor ins/outs, monitor renal profile and electrolytes closely ,send 3 sets of enzymes, O2 supply, serial chest xrays, monitor weights and oral intake of fluids, nutritionist consult Palliative care consult requested ,to discuss advance directives and complete MOLST  (26 Oct 2020 13:23)      PAST MEDICAL & SURGICAL HISTORY:  Constipation    Anemia    Dementia    Arteriosclerotic heart disease (ASHD)    Prostate CA    Atrial fibrillation    DM (diabetes mellitus)    Dementia    Depression    Diabetes    Hyperlipemia    CHF (congestive heart failure)    Dementia    Prostate ca    Diabetes    Hypertension    Atrial fibrillation    No significant past surgical history        FAMILY HISTORY:        MEDICATIONS   acetaminophen   Tablet .. 650 milliGRAM(s) Oral every 6 hours PRN  ALBUTerol    90 MICROgram(s) HFA Inhaler 2 Puff(s) Inhalation every 6 hours PRN  aspirin  chewable 81 milliGRAM(s) Oral daily  dextrose 40% Gel 15 Gram(s) Oral once PRN  dextrose 5% + sodium chloride 0.45%. 1000 milliLiter(s) IV Continuous <Continuous>  dextrose 5%. 1000 milliLiter(s) IV Continuous <Continuous>  dextrose 50% Injectable 12.5 Gram(s) IV Push once  dextrose 50% Injectable 25 Gram(s) IV Push once  dextrose 50% Injectable 25 Gram(s) IV Push once  donepezil 5 milliGRAM(s) Oral at bedtime  enoxaparin Injectable 40 milliGRAM(s) SubCutaneous daily  famotidine Injectable 20 milliGRAM(s) IV Push daily  glucagon  Injectable 1 milliGRAM(s) IntraMuscular once PRN  influenza   Vaccine 0.5 milliLiter(s) IntraMuscular once  insulin lispro (ADMELOG) corrective regimen sliding scale   SubCutaneous three times a day before meals  morphine  - Injectable 2 milliGRAM(s) IV Push every 4 hours PRN  ondansetron Injectable 4 milliGRAM(s) IV Push every 4 hours PRN  piperacillin/tazobactam IVPB.. 3.375 Gram(s) IV Intermittent every 8 hours  polyethylene glycol 3350 17 Gram(s) Oral daily  senna 2 Tablet(s) Oral at bedtime  simvastatin 20 milliGRAM(s) Oral at bedtime  tamsulosin 0.4 milliGRAM(s) Oral at bedtime  traMADol 50 milliGRAM(s) Oral every 6 hours PRN      Vital Signs Last 24 Hrs  T(C): 36.6 (29 Oct 2020 05:07), Max: 37 (28 Oct 2020 20:33)  T(F): 97.8 (29 Oct 2020 05:07), Max: 98.6 (28 Oct 2020 20:33)  HR: 64 (29 Oct 2020 05:07) (64 - 75)  BP: 128/54 (29 Oct 2020 05:07) (101/56 - 128/54)  BP(mean): --  RR: 17 (29 Oct 2020 05:07) (17 - 18)  SpO2: 97% (29 Oct 2020 05:07) (97% - 97%)      10-28-20 @ 07:01  -  10-29-20 @ 07:00  --------------------------------------------------------  IN: 0 mL / OUT: 700 mL / NET: -700 mL            LABS:                        10.9   5.32  )-----------( 245      ( 29 Oct 2020 08:55 )             33.4     10-29    142  |  107  |  11  ----------------------------<  112<H>  3.9   |  31  |  0.64    Ca    8.9      29 Oct 2020 08:55        Urinalysis Basic - ( 28 Oct 2020 04:53 )    Color: Yellow / Appearance: Clear / S.010 / pH: x  Gluc: x / Ketone: Negative  / Bili: Negative / Urobili: 1   Blood: x / Protein: Negative / Nitrite: Negative   Leuk Esterase: Trace / RBC: 0-2 /HPF / WBC 6-10   Sq Epi: x / Non Sq Epi: Occasional / Bacteria: Occasional            WBC:  WBC Count: 5.32 K/uL (10-29 @ 08:55)  WBC Count: 5.55 K/uL (10-28 @ 09:08)  WBC Count: 5.16 K/uL (10-27 @ 09:17)  WBC Count: 6.44 K/uL (10-26 @ 09:44)      MICROBIOLOGY:  RECENT CULTURES:  10-28 .Urine Clean Catch (Midstream) XXXX XXXX   No growth    10-27 .Other scrotal abcess XXXX XXXX   Numerous Corynebacterium striatum group "Susceptibilities not performed"    10-26 .Abscess Testicle - Right Proteus mirabilis XXXX   Rare Proteus mirabilis  Rare Coag Negative Staphylococcus "Susceptibilities not performed"  Few Corynebacterium species "Susceptibilities not performed"    10-26 .Blood Blood-Peripheral XXXX XXXX   No growth to date.    10-26 .Blood Blood-Peripheral XXXX XXXX   No growth to date.                    Sodium:  Sodium, Serum: 142 mmol/L (10-29 @ 08:55)  Sodium, Serum: 139 mmol/L (10-28 @ 09:08)  Sodium, Serum: 140 mmol/L (10-27 @ 09:17)  Sodium, Serum: 139 mmol/L (10-26 @ 09:44)      0.64 mg/dL 10-29 @ 08:55  0.61 mg/dL 10-28 @ 09:08  0.51 mg/dL 10-27 @ 09:17  0.61 mg/dL 10-26 @ 09:44      Hemoglobin:  Hemoglobin: 10.9 g/dL (10-29 @ 08:55)  Hemoglobin: 11.2 g/dL (10-28 @ 09:08)  Hemoglobin: 10.3 g/dL (10-27 @ 09:17)  Hemoglobin: 11.5 g/dL (10-26 @ 09:44)      Platelets: Platelet Count - Automated: 245 K/uL (10-29 @ 08:55)  Platelet Count - Automated: 250 K/uL (10-28 @ 09:08)  Platelet Count - Automated: 229 K/uL (10-27 @ 09:17)  Platelet Count - Automated: 245 K/uL (10-26 @ 09:44)          Urinalysis Basic - ( 28 Oct 2020 04:53 )    Color: Yellow / Appearance: Clear / S.010 / pH: x  Gluc: x / Ketone: Negative  / Bili: Negative / Urobili: 1   Blood: x / Protein: Negative / Nitrite: Negative   Leuk Esterase: Trace / RBC: 0-2 /HPF / WBC 6-10   Sq Epi: x / Non Sq Epi: Occasional / Bacteria: Occasional        RADIOLOGY & ADDITIONAL STUDIES:

## 2020-10-29 NOTE — PROGRESS NOTE ADULT - NSHPATTENDINGPLANDISCUSS_GEN_ALL_CORE
,med staff ,podiatry team  , , ,  , AND  ,med staff ,podiatry team  , , ,  , AND  ,ANTIICICPATE DISCHARGE IN AM

## 2020-10-29 NOTE — DISCHARGE NOTE PROVIDER - CARE PROVIDERS DIRECT ADDRESSES
,[email or web address],DirectAddress_Unknown,DirectAddress_Unknown

## 2020-10-29 NOTE — PROGRESS NOTE ADULT - SUBJECTIVE AND OBJECTIVE BOX
TITOTERESA is a 89yMale , patient examined and chart reviewed.      INTERVAL HPI/ OVERNIGHT EVENTS:   No events NAD  Afebrile.    PAST MEDICAL & SURGICAL HISTORY:  Constipation  Anemia  Dementia  Arteriosclerotic heart disease (ASHD)  Prostate CA  Atrial fibrillation  DM (diabetes mellitus)  Dementia  Prostate ca  Hypertension  No significant past surgical history        For details regarding the patient's social history, family history, and other miscellaneous elements, please refer the initial infectious diseases consultation and/or the admitting history and physical examination for this admission.    ROS:  Unable to obtain due to : Dementia     ALLERGIES  latex (Rash)     Current inpatient medications :    ANTIBIOTICS/RELEVANT:  piperacillin/tazobactam IVPB.. 3.375 Gram(s) IV Intermittent every 8 hours    MEDICATIONS  (STANDING):  aspirin  chewable 81 milliGRAM(s) Oral daily  dextrose 5% + sodium chloride 0.45%. 1000 milliLiter(s) (20 mL/Hr) IV Continuous <Continuous>  dextrose 5%. 1000 milliLiter(s) (50 mL/Hr) IV Continuous <Continuous>  dextrose 50% Injectable 12.5 Gram(s) IV Push once  dextrose 50% Injectable 25 Gram(s) IV Push once  dextrose 50% Injectable 25 Gram(s) IV Push once  donepezil 5 milliGRAM(s) Oral at bedtime  enoxaparin Injectable 40 milliGRAM(s) SubCutaneous daily  famotidine Injectable 20 milliGRAM(s) IV Push daily  influenza   Vaccine 0.5 milliLiter(s) IntraMuscular once  insulin lispro (ADMELOG) corrective regimen sliding scale   SubCutaneous three times a day before meals  polyethylene glycol 3350 17 Gram(s) Oral daily  senna 2 Tablet(s) Oral at bedtime  simvastatin 20 milliGRAM(s) Oral at bedtime  tamsulosin 0.4 milliGRAM(s) Oral at bedtime    MEDICATIONS  (PRN):  acetaminophen   Tablet .. 650 milliGRAM(s) Oral every 6 hours PRN Temp greater or equal to 38C (100.4F), Mild Pain (1 - 3)  ALBUTerol    90 MICROgram(s) HFA Inhaler 2 Puff(s) Inhalation every 6 hours PRN Shortness of Breath and/or Wheezing  dextrose 40% Gel 15 Gram(s) Oral once PRN Blood Glucose LESS THAN 70 milliGRAM(s)/deciliter  glucagon  Injectable 1 milliGRAM(s) IntraMuscular once PRN Glucose LESS THAN 70 milligrams/deciliter  morphine  - Injectable 2 milliGRAM(s) IV Push every 4 hours PRN Severe Pain (7 - 10)  ondansetron Injectable 4 milliGRAM(s) IV Push every 4 hours PRN Nausea and/or Vomiting  traMADol 50 milliGRAM(s) Oral every 6 hours PRN Moderate Pain (4 - 6)      Objective:  Vital Signs Last 24 Hrs  T(C): 36.7 (29 Oct 2020 12:59), Max: 37 (28 Oct 2020 20:33)  T(F): 98.1 (29 Oct 2020 12:59), Max: 98.6 (28 Oct 2020 20:33)  HR: 63 (29 Oct 2020 12:59) (63 - 64)  BP: 118/73 (29 Oct 2020 12:59) (101/56 - 128/54)  RR: 18 (29 Oct 2020 12:59) (17 - 18)  SpO2: 93% (29 Oct 2020 12:59) (93% - 97%)    Physical Exam:  General: no acute distress  Eyes: sclera anicteric, pupils equal and reactive to light  ENMT: buccal mucosa moist, pharynx not injected  Neck: supple, trachea midline  Lungs: clear, no wheeze/rhonchi  Cardiovascular: regular rate and rhythm, S1 S2  Abdomen: soft, nontender, ND, bowel sounds normal  : Right scrotal abscess decreased drainage decreased erythema  Neurological: awake confused  Skin: no increased ecchymosis/petechiae/purpura  Lymph Nodes: no palpable cervical/supraclavicular lymph nodes enlargements  Extremities: Jaun foot drsg c/d/i      LABS:                        10.9   5.32  )-----------( 245      ( 29 Oct 2020 08:55 )             33.4   10-29    142  |  107  |  11  ----------------------------<  112<H>  3.9   |  31  |  0.64    Ca    8.9      29 Oct 2020 08:55      MICROBIOLOGY:    Culture - Urine (collected 28 Oct 2020 08:56)  Source: .Urine Clean Catch (Midstream)  Final Report (29 Oct 2020 10:53):    No growth    Culture - Other (collected 27 Oct 2020 18:33)  Source: .Other RT. HEEL WOUND  Final Report (29 Oct 2020 10:52):    Moderate Coag Negative Staphylococcus Multiple Morphological Strains    "Susceptibilities not performed"    Culture - Other (collected 27 Oct 2020 18:33)  Source: .Other scrotal abcess  Final Report (29 Oct 2020 10:53):    Numerous Corynebacterium striatum group "Susceptibilities not performed"    Culture - Abscess with Gram Stain (collected 26 Oct 2020 22:10)  Source: .Abscess Testicle - Right  Preliminary Report (28 Oct 2020 18:19):    Rare Proteus mirabilis    Rare Coag Negative Staphylococcus "Susceptibilities not performed"    Few Corynebacterium species "Susceptibilities not performed"  Organism: Proteus mirabilis (28 Oct 2020 18:08)  Organism: Proteus mirabilis (28 Oct 2020 18:08)      -  Amikacin: S <=16      -  Amoxicillin/Clavulanic Acid: S <=8/4      -  Ampicillin: S <=8 These ampicillin results predict results for amoxicillin      -  Ampicillin/Sulbactam: S <=4/2 Enterobacter, Citrobacter, and Serratia may develop resistance during prolonged therapy (3-4 days)      -  Aztreonam: S <=4      -  Cefazolin: S <=2 Enterobacter, Citrobacter, and Serratia may develop resistance during prolonged therapy (3-4 days)      -  Cefepime: S <=2      -  Cefoxitin: S <=8      -  Ceftriaxone: S <=1 Enterobacter, Citrobacter, and Serratia may develop resistance during prolonged therapy      -  Ciprofloxacin: S <=0.25      -  Ertapenem: S <=0.5      -  Gentamicin: S 4      -  Levofloxacin: S <=0.5      -  Meropenem: S <=1      -  Piperacillin/Tazobactam: S <=8      -  Tobramycin: S 4      -  Trimethoprim/Sulfamethoxazole: S <=0.5/9.5      Method Type: CHARITO    Culture - Blood (collected 26 Oct 2020 19:20)  Source: .Blood Blood-Peripheral  Preliminary Report (27 Oct 2020 20:01):    No growth to date.      RADIOLOGY & ADDITIONAL STUDIES:  EXAM:  XR CHEST PORTABLE IMMED 1V                            PROCEDURE DATE:  10/26/2020          INTERPRETATION:  AP semierect chest on October 26, 2020 at 10:22 AM. Patient has a right groin infection.    Heart enlargement and left-sided pacemaker again noted.    There are slightly increased central vascular markings left greater the right this suggests CHF. This has increased from July 25, 2019.    IMPRESSION: Mild central CHF presently suspect. Heart enlargement and pacemaker again noted.    Assessment :   89 y/o with hx of Ca Prostate Dementia DM CHF admitted with right scrotal abscess + purulent drainage, Seen by Urology and cultures with proteus  Jaun heel ulcer doubt infection- seen by podiatry    Plan :   On Zosyn f  Change to po Ceftin x 7 days  Trend temps and cbc  Asp precautions    D/w Dr Ring    Continue with present regiment.  Appropriate use of antibiotics and adverse effects reviewed.    > 35 minutes were spent in direct patient care reviewing notes, medications ,labs data/ imaging , discussion with multidisciplinary team.    Thank you for allowing me to participate in care of your patient .    Heber Issa MD  Infectious Disease  [phone]

## 2020-10-29 NOTE — DISCHARGE NOTE PROVIDER - NSDCMRMEDTOKEN_GEN_ALL_CORE_FT
acetaminophen 500 mg oral tablet: 1 tab(s) orally every 6 hours, As Needed  aspirin 81 mg oral tablet: 1 tab(s) orally once a day RESUME IN ONE WEEK IF OK WITH PCP   donepezil 5 mg oral tablet: 1 tab(s) orally once a day (at bedtime)  FeroSul 325 mg (65 mg elemental iron) oral tablet: 1 tab(s) orally once a day  metFORMIN 500 mg oral tablet: 1 tab(s) orally 2 times a day (with meals)  MiraLax oral powder for reconstitution: 17 gram(s) orally once a day  pantoprazole 40 mg oral delayed release tablet: 1 tab(s) orally once a day  Senna 8.6 mg oral tablet: 2 tab(s) orally once a day (at bedtime)  simvastatin 20 mg oral tablet: 1 tab(s) orally once a day (at bedtime)  tamsulosin 0.4 mg oral capsule: 1 cap(s) orally once a day (at bedtime)   acetaminophen 500 mg oral tablet: 2 tab(s) orally every 6 hours, As Needed pain  ascorbic acid 500 mg oral tablet: 1 tab(s) orally 2 times a day  aspirin 81 mg oral tablet: 1 tab(s) orally once a day RESUME IN ONE WEEK IF OK WITH PCP   cefuroxime 250 mg oral tablet: 1 tab(s) orally every 12 hours  donepezil 5 mg oral tablet: 1 tab(s) orally once a day (at bedtime)  FeroSul 325 mg (65 mg elemental iron) oral tablet: 1 tab(s) orally once a day  metFORMIN 500 mg oral tablet: 1 tab(s) orally 2 times a day (with meals)  MiraLax oral powder for reconstitution: 17 gram(s) orally once a day  Multiple Vitamins with Minerals oral tablet: 1 tab(s) orally once a day  pantoprazole 40 mg oral delayed release tablet: 1 tab(s) orally once a day  Senna 8.6 mg oral tablet: 2 tab(s) orally once a day (at bedtime)  simvastatin 20 mg oral tablet: 1 tab(s) orally once a day (at bedtime)  tamsulosin 0.4 mg oral capsule: 1 cap(s) orally once a day (at bedtime)

## 2020-10-29 NOTE — PROGRESS NOTE ADULT - SUBJECTIVE AND OBJECTIVE BOX
Date/Time Patient Seen:  		  Referring MD:   Data Reviewed	       Patient is a 89y old  Male who presents with a chief complaint of R GROIN PAIN AND SWELLING (28 Oct 2020 21:09)      Subjective/HPI     PAST MEDICAL & SURGICAL HISTORY:  Constipation    Anemia    Dementia    Arteriosclerotic heart disease (ASHD)    Prostate CA    Atrial fibrillation    DM (diabetes mellitus)    No pertinent past medical history    Dementia    Depression    Diabetes    Hyperlipemia    CHF (congestive heart failure)    Dementia    Prostate ca    Diabetes    Hypertension    Atrial fibrillation    No significant past surgical history    No significant past surgical history          Medication list         MEDICATIONS  (STANDING):  aspirin  chewable 81 milliGRAM(s) Oral daily  dextrose 5% + sodium chloride 0.45%. 1000 milliLiter(s) (20 mL/Hr) IV Continuous <Continuous>  dextrose 5%. 1000 milliLiter(s) (50 mL/Hr) IV Continuous <Continuous>  dextrose 50% Injectable 12.5 Gram(s) IV Push once  dextrose 50% Injectable 25 Gram(s) IV Push once  dextrose 50% Injectable 25 Gram(s) IV Push once  donepezil 5 milliGRAM(s) Oral at bedtime  enoxaparin Injectable 40 milliGRAM(s) SubCutaneous daily  famotidine Injectable 20 milliGRAM(s) IV Push daily  influenza   Vaccine 0.5 milliLiter(s) IntraMuscular once  insulin lispro (ADMELOG) corrective regimen sliding scale   SubCutaneous three times a day before meals  piperacillin/tazobactam IVPB.. 3.375 Gram(s) IV Intermittent every 8 hours  polyethylene glycol 3350 17 Gram(s) Oral daily  senna 2 Tablet(s) Oral at bedtime  simvastatin 20 milliGRAM(s) Oral at bedtime  tamsulosin 0.4 milliGRAM(s) Oral at bedtime    MEDICATIONS  (PRN):  acetaminophen   Tablet .. 650 milliGRAM(s) Oral every 6 hours PRN Temp greater or equal to 38C (100.4F), Mild Pain (1 - 3)  ALBUTerol    90 MICROgram(s) HFA Inhaler 2 Puff(s) Inhalation every 6 hours PRN Shortness of Breath and/or Wheezing  dextrose 40% Gel 15 Gram(s) Oral once PRN Blood Glucose LESS THAN 70 milliGRAM(s)/deciliter  glucagon  Injectable 1 milliGRAM(s) IntraMuscular once PRN Glucose LESS THAN 70 milligrams/deciliter  morphine  - Injectable 2 milliGRAM(s) IV Push every 4 hours PRN Severe Pain (7 - 10)  ondansetron Injectable 4 milliGRAM(s) IV Push every 4 hours PRN Nausea and/or Vomiting  traMADol 50 milliGRAM(s) Oral every 6 hours PRN Moderate Pain (4 - 6)         Vitals log        ICU Vital Signs Last 24 Hrs  T(C): 36.6 (29 Oct 2020 05:07), Max: 37 (28 Oct 2020 20:33)  T(F): 97.8 (29 Oct 2020 05:07), Max: 98.6 (28 Oct 2020 20:33)  HR: 64 (29 Oct 2020 05:07) (64 - 75)  BP: 128/54 (29 Oct 2020 05:07) (101/56 - 128/54)  BP(mean): --  ABP: --  ABP(mean): --  RR: 17 (29 Oct 2020 05:07) (17 - 18)  SpO2: 97% (29 Oct 2020 05:07) (97% - 97%)           Input and Output:  I&O's Detail    27 Oct 2020 07:01  -  28 Oct 2020 07:00  --------------------------------------------------------  IN:  Total IN: 0 mL    OUT:    Incontinent per Condom Catheter (mL): 900 mL  Total OUT: 900 mL    Total NET: -900 mL          Lab Data                        11.2   5.55  )-----------( 250      ( 28 Oct 2020 09:08 )             33.6     10-28    139  |  104  |  11  ----------------------------<  128<H>  3.8   |  30  |  0.61    Ca    8.9      28 Oct 2020 09:08  Mg     1.6     10-27              Review of Systems	      Objective     Physical Examination    heart s1s2  lung dec BS  abd soft      Pertinent Lab findings & Imaging      [place]:  NO   Adequate UO     I&O's Detail    27 Oct 2020 07:01  -  28 Oct 2020 07:00  --------------------------------------------------------  IN:  Total IN: 0 mL    OUT:    Incontinent per Condom Catheter (mL): 900 mL  Total OUT: 900 mL    Total NET: -900 mL               Discussed with:     Cultures:	        Radiology

## 2020-10-29 NOTE — DISCHARGE NOTE PROVIDER - CARE PROVIDER_API CALL
Toby Quintana  CRITICAL CARE MEDICINE  [address] [place] [address]  Phone: [phone]  Fax: [phone]  Follow Up Time: 1 week    Kamran Jaimes  PODIATRIC MEDICINE AND SURGERY  [address] [place] [address]  Phone: [phone]  Fax: [phone]  Follow Up Time: 2 weeks    Nadia Espinoza)  Internal Medicine  [address] [facility], [address]  [place] [address]  Phone: [phone]  Fax: [phone]  Follow Up Time: 1 month    Luis Devries)  Urology  [address] [facility], [address]  [place] [address]  Phone: [phone]  Fax: [phone]  Follow Up Time: 2 weeks

## 2020-10-29 NOTE — PROGRESS NOTE ADULT - SUBJECTIVE AND OBJECTIVE BOX
INTERVAL HPI/OVERNIGHT EVENTS: Afebrile.    MEDICATIONS  (STANDING):  aspirin  chewable 81 milliGRAM(s) Oral daily  dextrose 5% + sodium chloride 0.45%. 1000 milliLiter(s) (20 mL/Hr) IV Continuous <Continuous>  dextrose 5%. 1000 milliLiter(s) (50 mL/Hr) IV Continuous <Continuous>  dextrose 50% Injectable 12.5 Gram(s) IV Push once  dextrose 50% Injectable 25 Gram(s) IV Push once  dextrose 50% Injectable 25 Gram(s) IV Push once  donepezil 5 milliGRAM(s) Oral at bedtime  enoxaparin Injectable 40 milliGRAM(s) SubCutaneous daily  famotidine Injectable 20 milliGRAM(s) IV Push daily  influenza   Vaccine 0.5 milliLiter(s) IntraMuscular once  insulin lispro (ADMELOG) corrective regimen sliding scale   SubCutaneous three times a day before meals  piperacillin/tazobactam IVPB.. 3.375 Gram(s) IV Intermittent every 8 hours  polyethylene glycol 3350 17 Gram(s) Oral daily  senna 2 Tablet(s) Oral at bedtime  simvastatin 20 milliGRAM(s) Oral at bedtime  tamsulosin 0.4 milliGRAM(s) Oral at bedtime    MEDICATIONS  (PRN):  acetaminophen   Tablet .. 650 milliGRAM(s) Oral every 6 hours PRN Temp greater or equal to 38C (100.4F), Mild Pain (1 - 3)  ALBUTerol    90 MICROgram(s) HFA Inhaler 2 Puff(s) Inhalation every 6 hours PRN Shortness of Breath and/or Wheezing  dextrose 40% Gel 15 Gram(s) Oral once PRN Blood Glucose LESS THAN 70 milliGRAM(s)/deciliter  glucagon  Injectable 1 milliGRAM(s) IntraMuscular once PRN Glucose LESS THAN 70 milligrams/deciliter  morphine  - Injectable 2 milliGRAM(s) IV Push every 4 hours PRN Severe Pain (7 - 10)  ondansetron Injectable 4 milliGRAM(s) IV Push every 4 hours PRN Nausea and/or Vomiting  traMADol 50 milliGRAM(s) Oral every 6 hours PRN Moderate Pain (4 - 6)        Vital Signs Last 24 Hrs  T(C): 36.6 (29 Oct 2020 05:07), Max: 37 (28 Oct 2020 20:33)  T(F): 97.8 (29 Oct 2020 05:07), Max: 98.6 (28 Oct 2020 20:33)  HR: 64 (29 Oct 2020 05:07) (64 - 75)  BP: 128/54 (29 Oct 2020 05:07) (101/56 - 128/54)  BP(mean): --  RR: 17 (29 Oct 2020 05:07) (17 - 18)  SpO2: 97% (29 Oct 2020 05:07) (97% - 97%)    PHYSICAL EXAM:    ABDOMEN: Soft. Non tender. Right groin with minimal drainage. No erythema    LABS:                        11.2   5.55  )-----------( 250      ( 28 Oct 2020 09:08 )             33.6     10    139  |  104  |  11  ----------------------------<  128<H>  3.8   |  30  |  0.61    Ca    8.9      28 Oct 2020 09:08  Mg     1.6     10-27        Urinalysis Basic - ( 28 Oct 2020 04:53 )    Color: Yellow / Appearance: Clear / S.010 / pH: x  Gluc: x / Ketone: Negative  / Bili: Negative / Urobili: 1   Blood: x / Protein: Negative / Nitrite: Negative   Leuk Esterase: Trace / RBC: 0-2 /HPF / WBC 6-10   Sq Epi: x / Non Sq Epi: Occasional / Bacteria: Occasional      Urine culture:  10-27 @ 18:33 --   Numerous Corynebacterium striatum group "Susceptibilities not performed"  Urine culture:  10-26 @ 22:10 --   Rare Proteus mirabilis  Rare Coag Negative Staphylococcus "Susceptibilities not performed"  Few Corynebacterium species "Susceptibilities not performed"  Urine culture:  10-26 @ 19:20 --   No growth to date.

## 2020-10-29 NOTE — DISCHARGE NOTE PROVIDER - NSDCHHNEEDSERVICE_GEN_ALL_CORE
Medication teaching and assessment/Observation and assessment/Rehabilitation services/Teaching and training Medication teaching and assessment/Observation and assessment/Wound care and assessment/Rehabilitation services/Teaching and training

## 2020-10-29 NOTE — PROGRESS NOTE ADULT - ATTENDING COMMENTS
89 y o WM  presents sent to ED from [facility] for evaluation of  R groin abscess for few days. now has opened area and has foul smelling discharge. Denies pain  No known fever/chills. no trauma. Patient is   poor historian,  PMD Dr Quintana,  no anticoagulants noted in  patient med list ,patient was admitted with GIB last year and BASA was stopped at that time .His  PMH is significant for :  Arteriosclerotic heart disease (ASHD)  Atrial fibrillation  ,CHF (congestive heart failure)  ,Dementia  ,Depression  ,DM (diabetes mellitus)  ,Hyperlipemia  ,Hypertension  ,Prostate ca . and ID consult called Seen by Dr Devries  ,no intereventions required ,continue topical care and iv abx Admitted for septic workup and evaluation,send blood and urine cx,serial lactate levels,monitor vitals closley,ivfs hydration,monitor urine output and renal profile,iv abx initiated -given vanco and zosyn in ER .Chest xray showed PVC and cardiology consult called Admit to monitored unit for cardiac monitoring, obtain echo to evaluate LVEF, intravenous diuresis  prn as per card consult , monitor ins/outs, monitor renal profile and electrolytes closely ,send 3 sets of enzymes, O2 supply, serial chest xrays, monitor weights and oral intake of fluids, nutritionist consult Palliative care consult requested ,to discuss advance directives and complete MOLST

## 2020-10-29 NOTE — DISCHARGE NOTE PROVIDER - NSDCFUADDINST_GEN_ALL_CORE_FT
Please cleanse heel ulcers with saline daily and apply dry gauze ,please provide heel off loading when in a bed Please cleanse heel ulcers with saline  and apply allevyn border every 3 days  ,please provide heel off loading when in a bed .Please provide warm compresses to right groin area tid for 20 min x 3-4 days

## 2020-10-29 NOTE — DISCHARGE NOTE PROVIDER - HOSPITAL COURSE
89 y o WM  presents sent to ED from [facility] for evaluation of  R groin abscess for few days. now has opened area and has foul smelling discharge. Denies pain  No known fever/chills. no trauma. Patient is   poor historian,  PMD Dr Quintana,  no anticoagulants noted in  patient med list ,patient was admitted with GIB last year and BASA was stopped at that time .His  PMH is significant for :  Arteriosclerotic heart disease (ASHD)  Atrial fibrillation  ,CHF (congestive heart failure)  ,Dementia  ,Depression  ,DM (diabetes mellitus)  ,Hyperlipemia  ,Hypertension  ,Prostate ca . and ID consult called Seen by Dr Devries  ,no intereventions required ,continue topical care and iv abx Admitted for septic workup and evaluation,send blood and urine cx,serial lactate levels,monitor vitals closley,ivfs hydration,monitor urine output and renal profile,iv abx initiated -given vanco and zosyn in ER .Chest xray showed PVC and cardiology consult called Admit to monitored unit for cardiac monitoring, obtain echo to evaluate LVEF, intravenous diuresis  prn as per card consult , monitor ins/outs, monitor renal profile and electrolytes closely ,send 3 sets of enzymes, O2 supply, serial chest xrays, monitor weights and oral intake of fluids, nutritionist consult Palliative care consult requested ,to discuss advance directives and complete MOLST     Problem/Plan - 1:  ·  Problem: Abscess of groin.  Plan: Admitted for septic workup and evaluation,send blood and urine cx,serial lactate levels,monitor vitals closley,ivfs hydration,monitor urine output and renal profile,iv abx initiated ,seen by ID CONSULT ,continue topical care ,follow cx .     Problem/Plan - 2:  ·  Problem: Atrial fibrillation.  Plan: continue current management and present  medications ,cardiology consult called.     Problem/Plan - 3:  ·  Problem: CHF (congestive heart failure).  Plan: obtain echo to evaluate LVEF, intravenous diuresis prn  as per card consult , monitor ins/outs, monitor renal profile and electrolytes closely ,send 3 sets of enzymes, O2 supply, serial chest xrays, monitor weights and oral intake of fluids, nutritionist consult.     Problem/Plan - 4:  ·  Problem: DM (diabetes mellitus).  Plan: corrective regimen sliding scale.     Problem/Plan - 5:  ·  Problem: Hypertension.  Plan: continue home medications ,cardiology cons requested.     Problem/Plan - 6:  Problem: Pressure ulcer of both heels. Plan: seen by wound care team /podiatry.    Problem/Plan - 7:  ·  Problem: Prostate CA.  Plan:  cons requested.     Problem/Plan - 8:  ·  Problem: Dementia.  Plan: supportive care Palliative care consult requested ,to discuss advance directives and complete MOLST.     Problem/Plan - 9:  ·  Problem: Prophylactic measure.  Plan: Gastrointestinal stress ulcer prophylaxis and DVT prophylaxis administered

## 2020-10-29 NOTE — PROGRESS NOTE ADULT - SUBJECTIVE AND OBJECTIVE BOX
PROGRESS NOTE  Patient is a 89y old  Male who presents with a chief complaint of R GROIN PAIN AND SWELLING (29 Oct 2020 07:13)  Chart and available morning labs /imaging are reviewed electronically , urgent issues addressed . More information  is being added upon completion of rounds , when more information is collected and management discussed with consultants , medical staff and social service/case management on the floor   OVERNIGHT  No new issues reported by medical staff . All above noted D/c tomorrow is anticipated after   ABX will be adjusted by ID accordingly to culture Seen by PT -syd recommended   HPI:  89 y o WM  presents sent to ED from [facility] for evaluation of  R groin abscess for few days. now has opened area and has foul smelling discharge. Denies pain  No known fever/chills. no trauma. Patient is   poor historian,  PMD Dr Quintana,  no anticoagulants noted in  patient med list ,patient was admitted with GIB last year and BASA was stopped at that time .His  PMH is significant for :  Arteriosclerotic heart disease (ASHD)  Atrial fibrillation  ,CHF (congestive heart failure)  ,Dementia  ,Depression  ,DM (diabetes mellitus)  ,Hyperlipemia  ,Hypertension  ,Prostate ca . and ID consult called Seen by Dr Devries  ,no intereventions required ,continue topical care and iv abx Admitted for septic workup and evaluation,send blood and urine cx,serial lactate levels,monitor vitals closley,ivfs hydration,monitor urine output and renal profile,iv abx initiated -given vanco and zosyn in ER .Chest xray showed PVC and cardiology consult called Admit to monitored unit for cardiac monitoring, obtain echo to evaluate LVEF, intravenous diuresis  prn as per card consult , monitor ins/outs, monitor renal profile and electrolytes closely ,send 3 sets of enzymes, O2 supply, serial chest xrays, monitor weights and oral intake of fluids, nutritionist consult Palliative care consult requested ,to discuss advance directives and complete MOLST  (26 Oct 2020 13:23)  PAST MEDICAL & SURGICAL HISTORY:  Constipation  Anemia  Dementia    Arteriosclerotic heart disease (ASHD)    Prostate CA    Atrial fibrillation    DM (diabetes mellitus)    Dementia    Depression    Diabetes    Hyperlipemia    CHF (congestive heart failure)    Dementia    Prostate ca    Diabetes    Hypertension    Atrial fibrillation    No significant past surgical history        MEDICATIONS  (STANDING):  aspirin  chewable 81 milliGRAM(s) Oral daily  dextrose 5% + sodium chloride 0.45%. 1000 milliLiter(s) (20 mL/Hr) IV Continuous <Continuous>  dextrose 5%. 1000 milliLiter(s) (50 mL/Hr) IV Continuous <Continuous>  dextrose 50% Injectable 12.5 Gram(s) IV Push once  dextrose 50% Injectable 25 Gram(s) IV Push once  dextrose 50% Injectable 25 Gram(s) IV Push once  donepezil 5 milliGRAM(s) Oral at bedtime  enoxaparin Injectable 40 milliGRAM(s) SubCutaneous daily  famotidine Injectable 20 milliGRAM(s) IV Push daily  influenza   Vaccine 0.5 milliLiter(s) IntraMuscular once  insulin lispro (ADMELOG) corrective regimen sliding scale   SubCutaneous three times a day before meals  piperacillin/tazobactam IVPB.. 3.375 Gram(s) IV Intermittent every 8 hours  polyethylene glycol 3350 17 Gram(s) Oral daily  senna 2 Tablet(s) Oral at bedtime  simvastatin 20 milliGRAM(s) Oral at bedtime  tamsulosin 0.4 milliGRAM(s) Oral at bedtime    MEDICATIONS  (PRN):  acetaminophen   Tablet .. 650 milliGRAM(s) Oral every 6 hours PRN Temp greater or equal to 38C (100.4F), Mild Pain (1 - 3)  ALBUTerol    90 MICROgram(s) HFA Inhaler 2 Puff(s) Inhalation every 6 hours PRN Shortness of Breath and/or Wheezing  dextrose 40% Gel 15 Gram(s) Oral once PRN Blood Glucose LESS THAN 70 milliGRAM(s)/deciliter  glucagon  Injectable 1 milliGRAM(s) IntraMuscular once PRN Glucose LESS THAN 70 milligrams/deciliter  morphine  - Injectable 2 milliGRAM(s) IV Push every 4 hours PRN Severe Pain (7 - 10)  ondansetron Injectable 4 milliGRAM(s) IV Push every 4 hours PRN Nausea and/or Vomiting  traMADol 50 milliGRAM(s) Oral every 6 hours PRN Moderate Pain (4 - 6)      OBJECTIVE    T(C): 36.6 (10-29-20 @ 05:07), Max: 37 (10-28-20 @ 20:33)  HR: 64 (10-29-20 @ 05:07) (64 - 75)  BP: 128/54 (10-29-20 @ 05:07) (101/56 - 128/54)  RR: 17 (10-29-20 @ 05:07) (17 - 18)  SpO2: 97% (10-29-20 @ 05:07) (97% - 97%)  Wt(kg): --  I&O's Summary    28 Oct 2020 07:01  -  29 Oct 2020 07:00  --------------------------------------------------------  IN: 0 mL / OUT: 700 mL / NET: -700 mL          REVIEW OF SYSTEMS:  ROS is unobtainable due to dementia and confusion PATIENT IS CONFUSED AND IS UNABLE TO GIVE ANY/RELIABLE HISTORY OR REVIEW OF SYSTEMS PLEASE ALSO SEE UNDER HPI AND ASSESSMENT FOR OBTAINED REVIEW OF SYSTEMS     PHYSICAL EXAM:  Appearance: NAD. VS past 24 hrs -as above   HEENT:   Moist oral mucosa. Conjunctiva clear b/l.   Neck : supple  Respiratory: Lungs CTAB.  Gastrointestinal:  Soft, nontender. No rebound. No rigidity. BS present	  Cardiovascular: RRR ,S1S2 present  Neurologic: Non-focal. Moving all extremities.  Extremities: No edema. No erythema. No calf tenderness. R groin dressing   Skin: No rashes, No ecchymoses, No cyanosis.	  wounds ,skin lesions-See skin assesment flow sheet   LABS:                        10.9   5.32  )-----------( 245      ( 29 Oct 2020 08:55 )             33.4     10-29    142  |  107  |  11  ----------------------------<  112<H>  3.9   |  31  |  0.64    Ca    8.9      29 Oct 2020 08:55      CAPILLARY BLOOD GLUCOSE      POCT Blood Glucose.: 131 mg/dL (29 Oct 2020 08:04)  POCT Blood Glucose.: 168 mg/dL (28 Oct 2020 21:04)  POCT Blood Glucose.: 134 mg/dL (28 Oct 2020 16:57)  POCT Blood Glucose.: 192 mg/dL (28 Oct 2020 11:44)      Urinalysis Basic - ( 28 Oct 2020 04:53 )    Color: Yellow / Appearance: Clear / S.010 / pH: x  Gluc: x / Ketone: Negative  / Bili: Negative / Urobili: 1   Blood: x / Protein: Negative / Nitrite: Negative   Leuk Esterase: Trace / RBC: 0-2 /HPF / WBC 6-10   Sq Epi: x / Non Sq Epi: Occasional / Bacteria: Occasional        Culture - Other (collected 27 Oct 2020 18:33)  Source: .Other RT. HEEL WOUND  Preliminary Report (28 Oct 2020 16:12):    Moderate Coag Negative Staphylococcus "Susceptibilities not performed"    Culture - Other (collected 27 Oct 2020 18:33)  Source: .Other scrotal abcess  Preliminary Report (28 Oct 2020 18:04):    Numerous Corynebacterium striatum group "Susceptibilities not performed"    Culture - Abscess with Gram Stain (collected 26 Oct 2020 22:10)  Source: .Abscess Testicle - Right  Preliminary Report (28 Oct 2020 18:19):    Rare Proteus mirabilis    Rare Coag Negative Staphylococcus "Susceptibilities not performed"    Few Corynebacterium species "Susceptibilities not performed"  Organism: Proteus mirabilis (28 Oct 2020 18:08)  Organism: Proteus mirabilis (28 Oct 2020 18:08)    Culture - Blood (collected 26 Oct 2020 19:20)  Source: .Blood Blood-Peripheral  Preliminary Report (27 Oct 2020 20:01):    No growth to date.    Culture - Blood (collected 26 Oct 2020 12:28)  Source: .Blood Blood-Peripheral  Preliminary Report (27 Oct 2020 13:03):    No growth to date.      RADIOLOGY & ADDITIONAL TESTS:   reviewed elctronically  ASSESSMENT/PLAN: 	     PROGRESS NOTE  Patient is a 89y old  Male who presents with a chief complaint of R GROIN PAIN AND SWELLING (29 Oct 2020 07:13)  Chart and available morning labs /imaging are reviewed electronically , urgent issues addressed . More information  is being added upon completion of rounds , when more information is collected and management discussed with consultants , medical staff and social service/case management on the floor   OVERNIGHT  No new issues reported by medical staff . All above noted D/c tomorrow is anticipated after   ABX will be adjusted by ID accordingly to culture Seen by PT -syd recommended   HPI:  89 y o WM  presents sent to ED from [facility] for evaluation of  R groin abscess for few days. now has opened area and has foul smelling discharge. Denies pain  No known fever/chills. no trauma. Patient is   poor historian,  PMD Dr Quintana,  no anticoagulants noted in  patient med list ,patient was admitted with GIB last year and BASA was stopped at that time .His  PMH is significant for :  Arteriosclerotic heart disease (ASHD)  Atrial fibrillation  ,CHF (congestive heart failure)  ,Dementia  ,Depression  ,DM (diabetes mellitus)  ,Hyperlipemia  ,Hypertension  ,Prostate ca . and ID consult called Seen by Dr Devries  ,no intereventions required ,continue topical care and iv abx Admitted for septic workup and evaluation,send blood and urine cx,serial lactate levels,monitor vitals closley,ivfs hydration,monitor urine output and renal profile,iv abx initiated -given vanco and zosyn in ER .Chest xray showed PVC and cardiology consult called Admit to monitored unit for cardiac monitoring, obtain echo to evaluate LVEF, intravenous diuresis  prn as per card consult , monitor ins/outs, monitor renal profile and electrolytes closely ,send 3 sets of enzymes, O2 supply, serial chest xrays, monitor weights and oral intake of fluids, nutritionist consult Palliative care consult requested ,to discuss advance directives and complete MOLST  (26 Oct 2020 13:23)  PAST MEDICAL & SURGICAL HISTORY:  Constipation  Anemia  Dementia    Arteriosclerotic heart disease (ASHD)    Prostate CA    Atrial fibrillation    DM (diabetes mellitus)    Dementia    Depression    Diabetes    Hyperlipemia    CHF (congestive heart failure)    Dementia    Prostate ca    Diabetes    Hypertension    Atrial fibrillation    No significant past surgical history        MEDICATIONS  (STANDING):  aspirin  chewable 81 milliGRAM(s) Oral daily  dextrose 5% + sodium chloride 0.45%. 1000 milliLiter(s) (20 mL/Hr) IV Continuous <Continuous>  dextrose 5%. 1000 milliLiter(s) (50 mL/Hr) IV Continuous <Continuous>  dextrose 50% Injectable 12.5 Gram(s) IV Push once  dextrose 50% Injectable 25 Gram(s) IV Push once  dextrose 50% Injectable 25 Gram(s) IV Push once  donepezil 5 milliGRAM(s) Oral at bedtime  enoxaparin Injectable 40 milliGRAM(s) SubCutaneous daily  famotidine Injectable 20 milliGRAM(s) IV Push daily  influenza   Vaccine 0.5 milliLiter(s) IntraMuscular once  insulin lispro (ADMELOG) corrective regimen sliding scale   SubCutaneous three times a day before meals  piperacillin/tazobactam IVPB.. 3.375 Gram(s) IV Intermittent every 8 hours  polyethylene glycol 3350 17 Gram(s) Oral daily  senna 2 Tablet(s) Oral at bedtime  simvastatin 20 milliGRAM(s) Oral at bedtime  tamsulosin 0.4 milliGRAM(s) Oral at bedtime    MEDICATIONS  (PRN):  acetaminophen   Tablet .. 650 milliGRAM(s) Oral every 6 hours PRN Temp greater or equal to 38C (100.4F), Mild Pain (1 - 3)  ALBUTerol    90 MICROgram(s) HFA Inhaler 2 Puff(s) Inhalation every 6 hours PRN Shortness of Breath and/or Wheezing  dextrose 40% Gel 15 Gram(s) Oral once PRN Blood Glucose LESS THAN 70 milliGRAM(s)/deciliter  glucagon  Injectable 1 milliGRAM(s) IntraMuscular once PRN Glucose LESS THAN 70 milligrams/deciliter  morphine  - Injectable 2 milliGRAM(s) IV Push every 4 hours PRN Severe Pain (7 - 10)  ondansetron Injectable 4 milliGRAM(s) IV Push every 4 hours PRN Nausea and/or Vomiting  traMADol 50 milliGRAM(s) Oral every 6 hours PRN Moderate Pain (4 - 6)      OBJECTIVE    T(C): 36.6 (10-29-20 @ 05:07), Max: 37 (10-28-20 @ 20:33)  HR: 64 (10-29-20 @ 05:07) (64 - 75)  BP: 128/54 (10-29-20 @ 05:07) (101/56 - 128/54)  RR: 17 (10-29-20 @ 05:07) (17 - 18)  SpO2: 97% (10-29-20 @ 05:07) (97% - 97%)  Wt(kg): --  I&O's Summary    28 Oct 2020 07:01  -  29 Oct 2020 07:00  --------------------------------------------------------  IN: 0 mL / OUT: 700 mL / NET: -700 mL          REVIEW OF SYSTEMS:  ROS is unobtainable due to dementia and confusion PATIENT IS CONFUSED AND IS UNABLE TO GIVE ANY/RELIABLE HISTORY OR REVIEW OF SYSTEMS PLEASE ALSO SEE UNDER HPI AND ASSESSMENT FOR OBTAINED REVIEW OF SYSTEMS     PHYSICAL EXAM:  Appearance: NAD. VS past 24 hrs -as above   HEENT:   Moist oral mucosa. Conjunctiva clear b/l.   Neck : supple  Respiratory: Lungs CTAB.  Gastrointestinal:  Soft, nontender. No rebound. No rigidity. BS present	  Cardiovascular: RRR ,S1S2 present  Neurologic: Non-focal. Moving all extremities.  Extremities: No edema. No erythema. No calf tenderness. R groin dressing   Skin: No rashes, No ecchymoses, No cyanosis.	  wounds ,skin lesions-See skin assesment flow sheet   LABS:                        10.9   5.32  )-----------( 245      ( 29 Oct 2020 08:55 )             33.4     10-29    142  |  107  |  11  ----------------------------<  112<H>  3.9   |  31  |  0.64    Ca    8.9      29 Oct 2020 08:55      CAPILLARY BLOOD GLUCOSE      POCT Blood Glucose.: 131 mg/dL (29 Oct 2020 08:04)  POCT Blood Glucose.: 168 mg/dL (28 Oct 2020 21:04)  POCT Blood Glucose.: 134 mg/dL (28 Oct 2020 16:57)  POCT Blood Glucose.: 192 mg/dL (28 Oct 2020 11:44)      Urinalysis Basic - ( 28 Oct 2020 04:53 )    Color: Yellow / Appearance: Clear / S.010 / pH: x  Gluc: x / Ketone: Negative  / Bili: Negative / Urobili: 1   Blood: x / Protein: Negative / Nitrite: Negative   Leuk Esterase: Trace / RBC: 0-2 /HPF / WBC 6-10   Sq Epi: x / Non Sq Epi: Occasional / Bacteria: Occasional        Culture - Other (collected 27 Oct 2020 18:33)  Source: .Other RT. HEEL WOUND  Preliminary Report (28 Oct 2020 16:12):    Moderate Coag Negative Staphylococcus "Susceptibilities not performed"    Culture - Other (collected 27 Oct 2020 18:33)  Source: .Other scrotal abcess  Preliminary Report (28 Oct 2020 18:04):    Numerous Corynebacterium striatum group "Susceptibilities not performed"    Culture - Abscess with Gram Stain (collected 26 Oct 2020 22:10)  Source: .Abscess Testicle - Right  Preliminary Report (28 Oct 2020 18:19):    Rare Proteus mirabilis    Rare Coag Negative Staphylococcus "Susceptibilities not performed"    Few Corynebacterium species "Susceptibilities not performed"  Organism: Proteus mirabilis (28 Oct 2020 18:08)  Organism: Proteus mirabilis (28 Oct 2020 18:08)    Culture - Blood (collected 26 Oct 2020 19:20)  Source: .Blood Blood-Peripheral  Preliminary Report (27 Oct 2020 20:01):    No growth to date.    Culture - Blood (collected 26 Oct 2020 12:28)  Source: .Blood Blood-Peripheral  Preliminary Report (27 Oct 2020 13:03):    No growth to date.      RADIOLOGY & ADDITIONAL TESTS:   reviewed elctronically  ASSESSMENT/PLAN: 	    45minutes spent on this visit, 50% visit time spent in care co-ordination with other attendings and counselling patient  I have discussed care plan with patient and HCP,expressed understanding of problems treatment and their effect and side effects, alternatives in detail,I have asked if they have any questions and concerns and appropriately addressed them to best of my ability

## 2020-10-29 NOTE — DISCHARGE NOTE PROVIDER - NSDCCPCAREPLAN_GEN_ALL_CORE_FT
PRINCIPAL DISCHARGE DIAGNOSIS  Diagnosis: Abscess of groin  Assessment and Plan of Treatment:       SECONDARY DISCHARGE DIAGNOSES  Diagnosis: Pressure ulcer of both heels  Assessment and Plan of Treatment: Pressure ulcer of both heels    Diagnosis: Dementia  Assessment and Plan of Treatment: Dementia    Diagnosis: Hypertension  Assessment and Plan of Treatment: Hypertension    Diagnosis: CHF (congestive heart failure)  Assessment and Plan of Treatment: CHF (congestive heart failure)    Diagnosis: DM (diabetes mellitus)  Assessment and Plan of Treatment: DM (diabetes mellitus)    Diagnosis: Atrial fibrillation  Assessment and Plan of Treatment: Atrial fibrillation    Diagnosis: Pressure ulcer of left heel, stage 2  Assessment and Plan of Treatment: Pressure ulcer of left heel, stage 2    Diagnosis: Pressure ulcer of right heel, stage 2  Assessment and Plan of Treatment: Pressure ulcer of right heel, stage 2    Diagnosis: Prostate CA  Assessment and Plan of Treatment: Prostate CA    Diagnosis: Prophylactic measure  Assessment and Plan of Treatment: Prophylactic measure

## 2020-10-29 NOTE — PROGRESS NOTE ADULT - SUBJECTIVE AND OBJECTIVE BOX
[facility] [place]       HPI:  89 y o WM  presents sent to ED from [facility] for evaluation of  R groin abscess for few days. now has opened area and has foul smelling discharge. Denies pain  No known fever/chills. no trauma. Patient is   poor historian,  PMD Dr Quintana,  no anticoagulants noted in  patient med list ,patient was admitted with GIB last year and BASA was stopped at that time .His  PMH is significant for :  Arteriosclerotic heart disease (ASHD)  Atrial fibrillation  ,CHF (congestive heart failure)  ,Dementia  ,Depression  ,DM (diabetes mellitus)  ,Hyperlipemia  ,Hypertension  ,Prostate ca . and ID consult called Seen by Dr Devries  ,no intereventions required ,continue topical care and iv abx Admitted for septic workup and evaluation,send blood and urine cx,serial lactate levels,monitor vitals closley,ivfs hydration,monitor urine output and renal profile,iv abx initiated -given vanco and zosyn in ER .Chest xray showed PVC and cardiology consult called Admit to monitored unit for cardiac monitoring, obtain echo to evaluate LVEF, intravenous diuresis  prn as per card consult , monitor ins/outs, monitor renal profile and electrolytes closely ,send 3 sets of enzymes, O2 supply, serial chest xrays, monitor weights and oral intake of fluids, nutritionist consult Palliative care consult requested ,to discuss advance directives and complete MOLST  (26 Oct 2020 13:23)        SUBJECTIVE:      ALLERGIES:  Allergies    latex (Rash)  latex (Unknown)  No Known Drug Allergies    Intolerances          MEDICATIONS  (STANDING):  aspirin  chewable 81 milliGRAM(s) Oral daily  dextrose 5% + sodium chloride 0.45%. 1000 milliLiter(s) (20 mL/Hr) IV Continuous <Continuous>  dextrose 5%. 1000 milliLiter(s) (50 mL/Hr) IV Continuous <Continuous>  dextrose 50% Injectable 12.5 Gram(s) IV Push once  dextrose 50% Injectable 25 Gram(s) IV Push once  dextrose 50% Injectable 25 Gram(s) IV Push once  donepezil 5 milliGRAM(s) Oral at bedtime  enoxaparin Injectable 40 milliGRAM(s) SubCutaneous daily  famotidine Injectable 20 milliGRAM(s) IV Push daily  influenza   Vaccine 0.5 milliLiter(s) IntraMuscular once  insulin lispro (ADMELOG) corrective regimen sliding scale   SubCutaneous three times a day before meals  polyethylene glycol 3350 17 Gram(s) Oral daily  senna 2 Tablet(s) Oral at bedtime  simvastatin 20 milliGRAM(s) Oral at bedtime  tamsulosin 0.4 milliGRAM(s) Oral at bedtime    MEDICATIONS  (PRN):  acetaminophen   Tablet .. 650 milliGRAM(s) Oral every 6 hours PRN Temp greater or equal to 38C (100.4F), Mild Pain (1 - 3)  ALBUTerol    90 MICROgram(s) HFA Inhaler 2 Puff(s) Inhalation every 6 hours PRN Shortness of Breath and/or Wheezing  dextrose 40% Gel 15 Gram(s) Oral once PRN Blood Glucose LESS THAN 70 milliGRAM(s)/deciliter  glucagon  Injectable 1 milliGRAM(s) IntraMuscular once PRN Glucose LESS THAN 70 milligrams/deciliter  morphine  - Injectable 2 milliGRAM(s) IV Push every 4 hours PRN Severe Pain (7 - 10)  ondansetron Injectable 4 milliGRAM(s) IV Push every 4 hours PRN Nausea and/or Vomiting  traMADol 50 milliGRAM(s) Oral every 6 hours PRN Moderate Pain (4 - 6)      REVIEW OF SYSTEMS:  CONSTITUTIONAL: No fever,  RESPIRATORY: No cough, wheezing, shortness of breath  CARDIOVASCULAR: No chest pain, dyspnea, palpitations, dizziness, syncope, paroxysmal nocturnal dyspnea, orthopnea, or arm or leg swelling  GASTROINTESTINAL: No abdominal  or epigastric pain, nausea, vomiting,  diarrhea  NEUROLOGICAL: No headaches,  loss of strength, numbness, or tremors    Vital Signs Last 24 Hrs  T(C): 36.8 (29 Oct 2020 20:30), Max: 36.8 (29 Oct 2020 20:30)  T(F): 98.2 (29 Oct 2020 20:30), Max: 98.2 (29 Oct 2020 20:30)  HR: 67 (29 Oct 2020 20:30) (63 - 67)  BP: 105/72 (29 Oct 2020 20:30) (105/72 - 128/54)  BP(mean): --  RR: 17 (29 Oct 2020 20:30) (17 - 18)  SpO2: 97% (29 Oct 2020 20:30) (93% - 97%)    PHYSICAL EXAM:  HEAD:  Atraumatic, Normocephalic  NECK: Supple and normal thyroid.  No JVD or carotid bruit.   HEART: S1, S2 regular , 1/6 soft ejection systolic murmur in mitral area , no thrill and no gallops .  PULMONARY: Bilateral vesicular breathing , few scattered ronchi and few scattered rales are present .  ABDOMEN: Soft nontender and positive bowl sounds   EXTREMITIES:  No clubbing, cyanosis, or pedal  edema  NEUROLOGICAL: AAOX3 , no focal deficit .    LABS:                        10.9   5.32  )-----------( 245      ( 29 Oct 2020 08:55 )             33.4     10-29    142  |  107  |  11  ----------------------------<  112<H>  3.9   |  31  |  0.64    Ca    8.9      29 Oct 2020 08:55            Urinalysis Basic - ( 28 Oct 2020 04:53 )    Color: Yellow / Appearance: Clear / S.010 / pH: x  Gluc: x / Ketone: Negative  / Bili: Negative / Urobili: 1   Blood: x / Protein: Negative / Nitrite: Negative   Leuk Esterase: Trace / RBC: 0-2 /HPF / WBC 6-10   Sq Epi: x / Non Sq Epi: Occasional / Bacteria: Occasional      BNP      EKG:  ECHO:  IMAGING:    Assessment/Plan    Will continue to follow during hospital course and give further recommendations as needed . Thanks for your referral . if any questions please contact me at office ([phone])cell [phone]) IVETTE VAZQUEZ MD [facility]  [address] [address]  [address]  OFFICE : [phone]  CELL : [phone]     Cardiology F/U :      HPI:  89 y o WM  presents sent to ED from [facility] for evaluation of  R groin abscess for few days. now has opened area and has foul smelling discharge. Denies pain  No known fever/chills. no trauma. Patient is   poor historian,  PMD Dr Quintana,  no anticoagulants noted in  patient med list ,patient was admitted with GIB last year and BASA was stopped at that time .His  PMH is significant for :  Arteriosclerotic heart disease (ASHD)  Atrial fibrillation  ,CHF (congestive heart failure)  ,Dementia  ,Depression  ,DM (diabetes mellitus)  ,Hyperlipemia  ,Hypertension  ,Prostate ca . and ID consult called Seen by Dr Devries  ,no intereventions required ,continue topical care and iv abx Admitted for septic workup and evaluation,send blood and urine cx,serial lactate levels,monitor vitals closley,ivfs hydration,monitor urine output and renal profile,iv abx initiated -given vanco and zosyn in ER .Chest xray showed PVC and cardiology consult called Admit to monitored unit for cardiac monitoring, obtain echo to evaluate LVEF, intravenous diuresis  prn as per card consult , monitor ins/outs, monitor renal profile and electrolytes closely ,send 3 sets of enzymes, O2 supply, serial chest xrays, monitor weights and oral intake of fluids, nutritionist consult Palliative care consult requested ,to discuss advance directives and complete MOLST  (26 Oct 2020 13:23)        SUBJECTIVE: Patient lying comfortable .      ALLERGIES:  Allergies    latex (Rash)  latex (Unknown)  No Known Drug Allergies    Intolerances          MEDICATIONS  (STANDING):  aspirin  chewable 81 milliGRAM(s) Oral daily  dextrose 5% + sodium chloride 0.45%. 1000 milliLiter(s) (20 mL/Hr) IV Continuous <Continuous>  dextrose 5%. 1000 milliLiter(s) (50 mL/Hr) IV Continuous <Continuous>  dextrose 50% Injectable 12.5 Gram(s) IV Push once  dextrose 50% Injectable 25 Gram(s) IV Push once  dextrose 50% Injectable 25 Gram(s) IV Push once  donepezil 5 milliGRAM(s) Oral at bedtime  enoxaparin Injectable 40 milliGRAM(s) SubCutaneous daily  famotidine Injectable 20 milliGRAM(s) IV Push daily  influenza   Vaccine 0.5 milliLiter(s) IntraMuscular once  insulin lispro (ADMELOG) corrective regimen sliding scale   SubCutaneous three times a day before meals  polyethylene glycol 3350 17 Gram(s) Oral daily  senna 2 Tablet(s) Oral at bedtime  simvastatin 20 milliGRAM(s) Oral at bedtime  tamsulosin 0.4 milliGRAM(s) Oral at bedtime    MEDICATIONS  (PRN):  acetaminophen   Tablet .. 650 milliGRAM(s) Oral every 6 hours PRN Temp greater or equal to 38C (100.4F), Mild Pain (1 - 3)  ALBUTerol    90 MICROgram(s) HFA Inhaler 2 Puff(s) Inhalation every 6 hours PRN Shortness of Breath and/or Wheezing  dextrose 40% Gel 15 Gram(s) Oral once PRN Blood Glucose LESS THAN 70 milliGRAM(s)/deciliter  glucagon  Injectable 1 milliGRAM(s) IntraMuscular once PRN Glucose LESS THAN 70 milligrams/deciliter  morphine  - Injectable 2 milliGRAM(s) IV Push every 4 hours PRN Severe Pain (7 - 10)  ondansetron Injectable 4 milliGRAM(s) IV Push every 4 hours PRN Nausea and/or Vomiting  traMADol 50 milliGRAM(s) Oral every 6 hours PRN Moderate Pain (4 - 6)      REVIEW OF SYSTEMS:  CONSTITUTIONAL: No fever,  RESPIRATORY: No cough, wheezing, shortness of breath  CARDIOVASCULAR: No chest pain, dyspnea, palpitations, dizziness, syncope, paroxysmal nocturnal dyspnea, orthopnea, or arm or leg swelling  GASTROINTESTINAL: No abdominal  or epigastric pain, nausea, vomiting,  diarrhea  NEUROLOGICAL: No headaches,  numbness, or tremors  Skin : No itching .  Hematology : No bleeding .  Endocrinology : No heat and cold intolerance .  Psychiatry : Patient is calm .  Musculoskeletal : Patient has mild artheritis .    Vital Signs Last 24 Hrs  T(C): 36.8 (29 Oct 2020 20:30), Max: 36.8 (29 Oct 2020 20:30)  T(F): 98.2 (29 Oct 2020 20:30), Max: 98.2 (29 Oct 2020 20:30)  HR: 67 (29 Oct 2020 20:30) (63 - 67)  BP: 105/72 (29 Oct 2020 20:30) (105/72 - 128/54)  BP(mean): --  RR: 17 (29 Oct 2020 20:30) (17 - 18)  SpO2: 97% (29 Oct 2020 20:30) (93% - 97%)    PHYSICAL EXAM:  HEAD:  Atraumatic, Normocephalic  NECK: Supple and normal thyroid.  No JVD or carotid bruit.   HEART: S1, S2 regular , 1/6 soft ejection systolic murmur in mitral area , no thrill and no gallops .  PULMONARY: Bilateral vesicular breathing , few scattered rhonchi and few scattered rales are present .  ABDOMEN: Soft nontender and positive bowl sounds   EXTREMITIES:  No clubbing, cyanosis, or pedal  edema  NEUROLOGICAL: AA and confused  , no focal deficit .    LABS:                        10.9   5.32  )-----------( 245      ( 29 Oct 2020 08:55 )             33.4     10-29    142  |  107  |  11  ----------------------------<  112<H>  3.9   |  31  |  0.64    Ca    8.9      29 Oct 2020 08:55            Urinalysis Basic - ( 28 Oct 2020 04:53 )    Color: Yellow / Appearance: Clear / S.010 / pH: x  Gluc: x / Ketone: Negative  / Bili: Negative / Urobili: 1   Blood: x / Protein: Negative / Nitrite: Negative   Leuk Esterase: Trace / RBC: 0-2 /HPF / WBC 6-10   Sq Epi: x / Non Sq Epi: Occasional / Bacteria: Occasional    Assessment/Plan  Patient has :  1) Groin abscess  and patient getting  antibiotics .  2) Pacemaker and functioning well   3) H/O CHF and stable .  4) H/O CAD   5) Anemia .  Plan : 1) Antibiotics as ordered 2) Rest of medications as such 3) Monitor hemoglobin and electrolytes .  Will continue to follow during hospital course and give further recommendations as needed . Thanks for your referral . if any questions please contact me at office ([phone] [phone])

## 2020-10-30 ENCOUNTER — TRANSCRIPTION ENCOUNTER (OUTPATIENT)
Age: 85
End: 2020-10-30

## 2020-10-30 VITALS
OXYGEN SATURATION: 97 % | TEMPERATURE: 98 F | HEART RATE: 105 BPM | RESPIRATION RATE: 17 BRPM | SYSTOLIC BLOOD PRESSURE: 109 MMHG | DIASTOLIC BLOOD PRESSURE: 64 MMHG

## 2020-10-30 LAB
ANION GAP SERPL CALC-SCNC: 4 MMOL/L — LOW (ref 5–17)
BUN SERPL-MCNC: 13 MG/DL — SIGNIFICANT CHANGE UP (ref 7–23)
CALCIUM SERPL-MCNC: 9.3 MG/DL — SIGNIFICANT CHANGE UP (ref 8.5–10.1)
CHLORIDE SERPL-SCNC: 107 MMOL/L — SIGNIFICANT CHANGE UP (ref 96–108)
CO2 SERPL-SCNC: 30 MMOL/L — SIGNIFICANT CHANGE UP (ref 22–31)
CREAT SERPL-MCNC: 0.6 MG/DL — SIGNIFICANT CHANGE UP (ref 0.5–1.3)
GLUCOSE SERPL-MCNC: 108 MG/DL — HIGH (ref 70–99)
HCT VFR BLD CALC: 35.7 % — LOW (ref 39–50)
HGB BLD-MCNC: 12.1 G/DL — LOW (ref 13–17)
MCHC RBC-ENTMCNC: 29.8 PG — SIGNIFICANT CHANGE UP (ref 27–34)
MCHC RBC-ENTMCNC: 33.9 GM/DL — SIGNIFICANT CHANGE UP (ref 32–36)
MCV RBC AUTO: 87.9 FL — SIGNIFICANT CHANGE UP (ref 80–100)
NRBC # BLD: 0 /100 WBCS — SIGNIFICANT CHANGE UP (ref 0–0)
PLATELET # BLD AUTO: 266 K/UL — SIGNIFICANT CHANGE UP (ref 150–400)
POTASSIUM SERPL-MCNC: 4.1 MMOL/L — SIGNIFICANT CHANGE UP (ref 3.5–5.3)
POTASSIUM SERPL-SCNC: 4.1 MMOL/L — SIGNIFICANT CHANGE UP (ref 3.5–5.3)
RBC # BLD: 4.06 M/UL — LOW (ref 4.2–5.8)
RBC # FLD: 14.6 % — HIGH (ref 10.3–14.5)
SODIUM SERPL-SCNC: 141 MMOL/L — SIGNIFICANT CHANGE UP (ref 135–145)
WBC # BLD: 5.07 K/UL — SIGNIFICANT CHANGE UP (ref 3.8–10.5)
WBC # FLD AUTO: 5.07 K/UL — SIGNIFICANT CHANGE UP (ref 3.8–10.5)

## 2020-10-30 PROCEDURE — 97166 OT EVAL MOD COMPLEX 45 MIN: CPT

## 2020-10-30 PROCEDURE — 99285 EMERGENCY DEPT VISIT HI MDM: CPT | Mod: 25

## 2020-10-30 PROCEDURE — 80053 COMPREHEN METABOLIC PANEL: CPT

## 2020-10-30 PROCEDURE — 97162 PT EVAL MOD COMPLEX 30 MIN: CPT

## 2020-10-30 PROCEDURE — 85730 THROMBOPLASTIN TIME PARTIAL: CPT

## 2020-10-30 PROCEDURE — 82962 GLUCOSE BLOOD TEST: CPT

## 2020-10-30 PROCEDURE — 71045 X-RAY EXAM CHEST 1 VIEW: CPT

## 2020-10-30 PROCEDURE — 83880 ASSAY OF NATRIURETIC PEPTIDE: CPT

## 2020-10-30 PROCEDURE — 97530 THERAPEUTIC ACTIVITIES: CPT

## 2020-10-30 PROCEDURE — 81001 URINALYSIS AUTO W/SCOPE: CPT

## 2020-10-30 PROCEDURE — 97116 GAIT TRAINING THERAPY: CPT

## 2020-10-30 PROCEDURE — U0003: CPT

## 2020-10-30 PROCEDURE — 87070 CULTURE OTHR SPECIMN AEROBIC: CPT

## 2020-10-30 PROCEDURE — 86769 SARS-COV-2 COVID-19 ANTIBODY: CPT

## 2020-10-30 PROCEDURE — 80061 LIPID PANEL: CPT

## 2020-10-30 PROCEDURE — 83036 HEMOGLOBIN GLYCOSYLATED A1C: CPT

## 2020-10-30 PROCEDURE — 87040 BLOOD CULTURE FOR BACTERIA: CPT

## 2020-10-30 PROCEDURE — 87186 SC STD MICRODIL/AGAR DIL: CPT

## 2020-10-30 PROCEDURE — 96365 THER/PROPH/DIAG IV INF INIT: CPT

## 2020-10-30 PROCEDURE — 97535 SELF CARE MNGMENT TRAINING: CPT

## 2020-10-30 PROCEDURE — 85027 COMPLETE CBC AUTOMATED: CPT

## 2020-10-30 PROCEDURE — 87086 URINE CULTURE/COLONY COUNT: CPT

## 2020-10-30 PROCEDURE — 80048 BASIC METABOLIC PNL TOTAL CA: CPT

## 2020-10-30 PROCEDURE — 36415 COLL VENOUS BLD VENIPUNCTURE: CPT

## 2020-10-30 PROCEDURE — 85025 COMPLETE CBC W/AUTO DIFF WBC: CPT

## 2020-10-30 PROCEDURE — 96368 THER/DIAG CONCURRENT INF: CPT

## 2020-10-30 PROCEDURE — 85610 PROTHROMBIN TIME: CPT

## 2020-10-30 PROCEDURE — 83605 ASSAY OF LACTIC ACID: CPT

## 2020-10-30 PROCEDURE — 97110 THERAPEUTIC EXERCISES: CPT

## 2020-10-30 PROCEDURE — 87205 SMEAR GRAM STAIN: CPT

## 2020-10-30 PROCEDURE — 87075 CULTR BACTERIA EXCEPT BLOOD: CPT

## 2020-10-30 PROCEDURE — 92610 EVALUATE SWALLOWING FUNCTION: CPT

## 2020-10-30 PROCEDURE — 93005 ELECTROCARDIOGRAM TRACING: CPT

## 2020-10-30 PROCEDURE — 84443 ASSAY THYROID STIM HORMONE: CPT

## 2020-10-30 PROCEDURE — 83735 ASSAY OF MAGNESIUM: CPT

## 2020-10-30 RX ORDER — ACETAMINOPHEN 500 MG
2 TABLET ORAL
Qty: 40 | Refills: 0
Start: 2020-10-30 | End: 2020-11-03

## 2020-10-30 RX ORDER — MULTIVIT-MIN/FERROUS GLUCONATE 9 MG/15 ML
1 LIQUID (ML) ORAL
Qty: 30 | Refills: 0
Start: 2020-10-30 | End: 2020-11-28

## 2020-10-30 RX ORDER — ASCORBIC ACID 60 MG
1 TABLET,CHEWABLE ORAL
Qty: 60 | Refills: 0
Start: 2020-10-30 | End: 2020-11-28

## 2020-10-30 RX ORDER — CEFUROXIME AXETIL 250 MG
1 TABLET ORAL
Qty: 14 | Refills: 0
Start: 2020-10-30 | End: 2020-11-05

## 2020-10-30 RX ORDER — ACETAMINOPHEN 500 MG
1 TABLET ORAL
Qty: 0 | Refills: 0 | DISCHARGE

## 2020-10-30 RX ORDER — ACETAMINOPHEN 500 MG
1 TABLET ORAL
Qty: 0 | Refills: 0 | DISCHARGE
Start: 2020-10-30 | End: 2020-11-03

## 2020-10-30 RX ADMIN — Medication 81 MILLIGRAM(S): at 11:30

## 2020-10-30 RX ADMIN — Medication 1: at 12:36

## 2020-10-30 RX ADMIN — POLYETHYLENE GLYCOL 3350 17 GRAM(S): 17 POWDER, FOR SOLUTION ORAL at 11:29

## 2020-10-30 RX ADMIN — FAMOTIDINE 20 MILLIGRAM(S): 10 INJECTION INTRAVENOUS at 11:29

## 2020-10-30 RX ADMIN — Medication 250 MILLIGRAM(S): at 05:36

## 2020-10-30 RX ADMIN — ENOXAPARIN SODIUM 40 MILLIGRAM(S): 100 INJECTION SUBCUTANEOUS at 11:30

## 2020-10-30 NOTE — PROGRESS NOTE ADULT - SUBJECTIVE AND OBJECTIVE BOX
TERESA FRANCIS    [facility] [id number] D1    Patient is a 89y old  Male who presents with a chief complaint of R GROIN PAIN AND SWELLING (30 Oct 2020 06:34)       Allergies    latex (Rash)  latex (Unknown)  No Known Drug Allergies    Intolerances        HPI:  89 y o WM  presents sent to ED from [facility] for evaluation of  R groin abscess for few days. now has opened area and has foul smelling discharge. Denies pain  No known fever/chills. no trauma. Patient is   poor historian,  PMD Dr Quintana,  no anticoagulants noted in  patient med list ,patient was admitted with GIB last year and BASA was stopped at that time .His  PMH is significant for :  Arteriosclerotic heart disease (ASHD)  Atrial fibrillation  ,CHF (congestive heart failure)  ,Dementia  ,Depression  ,DM (diabetes mellitus)  ,Hyperlipemia  ,Hypertension  ,Prostate ca . and ID consult called Seen by Dr Devries  ,no intereventions required ,continue topical care and iv abx Admitted for septic workup and evaluation,send blood and urine cx,serial lactate levels,monitor vitals closley,ivfs hydration,monitor urine output and renal profile,iv abx initiated -given vanco and zosyn in ER .Chest xray showed PVC and cardiology consult called Admit to monitored unit for cardiac monitoring, obtain echo to evaluate LVEF, intravenous diuresis  prn as per card consult , monitor ins/outs, monitor renal profile and electrolytes closely ,send 3 sets of enzymes, O2 supply, serial chest xrays, monitor weights and oral intake of fluids, nutritionist consult Palliative care consult requested ,to discuss advance directives and complete MOLST  (26 Oct 2020 13:23)      PAST MEDICAL & SURGICAL HISTORY:  Constipation    Anemia    Dementia    Arteriosclerotic heart disease (ASHD)    Prostate CA    Atrial fibrillation    DM (diabetes mellitus)    Dementia    Depression    Diabetes    Hyperlipemia    CHF (congestive heart failure)    Dementia    Prostate ca    Diabetes    Hypertension    Atrial fibrillation    No significant past surgical history        FAMILY HISTORY:        MEDICATIONS   acetaminophen   Tablet .. 650 milliGRAM(s) Oral every 6 hours PRN  ALBUTerol    90 MICROgram(s) HFA Inhaler 2 Puff(s) Inhalation every 6 hours PRN  aspirin  chewable 81 milliGRAM(s) Oral daily  cefuroxime   Tablet 250 milliGRAM(s) Oral every 12 hours  dextrose 40% Gel 15 Gram(s) Oral once PRN  dextrose 5% + sodium chloride 0.45%. 1000 milliLiter(s) IV Continuous <Continuous>  dextrose 5%. 1000 milliLiter(s) IV Continuous <Continuous>  dextrose 50% Injectable 12.5 Gram(s) IV Push once  dextrose 50% Injectable 25 Gram(s) IV Push once  dextrose 50% Injectable 25 Gram(s) IV Push once  donepezil 5 milliGRAM(s) Oral at bedtime  enoxaparin Injectable 40 milliGRAM(s) SubCutaneous daily  famotidine Injectable 20 milliGRAM(s) IV Push daily  glucagon  Injectable 1 milliGRAM(s) IntraMuscular once PRN  influenza   Vaccine 0.5 milliLiter(s) IntraMuscular once  insulin lispro (ADMELOG) corrective regimen sliding scale   SubCutaneous three times a day before meals  morphine  - Injectable 2 milliGRAM(s) IV Push every 4 hours PRN  ondansetron Injectable 4 milliGRAM(s) IV Push every 4 hours PRN  polyethylene glycol 3350 17 Gram(s) Oral daily  senna 2 Tablet(s) Oral at bedtime  simvastatin 20 milliGRAM(s) Oral at bedtime  tamsulosin 0.4 milliGRAM(s) Oral at bedtime  traMADol 50 milliGRAM(s) Oral every 6 hours PRN      Vital Signs Last 24 Hrs  T(C): 36.5 (30 Oct 2020 06:27), Max: 36.8 (29 Oct 2020 20:30)  T(F): 97.7 (30 Oct 2020 06:27), Max: 98.2 (29 Oct 2020 20:30)  HR: 85 (30 Oct 2020 10:05) (63 - 98)  BP: 109/64 (30 Oct 2020 10:05) (105/72 - 122/64)  BP(mean): --  RR: 17 (30 Oct 2020 06:27) (17 - 18)  SpO2: 98% (30 Oct 2020 10:05) (93% - 98%)      10-29-20 @ 07:01  -  10-30-20 @ 07:00  --------------------------------------------------------  IN: 660 mL / OUT: 0 mL / NET: 660 mL            LABS:                        12.1   5.07  )-----------( 266      ( 30 Oct 2020 08:57 )             35.7     10-30    141  |  107  |  13  ----------------------------<  108<H>  4.1   |  30  |  0.60    Ca    9.3      30 Oct 2020 08:57                WBC:  WBC Count: 5.07 K/uL (10-30 @ 08:57)  WBC Count: 5.32 K/uL (10-29 @ 08:55)  WBC Count: 5.55 K/uL (10-28 @ 09:08)  WBC Count: 5.16 K/uL (10-27 @ 09:17)      MICROBIOLOGY:  RECENT CULTURES:  10-28 .Urine Clean Catch (Midstream) XXXX XXXX   No growth    10-27 .Other scrotal abcess XXXX XXXX   Numerous Corynebacterium striatum group "Susceptibilities not performed"    10-26 .Abscess Testicle - Right Proteus mirabilis XXXX   Rare Proteus mirabilis  Rare Coag Negative Staphylococcus "Susceptibilities not performed"  Few Corynebacterium species "Susceptibilities not performed"    10-26 .Blood Blood-Peripheral XXXX XXXX   No growth to date.    10-26 .Blood Blood-Peripheral XXXX XXXX   No growth to date.                    Sodium:  Sodium, Serum: 141 mmol/L (10-30 @ 08:57)  Sodium, Serum: 142 mmol/L (10-29 @ 08:55)  Sodium, Serum: 139 mmol/L (10-28 @ 09:08)  Sodium, Serum: 140 mmol/L (10-27 @ 09:17)      0.60 mg/dL 10-30 @ 08:57  0.64 mg/dL 10-29 @ 08:55  0.61 mg/dL 10-28 @ 09:08  0.51 mg/dL 10-27 @ 09:17      Hemoglobin:  Hemoglobin: 12.1 g/dL (10-30 @ 08:57)  Hemoglobin: 10.9 g/dL (10-29 @ 08:55)  Hemoglobin: 11.2 g/dL (10-28 @ 09:08)  Hemoglobin: 10.3 g/dL (10-27 @ 09:17)      Platelets: Platelet Count - Automated: 266 K/uL (10-30 @ 08:57)  Platelet Count - Automated: 245 K/uL (10-29 @ 08:55)  Platelet Count - Automated: 250 K/uL (10-28 @ 09:08)  Platelet Count - Automated: 229 K/uL (10-27 @ 09:17)              RADIOLOGY & ADDITIONAL STUDIES:

## 2020-10-30 NOTE — DISCHARGE NOTE NURSING/CASE MANAGEMENT/SOCIAL WORK - NSDCCRTYPESERV_GEN_ALL_CORE_FT
An ambulance has been arranged, as requested, for you to return to [facility] for 3pm today

## 2020-10-30 NOTE — PROGRESS NOTE ADULT - SUBJECTIVE AND OBJECTIVE BOX
Date/Time Patient Seen:  		  Referring MD:   Data Reviewed	       Patient is a 89y old  Male who presents with a chief complaint of R GROIN PAIN AND SWELLING (29 Oct 2020 23:58)      Subjective/HPI     PAST MEDICAL & SURGICAL HISTORY:  Constipation    Anemia    Dementia    Arteriosclerotic heart disease (ASHD)    Prostate CA    Atrial fibrillation    DM (diabetes mellitus)    No pertinent past medical history    Dementia    Depression    Diabetes    Hyperlipemia    CHF (congestive heart failure)    Dementia    Prostate ca    Diabetes    Hypertension    Atrial fibrillation    No significant past surgical history    No significant past surgical history          Medication list         MEDICATIONS  (STANDING):  aspirin  chewable 81 milliGRAM(s) Oral daily  cefuroxime   Tablet 250 milliGRAM(s) Oral every 12 hours  dextrose 5% + sodium chloride 0.45%. 1000 milliLiter(s) (20 mL/Hr) IV Continuous <Continuous>  dextrose 5%. 1000 milliLiter(s) (50 mL/Hr) IV Continuous <Continuous>  dextrose 50% Injectable 12.5 Gram(s) IV Push once  dextrose 50% Injectable 25 Gram(s) IV Push once  dextrose 50% Injectable 25 Gram(s) IV Push once  donepezil 5 milliGRAM(s) Oral at bedtime  enoxaparin Injectable 40 milliGRAM(s) SubCutaneous daily  famotidine Injectable 20 milliGRAM(s) IV Push daily  influenza   Vaccine 0.5 milliLiter(s) IntraMuscular once  insulin lispro (ADMELOG) corrective regimen sliding scale   SubCutaneous three times a day before meals  polyethylene glycol 3350 17 Gram(s) Oral daily  senna 2 Tablet(s) Oral at bedtime  simvastatin 20 milliGRAM(s) Oral at bedtime  tamsulosin 0.4 milliGRAM(s) Oral at bedtime    MEDICATIONS  (PRN):  acetaminophen   Tablet .. 650 milliGRAM(s) Oral every 6 hours PRN Temp greater or equal to 38C (100.4F), Mild Pain (1 - 3)  ALBUTerol    90 MICROgram(s) HFA Inhaler 2 Puff(s) Inhalation every 6 hours PRN Shortness of Breath and/or Wheezing  dextrose 40% Gel 15 Gram(s) Oral once PRN Blood Glucose LESS THAN 70 milliGRAM(s)/deciliter  glucagon  Injectable 1 milliGRAM(s) IntraMuscular once PRN Glucose LESS THAN 70 milligrams/deciliter  morphine  - Injectable 2 milliGRAM(s) IV Push every 4 hours PRN Severe Pain (7 - 10)  ondansetron Injectable 4 milliGRAM(s) IV Push every 4 hours PRN Nausea and/or Vomiting  traMADol 50 milliGRAM(s) Oral every 6 hours PRN Moderate Pain (4 - 6)         Vitals log        ICU Vital Signs Last 24 Hrs  T(C): 36.5 (30 Oct 2020 06:27), Max: 36.8 (29 Oct 2020 20:30)  T(F): 97.7 (30 Oct 2020 06:27), Max: 98.2 (29 Oct 2020 20:30)  HR: 98 (30 Oct 2020 06:27) (63 - 98)  BP: 122/64 (30 Oct 2020 06:27) (105/72 - 122/64)  BP(mean): --  ABP: --  ABP(mean): --  RR: 17 (30 Oct 2020 06:27) (17 - 18)  SpO2: 96% (30 Oct 2020 06:27) (93% - 97%)           Input and Output:  I&O's Detail    28 Oct 2020 07:01  -  29 Oct 2020 07:00  --------------------------------------------------------  IN:  Total IN: 0 mL    OUT:    Voided (mL): 700 mL  Total OUT: 700 mL    Total NET: -700 mL      29 Oct 2020 07:01  -  30 Oct 2020 06:34  --------------------------------------------------------  IN:    dextrose 5% + sodium chloride 0.45%: 460 mL    IV PiggyBack: 200 mL  Total IN: 660 mL    OUT:  Total OUT: 0 mL    Total NET: 660 mL          Lab Data                        10.9   5.32  )-----------( 245      ( 29 Oct 2020 08:55 )             33.4     10-29    142  |  107  |  11  ----------------------------<  112<H>  3.9   |  31  |  0.64    Ca    8.9      29 Oct 2020 08:55              Review of Systems	      Objective     Physical Examination    heart s1s2  lung dec BS  abd soft  head nc  head at  on room air      Pertinent Lab findings & Imaging      [place]:  NO   Adequate UO     I&O's Detail    28 Oct 2020 07:01  -  29 Oct 2020 07:00  --------------------------------------------------------  IN:  Total IN: 0 mL    OUT:    Voided (mL): 700 mL  Total OUT: 700 mL    Total NET: -700 mL      29 Oct 2020 07:01  -  30 Oct 2020 06:34  --------------------------------------------------------  IN:    dextrose 5% + sodium chloride 0.45%: 460 mL    IV PiggyBack: 200 mL  Total IN: 660 mL    OUT:  Total OUT: 0 mL    Total NET: 660 mL               Discussed with:     Cultures:	        Radiology

## 2020-10-30 NOTE — PROGRESS NOTE ADULT - REASON FOR ADMISSION
R GROIN PAIN AND SWELLING

## 2020-10-30 NOTE — DISCHARGE NOTE NURSING/CASE MANAGEMENT/SOCIAL WORK - PATIENT PORTAL LINK FT
You can access the FollowMyHealth Patient Portal offered by [facility] by registering at the following website: [email or web address]. By joining [employer]’s FollowMyHealth portal, you will also be able to view your health information using other applications (apps) compatible with our system.

## 2020-10-30 NOTE — PROGRESS NOTE ADULT - SUBJECTIVE AND OBJECTIVE BOX
PROGRESS NOTE  Patient is a 89y old  Male who presents with a chief complaint of R GROIN PAIN AND SWELLING (30 Oct 2020 10:09)    Chart and available morning labs /imaging are reviewed electronically , urgent issues addressed . More information  is being added upon completion of rounds , when more information is collected and management discussed with consultants , medical staff and social service/case management on the floor   OVERNIGHT  No new issues reported by medical staff . All above noted Patient is resting in a bed comfortably .Confused ,poor mentation .No distress noted     HPI:  89 y o WM  presents sent to ED from [facility] for evaluation of  R groin abscess for few days. now has opened area and has foul smelling discharge. Denies pain  No known fever/chills. no trauma. Patient is   poor historian,  PMD Dr Quintana,  no anticoagulants noted in  patient med list ,patient was admitted with GIB last year and BASA was stopped at that time .His  PMH is significant for :  Arteriosclerotic heart disease (ASHD)  Atrial fibrillation  ,CHF (congestive heart failure)  ,Dementia  ,Depression  ,DM (diabetes mellitus)  ,Hyperlipemia  ,Hypertension  ,Prostate ca . and ID consult called Seen by Dr Devries  ,no intereventions required ,continue topical care and iv abx Admitted for septic workup and evaluation,send blood and urine cx,serial lactate levels,monitor vitals closley,ivfs hydration,monitor urine output and renal profile,iv abx initiated -given vanco and zosyn in ER .Chest xray showed PVC and cardiology consult called Admit to monitored unit for cardiac monitoring, obtain echo to evaluate LVEF, intravenous diuresis  prn as per card consult , monitor ins/outs, monitor renal profile and electrolytes closely ,send 3 sets of enzymes, O2 supply, serial chest xrays, monitor weights and oral intake of fluids, nutritionist consult Palliative care consult requested ,to discuss advance directives and complete MOLST  (26 Oct 2020 13:23)    PAST MEDICAL & SURGICAL HISTORY:  Constipation    Anemia    Dementia    Arteriosclerotic heart disease (ASHD)    Prostate CA    Atrial fibrillation    DM (diabetes mellitus)    Dementia    Depression    Diabetes    Hyperlipemia    CHF (congestive heart failure)    Dementia    Prostate ca    Diabetes    Hypertension    Atrial fibrillation    No significant past surgical history        MEDICATIONS  (STANDING):  aspirin  chewable 81 milliGRAM(s) Oral daily  cefuroxime   Tablet 250 milliGRAM(s) Oral every 12 hours  dextrose 5%. 1000 milliLiter(s) (50 mL/Hr) IV Continuous <Continuous>  dextrose 50% Injectable 12.5 Gram(s) IV Push once  dextrose 50% Injectable 25 Gram(s) IV Push once  dextrose 50% Injectable 25 Gram(s) IV Push once  donepezil 5 milliGRAM(s) Oral at bedtime  enoxaparin Injectable 40 milliGRAM(s) SubCutaneous daily  famotidine Injectable 20 milliGRAM(s) IV Push daily  influenza   Vaccine 0.5 milliLiter(s) IntraMuscular once  insulin lispro (ADMELOG) corrective regimen sliding scale   SubCutaneous three times a day before meals  polyethylene glycol 3350 17 Gram(s) Oral daily  senna 2 Tablet(s) Oral at bedtime  simvastatin 20 milliGRAM(s) Oral at bedtime  tamsulosin 0.4 milliGRAM(s) Oral at bedtime    MEDICATIONS  (PRN):  acetaminophen   Tablet .. 650 milliGRAM(s) Oral every 6 hours PRN Temp greater or equal to 38C (100.4F), Mild Pain (1 - 3)  ALBUTerol    90 MICROgram(s) HFA Inhaler 2 Puff(s) Inhalation every 6 hours PRN Shortness of Breath and/or Wheezing  dextrose 40% Gel 15 Gram(s) Oral once PRN Blood Glucose LESS THAN 70 milliGRAM(s)/deciliter  glucagon  Injectable 1 milliGRAM(s) IntraMuscular once PRN Glucose LESS THAN 70 milligrams/deciliter  morphine  - Injectable 2 milliGRAM(s) IV Push every 4 hours PRN Severe Pain (7 - 10)  ondansetron Injectable 4 milliGRAM(s) IV Push every 4 hours PRN Nausea and/or Vomiting  traMADol 50 milliGRAM(s) Oral every 6 hours PRN Moderate Pain (4 - 6)      OBJECTIVE    T(C): 36.6 (10-30-20 @ 13:07), Max: 36.8 (10-29-20 @ 20:30)  HR: 105 (10-30-20 @ 13:07) (67 - 105)  BP: 109/64 (10-30-20 @ 13:07) (105/72 - 122/64)  RR: 17 (10-30-20 @ 13:07) (17 - 17)  SpO2: 97% (10-30-20 @ 13:07) (96% - 98%)  Wt(kg): --  I&O's Summary    29 Oct 2020 07:01  -  30 Oct 2020 07:00  --------------------------------------------------------  IN: 660 mL / OUT: 0 mL / NET: 660 mL          REVIEW OF SYSTEMS:  ROS is unobtainable due to dementia and confusion PATIENT IS CONFUSED AND IS UNABLE TO GIVE ANY/RELIABLE HISTORY OR REVIEW OF SYSTEMS PLEASE ALSO SEE UNDER HPI AND ASSESSMENT FOR OBTAINED REVIEW OF SYSTEMS     PHYSICAL EXAM:  Appearance: NAD. VS past 24 hrs -as above   HEENT:   Moist oral mucosa. Conjunctiva clear b/l.   Neck : supple  Respiratory: Lungs CTAB.  Gastrointestinal:  Soft, nontender. No rebound. No rigidity. BS present	  Cardiovascular: RRR ,S1S2 present  Neurologic: Non-focal. Moving all extremities.  Extremities: No edema. No erythema. No calf tenderness.  Skin: No rashes, No ecchymoses, No cyanosis.	  wounds ,skin lesions-See skin assesment flow sheet   LABS:                        12.1   5.07  )-----------( 266      ( 30 Oct 2020 08:57 )             35.7     10-30    141  |  107  |  13  ----------------------------<  108<H>  4.1   |  30  |  0.60    Ca    9.3      30 Oct 2020 08:57      CAPILLARY BLOOD GLUCOSE      POCT Blood Glucose.: 171 mg/dL (30 Oct 2020 12:20)  POCT Blood Glucose.: 105 mg/dL (30 Oct 2020 08:12)  POCT Blood Glucose.: 167 mg/dL (29 Oct 2020 21:07)  POCT Blood Glucose.: 99 mg/dL (29 Oct 2020 17:09)          Culture - Urine (collected 28 Oct 2020 08:56)  Source: .Urine Clean Catch (Midstream)  Final Report (29 Oct 2020 10:53):    No growth    Culture - Other (collected 27 Oct 2020 18:33)  Source: .Other RT. HEEL WOUND  Final Report (29 Oct 2020 10:52):    Moderate Coag Negative Staphylococcus Multiple Morphological Strains    "Susceptibilities not performed"    Culture - Other (collected 27 Oct 2020 18:33)  Source: .Other scrotal abcess  Final Report (29 Oct 2020 10:53):    Numerous Corynebacterium striatum group "Susceptibilities not performed"    Culture - Abscess with Gram Stain (collected 26 Oct 2020 22:10)  Source: .Abscess Testicle - Right  Preliminary Report (28 Oct 2020 18:19):    Rare Proteus mirabilis    Rare Coag Negative Staphylococcus "Susceptibilities not performed"    Few Corynebacterium species "Susceptibilities not performed"  Organism: Proteus mirabilis (28 Oct 2020 18:08)  Organism: Proteus mirabilis (28 Oct 2020 18:08)    Culture - Blood (collected 26 Oct 2020 19:20)  Source: .Blood Blood-Peripheral  Preliminary Report (27 Oct 2020 20:01):    No growth to date.    Culture - Blood (collected 26 Oct 2020 12:28)  Source: .Blood Blood-Peripheral  Preliminary Report (27 Oct 2020 13:03):    No growth to date.      RADIOLOGY & ADDITIONAL TESTS:   reviewed elctronically  ASSESSMENT/PLAN: 	    Patient was seen and examined on a day of discharge . Plan of care , discharge medications and recommendations discussed with consultants and clearance for discharge obtained .Social service , case management  and medical staff are aware of plan. Family is notified. Discharge summary  is  prepared electronically 75minutes spent on this visit, 50% visit time spent in care co-ordination with other attendings and counselling patient  I have discussed care plan with patient and HCP,expressed understanding of problems treatment and their effect and side effects, alternatives in detail,I have asked if they have any questions and concerns and appropriately addressed them to best of my ability

## 2020-10-30 NOTE — PROGRESS NOTE ADULT - ASSESSMENT
cont rx   cont rx      TIOT,  283  10/16/1926 DOA 10/26/2020 DR ARCADIO FRIAS  CONTACT Vinicio Bradford [phone]      REVIEW OF SYMPTOMS      Able to give ROS  NO     PHYSICAL EXAM    HEENT Unremarkable PERRLA atraumatic   RESP Fair air entry EXP prolonged    Harsh breath sound Resp distres mild   CARDIAC S1 S2 No S3     NO JVD    ABDOMEN SOFT BS PRESENT NOT DISTENDED No hepatosplenomegaly PEDAL EDEMA present No calf tenderness  NO rash     PT DATA/BEST PRACTICE  ALLERGY     latex          WT              10/26/2020 68 k                   BMI                10/26/2020 20                         ADVANCED DIRECTIVE  dnr   HEAD OF BED ELEVATION Yes      DVT PROPHYLAXIS.    lvnx 40 (10/26/2020)     DIET. dys 3 soft thin (10/26)                                                                  SEGUNDO PROPHYLAXIS. famotidine (10/26)   INFECTION PPLX                                                    OXYGENATION.   10/26/2020 ra 98%       IV F. VITALS.   10/30/2020 afeb 98 120/80       PATIENT SUMMARY.   88 m retd [employer] PMH  subcm lung nodules 10/21/2014 CT Ch  1 cm hypoattenuating thyroid nodule  Trace symmetrical layering bl pl effusions with dependent atelectasis bases  Ca granuloma both RML and l lower lobes  5 mm subpleural parenchymal nodule R apex   sCHF 10/24/2014 ECHO EF 50% Mild hypokinesia septum PASP 40 Mod MR    A fib  (on coumadin which was dced 2019 when he developed hematuria ) Htn Depression l leg lymphedema ( remote injury) OBS  was admitted 10/26/2020 with groin abscess draining pus and Pulm consulted 10/26/2020 for ho copd lung nodule    home meds Tamsulosin .4 duoneb asa pulmicort asa donepezil 5 simvastat 20 metformin       PROBLEM ASSESSMENT RECOMMENDATIONS .   PRESSURE ULCER HEEL L STAGE 2 Seen 10/26/2020 Dr Wong   PRESSURE ULCER HEEL R STAGE 2 Seen 10/26/2020 Dr Wong   SCROTAL ABSCESS NOTED 10/26/2020 Vanco ns given in er 10/26/2020   A/R 10/27/2020 seenby gu and id loacl care and king and abio advised and being given   Was rxed with zosyn changed t ceftin 10/29/2020 Dr Issa 7 d   COPD Cont meds stable   CHF CXR 10/26/2020 cxr mild chf   ECHO 2/1/2019 echo EF 35% pasp 40   LUNG NODULE Elective ct  ch  A fib  On ASA Cont rx     TIME SPENT   Over 25 minutes aggregate care time spent on encounter; activities included   direct patient care, counseling and/or coordinating care reviewing notes, lab data/ imaging , discussion with multidisciplinary team/ patient  /family and explaining in detail risks, benefits, alternatives  of the recommendations     TERESA FRANCIS [phone]  10/16/1926 DOA 10/26/2020 DR ARCADIO FRIAS  CONTACT Vinicio Bradford [phone]

## 2020-10-30 NOTE — PROGRESS NOTE ADULT - PROBLEM SELECTOR PLAN 1
89 male with extensive medical hx - dementia - frailty - ataxic gait - OP - OA -   depression - Prostate ca - CHF - ASHD - wounds  Planned for DC to [facility] - Covid PCR repeat neg  transferred from [facility]   labs and imaging reviewed  MOL reviewed - updated  pt is DNR DNI.  STSI - abscess -  following - on PO ABX - local skin and wound care.
89 male with extensive medical hx - dementia - frailty - ataxic gait - OP - OA - depression - Prostate ca - CHF - ASHD - wounds  transferred from [facility]   labs and imaging reviewed  JOANA reviewed - updated  pt is DNR DNI.  STSI - abscess -  following - on ABX - local skin and wound care
89 male with extensive medical hx - dementia - frailty - ataxic gait - OP - OA - depression - Prostate ca - CHF - ASHD - wounds  transferred from [facility]   labs and imaging reviewed  JOANA reviewed - updated  pt is DNR DNI.  STSI - abscess -  following - on ABX - local skin and wound care.
89 male with extensive medical hx - dementia - frailty - ataxic gait - OP - OA - depression - Prostate ca - CHF - ASHD - wounds  transferred from [facility]   labs and imaging reviewed  JOANA reviewed - updated  pt is DNR DNI.  STSI - abscess -  following - on ABX - local skin and wound care.
Draining well. On Zosyn. Afebrile with normal wbc. Continue local care.
Resolving s/p drainage.  Continue local care. Please re-consult as the clinical situation dictates.
improving, continue local care and abiots per ID
Admitted for septic workup and evaluation,send blood and urine cx,serial lactate levels,monitor vitals ashley,[place]olin[place] hydration,monitor urine output and renal profile,iv abx initiated ,seen by ID CONSULT ,continue topical care ,follow cx
Admitted for septic workup and evaluation,send blood and urine cx,serial lactate levels,monitor vitals ashley,carolinas hydration,monitor urine output and renal profile,iv abx initiated ,seen by ID CONSULT ,continue topical care ,follow cx
Admitted for septic workup and evaluation,send blood and urine cx,serial lactate levels,monitor vitals closley,ivfs hydration,monitor urine output and renal profile,iv abx initiated ,seen by ID CONSULT ,continue topical care ,follow cx .
Admitted for septic workup and evaluation,send blood and urine cx,serial lactate levels,monitor vitals closley,ivfs hydration,monitor urine output and renal profile,iv abx initiated ,seen by ID CONSULT ,continue topical care ,follow cx .

## 2020-10-30 NOTE — PROGRESS NOTE ADULT - ATTENDING COMMENTS
89 y o WM  presents sent to ED from [facility] for evaluation of  R groin abscess for few days. now has opened area and has foul smelling discharge. Denies pain  No known fever/chills. no trauma. Patient is   poor historian,  PMD Dr Quintana,  no anticoagulants noted in  patient med list ,patient was admitted with GIB last year and BASA was stopped at that time .His  PMH is significant for :  Arteriosclerotic heart disease (ASHD)  Atrial fibrillation  ,CHF (congestive heart failure)  ,Dementia  ,Depression  ,DM (diabetes mellitus)  ,Hyperlipemia  ,Hypertension  ,Prostate ca . and ID consult called Seen by Dr Devries  ,no intereventions required ,continue topical care and iv abx Admitted for septic workup and evaluation,send blood and urine cx,serial lactate levels,monitor vitals closley,ivfs hydration,monitor urine output and renal profile,iv abx initiated -given vanco and zosyn in ER .Chest xray showed PVC and cardiology consult called Admit to monitored unit for cardiac monitoring, obtain echo to evaluate LVEF, intravenous diuresis  prn as per card consult , monitor ins/outs, monitor renal profile and electrolytes closely ,send 3 sets of enzymes, O2 supply, serial chest xrays, monitor weights and oral intake of fluids, nutritionist consult Palliative care consult requested ,to discuss advance directives and complete MOLST

## 2020-10-30 NOTE — PROGRESS NOTE ADULT - ASSESSMENT
89 y o WM  presents sent to ED from [facility] for evaluation of  R groin abscess for few days. now has opened area and has foul smelling discharge. Denies pain  No known fever/chills. no trauma. Patient is   poor historian,  PMD Dr Quintana,  no anticoagulants noted in  patient med list ,patient was admitted with GIB last year and BASA was stopped at that time .His  PMH is significant for :  Arteriosclerotic heart disease (ASHD)  Atrial fibrillation  ,CHF (congestive heart failure)  ,Dementia  ,Depression  ,DM (diabetes mellitus)  ,Hyperlipemia  ,Hypertension  ,Prostate ca . and ID consult called Seen by Dr Devries  ,no intereventions required ,continue topical care and iv abx Admitted for septic workup and evaluation,send blood and urine cx,serial lactate levels,monitor vitals closley,ivfs hydration,monitor urine output and renal profile,iv abx initiated -given vanco and zosyn in ER .Chest xray showed PVC and cardiology consult called Admit to monitored unit for cardiac monitoring, obtain echo to evaluate LVEF, intravenous diuresis  prn as per card consult , monitor ins/outs, monitor renal profile and electrolytes closely ,send 3 sets of enzymes, O2 supply, serial chest xrays, monitor weights and oral intake of fluids, nutritionist consult Palliative care consult requested ,to discuss advance directives and complete MOLST

## 2020-10-30 NOTE — PROGRESS NOTE ADULT - SUBJECTIVE AND OBJECTIVE BOX
TITO TERESA is a 89yMale , patient examined and chart reviewed.      INTERVAL HPI/ OVERNIGHT EVENTS:   No events NAD. In chair.  Afebrile.    PAST MEDICAL & SURGICAL HISTORY:  Constipation  Anemia  Dementia  Arteriosclerotic heart disease (ASHD)  Prostate CA  Atrial fibrillation  DM (diabetes mellitus)  Dementia  Prostate ca  Hypertension  No significant past surgical history        For details regarding the patient's social history, family history, and other miscellaneous elements, please refer the initial infectious diseases consultation and/or the admitting history and physical examination for this admission.    ROS:  Unable to obtain due to : Dementia     ALLERGIES  latex (Rash)     Current inpatient medications :    ANTIBIOTICS/RELEVANT:  cefuroxime   Tablet 250 milliGRAM(s) Oral every 12 hours    MEDICATIONS  (STANDING):  aspirin  chewable 81 milliGRAM(s) Oral daily  dextrose 5%. 1000 milliLiter(s) (50 mL/Hr) IV Continuous <Continuous>  dextrose 50% Injectable 12.5 Gram(s) IV Push once  dextrose 50% Injectable 25 Gram(s) IV Push once  dextrose 50% Injectable 25 Gram(s) IV Push once  donepezil 5 milliGRAM(s) Oral at bedtime  enoxaparin Injectable 40 milliGRAM(s) SubCutaneous daily  famotidine Injectable 20 milliGRAM(s) IV Push daily  influenza   Vaccine 0.5 milliLiter(s) IntraMuscular once  insulin lispro (ADMELOG) corrective regimen sliding scale   SubCutaneous three times a day before meals  polyethylene glycol 3350 17 Gram(s) Oral daily  senna 2 Tablet(s) Oral at bedtime  simvastatin 20 milliGRAM(s) Oral at bedtime  tamsulosin 0.4 milliGRAM(s) Oral at bedtime    MEDICATIONS  (PRN):  acetaminophen   Tablet .. 650 milliGRAM(s) Oral every 6 hours PRN Temp greater or equal to 38C (100.4F), Mild Pain (1 - 3)  ALBUTerol    90 MICROgram(s) HFA Inhaler 2 Puff(s) Inhalation every 6 hours PRN Shortness of Breath and/or Wheezing  dextrose 40% Gel 15 Gram(s) Oral once PRN Blood Glucose LESS THAN 70 milliGRAM(s)/deciliter  glucagon  Injectable 1 milliGRAM(s) IntraMuscular once PRN Glucose LESS THAN 70 milligrams/deciliter  morphine  - Injectable 2 milliGRAM(s) IV Push every 4 hours PRN Severe Pain (7 - 10)  ondansetron Injectable 4 milliGRAM(s) IV Push every 4 hours PRN Nausea and/or Vomiting  traMADol 50 milliGRAM(s) Oral every 6 hours PRN Moderate Pain (4 - 6)        Objective:  Vital Signs Last 24 Hrs  T(C): 36.6 (30 Oct 2020 13:07), Max: 36.8 (29 Oct 2020 20:30)  T(F): 97.8 (30 Oct 2020 13:07), Max: 98.2 (29 Oct 2020 20:30)  HR: 105 (30 Oct 2020 13:07) (67 - 105)  BP: 109/64 (30 Oct 2020 13:07) (105/72 - 122/64)  RR: 17 (30 Oct 2020 13:07) (17 - 17)  SpO2: 97% (30 Oct 2020 13:07) (96% - 98%)    Physical Exam:  General: no acute distress  Eyes: sclera anicteric, pupils equal and reactive to light  ENMT: buccal mucosa moist, pharynx not injected  Neck: supple, trachea midline  Lungs: clear, no wheeze/rhonchi  Cardiovascular: regular rate and rhythm, S1 S2  Abdomen: soft, nontender, ND, bowel sounds normal  : Right scrotal abscess decreased drainage decreased erythema  Neurological: awake confused  Skin: no increased ecchymosis/petechiae/purpura  Lymph Nodes: no palpable cervical/supraclavicular lymph nodes enlargements  Extremities: Jaun foot drsg c/d/i      LABS:                        12.1   5.07  )-----------( 266      ( 30 Oct 2020 08:57 )             35.7   10-30    141  |  107  |  13  ----------------------------<  108<H>  4.1   |  30  |  0.60    Ca    9.3      30 Oct 2020 08:57      MICROBIOLOGY:    Culture - Urine (collected 28 Oct 2020 08:56)  Source: .Urine Clean Catch (Midstream)  Final Report (29 Oct 2020 10:53):    No growth    Culture - Other (collected 27 Oct 2020 18:33)  Source: .Other RT. HEEL WOUND  Final Report (29 Oct 2020 10:52):    Moderate Coag Negative Staphylococcus Multiple Morphological Strains    "Susceptibilities not performed"    Culture - Other (collected 27 Oct 2020 18:33)  Source: .Other scrotal abcess  Final Report (29 Oct 2020 10:53):    Numerous Corynebacterium striatum group "Susceptibilities not performed"    Culture - Abscess with Gram Stain (collected 26 Oct 2020 22:10)  Source: .Abscess Testicle - Right  Preliminary Report (28 Oct 2020 18:19):    Rare Proteus mirabilis    Rare Coag Negative Staphylococcus "Susceptibilities not performed"    Few Corynebacterium species "Susceptibilities not performed"  Organism: Proteus mirabilis (28 Oct 2020 18:08)  Organism: Proteus mirabilis (28 Oct 2020 18:08)      -  Amikacin: S <=16      -  Amoxicillin/Clavulanic Acid: S <=8/4      -  Ampicillin: S <=8 These ampicillin results predict results for amoxicillin      -  Ampicillin/Sulbactam: S <=4/2 Enterobacter, Citrobacter, and Serratia may develop resistance during prolonged therapy (3-4 days)      -  Aztreonam: S <=4      -  Cefazolin: S <=2 Enterobacter, Citrobacter, and Serratia may develop resistance during prolonged therapy (3-4 days)      -  Cefepime: S <=2      -  Cefoxitin: S <=8      -  Ceftriaxone: S <=1 Enterobacter, Citrobacter, and Serratia may develop resistance during prolonged therapy      -  Ciprofloxacin: S <=0.25      -  Ertapenem: S <=0.5      -  Gentamicin: S 4      -  Levofloxacin: S <=0.5      -  Meropenem: S <=1      -  Piperacillin/Tazobactam: S <=8      -  Tobramycin: S 4      -  Trimethoprim/Sulfamethoxazole: S <=0.5/9.5      Method Type: CHARITO    Culture - Blood (collected 26 Oct 2020 19:20)  Source: .Blood Blood-Peripheral  Preliminary Report (27 Oct 2020 20:01):    No growth to date.      RADIOLOGY & ADDITIONAL STUDIES:  EXAM:  XR CHEST PORTABLE IMMED 1V                            PROCEDURE DATE:  10/26/2020          INTERPRETATION:  AP semierect chest on October 26, 2020 at 10:22 AM. Patient has a right groin infection.    Heart enlargement and left-sided pacemaker again noted.    There are slightly increased central vascular markings left greater the right this suggests CHF. This has increased from July 25, 2019.    IMPRESSION: Mild central CHF presently suspect. Heart enlargement and pacemaker again noted.    Assessment :   89 y/o with hx of Ca Prostate Dementia DM CHF admitted with right scrotal abscess + purulent drainage, Seen by Urology and cultures with proteus. Improved.  Jaun heel ulcer doubt infection- seen by podiatry  Overall stable    Plan :   Off Zosyn  Complete course of po Ceftin x 7 days  Trend temps and cbc  Asp precautions  Dc planning    D/w Dr Ring    Continue with present regiment.  Appropriate use of antibiotics and adverse effects reviewed.    25 minutes were spent in direct patient care reviewing notes, medications ,labs data/ imaging , discussion with multidisciplinary team.    Thank you for allowing me to participate in care of your patient .    Heber Issa MD  Infectious Disease  [phone]

## 2020-10-30 NOTE — PROGRESS NOTE ADULT - PROBLEM SELECTOR PROBLEM 1
Abscess of groin
Abscess of scrotum
Abscess of scrotum
Palliative care encounter
Abscess of groin

## 2020-10-31 LAB
CULTURE RESULTS: SIGNIFICANT CHANGE UP
ORGANISM # SPEC MICROSCOPIC CNT: SIGNIFICANT CHANGE UP
ORGANISM # SPEC MICROSCOPIC CNT: SIGNIFICANT CHANGE UP
SPECIMEN SOURCE: SIGNIFICANT CHANGE UP

## 2020-11-06 NOTE — ED PROVIDER NOTE - CPE EDP NEURO NORM
[FreeTextEntry1] : RUY GUAMAN is a 68 year F who is being seen today in follow up to discuss blood work results.\par  [de-identified] : RUY GUAMAN is a 68 year F who is being seen today in follow up to discuss blood work results.\par We reviewed her blood work today which shows A1c still prediabetic with cholesterol improved, with creatinine mildly elevated at 1.03 with GFR of 56.\par She also needs her eye exam done today as she needs her DMV forms updated as well as her license.\par Patient request for multiple medication refills today as well as prednisone and Augmentin on hand in case of a sinus infection as she is too afraid to see doctors at this time. Discussed the possibility of virtual visits and patient states she does not have the technology to support this. Discussed with the patient that for her controlled substance refill that she will need visits every 90 days.\par Patient resides that her gynecologist Dr. Miranda and has her mammogram appointment pending.\par Patient denies any cp, sob,abdominal pain, nausea, vomiting, palpitations, fever, chills, constipation, diarrhea.\par  normal...

## 2020-11-23 NOTE — ED PROVIDER NOTE - CPE EDP SKIN NORM
[address]     Electrophysiology Procedure Note       Date of Procedure: 11/23/2020  Patient's Name: Chet Monroy  YOB: 1948   Medical Record Number: [id number]  Referring Physician: No primary care provider on file. Procedure Performed by: Kurtis Florence MD    Procedure performed:  · Electrophysiology study with radiofrequency ablation of atrial fibrillation and pulmonary vein isolation   · Additional focal ablation for Atrial fibrillation, outside the pulmonary vein along the endocardial part of the coronary sinus, posterior wall and inter atrial septum  · Typical atrial flutter ablation  · 3-D electroanatomical mapping of the left atrium    · Transseptal puncture through an intact septum under intracardiac ultrasound guidance   · Intracardiac echocardiography. · Left ventricular pacing and recording  · Drug infusion with an attempt to induce arrhythmia and pacing  · Anesthesia: General anesthesia provided by the Anesthesia service  · EBL < 30 cc  · ELSIE      Indications for procedure: Symptomatic atrial fibrillation, failed antiarrhythmic therapy and cardioversion in the past     Chet Monroy is a 67 y.o. female who has a history of persistent atrial fibrillation who is symptomatic with symptoms of dyspnea with minimal exertion and fatigue who has failed antiarrhythmic therapy in the past is now here for an ablation for persistent atrial fibrillation. She had CTI dependent flutter ablation in 2011 and a cryoballoon ablation for atrial fibrillation in 2013. Details of Procedure: The risks, benefits and alternatives of the ablation procedure were discussed with the patient.  The risks including, but not limited to, the risks of bleeding, infection, radiation exposure, injury to vascular, cardiac and surrounding structures (including pneumothorax), stroke, cardiac perforation, tamponade, need for emergent open heart surgery, need for pacemaker implantation, esophageal injury and fistula, myocardial infarction and death were discussed in detail. The patient opted to proceed with the ablation. Written informed consent was signed and placed in the chart. Patient was brought to the EP lab in a fasting non-sedate state. Patient underwent general anesthesia by anesthesia team. The patient was monitored continuously with ECG, pulse oximetry, blood pressure monitoring, and direct observation. The patient was in normal sinus rhythm upon arriving in the EP laboratory. On Coumadin for stroke prophylaxis. Her INR was 1.66 a-week ago. Today it is 2.6. Secondary to drop in her INR, we decided to proceed with a ELSIE to rule out any left atrial appendage clot. Her ELSIE did not show any evidence of left atrial/left atrial appendage clot. This will be dictated separately. After subcutaneous infiltration with Lidocaine, sheaths were placed in RFV without difficulty as follows:    8.5Fr [place] medium curve sheath to the RFV for 3.5 mm tip Thermocool ST/SF D/F curve catheter and Pentaray catheter  7Fr sheath for the decapolar CS catheter  9Fr sheath for the ICE catheter    An intracardiac echocardiography catheter was advanced to the right atrium. The ICE catheter was placed in the RV and lack of a pericardial effusion was documented. After confirming that, a heparin bolus was given and a heparin drip was started. The ACT was maintained with a goal of 350-400 seconds for the duration of the procedure. Additional boluses of heparin during the procedure to keep the ACT about 350 sec. Intracardiac echocardiography was performed and a Cartosound map of the interatrial septum, coronary sinus, root of the aorta and left atrium was created. Using Pentaray catheter, a three-dimensional electro-anatomic mapping of the right atrium, right atrial appendage, SVC, IVC, coronary sinus and interatrial septum was performed. The decapolar catheter was advanced to the coronary sinus.  Also an esophageal temperature probe ([employer] Temperature Probe 12Fr) that was tied with sutures to an accompanying [employer] quadripolar 6 Frisian 5-5-5 electrode spacing catheter was advanced into the esophagus for real-time mapping of the esophagus and careful monitoring of the esophageal temperature during ablation. Ablation was stopped if the temperature was rising for more than 0.5 °C. Esosure was used to deviate the esophagus away from the site of ablation. [place] medium curve sheath was carefully placed in SVC, over the Pentaray catheter with EAM and ICE guidance. The dilator and a trans-septal [place] C-1 NRG 98 cms needle was carefully advanced to the SVC. The entire assembly was carefully pulled down into the right atrium. Using 3D EAM and ICE guidance, a trans-septal catheterization was performed without difficulty. LA pressure was measured and was 17/12 mm Hg. A Penta-ray catheter was advanced to the left atrium via [place] sheath. Using PentaRay catheter 3D electro anatomic mapping of the left atrium was performed. Phrenic nerve was mapped using high output pacing. Esophagus was mapped using Carto sound map and a mapping catheter in the esophagus. Left atrial appendage was also mapped. Baseline signals were recorded. Voltage mapping was also created. Then Pentaray catheter was removed and a 3.5 mm tip Thermocool ST/SF D/F curve catheter was advanced to the left atrium via [place] sheath. During sinus rhythm, we found that she had reconnection's in left superior pulmonary vein. All other 3 pulmonary veins did not have any signals within the vein. This, wide area circumferential ablation for the left sided pulmonary veins along with harinder ablation were performed which resulted in electrical isolation of these pulmonary veins and eradication of the PV fascicles. After this, with minimal catheter manipulation she was easily triggered and atrial fibrillation.   Interestingly, now right-sided veins also noted to have 10 central veins and hence I decided to proceed with wide area circumferential ablations for the right sided pulmonary veins along with harinder ablation were performed which resulted in electrical isolation of these pulmonary veins and eradication of the PV fascicles. Using Carto-smart touch module, care was taken to maintain the contact force between 8gms and 20gms during all the ablative lesions. Entrance block developed during RF energy delivery. Patient remained in Atrial fibrillation. Following this, additional radiofrequency ablation targeting CAFE potentials outside the pulmonary veins, on the posterior wall, floor of the left atrium along the endocardial part of the coronary sinus, and septum were performed as a separate mechanism. After these ablations, she organized into atypical atrial flutter with CS activation distal to proximal with a tachycardia cycle length of 240 ms. Activation mapping confirmed roof-dependent flutter. A roofline was planned. Before starting ablation, with minimal catheter and ablation along the roof of the left superior vein, a flutter got terminated. Hence I decided to complete the roofline connecting both the left superior and right superior pulmonary vein along this bumping point. Entrance and exit block were demonstrated in all 4 pulmonary veins. Maximum output (20mA) pacing in the L antrum and R antrum were performed and showed dissociation from the rest of the left atrium. This was checked circumferentially around the antrums and verified many times. Adenosine was used in all 4 pulmonary veins to rule out any dormant conduction. During adenosine infusion, ablation catheter was placed into the left ventricle and pacing from the left ventricular was performed to obtain VA block. With adenosine, there is hemodynamic response and there is no evidence of document conduction.  IV Isuprel up to a maximum of 10mcg/min was infused post ablation, for 10 minutes to check for any triggers for AF induction. No triggers noted. Before ablation, she was easily induced into atrial fibrillation flutter with pacing from proximal and distal coronary sinus. After ablation, I used aggressive pacing protocol both from proximal and distal coronary sinus and there is no further inducible atrial fibrillation or flutter noted. We then performed an EP study and programmed stimulation using our CS catheter and ablation cathter to assess the cardiac conduction system and to attempt to induce atrial tachydysrhythmia. His bundle potentials was recorded and pacing was performed from right atrium, coronary sinus and LV apex with the following results:     Sinus cycle length was 722 msec  NE interval was 184 msec  QRS duration was 92 msec  QT interval was 410 msec  AH interval was 86 msec  HV interval was 47 msec  Pacing from left atrium, 1:1 conduction over AV node with (AV wenckebach) was 370 msec  Pacing from LV apex, no VA conduction at 600 ms    All catheters and sheaths were removed to the right atrium. The heparin was stopped. The ICE catheter was placed in the RV. There was no pericardial effusion. Protamine 30 mg was given. When the ACT was appropriate, catheters and sheaths were removed and hemostasis was obtained via Vascade MVP venous closure device. No complications noted. Conclusion:     - Pre- and post-procedure diagnoses were persistent atrial fibrillation   - Pulmonary vein isolations using wide area circumferential radiofrequency ablation   - Additional focal ablation for atrial fibrillation, outside the pulmonary veins in posterior wall of the left atrium, interatrial septum and along the endocardial part of the coronary sinus  -Atypical flutter ablation -tachycardia cycle length to 40 ms, roof-dependent flutter  No dormant conduction with Adenosine  Isuprel infusion, with an attempt to induce arrhythmia    Plan:  He was on Coumadin.   Secondary to normal...

## 2020-12-06 ENCOUNTER — INPATIENT (INPATIENT)
Facility: HOSPITAL | Age: 85
LOS: 2 days | Discharge: NURSING, REHAB OR LONG-TERM CARE | DRG: 291 | End: 2020-12-09
Attending: INTERNAL MEDICINE | Admitting: INTERNAL MEDICINE
Payer: MEDICARE

## 2020-12-06 VITALS
TEMPERATURE: 98 F | DIASTOLIC BLOOD PRESSURE: 64 MMHG | WEIGHT: 160.06 LBS | RESPIRATION RATE: 18 BRPM | OXYGEN SATURATION: 93 % | HEIGHT: 72 IN | HEART RATE: 96 BPM | SYSTOLIC BLOOD PRESSURE: 120 MMHG

## 2020-12-06 DIAGNOSIS — I50.9 HEART FAILURE, UNSPECIFIED: ICD-10-CM

## 2020-12-06 DIAGNOSIS — I48.91 UNSPECIFIED ATRIAL FIBRILLATION: ICD-10-CM

## 2020-12-06 DIAGNOSIS — R53.1 WEAKNESS: ICD-10-CM

## 2020-12-06 DIAGNOSIS — E11.9 TYPE 2 DIABETES MELLITUS WITHOUT COMPLICATIONS: ICD-10-CM

## 2020-12-06 DIAGNOSIS — E78.5 HYPERLIPIDEMIA, UNSPECIFIED: ICD-10-CM

## 2020-12-06 DIAGNOSIS — I10 ESSENTIAL (PRIMARY) HYPERTENSION: ICD-10-CM

## 2020-12-06 DIAGNOSIS — D64.9 ANEMIA, UNSPECIFIED: ICD-10-CM

## 2020-12-06 LAB
ALBUMIN SERPL ELPH-MCNC: 3.2 G/DL — LOW (ref 3.3–5)
ALP SERPL-CCNC: 115 U/L — SIGNIFICANT CHANGE UP (ref 40–120)
ALT FLD-CCNC: 19 U/L — SIGNIFICANT CHANGE UP (ref 12–78)
ANION GAP SERPL CALC-SCNC: 9 MMOL/L — SIGNIFICANT CHANGE UP (ref 5–17)
APPEARANCE UR: CLEAR — SIGNIFICANT CHANGE UP
APTT BLD: 30.8 SEC — SIGNIFICANT CHANGE UP (ref 27.5–35.5)
AST SERPL-CCNC: 18 U/L — SIGNIFICANT CHANGE UP (ref 15–37)
BASOPHILS # BLD AUTO: 0.01 K/UL — SIGNIFICANT CHANGE UP (ref 0–0.2)
BASOPHILS NFR BLD AUTO: 0.1 % — SIGNIFICANT CHANGE UP (ref 0–2)
BILIRUB SERPL-MCNC: 1.2 MG/DL — SIGNIFICANT CHANGE UP (ref 0.2–1.2)
BILIRUB UR-MCNC: NEGATIVE — SIGNIFICANT CHANGE UP
BUN SERPL-MCNC: 12 MG/DL — SIGNIFICANT CHANGE UP (ref 7–23)
CALCIUM SERPL-MCNC: 8.8 MG/DL — SIGNIFICANT CHANGE UP (ref 8.5–10.1)
CHLORIDE SERPL-SCNC: 101 MMOL/L — SIGNIFICANT CHANGE UP (ref 96–108)
CK MB CFR SERPL CALC: 1.6 NG/ML — SIGNIFICANT CHANGE UP (ref 0–3.6)
CO2 SERPL-SCNC: 29 MMOL/L — SIGNIFICANT CHANGE UP (ref 22–31)
COLOR SPEC: YELLOW — SIGNIFICANT CHANGE UP
CREAT SERPL-MCNC: 0.51 MG/DL — SIGNIFICANT CHANGE UP (ref 0.5–1.3)
DIFF PNL FLD: ABNORMAL
EOSINOPHIL # BLD AUTO: 0.02 K/UL — SIGNIFICANT CHANGE UP (ref 0–0.5)
EOSINOPHIL NFR BLD AUTO: 0.2 % — SIGNIFICANT CHANGE UP (ref 0–6)
EPI CELLS # UR: SIGNIFICANT CHANGE UP
GLUCOSE SERPL-MCNC: 108 MG/DL — HIGH (ref 70–99)
GLUCOSE UR QL: NEGATIVE — SIGNIFICANT CHANGE UP
HCT VFR BLD CALC: 36.2 % — LOW (ref 39–50)
HGB BLD-MCNC: 11.8 G/DL — LOW (ref 13–17)
IMM GRANULOCYTES NFR BLD AUTO: 0.6 % — SIGNIFICANT CHANGE UP (ref 0–1.5)
INR BLD: 1.28 RATIO — HIGH (ref 0.88–1.16)
KETONES UR-MCNC: NEGATIVE — SIGNIFICANT CHANGE UP
LACTATE SERPL-SCNC: 1.4 MMOL/L — SIGNIFICANT CHANGE UP (ref 0.7–2)
LEUKOCYTE ESTERASE UR-ACNC: NEGATIVE — SIGNIFICANT CHANGE UP
LIDOCAIN IGE QN: 66 U/L — LOW (ref 73–393)
LYMPHOCYTES # BLD AUTO: 0.73 K/UL — LOW (ref 1–3.3)
LYMPHOCYTES # BLD AUTO: 8.3 % — LOW (ref 13–44)
MAGNESIUM SERPL-MCNC: 1.7 MG/DL — SIGNIFICANT CHANGE UP (ref 1.6–2.6)
MCHC RBC-ENTMCNC: 29.3 PG — SIGNIFICANT CHANGE UP (ref 27–34)
MCHC RBC-ENTMCNC: 32.6 GM/DL — SIGNIFICANT CHANGE UP (ref 32–36)
MCV RBC AUTO: 89.8 FL — SIGNIFICANT CHANGE UP (ref 80–100)
MONOCYTES # BLD AUTO: 0.94 K/UL — HIGH (ref 0–0.9)
MONOCYTES NFR BLD AUTO: 10.7 % — SIGNIFICANT CHANGE UP (ref 2–14)
NEUTROPHILS # BLD AUTO: 7 K/UL — SIGNIFICANT CHANGE UP (ref 1.8–7.4)
NEUTROPHILS NFR BLD AUTO: 80.1 % — HIGH (ref 43–77)
NITRITE UR-MCNC: NEGATIVE — SIGNIFICANT CHANGE UP
NRBC # BLD: 0 /100 WBCS — SIGNIFICANT CHANGE UP (ref 0–0)
NT-PROBNP SERPL-SCNC: 1365 PG/ML — HIGH (ref 0–450)
PH UR: 5 — SIGNIFICANT CHANGE UP (ref 5–8)
PLATELET # BLD AUTO: 235 K/UL — SIGNIFICANT CHANGE UP (ref 150–400)
POTASSIUM SERPL-MCNC: 4.2 MMOL/L — SIGNIFICANT CHANGE UP (ref 3.5–5.3)
POTASSIUM SERPL-SCNC: 4.2 MMOL/L — SIGNIFICANT CHANGE UP (ref 3.5–5.3)
PROT SERPL-MCNC: 7.3 G/DL — SIGNIFICANT CHANGE UP (ref 6–8.3)
PROT UR-MCNC: 15
PROTHROM AB SERPL-ACNC: 14.8 SEC — HIGH (ref 10.6–13.6)
RBC # BLD: 4.03 M/UL — LOW (ref 4.2–5.8)
RBC # FLD: 14.8 % — HIGH (ref 10.3–14.5)
RBC CASTS # UR COMP ASSIST: SIGNIFICANT CHANGE UP /HPF (ref 0–4)
SARS-COV-2 RNA SPEC QL NAA+PROBE: SIGNIFICANT CHANGE UP
SODIUM SERPL-SCNC: 139 MMOL/L — SIGNIFICANT CHANGE UP (ref 135–145)
SP GR SPEC: 1.01 — SIGNIFICANT CHANGE UP (ref 1.01–1.02)
TROPONIN I SERPL-MCNC: <.015 NG/ML — SIGNIFICANT CHANGE UP (ref 0.01–0.04)
TSH SERPL-MCNC: 1.53 UIU/ML — SIGNIFICANT CHANGE UP (ref 0.36–3.74)
UROBILINOGEN FLD QL: NEGATIVE — SIGNIFICANT CHANGE UP
WBC # BLD: 8.75 K/UL — SIGNIFICANT CHANGE UP (ref 3.8–10.5)
WBC # FLD AUTO: 8.75 K/UL — SIGNIFICANT CHANGE UP (ref 3.8–10.5)

## 2020-12-06 PROCEDURE — 71045 X-RAY EXAM CHEST 1 VIEW: CPT | Mod: 26

## 2020-12-06 PROCEDURE — 70450 CT HEAD/BRAIN W/O DYE: CPT | Mod: 26

## 2020-12-06 PROCEDURE — 99285 EMERGENCY DEPT VISIT HI MDM: CPT

## 2020-12-06 RX ORDER — DEXTROSE 50 % IN WATER 50 %
25 SYRINGE (ML) INTRAVENOUS ONCE
Refills: 0 | Status: DISCONTINUED | OUTPATIENT
Start: 2020-12-06 | End: 2020-12-09

## 2020-12-06 RX ORDER — ASCORBIC ACID 60 MG
500 TABLET,CHEWABLE ORAL DAILY
Refills: 0 | Status: DISCONTINUED | OUTPATIENT
Start: 2020-12-06 | End: 2020-12-09

## 2020-12-06 RX ORDER — SIMVASTATIN 20 MG/1
20 TABLET, FILM COATED ORAL AT BEDTIME
Refills: 0 | Status: DISCONTINUED | OUTPATIENT
Start: 2020-12-06 | End: 2020-12-09

## 2020-12-06 RX ORDER — DEXTROSE 50 % IN WATER 50 %
15 SYRINGE (ML) INTRAVENOUS ONCE
Refills: 0 | Status: DISCONTINUED | OUTPATIENT
Start: 2020-12-06 | End: 2020-12-09

## 2020-12-06 RX ORDER — DONEPEZIL HYDROCHLORIDE 10 MG/1
5 TABLET, FILM COATED ORAL AT BEDTIME
Refills: 0 | Status: DISCONTINUED | OUTPATIENT
Start: 2020-12-06 | End: 2020-12-09

## 2020-12-06 RX ORDER — LOSARTAN POTASSIUM 100 MG/1
25 TABLET, FILM COATED ORAL DAILY
Refills: 0 | Status: DISCONTINUED | OUTPATIENT
Start: 2020-12-06 | End: 2020-12-09

## 2020-12-06 RX ORDER — MULTIVIT-MIN/FERROUS GLUCONATE 9 MG/15 ML
1 LIQUID (ML) ORAL DAILY
Refills: 0 | Status: DISCONTINUED | OUTPATIENT
Start: 2020-12-06 | End: 2020-12-09

## 2020-12-06 RX ORDER — SODIUM CHLORIDE 9 MG/ML
1000 INJECTION INTRAMUSCULAR; INTRAVENOUS; SUBCUTANEOUS ONCE
Refills: 0 | Status: COMPLETED | OUTPATIENT
Start: 2020-12-06 | End: 2020-12-06

## 2020-12-06 RX ORDER — PANTOPRAZOLE SODIUM 20 MG/1
40 TABLET, DELAYED RELEASE ORAL
Refills: 0 | Status: DISCONTINUED | OUTPATIENT
Start: 2020-12-06 | End: 2020-12-09

## 2020-12-06 RX ORDER — SENNA PLUS 8.6 MG/1
2 TABLET ORAL AT BEDTIME
Refills: 0 | Status: DISCONTINUED | OUTPATIENT
Start: 2020-12-06 | End: 2020-12-09

## 2020-12-06 RX ORDER — POLYETHYLENE GLYCOL 3350 17 G/17G
17 POWDER, FOR SOLUTION ORAL DAILY
Refills: 0 | Status: DISCONTINUED | OUTPATIENT
Start: 2020-12-06 | End: 2020-12-09

## 2020-12-06 RX ORDER — HEPARIN SODIUM 5000 [USP'U]/ML
5000 INJECTION INTRAVENOUS; SUBCUTANEOUS EVERY 12 HOURS
Refills: 0 | Status: DISCONTINUED | OUTPATIENT
Start: 2020-12-06 | End: 2020-12-09

## 2020-12-06 RX ORDER — TAMSULOSIN HYDROCHLORIDE 0.4 MG/1
0.4 CAPSULE ORAL AT BEDTIME
Refills: 0 | Status: DISCONTINUED | OUTPATIENT
Start: 2020-12-06 | End: 2020-12-09

## 2020-12-06 RX ORDER — POLYETHYLENE GLYCOL 3350 17 G/17G
8.5 POWDER, FOR SOLUTION ORAL EVERY 24 HOURS
Refills: 0 | Status: DISCONTINUED | OUTPATIENT
Start: 2020-12-06 | End: 2020-12-06

## 2020-12-06 RX ORDER — SODIUM CHLORIDE 9 MG/ML
1000 INJECTION, SOLUTION INTRAVENOUS
Refills: 0 | Status: DISCONTINUED | OUTPATIENT
Start: 2020-12-06 | End: 2020-12-09

## 2020-12-06 RX ORDER — FUROSEMIDE 40 MG
40 TABLET ORAL ONCE
Refills: 0 | Status: COMPLETED | OUTPATIENT
Start: 2020-12-06 | End: 2020-12-06

## 2020-12-06 RX ORDER — METOPROLOL TARTRATE 50 MG
25 TABLET ORAL DAILY
Refills: 0 | Status: DISCONTINUED | OUTPATIENT
Start: 2020-12-06 | End: 2020-12-09

## 2020-12-06 RX ORDER — FERROUS SULFATE 325(65) MG
325 TABLET ORAL DAILY
Refills: 0 | Status: DISCONTINUED | OUTPATIENT
Start: 2020-12-06 | End: 2020-12-09

## 2020-12-06 RX ORDER — DEXTROSE 50 % IN WATER 50 %
12.5 SYRINGE (ML) INTRAVENOUS ONCE
Refills: 0 | Status: DISCONTINUED | OUTPATIENT
Start: 2020-12-06 | End: 2020-12-09

## 2020-12-06 RX ORDER — METFORMIN HYDROCHLORIDE 850 MG/1
500 TABLET ORAL
Refills: 0 | Status: DISCONTINUED | OUTPATIENT
Start: 2020-12-06 | End: 2020-12-07

## 2020-12-06 RX ORDER — ASPIRIN/CALCIUM CARB/MAGNESIUM 324 MG
81 TABLET ORAL DAILY
Refills: 0 | Status: DISCONTINUED | OUTPATIENT
Start: 2020-12-06 | End: 2020-12-09

## 2020-12-06 RX ORDER — GLUCAGON INJECTION, SOLUTION 0.5 MG/.1ML
1 INJECTION, SOLUTION SUBCUTANEOUS ONCE
Refills: 0 | Status: DISCONTINUED | OUTPATIENT
Start: 2020-12-06 | End: 2020-12-09

## 2020-12-06 RX ORDER — INSULIN LISPRO 100/ML
VIAL (ML) SUBCUTANEOUS
Refills: 0 | Status: DISCONTINUED | OUTPATIENT
Start: 2020-12-06 | End: 2020-12-09

## 2020-12-06 RX ADMIN — Medication 40 MILLIGRAM(S): at 14:34

## 2020-12-06 RX ADMIN — HEPARIN SODIUM 5000 UNIT(S): 5000 INJECTION INTRAVENOUS; SUBCUTANEOUS at 17:51

## 2020-12-06 RX ADMIN — SODIUM CHLORIDE 1000 MILLILITER(S): 9 INJECTION INTRAMUSCULAR; INTRAVENOUS; SUBCUTANEOUS at 10:47

## 2020-12-06 RX ADMIN — TAMSULOSIN HYDROCHLORIDE 0.4 MILLIGRAM(S): 0.4 CAPSULE ORAL at 22:52

## 2020-12-06 NOTE — H&P ADULT - HISTORY OF PRESENT ILLNESS
89 y o WM  presents sent to ED from [facility] for evaluation of  weakness.  No known fever/chills. no trauma. Patient is   poor historian,  PMD Dr Quintana,  no anticoagulants noted in  patient med list ,patient was admitted with GIB last year and BASA was stopped at that time .His  PMH is significant for :  Arteriosclerotic heart disease (ASHD)  Atrial fibrillation  ,CHF (congestive heart failure)  ,Dementia  ,Depression  ,DM (diabetes mellitus)  ,Hyperlipemia  ,Hypertension  ,Prostate ca Admit to monitored unit for cardiac monitoring, obtain echo to evaluate LVEF, intravenous diuresis as per card consult , monitor ins/outs, monitor renal profile and electrolytes closely ,send 3 sets of enzymes, O2 supply, serial chest xrays, monitor weights and oral intake of fluids, nutritionist consult Admitted  to telemetry unit for monitoring , send 3 sets of cardiac enzymes to rule out acute coronary event, obtain ECHO to evaluate LVEF, cardiology consult  ,continue current management, O2 supply, anticoagulation plan as per cardiology consult   Palliative care consult requested ,to discuss advance directives and complete MOLST

## 2020-12-06 NOTE — H&P ADULT - ASSESSMENT
89 y o WM  presents sent to ED from [facility] for evaluation of  weakness.  No known fever/chills. no trauma. Patient is   poor historian,  PMD Dr Quintana,  no anticoagulants noted in  patient med list ,patient was admitted with GIB last year and BASA was stopped at that time .His  PMH is significant for :  Arteriosclerotic heart disease (ASHD)  Atrial fibrillation  ,CHF (congestive heart failure)  ,Dementia  ,Depression  ,DM (diabetes mellitus)  ,Hyperlipemia  ,Hypertension  ,Prostate ca Admit to monitored unit for cardiac monitoring, obtain echo to evaluate LVEF, intravenous diuresis as per card consult , monitor ins/outs, monitor renal profile and electrolytes closely ,send 3 sets of enzymes, O2 supply, serial chest xrays, monitor weights and oral intake of fluids, nutritionist consult Admitted  to telemetry unit for monitoring , send 3 sets of cardiac enzymes to rule out acute coronary event, obtain ECHO to evaluate LVEF, cardiology consult  ,continue current management, O2 supply, anticoagulation plan as per cardiology consult   Palliative care consult requested ,to discuss advance directives and complete MOLST

## 2020-12-06 NOTE — H&P ADULT - ATTENDING COMMENTS
< from: CT Head No Cont (12.06.20 @ 11:55) >    EXAM:  CT BRAIN                            PROCEDURE DATE:  12/06/2020          INTERPRETATION:  CLINICAL Indications:  weakness, altered mental status, patient has a pacemaker    COMPARISON: CT head dated 8/28/2020    TECHNIQUE: Noncontrast CT of the head. Multiplanar reformations are submitted.    FINDINGS:  There is periventricular and subcortical white matter hypodensity without mass effect, nonspecific, likely representing mild chronic microvascular ischemic changes. There is no compellingevidence for an acute transcortical infarction. There is no evidence of mass, mass effect, midline shift or extra-axial fluid collection. The lateral ventricles and cortical sulci are age-appropriate in size and configuration. Moderate left-sided mastoid air cell effusion. Mild inflammatory mucosal changes are seen throughout the various portions of the paranasal sinuses. The orbits and right mastoid air cells are unremarkable. The calvarium is intact.    IMPRESSION:  Mild chronic microvascular changes without evidence of an acute transcortical infarction or hemorrhage. Consider follow-up CT as clinically warranted.            HECTOR NUNEZ MD; Attending Radiologist  This document has been electronically signed. Dec  6 2020 12:02PM    < end of copied text >  < from: Xray Chest 1 View-PORTABLE IMMEDIATE (10.26.20 @ 10:35) >    EXAM:  XR CHEST PORTABLE IMMED 1V                            PROCEDURE DATE:  10/26/2020          INTERPRETATION:  AP semierect chest on October 26, 2020 at 10:22 AM. Patient has a right groin infection.    Heart enlargement and left-sided pacemaker again noted.    There are slightly increased central vascular markings left greater the right this suggests CHF. This has increased from July 25, 2019.    IMPRESSION: Mild central CHF presently suspect. Heart enlargement and pacemaker again noted.            JUSTINA SINGH M.D., ATTENDING RADIOLOGIST  This document has been electronically signed. Oct 26 2020  1:04PM    < end of copied text >

## 2020-12-06 NOTE — CONSULT NOTE ADULT - ASSESSMENT
pt with chf  echo for lv function  add b.b and arb  troponin x3  ashd - atrial fib  s/p ppm  alzheimer's disease

## 2020-12-06 NOTE — CONSULT NOTE ADULT - SUBJECTIVE AND OBJECTIVE BOX
CARDIOLOGY CONSULT NOTE    Patient is a 89y Male with a known history of : admitted with weakness- unable to give hx  found to have chf - on xray chest and elevated bnp  HPI:      REVIEW OF SYSTEMS:    CONSTITUTIONAL: No fever, weight loss, or fatigue  EYES: No eye pain, visual disturbances, or discharge  ENMT:  No difficulty hearing, tinnitus, vertigo; No sinus or throat pain  NECK: No pain or stiffness  BREASTS: No pain, masses, or nipple discharge  RESPIRATORY: No cough, wheezing, chills or hemoptysis; No shortness of breath  CARDIOVASCULAR: No chest pain, palpitations, dizziness, or leg swelling  GASTROINTESTINAL: No abdominal or epigastric pain. No nausea, vomiting, or hematemesis; No diarrhea or constipation. No melena or hematochezia.  GENITOURINARY: No dysuria, frequency, hematuria, or incontinence  NEUROLOGICAL: No headaches, memory loss, loss of strength, numbness, or tremors  SKIN: No itching, burning, rashes, or lesions   LYMPH NODES: No enlarged glands  ENDOCRINE: No heat or cold intolerance; No hair loss  MUSCULOSKELETAL: No joint pain or swelling; No muscle, back, or extremity pain  PSYCHIATRIC: No depression, anxiety, mood swings, or difficulty sleeping  HEME/LYMPH: No easy bruising, or bleeding gums  ALLERGY AND IMMUNOLOGIC: No hives or eczema    MEDICATIONS  (STANDING):  ascorbic acid 500 milliGRAM(s) Oral daily  aspirin enteric coated 81 milliGRAM(s) Oral daily  dextrose 40% Gel 15 Gram(s) Oral once  dextrose 5%. 1000 milliLiter(s) (50 mL/Hr) IV Continuous <Continuous>  dextrose 5%. 1000 milliLiter(s) (100 mL/Hr) IV Continuous <Continuous>  dextrose 50% Injectable 25 Gram(s) IV Push once  dextrose 50% Injectable 12.5 Gram(s) IV Push once  dextrose 50% Injectable 25 Gram(s) IV Push once  donepezil 5 milliGRAM(s) Oral at bedtime  ferrous    sulfate 325 milliGRAM(s) Oral daily  furosemide   Injectable 40 milliGRAM(s) IV Push Once  glucagon  Injectable 1 milliGRAM(s) IntraMuscular once  heparin   Injectable 5000 Unit(s) SubCutaneous every 12 hours  insulin lispro (ADMELOG) corrective regimen sliding scale   SubCutaneous three times a day before meals  losartan 25 milliGRAM(s) Oral daily  metFORMIN 500 milliGRAM(s) Oral two times a day  metoprolol succinate ER 25 milliGRAM(s) Oral daily  multivitamin/minerals 1 Tablet(s) Oral daily  pantoprazole    Tablet 40 milliGRAM(s) Oral before breakfast  polyethylene glycol 3350 17 Gram(s) Oral daily  senna 2 Tablet(s) Oral at bedtime  simvastatin 20 milliGRAM(s) Oral at bedtime  tamsulosin 0.4 milliGRAM(s) Oral at bedtime    MEDICATIONS  (PRN):      ALLERGIES: latex (Rash)  latex (Unknown)  No Known Drug Allergies      Social History:     FAMILY HISTORY:      PAST MEDICAL & SURGICAL HISTORY:  Constipation    Anemia    Dementia    Arteriosclerotic heart disease (ASHD)    Prostate CA    Atrial fibrillation    DM (diabetes mellitus)    Dementia    Depression    Diabetes    Hyperlipemia    CHF (congestive heart failure)    Dementia    Prostate ca    Diabetes    Hypertension    Atrial fibrillation    No significant past surgical history          PHYSICAL EXAMINATION:  -----------------------------  T(C): 37.7 (12-06-20 @ 10:17), Max: 37.7 (12-06-20 @ 10:17)  HR: 96 (12-06-20 @ 09:33) (96 - 96)  BP: 120/64 (12-06-20 @ 09:33) (120/64 - 120/64)  RR: 18 (12-06-20 @ 09:33) (18 - 18)  SpO2: 93% (12-06-20 @ 09:33) (93% - 93%)  Wt(kg): --    Height (cm): 182.9 (12-06 @ 09:33)  Weight (kg): 72.6 (12-06 @ 09:33)  BMI (kg/m2): 21.7 (12-06 @ 09:33)  BSA (m2): 1.94 (12-06 @ 09:33)    Constitutional: well developed, normal appearance, well groomed, well nourished, no deformities and no acute distress.   Eyes: the conjunctiva exhibited no abnormalities and the eyelids demonstrated no xanthelasmas.   HEENT: normal oral mucosa, no oral pallor and no oral cyanosis.   Neck: normal jugular venous A waves present, normal jugular venous V waves present and no jugular venous jacobs A waves.   Pulmonary: no respiratory distress, normal respiratory rhythm and effort, no accessory muscle use and lungs were clear to auscultation bilaterally.   Cardiovascular: heart rate and rhythm were normal, normal S1 and S2 and no murmur, gallop, rub, heave or thrill are present.   Abdomen: soft, non-tender, no hepato-splenomegaly and no abdominal mass palpated.   Musculoskeletal: the gait could not be assessed..   Extremities: no clubbing of the fingernails, no localized cyanosis, no petechial hemorrhages and no ischemic changes.   Skin: normal skin color and pigmentation, no rash, no venous stasis, no skin lesions, no skin ulcer and no xanthoma was observed.   Psychiatric: oriented to person, place, and time, the affect was normal, the mood was normal and not feeling anxious.     LABS:   --------  12-06    139  |  101  |  12  ----------------------------<  108<H>  4.2   |  29  |  0.51    Ca    8.8      06 Dec 2020 11:08  Mg     1.7     12-06    TPro  7.3  /  Alb  3.2<L>  /  TBili  1.2  /  DBili  x   /  AST  18  /  ALT  19  /  AlkPhos  115  12-06                         11.8   8.75  )-----------( 235      ( 06 Dec 2020 11:08 )             36.2     PT/INR - ( 06 Dec 2020 11:08 )   PT: 14.8 sec;   INR: 1.28 ratio         PTT - ( 06 Dec 2020 11:08 )  PTT:30.8 sec  12-06 @ 11:08 BNP: 1365 pg/mL    12-06 @ 11:08 CPK total:--, CKMB --, Troponin I - <.015 ng/mL          RADIOLOGY:  -----------------        ECG:     ECHO

## 2020-12-06 NOTE — ED ADULT NURSE NOTE - OBJECTIVE STATEMENT
patient BIBEMS for weakness. Patient rectal temp 99.9 at this time. Healing b/l stage 2's on b/l buttocks. DTI noted to sacrum, with stage 1 redness around DTI. Patient nonverbal at this time, unable to speak?. Pending further lab and radiology reports.

## 2020-12-06 NOTE — H&P ADULT - NSHPSOCIALHISTORY_GEN_ALL_CORE
Social History:    Marital Status:   Occupation:   Lives with:     Substance Use :  Tobacco Usage:  (   ) never smoked   (   ) former smoker   (   ) current smoker  (     ) pack year  (        ) last tobacco use date  Alcohol Usage:      Health Management     For female:   Last Mammo:   Last Pap:     For male:  Last prostate exam:          [  ] date:            (  ) findings      Immunization Hx:   (  ) flu shot                               (     ) date   (  ) pneumonia shot               (     ) date  (  ) tetanus                               (     ) date     (     ) Advanced Directives: (     ) declined   [  ] health care proxy Social History:    Marital Status:   Occupation:   Lives with:     Substance Use :  none   Tobacco Usage:  (   ) never smoked   (   ) former smoker   (  no  ) current smoker  (     ) pack year  (        ) last tobacco use date  Alcohol Usage:  unable to obtained       Health Management     For female:   Last Mammo:   Last Pap:     For male:  Last prostate exam:          [  ] date:            (  ) findings      Immunization Hx:   (  ) flu shot                               (     ) date   (  ) pneumonia shot               (     ) date  (  ) tetanus                               (     ) date     (     ) Advanced Directives: (     ) declined   [  ] health care proxy

## 2020-12-06 NOTE — CONSULT NOTE ADULT - ASSESSMENT
TITO,  89  10/16/1926 DOA 12/6/2020 DR ARCADIO RING       Initial evaluation/Pulmonary Critical Care consultation requested on  12/6/2020  by Dr Ring  from Dr Quintana   Patient examined chart reviewed    HOSPITAL ADMISSION   PATIENT CAME  FROM (if information available)      PT DATA/BEST PRACTICE  ALLERGY       latex        WT                     12/6/2020 72  BMI                       12/6/2020 21                ADVANCED DIRECTIVE     HEAD OF BED ELEVATION Yes      DVT PROPHYLAXIS.     hpsc (12/6/2020)    DIET.cons carb (12/6/20200                                                                 SEGUNDO PROPHYLAXIS.   INFECTION PPLX                                                    OXYGENATION.   12/6/2020 ra 93%      VITALS.   12/6/2020 99f 96 120/60              MICROBIO.   Covid pcr 12/6/2020 pcr negv   Ua 12/6/2020 1015 blod tr nitr negv l estr negv  r 0-2              LABS.   W 12/6/2020 w 8.7   tr 12/6/2020 Tr .015   bnp 12/6/2020 bnp 1365   Hb 12/6/2020 Hb 11.8   Plt 12/6/2020 Plt 235   inr 12/6/2020 inr 128   Na 12/6/2020 Na 139   CO2 12/6/2020 co2 29   cr 12/6/2020 Cr .5           IMAGING.   Cxr 12/6/2020 cxr mild congestion             APA AC.   ASA 81 (12/6/2020)   hpsc (12/6/2020)   DM  ISS (12/6/2020)   METFORMIN 500.2 (12/6/2020)   CARDIAC  LOSARTAN 25 (12/6/20200   METOPROLOL 25 (12/6/2020)   MISC  PROTONIX 40 (12/6/2020)    DONEPEZIL 5 (12/6/20200   TAMSULOSIN .4 (12/6/2020)             PATIENT SUMMARY.   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  ALTERED MENTAL STATE POA   12/6/2020 CT head unremarkable   Monitor   RESP   Monitor target po 90-95%  CHF  bnp 12/6/2020 bnp 1365   1/31/2020 N lvsf ef 55% Mod TR N rvsp 33   HO SUBCM LUNG NODULES   will plan ct ch       TIME SPENT   Over 55 minutes aggregate care time spent on encounter; activities included   direct patient care, counseling and/or coordinating care reviewing notes, lab data/ imaging , discussion with multidisciplinary team/ patient  /family and explaining in detail risks, benefits, alternatives  of the recommendations     TERESA FRANCIS [phone]  10/16/1926 DOA 12/6/2020 DR ARCADIO RING

## 2020-12-06 NOTE — ED ADULT NURSE NOTE - NSIMPLEMENTINTERV_GEN_ALL_ED
Implemented All Fall with Harm Risk Interventions:  [place] to call system. Call bell, personal items and telephone within reach. Instruct patient to call for assistance. Room bathroom lighting operational. Non-slip footwear when patient is off stretcher. Physically safe environment: no spills, clutter or unnecessary equipment. Stretcher in lowest position, wheels locked, appropriate side rails in place. Provide visual cue, wrist band, yellow gown, etc. Monitor gait and stability. Monitor for mental status changes and reorient to person, place, and time. Review medications for side effects contributing to fall risk. Reinforce activity limits and safety measures with patient and family. Provide visual clues: red socks.

## 2020-12-06 NOTE — ED PROVIDER NOTE - CARE PLAN
Principal Discharge DX:	Weakness   Principal Discharge DX:	Weakness  Secondary Diagnosis:	Acute on chronic congestive heart failure, unspecified heart failure type

## 2020-12-06 NOTE — H&P ADULT - NSHPLABSRESULTS_GEN_ALL_CORE
11.8   8.75  )-----------( 235      ( 06 Dec 2020 11:08 )             36.2       CBC Full  -  ( 06 Dec 2020 11:08 )  WBC Count : 8.75 K/uL  RBC Count : 4.03 M/uL  Hemoglobin : 11.8 g/dL  Hematocrit : 36.2 %  Platelet Count - Automated : 235 K/uL  Mean Cell Volume : 89.8 fl  Mean Cell Hemoglobin : 29.3 pg  Mean Cell Hemoglobin Concentration : 32.6 gm/dL  Auto Neutrophil # : 7.00 K/uL  Auto Lymphocyte # : 0.73 K/uL  Auto Monocyte # : 0.94 K/uL  Auto Eosinophil # : 0.02 K/uL  Auto Basophil # : 0.01 K/uL  Auto Neutrophil % : 80.1 %  Auto Lymphocyte % : 8.3 %  Auto Monocyte % : 10.7 %  Auto Eosinophil % : 0.2 %  Auto Basophil % : 0.1 %      12-    139  |  101  |  12  ----------------------------<  108<H>  4.2   |  29  |  0.51    Ca    8.8      06 Dec 2020 11:08  Mg     1.7     12-    TPro  7.3  /  Alb  3.2<L>  /  TBili  1.2  /  DBili  x   /  AST  18  /  ALT  19  /  AlkPhos  115  12-06      CAPILLARY BLOOD GLUCOSE          Vital Signs Last 24 Hrs  T(C): 36.4 (06 Dec 2020 13:51), Max: 37.7 (06 Dec 2020 10:17)  T(F): 97.5 (06 Dec 2020 13:51), Max: 99.9 (06 Dec 2020 10:17)  HR: 72 (06 Dec 2020 13:51) (72 - 96)  BP: 128/70 (06 Dec 2020 13:51) (120/64 - 128/70)  BP(mean): --  RR: 16 (06 Dec 2020 13:51) (16 - 18)  SpO2: 97% (06 Dec 2020 13:51) (93% - 97%)    Urinalysis Basic - ( 06 Dec 2020 11:08 )    Color: Yellow / Appearance: Clear / S.015 / pH: x  Gluc: x / Ketone: Negative  / Bili: Negative / Urobili: Negative   Blood: x / Protein: 15 / Nitrite: Negative   Leuk Esterase: Negative / RBC: 0-2 /HPF / WBC x   Sq Epi: x / Non Sq Epi: Occasional / Bacteria: x        PT/INR - ( 06 Dec 2020 11:08 )   PT: 14.8 sec;   INR: 1.28 ratio         PTT - ( 06 Dec 2020 11:08 )  PTT:30.8 sec

## 2020-12-06 NOTE — ED ADULT NURSE NOTE - NSFALLRSKUNASSIST_ED_ALL_ED
Yes, if he has symptoms fine to treat with amox 400mg/5ml 600mg BID x 10 days #QS 0 refills. If no symptoms there is no preventative treatment, so no treatment, just don't share anything, no kissing, no hugging, wash hands frequently! no

## 2020-12-06 NOTE — ED PROVIDER NOTE - PMH
Anemia    Arteriosclerotic heart disease (ASHD)    Atrial fibrillation    Atrial fibrillation    CHF (congestive heart failure)    Constipation    Dementia    Dementia    Dementia    Depression    Diabetes    Diabetes    DM (diabetes mellitus)    Hyperlipemia    Hypertension    Prostate CA    Prostate ca

## 2020-12-06 NOTE — ED PROVIDER NOTE - OBJECTIVE STATEMENT
89 [facility]. Patient has dementia and non verbal, details obtained from chart.

## 2020-12-06 NOTE — CONSULT NOTE ADULT - SUBJECTIVE AND OBJECTIVE BOX
TERESA FRANCIS    PLV APER 01    Patient is a 89y old  Male who presents with a chief complaint of chf (06 Dec 2020 12:50)       Allergies    latex (Rash)  latex (Unknown)  No Known Drug Allergies    Intolerances        HPI:      PAST MEDICAL & SURGICAL HISTORY:  Constipation    Anemia    Dementia    Arteriosclerotic heart disease (ASHD)    Prostate CA    Atrial fibrillation    DM (diabetes mellitus)    Dementia    Depression    Diabetes    Hyperlipemia    CHF (congestive heart failure)    Dementia    Prostate ca    Diabetes    Hypertension    Atrial fibrillation    No significant past surgical history        FAMILY HISTORY:        MEDICATIONS   ascorbic acid 500 milliGRAM(s) Oral daily  aspirin enteric coated 81 milliGRAM(s) Oral daily  dextrose 40% Gel 15 Gram(s) Oral once  dextrose 5%. 1000 milliLiter(s) IV Continuous <Continuous>  dextrose 5%. 1000 milliLiter(s) IV Continuous <Continuous>  dextrose 50% Injectable 25 Gram(s) IV Push once  dextrose 50% Injectable 12.5 Gram(s) IV Push once  dextrose 50% Injectable 25 Gram(s) IV Push once  donepezil 5 milliGRAM(s) Oral at bedtime  ferrous    sulfate 325 milliGRAM(s) Oral daily  glucagon  Injectable 1 milliGRAM(s) IntraMuscular once  heparin   Injectable 5000 Unit(s) SubCutaneous every 12 hours  insulin lispro (ADMELOG) corrective regimen sliding scale   SubCutaneous three times a day before meals  losartan 25 milliGRAM(s) Oral daily  metFORMIN 500 milliGRAM(s) Oral two times a day  metoprolol succinate ER 25 milliGRAM(s) Oral daily  multivitamin/minerals 1 Tablet(s) Oral daily  pantoprazole    Tablet 40 milliGRAM(s) Oral before breakfast  polyethylene glycol 3350 17 Gram(s) Oral daily  senna 2 Tablet(s) Oral at bedtime  simvastatin 20 milliGRAM(s) Oral at bedtime  tamsulosin 0.4 milliGRAM(s) Oral at bedtime      Vital Signs Last 24 Hrs  T(C): 36.4 (06 Dec 2020 13:51), Max: 37.7 (06 Dec 2020 10:17)  T(F): 97.5 (06 Dec 2020 13:51), Max: 99.9 (06 Dec 2020 10:17)  HR: 72 (06 Dec 2020 13:51) (72 - 96)  BP: 128/70 (06 Dec 2020 13:51) (120/64 - 128/70)  BP(mean): --  RR: 16 (06 Dec 2020 13:51) (16 - 18)  SpO2: 97% (06 Dec 2020 13:51) (93% - 97%)            LABS:                        11.8   8.75  )-----------( 235      ( 06 Dec 2020 11:08 )             36.2     12    139  |  101  |  12  ----------------------------<  108<H>  4.2   |  29  |  0.51    Ca    8.8      06 Dec 2020 11:08  Mg     1.7         TPro  7.3  /  Alb  3.2<L>  /  TBili  1.2  /  DBili  x   /  AST  18  /  ALT  19  /  AlkPhos  115      PT/INR - ( 06 Dec 2020 11:08 )   PT: 14.8 sec;   INR: 1.28 ratio         PTT - ( 06 Dec 2020 11:08 )  PTT:30.8 sec  Urinalysis Basic - ( 06 Dec 2020 11:08 )    Color: Yellow / Appearance: Clear / S.015 / pH: x  Gluc: x / Ketone: Negative  / Bili: Negative / Urobili: Negative   Blood: x / Protein: 15 / Nitrite: Negative   Leuk Esterase: Negative / RBC: 0-2 /HPF / WBC x   Sq Epi: x / Non Sq Epi: Occasional / Bacteria: x            WBC:  WBC Count: 8.75 K/uL ( @ 11:08)      MICROBIOLOGY:  RECENT CULTURES:        CARDIAC MARKERS ( 06 Dec 2020 11:08 )  <.015 ng/mL / x     / x     / x     / 1.6 ng/mL        PT/INR - ( 06 Dec 2020 11:08 )   PT: 14.8 sec;   INR: 1.28 ratio         PTT - ( 06 Dec 2020 11:08 )  PTT:30.8 sec    Sodium:  Sodium, Serum: 139 mmol/L ( @ 11:08)      0.51 mg/dL  @ 11:08      Hemoglobin:  Hemoglobin: 11.8 g/dL ( @ 11:08)      Platelets: Platelet Count - Automated: 235 K/uL ( @ 11:08)      LIVER FUNCTIONS - ( 06 Dec 2020 11:08 )  Alb: 3.2 g/dL / Pro: 7.3 g/dL / ALK PHOS: 115 U/L / ALT: 19 U/L / AST: 18 U/L / GGT: x             Urinalysis Basic - ( 06 Dec 2020 11:08 )    Color: Yellow / Appearance: Clear / S.015 / pH: x  Gluc: x / Ketone: Negative  / Bili: Negative / Urobili: Negative   Blood: x / Protein: 15 / Nitrite: Negative   Leuk Esterase: Negative / RBC: 0-2 /HPF / WBC x   Sq Epi: x / Non Sq Epi: Occasional / Bacteria: x        RADIOLOGY & ADDITIONAL STUDIES:

## 2020-12-07 DIAGNOSIS — E11.9 TYPE 2 DIABETES MELLITUS WITHOUT COMPLICATIONS: ICD-10-CM

## 2020-12-07 DIAGNOSIS — K59.00 CONSTIPATION, UNSPECIFIED: ICD-10-CM

## 2020-12-07 DIAGNOSIS — Z51.5 ENCOUNTER FOR PALLIATIVE CARE: ICD-10-CM

## 2020-12-07 DIAGNOSIS — Z29.9 ENCOUNTER FOR PROPHYLACTIC MEASURES, UNSPECIFIED: ICD-10-CM

## 2020-12-07 DIAGNOSIS — F03.90 UNSPECIFIED DEMENTIA WITHOUT BEHAVIORAL DISTURBANCE: ICD-10-CM

## 2020-12-07 LAB
ANION GAP SERPL CALC-SCNC: 9 MMOL/L — SIGNIFICANT CHANGE UP (ref 5–17)
BUN SERPL-MCNC: 14 MG/DL — SIGNIFICANT CHANGE UP (ref 7–23)
CALCIUM SERPL-MCNC: 8.6 MG/DL — SIGNIFICANT CHANGE UP (ref 8.5–10.1)
CHLORIDE SERPL-SCNC: 103 MMOL/L — SIGNIFICANT CHANGE UP (ref 96–108)
CO2 SERPL-SCNC: 30 MMOL/L — SIGNIFICANT CHANGE UP (ref 22–31)
CREAT SERPL-MCNC: 0.46 MG/DL — LOW (ref 0.5–1.3)
CULTURE RESULTS: NO GROWTH — SIGNIFICANT CHANGE UP
GLUCOSE SERPL-MCNC: 74 MG/DL — SIGNIFICANT CHANGE UP (ref 70–99)
HCT VFR BLD CALC: 33.7 % — LOW (ref 39–50)
HGB BLD-MCNC: 11.1 G/DL — LOW (ref 13–17)
MCHC RBC-ENTMCNC: 29.4 PG — SIGNIFICANT CHANGE UP (ref 27–34)
MCHC RBC-ENTMCNC: 32.9 GM/DL — SIGNIFICANT CHANGE UP (ref 32–36)
MCV RBC AUTO: 89.2 FL — SIGNIFICANT CHANGE UP (ref 80–100)
NRBC # BLD: 0 /100 WBCS — SIGNIFICANT CHANGE UP (ref 0–0)
PLATELET # BLD AUTO: 218 K/UL — SIGNIFICANT CHANGE UP (ref 150–400)
POTASSIUM SERPL-MCNC: 3.4 MMOL/L — LOW (ref 3.5–5.3)
POTASSIUM SERPL-SCNC: 3.4 MMOL/L — LOW (ref 3.5–5.3)
RBC # BLD: 3.78 M/UL — LOW (ref 4.2–5.8)
RBC # FLD: 14.7 % — HIGH (ref 10.3–14.5)
SODIUM SERPL-SCNC: 142 MMOL/L — SIGNIFICANT CHANGE UP (ref 135–145)
SPECIMEN SOURCE: SIGNIFICANT CHANGE UP
WBC # BLD: 6.37 K/UL — SIGNIFICANT CHANGE UP (ref 3.8–10.5)
WBC # FLD AUTO: 6.37 K/UL — SIGNIFICANT CHANGE UP (ref 3.8–10.5)

## 2020-12-07 PROCEDURE — 71250 CT THORAX DX C-: CPT | Mod: 26

## 2020-12-07 PROCEDURE — 71045 X-RAY EXAM CHEST 1 VIEW: CPT | Mod: 26

## 2020-12-07 RX ORDER — INFLUENZA VIRUS VACCINE 15; 15; 15; 15 UG/.5ML; UG/.5ML; UG/.5ML; UG/.5ML
0.5 SUSPENSION INTRAMUSCULAR ONCE
Refills: 0 | Status: DISCONTINUED | OUTPATIENT
Start: 2020-12-07 | End: 2020-12-09

## 2020-12-07 RX ORDER — POTASSIUM CHLORIDE 20 MEQ
20 PACKET (EA) ORAL
Refills: 0 | Status: COMPLETED | OUTPATIENT
Start: 2020-12-07 | End: 2020-12-07

## 2020-12-07 RX ORDER — ACETAMINOPHEN 500 MG
500 TABLET ORAL EVERY 6 HOURS
Refills: 0 | Status: DISCONTINUED | OUTPATIENT
Start: 2020-12-07 | End: 2020-12-09

## 2020-12-07 RX ADMIN — POLYETHYLENE GLYCOL 3350 17 GRAM(S): 17 POWDER, FOR SOLUTION ORAL at 12:34

## 2020-12-07 RX ADMIN — LOSARTAN POTASSIUM 25 MILLIGRAM(S): 100 TABLET, FILM COATED ORAL at 05:49

## 2020-12-07 RX ADMIN — Medication 500 MILLIGRAM(S): at 12:34

## 2020-12-07 RX ADMIN — SIMVASTATIN 20 MILLIGRAM(S): 20 TABLET, FILM COATED ORAL at 21:19

## 2020-12-07 RX ADMIN — TAMSULOSIN HYDROCHLORIDE 0.4 MILLIGRAM(S): 0.4 CAPSULE ORAL at 21:19

## 2020-12-07 RX ADMIN — SENNA PLUS 2 TABLET(S): 8.6 TABLET ORAL at 21:19

## 2020-12-07 RX ADMIN — Medication 20 MILLIEQUIVALENT(S): at 12:34

## 2020-12-07 RX ADMIN — PANTOPRAZOLE SODIUM 40 MILLIGRAM(S): 20 TABLET, DELAYED RELEASE ORAL at 05:49

## 2020-12-07 RX ADMIN — Medication 20 MILLIEQUIVALENT(S): at 15:10

## 2020-12-07 RX ADMIN — Medication 1 TABLET(S): at 12:34

## 2020-12-07 RX ADMIN — Medication 25 MILLIGRAM(S): at 05:49

## 2020-12-07 RX ADMIN — Medication 325 MILLIGRAM(S): at 12:34

## 2020-12-07 RX ADMIN — DONEPEZIL HYDROCHLORIDE 5 MILLIGRAM(S): 10 TABLET, FILM COATED ORAL at 21:19

## 2020-12-07 RX ADMIN — Medication 81 MILLIGRAM(S): at 12:34

## 2020-12-07 RX ADMIN — HEPARIN SODIUM 5000 UNIT(S): 5000 INJECTION INTRAVENOUS; SUBCUTANEOUS at 17:40

## 2020-12-07 RX ADMIN — HEPARIN SODIUM 5000 UNIT(S): 5000 INJECTION INTRAVENOUS; SUBCUTANEOUS at 05:49

## 2020-12-07 NOTE — PROGRESS NOTE ADULT - ATTENDING COMMENTS
89 y o WM  presents sent to ED from [facility] for evaluation of  weakness.  No known fever/chills. no trauma. Patient is   poor historian,  PMD Dr Quintana,  no anticoagulants noted in  patient med list ,patient was admitted with GIB last year and BASA was stopped at that time .His  PMH is significant for :  Arteriosclerotic heart disease (ASHD)  Atrial fibrillation  ,CHF (congestive heart failure)  ,Dementia  ,Depression  ,DM (diabetes mellitus)  ,Hyperlipemia  ,Hypertension  ,Prostate ca Admit to monitored unit for cardiac monitoring, obtain echo to evaluate LVEF, intravenous diuresis as per card consult , monitor ins/outs, monitor renal profile and electrolytes closely ,send 3 sets of enzymes, O2 supply, serial chest xrays, monitor weights and oral intake of fluids, nutritionist consult Admitted  to telemetry unit for monitoring , send 3 sets of cardiac enzymes to rule out acute coronary event, obtain ECHO to evaluate LVEF, cardiology consult  ,continue current management, O2 supply, anticoagulation plan as per cardiology consult   Palliative care consult requested ,to discuss advance directives and complete MOLST

## 2020-12-07 NOTE — DIETITIAN INITIAL EVALUATION ADULT. - ORAL INTAKE PTA/DIET HISTORY
Pt with dementia, hx from EMR, transfer papers. On chopped diet PTA.MOLST noted, no tube feeds. Wt 162#.

## 2020-12-07 NOTE — PROGRESS NOTE ADULT - SUBJECTIVE AND OBJECTIVE BOX
PROGRESS NOTE /IM INITIAL EVALUATION   Patient is a 89y old  Male who presents with a chief complaint of chf (06 Dec 2020 12:50)  Chart and available morning labs /imaging are reviewed electronically , urgent issues addressed . More information  is being added upon completion of rounds , when more information is collected and management discussed with consultants , medical staff and social service/case management on the floor   OVERNIGHT  No new issues reported by medical staff . All above noted Patient is resting in a bed comfortably .Confused ,poor mentation .No distress noted   HPI:89 y o WM  presents sent to ED from [facility] for evaluation of  weakness.  No known fever/chills. no trauma. Patient is   poor historian,  PMD Dr Quintana,  no anticoagulants noted in  patient med list ,patient was admitted with GIB last year and BASA was stopped at that time .His  PMH is significant for :  Arteriosclerotic heart disease (ASHD)  Atrial fibrillation  ,CHF (congestive heart failure)  ,Dementia  ,Depression  ,DM (diabetes mellitus)  ,Hyperlipemia  ,Hypertension  ,Prostate ca Admit to monitored unit for cardiac monitoring, obtain echo to evaluate LVEF, intravenous diuresis as per card consult , monitor ins/outs, monitor renal profile and electrolytes closely ,send 3 sets of enzymes, O2 supply, serial chest xrays, monitor weights and oral intake of fluids, nutritionist consult Admitted  to telemetry unit for monitoring , send 3 sets of cardiac enzymes to rule out acute coronary event, obtain ECHO to evaluate LVEF, cardiology consult  ,continue current management, O2 supply, anticoagulation plan as per cardiology consult   Palliative care consult requested ,to discuss advance directives and complete MOLST   PAST MEDICAL & SURGICAL HISTORY:  Constipation  Anemia    Dementia    Arteriosclerotic heart disease (ASHD)    Prostate CA    Atrial fibrillation    DM (diabetes mellitus)    Dementia    Depression    Diabetes    Hyperlipemia    CHF (congestive heart failure)    Dementia    Prostate ca    Diabetes    Hypertension    Atrial fibrillation    No significant past surgical history        MEDICATIONS  (STANDING):  ascorbic acid 500 milliGRAM(s) Oral daily  aspirin enteric coated 81 milliGRAM(s) Oral daily  dextrose 40% Gel 15 Gram(s) Oral once  dextrose 5%. 1000 milliLiter(s) (50 mL/Hr) IV Continuous <Continuous>  dextrose 5%. 1000 milliLiter(s) (100 mL/Hr) IV Continuous <Continuous>  dextrose 50% Injectable 25 Gram(s) IV Push once  dextrose 50% Injectable 12.5 Gram(s) IV Push once  dextrose 50% Injectable 25 Gram(s) IV Push once  donepezil 5 milliGRAM(s) Oral at bedtime  ferrous    sulfate 325 milliGRAM(s) Oral daily  glucagon  Injectable 1 milliGRAM(s) IntraMuscular once  heparin   Injectable 5000 Unit(s) SubCutaneous every 12 hours  influenza   Vaccine 0.5 milliLiter(s) IntraMuscular once  insulin lispro (ADMELOG) corrective regimen sliding scale   SubCutaneous three times a day before meals  losartan 25 milliGRAM(s) Oral daily  metoprolol succinate ER 25 milliGRAM(s) Oral daily  multivitamin/minerals 1 Tablet(s) Oral daily  pantoprazole    Tablet 40 milliGRAM(s) Oral before breakfast  polyethylene glycol 3350 17 Gram(s) Oral daily  senna 2 Tablet(s) Oral at bedtime  simvastatin 20 milliGRAM(s) Oral at bedtime  tamsulosin 0.4 milliGRAM(s) Oral at bedtime    MEDICATIONS  (PRN):      OBJECTIVE    T(C): 37 (20 @ 05:15), Max: 37.7 (20 @ 10:17)  HR: 72 (20 @ 05:15) (72 - 96)  BP: 106/67 (20 @ 05:15) (106/67 - 129/92)  RR: 18 (20 @ 05:15) (16 - 18)  SpO2: 95% (20 @ 05:15) (93% - 98%)  Wt(kg): --  I&O's Summary    06 Dec 2020 07:01  -  07 Dec 2020 07:00  --------------------------------------------------------  IN: 0 mL / OUT: 350 mL / NET: -350 mL          REVIEW OF SYSTEMS:  CONSTITUTIONAL: No fever, weight loss, or fatigue  ROS is unobtainable due to dementia and confusion PATIENT IS CONFUSED AND IS UNABLE TO GIVE ANY/RELIABLE HISTORY OR REVIEW OF SYSTEMS PLEASE ALSO SEE UNDER HPI AND ASSESSMENT FOR OBTAINED REVIEW OF SYSTEMS   PHYSICAL EXAM:  Appearance: NAD. VS past 24 hrs -as above   HEENT:   Moist oral mucosa. Conjunctiva clear b/l.   Neck : supple  Respiratory: Lungs diminished bs at bases   Gastrointestinal:  Soft, nontender. No rebound. No rigidity. BS present	  Cardiovascular: RRR ,S1S2 present  Neurologic: Non-focal. Moving all extremities.  Extremities: No edema. No erythema. No calf tenderness.  Skin: No rashes, No ecchymoses, No cyanosis.	  wounds ,skin lesions-See skin assesment flow sheet   LABS:                        11.8   8.75  )-----------( 235      ( 06 Dec 2020 11:08 )             36.2     12    139  |  101  |  12  ----------------------------<  108<H>  4.2   |  29  |  0.51    Ca    8.8      06 Dec 2020 11:08  Mg     1.7         TPro  7.3  /  Alb  3.2<L>  /  TBili  1.2  /  DBili  x   /  AST  18  /  ALT  19  /  AlkPhos  115  12-    CAPILLARY BLOOD GLUCOSE      POCT Blood Glucose.: 77 mg/dL (07 Dec 2020 08:01)  POCT Blood Glucose.: 99 mg/dL (06 Dec 2020 21:41)  POCT Blood Glucose.: 103 mg/dL (06 Dec 2020 17:45)    PT/INR - ( 06 Dec 2020 11:08 )   PT: 14.8 sec;   INR: 1.28 ratio         PTT - ( 06 Dec 2020 11:08 )  PTT:30.8 sec  Urinalysis Basic - ( 06 Dec 2020 11:08 )    Color: Yellow / Appearance: Clear / S.015 / pH: x  Gluc: x / Ketone: Negative  / Bili: Negative / Urobili: Negative   Blood: x / Protein: 15 / Nitrite: Negative   Leuk Esterase: Negative / RBC: 0-2 /HPF / WBC x   Sq Epi: x / Non Sq Epi: Occasional / Bacteria: x        RADIOLOGY & ADDITIONAL TESTS:< from: CT Head No Cont (20 @ 11:55) >    PROCEDURE DATE:  2020          INTERPRETATION:  CLINICAL Indications:  weakness, altered mental status, patient has a pacemaker    COMPARISON: CT head dated 2020    TECHNIQUE: Noncontrast CT of the head. Multiplanar reformations are submitted.    FINDINGS:  There is periventricular and subcortical white matter hypodensity without mass effect, nonspecific, likely representing mild chronic microvascular ischemic changes. There is no compellingevidence for an acute transcortical infarction. There is no evidence of mass, mass effect, midline shift or extra-axial fluid collection. The lateral ventricles and cortical sulci are age-appropriate in size and configuration. Moderate left-sided mastoid air cell effusion. Mild inflammatory mucosal changes are seen throughout the various portions of the paranasal sinuses. The orbits and right mastoid air cells are unremarkable. The calvarium is intact.    IMPRESSION:  Mild chronic microvascular changes without evidence of an acute transcortical infarction or hemorrhage. Consider follow-up CT as clinically warranted.    < end of copied text >  < from: Xray Chest 1 View- PORTABLE-Urgent (Xray Chest 1 View- PORTABLE-Urgent .) (20 @ 10:57) >  EXAM:  XR CHEST PORTABLE URGENT 1V                            PROCEDURE DATE:  2020          INTERPRETATION:  Chest one view    HISTORY: Weakness    COMPARISON STUDY: 2019    Frontal expiratory view of the chest shows the heart to be similarly enlarged in size. The lungs show mild central pulmonary congestion and there is no evidence of pneumothorax nor pleural effusion. Left cardiac pacemaker and right upper quadrant surgical clips are present. Surgical clips are also seen over the lumbar spine.    IMPRESSION:  Mild pulmonary congestion.    Thank you for the courtesy of this referral.    < end of copied text >  < from: TTE Echo Doppler w/o Cont (20 @ 10:54) >   EXAM:  ECHO TTE WO CON COMP W DOPPLR         PROCEDURE DATE:  2020        INTERPRETATION:  Ordering Physician: ARCADIO FRIAS [id number]    Indication: Atrial fibrillation    Study Quality: Fair   A complete echocardiographic study was performed utilizing standard protocol including spectral and color Doppler in all echocardiographic windows.    Height: 1 72-cm  Weight: 66 kg   BSA: 1.8 sq m  Blood Pressure: 105/64    MEASUREMENTS  IVS: 1.0cm  PWT: 0.0cm  LA: 4.4cm  AO: 3.6cm  AV Cusp Separation:1.1cm  LVIDd: 5.0cm  LVIDs: 3.8cm  LVOT: 2.0cm    LVEF: 55%  RVSP: Less than 35mmHg    FINDINGS  Left Ventricle: Had normal cavity size and wall thickness. There was normal overall LV systolic function and wall motion. Ejection fraction was55%  Aortic Valve: Appeared mild to moderately sclerotic. Doppler exam was unremarkable  Mitral Valve: Had normal structure and motion. Doppler exam revealed mild MR  Tricuspid Valve: Had normal structure and motion. Doppler exam revealed mild to moderate TR with a peak RVSP of 33 mmHg  Pulmonic Valve: Was not well visualized. Doppler exam revealed trace UT  Left Atrium: Was mild to moderately dilated  Right Ventricle: Had normal structure and motion. A pacemaker wire was seen traversing the right heart.  Right Atrium: Was of normal size  Diastolic Function: Could not be calculated because of atrial fibrillation  Pericardium/Pleura: Subcostal view was suboptimal however no obvious pericardial effusion was noted  CONCLUSIONS:  This echo demonstrates normal LV systolic function, ejection fraction 55%, trace UT, mild MR, mild to moderate TR, normal RVSP, aortic sclerosis and presence of pacemaker wire.  < end of copied text >   reviewed elctronically	  75minutes spent on this visit, 50% visit time spent in care co-ordination with other attendings and counselling patient  I have discussed care plan with patient and HCP ELAN ,expressed understanding of problems treatment and their effect and side effects, alternatives in detail,I have asked if they have any questions and concerns and appropriately addressed them to best of my ability

## 2020-12-07 NOTE — PHYSICAL THERAPY INITIAL EVALUATION ADULT - ADDITIONAL COMMENTS
(EMR notes) Per medical staff, patient reported to be confused. CM made contact with Sabiha at  [facility] [phone]. Per Sabiha, patient's baseline mental status is confused. Per Sabiha, the patient was ambulating with a cane and one person assist.

## 2020-12-07 NOTE — SWALLOW BEDSIDE ASSESSMENT ADULT - SWALLOW EVAL: DIAGNOSIS
1. The patient demonstrated a mild oral dysphagia for puree, nectar thick, and thin liquid textures marked delayed bolus collection, transfer, and posterior transport. 2. The patient demonstrated a mild-moderate oral dysphagia for mechanical soft solids marked by prolonged oral manipulation resulting in delayed bolus collection, transfer, and posterior transport. Mild lingual residue noted subsequent to deglutition which reduced with a liquid wash. 3. The patient demonstrated a mild pharyngeal dysphagia for puree, mechanical soft solids, nectar thick, and thin liquid textures marked by a suspected delayed pharyngeal swallow trigger with evidence of hyolaryngeal elevation upon digital palpation w/o evidence of airway penetration.

## 2020-12-07 NOTE — SWALLOW BEDSIDE ASSESSMENT ADULT - ASR SWALLOW ASPIRATION MONITOR
oral hygiene/gurgly voice/change of breathing pattern/position upright (90Y)/pneumonia/upper respiratory infection/cough/fever/throat clearing

## 2020-12-07 NOTE — PHYSICAL THERAPY INITIAL EVALUATION ADULT - PERTINENT HX OF CURRENT PROBLEM, REHAB EVAL
89 [facility]. Patient has dementia and non verbal, details obtained from chart.

## 2020-12-07 NOTE — PROGRESS NOTE ADULT - PROBLEM SELECTOR PLAN 7
continue current management and present  medications Palliative care consult requested ,to discuss advance directives and complete /update  MOLST

## 2020-12-07 NOTE — PROGRESS NOTE ADULT - SUBJECTIVE AND OBJECTIVE BOX
Patient is a 89y Male with a known history of :  Palliative care encounter [Z51.5]    Hyperlipemia [E78.5]    Hypertension [I10]    Atrial fibrillation [I48.91]    DM (diabetes mellitus) [E11.9]    Anemia [D64.9]    Acute on chronic congestive heart failure, unspecified heart failure type [I50.9]    Weakness [R53.1]      HPI:      REVIEW OF SYSTEMS:    CONSTITUTIONAL: No fever, weight loss, or fatigue  EYES: No eye pain, visual disturbances, or discharge  ENMT:  No difficulty hearing, tinnitus, vertigo; No sinus or throat pain  NECK: No pain or stiffness  BREASTS: No pain, masses, or nipple discharge  RESPIRATORY: No cough, wheezing, chills or hemoptysis; No shortness of breath  CARDIOVASCULAR: No chest pain, palpitations, dizziness, or leg swelling  GASTROINTESTINAL: No abdominal or epigastric pain. No nausea, vomiting, or hematemesis; No diarrhea or constipation. No melena or hematochezia.  GENITOURINARY: No dysuria, frequency, hematuria, or incontinence  NEUROLOGICAL: No headaches, memory loss, loss of strength, numbness, or tremors  SKIN: No itching, burning, rashes, or lesions   LYMPH NODES: No enlarged glands  ENDOCRINE: No heat or cold intolerance; No hair loss  MUSCULOSKELETAL: No joint pain or swelling; No muscle, back, or extremity pain  PSYCHIATRIC: No depression, anxiety, mood swings, or difficulty sleeping  HEME/LYMPH: No easy bruising, or bleeding gums  ALLERGY AND IMMUNOLOGIC: No hives or eczema    MEDICATIONS  (STANDING):  ascorbic acid 500 milliGRAM(s) Oral daily  aspirin enteric coated 81 milliGRAM(s) Oral daily  dextrose 40% Gel 15 Gram(s) Oral once  dextrose 5%. 1000 milliLiter(s) (50 mL/Hr) IV Continuous <Continuous>  dextrose 5%. 1000 milliLiter(s) (100 mL/Hr) IV Continuous <Continuous>  dextrose 50% Injectable 25 Gram(s) IV Push once  dextrose 50% Injectable 12.5 Gram(s) IV Push once  dextrose 50% Injectable 25 Gram(s) IV Push once  donepezil 5 milliGRAM(s) Oral at bedtime  ferrous    sulfate 325 milliGRAM(s) Oral daily  glucagon  Injectable 1 milliGRAM(s) IntraMuscular once  heparin   Injectable 5000 Unit(s) SubCutaneous every 12 hours  influenza   Vaccine 0.5 milliLiter(s) IntraMuscular once  insulin lispro (ADMELOG) corrective regimen sliding scale   SubCutaneous three times a day before meals  losartan 25 milliGRAM(s) Oral daily  metoprolol succinate ER 25 milliGRAM(s) Oral daily  multivitamin/minerals 1 Tablet(s) Oral daily  pantoprazole    Tablet 40 milliGRAM(s) Oral before breakfast  polyethylene glycol 3350 17 Gram(s) Oral daily  senna 2 Tablet(s) Oral at bedtime  simvastatin 20 milliGRAM(s) Oral at bedtime  tamsulosin 0.4 milliGRAM(s) Oral at bedtime    MEDICATIONS  (PRN):      ALLERGIES: latex (Rash)  latex (Unknown)  No Known Drug Allergies      FAMILY HISTORY:      PHYSICAL EXAMINATION:  -----------------------------  T(C): 37 (12-07-20 @ 05:15), Max: 37.7 (12-06-20 @ 10:17)  HR: 72 (12-07-20 @ 05:15) (72 - 96)  BP: 106/67 (12-07-20 @ 05:15) (106/67 - 129/92)  RR: 18 (12-07-20 @ 05:15) (16 - 18)  SpO2: 95% (12-07-20 @ 05:15) (93% - 98%)  Wt(kg): --    12-06 @ 07:01  -  12-07 @ 07:00  --------------------------------------------------------  IN:  Total IN: 0 mL    OUT:    Voided (mL): 350 mL  Total OUT: 350 mL    Total NET: -350 mL        Height (cm): 182.9 (12-06 @ 09:33)  Weight (kg): 72.6 (12-06 @ 09:33)  BMI (kg/m2): 21.7 (12-06 @ 09:33)  BSA (m2): 1.94 (12-06 @ 09:33)    VITALS  T(C): 37 (12-07-20 @ 05:15), Max: 37.7 (12-06-20 @ 10:17)  HR: 72 (12-07-20 @ 05:15) (72 - 96)  BP: 106/67 (12-07-20 @ 05:15) (106/67 - 129/92)  RR: 18 (12-07-20 @ 05:15) (16 - 18)  SpO2: 95% (12-07-20 @ 05:15) (93% - 98%)    Constitutional: well developed, normal appearance, well groomed, well nourished, no deformities and no acute distress.   Eyes: the conjunctiva exhibited no abnormalities and the eyelids demonstrated no xanthelasmas.   HEENT: normal oral mucosa, no oral pallor and no oral cyanosis.   Neck: normal jugular venous A waves present, normal jugular venous V waves present and no jugular venous jacobs A waves.   Pulmonary: no respiratory distress, normal respiratory rhythm and effort, no accessory muscle use and lungs were clear to auscultation bilaterally.   Cardiovascular: heart rate and rhythm were normal, normal S1 and S2 and no murmur, gallop, rub, heave or thrill are present.   Abdomen: soft, non-tender, no hepato-splenomegaly and no abdominal mass palpated.   Musculoskeletal: the gait could not be assessed..   Extremities: no clubbing of the fingernails, no localized cyanosis, no petechial hemorrhages and no ischemic changes.   Skin: normal skin color and pigmentation, no rash, no venous stasis, no skin lesions, no skin ulcer and no xanthoma was observed.   Psychiatric: oriented to person, place, and time, the affect was normal, the mood was normal and not feeling anxious.     LABS:   --------  12-06    139  |  101  |  12  ----------------------------<  108<H>  4.2   |  29  |  0.51    Ca    8.8      06 Dec 2020 11:08  Mg     1.7     12-06    TPro  7.3  /  Alb  3.2<L>  /  TBili  1.2  /  DBili  x   /  AST  18  /  ALT  19  /  AlkPhos  115  12-06                         11.1   6.37  )-----------( 218      ( 07 Dec 2020 08:58 )             33.7     PT/INR - ( 06 Dec 2020 11:08 )   PT: 14.8 sec;   INR: 1.28 ratio         PTT - ( 06 Dec 2020 11:08 )  PTT:30.8 sec  12-06 @ 11:08 BNP: 1365 pg/mL    12-06 @ 11:08 CPK total:--, CKMB --, Troponin I - <.015 ng/mL          RADIOLOGY:  -----------------    ECG:     ECHO:

## 2020-12-07 NOTE — CHART NOTE - TREATMENT: THE FOLLOWING DIET HAS BEEN RECOMMENDED
Diet, Dysphagia 1 Pureed-Thin Liquids:   Supplement Feeding Modality:  Oral  Glucerna Shake Cans or Servings Per Day:  1       Frequency:  Daily (12-07-20 @ 09:30) [Active]

## 2020-12-07 NOTE — PROGRESS NOTE ADULT - SUBJECTIVE AND OBJECTIVE BOX
TERESA FRANCIS    PLV 1EAS 114 D1    Patient is a 89y old  Male who presents with a chief complaint of chf (06 Dec 2020 12:50)       Allergies    latex (Rash)  latex (Unknown)  No Known Drug Allergies    Intolerances        HPI:      PAST MEDICAL & SURGICAL HISTORY:  Constipation    Anemia    Dementia    Arteriosclerotic heart disease (ASHD)    Prostate CA    Atrial fibrillation    DM (diabetes mellitus)    Dementia    Depression    Diabetes    Hyperlipemia    CHF (congestive heart failure)    Dementia    Prostate ca    Diabetes    Hypertension    Atrial fibrillation    No significant past surgical history        FAMILY HISTORY:        MEDICATIONS   ascorbic acid 500 milliGRAM(s) Oral daily  aspirin enteric coated 81 milliGRAM(s) Oral daily  dextrose 40% Gel 15 Gram(s) Oral once  dextrose 5%. 1000 milliLiter(s) IV Continuous <Continuous>  dextrose 5%. 1000 milliLiter(s) IV Continuous <Continuous>  dextrose 50% Injectable 25 Gram(s) IV Push once  dextrose 50% Injectable 12.5 Gram(s) IV Push once  dextrose 50% Injectable 25 Gram(s) IV Push once  donepezil 5 milliGRAM(s) Oral at bedtime  ferrous    sulfate 325 milliGRAM(s) Oral daily  glucagon  Injectable 1 milliGRAM(s) IntraMuscular once  heparin   Injectable 5000 Unit(s) SubCutaneous every 12 hours  influenza   Vaccine 0.5 milliLiter(s) IntraMuscular once  insulin lispro (ADMELOG) corrective regimen sliding scale   SubCutaneous three times a day before meals  losartan 25 milliGRAM(s) Oral daily  metFORMIN 500 milliGRAM(s) Oral two times a day  metoprolol succinate ER 25 milliGRAM(s) Oral daily  multivitamin/minerals 1 Tablet(s) Oral daily  pantoprazole    Tablet 40 milliGRAM(s) Oral before breakfast  polyethylene glycol 3350 17 Gram(s) Oral daily  senna 2 Tablet(s) Oral at bedtime  simvastatin 20 milliGRAM(s) Oral at bedtime  tamsulosin 0.4 milliGRAM(s) Oral at bedtime      Vital Signs Last 24 Hrs  T(C): 37 (07 Dec 2020 05:15), Max: 37.7 (06 Dec 2020 10:17)  T(F): 98.6 (07 Dec 2020 05:15), Max: 99.9 (06 Dec 2020 10:17)  HR: 72 (07 Dec 2020 05:15) (72 - 96)  BP: 106/67 (07 Dec 2020 05:15) (106/67 - 129/92)  BP(mean): --  RR: 18 (07 Dec 2020 05:15) (16 - 18)  SpO2: 95% (07 Dec 2020 05:15) (93% - 98%)      20 @ 07:01  -  20 @ 07:00  --------------------------------------------------------  IN: 0 mL / OUT: 350 mL / NET: -350 mL            LABS:                        11.8   8.75  )-----------( 235      ( 06 Dec 2020 11:08 )             36.2         139  |  101  |  12  ----------------------------<  108<H>  4.2   |  29  |  0.51    Ca    8.8      06 Dec 2020 11:08  Mg     1.7         TPro  7.3  /  Alb  3.2<L>  /  TBili  1.2  /  DBili  x   /  AST  18  /  ALT  19  /  AlkPhos  115  12-    PT/INR - ( 06 Dec 2020 11:08 )   PT: 14.8 sec;   INR: 1.28 ratio         PTT - ( 06 Dec 2020 11:08 )  PTT:30.8 sec  Urinalysis Basic - ( 06 Dec 2020 11:08 )    Color: Yellow / Appearance: Clear / S.015 / pH: x  Gluc: x / Ketone: Negative  / Bili: Negative / Urobili: Negative   Blood: x / Protein: 15 / Nitrite: Negative   Leuk Esterase: Negative / RBC: 0-2 /HPF / WBC x   Sq Epi: x / Non Sq Epi: Occasional / Bacteria: x            WBC:  WBC Count: 8.75 K/uL ( @ 11:08)      MICROBIOLOGY:  RECENT CULTURES:        CARDIAC MARKERS ( 06 Dec 2020 11:08 )  <.015 ng/mL / x     / x     / x     / 1.6 ng/mL        PT/INR - ( 06 Dec 2020 11:08 )   PT: 14.8 sec;   INR: 1.28 ratio         PTT - ( 06 Dec 2020 11:08 )  PTT:30.8 sec    Sodium:  Sodium, Serum: 139 mmol/L ( @ 11:08)      0.51 mg/dL  @ 11:08      Hemoglobin:  Hemoglobin: 11.8 g/dL ( @ 11:08)      Platelets: Platelet Count - Automated: 235 K/uL ( @ 11:08)      LIVER FUNCTIONS - ( 06 Dec 2020 11:08 )  Alb: 3.2 g/dL / Pro: 7.3 g/dL / ALK PHOS: 115 U/L / ALT: 19 U/L / AST: 18 U/L / GGT: x             Urinalysis Basic - ( 06 Dec 2020 11:08 )    Color: Yellow / Appearance: Clear / S.015 / pH: x  Gluc: x / Ketone: Negative  / Bili: Negative / Urobili: Negative   Blood: x / Protein: 15 / Nitrite: Negative   Leuk Esterase: Negative / RBC: 0-2 /HPF / WBC x   Sq Epi: x / Non Sq Epi: Occasional / Bacteria: x        RADIOLOGY & ADDITIONAL STUDIES:

## 2020-12-07 NOTE — DIETITIAN INITIAL EVALUATION ADULT. - OTHER INFO
Admit with HF, dementia, Hx DM, constipation, with pressure injury buttock st 2. Seen by SLP, on puree diet with thin liquids, glucerna provided daily. Per nursing assistant, eating very well, %.

## 2020-12-07 NOTE — PHYSICAL THERAPY INITIAL EVALUATION ADULT - FOLLOWS COMMANDS/ANSWERS QUESTIONS, REHAB EVAL
50% of the time/needs verbal and tactile cues to initiate task/able to follow single-step instructions

## 2020-12-07 NOTE — CONSULT NOTE ADULT - PROBLEM SELECTOR RECOMMENDATION 9
pt with c/o weakness - known dementia - medical hx reviewed - lives in shelter  MOLST reviewed - updated - pt is DNR -  assist with ADL  work up in progress  fall prec   monitor for needs  CT head reviewed  old records reviewed

## 2020-12-07 NOTE — SWALLOW BEDSIDE ASSESSMENT ADULT - SWALLOW EVAL: RECOMMENDED FEEDING/EATING TECHNIQUES
small sips/bites/maintain upright posture during/after eating for 30 mins/oral hygiene/allow for swallow between intakes/alternate food with liquid/check mouth frequently for oral residue/pocketing/crush medication (when feasible)/position upright (90 degrees)

## 2020-12-07 NOTE — PHYSICAL THERAPY INITIAL EVALUATION ADULT - ACTIVE RANGE OF MOTION EXAMINATION, REHAB EVAL
bilateral  lower extremity Active ROM was WFL (within functional limits)/Right UE Active ROM was WFL (within functional limits)/decreased ROM LUE 0 to 95. L hand contractures/deficits as listed below

## 2020-12-07 NOTE — SWALLOW BEDSIDE ASSESSMENT ADULT - COMMENTS
Consult received and chart reviewed. The patient was seen at bedside this AM for an initial assessment of swallow function, at which time he was awake and cooperative. Patient currently receiving supplemental O2 via NC in place. Upon clinician arrival, patient sleeping though easily arouseable and able to respond to simple yes/no questions.    Per charting, the patient is a "89 y/o male sent to ER from [facility] with report of weakness. Patient has dementia and non verbal, details obtained from chart."     WBC is WFL. Most recent CT of the head revealed, "Mild chronic microvascular changes without evidence of an acute transcortical infarction or hemorrhage. Consider follow-up CT as clinically warranted." CXR revealed, "Mild pulmonary congestion."    Discussed results and recommendations from this evaluation with the patient, RN, and call out to MD.

## 2020-12-07 NOTE — CONSULT NOTE ADULT - SUBJECTIVE AND OBJECTIVE BOX
Date/Time Patient Seen:  		  Referring MD:   Data Reviewed	       Patient is a 89y old  Male who presents with a chief complaint of chf (06 Dec 2020 12:50)      Subjective/HPI  in bed  seen and examined  vs and meds reviewed  labs reviewed  h and p reviewed  er provider note reviewed  from [facility]   · Chief Complaint: The patient is a 89y Male complaining of weakness.  · HPI Objective Statement: 89 male sent to ER from [facility] with report of weakness. Patient has dementia and non verbal, details obtained from chart.    non smoker  non drinker  transferred for weakness eval  ros - weak - confused  family hx - htn      PAST MEDICAL & SURGICAL HISTORY:  Constipation    Anemia    Dementia    Arteriosclerotic heart disease (ASHD)    Prostate CA    Atrial fibrillation    DM (diabetes mellitus)    No pertinent past medical history    Dementia    Depression    Diabetes    Hyperlipemia    CHF (congestive heart failure)    Dementia    Prostate ca    Diabetes    Hypertension    Atrial fibrillation    No significant past surgical history    No significant past surgical history          Medication list         MEDICATIONS  (STANDING):  ascorbic acid 500 milliGRAM(s) Oral daily  aspirin enteric coated 81 milliGRAM(s) Oral daily  dextrose 40% Gel 15 Gram(s) Oral once  dextrose 5%. 1000 milliLiter(s) (50 mL/Hr) IV Continuous <Continuous>  dextrose 5%. 1000 milliLiter(s) (100 mL/Hr) IV Continuous <Continuous>  dextrose 50% Injectable 25 Gram(s) IV Push once  dextrose 50% Injectable 12.5 Gram(s) IV Push once  dextrose 50% Injectable 25 Gram(s) IV Push once  donepezil 5 milliGRAM(s) Oral at bedtime  ferrous    sulfate 325 milliGRAM(s) Oral daily  glucagon  Injectable 1 milliGRAM(s) IntraMuscular once  heparin   Injectable 5000 Unit(s) SubCutaneous every 12 hours  influenza   Vaccine 0.5 milliLiter(s) IntraMuscular once  insulin lispro (ADMELOG) corrective regimen sliding scale   SubCutaneous three times a day before meals  losartan 25 milliGRAM(s) Oral daily  metFORMIN 500 milliGRAM(s) Oral two times a day  metoprolol succinate ER 25 milliGRAM(s) Oral daily  multivitamin/minerals 1 Tablet(s) Oral daily  pantoprazole    Tablet 40 milliGRAM(s) Oral before breakfast  polyethylene glycol 3350 17 Gram(s) Oral daily  senna 2 Tablet(s) Oral at bedtime  simvastatin 20 milliGRAM(s) Oral at bedtime  tamsulosin 0.4 milliGRAM(s) Oral at bedtime    MEDICATIONS  (PRN):         Vitals log        ICU Vital Signs Last 24 Hrs  T(C): 36.8 (06 Dec 2020 21:15), Max: 37.7 (06 Dec 2020 10:17)  T(F): 98.2 (06 Dec 2020 21:15), Max: 99.9 (06 Dec 2020 10:17)  HR: 82 (06 Dec 2020 21:15) (72 - 96)  BP: 117/60 (06 Dec 2020 21:15) (117/60 - 129/92)  BP(mean): --  ABP: --  ABP(mean): --  RR: 18 (06 Dec 2020 21:15) (16 - 18)  SpO2: 98% (06 Dec 2020 21:15) (93% - 98%)           Input and Output:  I&O's Detail      Lab Data                        11.8   8.75  )-----------( 235      ( 06 Dec 2020 11:08 )             36.2     12-06    139  |  101  |  12  ----------------------------<  108<H>  4.2   |  29  |  0.51    Ca    8.8      06 Dec 2020 11:08  Mg     1.7     12-06    TPro  7.3  /  Alb  3.2<L>  /  TBili  1.2  /  DBili  x   /  AST  18  /  ALT  19  /  AlkPhos  115  12-06      CARDIAC MARKERS ( 06 Dec 2020 11:08 )  <.015 ng/mL / x     / x     / x     / 1.6 ng/mL        Review of Systems	  weakness      Objective     Physical Examination    heart s1s2  lung dec BS  abd soft      Pertinent Lab findings & Imaging      [place]:  NO   Adequate UO     I&O's Detail           Discussed with:     Cultures:	        Radiology      EXAM:  CT BRAIN                            PROCEDURE DATE:  12/06/2020          INTERPRETATION:  CLINICAL Indications:  weakness, altered mental status, patient has a pacemaker    COMPARISON: CT head dated 8/28/2020    TECHNIQUE: Noncontrast CT of the head. Multiplanar reformations are submitted.    FINDINGS:  There is periventricular and subcortical white matter hypodensity without mass effect, nonspecific, likely representing mild chronic microvascular ischemic changes. There is no compelling evidence for an acute transcortical infarction. There is no evidence of mass, mass effect, midline shift or extra-axial fluid collection. The lateral ventricles and cortical sulci are age-appropriate in size and configuration. Moderate left-sided mastoid air cell effusion. Mild inflammatory mucosal changes are seen throughout the various portions of the paranasal sinuses. The orbits and right mastoid air cells are unremarkable. The calvarium is intact.    IMPRESSION:  Mild chronic microvascular changes without evidence of an acute transcortical infarction or hemorrhage. Consider follow-up CT as clinically warranted.            HECTOR NUNEZ MD; Attending Radiologist

## 2020-12-08 ENCOUNTER — TRANSCRIPTION ENCOUNTER (OUTPATIENT)
Age: 85
End: 2020-12-08

## 2020-12-08 LAB
ANION GAP SERPL CALC-SCNC: 12 MMOL/L — SIGNIFICANT CHANGE UP (ref 5–17)
BUN SERPL-MCNC: 18 MG/DL — SIGNIFICANT CHANGE UP (ref 7–23)
CALCIUM SERPL-MCNC: 8.8 MG/DL — SIGNIFICANT CHANGE UP (ref 8.5–10.1)
CHLORIDE SERPL-SCNC: 105 MMOL/L — SIGNIFICANT CHANGE UP (ref 96–108)
CO2 SERPL-SCNC: 26 MMOL/L — SIGNIFICANT CHANGE UP (ref 22–31)
CREAT SERPL-MCNC: 0.51 MG/DL — SIGNIFICANT CHANGE UP (ref 0.5–1.3)
GLUCOSE SERPL-MCNC: 146 MG/DL — HIGH (ref 70–99)
HCT VFR BLD CALC: 34.1 % — LOW (ref 39–50)
HGB BLD-MCNC: 11.1 G/DL — LOW (ref 13–17)
MCHC RBC-ENTMCNC: 29.2 PG — SIGNIFICANT CHANGE UP (ref 27–34)
MCHC RBC-ENTMCNC: 32.6 GM/DL — SIGNIFICANT CHANGE UP (ref 32–36)
MCV RBC AUTO: 89.7 FL — SIGNIFICANT CHANGE UP (ref 80–100)
NRBC # BLD: 0 /100 WBCS — SIGNIFICANT CHANGE UP (ref 0–0)
NT-PROBNP SERPL-SCNC: 629 PG/ML — HIGH (ref 0–450)
PLATELET # BLD AUTO: 253 K/UL — SIGNIFICANT CHANGE UP (ref 150–400)
POTASSIUM SERPL-MCNC: 3.7 MMOL/L — SIGNIFICANT CHANGE UP (ref 3.5–5.3)
POTASSIUM SERPL-SCNC: 3.7 MMOL/L — SIGNIFICANT CHANGE UP (ref 3.5–5.3)
RBC # BLD: 3.8 M/UL — LOW (ref 4.2–5.8)
RBC # FLD: 14.7 % — HIGH (ref 10.3–14.5)
SARS-COV-2 RNA SPEC QL NAA+PROBE: SIGNIFICANT CHANGE UP
SODIUM SERPL-SCNC: 143 MMOL/L — SIGNIFICANT CHANGE UP (ref 135–145)
TROPONIN I SERPL-MCNC: <.015 NG/ML — SIGNIFICANT CHANGE UP (ref 0.01–0.04)
WBC # BLD: 6 K/UL — SIGNIFICANT CHANGE UP (ref 3.8–10.5)
WBC # FLD AUTO: 6 K/UL — SIGNIFICANT CHANGE UP (ref 3.8–10.5)

## 2020-12-08 RX ORDER — FUROSEMIDE 40 MG
20 TABLET ORAL DAILY
Refills: 0 | Status: DISCONTINUED | OUTPATIENT
Start: 2020-12-08 | End: 2020-12-09

## 2020-12-08 RX ADMIN — SIMVASTATIN 20 MILLIGRAM(S): 20 TABLET, FILM COATED ORAL at 21:36

## 2020-12-08 RX ADMIN — HEPARIN SODIUM 5000 UNIT(S): 5000 INJECTION INTRAVENOUS; SUBCUTANEOUS at 05:34

## 2020-12-08 RX ADMIN — POLYETHYLENE GLYCOL 3350 17 GRAM(S): 17 POWDER, FOR SOLUTION ORAL at 12:32

## 2020-12-08 RX ADMIN — Medication 25 MILLIGRAM(S): at 05:34

## 2020-12-08 RX ADMIN — Medication 20 MILLIGRAM(S): at 12:33

## 2020-12-08 RX ADMIN — DONEPEZIL HYDROCHLORIDE 5 MILLIGRAM(S): 10 TABLET, FILM COATED ORAL at 21:36

## 2020-12-08 RX ADMIN — HEPARIN SODIUM 5000 UNIT(S): 5000 INJECTION INTRAVENOUS; SUBCUTANEOUS at 17:05

## 2020-12-08 RX ADMIN — Medication 325 MILLIGRAM(S): at 12:33

## 2020-12-08 RX ADMIN — Medication 1 TABLET(S): at 12:33

## 2020-12-08 RX ADMIN — TAMSULOSIN HYDROCHLORIDE 0.4 MILLIGRAM(S): 0.4 CAPSULE ORAL at 21:36

## 2020-12-08 RX ADMIN — Medication 1: at 12:37

## 2020-12-08 RX ADMIN — Medication 500 MILLIGRAM(S): at 12:33

## 2020-12-08 RX ADMIN — LOSARTAN POTASSIUM 25 MILLIGRAM(S): 100 TABLET, FILM COATED ORAL at 05:34

## 2020-12-08 RX ADMIN — Medication 81 MILLIGRAM(S): at 12:33

## 2020-12-08 RX ADMIN — PANTOPRAZOLE SODIUM 40 MILLIGRAM(S): 20 TABLET, DELAYED RELEASE ORAL at 05:34

## 2020-12-08 RX ADMIN — SENNA PLUS 2 TABLET(S): 8.6 TABLET ORAL at 21:36

## 2020-12-08 RX ADMIN — Medication 1: at 17:05

## 2020-12-08 NOTE — PROGRESS NOTE ADULT - SUBJECTIVE AND OBJECTIVE BOX
TERESA FRANCIS    PLV 1EAS 114 D1    Patient is a 89y old  Male who presents with a chief complaint of sob (08 Dec 2020 09:09)       Allergies    latex (Rash)  latex (Unknown)  No Known Drug Allergies    Intolerances        HPI:  89 y o WM  presents sent to ED from [facility] for evaluation of  weakness.  No known fever/chills. no trauma. Patient is   poor historian,  PMD Dr Quintana,  no anticoagulants noted in  patient med list ,patient was admitted with GIB last year and BASA was stopped at that time .His  PMH is significant for :  Arteriosclerotic heart disease (ASHD)  Atrial fibrillation  ,CHF (congestive heart failure)  ,Dementia  ,Depression  ,DM (diabetes mellitus)  ,Hyperlipemia  ,Hypertension  ,Prostate ca Admit to monitored unit for cardiac monitoring, obtain echo to evaluate LVEF, intravenous diuresis as per card consult , monitor ins/outs, monitor renal profile and electrolytes closely ,send 3 sets of enzymes, O2 supply, serial chest xrays, monitor weights and oral intake of fluids, nutritionist consult Admitted  to telemetry unit for monitoring , send 3 sets of cardiac enzymes to rule out acute coronary event, obtain ECHO to evaluate LVEF, cardiology consult  ,continue current management, O2 supply, anticoagulation plan as per cardiology consult   Palliative care consult requested ,to discuss advance directives and complete MOLST (06 Dec 2020 13:36)      PAST MEDICAL & SURGICAL HISTORY:  Constipation    Anemia    Dementia    Arteriosclerotic heart disease (ASHD)    Prostate CA    Atrial fibrillation    DM (diabetes mellitus)    Dementia    Depression    Diabetes    Hyperlipemia    CHF (congestive heart failure)    Dementia    Prostate ca    Diabetes    Hypertension    Atrial fibrillation    No significant past surgical history        FAMILY HISTORY:        MEDICATIONS   acetaminophen   Tablet .. 500 milliGRAM(s) Oral every 6 hours PRN  ascorbic acid 500 milliGRAM(s) Oral daily  aspirin enteric coated 81 milliGRAM(s) Oral daily  dextrose 40% Gel 15 Gram(s) Oral once  dextrose 5%. 1000 milliLiter(s) IV Continuous <Continuous>  dextrose 5%. 1000 milliLiter(s) IV Continuous <Continuous>  dextrose 50% Injectable 25 Gram(s) IV Push once  dextrose 50% Injectable 12.5 Gram(s) IV Push once  dextrose 50% Injectable 25 Gram(s) IV Push once  donepezil 5 milliGRAM(s) Oral at bedtime  ferrous    sulfate 325 milliGRAM(s) Oral daily  furosemide    Tablet 20 milliGRAM(s) Oral daily  glucagon  Injectable 1 milliGRAM(s) IntraMuscular once  heparin   Injectable 5000 Unit(s) SubCutaneous every 12 hours  influenza   Vaccine 0.5 milliLiter(s) IntraMuscular once  insulin lispro (ADMELOG) corrective regimen sliding scale   SubCutaneous three times a day before meals  losartan 25 milliGRAM(s) Oral daily  metoprolol succinate ER 25 milliGRAM(s) Oral daily  multivitamin/minerals 1 Tablet(s) Oral daily  pantoprazole    Tablet 40 milliGRAM(s) Oral before breakfast  polyethylene glycol 3350 17 Gram(s) Oral daily  senna 2 Tablet(s) Oral at bedtime  simvastatin 20 milliGRAM(s) Oral at bedtime  tamsulosin 0.4 milliGRAM(s) Oral at bedtime      Vital Signs Last 24 Hrs  T(C): 37.2 (08 Dec 2020 05:21), Max: 37.2 (08 Dec 2020 05:21)  T(F): 99 (08 Dec 2020 05:21), Max: 99 (08 Dec 2020 05:21)  HR: 93 (08 Dec 2020 05:21) (79 - 94)  BP: 106/68 (08 Dec 2020 05:21) (98/62 - 106/68)  BP(mean): --  RR: 18 (08 Dec 2020 05:21) (17 - 18)  SpO2: 91% (08 Dec 2020 05:21) (91% - 94%)      20 @ 07:01  -  20 @ 07:00  --------------------------------------------------------  IN: 240 mL / OUT: 950 mL / NET: -710 mL            LABS:                        11.1   6.37  )-----------( 218      ( 07 Dec 2020 08:58 )             33.7         142  |  103  |  14  ----------------------------<  74  3.4<L>   |  30  |  0.46<L>    Ca    8.6      07 Dec 2020 08:58  Phos  3.0       Mg     1.8         TPro  7.3  /  Alb  3.2<L>  /  TBili  1.2  /  DBili  x   /  AST  18  /  ALT  19  /  AlkPhos  115      PT/INR - ( 06 Dec 2020 11:08 )   PT: 14.8 sec;   INR: 1.28 ratio         PTT - ( 06 Dec 2020 11:08 )  PTT:30.8 sec  Urinalysis Basic - ( 06 Dec 2020 11:08 )    Color: Yellow / Appearance: Clear / S.015 / pH: x  Gluc: x / Ketone: Negative  / Bili: Negative / Urobili: Negative   Blood: x / Protein: 15 / Nitrite: Negative   Leuk Esterase: Negative / RBC: 0-2 /HPF / WBC x   Sq Epi: x / Non Sq Epi: Occasional / Bacteria: x            WBC:  WBC Count: 6.37 K/uL ( @ 08:58)  WBC Count: 8.75 K/uL ( @ 11:08)      MICROBIOLOGY:  RECENT CULTURES:   .Urine Clean Catch (Midstream) XXXX XXXX   No growth     .Blood Blood-Peripheral XXXX XXXX   No growth to date.          CARDIAC MARKERS ( 07 Dec 2020 08:58 )  <.015 ng/mL / x     / x     / x     / x      CARDIAC MARKERS ( 06 Dec 2020 11:08 )  <.015 ng/mL / x     / x     / x     / 1.6 ng/mL        PT/INR - ( 06 Dec 2020 11:08 )   PT: 14.8 sec;   INR: 1.28 ratio         PTT - ( 06 Dec 2020 11:08 )  PTT:30.8 sec    Sodium:  Sodium, Serum: 142 mmol/L ( @ 08:58)  Sodium, Serum: 139 mmol/L ( @ 11:08)      0.46 mg/dL  @ 08:58  0.51 mg/dL  @ 11:08      Hemoglobin:  Hemoglobin: 11.1 g/dL ( @ 08:58)  Hemoglobin: 11.8 g/dL ( @ 11:08)      Platelets: Platelet Count - Automated: 218 K/uL ( @ 08:58)  Platelet Count - Automated: 235 K/uL ( @ 11:08)      LIVER FUNCTIONS - ( 06 Dec 2020 11:08 )  Alb: 3.2 g/dL / Pro: 7.3 g/dL / ALK PHOS: 115 U/L / ALT: 19 U/L / AST: 18 U/L / GGT: x             Urinalysis Basic - ( 06 Dec 2020 11:08 )    Color: Yellow / Appearance: Clear / S.015 / pH: x  Gluc: x / Ketone: Negative  / Bili: Negative / Urobili: Negative   Blood: x / Protein: 15 / Nitrite: Negative   Leuk Esterase: Negative / RBC: 0-2 /HPF / WBC x   Sq Epi: x / Non Sq Epi: Occasional / Bacteria: x        RADIOLOGY & ADDITIONAL STUDIES:

## 2020-12-08 NOTE — DISCHARGE NOTE PROVIDER - CARE PROVIDER_API CALL
Toby Quintana  CRITICAL CARE MEDICINE  [facility] [place] [address]  Phone: [phone]  Fax: [phone]  Follow Up Time: 1 week    Nadia Espinoza)  Internal Medicine  [address] [facility], [address] [place] [address]  Phone: [phone]  Fax: [phone]  Follow Up Time: 1 month

## 2020-12-08 NOTE — DISCHARGE NOTE PROVIDER - PROVIDER TOKENS
PROVIDER:[TOKEN:[[id number]],FOLLOWUP:[1 week]],PROVIDER:[TOKEN:[[id number]],FOLLOWUP:[1 month]]

## 2020-12-08 NOTE — PROGRESS NOTE ADULT - SUBJECTIVE AND OBJECTIVE BOX
Patient is a 89y Male with a known history of :  Prophylactic measure [Z29.9]    Constipation [K59.00]    Dementia [F03.90]    Diabetes [E11.9]    Palliative care encounter [Z51.5]    Hyperlipemia [E78.5]    Hypertension [I10]    Atrial fibrillation [I48.91]    DM (diabetes mellitus) [E11.9]    Anemia [D64.9]    Acute on chronic congestive heart failure, unspecified heart failure type [I50.9]    Weakness [R53.1]      HPI:  89 y o WM  presents sent to ED from [facility] for evaluation of  weakness.  No known fever/chills. no trauma. Patient is   poor historian,  PMD Dr Quintana,  no anticoagulants noted in  patient med list ,patient was admitted with GIB last year and BASA was stopped at that time .His  PMH is significant for :  Arteriosclerotic heart disease (ASHD)  Atrial fibrillation  ,CHF (congestive heart failure)  ,Dementia  ,Depression  ,DM (diabetes mellitus)  ,Hyperlipemia  ,Hypertension  ,Prostate ca Admit to monitored unit for cardiac monitoring, obtain echo to evaluate LVEF, intravenous diuresis as per card consult , monitor ins/outs, monitor renal profile and electrolytes closely ,send 3 sets of enzymes, O2 supply, serial chest xrays, monitor weights and oral intake of fluids, nutritionist consult Admitted  to telemetry unit for monitoring , send 3 sets of cardiac enzymes to rule out acute coronary event, obtain ECHO to evaluate LVEF, cardiology consult  ,continue current management, O2 supply, anticoagulation plan as per cardiology consult   Palliative care consult requested ,to discuss advance directives and complete [facility] (06 Dec 2020 13:36)      REVIEW OF SYSTEMS:    CONSTITUTIONAL: No fever, weight loss, or fatigue  EYES: No eye pain, visual disturbances, or discharge  ENMT:  No difficulty hearing, tinnitus, vertigo; No sinus or throat pain  NECK: No pain or stiffness  BREASTS: No pain, masses, or nipple discharge  RESPIRATORY: No cough, wheezing, chills or hemoptysis; No shortness of breath  CARDIOVASCULAR: No chest pain, palpitations, dizziness, or leg swelling  GASTROINTESTINAL: No abdominal or epigastric pain. No nausea, vomiting, or hematemesis; No diarrhea or constipation. No melena or hematochezia.  GENITOURINARY: No dysuria, frequency, hematuria, or incontinence  NEUROLOGICAL: No headaches, memory loss, loss of strength, numbness, or tremors  SKIN: No itching, burning, rashes, or lesions   LYMPH NODES: No enlarged glands  ENDOCRINE: No heat or cold intolerance; No hair loss  MUSCULOSKELETAL: No joint pain or swelling; No muscle, back, or extremity pain  PSYCHIATRIC: No depression, anxiety, mood swings, or difficulty sleeping  HEME/LYMPH: No easy bruising, or bleeding gums  ALLERGY AND IMMUNOLOGIC: No hives or eczema    MEDICATIONS  (STANDING):  ascorbic acid 500 milliGRAM(s) Oral daily  aspirin enteric coated 81 milliGRAM(s) Oral daily  dextrose 40% Gel 15 Gram(s) Oral once  dextrose 5%. 1000 milliLiter(s) (50 mL/Hr) IV Continuous <Continuous>  dextrose 5%. 1000 milliLiter(s) (100 mL/Hr) IV Continuous <Continuous>  dextrose 50% Injectable 25 Gram(s) IV Push once  dextrose 50% Injectable 12.5 Gram(s) IV Push once  dextrose 50% Injectable 25 Gram(s) IV Push once  donepezil 5 milliGRAM(s) Oral at bedtime  ferrous    sulfate 325 milliGRAM(s) Oral daily  furosemide    Tablet 20 milliGRAM(s) Oral daily  glucagon  Injectable 1 milliGRAM(s) IntraMuscular once  heparin   Injectable 5000 Unit(s) SubCutaneous every 12 hours  influenza   Vaccine 0.5 milliLiter(s) IntraMuscular once  insulin lispro (ADMELOG) corrective regimen sliding scale   SubCutaneous three times a day before meals  losartan 25 milliGRAM(s) Oral daily  metoprolol succinate ER 25 milliGRAM(s) Oral daily  multivitamin/minerals 1 Tablet(s) Oral daily  pantoprazole    Tablet 40 milliGRAM(s) Oral before breakfast  polyethylene glycol 3350 17 Gram(s) Oral daily  senna 2 Tablet(s) Oral at bedtime  simvastatin 20 milliGRAM(s) Oral at bedtime  tamsulosin 0.4 milliGRAM(s) Oral at bedtime    MEDICATIONS  (PRN):  acetaminophen   Tablet .. 500 milliGRAM(s) Oral every 6 hours PRN Temp greater or equal to 38C (100.4F), Mild Pain (1 - 3)      ALLERGIES: latex (Rash)  latex (Unknown)  No Known Drug Allergies      FAMILY HISTORY:      PHYSICAL EXAMINATION:  -----------------------------  T(C): 37.2 (12-08-20 @ 05:21), Max: 37.2 (12-08-20 @ 05:21)  HR: 93 (12-08-20 @ 05:21) (79 - 94)  BP: 106/68 (12-08-20 @ 05:21) (98/62 - 106/68)  RR: 18 (12-08-20 @ 05:21) (17 - 18)  SpO2: 91% (12-08-20 @ 05:21) (91% - 94%)  Wt(kg): --    12-07 @ 07:01  -  12-08 @ 07:00  --------------------------------------------------------  IN:    Oral Fluid: 240 mL  Total IN: 240 mL    OUT:    Incontinent per Condom Catheter (mL): 950 mL  Total OUT: 950 mL    Total NET: -710 mL            VITALS  T(C): 37.2 (12-08-20 @ 05:21), Max: 37.2 (12-08-20 @ 05:21)  HR: 93 (12-08-20 @ 05:21) (79 - 94)  BP: 106/68 (12-08-20 @ 05:21) (98/62 - 106/68)  RR: 18 (12-08-20 @ 05:21) (17 - 18)  SpO2: 91% (12-08-20 @ 05:21) (91% - 94%)    Constitutional: well developed, normal appearance, well groomed, well nourished, no deformities and no acute distress.   Eyes: the conjunctiva exhibited no abnormalities and the eyelids demonstrated no xanthelasmas.   HEENT: normal oral mucosa, no oral pallor and no oral cyanosis.   Neck: normal jugular venous A waves present, normal jugular venous V waves present and no jugular venous jacobs A waves.   Pulmonary: no respiratory distress, normal respiratory rhythm and effort, no accessory muscle use and lungs were clear to auscultation bilaterally.   Cardiovascular: heart rate and rhythm were normal, normal S1 and S2 and no murmur, gallop, rub, heave or thrill are present.   Abdomen: soft, non-tender, no hepato-splenomegaly and no abdominal mass palpated.   Musculoskeletal: the gait could not be assessed..   Extremities: no clubbing of the fingernails, no localized cyanosis, no petechial hemorrhages and no ischemic changes.   Skin: normal skin color and pigmentation, no rash, no venous stasis, no skin lesions, no skin ulcer and no xanthoma was observed.   Psychiatric: oriented to person, place, and time, the affect was normal, the mood was normal and not feeling anxious.     LABS:   --------  12-07    142  |  103  |  14  ----------------------------<  74  3.4<L>   |  30  |  0.46<L>    Ca    8.6      07 Dec 2020 08:58  Phos  3.0     12-07  Mg     1.8     12-07    TPro  7.3  /  Alb  3.2<L>  /  TBili  1.2  /  DBili  x   /  AST  18  /  ALT  19  /  AlkPhos  115  12-06                         11.1   6.37  )-----------( 218      ( 07 Dec 2020 08:58 )             33.7     PT/INR - ( 06 Dec 2020 11:08 )   PT: 14.8 sec;   INR: 1.28 ratio         PTT - ( 06 Dec 2020 11:08 )  PTT:30.8 sec    12-07 @ 08:58 CPK total:--, CKMB --, Troponin I - <.015 ng/mL  12-06 @ 11:08 CPK total:--, CKMB --, Troponin I - <.015 ng/mL      Culture Results:   No growth (12-06 @ 16:21)  Culture Results:   No growth to date. (12-06 @ 15:01)  Culture Results:   No growth to date. (12-06 @ 15:01)      RADIOLOGY:  -----------------    ECG:     ECHO:

## 2020-12-08 NOTE — DISCHARGE NOTE PROVIDER - NSDCMRMEDTOKEN_GEN_ALL_CORE_FT
acetaminophen 500 mg oral tablet: 1 tab(s) orally every 6 hours, As Needed for pain  ascorbic acid 500 mg oral tablet: 1 tab(s) orally 2 times a day  aspirin 81 mg oral tablet: 1 tab(s) orally once a day  donepezil 5 mg oral tablet: 1 tab(s) orally once a day (at bedtime)  FeroSul 325 mg (65 mg elemental iron) oral tablet: 1 tab(s) orally once a day  metFORMIN 500 mg oral tablet: 1 tab(s) orally 2 times a day (with meals)  MiraLax oral powder for reconstitution: 17 gram(s) orally once a day  Multiple Vitamins with Minerals oral tablet: 1 tab(s) orally once a day  pantoprazole 40 mg oral delayed release tablet: 1 tab(s) orally once a day  Senna 8.6 mg oral tablet: 2 tab(s) orally once a day (at bedtime)  simvastatin 20 mg oral tablet: 1 tab(s) orally once a day (at bedtime)  tamsulosin 0.4 mg oral capsule: 1 cap(s) orally once a day (at bedtime)   acetaminophen 500 mg oral tablet: 1 tab(s) orally every 6 hours, As Needed for pain  ascorbic acid 500 mg oral tablet: 1 tab(s) orally 2 times a day  aspirin 81 mg oral tablet: 1 tab(s) orally once a day  donepezil 5 mg oral tablet: 1 tab(s) orally once a day (at bedtime)  FeroSul 325 mg (65 mg elemental iron) oral tablet: 1 tab(s) orally once a day  furosemide 20 mg oral tablet: 1 tab(s) orally once a day  losartan 25 mg oral tablet: 1 tab(s) orally once a day  metFORMIN 500 mg oral tablet: 1 tab(s) orally 2 times a day (with meals)  metoprolol succinate 25 mg oral tablet, extended release: 1 tab(s) orally once a day  MiraLax oral powder for reconstitution: 17 gram(s) orally once a day  Multiple Vitamins with Minerals oral tablet: 1 tab(s) orally once a day  pantoprazole 40 mg oral delayed release tablet: 1 tab(s) orally once a day  Senna 8.6 mg oral tablet: 2 tab(s) orally once a day (at bedtime)  simvastatin 20 mg oral tablet: 1 tab(s) orally once a day (at bedtime)  tamsulosin 0.4 mg oral capsule: 1 cap(s) orally once a day (at bedtime)

## 2020-12-08 NOTE — DISCHARGE NOTE PROVIDER - NSDCCPCAREPLAN_GEN_ALL_CORE_FT
PRINCIPAL DISCHARGE DIAGNOSIS  Diagnosis: Acute on chronic congestive heart failure, unspecified heart failure type  Assessment and Plan of Treatment:       SECONDARY DISCHARGE DIAGNOSES  Diagnosis: Diabetes  Assessment and Plan of Treatment: Diabetes    Diagnosis: Hypertension  Assessment and Plan of Treatment: Hypertension    Diagnosis: Dementia  Assessment and Plan of Treatment: Dementia    Diagnosis: Constipation  Assessment and Plan of Treatment: Constipation    Diagnosis: Hyperlipemia  Assessment and Plan of Treatment: Hyperlipemia

## 2020-12-08 NOTE — ADVANCED PRACTICE NURSE CONSULT - ASSESSMENT
RN assessed patient to have stage 1 and stage 2 pressure injury. RN educated in the care of a stage 1  and stage 2 pressure injury.

## 2020-12-08 NOTE — PROGRESS NOTE ADULT - SUBJECTIVE AND OBJECTIVE BOX
PROGRESS NOTE  Patient is a 89y old  Male who presents with a chief complaint of sob (08 Dec 2020 09:09)  Chart and available morning labs /imaging are reviewed electronically , urgent issues addressed . More information  is being added upon completion of rounds , when more information is collected and management discussed with consultants , medical staff and social service/case management on the floor   LATE ENTRY- patient was seen and examined earlier today . Plan of care was discussed with med staff and unit coordinator .   OVERNIGHT  No new issues reported by medical staff . All above noted Patient is resting in a bed comfortably .Confused ,poor mentation .No distress noted   HPI:  89 y o WM  presents sent to ED from [facility] for evaluation of  weakness.  No known fever/chills. no trauma. Patient is   poor historian,  PMD Dr Quintana,  no anticoagulants noted in  patient med list ,patient was admitted with GIB last year and BASA was stopped at that time .His  PMH is significant for :  Arteriosclerotic heart disease (ASHD)  Atrial fibrillation  ,CHF (congestive heart failure)  ,Dementia  ,Depression  ,DM (diabetes mellitus)  ,Hyperlipemia  ,Hypertension  ,Prostate ca Admit to monitored unit for cardiac monitoring, obtain echo to evaluate LVEF, intravenous diuresis as per card consult , monitor ins/outs, monitor renal profile and electrolytes closely ,send 3 sets of enzymes, O2 supply, serial chest xrays, monitor weights and oral intake of fluids, nutritionist consult Admitted  to telemetry unit for monitoring , send 3 sets of cardiac enzymes to rule out acute coronary event, obtain ECHO to evaluate LVEF, cardiology consult  ,continue current management, O2 supply, anticoagulation plan as per cardiology consult   Palliative care consult requested ,to discuss advance directives and complete [facility] (06 Dec 2020 13:36)    PAST MEDICAL & SURGICAL HISTORY:  Constipation    Anemia    Dementia    Arteriosclerotic heart disease (ASHD)    Prostate CA    Atrial fibrillation    DM (diabetes mellitus)    Dementia    Depression    Diabetes    Hyperlipemia    CHF (congestive heart failure)    Dementia    Prostate ca    Diabetes    Hypertension    Atrial fibrillation    No significant past surgical history        MEDICATIONS  (STANDING):  ascorbic acid 500 milliGRAM(s) Oral daily  aspirin enteric coated 81 milliGRAM(s) Oral daily  dextrose 40% Gel 15 Gram(s) Oral once  dextrose 5%. 1000 milliLiter(s) (50 mL/Hr) IV Continuous <Continuous>  dextrose 5%. 1000 milliLiter(s) (100 mL/Hr) IV Continuous <Continuous>  dextrose 50% Injectable 25 Gram(s) IV Push once  dextrose 50% Injectable 12.5 Gram(s) IV Push once  dextrose 50% Injectable 25 Gram(s) IV Push once  donepezil 5 milliGRAM(s) Oral at bedtime  ferrous    sulfate 325 milliGRAM(s) Oral daily  furosemide    Tablet 20 milliGRAM(s) Oral daily  glucagon  Injectable 1 milliGRAM(s) IntraMuscular once  heparin   Injectable 5000 Unit(s) SubCutaneous every 12 hours  influenza   Vaccine 0.5 milliLiter(s) IntraMuscular once  insulin lispro (ADMELOG) corrective regimen sliding scale   SubCutaneous three times a day before meals  losartan 25 milliGRAM(s) Oral daily  metoprolol succinate ER 25 milliGRAM(s) Oral daily  multivitamin/minerals 1 Tablet(s) Oral daily  pantoprazole    Tablet 40 milliGRAM(s) Oral before breakfast  polyethylene glycol 3350 17 Gram(s) Oral daily  senna 2 Tablet(s) Oral at bedtime  simvastatin 20 milliGRAM(s) Oral at bedtime  tamsulosin 0.4 milliGRAM(s) Oral at bedtime    MEDICATIONS  (PRN):  acetaminophen   Tablet .. 500 milliGRAM(s) Oral every 6 hours PRN Temp greater or equal to 38C (100.4F), Mild Pain (1 - 3)      OBJECTIVE    T(C): 36.7 (12-08-20 @ 13:37), Max: 37.2 (12-08-20 @ 05:21)  HR: 71 (12-08-20 @ 13:37) (71 - 93)  BP: 106/65 (12-08-20 @ 13:37) (102/68 - 106/68)  RR: 17 (12-08-20 @ 13:37) (17 - 18)  SpO2: 92% (12-08-20 @ 13:37) (91% - 94%)  Wt(kg): --  I&O's Summary    07 Dec 2020 07:01  -  08 Dec 2020 07:00  --------------------------------------------------------  IN: 240 mL / OUT: 950 mL / NET: -710 mL          REVIEW OF SYSTEMS:  ROS is unobtainable due to dementia and confusion PATIENT IS CONFUSED AND IS UNABLE TO GIVE ANY/RELIABLE HISTORY OR REVIEW OF SYSTEMS PLEASE ALSO SEE UNDER HPI AND ASSESSMENT FOR OBTAINED REVIEW OF SYSTEMS     PHYSICAL EXAM:  Appearance: NAD. VS past 24 hrs -as above   HEENT:   Moist oral mucosa. Conjunctiva clear b/l.   Neck : supple  Respiratory: Lungs CTAB.  Gastrointestinal:  Soft, nontender. No rebound. No rigidity. BS present	  Cardiovascular: RRR ,S1S2 present  Neurologic: Non-focal. Moving all extremities.  Extremities: No edema. No erythema. No calf tenderness.  Skin: No rashes, No ecchymoses, No cyanosis.	  wounds ,skin lesions-See skin assesment flow sheet   LABS:                        11.1   6.00  )-----------( 253      ( 08 Dec 2020 10:16 )             34.1     12-08    143  |  105  |  18  ----------------------------<  146<H>  3.7   |  26  |  0.51    Ca    8.8      08 Dec 2020 10:16  Phos  3.0     12-07  Mg     1.8     12-07      CAPILLARY BLOOD GLUCOSE      POCT Blood Glucose.: 170 mg/dL (08 Dec 2020 12:07)  POCT Blood Glucose.: 147 mg/dL (08 Dec 2020 07:45)  POCT Blood Glucose.: 143 mg/dL (07 Dec 2020 21:29)  POCT Blood Glucose.: 124 mg/dL (07 Dec 2020 16:38)          Culture - Urine (collected 06 Dec 2020 16:21)  Source: .Urine Clean Catch (Midstream)  Final Report (07 Dec 2020 12:49):    No growth    Culture - Blood (collected 06 Dec 2020 15:01)  Source: .Blood Blood-Peripheral  Preliminary Report (07 Dec 2020 16:01):    No growth to date.    Culture - Blood (collected 06 Dec 2020 15:01)  Source: .Blood Blood-Peripheral  Preliminary Report (07 Dec 2020 16:01):    No growth to date.      RADIOLOGY & ADDITIONAL TESTS:   reviewed elctronically  ASSESSMENT/PLAN: 	  45minutes spent on this visit, 50% visit time spent in care co-ordination with other attendings and counselling patient  I have discussed care plan with patient and HCP,expressed understanding of problems treatment and their effect and side effects, alternatives in detail,I have asked if they have any questions and concerns and appropriately addressed them to best of my ability

## 2020-12-08 NOTE — DISCHARGE NOTE PROVIDER - HOSPITAL COURSE
h89 y o WM  presents sent to ED from [facility] for evaluation of  weakness.  No known fever/chills. no trauma. Patient is   poor historian,  PMD Dr Quintana,  no anticoagulants noted in  patient med list ,patient was admitted with GIB last year and BASA was stopped at that time .His  PMH is significant for :  Arteriosclerotic heart disease (ASHD)  Atrial fibrillation  ,CHF (congestive heart failure)  ,Dementia  ,Depression  ,DM (diabetes mellitus)  ,Hyperlipemia  ,Hypertension  ,Prostate ca Admit to monitored unit for cardiac monitoring, obtain echo to evaluate LVEF, intravenous diuresis as per card consult , monitor ins/outs, monitor renal profile and electrolytes closely ,send 3 sets of enzymes, O2 supply, serial chest xrays, monitor weights and oral intake of fluids, nutritionist consult Admitted  to telemetry unit for monitoring , send 3 sets of cardiac enzymes to rule out acute coronary event, obtain ECHO to evaluate LVEF, cardiology consult  ,continue current management, O2 supply, anticoagulation plan as per cardiology consult   Palliative care consult requested ,to discuss advance directives and complete [facility]     Nutritional Assessment:  · Nutritional Assessment  This patient has been assessed with a concern for Malnutrition and has been determined to have a diagnosis/diagnoses of Severe protein-calorie malnutrition.    This patient is being managed with:   Diet Dysphagia 1 Pureed-Thin Liquids-  Supplement Feeding Modality:  Oral  Glucerna Shake Cans or Servings Per Day:  1       Frequency:  Daily  Entered: Dec  7 2020  9:30AM    Problem/Plan - 1:  ·  Problem: Acute on chronic congestive heart failure, unspecified heart failure type.  Plan: Admit to monitored unit for cardiac monitoring, obtain echo to evaluate LVEF, intravenous diuresis as per card consult , monitor ins/outs, monitor renal profile and electrolytes closely ,send 3 sets of enzymes, O2 supply, serial chest xrays, monitor weights and oral intake of fluids, nutritionist consult.     Problem/Plan - 2:  ·  Problem: Atrial fibrillation.  Plan: Admitted  to telemetry unit for monitoring , send 3 sets of cardiac enzymes to rule out acute coronary event, obtain ECHO to evaluate LVEF, cardiology consult  ,continue current management, O2 supply, anticoagulation plan as per cardiology consult.     Problem/Plan - 3:  ·  Problem: Hypertension.  Plan: continue home medications.     Problem/Plan - 4:  ·  Problem: Diabetes.  Plan: corrective regimen sliding scale ,hypoglycemia protocol.     Problem/Plan - 5:  ·  Problem: Weakness.  Plan: OOB TC ,PT/OT.     Problem/Plan - 6:  Problem: Anemia. Plan: serial cbc.    Problem/Plan - 7:  ·  Problem: Dementia.  Plan: continue current management and present  medications Palliative care consult requested ,to discuss advance directives and complete /update  [facility].     Problem/Plan - 8:  ·  Problem: Constipation.  Plan: bowel regimen.     Problem/Plan - 9:  ·  Problem: Prophylactic measure.  Plan: Gastrointestinal stress ulcer prophylaxis and DVT prophylaxis administered.

## 2020-12-08 NOTE — PROGRESS NOTE ADULT - PROBLEM SELECTOR PROBLEM 2
-tylenol/motrin as needed  -push fluids  -Sudafed/flonase for ears  -Go to the ER with worsening symptoms      Pharyngitis: Strep (Confirmed)    You have had a positive test for strep throat. Strep throat is a contagious illness.  It is spread by coughing, sinus pain, or headache  · Painful lumps in the back of neck  · Stiff neck  · Lymph nodes getting larger or becoming soft in the middle  · You can't swallow liquids or you can't open your mouth wide because of throat pain  · Signs of dehydration.  These inc prescribed. If you have chronic liver or kidney disease, have ever had a stomach ulcer or gastrointestinal bleeding, or are taking blood-thinning medicines, talk with your healthcare provider before using these medicines.  Aspirin should never be given to a Atrial fibrillation

## 2020-12-08 NOTE — PROGRESS NOTE ADULT - SUBJECTIVE AND OBJECTIVE BOX
Date/Time Patient Seen:  		  Referring MD:   Data Reviewed	       Patient is a 89y old  Male who presents with a chief complaint of lethargy  chf (07 Dec 2020 13:53)      Subjective/HPI     PAST MEDICAL & SURGICAL HISTORY:  Constipation    Anemia    Dementia    Arteriosclerotic heart disease (ASHD)    Prostate CA    Atrial fibrillation    DM (diabetes mellitus)    No pertinent past medical history    Dementia    Depression    Diabetes    Hyperlipemia    CHF (congestive heart failure)    Dementia    Prostate ca    Diabetes    Hypertension    Atrial fibrillation    No significant past surgical history    No significant past surgical history          Medication list         MEDICATIONS  (STANDING):  ascorbic acid 500 milliGRAM(s) Oral daily  aspirin enteric coated 81 milliGRAM(s) Oral daily  dextrose 40% Gel 15 Gram(s) Oral once  dextrose 5%. 1000 milliLiter(s) (50 mL/Hr) IV Continuous <Continuous>  dextrose 5%. 1000 milliLiter(s) (100 mL/Hr) IV Continuous <Continuous>  dextrose 50% Injectable 25 Gram(s) IV Push once  dextrose 50% Injectable 12.5 Gram(s) IV Push once  dextrose 50% Injectable 25 Gram(s) IV Push once  donepezil 5 milliGRAM(s) Oral at bedtime  ferrous    sulfate 325 milliGRAM(s) Oral daily  glucagon  Injectable 1 milliGRAM(s) IntraMuscular once  heparin   Injectable 5000 Unit(s) SubCutaneous every 12 hours  influenza   Vaccine 0.5 milliLiter(s) IntraMuscular once  insulin lispro (ADMELOG) corrective regimen sliding scale   SubCutaneous three times a day before meals  losartan 25 milliGRAM(s) Oral daily  metoprolol succinate ER 25 milliGRAM(s) Oral daily  multivitamin/minerals 1 Tablet(s) Oral daily  pantoprazole    Tablet 40 milliGRAM(s) Oral before breakfast  polyethylene glycol 3350 17 Gram(s) Oral daily  senna 2 Tablet(s) Oral at bedtime  simvastatin 20 milliGRAM(s) Oral at bedtime  tamsulosin 0.4 milliGRAM(s) Oral at bedtime    MEDICATIONS  (PRN):  acetaminophen   Tablet .. 500 milliGRAM(s) Oral every 6 hours PRN Temp greater or equal to 38C (100.4F), Mild Pain (1 - 3)         Vitals log        ICU Vital Signs Last 24 Hrs  T(C): 37.2 (08 Dec 2020 05:21), Max: 37.2 (08 Dec 2020 05:21)  T(F): 99 (08 Dec 2020 05:21), Max: 99 (08 Dec 2020 05:21)  HR: 93 (08 Dec 2020 05:21) (79 - 94)  BP: 106/68 (08 Dec 2020 05:21) (98/62 - 106/68)  BP(mean): --  ABP: --  ABP(mean): --  RR: 18 (08 Dec 2020 05:21) (17 - 18)  SpO2: 91% (08 Dec 2020 05:21) (91% - 94%)           Input and Output:  I&O's Detail    06 Dec 2020 07:01  -  07 Dec 2020 07:00  --------------------------------------------------------  IN:  Total IN: 0 mL    OUT:    Voided (mL): 350 mL  Total OUT: 350 mL    Total NET: -350 mL      07 Dec 2020 07:01  -  08 Dec 2020 05:58  --------------------------------------------------------  IN:    Oral Fluid: 240 mL  Total IN: 240 mL    OUT:    Incontinent per Condom Catheter (mL): 950 mL  Total OUT: 950 mL    Total NET: -710 mL          Lab Data                        11.1   6.37  )-----------( 218      ( 07 Dec 2020 08:58 )             33.7     12-07    142  |  103  |  14  ----------------------------<  74  3.4<L>   |  30  |  0.46<L>    Ca    8.6      07 Dec 2020 08:58  Phos  3.0     12-07  Mg     1.8     12-07    TPro  7.3  /  Alb  3.2<L>  /  TBili  1.2  /  DBili  x   /  AST  18  /  ALT  19  /  AlkPhos  115  12-06      CARDIAC MARKERS ( 07 Dec 2020 08:58 )  <.015 ng/mL / x     / x     / x     / x      CARDIAC MARKERS ( 06 Dec 2020 11:08 )  <.015 ng/mL / x     / x     / x     / 1.6 ng/mL        Review of Systems	      Objective     Physical Examination  heart s1s2  lung dc BS  abd soft        Pertinent Lab findings & Imaging      [place]:  NO   Adequate UO     I&O's Detail    06 Dec 2020 07:01  -  07 Dec 2020 07:00  --------------------------------------------------------  IN:  Total IN: 0 mL    OUT:    Voided (mL): 350 mL  Total OUT: 350 mL    Total NET: -350 mL      07 Dec 2020 07:01  -  08 Dec 2020 05:58  --------------------------------------------------------  IN:    Oral Fluid: 240 mL  Total IN: 240 mL    OUT:    Incontinent per Condom Catheter (mL): 950 mL  Total OUT: 950 mL    Total NET: -710 mL               Discussed with:     Cultures:	        Radiology

## 2020-12-08 NOTE — PROGRESS NOTE ADULT - ATTENDING COMMENTS
89 y o WM  presents sent to ED from [facility] for evaluation of  weakness.  No known fever/chills. no trauma. Patient is   poor historian,  PMD Dr Quintana,  no anticoagulants noted in  patient med list ,patient was admitted with GIB last year and BASA was stopped at that time .His  PMH is significant for :  Arteriosclerotic heart disease (ASHD)  Atrial fibrillation  ,CHF (congestive heart failure)  ,Dementia  ,Depression  ,DM (diabetes mellitus)  ,Hyperlipemia  ,Hypertension  ,Prostate ca Admit to monitored unit for cardiac monitoring, obtain echo to evaluate LVEF, intravenous diuresis as per card consult , monitor ins/outs, monitor renal profile and electrolytes closely ,send 3 sets of enzymes, O2 supply, serial chest xrays, monitor weights and oral intake of fluids, nutritionist consult Admitted  to telemetry unit for monitoring , send 3 sets of cardiac enzymes to rule out acute coronary event, obtain ECHO to evaluate LVEF, cardiology consult  ,continue current management, O2 supply, anticoagulation plan as per cardiology consult   Palliative care consult requested ,to discuss advance directives and complete MOLST

## 2020-12-09 ENCOUNTER — TRANSCRIPTION ENCOUNTER (OUTPATIENT)
Age: 85
End: 2020-12-09

## 2020-12-09 VITALS
RESPIRATION RATE: 18 BRPM | SYSTOLIC BLOOD PRESSURE: 128 MMHG | HEART RATE: 80 BPM | DIASTOLIC BLOOD PRESSURE: 68 MMHG | OXYGEN SATURATION: 92 % | TEMPERATURE: 98 F

## 2020-12-09 LAB
MELD SCORE WITH DIALYSIS: 9 POINTS — SIGNIFICANT CHANGE UP
SARS-COV-2 RNA SPEC QL NAA+PROBE: SIGNIFICANT CHANGE UP

## 2020-12-09 PROCEDURE — 71045 X-RAY EXAM CHEST 1 VIEW: CPT | Mod: 26

## 2020-12-09 RX ORDER — FUROSEMIDE 40 MG
1 TABLET ORAL
Qty: 30 | Refills: 0
Start: 2020-12-09 | End: 2021-01-07

## 2020-12-09 RX ORDER — METOPROLOL TARTRATE 50 MG
1 TABLET ORAL
Qty: 30 | Refills: 0
Start: 2020-12-09 | End: 2021-01-07

## 2020-12-09 RX ORDER — LOSARTAN POTASSIUM 100 MG/1
1 TABLET, FILM COATED ORAL
Qty: 30 | Refills: 0
Start: 2020-12-09 | End: 2021-01-07

## 2020-12-09 RX ADMIN — Medication 20 MILLIGRAM(S): at 06:23

## 2020-12-09 RX ADMIN — Medication 25 MILLIGRAM(S): at 06:22

## 2020-12-09 RX ADMIN — PANTOPRAZOLE SODIUM 40 MILLIGRAM(S): 20 TABLET, DELAYED RELEASE ORAL at 06:22

## 2020-12-09 RX ADMIN — LOSARTAN POTASSIUM 25 MILLIGRAM(S): 100 TABLET, FILM COATED ORAL at 06:22

## 2020-12-09 RX ADMIN — Medication 1 TABLET(S): at 11:46

## 2020-12-09 RX ADMIN — Medication 500 MILLIGRAM(S): at 11:46

## 2020-12-09 RX ADMIN — POLYETHYLENE GLYCOL 3350 17 GRAM(S): 17 POWDER, FOR SOLUTION ORAL at 11:46

## 2020-12-09 RX ADMIN — Medication 1: at 11:47

## 2020-12-09 RX ADMIN — Medication 325 MILLIGRAM(S): at 11:46

## 2020-12-09 RX ADMIN — HEPARIN SODIUM 5000 UNIT(S): 5000 INJECTION INTRAVENOUS; SUBCUTANEOUS at 06:22

## 2020-12-09 RX ADMIN — Medication 81 MILLIGRAM(S): at 11:46

## 2020-12-09 NOTE — PROGRESS NOTE ADULT - PROBLEM SELECTOR PROBLEM 1
Palliative care encounter
Palliative care encounter
Acute on chronic congestive heart failure, unspecified heart failure type

## 2020-12-09 NOTE — PROGRESS NOTE ADULT - PROBLEM SELECTOR PLAN 1
pt with c/o weakness - known dementia - medical hx reviewed - lives in assisted  MOLST reviewed - updated - pt is DNR -  started on LASIX  covid PCR neg on 12 8 2020  assist with ADL  work up in progress  fall prec   monitor for needs  CT head reviewed - CT chest reviewed -   old records reviewed.  SLP kalyan noted - Dysphagia diet - asp prec - HOB elev -.
pt with c/o weakness - known dementia - medical hx reviewed - lives in snf  MOLST reviewed - updated - pt is DNR -  assist with ADL  work up in progress  fall prec   monitor for needs  CT head reviewed - CT chest reviewed -   old records reviewed.  SLP kalyan noted - Dysphagia diet - asp prec - HOB elev -
Admit to monitored unit for cardiac monitoring, obtain echo to evaluate LVEF, intravenous diuresis as per card consult , monitor ins/outs, monitor renal profile and electrolytes closely ,send 3 sets of enzymes, O2 supply, serial chest xrays, monitor weights and oral intake of fluids, nutritionist consult

## 2020-12-09 NOTE — PROGRESS NOTE ADULT - SUBJECTIVE AND OBJECTIVE BOX
PROGRESS NOTE  Patient is a 89y old  Male who presents with a chief complaint of chf (09 Dec 2020 09:13)  Chart and available morning labs /imaging are reviewed electronically , urgent issues addressed . More information  is being added upon completion of rounds , when more information is collected and management discussed with consultants , medical staff and social service/case management on the floor   LATE ENTRY- patient was seen and examined earlier today . Plan of care was discussed with med staff and unit coordinator .   OVERNIGHT  No new issues reported by medical staff . All above noted Patient is resting in a bed comfortably .Confused ,poor mentation .No distress noted     HPI:  89 y o WM  presents sent to ED from [facility] for evaluation of  weakness.  No known fever/chills. no trauma. Patient is   poor historian,  PMD Dr Quintana,  no anticoagulants noted in  patient med list ,patient was admitted with GIB last year and BASA was stopped at that time .His  PMH is significant for :  Arteriosclerotic heart disease (ASHD)  Atrial fibrillation  ,CHF (congestive heart failure)  ,Dementia  ,Depression  ,DM (diabetes mellitus)  ,Hyperlipemia  ,Hypertension  ,Prostate ca Admit to monitored unit for cardiac monitoring, obtain echo to evaluate LVEF, intravenous diuresis as per card consult , monitor ins/outs, monitor renal profile and electrolytes closely ,send 3 sets of enzymes, O2 supply, serial chest xrays, monitor weights and oral intake of fluids, nutritionist consult Admitted  to telemetry unit for monitoring , send 3 sets of cardiac enzymes to rule out acute coronary event, obtain ECHO to evaluate LVEF, cardiology consult  ,continue current management, O2 supply, anticoagulation plan as per cardiology consult   Palliative care consult requested ,to discuss advance directives and complete MOLST (06 Dec 2020 13:36)    PAST MEDICAL & SURGICAL HISTORY:  Constipation    Anemia    Dementia    Arteriosclerotic heart disease (ASHD)    Prostate CA    Atrial fibrillation    DM (diabetes mellitus)    Dementia    Depression    Diabetes    Hyperlipemia    CHF (congestive heart failure)    Dementia    Prostate ca    Diabetes    Hypertension    Atrial fibrillation    No significant past surgical history        MEDICATIONS  (STANDING):  ascorbic acid 500 milliGRAM(s) Oral daily  aspirin enteric coated 81 milliGRAM(s) Oral daily  dextrose 40% Gel 15 Gram(s) Oral once  dextrose 5%. 1000 milliLiter(s) (50 mL/Hr) IV Continuous <Continuous>  dextrose 5%. 1000 milliLiter(s) (100 mL/Hr) IV Continuous <Continuous>  dextrose 50% Injectable 25 Gram(s) IV Push once  dextrose 50% Injectable 12.5 Gram(s) IV Push once  dextrose 50% Injectable 25 Gram(s) IV Push once  donepezil 5 milliGRAM(s) Oral at bedtime  ferrous    sulfate 325 milliGRAM(s) Oral daily  furosemide    Tablet 20 milliGRAM(s) Oral daily  glucagon  Injectable 1 milliGRAM(s) IntraMuscular once  heparin   Injectable 5000 Unit(s) SubCutaneous every 12 hours  influenza   Vaccine 0.5 milliLiter(s) IntraMuscular once  insulin lispro (ADMELOG) corrective regimen sliding scale   SubCutaneous three times a day before meals  losartan 25 milliGRAM(s) Oral daily  metoprolol succinate ER 25 milliGRAM(s) Oral daily  multivitamin/minerals 1 Tablet(s) Oral daily  pantoprazole    Tablet 40 milliGRAM(s) Oral before breakfast  polyethylene glycol 3350 17 Gram(s) Oral daily  senna 2 Tablet(s) Oral at bedtime  simvastatin 20 milliGRAM(s) Oral at bedtime  tamsulosin 0.4 milliGRAM(s) Oral at bedtime    MEDICATIONS  (PRN):  acetaminophen   Tablet .. 500 milliGRAM(s) Oral every 6 hours PRN Temp greater or equal to 38C (100.4F), Mild Pain (1 - 3)      OBJECTIVE    T(C): 36.8 (12-09-20 @ 05:53), Max: 37 (12-08-20 @ 20:44)  HR: 80 (12-09-20 @ 05:53) (80 - 83)  BP: 128/68 (12-09-20 @ 05:53) (117/69 - 128/68)  RR: 18 (12-09-20 @ 05:53) (18 - 18)  SpO2: 92% (12-09-20 @ 05:53) (92% - 92%)  Wt(kg): --  I&O's Summary    08 Dec 2020 07:01  -  09 Dec 2020 07:00  --------------------------------------------------------  IN: 180 mL / OUT: 0 mL / NET: 180 mL          REVIEW OF SYSTEMS:  ROS is unobtainable due to dementia and confusion PATIENT IS CONFUSED AND IS UNABLE TO GIVE ANY/RELIABLE HISTORY OR REVIEW OF SYSTEMS PLEASE ALSO SEE UNDER HPI AND ASSESSMENT FOR OBTAINED REVIEW OF SYSTEMS     PHYSICAL EXAM:  Appearance: NAD. VS past 24 hrs -as above   HEENT:   Moist oral mucosa. Conjunctiva clear b/l.   Neck : supple  Respiratory: Lungs CTAB.  Gastrointestinal:  Soft, nontender. No rebound. No rigidity. BS present	  Cardiovascular: RRR ,S1S2 present  Neurologic: Non-focal. Moving all extremities.  Extremities: No edema. No erythema. No calf tenderness.  Skin: No rashes, No ecchymoses, No cyanosis.	  wounds ,skin lesions-See skin assesment flow sheet   LABS:                        11.1   6.00  )-----------( 253      ( 08 Dec 2020 10:16 )             34.1     12-09    144  |  x   |  x   ----------------------------<  x   x    |  x   |  0.48<L>    Ca    8.8      08 Dec 2020 10:16    TPro  x   /  Alb  x   /  TBili  0.7  /  DBili  x   /  AST  x   /  ALT  x   /  AlkPhos  x   12-09    CAPILLARY BLOOD GLUCOSE      POCT Blood Glucose.: 197 mg/dL (09 Dec 2020 11:36)  POCT Blood Glucose.: 129 mg/dL (09 Dec 2020 07:57)  POCT Blood Glucose.: 123 mg/dL (08 Dec 2020 21:22)  POCT Blood Glucose.: 163 mg/dL (08 Dec 2020 16:51)    PT/INR - ( 09 Dec 2020 08:11 )   PT: 14.5 sec;   INR: 1.25 ratio               Culture - Urine (collected 06 Dec 2020 16:21)  Source: .Urine Clean Catch (Midstream)  Final Report (07 Dec 2020 12:49):    No growth    Culture - Blood (collected 06 Dec 2020 15:01)  Source: .Blood Blood-Peripheral  Preliminary Report (07 Dec 2020 16:01):    No growth to date.    Culture - Blood (collected 06 Dec 2020 15:01)  Source: .Blood Blood-Peripheral  Preliminary Report (07 Dec 2020 16:01):    No growth to date.      RADIOLOGY & ADDITIONAL TESTS:   reviewed elctronically  ASSESSMENT/PLAN: 	Patient was seen and examined on a day of discharge . Plan of care , discharge medications and recommendations discussed with consultants and clearance for discharge obtained .Social service , case management  and medical staff are aware of plan. Family is notified. Discharge summary  is  prepared electronically     75minutes spent on this visit, 50% visit time spent in care co-ordination with other attendings and counselling patient  I have discussed care plan with patient and HCP,expressed understanding of problems treatment and their effect and side effects, alternatives in detail,I have asked if they have any questions and concerns and appropriately addressed them to best of my ability

## 2020-12-09 NOTE — PROGRESS NOTE ADULT - NUTRITIONAL ASSESSMENT
This patient has been assessed with a concern for Malnutrition and has been determined to have a diagnosis/diagnoses of Severe protein-calorie malnutrition.    This patient is being managed with:   Diet Dysphagia 1 Pureed-Thin Liquids-  Supplement Feeding Modality:  Oral  Glucerna Shake Cans or Servings Per Day:  1       Frequency:  Daily  Entered: Dec  7 2020  9:30AM    

## 2020-12-09 NOTE — DISCHARGE NOTE NURSING/CASE MANAGEMENT/SOCIAL WORK - PATIENT PORTAL LINK FT
You can access the FollowMyHealth Patient Portal offered by [facility] by registering at the following website: [email or web address]. By joining [employer]’s FollowMyHealth portal, you will also be able to view your health information using other applications (apps) compatible with our system.

## 2020-12-09 NOTE — PROGRESS NOTE ADULT - ASSESSMENT
TITO,  283  10/16/1926 DOA 12/6/2020 DR RACADIO FRIAS     REVIEW OF SYMPTOMS      Able to give ROS  NO     PHYSICAL EXAM    HEENT Unremarkable PERRLA atraumatic   RESP Fair air entry EXP prolonged    Harsh breath sound Resp distres mild   CARDIAC S1 S2 No S3     NO JVD    ABDOMEN SOFT BS PRESENT NOT DISTENDED No hepatosplenomegaly PEDAL EDEMA present No calf tenderness  NO rash     PT DATA/BEST PRACTICE  ALLERGY       latex        WT                     12/6/2020 72  BMI                       12/6/2020 21                ADVANCED DIRECTIVE   dnr   HEAD OF BED ELEVATION Yes      DVT PROPHYLAXIS.     hpsc (12/6/2020)    DIET.cons carb (12/6/2020) --> dys 1 purtee thin Glucerna (12/7)                                                                SEGUNDO PROPHYLAXIS.   INFECTION PPLX                                                    OXYGENATION.   12/6-12/7/2020 ra 93%- 3l 95%       VITALS.   12/7/2020 afeb 72 100/67       APA AC.   ASA 81 (12/6/2020)   hpsc (12/6/2020)   DM  ISS (12/6/2020)   METFORMIN 500.2 (12/6/2020)   CARDIAC  LOSARTAN 25 (12/6/20200   METOPROLOL 25 (12/6/2020)   MISC  PROTONIX 40 (12/6/2020)    DONEPEZIL 5 (12/6/20200   TAMSULOSIN .4 (12/6/2020)         PATIENT SUMMARY.   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 PMH HO  injury L shin remote past 3/18/2014 V duplex legs –ben Chr swelling leg  chronic l leg swelling OBS Prostate CA CHF 1/31/2020 N lvsf ef 55% Mod TR N rvsp 33  pacemaker    A fib  (NOT on coumadin) Htn Depression DM      LUNG NODULE 1/30/2020 CT  3 mm nodule rosa lll 3 mm calkcified nodule rml was admitted as transfer from  home 12/6/2020 with weakness   CXR showed mild congestion on 12/6/2020   ct head 12/6/2020 was unremarkable   Pulm consulted 12/6/2020     home meds tamsulosin .4 protonix 40 senna donepezil 5 simvastat 20 mtformin asa 81     er vitals afeb 96 120/60 ra 93%     PROBLEM/ASSESSMENT PLAN.  ALTERED MENTAL STATE POA   12/6/2020 CT head unremarkable   Monitor   RESP   Monitor target po 90-95%  CHF  bnp 12/6/2020 bnp 1365   1/31/2020 N lvsf ef 55% Mod TR N rvsp 33   HO SUBCM LUNG NODULES   will plan ct ch     12/7/2020 DW SON     TIME SPENT   Over 25 minutes aggregate care time spent on encounter; activities included   direct patient care, counseling and/or coordinating care reviewing notes, lab data/ imaging , discussion with multidisciplinary team/ patient  /family and explaining in detail risks, benefits, alternatives  of the recommendations     TERESA FRANCIS [id number]  10/16/1926 DOA 12/6/2020 DR ARCADIO FRIAS  
89 y o WM  presents sent to ED from [facility] for evaluation of  weakness.  No known fever/chills. no trauma. Patient is   poor historian,  PMD Dr Quintana,  no anticoagulants noted in  patient med list ,patient was admitted with GIB last year and BASA was stopped at that time .His  PMH is significant for :  Arteriosclerotic heart disease (ASHD)  Atrial fibrillation  ,CHF (congestive heart failure)  ,Dementia  ,Depression  ,DM (diabetes mellitus)  ,Hyperlipemia  ,Hypertension  ,Prostate ca Admit to monitored unit for cardiac monitoring, obtain echo to evaluate LVEF, intravenous diuresis as per card consult , monitor ins/outs, monitor renal profile and electrolytes closely ,send 3 sets of enzymes, O2 supply, serial chest xrays, monitor weights and oral intake of fluids, nutritionist consult Admitted  to telemetry unit for monitoring , send 3 sets of cardiac enzymes to rule out acute coronary event, obtain ECHO to evaluate LVEF, cardiology consult  ,continue current management, O2 supply, anticoagulation plan as per cardiology consult   Palliative care consult requested ,to discuss advance directives and complete MOLST
89 y o WM  presents sent to ED from [facility] for evaluation of  weakness.  No known fever/chills. no trauma. Patient is   poor historian,  PMD Dr Quintana,  no anticoagulants noted in  patient med list ,patient was admitted with GIB last year and BASA was stopped at that time .His  PMH is significant for :  Arteriosclerotic heart disease (ASHD)  Atrial fibrillation  ,CHF (congestive heart failure)  ,Dementia  ,Depression  ,DM (diabetes mellitus)  ,Hyperlipemia  ,Hypertension  ,Prostate ca Admit to monitored unit for cardiac monitoring, obtain echo to evaluate LVEF, intravenous diuresis as per card consult , monitor ins/outs, monitor renal profile and electrolytes closely ,send 3 sets of enzymes, O2 supply, serial chest xrays, monitor weights and oral intake of fluids, nutritionist consult Admitted  to telemetry unit for monitoring , send 3 sets of cardiac enzymes to rule out acute coronary event, obtain ECHO to evaluate LVEF, cardiology consult  ,continue current management, O2 supply, anticoagulation plan as per cardiology consult   Palliative care consult requested ,to discuss advance directives and complete MOLST
89 y o WM  presents sent to ED from [facility] for evaluation of  weakness.  No known fever/chills. no trauma. Patient is   poor historian,  PMD Dr Quintana,  no anticoagulants noted in  patient med list ,patient was admitted with GIB last year and BASA was stopped at that time .His  PMH is significant for :  Arteriosclerotic heart disease (ASHD)  Atrial fibrillation  ,CHF (congestive heart failure)  ,Dementia  ,Depression  ,DM (diabetes mellitus)  ,Hyperlipemia  ,Hypertension  ,Prostate ca Admit to monitored unit for cardiac monitoring, obtain echo to evaluate LVEF, intravenous diuresis as per card consult , monitor ins/outs, monitor renal profile and electrolytes closely ,send 3 sets of enzymes, O2 supply, serial chest xrays, monitor weights and oral intake of fluids, nutritionist consult Admitted  to telemetry unit for monitoring , send 3 sets of cardiac enzymes to rule out acute coronary event, obtain ECHO to evaluate LVEF, cardiology consult  ,continue current management, O2 supply, anticoagulation plan as per cardiology consult   Palliative care consult requested ,to discuss advance directives and complete MOLST
pt with chf  echo for lv function  add b.b and arb  troponin x3  ashd - atrial fib  s/p ppm  alzheimer's disease   to continue lasix  repeat xray chest  echo  ef 55%- mild aortic stenosis
pt with chf  echo for lv function  add b.b and arb  troponin x3  ashd - atrial fib  s/p ppm  alzheimer's disease   to continue lasix  repeat xray chest  echo for lv function
pt with chf  echo for lv function  add b.b and arb  troponin x3  ashd - atrial fib  s/p ppm  alzheimer's disease   to continue lasix  repeat xray chest  echo for lv function
  TITO,  283  10/16/1926 DOA 12/6/2020 DR ARCADIO FRIAS     REVIEW OF SYMPTOMS      Able to give ROS  NO     PHYSICAL EXAM    HEENT Unremarkable PERRLA atraumatic   RESP Fair air entry EXP prolonged    Harsh breath sound Resp distres mild   CARDIAC S1 S2 No S3     NO JVD    ABDOMEN SOFT BS PRESENT NOT DISTENDED No hepatosplenomegaly PEDAL EDEMA present No calf tenderness  NO rash     PT DATA/BEST PRACTICE  ALLERGY       latex        WT                     12/6/2020 72  BMI                       12/6/2020 21                ADVANCED DIRECTIVE   dnr   HEAD OF BED ELEVATION Yes      DVT PROPHYLAXIS.     hpsc (12/6/2020)    DIET.cons carb (12/6/2020) --> dys 1 purtee thin Glucerna (12/7)                                                                SEGUNDO PROPHYLAXIS.   INFECTION PPLX                                                    OXYGENATION.   12/6-12/7-12/8/2020 ra 93%- 3l 95% - nc 92%       VITALS.   12/8/2020 99f 87 100/68       APA AC.   ASA 81 (12/6/2020)   hpsc (12/6/2020)   DM  ISS (12/6/2020)   METFORMIN 500.2 (12/6/2020)   CARDIAC  LASIX 20 (12/8)   LOSARTAN 25 (12/6/20200   METOPROLOL 25 (12/6/2020)   MISC  PROTONIX 40 (12/6/2020)    DONEPEZIL 5 (12/6/20200   TAMSULOSIN .4 (12/6/2020)             PATIENT SUMMARY.   89 M [employer] employee PMH HO  injury L shin remote past 3/18/2014 V duplex legs –ben Chr swelling leg  chronic l leg swelling OBS Prostate CA CHF 1/31/2020 N lvsf ef 55% Mod TR N rvsp 33  pacemaker    A fib  (NOT on coumadin) Htn Depression DM      LUNG NODULE 1/30/2020 CT  3 mm nodule rosa lll 3 mm calkcified nodule rml was admitted as transfer from [facility] home 12/6/2020 with weakness   CXR showed mild congestion on 12/6/2020   ct head 12/6/2020 was unremarkable   Pulm consulted 12/6/2020     home meds tamsulosin .4 protonix 40 senna donepezil 5 simvastat 20 mtformin asa 81     er vitals afeb 96 120/60 ra 93%     PROBLEM/ASSESSMENT PLAN.  ALTERED MENTAL STATE POA   12/6/2020 CT head unremarkable   Monitor   RESP   Monitor target po 90-95%  CHF  bnp 12/6/2020 bnp 1365   1/31/2020 N lvsf ef 55% Mod TR N rvsp 33   LASIX 20 (12/8)   LOSARTAN 25 (12/6/20200   Compesated   HO SUBCM LUNG NODULES   CT ch 12/7/2020 ct ch 8 mm r apical nodule nsc 3 m followup basal dependent consolidation or atelectasis Small bl layering effsns Prominent pulm artey   dw datr 12/8/2020  She wants to move him to [place]     12/7/2020 DW SON       TIME SPENT   Over 25 minutes aggregate care time spent on encounter; activities included   direct patient care, counseling and/or coordinating care reviewing notes, lab data/ imaging , discussion with multidisciplinary team/ patient  /family and explaining in detail risks, benefits, alternatives  of the recommendations     TERESA FRANCIS [id number]  10/16/1926 DOA 12/6/2020 DR ARCADIO FRIAS      
  TITO,  283  10/16/1926 DOA 12/6/2020 DR ARCADIO FRIAS     REVIEW OF SYMPTOMS      Able to give ROS  NO     PHYSICAL EXAM    HEENT Unremarkable PERRLA atraumatic   RESP Fair air entry EXP prolonged    Harsh breath sound Resp distres mild   CARDIAC S1 S2 No S3     NO JVD    ABDOMEN SOFT BS PRESENT NOT DISTENDED No hepatosplenomegaly PEDAL EDEMA present No calf tenderness  NO rash     PT DATA/BEST PRACTICE  ALLERGY       latex        WT                     12/6/2020 72  BMI                       12/6/2020 21                ADVANCED DIRECTIVE   dnr   HEAD OF BED ELEVATION Yes      DVT PROPHYLAXIS.     hpsc (12/6/2020)    DIET.cons carb (12/6/2020) --> dys 1 purtee thin Glucerna (12/7)                                                                SEGUNDO PROPHYLAXIS.   INFECTION PPLX                                                    OXYGENATION.   12/6-12/7-12/8/2020 ra 93%- 3l 95% - nc 92%       VITALS.   12/9/2020 afeb 80 120/68       PATIENT SUMMARY.   89 M [employer] employee PMH HO  injury L shin remote past 3/18/2014 V duplex legs –ben Chr swelling leg  chronic l leg swelling OBS Prostate CA CHF 1/31/2020 N lvsf ef 55% Mod TR N rvsp 33  pacemaker    A fib  (NOT on coumadin) Htn Depression DM      LUNG NODULE 1/30/2020 CT  3 mm nodule rosa lll 3 mm calkcified nodule rml was admitted as transfer from  home 12/6/2020 with weakness   CXR showed mild congestion on 12/6/2020   ct head 12/6/2020 was unremarkable   Pulm consulted 12/6/2020     home meds tamsulosin .4 protonix 40 senna donepezil 5 simvastat 20 mtformin asa 81     er vitals afeb 96 120/60 ra 93%     PROBLEM/ASSESSMENT PLAN.  ALTERED MENTAL STATE POA   12/6/2020 CT head unremarkable   Monitor   RESP   Monitor target po 90-95%  CHF  bnp 12/6/2020 bnp 1365   1/31/2020 N lvsf ef 55% Mod TR N rvsp 33   LASIX 20 (12/8)   LOSARTAN 25 (12/6/20200   Compesated   HO SUBCM LUNG NODULES   CT ch 12/7/2020 ct ch 8 mm r apical nodule nsc 3 m followup basal dependent consolidation or atelectasis Small bl layering effsns Prominent pulm artey   dw datr 12/8/2020  She wants to move him to [place]     12/7/2020 DW SON       TIME SPENT   Over 25 minutes aggregate care time spent on encounter; activities included   direct patient care, counseling and/or coordinating care reviewing notes, lab data/ imaging , discussion with multidisciplinary team/ patient  /family and explaining in detail risks, benefits, alternatives  of the recommendations     TERESA FRANCIS [phone]  10/16/1926 DOA 12/6/2020 DR ARCADIO FRIAS

## 2020-12-09 NOTE — PROGRESS NOTE ADULT - SUBJECTIVE AND OBJECTIVE BOX
Patient is a 89y Male with a known history of :  Prophylactic measure [Z29.9]    Constipation [K59.00]    Dementia [F03.90]    Diabetes [E11.9]    Palliative care encounter [Z51.5]    Hyperlipemia [E78.5]    Hypertension [I10]    Atrial fibrillation [I48.91]    DM (diabetes mellitus) [E11.9]    Anemia [D64.9]    Acute on chronic congestive heart failure, unspecified heart failure type [I50.9]    Weakness [R53.1]      HPI:  89 y o WM  presents sent to ED from [facility] for evaluation of  weakness.  No known fever/chills. no trauma. Patient is   poor historian,  PMD Dr Qunitana,  no anticoagulants noted in  patient med list ,patient was admitted with GIB last year and BASA was stopped at that time .His  PMH is significant for :  Arteriosclerotic heart disease (ASHD)  Atrial fibrillation  ,CHF (congestive heart failure)  ,Dementia  ,Depression  ,DM (diabetes mellitus)  ,Hyperlipemia  ,Hypertension  ,Prostate ca Admit to monitored unit for cardiac monitoring, obtain echo to evaluate LVEF, intravenous diuresis as per card consult , monitor ins/outs, monitor renal profile and electrolytes closely ,send 3 sets of enzymes, O2 supply, serial chest xrays, monitor weights and oral intake of fluids, nutritionist consult Admitted  to telemetry unit for monitoring , send 3 sets of cardiac enzymes to rule out acute coronary event, obtain ECHO to evaluate LVEF, cardiology consult  ,continue current management, O2 supply, anticoagulation plan as per cardiology consult   Palliative care consult requested ,to discuss advance directives and complete [facility] (06 Dec 2020 13:36)      REVIEW OF SYSTEMS:    CONSTITUTIONAL: No fever, weight loss, or fatigue  EYES: No eye pain, visual disturbances, or discharge  ENMT:  No difficulty hearing, tinnitus, vertigo; No sinus or throat pain  NECK: No pain or stiffness  BREASTS: No pain, masses, or nipple discharge  RESPIRATORY: No cough, wheezing, chills or hemoptysis; No shortness of breath  CARDIOVASCULAR: No chest pain, palpitations, dizziness, or leg swelling  GASTROINTESTINAL: No abdominal or epigastric pain. No nausea, vomiting, or hematemesis; No diarrhea or constipation. No melena or hematochezia.  GENITOURINARY: No dysuria, frequency, hematuria, or incontinence  NEUROLOGICAL: No headaches, memory loss, loss of strength, numbness, or tremors  SKIN: No itching, burning, rashes, or lesions   LYMPH NODES: No enlarged glands  ENDOCRINE: No heat or cold intolerance; No hair loss  MUSCULOSKELETAL: No joint pain or swelling; No muscle, back, or extremity pain  PSYCHIATRIC: No depression, anxiety, mood swings, or difficulty sleeping  HEME/LYMPH: No easy bruising, or bleeding gums  ALLERGY AND IMMUNOLOGIC: No hives or eczema    MEDICATIONS  (STANDING):  ascorbic acid 500 milliGRAM(s) Oral daily  aspirin enteric coated 81 milliGRAM(s) Oral daily  dextrose 40% Gel 15 Gram(s) Oral once  dextrose 5%. 1000 milliLiter(s) (50 mL/Hr) IV Continuous <Continuous>  dextrose 5%. 1000 milliLiter(s) (100 mL/Hr) IV Continuous <Continuous>  dextrose 50% Injectable 25 Gram(s) IV Push once  dextrose 50% Injectable 12.5 Gram(s) IV Push once  dextrose 50% Injectable 25 Gram(s) IV Push once  donepezil 5 milliGRAM(s) Oral at bedtime  ferrous    sulfate 325 milliGRAM(s) Oral daily  furosemide    Tablet 20 milliGRAM(s) Oral daily  glucagon  Injectable 1 milliGRAM(s) IntraMuscular once  heparin   Injectable 5000 Unit(s) SubCutaneous every 12 hours  influenza   Vaccine 0.5 milliLiter(s) IntraMuscular once  insulin lispro (ADMELOG) corrective regimen sliding scale   SubCutaneous three times a day before meals  losartan 25 milliGRAM(s) Oral daily  metoprolol succinate ER 25 milliGRAM(s) Oral daily  multivitamin/minerals 1 Tablet(s) Oral daily  pantoprazole    Tablet 40 milliGRAM(s) Oral before breakfast  polyethylene glycol 3350 17 Gram(s) Oral daily  senna 2 Tablet(s) Oral at bedtime  simvastatin 20 milliGRAM(s) Oral at bedtime  tamsulosin 0.4 milliGRAM(s) Oral at bedtime    MEDICATIONS  (PRN):  acetaminophen   Tablet .. 500 milliGRAM(s) Oral every 6 hours PRN Temp greater or equal to 38C (100.4F), Mild Pain (1 - 3)      ALLERGIES: latex (Rash)  latex (Unknown)  No Known Drug Allergies      FAMILY HISTORY:      PHYSICAL EXAMINATION:  -----------------------------  T(C): 36.8 (12-09-20 @ 05:53), Max: 37 (12-08-20 @ 20:44)  HR: 80 (12-09-20 @ 05:53) (71 - 83)  BP: 128/68 (12-09-20 @ 05:53) (106/65 - 128/68)  RR: 18 (12-09-20 @ 05:53) (17 - 18)  SpO2: 92% (12-09-20 @ 05:53) (92% - 92%)  Wt(kg): --    12-08 @ 07:01  -  12-09 @ 07:00  --------------------------------------------------------  IN:    Oral Fluid: 180 mL  Total IN: 180 mL    OUT:  Total OUT: 0 mL    Total NET: 180 mL            VITALS  T(C): 36.8 (12-09-20 @ 05:53), Max: 37 (12-08-20 @ 20:44)  HR: 80 (12-09-20 @ 05:53) (71 - 83)  BP: 128/68 (12-09-20 @ 05:53) (106/65 - 128/68)  RR: 18 (12-09-20 @ 05:53) (17 - 18)  SpO2: 92% (12-09-20 @ 05:53) (92% - 92%)    Constitutional: well developed, normal appearance, well groomed, well nourished, no deformities and no acute distress.   Eyes: the conjunctiva exhibited no abnormalities and the eyelids demonstrated no xanthelasmas.   HEENT: normal oral mucosa, no oral pallor and no oral cyanosis.   Neck: normal jugular venous A waves present, normal jugular venous V waves present and no jugular venous jacobs A waves.   Pulmonary: no respiratory distress, normal respiratory rhythm and effort, no accessory muscle use and lungs were clear to auscultation bilaterally.   Cardiovascular: heart rate and rhythm were normal, normal S1 and S2 and no murmur, gallop, rub, heave or thrill are present.   Abdomen: soft, non-tender, no hepato-splenomegaly and no abdominal mass palpated.   Musculoskeletal: the gait could not be assessed..   Extremities: no clubbing of the fingernails, no localized cyanosis, no petechial hemorrhages and no ischemic changes.   Skin: normal skin color and pigmentation, no rash, no venous stasis, no skin lesions, no skin ulcer and no xanthoma was observed.   Psychiatric: oriented to person, place, and time, the affect was normal, the mood was normal and not feeling anxious.     LABS:   --------  12-09    144  |  x   |  x   ----------------------------<  x   x    |  x   |  0.48<L>    Ca    8.8      08 Dec 2020 10:16    TPro  x   /  Alb  x   /  TBili  0.7  /  DBili  x   /  AST  x   /  ALT  x   /  AlkPhos  x   12-09                         11.1   6.00  )-----------( 253      ( 08 Dec 2020 10:16 )             34.1     PT/INR - ( 09 Dec 2020 08:11 )   PT: 14.5 sec;   INR: 1.25 ratio           12-08 @ 10:16 BNP: 629 pg/mL    12-08 @ 10:16 CPK total:--, CKMB --, Troponin I - <.015 ng/mL          RADIOLOGY:  -----------------    ECG:     ECHO:

## 2020-12-09 NOTE — PROGRESS NOTE ADULT - NSHPATTENDINGPLANDISCUSS_GEN_ALL_CORE
DAT ANSARI , DR MAHAN , DR ESCALANTE , ,MED STAFF ON 1 E .DISCHARGE BACK TO assisted WHEN ARRANGED BY 
DAT ANSARI , DR MAHAN , DR ESCALANTE , ,MED STAFF ON 1 E .[facility] TO [facility] WHEN ARRANGED BY 
DAT ANSARI , DR MAHAN , DR ESCALANTE , ,MED STAFF ON 1 E

## 2020-12-09 NOTE — PROGRESS NOTE ADULT - SUBJECTIVE AND OBJECTIVE BOX
Date/Time Patient Seen:  		  Referring MD:   Data Reviewed	       Patient is a 89y old  Male who presents with a chief complaint of sob (08 Dec 2020 09:09)      Subjective/HPI     PAST MEDICAL & SURGICAL HISTORY:  Constipation    Anemia    Dementia    Arteriosclerotic heart disease (ASHD)    Prostate CA    Atrial fibrillation    DM (diabetes mellitus)    No pertinent past medical history    Dementia    Depression    Diabetes    Hyperlipemia    CHF (congestive heart failure)    Dementia    Prostate ca    Diabetes    Hypertension    Atrial fibrillation    No significant past surgical history    No significant past surgical history          Medication list         MEDICATIONS  (STANDING):  ascorbic acid 500 milliGRAM(s) Oral daily  aspirin enteric coated 81 milliGRAM(s) Oral daily  dextrose 40% Gel 15 Gram(s) Oral once  dextrose 5%. 1000 milliLiter(s) (50 mL/Hr) IV Continuous <Continuous>  dextrose 5%. 1000 milliLiter(s) (100 mL/Hr) IV Continuous <Continuous>  dextrose 50% Injectable 25 Gram(s) IV Push once  dextrose 50% Injectable 12.5 Gram(s) IV Push once  dextrose 50% Injectable 25 Gram(s) IV Push once  donepezil 5 milliGRAM(s) Oral at bedtime  ferrous    sulfate 325 milliGRAM(s) Oral daily  furosemide    Tablet 20 milliGRAM(s) Oral daily  glucagon  Injectable 1 milliGRAM(s) IntraMuscular once  heparin   Injectable 5000 Unit(s) SubCutaneous every 12 hours  influenza   Vaccine 0.5 milliLiter(s) IntraMuscular once  insulin lispro (ADMELOG) corrective regimen sliding scale   SubCutaneous three times a day before meals  losartan 25 milliGRAM(s) Oral daily  metoprolol succinate ER 25 milliGRAM(s) Oral daily  multivitamin/minerals 1 Tablet(s) Oral daily  pantoprazole    Tablet 40 milliGRAM(s) Oral before breakfast  polyethylene glycol 3350 17 Gram(s) Oral daily  senna 2 Tablet(s) Oral at bedtime  simvastatin 20 milliGRAM(s) Oral at bedtime  tamsulosin 0.4 milliGRAM(s) Oral at bedtime    MEDICATIONS  (PRN):  acetaminophen   Tablet .. 500 milliGRAM(s) Oral every 6 hours PRN Temp greater or equal to 38C (100.4F), Mild Pain (1 - 3)         Vitals log        ICU Vital Signs Last 24 Hrs  T(C): 36.8 (09 Dec 2020 05:53), Max: 37 (08 Dec 2020 20:44)  T(F): 98.2 (09 Dec 2020 05:53), Max: 98.6 (08 Dec 2020 20:44)  HR: 80 (09 Dec 2020 05:53) (71 - 83)  BP: 128/68 (09 Dec 2020 05:53) (106/65 - 128/68)  BP(mean): --  ABP: --  ABP(mean): --  RR: 18 (09 Dec 2020 05:53) (17 - 18)  SpO2: 92% (09 Dec 2020 05:53) (92% - 92%)           Input and Output:  I&O's Detail    07 Dec 2020 07:01  -  08 Dec 2020 07:00  --------------------------------------------------------  IN:    Oral Fluid: 240 mL  Total IN: 240 mL    OUT:    Incontinent per Condom Catheter (mL): 950 mL  Total OUT: 950 mL    Total NET: -710 mL          Lab Data                        11.1   6.00  )-----------( 253      ( 08 Dec 2020 10:16 )             34.1     12-08    143  |  105  |  18  ----------------------------<  146<H>  3.7   |  26  |  0.51    Ca    8.8      08 Dec 2020 10:16  Phos  3.0     12-07  Mg     1.8     12-07        CARDIAC MARKERS ( 08 Dec 2020 10:16 )  <.015 ng/mL / x     / x     / x     / x      CARDIAC MARKERS ( 07 Dec 2020 08:58 )  <.015 ng/mL / x     / x     / x     / x            Review of Systems	      Objective     Physical Examination    heart s1s2  lung dec BS  abd soft      Pertinent Lab findings & Imaging      [place]:  NO   Adequate UO     I&O's Detail    07 Dec 2020 07:01  -  08 Dec 2020 07:00  --------------------------------------------------------  IN:    Oral Fluid: 240 mL  Total IN: 240 mL    OUT:    Incontinent per Condom Catheter (mL): 950 mL  Total OUT: 950 mL    Total NET: -710 mL               Discussed with:     Cultures:	        Radiology

## 2020-12-09 NOTE — PROGRESS NOTE ADULT - ATTENDING COMMENTS
89 y o WM  presents sent to ED from [facility] for evaluation of  weakness.  No known fever/chills. no trauma. Patient is   poor historian,  PMD Dr Quintana,  no anticoagulants noted in  patient med list ,patient was admitted with GIB last year and BASA was stopped at that time .His  PMH is significant for :  Arteriosclerotic heart disease (ASHD)  Atrial fibrillation  ,CHF (congestive heart failure)  ,Dementia  ,Depression  ,DM (diabetes mellitus)  ,Hyperlipemia  ,Hypertension  ,Prostate ca Admit to monitored unit for cardiac monitoring, obtain echo to evaluate LVEF, intravenous diuresis as per card consult , monitor ins/outs, monitor renal profile and electrolytes closely ,send 3 sets of enzymes, O2 supply, serial chest xrays, monitor weights and oral intake of fluids, nutritionist consult Admitted  to telemetry unit for monitoring , send 3 sets of cardiac enzymes to rule out acute coronary event, obtain ECHO to evaluate LVEF, cardiology consult  ,continue current management, O2 supply, anticoagulation plan as per cardiology consult   Palliative care consult requested ,to discuss advance directives and complete MOLST

## 2020-12-09 NOTE — PROGRESS NOTE ADULT - SUBJECTIVE AND OBJECTIVE BOX
TERESA FRANCIS    PLV 1EAS 112 W1    Patient is a 89y old  Male who presents with a chief complaint of sob (08 Dec 2020 09:09)       Allergies    latex (Rash)  latex (Unknown)  No Known Drug Allergies    Intolerances        HPI:  89 y o WM  presents sent to ED from [facility] for evaluation of  weakness.  No known fever/chills. no trauma. Patient is   poor historian,  PMD Dr Quintana,  no anticoagulants noted in  patient med list ,patient was admitted with GIB last year and BASA was stopped at that time .His  PMH is significant for :  Arteriosclerotic heart disease (ASHD)  Atrial fibrillation  ,CHF (congestive heart failure)  ,Dementia  ,Depression  ,DM (diabetes mellitus)  ,Hyperlipemia  ,Hypertension  ,Prostate ca Admit to monitored unit for cardiac monitoring, obtain echo to evaluate LVEF, intravenous diuresis as per card consult , monitor ins/outs, monitor renal profile and electrolytes closely ,send 3 sets of enzymes, O2 supply, serial chest xrays, monitor weights and oral intake of fluids, nutritionist consult Admitted  to telemetry unit for monitoring , send 3 sets of cardiac enzymes to rule out acute coronary event, obtain ECHO to evaluate LVEF, cardiology consult  ,continue current management, O2 supply, anticoagulation plan as per cardiology consult   Palliative care consult requested ,to discuss advance directives and complete MOLST (06 Dec 2020 13:36)      PAST MEDICAL & SURGICAL HISTORY:  Constipation    Anemia    Dementia    Arteriosclerotic heart disease (ASHD)    Prostate CA    Atrial fibrillation    DM (diabetes mellitus)    Dementia    Depression    Diabetes    Hyperlipemia    CHF (congestive heart failure)    Dementia    Prostate ca    Diabetes    Hypertension    Atrial fibrillation    No significant past surgical history        FAMILY HISTORY:        MEDICATIONS   acetaminophen   Tablet .. 500 milliGRAM(s) Oral every 6 hours PRN  ascorbic acid 500 milliGRAM(s) Oral daily  aspirin enteric coated 81 milliGRAM(s) Oral daily  dextrose 40% Gel 15 Gram(s) Oral once  dextrose 5%. 1000 milliLiter(s) IV Continuous <Continuous>  dextrose 5%. 1000 milliLiter(s) IV Continuous <Continuous>  dextrose 50% Injectable 25 Gram(s) IV Push once  dextrose 50% Injectable 12.5 Gram(s) IV Push once  dextrose 50% Injectable 25 Gram(s) IV Push once  donepezil 5 milliGRAM(s) Oral at bedtime  ferrous    sulfate 325 milliGRAM(s) Oral daily  furosemide    Tablet 20 milliGRAM(s) Oral daily  glucagon  Injectable 1 milliGRAM(s) IntraMuscular once  heparin   Injectable 5000 Unit(s) SubCutaneous every 12 hours  influenza   Vaccine 0.5 milliLiter(s) IntraMuscular once  insulin lispro (ADMELOG) corrective regimen sliding scale   SubCutaneous three times a day before meals  losartan 25 milliGRAM(s) Oral daily  metoprolol succinate ER 25 milliGRAM(s) Oral daily  multivitamin/minerals 1 Tablet(s) Oral daily  pantoprazole    Tablet 40 milliGRAM(s) Oral before breakfast  polyethylene glycol 3350 17 Gram(s) Oral daily  senna 2 Tablet(s) Oral at bedtime  simvastatin 20 milliGRAM(s) Oral at bedtime  tamsulosin 0.4 milliGRAM(s) Oral at bedtime      Vital Signs Last 24 Hrs  T(C): 36.8 (09 Dec 2020 05:53), Max: 37 (08 Dec 2020 20:44)  T(F): 98.2 (09 Dec 2020 05:53), Max: 98.6 (08 Dec 2020 20:44)  HR: 80 (09 Dec 2020 05:53) (71 - 83)  BP: 128/68 (09 Dec 2020 05:53) (106/65 - 128/68)  BP(mean): --  RR: 18 (09 Dec 2020 05:53) (17 - 18)  SpO2: 92% (09 Dec 2020 05:53) (92% - 92%)      12-08-20 @ 07:01  -  12-09-20 @ 07:00  --------------------------------------------------------  IN: 180 mL / OUT: 0 mL / NET: 180 mL            LABS:                        11.1   6.00  )-----------( 253      ( 08 Dec 2020 10:16 )             34.1     12-08    143  |  105  |  18  ----------------------------<  146<H>  3.7   |  26  |  0.51    Ca    8.8      08 Dec 2020 10:16  Phos  3.0     12-07  Mg     1.8     12-07                WBC:  WBC Count: 6.00 K/uL (12-08 @ 10:16)  WBC Count: 6.37 K/uL (12-07 @ 08:58)  WBC Count: 8.75 K/uL (12-06 @ 11:08)      MICROBIOLOGY:  RECENT CULTURES:  12-06 .Urine Clean Catch (Midstream) XXXX XXXX   No growth    12-06 .Blood Blood-Peripheral XXXX XXXX   No growth to date.          CARDIAC MARKERS ( 08 Dec 2020 10:16 )  <.015 ng/mL / x     / x     / x     / x      CARDIAC MARKERS ( 07 Dec 2020 08:58 )  <.015 ng/mL / x     / x     / x     / x                Sodium:  Sodium, Serum: 143 mmol/L (12-08 @ 10:16)  Sodium, Serum: 142 mmol/L (12-07 @ 08:58)  Sodium, Serum: 139 mmol/L (12-06 @ 11:08)      0.51 mg/dL 12-08 @ 10:16  0.46 mg/dL 12-07 @ 08:58  0.51 mg/dL 12-06 @ 11:08      Hemoglobin:  Hemoglobin: 11.1 g/dL (12-08 @ 10:16)  Hemoglobin: 11.1 g/dL (12-07 @ 08:58)  Hemoglobin: 11.8 g/dL (12-06 @ 11:08)      Platelets: Platelet Count - Automated: 253 K/uL (12-08 @ 10:16)  Platelet Count - Automated: 218 K/uL (12-07 @ 08:58)  Platelet Count - Automated: 235 K/uL (12-06 @ 11:08)              RADIOLOGY & ADDITIONAL STUDIES:

## 2020-12-28 PROCEDURE — 83690 ASSAY OF LIPASE: CPT

## 2020-12-28 PROCEDURE — 93005 ELECTROCARDIOGRAM TRACING: CPT

## 2020-12-28 PROCEDURE — 84295 ASSAY OF SERUM SODIUM: CPT

## 2020-12-28 PROCEDURE — 80048 BASIC METABOLIC PNL TOTAL CA: CPT

## 2020-12-28 PROCEDURE — 82553 CREATINE MB FRACTION: CPT

## 2020-12-28 PROCEDURE — 84443 ASSAY THYROID STIM HORMONE: CPT

## 2020-12-28 PROCEDURE — 71045 X-RAY EXAM CHEST 1 VIEW: CPT

## 2020-12-28 PROCEDURE — 85027 COMPLETE CBC AUTOMATED: CPT

## 2020-12-28 PROCEDURE — 80053 COMPREHEN METABOLIC PANEL: CPT

## 2020-12-28 PROCEDURE — U0003: CPT

## 2020-12-28 PROCEDURE — 87040 BLOOD CULTURE FOR BACTERIA: CPT

## 2020-12-28 PROCEDURE — 85730 THROMBOPLASTIN TIME PARTIAL: CPT

## 2020-12-28 PROCEDURE — 81001 URINALYSIS AUTO W/SCOPE: CPT

## 2020-12-28 PROCEDURE — 99285 EMERGENCY DEPT VISIT HI MDM: CPT | Mod: 25

## 2020-12-28 PROCEDURE — 87086 URINE CULTURE/COLONY COUNT: CPT

## 2020-12-28 PROCEDURE — 82962 GLUCOSE BLOOD TEST: CPT

## 2020-12-28 PROCEDURE — 92610 EVALUATE SWALLOWING FUNCTION: CPT

## 2020-12-28 PROCEDURE — 85025 COMPLETE CBC W/AUTO DIFF WBC: CPT

## 2020-12-28 PROCEDURE — 83605 ASSAY OF LACTIC ACID: CPT

## 2020-12-28 PROCEDURE — 84484 ASSAY OF TROPONIN QUANT: CPT

## 2020-12-28 PROCEDURE — 85610 PROTHROMBIN TIME: CPT

## 2020-12-28 PROCEDURE — 70450 CT HEAD/BRAIN W/O DYE: CPT

## 2020-12-28 PROCEDURE — 84100 ASSAY OF PHOSPHORUS: CPT

## 2020-12-28 PROCEDURE — 36415 COLL VENOUS BLD VENIPUNCTURE: CPT

## 2020-12-28 PROCEDURE — 97163 PT EVAL HIGH COMPLEX 45 MIN: CPT

## 2020-12-28 PROCEDURE — 83735 ASSAY OF MAGNESIUM: CPT

## 2020-12-28 PROCEDURE — 71250 CT THORAX DX C-: CPT

## 2020-12-28 PROCEDURE — 93306 TTE W/DOPPLER COMPLETE: CPT

## 2020-12-28 PROCEDURE — 82247 BILIRUBIN TOTAL: CPT

## 2020-12-28 PROCEDURE — 83880 ASSAY OF NATRIURETIC PEPTIDE: CPT

## 2020-12-28 PROCEDURE — 82565 ASSAY OF CREATININE: CPT

## 2021-03-25 NOTE — DISCHARGE NOTE PROVIDER - DISCHARGE DATE
18-Apr-2019
right wrist and hand and fingers not moving as pt expriencing ++ pain 10/10/Left UE Active ROM was WFL (within functional limits)/Right UE Active ROM was WFL (within functional limits)/bilateral  lower extremity Active ROM was WFL (within functional limits)/deficits as listed below

## 2021-05-25 ENCOUNTER — EMERGENCY (EMERGENCY)
Facility: HOSPITAL | Age: 86
LOS: 1 days | Discharge: HOME | End: 2021-05-25
Attending: EMERGENCY MEDICINE | Admitting: EMERGENCY MEDICINE
Payer: MEDICARE

## 2021-05-25 VITALS
TEMPERATURE: 97 F | SYSTOLIC BLOOD PRESSURE: 116 MMHG | HEART RATE: 66 BPM | OXYGEN SATURATION: 97 % | HEIGHT: 72 IN | DIASTOLIC BLOOD PRESSURE: 55 MMHG | WEIGHT: 153 LBS | RESPIRATION RATE: 16 BRPM

## 2021-05-25 VITALS
RESPIRATION RATE: 16 BRPM | DIASTOLIC BLOOD PRESSURE: 72 MMHG | TEMPERATURE: 98 F | HEART RATE: 68 BPM | SYSTOLIC BLOOD PRESSURE: 128 MMHG | OXYGEN SATURATION: 98 %

## 2021-05-25 DIAGNOSIS — S51.012A LACERATION WITHOUT FOREIGN BODY OF LEFT ELBOW, INITIAL ENCOUNTER: ICD-10-CM

## 2021-05-25 PROCEDURE — 12002 RPR S/N/AX/GEN/TRNK2.6-7.5CM: CPT

## 2021-05-25 PROCEDURE — 99283 EMERGENCY DEPT VISIT LOW MDM: CPT

## 2021-05-25 PROCEDURE — 70450 CT HEAD/BRAIN W/O DYE: CPT | Mod: 26,ME

## 2021-05-25 PROCEDURE — 99284 EMERGENCY DEPT VISIT MOD MDM: CPT | Mod: 25

## 2021-05-25 PROCEDURE — 82962 GLUCOSE BLOOD TEST: CPT

## 2021-05-25 PROCEDURE — 70450 CT HEAD/BRAIN W/O DYE: CPT | Mod: 26,MG,77

## 2021-05-25 PROCEDURE — G1004: CPT

## 2021-05-25 PROCEDURE — 70450 CT HEAD/BRAIN W/O DYE: CPT

## 2021-05-25 NOTE — ED ADULT NURSE NOTE - OBJECTIVE STATEMENT
89 y/o M patient PMH dementia presents to ED from [facility] via EMS c/o unwitnessed fall today. As per EMS patient was found on floor, LOC unknown. Patient presents to ED A&Ox0. skin tear noted to left arm. laceration noted to left side scalp. Safety and comfort measures provided and maintained.

## 2021-05-25 NOTE — ED PROVIDER NOTE - NSFOLLOWUPINSTRUCTIONS_ED_ALL_ED_FT
Head Laceration    WHAT YOU NEED TO KNOW:    A laceration happens when the skin and other tissues are torn. Head lacerations usually bleed more than other types of lacerations.    DISCHARGE INSTRUCTIONS:    Have someone call your local emergency number (911 in the [place]) if:   •You cannot be woken.      •Your mood or behavior changes.      Call your doctor if:   •The area is red, warm, or has pus coming from it.      •The area begins to bleed and does not stop after 15 minutes of pressure.      •You have questions or concerns about your condition or care.      Rest. Some activities may cause too much pressure in your head. Your laceration may begin to bleed.    Ice the area. Apply ice to the area for 15 to 20 minutes every hour or as directed. Use an ice pack, or put crushed ice in a plastic bag. Cover it with a towel before you apply it. Ice helps prevent tissue damage and decreases swelling and pain.    Keep the area clean and dry. Your healthcare provider will tell you how to clean the area.    Check the area every day for signs of infection. Signs of infection may include redness, pus, and warmth around the area. Call your doctor if you find any signs of infection.    Do not smoke. Nicotine and other chemicals in cigarettes and cigars can prevent your wound from healing. Ask your healthcare provider for information if you currently smoke and need help to quit. E-cigarettes or smokeless tobacco still contain nicotine. Talk to your healthcare provider before you use these products.    Follow up with your doctor as directed: Write down your questions so you remember to ask them during your visits.       © Copyright [email or web address] 2021 Head Laceration    WHAT YOU NEED TO KNOW:    A laceration happens when the skin and other tissues are torn. Head lacerations usually bleed more than other types of lacerations.    DISCHARGE INSTRUCTIONS:    Have someone call your local emergency number (911 in the [place]) if:   •You cannot be woken.      •Your mood or behavior changes.      Call your doctor if:   •The area is red, warm, or has pus coming from it.      •The area begins to bleed and does not stop after 15 minutes of pressure.      •You have questions or concerns about your condition or care.      Rest. Some activities may cause too much pressure in your head. Your laceration may begin to bleed.    Ice the area. Apply ice to the area for 15 to 20 minutes every hour or as directed. Use an ice pack, or put crushed ice in a plastic bag. Cover it with a towel before you apply it. Ice helps prevent tissue damage and decreases swelling and pain.    Keep the area clean and dry. Your healthcare provider will tell you how to clean the area.    Check the area every day for signs of infection. Signs of infection may include redness, pus, and warmth around the area. Call your doctor if you find any signs of infection.    Do not smoke. Nicotine and other chemicals in cigarettes and cigars can prevent your wound from healing. Ask your healthcare provider for information if you currently smoke and need help to quit. E-cigarettes or smokeless tobacco still contain nicotine. Talk to your healthcare provider before you use these products.    Follow up with your doctor as directed: Write down your questions so you remember to ask them during your visits.       © Copyright [employer] 2021      "[employer]® CareNotes®     :  [facility]  	    INTRACRANIAL HEMATOMA - AfterCare(R) Instructions(ER/ED)     Intracranial Hematoma    WHAT YOU NEED TO KNOW:    An intracranial hematoma is a collection of blood inside your skull. The blood leaks from a tear or rupture in a vein or artery, such as after a hemorrhagic stroke. The collected blood puts pressure on the brain. Serious medical problems can develop, such as seizures or a coma. An intracranial hematoma is a life-threatening emergency that needs immediate medical care.    DISCHARGE INSTRUCTIONS:    Call your local emergency number (911 in the [place]) or have someone else call if:   •You have any of the following signs of a stroke: ?Numbness or drooping on one side of your face     ?Weakness in an arm or leg    ?Confusion or difficulty speaking    ?Dizziness, a severe headache, or vision loss    BE FAST SIGNS OF A STROKE      •You have a seizure.    •You have new problems with balance or movement.    Return to the emergency department if:   •You are sleepier or are harder to wake than usual.    •You have problems thinking.    •Your behavior or personality has changed.    •You have repeated or forceful vomiting or you cannot keep liquids down.    Call your doctor or neurologist if:   •You have nausea or are vomiting.    •Your symptoms are getting worse.    •You have questions or concerns about your condition or care.    Medicines:   •Anticonvulsants may be given to prevent and control seizures.    •Take your medicine as directed. Contact your healthcare provider if you think your medicine is not helping or if you have side effects. Tell him or her if you are allergic to any medicine. Keep a list of the medicines, vitamins, and herbs you take. Include the amounts, and when and why you take them. Bring the list or the pill bottles to follow-up visits. Carry your medicine list with you in case of an emergency.    Warning signs of a stroke: The words BE FAST can help you remember and recognize warning signs of a stroke:  •B = Balance: Sudden loss of balance    •E = Eyes: Loss of vision in one or both eyes    •F = Face: Face droops on one side    •A = Arms: Arm drops when both arms are raised    •S = Speech: Speech is slurred or sounds different    •T = Time: Time to get help immediately    BE FAST SIGNS OF A STROKE     Manage or prevent another intracranial hematoma: Healthcare providers will help you create goals for your recovery. The following lifestyle changes can help lower your risk for a stroke that can lead to another hematoma:   •Manage health conditions. A condition such as diabetes can increase your risk for a stroke. Control your blood sugar level if you have hyperglycemia or diabetes. Take your prescribed medicines and check your blood sugar level as directed.  How to check your blood sugar     •Check your blood pressure as directed. High blood pressure can increase your risk for a stroke. Follow your healthcare provider’s directions for controlling your blood pressure.   How to take a Blood Pressure     •Limit alcohol. Large amounts of alcohol can lead to an intracerebral hematoma or a stroke. Alcohol may also raise your blood pressure or thin your blood. Blood thinning can cause a hemorrhagic stroke.    •Do not use nicotine products or illegal drugs. Nicotine and other chemicals in cigarettes and cigars can cause blood vessel damage. Nicotine and illegal drugs both increase your risk for a stroke. Ask your healthcare provider for information if you currently smoke or use drugs and need help to quit. E-cigarettes or smokeless tobacco still contain nicotine. Talk to your healthcare provider before you use these products.    •Take blood thinners exactly as directed. Blood thinners increase your risk for an intracerebral hematoma. Your healthcare provider may make changes to your blood thinner if it caused your hematoma. Always follow directions so you do not take too much of this medicine.    •Eat a variety of healthy foods. Healthy foods include whole-grain breads, low-fat dairy products, beans, lean meats, and fish. Eat at least 5 servings of fruits and vegetables each day. Choose foods that are low in fat, cholesterol, salt, and sugar. Eat foods that are high in potassium, such as potatoes and bananas. A nutritionist can help you create healthy meal plans.   Healthy Foods    •Maintain a healthy weight. Ask your healthcare provider how much you should weigh. Ask him or her to help you create a weight loss plan if you are overweight. He or she can help you create small goals if you have a lot of weight to lose.    •Exercise as directed. Exercise can lower your blood pressure, cholesterol, weight, and blood sugar levels. Healthcare providers will help you create exercise goals. They can also help you make a plan to reach your goals. For example, you can break exercise into 10 minute periods, 3 times in a day. Find an exercise that you enjoy. This will make it easier for you to reach your exercise goals.  Walking for Exercise     •Manage stress. Stress can raise your blood pressure. Find new ways to relax, such as deep breathing or listening to music.    Follow up with your doctor or neurologist within 2 days: Write down your questions so you remember to ask them during your visits.    For support and more information:   •American Heart Association and American Stroke Association  [facility] [place] [address]  Phone: 1-148.525.6221  Web Address: http://www.heart.org    © Copyright [employer] 2021       Please return to the Emergency Department immediately for any problems or concerns.    Follow up with Dr Quintana tomorrow. Head Laceration    WHAT YOU NEED TO KNOW:    A laceration happens when the skin and other tissues are torn. Head lacerations usually bleed more than other types of lacerations.    DISCHARGE INSTRUCTIONS:    Have someone call your local emergency number (911 in the [place]) if:   •You cannot be woken.      •Your mood or behavior changes.      Call your doctor if:   •The area is red, warm, or has pus coming from it.      •The area begins to bleed and does not stop after 15 minutes of pressure.      •You have questions or concerns about your condition or care.      Rest. Some activities may cause too much pressure in your head. Your laceration may begin to bleed.    Ice the area. Apply ice to the area for 15 to 20 minutes every hour or as directed. Use an ice pack, or put crushed ice in a plastic bag. Cover it with a towel before you apply it. Ice helps prevent tissue damage and decreases swelling and pain.    Keep the area clean and dry. Your healthcare provider will tell you how to clean the area.    Check the area every day for signs of infection. Signs of infection may include redness, pus, and warmth around the area. Call your doctor if you find any signs of infection.    Do not smoke. Nicotine and other chemicals in cigarettes and cigars can prevent your wound from healing. Ask your healthcare provider for information if you currently smoke and need help to quit. E-cigarettes or smokeless tobacco still contain nicotine. Talk to your healthcare provider before you use these products.    Follow up with your doctor as directed: Write down your questions so you remember to ask them during your visits.       © Copyright [employer] 2021      [employer]® CareNotes®     :  [facility]  	    INTRACRANIAL HEMATOMA - AfterCare(R) Instructions(ER/ED)     Intracranial Hematoma    WHAT YOU NEED TO KNOW:    An intracranial hematoma is a collection of blood inside your skull. The blood leaks from a tear or rupture in a vein or artery, such as after a hemorrhagic stroke. The collected blood puts pressure on the brain. Serious medical problems can develop, such as seizures or a coma. An intracranial hematoma is a life-threatening emergency that needs immediate medical care.    DISCHARGE INSTRUCTIONS:    Call your local emergency number (911 in the [place]) or have someone else call if:   •You have any of the following signs of a stroke: ?Numbness or drooping on one side of your face     ?Weakness in an arm or leg    ?Confusion or difficulty speaking    ?Dizziness, a severe headache, or vision loss    BE FAST SIGNS OF A STROKE      •You have a seizure.    •You have new problems with balance or movement.    Return to the emergency department if:   •You are sleepier or are harder to wake than usual.    •You have problems thinking.    •Your behavior or personality has changed.    •You have repeated or forceful vomiting or you cannot keep liquids down.    Call your doctor or neurologist if:   •You have nausea or are vomiting.    •Your symptoms are getting worse.    •You have questions or concerns about your condition or care.    Medicines:   •Anticonvulsants may be given to prevent and control seizures.    •Take your medicine as directed. Contact your healthcare provider if you think your medicine is not helping or if you have side effects. Tell him or her if you are allergic to any medicine. Keep a list of the medicines, vitamins, and herbs you take. Include the amounts, and when and why you take them. Bring the list or the pill bottles to follow-up visits. Carry your medicine list with you in case of an emergency.    Warning signs of a stroke: The words BE FAST can help you remember and recognize warning signs of a stroke:  •B = Balance: Sudden loss of balance    •E = Eyes: Loss of vision in one or both eyes    •F = Face: Face droops on one side    •A = Arms: Arm drops when both arms are raised    •S = Speech: Speech is slurred or sounds different    •T = Time: Time to get help immediately    BE FAST SIGNS OF A STROKE     Manage or prevent another intracranial hematoma: Healthcare providers will help you create goals for your recovery. The following lifestyle changes can help lower your risk for a stroke that can lead to another hematoma:   •Manage health conditions. A condition such as diabetes can increase your risk for a stroke. Control your blood sugar level if you have hyperglycemia or diabetes. Take your prescribed medicines and check your blood sugar level as directed.  How to check your blood sugar     •Check your blood pressure as directed. High blood pressure can increase your risk for a stroke. Follow your healthcare provider’s directions for controlling your blood pressure.   How to take a Blood Pressure     •Limit alcohol. Large amounts of alcohol can lead to an intracerebral hematoma or a stroke. Alcohol may also raise your blood pressure or thin your blood. Blood thinning can cause a hemorrhagic stroke.    •Do not use nicotine products or illegal drugs. Nicotine and other chemicals in cigarettes and cigars can cause blood vessel damage. Nicotine and illegal drugs both increase your risk for a stroke. Ask your healthcare provider for information if you currently smoke or use drugs and need help to quit. E-cigarettes or smokeless tobacco still contain nicotine. Talk to your healthcare provider before you use these products.    •Take blood thinners exactly as directed. Blood thinners increase your risk for an intracerebral hematoma. Your healthcare provider may make changes to your blood thinner if it caused your hematoma. Always follow directions so you do not take too much of this medicine.    •Eat a variety of healthy foods. Healthy foods include whole-grain breads, low-fat dairy products, beans, lean meats, and fish. Eat at least 5 servings of fruits and vegetables each day. Choose foods that are low in fat, cholesterol, salt, and sugar. Eat foods that are high in potassium, such as potatoes and bananas. A nutritionist can help you create healthy meal plans.   Healthy Foods    •Maintain a healthy weight. Ask your healthcare provider how much you should weigh. Ask him or her to help you create a weight loss plan if you are overweight. He or she can help you create small goals if you have a lot of weight to lose.    •Exercise as directed. Exercise can lower your blood pressure, cholesterol, weight, and blood sugar levels. Healthcare providers will help you create exercise goals. They can also help you make a plan to reach your goals. For example, you can break exercise into 10 minute periods, 3 times in a day. Find an exercise that you enjoy. This will make it easier for you to reach your exercise goals.  Walking for Exercise     •Manage stress. Stress can raise your blood pressure. Find new ways to relax, such as deep breathing or listening to music.    Follow up with your doctor or neurologist within 2 days: Write down your questions so you remember to ask them during your visits.    For support and more information:   •American Heart Association and American Stroke Association  [facility] [place] [address]  Phone: 1-830.531.8603  Web Address: http://www.heart.org    © Copyright [employer] 2021       Please return to the Emergency Department immediately for any problems or concerns.    Follow up with Dr Quintana tomorrow.    Follow up with wound care/Dr. Wong in 4-6 days

## 2021-05-25 NOTE — ED ADULT NURSE NOTE - PAIN: PRESENCE, MLM
Problem: KNOWLEDGE DEFICIT  Goal: Patient/S.O. demonstrates understanding of disease process, treatment plan, medications, and discharge instructions.   Outcome: Met This Shift non-verbal indicators of pain/discomfort absent

## 2021-05-25 NOTE — ED ADULT NURSE REASSESSMENT NOTE - NSIMPLEMENTINTERV_GEN_ALL_ED
Implemented All Fall with Harm Risk Interventions:  [place] to call system. Call bell, personal items and telephone within reach. Instruct patient to call for assistance. Room bathroom lighting operational. Non-slip footwear when patient is off stretcher. Physically safe environment: no spills, clutter or unnecessary equipment. Stretcher in lowest position, wheels locked, appropriate side rails in place. Provide visual cue, wrist band, yellow gown, etc. Monitor gait and stability. Monitor for mental status changes and reorient to person, place, and time. Review medications for side effects contributing to fall risk. Reinforce activity limits and safety measures with patient and family. Provide visual clues: red socks.
Implemented All Fall with Harm Risk Interventions:  [place] to call system. Call bell, personal items and telephone within reach. Instruct patient to call for assistance. Room bathroom lighting operational. Non-slip footwear when patient is off stretcher. Physically safe environment: no spills, clutter or unnecessary equipment. Stretcher in lowest position, wheels locked, appropriate side rails in place. Provide visual cue, wrist band, yellow gown, etc. Monitor gait and stability. Monitor for mental status changes and reorient to person, place, and time. Review medications for side effects contributing to fall risk. Reinforce activity limits and safety measures with patient and family. Provide visual clues: red socks.

## 2021-05-25 NOTE — ED PROVIDER NOTE - CARE PROVIDER_API CALL
Toby Quintana)  Critical Care Medicine; Internal Medicine; Pulmonary Disease  [facility] [place] [address]  Phone: [phone]  Fax: [phone]  Established Patient  Follow Up Time: 1-3 Days   Toby Quintana)  Critical Care Medicine; Internal Medicine; Pulmonary Disease  [facility] [place] [address]  Phone: [phone]  Fax: [phone]  Established Patient  Follow Up Time: 1-3 Days    Skyler Wong)  Surgery  [address] [place] [address]  Phone: [phone]  Fax: [phone]  Established Patient  Follow Up Time: 4-6 Days

## 2021-05-25 NOTE — ED PROVIDER NOTE - CARE PROVIDERS DIRECT ADDRESSES
[email or web address]

## 2021-05-25 NOTE — ED ADULT NURSE REASSESSMENT NOTE - NS ED NURSE REASSESS COMMENT FT1
Patient taken to CT by ED tech. Will continue to monitor upon return.
Pt stable and resting in bed wound care to left posterior forearm. Pt tolerated well. Pt awaiting repeat ct scan at 1pm. Nursing care ongoing and safety maintained.
Pt received in report from JACOB Latif alert and disoriented. Pt s/p fall last night at nursing home where he sustained head lac and skin tear to left arm. Wound repair done and tolerated well. Pt now awaiting re-evaluation. Nursing care ongoing and safety maintained.

## 2021-05-25 NOTE — ED PROVIDER NOTE - PROVIDER TOKENS
PROVIDER:[TOKEN:[[id number]],FOLLOWUP:[1-3 Days],ESTABLISHEDPATIENT:[T]] PROVIDER:[TOKEN:[[id number]],FOLLOWUP:[1-3 Days],ESTABLISHEDPATIENT:[T]],PROVIDER:[TOKEN:[[id number]],FOLLOWUP:[4-6 Days],ESTABLISHEDPATIENT:[T]]

## 2021-05-25 NOTE — ED PROVIDER NOTE - PATIENT PORTAL LINK FT
You can access the FollowMyHealth Patient Portal offered by [facility] by registering at the following website: [email or web address]. By joining [employer]’s FollowMyHealth portal, you will also be able to view your health information using other applications (apps) compatible with our system.

## 2021-05-25 NOTE — ED PROVIDER NOTE - CARE PLAN
Principal Discharge DX:	Head injury due to trauma  Secondary Diagnosis:	Laceration of scalp  Secondary Diagnosis:	Skin tear of elbow without complication, left, initial encounter   Principal Discharge DX:	Subdural hematoma  Secondary Diagnosis:	Laceration of scalp  Secondary Diagnosis:	Skin tear of elbow without complication, left, initial encounter

## 2021-05-25 NOTE — ED ADULT NURSE NOTE - NSIMPLEMENTINTERV_GEN_ALL_ED
Implemented All Fall with Harm Risk Interventions:  [place] to call system. Call bell, personal items and telephone within reach. Instruct patient to call for assistance. Room bathroom lighting operational. Non-slip footwear when patient is off stretcher. Physically safe environment: no spills, clutter or unnecessary equipment. Stretcher in lowest position, wheels locked, appropriate side rails in place. Provide visual cue, wrist band, yellow gown, etc. Monitor gait and stability. Monitor for mental status changes and reorient to person, place, and time. Review medications for side effects contributing to fall risk. Reinforce activity limits and safety measures with patient and family. Provide visual clues: red socks.

## 2021-05-25 NOTE — ED PROVIDER NOTE - OBJECTIVE STATEMENT
89yo male bib ems s/p fall out of bed. pt was found on the floor with skin tear to left arm and head injury, unknown LOC, pt is unable to give any hx pt has dementia and is confused

## 2021-05-25 NOTE — ED ADULT NURSE NOTE - CHIEF COMPLAINT QUOTE
Pt was found on floor at assisted living facility, noted with superficial laceration to scalp and skin tear left forearm

## 2021-05-25 NOTE — CONSULT NOTE ADULT - SUBJECTIVE AND OBJECTIVE BOX
Chief Complaint: Right elbow laceration.    HPI: Patient was brought to ER after a fall, eas found to have left elbow skin laceration.    PAST MEDICAL & SURGICAL HISTORY:  Atrial fibrillation    Hypertension    Diabetes    Prostate ca    Dementia    CHF (congestive heart failure)    Hyperlipemia    Diabetes    Depression    Dementia    DM (diabetes mellitus)    Atrial fibrillation    Prostate CA    Arteriosclerotic heart disease (ASHD)    Dementia    Anemia    Constipation    No significant past surgical history        Allergies    latex (Rash)  latex (Unknown)  No Known Drug Allergies    Intolerances        MEDICATIONS  (STANDING):    MEDICATIONS  (PRN):      FAMILY HISTORY:          ROS:  CONSTITUTIONAL: No fever, weight loss, or fatigue  EYES: No eye pain, visual disturbances, or discharge  ENMT:  No difficulty hearing, tinnitus, vertigo; No sinus or throat pain  NECK: No pain or stiffness  BREASTS: No pain, masses, or nipple discharge  RESPIRATORY: No cough, wheezing, chills or hemoptysis; No shortness of breath  CARDIOVASCULAR: No chest pain, palpitations, dizziness, or leg swelling  GASTROINTESTINAL: No abdominal or epigastric pain. No nausea, vomiting, or hematemesis; No diarrhea or constipation. No melena or hematochezia.  GENITOURINARY: No dysuria, frequency, hematuria, or incontinence  NEUROLOGICAL: No headaches, memory loss, loss of strength, numbness, or tremors  SKIN: No itching, burning, rashes, or lesions   LYMPH NODES: No enlarged glands  ENDOCRINE: No heat or cold intolerance; No hair loss  MUSCULOSKELETAL: No joint pain or swelling; No muscle, back, or extremity pain  PSYCHIATRIC: No depression, anxiety, mood swings, or difficulty sleeping  HEME/LYMPH: No easy bruising, or bleeding gums  ALLERGY AND IMMUNOLOGIC: No hives or eczema    PHYSICAL EXAM-    Height (cm): 182.9 (05-25 @ 06:28)  Weight (kg): 69.4 (05-25 @ 06:28)  BMI (kg/m2): 20.7 (05-25 @ 06:28)  Vital Signs Last 24 Hrs  T(C): 36.1 (25 May 2021 06:28), Max: 36.1 (25 May 2021 06:28)  T(F): 97 (25 May 2021 06:28), Max: 97 (25 May 2021 06:28)  HR: 66 (25 May 2021 06:28) (66 - 66)  BP: 116/55 (25 May 2021 06:28) (116/55 - 116/55)  BP(mean): --  RR: 16 (25 May 2021 06:28) (16 - 16)  SpO2: 97% (25 May 2021 06:28) (97% - 97%)    Constitutional: well developed, well nourished, no apparent distress, alert, oriented x 3.  Neck: Supple   Pulmonary: no respiratory distress, normal respiratory rhythm and effort, lungs are clear to auscultation/percussion. No CVA tenderness.  Cardiovascular: heart rate normal, normal sinus rhythm; no murmurs, gallops, rubs, heaves or thrills   Abdomen: soft, non-tender, +BS, no guarding/rebound/rigidity.  Vascular: Lower extremities are well perfused.   Extremities: WNL  Skin: Left elbow skin laceration is clean, base of the wound is red and viable, mild ecchymosis around the wound, no infection, no cellulitis.                 Radiology:

## 2021-05-25 NOTE — ED ADULT NURSE NOTE - NS ED NURSE RECORD ANOTHER HT AND WT
This is a Subsequent Medicare Annual Wellness Visit providing Personalized Prevention Plan Services (PPPS) (Performed 12 months after initial AWV and PPPS )    I have reviewed the patient's medical history in detail and updated the computerized patient record. History     Past Medical History   Diagnosis Date    Back pain     CAD (coronary artery disease)      CABG, [place]    Hypercholesterolemia     Hypertension       Past Surgical History   Procedure Laterality Date    Hx gyn      Hx total vaginal hysterectomy  2014    Hx coronary artery bypass graft  2002     Current Outpatient Prescriptions   Medication Sig Dispense Refill    pravastatin (PRAVACHOL) 80 mg tablet Take 1 Tab by mouth nightly. 90 Tab 3    omeprazole (PRILOSEC) 20 mg capsule TAKE 1 CAPSULE BY MOUTH EVERY DAY 90 Cap 3    metoprolol succinate (TOPROL-XL) 50 mg XL tablet TAKE 1 TABLET BY MOUTH EVERY DAY 90 Tab 2    lisinopril (PRINIVIL, ZESTRIL) 10 mg tablet TAKE 1 TABLET BY MOUTH EVERY NIGHT 90 Tab 3    vitamin c-vitamin e (CRANBERRY CONCENTRATE) cap Take 1 Tab by mouth daily.  aspirin (SINDY CHILDRENS ASPIRIN) 81 mg chewable tablet Take 81 mg by mouth daily.  cholecalciferol, vitamin d3, (VITAMIN D) 1,000 unit tablet Take 1,000 Units by mouth daily.  PARoxetine (PAXIL) 10 mg tablet Take 1 Tab by mouth daily. 30 Tab 3    FLUoxetine (PROZAC) 10 mg capsule Take 1 Cap by mouth daily. 30 Cap 2    traMADol (ULTRAM) 50 mg tablet TAKE 1 TABLET BY MOUTH EVERY 6 HOURS AS NEEDED FOR PAIN 30 Tab 0    HYDROcodone-acetaminophen (NORCO) 5-325 mg per tablet Take 1 Tab by mouth every six (6) hours as needed for Pain. Max Daily Amount: 4 Tabs.  30 Tab 0     Allergies   Allergen Reactions    Amoxicillin Rash    Amoxicillin Rash    Sulfa (Sulfonamide Antibiotics) Nausea Only     Family History   Problem Relation Age of Onset    Heart Attack Mother     Heart Disease Mother     Heart Disease Father     Heart Disease Sister    Job [place] Emphysema Sister      Social History   Substance Use Topics    Smoking status: Never Smoker    Smokeless tobacco: Never Used    Alcohol use Yes      Comment: occ     Patient Active Problem List   Diagnosis Code    Hyperlipidemia E78.5    CAD (coronary artery disease) I25.10    Hypertension I10    GERD (gastroesophageal reflux disease) K21.9       Depression Risk Factor Screening:     PHQ 2 / 9, over the last two weeks 2/7/2017   Little interest or pleasure in doing things Several days   Feeling down, depressed or hopeless Several days   Total Score PHQ 2 2     Alcohol Risk Factor Screening: On any occasion during the past 3 months, have you had more than 3 drinks containing alcohol? No    Do you average more than 7 drinks per week? No      Functional Ability and Level of Safety:     Hearing Loss   none    Activities of Daily Living   Self-care. Requires assistance with: no ADLs    Fall Risk     Fall Risk Assessment, last 12 mths 2/7/2017   Able to walk? Yes   Fall in past 12 months? No     Abuse Screen   Patient is not abused    Review of Systems   See problem visit    Physical Examination     Evaluation of Cognitive Function:  Mood/affect:  neutral  Appearance: age appropriate  Family member/caregiver input: None available      Patient Care Team:  Linda Velasquez MD as PCP - General (Family Practice)  Rene Kulkarni MD (Cardiology)    Advice/Referrals/Counseling   Education and counseling provided: Will be getting a colonoscopy this week    Glaucoma screening 3 months ago    Taking an aspirin a day    Taking vitamin D a day    Never smoker    Assessment/Plan       With a history of hypertension, CAD and CABG should be followed up every 6 months. In addition to Medicare wellness visit the primary focus of today's visit was her mood and some medication management.   See the attached problem visit Yes

## 2021-05-25 NOTE — ED PROVIDER NOTE - PROGRESS NOTE DETAILS
d/w dr viera, aware of CT results of subacute SDh 4mm in right convexity. st call neurology. Dr viera aware pt with DNR, non surgical intervention. aspirin daily  tdap 9/8/16  covid vaccine pfizer second dose 2/5/21 d/w pt's son, aware of subacute SDH and skin tear and pending repeat CT. thankful

## 2021-06-08 NOTE — PHYSICAL THERAPY INITIAL EVALUATION ADULT - PATIENT/FAMILY AGREES WITH PLAN
6/8/2021      RE: Cyn AVILEZ Sarabia  [address] [facility] [address]                DERMATOLOGY CLINIC VISIT NOTE       CHIEF COMPLAINT:  Followup acne vulgaris.      ASSESSMENT AND PLAN:    1. Acne vulgaris:  Chronic with inflammatory papules, pustules, post inflammatory pigment change and scarring.  Cyn is tolerating her current regimen of clindamycin lotion, tretinoin 0.025% cream nightly and BHANU, which she initiated in November. Will continue this regiment.     2. Benign nevus on the neck and scalp. No worrisome features.     3. Dermatofibroma: Differential diagnosis includes papular urticaria or traumatized dermal nevus. Collection of excess collagen and fibrosis at past sites of skin injury. Benign. No treatment advised as this may result in a larger lesion.        The patient to return yearly, but sooner if any change in the lesion on the L arm.   Lucille Handy MD   of Dermatology  Division of Pediatric Dermatology  [facility]      _______________________________________  _______________________________________       HISTORY OF PRESENT ILLNESS:  Cyn is a 17-year-old female returning to Dermatology Clinic for evaluation of her acne.  She was last seen in 3/2021.  At that time, we continued clindamycin 1% lotion every morning, tretinoin 0.025% cream every night and BHANU oral contraceptive daily. She has met with Dr. Merrill about acne scarring and opted to postpone any laser for now.  She continues to have improvement.    She notes a growth on the L upper arm, stable in size, but firm. It turned darker when in [place], but has not grown.     She has an unusual looking mole on the scalp that she would like evaluated.      SOCIAL HISTORY:  Lives with parents and 2 siblings.      FAMILY HISTORY:  No family history of severe acne. Dad with basal cell carcinoma.      REVIEW OF SYSTEMS:  A 12 point review of systems was negative.      PHYSICAL EXAMINATION:   GENERAL:   Cyn is a healthy-appearing, 17-year-old female in no distress.   HEENT:  Conjunctivae clear.   PULMONARY:  Breathing comfortably on room air.   SKIN:  Exam focused on the face.  Examination of the glabella temples, lateral cheeks and mid cheeks with scattered, atrophic, pink macules improved.   R anterior neck with 3 mm dark brown papule  L temporal scalp with approx 9 mm medium brown macule with central clearing  L lateral arm with approx 5 mm firm papule with +dimple sign, non tender     Lucille Handy MD   yes

## 2021-09-12 NOTE — PHYSICAL THERAPY INITIAL EVALUATION ADULT - WEIGHT-BEARING RESTRICTIONS: STAND/SIT, REHAB EVAL
Pt right forearm cleaned and dry, applied petrolatum dressing and 4x4 with self  adherent wrap applied. Pt tolerated well.      Andres Stewart  09/12/21 1948 full weight-bearing

## 2021-10-21 ENCOUNTER — EMERGENCY (EMERGENCY)
Facility: HOSPITAL | Age: 86
LOS: 1 days | Discharge: HOME | End: 2021-10-21
Attending: EMERGENCY MEDICINE | Admitting: EMERGENCY MEDICINE
Payer: MEDICARE

## 2021-10-21 VITALS
HEART RATE: 63 BPM | RESPIRATION RATE: 16 BRPM | SYSTOLIC BLOOD PRESSURE: 121 MMHG | DIASTOLIC BLOOD PRESSURE: 62 MMHG | TEMPERATURE: 98 F | OXYGEN SATURATION: 98 %

## 2021-10-21 VITALS
HEIGHT: 72 IN | OXYGEN SATURATION: 97 % | RESPIRATION RATE: 14 BRPM | DIASTOLIC BLOOD PRESSURE: 66 MMHG | HEART RATE: 63 BPM | TEMPERATURE: 98 F | SYSTOLIC BLOOD PRESSURE: 114 MMHG

## 2021-10-21 PROCEDURE — 99283 EMERGENCY DEPT VISIT LOW MDM: CPT

## 2021-10-21 RX ORDER — CEPHALEXIN 500 MG
1 CAPSULE ORAL
Qty: 21 | Refills: 0
Start: 2021-10-21 | End: 2021-10-27

## 2021-10-21 NOTE — ED ADULT NURSE NOTE - NSIMPLEMENTINTERV_GEN_ALL_ED
Implemented All Fall with Harm Risk Interventions:  [place] to call system. Call bell, personal items and telephone within reach. Instruct patient to call for assistance. Room bathroom lighting operational. Non-slip footwear when patient is off stretcher. Physically safe environment: no spills, clutter or unnecessary equipment. Stretcher in lowest position, wheels locked, appropriate side rails in place. Provide visual cue, wrist band, yellow gown, etc. Monitor gait and stability. Monitor for mental status changes and reorient to person, place, and time. Review medications for side effects contributing to fall risk. Reinforce activity limits and safety measures with patient and family. Provide visual clues: red socks.

## 2021-10-21 NOTE — ED PROVIDER NOTE - PHYSICAL EXAMINATION
Constitutional: Awake, Alert, non-toxic. NAD. Well appearing, well nourished.   HEAD: Normocephalic, atraumatic.   EYES: EOM intact, conjunctiva and sclera are clear bilaterally.   ENT: No rhinorrhea, patent, mucous membranes pink/moist, no drooling or stridor.   NECK: Supple, non-tender  RESPIRATORY: Normal respiratory effort  EXTREMITIES: Full passive and active ROM in all extremities; non-tender to palpation; distal pulses palpable and symmetric  SKIN: Warm, dry; good skin turgor, (+) left 3rd digit distal finger erythema, (+) left hand contracted (+) grabbing cushioned object, (+) erythema with appearance of pressure ulcer. NO increased warmth, no discharge, no ecchymosis, brisk capillary refill.  NEURO: A&O x3. Sensory and motor functions are grossly intact. Speech is normal. Appearance and judgement seem appropriate for gender and age.

## 2021-10-21 NOTE — ED PROVIDER NOTE - CLINICAL SUMMARY MEDICAL DECISION MAKING FREE TEXT BOX
sent here from [facility] for evaluation of redness to left 3rd digit, timing unknown. Impression pressure ulcer. plan includes dc with abx.

## 2021-10-21 NOTE — ED PROVIDER NOTE - OBJECTIVE STATEMENT
89 y/o male with PMHX HTN, HLD, A FIb, Dementia, and DMBIBA from [facility] due to finger redness. pt has contracted left hand. Denies fever or trauma.

## 2021-10-21 NOTE — ED PROVIDER NOTE - CARE PROVIDER_API CALL
Debi Sanchez (MD)  Plastic Surgery  [address] [place] [address], [address]  [place], [place] [id number]  Phone: [phone]  Fax: [phone]  Follow Up Time: 1-3 Days

## 2021-10-21 NOTE — ED PROVIDER NOTE - NSFOLLOWUPINSTRUCTIONS_ED_ALL_ED_FT
Follow up with hand specialist. Caution with use of hand cushion as it seems to be creating a pressure ulcer. Antibiotics were sent to the pharmacy for cellulitis coverage. You should follow up with hand specialist. Return for swelling, streaking redness, fever.     Cellulitis    WHAT YOU NEED TO KNOW:    What is cellulitis? Cellulitis is a skin infection caused by bacteria. Cellulitis is common and can become severe. Cellulitis usually appears on the lower legs. It can also appear on the arms, face, and other areas. Cellulitis develops when bacteria enter a crack or break in your skin, such as a scratch, bite, or cut.    Cellulitis          What are the signs and symptoms of cellulitis? Signs and symptoms usually appear on one side of your body. You may have any of the following:  •A fever      •A red, warm, swollen area on your skin      •Pain when the area is touched      •Red spots, bumps, or blisters that may drain pus      •Bumpy, raised skin that feels like an orange peel      How is cellulitis diagnosed? Your healthcare provider may know you have cellulitis by looking at your skin. You may need blood tests to show what kind of bacteria are causing your infection. Other tests may be needed to see how much the infection has spread.    How is cellulitis treated? You should start to see improvement in 3 days. If your cellulitis is severe, you may need IV antibiotics in the hospital. If cellulitis is not treated, the infection can spread through your body and become life-threatening. You may need any of the following medicines:  •Antibiotics help treat the bacterial infection.      •Acetaminophen decreases pain and fever. It is available without a doctor's order. Ask how much to take and how often to take it. Follow directions. Read the labels of all other medicines you are using to see if they also contain acetaminophen, or ask your doctor or pharmacist. Acetaminophen can cause liver damage if not taken correctly. Do not use more than 4 grams (4,000 milligrams) total of acetaminophen in one day.       •NSAIDs, such as ibuprofen, help decrease swelling, pain, and fever. This medicine is available with or without a doctor's order. NSAIDs can cause stomach bleeding or kidney problems in certain people. If you take blood thinner medicine, always ask your healthcare provider if NSAIDs are safe for you. Always read the medicine label and follow directions.      How can I manage my symptoms?   •Wash the area with soap and water every day. Gently pat dry. Use bandages if directed by your healthcare provider.      •Elevate the area above the level of your heart as often as you can. This will help decrease swelling and pain. Prop the area on pillows or blankets to keep it elevated comfortably.  Elevate Leg           •Place a cool, damp cloth on the area. Use clean cloths and clean water. You can do this as often as you need to. Cool, damp cloths may help decrease pain.      •Apply cream or ointment as directed. These help protect the area. Most over-the-counter products, such as petroleum jelly, are good to use. Ask your healthcare provider about specific creams or ointments you should use.      How can I help prevent cellulitis?   •Do not scratch bug bites or areas of injury. You increase your risk for cellulitis by scratching these areas.      •Do not share personal items, such as towels, clothing, and razors.      •Clean exercise equipment with germ-killing  before and after you use it.      •Treat athlete’s foot. This can help prevent the spread of a bacterial skin infection.      •Wash your hands often. Use soap and water. Wash your hands after you use the bathroom, change a child's diapers, or sneeze. Wash your hands before you prepare or eat food. Use lotion to prevent dry, cracked skin.  Handwashing           When should I seek immediate care?   •Your wound gets larger and more painful.      •You feel a crackling under your skin when you touch it.      •You have purple dots or bumps on your skin, or you see bleeding under your skin.      •You see red streaks coming from the infected area.      When should I call my doctor?   •The red, warm, swollen area gets larger.      •Your fever or pain does not go away or gets worse.      •The area does not get smaller after 3 days of antibiotics.      •You have questions or concerns about your condition or care.      CARE AGREEMENT:    You have the right to help plan your care. Learn about your health condition and how it may be treated. Discuss treatment options with your healthcare providers to decide what care you want to receive. You always have the right to refuse treatment.

## 2021-10-21 NOTE — ED PROVIDER NOTE - PATIENT PORTAL LINK FT
You can access the FollowMyHealth Patient Portal offered by [facility] by registering at the following website: [email or web address]. By joining [employer]’s FollowMyHealth portal, you will also be able to view your health information using other applications (apps) compatible with our system.

## 2021-10-21 NOTE — ED ADULT NURSE NOTE - OBJECTIVE STATEMENT
a/ox4 patient BIBEMS from [facility] for left finger redness and nail discoloration. Patient's left hand is contracted with nothing preventing pressure ulcer at this time. Patient left second and third digit redness and irritation noted.

## 2021-11-16 ENCOUNTER — INPATIENT (INPATIENT)
Facility: HOSPITAL | Age: 86
LOS: 7 days | Discharge: NURSING, REHAB OR LONG-TERM CARE | DRG: 300 | End: 2021-11-24
Attending: INTERNAL MEDICINE | Admitting: INTERNAL MEDICINE
Payer: MEDICARE

## 2021-11-16 VITALS
HEIGHT: 72 IN | RESPIRATION RATE: 18 BRPM | SYSTOLIC BLOOD PRESSURE: 113 MMHG | TEMPERATURE: 97 F | HEART RATE: 60 BPM | OXYGEN SATURATION: 96 % | DIASTOLIC BLOOD PRESSURE: 74 MMHG | WEIGHT: 169.98 LBS

## 2021-11-16 DIAGNOSIS — I50.9 HEART FAILURE, UNSPECIFIED: ICD-10-CM

## 2021-11-16 DIAGNOSIS — L08.9 LOCAL INFECTION OF THE SKIN AND SUBCUTANEOUS TISSUE, UNSPECIFIED: ICD-10-CM

## 2021-11-16 DIAGNOSIS — Z29.9 ENCOUNTER FOR PROPHYLACTIC MEASURES, UNSPECIFIED: ICD-10-CM

## 2021-11-16 DIAGNOSIS — E11.9 TYPE 2 DIABETES MELLITUS WITHOUT COMPLICATIONS: ICD-10-CM

## 2021-11-16 DIAGNOSIS — I10 ESSENTIAL (PRIMARY) HYPERTENSION: ICD-10-CM

## 2021-11-16 DIAGNOSIS — K59.00 CONSTIPATION, UNSPECIFIED: ICD-10-CM

## 2021-11-16 DIAGNOSIS — F03.90 UNSPECIFIED DEMENTIA WITHOUT BEHAVIORAL DISTURBANCE: ICD-10-CM

## 2021-11-16 DIAGNOSIS — I25.10 ATHEROSCLEROTIC HEART DISEASE OF NATIVE CORONARY ARTERY WITHOUT ANGINA PECTORIS: ICD-10-CM

## 2021-11-16 DIAGNOSIS — I48.91 UNSPECIFIED ATRIAL FIBRILLATION: ICD-10-CM

## 2021-11-16 DIAGNOSIS — I96 GANGRENE, NOT ELSEWHERE CLASSIFIED: ICD-10-CM

## 2021-11-16 LAB
ALBUMIN SERPL ELPH-MCNC: 2.9 G/DL — LOW (ref 3.3–5)
ALP SERPL-CCNC: 88 U/L — SIGNIFICANT CHANGE UP (ref 40–120)
ALT FLD-CCNC: 21 U/L — SIGNIFICANT CHANGE UP (ref 12–78)
ANION GAP SERPL CALC-SCNC: 4 MMOL/L — LOW (ref 5–17)
AST SERPL-CCNC: 17 U/L — SIGNIFICANT CHANGE UP (ref 15–37)
BASOPHILS # BLD AUTO: 0.03 K/UL — SIGNIFICANT CHANGE UP (ref 0–0.2)
BASOPHILS NFR BLD AUTO: 0.5 % — SIGNIFICANT CHANGE UP (ref 0–2)
BILIRUB SERPL-MCNC: 0.4 MG/DL — SIGNIFICANT CHANGE UP (ref 0.2–1.2)
BUN SERPL-MCNC: 20 MG/DL — SIGNIFICANT CHANGE UP (ref 7–23)
CALCIUM SERPL-MCNC: 8.8 MG/DL — SIGNIFICANT CHANGE UP (ref 8.5–10.1)
CHLORIDE SERPL-SCNC: 105 MMOL/L — SIGNIFICANT CHANGE UP (ref 96–108)
CO2 SERPL-SCNC: 32 MMOL/L — HIGH (ref 22–31)
CREAT SERPL-MCNC: 0.61 MG/DL — SIGNIFICANT CHANGE UP (ref 0.5–1.3)
EOSINOPHIL # BLD AUTO: 0.08 K/UL — SIGNIFICANT CHANGE UP (ref 0–0.5)
EOSINOPHIL NFR BLD AUTO: 1.2 % — SIGNIFICANT CHANGE UP (ref 0–6)
GLUCOSE SERPL-MCNC: 94 MG/DL — SIGNIFICANT CHANGE UP (ref 70–99)
HCT VFR BLD CALC: 35 % — LOW (ref 39–50)
HGB BLD-MCNC: 11.8 G/DL — LOW (ref 13–17)
IMM GRANULOCYTES NFR BLD AUTO: 0.5 % — SIGNIFICANT CHANGE UP (ref 0–1.5)
LACTATE SERPL-SCNC: 0.8 MMOL/L — SIGNIFICANT CHANGE UP (ref 0.7–2)
LYMPHOCYTES # BLD AUTO: 1.35 K/UL — SIGNIFICANT CHANGE UP (ref 1–3.3)
LYMPHOCYTES # BLD AUTO: 20.5 % — SIGNIFICANT CHANGE UP (ref 13–44)
MCHC RBC-ENTMCNC: 32.2 PG — SIGNIFICANT CHANGE UP (ref 27–34)
MCHC RBC-ENTMCNC: 33.7 GM/DL — SIGNIFICANT CHANGE UP (ref 32–36)
MCV RBC AUTO: 95.4 FL — SIGNIFICANT CHANGE UP (ref 80–100)
MONOCYTES # BLD AUTO: 0.54 K/UL — SIGNIFICANT CHANGE UP (ref 0–0.9)
MONOCYTES NFR BLD AUTO: 8.2 % — SIGNIFICANT CHANGE UP (ref 2–14)
NEUTROPHILS # BLD AUTO: 4.54 K/UL — SIGNIFICANT CHANGE UP (ref 1.8–7.4)
NEUTROPHILS NFR BLD AUTO: 69.1 % — SIGNIFICANT CHANGE UP (ref 43–77)
NRBC # BLD: 0 /100 WBCS — SIGNIFICANT CHANGE UP (ref 0–0)
PLATELET # BLD AUTO: 224 K/UL — SIGNIFICANT CHANGE UP (ref 150–400)
POTASSIUM SERPL-MCNC: 4 MMOL/L — SIGNIFICANT CHANGE UP (ref 3.5–5.3)
POTASSIUM SERPL-SCNC: 4 MMOL/L — SIGNIFICANT CHANGE UP (ref 3.5–5.3)
PROT SERPL-MCNC: 6.8 G/DL — SIGNIFICANT CHANGE UP (ref 6–8.3)
RBC # BLD: 3.67 M/UL — LOW (ref 4.2–5.8)
RBC # FLD: 13.5 % — SIGNIFICANT CHANGE UP (ref 10.3–14.5)
SARS-COV-2 RNA SPEC QL NAA+PROBE: SIGNIFICANT CHANGE UP
SODIUM SERPL-SCNC: 141 MMOL/L — SIGNIFICANT CHANGE UP (ref 135–145)
WBC # BLD: 6.57 K/UL — SIGNIFICANT CHANGE UP (ref 3.8–10.5)
WBC # FLD AUTO: 6.57 K/UL — SIGNIFICANT CHANGE UP (ref 3.8–10.5)

## 2021-11-16 PROCEDURE — 73120 X-RAY EXAM OF HAND: CPT | Mod: 26,LT

## 2021-11-16 PROCEDURE — 99284 EMERGENCY DEPT VISIT MOD MDM: CPT

## 2021-11-16 RX ORDER — DEXTROSE 50 % IN WATER 50 %
12.5 SYRINGE (ML) INTRAVENOUS ONCE
Refills: 0 | Status: DISCONTINUED | OUTPATIENT
Start: 2021-11-16 | End: 2021-11-24

## 2021-11-16 RX ORDER — FUROSEMIDE 40 MG
20 TABLET ORAL DAILY
Refills: 0 | Status: DISCONTINUED | OUTPATIENT
Start: 2021-11-16 | End: 2021-11-24

## 2021-11-16 RX ORDER — GLUCAGON INJECTION, SOLUTION 0.5 MG/.1ML
1 INJECTION, SOLUTION SUBCUTANEOUS ONCE
Refills: 0 | Status: DISCONTINUED | OUTPATIENT
Start: 2021-11-16 | End: 2021-11-24

## 2021-11-16 RX ORDER — TRAMADOL HYDROCHLORIDE 50 MG/1
25 TABLET ORAL
Refills: 0 | Status: DISCONTINUED | OUTPATIENT
Start: 2021-11-16 | End: 2021-11-22

## 2021-11-16 RX ORDER — MAGNESIUM HYDROXIDE 400 MG/1
30 TABLET, CHEWABLE ORAL DAILY
Refills: 0 | Status: DISCONTINUED | OUTPATIENT
Start: 2021-11-16 | End: 2021-11-24

## 2021-11-16 RX ORDER — TAMSULOSIN HYDROCHLORIDE 0.4 MG/1
0.4 CAPSULE ORAL AT BEDTIME
Refills: 0 | Status: DISCONTINUED | OUTPATIENT
Start: 2021-11-16 | End: 2021-11-24

## 2021-11-16 RX ORDER — MULTIVIT-MIN/FERROUS GLUCONATE 9 MG/15 ML
1 LIQUID (ML) ORAL DAILY
Refills: 0 | Status: DISCONTINUED | OUTPATIENT
Start: 2021-11-16 | End: 2021-11-24

## 2021-11-16 RX ORDER — PANTOPRAZOLE SODIUM 20 MG/1
40 TABLET, DELAYED RELEASE ORAL
Refills: 0 | Status: DISCONTINUED | OUTPATIENT
Start: 2021-11-16 | End: 2021-11-21

## 2021-11-16 RX ORDER — SODIUM CHLORIDE 9 MG/ML
1000 INJECTION, SOLUTION INTRAVENOUS
Refills: 0 | Status: DISCONTINUED | OUTPATIENT
Start: 2021-11-16 | End: 2021-11-24

## 2021-11-16 RX ORDER — AMPICILLIN SODIUM AND SULBACTAM SODIUM 250; 125 MG/ML; MG/ML
3 INJECTION, POWDER, FOR SUSPENSION INTRAMUSCULAR; INTRAVENOUS EVERY 6 HOURS
Refills: 0 | Status: COMPLETED | OUTPATIENT
Start: 2021-11-16 | End: 2021-11-21

## 2021-11-16 RX ORDER — HEPARIN SODIUM 5000 [USP'U]/ML
5000 INJECTION INTRAVENOUS; SUBCUTANEOUS EVERY 12 HOURS
Refills: 0 | Status: DISCONTINUED | OUTPATIENT
Start: 2021-11-16 | End: 2021-11-24

## 2021-11-16 RX ORDER — VANCOMYCIN HCL 1 G
1000 VIAL (EA) INTRAVENOUS ONCE
Refills: 0 | Status: COMPLETED | OUTPATIENT
Start: 2021-11-16 | End: 2021-11-16

## 2021-11-16 RX ORDER — ASPIRIN/CALCIUM CARB/MAGNESIUM 324 MG
81 TABLET ORAL DAILY
Refills: 0 | Status: DISCONTINUED | OUTPATIENT
Start: 2021-11-16 | End: 2021-11-24

## 2021-11-16 RX ORDER — LACTOBACILLUS ACIDOPHILUS 100MM CELL
1 CAPSULE ORAL
Refills: 0 | Status: DISCONTINUED | OUTPATIENT
Start: 2021-11-16 | End: 2021-11-24

## 2021-11-16 RX ORDER — DEXTROSE 50 % IN WATER 50 %
25 SYRINGE (ML) INTRAVENOUS ONCE
Refills: 0 | Status: DISCONTINUED | OUTPATIENT
Start: 2021-11-16 | End: 2021-11-24

## 2021-11-16 RX ORDER — DONEPEZIL HYDROCHLORIDE 10 MG/1
5 TABLET, FILM COATED ORAL AT BEDTIME
Refills: 0 | Status: DISCONTINUED | OUTPATIENT
Start: 2021-11-16 | End: 2021-11-24

## 2021-11-16 RX ORDER — LOSARTAN POTASSIUM 100 MG/1
25 TABLET, FILM COATED ORAL DAILY
Refills: 0 | Status: DISCONTINUED | OUTPATIENT
Start: 2021-11-16 | End: 2021-11-24

## 2021-11-16 RX ORDER — SENNA PLUS 8.6 MG/1
2 TABLET ORAL AT BEDTIME
Refills: 0 | Status: DISCONTINUED | OUTPATIENT
Start: 2021-11-16 | End: 2021-11-24

## 2021-11-16 RX ORDER — ASCORBIC ACID 60 MG
500 TABLET,CHEWABLE ORAL
Refills: 0 | Status: DISCONTINUED | OUTPATIENT
Start: 2021-11-16 | End: 2021-11-24

## 2021-11-16 RX ORDER — METOPROLOL TARTRATE 50 MG
25 TABLET ORAL DAILY
Refills: 0 | Status: DISCONTINUED | OUTPATIENT
Start: 2021-11-16 | End: 2021-11-24

## 2021-11-16 RX ORDER — FERROUS SULFATE 325(65) MG
325 TABLET ORAL DAILY
Refills: 0 | Status: DISCONTINUED | OUTPATIENT
Start: 2021-11-16 | End: 2021-11-24

## 2021-11-16 RX ORDER — INSULIN LISPRO 100/ML
VIAL (ML) SUBCUTANEOUS
Refills: 0 | Status: DISCONTINUED | OUTPATIENT
Start: 2021-11-16 | End: 2021-11-24

## 2021-11-16 RX ORDER — ACETAMINOPHEN 500 MG
650 TABLET ORAL EVERY 6 HOURS
Refills: 0 | Status: DISCONTINUED | OUTPATIENT
Start: 2021-11-16 | End: 2021-11-24

## 2021-11-16 RX ORDER — POLYETHYLENE GLYCOL 3350 17 G/17G
17 POWDER, FOR SOLUTION ORAL DAILY
Refills: 0 | Status: DISCONTINUED | OUTPATIENT
Start: 2021-11-16 | End: 2021-11-24

## 2021-11-16 RX ORDER — AMPICILLIN SODIUM AND SULBACTAM SODIUM 250; 125 MG/ML; MG/ML
3 INJECTION, POWDER, FOR SUSPENSION INTRAMUSCULAR; INTRAVENOUS ONCE
Refills: 0 | Status: COMPLETED | OUTPATIENT
Start: 2021-11-16 | End: 2021-11-16

## 2021-11-16 RX ORDER — SIMVASTATIN 20 MG/1
20 TABLET, FILM COATED ORAL AT BEDTIME
Refills: 0 | Status: DISCONTINUED | OUTPATIENT
Start: 2021-11-16 | End: 2021-11-24

## 2021-11-16 RX ORDER — DEXTROSE 50 % IN WATER 50 %
15 SYRINGE (ML) INTRAVENOUS ONCE
Refills: 0 | Status: DISCONTINUED | OUTPATIENT
Start: 2021-11-16 | End: 2021-11-24

## 2021-11-16 RX ADMIN — AMPICILLIN SODIUM AND SULBACTAM SODIUM 200 GRAM(S): 250; 125 INJECTION, POWDER, FOR SUSPENSION INTRAMUSCULAR; INTRAVENOUS at 17:24

## 2021-11-16 RX ADMIN — Medication 250 MILLIGRAM(S): at 18:45

## 2021-11-16 NOTE — PROGRESS NOTE ADULT - SUBJECTIVE AND OBJECTIVE BOX
Pt seen  Chart reviewed  Full consult dictation to follow    Left third DP tip necrosis  no current signs or indications for acute surgical intervention  Recommend topical care, consider padding to avoid pressure injury related to contracture and finger placement into palm  will f/u PRN

## 2021-11-16 NOTE — CONSULT NOTE ADULT - SUBJECTIVE AND OBJECTIVE BOX
Patient is a 89y old  Male who presents with a chief complaint of     HPI:  89 Male ,[facility] resident  PMH is significant for :  Arteriosclerotic heart disease (ASHD)  Atrial fibrillation  ,CHF (congestive heart failure)  ,Dementia  ,Depression  ,DM (diabetes mellitus)  ,Hyperlipemia  ,Hypertension  ,Prostate ca  ,GIB ,R groin abscess  with dementia, HTN, HLD, A FIb, chf, dm sent from [facility] for evaluation of left middle finger discoloration .Seen by hand surgeon and wound care MD - diagnosed with tip necrosis of finger ,dry dressing and antibacterial tx recommended ,no immediate sx intervention required Admitted for septic workup and evaluation, send blood and urine cx ,serial lactate levels ,monitor vitals closely hydration ,monitor urine output and renal profile ,iv abx initiated -started on Unasyn ,s/p vancomycin ,ID cons called  (16 Nov 2021 19:23)      Asked to see patient for ID Consult    PAST MEDICAL & SURGICAL HISTORY:  Atrial fibrillation    Hypertension    Diabetes    Prostate ca    Dementia    CHF (congestive heart failure)    Hyperlipemia    Diabetes    Depression    Dementia    DM (diabetes mellitus)    Atrial fibrillation    Prostate CA    Arteriosclerotic heart disease (ASHD)    Dementia    Anemia    Constipation    No significant past surgical history        Allergies    latex (Rash)  latex (Unknown)  No Known Drug Allergies    Intolerances        REVIEW OF SYSTEMS:  No cough or SOB.   No vomiting or diarrhea      HOME MEDICATIONS:    MEDICATIONS  (STANDING):  ampicillin/sulbactam  IVPB 3 Gram(s) IV Intermittent every 6 hours  ascorbic acid 500 milliGRAM(s) Oral two times a day  aspirin  chewable 81 milliGRAM(s) Oral daily  dextrose 40% Gel 15 Gram(s) Oral once  dextrose 5%. 1000 milliLiter(s) (50 mL/Hr) IV Continuous <Continuous>  dextrose 5%. 1000 milliLiter(s) (100 mL/Hr) IV Continuous <Continuous>  dextrose 50% Injectable 25 Gram(s) IV Push once  dextrose 50% Injectable 12.5 Gram(s) IV Push once  dextrose 50% Injectable 25 Gram(s) IV Push once  donepezil 5 milliGRAM(s) Oral at bedtime  ferrous    sulfate 325 milliGRAM(s) Oral daily  furosemide    Tablet 20 milliGRAM(s) Oral daily  glucagon  Injectable 1 milliGRAM(s) IntraMuscular once  heparin   Injectable 5000 Unit(s) SubCutaneous every 12 hours  insulin lispro (ADMELOG) corrective regimen sliding scale   SubCutaneous three times a day before meals  lactobacillus acidophilus 1 Tablet(s) Oral two times a day  losartan 25 milliGRAM(s) Oral daily  metoprolol succinate ER 25 milliGRAM(s) Oral daily  multivitamin/minerals 1 Tablet(s) Oral daily  pantoprazole    Tablet 40 milliGRAM(s) Oral before breakfast  polyethylene glycol 3350 17 Gram(s) Oral daily  senna 2 Tablet(s) Oral at bedtime  simvastatin 20 milliGRAM(s) Oral at bedtime  tamsulosin 0.4 milliGRAM(s) Oral at bedtime    MEDICATIONS  (PRN):  acetaminophen     Tablet .. 650 milliGRAM(s) Oral every 6 hours PRN Temp greater or equal to 38C (100.4F), Mild Pain (1 - 3)  bisacodyl Suppository 10 milliGRAM(s) Rectal daily PRN Constipation  magnesium hydroxide Suspension 30 milliLiter(s) Oral daily PRN Constipation  traMADol 25 milliGRAM(s) Oral four times a day PRN Moderate Pain (4 - 6)      Vital Signs Last 24 Hrs  T(C): 36.7 (16 Nov 2021 19:18), Max: 36.7 (16 Nov 2021 19:18)  T(F): 98.1 (16 Nov 2021 19:18), Max: 98.1 (16 Nov 2021 19:18)  HR: 67 (16 Nov 2021 19:18) (60 - 67)  BP: 104/60 (16 Nov 2021 19:18) (104/60 - 113/74)  BP(mean): --  RR: 18 (16 Nov 2021 19:18) (18 - 18)  SpO2: 94% (16 Nov 2021 19:18) (94% - 96%)    PHYSICAL EXAM:  HEENT: NC/AT  Neck: Soft, no tenderness.   Lungs: Coarse BS bilaterally, no wheezing.   Heart: RRR, no murmurs.   Abdomen: Soft, no tenderness. No masses.   Genital/ Rectal: No fitch catheter.   Extremities: Contracted left fingers. Third left finger with moderate swelling and erythema. Distal phalanx with necrosis and fluctuance.   Neurologic: Confused.       I&O's Summary      LABORATORY:                          11.8   6.57  )-----------( 224      ( 16 Nov 2021 16:56 )             35.0           11-16    141  |  105  |  20  ----------------------------<  94  4.0   |  32<H>  |  0.61    Ca    8.8      16 Nov 2021 16:56    TPro  6.8  /  Alb  2.9<L>  /  TBili  0.4  /  DBili  x   /  AST  17  /  ALT  21  /  AlkPhos  88  11-16          LABORATORY:    CBC Full  -  ( 16 Nov 2021 16:56 )  WBC Count : 6.57 K/uL  RBC Count : 3.67 M/uL  Hemoglobin : 11.8 g/dL  Hematocrit : 35.0 %  Platelet Count - Automated : 224 K/uL  Mean Cell Volume : 95.4 fl  Mean Cell Hemoglobin : 32.2 pg  Mean Cell Hemoglobin Concentration : 33.7 gm/dL  Auto Neutrophil # : 4.54 K/uL  Auto Lymphocyte # : 1.35 K/uL  Auto Monocyte # : 0.54 K/uL  Auto Eosinophil # : 0.08 K/uL  Auto Basophil # : 0.03 K/uL  Auto Neutrophil % : 69.1 %  Auto Lymphocyte % : 20.5 %  Auto Monocyte % : 8.2 %  Auto Eosinophil % : 1.2 %  Auto Basophil % : 0.5 %          11-16    141  |  105  |  20  ----------------------------<  94  4.0   |  32<H>  |  0.61    Ca    8.8      16 Nov 2021 16:56    TPro  6.8  /  Alb  2.9<L>  /  TBili  0.4  /  DBili  x   /  AST  17  /  ALT  21  /  AlkPhos  88  11-16    Assessment and Plan:    1. Cellulitis of left 3rd finger.  2. Suspected osteomyelitis/abscess with necrosis of distal phalanx of left 3rd finger.    . Continue IV Unasyn for now  . Add IV Vanco.  . Get wound culture.  . MRI of left finger if possible.    Thank you.                                                Patient is a 89y old  Male who presents with a chief complaint of     HPI:  89 Male ,[facility] resident  PMH is significant for :  Arteriosclerotic heart disease (ASHD)  Atrial fibrillation  ,CHF (congestive heart failure)  ,Dementia  ,Depression  ,DM (diabetes mellitus)  ,Hyperlipemia  ,Hypertension  ,Prostate ca  ,GIB ,R groin abscess  with dementia, HTN, HLD, A FIb, chf, dm sent from [facility] for evaluation of left middle finger discoloration .Seen by hand surgeon and wound care MD - diagnosed with tip necrosis of finger ,dry dressing and antibacterial tx recommended ,no immediate sx intervention required Admitted for septic workup and evaluation, send blood and urine cx ,serial lactate levels ,monitor vitals closely hydration ,monitor urine output and renal profile ,iv abx initiated -started on Unasyn ,s/p vancomycin ,ID cons called  (16 Nov 2021 19:23)      Asked to see patient for ID Consult    PAST MEDICAL & SURGICAL HISTORY:  Atrial fibrillation    Hypertension    Diabetes    Prostate ca    Dementia    CHF (congestive heart failure)    Hyperlipemia    Diabetes    Depression    Dementia    DM (diabetes mellitus)    Atrial fibrillation    Prostate CA    Arteriosclerotic heart disease (ASHD)    Dementia    Anemia    Constipation    No significant past surgical history        Allergies    latex (Rash)  latex (Unknown)  No Known Drug Allergies    Intolerances        REVIEW OF SYSTEMS:  No cough or SOB.   No vomiting or diarrhea      HOME MEDICATIONS:    MEDICATIONS  (STANDING):  ampicillin/sulbactam  IVPB 3 Gram(s) IV Intermittent every 6 hours  ascorbic acid 500 milliGRAM(s) Oral two times a day  aspirin  chewable 81 milliGRAM(s) Oral daily  dextrose 40% Gel 15 Gram(s) Oral once  dextrose 5%. 1000 milliLiter(s) (50 mL/Hr) IV Continuous <Continuous>  dextrose 5%. 1000 milliLiter(s) (100 mL/Hr) IV Continuous <Continuous>  dextrose 50% Injectable 25 Gram(s) IV Push once  dextrose 50% Injectable 12.5 Gram(s) IV Push once  dextrose 50% Injectable 25 Gram(s) IV Push once  donepezil 5 milliGRAM(s) Oral at bedtime  ferrous    sulfate 325 milliGRAM(s) Oral daily  furosemide    Tablet 20 milliGRAM(s) Oral daily  glucagon  Injectable 1 milliGRAM(s) IntraMuscular once  heparin   Injectable 5000 Unit(s) SubCutaneous every 12 hours  insulin lispro (ADMELOG) corrective regimen sliding scale   SubCutaneous three times a day before meals  lactobacillus acidophilus 1 Tablet(s) Oral two times a day  losartan 25 milliGRAM(s) Oral daily  metoprolol succinate ER 25 milliGRAM(s) Oral daily  multivitamin/minerals 1 Tablet(s) Oral daily  pantoprazole    Tablet 40 milliGRAM(s) Oral before breakfast  polyethylene glycol 3350 17 Gram(s) Oral daily  senna 2 Tablet(s) Oral at bedtime  simvastatin 20 milliGRAM(s) Oral at bedtime  tamsulosin 0.4 milliGRAM(s) Oral at bedtime    MEDICATIONS  (PRN):  acetaminophen     Tablet .. 650 milliGRAM(s) Oral every 6 hours PRN Temp greater or equal to 38C (100.4F), Mild Pain (1 - 3)  bisacodyl Suppository 10 milliGRAM(s) Rectal daily PRN Constipation  magnesium hydroxide Suspension 30 milliLiter(s) Oral daily PRN Constipation  traMADol 25 milliGRAM(s) Oral four times a day PRN Moderate Pain (4 - 6)      Vital Signs Last 24 Hrs  T(C): 36.7 (16 Nov 2021 19:18), Max: 36.7 (16 Nov 2021 19:18)  T(F): 98.1 (16 Nov 2021 19:18), Max: 98.1 (16 Nov 2021 19:18)  HR: 67 (16 Nov 2021 19:18) (60 - 67)  BP: 104/60 (16 Nov 2021 19:18) (104/60 - 113/74)  BP(mean): --  RR: 18 (16 Nov 2021 19:18) (18 - 18)  SpO2: 94% (16 Nov 2021 19:18) (94% - 96%)    PHYSICAL EXAM:  HEENT: NC/AT  Neck: Soft, no tenderness.   Lungs: Coarse BS bilaterally, no wheezing.   Heart: RRR, no murmurs.   Abdomen: Soft, no tenderness. No masses.   Genital/ Rectal: No fitch catheter.   Extremities: Contracted left fingers. Third left finger with moderate swelling and erythema. Distal phalanx with necrosis and fluctuance.   Neurologic: Confused.       I&O's Summary      LABORATORY:                          11.8   6.57  )-----------( 224      ( 16 Nov 2021 16:56 )             35.0           11-16    141  |  105  |  20  ----------------------------<  94  4.0   |  32<H>  |  0.61    Ca    8.8      16 Nov 2021 16:56    TPro  6.8  /  Alb  2.9<L>  /  TBili  0.4  /  DBili  x   /  AST  17  /  ALT  21  /  AlkPhos  88  11-16          LABORATORY:    CBC Full  -  ( 16 Nov 2021 16:56 )  WBC Count : 6.57 K/uL  RBC Count : 3.67 M/uL  Hemoglobin : 11.8 g/dL  Hematocrit : 35.0 %  Platelet Count - Automated : 224 K/uL  Mean Cell Volume : 95.4 fl  Mean Cell Hemoglobin : 32.2 pg  Mean Cell Hemoglobin Concentration : 33.7 gm/dL  Auto Neutrophil # : 4.54 K/uL  Auto Lymphocyte # : 1.35 K/uL  Auto Monocyte # : 0.54 K/uL  Auto Eosinophil # : 0.08 K/uL  Auto Basophil # : 0.03 K/uL  Auto Neutrophil % : 69.1 %  Auto Lymphocyte % : 20.5 %  Auto Monocyte % : 8.2 %  Auto Eosinophil % : 1.2 %  Auto Basophil % : 0.5 %          11-16    141  |  105  |  20  ----------------------------<  94  4.0   |  32<H>  |  0.61    Ca    8.8      16 Nov 2021 16:56    TPro  6.8  /  Alb  2.9<L>  /  TBili  0.4  /  DBili  x   /  AST  17  /  ALT  21  /  AlkPhos  88  11-16    Assessment and Plan:    1. Cellulitis of left 3rd finger.  2. Suspected osteomyelitis/abscess with necrosis of distal phalanx of left 3rd finger.    . Continue IV Unasyn for now.   . Get wound culture.  . MRI cannot be done as the patient has AICD, Will get CT with IV contrast of left hand to evaluate for osteomyelitis/abscess.     Thank you.

## 2021-11-16 NOTE — ED PROVIDER NOTE - ATTENDING CONTRIBUTION TO CARE
Pt is a 89 yo male who presents to the ED for finger discoloration. PMHx of anemia, ASHD, A-fib does not appear to be on AC, CHF, constipation, dementia, depression, DM, HLD, HTN, prostate cancer. Pt was last seen in the ED on 10/21 with redness to his left hand 3rd digit. Pt with contracted left hand and is was believed to be a superficial pressure ulcer. He was discharged ashlie on po Keflex. Pt returns today for finger discoloration. Pt is a 89 yo male who presents to the ED for finger discoloration. PMHx of anemia, ASHD, A-fib does not appear to be on AC, CHF, constipation, dementia, depression, DM, HLD, HTN, prostate cancer. Pt was last seen in the ED on 10/21 with redness to his left hand 3rd digit. Pt with contracted left hand and is was believed to be a superficial pressure ulcer. He was discharged ashlie on po Keflex. Pt returns today for finger discoloration. Pt is unable to provide history. On exam pt lying in bed advanced dementia, NCAT, heart appears to be irregular but rate controlled, lungs CTA, abd soft NT/ND. LUE: no libby TTP to shoulder, humerus, elbow, wrist. Hand contracted and held in flexion with pillow in hand. Pt able to move 1st and 2nd digits but does not move 3rd through 5th digits and on active ROM appears to be in pain upon movement. No skin changes noted to 5th and 4th digits with cap refill less then 2 seconds. 3rd digit there is nailbed changes with nail hypertrophy and the nail appears to be lifted up off the nailbed although no subungual hematoma noted. There is skin discoloration noted at the distal palmar aspect of the digit almost appears to be healing ulceration base. No evidence of overt necrosis. Surrounding skin with cap refill less then 2 seconds. No streaking lymphangitis.  +radial pulse. Pt presenting to the ED with reported skin changes to left hand 3rd digit. Pt with known contraction to hand. Appears to be possible healing ulceration. Will obtain x-ray, and consult with wound care.

## 2021-11-16 NOTE — H&P ADULT - HISTORY OF PRESENT ILLNESS
89 Male ,retirement resident  PMH is significant for :  Arteriosclerotic heart disease (ASHD)  Atrial fibrillation  ,CHF (congestive heart failure)  ,Dementia  ,Depression  ,DM (diabetes mellitus)  ,Hyperlipemia  ,Hypertension  ,Prostate ca  ,GIB ,R groin abscess  with dementia, HTN, HLD, A FIb, chf, dm sent from [facility] for evaluation of left middle finger discoloration .Seen by hand surgeon and wound care MD - diagnosed with tip necrosis of finger ,dry dressing and antibacterial tx recommended ,no immediate sx intervention required Admitted for septic workup and evaluation, send blood and urine cx ,serial lactate levels ,monitor vitals closely hydration ,monitor urine output and renal profile ,iv abx initiated -started on Unasyn ,s/p vancomycin ,ID cons called  89 Male ,skilled nursing resident  PMH is significant for :  Arteriosclerotic heart disease (ASHD)  Atrial fibrillation  ,CHF (congestive heart failure)  ,Dementia  ,Depression  ,DM (diabetes mellitus)  ,Hyperlipemia  ,Hypertension  ,Prostate ca  ,GIB ,R groin abscess  with dementia, HTN, HLD, A FIb, chf, dm sent from [facility] for evaluation of left middle finger discoloration .Seen by hand surgeon and wound care MD - diagnosed with tip necrosis of finger ,dry dressing and antibacterial tx recommended ,no immediate sx intervention required Admitted for septic workup and evaluation, send blood and urine cx ,serial lactate levels ,monitor vitals closely hydration ,monitor urine output and renal profile ,iv abx initiated -started on Unasyn ,s/p vancomycin ,ID cons called Ct scan ordered to rule out OM

## 2021-11-16 NOTE — H&P ADULT - EXTREMITIES COMMENTS
left hand third finger necrotic tip: scant serosanguinous drainage no odor: finger is with erythema and swelling present no warmth noted wound dimensions about 1.5 x 1.5 wound culture obtained:  fingers of hand contracted:

## 2021-11-16 NOTE — ED ADULT NURSE NOTE - OBJECTIVE STATEMENT
per EMS pt with discoloration on left 3rd finger after wearing a brace for a couple of weeks. pt with necrotic area with redness on left third finger. pt denies pain.

## 2021-11-16 NOTE — ED PROVIDER NOTE - CLINICAL SUMMARY MEDICAL DECISION MAKING FREE TEXT BOX
89 M with dementia, HTN, HLD, A FIb, chf, dm sent from [facility] for evaluation of left middle finger discoloration. - xray, wound care consult

## 2021-11-16 NOTE — ED PROVIDER NOTE - OBJECTIVE STATEMENT
89 M with dementia, HTN, HLD, A FIb, chf, dm sent from [facility] for evaluation of left middle finger discoloration.

## 2021-11-16 NOTE — H&P ADULT - ASSESSMENT
89 Male ,[facility] resident  PMH is significant for :  Arteriosclerotic heart disease (ASHD)  Atrial fibrillation  ,CHF (congestive heart failure)  ,Dementia  ,Depression  ,DM (diabetes mellitus)  ,Hyperlipemia  ,Hypertension  ,Prostate ca  ,GIB ,R groin abscess  with dementia, HTN, HLD, A FIb, chf, dm sent from [facility] for evaluation of left middle finger discoloration .Seen by hand surgeon and wound care MD - diagnosed with tip necrosis of finger ,dry dressing and antibacterial tx recommended ,no immediate sx intervention required Admitted for septic workup and evaluation, send blood and urine cx ,serial lactate levels ,monitor vitals closely hydration ,monitor urine output and renal profile ,iv abx initiated -started on Unasyn ,s/p vancomycin ,ID cons called

## 2021-11-16 NOTE — H&P ADULT - TIME BILLING
75minutes spent on this visit, 50% visit time spent in care co-ordination with other attendings and counselling patient ,requesting necessary consults ,informing family about status & plan of care .I have discussed care plan with [facility] wellness/admitting /nursing   department ,outpatient PCP , hospital consultants , ER physician & med staff .

## 2021-11-16 NOTE — CONSULT NOTE ADULT - SUBJECTIVE AND OBJECTIVE BOX
Chief Complaint: Left 3rd finger wound    HPI: 89 yo WM, who presents to the ER today. Was asked to see pt for a left 3rd finger wound. Unable to obtain any hx or ROS from pt.     On exam, pt's left hand 3rd digit has a dry, dark necrotic appearance about it along with the nail. Its appearance seems to be of dry gangrene of the distal part of that finger. I spoke with PA and advised orthopedic evaluation of the pt now. No open wounds seen in the finger.    PAST MEDICAL & SURGICAL HISTORY:  Atrial fibrillation    Hypertension    Diabetes    Prostate ca    Dementia    CHF (congestive heart failure)    Hyperlipemia    Diabetes    Depression    Dementia    DM (diabetes mellitus)    Atrial fibrillation    Prostate CA    Arteriosclerotic heart disease (ASHD)    Dementia    Anemia    Constipation    No significant past surgical history        Allergies    latex (Rash)  latex (Unknown)  No Known Drug Allergies    Intolerances        MEDICATIONS  (STANDING):  ascorbic acid 500 milliGRAM(s) Oral two times a day  aspirin  chewable 81 milliGRAM(s) Oral daily  donepezil 5 milliGRAM(s) Oral at bedtime  ferrous    sulfate 325 milliGRAM(s) Oral daily  losartan 25 milliGRAM(s) Oral daily  metoprolol succinate ER 25 milliGRAM(s) Oral daily  multivitamin/minerals 1 Tablet(s) Oral daily  pantoprazole    Tablet 40 milliGRAM(s) Oral before breakfast  polyethylene glycol 3350 17 Gram(s) Oral daily  senna 2 Tablet(s) Oral at bedtime  simvastatin 20 milliGRAM(s) Oral at bedtime  tamsulosin 0.4 milliGRAM(s) Oral at bedtime  vancomycin  IVPB. 1000 milliGRAM(s) IV Intermittent once    MEDICATIONS  (PRN):  acetaminophen     Tablet .. 650 milliGRAM(s) Oral every 6 hours PRN Temp greater or equal to 38C (100.4F), Mild Pain (1 - 3)      FAMILY HISTORY:          ROS: Not obtainable.       PHYSICAL EXAM-    Height (cm): 182.9 (11-16 @ 13:20)  Weight (kg): 77.1 (11-16 @ 13:20)  BMI (kg/m2): 23 (11-16 @ 13:20)  Vital Signs Last 24 Hrs  T(C): 36.1 (16 Nov 2021 13:20), Max: 36.1 (16 Nov 2021 13:20)  T(F): 97 (16 Nov 2021 13:20), Max: 97 (16 Nov 2021 13:20)  HR: 60 (16 Nov 2021 13:20) (60 - 60)  BP: 113/74 (16 Nov 2021 13:20) (113/74 - 113/74)  BP(mean): --  RR: 18 (16 Nov 2021 13:20) (18 - 18)  SpO2: 96% (16 Nov 2021 13:20) (96% - 96%)    Constitutional: well developed, well nourished, no apparent distress.   Pulmonary: no respiratory distress, normal respiratory rhythm and effort, lungs are clear to auscultation/percussion. No CVA tenderness.  Cardiovascular: heart rate normal, normal sinus rhythm; no murmurs, gallops, rubs, heaves or thrills   Abdomen: soft, non-tender, +BS, no guarding/rebound/rigidity.    left hand, 3rd finger including nail-appears dark ,dry, shriveled up; no open wounds; appears like dry gangrene?                            11.8   6.57  )-----------( 224      ( 16 Nov 2021 16:56 )             35.0     11-16    141  |  105  |  20  ----------------------------<  94  4.0   |  32<H>  |  0.61    Ca    8.8      16 Nov 2021 16:56    TPro  6.8  /  Alb  2.9<L>  /  TBili  0.4  /  DBili  x   /  AST  17  /  ALT  21  /  AlkPhos  88  11-16      Radiology

## 2021-11-16 NOTE — H&P ADULT - RS GEN PE MLT RESP DETAILS PC
airway patent/breath sounds equal/good air movement/diminished breath sounds, L/diminished breath sounds, R

## 2021-11-16 NOTE — H&P ADULT - NSHPLABSRESULTS_GEN_ALL_CORE
< from: TTE Echo Complete w/o Contrast w/ Doppler (12.07.20 @ 14:47) >  CONCLUSIONS:  Difficult study and limited views  Left ventricle was of normal size, normal LV systolic function EF 55  Mild aortic stenosiswith peak gradient 14 mmHg, mean aortic gradient 8 mmHg and aortic valve area of 1.41 sq cm  Trace mitral regurgitation  Mild tricuspid regurgitation  Device wire seen in the right chambers  < end of copied text >  < from: CT Hand w/ IV Cont, Left (11.17.21 @ 13:14) >  CLINICAL INFORMATION: Swelling of the third finger  TECHNIQUE: Axial CT images were obtained of the left hand with coronal and sagittal reconstructions. 90 cc of Omnipaque 350 were administered intravenously. 10 cc were discarded.  FINDINGS:  Limited secondary to flexion of the fingers and bony demineralization. There is soft tissue swelling at the distal tip of the third phalanx, best appreciated along the volar aspect. No discrete evidence of osseous erosion or periosteal reaction. No evidence of fluid collection along the flexor tendon sheath.  There is radiocarpal, STT and basilar arthrosis. There are vascular calcifications. No soft tissue gas.  IMPRESSION:  Limited secondary to contractures and bony demineralization.  Soft tissue swelling about the distal third phalanx.No  CT evidence of osteomyelitis.  < end of copied text >

## 2021-11-16 NOTE — H&P ADULT - PROBLEM SELECTOR PLAN 1
dry dressing ,topical care , abx Admitted for septic workup and evaluation,send blood and urine cx,serial lactate levels,monitor vitals juliet ramirez hydration,monitor urine output and renal profile,iv abx initiated Admitted for septic workup and evaluation,send blood and urine cx,serial lactate levels,monitor vitals closley,ivfs hydration,monitor urine output and renal profile,iv abx initiated

## 2021-11-16 NOTE — ED PROVIDER NOTE - SKIN, MLM
Left hand 3rd digit with discoloration of nail, swelling and erythema of finger, red/purple discoloration to ventral surface of distal 3rd digit, appears to be pressure ulcer, cap refill <2sec, skin intact, no drainage.

## 2021-11-16 NOTE — ED PROVIDER NOTE - CADM POA URETHRAL CATHETER
Pt drank approx 4 oz of formula, and threw up shortly after according to mother. MD made aware.    No

## 2021-11-16 NOTE — ED PROVIDER NOTE - PRIOR HOSPITAL/ED VISITS SUMMARY FREE TEXT FOR MDM OBTAINED AND REVIEWED OLD RECORDS QUESTION
seen for similar 10/21/11: (+) left 3rd digit distal finger erythema, (+) left hand contracted (+) grabbing cushioned object, (+) erythema with appearance of pressure ulcer    discharged with keflex and f/u hand specialist

## 2021-11-16 NOTE — ED PROVIDER NOTE - PROGRESS NOTE DETAILS
pt seen by wound care who reports concern for necrotic wound/dry gangrene, requesting for pt to be seen by hand specialist pt to be admitted for IV abx, further evaluation    dw dr jeffries ([facility]) who states will see pt

## 2021-11-17 LAB
ANION GAP SERPL CALC-SCNC: 7 MMOL/L — SIGNIFICANT CHANGE UP (ref 5–17)
BUN SERPL-MCNC: 16 MG/DL — SIGNIFICANT CHANGE UP (ref 7–23)
CALCIUM SERPL-MCNC: 8.7 MG/DL — SIGNIFICANT CHANGE UP (ref 8.5–10.1)
CHLORIDE SERPL-SCNC: 105 MMOL/L — SIGNIFICANT CHANGE UP (ref 96–108)
CO2 SERPL-SCNC: 29 MMOL/L — SIGNIFICANT CHANGE UP (ref 22–31)
COVID-19 NUCLEOCAPSID GAM AB INTERP: POSITIVE
COVID-19 NUCLEOCAPSID TOTAL GAM ANTIBODY RESULT: 31.4 INDEX — HIGH
COVID-19 SPIKE DOMAIN AB INTERP: POSITIVE
COVID-19 SPIKE DOMAIN ANTIBODY RESULT: >250 U/ML — HIGH
CREAT SERPL-MCNC: 0.42 MG/DL — LOW (ref 0.5–1.3)
CRP SERPL-MCNC: <3 MG/L — SIGNIFICANT CHANGE UP
ERYTHROCYTE [SEDIMENTATION RATE] IN BLOOD: 21 MM/HR — HIGH (ref 0–20)
GLUCOSE SERPL-MCNC: 99 MG/DL — SIGNIFICANT CHANGE UP (ref 70–99)
HCT VFR BLD CALC: 32.7 % — LOW (ref 39–50)
HGB BLD-MCNC: 11.2 G/DL — LOW (ref 13–17)
MCHC RBC-ENTMCNC: 32.2 PG — SIGNIFICANT CHANGE UP (ref 27–34)
MCHC RBC-ENTMCNC: 34.3 GM/DL — SIGNIFICANT CHANGE UP (ref 32–36)
MCV RBC AUTO: 94 FL — SIGNIFICANT CHANGE UP (ref 80–100)
NRBC # BLD: 0 /100 WBCS — SIGNIFICANT CHANGE UP (ref 0–0)
PLATELET # BLD AUTO: 218 K/UL — SIGNIFICANT CHANGE UP (ref 150–400)
POTASSIUM SERPL-MCNC: 3.9 MMOL/L — SIGNIFICANT CHANGE UP (ref 3.5–5.3)
POTASSIUM SERPL-SCNC: 3.9 MMOL/L — SIGNIFICANT CHANGE UP (ref 3.5–5.3)
PROCALCITONIN SERPL-MCNC: <0.05 — SIGNIFICANT CHANGE UP (ref 0–0.04)
RBC # BLD: 3.48 M/UL — LOW (ref 4.2–5.8)
RBC # FLD: 13.3 % — SIGNIFICANT CHANGE UP (ref 10.3–14.5)
SARS-COV-2 IGG+IGM SERPL QL IA: 31.4 INDEX — HIGH
SARS-COV-2 IGG+IGM SERPL QL IA: >250 U/ML — HIGH
SARS-COV-2 IGG+IGM SERPL QL IA: POSITIVE
SARS-COV-2 IGG+IGM SERPL QL IA: POSITIVE
SODIUM SERPL-SCNC: 141 MMOL/L — SIGNIFICANT CHANGE UP (ref 135–145)
WBC # BLD: 6.11 K/UL — SIGNIFICANT CHANGE UP (ref 3.8–10.5)
WBC # FLD AUTO: 6.11 K/UL — SIGNIFICANT CHANGE UP (ref 3.8–10.5)

## 2021-11-17 PROCEDURE — 73201 CT UPPER EXTREMITY W/DYE: CPT | Mod: 26,LT

## 2021-11-17 PROCEDURE — 99232 SBSQ HOSP IP/OBS MODERATE 35: CPT

## 2021-11-17 RX ORDER — INFLUENZA VIRUS VACCINE 15; 15; 15; 15 UG/.5ML; UG/.5ML; UG/.5ML; UG/.5ML
0.7 SUSPENSION INTRAMUSCULAR ONCE
Refills: 0 | Status: DISCONTINUED | OUTPATIENT
Start: 2021-11-17 | End: 2021-11-24

## 2021-11-17 RX ORDER — POVIDONE-IODINE 5 %
1 AEROSOL (ML) TOPICAL DAILY
Refills: 0 | Status: DISCONTINUED | OUTPATIENT
Start: 2021-11-17 | End: 2021-11-24

## 2021-11-17 RX ADMIN — Medication 325 MILLIGRAM(S): at 11:48

## 2021-11-17 RX ADMIN — Medication 81 MILLIGRAM(S): at 11:48

## 2021-11-17 RX ADMIN — Medication 1 APPLICATION(S): at 22:20

## 2021-11-17 RX ADMIN — AMPICILLIN SODIUM AND SULBACTAM SODIUM 200 GRAM(S): 250; 125 INJECTION, POWDER, FOR SUSPENSION INTRAMUSCULAR; INTRAVENOUS at 17:41

## 2021-11-17 RX ADMIN — TAMSULOSIN HYDROCHLORIDE 0.4 MILLIGRAM(S): 0.4 CAPSULE ORAL at 00:55

## 2021-11-17 RX ADMIN — AMPICILLIN SODIUM AND SULBACTAM SODIUM 200 GRAM(S): 250; 125 INJECTION, POWDER, FOR SUSPENSION INTRAMUSCULAR; INTRAVENOUS at 23:05

## 2021-11-17 RX ADMIN — SENNA PLUS 2 TABLET(S): 8.6 TABLET ORAL at 00:55

## 2021-11-17 RX ADMIN — Medication 25 MILLIGRAM(S): at 05:41

## 2021-11-17 RX ADMIN — SIMVASTATIN 20 MILLIGRAM(S): 20 TABLET, FILM COATED ORAL at 00:55

## 2021-11-17 RX ADMIN — SENNA PLUS 2 TABLET(S): 8.6 TABLET ORAL at 22:21

## 2021-11-17 RX ADMIN — Medication 1 TABLET(S): at 11:47

## 2021-11-17 RX ADMIN — Medication 20 MILLIGRAM(S): at 05:40

## 2021-11-17 RX ADMIN — AMPICILLIN SODIUM AND SULBACTAM SODIUM 200 GRAM(S): 250; 125 INJECTION, POWDER, FOR SUSPENSION INTRAMUSCULAR; INTRAVENOUS at 00:52

## 2021-11-17 RX ADMIN — HEPARIN SODIUM 5000 UNIT(S): 5000 INJECTION INTRAVENOUS; SUBCUTANEOUS at 17:41

## 2021-11-17 RX ADMIN — Medication 1 TABLET(S): at 17:41

## 2021-11-17 RX ADMIN — Medication 1: at 11:48

## 2021-11-17 RX ADMIN — Medication 500 MILLIGRAM(S): at 05:39

## 2021-11-17 RX ADMIN — SIMVASTATIN 20 MILLIGRAM(S): 20 TABLET, FILM COATED ORAL at 22:20

## 2021-11-17 RX ADMIN — Medication 1 TABLET(S): at 05:40

## 2021-11-17 RX ADMIN — AMPICILLIN SODIUM AND SULBACTAM SODIUM 200 GRAM(S): 250; 125 INJECTION, POWDER, FOR SUSPENSION INTRAMUSCULAR; INTRAVENOUS at 05:36

## 2021-11-17 RX ADMIN — TAMSULOSIN HYDROCHLORIDE 0.4 MILLIGRAM(S): 0.4 CAPSULE ORAL at 22:20

## 2021-11-17 RX ADMIN — DONEPEZIL HYDROCHLORIDE 5 MILLIGRAM(S): 10 TABLET, FILM COATED ORAL at 00:56

## 2021-11-17 RX ADMIN — AMPICILLIN SODIUM AND SULBACTAM SODIUM 200 GRAM(S): 250; 125 INJECTION, POWDER, FOR SUSPENSION INTRAMUSCULAR; INTRAVENOUS at 11:49

## 2021-11-17 RX ADMIN — DONEPEZIL HYDROCHLORIDE 5 MILLIGRAM(S): 10 TABLET, FILM COATED ORAL at 22:20

## 2021-11-17 RX ADMIN — LOSARTAN POTASSIUM 25 MILLIGRAM(S): 100 TABLET, FILM COATED ORAL at 05:40

## 2021-11-17 RX ADMIN — HEPARIN SODIUM 5000 UNIT(S): 5000 INJECTION INTRAVENOUS; SUBCUTANEOUS at 05:41

## 2021-11-17 RX ADMIN — Medication 500 MILLIGRAM(S): at 17:41

## 2021-11-17 NOTE — PROGRESS NOTE ADULT - SUBJECTIVE AND OBJECTIVE BOX
PROGRESS NOTE  Patient is a 89y old  Male who presents with a chief complaint of L middle finger gangrene (17 Nov 2021 08:55)    Chart and available morning labs /imaging are reviewed electronically , urgent issues addressed . More information  is being added upon completion of rounds , when more information is collected and management discussed with consultants , medical staff and social service/case management on the floor   OVERNIGHT  No new issues reported by medical staff . All above noted Patient is resting in a bed comfortably .Confused ,poor mentation .No distress noted     HPI:  89 Male ,[facility] resident  PMH is significant for :  Arteriosclerotic heart disease (ASHD)  Atrial fibrillation  ,CHF (congestive heart failure)  ,Dementia  ,Depression  ,DM (diabetes mellitus)  ,Hyperlipemia  ,Hypertension  ,Prostate ca  ,GIB ,R groin abscess  with dementia, HTN, HLD, A FIb, chf, dm sent from [facility] for evaluation of left middle finger discoloration .Seen by hand surgeon and wound care MD - diagnosed with tip necrosis of finger ,dry dressing and antibacterial tx recommended ,no immediate sx intervention required Admitted for septic workup and evaluation, send blood and urine cx ,serial lactate levels ,monitor vitals closely hydration ,monitor urine output and renal profile ,iv abx initiated -started on Unasyn ,s/p vancomycin ,ID cons called Ct scan ordered to rule out OM  (16 Nov 2021 19:23)    PAST MEDICAL & SURGICAL HISTORY:  Atrial fibrillation    Hypertension    Diabetes    Prostate ca    Dementia    CHF (congestive heart failure)    Hyperlipemia    Diabetes    Depression    Dementia    DM (diabetes mellitus)    Atrial fibrillation    Prostate CA    Arteriosclerotic heart disease (ASHD)    Dementia    Anemia    Constipation    No significant past surgical history        MEDICATIONS  (STANDING):  ampicillin/sulbactam  IVPB 3 Gram(s) IV Intermittent every 6 hours  ascorbic acid 500 milliGRAM(s) Oral two times a day  aspirin  chewable 81 milliGRAM(s) Oral daily  dextrose 40% Gel 15 Gram(s) Oral once  dextrose 5%. 1000 milliLiter(s) (50 mL/Hr) IV Continuous <Continuous>  dextrose 5%. 1000 milliLiter(s) (100 mL/Hr) IV Continuous <Continuous>  dextrose 50% Injectable 25 Gram(s) IV Push once  dextrose 50% Injectable 12.5 Gram(s) IV Push once  dextrose 50% Injectable 25 Gram(s) IV Push once  donepezil 5 milliGRAM(s) Oral at bedtime  ferrous    sulfate 325 milliGRAM(s) Oral daily  furosemide    Tablet 20 milliGRAM(s) Oral daily  glucagon  Injectable 1 milliGRAM(s) IntraMuscular once  heparin   Injectable 5000 Unit(s) SubCutaneous every 12 hours  influenza  Vaccine (HIGH DOSE) 0.7 milliLiter(s) IntraMuscular once  insulin lispro (ADMELOG) corrective regimen sliding scale   SubCutaneous three times a day before meals  lactobacillus acidophilus 1 Tablet(s) Oral two times a day  losartan 25 milliGRAM(s) Oral daily  metoprolol succinate ER 25 milliGRAM(s) Oral daily  multivitamin/minerals 1 Tablet(s) Oral daily  pantoprazole    Tablet 40 milliGRAM(s) Oral before breakfast  polyethylene glycol 3350 17 Gram(s) Oral daily  povidone iodine 10% Solution 1 Application(s) Topical daily  senna 2 Tablet(s) Oral at bedtime  simvastatin 20 milliGRAM(s) Oral at bedtime  tamsulosin 0.4 milliGRAM(s) Oral at bedtime    MEDICATIONS  (PRN):  acetaminophen     Tablet .. 650 milliGRAM(s) Oral every 6 hours PRN Temp greater or equal to 38C (100.4F), Mild Pain (1 - 3)  bisacodyl Suppository 10 milliGRAM(s) Rectal daily PRN Constipation  magnesium hydroxide Suspension 30 milliLiter(s) Oral daily PRN Constipation  traMADol 25 milliGRAM(s) Oral four times a day PRN Moderate Pain (4 - 6)      OBJECTIVE    T(C): 36.2 (11-17-21 @ 05:20), Max: 36.7 (11-16-21 @ 19:18)  HR: 60 (11-17-21 @ 05:20) (60 - 78)  BP: 102/57 (11-17-21 @ 05:20) (102/57 - 122/69)  RR: 18 (11-17-21 @ 05:20) (17 - 19)  SpO2: 92% (11-17-21 @ 05:20) (92% - 97%)  Wt(kg): --  I&O's Summary        REVIEW OF SYSTEMS:  Patient is  unable to provide any information/ROS  due to baseline mental status.     PHYSICAL EXAM:  Appearance: NAD. VS past 24 hrs -as above   HEENT:   Moist oral mucosa. Conjunctiva clear b/l.   Neck : supple  Respiratory: Lungs CTAB.  Gastrointestinal:  Soft, nontender. No rebound. No rigidity. BS present	  Cardiovascular: RRR ,S1S2 present  Neurologic: Non-focal. Moving all extremities.  Extremities: No edema. No erythema. No calf tenderness.left hand third finger necrotic tip: scant serosanguinous drainage no odor: finger is with erythema and swelling present no warmth noted wound dimensions about 1.5 x 1.5 wound culture obtained:  fingers of hand contracted:   Skin: No rashes, No ecchymoses, No cyanosis.	  wounds ,skin lesions-See skin assesment flow sheet   LABS:                        11.2   6.11  )-----------( 218      ( 17 Nov 2021 09:08 )             32.7     11-17    141  |  105  |  16  ----------------------------<  99  3.9   |  29  |  0.42<L>    Ca    8.7      17 Nov 2021 09:08    TPro  6.8  /  Alb  2.9<L>  /  TBili  0.4  /  DBili  x   /  AST  17  /  ALT  21  /  AlkPhos  88  11-16    CAPILLARY BLOOD GLUCOSE      POCT Blood Glucose.: 182 mg/dL (17 Nov 2021 11:33)  POCT Blood Glucose.: 103 mg/dL (17 Nov 2021 07:53)          RADIOLOGY & ADDITIONAL TESTS:   reviewed elctronically  ASSESSMENT/PLAN: 	  45 minutes aggregate time was spent on this visit, 50% visit time spent in care co-ordination with other attendings and counselling patient .I have discussed care plan with patient / HCP/family memeber ,who expressed understanding of problems treatment and their effect and side effects, alternatives in details. I have asked if they have any questions and concerns and appropriately addressed them to best of my ability.    PROGRESS NOTE  Patient is a 89y old  Male who presents with a chief complaint of L middle finger gangrene (17 Nov 2021 08:55)    Chart and available morning labs /imaging are reviewed electronically , urgent issues addressed . More information  is being added upon completion of rounds , when more information is collected and management discussed with consultants , medical staff and social service/case management on the floor   OVERNIGHT  No new issues reported by medical staff . All above noted Patient is resting in a bed comfortably .Confused ,poor mentation .No distress noted     HPI:  89 Male ,[facility] resident  PMH is significant for :  Arteriosclerotic heart disease (ASHD)  Atrial fibrillation  ,CHF (congestive heart failure)  ,Dementia  ,Depression  ,DM (diabetes mellitus)  ,Hyperlipemia  ,Hypertension  ,Prostate ca  ,GIB ,R groin abscess  with dementia, HTN, HLD, A FIb, chf, dm sent from [facility] for evaluation of left middle finger discoloration .Seen by hand surgeon and wound care MD - diagnosed with tip necrosis of finger ,dry dressing and antibacterial tx recommended ,no immediate sx intervention required Admitted for septic workup and evaluation, send blood and urine cx ,serial lactate levels ,monitor vitals closely hydration ,monitor urine output and renal profile ,iv abx initiated -started on Unasyn ,s/p vancomycin ,ID cons called Ct scan ordered to rule out OM  (16 Nov 2021 19:23)    PAST MEDICAL & SURGICAL HISTORY:  Atrial fibrillation    Hypertension    Diabetes    Prostate ca    Dementia    CHF (congestive heart failure)    Hyperlipemia    Diabetes    Depression    Dementia    DM (diabetes mellitus)    Atrial fibrillation    Prostate CA    Arteriosclerotic heart disease (ASHD)    Dementia    Anemia    Constipation    No significant past surgical history        MEDICATIONS  (STANDING):  ampicillin/sulbactam  IVPB 3 Gram(s) IV Intermittent every 6 hours  ascorbic acid 500 milliGRAM(s) Oral two times a day  aspirin  chewable 81 milliGRAM(s) Oral daily  dextrose 40% Gel 15 Gram(s) Oral once  dextrose 5%. 1000 milliLiter(s) (50 mL/Hr) IV Continuous <Continuous>  dextrose 5%. 1000 milliLiter(s) (100 mL/Hr) IV Continuous <Continuous>  dextrose 50% Injectable 25 Gram(s) IV Push once  dextrose 50% Injectable 12.5 Gram(s) IV Push once  dextrose 50% Injectable 25 Gram(s) IV Push once  donepezil 5 milliGRAM(s) Oral at bedtime  ferrous    sulfate 325 milliGRAM(s) Oral daily  furosemide    Tablet 20 milliGRAM(s) Oral daily  glucagon  Injectable 1 milliGRAM(s) IntraMuscular once  heparin   Injectable 5000 Unit(s) SubCutaneous every 12 hours  influenza  Vaccine (HIGH DOSE) 0.7 milliLiter(s) IntraMuscular once  insulin lispro (ADMELOG) corrective regimen sliding scale   SubCutaneous three times a day before meals  lactobacillus acidophilus 1 Tablet(s) Oral two times a day  losartan 25 milliGRAM(s) Oral daily  metoprolol succinate ER 25 milliGRAM(s) Oral daily  multivitamin/minerals 1 Tablet(s) Oral daily  pantoprazole    Tablet 40 milliGRAM(s) Oral before breakfast  polyethylene glycol 3350 17 Gram(s) Oral daily  povidone iodine 10% Solution 1 Application(s) Topical daily  senna 2 Tablet(s) Oral at bedtime  simvastatin 20 milliGRAM(s) Oral at bedtime  tamsulosin 0.4 milliGRAM(s) Oral at bedtime    MEDICATIONS  (PRN):  acetaminophen     Tablet .. 650 milliGRAM(s) Oral every 6 hours PRN Temp greater or equal to 38C (100.4F), Mild Pain (1 - 3)  bisacodyl Suppository 10 milliGRAM(s) Rectal daily PRN Constipation  magnesium hydroxide Suspension 30 milliLiter(s) Oral daily PRN Constipation  traMADol 25 milliGRAM(s) Oral four times a day PRN Moderate Pain (4 - 6)      OBJECTIVE    T(C): 36.2 (11-17-21 @ 05:20), Max: 36.7 (11-16-21 @ 19:18)  HR: 60 (11-17-21 @ 05:20) (60 - 78)  BP: 102/57 (11-17-21 @ 05:20) (102/57 - 122/69)  RR: 18 (11-17-21 @ 05:20) (17 - 19)  SpO2: 92% (11-17-21 @ 05:20) (92% - 97%)  Wt(kg): --  I&O's Summary        REVIEW OF SYSTEMS:  Patient is  unable to provide any information/ROS  due to baseline mental status.     PHYSICAL EXAM:  Appearance: NAD. VS past 24 hrs -as above   HEENT:   Moist oral mucosa. Conjunctiva clear b/l.   Neck : supple  Respiratory: Lungs CTAB.  Gastrointestinal:  Soft, nontender. No rebound. No rigidity. BS present	  Cardiovascular: RRR ,S1S2 present  Neurologic: Non-focal. Moving all extremities.  Extremities: No edema. No erythema. No calf tenderness.left hand third finger necrotic tip: scant serosanguinous drainage no odor: finger is with erythema and swelling present no warmth noted wound dimensions about 1.5 x 1.5 wound culture obtained:  fingers of hand contracted:   Skin: No rashes, No ecchymoses, No cyanosis.	  wounds ,skin lesions-See skin assesment flow sheet   LABS:                        11.2   6.11  )-----------( 218      ( 17 Nov 2021 09:08 )             32.7     11-17    141  |  105  |  16  ----------------------------<  99  3.9   |  29  |  0.42<L>    Ca    8.7      17 Nov 2021 09:08    TPro  6.8  /  Alb  2.9<L>  /  TBili  0.4  /  DBili  x   /  AST  17  /  ALT  21  /  AlkPhos  88  11-16    CAPILLARY BLOOD GLUCOSE      POCT Blood Glucose.: 182 mg/dL (17 Nov 2021 11:33)  POCT Blood Glucose.: 103 mg/dL (17 Nov 2021 07:53)          RADIOLOGY & ADDITIONAL TESTS:< from: CT Hand w/ IV Cont, Left (11.17.21 @ 13:14) >    INTERPRETATION:  CT of the left hand    CLINICAL INFORMATION: Swelling of the third finger  TECHNIQUE: Axial CT images were obtained of the left hand with coronal and sagittal reconstructions. 90 cc of Omnipaque 350 were administered intravenously. 10 cc were discarded.    FINDINGS:    Limited secondary to flexion of the fingers and bony demineralization. There is soft tissue swelling at the distal tip of the third phalanx, best appreciated along the volar aspect. No discrete evidence of osseous erosion or periosteal reaction. No evidence of fluid collection along the flexor tendon sheath.  There is radiocarpal, STT and basilar arthrosis. There are vascular calcifications. No soft tissue gas.      IMPRESSION:  Limited secondary to contractures and bony demineralization.  Soft tissue swelling about the distal third phalanx.No  CT evidence of osteomyelitis.    < end of copied text >  < from: Xray Hand 2 Views, Left (11.16.21 @ 13:58) >        INTERPRETATION:  Left hand attention third finger    HISTORY: Third finger wound. The images are limited by flexion of the fingers in all views.     Three views of the left hand show no evidence of fracture nor destructive change. The joint spaces are maintained. Vascular calcifications are present.    IMPRESSION: Limited study. No definite evidence of bony destruction.    < end of copied text >     reviewed elctronically  ASSESSMENT/PLAN: 	  45 minutes aggregate time was spent on this visit, 50% visit time spent in care co-ordination with other attendings and counselling patient .I have discussed care plan with patient / HCP/family memeber ,who expressed understanding of problems treatment and their effect and side effects, alternatives in details. I have asked if they have any questions and concerns and appropriately addressed them to best of my ability.

## 2021-11-17 NOTE — PHYSICAL THERAPY INITIAL EVALUATION ADULT - DIAGNOSIS, PT EVAL
Patient presents with decreased strength, balance, and endurance resulting in impaired functional mobility putting patient at increased risk for falls.

## 2021-11-17 NOTE — PATIENT PROFILE ADULT - PATIENT REPRESENTATIVE: ( YOU CAN CHOOSE ANY PERSON THAT CAN ASSIST YOU WITH YOUR HEALTH CARE PREFERENCES, DOES NOT HAVE TO BE A SPOUSE, IMMEDIATE FAMILY OR SIGNIFICANT OTHER/PARTNER)
[FreeTextEntry1] : 77 y/o male with h/o AAA  4.4 , PVD with a history of left lower extremity ulceration which has healed, underwent left SFA angioplasty and stent on 3/22/19, The SFA stent is patent  however unable to visualize AAA on duplex. He had a history of ESRF and sepsis in which he is off HD now. His legs are over all improved. He was sent in by Cardiologist for evaluation of carotid artery stenosis. He was recently diagnosed with atrial fibrillation.\par I performed a carotid duplex which was medically necessary to evaluate for high-grade carotid stenosis. It showed bilat 50-69. He cannot undergo a CT angiogram and he has renal disease and just came off HD recently.\par He remains asymptomatic and given his multiple medical issues i recommend he follow up with pulmonary and I will reevaluate his carotid arteries and aorta in 6 months time or sooner if any new symptoms develop. yes

## 2021-11-17 NOTE — CONSULT NOTE ADULT - SUBJECTIVE AND OBJECTIVE BOX
CARDIOLOGY CONSULT NOTE    Patient is a 89y Male with a known history of : admitted with wound of left 3rd finger tip  Gangrene of finger of left hand [I96]    DM (diabetes mellitus) [E11.9]    Afib [I48.91]    HTN (hypertension) [I10]    Arteriosclerotic heart disease (ASHD) [I25.10]    Dementia [F03.90]    CHF (congestive heart failure) [I50.9]    Constipation [K59.00]    Prophylactic measure [Z29.9]      HPI:  89 Male ,[facility] resident  PMH is significant for :  Arteriosclerotic heart disease (ASHD)  Atrial fibrillation  ,CHF (congestive heart failure)  ,Dementia  ,Depression  ,DM (diabetes mellitus)  ,Hyperlipemia  ,Hypertension  ,Prostate ca  ,GIB ,R groin abscess  with dementia, HTN, HLD, A FIb, chf, dm sent from [facility] for evaluation of left middle finger discoloration .Seen by hand surgeon and wound care MD - diagnosed with tip necrosis of finger ,dry dressing and antibacterial tx recommended ,no immediate sx intervention required Admitted for septic workup and evaluation, send blood and urine cx ,serial lactate levels ,monitor vitals closely hydration ,monitor urine output and renal profile ,iv abx initiated -started on Unasyn ,s/p vancomycin ,ID cons called  (16 Nov 2021 19:23)      REVIEW OF SYSTEMS:    CONSTITUTIONAL: No fever, weight loss, or fatigue  EYES: No eye pain, visual disturbances, or discharge  ENMT:  No difficulty hearing, tinnitus, vertigo; No sinus or throat pain  NECK: No pain or stiffness  BREASTS: No pain, masses, or nipple discharge  RESPIRATORY: No cough, wheezing, chills or hemoptysis; No shortness of breath  CARDIOVASCULAR: No chest pain, palpitations, dizziness, or leg swelling  GASTROINTESTINAL: No abdominal or epigastric pain. No nausea, vomiting, or hematemesis; No diarrhea or constipation. No melena or hematochezia.  GENITOURINARY: No dysuria, frequency, hematuria, or incontinence  NEUROLOGICAL: No headaches, memory loss, loss of strength, numbness, or tremors  SKIN: No itching, burning, rashes, or lesions   LYMPH NODES: No enlarged glands  ENDOCRINE: No heat or cold intolerance; No hair loss  MUSCULOSKELETAL: No joint pain or swelling; No muscle, back, or extremity pain  PSYCHIATRIC: No depression, anxiety, mood swings, or difficulty sleeping  HEME/LYMPH: No easy bruising, or bleeding gums  ALLERGY AND IMMUNOLOGIC: No hives or eczema    MEDICATIONS  (STANDING):  ampicillin/sulbactam  IVPB 3 Gram(s) IV Intermittent every 6 hours  ascorbic acid 500 milliGRAM(s) Oral two times a day  aspirin  chewable 81 milliGRAM(s) Oral daily  dextrose 40% Gel 15 Gram(s) Oral once  dextrose 5%. 1000 milliLiter(s) (50 mL/Hr) IV Continuous <Continuous>  dextrose 5%. 1000 milliLiter(s) (100 mL/Hr) IV Continuous <Continuous>  dextrose 50% Injectable 25 Gram(s) IV Push once  dextrose 50% Injectable 12.5 Gram(s) IV Push once  dextrose 50% Injectable 25 Gram(s) IV Push once  donepezil 5 milliGRAM(s) Oral at bedtime  ferrous    sulfate 325 milliGRAM(s) Oral daily  furosemide    Tablet 20 milliGRAM(s) Oral daily  glucagon  Injectable 1 milliGRAM(s) IntraMuscular once  heparin   Injectable 5000 Unit(s) SubCutaneous every 12 hours  influenza  Vaccine (HIGH DOSE) 0.7 milliLiter(s) IntraMuscular once  insulin lispro (ADMELOG) corrective regimen sliding scale   SubCutaneous three times a day before meals  lactobacillus acidophilus 1 Tablet(s) Oral two times a day  losartan 25 milliGRAM(s) Oral daily  metoprolol succinate ER 25 milliGRAM(s) Oral daily  multivitamin/minerals 1 Tablet(s) Oral daily  pantoprazole    Tablet 40 milliGRAM(s) Oral before breakfast  polyethylene glycol 3350 17 Gram(s) Oral daily  senna 2 Tablet(s) Oral at bedtime  simvastatin 20 milliGRAM(s) Oral at bedtime  tamsulosin 0.4 milliGRAM(s) Oral at bedtime    MEDICATIONS  (PRN):  acetaminophen     Tablet .. 650 milliGRAM(s) Oral every 6 hours PRN Temp greater or equal to 38C (100.4F), Mild Pain (1 - 3)  bisacodyl Suppository 10 milliGRAM(s) Rectal daily PRN Constipation  magnesium hydroxide Suspension 30 milliLiter(s) Oral daily PRN Constipation  traMADol 25 milliGRAM(s) Oral four times a day PRN Moderate Pain (4 - 6)      ALLERGIES: latex (Rash)  latex (Unknown)  No Known Drug Allergies      Social History:     FAMILY HISTORY:      PAST MEDICAL & SURGICAL HISTORY:  Atrial fibrillation    Hypertension    Diabetes    Prostate ca    Dementia    CHF (congestive heart failure)    Hyperlipemia    Diabetes    Depression    Dementia    DM (diabetes mellitus)    Atrial fibrillation    Prostate CA    Arteriosclerotic heart disease (ASHD)    Dementia    Anemia    Constipation    No significant past surgical history          PHYSICAL EXAMINATION:  -----------------------------  T(C): 36.2 (11-17-21 @ 05:20), Max: 36.7 (11-16-21 @ 19:18)  HR: 60 (11-17-21 @ 05:20) (60 - 78)  BP: 102/57 (11-17-21 @ 05:20) (102/57 - 122/69)  RR: 18 (11-17-21 @ 05:20) (17 - 19)  SpO2: 92% (11-17-21 @ 05:20) (92% - 97%)  Wt(kg): --    Height (cm): 182.9 (11-16 @ 13:20)  Weight (kg): 77.1 (11-16 @ 13:20)  BMI (kg/m2): 23 (11-16 @ 13:20)  BSA (m2): 1.99 (11-16 @ 13:20)    Constitutional: well developed, normal appearance, well groomed, well nourished, no deformities and no acute distress.   Eyes: the conjunctiva exhibited no abnormalities and the eyelids demonstrated no xanthelasmas.   HEENT: normal oral mucosa, no oral pallor and no oral cyanosis.   Neck: normal jugular venous A waves present, normal jugular venous V waves present and no jugular venous jacobs A waves.   Pulmonary: no respiratory distress, normal respiratory rhythm and effort, no accessory muscle use and lungs were clear to auscultation bilaterally.   Cardiovascular: heart rate and rhythm were normal, normal S1 and S2 and no murmur, gallop, rub, heave or thrill are present.   Abdomen: soft, non-tender, no hepato-splenomegaly and no abdominal mass palpated.   Musculoskeletal: the gait could not be assessed..   Extremities: no clubbing of the fingernails, no localized cyanosis, no petechial hemorrhages and no ischemic changes.   Skin: normal skin color and pigmentation, no rash, no venous stasis, no skin lesions, no skin ulcer and no xanthoma was observed.   Psychiatric: oriented to person, place, and time, the affect was normal, the mood was normal and not feeling anxious.     LABS:   --------  11-16    141  |  105  |  20  ----------------------------<  94  4.0   |  32<H>  |  0.61    Ca    8.8      16 Nov 2021 16:56    TPro  6.8  /  Alb  2.9<L>  /  TBili  0.4  /  DBili  x   /  AST  17  /  ALT  21  /  AlkPhos  88  11-16                         11.2   6.11  )-----------( 218      ( 17 Nov 2021 09:08 )             32.7                 RADIOLOGY:  -----------------        ECG:     ECHO

## 2021-11-17 NOTE — OCCUPATIONAL THERAPY INITIAL EVALUATION ADULT - PERTINENT HX OF CURRENT PROBLEM, REHAB EVAL
89 y/o male PMH ASHD, A-fib, CHF, Dementia, Depression, DM, ,HLD, HTN, Prostate ca, GIB, R groin abscess sent from [facility] for evaluation of left middle finger discoloration. Seen by hand surgeon and wound care MD - diagnosed with tip necrosis of finger, dry dressing and antibacterial tx recommended, no immediate sx intervention required.

## 2021-11-17 NOTE — DIETITIAN INITIAL EVALUATION ADULT. - OTHER INFO
89 Male ,[facility] resident  PMH is significant for :  Arteriosclerotic heart disease (ASHD)  Atrial fibrillation  ,CHF (congestive heart failure)  ,Dementia  ,Depression  ,DM (diabetes mellitus)  ,Hyperlipemia  ,Hypertension  ,Prostate ca  ,GIB ,R groin abscess, HTN, HLD, A FIb, chf, dm sent from [facility] for evaluation of left middle finger discoloration .Seen by hand surgeon and wound care MD - diagnosed with tip necrosis of finger  Admitted for septic workup and evaluation.     Pt seen at bedside, pleasant, alert.  Able to feed self a bit after tray set up, ate ~100% bfst tray with assist.  Swallow eval pending.  Ht/WT from transfer papers 6'/153.5#.    Historical chart review reveals wt of 162# Dec. 2020.  5% x ~1 yr loss.   Mild temporal/clavicular wasting, severe calf/thigh wasting, severe tricep fat loss.

## 2021-11-17 NOTE — SWALLOW BEDSIDE ASSESSMENT ADULT - ASR SWALLOW RECOMMEND DIAG
Not warranted at this time, as pt appeared to tolerate PO w/o difficulty and no clinical indication for pna

## 2021-11-17 NOTE — SWALLOW BEDSIDE ASSESSMENT ADULT - SWALLOW EVAL: DIAGNOSIS
Pt p/w a moderate oral dysphagia when given regular solids marked by reduced retrieval, adequate containment, prolonged mastication/manipulation and transfer, and adequate clearance post swallow. Mild oral dysphagia when given puree and thin liquids marked by adequate retrieval and containment, timely manipulation and transfer, suspected posterior loss, and adequate clearance post swallow. Pharyngeal dysphagia marked by suspected brief delay in swallow onset, +laryngeal elevation to palpation, and no overt s/sx of aspiration. Recommend pt resume current diet of puree with thin liquids +aspiration precautions. Feed only when fully awake/attentive to PO, position upright 90 degrees. Small bite/sip, alternate bite/sip, pace meal slowly. Discussed with Dr. Ring.

## 2021-11-17 NOTE — CONSULT NOTE ADULT - SUBJECTIVE AND OBJECTIVE BOX
HPI:  89 Male ,[facility] resident  PMH is significant for :  Arteriosclerotic heart disease (ASHD)  Atrial fibrillation  ,CHF (congestive heart failure)  ,Dementia  ,Depression  ,DM (diabetes mellitus)  ,Hyperlipemia  ,Hypertension  ,Prostate ca  ,GIB ,R groin abscess  with dementia, HTN, HLD, A FIb, chf, dm sent from [facility] for evaluation of left middle finger discoloration .Seen by hand surgeon and wound care MD - diagnosed with tip necrosis of finger ,dry dressing and antibacterial tx recommended ,no immediate sx intervention required Admitted for septic workup and evaluation, send blood and urine cx ,serial lactate levels ,monitor vitals closely hydration ,monitor urine output and renal profile ,iv abx initiated -started on Unasyn ,s/p vancomycin ,ID cons called  (16 Nov 2021 19:23)      PAST MEDICAL & SURGICAL HISTORY:  Atrial fibrillation    Hypertension    Diabetes    Prostate ca    Dementia    CHF (congestive heart failure)    Hyperlipemia    Diabetes    Depression    Dementia    DM (diabetes mellitus)    Atrial fibrillation    Prostate CA    Arteriosclerotic heart disease (ASHD)    Dementia    Anemia    Constipation    No significant past surgical history      REVIEW OF SYSTEMS  Patient is unable to provide any information/ROS  due to baseline mental status    MEDICATIONS  (STANDING):  ampicillin/sulbactam  IVPB 3 Gram(s) IV Intermittent every 6 hours  ascorbic acid 500 milliGRAM(s) Oral two times a day  aspirin  chewable 81 milliGRAM(s) Oral daily  dextrose 40% Gel 15 Gram(s) Oral once  dextrose 5%. 1000 milliLiter(s) (50 mL/Hr) IV Continuous <Continuous>  dextrose 5%. 1000 milliLiter(s) (100 mL/Hr) IV Continuous <Continuous>  dextrose 50% Injectable 25 Gram(s) IV Push once  dextrose 50% Injectable 12.5 Gram(s) IV Push once  dextrose 50% Injectable 25 Gram(s) IV Push once  donepezil 5 milliGRAM(s) Oral at bedtime  ferrous    sulfate 325 milliGRAM(s) Oral daily  furosemide    Tablet 20 milliGRAM(s) Oral daily  glucagon  Injectable 1 milliGRAM(s) IntraMuscular once  heparin   Injectable 5000 Unit(s) SubCutaneous every 12 hours  influenza  Vaccine (HIGH DOSE) 0.7 milliLiter(s) IntraMuscular once  insulin lispro (ADMELOG) corrective regimen sliding scale   SubCutaneous three times a day before meals  lactobacillus acidophilus 1 Tablet(s) Oral two times a day  losartan 25 milliGRAM(s) Oral daily  metoprolol succinate ER 25 milliGRAM(s) Oral daily  multivitamin/minerals 1 Tablet(s) Oral daily  pantoprazole    Tablet 40 milliGRAM(s) Oral before breakfast  polyethylene glycol 3350 17 Gram(s) Oral daily  senna 2 Tablet(s) Oral at bedtime  simvastatin 20 milliGRAM(s) Oral at bedtime  tamsulosin 0.4 milliGRAM(s) Oral at bedtime    MEDICATIONS  (PRN):  acetaminophen     Tablet .. 650 milliGRAM(s) Oral every 6 hours PRN Temp greater or equal to 38C (100.4F), Mild Pain (1 - 3)  bisacodyl Suppository 10 milliGRAM(s) Rectal daily PRN Constipation  magnesium hydroxide Suspension 30 milliLiter(s) Oral daily PRN Constipation  traMADol 25 milliGRAM(s) Oral four times a day PRN Moderate Pain (4 - 6)      Allergies    latex (Rash)  latex (Unknown)  No Known Drug Allergies    Intolerances        SOCIAL HISTORY:     FAMILY HISTORY:      Vital Signs Last 24 Hrs  T(C): 36.2 (17 Nov 2021 05:20), Max: 36.7 (16 Nov 2021 19:18)  T(F): 97.2 (17 Nov 2021 05:20), Max: 98.1 (16 Nov 2021 19:18)  HR: 60 (17 Nov 2021 05:20) (60 - 78)  BP: 102/57 (17 Nov 2021 05:20) (102/57 - 122/69)  BP(mean): --  RR: 18 (17 Nov 2021 05:20) (17 - 19)  SpO2: 92% (17 Nov 2021 05:20) (92% - 97%)      Total Care [employer]/ Versa Care P500 bed                            11.2   6.11  )-----------( 218      ( 17 Nov 2021 09:08 )             32.7     11-17    141  |  105  |  16  ----------------------------<  99  3.9   |  29  |  0.42<L>    Ca    8.7      17 Nov 2021 09:08    TPro  6.8  /  Alb  2.9<L>  /  TBili  0.4  /  DBili  x   /  AST  17  /  ALT  21  /  AlkPhos  88  11-16    Procalcitonin, Serum: <0.05 (11-17-21 @ 09:08)  Sedimentation Rate, Erythrocyte: 21 mm/hr (11-17-21 @ 09:08)  Auto Neutrophil #: 4.54 K/uL (11-16-21 @ 16:56)          Neurology  weakened strength  nonverbal, Can not follow commands    Musculoskeletal/Vascular:   contracted    Skin:  frail,  ecchymosis w/o hematoma  scant serosanguinous drainage, erythema, TTP  No odor, warmth,  induration, fluctuance, culture obtained    < from: Xray Hand 2 Views, Left (11.16.21 @ 13:58) >  EXAM:  XR HAND 2 VIEWS LT                            PROCEDURE DATE:  11/16/2021          INTERPRETATION:  Left hand attention third finger    HISTORY: Third finger wound. The images are limited by flexion of the fingers in all views.     Three views of the left hand show no evidence of fracture nor destructive change. The joint spaces are maintained. Vascular calcifications are present.    IMPRESSION: Limited study. No definite evidence of bony destruction.        Thank you for this referral.    --- End of Report ---            YOSSI APPIAH MD; Attending Interventional Radiologist  This document has been electronically signed. Nov 16 2021  4:06PM    < end of copied text >

## 2021-11-17 NOTE — CONSULT NOTE ADULT - ASSESSMENT
wound left 3rd finger tip on unasyn  ashd, chf  hfpef  atrial fib  s/p ppm  hypertension  dm2  dementia

## 2021-11-17 NOTE — OCCUPATIONAL THERAPY INITIAL EVALUATION ADULT - GENERAL OBSERVATIONS, REHAB EVAL
Pt received supine in bed (+) alarm, dressing to left palm C/D/I; multiple ecchymoses BUE; NAD. Pt agrees to participate with OT for eval with encouragement.

## 2021-11-17 NOTE — PHYSICAL THERAPY INITIAL EVALUATION ADULT - ADDITIONAL COMMENTS
Speaking Coherently
Patient is a poor historian. Per Care Coordination Assessment dated 11/17/21, patient presents from long term and requires assist/DME for mobility and ADLs.

## 2021-11-17 NOTE — OCCUPATIONAL THERAPY INITIAL EVALUATION ADULT - RANGE OF MOTION EXAMINATION, UPPER EXTREMITY
grossly WFL BUE. left hand contracted vs resistant to movement due to edema/erythema (+) infection 3rd digit

## 2021-11-17 NOTE — OCCUPATIONAL THERAPY INITIAL EVALUATION ADULT - ADDITIONAL COMMENTS
Unable to obtain information from pt due to cognitive status. Per care coordinator note, pt is from [facility] and required assist for ADLs and functional mobility PTA.

## 2021-11-17 NOTE — PHYSICAL THERAPY INITIAL EVALUATION ADULT - PERTINENT HX OF CURRENT PROBLEM, REHAB EVAL
89M PMH ASHD, afib, CHF, dementia, depression, DM2, hyperlipidemia, NTH, prostate CA. Presents sent to ED from [facility] for evaluation of  weakness. Found to have (R) hand 3rd digit finger tip necrosis, no surgical intervention per plastics.

## 2021-11-17 NOTE — CONSULT NOTE ADULT - ASSESSMENT
Patient presents left hand third finger necrotic tip: scant serosanguinous drainage no odor: finger is with erythema and swelling present no warmth noted wound dimensions about 1.5 x 1.5 wound culture obtained:  fingers of hand contracted:     Recommend: Betadine daily, 4x4 to cushion contracted pressure    Continue  Nutrition (as tolerated)  Continue  Offloading     Apply cair boots at all times while in bed.   Provide skin checks and foot placement q8h.  Care as per medicine will follow w/ you  Findings and recommendations discussed with JACOB Richard MD   Thank you for this consult  Margarita Solorzano NP, [facility] [phone] Patient presents left hand third finger necrotic tip: scant serosanguinous drainage no odor: finger is with erythema and swelling present no warmth noted wound dimensions about 1.5 x 1.5 wound culture obtained:  fingers of hand contracted:     Recommend: Betadine daily, 4x4 to cushion contracted pressure    Continue  Nutrition (as tolerated)  Continue  Offloading     Apply cair boots at all times while in bed.   Provide skin checks and foot placement q8h.  Care as per medicine will follow w/ you  Findings and recommendations discussed with JACOB Ring  Thank you for this consult  Margarita Solorzano NP, [facility] [phone]

## 2021-11-17 NOTE — OCCUPATIONAL THERAPY INITIAL EVALUATION ADULT - MANUAL MUSCLE TESTING RESULTS, REHAB EVAL
pt confusion/not following commands well. At least 3/5 throughout BUE except shoulders at least 3-/5/grossly assessed due to

## 2021-11-17 NOTE — SWALLOW BEDSIDE ASSESSMENT ADULT - ORAL PHASE
Decreased anterior-posterior movement of the bolus/Delayed oral transit time suspected posterior loss

## 2021-11-17 NOTE — DIETITIAN INITIAL EVALUATION ADULT. - PROBLEM SELECTOR PLAN 1
dry dressing ,topical care , abx Admitted for septic workup and evaluation,send blood and urine cx,serial lactate levels,monitor vitals juliet ramirez hydration,monitor urine output and renal profile,iv abx initiated

## 2021-11-17 NOTE — SWALLOW BEDSIDE ASSESSMENT ADULT - SLP PERTINENT HISTORY OF CURRENT PROBLEM
Per charting, "89 Male ,[facility] resident  PMH is significant for :  Arteriosclerotic heart disease (ASHD)  Atrial fibrillation  ,CHF (congestive heart failure)  ,Dementia  ,Depression  ,DM (diabetes mellitus)  ,Hyperlipemia  ,Hypertension  ,Prostate ca  ,GIB ,R groin abscess  with dementia, HTN, HLD, A FIb, chf, dm sent from [facility] for evaluation of left middle finger discoloration .Seen by hand surgeon and wound care MD - diagnosed with tip necrosis of finger ,dry dressing and antibacterial tx recommended ,no immediate sx intervention required Admitted for septic workup and evaluation."

## 2021-11-17 NOTE — OCCUPATIONAL THERAPY INITIAL EVALUATION ADULT - REHAB POTENTIAL, OT EVAL
Pt appears to be functioning at baseline; no skilled OT needs at this time. Pt admitted for infection left 3rd digit but refuses to use LUE and contracture noted./none

## 2021-11-17 NOTE — SWALLOW BEDSIDE ASSESSMENT ADULT - COMMENTS
Pt received upright in bed, awake and cooperative, on room air. Pt demonstrated difficulty following low level directives, but was receptive to PO trials. Pt's vocal quality/intensity and speech production was WFL. Pt with inconsistent yes/no responses, nonverbal pain scale 0/10.    Pt known to this service from a previous admission. Clinical swallow assessment completed 12/7/20, at which time pt was recommended puree with thin liquids.    No CXR has been administered this admission. Pt's WBC is WFL, no fever.

## 2021-11-18 LAB
A1C WITH ESTIMATED AVERAGE GLUCOSE RESULT: 5.8 % — HIGH (ref 4–5.6)
ANION GAP SERPL CALC-SCNC: 8 MMOL/L — SIGNIFICANT CHANGE UP (ref 5–17)
BUN SERPL-MCNC: 11 MG/DL — SIGNIFICANT CHANGE UP (ref 7–23)
CALCIUM SERPL-MCNC: 9.2 MG/DL — SIGNIFICANT CHANGE UP (ref 8.5–10.1)
CHLORIDE SERPL-SCNC: 100 MMOL/L — SIGNIFICANT CHANGE UP (ref 96–108)
CO2 SERPL-SCNC: 29 MMOL/L — SIGNIFICANT CHANGE UP (ref 22–31)
CREAT SERPL-MCNC: 0.58 MG/DL — SIGNIFICANT CHANGE UP (ref 0.5–1.3)
ESTIMATED AVERAGE GLUCOSE: 120 MG/DL — HIGH (ref 68–114)
GLUCOSE SERPL-MCNC: 99 MG/DL — SIGNIFICANT CHANGE UP (ref 70–99)
HCT VFR BLD CALC: 33.6 % — LOW (ref 39–50)
HGB BLD-MCNC: 11.6 G/DL — LOW (ref 13–17)
MCHC RBC-ENTMCNC: 31.9 PG — SIGNIFICANT CHANGE UP (ref 27–34)
MCHC RBC-ENTMCNC: 34.5 GM/DL — SIGNIFICANT CHANGE UP (ref 32–36)
MCV RBC AUTO: 92.3 FL — SIGNIFICANT CHANGE UP (ref 80–100)
NRBC # BLD: 0 /100 WBCS — SIGNIFICANT CHANGE UP (ref 0–0)
PLATELET # BLD AUTO: 246 K/UL — SIGNIFICANT CHANGE UP (ref 150–400)
POTASSIUM SERPL-MCNC: 3.7 MMOL/L — SIGNIFICANT CHANGE UP (ref 3.5–5.3)
POTASSIUM SERPL-SCNC: 3.7 MMOL/L — SIGNIFICANT CHANGE UP (ref 3.5–5.3)
RBC # BLD: 3.64 M/UL — LOW (ref 4.2–5.8)
RBC # FLD: 13.1 % — SIGNIFICANT CHANGE UP (ref 10.3–14.5)
SODIUM SERPL-SCNC: 137 MMOL/L — SIGNIFICANT CHANGE UP (ref 135–145)
WBC # BLD: 5.96 K/UL — SIGNIFICANT CHANGE UP (ref 3.8–10.5)
WBC # FLD AUTO: 5.96 K/UL — SIGNIFICANT CHANGE UP (ref 3.8–10.5)

## 2021-11-18 PROCEDURE — 71045 X-RAY EXAM CHEST 1 VIEW: CPT | Mod: 26

## 2021-11-18 PROCEDURE — 93010 ELECTROCARDIOGRAM REPORT: CPT

## 2021-11-18 RX ORDER — VANCOMYCIN HCL 1 G
1000 VIAL (EA) INTRAVENOUS EVERY 12 HOURS
Refills: 0 | Status: COMPLETED | OUTPATIENT
Start: 2021-11-18 | End: 2021-11-20

## 2021-11-18 RX ADMIN — Medication 20 MILLIGRAM(S): at 06:18

## 2021-11-18 RX ADMIN — Medication 1 TABLET(S): at 12:13

## 2021-11-18 RX ADMIN — Medication 1 TABLET(S): at 17:58

## 2021-11-18 RX ADMIN — Medication 2: at 12:09

## 2021-11-18 RX ADMIN — HEPARIN SODIUM 5000 UNIT(S): 5000 INJECTION INTRAVENOUS; SUBCUTANEOUS at 17:58

## 2021-11-18 RX ADMIN — AMPICILLIN SODIUM AND SULBACTAM SODIUM 200 GRAM(S): 250; 125 INJECTION, POWDER, FOR SUSPENSION INTRAMUSCULAR; INTRAVENOUS at 06:18

## 2021-11-18 RX ADMIN — DONEPEZIL HYDROCHLORIDE 5 MILLIGRAM(S): 10 TABLET, FILM COATED ORAL at 21:41

## 2021-11-18 RX ADMIN — Medication 1 TABLET(S): at 06:18

## 2021-11-18 RX ADMIN — LOSARTAN POTASSIUM 25 MILLIGRAM(S): 100 TABLET, FILM COATED ORAL at 06:18

## 2021-11-18 RX ADMIN — Medication 500 MILLIGRAM(S): at 06:25

## 2021-11-18 RX ADMIN — Medication 25 MILLIGRAM(S): at 06:18

## 2021-11-18 RX ADMIN — Medication 500 MILLIGRAM(S): at 17:58

## 2021-11-18 RX ADMIN — Medication 1 APPLICATION(S): at 12:13

## 2021-11-18 RX ADMIN — PANTOPRAZOLE SODIUM 40 MILLIGRAM(S): 20 TABLET, DELAYED RELEASE ORAL at 06:18

## 2021-11-18 RX ADMIN — HEPARIN SODIUM 5000 UNIT(S): 5000 INJECTION INTRAVENOUS; SUBCUTANEOUS at 06:23

## 2021-11-18 RX ADMIN — AMPICILLIN SODIUM AND SULBACTAM SODIUM 200 GRAM(S): 250; 125 INJECTION, POWDER, FOR SUSPENSION INTRAMUSCULAR; INTRAVENOUS at 12:18

## 2021-11-18 RX ADMIN — Medication 325 MILLIGRAM(S): at 12:13

## 2021-11-18 RX ADMIN — SIMVASTATIN 20 MILLIGRAM(S): 20 TABLET, FILM COATED ORAL at 21:41

## 2021-11-18 RX ADMIN — TAMSULOSIN HYDROCHLORIDE 0.4 MILLIGRAM(S): 0.4 CAPSULE ORAL at 21:41

## 2021-11-18 RX ADMIN — SENNA PLUS 2 TABLET(S): 8.6 TABLET ORAL at 21:41

## 2021-11-18 RX ADMIN — Medication 81 MILLIGRAM(S): at 12:13

## 2021-11-18 RX ADMIN — AMPICILLIN SODIUM AND SULBACTAM SODIUM 200 GRAM(S): 250; 125 INJECTION, POWDER, FOR SUSPENSION INTRAMUSCULAR; INTRAVENOUS at 17:58

## 2021-11-18 NOTE — PROGRESS NOTE ADULT - SUBJECTIVE AND OBJECTIVE BOX
Patient is a 89y Male with a known history of :  Gangrene of finger of left hand [I96]    DM (diabetes mellitus) [E11.9]    Afib [I48.91]    HTN (hypertension) [I10]    Arteriosclerotic heart disease (ASHD) [I25.10]    Dementia [F03.90]    CHF (congestive heart failure) [I50.9]    Constipation [K59.00]    Prophylactic measure [Z29.9]      HPI:  89 Male ,[facility] resident  PMH is significant for :  Arteriosclerotic heart disease (ASHD)  Atrial fibrillation  ,CHF (congestive heart failure)  ,Dementia  ,Depression  ,DM (diabetes mellitus)  ,Hyperlipemia  ,Hypertension  ,Prostate ca  ,GIB ,R groin abscess  with dementia, HTN, HLD, A FIb, chf, dm sent from [facility] for evaluation of left middle finger discoloration .Seen by hand surgeon and wound care MD - diagnosed with tip necrosis of finger ,dry dressing and antibacterial tx recommended ,no immediate sx intervention required Admitted for septic workup and evaluation, send blood and urine cx ,serial lactate levels ,monitor vitals closely hydration ,monitor urine output and renal profile ,iv abx initiated -started on Unasyn ,s/p vancomycin ,ID cons called Ct scan ordered to rule out OM  (16 Nov 2021 19:23)      REVIEW OF SYSTEMS:    CONSTITUTIONAL: No fever, weight loss, or fatigue  EYES: No eye pain, visual disturbances, or discharge  ENMT:  No difficulty hearing, tinnitus, vertigo; No sinus or throat pain  NECK: No pain or stiffness  BREASTS: No pain, masses, or nipple discharge  RESPIRATORY: No cough, wheezing, chills or hemoptysis; No shortness of breath  CARDIOVASCULAR: No chest pain, palpitations, dizziness, or leg swelling  GASTROINTESTINAL: No abdominal or epigastric pain. No nausea, vomiting, or hematemesis; No diarrhea or constipation. No melena or hematochezia.  GENITOURINARY: No dysuria, frequency, hematuria, or incontinence  NEUROLOGICAL: No headaches, memory loss, loss of strength, numbness, or tremors  SKIN: No itching, burning, rashes, or lesions   LYMPH NODES: No enlarged glands  ENDOCRINE: No heat or cold intolerance; No hair loss  MUSCULOSKELETAL: No joint pain or swelling; No muscle, back, or extremity pain  PSYCHIATRIC: No depression, anxiety, mood swings, or difficulty sleeping  HEME/LYMPH: No easy bruising, or bleeding gums  ALLERGY AND IMMUNOLOGIC: No hives or eczema    MEDICATIONS  (STANDING):  ampicillin/sulbactam  IVPB 3 Gram(s) IV Intermittent every 6 hours  ascorbic acid 500 milliGRAM(s) Oral two times a day  aspirin  chewable 81 milliGRAM(s) Oral daily  dextrose 40% Gel 15 Gram(s) Oral once  dextrose 5%. 1000 milliLiter(s) (50 mL/Hr) IV Continuous <Continuous>  dextrose 5%. 1000 milliLiter(s) (100 mL/Hr) IV Continuous <Continuous>  dextrose 50% Injectable 25 Gram(s) IV Push once  dextrose 50% Injectable 12.5 Gram(s) IV Push once  dextrose 50% Injectable 25 Gram(s) IV Push once  donepezil 5 milliGRAM(s) Oral at bedtime  ferrous    sulfate 325 milliGRAM(s) Oral daily  furosemide    Tablet 20 milliGRAM(s) Oral daily  glucagon  Injectable 1 milliGRAM(s) IntraMuscular once  heparin   Injectable 5000 Unit(s) SubCutaneous every 12 hours  influenza  Vaccine (HIGH DOSE) 0.7 milliLiter(s) IntraMuscular once  insulin lispro (ADMELOG) corrective regimen sliding scale   SubCutaneous three times a day before meals  lactobacillus acidophilus 1 Tablet(s) Oral two times a day  losartan 25 milliGRAM(s) Oral daily  metoprolol succinate ER 25 milliGRAM(s) Oral daily  multivitamin/minerals 1 Tablet(s) Oral daily  pantoprazole    Tablet 40 milliGRAM(s) Oral before breakfast  polyethylene glycol 3350 17 Gram(s) Oral daily  povidone iodine 10% Solution 1 Application(s) Topical daily  senna 2 Tablet(s) Oral at bedtime  simvastatin 20 milliGRAM(s) Oral at bedtime  tamsulosin 0.4 milliGRAM(s) Oral at bedtime    MEDICATIONS  (PRN):  acetaminophen     Tablet .. 650 milliGRAM(s) Oral every 6 hours PRN Temp greater or equal to 38C (100.4F), Mild Pain (1 - 3)  bisacodyl Suppository 10 milliGRAM(s) Rectal daily PRN Constipation  magnesium hydroxide Suspension 30 milliLiter(s) Oral daily PRN Constipation  traMADol 25 milliGRAM(s) Oral four times a day PRN Moderate Pain (4 - 6)      ALLERGIES: latex (Rash)  latex (Unknown)  No Known Drug Allergies      FAMILY HISTORY:      PHYSICAL EXAMINATION:  -----------------------------  T(C): 36.4 (11-18-21 @ 04:51), Max: 36.9 (11-17-21 @ 21:00)  HR: 80 (11-18-21 @ 04:51) (79 - 80)  BP: 132/71 (11-18-21 @ 04:51) (127/73 - 132/71)  RR: 18 (11-18-21 @ 04:51) (18 - 18)  SpO2: 92% (11-18-21 @ 04:51) (92% - 96%)  Wt(kg): --        VITALS  T(C): 36.4 (11-18-21 @ 04:51), Max: 36.9 (11-17-21 @ 21:00)  HR: 80 (11-18-21 @ 04:51) (79 - 80)  BP: 132/71 (11-18-21 @ 04:51) (127/73 - 132/71)  RR: 18 (11-18-21 @ 04:51) (18 - 18)  SpO2: 92% (11-18-21 @ 04:51) (92% - 96%)    Constitutional: well developed, normal appearance, well groomed, well nourished, no deformities and no acute distress.   Eyes: the conjunctiva exhibited no abnormalities and the eyelids demonstrated no xanthelasmas.   HEENT: normal oral mucosa, no oral pallor and no oral cyanosis.   Neck: normal jugular venous A waves present, normal jugular venous V waves present and no jugular venous jacobs A waves.   Pulmonary: no respiratory distress, normal respiratory rhythm and effort, no accessory muscle use and lungs were clear to auscultation bilaterally.   Cardiovascular: heart rate and rhythm were normal, normal S1 and S2 and no murmur, gallop, rub, heave or thrill are present.   Abdomen: soft, non-tender, no hepato-splenomegaly and no abdominal mass palpated.   Musculoskeletal: the gait could not be assessed..   Extremities: no clubbing of the fingernails, no localized cyanosis, no petechial hemorrhages and no ischemic changes.   Skin: normal skin color and pigmentation, no rash, no venous stasis, no skin lesions, no skin ulcer and no xanthoma was observed.   Psychiatric: oriented to person, place, and time, the affect was normal, the mood was normal and not feeling anxious.     LABS:   --------  11-17    141  |  105  |  16  ----------------------------<  99  3.9   |  29  |  0.42<L>    Ca    8.7      17 Nov 2021 09:08    TPro  6.8  /  Alb  2.9<L>  /  TBili  0.4  /  DBili  x   /  AST  17  /  ALT  21  /  AlkPhos  88  11-16                         11.2   6.11  )-----------( 218      ( 17 Nov 2021 09:08 )             32.7             Culture Results:   No growth to date. (11-16 @ 22:58)  Culture Results:   No growth to date. (11-16 @ 22:57)      RADIOLOGY:  -----------------    ECG:     ECHO:

## 2021-11-18 NOTE — PROGRESS NOTE ADULT - SUBJECTIVE AND OBJECTIVE BOX
PROGRESS NOTE  Patient is a 89y old  Male who presents with a chief complaint of Left hand 3 rd finger infection (18 Nov 2021 08:43)  Chart and available morning labs /imaging are reviewed electronically , urgent issues addressed . More information  is being added upon completion of rounds , when more information is collected and management discussed with consultants , medical staff and social service/case management on the floor     OVERNIGHT  No new issues reported by medical staff . All above noted Patient is resting in a bed comfortably .Confused ,poor mentation .No distress noted     HPI:  89 Male ,[facility] resident  PMH is significant for :  Arteriosclerotic heart disease (ASHD)  Atrial fibrillation  ,CHF (congestive heart failure)  ,Dementia  ,Depression  ,DM (diabetes mellitus)  ,Hyperlipemia  ,Hypertension  ,Prostate ca  ,GIB ,R groin abscess  with dementia, HTN, HLD, A FIb, chf, dm sent from [facility] for evaluation of left middle finger discoloration .Seen by hand surgeon and wound care MD - diagnosed with tip necrosis of finger ,dry dressing and antibacterial tx recommended ,no immediate sx intervention required Admitted for septic workup and evaluation, send blood and urine cx ,serial lactate levels ,monitor vitals closely hydration ,monitor urine output and renal profile ,iv abx initiated -started on Unasyn ,s/p vancomycin ,ID cons called Ct scan ordered to rule out OM  (16 Nov 2021 19:23)    PAST MEDICAL & SURGICAL HISTORY:  Atrial fibrillation    Hypertension    Diabetes    Prostate ca    Dementia    CHF (congestive heart failure)    Hyperlipemia    Diabetes    Depression    Dementia    DM (diabetes mellitus)    Atrial fibrillation    Prostate CA    Arteriosclerotic heart disease (ASHD)    Dementia    Anemia    Constipation    No significant past surgical history        MEDICATIONS  (STANDING):  ampicillin/sulbactam  IVPB 3 Gram(s) IV Intermittent every 6 hours  ascorbic acid 500 milliGRAM(s) Oral two times a day  aspirin  chewable 81 milliGRAM(s) Oral daily  dextrose 40% Gel 15 Gram(s) Oral once  dextrose 5%. 1000 milliLiter(s) (100 mL/Hr) IV Continuous <Continuous>  dextrose 5%. 1000 milliLiter(s) (50 mL/Hr) IV Continuous <Continuous>  dextrose 50% Injectable 25 Gram(s) IV Push once  dextrose 50% Injectable 12.5 Gram(s) IV Push once  dextrose 50% Injectable 25 Gram(s) IV Push once  donepezil 5 milliGRAM(s) Oral at bedtime  ferrous    sulfate 325 milliGRAM(s) Oral daily  furosemide    Tablet 20 milliGRAM(s) Oral daily  glucagon  Injectable 1 milliGRAM(s) IntraMuscular once  heparin   Injectable 5000 Unit(s) SubCutaneous every 12 hours  influenza  Vaccine (HIGH DOSE) 0.7 milliLiter(s) IntraMuscular once  insulin lispro (ADMELOG) corrective regimen sliding scale   SubCutaneous three times a day before meals  lactobacillus acidophilus 1 Tablet(s) Oral two times a day  losartan 25 milliGRAM(s) Oral daily  metoprolol succinate ER 25 milliGRAM(s) Oral daily  multivitamin/minerals 1 Tablet(s) Oral daily  pantoprazole    Tablet 40 milliGRAM(s) Oral before breakfast  polyethylene glycol 3350 17 Gram(s) Oral daily  povidone iodine 10% Solution 1 Application(s) Topical daily  senna 2 Tablet(s) Oral at bedtime  simvastatin 20 milliGRAM(s) Oral at bedtime  tamsulosin 0.4 milliGRAM(s) Oral at bedtime    MEDICATIONS  (PRN):  acetaminophen     Tablet .. 650 milliGRAM(s) Oral every 6 hours PRN Temp greater or equal to 38C (100.4F), Mild Pain (1 - 3)  bisacodyl Suppository 10 milliGRAM(s) Rectal daily PRN Constipation  magnesium hydroxide Suspension 30 milliLiter(s) Oral daily PRN Constipation  traMADol 25 milliGRAM(s) Oral four times a day PRN Moderate Pain (4 - 6)      OBJECTIVE    T(C): 36.9 (11-18-21 @ 12:38), Max: 36.9 (11-17-21 @ 21:00)  HR: 74 (11-18-21 @ 12:38) (74 - 80)  BP: 115/69 (11-18-21 @ 12:38) (115/69 - 132/71)  RR: 17 (11-18-21 @ 12:38) (17 - 18)  SpO2: 93% (11-18-21 @ 12:38) (92% - 96%)  Wt(kg): --  I&O's Summary        REVIEW OF SYSTEMS:  CONSTITUTIONAL: No fever, weight loss, or fatigue  EYES: No eye pain, visual disturbances, or discharge  ENMT:   No sinus or throat pain  NECK: No pain or stiffness  RESPIRATORY: No cough, wheezing, chills or hemoptysis; No shortness of breath  CARDIOVASCULAR: No chest pain, palpitations, dizziness, or leg swelling  GASTROINTESTINAL: No abdominal pain. No nausea, vomiting; No diarrhea or constipation. No melena or hematochezia.  GENITOURINARY: No dysuria, frequency, hematuria, or incontinence  NEUROLOGICAL: No headaches, memory loss, loss of strength, numbness, or tremors  SKIN: No itching, burning, rashes, or lesions   MUSCULOSKELETAL: No joint pain or swelling; No muscle, back, or extremity pain    PHYSICAL EXAM:  Appearance: NAD. VS past 24 hrs -as above   HEENT:   Moist oral mucosa. Conjunctiva clear b/l.   Neck : supple  Respiratory: Lungs CTAB.  Gastrointestinal:  Soft, nontender. No rebound. No rigidity. BS present	  Cardiovascular: RRR ,S1S2 present  Neurologic: Non-focal. Moving all extremities.  Extremities: No edema. No erythema. No calf tenderness. L hand 3 rd finger tip necrosis   Skin: No rashes, No ecchymoses, No cyanosis.	  wounds ,skin lesions-See skin assesment flow sheet   LABS:                        11.6   5.96  )-----------( 246      ( 18 Nov 2021 08:38 )             33.6     11-18    137  |  100  |  11  ----------------------------<  99  3.7   |  29  |  0.58    Ca    9.2      18 Nov 2021 08:38    TPro  6.8  /  Alb  2.9<L>  /  TBili  0.4  /  DBili  x   /  AST  17  /  ALT  21  /  AlkPhos  88  11-16    CAPILLARY BLOOD GLUCOSE      POCT Blood Glucose.: 227 mg/dL (18 Nov 2021 11:36)  POCT Blood Glucose.: 119 mg/dL (18 Nov 2021 07:47)  POCT Blood Glucose.: 118 mg/dL (17 Nov 2021 22:18)  POCT Blood Glucose.: 94 mg/dL (17 Nov 2021 17:28)          Culture - Blood (collected 16 Nov 2021 22:58)  Source: .Blood Blood  Preliminary Report (17 Nov 2021 23:01):    No growth to date.    Culture - Blood (collected 16 Nov 2021 22:57)  Source: .Blood Blood  Preliminary Report (17 Nov 2021 23:01):    No growth to date.      RADIOLOGY & ADDITIONAL TESTS:   reviewed elctronically  ASSESSMENT/PLAN: 	  25 minutes aggregate time was spent on this visit, 50% visit time spent in care co-ordination with other attendings and counselling patient .I have discussed care plan with patient / HCP/family member scott Mooney on a phone  ,who expressed understanding of problems treatment and their effect and side effects, alternatives in details. I have asked if they have any questions and concerns and appropriately addressed them to best of my ability. Advance care planning was discussed , pallitaive care issues ,CMO ,hospice levels of care were discussed in details , forms ,advance directives were reviewed .All questions were answered to the best of my knowledge .25 min spent-acp

## 2021-11-18 NOTE — PROGRESS NOTE ADULT - SUBJECTIVE AND OBJECTIVE BOX
Interval History:    CENTRAL LINE:   [  ] YES       [  ] NO  RAMOS:                 [  ] YES       [  ] NO         REVIEW OF SYSTEMS:  All Systems below were reviewed and are negative [  ]  HEENT:  ID:  Pulmonary:  Cardiac:  GI:  Renal:  Musculoskeletal:  All other systems above were reviewed and are negative   [  ]      MEDICATIONS  (STANDING):  ampicillin/sulbactam  IVPB 3 Gram(s) IV Intermittent every 6 hours  ascorbic acid 500 milliGRAM(s) Oral two times a day  aspirin  chewable 81 milliGRAM(s) Oral daily  dextrose 40% Gel 15 Gram(s) Oral once  dextrose 5%. 1000 milliLiter(s) (50 mL/Hr) IV Continuous <Continuous>  dextrose 5%. 1000 milliLiter(s) (100 mL/Hr) IV Continuous <Continuous>  dextrose 50% Injectable 25 Gram(s) IV Push once  dextrose 50% Injectable 12.5 Gram(s) IV Push once  dextrose 50% Injectable 25 Gram(s) IV Push once  donepezil 5 milliGRAM(s) Oral at bedtime  ferrous    sulfate 325 milliGRAM(s) Oral daily  furosemide    Tablet 20 milliGRAM(s) Oral daily  glucagon  Injectable 1 milliGRAM(s) IntraMuscular once  heparin   Injectable 5000 Unit(s) SubCutaneous every 12 hours  influenza  Vaccine (HIGH DOSE) 0.7 milliLiter(s) IntraMuscular once  insulin lispro (ADMELOG) corrective regimen sliding scale   SubCutaneous three times a day before meals  lactobacillus acidophilus 1 Tablet(s) Oral two times a day  losartan 25 milliGRAM(s) Oral daily  metoprolol succinate ER 25 milliGRAM(s) Oral daily  multivitamin/minerals 1 Tablet(s) Oral daily  pantoprazole    Tablet 40 milliGRAM(s) Oral before breakfast  polyethylene glycol 3350 17 Gram(s) Oral daily  povidone iodine 10% Solution 1 Application(s) Topical daily  senna 2 Tablet(s) Oral at bedtime  simvastatin 20 milliGRAM(s) Oral at bedtime  tamsulosin 0.4 milliGRAM(s) Oral at bedtime    MEDICATIONS  (PRN):  acetaminophen     Tablet .. 650 milliGRAM(s) Oral every 6 hours PRN Temp greater or equal to 38C (100.4F), Mild Pain (1 - 3)  bisacodyl Suppository 10 milliGRAM(s) Rectal daily PRN Constipation  magnesium hydroxide Suspension 30 milliLiter(s) Oral daily PRN Constipation  traMADol 25 milliGRAM(s) Oral four times a day PRN Moderate Pain (4 - 6)      Vital Signs Last 24 Hrs  T(C): 36.9 (18 Nov 2021 12:38), Max: 36.9 (17 Nov 2021 21:00)  T(F): 98.5 (18 Nov 2021 12:38), Max: 98.5 (18 Nov 2021 12:38)  HR: 74 (18 Nov 2021 12:38) (74 - 80)  BP: 115/69 (18 Nov 2021 12:38) (115/69 - 132/71)  BP(mean): --  RR: 17 (18 Nov 2021 12:38) (17 - 18)  SpO2: 93% (18 Nov 2021 12:38) (92% - 96%)    I&O's Summary      PHYSICAL EXAM:  HEENT: NC/AT; PERRLA  Neck: Soft; no tenderness  Lungs: CTA bilaterally; no wheezing.   Heart:  Abdomen:  Genital/ Rectal:  Extremities:  Neurologic:  Vascular:      LABORATORY:    CBC Full  -  ( 18 Nov 2021 08:38 )  WBC Count : 5.96 K/uL  RBC Count : 3.64 M/uL  Hemoglobin : 11.6 g/dL  Hematocrit : 33.6 %  Platelet Count - Automated : 246 K/uL  Mean Cell Volume : 92.3 fl  Mean Cell Hemoglobin : 31.9 pg  Mean Cell Hemoglobin Concentration : 34.5 gm/dL  Auto Neutrophil # : x  Auto Lymphocyte # : x  Auto Monocyte # : x  Auto Eosinophil # : x  Auto Basophil # : x  Auto Neutrophil % : x  Auto Lymphocyte % : x  Auto Monocyte % : x  Auto Eosinophil % : x  Auto Basophil % : x      ESR:                   11-17 @ 11:37  --    C-Reactive Protein:     11-17 @ 11:37  <3    Procalcitonin:           11-17 @ 11:37   --  ESR:                   11-17 @ 09:08  21    C-Reactive Protein:     11-17 @ 09:08  --    Procalcitonin:           11-17 @ 09:08   <0.05      11-18    137  |  100  |  11  ----------------------------<  99  3.7   |  29  |  0.58    Ca    9.2      18 Nov 2021 08:38            Assessment and Plan:          Stephan Pruett MD   [phone].    He is afebrile  Comfortable. Confused.     MEDICATIONS  (STANDING):  ampicillin/sulbactam  IVPB 3 Gram(s) IV Intermittent every 6 hours  ascorbic acid 500 milliGRAM(s) Oral two times a day  aspirin  chewable 81 milliGRAM(s) Oral daily  dextrose 40% Gel 15 Gram(s) Oral once  dextrose 5%. 1000 milliLiter(s) (50 mL/Hr) IV Continuous <Continuous>  dextrose 5%. 1000 milliLiter(s) (100 mL/Hr) IV Continuous <Continuous>  dextrose 50% Injectable 25 Gram(s) IV Push once  dextrose 50% Injectable 12.5 Gram(s) IV Push once  dextrose 50% Injectable 25 Gram(s) IV Push once  donepezil 5 milliGRAM(s) Oral at bedtime  ferrous    sulfate 325 milliGRAM(s) Oral daily  furosemide    Tablet 20 milliGRAM(s) Oral daily  glucagon  Injectable 1 milliGRAM(s) IntraMuscular once  heparin   Injectable 5000 Unit(s) SubCutaneous every 12 hours  influenza  Vaccine (HIGH DOSE) 0.7 milliLiter(s) IntraMuscular once  insulin lispro (ADMELOG) corrective regimen sliding scale   SubCutaneous three times a day before meals  lactobacillus acidophilus 1 Tablet(s) Oral two times a day  losartan 25 milliGRAM(s) Oral daily  metoprolol succinate ER 25 milliGRAM(s) Oral daily  multivitamin/minerals 1 Tablet(s) Oral daily  pantoprazole    Tablet 40 milliGRAM(s) Oral before breakfast  polyethylene glycol 3350 17 Gram(s) Oral daily  povidone iodine 10% Solution 1 Application(s) Topical daily  senna 2 Tablet(s) Oral at bedtime  simvastatin 20 milliGRAM(s) Oral at bedtime  tamsulosin 0.4 milliGRAM(s) Oral at bedtime    MEDICATIONS  (PRN):  acetaminophen     Tablet .. 650 milliGRAM(s) Oral every 6 hours PRN Temp greater or equal to 38C (100.4F), Mild Pain (1 - 3)  bisacodyl Suppository 10 milliGRAM(s) Rectal daily PRN Constipation  magnesium hydroxide Suspension 30 milliLiter(s) Oral daily PRN Constipation  traMADol 25 milliGRAM(s) Oral four times a day PRN Moderate Pain (4 - 6)      Vital Signs Last 24 Hrs  T(C): 36.9 (18 Nov 2021 12:38), Max: 36.9 (17 Nov 2021 21:00)  T(F): 98.5 (18 Nov 2021 12:38), Max: 98.5 (18 Nov 2021 12:38)  HR: 74 (18 Nov 2021 12:38) (74 - 80)  BP: 115/69 (18 Nov 2021 12:38) (115/69 - 132/71)  BP(mean): --  RR: 17 (18 Nov 2021 12:38) (17 - 18)  SpO2: 93% (18 Nov 2021 12:38) (92% - 96%)    I&O's Summary      PHYSICAL EXAM:  HEENT: NC/AT; PERRLA  Neck: Soft; no tenderness  Lungs: CTA bilaterally; no wheezing.   Heart: RRR, no murmurs  Abdomen: Soft, no tenderness  Genital/ Rectal: No fitch  Extremities: Moderate erythema of left 3rd finger. Necrosis of distal phalanx.   Neurologic: Confused,.       LABORATORY:    CBC Full  -  ( 18 Nov 2021 08:38 )  WBC Count : 5.96 K/uL  RBC Count : 3.64 M/uL  Hemoglobin : 11.6 g/dL  Hematocrit : 33.6 %  Platelet Count - Automated : 246 K/uL  Mean Cell Volume : 92.3 fl  Mean Cell Hemoglobin : 31.9 pg  Mean Cell Hemoglobin Concentration : 34.5 gm/dL  Auto Neutrophil # : x  Auto Lymphocyte # : x  Auto Monocyte # : x  Auto Eosinophil # : x  Auto Basophil # : x  Auto Neutrophil % : x  Auto Lymphocyte % : x  Auto Monocyte % : x  Auto Eosinophil % : x  Auto Basophil % : x      ESR:                   11-17 @ 11:37  --    C-Reactive Protein:     11-17 @ 11:37  <3    Procalcitonin:           11-17 @ 11:37   --  ESR:                   11-17 @ 09:08  21    C-Reactive Protein:     11-17 @ 09:08  --    Procalcitonin:           11-17 @ 09:08   <0.05      11-18    137  |  100  |  11  ----------------------------<  99  3.7   |  29  |  0.58    Ca    9.2      18 Nov 2021 08:38    Assessment and Plan:    1. Cellulitis of left 3rd finger.  2. Necrosis of distal phalanx of left 3rd finger.      . MRI cannot be done as the patient has AICD. CT with IV contrast of left hand was negative for osteomyelitis/abscess of the left 3rd finger.  . Still with moderate erythema. Continue IV Unasyn. Add IV  Vancomycin.  . Monitor the progression of cellulitis.         Stephan Pruett MD   [phone].

## 2021-11-19 LAB
ANION GAP SERPL CALC-SCNC: 8 MMOL/L — SIGNIFICANT CHANGE UP (ref 5–17)
BUN SERPL-MCNC: 10 MG/DL — SIGNIFICANT CHANGE UP (ref 7–23)
CALCIUM SERPL-MCNC: 8.8 MG/DL — SIGNIFICANT CHANGE UP (ref 8.5–10.1)
CHLORIDE SERPL-SCNC: 104 MMOL/L — SIGNIFICANT CHANGE UP (ref 96–108)
CO2 SERPL-SCNC: 28 MMOL/L — SIGNIFICANT CHANGE UP (ref 22–31)
CREAT SERPL-MCNC: 0.54 MG/DL — SIGNIFICANT CHANGE UP (ref 0.5–1.3)
GLUCOSE SERPL-MCNC: 104 MG/DL — HIGH (ref 70–99)
HCT VFR BLD CALC: 34.2 % — LOW (ref 39–50)
HGB BLD-MCNC: 11.4 G/DL — LOW (ref 13–17)
MCHC RBC-ENTMCNC: 31.2 PG — SIGNIFICANT CHANGE UP (ref 27–34)
MCHC RBC-ENTMCNC: 33.3 GM/DL — SIGNIFICANT CHANGE UP (ref 32–36)
MCV RBC AUTO: 93.7 FL — SIGNIFICANT CHANGE UP (ref 80–100)
NRBC # BLD: 0 /100 WBCS — SIGNIFICANT CHANGE UP (ref 0–0)
PLATELET # BLD AUTO: 221 K/UL — SIGNIFICANT CHANGE UP (ref 150–400)
POTASSIUM SERPL-MCNC: 3.7 MMOL/L — SIGNIFICANT CHANGE UP (ref 3.5–5.3)
POTASSIUM SERPL-SCNC: 3.7 MMOL/L — SIGNIFICANT CHANGE UP (ref 3.5–5.3)
RBC # BLD: 3.65 M/UL — LOW (ref 4.2–5.8)
RBC # FLD: 13 % — SIGNIFICANT CHANGE UP (ref 10.3–14.5)
SODIUM SERPL-SCNC: 140 MMOL/L — SIGNIFICANT CHANGE UP (ref 135–145)
WBC # BLD: 6.96 K/UL — SIGNIFICANT CHANGE UP (ref 3.8–10.5)
WBC # FLD AUTO: 6.96 K/UL — SIGNIFICANT CHANGE UP (ref 3.8–10.5)

## 2021-11-19 RX ADMIN — Medication 81 MILLIGRAM(S): at 12:10

## 2021-11-19 RX ADMIN — Medication 1 TABLET(S): at 17:02

## 2021-11-19 RX ADMIN — Medication 1 APPLICATION(S): at 12:10

## 2021-11-19 RX ADMIN — Medication 1 TABLET(S): at 05:38

## 2021-11-19 RX ADMIN — Medication 500 MILLIGRAM(S): at 05:38

## 2021-11-19 RX ADMIN — SENNA PLUS 2 TABLET(S): 8.6 TABLET ORAL at 21:20

## 2021-11-19 RX ADMIN — Medication 20 MILLIGRAM(S): at 05:38

## 2021-11-19 RX ADMIN — AMPICILLIN SODIUM AND SULBACTAM SODIUM 200 GRAM(S): 250; 125 INJECTION, POWDER, FOR SUSPENSION INTRAMUSCULAR; INTRAVENOUS at 00:06

## 2021-11-19 RX ADMIN — SIMVASTATIN 20 MILLIGRAM(S): 20 TABLET, FILM COATED ORAL at 21:20

## 2021-11-19 RX ADMIN — Medication 25 MILLIGRAM(S): at 05:38

## 2021-11-19 RX ADMIN — PANTOPRAZOLE SODIUM 40 MILLIGRAM(S): 20 TABLET, DELAYED RELEASE ORAL at 05:39

## 2021-11-19 RX ADMIN — HEPARIN SODIUM 5000 UNIT(S): 5000 INJECTION INTRAVENOUS; SUBCUTANEOUS at 05:44

## 2021-11-19 RX ADMIN — AMPICILLIN SODIUM AND SULBACTAM SODIUM 200 GRAM(S): 250; 125 INJECTION, POWDER, FOR SUSPENSION INTRAMUSCULAR; INTRAVENOUS at 23:41

## 2021-11-19 RX ADMIN — Medication 500 MILLIGRAM(S): at 17:02

## 2021-11-19 RX ADMIN — DONEPEZIL HYDROCHLORIDE 5 MILLIGRAM(S): 10 TABLET, FILM COATED ORAL at 21:20

## 2021-11-19 RX ADMIN — AMPICILLIN SODIUM AND SULBACTAM SODIUM 200 GRAM(S): 250; 125 INJECTION, POWDER, FOR SUSPENSION INTRAMUSCULAR; INTRAVENOUS at 13:47

## 2021-11-19 RX ADMIN — Medication 1 TABLET(S): at 12:10

## 2021-11-19 RX ADMIN — Medication 250 MILLIGRAM(S): at 05:38

## 2021-11-19 RX ADMIN — AMPICILLIN SODIUM AND SULBACTAM SODIUM 200 GRAM(S): 250; 125 INJECTION, POWDER, FOR SUSPENSION INTRAMUSCULAR; INTRAVENOUS at 05:38

## 2021-11-19 RX ADMIN — Medication 250 MILLIGRAM(S): at 17:56

## 2021-11-19 RX ADMIN — AMPICILLIN SODIUM AND SULBACTAM SODIUM 200 GRAM(S): 250; 125 INJECTION, POWDER, FOR SUSPENSION INTRAMUSCULAR; INTRAVENOUS at 17:02

## 2021-11-19 RX ADMIN — Medication 325 MILLIGRAM(S): at 12:10

## 2021-11-19 RX ADMIN — LOSARTAN POTASSIUM 25 MILLIGRAM(S): 100 TABLET, FILM COATED ORAL at 05:38

## 2021-11-19 RX ADMIN — TAMSULOSIN HYDROCHLORIDE 0.4 MILLIGRAM(S): 0.4 CAPSULE ORAL at 21:20

## 2021-11-19 RX ADMIN — HEPARIN SODIUM 5000 UNIT(S): 5000 INJECTION INTRAVENOUS; SUBCUTANEOUS at 17:02

## 2021-11-19 NOTE — PROGRESS NOTE ADULT - SUBJECTIVE AND OBJECTIVE BOX
PROGRESS NOTE  Patient is a 89y old  Male who presents with a chief complaint of Left hand 3 rd finger infection (19 Nov 2021 08:44)  Chart and available morning labs /imaging are reviewed electronically , urgent issues addressed . More information  is being added upon completion of rounds , when more information is collected and management discussed with consultants , medical staff and social service/case management on the floor   OVERNIGHT  No new issues reported by medical staff . All above noted   Patient is resting in a bed comfortably .Confused ,poor mentation .No distress noted   HPI:  89 Male ,[facility] resident  PMH is significant for :  Arteriosclerotic heart disease (ASHD)  Atrial fibrillation  ,CHF (congestive heart failure)  ,Dementia  ,Depression  ,DM (diabetes mellitus)  ,Hyperlipemia  ,Hypertension  ,Prostate ca  ,GIB ,R groin abscess  with dementia, HTN, HLD, A FIb, chf, dm sent from [facility] for evaluation of left middle finger discoloration .Seen by hand surgeon and wound care MD - diagnosed with tip necrosis of finger ,dry dressing and antibacterial tx recommended ,no immediate sx intervention required Admitted for septic workup and evaluation, send blood and urine cx ,serial lactate levels ,monitor vitals closely hydration ,monitor urine output and renal profile ,iv abx initiated -started on Unasyn ,s/p vancomycin ,ID cons called Ct scan ordered to rule out OM  (16 Nov 2021 19:23)  PAST MEDICAL & SURGICAL HISTORY:  Atrial fibrillation    Hypertension    Diabetes    Prostate ca    Dementia    CHF (congestive heart failure)    Hyperlipemia    Diabetes    Depression    Dementia    DM (diabetes mellitus)    Atrial fibrillation    Prostate CA    Arteriosclerotic heart disease (ASHD)    Dementia    Anemia    Constipation    No significant past surgical history        MEDICATIONS  (STANDING):  ampicillin/sulbactam  IVPB 3 Gram(s) IV Intermittent every 6 hours  ascorbic acid 500 milliGRAM(s) Oral two times a day  aspirin  chewable 81 milliGRAM(s) Oral daily  dextrose 40% Gel 15 Gram(s) Oral once  dextrose 5%. 1000 milliLiter(s) (50 mL/Hr) IV Continuous <Continuous>  dextrose 5%. 1000 milliLiter(s) (100 mL/Hr) IV Continuous <Continuous>  dextrose 50% Injectable 25 Gram(s) IV Push once  dextrose 50% Injectable 12.5 Gram(s) IV Push once  dextrose 50% Injectable 25 Gram(s) IV Push once  donepezil 5 milliGRAM(s) Oral at bedtime  ferrous    sulfate 325 milliGRAM(s) Oral daily  furosemide    Tablet 20 milliGRAM(s) Oral daily  glucagon  Injectable 1 milliGRAM(s) IntraMuscular once  heparin   Injectable 5000 Unit(s) SubCutaneous every 12 hours  influenza  Vaccine (HIGH DOSE) 0.7 milliLiter(s) IntraMuscular once  insulin lispro (ADMELOG) corrective regimen sliding scale   SubCutaneous three times a day before meals  lactobacillus acidophilus 1 Tablet(s) Oral two times a day  losartan 25 milliGRAM(s) Oral daily  metoprolol succinate ER 25 milliGRAM(s) Oral daily  multivitamin/minerals 1 Tablet(s) Oral daily  pantoprazole    Tablet 40 milliGRAM(s) Oral before breakfast  polyethylene glycol 3350 17 Gram(s) Oral daily  povidone iodine 10% Solution 1 Application(s) Topical daily  senna 2 Tablet(s) Oral at bedtime  simvastatin 20 milliGRAM(s) Oral at bedtime  tamsulosin 0.4 milliGRAM(s) Oral at bedtime  vancomycin  IVPB 1000 milliGRAM(s) IV Intermittent every 12 hours    MEDICATIONS  (PRN):  acetaminophen     Tablet .. 650 milliGRAM(s) Oral every 6 hours PRN Temp greater or equal to 38C (100.4F), Mild Pain (1 - 3)  bisacodyl Suppository 10 milliGRAM(s) Rectal daily PRN Constipation  magnesium hydroxide Suspension 30 milliLiter(s) Oral daily PRN Constipation  traMADol 25 milliGRAM(s) Oral four times a day PRN Moderate Pain (4 - 6)      OBJECTIVE    T(C): 36.4 (11-19-21 @ 05:15), Max: 36.6 (11-18-21 @ 20:55)  HR: 82 (11-19-21 @ 05:15) (79 - 82)  BP: 111/62 (11-19-21 @ 05:15) (92/53 - 111/62)  RR: 17 (11-19-21 @ 05:15) (17 - 17)  SpO2: 93% (11-19-21 @ 05:15) (93% - 95%)  Wt(kg): --  I&O's Summary    18 Nov 2021 07:01  -  19 Nov 2021 07:00  --------------------------------------------------------  IN: 920 mL / OUT: 0 mL / NET: 920 mL          REVIEW OF SYSTEMS:  Patient is  unable to provide any information/ROS  due to baseline mental status.     PHYSICAL EXAM:  Appearance: NAD. VS past 24 hrs -as above   HEENT:   Moist oral mucosa. Conjunctiva clear b/l.   Neck : supple  Respiratory: Lungs CTAB.  Gastrointestinal:  Soft, nontender. No rebound. No rigidity. BS present	  Cardiovascular: RRR ,S1S2 present  Neurologic: Non-focal. Moving all extremities.  Extremities: No edema. No erythema. No calf tenderness.  Skin: No rashes, No ecchymoses, No cyanosis.	  wounds ,skin lesions-See skin assesment flow sheet   LABS:                        11.4   6.96  )-----------( 221      ( 19 Nov 2021 08:59 )             34.2     11-19    140  |  104  |  10  ----------------------------<  104<H>  3.7   |  28  |  0.54    Ca    8.8      19 Nov 2021 08:59      CAPILLARY BLOOD GLUCOSE      POCT Blood Glucose.: 134 mg/dL (19 Nov 2021 12:06)  POCT Blood Glucose.: 108 mg/dL (19 Nov 2021 07:44)  POCT Blood Glucose.: 130 mg/dL (18 Nov 2021 21:11)  POCT Blood Glucose.: 97 mg/dL (18 Nov 2021 16:52)      Culture - Abscess with Gram Stain (collected 17 Nov 2021 18:55)  Source: .Abscess Arm - Left  Preliminary Report (18 Nov 2021 15:52):    No growth to date.  Culture - Blood (collected 16 Nov 2021 22:58)  Source: .Blood Blood  Preliminary Report (17 Nov 2021 23:01):    No growth to date.  Culture - Blood (collected 16 Nov 2021 22:57)  Source: .Blood Blood  Preliminary Report (17 Nov 2021 23:01):    No growth to date.  RADIOLOGY & ADDITIONAL TESTS:   reviewed elctronically  ASSESSMENT/PLAN: 	  25 minutes aggregate time was spent on this visit, 50% visit time spent in care co-ordination with other attendings and counselling patient .I have discussed care plan with patient / HCP/family member ,who expressed understanding of problems treatment and their effect and side effects, alternatives in details. I have asked if they have any questions and concerns and appropriately addressed them to best of my ability. Advance care planning was discussed , palliative care issues ,CMO ,hospice levels of care were discussed in details , forms ,advance directives were reviewed .All questions were answered to the best of my knowledge .25 min spent.

## 2021-11-19 NOTE — PROGRESS NOTE ADULT - SUBJECTIVE AND OBJECTIVE BOX
Patient is a 89y Male with a known history of :  Gangrene of finger of left hand [I96]    DM (diabetes mellitus) [E11.9]    Afib [I48.91]    HTN (hypertension) [I10]    Arteriosclerotic heart disease (ASHD) [I25.10]    Dementia [F03.90]    CHF (congestive heart failure) [I50.9]    Constipation [K59.00]    Prophylactic measure [Z29.9]      HPI:  89 Male ,[facility] resident  PMH is significant for :  Arteriosclerotic heart disease (ASHD)  Atrial fibrillation  ,CHF (congestive heart failure)  ,Dementia  ,Depression  ,DM (diabetes mellitus)  ,Hyperlipemia  ,Hypertension  ,Prostate ca  ,GIB ,R groin abscess  with dementia, HTN, HLD, A FIb, chf, dm sent from [facility] for evaluation of left middle finger discoloration .Seen by hand surgeon and wound care MD - diagnosed with tip necrosis of finger ,dry dressing and antibacterial tx recommended ,no immediate sx intervention required Admitted for septic workup and evaluation, send blood and urine cx ,serial lactate levels ,monitor vitals closely hydration ,monitor urine output and renal profile ,iv abx initiated -started on Unasyn ,s/p vancomycin ,ID cons called Ct scan ordered to rule out OM  (16 Nov 2021 19:23)      REVIEW OF SYSTEMS:    CONSTITUTIONAL: No fever, weight loss, or fatigue  EYES: No eye pain, visual disturbances, or discharge  ENMT:  No difficulty hearing, tinnitus, vertigo; No sinus or throat pain  NECK: No pain or stiffness  BREASTS: No pain, masses, or nipple discharge  RESPIRATORY: No cough, wheezing, chills or hemoptysis; No shortness of breath  CARDIOVASCULAR: No chest pain, palpitations, dizziness, or leg swelling  GASTROINTESTINAL: No abdominal or epigastric pain. No nausea, vomiting, or hematemesis; No diarrhea or constipation. No melena or hematochezia.  GENITOURINARY: No dysuria, frequency, hematuria, or incontinence  NEUROLOGICAL: No headaches, memory loss, loss of strength, numbness, or tremors  SKIN: No itching, burning, rashes, or lesions   LYMPH NODES: No enlarged glands  ENDOCRINE: No heat or cold intolerance; No hair loss  MUSCULOSKELETAL: No joint pain or swelling; No muscle, back, or extremity pain  PSYCHIATRIC: No depression, anxiety, mood swings, or difficulty sleeping  HEME/LYMPH: No easy bruising, or bleeding gums  ALLERGY AND IMMUNOLOGIC: No hives or eczema    MEDICATIONS  (STANDING):  ampicillin/sulbactam  IVPB 3 Gram(s) IV Intermittent every 6 hours  ascorbic acid 500 milliGRAM(s) Oral two times a day  aspirin  chewable 81 milliGRAM(s) Oral daily  dextrose 40% Gel 15 Gram(s) Oral once  dextrose 5%. 1000 milliLiter(s) (50 mL/Hr) IV Continuous <Continuous>  dextrose 5%. 1000 milliLiter(s) (100 mL/Hr) IV Continuous <Continuous>  dextrose 50% Injectable 25 Gram(s) IV Push once  dextrose 50% Injectable 12.5 Gram(s) IV Push once  dextrose 50% Injectable 25 Gram(s) IV Push once  donepezil 5 milliGRAM(s) Oral at bedtime  ferrous    sulfate 325 milliGRAM(s) Oral daily  furosemide    Tablet 20 milliGRAM(s) Oral daily  glucagon  Injectable 1 milliGRAM(s) IntraMuscular once  heparin   Injectable 5000 Unit(s) SubCutaneous every 12 hours  influenza  Vaccine (HIGH DOSE) 0.7 milliLiter(s) IntraMuscular once  insulin lispro (ADMELOG) corrective regimen sliding scale   SubCutaneous three times a day before meals  lactobacillus acidophilus 1 Tablet(s) Oral two times a day  losartan 25 milliGRAM(s) Oral daily  metoprolol succinate ER 25 milliGRAM(s) Oral daily  multivitamin/minerals 1 Tablet(s) Oral daily  pantoprazole    Tablet 40 milliGRAM(s) Oral before breakfast  polyethylene glycol 3350 17 Gram(s) Oral daily  povidone iodine 10% Solution 1 Application(s) Topical daily  senna 2 Tablet(s) Oral at bedtime  simvastatin 20 milliGRAM(s) Oral at bedtime  tamsulosin 0.4 milliGRAM(s) Oral at bedtime  vancomycin  IVPB 1000 milliGRAM(s) IV Intermittent every 12 hours    MEDICATIONS  (PRN):  acetaminophen     Tablet .. 650 milliGRAM(s) Oral every 6 hours PRN Temp greater or equal to 38C (100.4F), Mild Pain (1 - 3)  bisacodyl Suppository 10 milliGRAM(s) Rectal daily PRN Constipation  magnesium hydroxide Suspension 30 milliLiter(s) Oral daily PRN Constipation  traMADol 25 milliGRAM(s) Oral four times a day PRN Moderate Pain (4 - 6)      ALLERGIES: latex (Rash)  latex (Unknown)  No Known Drug Allergies      FAMILY HISTORY:      PHYSICAL EXAMINATION:  -----------------------------  T(C): 36.4 (11-19-21 @ 05:15), Max: 36.9 (11-18-21 @ 12:38)  HR: 82 (11-19-21 @ 05:15) (74 - 82)  BP: 111/62 (11-19-21 @ 05:15) (92/53 - 115/69)  RR: 17 (11-19-21 @ 05:15) (17 - 17)  SpO2: 93% (11-19-21 @ 05:15) (93% - 95%)  Wt(kg): --    11-18 @ 07:01  -  11-19 @ 07:00  --------------------------------------------------------  IN:    IV PiggyBack: 200 mL    Oral Fluid: 720 mL  Total IN: 920 mL    OUT:  Total OUT: 0 mL    Total NET: 920 mL            VITALS  T(C): 36.4 (11-19-21 @ 05:15), Max: 36.9 (11-18-21 @ 12:38)  HR: 82 (11-19-21 @ 05:15) (74 - 82)  BP: 111/62 (11-19-21 @ 05:15) (92/53 - 115/69)  RR: 17 (11-19-21 @ 05:15) (17 - 17)  SpO2: 93% (11-19-21 @ 05:15) (93% - 95%)    Constitutional: well developed, normal appearance, well groomed, well nourished, no deformities and no acute distress.   Eyes: the conjunctiva exhibited no abnormalities and the eyelids demonstrated no xanthelasmas.   HEENT: normal oral mucosa, no oral pallor and no oral cyanosis.   Neck: normal jugular venous A waves present, normal jugular venous V waves present and no jugular venous jacobs A waves.   Pulmonary: no respiratory distress, normal respiratory rhythm and effort, no accessory muscle use and lungs were clear to auscultation bilaterally.   Cardiovascular: heart rate and rhythm were normal, normal S1 and S2 and no murmur, gallop, rub, heave or thrill are present.   Abdomen: soft, non-tender, no hepato-splenomegaly and no abdominal mass palpated.   Musculoskeletal: the gait could not be assessed..   Extremities: no clubbing of the fingernails, no localized cyanosis, no petechial hemorrhages and no ischemic changes.   Skin: normal skin color and pigmentation, no rash, no venous stasis, no skin lesions, no skin ulcer and no xanthoma was observed.   Psychiatric: oriented to person, place, and time, the affect was normal, the mood was normal and not feeling anxious.     LABS:   --------  11-18    137  |  100  |  11  ----------------------------<  99  3.7   |  29  |  0.58    Ca    9.2      18 Nov 2021 08:38                           11.6   5.96  )-----------( 246      ( 18 Nov 2021 08:38 )             33.6             Culture Results:   No growth to date. (11-17 @ 18:55)      RADIOLOGY:  -----------------    ECG:     ECHO:

## 2021-11-20 LAB
ANION GAP SERPL CALC-SCNC: 4 MMOL/L — LOW (ref 5–17)
BUN SERPL-MCNC: 12 MG/DL — SIGNIFICANT CHANGE UP (ref 7–23)
CALCIUM SERPL-MCNC: 8.9 MG/DL — SIGNIFICANT CHANGE UP (ref 8.5–10.1)
CHLORIDE SERPL-SCNC: 105 MMOL/L — SIGNIFICANT CHANGE UP (ref 96–108)
CO2 SERPL-SCNC: 32 MMOL/L — HIGH (ref 22–31)
CREAT SERPL-MCNC: 0.6 MG/DL — SIGNIFICANT CHANGE UP (ref 0.5–1.3)
GLUCOSE SERPL-MCNC: 115 MG/DL — HIGH (ref 70–99)
HCT VFR BLD CALC: 34.7 % — LOW (ref 39–50)
HGB BLD-MCNC: 11.3 G/DL — LOW (ref 13–17)
MCHC RBC-ENTMCNC: 30.9 PG — SIGNIFICANT CHANGE UP (ref 27–34)
MCHC RBC-ENTMCNC: 32.6 GM/DL — SIGNIFICANT CHANGE UP (ref 32–36)
MCV RBC AUTO: 94.8 FL — SIGNIFICANT CHANGE UP (ref 80–100)
NRBC # BLD: 0 /100 WBCS — SIGNIFICANT CHANGE UP (ref 0–0)
PLATELET # BLD AUTO: 227 K/UL — SIGNIFICANT CHANGE UP (ref 150–400)
POTASSIUM SERPL-MCNC: 4 MMOL/L — SIGNIFICANT CHANGE UP (ref 3.5–5.3)
POTASSIUM SERPL-SCNC: 4 MMOL/L — SIGNIFICANT CHANGE UP (ref 3.5–5.3)
RBC # BLD: 3.66 M/UL — LOW (ref 4.2–5.8)
RBC # FLD: 13.2 % — SIGNIFICANT CHANGE UP (ref 10.3–14.5)
SODIUM SERPL-SCNC: 141 MMOL/L — SIGNIFICANT CHANGE UP (ref 135–145)
VANCOMYCIN TROUGH SERPL-MCNC: 19 UG/ML — SIGNIFICANT CHANGE UP (ref 10–20)
WBC # BLD: 6.52 K/UL — SIGNIFICANT CHANGE UP (ref 3.8–10.5)
WBC # FLD AUTO: 6.52 K/UL — SIGNIFICANT CHANGE UP (ref 3.8–10.5)

## 2021-11-20 RX ADMIN — Medication 500 MILLIGRAM(S): at 06:22

## 2021-11-20 RX ADMIN — SENNA PLUS 2 TABLET(S): 8.6 TABLET ORAL at 21:28

## 2021-11-20 RX ADMIN — Medication 2: at 12:26

## 2021-11-20 RX ADMIN — Medication 500 MILLIGRAM(S): at 18:05

## 2021-11-20 RX ADMIN — SIMVASTATIN 20 MILLIGRAM(S): 20 TABLET, FILM COATED ORAL at 21:28

## 2021-11-20 RX ADMIN — Medication 250 MILLIGRAM(S): at 06:22

## 2021-11-20 RX ADMIN — Medication 1 TABLET(S): at 06:22

## 2021-11-20 RX ADMIN — Medication 1 TABLET(S): at 12:26

## 2021-11-20 RX ADMIN — Medication 20 MILLIGRAM(S): at 06:22

## 2021-11-20 RX ADMIN — Medication 1: at 16:53

## 2021-11-20 RX ADMIN — Medication 1 APPLICATION(S): at 12:26

## 2021-11-20 RX ADMIN — HEPARIN SODIUM 5000 UNIT(S): 5000 INJECTION INTRAVENOUS; SUBCUTANEOUS at 06:22

## 2021-11-20 RX ADMIN — Medication 1 TABLET(S): at 18:05

## 2021-11-20 RX ADMIN — DONEPEZIL HYDROCHLORIDE 5 MILLIGRAM(S): 10 TABLET, FILM COATED ORAL at 21:28

## 2021-11-20 RX ADMIN — Medication 250 MILLIGRAM(S): at 18:01

## 2021-11-20 RX ADMIN — AMPICILLIN SODIUM AND SULBACTAM SODIUM 200 GRAM(S): 250; 125 INJECTION, POWDER, FOR SUSPENSION INTRAMUSCULAR; INTRAVENOUS at 06:21

## 2021-11-20 RX ADMIN — POLYETHYLENE GLYCOL 3350 17 GRAM(S): 17 POWDER, FOR SOLUTION ORAL at 12:26

## 2021-11-20 RX ADMIN — Medication 81 MILLIGRAM(S): at 12:26

## 2021-11-20 RX ADMIN — PANTOPRAZOLE SODIUM 40 MILLIGRAM(S): 20 TABLET, DELAYED RELEASE ORAL at 06:22

## 2021-11-20 RX ADMIN — Medication 325 MILLIGRAM(S): at 12:26

## 2021-11-20 RX ADMIN — TAMSULOSIN HYDROCHLORIDE 0.4 MILLIGRAM(S): 0.4 CAPSULE ORAL at 21:28

## 2021-11-20 RX ADMIN — HEPARIN SODIUM 5000 UNIT(S): 5000 INJECTION INTRAVENOUS; SUBCUTANEOUS at 18:06

## 2021-11-20 RX ADMIN — AMPICILLIN SODIUM AND SULBACTAM SODIUM 200 GRAM(S): 250; 125 INJECTION, POWDER, FOR SUSPENSION INTRAMUSCULAR; INTRAVENOUS at 12:26

## 2021-11-20 RX ADMIN — AMPICILLIN SODIUM AND SULBACTAM SODIUM 200 GRAM(S): 250; 125 INJECTION, POWDER, FOR SUSPENSION INTRAMUSCULAR; INTRAVENOUS at 19:10

## 2021-11-20 NOTE — PROGRESS NOTE ADULT - SUBJECTIVE AND OBJECTIVE BOX
Interval History:    CENTRAL LINE:   [  ] YES       [  ] NO  RAMOS:                 [  ] YES       [  ] NO         REVIEW OF SYSTEMS:  All Systems below were reviewed and are negative [  ]  HEENT:  ID:  Pulmonary:  Cardiac:  GI:  Renal:  Musculoskeletal:  All other systems above were reviewed and are negative   [  ]      MEDICATIONS  (STANDING):  ampicillin/sulbactam  IVPB 3 Gram(s) IV Intermittent every 6 hours  ascorbic acid 500 milliGRAM(s) Oral two times a day  aspirin  chewable 81 milliGRAM(s) Oral daily  dextrose 40% Gel 15 Gram(s) Oral once  dextrose 5%. 1000 milliLiter(s) (50 mL/Hr) IV Continuous <Continuous>  dextrose 5%. 1000 milliLiter(s) (100 mL/Hr) IV Continuous <Continuous>  dextrose 50% Injectable 25 Gram(s) IV Push once  dextrose 50% Injectable 12.5 Gram(s) IV Push once  dextrose 50% Injectable 25 Gram(s) IV Push once  donepezil 5 milliGRAM(s) Oral at bedtime  ferrous    sulfate 325 milliGRAM(s) Oral daily  furosemide    Tablet 20 milliGRAM(s) Oral daily  glucagon  Injectable 1 milliGRAM(s) IntraMuscular once  heparin   Injectable 5000 Unit(s) SubCutaneous every 12 hours  influenza  Vaccine (HIGH DOSE) 0.7 milliLiter(s) IntraMuscular once  insulin lispro (ADMELOG) corrective regimen sliding scale   SubCutaneous three times a day before meals  lactobacillus acidophilus 1 Tablet(s) Oral two times a day  losartan 25 milliGRAM(s) Oral daily  metoprolol succinate ER 25 milliGRAM(s) Oral daily  multivitamin/minerals 1 Tablet(s) Oral daily  pantoprazole    Tablet 40 milliGRAM(s) Oral before breakfast  polyethylene glycol 3350 17 Gram(s) Oral daily  povidone iodine 10% Solution 1 Application(s) Topical daily  senna 2 Tablet(s) Oral at bedtime  simvastatin 20 milliGRAM(s) Oral at bedtime  tamsulosin 0.4 milliGRAM(s) Oral at bedtime  vancomycin  IVPB 1000 milliGRAM(s) IV Intermittent every 12 hours    MEDICATIONS  (PRN):  acetaminophen     Tablet .. 650 milliGRAM(s) Oral every 6 hours PRN Temp greater or equal to 38C (100.4F), Mild Pain (1 - 3)  bisacodyl Suppository 10 milliGRAM(s) Rectal daily PRN Constipation  magnesium hydroxide Suspension 30 milliLiter(s) Oral daily PRN Constipation  traMADol 25 milliGRAM(s) Oral four times a day PRN Moderate Pain (4 - 6)      Vital Signs Last 24 Hrs  T(C): 36.7 (20 Nov 2021 12:50), Max: 36.7 (19 Nov 2021 20:02)  T(F): 98.1 (20 Nov 2021 12:50), Max: 98.1 (20 Nov 2021 12:50)  HR: 75 (20 Nov 2021 12:50) (60 - 75)  BP: 94/56 (20 Nov 2021 12:50) (90/52 - 106/69)  BP(mean): --  RR: 17 (20 Nov 2021 12:50) (17 - 17)  SpO2: 91% (20 Nov 2021 12:50) (91% - 98%)    I&O's Summary    19 Nov 2021 07:01  -  20 Nov 2021 07:00  --------------------------------------------------------  IN: 1055 mL / OUT: 0 mL / NET: 1055 mL        PHYSICAL EXAM:  HEENT: NC/AT; PERRLA  Neck: Soft; no tenderness  Lungs: CTA bilaterally; no wheezing.   Heart:  Abdomen:  Genital/ Rectal:  Extremities:  Neurologic:  Vascular:      LABORATORY:    CBC Full  -  ( 20 Nov 2021 09:03 )  WBC Count : 6.52 K/uL  RBC Count : 3.66 M/uL  Hemoglobin : 11.3 g/dL  Hematocrit : 34.7 %  Platelet Count - Automated : 227 K/uL  Mean Cell Volume : 94.8 fl  Mean Cell Hemoglobin : 30.9 pg  Mean Cell Hemoglobin Concentration : 32.6 gm/dL  Auto Neutrophil # : x  Auto Lymphocyte # : x  Auto Monocyte # : x  Auto Eosinophil # : x  Auto Basophil # : x  Auto Neutrophil % : x  Auto Lymphocyte % : x  Auto Monocyte % : x  Auto Eosinophil % : x  Auto Basophil % : x      ESR:                   11-17 @ 11:37  --    C-Reactive Protein:     11-17 @ 11:37  <3    Procalcitonin:           11-17 @ 11:37   --  ESR:                   11-17 @ 09:08  21    C-Reactive Protein:     11-17 @ 09:08  --    Procalcitonin:           11-17 @ 09:08   <0.05      11-20    141  |  105  |  12  ----------------------------<  115<H>  4.0   |  32<H>  |  0.60    Ca    8.9      20 Nov 2021 09:03            Assessment and Plan:          Stephan Pruett MD   [phone].     He is afebrile  No new events.       MEDICATIONS  (STANDING):  ampicillin/sulbactam  IVPB 3 Gram(s) IV Intermittent every 6 hours  ascorbic acid 500 milliGRAM(s) Oral two times a day  aspirin  chewable 81 milliGRAM(s) Oral daily  dextrose 40% Gel 15 Gram(s) Oral once  dextrose 5%. 1000 milliLiter(s) (50 mL/Hr) IV Continuous <Continuous>  dextrose 5%. 1000 milliLiter(s) (100 mL/Hr) IV Continuous <Continuous>  dextrose 50% Injectable 25 Gram(s) IV Push once  dextrose 50% Injectable 12.5 Gram(s) IV Push once  dextrose 50% Injectable 25 Gram(s) IV Push once  donepezil 5 milliGRAM(s) Oral at bedtime  ferrous    sulfate 325 milliGRAM(s) Oral daily  furosemide    Tablet 20 milliGRAM(s) Oral daily  glucagon  Injectable 1 milliGRAM(s) IntraMuscular once  heparin   Injectable 5000 Unit(s) SubCutaneous every 12 hours  influenza  Vaccine (HIGH DOSE) 0.7 milliLiter(s) IntraMuscular once  insulin lispro (ADMELOG) corrective regimen sliding scale   SubCutaneous three times a day before meals  lactobacillus acidophilus 1 Tablet(s) Oral two times a day  losartan 25 milliGRAM(s) Oral daily  metoprolol succinate ER 25 milliGRAM(s) Oral daily  multivitamin/minerals 1 Tablet(s) Oral daily  pantoprazole    Tablet 40 milliGRAM(s) Oral before breakfast  polyethylene glycol 3350 17 Gram(s) Oral daily  povidone iodine 10% Solution 1 Application(s) Topical daily  senna 2 Tablet(s) Oral at bedtime  simvastatin 20 milliGRAM(s) Oral at bedtime  tamsulosin 0.4 milliGRAM(s) Oral at bedtime  vancomycin  IVPB 1000 milliGRAM(s) IV Intermittent every 12 hours    MEDICATIONS  (PRN):  acetaminophen     Tablet .. 650 milliGRAM(s) Oral every 6 hours PRN Temp greater or equal to 38C (100.4F), Mild Pain (1 - 3)  bisacodyl Suppository 10 milliGRAM(s) Rectal daily PRN Constipation  magnesium hydroxide Suspension 30 milliLiter(s) Oral daily PRN Constipation  traMADol 25 milliGRAM(s) Oral four times a day PRN Moderate Pain (4 - 6)      Vital Signs Last 24 Hrs  T(C): 36.7 (20 Nov 2021 12:50), Max: 36.7 (19 Nov 2021 20:02)  T(F): 98.1 (20 Nov 2021 12:50), Max: 98.1 (20 Nov 2021 12:50)  HR: 75 (20 Nov 2021 12:50) (60 - 75)  BP: 94/56 (20 Nov 2021 12:50) (90/52 - 106/69)  BP(mean): --  RR: 17 (20 Nov 2021 12:50) (17 - 17)  SpO2: 91% (20 Nov 2021 12:50) (91% - 98%)    I&O's Summary    19 Nov 2021 07:01  -  20 Nov 2021 07:00  --------------------------------------------------------  IN: 1055 mL / OUT: 0 mL / NET: 1055 mL        PHYSICAL EXAM:  HEENT: NC/AT; PERRLA  Neck: Soft; no tenderness  Lungs: Coarse BS  bilaterally; no wheezing.   Heart: RRR, no murmurs.   Abdomen: Soft, no masses,  Genital/ Rectal: No fitch   Extremities: Contracted fingers of hands. Discoloration of the distal phalanx of the 3rd finger.   Neurologic: Confused.       LABORATORY:    CBC Full  -  ( 20 Nov 2021 09:03 )  WBC Count : 6.52 K/uL  RBC Count : 3.66 M/uL  Hemoglobin : 11.3 g/dL  Hematocrit : 34.7 %  Platelet Count - Automated : 227 K/uL  Mean Cell Volume : 94.8 fl  Mean Cell Hemoglobin : 30.9 pg  Mean Cell Hemoglobin Concentration : 32.6 gm/dL  Auto Neutrophil # : x  Auto Lymphocyte # : x  Auto Monocyte # : x  Auto Eosinophil # : x  Auto Basophil # : x  Auto Neutrophil % : x  Auto Lymphocyte % : x  Auto Monocyte % : x  Auto Eosinophil % : x  Auto Basophil % : x      ESR:                   11-17 @ 11:37  --    C-Reactive Protein:     11-17 @ 11:37  <3    Procalcitonin:           11-17 @ 11:37   --  ESR:                   11-17 @ 09:08  21    C-Reactive Protein:     11-17 @ 09:08  --    Procalcitonin:           11-17 @ 09:08   <0.05      11-20    141  |  105  |  12  ----------------------------<  115<H>  4.0   |  32<H>  |  0.60    Ca    8.9      20 Nov 2021 09:03      Assessment and Plan:    1. Cellulitis of left 3rd finger.  2. Necrosis of distal phalanx of left 3rd finger.      . MRI cannot be done as the patient has AICD. CT with IV contrast of left hand was negative for osteomyelitis/abscess of the left 3rd finger.  . Cellulitis is improving. Still with necrosis of the distal phalanx.   . Discontinue IV Unasyn and Vancomycin after today then add po Doxycycline 100 mg bid and Keflex 500 mg bix for 7 days.   . Surgery follow up for necrosis of th distal phalanx.         Stephan Pruett MD   [phone].

## 2021-11-20 NOTE — PROGRESS NOTE ADULT - SUBJECTIVE AND OBJECTIVE BOX
PROGRESS NOTE  Patient is a 89y old  Male who presents with a chief complaint of Left hand 3 rd finger infection (20 Nov 2021 07:39)  Chart and available morning labs /imaging are reviewed electronically , urgent issues addressed . More information  is being added upon completion of rounds , when more information is collected and management discussed with consultants , medical staff and social service/case management on the floor     OVERNIGHT  No new issues reported by medical staff . All above noted Patient is resting in a bed comfortably .Confused ,poor mentation .No distress noted     HPI:  89 Male ,[facility] resident  PMH is significant for :  Arteriosclerotic heart disease (ASHD)  Atrial fibrillation  ,CHF (congestive heart failure)  ,Dementia  ,Depression  ,DM (diabetes mellitus)  ,Hyperlipemia  ,Hypertension  ,Prostate ca  ,GIB ,R groin abscess  with dementia, HTN, HLD, A FIb, chf, dm sent from [facility] for evaluation of left middle finger discoloration .Seen by hand surgeon and wound care MD - diagnosed with tip necrosis of finger ,dry dressing and antibacterial tx recommended ,no immediate sx intervention required Admitted for septic workup and evaluation, send blood and urine cx ,serial lactate levels ,monitor vitals closely hydration ,monitor urine output and renal profile ,iv abx initiated -started on Unasyn ,s/p vancomycin ,ID cons called Ct scan ordered to rule out OM  (16 Nov 2021 19:23)    PAST MEDICAL & SURGICAL HISTORY:  Atrial fibrillation    Hypertension    Diabetes    Prostate ca    Dementia    CHF (congestive heart failure)    Hyperlipemia    Diabetes    Depression    Dementia    DM (diabetes mellitus)    Atrial fibrillation    Prostate CA    Arteriosclerotic heart disease (ASHD)    Dementia    Anemia    Constipation    No significant past surgical history        MEDICATIONS  (STANDING):  ampicillin/sulbactam  IVPB 3 Gram(s) IV Intermittent every 6 hours  ascorbic acid 500 milliGRAM(s) Oral two times a day  aspirin  chewable 81 milliGRAM(s) Oral daily  dextrose 40% Gel 15 Gram(s) Oral once  dextrose 5%. 1000 milliLiter(s) (50 mL/Hr) IV Continuous <Continuous>  dextrose 5%. 1000 milliLiter(s) (100 mL/Hr) IV Continuous <Continuous>  dextrose 50% Injectable 25 Gram(s) IV Push once  dextrose 50% Injectable 12.5 Gram(s) IV Push once  dextrose 50% Injectable 25 Gram(s) IV Push once  donepezil 5 milliGRAM(s) Oral at bedtime  ferrous    sulfate 325 milliGRAM(s) Oral daily  furosemide    Tablet 20 milliGRAM(s) Oral daily  glucagon  Injectable 1 milliGRAM(s) IntraMuscular once  heparin   Injectable 5000 Unit(s) SubCutaneous every 12 hours  influenza  Vaccine (HIGH DOSE) 0.7 milliLiter(s) IntraMuscular once  insulin lispro (ADMELOG) corrective regimen sliding scale   SubCutaneous three times a day before meals  lactobacillus acidophilus 1 Tablet(s) Oral two times a day  losartan 25 milliGRAM(s) Oral daily  metoprolol succinate ER 25 milliGRAM(s) Oral daily  multivitamin/minerals 1 Tablet(s) Oral daily  pantoprazole    Tablet 40 milliGRAM(s) Oral before breakfast  polyethylene glycol 3350 17 Gram(s) Oral daily  povidone iodine 10% Solution 1 Application(s) Topical daily  senna 2 Tablet(s) Oral at bedtime  simvastatin 20 milliGRAM(s) Oral at bedtime  tamsulosin 0.4 milliGRAM(s) Oral at bedtime  vancomycin  IVPB 1000 milliGRAM(s) IV Intermittent every 12 hours    MEDICATIONS  (PRN):  acetaminophen     Tablet .. 650 milliGRAM(s) Oral every 6 hours PRN Temp greater or equal to 38C (100.4F), Mild Pain (1 - 3)  bisacodyl Suppository 10 milliGRAM(s) Rectal daily PRN Constipation  magnesium hydroxide Suspension 30 milliLiter(s) Oral daily PRN Constipation  traMADol 25 milliGRAM(s) Oral four times a day PRN Moderate Pain (4 - 6)      OBJECTIVE    T(C): 36.3 (11-20-21 @ 05:40), Max: 36.7 (11-19-21 @ 20:02)  HR: 69 (11-20-21 @ 05:40) (60 - 69)  BP: 106/69 (11-20-21 @ 05:40) (90/52 - 106/69)  RR: 17 (11-20-21 @ 05:40) (17 - 18)  SpO2: 93% (11-20-21 @ 05:40) (93% - 98%)  Wt(kg): --  I&O's Summary    19 Nov 2021 07:01  -  20 Nov 2021 07:00  --------------------------------------------------------  IN: 1055 mL / OUT: 0 mL / NET: 1055 mL          REVIEW OF SYSTEMS:  CONSTITUTIONAL: No fever, weight loss, or fatigue  EYES: No eye pain, visual disturbances, or discharge  ENMT:   No sinus or throat pain  NECK: No pain or stiffness  RESPIRATORY: No cough, wheezing, chills or hemoptysis; No shortness of breath  CARDIOVASCULAR: No chest pain, palpitations, dizziness, or leg swelling  GASTROINTESTINAL: No abdominal pain. No nausea, vomiting; No diarrhea or constipation. No melena or hematochezia.  GENITOURINARY: No dysuria, frequency, hematuria, or incontinence  NEUROLOGICAL: No headaches, memory loss, loss of strength, numbness, or tremors  SKIN: No itching, burning, rashes, or lesions   MUSCULOSKELETAL: No joint pain or swelling; No muscle, back, or extremity pain  PHYSICAL EXAM:  Appearance: NAD. VS past 24 hrs -as above   HEENT:   Moist oral mucosa. Conjunctiva clear b/l.   Neck : supple  Respiratory: Lungs CTAB.  Gastrointestinal:  Soft, nontender. No rebound. No rigidity. BS present	  Cardiovascular: RRR ,S1S2 present  Neurologic: Non-focal. Moving all extremities.  Extremities: No edema. No erythema. No calf tenderness. L hand 3 rd digit tip necrosis   Skin: No rashes, No ecchymoses, No cyanosis.	  wounds ,skin lesions-See skin assesment flow sheet   LABS:                        11.3   6.52  )-----------( 227      ( 20 Nov 2021 09:03 )             34.7     11-20    141  |  105  |  12  ----------------------------<  115<H>  4.0   |  32<H>  |  0.60    Ca    8.9      20 Nov 2021 09:03      CAPILLARY BLOOD GLUCOSE      POCT Blood Glucose.: 204 mg/dL (20 Nov 2021 11:52)  POCT Blood Glucose.: 135 mg/dL (20 Nov 2021 07:48)  POCT Blood Glucose.: 167 mg/dL (19 Nov 2021 21:53)  POCT Blood Glucose.: 138 mg/dL (19 Nov 2021 16:46)          Culture - Abscess with Gram Stain (collected 17 Nov 2021 18:55)  Source: .Abscess Arm - Left  Preliminary Report (19 Nov 2021 22:47):    Rare Coag Negative Staphylococcus "Susceptibilities not performed"    Few Most closely resembling Actinomyces species "Susceptibilities not    performed"  Culture - Blood (collected 16 Nov 2021 22:58)  Source: .Blood Blood  Preliminary Report (17 Nov 2021 23:01):    No growth to date.  Culture - Blood (collected 16 Nov 2021 22:57)  Source: .Blood Blood  Preliminary Report (17 Nov 2021 23:01):    No growth to date.  RADIOLOGY & ADDITIONAL TESTS:   reviewed electronically  ASSESSMENT/PLAN: 	  25 minutes aggregate time was spent on this visit, 50% visit time spent in care co-ordination with other attendings and counselling patient .I have discussed care plan with patient / HCP/family member ,who expressed understanding of problems treatment and their effect and side effects, alternatives in details. I have asked if they have any questions and concerns and appropriately addressed them to best of my ability.

## 2021-11-20 NOTE — PROGRESS NOTE ADULT - SUBJECTIVE AND OBJECTIVE BOX
Patient is a 89y Male with a known history of :  Gangrene of finger of left hand [I96]    DM (diabetes mellitus) [E11.9]    Afib [I48.91]    HTN (hypertension) [I10]    Arteriosclerotic heart disease (ASHD) [I25.10]    Dementia [F03.90]    CHF (congestive heart failure) [I50.9]    Constipation [K59.00]    Prophylactic measure [Z29.9]      HPI:  89 Male ,[facility] resident  PMH is significant for :  Arteriosclerotic heart disease (ASHD)  Atrial fibrillation  ,CHF (congestive heart failure)  ,Dementia  ,Depression  ,DM (diabetes mellitus)  ,Hyperlipemia  ,Hypertension  ,Prostate ca  ,GIB ,R groin abscess  with dementia, HTN, HLD, A FIb, chf, dm sent from [facility] for evaluation of left middle finger discoloration .Seen by hand surgeon and wound care MD - diagnosed with tip necrosis of finger ,dry dressing and antibacterial tx recommended ,no immediate sx intervention required Admitted for septic workup and evaluation, send blood and urine cx ,serial lactate levels ,monitor vitals closely hydration ,monitor urine output and renal profile ,iv abx initiated -started on Unasyn ,s/p vancomycin ,ID cons called Ct scan ordered to rule out OM  (16 Nov 2021 19:23)      REVIEW OF SYSTEMS:    CONSTITUTIONAL: No fever, weight loss, or fatigue  EYES: No eye pain, visual disturbances, or discharge  ENMT:  No difficulty hearing, tinnitus, vertigo; No sinus or throat pain  NECK: No pain or stiffness  BREASTS: No pain, masses, or nipple discharge  RESPIRATORY: No cough, wheezing, chills or hemoptysis; No shortness of breath  CARDIOVASCULAR: No chest pain, palpitations, dizziness, or leg swelling  GASTROINTESTINAL: No abdominal or epigastric pain. No nausea, vomiting, or hematemesis; No diarrhea or constipation. No melena or hematochezia.  GENITOURINARY: No dysuria, frequency, hematuria, or incontinence  NEUROLOGICAL: No headaches, memory loss, loss of strength, numbness, or tremors  SKIN: No itching, burning, rashes, or lesions   LYMPH NODES: No enlarged glands  ENDOCRINE: No heat or cold intolerance; No hair loss  MUSCULOSKELETAL: No joint pain or swelling; No muscle, back, or extremity pain  PSYCHIATRIC: No depression, anxiety, mood swings, or difficulty sleeping  HEME/LYMPH: No easy bruising, or bleeding gums  ALLERGY AND IMMUNOLOGIC: No hives or eczema    MEDICATIONS  (STANDING):  ampicillin/sulbactam  IVPB 3 Gram(s) IV Intermittent every 6 hours  ascorbic acid 500 milliGRAM(s) Oral two times a day  aspirin  chewable 81 milliGRAM(s) Oral daily  dextrose 40% Gel 15 Gram(s) Oral once  dextrose 5%. 1000 milliLiter(s) (50 mL/Hr) IV Continuous <Continuous>  dextrose 5%. 1000 milliLiter(s) (100 mL/Hr) IV Continuous <Continuous>  dextrose 50% Injectable 25 Gram(s) IV Push once  dextrose 50% Injectable 12.5 Gram(s) IV Push once  dextrose 50% Injectable 25 Gram(s) IV Push once  donepezil 5 milliGRAM(s) Oral at bedtime  ferrous    sulfate 325 milliGRAM(s) Oral daily  furosemide    Tablet 20 milliGRAM(s) Oral daily  glucagon  Injectable 1 milliGRAM(s) IntraMuscular once  heparin   Injectable 5000 Unit(s) SubCutaneous every 12 hours  influenza  Vaccine (HIGH DOSE) 0.7 milliLiter(s) IntraMuscular once  insulin lispro (ADMELOG) corrective regimen sliding scale   SubCutaneous three times a day before meals  lactobacillus acidophilus 1 Tablet(s) Oral two times a day  losartan 25 milliGRAM(s) Oral daily  metoprolol succinate ER 25 milliGRAM(s) Oral daily  multivitamin/minerals 1 Tablet(s) Oral daily  pantoprazole    Tablet 40 milliGRAM(s) Oral before breakfast  polyethylene glycol 3350 17 Gram(s) Oral daily  povidone iodine 10% Solution 1 Application(s) Topical daily  senna 2 Tablet(s) Oral at bedtime  simvastatin 20 milliGRAM(s) Oral at bedtime  tamsulosin 0.4 milliGRAM(s) Oral at bedtime  vancomycin  IVPB 1000 milliGRAM(s) IV Intermittent every 12 hours    MEDICATIONS  (PRN):  acetaminophen     Tablet .. 650 milliGRAM(s) Oral every 6 hours PRN Temp greater or equal to 38C (100.4F), Mild Pain (1 - 3)  bisacodyl Suppository 10 milliGRAM(s) Rectal daily PRN Constipation  magnesium hydroxide Suspension 30 milliLiter(s) Oral daily PRN Constipation  traMADol 25 milliGRAM(s) Oral four times a day PRN Moderate Pain (4 - 6)      ALLERGIES: latex (Rash)  latex (Unknown)  No Known Drug Allergies      FAMILY HISTORY:      PHYSICAL EXAMINATION:  -----------------------------  T(C): 36.3 (11-20-21 @ 05:40), Max: 36.7 (11-19-21 @ 20:02)  HR: 69 (11-20-21 @ 05:40) (60 - 69)  BP: 106/69 (11-20-21 @ 05:40) (90/52 - 106/69)  RR: 17 (11-20-21 @ 05:40) (17 - 18)  SpO2: 93% (11-20-21 @ 05:40) (93% - 98%)  Wt(kg): --    11-19 @ 07:01  -  11-20 @ 07:00  --------------------------------------------------------  IN:    IV PiggyBack: 200 mL    IV PiggyBack: 375 mL    Oral Fluid: 480 mL  Total IN: 1055 mL    OUT:  Total OUT: 0 mL    Total NET: 1055 mL            VITALS  T(C): 36.3 (11-20-21 @ 05:40), Max: 36.7 (11-19-21 @ 20:02)  HR: 69 (11-20-21 @ 05:40) (60 - 69)  BP: 106/69 (11-20-21 @ 05:40) (90/52 - 106/69)  RR: 17 (11-20-21 @ 05:40) (17 - 18)  SpO2: 93% (11-20-21 @ 05:40) (93% - 98%)    Constitutional: well developed, normal appearance, well groomed, well nourished, no deformities and no acute distress.   Eyes: the conjunctiva exhibited no abnormalities and the eyelids demonstrated no xanthelasmas.   HEENT: normal oral mucosa, no oral pallor and no oral cyanosis.   Neck: normal jugular venous A waves present, normal jugular venous V waves present and no jugular venous jacobs A waves.   Pulmonary: no respiratory distress, normal respiratory rhythm and effort, no accessory muscle use and lungs were clear to auscultation bilaterally.   Cardiovascular: heart rate and rhythm were normal, normal S1 and S2 and no murmur, gallop, rub, heave or thrill are present.   Abdomen: soft, non-tender, no hepato-splenomegaly and no abdominal mass palpated.   Musculoskeletal: the gait could not be assessed..   Extremities: no clubbing of the fingernails, no localized cyanosis, no petechial hemorrhages and no ischemic changes.   Skin: normal skin color and pigmentation, no rash, no venous stasis, no skin lesions, no skin ulcer and no xanthoma was observed.   Psychiatric: oriented to person, place, and time, the affect was normal, the mood was normal and not feeling anxious.     LABS:   --------  11-19    140  |  104  |  10  ----------------------------<  104<H>  3.7   |  28  |  0.54    Ca    8.8      19 Nov 2021 08:59                           11.4   6.96  )-----------( 221      ( 19 Nov 2021 08:59 )             34.2                 RADIOLOGY:  -----------------    ECG:     ECHO:

## 2021-11-21 LAB
ANION GAP SERPL CALC-SCNC: 5 MMOL/L — SIGNIFICANT CHANGE UP (ref 5–17)
BUN SERPL-MCNC: 17 MG/DL — SIGNIFICANT CHANGE UP (ref 7–23)
CALCIUM SERPL-MCNC: 8.8 MG/DL — SIGNIFICANT CHANGE UP (ref 8.5–10.1)
CHLORIDE SERPL-SCNC: 103 MMOL/L — SIGNIFICANT CHANGE UP (ref 96–108)
CO2 SERPL-SCNC: 32 MMOL/L — HIGH (ref 22–31)
CREAT SERPL-MCNC: 0.67 MG/DL — SIGNIFICANT CHANGE UP (ref 0.5–1.3)
CULTURE RESULTS: SIGNIFICANT CHANGE UP
CULTURE RESULTS: SIGNIFICANT CHANGE UP
GLUCOSE SERPL-MCNC: 105 MG/DL — HIGH (ref 70–99)
HCT VFR BLD CALC: 33.6 % — LOW (ref 39–50)
HGB BLD-MCNC: 11.3 G/DL — LOW (ref 13–17)
MCHC RBC-ENTMCNC: 31.7 PG — SIGNIFICANT CHANGE UP (ref 27–34)
MCHC RBC-ENTMCNC: 33.6 GM/DL — SIGNIFICANT CHANGE UP (ref 32–36)
MCV RBC AUTO: 94.1 FL — SIGNIFICANT CHANGE UP (ref 80–100)
NRBC # BLD: 0 /100 WBCS — SIGNIFICANT CHANGE UP (ref 0–0)
PLATELET # BLD AUTO: 221 K/UL — SIGNIFICANT CHANGE UP (ref 150–400)
POTASSIUM SERPL-MCNC: 3.9 MMOL/L — SIGNIFICANT CHANGE UP (ref 3.5–5.3)
POTASSIUM SERPL-SCNC: 3.9 MMOL/L — SIGNIFICANT CHANGE UP (ref 3.5–5.3)
RBC # BLD: 3.57 M/UL — LOW (ref 4.2–5.8)
RBC # FLD: 13.3 % — SIGNIFICANT CHANGE UP (ref 10.3–14.5)
SODIUM SERPL-SCNC: 140 MMOL/L — SIGNIFICANT CHANGE UP (ref 135–145)
SPECIMEN SOURCE: SIGNIFICANT CHANGE UP
SPECIMEN SOURCE: SIGNIFICANT CHANGE UP
WBC # BLD: 6.86 K/UL — SIGNIFICANT CHANGE UP (ref 3.8–10.5)
WBC # FLD AUTO: 6.86 K/UL — SIGNIFICANT CHANGE UP (ref 3.8–10.5)

## 2021-11-21 RX ORDER — CEPHALEXIN 500 MG
500 CAPSULE ORAL EVERY 12 HOURS
Refills: 0 | Status: DISCONTINUED | OUTPATIENT
Start: 2021-11-22 | End: 2021-11-24

## 2021-11-21 RX ORDER — SODIUM CHLORIDE 9 MG/ML
1000 INJECTION INTRAMUSCULAR; INTRAVENOUS; SUBCUTANEOUS
Refills: 0 | Status: DISCONTINUED | OUTPATIENT
Start: 2021-11-21 | End: 2021-11-22

## 2021-11-21 RX ORDER — SODIUM CHLORIDE 9 MG/ML
250 INJECTION INTRAMUSCULAR; INTRAVENOUS; SUBCUTANEOUS ONCE
Refills: 0 | Status: COMPLETED | OUTPATIENT
Start: 2021-11-21 | End: 2021-11-21

## 2021-11-21 RX ORDER — PANTOPRAZOLE SODIUM 20 MG/1
40 TABLET, DELAYED RELEASE ORAL
Refills: 0 | Status: DISCONTINUED | OUTPATIENT
Start: 2021-11-21 | End: 2021-11-24

## 2021-11-21 RX ADMIN — Medication 1 TABLET(S): at 11:10

## 2021-11-21 RX ADMIN — Medication 81 MILLIGRAM(S): at 11:10

## 2021-11-21 RX ADMIN — SIMVASTATIN 20 MILLIGRAM(S): 20 TABLET, FILM COATED ORAL at 21:13

## 2021-11-21 RX ADMIN — HEPARIN SODIUM 5000 UNIT(S): 5000 INJECTION INTRAVENOUS; SUBCUTANEOUS at 06:26

## 2021-11-21 RX ADMIN — Medication 20 MILLIGRAM(S): at 06:26

## 2021-11-21 RX ADMIN — TAMSULOSIN HYDROCHLORIDE 0.4 MILLIGRAM(S): 0.4 CAPSULE ORAL at 21:13

## 2021-11-21 RX ADMIN — SENNA PLUS 2 TABLET(S): 8.6 TABLET ORAL at 21:13

## 2021-11-21 RX ADMIN — Medication 25 MILLIGRAM(S): at 06:27

## 2021-11-21 RX ADMIN — LOSARTAN POTASSIUM 25 MILLIGRAM(S): 100 TABLET, FILM COATED ORAL at 06:27

## 2021-11-21 RX ADMIN — AMPICILLIN SODIUM AND SULBACTAM SODIUM 200 GRAM(S): 250; 125 INJECTION, POWDER, FOR SUSPENSION INTRAMUSCULAR; INTRAVENOUS at 00:03

## 2021-11-21 RX ADMIN — Medication 500 MILLIGRAM(S): at 18:46

## 2021-11-21 RX ADMIN — AMPICILLIN SODIUM AND SULBACTAM SODIUM 200 GRAM(S): 250; 125 INJECTION, POWDER, FOR SUSPENSION INTRAMUSCULAR; INTRAVENOUS at 06:22

## 2021-11-21 RX ADMIN — Medication 1 TABLET(S): at 06:27

## 2021-11-21 RX ADMIN — DONEPEZIL HYDROCHLORIDE 5 MILLIGRAM(S): 10 TABLET, FILM COATED ORAL at 21:13

## 2021-11-21 RX ADMIN — Medication 500 MILLIGRAM(S): at 06:27

## 2021-11-21 RX ADMIN — Medication 1 APPLICATION(S): at 11:11

## 2021-11-21 RX ADMIN — Medication 1 TABLET(S): at 18:46

## 2021-11-21 RX ADMIN — HEPARIN SODIUM 5000 UNIT(S): 5000 INJECTION INTRAVENOUS; SUBCUTANEOUS at 18:46

## 2021-11-21 RX ADMIN — AMPICILLIN SODIUM AND SULBACTAM SODIUM 200 GRAM(S): 250; 125 INJECTION, POWDER, FOR SUSPENSION INTRAMUSCULAR; INTRAVENOUS at 18:47

## 2021-11-21 RX ADMIN — Medication 1: at 12:31

## 2021-11-21 RX ADMIN — Medication 100 MILLIGRAM(S): at 18:46

## 2021-11-21 RX ADMIN — POLYETHYLENE GLYCOL 3350 17 GRAM(S): 17 POWDER, FOR SOLUTION ORAL at 11:10

## 2021-11-21 RX ADMIN — SODIUM CHLORIDE 30 MILLILITER(S): 9 INJECTION INTRAMUSCULAR; INTRAVENOUS; SUBCUTANEOUS at 15:05

## 2021-11-21 RX ADMIN — AMPICILLIN SODIUM AND SULBACTAM SODIUM 200 GRAM(S): 250; 125 INJECTION, POWDER, FOR SUSPENSION INTRAMUSCULAR; INTRAVENOUS at 11:10

## 2021-11-21 RX ADMIN — Medication 325 MILLIGRAM(S): at 11:10

## 2021-11-21 RX ADMIN — PANTOPRAZOLE SODIUM 40 MILLIGRAM(S): 20 TABLET, DELAYED RELEASE ORAL at 11:10

## 2021-11-21 RX ADMIN — SODIUM CHLORIDE 250 MILLILITER(S): 9 INJECTION INTRAMUSCULAR; INTRAVENOUS; SUBCUTANEOUS at 12:30

## 2021-11-21 NOTE — PROGRESS NOTE ADULT - SUBJECTIVE AND OBJECTIVE BOX
Patient is a 89y Male with a known history of :  Gangrene of finger of left hand [I96]    DM (diabetes mellitus) [E11.9]    Afib [I48.91]    HTN (hypertension) [I10]    Arteriosclerotic heart disease (ASHD) [I25.10]    Dementia [F03.90]    CHF (congestive heart failure) [I50.9]    Constipation [K59.00]    Prophylactic measure [Z29.9]      HPI:  89 Male ,[facility] resident  PMH is significant for :  Arteriosclerotic heart disease (ASHD)  Atrial fibrillation  ,CHF (congestive heart failure)  ,Dementia  ,Depression  ,DM (diabetes mellitus)  ,Hyperlipemia  ,Hypertension  ,Prostate ca  ,GIB ,R groin abscess  with dementia, HTN, HLD, A FIb, chf, dm sent from [facility] for evaluation of left middle finger discoloration .Seen by hand surgeon and wound care MD - diagnosed with tip necrosis of finger ,dry dressing and antibacterial tx recommended ,no immediate sx intervention required Admitted for septic workup and evaluation, send blood and urine cx ,serial lactate levels ,monitor vitals closely hydration ,monitor urine output and renal profile ,iv abx initiated -started on Unasyn ,s/p vancomycin ,ID cons called Ct scan ordered to rule out OM  (16 Nov 2021 19:23)      REVIEW OF SYSTEMS:    CONSTITUTIONAL: No fever, weight loss, or fatigue  EYES: No eye pain, visual disturbances, or discharge  ENMT:  No difficulty hearing, tinnitus, vertigo; No sinus or throat pain  NECK: No pain or stiffness  BREASTS: No pain, masses, or nipple discharge  RESPIRATORY: No cough, wheezing, chills or hemoptysis; No shortness of breath  CARDIOVASCULAR: No chest pain, palpitations, dizziness, or leg swelling  GASTROINTESTINAL: No abdominal or epigastric pain. No nausea, vomiting, or hematemesis; No diarrhea or constipation. No melena or hematochezia.  GENITOURINARY: No dysuria, frequency, hematuria, or incontinence  NEUROLOGICAL: No headaches, memory loss, loss of strength, numbness, or tremors  SKIN: No itching, burning, rashes, or lesions   LYMPH NODES: No enlarged glands  ENDOCRINE: No heat or cold intolerance; No hair loss  MUSCULOSKELETAL: No joint pain or swelling; No muscle, back, or extremity pain  PSYCHIATRIC: No depression, anxiety, mood swings, or difficulty sleeping  HEME/LYMPH: No easy bruising, or bleeding gums  ALLERGY AND IMMUNOLOGIC: No hives or eczema    MEDICATIONS  (STANDING):  ampicillin/sulbactam  IVPB 3 Gram(s) IV Intermittent every 6 hours  ascorbic acid 500 milliGRAM(s) Oral two times a day  aspirin  chewable 81 milliGRAM(s) Oral daily  dextrose 40% Gel 15 Gram(s) Oral once  dextrose 5%. 1000 milliLiter(s) (50 mL/Hr) IV Continuous <Continuous>  dextrose 5%. 1000 milliLiter(s) (100 mL/Hr) IV Continuous <Continuous>  dextrose 50% Injectable 25 Gram(s) IV Push once  dextrose 50% Injectable 12.5 Gram(s) IV Push once  dextrose 50% Injectable 25 Gram(s) IV Push once  donepezil 5 milliGRAM(s) Oral at bedtime  doxycycline hyclate Capsule 100 milliGRAM(s) Oral every 12 hours  ferrous    sulfate 325 milliGRAM(s) Oral daily  furosemide    Tablet 20 milliGRAM(s) Oral daily  glucagon  Injectable 1 milliGRAM(s) IntraMuscular once  heparin   Injectable 5000 Unit(s) SubCutaneous every 12 hours  influenza  Vaccine (HIGH DOSE) 0.7 milliLiter(s) IntraMuscular once  insulin lispro (ADMELOG) corrective regimen sliding scale   SubCutaneous three times a day before meals  lactobacillus acidophilus 1 Tablet(s) Oral two times a day  losartan 25 milliGRAM(s) Oral daily  metoprolol succinate ER 25 milliGRAM(s) Oral daily  multivitamin/minerals 1 Tablet(s) Oral daily  pantoprazole   Suspension 40 milliGRAM(s) Oral before breakfast  polyethylene glycol 3350 17 Gram(s) Oral daily  povidone iodine 10% Solution 1 Application(s) Topical daily  senna 2 Tablet(s) Oral at bedtime  simvastatin 20 milliGRAM(s) Oral at bedtime  tamsulosin 0.4 milliGRAM(s) Oral at bedtime    MEDICATIONS  (PRN):  acetaminophen     Tablet .. 650 milliGRAM(s) Oral every 6 hours PRN Temp greater or equal to 38C (100.4F), Mild Pain (1 - 3)  bisacodyl Suppository 10 milliGRAM(s) Rectal daily PRN Constipation  magnesium hydroxide Suspension 30 milliLiter(s) Oral daily PRN Constipation  traMADol 25 milliGRAM(s) Oral four times a day PRN Moderate Pain (4 - 6)      ALLERGIES: latex (Rash)  latex (Unknown)  No Known Drug Allergies      FAMILY HISTORY:      PHYSICAL EXAMINATION:  -----------------------------  T(C): 36.7 (11-21-21 @ 05:05), Max: 36.7 (11-20-21 @ 12:50)  HR: 70 (11-21-21 @ 05:05) (66 - 75)  BP: 119/66 (11-21-21 @ 05:05) (94/56 - 119/66)  RR: 18 (11-21-21 @ 05:05) (17 - 18)  SpO2: 91% (11-21-21 @ 05:05) (90% - 91%)  Wt(kg): --        VITALS  T(C): 36.7 (11-21-21 @ 05:05), Max: 36.7 (11-20-21 @ 12:50)  HR: 70 (11-21-21 @ 05:05) (66 - 75)  BP: 119/66 (11-21-21 @ 05:05) (94/56 - 119/66)  RR: 18 (11-21-21 @ 05:05) (17 - 18)  SpO2: 91% (11-21-21 @ 05:05) (90% - 91%)    Constitutional: well developed, normal appearance, well groomed, well nourished, no deformities and no acute distress.   Eyes: the conjunctiva exhibited no abnormalities and the eyelids demonstrated no xanthelasmas.   HEENT: normal oral mucosa, no oral pallor and no oral cyanosis.   Neck: normal jugular venous A waves present, normal jugular venous V waves present and no jugular venous jacobs A waves.   Pulmonary: no respiratory distress, normal respiratory rhythm and effort, no accessory muscle use and lungs were clear to auscultation bilaterally.   Cardiovascular: heart rate and rhythm were normal, normal S1 and S2 and no murmur, gallop, rub, heave or thrill are present.   Abdomen: soft, non-tender, no hepato-splenomegaly and no abdominal mass palpated.   Musculoskeletal: the gait could not be assessed..   Extremities: no clubbing of the fingernails, no localized cyanosis, no petechial hemorrhages and no ischemic changes.   Skin: normal skin color and pigmentation, no rash, no venous stasis, no skin lesions, no skin ulcer and no xanthoma was observed.   Psychiatric: oriented to person, place, and time, the affect was normal, the mood was normal and not feeling anxious.     LABS:   --------  11-21    140  |  103  |  17  ----------------------------<  105<H>  3.9   |  32<H>  |  0.67    Ca    8.8      21 Nov 2021 08:44                           11.3   6.86  )-----------( 221      ( 21 Nov 2021 08:44 )             33.6                 RADIOLOGY:  -----------------    ECG:     ECHO:

## 2021-11-22 ENCOUNTER — TRANSCRIPTION ENCOUNTER (OUTPATIENT)
Age: 86
End: 2021-11-22

## 2021-11-22 DIAGNOSIS — I95.9 HYPOTENSION, UNSPECIFIED: ICD-10-CM

## 2021-11-22 LAB
ANION GAP SERPL CALC-SCNC: 5 MMOL/L — SIGNIFICANT CHANGE UP (ref 5–17)
BUN SERPL-MCNC: 23 MG/DL — SIGNIFICANT CHANGE UP (ref 7–23)
CALCIUM SERPL-MCNC: 8.7 MG/DL — SIGNIFICANT CHANGE UP (ref 8.5–10.1)
CHLORIDE SERPL-SCNC: 103 MMOL/L — SIGNIFICANT CHANGE UP (ref 96–108)
CO2 SERPL-SCNC: 31 MMOL/L — SIGNIFICANT CHANGE UP (ref 22–31)
CREAT SERPL-MCNC: 0.78 MG/DL — SIGNIFICANT CHANGE UP (ref 0.5–1.3)
CULTURE RESULTS: SIGNIFICANT CHANGE UP
GLUCOSE SERPL-MCNC: 110 MG/DL — HIGH (ref 70–99)
HCT VFR BLD CALC: 31.6 % — LOW (ref 39–50)
HGB BLD-MCNC: 10.6 G/DL — LOW (ref 13–17)
MCHC RBC-ENTMCNC: 31.5 PG — SIGNIFICANT CHANGE UP (ref 27–34)
MCHC RBC-ENTMCNC: 33.5 GM/DL — SIGNIFICANT CHANGE UP (ref 32–36)
MCV RBC AUTO: 94 FL — SIGNIFICANT CHANGE UP (ref 80–100)
NRBC # BLD: 0 /100 WBCS — SIGNIFICANT CHANGE UP (ref 0–0)
PLATELET # BLD AUTO: 223 K/UL — SIGNIFICANT CHANGE UP (ref 150–400)
POTASSIUM SERPL-MCNC: 4.2 MMOL/L — SIGNIFICANT CHANGE UP (ref 3.5–5.3)
POTASSIUM SERPL-SCNC: 4.2 MMOL/L — SIGNIFICANT CHANGE UP (ref 3.5–5.3)
RBC # BLD: 3.36 M/UL — LOW (ref 4.2–5.8)
RBC # FLD: 13.5 % — SIGNIFICANT CHANGE UP (ref 10.3–14.5)
SODIUM SERPL-SCNC: 139 MMOL/L — SIGNIFICANT CHANGE UP (ref 135–145)
SPECIMEN SOURCE: SIGNIFICANT CHANGE UP
WBC # BLD: 6.58 K/UL — SIGNIFICANT CHANGE UP (ref 3.8–10.5)
WBC # FLD AUTO: 6.58 K/UL — SIGNIFICANT CHANGE UP (ref 3.8–10.5)

## 2021-11-22 RX ORDER — TRAMADOL HYDROCHLORIDE 50 MG/1
0.5 TABLET ORAL
Qty: 10 | Refills: 0
Start: 2021-11-22 | End: 2021-11-26

## 2021-11-22 RX ORDER — ACETAMINOPHEN 500 MG
1 TABLET ORAL
Qty: 9 | Refills: 0
Start: 2021-11-22 | End: 2021-11-24

## 2021-11-22 RX ORDER — POVIDONE-IODINE 5 %
1 AEROSOL (ML) TOPICAL
Qty: 100 | Refills: 0
Start: 2021-11-22 | End: 2021-12-06

## 2021-11-22 RX ORDER — CEPHALEXIN 500 MG
1 CAPSULE ORAL
Qty: 14 | Refills: 0
Start: 2021-11-22 | End: 2021-11-28

## 2021-11-22 RX ORDER — LACTOBACILLUS ACIDOPHILUS 100MM CELL
2 CAPSULE ORAL
Qty: 60 | Refills: 0
Start: 2021-11-22 | End: 2021-12-21

## 2021-11-22 RX ADMIN — Medication 20 MILLIGRAM(S): at 05:25

## 2021-11-22 RX ADMIN — Medication 81 MILLIGRAM(S): at 11:26

## 2021-11-22 RX ADMIN — HEPARIN SODIUM 5000 UNIT(S): 5000 INJECTION INTRAVENOUS; SUBCUTANEOUS at 05:23

## 2021-11-22 RX ADMIN — POLYETHYLENE GLYCOL 3350 17 GRAM(S): 17 POWDER, FOR SOLUTION ORAL at 11:26

## 2021-11-22 RX ADMIN — TAMSULOSIN HYDROCHLORIDE 0.4 MILLIGRAM(S): 0.4 CAPSULE ORAL at 21:33

## 2021-11-22 RX ADMIN — Medication 2: at 12:11

## 2021-11-22 RX ADMIN — Medication 100 MILLIGRAM(S): at 05:24

## 2021-11-22 RX ADMIN — Medication 1 TABLET(S): at 17:20

## 2021-11-22 RX ADMIN — Medication 500 MILLIGRAM(S): at 17:20

## 2021-11-22 RX ADMIN — Medication 1 TABLET(S): at 05:25

## 2021-11-22 RX ADMIN — DONEPEZIL HYDROCHLORIDE 5 MILLIGRAM(S): 10 TABLET, FILM COATED ORAL at 21:33

## 2021-11-22 RX ADMIN — SENNA PLUS 2 TABLET(S): 8.6 TABLET ORAL at 21:33

## 2021-11-22 RX ADMIN — SIMVASTATIN 20 MILLIGRAM(S): 20 TABLET, FILM COATED ORAL at 21:33

## 2021-11-22 RX ADMIN — Medication 25 MILLIGRAM(S): at 06:19

## 2021-11-22 RX ADMIN — Medication 1 TABLET(S): at 11:26

## 2021-11-22 RX ADMIN — Medication 1 APPLICATION(S): at 11:26

## 2021-11-22 RX ADMIN — Medication 500 MILLIGRAM(S): at 05:24

## 2021-11-22 RX ADMIN — LOSARTAN POTASSIUM 25 MILLIGRAM(S): 100 TABLET, FILM COATED ORAL at 06:19

## 2021-11-22 RX ADMIN — PANTOPRAZOLE SODIUM 40 MILLIGRAM(S): 20 TABLET, DELAYED RELEASE ORAL at 06:19

## 2021-11-22 RX ADMIN — Medication 1: at 17:12

## 2021-11-22 RX ADMIN — Medication 325 MILLIGRAM(S): at 11:26

## 2021-11-22 RX ADMIN — Medication 100 MILLIGRAM(S): at 17:20

## 2021-11-22 RX ADMIN — HEPARIN SODIUM 5000 UNIT(S): 5000 INJECTION INTRAVENOUS; SUBCUTANEOUS at 17:13

## 2021-11-22 RX ADMIN — Medication 500 MILLIGRAM(S): at 05:23

## 2021-11-22 NOTE — DISCHARGE NOTE PROVIDER - NSDCMRMEDTOKEN_GEN_ALL_CORE_FT
acetaminophen 500 mg oral tablet: 1 tab(s) orally every 6 hours, As Needed for pain  ascorbic acid 500 mg oral tablet: 1 tab(s) orally 2 times a day  aspirin 81 mg oral tablet: 1 tab(s) orally once a day  cephalexin 500 mg oral capsule: 1 cap(s) orally every 12 hours  donepezil 5 mg oral tablet: 1 tab(s) orally once a day (at bedtime)  doxycycline monohydrate 100 mg oral capsule: 1 cap(s) orally every 12 hours  ferrous sulfate 325 mg (65 mg elemental iron) oral delayed release tablet: 1 tab(s) orally once a day  furosemide 20 mg oral tablet: 1 tab(s) orally once a day  lactobacillus acidophilus oral tablet: 2 tab(s) orally once a day  losartan 25 mg oral tablet: 1 tab(s) orally once a day  metFORMIN 500 mg oral tablet: 1 tab(s) orally 2 times a day (with meals)  metoprolol succinate 25 mg oral tablet, extended release: 1 tab(s) orally once a day  MiraLax oral powder for reconstitution: 17 gram(s) orally once a day  Multiple Vitamins with Minerals oral tablet: 1 tab(s) orally once a day  pantoprazole 40 mg oral delayed release tablet: 1 tab(s) orally once a day  povidone iodine 10% topical solution: 1 application topically once a day  Senna 8.6 mg oral tablet: 2 tab(s) orally once a day (at bedtime)  simvastatin 20 mg oral tablet: 1 tab(s) orally once a day (at bedtime)  tamsulosin 0.4 mg oral capsule: 1 cap(s) orally once a day (at bedtime)  traMADol 50 mg oral tablet: 0.5 tab(s) orally 4 times a day, As needed, Moderate Pain (4 - 6) MDD:4   acetaminophen 500 mg oral tablet: 1 tab(s) orally every 8 hours  for pain   ascorbic acid 500 mg oral tablet: 1 tab(s) orally 2 times a day  aspirin 81 mg oral tablet: 1 tab(s) orally once a day  cephalexin 500 mg oral capsule: 1 cap(s) orally every 12 hours  donepezil 5 mg oral tablet: 1 tab(s) orally once a day (at bedtime)  doxycycline monohydrate 100 mg oral capsule: 1 cap(s) orally every 12 hours  ferrous sulfate 325 mg (65 mg elemental iron) oral delayed release tablet: 1 tab(s) orally once a day  furosemide 20 mg oral tablet: 1 tab(s) orally once a day  lactobacillus acidophilus oral tablet: 2 tab(s) orally once a day  losartan 25 mg oral tablet: 1 tab(s) orally once a day  metFORMIN 500 mg oral tablet: 1 tab(s) orally 2 times a day (with meals)  metoprolol succinate 25 mg oral tablet, extended release: 1 tab(s) orally once a day  MiraLax oral powder for reconstitution: 17 gram(s) orally once a day  Multiple Vitamins with Minerals oral tablet: 1 tab(s) orally once a day  pantoprazole 40 mg oral delayed release tablet: 1 tab(s) orally once a day  povidone iodine 10% topical solution: 1 application topically once a day  Senna 8.6 mg oral tablet: 2 tab(s) orally once a day (at bedtime)  simvastatin 20 mg oral tablet: 1 tab(s) orally once a day (at bedtime)  tamsulosin 0.4 mg oral capsule: 1 cap(s) orally once a day (at bedtime)

## 2021-11-22 NOTE — PROGRESS NOTE ADULT - SUBJECTIVE AND OBJECTIVE BOX
Interval History:    CENTRAL LINE:   [  ] YES       [  ] NO  RAMOS:                 [  ] YES       [  ] NO         REVIEW OF SYSTEMS:  All Systems below were reviewed and are negative [  ]  HEENT:  ID:  Pulmonary:  Cardiac:  GI:  Renal:  Musculoskeletal:  All other systems above were reviewed and are negative   [  ]      MEDICATIONS  (STANDING):  ascorbic acid 500 milliGRAM(s) Oral two times a day  aspirin  chewable 81 milliGRAM(s) Oral daily  cephalexin 500 milliGRAM(s) Oral every 12 hours  dextrose 40% Gel 15 Gram(s) Oral once  dextrose 5%. 1000 milliLiter(s) (50 mL/Hr) IV Continuous <Continuous>  dextrose 5%. 1000 milliLiter(s) (100 mL/Hr) IV Continuous <Continuous>  dextrose 50% Injectable 25 Gram(s) IV Push once  dextrose 50% Injectable 12.5 Gram(s) IV Push once  dextrose 50% Injectable 25 Gram(s) IV Push once  donepezil 5 milliGRAM(s) Oral at bedtime  doxycycline hyclate Capsule 100 milliGRAM(s) Oral every 12 hours  ferrous    sulfate 325 milliGRAM(s) Oral daily  furosemide    Tablet 20 milliGRAM(s) Oral daily  glucagon  Injectable 1 milliGRAM(s) IntraMuscular once  heparin   Injectable 5000 Unit(s) SubCutaneous every 12 hours  influenza  Vaccine (HIGH DOSE) 0.7 milliLiter(s) IntraMuscular once  insulin lispro (ADMELOG) corrective regimen sliding scale   SubCutaneous three times a day before meals  lactobacillus acidophilus 1 Tablet(s) Oral two times a day  losartan 25 milliGRAM(s) Oral daily  metoprolol succinate ER 25 milliGRAM(s) Oral daily  multivitamin/minerals 1 Tablet(s) Oral daily  pantoprazole   Suspension 40 milliGRAM(s) Oral before breakfast  polyethylene glycol 3350 17 Gram(s) Oral daily  povidone iodine 10% Solution 1 Application(s) Topical daily  senna 2 Tablet(s) Oral at bedtime  simvastatin 20 milliGRAM(s) Oral at bedtime  tamsulosin 0.4 milliGRAM(s) Oral at bedtime    MEDICATIONS  (PRN):  acetaminophen     Tablet .. 650 milliGRAM(s) Oral every 6 hours PRN Temp greater or equal to 38C (100.4F), Mild Pain (1 - 3)  bisacodyl Suppository 10 milliGRAM(s) Rectal daily PRN Constipation  magnesium hydroxide Suspension 30 milliLiter(s) Oral daily PRN Constipation      Vital Signs Last 24 Hrs  T(C): 37.3 (22 Nov 2021 12:25), Max: 37.4 (21 Nov 2021 20:51)  T(F): 99.1 (22 Nov 2021 12:25), Max: 99.3 (21 Nov 2021 20:51)  HR: 81 (22 Nov 2021 12:25) (68 - 81)  BP: 124/66 (22 Nov 2021 12:25) (91/53 - 124/66)  BP(mean): --  RR: 20 (22 Nov 2021 12:25) (17 - 20)  SpO2: 90% (22 Nov 2021 12:25) (90% - 90%)    I&O's Summary    21 Nov 2021 07:01  -  22 Nov 2021 07:00  --------------------------------------------------------  IN: 0 mL / OUT: 200 mL / NET: -200 mL    22 Nov 2021 07:01  -  22 Nov 2021 20:27  --------------------------------------------------------  IN: 750 mL / OUT: 750 mL / NET: 0 mL        PHYSICAL EXAM:  HEENT: NC/AT; PERRLA  Neck: Soft; no tenderness  Lungs: CTA bilaterally; no wheezing.   Heart:  Abdomen:  Genital/ Rectal:  Extremities:  Neurologic:  Vascular:      LABORATORY:    CBC Full  -  ( 22 Nov 2021 09:40 )  WBC Count : 6.58 K/uL  RBC Count : 3.36 M/uL  Hemoglobin : 10.6 g/dL  Hematocrit : 31.6 %  Platelet Count - Automated : 223 K/uL  Mean Cell Volume : 94.0 fl  Mean Cell Hemoglobin : 31.5 pg  Mean Cell Hemoglobin Concentration : 33.5 gm/dL  Auto Neutrophil # : x  Auto Lymphocyte # : x  Auto Monocyte # : x  Auto Eosinophil # : x  Auto Basophil # : x  Auto Neutrophil % : x  Auto Lymphocyte % : x  Auto Monocyte % : x  Auto Eosinophil % : x  Auto Basophil % : x      ESR:                   11-17 @ 11:37  --    C-Reactive Protein:     11-17 @ 11:37  <3    Procalcitonin:           11-17 @ 11:37   --  ESR:                   11-17 @ 09:08  21    C-Reactive Protein:     11-17 @ 09:08  --    Procalcitonin:           11-17 @ 09:08   <0.05      11-22    139  |  103  |  23  ----------------------------<  110<H>  4.2   |  31  |  0.78    Ca    8.7      22 Nov 2021 09:40            Assessment and Plan:          Stephan Pruett MD   [phone].    He is afebrile  Comfortable     MEDICATIONS  (STANDING):  ascorbic acid 500 milliGRAM(s) Oral two times a day  aspirin  chewable 81 milliGRAM(s) Oral daily  cephalexin 500 milliGRAM(s) Oral every 12 hours  dextrose 40% Gel 15 Gram(s) Oral once  dextrose 5%. 1000 milliLiter(s) (50 mL/Hr) IV Continuous <Continuous>  dextrose 5%. 1000 milliLiter(s) (100 mL/Hr) IV Continuous <Continuous>  dextrose 50% Injectable 25 Gram(s) IV Push once  dextrose 50% Injectable 12.5 Gram(s) IV Push once  dextrose 50% Injectable 25 Gram(s) IV Push once  donepezil 5 milliGRAM(s) Oral at bedtime  doxycycline hyclate Capsule 100 milliGRAM(s) Oral every 12 hours  ferrous    sulfate 325 milliGRAM(s) Oral daily  furosemide    Tablet 20 milliGRAM(s) Oral daily  glucagon  Injectable 1 milliGRAM(s) IntraMuscular once  heparin   Injectable 5000 Unit(s) SubCutaneous every 12 hours  influenza  Vaccine (HIGH DOSE) 0.7 milliLiter(s) IntraMuscular once  insulin lispro (ADMELOG) corrective regimen sliding scale   SubCutaneous three times a day before meals  lactobacillus acidophilus 1 Tablet(s) Oral two times a day  losartan 25 milliGRAM(s) Oral daily  metoprolol succinate ER 25 milliGRAM(s) Oral daily  multivitamin/minerals 1 Tablet(s) Oral daily  pantoprazole   Suspension 40 milliGRAM(s) Oral before breakfast  polyethylene glycol 3350 17 Gram(s) Oral daily  povidone iodine 10% Solution 1 Application(s) Topical daily  senna 2 Tablet(s) Oral at bedtime  simvastatin 20 milliGRAM(s) Oral at bedtime  tamsulosin 0.4 milliGRAM(s) Oral at bedtime    MEDICATIONS  (PRN):  acetaminophen     Tablet .. 650 milliGRAM(s) Oral every 6 hours PRN Temp greater or equal to 38C (100.4F), Mild Pain (1 - 3)  bisacodyl Suppository 10 milliGRAM(s) Rectal daily PRN Constipation  magnesium hydroxide Suspension 30 milliLiter(s) Oral daily PRN Constipation      Vital Signs Last 24 Hrs  T(C): 37.3 (22 Nov 2021 12:25), Max: 37.4 (21 Nov 2021 20:51)  T(F): 99.1 (22 Nov 2021 12:25), Max: 99.3 (21 Nov 2021 20:51)  HR: 81 (22 Nov 2021 12:25) (68 - 81)  BP: 124/66 (22 Nov 2021 12:25) (91/53 - 124/66)  BP(mean): --  RR: 20 (22 Nov 2021 12:25) (17 - 20)  SpO2: 90% (22 Nov 2021 12:25) (90% - 90%)    I&O's Summary    21 Nov 2021 07:01  -  22 Nov 2021 07:00  --------------------------------------------------------  IN: 0 mL / OUT: 200 mL / NET: -200 mL    22 Nov 2021 07:01  -  22 Nov 2021 20:27  --------------------------------------------------------  IN: 750 mL / OUT: 750 mL / NET: 0 mL        PHYSICAL EXAM:  HEENT: NC/AT; PERRLA  Neck: Soft; no tenderness  Lungs: Coarse BS bilaterally; no wheezing.   Heart: RRR, no murmurs.   Abdomen: Soft, no tenderness.   Genital/ Rectal: No fitch   Extremities: Decreased erythema of left 3rd finger, Decreased necrosis of the distal phalanx,   Neurologic: Confused.       LABORATORY:    CBC Full  -  ( 22 Nov 2021 09:40 )  WBC Count : 6.58 K/uL  RBC Count : 3.36 M/uL  Hemoglobin : 10.6 g/dL  Hematocrit : 31.6 %  Platelet Count - Automated : 223 K/uL  Mean Cell Volume : 94.0 fl  Mean Cell Hemoglobin : 31.5 pg  Mean Cell Hemoglobin Concentration : 33.5 gm/dL  Auto Neutrophil # : x  Auto Lymphocyte # : x  Auto Monocyte # : x  Auto Eosinophil # : x  Auto Basophil # : x  Auto Neutrophil % : x  Auto Lymphocyte % : x  Auto Monocyte % : x  Auto Eosinophil % : x  Auto Basophil % : x      ESR:                   11-17 @ 11:37  --    C-Reactive Protein:     11-17 @ 11:37  <3    Procalcitonin:           11-17 @ 11:37   --  ESR:                   11-17 @ 09:08  21    C-Reactive Protein:     11-17 @ 09:08  --    Procalcitonin:           11-17 @ 09:08   <0.05      11-22    139  |  103  |  23  ----------------------------<  110<H>  4.2   |  31  |  0.78    Ca    8.7      22 Nov 2021 09:40      Assessment and Plan:    1. Cellulitis of left 3rd finger.  2. Necrosis of distal phalanx of left 3rd finger.      . MRI cannot be done as the patient has AICD. CT with IV contrast of left hand was negative for osteomyelitis/abscess of the left 3rd finger.  . Cellulitis is improving. Still with mild necrosis of the distal phalanx.   . Continue  po Doxycycline 100 mg bid and Keflex 500 mg bid  for 7 days.   . Surgery follow up for necrosis of th distal phalanx.   . Discharge planning,       Stephan Pruett MD   [phone].

## 2021-11-22 NOTE — DISCHARGE NOTE PROVIDER - CARE PROVIDER_API CALL
Toby Quintana)  Critical Care Medicine; Internal Medicine; Pulmonary Disease  [address] [place] [address]  Phone: [phone]  Fax: [phone]  Follow Up Time: 1-3 days    Ramin Sosa (DO)  Plastic Surgery  [address] [place] [address]  Phone: [phone]  Fax: [phone]  Follow Up Time: 2 weeks    Stephan Pruett  INFECTIOUS DISEASE  [address] [place] [address]  Phone: [phone]  Fax: [phone]  Follow Up Time: 1 week

## 2021-11-22 NOTE — DISCHARGE NOTE PROVIDER - PROVIDER TOKENS
PROVIDER:[TOKEN:[[id number]],FOLLOWUP:[1-3 days]],PROVIDER:[TOKEN:[[id number]],FOLLOWUP:[2 weeks]],PROVIDER:[TOKEN:[[id number]],FOLLOWUP:[1 week]]

## 2021-11-22 NOTE — DISCHARGE NOTE PROVIDER - NSDCCPCAREPLAN_GEN_ALL_CORE_FT
PRINCIPAL DISCHARGE DIAGNOSIS  Diagnosis: Gangrene of finger of left hand  Assessment and Plan of Treatment:       SECONDARY DISCHARGE DIAGNOSES  Diagnosis: DM (diabetes mellitus)  Assessment and Plan of Treatment:     Diagnosis: Arteriosclerotic heart disease (ASHD)  Assessment and Plan of Treatment:     Diagnosis: HTN (hypertension)  Assessment and Plan of Treatment:     Diagnosis: CHF (congestive heart failure)  Assessment and Plan of Treatment:     Diagnosis: Afib  Assessment and Plan of Treatment:     Diagnosis: Constipation  Assessment and Plan of Treatment:     Diagnosis: Dementia  Assessment and Plan of Treatment:

## 2021-11-22 NOTE — DISCHARGE NOTE PROVIDER - HOSPITAL COURSE
89 Male ,[facility] resident  PMH is significant for :  Arteriosclerotic heart disease (ASHD)  Atrial fibrillation  ,CHF (congestive heart failure)  ,Dementia  ,Depression  ,DM (diabetes mellitus)  ,Hyperlipemia  ,Hypertension  ,Prostate ca  ,GIB ,R groin abscess  with dementia, HTN, HLD, A FIb, chf, dm sent from [facility] for evaluation of left middle finger discoloration .Seen by hand surgeon and wound care MD - diagnosed with tip necrosis of finger ,dry dressing and antibacterial tx recommended ,no immediate sx intervention required Admitted for septic workup and evaluation, send blood and urine cx ,serial lactate levels ,monitor vitals closely hydration ,monitor urine output and renal profile ,iv abx initiated -started on Unasyn ,s/p vancomycin ,ID cons called     Nutritional Assessment:  · Nutritional Assessment  This patient has been assessed with a concern for Malnutrition and has been determined to have a diagnosis/diagnoses of Moderate protein-calorie malnutrition.    This patient is being managed with:   Diet Pureed-  Supplement Feeding Modality:  Oral  Ensure Enlive Cans or Servings Per Day:  1       Frequency:  Daily  Ensure Pudding Cans or Servings Per Day:  1       Frequency:  Daily  Entered: Nov 19 2021  7:49AM    Problem/Plan - 1:  ·  Problem: Gangrene of finger of left hand.   ·  Plan: with tip necrosis ,poa - left hand third finger necrotic tip: scant serosanguinous drainage no odor: finger is with erythema and swelling present no warmth noted wound dimensions about 1.5 x 1.5 wound culture obtained:  fingers of hand contracted .CT -no evidence of osteomyelitis ,further abx management as per ID CONS .No surgical interventions as per hand surgeon ,outpatient f/up.    Problem/Plan - 2:  ·  Problem: Afib.   ·  Plan: continue home medications ,not on OAC due to hx of GIB ,EKG ,card is following ,not on OAC.    Problem/Plan - 3:  ·  Problem: DM (diabetes mellitus).   ·  Plan: corrective regimen sliding scale.    Problem/Plan - 4:  ·  Problem: Arteriosclerotic heart disease (ASHD).   ·  Plan: continue home medications.    Problem/Plan - 5:  ·  Problem: HTN (hypertension).   ·  Plan: continue home medications ,card is following.    Problem/Plan - 6:  ·  Problem: CHF (congestive heart failure).   ·  Plan: continue home medications ,ins/outs.    Problem/Plan - 7:  ·  Problem: Dementia.   ·  Plan: supportive care ,frequent redirection.    Problem/Plan - 8:  ·  Problem: Constipation.   ·  Plan: bowel regimen.    Problem/Plan - 9:  ·  Problem: Prophylactic measure.   ·  Plan: Gastrointestinal stress ulcer prophylaxis and DVT prophylaxis administered.

## 2021-11-22 NOTE — CHART NOTE - NSCHARTNOTEFT_GEN_A_CORE
Assessment: patient seen for malnutrition follow up  89yMalePatient is a 89y old  Male who presents with a chief complaint of Left hand 3 rd finger infection   patient seen in bed per RN and CNA patient with good PO intake . total feed this AM  per RN patient would benefit from consistent cho diet  A1c 5.8%, history DM POCT 136,116, 167  fecal incontinence 11/21      Factors impacting intake: [ ] none [ ] nausea  [ ] vomiting [ ] diarrhea [ ] constipation  [ ]chewing problems [x ] swallowing issues  [ ] other: puree thin liquids per speech recommendation     Diet Presciption: Diet, Pureed:   Supplement Feeding Modality:  Oral  Ensure Enlive Cans or Servings Per Day:  1       Frequency:  Daily  Ensure Pudding Cans or Servings Per Day:  1       Frequency:  Daily (11-19-21 @ 07:48)    Intake: 100% per flow sheets and RN    Current Weight: Weight 11/22 154.1# 11/21 151.6#   % Weight Change    Pertinent Medications: MEDICATIONS  (STANDING):  ascorbic acid 500 milliGRAM(s) Oral two times a day  aspirin  chewable 81 milliGRAM(s) Oral daily  cephalexin 500 milliGRAM(s) Oral every 12 hours  dextrose 40% Gel 15 Gram(s) Oral once  dextrose 5%. 1000 milliLiter(s) (50 mL/Hr) IV Continuous <Continuous>  dextrose 5%. 1000 milliLiter(s) (100 mL/Hr) IV Continuous <Continuous>  dextrose 50% Injectable 25 Gram(s) IV Push once  dextrose 50% Injectable 12.5 Gram(s) IV Push once  dextrose 50% Injectable 25 Gram(s) IV Push once  donepezil 5 milliGRAM(s) Oral at bedtime  doxycycline hyclate Capsule 100 milliGRAM(s) Oral every 12 hours  ferrous    sulfate 325 milliGRAM(s) Oral daily  furosemide    Tablet 20 milliGRAM(s) Oral daily  glucagon  Injectable 1 milliGRAM(s) IntraMuscular once  heparin   Injectable 5000 Unit(s) SubCutaneous every 12 hours  influenza  Vaccine (HIGH DOSE) 0.7 milliLiter(s) IntraMuscular once  insulin lispro (ADMELOG) corrective regimen sliding scale   SubCutaneous three times a day before meals  lactobacillus acidophilus 1 Tablet(s) Oral two times a day  losartan 25 milliGRAM(s) Oral daily  metoprolol succinate ER 25 milliGRAM(s) Oral daily  multivitamin/minerals 1 Tablet(s) Oral daily  pantoprazole   Suspension 40 milliGRAM(s) Oral before breakfast  polyethylene glycol 3350 17 Gram(s) Oral daily  povidone iodine 10% Solution 1 Application(s) Topical daily  senna 2 Tablet(s) Oral at bedtime  simvastatin 20 milliGRAM(s) Oral at bedtime  sodium chloride 0.9%. 1000 milliLiter(s) (30 mL/Hr) IV Continuous <Continuous>  tamsulosin 0.4 milliGRAM(s) Oral at bedtime    MEDICATIONS  (PRN):  acetaminophen     Tablet .. 650 milliGRAM(s) Oral every 6 hours PRN Temp greater or equal to 38C (100.4F), Mild Pain (1 - 3)  bisacodyl Suppository 10 milliGRAM(s) Rectal daily PRN Constipation  magnesium hydroxide Suspension 30 milliLiter(s) Oral daily PRN Constipation  traMADol 25 milliGRAM(s) Oral four times a day PRN Moderate Pain (4 - 6)    Pertinent Labs: POCT 136   Skin: left third finger wound    Estimated Needs:   [x ] no change since previous assessment  [ ] recalculated:     Previous Nutrition Diagnosis:   [ ] Inadequate Energy Intake [ ]Inadequate Oral Intake [ ] Excessive Energy Intake   [ ] Underweight [ ] Increased Nutrient Needs [ ] Overweight/Obesity   [ ] Altered GI Function [ ] Unintended Weight Loss [ ] Food & Nutrition Related Knowledge Deficit [x ] Malnutrition     Nutrition Diagnosis is [x ] ongoing  [ ] resolved [ ] not applicable     New Nutrition Diagnosis: [x ] not applicable       Interventions:   Recommend  [x ] Change Diet To: recommend consistent cho low Na puree glucerna BID  [ ] Nutrition Supplement  [ ] Nutrition Support  [x ] Other: follow weights, labs, PO intake    Monitoring and Evaluation:   [ x] PO intake [ x ] Tolerance to diet prescription [ x ] weights [ x ] labs[ x ] follow up per protocol  [ ] other:

## 2021-11-22 NOTE — DISCHARGE NOTE PROVIDER - DETAILS OF MALNUTRITION DIAGNOSIS/DIAGNOSES
This patient has been assessed with a concern for Malnutrition and was treated during this hospitalization for the following Nutrition diagnosis/diagnoses:     -  11/17/2021: Moderate protein-calorie malnutrition

## 2021-11-22 NOTE — PROGRESS NOTE ADULT - SUBJECTIVE AND OBJECTIVE BOX
Patient is a 89y Male with a known history of :  Gangrene of finger of left hand [I96]    DM (diabetes mellitus) [E11.9]    Afib [I48.91]    HTN (hypertension) [I10]    Arteriosclerotic heart disease (ASHD) [I25.10]    Dementia [F03.90]    CHF (congestive heart failure) [I50.9]    Constipation [K59.00]    Prophylactic measure [Z29.9]      HPI:  89 Male ,[facility] resident  PMH is significant for :  Arteriosclerotic heart disease (ASHD)  Atrial fibrillation  ,CHF (congestive heart failure)  ,Dementia  ,Depression  ,DM (diabetes mellitus)  ,Hyperlipemia  ,Hypertension  ,Prostate ca  ,GIB ,R groin abscess  with dementia, HTN, HLD, A FIb, chf, dm sent from [facility] for evaluation of left middle finger discoloration .Seen by hand surgeon and wound care MD - diagnosed with tip necrosis of finger ,dry dressing and antibacterial tx recommended ,no immediate sx intervention required Admitted for septic workup and evaluation, send blood and urine cx ,serial lactate levels ,monitor vitals closely hydration ,monitor urine output and renal profile ,iv abx initiated -started on Unasyn ,s/p vancomycin ,ID cons called Ct scan ordered to rule out OM  (16 Nov 2021 19:23)      REVIEW OF SYSTEMS:    CONSTITUTIONAL: No fever, weight loss, or fatigue  EYES: No eye pain, visual disturbances, or discharge  ENMT:  No difficulty hearing, tinnitus, vertigo; No sinus or throat pain  NECK: No pain or stiffness  BREASTS: No pain, masses, or nipple discharge  RESPIRATORY: No cough, wheezing, chills or hemoptysis; No shortness of breath  CARDIOVASCULAR: No chest pain, palpitations, dizziness, or leg swelling  GASTROINTESTINAL: No abdominal or epigastric pain. No nausea, vomiting, or hematemesis; No diarrhea or constipation. No melena or hematochezia.  GENITOURINARY: No dysuria, frequency, hematuria, or incontinence  NEUROLOGICAL: No headaches, memory loss, loss of strength, numbness, or tremors  SKIN: No itching, burning, rashes, or lesions   LYMPH NODES: No enlarged glands  ENDOCRINE: No heat or cold intolerance; No hair loss  MUSCULOSKELETAL: No joint pain or swelling; No muscle, back, or extremity pain  PSYCHIATRIC: No depression, anxiety, mood swings, or difficulty sleeping  HEME/LYMPH: No easy bruising, or bleeding gums  ALLERGY AND IMMUNOLOGIC: No hives or eczema    MEDICATIONS  (STANDING):  ascorbic acid 500 milliGRAM(s) Oral two times a day  aspirin  chewable 81 milliGRAM(s) Oral daily  cephalexin 500 milliGRAM(s) Oral every 12 hours  dextrose 40% Gel 15 Gram(s) Oral once  dextrose 5%. 1000 milliLiter(s) (50 mL/Hr) IV Continuous <Continuous>  dextrose 5%. 1000 milliLiter(s) (100 mL/Hr) IV Continuous <Continuous>  dextrose 50% Injectable 25 Gram(s) IV Push once  dextrose 50% Injectable 12.5 Gram(s) IV Push once  dextrose 50% Injectable 25 Gram(s) IV Push once  donepezil 5 milliGRAM(s) Oral at bedtime  doxycycline hyclate Capsule 100 milliGRAM(s) Oral every 12 hours  ferrous    sulfate 325 milliGRAM(s) Oral daily  furosemide    Tablet 20 milliGRAM(s) Oral daily  glucagon  Injectable 1 milliGRAM(s) IntraMuscular once  heparin   Injectable 5000 Unit(s) SubCutaneous every 12 hours  influenza  Vaccine (HIGH DOSE) 0.7 milliLiter(s) IntraMuscular once  insulin lispro (ADMELOG) corrective regimen sliding scale   SubCutaneous three times a day before meals  lactobacillus acidophilus 1 Tablet(s) Oral two times a day  losartan 25 milliGRAM(s) Oral daily  metoprolol succinate ER 25 milliGRAM(s) Oral daily  multivitamin/minerals 1 Tablet(s) Oral daily  pantoprazole   Suspension 40 milliGRAM(s) Oral before breakfast  polyethylene glycol 3350 17 Gram(s) Oral daily  povidone iodine 10% Solution 1 Application(s) Topical daily  senna 2 Tablet(s) Oral at bedtime  simvastatin 20 milliGRAM(s) Oral at bedtime  sodium chloride 0.9%. 1000 milliLiter(s) (30 mL/Hr) IV Continuous <Continuous>  tamsulosin 0.4 milliGRAM(s) Oral at bedtime    MEDICATIONS  (PRN):  acetaminophen     Tablet .. 650 milliGRAM(s) Oral every 6 hours PRN Temp greater or equal to 38C (100.4F), Mild Pain (1 - 3)  bisacodyl Suppository 10 milliGRAM(s) Rectal daily PRN Constipation  magnesium hydroxide Suspension 30 milliLiter(s) Oral daily PRN Constipation  traMADol 25 milliGRAM(s) Oral four times a day PRN Moderate Pain (4 - 6)      ALLERGIES: latex (Rash)  latex (Unknown)  No Known Drug Allergies      FAMILY HISTORY:      PHYSICAL EXAMINATION:  -----------------------------  T(C): 36.6 (11-22-21 @ 05:43), Max: 37.4 (11-21-21 @ 20:51)  HR: 74 (11-22-21 @ 05:43) (68 - 76)  BP: 111/64 (11-22-21 @ 05:43) (84/50 - 111/64)  RR: 18 (11-22-21 @ 05:43) (16 - 18)  SpO2: 90% (11-22-21 @ 05:43) (90% - 93%)  Wt(kg): --    11-21 @ 07:01  -  11-22 @ 07:00  --------------------------------------------------------  IN:  Total IN: 0 mL    OUT:    Voided (mL): 200 mL  Total OUT: 200 mL    Total NET: -200 mL      11-22 @ 07:01  -  11-22 @ 08:55  --------------------------------------------------------  IN:    Oral Fluid: 600 mL  Total IN: 600 mL    OUT:  Total OUT: 0 mL    Total NET: 600 mL            VITALS  T(C): 36.6 (11-22-21 @ 05:43), Max: 37.4 (11-21-21 @ 20:51)  HR: 74 (11-22-21 @ 05:43) (68 - 76)  BP: 111/64 (11-22-21 @ 05:43) (84/50 - 111/64)  RR: 18 (11-22-21 @ 05:43) (16 - 18)  SpO2: 90% (11-22-21 @ 05:43) (90% - 93%)    Constitutional: well developed, normal appearance, well groomed, well nourished, no deformities and no acute distress.   Eyes: the conjunctiva exhibited no abnormalities and the eyelids demonstrated no xanthelasmas.   HEENT: normal oral mucosa, no oral pallor and no oral cyanosis.   Neck: normal jugular venous A waves present, normal jugular venous V waves present and no jugular venous jacobs A waves.   Pulmonary: no respiratory distress, normal respiratory rhythm and effort, no accessory muscle use and lungs were clear to auscultation bilaterally.   Cardiovascular: heart rate and rhythm were normal, normal S1 and S2 and no murmur, gallop, rub, heave or thrill are present.   Abdomen: soft, non-tender, no hepato-splenomegaly and no abdominal mass palpated.   Musculoskeletal: the gait could not be assessed..   Extremities: no clubbing of the fingernails, no localized cyanosis, no petechial hemorrhages and no ischemic changes.   Skin: normal skin color and pigmentation, no rash, no venous stasis, no skin lesions, no skin ulcer and no xanthoma was observed.   Psychiatric: oriented to person, place, and time, the affect was normal, the mood was normal and not feeling anxious.     LABS:   --------  11-21    140  |  103  |  17  ----------------------------<  105<H>  3.9   |  32<H>  |  0.67    Ca    8.8      21 Nov 2021 08:44                           11.3   6.86  )-----------( 221      ( 21 Nov 2021 08:44 )             33.6                 RADIOLOGY:  -----------------    ECG:     ECHO:

## 2021-11-22 NOTE — PROGRESS NOTE ADULT - SUBJECTIVE AND OBJECTIVE BOX
PROGRESS NOTE  Patient is a 89y old  Male who presents with a chief complaint of Left hand 3 rd finger infection (22 Nov 2021 11:33)  Chart and available morning labs /imaging are reviewed electronically , urgent issues addressed . More information  is being added upon completion of rounds , when more information is collected and management discussed with consultants , medical staff and social service/case management on the floor     OVERNIGHT  No new issues reported by medical staff . All above noted Patient is resting in a bed comfortably .Confused ,poor mentation .No distress noted   SBP below 90 noted yesterday ,improved today Ehsan advised to stop iv fluids and watch bp x 24 hrs off fluids prior to d/c D/w scott Mooney on a phone   HPI:  89 Male ,[facility] resident  PMH is significant for :  Arteriosclerotic heart disease (ASHD)  Atrial fibrillation  ,CHF (congestive heart failure)  ,Dementia  ,Depression  ,DM (diabetes mellitus)  ,Hyperlipemia  ,Hypertension  ,Prostate ca  ,GIB ,R groin abscess  with dementia, HTN, HLD, A FIb, chf, dm sent from [facility] for evaluation of left middle finger discoloration .Seen by hand surgeon and wound care MD - diagnosed with tip necrosis of finger ,dry dressing and antibacterial tx recommended ,no immediate sx intervention required Admitted for septic workup and evaluation, send blood and urine cx ,serial lactate levels ,monitor vitals closely hydration ,monitor urine output and renal profile ,iv abx initiated -started on Unasyn ,s/p vancomycin ,ID cons called Ct scan ordered to rule out OM  (16 Nov 2021 19:23)    PAST MEDICAL & SURGICAL HISTORY:  Atrial fibrillation    Hypertension    Diabetes    Prostate ca    Dementia    CHF (congestive heart failure)    Hyperlipemia    Diabetes    Depression    Dementia    DM (diabetes mellitus)    Atrial fibrillation    Prostate CA    Arteriosclerotic heart disease (ASHD)    Dementia    Anemia    Constipation    No significant past surgical history        MEDICATIONS  (STANDING):  ascorbic acid 500 milliGRAM(s) Oral two times a day  aspirin  chewable 81 milliGRAM(s) Oral daily  cephalexin 500 milliGRAM(s) Oral every 12 hours  dextrose 40% Gel 15 Gram(s) Oral once  dextrose 5%. 1000 milliLiter(s) (50 mL/Hr) IV Continuous <Continuous>  dextrose 5%. 1000 milliLiter(s) (100 mL/Hr) IV Continuous <Continuous>  dextrose 50% Injectable 25 Gram(s) IV Push once  dextrose 50% Injectable 12.5 Gram(s) IV Push once  dextrose 50% Injectable 25 Gram(s) IV Push once  donepezil 5 milliGRAM(s) Oral at bedtime  doxycycline hyclate Capsule 100 milliGRAM(s) Oral every 12 hours  ferrous    sulfate 325 milliGRAM(s) Oral daily  furosemide    Tablet 20 milliGRAM(s) Oral daily  glucagon  Injectable 1 milliGRAM(s) IntraMuscular once  heparin   Injectable 5000 Unit(s) SubCutaneous every 12 hours  influenza  Vaccine (HIGH DOSE) 0.7 milliLiter(s) IntraMuscular once  insulin lispro (ADMELOG) corrective regimen sliding scale   SubCutaneous three times a day before meals  lactobacillus acidophilus 1 Tablet(s) Oral two times a day  losartan 25 milliGRAM(s) Oral daily  metoprolol succinate ER 25 milliGRAM(s) Oral daily  multivitamin/minerals 1 Tablet(s) Oral daily  pantoprazole   Suspension 40 milliGRAM(s) Oral before breakfast  polyethylene glycol 3350 17 Gram(s) Oral daily  povidone iodine 10% Solution 1 Application(s) Topical daily  senna 2 Tablet(s) Oral at bedtime  simvastatin 20 milliGRAM(s) Oral at bedtime  tamsulosin 0.4 milliGRAM(s) Oral at bedtime    MEDICATIONS  (PRN):  acetaminophen     Tablet .. 650 milliGRAM(s) Oral every 6 hours PRN Temp greater or equal to 38C (100.4F), Mild Pain (1 - 3)  bisacodyl Suppository 10 milliGRAM(s) Rectal daily PRN Constipation  magnesium hydroxide Suspension 30 milliLiter(s) Oral daily PRN Constipation  traMADol 25 milliGRAM(s) Oral four times a day PRN Moderate Pain (4 - 6)      OBJECTIVE    T(C): 36.6 (11-22-21 @ 05:43), Max: 37.4 (11-21-21 @ 20:51)  HR: 74 (11-22-21 @ 05:43) (68 - 76)  BP: 111/64 (11-22-21 @ 05:43) (84/50 - 111/64)  RR: 18 (11-22-21 @ 05:43) (16 - 18)  SpO2: 90% (11-22-21 @ 05:43) (90% - 93%)  Wt(kg): --  I&O's Summary    21 Nov 2021 07:01  -  22 Nov 2021 07:00  --------------------------------------------------------  IN: 0 mL / OUT: 200 mL / NET: -200 mL    22 Nov 2021 07:01  -  22 Nov 2021 12:01  --------------------------------------------------------  IN: 600 mL / OUT: 300 mL / NET: 300 mL          REVIEW OF SYSTEMS:  Patient is  unable to provide any information/ROS  due to baseline mental status.   PHYSICAL EXAM:  Appearance: NAD. VS past 24 hrs -as above   HEENT:   Moist oral mucosa. Conjunctiva clear b/l.   Neck : supple  Respiratory: Lungs CTAB.  Gastrointestinal:  Soft, nontender. No rebound. No rigidity. BS present	  Cardiovascular: RRR ,S1S2 present  Neurologic: Non-focal. Moving all extremities.  Extremities: No edema. No erythema. No calf tenderness. L hand 3 finger improving cellulitis ,necrotic tip present   Skin: No rashes, No ecchymoses, No cyanosis.	  wounds ,skin lesions-See skin assesment flow sheet   LABS:                        10.6   6.58  )-----------( 223      ( 22 Nov 2021 09:40 )             31.6     11-22    139  |  103  |  23  ----------------------------<  110<H>  4.2   |  31  |  0.78    Ca    8.7      22 Nov 2021 09:40      CAPILLARY BLOOD GLUCOSE      POCT Blood Glucose.: 136 mg/dL (22 Nov 2021 08:09)  POCT Blood Glucose.: 116 mg/dL (22 Nov 2021 00:38)  POCT Blood Glucose.: 167 mg/dL (21 Nov 2021 21:18)  POCT Blood Glucose.: 141 mg/dL (21 Nov 2021 16:40)  POCT Blood Glucose.: 190 mg/dL (21 Nov 2021 12:06)          Culture - Abscess with Gram Stain (collected 17 Nov 2021 18:55)  Source: .Abscess Arm - Left  Preliminary Report (19 Nov 2021 22:47):    Rare Coag Negative Staphylococcus "Susceptibilities not performed"    Few Most closely resembling Actinomyces species "Susceptibilities not    performed"    Culture - Blood (collected 16 Nov 2021 22:58)  Source: .Blood Blood  Final Report (21 Nov 2021 23:00):    No Growth Final    Culture - Blood (collected 16 Nov 2021 22:57)  Source: .Blood Blood  Final Report (21 Nov 2021 23:00):    No Growth Final      RADIOLOGY & ADDITIONAL TESTS:   reviewed elctronically  ASSESSMENT/PLAN: 	    25 minutes aggregate time was spent on this visit, 50% visit time spent in care co-ordination with other attendings and counselling patient .I have discussed care plan with patient / HCP/family memeber -spoke to scott Mooney on a phone twice  ,who expressed understanding of problems treatment and their effect and side effects, alternatives in details. I have asked if they have any questions and concerns and appropriately addressed them to best of my ability. Advance care planning was discussed , pallitaive care issues ,CMO ,hospice levels of care were discussed in details , forms ,advance directives were reviewed .All questions were answered to the best of my knowledge .25 min spent. -acp Spoke to Sabiha from [facility]

## 2021-11-23 LAB
ANION GAP SERPL CALC-SCNC: 5 MMOL/L — SIGNIFICANT CHANGE UP (ref 5–17)
BUN SERPL-MCNC: 21 MG/DL — SIGNIFICANT CHANGE UP (ref 7–23)
CALCIUM SERPL-MCNC: 8.8 MG/DL — SIGNIFICANT CHANGE UP (ref 8.5–10.1)
CHLORIDE SERPL-SCNC: 103 MMOL/L — SIGNIFICANT CHANGE UP (ref 96–108)
CO2 SERPL-SCNC: 29 MMOL/L — SIGNIFICANT CHANGE UP (ref 22–31)
CREAT SERPL-MCNC: 0.67 MG/DL — SIGNIFICANT CHANGE UP (ref 0.5–1.3)
GLUCOSE SERPL-MCNC: 115 MG/DL — HIGH (ref 70–99)
HCT VFR BLD CALC: 32.3 % — LOW (ref 39–50)
HGB BLD-MCNC: 10.9 G/DL — LOW (ref 13–17)
MCHC RBC-ENTMCNC: 31.6 PG — SIGNIFICANT CHANGE UP (ref 27–34)
MCHC RBC-ENTMCNC: 33.7 GM/DL — SIGNIFICANT CHANGE UP (ref 32–36)
MCV RBC AUTO: 93.6 FL — SIGNIFICANT CHANGE UP (ref 80–100)
NRBC # BLD: 0 /100 WBCS — SIGNIFICANT CHANGE UP (ref 0–0)
PLATELET # BLD AUTO: 233 K/UL — SIGNIFICANT CHANGE UP (ref 150–400)
POTASSIUM SERPL-MCNC: 4.3 MMOL/L — SIGNIFICANT CHANGE UP (ref 3.5–5.3)
POTASSIUM SERPL-SCNC: 4.3 MMOL/L — SIGNIFICANT CHANGE UP (ref 3.5–5.3)
RBC # BLD: 3.45 M/UL — LOW (ref 4.2–5.8)
RBC # FLD: 13.4 % — SIGNIFICANT CHANGE UP (ref 10.3–14.5)
SARS-COV-2 RNA SPEC QL NAA+PROBE: SIGNIFICANT CHANGE UP
SODIUM SERPL-SCNC: 137 MMOL/L — SIGNIFICANT CHANGE UP (ref 135–145)
WBC # BLD: 6.5 K/UL — SIGNIFICANT CHANGE UP (ref 3.8–10.5)
WBC # FLD AUTO: 6.5 K/UL — SIGNIFICANT CHANGE UP (ref 3.8–10.5)

## 2021-11-23 RX ADMIN — Medication 325 MILLIGRAM(S): at 12:42

## 2021-11-23 RX ADMIN — HEPARIN SODIUM 5000 UNIT(S): 5000 INJECTION INTRAVENOUS; SUBCUTANEOUS at 18:46

## 2021-11-23 RX ADMIN — Medication 1 TABLET(S): at 05:16

## 2021-11-23 RX ADMIN — TAMSULOSIN HYDROCHLORIDE 0.4 MILLIGRAM(S): 0.4 CAPSULE ORAL at 22:02

## 2021-11-23 RX ADMIN — PANTOPRAZOLE SODIUM 40 MILLIGRAM(S): 20 TABLET, DELAYED RELEASE ORAL at 05:16

## 2021-11-23 RX ADMIN — Medication 650 MILLIGRAM(S): at 05:17

## 2021-11-23 RX ADMIN — Medication 500 MILLIGRAM(S): at 05:16

## 2021-11-23 RX ADMIN — Medication 25 MILLIGRAM(S): at 05:16

## 2021-11-23 RX ADMIN — Medication 100 MILLIGRAM(S): at 05:16

## 2021-11-23 RX ADMIN — Medication 1 APPLICATION(S): at 12:43

## 2021-11-23 RX ADMIN — Medication 81 MILLIGRAM(S): at 12:42

## 2021-11-23 RX ADMIN — Medication 1 TABLET(S): at 12:42

## 2021-11-23 RX ADMIN — LOSARTAN POTASSIUM 25 MILLIGRAM(S): 100 TABLET, FILM COATED ORAL at 05:16

## 2021-11-23 RX ADMIN — DONEPEZIL HYDROCHLORIDE 5 MILLIGRAM(S): 10 TABLET, FILM COATED ORAL at 23:54

## 2021-11-23 RX ADMIN — Medication 500 MILLIGRAM(S): at 18:45

## 2021-11-23 RX ADMIN — Medication 100 MILLIGRAM(S): at 18:45

## 2021-11-23 RX ADMIN — Medication 1 TABLET(S): at 18:45

## 2021-11-23 RX ADMIN — Medication 20 MILLIGRAM(S): at 05:16

## 2021-11-23 RX ADMIN — Medication 1: at 12:25

## 2021-11-23 RX ADMIN — SIMVASTATIN 20 MILLIGRAM(S): 20 TABLET, FILM COATED ORAL at 22:01

## 2021-11-23 RX ADMIN — SENNA PLUS 2 TABLET(S): 8.6 TABLET ORAL at 23:55

## 2021-11-23 RX ADMIN — POLYETHYLENE GLYCOL 3350 17 GRAM(S): 17 POWDER, FOR SOLUTION ORAL at 12:42

## 2021-11-23 RX ADMIN — HEPARIN SODIUM 5000 UNIT(S): 5000 INJECTION INTRAVENOUS; SUBCUTANEOUS at 05:17

## 2021-11-23 NOTE — ED ADULT NURSE NOTE - NS ED NURSE LEVEL OF CONSCIOUSNESS ORIENTATION
Atorvastatin 10mg tablet refill request from [facility] pharmacy--however only Atorvastatin 20mg tablets are on medication list.     Please advise.   Disoriented

## 2021-11-23 NOTE — PROGRESS NOTE ADULT - SUBJECTIVE AND OBJECTIVE BOX
Patient is a 89y Male with a known history of :  Gangrene of finger of left hand [I96]    DM (diabetes mellitus) [E11.9]    Afib [I48.91]    HTN (hypertension) [I10]    Arteriosclerotic heart disease (ASHD) [I25.10]    Dementia [F03.90]    CHF (congestive heart failure) [I50.9]    Constipation [K59.00]    Prophylactic measure [Z29.9]    Hypotension [I95.9]      HPI:  89 Male ,[facility] resident  PMH is significant for :  Arteriosclerotic heart disease (ASHD)  Atrial fibrillation  ,CHF (congestive heart failure)  ,Dementia  ,Depression  ,DM (diabetes mellitus)  ,Hyperlipemia  ,Hypertension  ,Prostate ca  ,GIB ,R groin abscess  with dementia, HTN, HLD, A FIb, chf, dm sent from [facility] for evaluation of left middle finger discoloration .Seen by hand surgeon and wound care MD - diagnosed with tip necrosis of finger ,dry dressing and antibacterial tx recommended ,no immediate sx intervention required Admitted for septic workup and evaluation, send blood and urine cx ,serial lactate levels ,monitor vitals closely hydration ,monitor urine output and renal profile ,iv abx initiated -started on Unasyn ,s/p vancomycin ,ID cons called Ct scan ordered to rule out OM  (16 Nov 2021 19:23)      REVIEW OF SYSTEMS:    CONSTITUTIONAL: No fever, weight loss, or fatigue  EYES: No eye pain, visual disturbances, or discharge  ENMT:  No difficulty hearing, tinnitus, vertigo; No sinus or throat pain  NECK: No pain or stiffness  BREASTS: No pain, masses, or nipple discharge  RESPIRATORY: No cough, wheezing, chills or hemoptysis; No shortness of breath  CARDIOVASCULAR: No chest pain, palpitations, dizziness, or leg swelling  GASTROINTESTINAL: No abdominal or epigastric pain. No nausea, vomiting, or hematemesis; No diarrhea or constipation. No melena or hematochezia.  GENITOURINARY: No dysuria, frequency, hematuria, or incontinence  NEUROLOGICAL: No headaches, memory loss, loss of strength, numbness, or tremors  SKIN: No itching, burning, rashes, or lesions   LYMPH NODES: No enlarged glands  ENDOCRINE: No heat or cold intolerance; No hair loss  MUSCULOSKELETAL: No joint pain or swelling; No muscle, back, or extremity pain  PSYCHIATRIC: No depression, anxiety, mood swings, or difficulty sleeping  HEME/LYMPH: No easy bruising, or bleeding gums  ALLERGY AND IMMUNOLOGIC: No hives or eczema    MEDICATIONS  (STANDING):  ascorbic acid 500 milliGRAM(s) Oral two times a day  aspirin  chewable 81 milliGRAM(s) Oral daily  cephalexin 500 milliGRAM(s) Oral every 12 hours  dextrose 40% Gel 15 Gram(s) Oral once  dextrose 5%. 1000 milliLiter(s) (50 mL/Hr) IV Continuous <Continuous>  dextrose 5%. 1000 milliLiter(s) (100 mL/Hr) IV Continuous <Continuous>  dextrose 50% Injectable 25 Gram(s) IV Push once  dextrose 50% Injectable 12.5 Gram(s) IV Push once  dextrose 50% Injectable 25 Gram(s) IV Push once  donepezil 5 milliGRAM(s) Oral at bedtime  doxycycline hyclate Capsule 100 milliGRAM(s) Oral every 12 hours  ferrous    sulfate 325 milliGRAM(s) Oral daily  furosemide    Tablet 20 milliGRAM(s) Oral daily  glucagon  Injectable 1 milliGRAM(s) IntraMuscular once  heparin   Injectable 5000 Unit(s) SubCutaneous every 12 hours  influenza  Vaccine (HIGH DOSE) 0.7 milliLiter(s) IntraMuscular once  insulin lispro (ADMELOG) corrective regimen sliding scale   SubCutaneous three times a day before meals  lactobacillus acidophilus 1 Tablet(s) Oral two times a day  losartan 25 milliGRAM(s) Oral daily  metoprolol succinate ER 25 milliGRAM(s) Oral daily  multivitamin/minerals 1 Tablet(s) Oral daily  pantoprazole   Suspension 40 milliGRAM(s) Oral before breakfast  polyethylene glycol 3350 17 Gram(s) Oral daily  povidone iodine 10% Solution 1 Application(s) Topical daily  senna 2 Tablet(s) Oral at bedtime  simvastatin 20 milliGRAM(s) Oral at bedtime  tamsulosin 0.4 milliGRAM(s) Oral at bedtime    MEDICATIONS  (PRN):  acetaminophen     Tablet .. 650 milliGRAM(s) Oral every 6 hours PRN Temp greater or equal to 38C (100.4F), Mild Pain (1 - 3)  bisacodyl Suppository 10 milliGRAM(s) Rectal daily PRN Constipation  magnesium hydroxide Suspension 30 milliLiter(s) Oral daily PRN Constipation      ALLERGIES: latex (Rash)  latex (Unknown)  No Known Drug Allergies      FAMILY HISTORY:      PHYSICAL EXAMINATION:  -----------------------------  T(C): 36.7 (11-23-21 @ 05:30), Max: 37.3 (11-22-21 @ 12:25)  HR: 72 (11-23-21 @ 05:30) (72 - 84)  BP: 120/65 (11-23-21 @ 05:30) (111/56 - 124/66)  RR: 17 (11-23-21 @ 05:30) (17 - 20)  SpO2: 93% (11-23-21 @ 05:30) (90% - 96%)  Wt(kg): --    11-22 @ 07:01  -  11-23 @ 07:00  --------------------------------------------------------  IN:    Oral Fluid: 660 mL    sodium chloride 0.9%: 90 mL  Total IN: 750 mL    OUT:    Incontinent per Condom Catheter (mL): 750 mL  Total OUT: 750 mL    Total NET: 0 mL            VITALS  T(C): 36.7 (11-23-21 @ 05:30), Max: 37.3 (11-22-21 @ 12:25)  HR: 72 (11-23-21 @ 05:30) (72 - 84)  BP: 120/65 (11-23-21 @ 05:30) (111/56 - 124/66)  RR: 17 (11-23-21 @ 05:30) (17 - 20)  SpO2: 93% (11-23-21 @ 05:30) (90% - 96%)    Constitutional: well developed, normal appearance, well groomed, well nourished, no deformities and no acute distress.   Eyes: the conjunctiva exhibited no abnormalities and the eyelids demonstrated no xanthelasmas.   HEENT: normal oral mucosa, no oral pallor and no oral cyanosis.   Neck: normal jugular venous A waves present, normal jugular venous V waves present and no jugular venous jacobs A waves.   Pulmonary: no respiratory distress, normal respiratory rhythm and effort, no accessory muscle use and lungs were clear to auscultation bilaterally.   Cardiovascular: heart rate and rhythm were normal, normal S1 and S2 and no murmur, gallop, rub, heave or thrill are present.   Abdomen: soft, non-tender, no hepato-splenomegaly and no abdominal mass palpated.   Musculoskeletal: the gait could not be assessed..   Extremities: no clubbing of the fingernails, no localized cyanosis, no petechial hemorrhages and no ischemic changes.   Skin: normal skin color and pigmentation, no rash, no venous stasis, no skin lesions, no skin ulcer and no xanthoma was observed.   Psychiatric: oriented to person, place, and time, the affect was normal, the mood was normal and not feeling anxious.     LABS:   --------  11-22    139  |  103  |  23  ----------------------------<  110<H>  4.2   |  31  |  0.78    Ca    8.7      22 Nov 2021 09:40                           10.9   6.50  )-----------( 233      ( 23 Nov 2021 09:13 )             32.3                 RADIOLOGY:  -----------------    ECG:     ECHO:

## 2021-11-23 NOTE — PROGRESS NOTE ADULT - SUBJECTIVE AND OBJECTIVE BOX
PROGRESS NOTE  Patient is a 89y old  Male who presents with a chief complaint of Left hand 3 rd finger infection (22 Nov 2021 11:33)  Chart and available morning labs /imaging are reviewed electronically , urgent issues addressed . More information  is being added upon completion of rounds , when more information is collected and management discussed with consultants , medical staff and social service/case management on the floor     OVERNIGHT  No new issues reported by medical staff . All above noted Patient is resting in a bed comfortably .Confused ,poor mentation .No distress noted   SBP below 90 noted yesterday ,improved today Ehsan advised to stop iv fluids and watch bp x 24 hrs off fluids prior to d/c D/w scott Mooney on a phone   HPI:  89 Male ,[facility] resident  PMH is significant for :  Arteriosclerotic heart disease (ASHD)  Atrial fibrillation  ,CHF (congestive heart failure)  ,Dementia  ,Depression  ,DM (diabetes mellitus)  ,Hyperlipemia  ,Hypertension  ,Prostate ca  ,GIB ,R groin abscess  with dementia, HTN, HLD, A FIb, chf, dm sent from [facility] for evaluation of left middle finger discoloration .Seen by hand surgeon and wound care MD - diagnosed with tip necrosis of finger ,dry dressing and antibacterial tx recommended ,no immediate sx intervention required Admitted for septic workup and evaluation, send blood and urine cx ,serial lactate levels ,monitor vitals closely hydration ,monitor urine output and renal profile ,iv abx initiated -started on Unasyn ,s/p vancomycin ,ID cons called Ct scan ordered to rule out OM  (16 Nov 2021 19:23)    PAST MEDICAL & SURGICAL HISTORY:  Atrial fibrillation    Hypertension    Diabetes    Prostate ca    Dementia    CHF (congestive heart failure)    Hyperlipemia    Diabetes    Depression    Dementia    DM (diabetes mellitus)    Atrial fibrillation    Prostate CA    Arteriosclerotic heart disease (ASHD)    Dementia    Anemia    Constipation    No significant past surgical history        MEDICATIONS  (STANDING):  ascorbic acid 500 milliGRAM(s) Oral two times a day  aspirin  chewable 81 milliGRAM(s) Oral daily  cephalexin 500 milliGRAM(s) Oral every 12 hours  dextrose 40% Gel 15 Gram(s) Oral once  dextrose 5%. 1000 milliLiter(s) (50 mL/Hr) IV Continuous <Continuous>  dextrose 5%. 1000 milliLiter(s) (100 mL/Hr) IV Continuous <Continuous>  dextrose 50% Injectable 25 Gram(s) IV Push once  dextrose 50% Injectable 12.5 Gram(s) IV Push once  dextrose 50% Injectable 25 Gram(s) IV Push once  donepezil 5 milliGRAM(s) Oral at bedtime  doxycycline hyclate Capsule 100 milliGRAM(s) Oral every 12 hours  ferrous    sulfate 325 milliGRAM(s) Oral daily  furosemide    Tablet 20 milliGRAM(s) Oral daily  glucagon  Injectable 1 milliGRAM(s) IntraMuscular once  heparin   Injectable 5000 Unit(s) SubCutaneous every 12 hours  influenza  Vaccine (HIGH DOSE) 0.7 milliLiter(s) IntraMuscular once  insulin lispro (ADMELOG) corrective regimen sliding scale   SubCutaneous three times a day before meals  lactobacillus acidophilus 1 Tablet(s) Oral two times a day  losartan 25 milliGRAM(s) Oral daily  metoprolol succinate ER 25 milliGRAM(s) Oral daily  multivitamin/minerals 1 Tablet(s) Oral daily  pantoprazole   Suspension 40 milliGRAM(s) Oral before breakfast  polyethylene glycol 3350 17 Gram(s) Oral daily  povidone iodine 10% Solution 1 Application(s) Topical daily  senna 2 Tablet(s) Oral at bedtime  simvastatin 20 milliGRAM(s) Oral at bedtime  tamsulosin 0.4 milliGRAM(s) Oral at bedtime    MEDICATIONS  (PRN):  acetaminophen     Tablet .. 650 milliGRAM(s) Oral every 6 hours PRN Temp greater or equal to 38C (100.4F), Mild Pain (1 - 3)  bisacodyl Suppository 10 milliGRAM(s) Rectal daily PRN Constipation  magnesium hydroxide Suspension 30 milliLiter(s) Oral daily PRN Constipation  traMADol 25 milliGRAM(s) Oral four times a day PRN Moderate Pain (4 - 6)      OBJECTIVE    T(C): 36.6 (11-22-21 @ 05:43), Max: 37.4 (11-21-21 @ 20:51)  HR: 74 (11-22-21 @ 05:43) (68 - 76)  BP: 111/64 (11-22-21 @ 05:43) (84/50 - 111/64)  RR: 18 (11-22-21 @ 05:43) (16 - 18)  SpO2: 90% (11-22-21 @ 05:43) (90% - 93%)  Wt(kg): --  I&O's Summary    21 Nov 2021 07:01  -  22 Nov 2021 07:00  --------------------------------------------------------  IN: 0 mL / OUT: 200 mL / NET: -200 mL    22 Nov 2021 07:01  -  22 Nov 2021 12:01  --------------------------------------------------------  IN: 600 mL / OUT: 300 mL / NET: 300 mL          REVIEW OF SYSTEMS:  Patient is  unable to provide any information/ROS  due to baseline mental status.   PHYSICAL EXAM:  Appearance: NAD. VS past 24 hrs -as above   HEENT:   Moist oral mucosa. Conjunctiva clear b/l.   Neck : supple  Respiratory: Lungs CTAB.  Gastrointestinal:  Soft, nontender. No rebound. No rigidity. BS present	  Cardiovascular: RRR ,S1S2 present  Neurologic: Non-focal. Moving all extremities.  Extremities: No edema. No erythema. No calf tenderness. L hand 3 finger improving cellulitis ,necrotic tip present   Skin: No rashes, No ecchymoses, No cyanosis.	  wounds ,skin lesions-See skin assesment flow sheet   LABS:                        10.6   6.58  )-----------( 223      ( 22 Nov 2021 09:40 )             31.6     11-22    139  |  103  |  23  ----------------------------<  110<H>  4.2   |  31  |  0.78    Ca    8.7      22 Nov 2021 09:40      CAPILLARY BLOOD GLUCOSE      POCT Blood Glucose.: 136 mg/dL (22 Nov 2021 08:09)  POCT Blood Glucose.: 116 mg/dL (22 Nov 2021 00:38)  POCT Blood Glucose.: 167 mg/dL (21 Nov 2021 21:18)  POCT Blood Glucose.: 141 mg/dL (21 Nov 2021 16:40)  POCT Blood Glucose.: 190 mg/dL (21 Nov 2021 12:06)          Culture - Abscess with Gram Stain (collected 17 Nov 2021 18:55)  Source: .Abscess Arm - Left  Preliminary Report (19 Nov 2021 22:47):    Rare Coag Negative Staphylococcus "Susceptibilities not performed"    Few Most closely resembling Actinomyces species "Susceptibilities not    performed"    Culture - Blood (collected 16 Nov 2021 22:58)  Source: .Blood Blood  Final Report (21 Nov 2021 23:00):    No Growth Final    Culture - Blood (collected 16 Nov 2021 22:57)  Source: .Blood Blood  Final Report (21 Nov 2021 23:00):    No Growth Final      RADIOLOGY & ADDITIONAL TESTS:   reviewed elctronically  ASSESSMENT/PLAN: 	    25 minutes aggregate time was spent on this visit, 50% visit time spent in care co-ordination with other attendings and counselling patient .I have discussed care plan with patient / HCP/family memeber -spoke to scott Mooney on a phone twice  ,who expressed understanding of problems treatment and their effect and side effects, alternatives in details. I have asked if they have any questions and concerns and appropriately addressed them to best of my ability. Advance care planning was discussed , pallitaive care issues ,CMO ,hospice levels of care were discussed in details , forms ,advance directives were reviewed .All questions were answered to the best of my knowledge .25 min spent. -acp Spoke to Sabiha from [facility] /[facility]   PROGRESS NOTE  Patient is a 89y old  Male who presents with a chief complaint of Left hand 3 rd finger infection (22 Nov 2021 11:33)  Chart and available morning labs /imaging are reviewed electronically , urgent issues addressed . More information  is being added upon completion of rounds , when more information is collected and management discussed with consultants , medical staff and social service/case management on the floor     OVERNIGHT  No new issues reported by medical staff . All above noted Patient is resting in a bed comfortably .Confused ,poor mentation .No distress noted   SBP below 90 noted yesterday ,improved today Ehsan advised to stop iv fluids and watch bp x 24 hrs off fluids prior to d/c D/w scott Mooney on a phone.     HPI:  89 Male ,care home resident  PMH is significant for :  Arteriosclerotic heart disease (ASHD)  Atrial fibrillation  ,CHF (congestive heart failure)  ,Dementia  ,Depression  ,DM (diabetes mellitus)  ,Hyperlipemia  ,Hypertension  ,Prostate ca  ,GIB ,R groin abscess  with dementia, HTN, HLD, A FIb, chf, dm sent from [facility] for evaluation of left middle finger discoloration .Seen by hand surgeon and wound care MD - diagnosed with tip necrosis of finger ,dry dressing and antibacterial tx recommended ,no immediate sx intervention required Admitted for septic workup and evaluation, send blood and urine cx ,serial lactate levels ,monitor vitals closely hydration ,monitor urine output and renal profile ,iv abx initiated -started on Unasyn ,s/p vancomycin ,ID cons called Ct scan ordered to rule out OM  (16 Nov 2021 19:23)    PAST MEDICAL & SURGICAL HISTORY:  Atrial fibrillation  Hypertension  Diabetes  Prostate ca  Dementia  CHF (congestive heart failure)  Hyperlipemia  Diabetes  Depression  Dementia  DM (diabetes mellitus)  Atrial fibrillation  Prostate CA  Arteriosclerotic heart disease (ASHD)  Dementia  Anemia  Constipation  No significant past surgical history    MEDICATIONS  (STANDING):  ascorbic acid 500 milliGRAM(s) Oral two times a day  aspirin  chewable 81 milliGRAM(s) Oral daily  cephalexin 500 milliGRAM(s) Oral every 12 hours  dextrose 40% Gel 15 Gram(s) Oral once  dextrose 5%. 1000 milliLiter(s) (50 mL/Hr) IV Continuous <Continuous>  dextrose 5%. 1000 milliLiter(s) (100 mL/Hr) IV Continuous <Continuous>  dextrose 50% Injectable 25 Gram(s) IV Push once  dextrose 50% Injectable 12.5 Gram(s) IV Push once  dextrose 50% Injectable 25 Gram(s) IV Push once  donepezil 5 milliGRAM(s) Oral at bedtime  doxycycline hyclate Capsule 100 milliGRAM(s) Oral every 12 hours  ferrous    sulfate 325 milliGRAM(s) Oral daily  furosemide    Tablet 20 milliGRAM(s) Oral daily  glucagon  Injectable 1 milliGRAM(s) IntraMuscular once  heparin   Injectable 5000 Unit(s) SubCutaneous every 12 hours  influenza  Vaccine (HIGH DOSE) 0.7 milliLiter(s) IntraMuscular once  insulin lispro (ADMELOG) corrective regimen sliding scale   SubCutaneous three times a day before meals  lactobacillus acidophilus 1 Tablet(s) Oral two times a day  losartan 25 milliGRAM(s) Oral daily  metoprolol succinate ER 25 milliGRAM(s) Oral daily  multivitamin/minerals 1 Tablet(s) Oral daily  pantoprazole   Suspension 40 milliGRAM(s) Oral before breakfast  polyethylene glycol 3350 17 Gram(s) Oral daily  povidone iodine 10% Solution 1 Application(s) Topical daily  senna 2 Tablet(s) Oral at bedtime  simvastatin 20 milliGRAM(s) Oral at bedtime  tamsulosin 0.4 milliGRAM(s) Oral at bedtime    MEDICATIONS  (PRN):  acetaminophen     Tablet .. 650 milliGRAM(s) Oral every 6 hours PRN Temp greater or equal to 38C (100.4F), Mild Pain (1 - 3)  bisacodyl Suppository 10 milliGRAM(s) Rectal daily PRN Constipation  magnesium hydroxide Suspension 30 milliLiter(s) Oral daily PRN Constipation    OBJECTIVE    Vital Signs Last 24 Hrs  T(C): 36.6 (23 Nov 2021 20:55), Max: 36.8 (23 Nov 2021 13:20)  T(F): 97.9 (23 Nov 2021 20:55), Max: 98.3 (23 Nov 2021 13:20)  HR: 63 (23 Nov 2021 20:55) (63 - 72)  BP: 126/72 (23 Nov 2021 20:55) (102/62 - 126/72)  RR: 17 (23 Nov 2021 20:55) (17 - 17)  SpO2: 96% (23 Nov 2021 20:55) (92% - 96%)    REVIEW OF SYSTEMS:  Patient is  unable to provide any information/ROS  due to baseline mental status.   PHYSICAL EXAM:  Appearance: NAD. VS past 24 hrs -as above   HEENT:   Moist oral mucosa. Conjunctiva clear b/l.   Neck : supple  Respiratory: Lungs CTAB.  Gastrointestinal:  Soft, nontender. No rebound. No rigidity. BS present	  Cardiovascular: RRR ,S1S2 present  Neurologic: Non-focal. Moving all extremities.  Extremities: No edema. No erythema. No calf tenderness. L hand 3 finger improving cellulitis ,necrotic tip present   Skin: No rashes, No ecchymoses, No cyanosis.	  wounds ,skin lesions-See skin assesment flow sheet   LABS:                        10.6   6.58  )-----------( 223      ( 22 Nov 2021 09:40 )             31.6     11-22    139  |  103  |  23  ----------------------------<  110<H>  4.2   |  31  |  0.78    Ca    8.7      22 Nov 2021 09:40      CAPILLARY BLOOD GLUCOSE      POCT Blood Glucose.: 136 mg/dL (22 Nov 2021 08:09)  POCT Blood Glucose.: 116 mg/dL (22 Nov 2021 00:38)  POCT Blood Glucose.: 167 mg/dL (21 Nov 2021 21:18)  POCT Blood Glucose.: 141 mg/dL (21 Nov 2021 16:40)  POCT Blood Glucose.: 190 mg/dL (21 Nov 2021 12:06)    Culture - Abscess with Gram Stain (collected 17 Nov 2021 18:55)  Source: .Abscess Arm - Left  Preliminary Report (19 Nov 2021 22:47):  Rare Coag Negative Staphylococcus "Susceptibilities not performed"  Few Most closely resembling Actinomyces species "Susceptibilities not performed"    Culture - Blood (collected 16 Nov 2021 22:58)  Source: .Blood Blood  Final Report (21 Nov 2021 23:00):  No Growth Final    Culture - Blood (collected 16 Nov 2021 22:57)  Source: .Blood Blood  Final Report (21 Nov 2021 23:00):    No Growth Final      RADIOLOGY & ADDITIONAL TESTS:   reviewed elctronically  ASSESSMENT/PLAN: 	    25 minutes aggregate time was spent on this visit, 50% visit time spent in care co-ordination with other attendings and counselling patient .I have discussed care plan with patient / HCP/family memeber -spoke to scott Mooney on a phone twice  ,who expressed understanding of problems treatment and their effect and side effects, alternatives in details. I have asked if they have any questions and concerns and appropriately addressed them to best of my ability. Advance care planning was discussed , pallitaive care issues ,CMO ,hospice levels of care were discussed in details , forms ,advance directives were reviewed .All questions were answered to the best of my knowledge .25 min spent. -acp Spoke to Sabiha from care home /[facility]

## 2021-11-24 ENCOUNTER — TRANSCRIPTION ENCOUNTER (OUTPATIENT)
Age: 86
End: 2021-11-24

## 2021-11-24 VITALS
SYSTOLIC BLOOD PRESSURE: 115 MMHG | RESPIRATION RATE: 17 BRPM | TEMPERATURE: 98 F | DIASTOLIC BLOOD PRESSURE: 65 MMHG | HEART RATE: 62 BPM | OXYGEN SATURATION: 92 %

## 2021-11-24 RX ADMIN — Medication 500 MILLIGRAM(S): at 05:35

## 2021-11-24 RX ADMIN — Medication 325 MILLIGRAM(S): at 12:06

## 2021-11-24 RX ADMIN — POLYETHYLENE GLYCOL 3350 17 GRAM(S): 17 POWDER, FOR SOLUTION ORAL at 12:06

## 2021-11-24 RX ADMIN — Medication 1 TABLET(S): at 05:35

## 2021-11-24 RX ADMIN — Medication 500 MILLIGRAM(S): at 05:36

## 2021-11-24 RX ADMIN — Medication 1 TABLET(S): at 12:06

## 2021-11-24 RX ADMIN — HEPARIN SODIUM 5000 UNIT(S): 5000 INJECTION INTRAVENOUS; SUBCUTANEOUS at 05:37

## 2021-11-24 RX ADMIN — Medication 81 MILLIGRAM(S): at 12:06

## 2021-11-24 RX ADMIN — PANTOPRAZOLE SODIUM 40 MILLIGRAM(S): 20 TABLET, DELAYED RELEASE ORAL at 06:56

## 2021-11-24 RX ADMIN — Medication 100 MILLIGRAM(S): at 05:35

## 2021-11-24 RX ADMIN — Medication 20 MILLIGRAM(S): at 05:35

## 2021-11-24 RX ADMIN — Medication 1 APPLICATION(S): at 12:07

## 2021-11-24 NOTE — PROGRESS NOTE ADULT - PROVIDER SPECIALTY LIST ADULT
Cardiology
Cardiology
Infectious Disease
Plastic Surgery
Cardiology
Infectious Disease
Cardiology
Infectious Disease
Internal Medicine
Cardiology
Cardiology
Hospitalist

## 2021-11-24 NOTE — DISCHARGE NOTE NURSING/CASE MANAGEMENT/SOCIAL WORK - PATIENT PORTAL LINK FT
You can access the FollowMyHealth Patient Portal offered by [facility] by registering at the following website: [email or web address]. By joining [employer]’s FollowMyHealth portal, you will also be able to view your health information using other applications (apps) compatible with our system.

## 2021-11-24 NOTE — PROGRESS NOTE ADULT - SUBJECTIVE AND OBJECTIVE BOX
PROGRESS NOTE  Patient is a 89y old  Male who presents with a chief complaint of Left hand 3 rd finger infection (22 Nov 2021 11:33)  Chart and available morning labs /imaging are reviewed electronically , urgent issues addressed . More information  is being added upon completion of rounds , when more information is collected and management discussed with consultants , medical staff and social service/case management on the floor     OVERNIGHT  No new issues reported by medical staff . All above noted Patient is resting in a bed comfortably .Confused ,poor mentation .No distress noted   SBP below 90 noted yesterday ,improved today Ehsan advised to stop iv fluids and watch bp x 24 hrs off fluids prior to d/c D/w scott Mooney on a phone.     HPI:  89 Male ,assisted resident  PMH is significant for :  Arteriosclerotic heart disease (ASHD)  Atrial fibrillation  ,CHF (congestive heart failure)  ,Dementia  ,Depression  ,DM (diabetes mellitus)  ,Hyperlipemia  ,Hypertension  ,Prostate ca  ,GIB ,R groin abscess  with dementia, HTN, HLD, A FIb, chf, dm sent from [facility] for evaluation of left middle finger discoloration .Seen by hand surgeon and wound care MD - diagnosed with tip necrosis of finger ,dry dressing and antibacterial tx recommended ,no immediate sx intervention required Admitted for septic workup and evaluation, send blood and urine cx ,serial lactate levels ,monitor vitals closely hydration ,monitor urine output and renal profile ,iv abx initiated -started on Unasyn ,s/p vancomycin ,ID cons called Ct scan ordered to rule out OM  (16 Nov 2021 19:23)    PAST MEDICAL & SURGICAL HISTORY:  Atrial fibrillation  Hypertension  Diabetes  Prostate ca  Dementia  CHF (congestive heart failure)  Hyperlipemia  Diabetes  Depression  Dementia  DM (diabetes mellitus)  Atrial fibrillation  Prostate CA  Arteriosclerotic heart disease (ASHD)  Dementia  Anemia  Constipation  No significant past surgical history    MEDICATIONS  (STANDING):  ascorbic acid 500 milliGRAM(s) Oral two times a day  aspirin  chewable 81 milliGRAM(s) Oral daily  cephalexin 500 milliGRAM(s) Oral every 12 hours  dextrose 40% Gel 15 Gram(s) Oral once  dextrose 5%. 1000 milliLiter(s) (50 mL/Hr) IV Continuous <Continuous>  dextrose 5%. 1000 milliLiter(s) (100 mL/Hr) IV Continuous <Continuous>  dextrose 50% Injectable 25 Gram(s) IV Push once  dextrose 50% Injectable 12.5 Gram(s) IV Push once  dextrose 50% Injectable 25 Gram(s) IV Push once  donepezil 5 milliGRAM(s) Oral at bedtime  doxycycline hyclate Capsule 100 milliGRAM(s) Oral every 12 hours  ferrous    sulfate 325 milliGRAM(s) Oral daily  furosemide    Tablet 20 milliGRAM(s) Oral daily  glucagon  Injectable 1 milliGRAM(s) IntraMuscular once  heparin   Injectable 5000 Unit(s) SubCutaneous every 12 hours  influenza  Vaccine (HIGH DOSE) 0.7 milliLiter(s) IntraMuscular once  insulin lispro (ADMELOG) corrective regimen sliding scale   SubCutaneous three times a day before meals  lactobacillus acidophilus 1 Tablet(s) Oral two times a day  losartan 25 milliGRAM(s) Oral daily  metoprolol succinate ER 25 milliGRAM(s) Oral daily  multivitamin/minerals 1 Tablet(s) Oral daily  pantoprazole   Suspension 40 milliGRAM(s) Oral before breakfast  polyethylene glycol 3350 17 Gram(s) Oral daily  povidone iodine 10% Solution 1 Application(s) Topical daily  senna 2 Tablet(s) Oral at bedtime  simvastatin 20 milliGRAM(s) Oral at bedtime  tamsulosin 0.4 milliGRAM(s) Oral at bedtime    MEDICATIONS  (PRN):  acetaminophen     Tablet .. 650 milliGRAM(s) Oral every 6 hours PRN Temp greater or equal to 38C (100.4F), Mild Pain (1 - 3)  bisacodyl Suppository 10 milliGRAM(s) Rectal daily PRN Constipation  magnesium hydroxide Suspension 30 milliLiter(s) Oral daily PRN Constipation    OBJECTIVE    Vital Signs Last 24 Hrs  T(C): 36.4 (24 Nov 2021 12:48), Max: 36.6 (23 Nov 2021 20:55)  T(F): 97.5 (24 Nov 2021 12:48), Max: 97.9 (23 Nov 2021 20:55)  HR: 62 (24 Nov 2021 12:48) (62 - 79)  BP: 115/65 (24 Nov 2021 12:48) (109/69 - 126/72)  RR: 17 (24 Nov 2021 12:48) (17 - 17)  SpO2: 92% (24 Nov 2021 12:48) (92% - 96%)    REVIEW OF SYSTEMS:  Patient is  unable to provide any information/ROS  due to baseline mental status.   PHYSICAL EXAM:  Appearance: NAD. VS past 24 hrs -as above   HEENT:   Moist oral mucosa. Conjunctiva clear b/l.   Neck : supple  Respiratory: Lungs CTAB.  Gastrointestinal:  Soft, nontender. No rebound. No rigidity. BS present	  Cardiovascular: RRR ,S1S2 present  Neurologic: Non-focal. Moving all extremities.  Extremities: No edema. No erythema. No calf tenderness. L hand 3 finger improving cellulitis ,necrotic tip present   Skin: No rashes, No ecchymoses, No cyanosis.	  wounds ,skin lesions-See skin assesment flow sheet   LABS:                        10.9   6.50  )-----------( 233      ( 23 Nov 2021 09:13 )             32.3     23 Nov 2021 09:13    137    |  103    |  21     ----------------------------<  115    4.3     |  29     |  0.67     Ca    8.8        23 Nov 2021 09:13    CAPILLARY BLOOD GLUCOSE    POCT Blood Glucose.: 138 mg/dL (24 Nov 2021 11:38)  POCT Blood Glucose.: 117 mg/dL (24 Nov 2021 08:01)  POCT Blood Glucose.: 145 mg/dL (23 Nov 2021 21:04)    Culture - Abscess with Gram Stain (collected 17 Nov 2021 18:55)  Source: .Abscess Arm - Left  Preliminary Report (19 Nov 2021 22:47):  Rare Coag Negative Staphylococcus "Susceptibilities not performed"  Few Most closely resembling Actinomyces species "Susceptibilities not performed"    Culture - Blood (collected 16 Nov 2021 22:58)  Source: .Blood Blood  Final Report (21 Nov 2021 23:00):  No Growth Final    Culture - Blood (collected 16 Nov 2021 22:57)  Source: .Blood Blood  Final Report (21 Nov 2021 23:00):    No Growth Final      RADIOLOGY & ADDITIONAL TESTS:   reviewed elctronically  ASSESSMENT/PLAN: 	    25 minutes aggregate time was spent on this visit, 50% visit time spent in care co-ordination with other attendings and counselling patient .I have discussed care plan with patient / HCP/family memeber -spoke to scott Mooney on a phone twice  ,who expressed understanding of problems treatment and their effect and side effects, alternatives in details. I have asked if they have any questions and concerns and appropriately addressed them to best of my ability. Advance care planning was discussed , pallitaive care issues ,CMO ,hospice levels of care were discussed in details , forms ,advance directives were reviewed .All questions were answered to the best of my knowledge .25 min spent. -acp Spoke to Sabiha from assisted /[facility]

## 2021-11-24 NOTE — PROGRESS NOTE ADULT - REASON FOR ADMISSION
Left hand 3 rd finger infection

## 2021-11-24 NOTE — PROGRESS NOTE ADULT - PROBLEM SELECTOR PLAN 5
continue home medications ,card is following

## 2021-11-24 NOTE — PROGRESS NOTE ADULT - ASSESSMENT
89 Male ,residential resident  PMH is significant for :  Arteriosclerotic heart disease (ASHD)  Atrial fibrillation  ,CHF (congestive heart failure)  ,Dementia  ,Depression  ,DM (diabetes mellitus)  ,Hyperlipemia  ,Hypertension  ,Prostate ca  ,GIB ,R groin abscess  with dementia, HTN, HLD, A FIb, chf, dm sent from [facility] for evaluation of left middle finger discoloration .Seen by hand surgeon and wound care MD - diagnosed with tip necrosis of finger ,dry dressing and antibacterial tx recommended ,no immediate sx intervention required Admitted for septic workup and evaluation, send blood and urine cx ,serial lactate levels ,monitor vitals closely hydration ,monitor urine output and renal profile ,iv abx initiated -started on Unasyn ,s/p vancomycin ,ID cons called 
89 Male ,senior living resident  PMH is significant for :  Arteriosclerotic heart disease (ASHD)  Atrial fibrillation  ,CHF (congestive heart failure)  ,Dementia  ,Depression  ,DM (diabetes mellitus)  ,Hyperlipemia  ,Hypertension  ,Prostate ca  ,GIB ,R groin abscess  with dementia, HTN, HLD, A FIb, chf, dm sent from [facility] for evaluation of left middle finger discoloration .Seen by hand surgeon and wound care MD - diagnosed with tip necrosis of finger ,dry dressing and antibacterial tx recommended ,no immediate sx intervention required Admitted for septic workup and evaluation, send blood and urine cx ,serial lactate levels ,monitor vitals closely hydration ,monitor urine output and renal profile ,iv abx initiated -started on Unasyn ,s/p vancomycin ,ID cons called 
wound left 3rd finger tip on unasyn  ashd, chf  hfpef  atrial fib  s/p ppm  hypertension  dm2  dementia   echo for lv function
wound left 3rd finger tip on unasyn  ashd, chf  hfpef  atrial fib  s/p ppm  hypertension  dm2  dementia  had hypoteision- bp improved with iv fluids
89 Male ,skilled nursing resident  PMH is significant for :  Arteriosclerotic heart disease (ASHD)  Atrial fibrillation  ,CHF (congestive heart failure)  ,Dementia  ,Depression  ,DM (diabetes mellitus)  ,Hyperlipemia  ,Hypertension  ,Prostate ca  ,GIB ,R groin abscess  with dementia, HTN, HLD, A FIb, chf, dm sent from [facility] for evaluation of left middle finger discoloration .Seen by hand surgeon and wound care MD - diagnosed with tip necrosis of finger ,dry dressing and antibacterial tx recommended ,no immediate sx intervention required Admitted for septic workup and evaluation, send blood and urine cx ,serial lactate levels ,monitor vitals closely hydration ,monitor urine output and renal profile ,iv abx initiated -started on Unasyn ,s/p vancomycin ,ID cons called 
wound left 3rd finger tip on unasyn  ashd, chf  hfpef  atrial fib  s/p ppm  hypertension  dm2  dementia  had hypoteision- bp improved with iv fluids  bp - stable without iv fluids 11/23
wound left 3rd finger tip on unasyn  ashd, chf  hfpef  atrial fib  s/p ppm  hypertension  dm2  dementia  had hypoteision- bp improved with iv fluids  bp - stable without iv fluids 11/23
wound left 3rd finger tip on unasyn  ashd, chf  hfpef  atrial fib  s/p ppm  hypertension  dm2  dementia echo for lv function
89 Male ,[facility] resident  PMH is significant for :  Arteriosclerotic heart disease (ASHD)  Atrial fibrillation  ,CHF (congestive heart failure)  ,Dementia  ,Depression  ,DM (diabetes mellitus)  ,Hyperlipemia  ,Hypertension  ,Prostate ca  ,GIB ,R groin abscess  with dementia, HTN, HLD, A FIb, chf, dm sent from [facility] for evaluation of left middle finger discoloration .Seen by hand surgeon and wound care MD - diagnosed with tip necrosis of finger ,dry dressing and antibacterial tx recommended ,no immediate sx intervention required Admitted for septic workup and evaluation, send blood and urine cx ,serial lactate levels ,monitor vitals closely hydration ,monitor urine output and renal profile ,iv abx initiated -started on Unasyn ,s/p vancomycin ,ID cons called 
89 Male ,long term resident  PMH is significant for :  Arteriosclerotic heart disease (ASHD)  Atrial fibrillation  ,CHF (congestive heart failure)  ,Dementia  ,Depression  ,DM (diabetes mellitus)  ,Hyperlipemia  ,Hypertension  ,Prostate ca  ,GIB ,R groin abscess  with dementia, HTN, HLD, A FIb, chf, dm sent from [facility] for evaluation of left middle finger discoloration .Seen by hand surgeon and wound care MD - diagnosed with tip necrosis of finger ,dry dressing and antibacterial tx recommended ,no immediate sx intervention required Admitted for septic workup and evaluation, send blood and urine cx ,serial lactate levels ,monitor vitals closely hydration ,monitor urine output and renal profile ,iv abx initiated -started on Unasyn ,s/p vancomycin ,ID cons called 
89 Male ,group home resident  PMH is significant for :  Arteriosclerotic heart disease (ASHD)  Atrial fibrillation  ,CHF (congestive heart failure)  ,Dementia  ,Depression  ,DM (diabetes mellitus)  ,Hyperlipemia  ,Hypertension  ,Prostate ca  ,GIB ,R groin abscess  with dementia, HTN, HLD, A FIb, chf, dm sent from [facility] for evaluation of left middle finger discoloration .Seen by hand surgeon and wound care MD - diagnosed with tip necrosis of finger ,dry dressing and antibacterial tx recommended ,no immediate sx intervention required Admitted for septic workup and evaluation, send blood and urine cx ,serial lactate levels ,monitor vitals closely hydration ,monitor urine output and renal profile ,iv abx initiated -started on Unasyn ,s/p vancomycin ,ID cons called 
89 Male ,half-way resident  PMH is significant for :  Arteriosclerotic heart disease (ASHD)  Atrial fibrillation  ,CHF (congestive heart failure)  ,Dementia  ,Depression  ,DM (diabetes mellitus)  ,Hyperlipemia  ,Hypertension  ,Prostate ca  ,GIB ,R groin abscess  with dementia, HTN, HLD, A FIb, chf, dm sent from [facility] for evaluation of left middle finger discoloration .Seen by hand surgeon and wound care MD - diagnosed with tip necrosis of finger ,dry dressing and antibacterial tx recommended ,no immediate sx intervention required Admitted for septic workup and evaluation, send blood and urine cx ,serial lactate levels ,monitor vitals closely hydration ,monitor urine output and renal profile ,iv abx initiated -started on Unasyn ,s/p vancomycin ,ID cons called

## 2021-11-24 NOTE — PROGRESS NOTE ADULT - PROBLEM SELECTOR PLAN 10
Gastrointestinal stress ulcer prophylaxis and DVT prophylaxis administered

## 2021-11-24 NOTE — PROGRESS NOTE ADULT - PROBLEM SELECTOR PROBLEM 6
Hypotension
CHF (congestive heart failure)
Hypotension
Hypotension

## 2021-11-24 NOTE — PROGRESS NOTE ADULT - PROBLEM SELECTOR PROBLEM 4
Arteriosclerotic heart disease (ASHD)

## 2021-11-24 NOTE — PROGRESS NOTE ADULT - PROBLEM SELECTOR PLAN 2
continue home medications ,not on OAC due to hx of GIB ,EKG ,card is following ,not on OAC

## 2021-11-24 NOTE — PROGRESS NOTE ADULT - PROBLEM SELECTOR PLAN 1
with tip necrosis ,poa - left hand third finger necrotic tip: scant serosanguinous drainage no odor: finger is with erythema and swelling present no warmth noted wound dimensions about 1.5 x 1.5 wound culture obtained:  fingers of hand contracted .CT -no evidence of osteomyelitis ,further abx management as per ID CONS .No surgical interventions as per hand surgeon ,outpatient f/up
with tip necrosis ,poa - left hand third finger necrotic tip: scant serosanguinous drainage no odor: finger is with erythema and swelling present no warmth noted wound dimensions about 1.5 x 1.5 wound culture obtained:  fingers of hand contracted .CT -no evidence of osteomyelitis ,further abx management as per ID CONS .No surgical interventions as per hand surgeon ,outpatient f/up
with tip necrosis ,poa - left hand third finger necrotic tip: scant serosanguinous drainage no odor: finger is with erythema and swelling present no warmth noted wound dimensions about 1.5 x 1.5 wound culture obtained:  fingers of hand contracted .CT -no evidence of osteomyelitis ,further abx management as per ID CONS .No surgical interventions as per hand surgeon ,outpatient f/up Continue UNASYN and VANCOMYCIN ,ID is following
with tip necrosis ,poa - left hand third finger necrotic tip: scant serosanguinous drainage no odor: finger is with erythema and swelling present no warmth noted wound dimensions about 1.5 x 1.5 wound culture obtained:  fingers of hand contracted .CT -no evidence of osteomyelitis ,further abx management as per ID CONS .No surgical interventions as per hand surgeon ,outpatient f/up

## 2021-11-24 NOTE — PROGRESS NOTE ADULT - PROBLEM SELECTOR PLAN 8
bowel regimen
supportive care ,frequent redirection
bowel regimen
supportive care ,frequent redirection
bowel regimen
supportive care ,frequent redirection
bowel regimen

## 2021-11-24 NOTE — PROGRESS NOTE ADULT - SUBJECTIVE AND OBJECTIVE BOX
Patient is a 89y Male with a known history of :  Gangrene of finger of left hand [I96]    DM (diabetes mellitus) [E11.9]    Afib [I48.91]    HTN (hypertension) [I10]    Arteriosclerotic heart disease (ASHD) [I25.10]    Dementia [F03.90]    CHF (congestive heart failure) [I50.9]    Constipation [K59.00]    Prophylactic measure [Z29.9]    Hypotension [I95.9]      HPI:  89 Male ,[facility] resident  PMH is significant for :  Arteriosclerotic heart disease (ASHD)  Atrial fibrillation  ,CHF (congestive heart failure)  ,Dementia  ,Depression  ,DM (diabetes mellitus)  ,Hyperlipemia  ,Hypertension  ,Prostate ca  ,GIB ,R groin abscess  with dementia, HTN, HLD, A FIb, chf, dm sent from [facility] for evaluation of left middle finger discoloration .Seen by hand surgeon and wound care MD - diagnosed with tip necrosis of finger ,dry dressing and antibacterial tx recommended ,no immediate sx intervention required Admitted for septic workup and evaluation, send blood and urine cx ,serial lactate levels ,monitor vitals closely hydration ,monitor urine output and renal profile ,iv abx initiated -started on Unasyn ,s/p vancomycin ,ID cons called Ct scan ordered to rule out OM  (16 Nov 2021 19:23)      REVIEW OF SYSTEMS:    CONSTITUTIONAL: No fever, weight loss, or fatigue  EYES: No eye pain, visual disturbances, or discharge  ENMT:  No difficulty hearing, tinnitus, vertigo; No sinus or throat pain  NECK: No pain or stiffness  BREASTS: No pain, masses, or nipple discharge  RESPIRATORY: No cough, wheezing, chills or hemoptysis; No shortness of breath  CARDIOVASCULAR: No chest pain, palpitations, dizziness, or leg swelling  GASTROINTESTINAL: No abdominal or epigastric pain. No nausea, vomiting, or hematemesis; No diarrhea or constipation. No melena or hematochezia.  GENITOURINARY: No dysuria, frequency, hematuria, or incontinence  NEUROLOGICAL: No headaches, memory loss, loss of strength, numbness, or tremors  SKIN: No itching, burning, rashes, or lesions   LYMPH NODES: No enlarged glands  ENDOCRINE: No heat or cold intolerance; No hair loss  MUSCULOSKELETAL: No joint pain or swelling; No muscle, back, or extremity pain  PSYCHIATRIC: No depression, anxiety, mood swings, or difficulty sleeping  HEME/LYMPH: No easy bruising, or bleeding gums  ALLERGY AND IMMUNOLOGIC: No hives or eczema    MEDICATIONS  (STANDING):  ascorbic acid 500 milliGRAM(s) Oral two times a day  aspirin  chewable 81 milliGRAM(s) Oral daily  cephalexin 500 milliGRAM(s) Oral every 12 hours  dextrose 40% Gel 15 Gram(s) Oral once  dextrose 5%. 1000 milliLiter(s) (50 mL/Hr) IV Continuous <Continuous>  dextrose 5%. 1000 milliLiter(s) (100 mL/Hr) IV Continuous <Continuous>  dextrose 50% Injectable 25 Gram(s) IV Push once  dextrose 50% Injectable 12.5 Gram(s) IV Push once  dextrose 50% Injectable 25 Gram(s) IV Push once  donepezil 5 milliGRAM(s) Oral at bedtime  doxycycline hyclate Capsule 100 milliGRAM(s) Oral every 12 hours  ferrous    sulfate 325 milliGRAM(s) Oral daily  furosemide    Tablet 20 milliGRAM(s) Oral daily  glucagon  Injectable 1 milliGRAM(s) IntraMuscular once  heparin   Injectable 5000 Unit(s) SubCutaneous every 12 hours  influenza  Vaccine (HIGH DOSE) 0.7 milliLiter(s) IntraMuscular once  insulin lispro (ADMELOG) corrective regimen sliding scale   SubCutaneous three times a day before meals  lactobacillus acidophilus 1 Tablet(s) Oral two times a day  losartan 25 milliGRAM(s) Oral daily  metoprolol succinate ER 25 milliGRAM(s) Oral daily  multivitamin/minerals 1 Tablet(s) Oral daily  pantoprazole   Suspension 40 milliGRAM(s) Oral before breakfast  polyethylene glycol 3350 17 Gram(s) Oral daily  povidone iodine 10% Solution 1 Application(s) Topical daily  senna 2 Tablet(s) Oral at bedtime  simvastatin 20 milliGRAM(s) Oral at bedtime  tamsulosin 0.4 milliGRAM(s) Oral at bedtime    MEDICATIONS  (PRN):  acetaminophen     Tablet .. 650 milliGRAM(s) Oral every 6 hours PRN Temp greater or equal to 38C (100.4F), Mild Pain (1 - 3)  bisacodyl Suppository 10 milliGRAM(s) Rectal daily PRN Constipation  magnesium hydroxide Suspension 30 milliLiter(s) Oral daily PRN Constipation      ALLERGIES: latex (Rash)  latex (Unknown)  No Known Drug Allergies      FAMILY HISTORY:      PHYSICAL EXAMINATION:  -----------------------------  T(C): 36.4 (11-24-21 @ 04:36), Max: 36.8 (11-23-21 @ 13:20)  HR: 79 (11-24-21 @ 04:36) (63 - 79)  BP: 109/69 (11-24-21 @ 04:36) (102/62 - 126/72)  RR: 17 (11-24-21 @ 04:36) (17 - 17)  SpO2: 92% (11-24-21 @ 04:36) (92% - 96%)  Wt(kg): --    11-23 @ 07:01  -  11-24 @ 07:00  --------------------------------------------------------  IN:  Total IN: 0 mL    OUT:    Voided (mL): 700 mL  Total OUT: 700 mL    Total NET: -700 mL            VITALS  T(C): 36.4 (11-24-21 @ 04:36), Max: 36.8 (11-23-21 @ 13:20)  HR: 79 (11-24-21 @ 04:36) (63 - 79)  BP: 109/69 (11-24-21 @ 04:36) (102/62 - 126/72)  RR: 17 (11-24-21 @ 04:36) (17 - 17)  SpO2: 92% (11-24-21 @ 04:36) (92% - 96%)    Constitutional: well developed, normal appearance, well groomed, well nourished, no deformities and no acute distress.   Eyes: the conjunctiva exhibited no abnormalities and the eyelids demonstrated no xanthelasmas.   HEENT: normal oral mucosa, no oral pallor and no oral cyanosis.   Neck: normal jugular venous A waves present, normal jugular venous V waves present and no jugular venous jacobs A waves.   Pulmonary: no respiratory distress, normal respiratory rhythm and effort, no accessory muscle use and lungs were clear to auscultation bilaterally.   Cardiovascular: heart rate and rhythm were normal, normal S1 and S2 and no murmur, gallop, rub, heave or thrill are present.   Abdomen: soft, non-tender, no hepato-splenomegaly and no abdominal mass palpated.   Musculoskeletal: the gait could not be assessed..   Extremities: no clubbing of the fingernails, no localized cyanosis, no petechial hemorrhages and no ischemic changes.   Skin: normal skin color and pigmentation, no rash, no venous stasis, no skin lesions, no skin ulcer and no xanthoma was observed.   Psychiatric: oriented to person, place, and time, the affect was normal, the mood was normal and not feeling anxious.     LABS:   --------  11-23    137  |  103  |  21  ----------------------------<  115<H>  4.3   |  29  |  0.67    Ca    8.8      23 Nov 2021 09:13                           10.9   6.50  )-----------( 233      ( 23 Nov 2021 09:13 )             32.3                 RADIOLOGY:  -----------------    ECG:     ECHO:

## 2021-11-24 NOTE — PROGRESS NOTE ADULT - PROBLEM SELECTOR PLAN 7
supportive care ,frequent redirection
continue home medications ,ins/outs
supportive care ,frequent redirection
supportive care ,frequent redirection
continue home medications ,ins/outs
supportive care ,frequent redirection
continue home medications ,ins/outs

## 2021-11-24 NOTE — PROGRESS NOTE ADULT - PROBLEM SELECTOR PLAN 6
likely 2/2 to poor po intake of fluids ,unlikely medication induced as per Dr Espinoza -continue current doses of rx
continue home medications ,ins/outs
continue home medications ,ins/outs
likely 2/2 to poor po intake of fluids ,unlikely medication induced as per Dr Espinoza -continue current doses of rx
continue home medications ,ins/outs
continue home medications ,ins/outs
likely 2/2 to poor po intake of fluids ,unlikely medication induced as per Dr Espinoza -continue current doses of rx

## 2021-11-24 NOTE — PROGRESS NOTE ADULT - PROBLEM SELECTOR PROBLEM 9
Constipation
Prophylactic measure
Prophylactic measure
Constipation
Prophylactic measure
Constipation
Prophylactic measure

## 2021-11-24 NOTE — PROGRESS NOTE ADULT - PROBLEM SELECTOR PLAN 9
bowel regimen
Gastrointestinal stress ulcer prophylaxis and DVT prophylaxis administered
bowel regimen
Gastrointestinal stress ulcer prophylaxis and DVT prophylaxis administered
bowel regimen

## 2021-11-24 NOTE — PROGRESS NOTE ADULT - PROBLEM SELECTOR PROBLEM 7
Dementia
CHF (congestive heart failure)
Dementia
Dementia
CHF (congestive heart failure)
Dementia
CHF (congestive heart failure)

## 2021-11-24 NOTE — DISCHARGE NOTE NURSING/CASE MANAGEMENT/SOCIAL WORK - NSDCVIVACCINE_GEN_ALL_CORE_FT
pneumococcal polysaccharide PPV23; 18-Jun-2014 09:36; Lamar Gonzalez (RN); [id number]; IntraMuscular; Deltoid Right.; 0.5 milliLiter(s);   Tdap; 19-Jul-2018 13:12; Arleen Wallace (RN); [employer]; [id number]; IntraMuscular; Deltoid Left.; 0.5 milliLiter(s); VIS (VIS Published: 09-May-2013, VIS Presented: 19-Jul-2018);

## 2021-11-24 NOTE — PROGRESS NOTE ADULT - NUTRITIONAL ASSESSMENT
This patient has been assessed with a concern for Malnutrition and has been determined to have a diagnosis/diagnoses of Moderate protein-calorie malnutrition.    This patient is being managed with:   Diet Pureed-  Consistent Carbohydrate {No Snacks}  Low Sodium  Supplement Feeding Modality:  Oral  Glucerna Shake Cans or Servings Per Day:  1       Frequency:  Two Times a day  Entered: Nov 22 2021  9:23AM    
This patient has been assessed with a concern for Malnutrition and has been determined to have a diagnosis/diagnoses of Moderate protein-calorie malnutrition.    This patient is being managed with:   Diet Pureed-  Consistent Carbohydrate {No Snacks}  Low Sodium  Supplement Feeding Modality:  Oral  Glucerna Shake Cans or Servings Per Day:  1       Frequency:  Two Times a day  Entered: Nov 22 2021  9:23AM    
This patient has been assessed with a concern for Malnutrition and has been determined to have a diagnosis/diagnoses of Moderate protein-calorie malnutrition.    This patient is being managed with:   Diet Pureed-  Supplement Feeding Modality:  Oral  Ensure Enlive Cans or Servings Per Day:  1       Frequency:  Daily  Ensure Pudding Cans or Servings Per Day:  1       Frequency:  Daily  Entered: Nov 19 2021  7:49AM    
This patient has been assessed with a concern for Malnutrition and has been determined to have a diagnosis/diagnoses of Moderate protein-calorie malnutrition.  This patient is being managed with:   Diet Pureed-  Supplement Feeding Modality:  Oral  Ensure Enlive Cans or Servings Per Day:  1       Frequency:  Daily  Ensure Pudding Cans or Servings Per Day:  1       Frequency:  Daily  Entered: Nov 19 2021  7:49AM
This patient has been assessed with a concern for Malnutrition and has been determined to have a diagnosis/diagnoses of Moderate protein-calorie malnutrition.    This patient is being managed with:   Diet Pureed-  Entered: Nov 16 2021  6:04PM    
This patient has been assessed with a concern for Malnutrition and has been determined to have a diagnosis/diagnoses of Moderate protein-calorie malnutrition.    This patient is being managed with:   Diet Pureed-  Consistent Carbohydrate {No Snacks}  Low Sodium  Supplement Feeding Modality:  Oral  Glucerna Shake Cans or Servings Per Day:  1       Frequency:  Two Times a day  Entered: Nov 22 2021  9:23AM    
This patient has been assessed with a concern for Malnutrition and has been determined to have a diagnosis/diagnoses of Moderate protein-calorie malnutrition.    This patient is being managed with:   Diet Pureed-  Entered: Nov 16 2021  6:04PM

## 2021-12-13 NOTE — PATIENT PROFILE ADULT - HARM RISK FACTORS
Entire school had to quarantine for 2 weeks. Went to school, then had to wear masks.  Went to bed fine.   Wakes up during the night for 2 hours-cant go back to sleep.  Teachers all called in last week so she came home from school.  Reason for Disposition  • [1] Sleep problem is new onset AND [2] sounds very stressful and urgent to triager    Protocols used: SLEEP PROBLEMS - CHILD SLEEPS IN A BED-P-AH    Parents trying to find triggers and discuss what is causing her stress but she states \"I dont know.\"    Discussed with mom and agreed to see PCP to rule out any physical causes first.  
yes

## 2021-12-30 PROCEDURE — 97162 PT EVAL MOD COMPLEX 30 MIN: CPT

## 2021-12-30 PROCEDURE — 80053 COMPREHEN METABOLIC PANEL: CPT

## 2021-12-30 PROCEDURE — 85027 COMPLETE CBC AUTOMATED: CPT

## 2021-12-30 PROCEDURE — 93005 ELECTROCARDIOGRAM TRACING: CPT

## 2021-12-30 PROCEDURE — 73201 CT UPPER EXTREMITY W/DYE: CPT

## 2021-12-30 PROCEDURE — 92526 ORAL FUNCTION THERAPY: CPT

## 2021-12-30 PROCEDURE — 80202 ASSAY OF VANCOMYCIN: CPT

## 2021-12-30 PROCEDURE — 97166 OT EVAL MOD COMPLEX 45 MIN: CPT

## 2021-12-30 PROCEDURE — 86769 SARS-COV-2 COVID-19 ANTIBODY: CPT

## 2021-12-30 PROCEDURE — 99285 EMERGENCY DEPT VISIT HI MDM: CPT

## 2021-12-30 PROCEDURE — 83605 ASSAY OF LACTIC ACID: CPT

## 2021-12-30 PROCEDURE — 92610 EVALUATE SWALLOWING FUNCTION: CPT

## 2021-12-30 PROCEDURE — 85652 RBC SED RATE AUTOMATED: CPT

## 2021-12-30 PROCEDURE — 87070 CULTURE OTHR SPECIMN AEROBIC: CPT

## 2021-12-30 PROCEDURE — 87077 CULTURE AEROBIC IDENTIFY: CPT

## 2021-12-30 PROCEDURE — 71045 X-RAY EXAM CHEST 1 VIEW: CPT

## 2021-12-30 PROCEDURE — U0003: CPT

## 2021-12-30 PROCEDURE — 97116 GAIT TRAINING THERAPY: CPT

## 2021-12-30 PROCEDURE — 87040 BLOOD CULTURE FOR BACTERIA: CPT

## 2021-12-30 PROCEDURE — 82962 GLUCOSE BLOOD TEST: CPT

## 2021-12-30 PROCEDURE — 84145 PROCALCITONIN (PCT): CPT

## 2021-12-30 PROCEDURE — 86140 C-REACTIVE PROTEIN: CPT

## 2021-12-30 PROCEDURE — 83036 HEMOGLOBIN GLYCOSYLATED A1C: CPT

## 2021-12-30 PROCEDURE — 85025 COMPLETE CBC W/AUTO DIFF WBC: CPT

## 2021-12-30 PROCEDURE — 73120 X-RAY EXAM OF HAND: CPT

## 2021-12-30 PROCEDURE — 80048 BASIC METABOLIC PNL TOTAL CA: CPT

## 2021-12-30 PROCEDURE — 87205 SMEAR GRAM STAIN: CPT

## 2021-12-30 PROCEDURE — 97110 THERAPEUTIC EXERCISES: CPT

## 2021-12-30 PROCEDURE — 93306 TTE W/DOPPLER COMPLETE: CPT

## 2021-12-30 PROCEDURE — 36415 COLL VENOUS BLD VENIPUNCTURE: CPT

## 2021-12-30 PROCEDURE — U0005: CPT

## 2022-01-07 NOTE — DIETITIAN INITIAL EVALUATION ADULT. - CALCULATED TO (G/KG)
108.75 Protopic Counseling: Patient may experience a mild burning sensation during topical application. Protopic is not approved in children less than 2 years of age. There have been case reports of hematologic and skin malignancies in patients using topical calcineurin inhibitors although causality is questionable.

## 2022-01-18 ENCOUNTER — EMERGENCY (EMERGENCY)
Facility: HOSPITAL | Age: 87
LOS: 1 days | Discharge: HOME | End: 2022-01-18
Attending: EMERGENCY MEDICINE | Admitting: EMERGENCY MEDICINE
Payer: MEDICARE

## 2022-01-18 VITALS
TEMPERATURE: 98 F | SYSTOLIC BLOOD PRESSURE: 110 MMHG | HEART RATE: 70 BPM | RESPIRATION RATE: 17 BRPM | DIASTOLIC BLOOD PRESSURE: 74 MMHG | OXYGEN SATURATION: 97 %

## 2022-01-18 VITALS
DIASTOLIC BLOOD PRESSURE: 66 MMHG | HEART RATE: 72 BPM | HEIGHT: 72 IN | OXYGEN SATURATION: 97 % | RESPIRATION RATE: 16 BRPM | TEMPERATURE: 98 F | WEIGHT: 151.02 LBS | SYSTOLIC BLOOD PRESSURE: 104 MMHG

## 2022-01-18 PROCEDURE — 70450 CT HEAD/BRAIN W/O DYE: CPT | Mod: 26,MA

## 2022-01-18 PROCEDURE — 99284 EMERGENCY DEPT VISIT MOD MDM: CPT | Mod: FS

## 2022-01-18 PROCEDURE — 73070 X-RAY EXAM OF ELBOW: CPT | Mod: 26,LT

## 2022-01-18 PROCEDURE — 73030 X-RAY EXAM OF SHOULDER: CPT | Mod: 26,LT

## 2022-01-18 PROCEDURE — 72125 CT NECK SPINE W/O DYE: CPT | Mod: 26,MA

## 2022-01-18 PROCEDURE — 71045 X-RAY EXAM CHEST 1 VIEW: CPT | Mod: 26

## 2022-01-18 PROCEDURE — 90715 TDAP VACCINE 7 YRS/> IM: CPT

## 2022-01-18 PROCEDURE — 99284 EMERGENCY DEPT VISIT MOD MDM: CPT | Mod: 25

## 2022-01-18 PROCEDURE — 73030 X-RAY EXAM OF SHOULDER: CPT

## 2022-01-18 PROCEDURE — 71045 X-RAY EXAM CHEST 1 VIEW: CPT

## 2022-01-18 PROCEDURE — 72125 CT NECK SPINE W/O DYE: CPT | Mod: MA

## 2022-01-18 PROCEDURE — 73070 X-RAY EXAM OF ELBOW: CPT

## 2022-01-18 PROCEDURE — 70450 CT HEAD/BRAIN W/O DYE: CPT | Mod: MA

## 2022-01-18 PROCEDURE — 90471 IMMUNIZATION ADMIN: CPT

## 2022-01-18 RX ORDER — ACETAMINOPHEN 500 MG
650 TABLET ORAL ONCE
Refills: 0 | Status: COMPLETED | OUTPATIENT
Start: 2022-01-18 | End: 2022-01-18

## 2022-01-18 RX ORDER — TETANUS TOXOID, REDUCED DIPHTHERIA TOXOID AND ACELLULAR PERTUSSIS VACCINE, ADSORBED 5; 2.5; 8; 8; 2.5 [IU]/.5ML; [IU]/.5ML; UG/.5ML; UG/.5ML; UG/.5ML
0.5 SUSPENSION INTRAMUSCULAR ONCE
Refills: 0 | Status: COMPLETED | OUTPATIENT
Start: 2022-01-18 | End: 2022-01-18

## 2022-01-18 RX ADMIN — TETANUS TOXOID, REDUCED DIPHTHERIA TOXOID AND ACELLULAR PERTUSSIS VACCINE, ADSORBED 0.5 MILLILITER(S): 5; 2.5; 8; 8; 2.5 SUSPENSION INTRAMUSCULAR at 12:32

## 2022-01-18 NOTE — ED PROVIDER NOTE - OBJECTIVE STATEMENT
Pt is a 89 Male ,[facility] resident PMH is significant for : Arteriosclerotic heart disease (ASHD) Atrial fibrillation ,CHF (congestive heart failure) ,Dementia ,Depression ,DM (diabetes mellitus) ,Hyperlipemia ,Hypertension ,Prostate ca ,[facility]itnessed fall s/p slip and fall out of bed unknown loc no blood thinners limited hx from pt due to dementia denies any complaints. skin tear noted on left elbow and hand

## 2022-01-18 NOTE — ED PROVIDER NOTE - ATTENDING CONTRIBUTION TO CARE
Pt is a 89 yo male who presents to the ED for unwitnessed fall out of bed.  PMHx of anemia, ASHD, A-fib does not appear to be on AC, CHF, constipation, dementia, depression, DM, HLD, HTN, prostate cancer. Pt was last admitted to the hospital from 11/16-11/22 with 3rd digit left hand tip necrosis. Pt was seen and elv by hand but no operative management was recommended. He was treated with a course of abx. Pt was discharged back to his facility. Today pt presents to the ED s/p unwitnessed fall out of bed.  Pt is unable to provide history. On exam pt lying in bed advanced dementia will answer yes to his name but is not really providing additional history, superficial abrasion noted to the top of pt scalp, heart appears to be irregular but rate controlled, lungs CTA, abd soft NT/ND. LUE: no libby TTP to shoulder, humerus, elbow, wrist. Pt with skin tear to left elbow and dorsal aspect of hand. Hand contracted and held in flexion  Pt able to move 1st and 2nd digits but does not move 3rd through 5th digits and on active ROM appears to be in pain upon movement. No skin changes noted to 5th and 4th digits with cap refill less then 2 seconds. 3rd digit there is nailbed changes with nail hypertrophy and the nail appears to be lifted up off the nailbed although no subungual hematoma noted. There is skin discoloration noted at the distal palmar aspect of the digit almost appears to be healing ulceration base. No evidence of overt necrosis. Surrounding skin with cap refill less then 2 seconds. No streaking lymphangitis.  +radial pulse. No midline C/T/L TTP. No pelvic TTP, no chest wall TTP. Pt presenting s/p unwitnessed fall out of bed. Unable to provide history due to dementia. Will obtain CT head and cervical spine and will x-ray.

## 2022-01-18 NOTE — ED PROVIDER NOTE - MUSCULOSKELETAL, MLM
left elbow small abrasion left hand skin tear nvi moving all extremities spontaneously except left arm left elbow small abrasion left hand skin tear nvi moving all extremities spontaneously except left hand contracted nvi

## 2022-01-18 NOTE — ED ADULT NURSE NOTE - OBJECTIVE STATEMENT
Pt AOx1 (self) breathing equal and unlabored. Pt sent from facility for fall. Pt has skin tear to R hand. Pt moving all extremities. Pt denies pain. Pupils are +2mm equal and reactive. Safety measures initiated. Pt changed into gown. Warm blankets applied. Will continue to monitor. cs

## 2022-01-18 NOTE — ED PROVIDER NOTE - SKIN, MLM
Skin normal color for race, warm, dry and intact. No evidence of rash. 3rd digit nailbed changes with nail hypertrophy and the nail appears to be lifted up off the nailbed although no subungual hematoma noted.  digit almost appears to be healing ulceration base.

## 2022-01-18 NOTE — ED ADULT NURSE REASSESSMENT NOTE - NS ED NURSE REASSESS COMMENT FT1
1243- Pt medicated as ordered. Pt resting comfortably in stretcher breathing equal and unlabored. Skin warm, dry and good color. Safety measures maintained. Warm blankets applied. Pt pending disposition. Will continue to monitor.

## 2022-01-18 NOTE — ED ADULT TRIAGE NOTE - DOMESTIC TRAVEL HIGH RISK QUESTION
Baudilio Cameron is a 13 year old male     PRESENTING PROBLEM:  Hit in head    NURSING ASSESSMENT:  Description:  I spoke with dad who said pt feel backward and hit his head hard on the ground while playing baseball. Had a HA right away and still has a HA. His neck is sore. Dad is not sure if pt was unconscious, was not there. Not acting confused. Continued to play the rest of the game. Pt is still sleeping so limited information  Onset/duration:  Yesterday   Precip. factors:  none  Associated symptoms:  See above  Improves/worsens symptoms:  Did not address  Pain scale (0-10)   Unable to ask  Activity:  Finished game as catcher. Dad stated he didn't seem confused    Allergies: No Known Allergies    RECOMMENDED DISPOSITION:  See in 24 hours   Will comply with recommendation: Yes     Next 5 appointments (look out 90 days)     May 16, 2018  9:00 AM CDT   Office Visit with PRINCE Gibson CNP   [facility] ([facility])    [address] [place] [id number] [phone]                  If further questions/concerns or if symptoms do not improve, worsen or new symptoms develop, call your PCP or [facility] as soon as possible.      Guideline used: Trauma, Head  Pediatric Telephone Advice, 14th Edition, Braxton Navas RN    
No

## 2022-01-18 NOTE — ED PROVIDER NOTE - NSFOLLOWUPINSTRUCTIONS_ED_ALL_ED_FT
Follow up in wound clinic  return to er for any worsening symptoms    Skin Tear    WHAT YOU NEED TO KNOW:    What do I need to know about a skin tear? A skin tear occurs when the layers of weakened skin split open from an injury. It is important to treat and prevent skin tears to prevent infection.    What increases my risk for a skin tear?   •[place] and elderly ages      •Chronic or critical illness      •Long-term use of steroids      How is a skin tear treated? Treatment may include any of the following:   •Medicines may be given to decrease pain or treat a bacterial infection.      •Bandages such as moist gauze pads or wraps may be placed on your wound. Bandages will help protect your wound from more injury, and allow your wound to heal. Do not use adhesive bandages. These could stick to your wound and make your skin tear worse.      •Stitches or medical glue may be used to close the wound so it can heal.      •Debridement is removal of dead, damaged, or infected skin to help your wound heal correctly.      How can I help prevent a skin tear?   •Clean, moisturize, and protect your skin. Baths, hot showers, and soap can dry your skin and increase your risk for skin tears. Take lukewarm showers, use mild soap as directed, and gently pat your skin dry. Use lotion to keep your skin moist after you shower. Wear long sleeves, pants, and protective footwear.      •Move carefully. Ask for help if you cannot lift yourself. Do not drag your skin when you move.      •Keep your home safe. Cover sharp corners, keep your pathways clear, and turn on lights so you can see clearly. Ask for more information if you have questions about home safety.      •Drink liquids as directed. Ask your provider how much liquid to drink each day and which liquids are best for you. Liquids will help keep your skin moist and protected from another skin tear.      •Eat high-protein foods to help with wound healing. Examples are lean meats, fish, low-fat dairy products, and beans.       When should I call my doctor?   •You have a fever or chills.      •Blood soaks through your bandage.      •You have redness, swelling, pus, or a bad odor coming from your wound.      •You have severe pain.      •Your wound tears open again.      •You have questions or concerns about your condition or care.      CARE AGREEMENT:    You have the right to help plan your care. Learn about your health condition and how it may be treated. Discuss treatment options with your healthcare providers to decide what care you want to receive. You always have the right to refuse treatment.

## 2022-01-18 NOTE — ED PROVIDER NOTE - PATIENT PORTAL LINK FT
You can access the FollowMyHealth Patient Portal offered by [facility] by registering at the following website: [email or web address]. By joining [employer]’s FollowMyHealth portal, you will also be able to view your health information using other applications (apps) compatible with our system.

## 2022-01-18 NOTE — ED PROVIDER NOTE - CARE PROVIDER_API CALL
Kamran Jaimes (DPM)  Podiatric Medicine and Surgery  [address] [place] [address]  Phone: [phone]  Fax: [phone]  Follow Up Time:

## 2022-01-18 NOTE — ED ADULT NURSE NOTE - NSIMPLEMENTINTERV_GEN_ALL_ED
Implemented All Fall with Harm Risk Interventions:  [place] to call system. Call bell, personal items and telephone within reach. Instruct patient to call for assistance. Room bathroom lighting operational. Non-slip footwear when patient is off stretcher. Physically safe environment: no spills, clutter or unnecessary equipment. Stretcher in lowest position, wheels locked, appropriate side rails in place. Provide visual cue, wrist band, yellow gown, etc. Monitor gait and stability. Monitor for mental status changes and reorient to person, place, and time. Review medications for side effects contributing to fall risk. Reinforce activity limits and safety measures with patient and family. Provide visual clues: red socks.

## 2022-01-18 NOTE — ED PROVIDER NOTE - CONSTITUTIONAL, MLM
Well appearing, awake, alert, oriented to person, not place, time/situation and in no apparent distress. mild swelling noted on top of head no laceration normal...

## 2022-01-25 ENCOUNTER — INPATIENT (INPATIENT)
Facility: HOSPITAL | Age: 87
LOS: 6 days | Discharge: HOME | DRG: 638 | End: 2022-02-01
Attending: INTERNAL MEDICINE | Admitting: INTERNAL MEDICINE
Payer: MEDICARE

## 2022-01-25 VITALS
HEIGHT: 72 IN | TEMPERATURE: 98 F | DIASTOLIC BLOOD PRESSURE: 58 MMHG | WEIGHT: 153 LBS | HEART RATE: 80 BPM | SYSTOLIC BLOOD PRESSURE: 119 MMHG | RESPIRATION RATE: 16 BRPM

## 2022-01-25 DIAGNOSIS — F03.90 UNSPECIFIED DEMENTIA, UNSPECIFIED SEVERITY, WITHOUT BEHAVIORAL DISTURBANCE, PSYCHOTIC DISTURBANCE, MOOD DISTURBANCE, AND ANXIETY: ICD-10-CM

## 2022-01-25 DIAGNOSIS — L03.119 CELLULITIS OF UNSPECIFIED PART OF LIMB: ICD-10-CM

## 2022-01-25 DIAGNOSIS — L03.114 CELLULITIS OF LEFT UPPER LIMB: ICD-10-CM

## 2022-01-25 DIAGNOSIS — I25.10 ATHEROSCLEROTIC HEART DISEASE OF NATIVE CORONARY ARTERY WITHOUT ANGINA PECTORIS: ICD-10-CM

## 2022-01-25 DIAGNOSIS — Z29.9 ENCOUNTER FOR PROPHYLACTIC MEASURES, UNSPECIFIED: ICD-10-CM

## 2022-01-25 DIAGNOSIS — K59.00 CONSTIPATION, UNSPECIFIED: ICD-10-CM

## 2022-01-25 DIAGNOSIS — I48.91 UNSPECIFIED ATRIAL FIBRILLATION: ICD-10-CM

## 2022-01-25 DIAGNOSIS — I10 ESSENTIAL (PRIMARY) HYPERTENSION: ICD-10-CM

## 2022-01-25 DIAGNOSIS — D64.9 ANEMIA, UNSPECIFIED: ICD-10-CM

## 2022-01-25 DIAGNOSIS — E11.9 TYPE 2 DIABETES MELLITUS WITHOUT COMPLICATIONS: ICD-10-CM

## 2022-01-25 LAB
ALBUMIN SERPL ELPH-MCNC: 3.1 G/DL — LOW (ref 3.3–5)
ALP SERPL-CCNC: 95 U/L — SIGNIFICANT CHANGE UP (ref 40–120)
ALT FLD-CCNC: 17 U/L — SIGNIFICANT CHANGE UP (ref 12–78)
ANION GAP SERPL CALC-SCNC: 5 MMOL/L — SIGNIFICANT CHANGE UP (ref 5–17)
APPEARANCE UR: ABNORMAL
APTT BLD: 32.3 SEC — SIGNIFICANT CHANGE UP (ref 27.5–35.5)
AST SERPL-CCNC: 16 U/L — SIGNIFICANT CHANGE UP (ref 15–37)
BASOPHILS # BLD AUTO: 0.03 K/UL — SIGNIFICANT CHANGE UP (ref 0–0.2)
BASOPHILS NFR BLD AUTO: 0.4 % — SIGNIFICANT CHANGE UP (ref 0–2)
BILIRUB SERPL-MCNC: 0.5 MG/DL — SIGNIFICANT CHANGE UP (ref 0.2–1.2)
BILIRUB UR-MCNC: NEGATIVE — SIGNIFICANT CHANGE UP
BUN SERPL-MCNC: 22 MG/DL — SIGNIFICANT CHANGE UP (ref 7–23)
CALCIUM SERPL-MCNC: 9.4 MG/DL — SIGNIFICANT CHANGE UP (ref 8.5–10.1)
CHLORIDE SERPL-SCNC: 105 MMOL/L — SIGNIFICANT CHANGE UP (ref 96–108)
CO2 SERPL-SCNC: 33 MMOL/L — HIGH (ref 22–31)
COLOR SPEC: YELLOW — SIGNIFICANT CHANGE UP
CREAT SERPL-MCNC: 0.64 MG/DL — SIGNIFICANT CHANGE UP (ref 0.5–1.3)
DIFF PNL FLD: ABNORMAL
EOSINOPHIL # BLD AUTO: 0.12 K/UL — SIGNIFICANT CHANGE UP (ref 0–0.5)
EOSINOPHIL NFR BLD AUTO: 1.4 % — SIGNIFICANT CHANGE UP (ref 0–6)
GLUCOSE SERPL-MCNC: 103 MG/DL — HIGH (ref 70–99)
GLUCOSE UR QL: NEGATIVE — SIGNIFICANT CHANGE UP
HCT VFR BLD CALC: 34.9 % — LOW (ref 39–50)
HGB BLD-MCNC: 11.7 G/DL — LOW (ref 13–17)
IMM GRANULOCYTES NFR BLD AUTO: 0.4 % — SIGNIFICANT CHANGE UP (ref 0–1.5)
INR BLD: 1.2 RATIO — HIGH (ref 0.88–1.16)
KETONES UR-MCNC: NEGATIVE — SIGNIFICANT CHANGE UP
LACTATE SERPL-SCNC: 1.5 MMOL/L — SIGNIFICANT CHANGE UP (ref 0.7–2)
LEUKOCYTE ESTERASE UR-ACNC: ABNORMAL
LYMPHOCYTES # BLD AUTO: 1.31 K/UL — SIGNIFICANT CHANGE UP (ref 1–3.3)
LYMPHOCYTES # BLD AUTO: 15.4 % — SIGNIFICANT CHANGE UP (ref 13–44)
MCHC RBC-ENTMCNC: 31.8 PG — SIGNIFICANT CHANGE UP (ref 27–34)
MCHC RBC-ENTMCNC: 33.5 GM/DL — SIGNIFICANT CHANGE UP (ref 32–36)
MCV RBC AUTO: 94.8 FL — SIGNIFICANT CHANGE UP (ref 80–100)
MONOCYTES # BLD AUTO: 0.84 K/UL — SIGNIFICANT CHANGE UP (ref 0–0.9)
MONOCYTES NFR BLD AUTO: 9.9 % — SIGNIFICANT CHANGE UP (ref 2–14)
NEUTROPHILS # BLD AUTO: 6.19 K/UL — SIGNIFICANT CHANGE UP (ref 1.8–7.4)
NEUTROPHILS NFR BLD AUTO: 72.5 % — SIGNIFICANT CHANGE UP (ref 43–77)
NITRITE UR-MCNC: POSITIVE
NRBC # BLD: 0 /100 WBCS — SIGNIFICANT CHANGE UP (ref 0–0)
PH UR: 6 — SIGNIFICANT CHANGE UP (ref 5–8)
PLATELET # BLD AUTO: 255 K/UL — SIGNIFICANT CHANGE UP (ref 150–400)
POTASSIUM SERPL-MCNC: 4.5 MMOL/L — SIGNIFICANT CHANGE UP (ref 3.5–5.3)
POTASSIUM SERPL-SCNC: 4.5 MMOL/L — SIGNIFICANT CHANGE UP (ref 3.5–5.3)
PROT SERPL-MCNC: 7.2 G/DL — SIGNIFICANT CHANGE UP (ref 6–8.3)
PROT UR-MCNC: 15
PROTHROM AB SERPL-ACNC: 13.9 SEC — HIGH (ref 10.6–13.6)
RBC # BLD: 3.68 M/UL — LOW (ref 4.2–5.8)
RBC # FLD: 13.2 % — SIGNIFICANT CHANGE UP (ref 10.3–14.5)
SARS-COV-2 RNA SPEC QL NAA+PROBE: SIGNIFICANT CHANGE UP
SODIUM SERPL-SCNC: 143 MMOL/L — SIGNIFICANT CHANGE UP (ref 135–145)
SP GR SPEC: 1.01 — SIGNIFICANT CHANGE UP (ref 1.01–1.02)
UROBILINOGEN FLD QL: NEGATIVE — SIGNIFICANT CHANGE UP
WBC # BLD: 8.52 K/UL — SIGNIFICANT CHANGE UP (ref 3.8–10.5)
WBC # FLD AUTO: 8.52 K/UL — SIGNIFICANT CHANGE UP (ref 3.8–10.5)

## 2022-01-25 PROCEDURE — 73130 X-RAY EXAM OF HAND: CPT | Mod: 26,LT

## 2022-01-25 PROCEDURE — 99285 EMERGENCY DEPT VISIT HI MDM: CPT | Mod: FS

## 2022-01-25 PROCEDURE — 71045 X-RAY EXAM CHEST 1 VIEW: CPT | Mod: 26

## 2022-01-25 RX ORDER — SODIUM CHLORIDE 9 MG/ML
1000 INJECTION INTRAMUSCULAR; INTRAVENOUS; SUBCUTANEOUS
Refills: 0 | Status: DISCONTINUED | OUTPATIENT
Start: 2022-01-25 | End: 2022-01-29

## 2022-01-25 RX ORDER — GLUCAGON INJECTION, SOLUTION 0.5 MG/.1ML
1 INJECTION, SOLUTION SUBCUTANEOUS ONCE
Refills: 0 | Status: DISCONTINUED | OUTPATIENT
Start: 2022-01-25 | End: 2022-02-01

## 2022-01-25 RX ORDER — DEXTROSE 50 % IN WATER 50 %
25 SYRINGE (ML) INTRAVENOUS ONCE
Refills: 0 | Status: DISCONTINUED | OUTPATIENT
Start: 2022-01-25 | End: 2022-02-01

## 2022-01-25 RX ORDER — DONEPEZIL HYDROCHLORIDE 10 MG/1
5 TABLET, FILM COATED ORAL AT BEDTIME
Refills: 0 | Status: DISCONTINUED | OUTPATIENT
Start: 2022-01-25 | End: 2022-02-01

## 2022-01-25 RX ORDER — FUROSEMIDE 40 MG
20 TABLET ORAL DAILY
Refills: 0 | Status: DISCONTINUED | OUTPATIENT
Start: 2022-01-25 | End: 2022-01-26

## 2022-01-25 RX ORDER — METOPROLOL TARTRATE 50 MG
25 TABLET ORAL DAILY
Refills: 0 | Status: DISCONTINUED | OUTPATIENT
Start: 2022-01-25 | End: 2022-01-26

## 2022-01-25 RX ORDER — LACTOBACILLUS ACIDOPHILUS 100MM CELL
1 CAPSULE ORAL DAILY
Refills: 0 | Status: DISCONTINUED | OUTPATIENT
Start: 2022-01-25 | End: 2022-02-01

## 2022-01-25 RX ORDER — DEXTROSE 50 % IN WATER 50 %
12.5 SYRINGE (ML) INTRAVENOUS ONCE
Refills: 0 | Status: DISCONTINUED | OUTPATIENT
Start: 2022-01-25 | End: 2022-02-01

## 2022-01-25 RX ORDER — PIPERACILLIN AND TAZOBACTAM 4; .5 G/20ML; G/20ML
3.38 INJECTION, POWDER, LYOPHILIZED, FOR SOLUTION INTRAVENOUS EVERY 8 HOURS
Refills: 0 | Status: DISCONTINUED | OUTPATIENT
Start: 2022-01-25 | End: 2022-01-25

## 2022-01-25 RX ORDER — ASPIRIN/CALCIUM CARB/MAGNESIUM 324 MG
81 TABLET ORAL DAILY
Refills: 0 | Status: DISCONTINUED | OUTPATIENT
Start: 2022-01-25 | End: 2022-01-28

## 2022-01-25 RX ORDER — SODIUM CHLORIDE 9 MG/ML
1000 INJECTION, SOLUTION INTRAVENOUS
Refills: 0 | Status: DISCONTINUED | OUTPATIENT
Start: 2022-01-25 | End: 2022-02-01

## 2022-01-25 RX ORDER — ACETAMINOPHEN 500 MG
650 TABLET ORAL EVERY 6 HOURS
Refills: 0 | Status: DISCONTINUED | OUTPATIENT
Start: 2022-01-25 | End: 2022-01-25

## 2022-01-25 RX ORDER — INSULIN LISPRO 100/ML
VIAL (ML) SUBCUTANEOUS
Refills: 0 | Status: DISCONTINUED | OUTPATIENT
Start: 2022-01-25 | End: 2022-02-01

## 2022-01-25 RX ORDER — TRAMADOL HYDROCHLORIDE 50 MG/1
50 TABLET ORAL
Refills: 0 | Status: DISCONTINUED | OUTPATIENT
Start: 2022-01-25 | End: 2022-01-25

## 2022-01-25 RX ORDER — ACETAMINOPHEN 500 MG
650 TABLET ORAL EVERY 6 HOURS
Refills: 0 | Status: DISCONTINUED | OUTPATIENT
Start: 2022-01-25 | End: 2022-02-01

## 2022-01-25 RX ORDER — MULTIVIT-MIN/FERROUS GLUCONATE 9 MG/15 ML
1 LIQUID (ML) ORAL DAILY
Refills: 0 | Status: DISCONTINUED | OUTPATIENT
Start: 2022-01-25 | End: 2022-02-01

## 2022-01-25 RX ORDER — FERROUS SULFATE 325(65) MG
325 TABLET ORAL AT BEDTIME
Refills: 0 | Status: DISCONTINUED | OUTPATIENT
Start: 2022-01-25 | End: 2022-02-01

## 2022-01-25 RX ORDER — PIPERACILLIN AND TAZOBACTAM 4; .5 G/20ML; G/20ML
3.38 INJECTION, POWDER, LYOPHILIZED, FOR SOLUTION INTRAVENOUS ONCE
Refills: 0 | Status: COMPLETED | OUTPATIENT
Start: 2022-01-25 | End: 2022-01-25

## 2022-01-25 RX ORDER — ONDANSETRON 8 MG/1
4 TABLET, FILM COATED ORAL EVERY 8 HOURS
Refills: 0 | Status: DISCONTINUED | OUTPATIENT
Start: 2022-01-25 | End: 2022-02-01

## 2022-01-25 RX ORDER — POLYETHYLENE GLYCOL 3350 17 G/17G
17 POWDER, FOR SOLUTION ORAL DAILY
Refills: 0 | Status: DISCONTINUED | OUTPATIENT
Start: 2022-01-25 | End: 2022-02-01

## 2022-01-25 RX ORDER — CEFEPIME 1 G/1
2000 INJECTION, POWDER, FOR SOLUTION INTRAMUSCULAR; INTRAVENOUS EVERY 12 HOURS
Refills: 0 | Status: DISCONTINUED | OUTPATIENT
Start: 2022-01-26 | End: 2022-01-27

## 2022-01-25 RX ORDER — SODIUM CHLORIDE 9 MG/ML
1000 INJECTION INTRAMUSCULAR; INTRAVENOUS; SUBCUTANEOUS ONCE
Refills: 0 | Status: DISCONTINUED | OUTPATIENT
Start: 2022-01-25 | End: 2022-01-25

## 2022-01-25 RX ORDER — DEXTROSE 50 % IN WATER 50 %
15 SYRINGE (ML) INTRAVENOUS ONCE
Refills: 0 | Status: DISCONTINUED | OUTPATIENT
Start: 2022-01-25 | End: 2022-02-01

## 2022-01-25 RX ORDER — HEPARIN SODIUM 5000 [USP'U]/ML
5000 INJECTION INTRAVENOUS; SUBCUTANEOUS EVERY 12 HOURS
Refills: 0 | Status: DISCONTINUED | OUTPATIENT
Start: 2022-01-25 | End: 2022-02-01

## 2022-01-25 RX ORDER — VANCOMYCIN HCL 1 G
1000 VIAL (EA) INTRAVENOUS ONCE
Refills: 0 | Status: COMPLETED | OUTPATIENT
Start: 2022-01-25 | End: 2022-01-25

## 2022-01-25 RX ORDER — METFORMIN HYDROCHLORIDE 850 MG/1
500 TABLET ORAL
Refills: 0 | Status: DISCONTINUED | OUTPATIENT
Start: 2022-01-25 | End: 2022-01-25

## 2022-01-25 RX ORDER — LOSARTAN POTASSIUM 100 MG/1
25 TABLET, FILM COATED ORAL DAILY
Refills: 0 | Status: DISCONTINUED | OUTPATIENT
Start: 2022-01-25 | End: 2022-01-26

## 2022-01-25 RX ORDER — PANTOPRAZOLE SODIUM 20 MG/1
40 TABLET, DELAYED RELEASE ORAL
Refills: 0 | Status: DISCONTINUED | OUTPATIENT
Start: 2022-01-25 | End: 2022-01-28

## 2022-01-25 RX ORDER — TAMSULOSIN HYDROCHLORIDE 0.4 MG/1
0.4 CAPSULE ORAL AT BEDTIME
Refills: 0 | Status: DISCONTINUED | OUTPATIENT
Start: 2022-01-25 | End: 2022-02-01

## 2022-01-25 RX ORDER — LANOLIN ALCOHOL/MO/W.PET/CERES
3 CREAM (GRAM) TOPICAL AT BEDTIME
Refills: 0 | Status: DISCONTINUED | OUTPATIENT
Start: 2022-01-25 | End: 2022-02-01

## 2022-01-25 RX ORDER — ASCORBIC ACID 60 MG
500 TABLET,CHEWABLE ORAL DAILY
Refills: 0 | Status: DISCONTINUED | OUTPATIENT
Start: 2022-01-25 | End: 2022-02-01

## 2022-01-25 RX ORDER — SENNA PLUS 8.6 MG/1
2 TABLET ORAL AT BEDTIME
Refills: 0 | Status: DISCONTINUED | OUTPATIENT
Start: 2022-01-25 | End: 2022-02-01

## 2022-01-25 RX ORDER — SIMVASTATIN 20 MG/1
20 TABLET, FILM COATED ORAL AT BEDTIME
Refills: 0 | Status: DISCONTINUED | OUTPATIENT
Start: 2022-01-25 | End: 2022-02-01

## 2022-01-25 RX ORDER — VANCOMYCIN HCL 1 G
1000 VIAL (EA) INTRAVENOUS EVERY 12 HOURS
Refills: 0 | Status: DISCONTINUED | OUTPATIENT
Start: 2022-01-26 | End: 2022-01-27

## 2022-01-25 RX ORDER — MORPHINE SULFATE 50 MG/1
2 CAPSULE, EXTENDED RELEASE ORAL EVERY 4 HOURS
Refills: 0 | Status: DISCONTINUED | OUTPATIENT
Start: 2022-01-25 | End: 2022-01-25

## 2022-01-25 RX ADMIN — PIPERACILLIN AND TAZOBACTAM 200 GRAM(S): 4; .5 INJECTION, POWDER, LYOPHILIZED, FOR SOLUTION INTRAVENOUS at 13:50

## 2022-01-25 RX ADMIN — SIMVASTATIN 20 MILLIGRAM(S): 20 TABLET, FILM COATED ORAL at 22:10

## 2022-01-25 RX ADMIN — SODIUM CHLORIDE 50 MILLILITER(S): 9 INJECTION INTRAMUSCULAR; INTRAVENOUS; SUBCUTANEOUS at 22:02

## 2022-01-25 RX ADMIN — Medication 1000 MILLIGRAM(S): at 16:30

## 2022-01-25 RX ADMIN — Medication 250 MILLIGRAM(S): at 14:55

## 2022-01-25 RX ADMIN — DONEPEZIL HYDROCHLORIDE 5 MILLIGRAM(S): 10 TABLET, FILM COATED ORAL at 22:10

## 2022-01-25 RX ADMIN — PIPERACILLIN AND TAZOBACTAM 3.38 GRAM(S): 4; .5 INJECTION, POWDER, LYOPHILIZED, FOR SOLUTION INTRAVENOUS at 14:20

## 2022-01-25 RX ADMIN — TAMSULOSIN HYDROCHLORIDE 0.4 MILLIGRAM(S): 0.4 CAPSULE ORAL at 22:10

## 2022-01-25 RX ADMIN — Medication 325 MILLIGRAM(S): at 22:10

## 2022-01-25 RX ADMIN — SENNA PLUS 2 TABLET(S): 8.6 TABLET ORAL at 22:10

## 2022-01-25 RX ADMIN — HEPARIN SODIUM 5000 UNIT(S): 5000 INJECTION INTRAVENOUS; SUBCUTANEOUS at 19:30

## 2022-01-25 NOTE — ED PROVIDER NOTE - SKIN, MLM
diffuse swelling and redness to left middle digit. sloughing of skin to distal left middle digit with foul smelling drainage. +onychomycosis of nail diffuse swelling and redness to left middle digit (extends to include left hand). sloughing of skin to distal left middle digit with foul smelling drainage. +onychomycosis of nail

## 2022-01-25 NOTE — H&P ADULT - HISTORY OF PRESENT ILLNESS
89 year old male with history of dementia, anemia, CAD, prostate cancer, A-fib, DM, depression, CHF, and HLD BIBA for left hand infection. limited history due to dementia. afebrile in triage. Per previous nursing records, had history of left hand infection in Nov (was improving at that time, was being followed by home health). Patient was admitted in November 2021 with left hand third finger necrotic tip: scant serosanguinous drainage no odor: finger is with erythema and swelling present no warmth noted wound dimensions about 1.5 x 1.5 wound culture obtained:  fingers of hand contracted .CT -no evidence of osteomyelitis ,discharged on 7 days of po abx after ID & plastic sx clearance was obtained ,patient was given followup with plastic hand sx Dr GRIFFIN Sosa. Patient is diagnosed with cellulitis of 3rd finger of left hand Admitted for septic workup and evaluation,send blood and urine cx,serial lactate levels,monitor vitals clos[place],ivfs hydration,monitor urine output and renal profile,iv abx initiated ,ID cons called . PCP Toby Quintana Resident at [facility]

## 2022-01-25 NOTE — ED ADULT NURSE NOTE - NSIMPLEMENTINTERV_GEN_ALL_ED
Implemented All Fall with Harm Risk Interventions:  [place] to call system. Call bell, personal items and telephone within reach. Instruct patient to call for assistance. Room bathroom lighting operational. Non-slip footwear when patient is off stretcher. Physically safe environment: no spills, clutter or unnecessary equipment. Stretcher in lowest position, wheels locked, appropriate side rails in place. Provide visual cue, wrist band, yellow gown, etc. Monitor gait and stability. Monitor for mental status changes and reorient to person, place, and time. Review medications for side effects contributing to fall risk. Reinforce activity limits and safety measures with patient and family. Provide visual clues: red socks.

## 2022-01-25 NOTE — H&P ADULT - PROBLEM SELECTOR PLAN 3
Accuchecks monitoring and insulin corrective regimen  sliding scale coverage with short acting insulin, add long-acting insulin as needed ,no concentrated sweets diet, serial labs ,HbA1C,education

## 2022-01-25 NOTE — CONSULT NOTE ADULT - SUBJECTIVE AND OBJECTIVE BOX
Patient is a 89y old  Male who presents with a chief complaint of hand infection left (25 Jan 2022 17:03)      HPI:  89 year old male with history of dementia, anemia, CAD, prostate cancer, A-fib, DM, depression, CHF, and HLD BIBA for left hand infection. limited history due to dementia. afebrile in triage. Per previous nursing records, had history of left hand infection in Nov (was improving at that time, was being followed by home health). Patient was admitted in November 2021 with left hand third finger necrotic tip: scant serosanguinous drainage no odor: finger is with erythema and swelling present no warmth noted wound dimensions about 1.5 x 1.5 wound culture obtained:  fingers of hand contracted .CT -no evidence of osteomyelitis ,discharged on 7 days of po abx after ID & plastic sx clearance was obtained ,patient was given followup with plastic hand sx Dr GRIFFIN Sosa. Patient is diagnosed with cellulitis of 3rd finger of left hand Admitted for septic workup and evaluation,send blood and urine cx,serial lactate levels,monitor vitals closley,ivfs hydration,monitor urine output and renal profile,iv abx initiated ,ID cons called . PCP Toby Quintana Resident at [facility] (25 Jan 2022 19:39)      Asked to see patient for ID Consult    PAST MEDICAL & SURGICAL HISTORY:  Atrial fibrillation    Hypertension    Diabetes    Prostate ca    Dementia    CHF (congestive heart failure)    Hyperlipemia    Diabetes    Depression    Dementia    DM (diabetes mellitus)    Atrial fibrillation    Prostate CA    Arteriosclerotic heart disease (ASHD)    Dementia    Anemia    Constipation    No significant past surgical history        Allergies    latex (Rash)  latex (Unknown)  No Known Drug Allergies    Intolerances        REVIEW OF SYSTEMS:  All systems below were reviewed and are negative [  ]  HEENT:  ID:  Pulmonary:  Cardiac:  GI:  Renal:  Musculoskeletal:  All other systems above were reviewed and are negative   [  ]    HOME MEDICATIONS:    MEDICATIONS  (STANDING):  ascorbic acid 500 milliGRAM(s) Oral daily  aspirin enteric coated 81 milliGRAM(s) Oral daily  dextrose 40% Gel 15 Gram(s) Oral once  dextrose 5%. 1000 milliLiter(s) (50 mL/Hr) IV Continuous <Continuous>  dextrose 5%. 1000 milliLiter(s) (100 mL/Hr) IV Continuous <Continuous>  dextrose 50% Injectable 25 Gram(s) IV Push once  dextrose 50% Injectable 12.5 Gram(s) IV Push once  dextrose 50% Injectable 25 Gram(s) IV Push once  donepezil 5 milliGRAM(s) Oral at bedtime  ferrous    sulfate 325 milliGRAM(s) Oral at bedtime  furosemide    Tablet 20 milliGRAM(s) Oral daily  glucagon  Injectable 1 milliGRAM(s) IntraMuscular once  heparin   Injectable 5000 Unit(s) SubCutaneous every 12 hours  insulin lispro (ADMELOG) corrective regimen sliding scale   SubCutaneous three times a day before meals  lactobacillus acidophilus 1 Tablet(s) Oral daily  losartan 25 milliGRAM(s) Oral daily  metoprolol succinate ER 25 milliGRAM(s) Oral daily  multivitamin/minerals 1 Tablet(s) Oral daily  pantoprazole    Tablet 40 milliGRAM(s) Oral before breakfast  polyethylene glycol 3350 17 Gram(s) Oral daily  senna 2 Tablet(s) Oral at bedtime  simvastatin 20 milliGRAM(s) Oral at bedtime  sodium chloride 0.9%. 1000 milliLiter(s) (50 mL/Hr) IV Continuous <Continuous>  tamsulosin 0.4 milliGRAM(s) Oral at bedtime    MEDICATIONS  (PRN):  acetaminophen     Tablet .. 650 milliGRAM(s) Oral every 6 hours PRN Temp greater or equal to 38C (100.4F), Mild Pain (1 - 3)  aluminum hydroxide/magnesium hydroxide/simethicone Suspension 30 milliLiter(s) Oral every 4 hours PRN Dyspepsia  melatonin 3 milliGRAM(s) Oral at bedtime PRN Insomnia  morphine  - Injectable 2 milliGRAM(s) IV Push every 4 hours PRN Severe Pain (7 - 10)  ondansetron Injectable 4 milliGRAM(s) IV Push every 8 hours PRN Nausea and/or Vomiting  traMADol 50 milliGRAM(s) Oral four times a day PRN Moderate Pain (4 - 6)      Vital Signs Last 24 Hrs  T(C): 37.1 (25 Jan 2022 18:56), Max: 37.1 (25 Jan 2022 18:56)  T(F): 98.7 (25 Jan 2022 18:56), Max: 98.7 (25 Jan 2022 18:56)  HR: 66 (25 Jan 2022 18:56) (66 - 80)  BP: 116/63 (25 Jan 2022 18:56) (116/63 - 119/58)  BP(mean): --  RR: 18 (25 Jan 2022 18:56) (16 - 18)  SpO2: 97% (25 Jan 2022 18:56) (95% - 97%)    PHYSICAL EXAM:  HEENT:  Neck:  Lungs:  Heart:  Abdomen:  Genital/ Rectal:  Extremities:  Neurologic:  Vascular:    I&O's Summary      LABORATORY:                          11.7   8.52  )-----------( 255      ( 25 Jan 2022 14:00 )             34.9           01-25    143  |  105  |  22  ----------------------------<  103<H>  4.5   |  33<H>  |  0.64    Ca    9.4      25 Jan 2022 14:00    TPro  7.2  /  Alb  3.1<L>  /  TBili  0.5  /  DBili  x   /  AST  16  /  ALT  17  /  AlkPhos  95  01-25          LABORATORY:    CBC Full  -  ( 25 Jan 2022 14:00 )  WBC Count : 8.52 K/uL  RBC Count : 3.68 M/uL  Hemoglobin : 11.7 g/dL  Hematocrit : 34.9 %  Platelet Count - Automated : 255 K/uL  Mean Cell Volume : 94.8 fl  Mean Cell Hemoglobin : 31.8 pg  Mean Cell Hemoglobin Concentration : 33.5 gm/dL  Auto Neutrophil # : 6.19 K/uL  Auto Lymphocyte # : 1.31 K/uL  Auto Monocyte # : 0.84 K/uL  Auto Eosinophil # : 0.12 K/uL  Auto Basophil # : 0.03 K/uL  Auto Neutrophil % : 72.5 %  Auto Lymphocyte % : 15.4 %  Auto Monocyte % : 9.9 %  Auto Eosinophil % : 1.4 %  Auto Basophil % : 0.4 %          01-25    143  |  105  |  22  ----------------------------<  103<H>  4.5   |  33<H>  |  0.64    Ca    9.4      25 Jan 2022 14:00    TPro  7.2  /  Alb  3.1<L>  /  TBili  0.5  /  DBili  x   /  AST  16  /  ALT  17  /  AlkPhos  95  01-25      Assessment and plan:    1.  Left 3rd finger abscess. r/o osteo    . Get culture.  , Get Nuclear scan of left 3rd finger, No MRI (AICD)  , Discontinue IV Zosyn. Add IV Vanco and Cefepime.  . Hand surgery evaluation with Dr. Matute      Thank you                                    Patient is a 89y old  Male who presents with a chief complaint of hand infection left (25 Jan 2022 17:03)      89 year old male with history of dementia, anemia, CAD, prostate cancer, A-fib, DM, depression, CHF, and HLD BIBA for left hand infection. limited history due to dementia. afebrile in triage. Per previous nursing records, had history of left hand infection in Nov (was improving at that time, was being followed by home health). Patient was admitted in November 2021. CT was negative for osteomyelitis at that time. Plastic surgery was consulted who did not want to do surgery. He was then discharged with oral antibiotics. He was brought back to the ED today for worsened pain and swelling of the left 3rd finger. In the ED he was afebrile. He was given IV Vancomycin and Zosyn.         PAST MEDICAL & SURGICAL HISTORY:  Atrial fibrillation    Hypertension    Diabetes    Prostate ca    Dementia    CHF (congestive heart failure)    Hyperlipemia    Diabetes    Depression    Dementia    DM (diabetes mellitus)    Atrial fibrillation    Prostate CA    Arteriosclerotic heart disease (ASHD)    Dementia    Anemia    Constipation    No significant past surgical history        Allergies    latex (Rash)  latex (Unknown)  No Known Drug Allergies    Intolerances        REVIEW OF SYSTEMS:  No cough or SOB  No diarrhea.    HOME MEDICATIONS:    MEDICATIONS  (STANDING):  ascorbic acid 500 milliGRAM(s) Oral daily  aspirin enteric coated 81 milliGRAM(s) Oral daily  dextrose 40% Gel 15 Gram(s) Oral once  dextrose 5%. 1000 milliLiter(s) (50 mL/Hr) IV Continuous <Continuous>  dextrose 5%. 1000 milliLiter(s) (100 mL/Hr) IV Continuous <Continuous>  dextrose 50% Injectable 25 Gram(s) IV Push once  dextrose 50% Injectable 12.5 Gram(s) IV Push once  dextrose 50% Injectable 25 Gram(s) IV Push once  donepezil 5 milliGRAM(s) Oral at bedtime  ferrous    sulfate 325 milliGRAM(s) Oral at bedtime  furosemide    Tablet 20 milliGRAM(s) Oral daily  glucagon  Injectable 1 milliGRAM(s) IntraMuscular once  heparin   Injectable 5000 Unit(s) SubCutaneous every 12 hours  insulin lispro (ADMELOG) corrective regimen sliding scale   SubCutaneous three times a day before meals  lactobacillus acidophilus 1 Tablet(s) Oral daily  losartan 25 milliGRAM(s) Oral daily  metoprolol succinate ER 25 milliGRAM(s) Oral daily  multivitamin/minerals 1 Tablet(s) Oral daily  pantoprazole    Tablet 40 milliGRAM(s) Oral before breakfast  polyethylene glycol 3350 17 Gram(s) Oral daily  senna 2 Tablet(s) Oral at bedtime  simvastatin 20 milliGRAM(s) Oral at bedtime  sodium chloride 0.9%. 1000 milliLiter(s) (50 mL/Hr) IV Continuous <Continuous>  tamsulosin 0.4 milliGRAM(s) Oral at bedtime    MEDICATIONS  (PRN):  acetaminophen     Tablet .. 650 milliGRAM(s) Oral every 6 hours PRN Temp greater or equal to 38C (100.4F), Mild Pain (1 - 3)  aluminum hydroxide/magnesium hydroxide/simethicone Suspension 30 milliLiter(s) Oral every 4 hours PRN Dyspepsia  melatonin 3 milliGRAM(s) Oral at bedtime PRN Insomnia  morphine  - Injectable 2 milliGRAM(s) IV Push every 4 hours PRN Severe Pain (7 - 10)  ondansetron Injectable 4 milliGRAM(s) IV Push every 8 hours PRN Nausea and/or Vomiting  traMADol 50 milliGRAM(s) Oral four times a day PRN Moderate Pain (4 - 6)      Vital Signs Last 24 Hrs  T(C): 37.1 (25 Jan 2022 18:56), Max: 37.1 (25 Jan 2022 18:56)  T(F): 98.7 (25 Jan 2022 18:56), Max: 98.7 (25 Jan 2022 18:56)  HR: 66 (25 Jan 2022 18:56) (66 - 80)  BP: 116/63 (25 Jan 2022 18:56) (116/63 - 119/58)  BP(mean): --  RR: 18 (25 Jan 2022 18:56) (16 - 18)  SpO2: 97% (25 Jan 2022 18:56) (95% - 97%)    PHYSICAL EXAM:  HEENT: NC/AT; PERRLA.  Neck: Soft, no tenderness.   Lungs: Coarse BS bilaterally, no wheezing.   Heart: RRR, no murmurs.   Abdomen: Soft, no tenderness.   Genital/ Rectal: No fitch catheter.   Extremities: Severe swelling of the left 3rd finger with purulent drainage of the finger tip. Contracted fingers   Neurologic: Awake.       I&O's Summary      LABORATORY:                          11.7   8.52  )-----------( 255      ( 25 Jan 2022 14:00 )             34.9           01-25    143  |  105  |  22  ----------------------------<  103<H>  4.5   |  33<H>  |  0.64    Ca    9.4      25 Jan 2022 14:00    TPro  7.2  /  Alb  3.1<L>  /  TBili  0.5  /  DBili  x   /  AST  16  /  ALT  17  /  AlkPhos  95  01-25          LABORATORY:    CBC Full  -  ( 25 Jan 2022 14:00 )  WBC Count : 8.52 K/uL  RBC Count : 3.68 M/uL  Hemoglobin : 11.7 g/dL  Hematocrit : 34.9 %  Platelet Count - Automated : 255 K/uL  Mean Cell Volume : 94.8 fl  Mean Cell Hemoglobin : 31.8 pg  Mean Cell Hemoglobin Concentration : 33.5 gm/dL  Auto Neutrophil # : 6.19 K/uL  Auto Lymphocyte # : 1.31 K/uL  Auto Monocyte # : 0.84 K/uL  Auto Eosinophil # : 0.12 K/uL  Auto Basophil # : 0.03 K/uL  Auto Neutrophil % : 72.5 %  Auto Lymphocyte % : 15.4 %  Auto Monocyte % : 9.9 %  Auto Eosinophil % : 1.4 %  Auto Basophil % : 0.4 %          01-25    143  |  105  |  22  ----------------------------<  103<H>  4.5   |  33<H>  |  0.64    Ca    9.4      25 Jan 2022 14:00    TPro  7.2  /  Alb  3.1<L>  /  TBili  0.5  /  DBili  x   /  AST  16  /  ALT  17  /  AlkPhos  95  01-25      Assessment and plan:    1.  Left 3rd finger abscess. r/o osteomyelitis   2. DM,  3. CKD    . Get cultures of the fimger.   , Get Nuclear scan of left 3rd finge. o MRI (AICD)  , Discontinue IV Zosyn. Add IV Vanco and Cefepime.  . Hand surgery evaluation with Dr. Matute for possible need of debridement.       Thank you

## 2022-01-25 NOTE — H&P ADULT - TIME BILLING
75minutes spent on this visit, 50% visit time spent in care co-ordination with other attendings and counselling patient ,requesting necessary consults ,informing family about status & plan of care .I have discussed care plan with [facility] wellness/admitting /nursing   department ,outpatient PCP , hospital consultants , ER physician & med staff .

## 2022-01-25 NOTE — ED PROVIDER NOTE - ATTENDING CONTRIBUTION TO CARE
I have personally performed a face to face diagnostic evaluation on this patient.  I have reviewed the PA's note and agree with the history, exam, and plan of care, except as noted.  History and Exam by me shows 89 year old male with history of dementia, anemia, CAD, prostate cancer, A-fib, DM, depression, CHF, and HLD BIBA for left hand infection. limited history due to dementia. afebrile in triage. Per previous nursing records, had history of left hand infection in Nov (was improving at that time, was being followed by home health).  .  Patient is NAD.  Alert. NC/AT. Lungs cta bl. Heart s1,s2, rrr, no murmurs. left hand- 3rd finger erythema and swollen.  admit for left hand cellulitis

## 2022-01-25 NOTE — CONSULT NOTE ADULT - SUBJECTIVE AND OBJECTIVE BOX
CARDIOLOGY CONSULT NOTE    Patient is a 89y Male with a known history of : left hand cellulitis    HPI:      REVIEW OF SYSTEMS:    CONSTITUTIONAL: No fever, weight loss, or fatigue  EYES: No eye pain, visual disturbances, or discharge  ENMT:  No difficulty hearing, tinnitus, vertigo; No sinus or throat pain  NECK: No pain or stiffness  BREASTS: No pain, masses, or nipple discharge  RESPIRATORY: No cough, wheezing, chills or hemoptysis; No shortness of breath  CARDIOVASCULAR: No chest pain, palpitations, dizziness, or leg swelling  GASTROINTESTINAL: No abdominal or epigastric pain. No nausea, vomiting, or hematemesis; No diarrhea or constipation. No melena or hematochezia.  GENITOURINARY: No dysuria, frequency, hematuria, or incontinence  NEUROLOGICAL: No headaches, memory loss, loss of strength, numbness, or tremors  SKIN: No itching, burning, rashes, or lesions   LYMPH NODES: No enlarged glands  ENDOCRINE: No heat or cold intolerance; No hair loss  MUSCULOSKELETAL: No joint pain or swelling; No muscle, back, or extremity pain  PSYCHIATRIC: No depression, anxiety, mood swings, or difficulty sleeping  HEME/LYMPH: No easy bruising, or bleeding gums  ALLERGY AND IMMUNOLOGIC: No hives or eczema    MEDICATIONS  (STANDING):  ascorbic acid 500 milliGRAM(s) Oral daily  aspirin enteric coated 81 milliGRAM(s) Oral daily  dextrose 40% Gel 15 Gram(s) Oral once  dextrose 5%. 1000 milliLiter(s) (50 mL/Hr) IV Continuous <Continuous>  dextrose 5%. 1000 milliLiter(s) (100 mL/Hr) IV Continuous <Continuous>  dextrose 50% Injectable 25 Gram(s) IV Push once  dextrose 50% Injectable 12.5 Gram(s) IV Push once  dextrose 50% Injectable 25 Gram(s) IV Push once  donepezil 5 milliGRAM(s) Oral at bedtime  ferrous    sulfate 325 milliGRAM(s) Oral at bedtime  furosemide    Tablet 20 milliGRAM(s) Oral daily  glucagon  Injectable 1 milliGRAM(s) IntraMuscular once  heparin   Injectable 5000 Unit(s) SubCutaneous every 12 hours  insulin lispro (ADMELOG) corrective regimen sliding scale   SubCutaneous three times a day before meals  lactobacillus acidophilus 1 Tablet(s) Oral daily  losartan 25 milliGRAM(s) Oral daily  metFORMIN 500 milliGRAM(s) Oral two times a day with meals  metoprolol succinate ER 25 milliGRAM(s) Oral daily  multivitamin/minerals 1 Tablet(s) Oral daily  pantoprazole    Tablet 40 milliGRAM(s) Oral before breakfast  piperacillin/tazobactam IVPB.. 3.375 Gram(s) IV Intermittent every 8 hours  polyethylene glycol 3350 17 Gram(s) Oral daily  senna 2 Tablet(s) Oral at bedtime  simvastatin 20 milliGRAM(s) Oral at bedtime  tamsulosin 0.4 milliGRAM(s) Oral at bedtime    MEDICATIONS  (PRN):  acetaminophen     Tablet .. 650 milliGRAM(s) Oral every 6 hours PRN Moderate Pain (4 - 6)      ALLERGIES: latex (Rash)  latex (Unknown)  No Known Drug Allergies      Social History:     FAMILY HISTORY:      PAST MEDICAL & SURGICAL HISTORY:  Atrial fibrillation    Hypertension    Diabetes    Prostate ca    Dementia    CHF (congestive heart failure)    Hyperlipemia    Diabetes    Depression    Dementia    DM (diabetes mellitus)    Atrial fibrillation    Prostate CA    Arteriosclerotic heart disease (ASHD)    Dementia    Anemia    Constipation    No significant past surgical history          PHYSICAL EXAMINATION:  -----------------------------  T(C): 36.7 (01-25-22 @ 12:45), Max: 36.9 (01-25-22 @ 12:31)  HR: 80 (01-25-22 @ 12:31) (80 - 80)  BP: 119/58 (01-25-22 @ 12:31) (119/58 - 119/58)  RR: 16 (01-25-22 @ 12:31) (16 - 16)  SpO2: --  Wt(kg): --    Height (cm): 182.9 (01-25 @ 12:31)  Weight (kg): 69.4 (01-25 @ 12:31)  BMI (kg/m2): 20.7 (01-25 @ 12:31)  BSA (m2): 1.9 (01-25 @ 12:31)    Constitutional: well developed, normal appearance, well groomed, well nourished, no deformities and no acute distress.   Eyes: the conjunctiva exhibited no abnormalities and the eyelids demonstrated no xanthelasmas.   HEENT: normal oral mucosa, no oral pallor and no oral cyanosis.   Neck: normal jugular venous A waves present, normal jugular venous V waves present and no jugular venous jacobs A waves.   Pulmonary: no respiratory distress, normal respiratory rhythm and effort, no accessory muscle use and lungs were clear to auscultation bilaterally.   Cardiovascular: heart rate and rhythm were normal, normal S1 and S2 and no murmur, gallop, rub, heave or thrill are present.   Abdomen: soft, non-tender, no hepato-splenomegaly and no abdominal mass palpated.   Musculoskeletal: the gait could not be assessed..   Extremities: no clubbing of the fingernails, no localized cyanosis, no petechial hemorrhages and no ischemic changes.   Skin: normal skin color and pigmentation, no rash, no venous stasis, no skin lesions, no skin ulcer and no xanthoma was observed.   Psychiatric: oriented to person, place, and time, the affect was normal, the mood was normal and not feeling anxious.     LABS:   --------  01-25    143  |  105  |  22  ----------------------------<  103<H>  4.5   |  33<H>  |  0.64    Ca    9.4      25 Jan 2022 14:00    TPro  7.2  /  Alb  3.1<L>  /  TBili  0.5  /  DBili  x   /  AST  16  /  ALT  17  /  AlkPhos  95  01-25                         11.7   8.52  )-----------( 255      ( 25 Jan 2022 14:00 )             34.9     PT/INR - ( 25 Jan 2022 14:00 )   PT: 13.9 sec;   INR: 1.20 ratio         PTT - ( 25 Jan 2022 14:00 )  PTT:32.3 sec            RADIOLOGY:  -----------------        ECG:     ECHO

## 2022-01-25 NOTE — H&P ADULT - ASSESSMENT
89 year old male with history of dementia, anemia, CAD, prostate cancer, A-fib, DM, depression, CHF, and HLD BIBA for left hand infection. limited history due to dementia. afebrile in triage. Per previous nursing records, had history of left hand infection in Nov (was improving at that time, was being followed by home health). Patient was admitted in November 2021 with left hand third finger necrotic tip: scant serosanguinous drainage no odor: finger is with erythema and swelling present no warmth noted wound dimensions about 1.5 x 1.5 wound culture obtained:  fingers of hand contracted .CT -no evidence of osteomyelitis ,discharged on 7 days of po abx after ID & plastic sx clearance was obtained ,patient was given followup with plastic hand sx Dr GRIFFIN Sosa. Patient is diagnosed with cellulitis of 3rd finger of left hand Admitted for septic workup and evaluation,send blood and urine cx,serial lactate levels,monitor vitals closley,ivfs hydration,monitor urine output and renal profile,iv abx initiated ,ID cons called . PCP Toby Quintana Resident at [facility]

## 2022-01-25 NOTE — H&P ADULT - NSHPLABSRESULTS_GEN_ALL_CORE
< from: TTE Echo Complete w/o Contrast w/ Doppler   Supine study and limited views  Left ventricle was of normal size and normal LV systolic function EF 55%  Mild aortic stenosis with peak gradient 18 mmHg and aortic valve area of 1.2 sq cm  Mitral annular calcifications with mild mitral regurgitation  Mild tricuspid regurgitation  Mild left atrial enlargement  Pacemaker seen in the right chambers

## 2022-01-25 NOTE — ED ADULT NURSE NOTE - AGENT'S NAME
Mild k elevation    Repeat bmp in 2 weeks  Cr stable    D mild low--increase otc vit d to 2000units per day Vinicio Mcgarry

## 2022-01-25 NOTE — ED ADULT NURSE NOTE - OBJECTIVE STATEMENT
Pt presents to the Ed via ambulance from [facility] s/p left hand , fingers  contacted 3rd finger redness, swelling, ski tear noted on the dorsal aspect of the hand. Pt knows his first name, history of dementia. Pt presents to the Ed via ambulance from [facility] s/p left hand , fingers  contacted 3rd finger redness, odor, swelling, ski tear noted on the dorsal aspect of the hand. Pt knows his first name, history of dementia.

## 2022-01-25 NOTE — ED PROVIDER NOTE - CLINICAL SUMMARY MEDICAL DECISION MAKING FREE TEXT BOX
left hand redness and swelling. limited history due to dementia. differentials include but not limited to cellulitis, tenosynovitis, osteomyelitis. will check labs, x-ray, IV abx

## 2022-01-25 NOTE — H&P ADULT - SKIN COMMENTS
diffuse swelling and redness to left middle digit (extends to include left hand). sloughing of skin to distal left middle digit with foul smelling drainage. +onychomycosis of nail

## 2022-01-25 NOTE — ED PROVIDER NOTE - OBJECTIVE STATEMENT
89 year old male with history of dementia, anemia, CAD, prostate cancer, A-fib, DM, depression, CHF, and HLD BIBA for left hand infection. limited history due to dementia. afebrile in triage. Per previous nursing records, had history of left hand infection in Nov (was improving at that time, was being followed by home health).   PCP Toby Quintana   Resident at [facility]

## 2022-01-26 LAB
A1C WITH ESTIMATED AVERAGE GLUCOSE RESULT: 5.9 % — HIGH (ref 4–5.6)
ALBUMIN SERPL ELPH-MCNC: 2.8 G/DL — LOW (ref 3.3–5)
ALP SERPL-CCNC: 86 U/L — SIGNIFICANT CHANGE UP (ref 40–120)
ALT FLD-CCNC: 14 U/L — SIGNIFICANT CHANGE UP (ref 12–78)
ANION GAP SERPL CALC-SCNC: 7 MMOL/L — SIGNIFICANT CHANGE UP (ref 5–17)
AST SERPL-CCNC: 16 U/L — SIGNIFICANT CHANGE UP (ref 15–37)
BASOPHILS # BLD AUTO: 0.03 K/UL — SIGNIFICANT CHANGE UP (ref 0–0.2)
BASOPHILS NFR BLD AUTO: 0.5 % — SIGNIFICANT CHANGE UP (ref 0–2)
BILIRUB SERPL-MCNC: 0.6 MG/DL — SIGNIFICANT CHANGE UP (ref 0.2–1.2)
BUN SERPL-MCNC: 17 MG/DL — SIGNIFICANT CHANGE UP (ref 7–23)
CALCIUM SERPL-MCNC: 8.7 MG/DL — SIGNIFICANT CHANGE UP (ref 8.5–10.1)
CHLORIDE SERPL-SCNC: 103 MMOL/L — SIGNIFICANT CHANGE UP (ref 96–108)
CO2 SERPL-SCNC: 29 MMOL/L — SIGNIFICANT CHANGE UP (ref 22–31)
CREAT SERPL-MCNC: 0.52 MG/DL — SIGNIFICANT CHANGE UP (ref 0.5–1.3)
CRP SERPL-MCNC: 22 MG/L — HIGH
EOSINOPHIL # BLD AUTO: 0.11 K/UL — SIGNIFICANT CHANGE UP (ref 0–0.5)
EOSINOPHIL NFR BLD AUTO: 1.8 % — SIGNIFICANT CHANGE UP (ref 0–6)
ERYTHROCYTE [SEDIMENTATION RATE] IN BLOOD: 38 MM/HR — HIGH (ref 0–20)
ESTIMATED AVERAGE GLUCOSE: 123 MG/DL — HIGH (ref 68–114)
GLUCOSE SERPL-MCNC: 140 MG/DL — HIGH (ref 70–99)
HCT VFR BLD CALC: 32.3 % — LOW (ref 39–50)
HGB BLD-MCNC: 10.9 G/DL — LOW (ref 13–17)
IMM GRANULOCYTES NFR BLD AUTO: 0.3 % — SIGNIFICANT CHANGE UP (ref 0–1.5)
INR BLD: 1.24 RATIO — HIGH (ref 0.88–1.16)
LYMPHOCYTES # BLD AUTO: 1.24 K/UL — SIGNIFICANT CHANGE UP (ref 1–3.3)
LYMPHOCYTES # BLD AUTO: 20 % — SIGNIFICANT CHANGE UP (ref 13–44)
MCHC RBC-ENTMCNC: 31.4 PG — SIGNIFICANT CHANGE UP (ref 27–34)
MCHC RBC-ENTMCNC: 33.7 GM/DL — SIGNIFICANT CHANGE UP (ref 32–36)
MCV RBC AUTO: 93.1 FL — SIGNIFICANT CHANGE UP (ref 80–100)
MONOCYTES # BLD AUTO: 0.51 K/UL — SIGNIFICANT CHANGE UP (ref 0–0.9)
MONOCYTES NFR BLD AUTO: 8.2 % — SIGNIFICANT CHANGE UP (ref 2–14)
NEUTROPHILS # BLD AUTO: 4.3 K/UL — SIGNIFICANT CHANGE UP (ref 1.8–7.4)
NEUTROPHILS NFR BLD AUTO: 69.2 % — SIGNIFICANT CHANGE UP (ref 43–77)
NRBC # BLD: 0 /100 WBCS — SIGNIFICANT CHANGE UP (ref 0–0)
PLATELET # BLD AUTO: 230 K/UL — SIGNIFICANT CHANGE UP (ref 150–400)
POTASSIUM SERPL-MCNC: 4.1 MMOL/L — SIGNIFICANT CHANGE UP (ref 3.5–5.3)
POTASSIUM SERPL-SCNC: 4.1 MMOL/L — SIGNIFICANT CHANGE UP (ref 3.5–5.3)
PROT SERPL-MCNC: 6.4 G/DL — SIGNIFICANT CHANGE UP (ref 6–8.3)
PROTHROM AB SERPL-ACNC: 14.4 SEC — HIGH (ref 10.6–13.6)
RBC # BLD: 3.47 M/UL — LOW (ref 4.2–5.8)
RBC # FLD: 13 % — SIGNIFICANT CHANGE UP (ref 10.3–14.5)
SODIUM SERPL-SCNC: 139 MMOL/L — SIGNIFICANT CHANGE UP (ref 135–145)
WBC # BLD: 6.21 K/UL — SIGNIFICANT CHANGE UP (ref 3.8–10.5)
WBC # FLD AUTO: 6.21 K/UL — SIGNIFICANT CHANGE UP (ref 3.8–10.5)

## 2022-01-26 RX ORDER — FUROSEMIDE 40 MG
20 TABLET ORAL DAILY
Refills: 0 | Status: DISCONTINUED | OUTPATIENT
Start: 2022-01-26 | End: 2022-02-01

## 2022-01-26 RX ORDER — METOPROLOL TARTRATE 50 MG
25 TABLET ORAL DAILY
Refills: 0 | Status: DISCONTINUED | OUTPATIENT
Start: 2022-01-26 | End: 2022-01-28

## 2022-01-26 RX ORDER — MUPIROCIN 20 MG/G
1 OINTMENT TOPICAL
Refills: 0 | Status: DISCONTINUED | OUTPATIENT
Start: 2022-01-26 | End: 2022-02-01

## 2022-01-26 RX ORDER — LOSARTAN POTASSIUM 100 MG/1
25 TABLET, FILM COATED ORAL DAILY
Refills: 0 | Status: DISCONTINUED | OUTPATIENT
Start: 2022-01-26 | End: 2022-02-01

## 2022-01-26 RX ADMIN — Medication 81 MILLIGRAM(S): at 11:42

## 2022-01-26 RX ADMIN — HEPARIN SODIUM 5000 UNIT(S): 5000 INJECTION INTRAVENOUS; SUBCUTANEOUS at 05:16

## 2022-01-26 RX ADMIN — Medication 1: at 13:04

## 2022-01-26 RX ADMIN — POLYETHYLENE GLYCOL 3350 17 GRAM(S): 17 POWDER, FOR SOLUTION ORAL at 11:43

## 2022-01-26 RX ADMIN — PANTOPRAZOLE SODIUM 40 MILLIGRAM(S): 20 TABLET, DELAYED RELEASE ORAL at 05:16

## 2022-01-26 RX ADMIN — Medication 1 TABLET(S): at 11:42

## 2022-01-26 RX ADMIN — SENNA PLUS 2 TABLET(S): 8.6 TABLET ORAL at 21:36

## 2022-01-26 RX ADMIN — HEPARIN SODIUM 5000 UNIT(S): 5000 INJECTION INTRAVENOUS; SUBCUTANEOUS at 17:14

## 2022-01-26 RX ADMIN — SIMVASTATIN 20 MILLIGRAM(S): 20 TABLET, FILM COATED ORAL at 21:36

## 2022-01-26 RX ADMIN — CEFEPIME 100 MILLIGRAM(S): 1 INJECTION, POWDER, FOR SOLUTION INTRAMUSCULAR; INTRAVENOUS at 17:11

## 2022-01-26 RX ADMIN — TAMSULOSIN HYDROCHLORIDE 0.4 MILLIGRAM(S): 0.4 CAPSULE ORAL at 21:36

## 2022-01-26 RX ADMIN — DONEPEZIL HYDROCHLORIDE 5 MILLIGRAM(S): 10 TABLET, FILM COATED ORAL at 21:36

## 2022-01-26 RX ADMIN — Medication 250 MILLIGRAM(S): at 05:46

## 2022-01-26 RX ADMIN — Medication 250 MILLIGRAM(S): at 17:57

## 2022-01-26 RX ADMIN — Medication 1: at 08:23

## 2022-01-26 RX ADMIN — SODIUM CHLORIDE 50 MILLILITER(S): 9 INJECTION INTRAMUSCULAR; INTRAVENOUS; SUBCUTANEOUS at 08:27

## 2022-01-26 RX ADMIN — Medication 500 MILLIGRAM(S): at 11:42

## 2022-01-26 RX ADMIN — Medication 325 MILLIGRAM(S): at 21:36

## 2022-01-26 RX ADMIN — CEFEPIME 100 MILLIGRAM(S): 1 INJECTION, POWDER, FOR SOLUTION INTRAMUSCULAR; INTRAVENOUS at 05:16

## 2022-01-26 RX ADMIN — Medication 25 MILLIGRAM(S): at 05:59

## 2022-01-26 NOTE — SWALLOW BEDSIDE ASSESSMENT ADULT - COMMENTS
Consult received and chart reviewed. Patient seen at bedside this afternoon for initial assessment of swallow function. Patient was alert and cooperative. Patient unable to follow low level commands, despite max prompting. Patient with minimal verbalizations; however vocal quality judged as grossly WFL.    Per charting, patient is a "89 year old male with history of dementia, anemia, CAD, prostate cancer, A-fib, DM, depression, CHF, and HLD BIBA for left hand infection. limited history due to dementia." WBC WFL. CXR 1/25/22 revealed "Left ICD. No change heart mediastinum. Low lung volumes. Vague density right midlung field may represent a spurious finding secondary to summation of shadows. Recommend chest CT to exclude a lesion or infiltrate. No pleural collections.'      Discussed results and recommendations with Patient, RN, and called out to MD.

## 2022-01-26 NOTE — PROGRESS NOTE ADULT - SUBJECTIVE AND OBJECTIVE BOX
Patient is a 89y Male with a known history of :  Cellulitis of hand [L03.119]    Afib [I48.91]    HTN (hypertension) [I10]    DM (diabetes mellitus) [E11.9]    Dementia [F03.90]    Arteriosclerotic heart disease (ASHD) [I25.10]    Constipation [K59.00]    Anemia [D64.9]    Prophylactic measure [Z29.9]      HPI:  89 year old male with history of dementia, anemia, CAD, prostate cancer, A-fib, DM, depression, CHF, and HLD BIBA for left hand infection. limited history due to dementia. afebrile in triage. Per previous nursing records, had history of left hand infection in Nov (was improving at that time, was being followed by home health). Patient was admitted in November 2021 with left hand third finger necrotic tip: scant serosanguinous drainage no odor: finger is with erythema and swelling present no warmth noted wound dimensions about 1.5 x 1.5 wound culture obtained:  fingers of hand contracted .CT -no evidence of osteomyelitis ,discharged on 7 days of po abx after ID & plastic sx clearance was obtained ,patient was given followup with plastic hand sx Dr GRIFFIN Sosa. Patient is diagnosed with cellulitis of 3rd finger of left hand Admitted for septic workup and evaluation,send blood and urine cx,serial lactate levels,monitor vitals closley,ivfs hydration,monitor urine output and renal profile,iv abx initiated ,ID cons called . PCP Toby Quintana Resident at [facility] (25 Jan 2022 19:39)      REVIEW OF SYSTEMS:    CONSTITUTIONAL: No fever, weight loss, or fatigue  EYES: No eye pain, visual disturbances, or discharge  ENMT:  No difficulty hearing, tinnitus, vertigo; No sinus or throat pain  NECK: No pain or stiffness  BREASTS: No pain, masses, or nipple discharge  RESPIRATORY: No cough, wheezing, chills or hemoptysis; No shortness of breath  CARDIOVASCULAR: No chest pain, palpitations, dizziness, or leg swelling  GASTROINTESTINAL: No abdominal or epigastric pain. No nausea, vomiting, or hematemesis; No diarrhea or constipation. No melena or hematochezia.  GENITOURINARY: No dysuria, frequency, hematuria, or incontinence  NEUROLOGICAL: No headaches, memory loss, loss of strength, numbness, or tremors  SKIN: No itching, burning, rashes, or lesions   LYMPH NODES: No enlarged glands  ENDOCRINE: No heat or cold intolerance; No hair loss  MUSCULOSKELETAL: No joint pain or swelling; No muscle, back, or extremity pain  PSYCHIATRIC: No depression, anxiety, mood swings, or difficulty sleeping  HEME/LYMPH: No easy bruising, or bleeding gums  ALLERGY AND IMMUNOLOGIC: No hives or eczema    MEDICATIONS  (STANDING):  ascorbic acid 500 milliGRAM(s) Oral daily  aspirin enteric coated 81 milliGRAM(s) Oral daily  cefepime   IVPB 2000 milliGRAM(s) IV Intermittent every 12 hours  dextrose 40% Gel 15 Gram(s) Oral once  dextrose 5%. 1000 milliLiter(s) (50 mL/Hr) IV Continuous <Continuous>  dextrose 5%. 1000 milliLiter(s) (100 mL/Hr) IV Continuous <Continuous>  dextrose 50% Injectable 25 Gram(s) IV Push once  dextrose 50% Injectable 12.5 Gram(s) IV Push once  dextrose 50% Injectable 25 Gram(s) IV Push once  donepezil 5 milliGRAM(s) Oral at bedtime  ferrous    sulfate 325 milliGRAM(s) Oral at bedtime  furosemide    Tablet 20 milliGRAM(s) Oral daily  glucagon  Injectable 1 milliGRAM(s) IntraMuscular once  heparin   Injectable 5000 Unit(s) SubCutaneous every 12 hours  insulin lispro (ADMELOG) corrective regimen sliding scale   SubCutaneous three times a day before meals  lactobacillus acidophilus 1 Tablet(s) Oral daily  losartan 25 milliGRAM(s) Oral daily  metoprolol succinate ER 25 milliGRAM(s) Oral daily  multivitamin/minerals 1 Tablet(s) Oral daily  pantoprazole    Tablet 40 milliGRAM(s) Oral before breakfast  polyethylene glycol 3350 17 Gram(s) Oral daily  senna 2 Tablet(s) Oral at bedtime  simvastatin 20 milliGRAM(s) Oral at bedtime  sodium chloride 0.9%. 1000 milliLiter(s) (50 mL/Hr) IV Continuous <Continuous>  tamsulosin 0.4 milliGRAM(s) Oral at bedtime  vancomycin  IVPB 1000 milliGRAM(s) IV Intermittent every 12 hours    MEDICATIONS  (PRN):  acetaminophen     Tablet .. 650 milliGRAM(s) Oral every 6 hours PRN Temp greater or equal to 38C (100.4F), Mild Pain (1 - 3)  aluminum hydroxide/magnesium hydroxide/simethicone Suspension 30 milliLiter(s) Oral every 4 hours PRN Dyspepsia  melatonin 3 milliGRAM(s) Oral at bedtime PRN Insomnia  morphine  - Injectable 2 milliGRAM(s) IV Push every 4 hours PRN Severe Pain (7 - 10)  ondansetron Injectable 4 milliGRAM(s) IV Push every 8 hours PRN Nausea and/or Vomiting  traMADol 50 milliGRAM(s) Oral four times a day PRN Moderate Pain (4 - 6)      ALLERGIES: latex (Rash)  latex (Unknown)  No Known Drug Allergies      FAMILY HISTORY:      PHYSICAL EXAMINATION:  -----------------------------  T(C): 36.4 (01-26-22 @ 04:38), Max: 37.1 (01-25-22 @ 18:56)  HR: 95 (01-26-22 @ 04:38) (66 - 95)  BP: 106/53 (01-26-22 @ 04:38) (106/53 - 123/67)  RR: 16 (01-26-22 @ 04:38) (16 - 18)  SpO2: 94% (01-26-22 @ 04:38) (94% - 97%)  Wt(kg): --    Height (cm): 182.9 (01-25 @ 12:31)  Weight (kg): 69.4 (01-25 @ 12:31)  BMI (kg/m2): 20.7 (01-25 @ 12:31)  BSA (m2): 1.9 (01-25 @ 12:31)    VITALS  T(C): 36.4 (01-26-22 @ 04:38), Max: 37.1 (01-25-22 @ 18:56)  HR: 95 (01-26-22 @ 04:38) (66 - 95)  BP: 106/53 (01-26-22 @ 04:38) (106/53 - 123/67)  RR: 16 (01-26-22 @ 04:38) (16 - 18)  SpO2: 94% (01-26-22 @ 04:38) (94% - 97%)    Constitutional: well developed, normal appearance, well groomed, well nourished, no deformities and no acute distress.   Eyes: the conjunctiva exhibited no abnormalities and the eyelids demonstrated no xanthelasmas.   HEENT: normal oral mucosa, no oral pallor and no oral cyanosis.   Neck: normal jugular venous A waves present, normal jugular venous V waves present and no jugular venous jacobs A waves.   Pulmonary: no respiratory distress, normal respiratory rhythm and effort, no accessory muscle use and lungs were clear to auscultation bilaterally.   Cardiovascular: heart rate and rhythm were normal, normal S1 and S2 and no murmur, gallop, rub, heave or thrill are present.   Abdomen: soft, non-tender, no hepato-splenomegaly and no abdominal mass palpated.   Musculoskeletal: the gait could not be assessed..   Extremities: no clubbing of the fingernails, no localized cyanosis, no petechial hemorrhages and no ischemic changes.   Skin: normal skin color and pigmentation, no rash, no venous stasis, no skin lesions, no skin ulcer and no xanthoma was observed.   Psychiatric: oriented to person, place, and time, the affect was normal, the mood was normal and not feeling anxious.     LABS:   --------  01-25    143  |  105  |  22  ----------------------------<  103<H>  4.5   |  33<H>  |  0.64    Ca    9.4      25 Jan 2022 14:00    TPro  7.2  /  Alb  3.1<L>  /  TBili  0.5  /  DBili  x   /  AST  16  /  ALT  17  /  AlkPhos  95  01-25                         11.7   8.52  )-----------( 255      ( 25 Jan 2022 14:00 )             34.9     PT/INR - ( 25 Jan 2022 14:00 )   PT: 13.9 sec;   INR: 1.20 ratio         PTT - ( 25 Jan 2022 14:00 )  PTT:32.3 sec            RADIOLOGY:  -----------------    ECG:     ECHO:

## 2022-01-26 NOTE — SWALLOW BEDSIDE ASSESSMENT ADULT - SWALLOW EVAL: RECOMMENDED FEEDING/EATING TECHNIQUES
alternate food with liquid/check mouth frequently for oral residue/pocketing/crush medication (when feasible)/maintain upright posture during/after eating for 30 mins/no straws/oral hygiene/position upright (90 degrees)/small sips/bites

## 2022-01-26 NOTE — OCCUPATIONAL THERAPY INITIAL EVALUATION ADULT - NS ASR FOLLOW COMMAND OT EVAL
with cueing/75% of the time/able to follow single-step instructions/unable to answer questions Imiquimod Counseling:  I discussed with the patient the risks of imiquimod including but not limited to erythema, scaling, itching, weeping, crusting, and pain.  Patient understands that the inflammatory response to imiquimod is variable from person to person and was educated regarded proper titration schedule.  If flu-like symptoms develop, patient knows to discontinue the medication and contact us.

## 2022-01-26 NOTE — PHYSICAL THERAPY INITIAL EVALUATION ADULT - MANUAL MUSCLE TESTING RESULTS, REHAB EVAL
Except bilateral ankle dorsiflexion - less than or equal to 2/5; left hand - unable to assess secondary to to contracture/no strength deficits were identified

## 2022-01-26 NOTE — OCCUPATIONAL THERAPY INITIAL EVALUATION ADULT - GENERAL OBSERVATIONS, REHAB EVAL
Patient received supine on stretcher in ED, +hand roll left palm C/D/I with +left hand contractures noted. Patient appears mildly confused though was cooperative with OT. Patient received supine on stretcher in ED, +external catheter, +hand roll left palm C/D/I with +left hand contractures noted. Patient appears mildly confused though was cooperative with OT.

## 2022-01-26 NOTE — SWALLOW BEDSIDE ASSESSMENT ADULT - ADDITIONAL RECOMMENDATIONS
This department will continue to follow Patient for diet tolerance during this admission, as schedule permits.

## 2022-01-26 NOTE — PROGRESS NOTE ADULT - SUBJECTIVE AND OBJECTIVE BOX
PROGRESS NOTE  Patient is a 89y old  Male who presents with a chief complaint of hand infection left (2022 08:41)  Chart and available morning labs /imaging are reviewed electronically , urgent issues addressed . More information  is being added upon completion of rounds , when more information is collected and management discussed with consultants , medical staff and social service/case management on the floor     OVERNIGHT  No new issues reported by medical staff . All above noted Patient is resting in a bed comfortably .Confused ,poor mentation .No distress noted     HPI:  89 year old male with history of dementia, anemia, CAD, prostate cancer, A-fib, DM, depression, CHF, and HLD BIBA for left hand infection. limited history due to dementia. afebrile in triage. Per previous nursing records, had history of left hand infection in Nov (was improving at that time, was being followed by home health). Patient was admitted in 2021 with left hand third finger necrotic tip: scant serosanguinous drainage no odor: finger is with erythema and swelling present no warmth noted wound dimensions about 1.5 x 1.5 wound culture obtained:  fingers of hand contracted .CT -no evidence of osteomyelitis ,discharged on 7 days of po abx after ID & plastic sx clearance was obtained ,patient was given followup with plastic hand sx Dr GRIFFIN Sosa. Patient is diagnosed with cellulitis of 3rd finger of left hand Admitted for septic workup and evaluation,send blood and urine cx,serial lactate levels,monitor vitals closley,ivfs hydration,monitor urine output and renal profile,iv abx initiated ,ID cons called . PCP Toby Quintana Resident at [facility] (2022 19:39)    PAST MEDICAL & SURGICAL HISTORY:  Atrial fibrillation    Hypertension    Diabetes    Prostate ca    Dementia    CHF (congestive heart failure)    Hyperlipemia    Diabetes    Depression    Dementia    DM (diabetes mellitus)    Atrial fibrillation    Prostate CA    Dementia    Arteriosclerotic heart disease (ASHD)    Constipation    Anemia    No significant past surgical history        MEDICATIONS  (STANDING):  ascorbic acid 500 milliGRAM(s) Oral daily  aspirin enteric coated 81 milliGRAM(s) Oral daily  cefepime   IVPB 2000 milliGRAM(s) IV Intermittent every 12 hours  dextrose 40% Gel 15 Gram(s) Oral once  dextrose 5%. 1000 milliLiter(s) (50 mL/Hr) IV Continuous <Continuous>  dextrose 5%. 1000 milliLiter(s) (100 mL/Hr) IV Continuous <Continuous>  dextrose 50% Injectable 25 Gram(s) IV Push once  dextrose 50% Injectable 12.5 Gram(s) IV Push once  dextrose 50% Injectable 25 Gram(s) IV Push once  donepezil 5 milliGRAM(s) Oral at bedtime  ferrous    sulfate 325 milliGRAM(s) Oral at bedtime  furosemide    Tablet 20 milliGRAM(s) Oral daily  glucagon  Injectable 1 milliGRAM(s) IntraMuscular once  heparin   Injectable 5000 Unit(s) SubCutaneous every 12 hours  insulin lispro (ADMELOG) corrective regimen sliding scale   SubCutaneous three times a day before meals  lactobacillus acidophilus 1 Tablet(s) Oral daily  losartan 25 milliGRAM(s) Oral daily  metoprolol succinate ER 25 milliGRAM(s) Oral daily  multivitamin/minerals 1 Tablet(s) Oral daily  pantoprazole    Tablet 40 milliGRAM(s) Oral before breakfast  polyethylene glycol 3350 17 Gram(s) Oral daily  senna 2 Tablet(s) Oral at bedtime  simvastatin 20 milliGRAM(s) Oral at bedtime  sodium chloride 0.9%. 1000 milliLiter(s) (50 mL/Hr) IV Continuous <Continuous>  tamsulosin 0.4 milliGRAM(s) Oral at bedtime  vancomycin  IVPB 1000 milliGRAM(s) IV Intermittent every 12 hours    MEDICATIONS  (PRN):  acetaminophen     Tablet .. 650 milliGRAM(s) Oral every 6 hours PRN Temp greater or equal to 38C (100.4F), Mild Pain (1 - 3)  aluminum hydroxide/magnesium hydroxide/simethicone Suspension 30 milliLiter(s) Oral every 4 hours PRN Dyspepsia  melatonin 3 milliGRAM(s) Oral at bedtime PRN Insomnia  morphine  - Injectable 2 milliGRAM(s) IV Push every 4 hours PRN Severe Pain (7 - 10)  ondansetron Injectable 4 milliGRAM(s) IV Push every 8 hours PRN Nausea and/or Vomiting  traMADol 50 milliGRAM(s) Oral four times a day PRN Moderate Pain (4 - 6)      OBJECTIVE    T(C): 36.6 (22 @ 12:55), Max: 37.1 (22 @ 18:56)  HR: 69 (22 @ 12:55) (66 - 95)  BP: 118/67 (22 @ 12:55) (106/53 - 123/67)  RR: 18 (22 @ 12:55) (16 - 18)  SpO2: 96% (22 @ 12:55) (94% - 97%)  Wt(kg): --  I&O's Summary        REVIEW OF SYSTEMS:  CONSTITUTIONAL: No fever, weight loss, or fatigue  EYES: No eye pain, visual disturbances, or discharge  ENMT:   No sinus or throat pain  NECK: No pain or stiffness  RESPIRATORY: No cough, wheezing, chills or hemoptysis; No shortness of breath  CARDIOVASCULAR: No chest pain, palpitations, dizziness, or leg swelling  GASTROINTESTINAL: No abdominal pain. No nausea, vomiting; No diarrhea or constipation. No melena or hematochezia.  GENITOURINARY: No dysuria, frequency, hematuria, or incontinence  NEUROLOGICAL: No headaches, memory loss, loss of strength, numbness, or tremors  SKIN: No itching, burning, rashes, or lesions   MUSCULOSKELETAL: No joint pain or swelling; No muscle, back, or extremity pain    PHYSICAL EXAM:  Appearance: NAD. VS past 24 hrs -as above   HEENT:   Moist oral mucosa. Conjunctiva clear b/l.   Neck : supple  Respiratory: Lungs CTAB.  Gastrointestinal:  Soft, nontender. No rebound. No rigidity. BS present	  Cardiovascular: RRR ,S1S2 present  Neurologic: Non-focal. Moving all extremities.  Extremities: No edema. No erythema. No calf tenderness. L HAND CELLULITIS OF 3RD FINGER   Skin: No rashes, No ecchymoses, No cyanosis.	  wounds ,skin lesions-See skin assesment flow sheet   LABS:                        10.9   6.21  )-----------( 230      ( 2022 09:05 )             32.3         139  |  103  |  17  ----------------------------<  140<H>  4.1   |  29  |  0.52    Ca    8.7      2022 09:05    TPro  6.4  /  Alb  2.8<L>  /  TBili  0.6  /  DBili  x   /  AST  16  /  ALT  14  /  AlkPhos  86      CAPILLARY BLOOD GLUCOSE      POCT Blood Glucose.: 155 mg/dL (2022 12:52)  POCT Blood Glucose.: 169 mg/dL (2022 07:40)  POCT Blood Glucose.: 111 mg/dL (2022 22:54)  POCT Blood Glucose.: 104 mg/dL (2022 18:40)    PT/INR - ( 2022 09:05 )   PT: 14.4 sec;   INR: 1.24 ratio         PTT - ( 2022 14:00 )  PTT:32.3 sec  Urinalysis Basic - ( 2022 19:00 )    Color: Yellow / Appearance: Slightly Turbid / S.010 / pH: x  Gluc: x / Ketone: Negative  / Bili: Negative / Urobili: Negative   Blood: x / Protein: 15 / Nitrite: Positive   Leuk Esterase: Moderate / RBC: 0-2 /HPF / WBC 26-50   Sq Epi: x / Non Sq Epi: Occasional / Bacteria: Moderate        RADIOLOGY & ADDITIONAL TESTS:   reviewed elctronically  ASSESSMENT/PLAN: 	  25 minutes aggregate time was spent on this visit, 50% visit time spent in care co-ordination with other attendings and counselling patient .I have discussed care plan with patient / HCP/family member ,who expressed understanding of problems treatment and their effect and side effects, alternatives in details. I have asked if they have any questions and concerns and appropriately addressed them to best of my ability.

## 2022-01-26 NOTE — OCCUPATIONAL THERAPY INITIAL EVALUATION ADULT - ADDITIONAL COMMENTS
Unable to obtain information from pt due to cognitive status. As per EMR, pt resides at [facility] and required assist for ADLs and functional mobility PTA. Pt performed sit to stand and ambulated 6' taking lateral sidesteps using RW holding on with right UE with min assist x 1 + 1; patient unable to use left UE to hold onto walker due to +left hand contracture. Pt requires assistance with ADL's and transfers due to limited use left UE/+hand contracture, impaired cognition, decreased strength, decreased endurance and impaired sitting/standing balance. Unable to obtain information from pt due to cognitive status. As per EMR, pt resides at [facility] and required assist for ADLs and functional mobility PTA. Pt performed sit to stand and ambulated 6' taking lateral sidesteps using RW holding on with right UE with min assist x 1 + 1; patient unable to use left UE to hold onto walker due to +left hand contracture. Pt requires assistance with ADL's and transfers due to limited use left UE/+hand contracture, impaired cognition, decreased strength, decreased endurance and impaired sitting/standing balance. Pt is resistive to using left UE to assist with ADL's.

## 2022-01-26 NOTE — OCCUPATIONAL THERAPY INITIAL EVALUATION ADULT - REHAB POTENTIAL, OT EVAL
Pt appears to be functioning at baseline; no skilled OT needs at this time. Pt admitted for cellulitis left UE/infection left 3rd digit but refuses to use LUE and contracture noted./none

## 2022-01-26 NOTE — PROGRESS NOTE ADULT - SUBJECTIVE AND OBJECTIVE BOX
Interval History:    CENTRAL LINE:   [  ] YES       [  ] NO  RAMOS:                 [  ] YES       [  ] NO         REVIEW OF SYSTEMS:  All Systems below were reviewed and are negative [  ]  HEENT:  ID:  Pulmonary:  Cardiac:  GI:  Renal:  Musculoskeletal:  All other systems above were reviewed and are negative   [  ]      MEDICATIONS  (STANDING):  ascorbic acid 500 milliGRAM(s) Oral daily  aspirin enteric coated 81 milliGRAM(s) Oral daily  cefepime   IVPB 2000 milliGRAM(s) IV Intermittent every 12 hours  dextrose 40% Gel 15 Gram(s) Oral once  dextrose 5%. 1000 milliLiter(s) (50 mL/Hr) IV Continuous <Continuous>  dextrose 5%. 1000 milliLiter(s) (100 mL/Hr) IV Continuous <Continuous>  dextrose 50% Injectable 25 Gram(s) IV Push once  dextrose 50% Injectable 12.5 Gram(s) IV Push once  dextrose 50% Injectable 25 Gram(s) IV Push once  donepezil 5 milliGRAM(s) Oral at bedtime  ferrous    sulfate 325 milliGRAM(s) Oral at bedtime  furosemide    Tablet 20 milliGRAM(s) Oral daily  glucagon  Injectable 1 milliGRAM(s) IntraMuscular once  heparin   Injectable 5000 Unit(s) SubCutaneous every 12 hours  insulin lispro (ADMELOG) corrective regimen sliding scale   SubCutaneous three times a day before meals  lactobacillus acidophilus 1 Tablet(s) Oral daily  losartan 25 milliGRAM(s) Oral daily  metoprolol succinate ER 25 milliGRAM(s) Oral daily  multivitamin/minerals 1 Tablet(s) Oral daily  mupirocin 2% Ointment 1 Application(s) Topical two times a day  pantoprazole    Tablet 40 milliGRAM(s) Oral before breakfast  polyethylene glycol 3350 17 Gram(s) Oral daily  senna 2 Tablet(s) Oral at bedtime  simvastatin 20 milliGRAM(s) Oral at bedtime  sodium chloride 0.9%. 1000 milliLiter(s) (50 mL/Hr) IV Continuous <Continuous>  tamsulosin 0.4 milliGRAM(s) Oral at bedtime  vancomycin  IVPB 1000 milliGRAM(s) IV Intermittent every 12 hours    MEDICATIONS  (PRN):  acetaminophen     Tablet .. 650 milliGRAM(s) Oral every 6 hours PRN Temp greater or equal to 38C (100.4F), Mild Pain (1 - 3)  aluminum hydroxide/magnesium hydroxide/simethicone Suspension 30 milliLiter(s) Oral every 4 hours PRN Dyspepsia  melatonin 3 milliGRAM(s) Oral at bedtime PRN Insomnia  morphine  - Injectable 2 milliGRAM(s) IV Push every 4 hours PRN Severe Pain (7 - 10)  ondansetron Injectable 4 milliGRAM(s) IV Push every 8 hours PRN Nausea and/or Vomiting  traMADol 50 milliGRAM(s) Oral four times a day PRN Moderate Pain (4 - 6)      Vital Signs Last 24 Hrs  T(C): 36.6 (26 Jan 2022 12:55), Max: 36.6 (25 Jan 2022 22:42)  T(F): 97.8 (26 Jan 2022 12:55), Max: 97.9 (25 Jan 2022 22:42)  HR: 69 (26 Jan 2022 12:55) (69 - 95)  BP: 118/67 (26 Jan 2022 12:55) (106/53 - 123/67)  BP(mean): --  RR: 18 (26 Jan 2022 12:55) (16 - 18)  SpO2: 96% (26 Jan 2022 12:55) (94% - 97%)    I&O's Summary      PHYSICAL EXAM:  HEENT: NC/AT; PERRLA  Neck: Soft; no tenderness  Lungs: CTA bilaterally; no wheezing.   Heart:  Abdomen:  Genital/ Rectal:  Extremities:  Neurologic:  Vascular:      LABORATORY:    CBC Full  -  ( 26 Jan 2022 09:05 )  WBC Count : 6.21 K/uL  RBC Count : 3.47 M/uL  Hemoglobin : 10.9 g/dL  Hematocrit : 32.3 %  Platelet Count - Automated : 230 K/uL  Mean Cell Volume : 93.1 fl  Mean Cell Hemoglobin : 31.4 pg  Mean Cell Hemoglobin Concentration : 33.7 gm/dL  Auto Neutrophil # : 4.30 K/uL  Auto Lymphocyte # : 1.24 K/uL  Auto Monocyte # : 0.51 K/uL  Auto Eosinophil # : 0.11 K/uL  Auto Basophil # : 0.03 K/uL  Auto Neutrophil % : 69.2 %  Auto Lymphocyte % : 20.0 %  Auto Monocyte % : 8.2 %  Auto Eosinophil % : 1.8 %  Auto Basophil % : 0.5 %      ESR:                   01-26 @ 11:17  --    C-Reactive Protein:     01-26 @ 11:17  22    Procalcitonin:           01-26 @ 11:17   --  ESR:                   01-26 @ 09:05  38    C-Reactive Protein:     01-26 @ 09:05  --    Procalcitonin:           01-26 @ 09:05   --      01-26    139  |  103  |  17  ----------------------------<  140<H>  4.1   |  29  |  0.52    Ca    8.7      26 Jan 2022 09:05    TPro  6.4  /  Alb  2.8<L>  /  TBili  0.6  /  DBili  x   /  AST  16  /  ALT  14  /  AlkPhos  86  01-26          Assessment and Plan:          Stephan Pruett MD   [phone].  He is afebrile  Comfortable       MEDICATIONS  (STANDING):  ascorbic acid 500 milliGRAM(s) Oral daily  aspirin enteric coated 81 milliGRAM(s) Oral daily  cefepime   IVPB 2000 milliGRAM(s) IV Intermittent every 12 hours  dextrose 40% Gel 15 Gram(s) Oral once  dextrose 5%. 1000 milliLiter(s) (50 mL/Hr) IV Continuous <Continuous>  dextrose 5%. 1000 milliLiter(s) (100 mL/Hr) IV Continuous <Continuous>  dextrose 50% Injectable 25 Gram(s) IV Push once  dextrose 50% Injectable 12.5 Gram(s) IV Push once  dextrose 50% Injectable 25 Gram(s) IV Push once  donepezil 5 milliGRAM(s) Oral at bedtime  ferrous    sulfate 325 milliGRAM(s) Oral at bedtime  furosemide    Tablet 20 milliGRAM(s) Oral daily  glucagon  Injectable 1 milliGRAM(s) IntraMuscular once  heparin   Injectable 5000 Unit(s) SubCutaneous every 12 hours  insulin lispro (ADMELOG) corrective regimen sliding scale   SubCutaneous three times a day before meals  lactobacillus acidophilus 1 Tablet(s) Oral daily  losartan 25 milliGRAM(s) Oral daily  metoprolol succinate ER 25 milliGRAM(s) Oral daily  multivitamin/minerals 1 Tablet(s) Oral daily  mupirocin 2% Ointment 1 Application(s) Topical two times a day  pantoprazole    Tablet 40 milliGRAM(s) Oral before breakfast  polyethylene glycol 3350 17 Gram(s) Oral daily  senna 2 Tablet(s) Oral at bedtime  simvastatin 20 milliGRAM(s) Oral at bedtime  sodium chloride 0.9%. 1000 milliLiter(s) (50 mL/Hr) IV Continuous <Continuous>  tamsulosin 0.4 milliGRAM(s) Oral at bedtime  vancomycin  IVPB 1000 milliGRAM(s) IV Intermittent every 12 hours    MEDICATIONS  (PRN):  acetaminophen     Tablet .. 650 milliGRAM(s) Oral every 6 hours PRN Temp greater or equal to 38C (100.4F), Mild Pain (1 - 3)  aluminum hydroxide/magnesium hydroxide/simethicone Suspension 30 milliLiter(s) Oral every 4 hours PRN Dyspepsia  melatonin 3 milliGRAM(s) Oral at bedtime PRN Insomnia  morphine  - Injectable 2 milliGRAM(s) IV Push every 4 hours PRN Severe Pain (7 - 10)  ondansetron Injectable 4 milliGRAM(s) IV Push every 8 hours PRN Nausea and/or Vomiting  traMADol 50 milliGRAM(s) Oral four times a day PRN Moderate Pain (4 - 6)      Vital Signs Last 24 Hrs  T(C): 36.6 (26 Jan 2022 12:55), Max: 36.6 (25 Jan 2022 22:42)  T(F): 97.8 (26 Jan 2022 12:55), Max: 97.9 (25 Jan 2022 22:42)  HR: 69 (26 Jan 2022 12:55) (69 - 95)  BP: 118/67 (26 Jan 2022 12:55) (106/53 - 123/67)  BP(mean): --  RR: 18 (26 Jan 2022 12:55) (16 - 18)  SpO2: 96% (26 Jan 2022 12:55) (94% - 97%)    I&O's Summary      PHYSICAL EXAM:  HEENT: NC/AT; PERRLA  Neck: Soft; no tenderness  Lungs: Coarse BS bilaterally; no wheezing.   Heart: RRR, no murmurs.   Abdomen: Soft, no tenderness.   Genital/ Rectal: No fitch catheter.   Extremities: left 3rd finger with severe swelling and erythema. Distal phalanx with bleeding and drainage.   Neurologic: Awake.       LABORATORY:    CBC Full  -  ( 26 Jan 2022 09:05 )  WBC Count : 6.21 K/uL  RBC Count : 3.47 M/uL  Hemoglobin : 10.9 g/dL  Hematocrit : 32.3 %  Platelet Count - Automated : 230 K/uL  Mean Cell Volume : 93.1 fl  Mean Cell Hemoglobin : 31.4 pg  Mean Cell Hemoglobin Concentration : 33.7 gm/dL  Auto Neutrophil # : 4.30 K/uL  Auto Lymphocyte # : 1.24 K/uL  Auto Monocyte # : 0.51 K/uL  Auto Eosinophil # : 0.11 K/uL  Auto Basophil # : 0.03 K/uL  Auto Neutrophil % : 69.2 %  Auto Lymphocyte % : 20.0 %  Auto Monocyte % : 8.2 %  Auto Eosinophil % : 1.8 %  Auto Basophil % : 0.5 %      ESR:                   01-26 @ 11:17  --    C-Reactive Protein:     01-26 @ 11:17  22    Procalcitonin:           01-26 @ 11:17   --  ESR:                   01-26 @ 09:05  38    C-Reactive Protein:     01-26 @ 09:05  --    Procalcitonin:           01-26 @ 09:05   --      01-26    139  |  103  |  17  ----------------------------<  140<H>  4.1   |  29  |  0.52    Ca    8.7      26 Jan 2022 09:05    TPro  6.4  /  Alb  2.8<L>  /  TBili  0.6  /  DBili  x   /  AST  16  /  ALT  14  /  AlkPhos  86  01-26      Assessment and Plan:    1. Left 3rd finger with cellulitis and abscess of the distal phalanx, r/o osteomyelitis.    . Still with severe swelling and erythema.  . Continue IV Vancomycin and Cefepime. Vanco trough before 4th dose.  . Follow wound culture.  . Waiting for nuclear scan to evaluate for osteomyelitis.       Stephan Pruett MD   [phone].

## 2022-01-26 NOTE — OCCUPATIONAL THERAPY INITIAL EVALUATION ADULT - RANGE OF MOTION EXAMINATION, UPPER EXTREMITY
grossly WFL for PROM/AAROM left UE except L hand. +flexion contractures noted MP/IP joints L hand. Fine motor skills: WFL's-impaired right UE, unable left UE/Right UE Active Assistive ROM was WFL  (within functional limits) grossly WFL for PROM left UE except L hand. +flexion contractures noted MP/IP joints L hand. Fine motor skills: WFL's-impaired right UE, unable left UE/Right UE Active Assistive ROM was WFL  (within functional limits)

## 2022-01-26 NOTE — PATIENT PROFILE ADULT - FALL HARM RISK - RISK INTERVENTIONS

## 2022-01-26 NOTE — SWALLOW BEDSIDE ASSESSMENT ADULT - SWALLOW EVAL: DIAGNOSIS
1. Patient demonstrates a mild oral dysphagia for pureed, moderately thick, mildly thick, and thin liquids marked by prolonged manipulation resulting in delayed collection, transfer, and transport. 2. Patient demonstrates a mild-moderate oral dysphagia for soft & bite-sized solids marked by prolonged mastication resulting in delayed collection, transfer, and transport with mild stasis observed post swallow, which reduced with liquid wash. 3. Patient demonstrates a mild pharyngeal dysphagia for pureed, soft & bite-sized, moderately thick, mildly thick, and thin liquids marked by suspected delayed pharyngeal swallow trigger and hyolaryngeal elevation noted by digital palpation without evidence of airway penetration/aspiration. 4. Recommend pureed and thin liquids, with aspiration precautions and full assistance during meals, as tolerated. Facilitate upright position, slow pacing, single/small bites/sips, and alternate solids with liquids.

## 2022-01-26 NOTE — PHYSICAL THERAPY INITIAL EVALUATION ADULT - GENERAL OBSERVATIONS, REHAB EVAL
Patient was received and left supine on stretcher with head elevated, condom catheter and IV in place, call bell in reach & RN present.

## 2022-01-26 NOTE — OCCUPATIONAL THERAPY INITIAL EVALUATION ADULT - PERTINENT HX OF CURRENT PROBLEM, REHAB EVAL
89 y/o male with PMH dementia, anemia, CAD, prostate ca, A-fib, DM, depression, CHF, and HLD BIBA for left hand infection and admitted 1/25/22 with cellulitis left UE. As per EMR, patient was admitted in November 2021 with left hand third finger necrotic tip.

## 2022-01-27 ENCOUNTER — TRANSCRIPTION ENCOUNTER (OUTPATIENT)
Age: 87
End: 2022-01-27

## 2022-01-27 LAB
-  AMPICILLIN/SULBACTAM: SIGNIFICANT CHANGE UP
-  CEFAZOLIN: SIGNIFICANT CHANGE UP
-  CLINDAMYCIN: SIGNIFICANT CHANGE UP
-  ERYTHROMYCIN: SIGNIFICANT CHANGE UP
-  GENTAMICIN: SIGNIFICANT CHANGE UP
-  OXACILLIN: SIGNIFICANT CHANGE UP
-  PENICILLIN: SIGNIFICANT CHANGE UP
-  RIFAMPIN: SIGNIFICANT CHANGE UP
-  TETRACYCLINE: SIGNIFICANT CHANGE UP
-  TRIMETHOPRIM/SULFAMETHOXAZOLE: SIGNIFICANT CHANGE UP
-  VANCOMYCIN: SIGNIFICANT CHANGE UP
ANION GAP SERPL CALC-SCNC: 2 MMOL/L — LOW (ref 5–17)
BUN SERPL-MCNC: 15 MG/DL — SIGNIFICANT CHANGE UP (ref 7–23)
CALCIUM SERPL-MCNC: 8.6 MG/DL — SIGNIFICANT CHANGE UP (ref 8.5–10.1)
CHLORIDE SERPL-SCNC: 103 MMOL/L — SIGNIFICANT CHANGE UP (ref 96–108)
CO2 SERPL-SCNC: 31 MMOL/L — SIGNIFICANT CHANGE UP (ref 22–31)
CREAT SERPL-MCNC: 0.57 MG/DL — SIGNIFICANT CHANGE UP (ref 0.5–1.3)
CULTURE RESULTS: SIGNIFICANT CHANGE UP
GLUCOSE SERPL-MCNC: 114 MG/DL — HIGH (ref 70–99)
METHOD TYPE: SIGNIFICANT CHANGE UP
ORGANISM # SPEC MICROSCOPIC CNT: SIGNIFICANT CHANGE UP
ORGANISM # SPEC MICROSCOPIC CNT: SIGNIFICANT CHANGE UP
POTASSIUM SERPL-MCNC: 4.3 MMOL/L — SIGNIFICANT CHANGE UP (ref 3.5–5.3)
POTASSIUM SERPL-SCNC: 4.3 MMOL/L — SIGNIFICANT CHANGE UP (ref 3.5–5.3)
SODIUM SERPL-SCNC: 136 MMOL/L — SIGNIFICANT CHANGE UP (ref 135–145)
SPECIMEN SOURCE: SIGNIFICANT CHANGE UP
VANCOMYCIN TROUGH SERPL-MCNC: 15.8 UG/ML — SIGNIFICANT CHANGE UP (ref 10–20)

## 2022-01-27 RX ORDER — CEFAZOLIN SODIUM 1 G
2000 VIAL (EA) INJECTION EVERY 8 HOURS
Refills: 0 | Status: DISCONTINUED | OUTPATIENT
Start: 2022-01-28 | End: 2022-02-01

## 2022-01-27 RX ORDER — CEFAZOLIN SODIUM 1 G
2000 VIAL (EA) INJECTION ONCE
Refills: 0 | Status: COMPLETED | OUTPATIENT
Start: 2022-01-27 | End: 2022-01-27

## 2022-01-27 RX ADMIN — MUPIROCIN 1 APPLICATION(S): 20 OINTMENT TOPICAL at 17:13

## 2022-01-27 RX ADMIN — HEPARIN SODIUM 5000 UNIT(S): 5000 INJECTION INTRAVENOUS; SUBCUTANEOUS at 17:12

## 2022-01-27 RX ADMIN — Medication 1: at 07:57

## 2022-01-27 RX ADMIN — Medication 100 MILLIGRAM(S): at 17:12

## 2022-01-27 RX ADMIN — SIMVASTATIN 20 MILLIGRAM(S): 20 TABLET, FILM COATED ORAL at 22:46

## 2022-01-27 RX ADMIN — Medication 81 MILLIGRAM(S): at 11:57

## 2022-01-27 RX ADMIN — PANTOPRAZOLE SODIUM 40 MILLIGRAM(S): 20 TABLET, DELAYED RELEASE ORAL at 05:18

## 2022-01-27 RX ADMIN — CEFEPIME 100 MILLIGRAM(S): 1 INJECTION, POWDER, FOR SOLUTION INTRAMUSCULAR; INTRAVENOUS at 05:18

## 2022-01-27 RX ADMIN — Medication 1 TABLET(S): at 11:57

## 2022-01-27 RX ADMIN — POLYETHYLENE GLYCOL 3350 17 GRAM(S): 17 POWDER, FOR SOLUTION ORAL at 11:57

## 2022-01-27 RX ADMIN — TAMSULOSIN HYDROCHLORIDE 0.4 MILLIGRAM(S): 0.4 CAPSULE ORAL at 22:46

## 2022-01-27 RX ADMIN — Medication 500 MILLIGRAM(S): at 11:59

## 2022-01-27 RX ADMIN — Medication 325 MILLIGRAM(S): at 22:46

## 2022-01-27 RX ADMIN — Medication 2: at 11:56

## 2022-01-27 RX ADMIN — MUPIROCIN 1 APPLICATION(S): 20 OINTMENT TOPICAL at 05:18

## 2022-01-27 RX ADMIN — DONEPEZIL HYDROCHLORIDE 5 MILLIGRAM(S): 10 TABLET, FILM COATED ORAL at 22:46

## 2022-01-27 RX ADMIN — SENNA PLUS 2 TABLET(S): 8.6 TABLET ORAL at 22:46

## 2022-01-27 RX ADMIN — Medication 250 MILLIGRAM(S): at 06:18

## 2022-01-27 RX ADMIN — HEPARIN SODIUM 5000 UNIT(S): 5000 INJECTION INTRAVENOUS; SUBCUTANEOUS at 05:18

## 2022-01-27 NOTE — DIETITIAN INITIAL EVALUATION ADULT. - PERTINENT MEDS FT
maxipime, vanco, lasix, Vit C, feso4, lactobacillus, morphine, MVI with min, zofran, protonix, miralax, senna, zocor

## 2022-01-27 NOTE — DIETITIAN INITIAL EVALUATION ADULT. - SIGNS/SYMPTOMS
as evidenced by abnormal swallow eval rec puree diet w/ thin liquids (1/26), hx dementia. as evidenced by mild to moderate muscle wasting and fat depletion.

## 2022-01-27 NOTE — PROGRESS NOTE ADULT - SUBJECTIVE AND OBJECTIVE BOX
PROGRESS NOTE  Patient is a 89y old  Male who presents with a chief complaint of hand infection left (27 Jan 2022 11:29)  Chart and available morning labs /imaging are reviewed electronically , urgent issues addressed . More information  is being added upon completion of rounds , when more information is collected and management discussed with consultants , medical staff and social service/case management on the floor     OVERNIGHT  No new issues reported by medical staff . All above noted Patient is resting in a bed comfortably .Confused ,poor mentation .No distress noted   Seen by OT ,L HAND SPLINT ordered   HPI:  89 year old male with history of dementia, anemia, CAD, prostate cancer, A-fib, DM, depression, CHF, and HLD BIBA for left hand infection. limited history due to dementia. afebrile in triage. Per previous nursing records, had history of left hand infection in Nov (was improving at that time, was being followed by home health). Patient was admitted in November 2021 with left hand third finger necrotic tip: scant serosanguinous drainage no odor: finger is with erythema and swelling present no warmth noted wound dimensions about 1.5 x 1.5 wound culture obtained:  fingers of hand contracted .CT -no evidence of osteomyelitis ,discharged on 7 days of po abx after ID & plastic sx clearance was obtained ,patient was given followup with plastic hand sx Dr GRIFFIN Sosa. Patient is diagnosed with cellulitis of 3rd finger of left hand Admitted for septic workup and evaluation,send blood and urine cx,serial lactate levels,monitor vitals closley,ivfs hydration,monitor urine output and renal profile,iv abx initiated ,ID cons called . PCP Toby Quintana Resident at [facility] (25 Jan 2022 19:39)    PAST MEDICAL & SURGICAL HISTORY:  Atrial fibrillation    Hypertension    Diabetes    Prostate ca    Dementia    CHF (congestive heart failure)    Hyperlipemia    Diabetes    Depression    Dementia    DM (diabetes mellitus)    Atrial fibrillation    Prostate CA    Dementia    Arteriosclerotic heart disease (ASHD)    Constipation    Anemia    No significant past surgical history        MEDICATIONS  (STANDING):  ascorbic acid 500 milliGRAM(s) Oral daily  aspirin enteric coated 81 milliGRAM(s) Oral daily  dextrose 40% Gel 15 Gram(s) Oral once  dextrose 5%. 1000 milliLiter(s) (50 mL/Hr) IV Continuous <Continuous>  dextrose 5%. 1000 milliLiter(s) (100 mL/Hr) IV Continuous <Continuous>  dextrose 50% Injectable 25 Gram(s) IV Push once  dextrose 50% Injectable 12.5 Gram(s) IV Push once  dextrose 50% Injectable 25 Gram(s) IV Push once  donepezil 5 milliGRAM(s) Oral at bedtime  ferrous    sulfate 325 milliGRAM(s) Oral at bedtime  furosemide    Tablet 20 milliGRAM(s) Oral daily  glucagon  Injectable 1 milliGRAM(s) IntraMuscular once  heparin   Injectable 5000 Unit(s) SubCutaneous every 12 hours  insulin lispro (ADMELOG) corrective regimen sliding scale   SubCutaneous three times a day before meals  lactobacillus acidophilus 1 Tablet(s) Oral daily  losartan 25 milliGRAM(s) Oral daily  metoprolol succinate ER 25 milliGRAM(s) Oral daily  multivitamin/minerals 1 Tablet(s) Oral daily  mupirocin 2% Ointment 1 Application(s) Topical two times a day  pantoprazole    Tablet 40 milliGRAM(s) Oral before breakfast  polyethylene glycol 3350 17 Gram(s) Oral daily  senna 2 Tablet(s) Oral at bedtime  simvastatin 20 milliGRAM(s) Oral at bedtime  sodium chloride 0.9%. 1000 milliLiter(s) (50 mL/Hr) IV Continuous <Continuous>  tamsulosin 0.4 milliGRAM(s) Oral at bedtime    MEDICATIONS  (PRN):  acetaminophen     Tablet .. 650 milliGRAM(s) Oral every 6 hours PRN Temp greater or equal to 38C (100.4F), Mild Pain (1 - 3)  aluminum hydroxide/magnesium hydroxide/simethicone Suspension 30 milliLiter(s) Oral every 4 hours PRN Dyspepsia  melatonin 3 milliGRAM(s) Oral at bedtime PRN Insomnia  morphine  - Injectable 2 milliGRAM(s) IV Push every 4 hours PRN Severe Pain (7 - 10)  ondansetron Injectable 4 milliGRAM(s) IV Push every 8 hours PRN Nausea and/or Vomiting  traMADol 50 milliGRAM(s) Oral four times a day PRN Moderate Pain (4 - 6)      OBJECTIVE    T(C): 36.9 (01-27-22 @ 19:58), Max: 37.2 (01-26-22 @ 21:10)  HR: 68 (01-27-22 @ 19:58) (65 - 75)  BP: 98/44 (01-27-22 @ 19:58) (96/58 - 114/48)  RR: 19 (01-27-22 @ 19:58) (16 - 19)  SpO2: 92% (01-27-22 @ 19:58) (92% - 97%)  Wt(kg): --  I&O's Summary    27 Jan 2022 07:01  -  27 Jan 2022 20:50  --------------------------------------------------------  IN: 0 mL / OUT: 801 mL / NET: -801 mL          REVIEW OF SYSTEMS:  CONSTITUTIONAL: No fever, weight loss, or fatigue  Patient is  unable to provide any information/ROS  due to baseline mental status.     PHYSICAL EXAM:  Appearance: NAD. VS past 24 hrs -as above   HEENT:   Moist oral mucosa. Conjunctiva clear b/l.   Neck : supple  Respiratory: Lungs CTAB.  Gastrointestinal:  Soft, nontender. No rebound. No rigidity. BS present	  Cardiovascular: RRR ,S1S2 present  Neurologic: Non-focal. Moving all extremities.  Extremities: LE  No edema. No erythema. No calf tenderness.  Skin: No rashes, No ecchymoses, No cyanosis.	  wounds ,skin lesions-See skin assesment flow sheet   LABS:                        10.9   6.21  )-----------( 230      ( 26 Jan 2022 09:05 )             32.3     01-27    136  |  103  |  15  ----------------------------<  114<H>  4.3   |  31  |  0.57    Ca    8.6      27 Jan 2022 05:44    TPro  6.4  /  Alb  2.8<L>  /  TBili  0.6  /  DBili  x   /  AST  16  /  ALT  14  /  AlkPhos  86  01-26    CAPILLARY BLOOD GLUCOSE      POCT Blood Glucose.: 105 mg/dL (27 Jan 2022 16:46)  POCT Blood Glucose.: 203 mg/dL (27 Jan 2022 11:33)  POCT Blood Glucose.: 164 mg/dL (27 Jan 2022 07:41)  POCT Blood Glucose.: 202 mg/dL (26 Jan 2022 21:26)    PT/INR - ( 26 Jan 2022 09:05 )   PT: 14.4 sec;   INR: 1.24 ratio               Culture - Urine (collected 26 Jan 2022 00:44)  Source: Clean Catch Clean Catch (Midstream)  Preliminary Report (27 Jan 2022 09:57):    50,000 - 99,000 CFU/mL Escherichia coli    Culture - Other (collected 25 Jan 2022 18:54)  Source: .Other finger  Final Report (27 Jan 2022 13:22):    Numerous Staphylococcus aureus    Normal skin adriana isolated  Organism: Staphylococcus aureus (27 Jan 2022 13:22)  Organism: Staphylococcus aureus (27 Jan 2022 13:22)    Culture - Blood (collected 25 Jan 2022 17:15)  Source: .Blood Blood-Peripheral  Preliminary Report (26 Jan 2022 18:01):    No growth to date.    Culture - Blood (collected 25 Jan 2022 17:15)  Source: .Blood Blood-Peripheral  Preliminary Report (26 Jan 2022 18:01):    No growth to date.      RADIOLOGY & ADDITIONAL TESTS:   reviewed elctronically  ASSESSMENT/PLAN: 	  25 minutes aggregate time was spent on this visit, 50% visit time spent in care co-ordination with other attendings and counselling patient .I have discussed care plan with patient / HCP/family member ,who expressed understanding of problems treatment and their effect and side effects, alternatives in details. I have asked if they have any questions and concerns and appropriately addressed them to best of my ability. Advance care planning was discussed , pallitaive care issues ,CMO ,hospice levels of care were discussed in details , forms ,advance directives were reviewed .All questions were answered to the best of my knowledge .25 min spent.

## 2022-01-27 NOTE — DISCHARGE NOTE NURSING/CASE MANAGEMENT/SOCIAL WORK - PATIENT PORTAL LINK FT
You can access the FollowMyHealth Patient Portal offered by [facility] by registering at the following website: [email or web address]. By joining [facility]’s FollowMyHealth portal, you will also be able to view your health information using other applications (apps) compatible with our system.

## 2022-01-27 NOTE — PROGRESS NOTE ADULT - SUBJECTIVE AND OBJECTIVE BOX
Patient is a 89y Male with a known history of :  Cellulitis of hand [L03.119]    Afib [I48.91]    HTN (hypertension) [I10]    DM (diabetes mellitus) [E11.9]    Dementia [F03.90]    Arteriosclerotic heart disease (ASHD) [I25.10]    Constipation [K59.00]    Anemia [D64.9]    Prophylactic measure [Z29.9]      HPI:  89 year old male with history of dementia, anemia, CAD, prostate cancer, A-fib, DM, depression, CHF, and HLD BIBA for left hand infection. limited history due to dementia. afebrile in triage. Per previous nursing records, had history of left hand infection in Nov (was improving at that time, was being followed by home health). Patient was admitted in November 2021 with left hand third finger necrotic tip: scant serosanguinous drainage no odor: finger is with erythema and swelling present no warmth noted wound dimensions about 1.5 x 1.5 wound culture obtained:  fingers of hand contracted .CT -no evidence of osteomyelitis ,discharged on 7 days of po abx after ID & plastic sx clearance was obtained ,patient was given followup with plastic hand sx Dr GRIFFIN Sosa. Patient is diagnosed with cellulitis of 3rd finger of left hand Admitted for septic workup and evaluation,send blood and urine cx,serial lactate levels,monitor vitals closley,ivfs hydration,monitor urine output and renal profile,iv abx initiated ,ID cons called . PCP Toby Quintana Resident at [facility] (25 Jan 2022 19:39)      REVIEW OF SYSTEMS:    CONSTITUTIONAL: No fever, weight loss, or fatigue  EYES: No eye pain, visual disturbances, or discharge  ENMT:  No difficulty hearing, tinnitus, vertigo; No sinus or throat pain  NECK: No pain or stiffness  BREASTS: No pain, masses, or nipple discharge  RESPIRATORY: No cough, wheezing, chills or hemoptysis; No shortness of breath  CARDIOVASCULAR: No chest pain, palpitations, dizziness, or leg swelling  GASTROINTESTINAL: No abdominal or epigastric pain. No nausea, vomiting, or hematemesis; No diarrhea or constipation. No melena or hematochezia.  GENITOURINARY: No dysuria, frequency, hematuria, or incontinence  NEUROLOGICAL: No headaches, memory loss, loss of strength, numbness, or tremors  SKIN: No itching, burning, rashes, or lesions   LYMPH NODES: No enlarged glands  ENDOCRINE: No heat or cold intolerance; No hair loss  MUSCULOSKELETAL: No joint pain or swelling; No muscle, back, or extremity pain  PSYCHIATRIC: No depression, anxiety, mood swings, or difficulty sleeping  HEME/LYMPH: No easy bruising, or bleeding gums  ALLERGY AND IMMUNOLOGIC: No hives or eczema    MEDICATIONS  (STANDING):  ascorbic acid 500 milliGRAM(s) Oral daily  aspirin enteric coated 81 milliGRAM(s) Oral daily  cefepime   IVPB 2000 milliGRAM(s) IV Intermittent every 12 hours  dextrose 40% Gel 15 Gram(s) Oral once  dextrose 5%. 1000 milliLiter(s) (50 mL/Hr) IV Continuous <Continuous>  dextrose 5%. 1000 milliLiter(s) (100 mL/Hr) IV Continuous <Continuous>  dextrose 50% Injectable 25 Gram(s) IV Push once  dextrose 50% Injectable 12.5 Gram(s) IV Push once  dextrose 50% Injectable 25 Gram(s) IV Push once  donepezil 5 milliGRAM(s) Oral at bedtime  ferrous    sulfate 325 milliGRAM(s) Oral at bedtime  furosemide    Tablet 20 milliGRAM(s) Oral daily  glucagon  Injectable 1 milliGRAM(s) IntraMuscular once  heparin   Injectable 5000 Unit(s) SubCutaneous every 12 hours  insulin lispro (ADMELOG) corrective regimen sliding scale   SubCutaneous three times a day before meals  lactobacillus acidophilus 1 Tablet(s) Oral daily  losartan 25 milliGRAM(s) Oral daily  metoprolol succinate ER 25 milliGRAM(s) Oral daily  multivitamin/minerals 1 Tablet(s) Oral daily  mupirocin 2% Ointment 1 Application(s) Topical two times a day  pantoprazole    Tablet 40 milliGRAM(s) Oral before breakfast  polyethylene glycol 3350 17 Gram(s) Oral daily  senna 2 Tablet(s) Oral at bedtime  simvastatin 20 milliGRAM(s) Oral at bedtime  sodium chloride 0.9%. 1000 milliLiter(s) (50 mL/Hr) IV Continuous <Continuous>  tamsulosin 0.4 milliGRAM(s) Oral at bedtime  vancomycin  IVPB 1000 milliGRAM(s) IV Intermittent every 12 hours    MEDICATIONS  (PRN):  acetaminophen     Tablet .. 650 milliGRAM(s) Oral every 6 hours PRN Temp greater or equal to 38C (100.4F), Mild Pain (1 - 3)  aluminum hydroxide/magnesium hydroxide/simethicone Suspension 30 milliLiter(s) Oral every 4 hours PRN Dyspepsia  melatonin 3 milliGRAM(s) Oral at bedtime PRN Insomnia  morphine  - Injectable 2 milliGRAM(s) IV Push every 4 hours PRN Severe Pain (7 - 10)  ondansetron Injectable 4 milliGRAM(s) IV Push every 8 hours PRN Nausea and/or Vomiting  traMADol 50 milliGRAM(s) Oral four times a day PRN Moderate Pain (4 - 6)      ALLERGIES: latex (Rash)  latex (Unknown)  No Known Drug Allergies      FAMILY HISTORY:      PHYSICAL EXAMINATION:  -----------------------------  T(C): 36.4 (01-27-22 @ 05:16), Max: 37.2 (01-26-22 @ 21:10)  HR: 75 (01-27-22 @ 05:16) (65 - 75)  BP: 98/54 (01-27-22 @ 05:16) (96/58 - 118/67)  RR: 18 (01-27-22 @ 05:16) (16 - 18)  SpO2: 92% (01-27-22 @ 05:16) (92% - 97%)  Wt(kg): --        VITALS  T(C): 36.4 (01-27-22 @ 05:16), Max: 37.2 (01-26-22 @ 21:10)  HR: 75 (01-27-22 @ 05:16) (65 - 75)  BP: 98/54 (01-27-22 @ 05:16) (96/58 - 118/67)  RR: 18 (01-27-22 @ 05:16) (16 - 18)  SpO2: 92% (01-27-22 @ 05:16) (92% - 97%)    Constitutional: well developed, normal appearance, well groomed, well nourished, no deformities and no acute distress.   Eyes: the conjunctiva exhibited no abnormalities and the eyelids demonstrated no xanthelasmas.   HEENT: normal oral mucosa, no oral pallor and no oral cyanosis.   Neck: normal jugular venous A waves present, normal jugular venous V waves present and no jugular venous jacobs A waves.   Pulmonary: no respiratory distress, normal respiratory rhythm and effort, no accessory muscle use and lungs were clear to auscultation bilaterally.   Cardiovascular: heart rate and rhythm were normal, normal S1 and S2 and no murmur, gallop, rub, heave or thrill are present.   Abdomen: soft, non-tender, no hepato-splenomegaly and no abdominal mass palpated.   Musculoskeletal: the gait could not be assessed..   Extremities: no clubbing of the fingernails, no localized cyanosis, no petechial hemorrhages and no ischemic changes.   Skin: normal skin color and pigmentation, no rash, no venous stasis, no skin lesions, no skin ulcer and no xanthoma was observed.   Psychiatric: oriented to person, place, and time, the affect was normal, the mood was normal and not feeling anxious.     LABS:   --------  01-27    136  |  103  |  15  ----------------------------<  114<H>  4.3   |  31  |  0.57    Ca    8.6      27 Jan 2022 05:44    TPro  6.4  /  Alb  2.8<L>  /  TBili  0.6  /  DBili  x   /  AST  16  /  ALT  14  /  AlkPhos  86  01-26                         10.9   6.21  )-----------( 230      ( 26 Jan 2022 09:05 )             32.3     PT/INR - ( 26 Jan 2022 09:05 )   PT: 14.4 sec;   INR: 1.24 ratio         PTT - ( 25 Jan 2022 14:00 )  PTT:32.3 sec        Culture Results:   50,000 - 99,000 CFU/mL Gram Negative Rods (01-26 @ 00:44)  Culture Results:   Numerous Staphylococcus aureus  Normal skin adriana isolated (01-25 @ 18:54)  Culture Results:   No growth to date. (01-25 @ 17:15)      RADIOLOGY:  -----------------    ECG:     ECHO:

## 2022-01-27 NOTE — PROGRESS NOTE ADULT - SUBJECTIVE AND OBJECTIVE BOX
Interval History:    CENTRAL LINE:   [  ] YES       [  ] NO  RAMOS:                 [  ] YES       [  ] NO         REVIEW OF SYSTEMS:  All Systems below were reviewed and are negative [  ]  HEENT:  ID:  Pulmonary:  Cardiac:  GI:  Renal:  Musculoskeletal:  All other systems above were reviewed and are negative   [  ]      MEDICATIONS  (STANDING):  ascorbic acid 500 milliGRAM(s) Oral daily  aspirin enteric coated 81 milliGRAM(s) Oral daily  dextrose 40% Gel 15 Gram(s) Oral once  dextrose 5%. 1000 milliLiter(s) (50 mL/Hr) IV Continuous <Continuous>  dextrose 5%. 1000 milliLiter(s) (100 mL/Hr) IV Continuous <Continuous>  dextrose 50% Injectable 25 Gram(s) IV Push once  dextrose 50% Injectable 12.5 Gram(s) IV Push once  dextrose 50% Injectable 25 Gram(s) IV Push once  donepezil 5 milliGRAM(s) Oral at bedtime  ferrous    sulfate 325 milliGRAM(s) Oral at bedtime  furosemide    Tablet 20 milliGRAM(s) Oral daily  glucagon  Injectable 1 milliGRAM(s) IntraMuscular once  heparin   Injectable 5000 Unit(s) SubCutaneous every 12 hours  insulin lispro (ADMELOG) corrective regimen sliding scale   SubCutaneous three times a day before meals  lactobacillus acidophilus 1 Tablet(s) Oral daily  losartan 25 milliGRAM(s) Oral daily  metoprolol succinate ER 25 milliGRAM(s) Oral daily  multivitamin/minerals 1 Tablet(s) Oral daily  mupirocin 2% Ointment 1 Application(s) Topical two times a day  pantoprazole    Tablet 40 milliGRAM(s) Oral before breakfast  polyethylene glycol 3350 17 Gram(s) Oral daily  senna 2 Tablet(s) Oral at bedtime  simvastatin 20 milliGRAM(s) Oral at bedtime  sodium chloride 0.9%. 1000 milliLiter(s) (50 mL/Hr) IV Continuous <Continuous>  tamsulosin 0.4 milliGRAM(s) Oral at bedtime    MEDICATIONS  (PRN):  acetaminophen     Tablet .. 650 milliGRAM(s) Oral every 6 hours PRN Temp greater or equal to 38C (100.4F), Mild Pain (1 - 3)  aluminum hydroxide/magnesium hydroxide/simethicone Suspension 30 milliLiter(s) Oral every 4 hours PRN Dyspepsia  melatonin 3 milliGRAM(s) Oral at bedtime PRN Insomnia  morphine  - Injectable 2 milliGRAM(s) IV Push every 4 hours PRN Severe Pain (7 - 10)  ondansetron Injectable 4 milliGRAM(s) IV Push every 8 hours PRN Nausea and/or Vomiting  traMADol 50 milliGRAM(s) Oral four times a day PRN Moderate Pain (4 - 6)      Vital Signs Last 24 Hrs  T(C): 36.9 (27 Jan 2022 12:15), Max: 37.2 (26 Jan 2022 21:10)  T(F): 98.5 (27 Jan 2022 12:15), Max: 98.9 (26 Jan 2022 21:10)  HR: 74 (27 Jan 2022 12:15) (65 - 75)  BP: 100/55 (27 Jan 2022 12:50) (96/58 - 114/48)  BP(mean): --  RR: 19 (27 Jan 2022 12:15) (16 - 19)  SpO2: 95% (27 Jan 2022 12:15) (92% - 97%)    I&O's Summary    27 Jan 2022 07:01  -  27 Jan 2022 19:54  --------------------------------------------------------  IN: 0 mL / OUT: 801 mL / NET: -801 mL        PHYSICAL EXAM:  HEENT: NC/AT; PERRLA  Neck: Soft; no tenderness  Lungs: CTA bilaterally; no wheezing.   Heart:  Abdomen:  Genital/ Rectal:  Extremities:  Neurologic:  Vascular:      LABORATORY:    CBC Full  -  ( 26 Jan 2022 09:05 )  WBC Count : 6.21 K/uL  RBC Count : 3.47 M/uL  Hemoglobin : 10.9 g/dL  Hematocrit : 32.3 %  Platelet Count - Automated : 230 K/uL  Mean Cell Volume : 93.1 fl  Mean Cell Hemoglobin : 31.4 pg  Mean Cell Hemoglobin Concentration : 33.7 gm/dL  Auto Neutrophil # : 4.30 K/uL  Auto Lymphocyte # : 1.24 K/uL  Auto Monocyte # : 0.51 K/uL  Auto Eosinophil # : 0.11 K/uL  Auto Basophil # : 0.03 K/uL  Auto Neutrophil % : 69.2 %  Auto Lymphocyte % : 20.0 %  Auto Monocyte % : 8.2 %  Auto Eosinophil % : 1.8 %  Auto Basophil % : 0.5 %      ESR:                   01-26 @ 11:17  --    C-Reactive Protein:     01-26 @ 11:17  22    Procalcitonin:           01-26 @ 11:17   --  ESR:                   01-26 @ 09:05  38    C-Reactive Protein:     01-26 @ 09:05  --    Procalcitonin:           01-26 @ 09:05   --      01-27    136  |  103  |  15  ----------------------------<  114<H>  4.3   |  31  |  0.57    Ca    8.6      27 Jan 2022 05:44    TPro  6.4  /  Alb  2.8<L>  /  TBili  0.6  /  DBili  x   /  AST  16  /  ALT  14  /  AlkPhos  86  01-26          Assessment and Plan:          Stephan Pruett MD   [phone].  He is afebrile  Comfortable.       MEDICATIONS  (STANDING):  ascorbic acid 500 milliGRAM(s) Oral daily  aspirin enteric coated 81 milliGRAM(s) Oral daily  dextrose 40% Gel 15 Gram(s) Oral once  dextrose 5%. 1000 milliLiter(s) (50 mL/Hr) IV Continuous <Continuous>  dextrose 5%. 1000 milliLiter(s) (100 mL/Hr) IV Continuous <Continuous>  dextrose 50% Injectable 25 Gram(s) IV Push once  dextrose 50% Injectable 12.5 Gram(s) IV Push once  dextrose 50% Injectable 25 Gram(s) IV Push once  donepezil 5 milliGRAM(s) Oral at bedtime  ferrous    sulfate 325 milliGRAM(s) Oral at bedtime  furosemide    Tablet 20 milliGRAM(s) Oral daily  glucagon  Injectable 1 milliGRAM(s) IntraMuscular once  heparin   Injectable 5000 Unit(s) SubCutaneous every 12 hours  insulin lispro (ADMELOG) corrective regimen sliding scale   SubCutaneous three times a day before meals  lactobacillus acidophilus 1 Tablet(s) Oral daily  losartan 25 milliGRAM(s) Oral daily  metoprolol succinate ER 25 milliGRAM(s) Oral daily  multivitamin/minerals 1 Tablet(s) Oral daily  mupirocin 2% Ointment 1 Application(s) Topical two times a day  pantoprazole    Tablet 40 milliGRAM(s) Oral before breakfast  polyethylene glycol 3350 17 Gram(s) Oral daily  senna 2 Tablet(s) Oral at bedtime  simvastatin 20 milliGRAM(s) Oral at bedtime  sodium chloride 0.9%. 1000 milliLiter(s) (50 mL/Hr) IV Continuous <Continuous>  tamsulosin 0.4 milliGRAM(s) Oral at bedtime    MEDICATIONS  (PRN):  acetaminophen     Tablet .. 650 milliGRAM(s) Oral every 6 hours PRN Temp greater or equal to 38C (100.4F), Mild Pain (1 - 3)  aluminum hydroxide/magnesium hydroxide/simethicone Suspension 30 milliLiter(s) Oral every 4 hours PRN Dyspepsia  melatonin 3 milliGRAM(s) Oral at bedtime PRN Insomnia  morphine  - Injectable 2 milliGRAM(s) IV Push every 4 hours PRN Severe Pain (7 - 10)  ondansetron Injectable 4 milliGRAM(s) IV Push every 8 hours PRN Nausea and/or Vomiting  traMADol 50 milliGRAM(s) Oral four times a day PRN Moderate Pain (4 - 6)      Vital Signs Last 24 Hrs  T(C): 36.9 (27 Jan 2022 12:15), Max: 37.2 (26 Jan 2022 21:10)  T(F): 98.5 (27 Jan 2022 12:15), Max: 98.9 (26 Jan 2022 21:10)  HR: 74 (27 Jan 2022 12:15) (65 - 75)  BP: 100/55 (27 Jan 2022 12:50) (96/58 - 114/48)  BP(mean): --  RR: 19 (27 Jan 2022 12:15) (16 - 19)  SpO2: 95% (27 Jan 2022 12:15) (92% - 97%)    I&O's Summary    27 Jan 2022 07:01  -  27 Jan 2022 19:54  --------------------------------------------------------  IN: 0 mL / OUT: 801 mL / NET: -801 mL        PHYSICAL EXAM:  HEENT: NC/AT; PERRLA  Neck: Soft; no tenderness  Lungs: Coarse BS  bilaterally; no wheezing.   Heart: RRR, no murmurs.   Abdomen: Soft, no tenderness. No masses.   Genital/ Rectal: No fitch catheter.   Extremities: Decreasing swelling and erythema of left 3rd finger, Bleed from distal phalanx of the left 3rd finger, Contacted left hand.   Neurologic: Confused.       LABORATORY:    CBC Full  -  ( 26 Jan 2022 09:05 )  WBC Count : 6.21 K/uL  RBC Count : 3.47 M/uL  Hemoglobin : 10.9 g/dL  Hematocrit : 32.3 %  Platelet Count - Automated : 230 K/uL  Mean Cell Volume : 93.1 fl  Mean Cell Hemoglobin : 31.4 pg  Mean Cell Hemoglobin Concentration : 33.7 gm/dL  Auto Neutrophil # : 4.30 K/uL  Auto Lymphocyte # : 1.24 K/uL  Auto Monocyte # : 0.51 K/uL  Auto Eosinophil # : 0.11 K/uL  Auto Basophil # : 0.03 K/uL  Auto Neutrophil % : 69.2 %  Auto Lymphocyte % : 20.0 %  Auto Monocyte % : 8.2 %  Auto Eosinophil % : 1.8 %  Auto Basophil % : 0.5 %      ESR:                   01-26 @ 11:17  --    C-Reactive Protein:     01-26 @ 11:17  22    Procalcitonin:           01-26 @ 11:17   --  ESR:                   01-26 @ 09:05  38    C-Reactive Protein:     01-26 @ 09:05  --    Procalcitonin:           01-26 @ 09:05   --      01-27    136  |  103  |  15  ----------------------------<  114<H>  4.3   |  31  |  0.57    Ca    8.6      27 Jan 2022 05:44    TPro  6.4  /  Alb  2.8<L>  /  TBili  0.6  /  DBili  x   /  AST  16  /  ALT  14  /  AlkPhos  86  01-26      Assessment and Plan:    1. Left 3rd finger with cellulitis and abscess of the distal phalanx, r/o osteomyelitis.  2. Possible UTI.    . Still with moderate swelling and erythema of the finger  . Wound culture grew Staph aureus (MSSA). Discontinue IV Vancomycin and Cefepime. Add UV Ancef 2 gm iv q8h.   . Wound care daily.   . Waiting for nuclear scan to evaluate for osteomyelitis.         Stephan Pruett MD   [phone].

## 2022-01-27 NOTE — DISCHARGE NOTE NURSING/CASE MANAGEMENT/SOCIAL WORK - NSDCPEFALRISK_GEN_ALL_CORE
For information on Fall & Injury Prevention, visit: [email or web address] OR  [email or web address] OR  https://www.cdc.gov/steadi/patient.html

## 2022-01-27 NOTE — DIETITIAN INITIAL EVALUATION ADULT. - OTHER INFO
89 year old male with history of dementia, anemia, CAD, prostate cancer, A-fib, DM, depression, CHF, and HLD BIBA for left hand infection.     Pt awake/confused at time of visit. S/p SLP evaluation (1/26) with recommendation for puree diet with thin liquids. Pt tolerating meals well per RN with good po intake for breakfast 1/27. GI wdl, fecal incontinence noted. Soft brown BM 1/26. Bowel regimen rx. Seen by RD on 11/17/21 during past admission, weight at that time 153.5lbs. Weight stable. Current admission weight 153lb. Identified with moderate protein calorie malnutrition during past admission in context of chronic illness.

## 2022-01-27 NOTE — DISCHARGE NOTE NURSING/CASE MANAGEMENT/SOCIAL WORK - NSDCVIVACCINE_GEN_ALL_CORE_FT
pneumococcal polysaccharide PPV23; 18-Jun-2014 09:36; Lamar Gonzalez (RN); [id number]; IntraMuscular; Deltoid Right.; 0.5 milliLiter(s);   Tdap; 19-Jul-2018 13:12; Arleen Wallace (RN); [employer]; [id number]; IntraMuscular; Deltoid Left.; 0.5 milliLiter(s); VIS (VIS Published: 09-May-2013, VIS Presented: 19-Jul-2018);   Tdap; 18-Jan-2022 12:32; Bethanie Briseno (RN); [employer]; [id number] (Exp. Date: 09-Sep-2023); IntraMuscular; Deltoid Right.; 0.5 milliLiter(s); VIS (VIS Published: 09-May-2013, VIS Presented: 18-Jan-2022);

## 2022-01-27 NOTE — DIETITIAN INITIAL EVALUATION ADULT. - PROBLEM SELECTOR PLAN 1
Admitted for septic workup and evaluation ,send blood and urine cx ,serial lactate levels ,monitor vitals closely hydration ,monitor urine output and renal profile ,iv abx initiated

## 2022-01-27 NOTE — DIETITIAN NUTRITION RISK NOTIFICATION - TREATMENT: THE FOLLOWING DIET HAS BEEN RECOMMENDED
Diet, Pureed:   Supplement Feeding Modality:  Oral  Glucerna Shake Cans or Servings Per Day:  1       Frequency:  Daily (01-25-22 @ 19:16) [Active]

## 2022-01-28 LAB
-  AMIKACIN: SIGNIFICANT CHANGE UP
-  AMOXICILLIN/CLAVULANIC ACID: SIGNIFICANT CHANGE UP
-  AMPICILLIN/SULBACTAM: SIGNIFICANT CHANGE UP
-  AMPICILLIN: SIGNIFICANT CHANGE UP
-  AZTREONAM: SIGNIFICANT CHANGE UP
-  CEFAZOLIN: SIGNIFICANT CHANGE UP
-  CEFEPIME: SIGNIFICANT CHANGE UP
-  CEFOXITIN: SIGNIFICANT CHANGE UP
-  CEFTRIAXONE: SIGNIFICANT CHANGE UP
-  CIPROFLOXACIN: SIGNIFICANT CHANGE UP
-  ERTAPENEM: SIGNIFICANT CHANGE UP
-  GENTAMICIN: SIGNIFICANT CHANGE UP
-  IMIPENEM: SIGNIFICANT CHANGE UP
-  LEVOFLOXACIN: SIGNIFICANT CHANGE UP
-  MEROPENEM: SIGNIFICANT CHANGE UP
-  NITROFURANTOIN: SIGNIFICANT CHANGE UP
-  PIPERACILLIN/TAZOBACTAM: SIGNIFICANT CHANGE UP
-  TIGECYCLINE: SIGNIFICANT CHANGE UP
-  TOBRAMYCIN: SIGNIFICANT CHANGE UP
-  TRIMETHOPRIM/SULFAMETHOXAZOLE: SIGNIFICANT CHANGE UP
ANION GAP SERPL CALC-SCNC: 9 MMOL/L — SIGNIFICANT CHANGE UP (ref 5–17)
BUN SERPL-MCNC: 11 MG/DL — SIGNIFICANT CHANGE UP (ref 7–23)
CALCIUM SERPL-MCNC: 8.5 MG/DL — SIGNIFICANT CHANGE UP (ref 8.5–10.1)
CHLORIDE SERPL-SCNC: 104 MMOL/L — SIGNIFICANT CHANGE UP (ref 96–108)
CO2 SERPL-SCNC: 25 MMOL/L — SIGNIFICANT CHANGE UP (ref 22–31)
CREAT SERPL-MCNC: 0.68 MG/DL — SIGNIFICANT CHANGE UP (ref 0.5–1.3)
CULTURE RESULTS: SIGNIFICANT CHANGE UP
GLUCOSE SERPL-MCNC: 199 MG/DL — HIGH (ref 70–99)
HCT VFR BLD CALC: 30.2 % — LOW (ref 39–50)
HGB BLD-MCNC: 10.3 G/DL — LOW (ref 13–17)
MCHC RBC-ENTMCNC: 31.4 PG — SIGNIFICANT CHANGE UP (ref 27–34)
MCHC RBC-ENTMCNC: 34.1 GM/DL — SIGNIFICANT CHANGE UP (ref 32–36)
MCV RBC AUTO: 92.1 FL — SIGNIFICANT CHANGE UP (ref 80–100)
METHOD TYPE: SIGNIFICANT CHANGE UP
NRBC # BLD: 0 /100 WBCS — SIGNIFICANT CHANGE UP (ref 0–0)
ORGANISM # SPEC MICROSCOPIC CNT: SIGNIFICANT CHANGE UP
ORGANISM # SPEC MICROSCOPIC CNT: SIGNIFICANT CHANGE UP
PLATELET # BLD AUTO: 224 K/UL — SIGNIFICANT CHANGE UP (ref 150–400)
POTASSIUM SERPL-MCNC: 4.1 MMOL/L — SIGNIFICANT CHANGE UP (ref 3.5–5.3)
POTASSIUM SERPL-SCNC: 4.1 MMOL/L — SIGNIFICANT CHANGE UP (ref 3.5–5.3)
RBC # BLD: 3.28 M/UL — LOW (ref 4.2–5.8)
RBC # FLD: 13 % — SIGNIFICANT CHANGE UP (ref 10.3–14.5)
SODIUM SERPL-SCNC: 138 MMOL/L — SIGNIFICANT CHANGE UP (ref 135–145)
SPECIMEN SOURCE: SIGNIFICANT CHANGE UP
WBC # BLD: 5.71 K/UL — SIGNIFICANT CHANGE UP (ref 3.8–10.5)
WBC # FLD AUTO: 5.71 K/UL — SIGNIFICANT CHANGE UP (ref 3.8–10.5)

## 2022-01-28 PROCEDURE — 78800 RP LOCLZJ TUM 1 AREA 1 D IMG: CPT | Mod: 26

## 2022-01-28 PROCEDURE — 78102 BONE MARROW IMAGING LTD: CPT | Mod: 26

## 2022-01-28 RX ORDER — ASPIRIN/CALCIUM CARB/MAGNESIUM 324 MG
81 TABLET ORAL DAILY
Refills: 0 | Status: DISCONTINUED | OUTPATIENT
Start: 2022-01-28 | End: 2022-02-01

## 2022-01-28 RX ORDER — METOPROLOL TARTRATE 50 MG
12.5 TABLET ORAL
Refills: 0 | Status: DISCONTINUED | OUTPATIENT
Start: 2022-01-28 | End: 2022-02-01

## 2022-01-28 RX ORDER — PANTOPRAZOLE SODIUM 20 MG/1
40 TABLET, DELAYED RELEASE ORAL DAILY
Refills: 0 | Status: DISCONTINUED | OUTPATIENT
Start: 2022-01-28 | End: 2022-02-01

## 2022-01-28 RX ADMIN — Medication 25 MILLIGRAM(S): at 06:48

## 2022-01-28 RX ADMIN — DONEPEZIL HYDROCHLORIDE 5 MILLIGRAM(S): 10 TABLET, FILM COATED ORAL at 21:38

## 2022-01-28 RX ADMIN — Medication 1 TABLET(S): at 11:09

## 2022-01-28 RX ADMIN — TAMSULOSIN HYDROCHLORIDE 0.4 MILLIGRAM(S): 0.4 CAPSULE ORAL at 21:38

## 2022-01-28 RX ADMIN — MUPIROCIN 1 APPLICATION(S): 20 OINTMENT TOPICAL at 17:18

## 2022-01-28 RX ADMIN — MUPIROCIN 1 APPLICATION(S): 20 OINTMENT TOPICAL at 06:49

## 2022-01-28 RX ADMIN — Medication 100 MILLIGRAM(S): at 09:09

## 2022-01-28 RX ADMIN — Medication 12.5 MILLIGRAM(S): at 16:47

## 2022-01-28 RX ADMIN — Medication 100 MILLIGRAM(S): at 00:42

## 2022-01-28 RX ADMIN — Medication 100 MILLIGRAM(S): at 17:20

## 2022-01-28 RX ADMIN — PANTOPRAZOLE SODIUM 40 MILLIGRAM(S): 20 TABLET, DELAYED RELEASE ORAL at 16:43

## 2022-01-28 RX ADMIN — SENNA PLUS 2 TABLET(S): 8.6 TABLET ORAL at 21:38

## 2022-01-28 RX ADMIN — HEPARIN SODIUM 5000 UNIT(S): 5000 INJECTION INTRAVENOUS; SUBCUTANEOUS at 17:18

## 2022-01-28 RX ADMIN — SIMVASTATIN 20 MILLIGRAM(S): 20 TABLET, FILM COATED ORAL at 21:38

## 2022-01-28 RX ADMIN — Medication 500 MILLIGRAM(S): at 16:43

## 2022-01-28 RX ADMIN — Medication 2: at 12:18

## 2022-01-28 RX ADMIN — HEPARIN SODIUM 5000 UNIT(S): 5000 INJECTION INTRAVENOUS; SUBCUTANEOUS at 06:48

## 2022-01-28 RX ADMIN — POLYETHYLENE GLYCOL 3350 17 GRAM(S): 17 POWDER, FOR SOLUTION ORAL at 11:09

## 2022-01-28 RX ADMIN — Medication 81 MILLIGRAM(S): at 16:43

## 2022-01-28 RX ADMIN — Medication 325 MILLIGRAM(S): at 21:38

## 2022-01-28 NOTE — PROGRESS NOTE ADULT - SUBJECTIVE AND OBJECTIVE BOX
Patient is a 89y Male with a known history of :  Cellulitis of hand [L03.119]    Afib [I48.91]    HTN (hypertension) [I10]    DM (diabetes mellitus) [E11.9]    Dementia [F03.90]    Arteriosclerotic heart disease (ASHD) [I25.10]    Constipation [K59.00]    Anemia [D64.9]    Prophylactic measure [Z29.9]      HPI:  89 year old male with history of dementia, anemia, CAD, prostate cancer, A-fib, DM, depression, CHF, and HLD BIBA for left hand infection. limited history due to dementia. afebrile in triage. Per previous nursing records, had history of left hand infection in Nov (was improving at that time, was being followed by home health). Patient was admitted in November 2021 with left hand third finger necrotic tip: scant serosanguinous drainage no odor: finger is with erythema and swelling present no warmth noted wound dimensions about 1.5 x 1.5 wound culture obtained:  fingers of hand contracted .CT -no evidence of osteomyelitis ,discharged on 7 days of po abx after ID & plastic sx clearance was obtained ,patient was given followup with plastic hand sx Dr GRIFFIN Sosa. Patient is diagnosed with cellulitis of 3rd finger of left hand Admitted for septic workup and evaluation,send blood and urine cx,serial lactate levels,monitor vitals closley,ivfs hydration,monitor urine output and renal profile,iv abx initiated ,ID cons called . PCP Toby Quintana Resident at [facility] (25 Jan 2022 19:39)      REVIEW OF SYSTEMS:    CONSTITUTIONAL: No fever, weight loss, or fatigue  EYES: No eye pain, visual disturbances, or discharge  ENMT:  No difficulty hearing, tinnitus, vertigo; No sinus or throat pain  NECK: No pain or stiffness  BREASTS: No pain, masses, or nipple discharge  RESPIRATORY: No cough, wheezing, chills or hemoptysis; No shortness of breath  CARDIOVASCULAR: No chest pain, palpitations, dizziness, or leg swelling  GASTROINTESTINAL: No abdominal or epigastric pain. No nausea, vomiting, or hematemesis; No diarrhea or constipation. No melena or hematochezia.  GENITOURINARY: No dysuria, frequency, hematuria, or incontinence  NEUROLOGICAL: No headaches, memory loss, loss of strength, numbness, or tremors  SKIN: No itching, burning, rashes, or lesions   LYMPH NODES: No enlarged glands  ENDOCRINE: No heat or cold intolerance; No hair loss  MUSCULOSKELETAL: No joint pain or swelling; No muscle, back, or extremity pain  PSYCHIATRIC: No depression, anxiety, mood swings, or difficulty sleeping  HEME/LYMPH: No easy bruising, or bleeding gums  ALLERGY AND IMMUNOLOGIC: No hives or eczema    MEDICATIONS  (STANDING):  ascorbic acid 500 milliGRAM(s) Oral daily  aspirin  chewable 81 milliGRAM(s) Oral daily  ceFAZolin   IVPB 2000 milliGRAM(s) IV Intermittent every 8 hours  dextrose 40% Gel 15 Gram(s) Oral once  dextrose 5%. 1000 milliLiter(s) (50 mL/Hr) IV Continuous <Continuous>  dextrose 5%. 1000 milliLiter(s) (100 mL/Hr) IV Continuous <Continuous>  dextrose 50% Injectable 25 Gram(s) IV Push once  dextrose 50% Injectable 12.5 Gram(s) IV Push once  dextrose 50% Injectable 25 Gram(s) IV Push once  donepezil 5 milliGRAM(s) Oral at bedtime  ferrous    sulfate 325 milliGRAM(s) Oral at bedtime  furosemide    Tablet 20 milliGRAM(s) Oral daily  glucagon  Injectable 1 milliGRAM(s) IntraMuscular once  heparin   Injectable 5000 Unit(s) SubCutaneous every 12 hours  insulin lispro (ADMELOG) corrective regimen sliding scale   SubCutaneous three times a day before meals  lactobacillus acidophilus 1 Tablet(s) Oral daily  losartan 25 milliGRAM(s) Oral daily  metoprolol tartrate 12.5 milliGRAM(s) Oral two times a day  multivitamin/minerals 1 Tablet(s) Oral daily  mupirocin 2% Ointment 1 Application(s) Topical two times a day  pantoprazole   Suspension 40 milliGRAM(s) Oral daily  polyethylene glycol 3350 17 Gram(s) Oral daily  senna 2 Tablet(s) Oral at bedtime  simvastatin 20 milliGRAM(s) Oral at bedtime  sodium chloride 0.9%. 1000 milliLiter(s) (50 mL/Hr) IV Continuous <Continuous>  tamsulosin 0.4 milliGRAM(s) Oral at bedtime    MEDICATIONS  (PRN):  acetaminophen     Tablet .. 650 milliGRAM(s) Oral every 6 hours PRN Temp greater or equal to 38C (100.4F), Mild Pain (1 - 3)  aluminum hydroxide/magnesium hydroxide/simethicone Suspension 30 milliLiter(s) Oral every 4 hours PRN Dyspepsia  melatonin 3 milliGRAM(s) Oral at bedtime PRN Insomnia  morphine  - Injectable 2 milliGRAM(s) IV Push every 4 hours PRN Severe Pain (7 - 10)  ondansetron Injectable 4 milliGRAM(s) IV Push every 8 hours PRN Nausea and/or Vomiting  traMADol 50 milliGRAM(s) Oral four times a day PRN Moderate Pain (4 - 6)      ALLERGIES: latex (Rash)  latex (Unknown)  No Known Drug Allergies      FAMILY HISTORY:      PHYSICAL EXAMINATION:  -----------------------------  T(C): 36.6 (01-28-22 @ 05:24), Max: 36.9 (01-27-22 @ 12:15)  HR: 74 (01-28-22 @ 05:24) (68 - 74)  BP: 111/55 (01-28-22 @ 05:24) (98/44 - 114/48)  RR: 19 (01-28-22 @ 05:24) (19 - 19)  SpO2: 91% (01-28-22 @ 05:24) (91% - 95%)  Wt(kg): --    01-27 @ 07:01  -  01-28 @ 07:00  --------------------------------------------------------  IN:    Oral Fluid: 100 mL    sodium chloride 0.9%: 600 mL  Total IN: 700 mL    OUT:    Stool (mL): 1 mL    Voided (mL): 800 mL  Total OUT: 801 mL    Total NET: -101 mL            VITALS  T(C): 36.6 (01-28-22 @ 05:24), Max: 36.9 (01-27-22 @ 12:15)  HR: 74 (01-28-22 @ 05:24) (68 - 74)  BP: 111/55 (01-28-22 @ 05:24) (98/44 - 114/48)  RR: 19 (01-28-22 @ 05:24) (19 - 19)  SpO2: 91% (01-28-22 @ 05:24) (91% - 95%)    Constitutional: well developed, normal appearance, well groomed, well nourished, no deformities and no acute distress.   Eyes: the conjunctiva exhibited no abnormalities and the eyelids demonstrated no xanthelasmas.   HEENT: normal oral mucosa, no oral pallor and no oral cyanosis.   Neck: normal jugular venous A waves present, normal jugular venous V waves present and no jugular venous jacobs A waves.   Pulmonary: no respiratory distress, normal respiratory rhythm and effort, no accessory muscle use and lungs were clear to auscultation bilaterally.   Cardiovascular: heart rate and rhythm were normal, normal S1 and S2 and no murmur, gallop, rub, heave or thrill are present.   Abdomen: soft, non-tender, no hepato-splenomegaly and no abdominal mass palpated.   Musculoskeletal: the gait could not be assessed..   Extremities: no clubbing of the fingernails, no localized cyanosis, no petechial hemorrhages and no ischemic changes.   Skin: normal skin color and pigmentation, no rash, no venous stasis, no skin lesions, no skin ulcer and no xanthoma was observed.   Psychiatric: oriented to person, place, and time, the affect was normal, the mood was normal and not feeling anxious.     LABS:   --------  01-27    136  |  103  |  15  ----------------------------<  114<H>  4.3   |  31  |  0.57    Ca    8.6      27 Jan 2022 05:44    TPro  6.4  /  Alb  2.8<L>  /  TBili  0.6  /  DBili  x   /  AST  16  /  ALT  14  /  AlkPhos  86  01-26                         10.9   6.21  )-----------( 230      ( 26 Jan 2022 09:05 )             32.3     PT/INR - ( 26 Jan 2022 09:05 )   PT: 14.4 sec;   INR: 1.24 ratio                     RADIOLOGY:  -----------------    ECG:     ECHO:

## 2022-01-29 DIAGNOSIS — M86.10 OTHER ACUTE OSTEOMYELITIS, UNSPECIFIED SITE: ICD-10-CM

## 2022-01-29 LAB
ANION GAP SERPL CALC-SCNC: 3 MMOL/L — LOW (ref 5–17)
BUN SERPL-MCNC: 12 MG/DL — SIGNIFICANT CHANGE UP (ref 7–23)
CALCIUM SERPL-MCNC: 8.7 MG/DL — SIGNIFICANT CHANGE UP (ref 8.5–10.1)
CHLORIDE SERPL-SCNC: 108 MMOL/L — SIGNIFICANT CHANGE UP (ref 96–108)
CO2 SERPL-SCNC: 29 MMOL/L — SIGNIFICANT CHANGE UP (ref 22–31)
CREAT SERPL-MCNC: 0.56 MG/DL — SIGNIFICANT CHANGE UP (ref 0.5–1.3)
GLUCOSE SERPL-MCNC: 103 MG/DL — HIGH (ref 70–99)
HCT VFR BLD CALC: 28.5 % — LOW (ref 39–50)
HGB BLD-MCNC: 9.8 G/DL — LOW (ref 13–17)
MCHC RBC-ENTMCNC: 31.8 PG — SIGNIFICANT CHANGE UP (ref 27–34)
MCHC RBC-ENTMCNC: 34.4 GM/DL — SIGNIFICANT CHANGE UP (ref 32–36)
MCV RBC AUTO: 92.5 FL — SIGNIFICANT CHANGE UP (ref 80–100)
NRBC # BLD: 0 /100 WBCS — SIGNIFICANT CHANGE UP (ref 0–0)
PLATELET # BLD AUTO: 213 K/UL — SIGNIFICANT CHANGE UP (ref 150–400)
POTASSIUM SERPL-MCNC: 4 MMOL/L — SIGNIFICANT CHANGE UP (ref 3.5–5.3)
POTASSIUM SERPL-SCNC: 4 MMOL/L — SIGNIFICANT CHANGE UP (ref 3.5–5.3)
RBC # BLD: 3.08 M/UL — LOW (ref 4.2–5.8)
RBC # FLD: 12.8 % — SIGNIFICANT CHANGE UP (ref 10.3–14.5)
SODIUM SERPL-SCNC: 140 MMOL/L — SIGNIFICANT CHANGE UP (ref 135–145)
WBC # BLD: 6.21 K/UL — SIGNIFICANT CHANGE UP (ref 3.8–10.5)
WBC # FLD AUTO: 6.21 K/UL — SIGNIFICANT CHANGE UP (ref 3.8–10.5)

## 2022-01-29 PROCEDURE — 99232 SBSQ HOSP IP/OBS MODERATE 35: CPT

## 2022-01-29 RX ADMIN — PANTOPRAZOLE SODIUM 40 MILLIGRAM(S): 20 TABLET, DELAYED RELEASE ORAL at 11:37

## 2022-01-29 RX ADMIN — SENNA PLUS 2 TABLET(S): 8.6 TABLET ORAL at 21:08

## 2022-01-29 RX ADMIN — LOSARTAN POTASSIUM 25 MILLIGRAM(S): 100 TABLET, FILM COATED ORAL at 05:26

## 2022-01-29 RX ADMIN — Medication 20 MILLIGRAM(S): at 05:26

## 2022-01-29 RX ADMIN — Medication 100 MILLIGRAM(S): at 16:42

## 2022-01-29 RX ADMIN — POLYETHYLENE GLYCOL 3350 17 GRAM(S): 17 POWDER, FOR SOLUTION ORAL at 11:37

## 2022-01-29 RX ADMIN — Medication 12.5 MILLIGRAM(S): at 16:42

## 2022-01-29 RX ADMIN — Medication 1 TABLET(S): at 11:37

## 2022-01-29 RX ADMIN — Medication 81 MILLIGRAM(S): at 11:37

## 2022-01-29 RX ADMIN — HEPARIN SODIUM 5000 UNIT(S): 5000 INJECTION INTRAVENOUS; SUBCUTANEOUS at 16:42

## 2022-01-29 RX ADMIN — TAMSULOSIN HYDROCHLORIDE 0.4 MILLIGRAM(S): 0.4 CAPSULE ORAL at 21:07

## 2022-01-29 RX ADMIN — Medication 12.5 MILLIGRAM(S): at 05:26

## 2022-01-29 RX ADMIN — Medication 1: at 16:42

## 2022-01-29 RX ADMIN — Medication 500 MILLIGRAM(S): at 11:37

## 2022-01-29 RX ADMIN — Medication 100 MILLIGRAM(S): at 01:16

## 2022-01-29 RX ADMIN — DONEPEZIL HYDROCHLORIDE 5 MILLIGRAM(S): 10 TABLET, FILM COATED ORAL at 21:07

## 2022-01-29 RX ADMIN — Medication 100 MILLIGRAM(S): at 11:38

## 2022-01-29 RX ADMIN — Medication 325 MILLIGRAM(S): at 21:07

## 2022-01-29 RX ADMIN — HEPARIN SODIUM 5000 UNIT(S): 5000 INJECTION INTRAVENOUS; SUBCUTANEOUS at 05:26

## 2022-01-29 RX ADMIN — MUPIROCIN 1 APPLICATION(S): 20 OINTMENT TOPICAL at 05:26

## 2022-01-29 RX ADMIN — SIMVASTATIN 20 MILLIGRAM(S): 20 TABLET, FILM COATED ORAL at 21:07

## 2022-01-29 NOTE — PROGRESS NOTE ADULT - SUBJECTIVE AND OBJECTIVE BOX
PROGRESS NOTE  Patient is a 89y old  Male who presents with a chief complaint of hand infection left (28 Jan 2022 08:13)  Chart and available morning labs /imaging are reviewed electronically , urgent issues addressed . More information  is being added upon completion of rounds , when more information is collected and management discussed with consultants , medical staff and social service/case management on the floor   OVERNIGHT  No new issues reported by medical staff . All above noted ,NM BONE SCAN reviewed ,ID note reviewed   HPI:  89 year old male with history of dementia, anemia, CAD, prostate cancer, A-fib, DM, depression, CHF, and HLD BIBA for left hand infection. limited history due to dementia. afebrile in triage. Per previous nursing records, had history of left hand infection in Nov (was improving at that time, was being followed by home health). Patient was admitted in November 2021 with left hand third finger necrotic tip: scant serosanguinous drainage no odor: finger is with erythema and swelling present no warmth noted wound dimensions about 1.5 x 1.5 wound culture obtained:  fingers of hand contracted .CT -no evidence of osteomyelitis ,discharged on 7 days of po abx after ID & plastic sx clearance was obtained ,patient was given followup with plastic hand sx Dr GRIFFIN Sosa. Patient is diagnosed with cellulitis of 3rd finger of left hand Admitted for septic workup and evaluation,send blood and urine cx,serial lactate levels,monitor vitals closley,ivfs hydration,monitor urine output and renal profile,iv abx initiated ,ID cons called . PCP Toby Quintana Resident at [facility] (25 Jan 2022 19:39)    PAST MEDICAL & SURGICAL HISTORY:  Atrial fibrillation    Hypertension    Diabetes    Prostate ca    Dementia    CHF (congestive heart failure)    Hyperlipemia    Diabetes    Depression    Dementia    DM (diabetes mellitus)    Atrial fibrillation    Prostate CA    Dementia    Arteriosclerotic heart disease (ASHD)    Constipation    Anemia    No significant past surgical history        MEDICATIONS  (STANDING):  ascorbic acid 500 milliGRAM(s) Oral daily  aspirin  chewable 81 milliGRAM(s) Oral daily  ceFAZolin   IVPB 2000 milliGRAM(s) IV Intermittent every 8 hours  dextrose 40% Gel 15 Gram(s) Oral once  dextrose 5%. 1000 milliLiter(s) (50 mL/Hr) IV Continuous <Continuous>  dextrose 5%. 1000 milliLiter(s) (100 mL/Hr) IV Continuous <Continuous>  dextrose 50% Injectable 25 Gram(s) IV Push once  dextrose 50% Injectable 12.5 Gram(s) IV Push once  dextrose 50% Injectable 25 Gram(s) IV Push once  donepezil 5 milliGRAM(s) Oral at bedtime  ferrous    sulfate 325 milliGRAM(s) Oral at bedtime  furosemide    Tablet 20 milliGRAM(s) Oral daily  glucagon  Injectable 1 milliGRAM(s) IntraMuscular once  heparin   Injectable 5000 Unit(s) SubCutaneous every 12 hours  insulin lispro (ADMELOG) corrective regimen sliding scale   SubCutaneous three times a day before meals  lactobacillus acidophilus 1 Tablet(s) Oral daily  losartan 25 milliGRAM(s) Oral daily  metoprolol tartrate 12.5 milliGRAM(s) Oral two times a day  multivitamin/minerals 1 Tablet(s) Oral daily  mupirocin 2% Ointment 1 Application(s) Topical two times a day  pantoprazole   Suspension 40 milliGRAM(s) Oral daily  polyethylene glycol 3350 17 Gram(s) Oral daily  senna 2 Tablet(s) Oral at bedtime  simvastatin 20 milliGRAM(s) Oral at bedtime  tamsulosin 0.4 milliGRAM(s) Oral at bedtime    MEDICATIONS  (PRN):  acetaminophen     Tablet .. 650 milliGRAM(s) Oral every 6 hours PRN Temp greater or equal to 38C (100.4F), Mild Pain (1 - 3)  aluminum hydroxide/magnesium hydroxide/simethicone Suspension 30 milliLiter(s) Oral every 4 hours PRN Dyspepsia  melatonin 3 milliGRAM(s) Oral at bedtime PRN Insomnia  morphine  - Injectable 2 milliGRAM(s) IV Push every 4 hours PRN Severe Pain (7 - 10)  ondansetron Injectable 4 milliGRAM(s) IV Push every 8 hours PRN Nausea and/or Vomiting  traMADol 50 milliGRAM(s) Oral four times a day PRN Moderate Pain (4 - 6)      OBJECTIVE    T(C): 36.7 (01-29-22 @ 12:27), Max: 36.9 (01-28-22 @ 20:19)  HR: 72 (01-29-22 @ 12:27) (65 - 75)  BP: 116/61 (01-29-22 @ 12:27) (95/51 - 135/74)  RR: 18 (01-29-22 @ 12:27) (18 - 18)  SpO2: 96% (01-29-22 @ 12:27) (94% - 96%)  Wt(kg): --  I&O's Summary    28 Jan 2022 07:01  -  29 Jan 2022 07:00  --------------------------------------------------------  IN: 650 mL / OUT: 0 mL / NET: 650 mL  REVIEW OF SYSTEMS:  Patient is  unable to provide any information/ROS  due to baseline mental status.   PHYSICAL EXAM:  see progress note 01/28/22  LABS:                        9.8    6.21  )-----------( 213      ( 29 Jan 2022 06:50 )             28.5     01-29    140  |  108  |  12  ----------------------------<  103<H>  4.0   |  29  |  0.56    Ca    8.7      29 Jan 2022 06:50      CAPILLARY BLOOD GLUCOSE      POCT Blood Glucose.: 136 mg/dL (29 Jan 2022 11:38)  POCT Blood Glucose.: 131 mg/dL (29 Jan 2022 07:45)  POCT Blood Glucose.: 150 mg/dL (28 Jan 2022 21:43)  POCT Blood Glucose.: 93 mg/dL (28 Jan 2022 16:46)          Culture - Urine (collected 26 Jan 2022 00:44)  Source: Clean Catch Clean Catch (Midstream)  Final Report (28 Jan 2022 10:47):    50,000 - 99,000 CFU/mL Escherichia coli  Organism: Escherichia coli (28 Jan 2022 10:47)  Organism: Escherichia coli (28 Jan 2022 10:47)    Culture - Other (collected 25 Jan 2022 18:54)  Source: .Other finger  Final Report (27 Jan 2022 13:22):    Numerous Staphylococcus aureus    Normal skin adriana isolated  Organism: Staphylococcus aureus (27 Jan 2022 13:22)  Organism: Staphylococcus aureus (27 Jan 2022 13:22)    Culture - Blood (collected 25 Jan 2022 17:15)  Source: .Blood Blood-Peripheral  Preliminary Report (26 Jan 2022 18:01):    No growth to date.    Culture - Blood (collected 25 Jan 2022 17:15)  Source: .Blood Blood-Peripheral  Preliminary Report (26 Jan 2022 18:01):    No growth to date.      RADIOLOGY & ADDITIONAL TESTS: < from: NM Inflammatory Loc WBC, Single Area Single Day (01.28.22 @ 15:08) >  ACC: [id number] EXAM:  NM INFLAMM LOC WBC SA SD                          PROCEDURE DATE:  01/28/2022          INTERPRETATION:  RADIOPHARMACEUTICAL: 0.5 mCi In-111 labeled autologous   leukocytes, I.V.; 10 mCi Tc99m sulfur colloid    CLINICAL STATEMENT: 89-year-old male with left middle digit infection.    TECHNIQUE: Imaging is limited by patient's body habitus and condition.   Static images of the pelvis including the left hand were obtained 24   hours following administration of radiolabeled leukocytes. The patient   then was injected with Tc-99m sulfur colloid and images were repeated in   the same projections using dual isotope acquisition mode.    FINDINGS:  There is incongruent uptake of radiolabeled leukocytes and   Tc-99m sulfur colloid within the left hand, possible with osteomyelitis   most likely corresponding to the lesion in the third digit.    IMPRESSION: Abnormal limited combined Indium-111 labeled leukocyte study   and marrow scan.    Incongruent uptake of radiolabeled leukocytes and Tc 99m sulfur colloid   within the left hand, compatible with osteomyelitis, most likely   corresponding to the lesion in the third digit.    < end of copied text >     reviewed elctronically  ASSESSMENT/PLAN: 	    25 minutes aggregate time was spent on this visit, 50% visit time spent in care co-ordination with other attendings and counselling patient .I have discussed care plan with patient / HCP/family member ,who expressed understanding of problems treatment and their effect and side effects, alternatives in details. I have asked if they have any questions and concerns and appropriately addressed them to best of my ability. Left 2 messages for son Vinicio

## 2022-01-29 NOTE — PROGRESS NOTE ADULT - SUBJECTIVE AND OBJECTIVE BOX
Patient is a 89y Male with a known history of :  Cellulitis of hand [L03.119]    Afib [I48.91]    HTN (hypertension) [I10]    DM (diabetes mellitus) [E11.9]    Dementia [F03.90]    Arteriosclerotic heart disease (ASHD) [I25.10]    Constipation [K59.00]    Anemia [D64.9]    Prophylactic measure [Z29.9]    Acute osteomyelitis [M86.10]      HPI:  89 year old male with history of dementia, anemia, CAD, prostate cancer, A-fib, DM, depression, CHF, and HLD BIBA for left hand infection. limited history due to dementia. afebrile in triage. Per previous nursing records, had history of left hand infection in Nov (was improving at that time, was being followed by home health). Patient was admitted in November 2021 with left hand third finger necrotic tip: scant serosanguinous drainage no odor: finger is with erythema and swelling present no warmth noted wound dimensions about 1.5 x 1.5 wound culture obtained:  fingers of hand contracted .CT -no evidence of osteomyelitis ,discharged on 7 days of po abx after ID & plastic sx clearance was obtained ,patient was given followup with plastic hand sx Dr GRIFFIN Sosa. Patient is diagnosed with cellulitis of 3rd finger of left hand Admitted for septic workup and evaluation,send blood and urine cx,serial lactate levels,monitor vitals closley,ivfs hydration,monitor urine output and renal profile,iv abx initiated ,ID cons called . PCP Toby Quintana Resident at [facility] (25 Jan 2022 19:39)      REVIEW OF SYSTEMS:    CONSTITUTIONAL: No fever, weight loss, or fatigue  EYES: No eye pain, visual disturbances, or discharge  ENMT:  No difficulty hearing, tinnitus, vertigo; No sinus or throat pain  NECK: No pain or stiffness  BREASTS: No pain, masses, or nipple discharge  RESPIRATORY: No cough, wheezing, chills or hemoptysis; No shortness of breath  CARDIOVASCULAR: No chest pain, palpitations, dizziness, or leg swelling  GASTROINTESTINAL: No abdominal or epigastric pain. No nausea, vomiting, or hematemesis; No diarrhea or constipation. No melena or hematochezia.  GENITOURINARY: No dysuria, frequency, hematuria, or incontinence  NEUROLOGICAL: No headaches, memory loss, loss of strength, numbness, or tremors  SKIN: No itching, burning, rashes, or lesions   LYMPH NODES: No enlarged glands  ENDOCRINE: No heat or cold intolerance; No hair loss  MUSCULOSKELETAL: No joint pain or swelling; No muscle, back, or extremity pain  PSYCHIATRIC: No depression, anxiety, mood swings, or difficulty sleeping  HEME/LYMPH: No easy bruising, or bleeding gums  ALLERGY AND IMMUNOLOGIC: No hives or eczema    MEDICATIONS  (STANDING):  ascorbic acid 500 milliGRAM(s) Oral daily  aspirin  chewable 81 milliGRAM(s) Oral daily  ceFAZolin   IVPB 2000 milliGRAM(s) IV Intermittent every 8 hours  dextrose 40% Gel 15 Gram(s) Oral once  dextrose 5%. 1000 milliLiter(s) (50 mL/Hr) IV Continuous <Continuous>  dextrose 5%. 1000 milliLiter(s) (100 mL/Hr) IV Continuous <Continuous>  dextrose 50% Injectable 25 Gram(s) IV Push once  dextrose 50% Injectable 12.5 Gram(s) IV Push once  dextrose 50% Injectable 25 Gram(s) IV Push once  donepezil 5 milliGRAM(s) Oral at bedtime  ferrous    sulfate 325 milliGRAM(s) Oral at bedtime  furosemide    Tablet 20 milliGRAM(s) Oral daily  glucagon  Injectable 1 milliGRAM(s) IntraMuscular once  heparin   Injectable 5000 Unit(s) SubCutaneous every 12 hours  insulin lispro (ADMELOG) corrective regimen sliding scale   SubCutaneous three times a day before meals  lactobacillus acidophilus 1 Tablet(s) Oral daily  losartan 25 milliGRAM(s) Oral daily  metoprolol tartrate 12.5 milliGRAM(s) Oral two times a day  multivitamin/minerals 1 Tablet(s) Oral daily  mupirocin 2% Ointment 1 Application(s) Topical two times a day  pantoprazole   Suspension 40 milliGRAM(s) Oral daily  polyethylene glycol 3350 17 Gram(s) Oral daily  senna 2 Tablet(s) Oral at bedtime  simvastatin 20 milliGRAM(s) Oral at bedtime  tamsulosin 0.4 milliGRAM(s) Oral at bedtime    MEDICATIONS  (PRN):  acetaminophen     Tablet .. 650 milliGRAM(s) Oral every 6 hours PRN Temp greater or equal to 38C (100.4F), Mild Pain (1 - 3)  aluminum hydroxide/magnesium hydroxide/simethicone Suspension 30 milliLiter(s) Oral every 4 hours PRN Dyspepsia  melatonin 3 milliGRAM(s) Oral at bedtime PRN Insomnia  morphine  - Injectable 2 milliGRAM(s) IV Push every 4 hours PRN Severe Pain (7 - 10)  ondansetron Injectable 4 milliGRAM(s) IV Push every 8 hours PRN Nausea and/or Vomiting  traMADol 50 milliGRAM(s) Oral four times a day PRN Moderate Pain (4 - 6)      ALLERGIES: latex (Rash)  latex (Unknown)  No Known Drug Allergies      FAMILY HISTORY:      PHYSICAL EXAMINATION:  -----------------------------  T(C): 36.7 (01-29-22 @ 12:27), Max: 36.9 (01-28-22 @ 20:19)  HR: 70 (01-29-22 @ 16:41) (65 - 72)  BP: 110/- (01-29-22 @ 16:41) (95/51 - 135/74)  RR: 18 (01-29-22 @ 12:27) (18 - 18)  SpO2: 96% (01-29-22 @ 12:27) (94% - 96%)  Wt(kg): --    01-28 @ 07:01  -  01-29 @ 07:00  --------------------------------------------------------  IN:    sodium chloride 0.9%: 650 mL  Total IN: 650 mL    OUT:  Total OUT: 0 mL    Total NET: 650 mL            VITALS  T(C): 36.7 (01-29-22 @ 12:27), Max: 36.9 (01-28-22 @ 20:19)  HR: 70 (01-29-22 @ 16:41) (65 - 72)  BP: 110/- (01-29-22 @ 16:41) (95/51 - 135/74)  RR: 18 (01-29-22 @ 12:27) (18 - 18)  SpO2: 96% (01-29-22 @ 12:27) (94% - 96%)    Constitutional: well developed, normal appearance, well groomed, well nourished, no deformities and no acute distress.   Eyes: the conjunctiva exhibited no abnormalities and the eyelids demonstrated no xanthelasmas.   HEENT: normal oral mucosa, no oral pallor and no oral cyanosis.   Neck: normal jugular venous A waves present, normal jugular venous V waves present and no jugular venous jacobs A waves.   Pulmonary: no respiratory distress, normal respiratory rhythm and effort, no accessory muscle use and lungs were clear to auscultation bilaterally.   Cardiovascular: heart rate and rhythm were normal, normal S1 and S2 and no murmur, gallop, rub, heave or thrill are present.   Abdomen: soft, non-tender, no hepato-splenomegaly and no abdominal mass palpated.   Musculoskeletal: the gait could not be assessed..   Extremities: no clubbing of the fingernails, no localized cyanosis, no petechial hemorrhages and no ischemic changes.   Skin: normal skin color and pigmentation, no rash, no venous stasis, no skin lesions, no skin ulcer and no xanthoma was observed.   Psychiatric: oriented to person, place, and time, the affect was normal, the mood was normal and not feeling anxious.     LABS:   --------  01-29    140  |  108  |  12  ----------------------------<  103<H>  4.0   |  29  |  0.56    Ca    8.7      29 Jan 2022 06:50                           9.8    6.21  )-----------( 213      ( 29 Jan 2022 06:50 )             28.5                 RADIOLOGY:  -----------------    ECG:     ECHO:

## 2022-01-29 NOTE — CONSULT NOTE ADULT - SUBJECTIVE AND OBJECTIVE BOX
HPI:  89 year old male with history of dementia, anemia, CAD, prostate cancer, A-fib, DM, depression, CHF, and HLD BIBA for left hand infection. limited history due to dementia. afebrile in triage. Per previous nursing records, had history of left hand infection in Nov (was improving at that time, was being followed by home health). Patient was admitted in November 2021 with left hand third finger necrotic tip: scant serosanguinous drainage no odor: finger is with erythema and swelling present no warmth noted wound dimensions about 1.5 x 1.5 wound culture obtained:  fingers of hand contracted .CT -no evidence of osteomyelitis ,discharged on 7 days of po abx after ID & plastic sx clearance was obtained ,patient was given followup with plastic hand sx Dr GRIFFIN Sosa. Patient is diagnosed with cellulitis of 3rd finger of left hand Admitted for septic workup and evaluation,send blood and urine cx,serial lactate levels,monitor vitals closley,ivfs hydration,monitor urine output and renal profile,iv abx initiated ,ID cons called . PCP Toby Quintana Resident at [facility] (25 Jan 2022 19:39)      PAST MEDICAL & SURGICAL HISTORY:  Atrial fibrillation    Hypertension    Diabetes    Prostate ca    Dementia    CHF (congestive heart failure)    Hyperlipemia    Diabetes    Depression    Dementia    DM (diabetes mellitus)    Atrial fibrillation    Prostate CA    Arteriosclerotic heart disease (ASHD)    Dementia    Anemia    Constipation    No significant past surgical history                REVIEW OF SYSTEMS  Patient is unable to provide any information/ROS  due to baseline mental status    MEDICATIONS  (STANDING):  ascorbic acid 500 milliGRAM(s) Oral daily  aspirin  chewable 81 milliGRAM(s) Oral daily  ceFAZolin   IVPB 2000 milliGRAM(s) IV Intermittent every 8 hours  dextrose 40% Gel 15 Gram(s) Oral once  dextrose 5%. 1000 milliLiter(s) (50 mL/Hr) IV Continuous <Continuous>  dextrose 5%. 1000 milliLiter(s) (100 mL/Hr) IV Continuous <Continuous>  dextrose 50% Injectable 25 Gram(s) IV Push once  dextrose 50% Injectable 12.5 Gram(s) IV Push once  dextrose 50% Injectable 25 Gram(s) IV Push once  donepezil 5 milliGRAM(s) Oral at bedtime  ferrous    sulfate 325 milliGRAM(s) Oral at bedtime  furosemide    Tablet 20 milliGRAM(s) Oral daily  glucagon  Injectable 1 milliGRAM(s) IntraMuscular once  heparin   Injectable 5000 Unit(s) SubCutaneous every 12 hours  insulin lispro (ADMELOG) corrective regimen sliding scale   SubCutaneous three times a day before meals  lactobacillus acidophilus 1 Tablet(s) Oral daily  losartan 25 milliGRAM(s) Oral daily  metoprolol tartrate 12.5 milliGRAM(s) Oral two times a day  multivitamin/minerals 1 Tablet(s) Oral daily  mupirocin 2% Ointment 1 Application(s) Topical two times a day  pantoprazole   Suspension 40 milliGRAM(s) Oral daily  polyethylene glycol 3350 17 Gram(s) Oral daily  senna 2 Tablet(s) Oral at bedtime  simvastatin 20 milliGRAM(s) Oral at bedtime  tamsulosin 0.4 milliGRAM(s) Oral at bedtime    MEDICATIONS  (PRN):  acetaminophen     Tablet .. 650 milliGRAM(s) Oral every 6 hours PRN Temp greater or equal to 38C (100.4F), Mild Pain (1 - 3)  aluminum hydroxide/magnesium hydroxide/simethicone Suspension 30 milliLiter(s) Oral every 4 hours PRN Dyspepsia  melatonin 3 milliGRAM(s) Oral at bedtime PRN Insomnia  morphine  - Injectable 2 milliGRAM(s) IV Push every 4 hours PRN Severe Pain (7 - 10)  ondansetron Injectable 4 milliGRAM(s) IV Push every 8 hours PRN Nausea and/or Vomiting  traMADol 50 milliGRAM(s) Oral four times a day PRN Moderate Pain (4 - 6)      Allergies    latex (Rash)  latex (Unknown)  No Known Drug Allergies    Intolerances    SOCIAL HISTORY:  FAMILY HISTORY:      Vital Signs Last 24 Hrs  T(C): 36.5 (29 Jan 2022 05:19), Max: 36.9 (28 Jan 2022 20:19)  T(F): 97.7 (29 Jan 2022 05:19), Max: 98.5 (28 Jan 2022 20:19)  HR: 69 (29 Jan 2022 05:19) (65 - 88)  BP: 135/74 (29 Jan 2022 05:19) (95/51 - 135/74)  BP(mean): --  RR: 18 (29 Jan 2022 05:19) (18 - 18)  SpO2: 94% (29 Jan 2022 05:19) (92% - 95%)    NAD /   A&Ox1 name  within defined normal limits  Total Care Sport/ Versa Care P500 bed                            9.8    6.21  )-----------( 213      ( 29 Jan 2022 06:50 )  < from: NM Inflammatory Loc WBC, Single Area Single Day (01.28.22 @ 15:08) >    ACC: [id number] EXAM:  NM INFLAMM LOC WBC SA SD                          PROCEDURE DATE:  01/28/2022          INTERPRETATION:  RADIOPHARMACEUTICAL: 0.5 mCi In-111 labeled autologous   leukocytes, I.V.; 10 mCi Tc99m sulfur colloid    CLINICAL STATEMENT: 89-year-old male with left middle digit infection.    TECHNIQUE: Imaging is limited by patient's body habitus and condition.   Static images of the pelvis including the left hand were obtained 24   hours following administration of radiolabeled leukocytes. The patient   then was injected with Tc-99m sulfur colloid and images were repeated in   the same projections using dual isotope acquisition mode.    FINDINGS:  There is incongruent uptake of radiolabeled leukocytes and   Tc-99m sulfur colloid within the left hand, possible with osteomyelitis   most likely corresponding to the lesion in the third digit.    IMPRESSION: Abnormal limited combined Indium-111 labeled leukocyte study   and marrow scan.    Incongruent uptake of radiolabeled leukocytes and Tc 99m sulfur colloid   within the left hand, compatible with osteomyelitis, most likely   corresponding to the lesion in the third digit.    --- End of Report ---            NIRMAL GARY MD; Attending Nuclear Medicine  This document has been electronically signed. Jan 28 2022  3:25PM    < end of copied text >  .5     01-29    140  |  108  |  12  ----------------------------<  103<H>  4.0   |  29  |  0.56    Ca    8.7      29 Jan 2022 06:50        A1C with Estimated Average Glucose Result: 5.9 % (01-26-22 @ 11:13)  A1C with Estimated Average Glucose Result: 5.8 % (11-17-21 @ 15:10)      Neurology   Can not follow commands    Musculoskeletal:  contractracted  DP & PT pulses non-palpable bilaterally 2/2 +1 lower extremity pitting edema, Capillary refill 3 seconds, no hair growth, skin temp warm b/l.    Skin:    frail, dry   scant serosanguinous drainage, erythema  No odor, warmth, tenderness

## 2022-01-29 NOTE — CONSULT NOTE ADULT - ASSESSMENT
Patient presents with:   1. Left hand third digit wound at finger tip: scant serosanguinous drainage, erythema, and swollen: No odor, warmth, tenderness: open wound dimensions about 1 x 1 wound culture obtained:  fingers of hand contracted .Organism: Staphylococcus aureus (01.25.22 @ 18:54)   and prescribed  mupirocin 2% Ointment 1 Application(s) Topical two times a day   recommend   continue Mupirocin BId  Offload contracted fingers  Continue  Nutrition (as tolerated)  Continue  Offloading   Continue Pericare  Apply cair boots at all times while in bed.   Provide skin checks and foot placement q8h.  Care as per medicine will follow w/ you  Findings and recommendations discussed with JACOB Ring  Thank you for this consult  Margarita Solorzano NP, [facility] [phone]
left hand cellulitis - on zosyn  ashd  atrial fib  chf hfpef - compensated  hypertension  ca prostate  dm2

## 2022-01-30 RX ADMIN — Medication 81 MILLIGRAM(S): at 11:10

## 2022-01-30 RX ADMIN — Medication 12.5 MILLIGRAM(S): at 05:12

## 2022-01-30 RX ADMIN — LOSARTAN POTASSIUM 25 MILLIGRAM(S): 100 TABLET, FILM COATED ORAL at 05:12

## 2022-01-30 RX ADMIN — Medication 1: at 11:47

## 2022-01-30 RX ADMIN — Medication 100 MILLIGRAM(S): at 17:14

## 2022-01-30 RX ADMIN — Medication 1 TABLET(S): at 11:10

## 2022-01-30 RX ADMIN — MUPIROCIN 1 APPLICATION(S): 20 OINTMENT TOPICAL at 17:13

## 2022-01-30 RX ADMIN — Medication 100 MILLIGRAM(S): at 00:46

## 2022-01-30 RX ADMIN — POLYETHYLENE GLYCOL 3350 17 GRAM(S): 17 POWDER, FOR SOLUTION ORAL at 11:10

## 2022-01-30 RX ADMIN — SENNA PLUS 2 TABLET(S): 8.6 TABLET ORAL at 21:03

## 2022-01-30 RX ADMIN — DONEPEZIL HYDROCHLORIDE 5 MILLIGRAM(S): 10 TABLET, FILM COATED ORAL at 21:03

## 2022-01-30 RX ADMIN — Medication 20 MILLIGRAM(S): at 05:12

## 2022-01-30 RX ADMIN — Medication 100 MILLIGRAM(S): at 11:20

## 2022-01-30 RX ADMIN — Medication 325 MILLIGRAM(S): at 21:03

## 2022-01-30 RX ADMIN — TAMSULOSIN HYDROCHLORIDE 0.4 MILLIGRAM(S): 0.4 CAPSULE ORAL at 21:03

## 2022-01-30 RX ADMIN — MUPIROCIN 1 APPLICATION(S): 20 OINTMENT TOPICAL at 05:13

## 2022-01-30 RX ADMIN — HEPARIN SODIUM 5000 UNIT(S): 5000 INJECTION INTRAVENOUS; SUBCUTANEOUS at 05:12

## 2022-01-30 RX ADMIN — PANTOPRAZOLE SODIUM 40 MILLIGRAM(S): 20 TABLET, DELAYED RELEASE ORAL at 11:24

## 2022-01-30 RX ADMIN — Medication 12.5 MILLIGRAM(S): at 17:12

## 2022-01-30 RX ADMIN — Medication 500 MILLIGRAM(S): at 11:10

## 2022-01-30 RX ADMIN — SIMVASTATIN 20 MILLIGRAM(S): 20 TABLET, FILM COATED ORAL at 21:03

## 2022-01-30 RX ADMIN — HEPARIN SODIUM 5000 UNIT(S): 5000 INJECTION INTRAVENOUS; SUBCUTANEOUS at 17:12

## 2022-01-30 NOTE — PROGRESS NOTE ADULT - SUBJECTIVE AND OBJECTIVE BOX
Patient is a 89y Male with a known history of :  Cellulitis of hand [L03.119]    Afib [I48.91]    HTN (hypertension) [I10]    DM (diabetes mellitus) [E11.9]    Dementia [F03.90]    Arteriosclerotic heart disease (ASHD) [I25.10]    Constipation [K59.00]    Anemia [D64.9]    Prophylactic measure [Z29.9]    Acute osteomyelitis [M86.10]      HPI:  89 year old male with history of dementia, anemia, CAD, prostate cancer, A-fib, DM, depression, CHF, and HLD BIBA for left hand infection. limited history due to dementia. afebrile in triage. Per previous nursing records, had history of left hand infection in Nov (was improving at that time, was being followed by home health). Patient was admitted in November 2021 with left hand third finger necrotic tip: scant serosanguinous drainage no odor: finger is with erythema and swelling present no warmth noted wound dimensions about 1.5 x 1.5 wound culture obtained:  fingers of hand contracted .CT -no evidence of osteomyelitis ,discharged on 7 days of po abx after ID & plastic sx clearance was obtained ,patient was given followup with plastic hand sx Dr GRIFFIN Sosa. Patient is diagnosed with cellulitis of 3rd finger of left hand Admitted for septic workup and evaluation,send blood and urine cx,serial lactate levels,monitor vitals closley,ivfs hydration,monitor urine output and renal profile,iv abx initiated ,ID cons called . PCP Toby Quintana Resident at [facility] (25 Jan 2022 19:39)      REVIEW OF SYSTEMS:    CONSTITUTIONAL: No fever, weight loss, or fatigue  EYES: No eye pain, visual disturbances, or discharge  ENMT:  No difficulty hearing, tinnitus, vertigo; No sinus or throat pain  NECK: No pain or stiffness  BREASTS: No pain, masses, or nipple discharge  RESPIRATORY: No cough, wheezing, chills or hemoptysis; No shortness of breath  CARDIOVASCULAR: No chest pain, palpitations, dizziness, or leg swelling  GASTROINTESTINAL: No abdominal or epigastric pain. No nausea, vomiting, or hematemesis; No diarrhea or constipation. No melena or hematochezia.  GENITOURINARY: No dysuria, frequency, hematuria, or incontinence  NEUROLOGICAL: No headaches, memory loss, loss of strength, numbness, or tremors  SKIN: No itching, burning, rashes, or lesions   LYMPH NODES: No enlarged glands  ENDOCRINE: No heat or cold intolerance; No hair loss  MUSCULOSKELETAL: No joint pain or swelling; No muscle, back, or extremity pain  PSYCHIATRIC: No depression, anxiety, mood swings, or difficulty sleeping  HEME/LYMPH: No easy bruising, or bleeding gums  ALLERGY AND IMMUNOLOGIC: No hives or eczema    MEDICATIONS  (STANDING):  ascorbic acid 500 milliGRAM(s) Oral daily  aspirin  chewable 81 milliGRAM(s) Oral daily  ceFAZolin   IVPB 2000 milliGRAM(s) IV Intermittent every 8 hours  dextrose 40% Gel 15 Gram(s) Oral once  dextrose 5%. 1000 milliLiter(s) (50 mL/Hr) IV Continuous <Continuous>  dextrose 5%. 1000 milliLiter(s) (100 mL/Hr) IV Continuous <Continuous>  dextrose 50% Injectable 25 Gram(s) IV Push once  dextrose 50% Injectable 12.5 Gram(s) IV Push once  dextrose 50% Injectable 25 Gram(s) IV Push once  donepezil 5 milliGRAM(s) Oral at bedtime  ferrous    sulfate 325 milliGRAM(s) Oral at bedtime  furosemide    Tablet 20 milliGRAM(s) Oral daily  glucagon  Injectable 1 milliGRAM(s) IntraMuscular once  heparin   Injectable 5000 Unit(s) SubCutaneous every 12 hours  insulin lispro (ADMELOG) corrective regimen sliding scale   SubCutaneous three times a day before meals  lactobacillus acidophilus 1 Tablet(s) Oral daily  losartan 25 milliGRAM(s) Oral daily  metoprolol tartrate 12.5 milliGRAM(s) Oral two times a day  multivitamin/minerals 1 Tablet(s) Oral daily  mupirocin 2% Ointment 1 Application(s) Topical two times a day  pantoprazole   Suspension 40 milliGRAM(s) Oral daily  polyethylene glycol 3350 17 Gram(s) Oral daily  senna 2 Tablet(s) Oral at bedtime  simvastatin 20 milliGRAM(s) Oral at bedtime  tamsulosin 0.4 milliGRAM(s) Oral at bedtime    MEDICATIONS  (PRN):  acetaminophen     Tablet .. 650 milliGRAM(s) Oral every 6 hours PRN Temp greater or equal to 38C (100.4F), Mild Pain (1 - 3)  aluminum hydroxide/magnesium hydroxide/simethicone Suspension 30 milliLiter(s) Oral every 4 hours PRN Dyspepsia  melatonin 3 milliGRAM(s) Oral at bedtime PRN Insomnia  morphine  - Injectable 2 milliGRAM(s) IV Push every 4 hours PRN Severe Pain (7 - 10)  ondansetron Injectable 4 milliGRAM(s) IV Push every 8 hours PRN Nausea and/or Vomiting  traMADol 50 milliGRAM(s) Oral four times a day PRN Moderate Pain (4 - 6)      ALLERGIES: latex (Rash)  latex (Unknown)  No Known Drug Allergies      FAMILY HISTORY:      PHYSICAL EXAMINATION:  -----------------------------  T(C): 36.5 (01-30-22 @ 05:00), Max: 36.7 (01-29-22 @ 12:27)  HR: 60 (01-30-22 @ 05:00) (60 - 72)  BP: 129/65 (01-30-22 @ 05:00) (101/63 - 129/65)  RR: 18 (01-30-22 @ 05:00) (18 - 18)  SpO2: 93% (01-30-22 @ 05:00) (93% - 96%)  Wt(kg): --        VITALS  T(C): 36.5 (01-30-22 @ 05:00), Max: 36.7 (01-29-22 @ 12:27)  HR: 60 (01-30-22 @ 05:00) (60 - 72)  BP: 129/65 (01-30-22 @ 05:00) (101/63 - 129/65)  RR: 18 (01-30-22 @ 05:00) (18 - 18)  SpO2: 93% (01-30-22 @ 05:00) (93% - 96%)    Constitutional: well developed, normal appearance, well groomed, well nourished, no deformities and no acute distress.   Eyes: the conjunctiva exhibited no abnormalities and the eyelids demonstrated no xanthelasmas.   HEENT: normal oral mucosa, no oral pallor and no oral cyanosis.   Neck: normal jugular venous A waves present, normal jugular venous V waves present and no jugular venous jacobs A waves.   Pulmonary: no respiratory distress, normal respiratory rhythm and effort, no accessory muscle use and lungs were clear to auscultation bilaterally.   Cardiovascular: heart rate and rhythm were normal, normal S1 and S2 and no murmur, gallop, rub, heave or thrill are present.   Abdomen: soft, non-tender, no hepato-splenomegaly and no abdominal mass palpated.   Musculoskeletal: the gait could not be assessed..   Extremities: no clubbing of the fingernails, no localized cyanosis, no petechial hemorrhages and no ischemic changes.   Skin: normal skin color and pigmentation, no rash, no venous stasis, no skin lesions, no skin ulcer and no xanthoma was observed.   Psychiatric: oriented to person, place, and time, the affect was normal, the mood was normal and not feeling anxious.     LABS:   --------  01-29    140  |  108  |  12  ----------------------------<  103<H>  4.0   |  29  |  0.56    Ca    8.7      29 Jan 2022 06:50                           9.8    6.21  )-----------( 213      ( 29 Jan 2022 06:50 )             28.5                 RADIOLOGY:  -----------------    ECG:     ECHO:

## 2022-01-30 NOTE — PROGRESS NOTE ADULT - SUBJECTIVE AND OBJECTIVE BOX
Interval History:    CENTRAL LINE:   [  ] YES       [  ] NO  RAMOS:                 [  ] YES       [  ] NO         REVIEW OF SYSTEMS:  All Systems below were reviewed and are negative [  ]  HEENT:  ID:  Pulmonary:  Cardiac:  GI:  Renal:  Musculoskeletal:  All other systems above were reviewed and are negative   [  ]      MEDICATIONS  (STANDING):  ascorbic acid 500 milliGRAM(s) Oral daily  aspirin  chewable 81 milliGRAM(s) Oral daily  ceFAZolin   IVPB 2000 milliGRAM(s) IV Intermittent every 8 hours  dextrose 40% Gel 15 Gram(s) Oral once  dextrose 5%. 1000 milliLiter(s) (50 mL/Hr) IV Continuous <Continuous>  dextrose 5%. 1000 milliLiter(s) (100 mL/Hr) IV Continuous <Continuous>  dextrose 50% Injectable 25 Gram(s) IV Push once  dextrose 50% Injectable 12.5 Gram(s) IV Push once  dextrose 50% Injectable 25 Gram(s) IV Push once  donepezil 5 milliGRAM(s) Oral at bedtime  ferrous    sulfate 325 milliGRAM(s) Oral at bedtime  furosemide    Tablet 20 milliGRAM(s) Oral daily  glucagon  Injectable 1 milliGRAM(s) IntraMuscular once  heparin   Injectable 5000 Unit(s) SubCutaneous every 12 hours  insulin lispro (ADMELOG) corrective regimen sliding scale   SubCutaneous three times a day before meals  lactobacillus acidophilus 1 Tablet(s) Oral daily  losartan 25 milliGRAM(s) Oral daily  metoprolol tartrate 12.5 milliGRAM(s) Oral two times a day  multivitamin/minerals 1 Tablet(s) Oral daily  mupirocin 2% Ointment 1 Application(s) Topical two times a day  pantoprazole   Suspension 40 milliGRAM(s) Oral daily  polyethylene glycol 3350 17 Gram(s) Oral daily  senna 2 Tablet(s) Oral at bedtime  simvastatin 20 milliGRAM(s) Oral at bedtime  tamsulosin 0.4 milliGRAM(s) Oral at bedtime    MEDICATIONS  (PRN):  acetaminophen     Tablet .. 650 milliGRAM(s) Oral every 6 hours PRN Temp greater or equal to 38C (100.4F), Mild Pain (1 - 3)  aluminum hydroxide/magnesium hydroxide/simethicone Suspension 30 milliLiter(s) Oral every 4 hours PRN Dyspepsia  melatonin 3 milliGRAM(s) Oral at bedtime PRN Insomnia  morphine  - Injectable 2 milliGRAM(s) IV Push every 4 hours PRN Severe Pain (7 - 10)  ondansetron Injectable 4 milliGRAM(s) IV Push every 8 hours PRN Nausea and/or Vomiting  traMADol 50 milliGRAM(s) Oral four times a day PRN Moderate Pain (4 - 6)      Vital Signs Last 24 Hrs  T(C): 36.6 (30 Jan 2022 13:53), Max: 36.7 (29 Jan 2022 21:42)  T(F): 97.8 (30 Jan 2022 13:53), Max: 98.1 (29 Jan 2022 21:42)  HR: 61 (30 Jan 2022 13:53) (60 - 61)  BP: 135/61 (30 Jan 2022 13:53) (101/63 - 135/61)  BP(mean): --  RR: 18 (30 Jan 2022 13:53) (18 - 18)  SpO2: 93% (30 Jan 2022 13:53) (93% - 95%)    I&O's Summary    30 Jan 2022 07:01  -  30 Jan 2022 19:46  --------------------------------------------------------  IN: 200 mL / OUT: 0 mL / NET: 200 mL        PHYSICAL EXAM:  HEENT: NC/AT; PERRLA  Neck: Soft; no tenderness  Lungs: CTA bilaterally; no wheezing.   Heart:  Abdomen:  Genital/ Rectal:  Extremities:  Neurologic:  Vascular:      LABORATORY:    CBC Full  -  ( 29 Jan 2022 06:50 )  WBC Count : 6.21 K/uL  RBC Count : 3.08 M/uL  Hemoglobin : 9.8 g/dL  Hematocrit : 28.5 %  Platelet Count - Automated : 213 K/uL  Mean Cell Volume : 92.5 fl  Mean Cell Hemoglobin : 31.8 pg  Mean Cell Hemoglobin Concentration : 34.4 gm/dL  Auto Neutrophil # : x  Auto Lymphocyte # : x  Auto Monocyte # : x  Auto Eosinophil # : x  Auto Basophil # : x  Auto Neutrophil % : x  Auto Lymphocyte % : x  Auto Monocyte % : x  Auto Eosinophil % : x  Auto Basophil % : x      ESR:                   01-26 @ 11:17  --    C-Reactive Protein:     01-26 @ 11:17  22    Procalcitonin:           01-26 @ 11:17   --  ESR:                   01-26 @ 09:05  38    C-Reactive Protein:     01-26 @ 09:05  --    Procalcitonin:           01-26 @ 09:05   --      01-29    140  |  108  |  12  ----------------------------<  103<H>  4.0   |  29  |  0.56    Ca    8.7      29 Jan 2022 06:50            Assessment and Plan:          Stephan Pruett MD   [phone].    He is afebrile  Comfortable     MEDICATIONS  (STANDING):  ascorbic acid 500 milliGRAM(s) Oral daily  aspirin  chewable 81 milliGRAM(s) Oral daily  ceFAZolin   IVPB 2000 milliGRAM(s) IV Intermittent every 8 hours  dextrose 40% Gel 15 Gram(s) Oral once  dextrose 5%. 1000 milliLiter(s) (50 mL/Hr) IV Continuous <Continuous>  dextrose 5%. 1000 milliLiter(s) (100 mL/Hr) IV Continuous <Continuous>  dextrose 50% Injectable 25 Gram(s) IV Push once  dextrose 50% Injectable 12.5 Gram(s) IV Push once  dextrose 50% Injectable 25 Gram(s) IV Push once  donepezil 5 milliGRAM(s) Oral at bedtime  ferrous    sulfate 325 milliGRAM(s) Oral at bedtime  furosemide    Tablet 20 milliGRAM(s) Oral daily  glucagon  Injectable 1 milliGRAM(s) IntraMuscular once  heparin   Injectable 5000 Unit(s) SubCutaneous every 12 hours  insulin lispro (ADMELOG) corrective regimen sliding scale   SubCutaneous three times a day before meals  lactobacillus acidophilus 1 Tablet(s) Oral daily  losartan 25 milliGRAM(s) Oral daily  metoprolol tartrate 12.5 milliGRAM(s) Oral two times a day  multivitamin/minerals 1 Tablet(s) Oral daily  mupirocin 2% Ointment 1 Application(s) Topical two times a day  pantoprazole   Suspension 40 milliGRAM(s) Oral daily  polyethylene glycol 3350 17 Gram(s) Oral daily  senna 2 Tablet(s) Oral at bedtime  simvastatin 20 milliGRAM(s) Oral at bedtime  tamsulosin 0.4 milliGRAM(s) Oral at bedtime    MEDICATIONS  (PRN):  acetaminophen     Tablet .. 650 milliGRAM(s) Oral every 6 hours PRN Temp greater or equal to 38C (100.4F), Mild Pain (1 - 3)  aluminum hydroxide/magnesium hydroxide/simethicone Suspension 30 milliLiter(s) Oral every 4 hours PRN Dyspepsia  melatonin 3 milliGRAM(s) Oral at bedtime PRN Insomnia  morphine  - Injectable 2 milliGRAM(s) IV Push every 4 hours PRN Severe Pain (7 - 10)  ondansetron Injectable 4 milliGRAM(s) IV Push every 8 hours PRN Nausea and/or Vomiting  traMADol 50 milliGRAM(s) Oral four times a day PRN Moderate Pain (4 - 6)      Vital Signs Last 24 Hrs  T(C): 36.6 (2022 13:53), Max: 36.7 (2022 21:42)  T(F): 97.8 (2022 13:53), Max: 98.1 (2022 21:42)  HR: 61 (2022 13:53) (60 - 61)  BP: 135/61 (2022 13:53) (101/63 - 135/61)  BP(mean): --  RR: 18 (2022 13:53) (18 - 18)  SpO2: 93% (2022 13:53) (93% - 95%)    I&O's Summary    2022 07:01  -  2022 19:46  --------------------------------------------------------  IN: 200 mL / OUT: 0 mL / NET: 200 mL        PHYSICAL EXAM:  HEENT: NC/AT; PERRLA  Neck: Soft; no tenderness  Lungs: CTA bilaterally; no wheezing.   Heart: RRR, no murmurs   Abdomen: Soft, no tenderness.   Genital/ Rectal: No fitch   Extremities:  erythema of left 3rd finger.   Neurologic: Awake      LABORATORY:    CBC Full  -  ( 2022 06:50 )  WBC Count : 6.21 K/uL  RBC Count : 3.08 M/uL  Hemoglobin : 9.8 g/dL  Hematocrit : 28.5 %  Platelet Count - Automated : 213 K/uL  Mean Cell Volume : 92.5 fl  Mean Cell Hemoglobin : 31.8 pg  Mean Cell Hemoglobin Concentration : 34.4 gm/dL  Auto Neutrophil # : x  Auto Lymphocyte # : x  Auto Monocyte # : x  Auto Eosinophil # : x  Auto Basophil # : x  Auto Neutrophil % : x  Auto Lymphocyte % : x  Auto Monocyte % : x  Auto Eosinophil % : x  Auto Basophil % : x      ESR:                    @ 11:17  --    C-Reactive Protein:      @ 11:17  22    Procalcitonin:            @ 11:17   --  ESR:                    @ 09:05  38    C-Reactive Protein:      @ 09:05  --    Procalcitonin:            @ 09:05   --          140  |  108  |  12  ----------------------------<  103<H>  4.0   |  29  |  0.56    Ca    8.7      2022 06:50      Assessment and Plan:            . Nuclear scan showed osteomyelitis of left 3rd finger.  . Wound culture with Staph aureus (MSSA) and antibiotics were changed to IV Ancef.  . Get Picc line for 6 weeks of IV Ancef.  . Discharge planning to rehab.        Stephan Pruett MD   [phone].    He is afebrile  Comfortable     MEDICATIONS  (STANDING):  ascorbic acid 500 milliGRAM(s) Oral daily  aspirin  chewable 81 milliGRAM(s) Oral daily  ceFAZolin   IVPB 2000 milliGRAM(s) IV Intermittent every 8 hours  dextrose 40% Gel 15 Gram(s) Oral once  dextrose 5%. 1000 milliLiter(s) (50 mL/Hr) IV Continuous <Continuous>  dextrose 5%. 1000 milliLiter(s) (100 mL/Hr) IV Continuous <Continuous>  dextrose 50% Injectable 25 Gram(s) IV Push once  dextrose 50% Injectable 12.5 Gram(s) IV Push once  dextrose 50% Injectable 25 Gram(s) IV Push once  donepezil 5 milliGRAM(s) Oral at bedtime  ferrous    sulfate 325 milliGRAM(s) Oral at bedtime  furosemide    Tablet 20 milliGRAM(s) Oral daily  glucagon  Injectable 1 milliGRAM(s) IntraMuscular once  heparin   Injectable 5000 Unit(s) SubCutaneous every 12 hours  insulin lispro (ADMELOG) corrective regimen sliding scale   SubCutaneous three times a day before meals  lactobacillus acidophilus 1 Tablet(s) Oral daily  losartan 25 milliGRAM(s) Oral daily  metoprolol tartrate 12.5 milliGRAM(s) Oral two times a day  multivitamin/minerals 1 Tablet(s) Oral daily  mupirocin 2% Ointment 1 Application(s) Topical two times a day  pantoprazole   Suspension 40 milliGRAM(s) Oral daily  polyethylene glycol 3350 17 Gram(s) Oral daily  senna 2 Tablet(s) Oral at bedtime  simvastatin 20 milliGRAM(s) Oral at bedtime  tamsulosin 0.4 milliGRAM(s) Oral at bedtime    MEDICATIONS  (PRN):  acetaminophen     Tablet .. 650 milliGRAM(s) Oral every 6 hours PRN Temp greater or equal to 38C (100.4F), Mild Pain (1 - 3)  aluminum hydroxide/magnesium hydroxide/simethicone Suspension 30 milliLiter(s) Oral every 4 hours PRN Dyspepsia  melatonin 3 milliGRAM(s) Oral at bedtime PRN Insomnia  morphine  - Injectable 2 milliGRAM(s) IV Push every 4 hours PRN Severe Pain (7 - 10)  ondansetron Injectable 4 milliGRAM(s) IV Push every 8 hours PRN Nausea and/or Vomiting  traMADol 50 milliGRAM(s) Oral four times a day PRN Moderate Pain (4 - 6)      Vital Signs Last 24 Hrs  T(C): 36.6 (2022 13:53), Max: 36.7 (2022 21:42)  T(F): 97.8 (2022 13:53), Max: 98.1 (2022 21:42)  HR: 61 (2022 13:53) (60 - 61)  BP: 135/61 (2022 13:53) (101/63 - 135/61)  BP(mean): --  RR: 18 (2022 13:53) (18 - 18)  SpO2: 93% (2022 13:53) (93% - 95%)    I&O's Summary    2022 07:01  -  2022 19:46  --------------------------------------------------------  IN: 200 mL / OUT: 0 mL / NET: 200 mL        PHYSICAL EXAM:  HEENT: NC/AT; PERRLA  Neck: Soft; no tenderness  Lungs: CTA bilaterally; no wheezing.   Heart: RRR, no murmurs   Abdomen: Soft, no tenderness.   Genital/ Rectal: No fitch   Extremities:  erythema of left 3rd finger.   Neurologic: Awake      LABORATORY:    CBC Full  -  ( 2022 06:50 )  WBC Count : 6.21 K/uL  RBC Count : 3.08 M/uL  Hemoglobin : 9.8 g/dL  Hematocrit : 28.5 %  Platelet Count - Automated : 213 K/uL  Mean Cell Volume : 92.5 fl  Mean Cell Hemoglobin : 31.8 pg  Mean Cell Hemoglobin Concentration : 34.4 gm/dL  Auto Neutrophil # : x  Auto Lymphocyte # : x  Auto Monocyte # : x  Auto Eosinophil # : x  Auto Basophil # : x  Auto Neutrophil % : x  Auto Lymphocyte % : x  Auto Monocyte % : x  Auto Eosinophil % : x  Auto Basophil % : x      ESR:                    @ 11:17  --    C-Reactive Protein:      @ 11:17  22    Procalcitonin:            @ 11:17   --  ESR:                    @ 09:05  38    C-Reactive Protein:      @ 09:05  --    Procalcitonin:            @ 09:05   --          140  |  108  |  12  ----------------------------<  103<H>  4.0   |  29  |  0.56    Ca    8.7      2022 06:50      Assessment and Plan:    1. Left 3rd finger with cellulitis and abscess of the distal phalanx with osteomyelitis.  2. Possible UTI.    . Nuclear scan showed osteomyelitis of left 3rd finger.  . Wound culture with Staph aureus (MSSA) and antibiotics were changed to IV Ancef.  . Get Picc line for 6 weeks of IV Ancef 2 gm iv q8h (to March 10, 2022)  . Discharge planning to rehab.        Stephan Pruett MD   [phone].

## 2022-01-31 LAB — SARS-COV-2 RNA SPEC QL NAA+PROBE: SIGNIFICANT CHANGE UP

## 2022-01-31 PROCEDURE — 36573 INSJ PICC RS&I 5 YR+: CPT

## 2022-01-31 PROCEDURE — 76937 US GUIDE VASCULAR ACCESS: CPT | Mod: 26,59

## 2022-01-31 RX ORDER — CHLORHEXIDINE GLUCONATE 213 G/1000ML
1 SOLUTION TOPICAL
Refills: 0 | Status: DISCONTINUED | OUTPATIENT
Start: 2022-01-31 | End: 2022-02-01

## 2022-01-31 RX ORDER — SODIUM CHLORIDE 9 MG/ML
10 INJECTION INTRAMUSCULAR; INTRAVENOUS; SUBCUTANEOUS
Refills: 0 | Status: DISCONTINUED | OUTPATIENT
Start: 2022-01-31 | End: 2022-02-01

## 2022-01-31 RX ADMIN — Medication 100 MILLIGRAM(S): at 08:04

## 2022-01-31 RX ADMIN — MUPIROCIN 1 APPLICATION(S): 20 OINTMENT TOPICAL at 05:46

## 2022-01-31 RX ADMIN — MUPIROCIN 1 APPLICATION(S): 20 OINTMENT TOPICAL at 17:12

## 2022-01-31 RX ADMIN — MUPIROCIN 1 APPLICATION(S): 20 OINTMENT TOPICAL at 21:30

## 2022-01-31 RX ADMIN — Medication 1: at 11:46

## 2022-01-31 RX ADMIN — HEPARIN SODIUM 5000 UNIT(S): 5000 INJECTION INTRAVENOUS; SUBCUTANEOUS at 05:47

## 2022-01-31 RX ADMIN — Medication 1 TABLET(S): at 11:38

## 2022-01-31 RX ADMIN — Medication 500 MILLIGRAM(S): at 11:38

## 2022-01-31 RX ADMIN — Medication 100 MILLIGRAM(S): at 00:58

## 2022-01-31 RX ADMIN — Medication 325 MILLIGRAM(S): at 21:30

## 2022-01-31 RX ADMIN — POLYETHYLENE GLYCOL 3350 17 GRAM(S): 17 POWDER, FOR SOLUTION ORAL at 11:38

## 2022-01-31 RX ADMIN — DONEPEZIL HYDROCHLORIDE 5 MILLIGRAM(S): 10 TABLET, FILM COATED ORAL at 21:30

## 2022-01-31 RX ADMIN — SENNA PLUS 2 TABLET(S): 8.6 TABLET ORAL at 21:30

## 2022-01-31 RX ADMIN — PANTOPRAZOLE SODIUM 40 MILLIGRAM(S): 20 TABLET, DELAYED RELEASE ORAL at 11:38

## 2022-01-31 RX ADMIN — Medication 81 MILLIGRAM(S): at 11:38

## 2022-01-31 RX ADMIN — HEPARIN SODIUM 5000 UNIT(S): 5000 INJECTION INTRAVENOUS; SUBCUTANEOUS at 17:11

## 2022-01-31 RX ADMIN — Medication 12.5 MILLIGRAM(S): at 05:47

## 2022-01-31 RX ADMIN — TAMSULOSIN HYDROCHLORIDE 0.4 MILLIGRAM(S): 0.4 CAPSULE ORAL at 21:30

## 2022-01-31 RX ADMIN — Medication 12.5 MILLIGRAM(S): at 17:12

## 2022-01-31 RX ADMIN — Medication 100 MILLIGRAM(S): at 17:11

## 2022-01-31 RX ADMIN — SIMVASTATIN 20 MILLIGRAM(S): 20 TABLET, FILM COATED ORAL at 21:32

## 2022-01-31 NOTE — PROGRESS NOTE ADULT - SUBJECTIVE AND OBJECTIVE BOX
Patient is a 89y Male with a known history of :  Cellulitis of hand [L03.119]    Afib [I48.91]    HTN (hypertension) [I10]    DM (diabetes mellitus) [E11.9]    Dementia [F03.90]    Arteriosclerotic heart disease (ASHD) [I25.10]    Constipation [K59.00]    Anemia [D64.9]    Prophylactic measure [Z29.9]    Acute osteomyelitis [M86.10]      HPI:  89 year old male with history of dementia, anemia, CAD, prostate cancer, A-fib, DM, depression, CHF, and HLD BIBA for left hand infection. limited history due to dementia. afebrile in triage. Per previous nursing records, had history of left hand infection in Nov (was improving at that time, was being followed by home health). Patient was admitted in November 2021 with left hand third finger necrotic tip: scant serosanguinous drainage no odor: finger is with erythema and swelling present no warmth noted wound dimensions about 1.5 x 1.5 wound culture obtained:  fingers of hand contracted .CT -no evidence of osteomyelitis ,discharged on 7 days of po abx after ID & plastic sx clearance was obtained ,patient was given followup with plastic hand sx Dr GRIFFIN Sosa. Patient is diagnosed with cellulitis of 3rd finger of left hand Admitted for septic workup and evaluation,send blood and urine cx,serial lactate levels,monitor vitals closley,ivfs hydration,monitor urine output and renal profile,iv abx initiated ,ID cons called . PCP Toby Quintana Resident at [facility] (25 Jan 2022 19:39)      REVIEW OF SYSTEMS:    CONSTITUTIONAL: No fever, weight loss, or fatigue  EYES: No eye pain, visual disturbances, or discharge  ENMT:  No difficulty hearing, tinnitus, vertigo; No sinus or throat pain  NECK: No pain or stiffness  BREASTS: No pain, masses, or nipple discharge  RESPIRATORY: No cough, wheezing, chills or hemoptysis; No shortness of breath  CARDIOVASCULAR: No chest pain, palpitations, dizziness, or leg swelling  GASTROINTESTINAL: No abdominal or epigastric pain. No nausea, vomiting, or hematemesis; No diarrhea or constipation. No melena or hematochezia.  GENITOURINARY: No dysuria, frequency, hematuria, or incontinence  NEUROLOGICAL: No headaches, memory loss, loss of strength, numbness, or tremors  SKIN: No itching, burning, rashes, or lesions   LYMPH NODES: No enlarged glands  ENDOCRINE: No heat or cold intolerance; No hair loss  MUSCULOSKELETAL: No joint pain or swelling; No muscle, back, or extremity pain  PSYCHIATRIC: No depression, anxiety, mood swings, or difficulty sleeping  HEME/LYMPH: No easy bruising, or bleeding gums  ALLERGY AND IMMUNOLOGIC: No hives or eczema    MEDICATIONS  (STANDING):  ascorbic acid 500 milliGRAM(s) Oral daily  aspirin  chewable 81 milliGRAM(s) Oral daily  ceFAZolin   IVPB 2000 milliGRAM(s) IV Intermittent every 8 hours  dextrose 40% Gel 15 Gram(s) Oral once  dextrose 5%. 1000 milliLiter(s) (50 mL/Hr) IV Continuous <Continuous>  dextrose 5%. 1000 milliLiter(s) (100 mL/Hr) IV Continuous <Continuous>  dextrose 50% Injectable 25 Gram(s) IV Push once  dextrose 50% Injectable 12.5 Gram(s) IV Push once  dextrose 50% Injectable 25 Gram(s) IV Push once  donepezil 5 milliGRAM(s) Oral at bedtime  ferrous    sulfate 325 milliGRAM(s) Oral at bedtime  furosemide    Tablet 20 milliGRAM(s) Oral daily  glucagon  Injectable 1 milliGRAM(s) IntraMuscular once  heparin   Injectable 5000 Unit(s) SubCutaneous every 12 hours  insulin lispro (ADMELOG) corrective regimen sliding scale   SubCutaneous three times a day before meals  lactobacillus acidophilus 1 Tablet(s) Oral daily  losartan 25 milliGRAM(s) Oral daily  metoprolol tartrate 12.5 milliGRAM(s) Oral two times a day  multivitamin/minerals 1 Tablet(s) Oral daily  mupirocin 2% Ointment 1 Application(s) Topical two times a day  pantoprazole   Suspension 40 milliGRAM(s) Oral daily  polyethylene glycol 3350 17 Gram(s) Oral daily  senna 2 Tablet(s) Oral at bedtime  simvastatin 20 milliGRAM(s) Oral at bedtime  tamsulosin 0.4 milliGRAM(s) Oral at bedtime    MEDICATIONS  (PRN):  acetaminophen     Tablet .. 650 milliGRAM(s) Oral every 6 hours PRN Temp greater or equal to 38C (100.4F), Mild Pain (1 - 3)  aluminum hydroxide/magnesium hydroxide/simethicone Suspension 30 milliLiter(s) Oral every 4 hours PRN Dyspepsia  melatonin 3 milliGRAM(s) Oral at bedtime PRN Insomnia  morphine  - Injectable 2 milliGRAM(s) IV Push every 4 hours PRN Severe Pain (7 - 10)  ondansetron Injectable 4 milliGRAM(s) IV Push every 8 hours PRN Nausea and/or Vomiting  traMADol 50 milliGRAM(s) Oral four times a day PRN Moderate Pain (4 - 6)      ALLERGIES: latex (Rash)  latex (Unknown)  No Known Drug Allergies      FAMILY HISTORY:      PHYSICAL EXAMINATION:  -----------------------------  T(C): 36.6 (01-31-22 @ 05:45), Max: 36.7 (01-30-22 @ 21:31)  HR: 74 (01-31-22 @ 05:45) (61 - 74)  BP: 118/68 (01-31-22 @ 05:45) (98/52 - 135/61)  RR: 17 (01-31-22 @ 05:45) (17 - 18)  SpO2: 92% (01-31-22 @ 05:45) (92% - 94%)  Wt(kg): --    01-30 @ 07:01  -  01-31 @ 07:00  --------------------------------------------------------  IN:    Oral Fluid: 200 mL  Total IN: 200 mL    OUT:  Total OUT: 0 mL    Total NET: 200 mL            VITALS  T(C): 36.6 (01-31-22 @ 05:45), Max: 36.7 (01-30-22 @ 21:31)  HR: 74 (01-31-22 @ 05:45) (61 - 74)  BP: 118/68 (01-31-22 @ 05:45) (98/52 - 135/61)  RR: 17 (01-31-22 @ 05:45) (17 - 18)  SpO2: 92% (01-31-22 @ 05:45) (92% - 94%)    Constitutional: well developed, normal appearance, well groomed, well nourished, no deformities and no acute distress.   Eyes: the conjunctiva exhibited no abnormalities and the eyelids demonstrated no xanthelasmas.   HEENT: normal oral mucosa, no oral pallor and no oral cyanosis.   Neck: normal jugular venous A waves present, normal jugular venous V waves present and no jugular venous jacobs A waves.   Pulmonary: no respiratory distress, normal respiratory rhythm and effort, no accessory muscle use and lungs were clear to auscultation bilaterally.   Cardiovascular: heart rate and rhythm were normal, normal S1 and S2 and no murmur, gallop, rub, heave or thrill are present.   Abdomen: soft, non-tender, no hepato-splenomegaly and no abdominal mass palpated.   Musculoskeletal: the gait could not be assessed..   Extremities: no clubbing of the fingernails, no localized cyanosis, no petechial hemorrhages and no ischemic changes.   Skin: normal skin color and pigmentation, no rash, no venous stasis, no skin lesions, no skin ulcer and no xanthoma was observed.   Psychiatric: oriented to person, place, and time, the affect was normal, the mood was normal and not feeling anxious.     LABS:   --------                     RADIOLOGY:  -----------------    ECG:     ECHO:

## 2022-01-31 NOTE — PROCEDURE NOTE - ADDITIONAL PROCEDURE DETAILS
Right brachial vein single lumen PICC placed with ultrasound and fluoroscopic guidance, 4Fr x 41cm length, tip of catheter at distal SVC, okay to use.

## 2022-01-31 NOTE — PROGRESS NOTE ADULT - PROBLEM SELECTOR PLAN 6
serial cbc
continue home medications ,cardiology is following
serial cbc
serial cbc
continue home medications ,cardiology is following

## 2022-01-31 NOTE — PROGRESS NOTE ADULT - PROBLEM SELECTOR PLAN 5
continue home medications ,cardiology is following
continue home medications
continue home medications

## 2022-01-31 NOTE — PROGRESS NOTE ADULT - PROBLEM SELECTOR PROBLEM 4
DM (diabetes mellitus)
DM (diabetes mellitus)
HTN (hypertension)

## 2022-01-31 NOTE — PROGRESS NOTE ADULT - SUBJECTIVE AND OBJECTIVE BOX
PROGRESS NOTE  Patient is a 89y old  Male who presents with a chief complaint of hand infection left (31 Jan 2022 08:26)  Chart and available morning labs /imaging are reviewed electronically , urgent issues addressed . More information  is being added upon completion of rounds , when more information is collected and management discussed with consultants , medical staff and social service/case management on the floor   OVERNIGHT  No new issues reported by medical staff . All above noted Patient is resting in a bed comfortably .Confused ,poor mentation .No distress noted   HPI:  89 year old male with history of dementia, anemia, CAD, prostate cancer, A-fib, DM, depression, CHF, and HLD BIBA for left hand infection. limited history due to dementia. afebrile in triage. Per previous nursing records, had history of left hand infection in Nov (was improving at that time, was being followed by home health). Patient was admitted in November 2021 with left hand third finger necrotic tip: scant serosanguinous drainage no odor: finger is with erythema and swelling present no warmth noted wound dimensions about 1.5 x 1.5 wound culture obtained:  fingers of hand contracted .CT -no evidence of osteomyelitis ,discharged on 7 days of po abx after ID & plastic sx clearance was obtained ,patient was given followup with plastic hand sx Dr GRIFFIN Sosa. Patient is diagnosed with cellulitis of 3rd finger of left hand Admitted for septic workup and evaluation,send blood and urine cx,serial lactate levels,monitor vitals closley,ivfs hydration,monitor urine output and renal profile,iv abx initiated ,ID cons called . PCP Toby Quintana Resident at [facility] (25 Jan 2022 19:39)  PAST MEDICAL & SURGICAL HISTORY:  Atrial fibrillation    Hypertension    Diabetes    Prostate ca    Dementia    CHF (congestive heart failure)    Hyperlipemia    Diabetes    Depression    Dementia    DM (diabetes mellitus)    Atrial fibrillation    Prostate CA    Arteriosclerotic heart disease (ASHD)    Dementia    Anemia    Constipation    No significant past surgical history        MEDICATIONS  (STANDING):  ascorbic acid 500 milliGRAM(s) Oral daily  aspirin  chewable 81 milliGRAM(s) Oral daily  ceFAZolin   IVPB 2000 milliGRAM(s) IV Intermittent every 8 hours  chlorhexidine 4% Liquid 1 Application(s) Topical <User Schedule>  dextrose 40% Gel 15 Gram(s) Oral once  dextrose 5%. 1000 milliLiter(s) (50 mL/Hr) IV Continuous <Continuous>  dextrose 5%. 1000 milliLiter(s) (100 mL/Hr) IV Continuous <Continuous>  dextrose 50% Injectable 25 Gram(s) IV Push once  dextrose 50% Injectable 12.5 Gram(s) IV Push once  dextrose 50% Injectable 25 Gram(s) IV Push once  donepezil 5 milliGRAM(s) Oral at bedtime  ferrous    sulfate 325 milliGRAM(s) Oral at bedtime  furosemide    Tablet 20 milliGRAM(s) Oral daily  glucagon  Injectable 1 milliGRAM(s) IntraMuscular once  heparin   Injectable 5000 Unit(s) SubCutaneous every 12 hours  insulin lispro (ADMELOG) corrective regimen sliding scale   SubCutaneous three times a day before meals  lactobacillus acidophilus 1 Tablet(s) Oral daily  losartan 25 milliGRAM(s) Oral daily  metoprolol tartrate 12.5 milliGRAM(s) Oral two times a day  multivitamin/minerals 1 Tablet(s) Oral daily  mupirocin 2% Ointment 1 Application(s) Topical two times a day  pantoprazole   Suspension 40 milliGRAM(s) Oral daily  polyethylene glycol 3350 17 Gram(s) Oral daily  senna 2 Tablet(s) Oral at bedtime  simvastatin 20 milliGRAM(s) Oral at bedtime  tamsulosin 0.4 milliGRAM(s) Oral at bedtime    MEDICATIONS  (PRN):  acetaminophen     Tablet .. 650 milliGRAM(s) Oral every 6 hours PRN Temp greater or equal to 38C (100.4F), Mild Pain (1 - 3)  aluminum hydroxide/magnesium hydroxide/simethicone Suspension 30 milliLiter(s) Oral every 4 hours PRN Dyspepsia  melatonin 3 milliGRAM(s) Oral at bedtime PRN Insomnia  morphine  - Injectable 2 milliGRAM(s) IV Push every 4 hours PRN Severe Pain (7 - 10)  ondansetron Injectable 4 milliGRAM(s) IV Push every 8 hours PRN Nausea and/or Vomiting  sodium chloride 0.9% lock flush 10 milliLiter(s) IV Push every 1 hour PRN Pre/post blood products, medications, blood draw, and to maintain line patency  traMADol 50 milliGRAM(s) Oral four times a day PRN Moderate Pain (4 - 6)      OBJECTIVE    T(C): 36.8 (01-31-22 @ 12:37), Max: 36.8 (01-31-22 @ 12:37)  HR: 67 (01-31-22 @ 12:37) (67 - 74)  BP: 125/71 (01-31-22 @ 12:37) (98/52 - 125/71)  RR: 18 (01-31-22 @ 12:37) (17 - 18)  SpO2: 97% (01-31-22 @ 12:37) (92% - 97%)  Wt(kg): --  I&O's Summary    30 Jan 2022 07:01  -  31 Jan 2022 07:00  --------------------------------------------------------  IN: 200 mL / OUT: 0 mL / NET: 200 mL    31 Jan 2022 07:01  -  31 Jan 2022 16:49  --------------------------------------------------------  IN: 0 mL / OUT: 1 mL / NET: -1 mL          REVIEW OF SYSTEMS:  CONSTITUTIONAL: No fever, weight loss, or fatigue  EYES: No eye pain, visual disturbances, or discharge  ENMT:   No sinus or throat pain  NECK: No pain or stiffness  RESPIRATORY: No cough, wheezing, chills or hemoptysis; No shortness of breath  CARDIOVASCULAR: No chest pain, palpitations, dizziness, or leg swelling  GASTROINTESTINAL: No abdominal pain. No nausea, vomiting; No diarrhea or constipation. No melena or hematochezia.  GENITOURINARY: No dysuria, frequency, hematuria, or incontinence  NEUROLOGICAL: No headaches, memory loss, loss of strength, numbness, or tremors  SKIN: No itching, burning, rashes, or lesions   MUSCULOSKELETAL: No joint pain or swelling; No muscle, back, or extremity pain    PHYSICAL EXAM:  Appearance: NAD. VS past 24 hrs -as above   HEENT:   Moist oral mucosa. Conjunctiva clear b/l.   Neck : supple  Respiratory: Lungs CTAB.  Gastrointestinal:  Soft, nontender. No rebound. No rigidity. BS present	  Cardiovascular: RRR ,S1S2 present  Neurologic: Non-focal. Moving all extremities.  Extremities: No edema. No erythema. No calf tenderness.  Skin: No rashes, No ecchymoses, No cyanosis.	  wounds ,skin lesions-See skin assesment flow sheet   LABS:          CAPILLARY BLOOD GLUCOSE      POCT Blood Glucose.: 88 mg/dL (31 Jan 2022 16:39)  POCT Blood Glucose.: 175 mg/dL (31 Jan 2022 11:33)  POCT Blood Glucose.: 119 mg/dL (31 Jan 2022 07:38)  POCT Blood Glucose.: 117 mg/dL (30 Jan 2022 21:40)          Culture - Urine (collected 26 Jan 2022 00:44)  Source: Clean Catch Clean Catch (Midstream)  Final Report (28 Jan 2022 10:47):    50,000 - 99,000 CFU/mL Escherichia coli  Organism: Escherichia coli (28 Jan 2022 10:47)  Organism: Escherichia coli (28 Jan 2022 10:47)    Culture - Other (collected 25 Jan 2022 18:54)  Source: .Other finger  Final Report (27 Jan 2022 13:22):    Numerous Staphylococcus aureus    Normal skin adriana isolated  Organism: Staphylococcus aureus (27 Jan 2022 13:22)  Organism: Staphylococcus aureus (27 Jan 2022 13:22)    Culture - Blood (collected 25 Jan 2022 17:15)  Source: .Blood Blood-Peripheral  Final Report (30 Jan 2022 18:00):    No Growth Final  Culture - Blood (collected 25 Jan 2022 17:15)  Source: .Blood Blood-Peripheral  Final Report (30 Jan 2022 18:00):    No Growth Final  RADIOLOGY & ADDITIONAL TESTS:   reviewed elctronically	  25 minutes aggregate time was spent on this visit, 50% visit time spent in care co-ordination with other attendings and counselling patient .I have discussed care plan with patient / HCP/family member ,who expressed understanding of problems treatment and their effect and side effects, alternatives in details. I have asked if they have any questions and concerns and appropriately addressed them to best of my ability.

## 2022-01-31 NOTE — PROGRESS NOTE ADULT - PROBLEM SELECTOR PLAN 3
Accuchecks monitoring and insulin corrective regimen  sliding scale coverage with short acting insulin, add long-acting insulin as needed ,no concentrated sweets diet, serial labs ,HbA1C,education
continue home medications ,cardiology cons
Accuchecks monitoring and insulin corrective regimen  sliding scale coverage with short acting insulin, add long-acting insulin as needed ,no concentrated sweets diet, serial labs ,HbA1C,education
Accuchecks monitoring and insulin corrective regimen  sliding scale coverage with short acting insulin, add long-acting insulin as needed ,no concentrated sweets diet, serial labs ,HbA1C,education
continue home medications ,cardiology cons

## 2022-01-31 NOTE — PROGRESS NOTE ADULT - PROBLEM SELECTOR PLAN 7
serial cbc
Supportive care, frequent redirection, continue home medications, management of agitation as needed
serial cbc

## 2022-01-31 NOTE — PROCEDURE NOTE - NSINFORMCONSENT_GEN_A_CORE
Consent obtained from patient's son Vinicio Mcgarry via telephone, DNR kept in place./Benefits, risks, and possible complications of procedure explained to patient/caregiver who verbalized understanding and gave verbal consent.

## 2022-01-31 NOTE — PROGRESS NOTE ADULT - NUTRITIONAL ASSESSMENT
This patient has been assessed with a concern for Malnutrition and has been determined to have a diagnosis/diagnoses of Moderate protein-calorie malnutrition.    This patient is being managed with:   Diet Pureed-  Supplement Feeding Modality:  Oral  Glucerna Shake Cans or Servings Per Day:  1       Frequency:  Daily  Entered: Jan 25 2022  7:16PM    

## 2022-01-31 NOTE — PROGRESS NOTE ADULT - PROBLEM SELECTOR PLAN 2
NM WBC BONE SCAN FINDINGS:  There is incongruent uptake of radiolabeled leukocytes and   Tc-99m sulfur colloid within the left hand, possible with osteomyelitis   most likely corresponding to the lesion in the third digit. ABX management as per ID
continue home medications ,cardiology cons
NM WBC BONE SCAN FINDINGS:  There is incongruent uptake of radiolabeled leukocytes and   Tc-99m sulfur colloid within the left hand, possible with osteomyelitis   most likely corresponding to the lesion in the third digit. ABX management as per ID
continue home medications ,cardiology cons
continue home medications ,cardiology cons

## 2022-01-31 NOTE — PROGRESS NOTE ADULT - PROBLEM SELECTOR PLAN 4
continue home medications
continue home medications
Accuchecks monitoring and insulin corrective regimen  sliding scale coverage with short acting insulin, add long-acting insulin as needed ,no concentrated sweets diet, serial labs ,HbA1C,education
continue home medications
Accuchecks monitoring and insulin corrective regimen  sliding scale coverage with short acting insulin, add long-acting insulin as needed ,no concentrated sweets diet, serial labs ,HbA1C,education

## 2022-01-31 NOTE — PROGRESS NOTE ADULT - PROBLEM SELECTOR PLAN 8
Supportive care, frequent redirection, continue home medications, management of agitation as needed
bowel regimen
Supportive care, frequent redirection, continue home medications, management of agitation as needed

## 2022-01-31 NOTE — PROGRESS NOTE ADULT - SUBJECTIVE AND OBJECTIVE BOX
Interval History:    CENTRAL LINE:   [  ] YES       [  ] NO  RAMOS:                 [  ] YES       [  ] NO         REVIEW OF SYSTEMS:  All Systems below were reviewed and are negative [  ]  HEENT:  ID:  Pulmonary:  Cardiac:  GI:  Renal:  Musculoskeletal:  All other systems above were reviewed and are negative   [  ]      MEDICATIONS  (STANDING):  ascorbic acid 500 milliGRAM(s) Oral daily  aspirin  chewable 81 milliGRAM(s) Oral daily  ceFAZolin   IVPB 2000 milliGRAM(s) IV Intermittent every 8 hours  chlorhexidine 4% Liquid 1 Application(s) Topical <User Schedule>  dextrose 40% Gel 15 Gram(s) Oral once  dextrose 5%. 1000 milliLiter(s) (50 mL/Hr) IV Continuous <Continuous>  dextrose 5%. 1000 milliLiter(s) (100 mL/Hr) IV Continuous <Continuous>  dextrose 50% Injectable 25 Gram(s) IV Push once  dextrose 50% Injectable 12.5 Gram(s) IV Push once  dextrose 50% Injectable 25 Gram(s) IV Push once  donepezil 5 milliGRAM(s) Oral at bedtime  ferrous    sulfate 325 milliGRAM(s) Oral at bedtime  furosemide    Tablet 20 milliGRAM(s) Oral daily  glucagon  Injectable 1 milliGRAM(s) IntraMuscular once  heparin   Injectable 5000 Unit(s) SubCutaneous every 12 hours  insulin lispro (ADMELOG) corrective regimen sliding scale   SubCutaneous three times a day before meals  lactobacillus acidophilus 1 Tablet(s) Oral daily  losartan 25 milliGRAM(s) Oral daily  metoprolol tartrate 12.5 milliGRAM(s) Oral two times a day  multivitamin/minerals 1 Tablet(s) Oral daily  mupirocin 2% Ointment 1 Application(s) Topical two times a day  pantoprazole   Suspension 40 milliGRAM(s) Oral daily  polyethylene glycol 3350 17 Gram(s) Oral daily  senna 2 Tablet(s) Oral at bedtime  simvastatin 20 milliGRAM(s) Oral at bedtime  tamsulosin 0.4 milliGRAM(s) Oral at bedtime    MEDICATIONS  (PRN):  acetaminophen     Tablet .. 650 milliGRAM(s) Oral every 6 hours PRN Temp greater or equal to 38C (100.4F), Mild Pain (1 - 3)  aluminum hydroxide/magnesium hydroxide/simethicone Suspension 30 milliLiter(s) Oral every 4 hours PRN Dyspepsia  melatonin 3 milliGRAM(s) Oral at bedtime PRN Insomnia  morphine  - Injectable 2 milliGRAM(s) IV Push every 4 hours PRN Severe Pain (7 - 10)  ondansetron Injectable 4 milliGRAM(s) IV Push every 8 hours PRN Nausea and/or Vomiting  sodium chloride 0.9% lock flush 10 milliLiter(s) IV Push every 1 hour PRN Pre/post blood products, medications, blood draw, and to maintain line patency  traMADol 50 milliGRAM(s) Oral four times a day PRN Moderate Pain (4 - 6)      Vital Signs Last 24 Hrs  T(C): 36.6 (31 Jan 2022 20:21), Max: 36.8 (31 Jan 2022 12:37)  T(F): 97.9 (31 Jan 2022 20:21), Max: 98.2 (31 Jan 2022 12:37)  HR: 75 (31 Jan 2022 20:21) (67 - 75)  BP: 124/70 (31 Jan 2022 20:21) (98/52 - 125/71)  BP(mean): --  RR: 18 (31 Jan 2022 20:21) (17 - 18)  SpO2: 95% (31 Jan 2022 20:21) (92% - 97%)    I&O's Summary    30 Jan 2022 07:01  -  31 Jan 2022 07:00  --------------------------------------------------------  IN: 200 mL / OUT: 0 mL / NET: 200 mL    31 Jan 2022 07:01  -  31 Jan 2022 20:28  --------------------------------------------------------  IN: 0 mL / OUT: 1 mL / NET: -1 mL        PHYSICAL EXAM:  HEENT: NC/AT; PERRLA  Neck: Soft; no tenderness  Lungs: CTA bilaterally; no wheezing.   Heart:  Abdomen:  Genital/ Rectal:  Extremities:  Neurologic:  Vascular:      LABORATORY:        ESR:                   01-26 @ 11:17  --    C-Reactive Protein:     01-26 @ 11:17  22    Procalcitonin:           01-26 @ 11:17   --  ESR:                   01-26 @ 09:05  38    C-Reactive Protein:     01-26 @ 09:05  --    Procalcitonin:           01-26 @ 09:05   --                  Assessment and Plan:          Stephan Pruett MD   [phone].    He is afebrile  Comfortable       MEDICATIONS  (STANDING):  ascorbic acid 500 milliGRAM(s) Oral daily  aspirin  chewable 81 milliGRAM(s) Oral daily  ceFAZolin   IVPB 2000 milliGRAM(s) IV Intermittent every 8 hours  chlorhexidine 4% Liquid 1 Application(s) Topical <User Schedule>  dextrose 40% Gel 15 Gram(s) Oral once  dextrose 5%. 1000 milliLiter(s) (50 mL/Hr) IV Continuous <Continuous>  dextrose 5%. 1000 milliLiter(s) (100 mL/Hr) IV Continuous <Continuous>  dextrose 50% Injectable 25 Gram(s) IV Push once  dextrose 50% Injectable 12.5 Gram(s) IV Push once  dextrose 50% Injectable 25 Gram(s) IV Push once  donepezil 5 milliGRAM(s) Oral at bedtime  ferrous    sulfate 325 milliGRAM(s) Oral at bedtime  furosemide    Tablet 20 milliGRAM(s) Oral daily  glucagon  Injectable 1 milliGRAM(s) IntraMuscular once  heparin   Injectable 5000 Unit(s) SubCutaneous every 12 hours  insulin lispro (ADMELOG) corrective regimen sliding scale   SubCutaneous three times a day before meals  lactobacillus acidophilus 1 Tablet(s) Oral daily  losartan 25 milliGRAM(s) Oral daily  metoprolol tartrate 12.5 milliGRAM(s) Oral two times a day  multivitamin/minerals 1 Tablet(s) Oral daily  mupirocin 2% Ointment 1 Application(s) Topical two times a day  pantoprazole   Suspension 40 milliGRAM(s) Oral daily  polyethylene glycol 3350 17 Gram(s) Oral daily  senna 2 Tablet(s) Oral at bedtime  simvastatin 20 milliGRAM(s) Oral at bedtime  tamsulosin 0.4 milliGRAM(s) Oral at bedtime    MEDICATIONS  (PRN):  acetaminophen     Tablet .. 650 milliGRAM(s) Oral every 6 hours PRN Temp greater or equal to 38C (100.4F), Mild Pain (1 - 3)  aluminum hydroxide/magnesium hydroxide/simethicone Suspension 30 milliLiter(s) Oral every 4 hours PRN Dyspepsia  melatonin 3 milliGRAM(s) Oral at bedtime PRN Insomnia  morphine  - Injectable 2 milliGRAM(s) IV Push every 4 hours PRN Severe Pain (7 - 10)  ondansetron Injectable 4 milliGRAM(s) IV Push every 8 hours PRN Nausea and/or Vomiting  sodium chloride 0.9% lock flush 10 milliLiter(s) IV Push every 1 hour PRN Pre/post blood products, medications, blood draw, and to maintain line patency  traMADol 50 milliGRAM(s) Oral four times a day PRN Moderate Pain (4 - 6)      Vital Signs Last 24 Hrs  T(C): 36.6 (31 Jan 2022 20:21), Max: 36.8 (31 Jan 2022 12:37)  T(F): 97.9 (31 Jan 2022 20:21), Max: 98.2 (31 Jan 2022 12:37)  HR: 75 (31 Jan 2022 20:21) (67 - 75)  BP: 124/70 (31 Jan 2022 20:21) (98/52 - 125/71)  BP(mean): --  RR: 18 (31 Jan 2022 20:21) (17 - 18)  SpO2: 95% (31 Jan 2022 20:21) (92% - 97%)    I&O's Summary    30 Jan 2022 07:01  -  31 Jan 2022 07:00  --------------------------------------------------------  IN: 200 mL / OUT: 0 mL / NET: 200 mL    31 Jan 2022 07:01  -  31 Jan 2022 20:28  --------------------------------------------------------  IN: 0 mL / OUT: 1 mL / NET: -1 mL      PHYSICAL EXAM:  HEENT: NC/AT; PERRLA  Neck: Soft; no tenderness  Lungs: Coarse BS bilaterally; no wheezing.   Heart: RRR, no murmurs.   Abdomen: Soft, no tenderness.   Genital/ Rectal: No fitch.  Extremities: No ulcers. Decreased swelling and erythema of left 3rd finger.   Neurologic: Confused.       LABORATORY:        ESR:                   01-26 @ 11:17  --    C-Reactive Protein:     01-26 @ 11:17  22    Procalcitonin:           01-26 @ 11:17   --  ESR:                   01-26 @ 09:05  38    C-Reactive Protein:     01-26 @ 09:05  --    Procalcitonin:           01-26 @ 09:05   --      Assessment and Plan:      1. Left 3rd finger with cellulitis and abscess of the distal phalanx with osteomyelitis.  2. Possible UTI.    . Nuclear scan showed osteomyelitis of left 3rd finger.  . Wound culture with Staph aureus (MSSA).   . Get Picc line for 6 weeks of IV Ancef 2 gm iv q8h (to March 10, 2022)  . Discharge planning to rehab.        Stephan Pruett MD   [phone].

## 2022-01-31 NOTE — PROGRESS NOTE ADULT - PROBLEM SELECTOR PROBLEM 5
HTN (hypertension)
HTN (hypertension)
Arteriosclerotic heart disease (ASHD)

## 2022-01-31 NOTE — PROGRESS NOTE ADULT - PROBLEM SELECTOR PLAN 9
Gastrointestinal stress ulcer prophylaxis and DVT prophylaxis administered
bowel regimen
bowel regimen

## 2022-02-01 ENCOUNTER — TRANSCRIPTION ENCOUNTER (OUTPATIENT)
Age: 87
End: 2022-02-01

## 2022-02-01 VITALS
TEMPERATURE: 98 F | RESPIRATION RATE: 18 BRPM | HEART RATE: 82 BPM | OXYGEN SATURATION: 90 % | SYSTOLIC BLOOD PRESSURE: 123 MMHG | DIASTOLIC BLOOD PRESSURE: 58 MMHG

## 2022-02-01 PROCEDURE — 36573 INSJ PICC RS&I 5 YR+: CPT

## 2022-02-01 PROCEDURE — 80202 ASSAY OF VANCOMYCIN: CPT

## 2022-02-01 PROCEDURE — 36415 COLL VENOUS BLD VENIPUNCTURE: CPT

## 2022-02-01 PROCEDURE — 85652 RBC SED RATE AUTOMATED: CPT

## 2022-02-01 PROCEDURE — 81001 URINALYSIS AUTO W/SCOPE: CPT

## 2022-02-01 PROCEDURE — 87186 SC STD MICRODIL/AGAR DIL: CPT

## 2022-02-01 PROCEDURE — 97162 PT EVAL MOD COMPLEX 30 MIN: CPT

## 2022-02-01 PROCEDURE — C1751: CPT

## 2022-02-01 PROCEDURE — 97110 THERAPEUTIC EXERCISES: CPT

## 2022-02-01 PROCEDURE — 80053 COMPREHEN METABOLIC PANEL: CPT

## 2022-02-01 PROCEDURE — 80048 BASIC METABOLIC PNL TOTAL CA: CPT

## 2022-02-01 PROCEDURE — 86140 C-REACTIVE PROTEIN: CPT

## 2022-02-01 PROCEDURE — A9541: CPT

## 2022-02-01 PROCEDURE — 99285 EMERGENCY DEPT VISIT HI MDM: CPT

## 2022-02-01 PROCEDURE — 87635 SARS-COV-2 COVID-19 AMP PRB: CPT

## 2022-02-01 PROCEDURE — 87070 CULTURE OTHR SPECIMN AEROBIC: CPT

## 2022-02-01 PROCEDURE — 77001 FLUOROGUIDE FOR VEIN DEVICE: CPT

## 2022-02-01 PROCEDURE — 85027 COMPLETE CBC AUTOMATED: CPT

## 2022-02-01 PROCEDURE — 85610 PROTHROMBIN TIME: CPT

## 2022-02-01 PROCEDURE — 83036 HEMOGLOBIN GLYCOSYLATED A1C: CPT

## 2022-02-01 PROCEDURE — 97166 OT EVAL MOD COMPLEX 45 MIN: CPT

## 2022-02-01 PROCEDURE — 87040 BLOOD CULTURE FOR BACTERIA: CPT

## 2022-02-01 PROCEDURE — 73130 X-RAY EXAM OF HAND: CPT

## 2022-02-01 PROCEDURE — 83605 ASSAY OF LACTIC ACID: CPT

## 2022-02-01 PROCEDURE — 85025 COMPLETE CBC W/AUTO DIFF WBC: CPT

## 2022-02-01 PROCEDURE — 87086 URINE CULTURE/COLONY COUNT: CPT

## 2022-02-01 PROCEDURE — 92610 EVALUATE SWALLOWING FUNCTION: CPT

## 2022-02-01 PROCEDURE — A9572: CPT

## 2022-02-01 PROCEDURE — 76937 US GUIDE VASCULAR ACCESS: CPT

## 2022-02-01 PROCEDURE — 97116 GAIT TRAINING THERAPY: CPT

## 2022-02-01 PROCEDURE — 85730 THROMBOPLASTIN TIME PARTIAL: CPT

## 2022-02-01 PROCEDURE — 96365 THER/PROPH/DIAG IV INF INIT: CPT

## 2022-02-01 PROCEDURE — 78102 BONE MARROW IMAGING LTD: CPT

## 2022-02-01 PROCEDURE — 78800 RP LOCLZJ TUM 1 AREA 1 D IMG: CPT

## 2022-02-01 PROCEDURE — 71045 X-RAY EXAM CHEST 1 VIEW: CPT

## 2022-02-01 PROCEDURE — 82962 GLUCOSE BLOOD TEST: CPT

## 2022-02-01 PROCEDURE — 93005 ELECTROCARDIOGRAM TRACING: CPT

## 2022-02-01 RX ORDER — ASPIRIN/CALCIUM CARB/MAGNESIUM 324 MG
1 TABLET ORAL
Qty: 0 | Refills: 0 | DISCHARGE
Start: 2022-02-01

## 2022-02-01 RX ORDER — MULTIVIT-MIN/FERROUS GLUCONATE 9 MG/15 ML
1 LIQUID (ML) ORAL
Qty: 0 | Refills: 0 | DISCHARGE
Start: 2022-02-01

## 2022-02-01 RX ORDER — INSULIN LISPRO 100/ML
0 VIAL (ML) SUBCUTANEOUS
Qty: 0 | Refills: 0 | DISCHARGE
Start: 2022-02-01

## 2022-02-01 RX ORDER — METOPROLOL TARTRATE 50 MG
12.5 TABLET ORAL
Qty: 0 | Refills: 0 | DISCHARGE
Start: 2022-02-01

## 2022-02-01 RX ORDER — TRAMADOL HYDROCHLORIDE 50 MG/1
1 TABLET ORAL
Qty: 0 | Refills: 0 | DISCHARGE
Start: 2022-02-01

## 2022-02-01 RX ORDER — CEFAZOLIN SODIUM 1 G
2000 VIAL (EA) INJECTION
Qty: 0 | Refills: 0 | DISCHARGE
Start: 2022-02-01

## 2022-02-01 RX ORDER — ASCORBIC ACID 60 MG
1 TABLET,CHEWABLE ORAL
Qty: 0 | Refills: 0 | DISCHARGE
Start: 2022-02-01

## 2022-02-01 RX ORDER — HEPARIN SODIUM 5000 [USP'U]/ML
5000 INJECTION INTRAVENOUS; SUBCUTANEOUS
Qty: 0 | Refills: 0 | DISCHARGE
Start: 2022-02-01

## 2022-02-01 RX ADMIN — Medication 1 TABLET(S): at 11:29

## 2022-02-01 RX ADMIN — MUPIROCIN 1 APPLICATION(S): 20 OINTMENT TOPICAL at 18:05

## 2022-02-01 RX ADMIN — POLYETHYLENE GLYCOL 3350 17 GRAM(S): 17 POWDER, FOR SOLUTION ORAL at 11:29

## 2022-02-01 RX ADMIN — MUPIROCIN 1 APPLICATION(S): 20 OINTMENT TOPICAL at 05:21

## 2022-02-01 RX ADMIN — Medication 12.5 MILLIGRAM(S): at 18:05

## 2022-02-01 RX ADMIN — Medication 12.5 MILLIGRAM(S): at 05:20

## 2022-02-01 RX ADMIN — Medication 100 MILLIGRAM(S): at 18:07

## 2022-02-01 RX ADMIN — Medication 500 MILLIGRAM(S): at 11:32

## 2022-02-01 RX ADMIN — Medication 100 MILLIGRAM(S): at 11:29

## 2022-02-01 RX ADMIN — Medication 100 MILLIGRAM(S): at 01:31

## 2022-02-01 RX ADMIN — Medication 81 MILLIGRAM(S): at 11:29

## 2022-02-01 RX ADMIN — PANTOPRAZOLE SODIUM 40 MILLIGRAM(S): 20 TABLET, DELAYED RELEASE ORAL at 11:32

## 2022-02-01 RX ADMIN — HEPARIN SODIUM 5000 UNIT(S): 5000 INJECTION INTRAVENOUS; SUBCUTANEOUS at 05:21

## 2022-02-01 RX ADMIN — CHLORHEXIDINE GLUCONATE 1 APPLICATION(S): 213 SOLUTION TOPICAL at 05:21

## 2022-02-01 RX ADMIN — HEPARIN SODIUM 5000 UNIT(S): 5000 INJECTION INTRAVENOUS; SUBCUTANEOUS at 18:05

## 2022-02-01 NOTE — DISCHARGE NOTE PROVIDER - PROVIDER TOKENS
PROVIDER:[TOKEN:[[id number]],FOLLOWUP:[2 weeks]],PROVIDER:[TOKEN:[[id number]],FOLLOWUP:[1-3 days]],PROVIDER:[TOKEN:[[id number]],FOLLOWUP:[1 month]],PROVIDER:[TOKEN:[[id number]],FOLLOWUP:[Routine]]

## 2022-02-01 NOTE — PROGRESS NOTE ADULT - PROVIDER SPECIALTY LIST ADULT
Infectious Disease
Cardiology
Cardiology
Internal Medicine
Cardiology
Hospitalist

## 2022-02-01 NOTE — PROGRESS NOTE ADULT - ASSESSMENT
89 year old male with history of dementia, anemia, CAD, prostate cancer, A-fib, DM, depression, CHF, and HLD BIBA for left hand infection. limited history due to dementia. afebrile in triage. Per previous nursing records, had history of left hand infection in Nov (was improving at that time, was being followed by home health). Patient was admitted in November 2021 with left hand third finger necrotic tip: scant serosanguinous drainage no odor: finger is with erythema and swelling present no warmth noted wound dimensions about 1.5 x 1.5 wound culture obtained:  fingers of hand contracted .CT -no evidence of osteomyelitis ,discharged on 7 days of po abx after ID & plastic sx clearance was obtained ,patient was given followup with plastic hand sx Dr GRIFFIN Sosa. Patient is diagnosed with cellulitis of 3rd finger of left hand Admitted for septic workup and evaluation,send blood and urine cx,serial lactate levels,monitor vitals closley,ivfs hydration,monitor urine output and renal profile,iv abx initiated ,ID cons called . PCP Toby Quintana Resident at [facility]
left hand cellulitis - on zosyn  ashd  atrial fib  chf hfpef - compensated  hypertension  ca prostate  dm2
89 year old male with history of dementia, anemia, CAD, prostate cancer, A-fib, DM, depression, CHF, and HLD BIBA for left hand infection. limited history due to dementia. afebrile in triage. Per previous nursing records, had history of left hand infection in Nov (was improving at that time, was being followed by home health). Patient was admitted in November 2021 with left hand third finger necrotic tip: scant serosanguinous drainage no odor: finger is with erythema and swelling present no warmth noted wound dimensions about 1.5 x 1.5 wound culture obtained:  fingers of hand contracted .CT -no evidence of osteomyelitis ,discharged on 7 days of po abx after ID & plastic sx clearance was obtained ,patient was given followup with plastic hand sx Dr GRIFFIN Sosa. Patient is diagnosed with cellulitis of 3rd finger of left hand Admitted for septic workup and evaluation,send blood and urine cx,serial lactate levels,monitor vitals closley,ivfs hydration,monitor urine output and renal profile,iv abx initiated ,ID cons called . PCP Toby Quintana Resident at [facility]
left hand cellulitis - on zosyn  ashd  atrial fib  chf hfpef - compensated  hypertension  ca prostate  dm2
left hand cellulitis - on zosyn  ashd  atrial fib  chf hfpef - compensated  hypertension  ca prostate  dm2
89 year old male with history of dementia, anemia, CAD, prostate cancer, A-fib, DM, depression, CHF, and HLD BIBA for left hand infection. limited history due to dementia. afebrile in triage. Per previous nursing records, had history of left hand infection in Nov (was improving at that time, was being followed by home health). Patient was admitted in November 2021 with left hand third finger necrotic tip: scant serosanguinous drainage no odor: finger is with erythema and swelling present no warmth noted wound dimensions about 1.5 x 1.5 wound culture obtained:  fingers of hand contracted .CT -no evidence of osteomyelitis ,discharged on 7 days of po abx after ID & plastic sx clearance was obtained ,patient was given followup with plastic hand sx Dr GRIFFIN Sosa. Patient is diagnosed with cellulitis of 3rd finger of left hand Admitted for septic workup and evaluation,send blood and urine cx,serial lactate levels,monitor vitals closley,ivfs hydration,monitor urine output and renal profile,iv abx initiated ,ID cons called . PCP Toby Quintana Resident at [facility]
89 year old male with history of dementia, anemia, CAD, prostate cancer, A-fib, DM, depression, CHF, and HLD BIBA for left hand infection. limited history due to dementia. afebrile in triage. Per previous nursing records, had history of left hand infection in Nov (was improving at that time, was being followed by home health). Patient was admitted in November 2021 with left hand third finger necrotic tip: scant serosanguinous drainage no odor: finger is with erythema and swelling present no warmth noted wound dimensions about 1.5 x 1.5 wound culture obtained:  fingers of hand contracted .CT -no evidence of osteomyelitis ,discharged on 7 days of po abx after ID & plastic sx clearance was obtained ,patient was given followup with plastic hand sx Dr GRIFFIN Sosa. Patient is diagnosed with cellulitis of 3rd finger of left hand Admitted for septic workup and evaluation,send blood and urine cx,serial lactate levels,monitor vitals closley,ivfs hydration,monitor urine output and renal profile,iv abx initiated ,ID cons called . PCP Toby Quintana Resident at [facility]
89 year old male with history of dementia, anemia, CAD, prostate cancer, A-fib, DM, depression, CHF, and HLD BIBA for left hand infection. limited history due to dementia. afebrile in triage. Per previous nursing records, had history of left hand infection in Nov (was improving at that time, was being followed by home health). Patient was admitted in November 2021 with left hand third finger necrotic tip: scant serosanguinous drainage no odor: finger is with erythema and swelling present no warmth noted wound dimensions about 1.5 x 1.5 wound culture obtained:  fingers of hand contracted .CT -no evidence of osteomyelitis ,discharged on 7 days of po abx after ID & plastic sx clearance was obtained ,patient was given followup with plastic hand sx Dr GRIFFIN Sosa. Patient is diagnosed with cellulitis of 3rd finger of left hand Admitted for septic workup and evaluation,send blood and urine cx,serial lactate levels,monitor vitals closley,ivfs hydration,monitor urine output and renal profile,iv abx initiated ,ID cons called . PCP Toby Quintana Resident at [facility]

## 2022-02-01 NOTE — DISCHARGE NOTE PROVIDER - NSDCCPCAREPLAN_GEN_ALL_CORE_FT
PRINCIPAL DISCHARGE DIAGNOSIS  Diagnosis: Acute osteomyelitis  Assessment and Plan of Treatment:       SECONDARY DISCHARGE DIAGNOSES  Diagnosis: Cellulitis of hand  Assessment and Plan of Treatment:     Diagnosis: Afib  Assessment and Plan of Treatment:     Diagnosis: Arteriosclerotic heart disease (ASHD)  Assessment and Plan of Treatment:     Diagnosis: HTN (hypertension)  Assessment and Plan of Treatment:     Diagnosis: Anemia  Assessment and Plan of Treatment:     Diagnosis: DM (diabetes mellitus)  Assessment and Plan of Treatment:     Diagnosis: Constipation  Assessment and Plan of Treatment:     Diagnosis: Dementia  Assessment and Plan of Treatment:

## 2022-02-01 NOTE — PROGRESS NOTE ADULT - REASON FOR ADMISSION
hand infection left

## 2022-02-01 NOTE — DISCHARGE NOTE PROVIDER - CARE PROVIDER_API CALL
Elton Matute)  Plastic Surgery; Surgery of the Hand  [address], [address]  [place] [address]  Phone: [phone]  Fax: [phone]  Follow Up Time: 2 weeks    Peterson Carreon)  Internal Medicine  [address], [address]  [place] [address]  Phone: [phone]  Fax: [phone]  Follow Up Time: 1-3 days    Toby Quintana)  Critical Care Medicine; Internal Medicine; Pulmonary Disease  [facility] [place] [address]  Phone: [phone]  Fax: [phone]  Follow Up Time: 1 month    Nadia Espinoza)  Internal Medicine  [address], [address]  [place] [address]  Phone: [phone]  Fax: [phone]  Follow Up Time: Routine

## 2022-02-01 NOTE — CHART NOTE - NSCHARTNOTEFT_GEN_A_CORE
Assessment/Follow up: Pt awake/confused at time of visit; continues on Puree diet with glucerna daily. Tolerating meals well with assistance; % documented in EMR past 5 days. GI wdl, fecal impaction? documented 1/29. +BM 2/1. Bowel regimen rx. Encourage adequate fluid/dietary fiber. Recommend continued assistance/encouragement with meals & oral nutrition supplements. Discharge planning in progress; possibly to rehab 2/2.     Factors impacting intake: [ ] none [ ] nausea  [ ] vomiting [ ] diarrhea [ ] constipation  [ ]chewing problems [x ] swallowing issues  [x ] other: dementia    Diet Presciption: Diet, Pureed:   Supplement Feeding Modality:  Oral  Glucerna Shake Cans or Servings Per Day:  1       Frequency:  Daily (01-25-22 @ 19:16)    Intake: % x 5 days    Current Weight: Weight (kg): 69.4 (01-25 @ 12:31); 152.7lb(2/1)      Pertinent Medications: MEDICATIONS  (STANDING):  ascorbic acid 500 milliGRAM(s) Oral daily  aspirin  chewable 81 milliGRAM(s) Oral daily  ceFAZolin   IVPB 2000 milliGRAM(s) IV Intermittent every 8 hours  chlorhexidine 4% Liquid 1 Application(s) Topical <User Schedule>  dextrose 40% Gel 15 Gram(s) Oral once  dextrose 5%. 1000 milliLiter(s) (50 mL/Hr) IV Continuous <Continuous>  dextrose 5%. 1000 milliLiter(s) (100 mL/Hr) IV Continuous <Continuous>  dextrose 50% Injectable 25 Gram(s) IV Push once  dextrose 50% Injectable 12.5 Gram(s) IV Push once  dextrose 50% Injectable 25 Gram(s) IV Push once  donepezil 5 milliGRAM(s) Oral at bedtime  ferrous    sulfate 325 milliGRAM(s) Oral at bedtime  furosemide    Tablet 20 milliGRAM(s) Oral daily  glucagon  Injectable 1 milliGRAM(s) IntraMuscular once  heparin   Injectable 5000 Unit(s) SubCutaneous every 12 hours  insulin lispro (ADMELOG) corrective regimen sliding scale   SubCutaneous three times a day before meals  lactobacillus acidophilus 1 Tablet(s) Oral daily  losartan 25 milliGRAM(s) Oral daily  metoprolol tartrate 12.5 milliGRAM(s) Oral two times a day  multivitamin/minerals 1 Tablet(s) Oral daily  mupirocin 2% Ointment 1 Application(s) Topical two times a day  pantoprazole   Suspension 40 milliGRAM(s) Oral daily  polyethylene glycol 3350 17 Gram(s) Oral daily  senna 2 Tablet(s) Oral at bedtime  simvastatin 20 milliGRAM(s) Oral at bedtime  tamsulosin 0.4 milliGRAM(s) Oral at bedtime    MEDICATIONS  (PRN):  acetaminophen     Tablet .. 650 milliGRAM(s) Oral every 6 hours PRN Temp greater or equal to 38C (100.4F), Mild Pain (1 - 3)  aluminum hydroxide/magnesium hydroxide/simethicone Suspension 30 milliLiter(s) Oral every 4 hours PRN Dyspepsia  melatonin 3 milliGRAM(s) Oral at bedtime PRN Insomnia  morphine  - Injectable 2 milliGRAM(s) IV Push every 4 hours PRN Severe Pain (7 - 10)  ondansetron Injectable 4 milliGRAM(s) IV Push every 8 hours PRN Nausea and/or Vomiting  sodium chloride 0.9% lock flush 10 milliLiter(s) IV Push every 1 hour PRN Pre/post blood products, medications, blood draw, and to maintain line patency  traMADol 50 milliGRAM(s) Oral four times a day PRN Moderate Pain (4 - 6)    Pertinent Labs:  01-26 Alb 2.8 g/dL<L>     CAPILLARY BLOOD GLUCOSE      POCT Blood Glucose.: 98 mg/dL (31 Jan 2022 21:20)  POCT Blood Glucose.: 88 mg/dL (31 Jan 2022 16:39)  POCT Blood Glucose.: 175 mg/dL (31 Jan 2022 11:33)    Skin: left hand 3rd digit cellulitis/wound. Miguel 14.     Estimated Needs:   [x ] no change since previous assessment  [ ] recalculated:     Previous Nutrition Diagnosis:   [ ] Inadequate Energy Intake [ ]Inadequate Oral Intake [ ] Excessive Energy Intake   [ ] Underweight [ ] Increased Nutrient Needs [ ] Overweight/Obesity   [ ] Altered GI Function [ ] Unintended Weight Loss [ ] Food & Nutrition Related Knowledge Deficit [x ] Malnutrition   [x ] Swallowing difficulty    Nutrition Diagnosis is [ x] ongoing  [ ] resolved [ ] not applicable     New Nutrition Diagnosis: [x ] not applicable       Interventions:   Recommend  [ ] Change Diet To:  [ ] Nutrition Supplement  [ ] Nutrition Support  [x ] Other: Continued assistance/encouragement with meals and glucerna supplement.     Monitoring and Evaluation:   [x ] PO intake [ x ] Tolerance to diet prescription [ x ] weights [ x ] labs[ x ] follow up per protocol  [ ] other: Assessment/Follow up: Pt awake/confused at time of visit; continues on Puree diet with glucerna daily. Tolerating meals well with assistance; % documented in EMR past 5 days. GI wdl, fecal impaction? documented 1/29. +BM 2/1. Bowel regimen rx. Encourage adequate fluid/dietary fiber. Recommend continued assistance/encouragement with meals & oral nutrition supplements. Discharge planning in progress.    Factors impacting intake: [ ] none [ ] nausea  [ ] vomiting [ ] diarrhea [ ] constipation  [ ]chewing problems [x ] swallowing issues  [x ] other: dementia    Diet Presciption: Diet, Pureed:   Supplement Feeding Modality:  Oral  Glucerna Shake Cans or Servings Per Day:  1       Frequency:  Daily (01-25-22 @ 19:16)    Intake: % x 5 days    Current Weight: Weight (kg): 69.4 (01-25 @ 12:31); 152.7lb(2/1)      Pertinent Medications: MEDICATIONS  (STANDING):  ascorbic acid 500 milliGRAM(s) Oral daily  aspirin  chewable 81 milliGRAM(s) Oral daily  ceFAZolin   IVPB 2000 milliGRAM(s) IV Intermittent every 8 hours  chlorhexidine 4% Liquid 1 Application(s) Topical <User Schedule>  dextrose 40% Gel 15 Gram(s) Oral once  dextrose 5%. 1000 milliLiter(s) (50 mL/Hr) IV Continuous <Continuous>  dextrose 5%. 1000 milliLiter(s) (100 mL/Hr) IV Continuous <Continuous>  dextrose 50% Injectable 25 Gram(s) IV Push once  dextrose 50% Injectable 12.5 Gram(s) IV Push once  dextrose 50% Injectable 25 Gram(s) IV Push once  donepezil 5 milliGRAM(s) Oral at bedtime  ferrous    sulfate 325 milliGRAM(s) Oral at bedtime  furosemide    Tablet 20 milliGRAM(s) Oral daily  glucagon  Injectable 1 milliGRAM(s) IntraMuscular once  heparin   Injectable 5000 Unit(s) SubCutaneous every 12 hours  insulin lispro (ADMELOG) corrective regimen sliding scale   SubCutaneous three times a day before meals  lactobacillus acidophilus 1 Tablet(s) Oral daily  losartan 25 milliGRAM(s) Oral daily  metoprolol tartrate 12.5 milliGRAM(s) Oral two times a day  multivitamin/minerals 1 Tablet(s) Oral daily  mupirocin 2% Ointment 1 Application(s) Topical two times a day  pantoprazole   Suspension 40 milliGRAM(s) Oral daily  polyethylene glycol 3350 17 Gram(s) Oral daily  senna 2 Tablet(s) Oral at bedtime  simvastatin 20 milliGRAM(s) Oral at bedtime  tamsulosin 0.4 milliGRAM(s) Oral at bedtime    MEDICATIONS  (PRN):  acetaminophen     Tablet .. 650 milliGRAM(s) Oral every 6 hours PRN Temp greater or equal to 38C (100.4F), Mild Pain (1 - 3)  aluminum hydroxide/magnesium hydroxide/simethicone Suspension 30 milliLiter(s) Oral every 4 hours PRN Dyspepsia  melatonin 3 milliGRAM(s) Oral at bedtime PRN Insomnia  morphine  - Injectable 2 milliGRAM(s) IV Push every 4 hours PRN Severe Pain (7 - 10)  ondansetron Injectable 4 milliGRAM(s) IV Push every 8 hours PRN Nausea and/or Vomiting  sodium chloride 0.9% lock flush 10 milliLiter(s) IV Push every 1 hour PRN Pre/post blood products, medications, blood draw, and to maintain line patency  traMADol 50 milliGRAM(s) Oral four times a day PRN Moderate Pain (4 - 6)    Pertinent Labs:  01-26 Alb 2.8 g/dL<L>     CAPILLARY BLOOD GLUCOSE      POCT Blood Glucose.: 98 mg/dL (31 Jan 2022 21:20)  POCT Blood Glucose.: 88 mg/dL (31 Jan 2022 16:39)  POCT Blood Glucose.: 175 mg/dL (31 Jan 2022 11:33)    Skin: left hand 3rd digit cellulitis/wound. Miguel 14.     Estimated Needs:   [x ] no change since previous assessment  [ ] recalculated:     Previous Nutrition Diagnosis:   [ ] Inadequate Energy Intake [ ]Inadequate Oral Intake [ ] Excessive Energy Intake   [ ] Underweight [ ] Increased Nutrient Needs [ ] Overweight/Obesity   [ ] Altered GI Function [ ] Unintended Weight Loss [ ] Food & Nutrition Related Knowledge Deficit [x ] Malnutrition   [x ] Swallowing difficulty    Nutrition Diagnosis is [ x] ongoing  [ ] resolved [ ] not applicable     New Nutrition Diagnosis: [x ] not applicable       Interventions:   Recommend  [ ] Change Diet To:  [ ] Nutrition Supplement  [ ] Nutrition Support  [x ] Other: Continued assistance/encouragement with meals and glucerna supplement.     Monitoring and Evaluation:   [x ] PO intake [ x ] Tolerance to diet prescription [ x ] weights [ x ] labs[ x ] follow up per protocol  [ ] other: Assessment/Follow up: Pt awake/confused at time of visit; continues on Puree diet with glucerna daily. Tolerating meals well with assistance; % documented in EMR past 5 days. GI wdl, fecal impaction? documented 1/29. +BM 2/1 per EMR. Bowel regimen rx. Encourage adequate fluid/dietary fiber. Recommend continued assistance/encouragement with meals & oral nutrition supplements. Discharge planning in progress.    Factors impacting intake: [ ] none [ ] nausea  [ ] vomiting [ ] diarrhea [ ] constipation  [ ]chewing problems [x ] swallowing issues  [x ] other: dementia    Diet Presciption: Diet, Pureed:   Supplement Feeding Modality:  Oral  Glucerna Shake Cans or Servings Per Day:  1       Frequency:  Daily (01-25-22 @ 19:16)    Intake: % x 5 days    Current Weight: Weight (kg): 69.4 (01-25 @ 12:31); 152.7lb(2/1)      Pertinent Medications: MEDICATIONS  (STANDING):  ascorbic acid 500 milliGRAM(s) Oral daily  aspirin  chewable 81 milliGRAM(s) Oral daily  ceFAZolin   IVPB 2000 milliGRAM(s) IV Intermittent every 8 hours  chlorhexidine 4% Liquid 1 Application(s) Topical <User Schedule>  dextrose 40% Gel 15 Gram(s) Oral once  dextrose 5%. 1000 milliLiter(s) (50 mL/Hr) IV Continuous <Continuous>  dextrose 5%. 1000 milliLiter(s) (100 mL/Hr) IV Continuous <Continuous>  dextrose 50% Injectable 25 Gram(s) IV Push once  dextrose 50% Injectable 12.5 Gram(s) IV Push once  dextrose 50% Injectable 25 Gram(s) IV Push once  donepezil 5 milliGRAM(s) Oral at bedtime  ferrous    sulfate 325 milliGRAM(s) Oral at bedtime  furosemide    Tablet 20 milliGRAM(s) Oral daily  glucagon  Injectable 1 milliGRAM(s) IntraMuscular once  heparin   Injectable 5000 Unit(s) SubCutaneous every 12 hours  insulin lispro (ADMELOG) corrective regimen sliding scale   SubCutaneous three times a day before meals  lactobacillus acidophilus 1 Tablet(s) Oral daily  losartan 25 milliGRAM(s) Oral daily  metoprolol tartrate 12.5 milliGRAM(s) Oral two times a day  multivitamin/minerals 1 Tablet(s) Oral daily  mupirocin 2% Ointment 1 Application(s) Topical two times a day  pantoprazole   Suspension 40 milliGRAM(s) Oral daily  polyethylene glycol 3350 17 Gram(s) Oral daily  senna 2 Tablet(s) Oral at bedtime  simvastatin 20 milliGRAM(s) Oral at bedtime  tamsulosin 0.4 milliGRAM(s) Oral at bedtime    MEDICATIONS  (PRN):  acetaminophen     Tablet .. 650 milliGRAM(s) Oral every 6 hours PRN Temp greater or equal to 38C (100.4F), Mild Pain (1 - 3)  aluminum hydroxide/magnesium hydroxide/simethicone Suspension 30 milliLiter(s) Oral every 4 hours PRN Dyspepsia  melatonin 3 milliGRAM(s) Oral at bedtime PRN Insomnia  morphine  - Injectable 2 milliGRAM(s) IV Push every 4 hours PRN Severe Pain (7 - 10)  ondansetron Injectable 4 milliGRAM(s) IV Push every 8 hours PRN Nausea and/or Vomiting  sodium chloride 0.9% lock flush 10 milliLiter(s) IV Push every 1 hour PRN Pre/post blood products, medications, blood draw, and to maintain line patency  traMADol 50 milliGRAM(s) Oral four times a day PRN Moderate Pain (4 - 6)    Pertinent Labs:  01-26 Alb 2.8 g/dL<L>     CAPILLARY BLOOD GLUCOSE      POCT Blood Glucose.: 98 mg/dL (31 Jan 2022 21:20)  POCT Blood Glucose.: 88 mg/dL (31 Jan 2022 16:39)  POCT Blood Glucose.: 175 mg/dL (31 Jan 2022 11:33)    Skin: left hand 3rd digit cellulitis/wound. Miguel 14.     Estimated Needs:   [x ] no change since previous assessment  [ ] recalculated:     Previous Nutrition Diagnosis:   [ ] Inadequate Energy Intake [ ]Inadequate Oral Intake [ ] Excessive Energy Intake   [ ] Underweight [ ] Increased Nutrient Needs [ ] Overweight/Obesity   [ ] Altered GI Function [ ] Unintended Weight Loss [ ] Food & Nutrition Related Knowledge Deficit [x ] Malnutrition   [x ] Swallowing difficulty    Nutrition Diagnosis is [ x] ongoing  [ ] resolved [ ] not applicable     New Nutrition Diagnosis: [x ] not applicable       Interventions:   Recommend  [ ] Change Diet To:  [ ] Nutrition Supplement  [ ] Nutrition Support  [x ] Other: Continued assistance/encouragement with meals and glucerna supplement.     Monitoring and Evaluation:   [x ] PO intake [ x ] Tolerance to diet prescription [ x ] weights [ x ] labs[ x ] follow up per protocol  [ ] other:

## 2022-02-01 NOTE — PROGRESS NOTE ADULT - SUBJECTIVE AND OBJECTIVE BOX
Patient is a 89y Male with a known history of :  Cellulitis of hand [L03.119]    Afib [I48.91]    HTN (hypertension) [I10]    DM (diabetes mellitus) [E11.9]    Dementia [F03.90]    Arteriosclerotic heart disease (ASHD) [I25.10]    Constipation [K59.00]    Anemia [D64.9]    Prophylactic measure [Z29.9]    Acute osteomyelitis [M86.10]      HPI:  89 year old male with history of dementia, anemia, CAD, prostate cancer, A-fib, DM, depression, CHF, and HLD BIBA for left hand infection. limited history due to dementia. afebrile in triage. Per previous nursing records, had history of left hand infection in Nov (was improving at that time, was being followed by home health). Patient was admitted in November 2021 with left hand third finger necrotic tip: scant serosanguinous drainage no odor: finger is with erythema and swelling present no warmth noted wound dimensions about 1.5 x 1.5 wound culture obtained:  fingers of hand contracted .CT -no evidence of osteomyelitis ,discharged on 7 days of po abx after ID & plastic sx clearance was obtained ,patient was given followup with plastic hand sx Dr GRIFFIN Sosa. Patient is diagnosed with cellulitis of 3rd finger of left hand Admitted for septic workup and evaluation,send blood and urine cx,serial lactate levels,monitor vitals closley,ivfs hydration,monitor urine output and renal profile,iv abx initiated ,ID cons called . PCP Toby Quintana Resident at [facility] (25 Jan 2022 19:39)      REVIEW OF SYSTEMS:    CONSTITUTIONAL: No fever, weight loss, or fatigue  EYES: No eye pain, visual disturbances, or discharge  ENMT:  No difficulty hearing, tinnitus, vertigo; No sinus or throat pain  NECK: No pain or stiffness  BREASTS: No pain, masses, or nipple discharge  RESPIRATORY: No cough, wheezing, chills or hemoptysis; No shortness of breath  CARDIOVASCULAR: No chest pain, palpitations, dizziness, or leg swelling  GASTROINTESTINAL: No abdominal or epigastric pain. No nausea, vomiting, or hematemesis; No diarrhea or constipation. No melena or hematochezia.  GENITOURINARY: No dysuria, frequency, hematuria, or incontinence  NEUROLOGICAL: No headaches, memory loss, loss of strength, numbness, or tremors  SKIN: No itching, burning, rashes, or lesions   LYMPH NODES: No enlarged glands  ENDOCRINE: No heat or cold intolerance; No hair loss  MUSCULOSKELETAL: No joint pain or swelling; No muscle, back, or extremity pain  PSYCHIATRIC: No depression, anxiety, mood swings, or difficulty sleeping  HEME/LYMPH: No easy bruising, or bleeding gums  ALLERGY AND IMMUNOLOGIC: No hives or eczema    MEDICATIONS  (STANDING):  ascorbic acid 500 milliGRAM(s) Oral daily  aspirin  chewable 81 milliGRAM(s) Oral daily  ceFAZolin   IVPB 2000 milliGRAM(s) IV Intermittent every 8 hours  chlorhexidine 4% Liquid 1 Application(s) Topical <User Schedule>  dextrose 40% Gel 15 Gram(s) Oral once  dextrose 5%. 1000 milliLiter(s) (50 mL/Hr) IV Continuous <Continuous>  dextrose 5%. 1000 milliLiter(s) (100 mL/Hr) IV Continuous <Continuous>  dextrose 50% Injectable 25 Gram(s) IV Push once  dextrose 50% Injectable 12.5 Gram(s) IV Push once  dextrose 50% Injectable 25 Gram(s) IV Push once  donepezil 5 milliGRAM(s) Oral at bedtime  ferrous    sulfate 325 milliGRAM(s) Oral at bedtime  furosemide    Tablet 20 milliGRAM(s) Oral daily  glucagon  Injectable 1 milliGRAM(s) IntraMuscular once  heparin   Injectable 5000 Unit(s) SubCutaneous every 12 hours  insulin lispro (ADMELOG) corrective regimen sliding scale   SubCutaneous three times a day before meals  lactobacillus acidophilus 1 Tablet(s) Oral daily  losartan 25 milliGRAM(s) Oral daily  metoprolol tartrate 12.5 milliGRAM(s) Oral two times a day  multivitamin/minerals 1 Tablet(s) Oral daily  mupirocin 2% Ointment 1 Application(s) Topical two times a day  pantoprazole   Suspension 40 milliGRAM(s) Oral daily  polyethylene glycol 3350 17 Gram(s) Oral daily  senna 2 Tablet(s) Oral at bedtime  simvastatin 20 milliGRAM(s) Oral at bedtime  tamsulosin 0.4 milliGRAM(s) Oral at bedtime    MEDICATIONS  (PRN):  acetaminophen     Tablet .. 650 milliGRAM(s) Oral every 6 hours PRN Temp greater or equal to 38C (100.4F), Mild Pain (1 - 3)  aluminum hydroxide/magnesium hydroxide/simethicone Suspension 30 milliLiter(s) Oral every 4 hours PRN Dyspepsia  melatonin 3 milliGRAM(s) Oral at bedtime PRN Insomnia  morphine  - Injectable 2 milliGRAM(s) IV Push every 4 hours PRN Severe Pain (7 - 10)  ondansetron Injectable 4 milliGRAM(s) IV Push every 8 hours PRN Nausea and/or Vomiting  sodium chloride 0.9% lock flush 10 milliLiter(s) IV Push every 1 hour PRN Pre/post blood products, medications, blood draw, and to maintain line patency  traMADol 50 milliGRAM(s) Oral four times a day PRN Moderate Pain (4 - 6)      ALLERGIES: latex (Rash)  latex (Unknown)  No Known Drug Allergies      FAMILY HISTORY:      PHYSICAL EXAMINATION:  -----------------------------  T(C): 36.2 (02-01-22 @ 04:35), Max: 36.8 (01-31-22 @ 12:37)  HR: 63 (02-01-22 @ 04:35) (63 - 75)  BP: 117/62 (02-01-22 @ 04:35) (117/62 - 125/71)  RR: 17 (02-01-22 @ 04:35) (17 - 18)  SpO2: 91% (02-01-22 @ 04:35) (91% - 97%)  Wt(kg): --    01-31 @ 07:01  -  02-01 @ 07:00  --------------------------------------------------------  IN:    IV PiggyBack: 50 mL  Total IN: 50 mL    OUT:    Stool (mL): 1 mL  Total OUT: 1 mL    Total NET: 49 mL            VITALS  T(C): 36.2 (02-01-22 @ 04:35), Max: 36.8 (01-31-22 @ 12:37)  HR: 63 (02-01-22 @ 04:35) (63 - 75)  BP: 117/62 (02-01-22 @ 04:35) (117/62 - 125/71)  RR: 17 (02-01-22 @ 04:35) (17 - 18)  SpO2: 91% (02-01-22 @ 04:35) (91% - 97%)    Constitutional: well developed, normal appearance, well groomed, well nourished, no deformities and no acute distress.   Eyes: the conjunctiva exhibited no abnormalities and the eyelids demonstrated no xanthelasmas.   HEENT: normal oral mucosa, no oral pallor and no oral cyanosis.   Neck: normal jugular venous A waves present, normal jugular venous V waves present and no jugular venous jacobs A waves.   Pulmonary: no respiratory distress, normal respiratory rhythm and effort, no accessory muscle use and lungs were clear to auscultation bilaterally.   Cardiovascular: heart rate and rhythm were normal, normal S1 and S2 and no murmur, gallop, rub, heave or thrill are present.   Abdomen: soft, non-tender, no hepato-splenomegaly and no abdominal mass palpated.   Musculoskeletal: the gait could not be assessed..   Extremities: no clubbing of the fingernails, no localized cyanosis, no petechial hemorrhages and no ischemic changes.   Skin: normal skin color and pigmentation, no rash, no venous stasis, no skin lesions, no skin ulcer and no xanthoma was observed.   Psychiatric: oriented to person, place, and time, the affect was normal, the mood was normal and not feeling anxious.     LABS:   --------                     RADIOLOGY:  -----------------    ECG:     ECHO:

## 2022-02-01 NOTE — DISCHARGE NOTE PROVIDER - CARE PROVIDERS DIRECT ADDRESSES
,DirectAddress_Unknown,DirectAddress_Unknown,[email or web address],DirectAddress_Unknown

## 2022-02-01 NOTE — DISCHARGE NOTE PROVIDER - HOSPITAL COURSE
89 year old male with history of dementia, anemia, CAD, prostate cancer, A-fib, DM, depression, CHF, and HLD BIBA for left hand infection. limited history due to dementia. afebrile in triage. Per previous nursing records, had history of left hand infection in Nov (was improving at that time, was being followed by home health). Patient was admitted in November 2021 with left hand third finger necrotic tip: scant serosanguinous drainage no odor: finger is with erythema and swelling present no warmth noted wound dimensions about 1.5 x 1.5 wound culture obtained:  fingers of hand contracted .CT -no evidence of osteomyelitis ,discharged on 7 days of po abx after ID & plastic sx clearance was obtained ,patient was given followup with plastic hand sx Dr GRIFFIN Sosa. Patient is diagnosed with cellulitis of 3rd finger of left hand Admitted for septic workup and evaluation,send blood and urine cx,serial lactate levels,monitor vitals closley,ivfs hydration,monitor urine output and renal profile,iv abx initiated ,ID cons called . PCP Toby Quintana Resident at [facility]    Nutritional Assessment:  · Nutritional Assessment  This patient has been assessed with a concern for Malnutrition and has been determined to have a diagnosis/diagnoses of Moderate protein-calorie malnutrition.  This patient is being managed with:   Diet Pureed-  Supplement Feeding Modality:  Oral  Glucerna Shake Cans or Servings Per Day:  1       Frequency:  Daily  Entered: Jan 25 2022  7:16PM    Problem/Plan - 1:  ·  Problem: Cellulitis of hand.   ·  Plan: Admitted for septic workup and evaluation ,send blood and urine cx ,serial lactate levels ,monitor vitals closely hydration ,monitor urine output and renal profile ,iv abx initiated.    Problem/Plan - 2:  ·  Problem: Acute osteomyelitis.   ·  Plan: NM WBC BONE SCAN FINDINGS:  There is incongruent uptake of radiolabeled leukocytes and   Tc-99m sulfur colloid within the left hand, possible with osteomyelitis   most likely corresponding to the lesion in the third digit. ABX management as per ID.    Problem/Plan - 3:  ·  Problem: Afib.   ·  Plan: continue home medications ,cardiology cons.    Problem/Plan - 4:  ·  Problem: DM (diabetes mellitus).   ·  Plan: Accuchecks monitoring and insulin corrective regimen  sliding scale coverage with short acting insulin, add long-acting insulin as needed ,no concentrated sweets diet, serial labs ,HbA1C,education.    Problem/Plan - 5:  ·  Problem: HTN (hypertension).   ·  Plan: continue home medications.    Problem/Plan - 6:  ·  Problem: Arteriosclerotic heart disease (ASHD).   ·  Plan: continue home medications ,cardiology is following.    Problem/Plan - 7:  ·  Problem: Anemia.   ·  Plan: serial cbc.    Problem/Plan - 8:  ·  Problem: Dementia.   ·  Plan: Supportive care, frequent redirection, continue home medications, management of agitation as needed.    Problem/Plan - 9:  ·  Problem: Constipation.   ·  Plan: bowel regimen.    Problem/Plan - 10:  ·  Problem: Prophylactic measure.   ·  Plan; Gastrointestinal stress ulcer prophylaxis and DVT prophylaxis administered.      Additional Information:  Additional Information: DISCHARGE TO [facility] WITH 6 WEEKS OF IV ABX

## 2022-02-01 NOTE — DISCHARGE NOTE PROVIDER - NSDCMRMEDTOKEN_GEN_ALL_CORE_FT
acetaminophen 500 mg oral tablet: 1 tab(s) orally every 8 hours  for pain   ascorbic acid 500 mg oral tablet: 1 tab(s) orally once a day  aspirin 81 mg oral tablet, chewable: 1 tab(s) orally once a day  cephalexin 500 mg oral capsule: 1 cap(s) orally every 12 hours  donepezil 5 mg oral tablet: 1 tab(s) orally once a day (at bedtime)  doxycycline monohydrate 100 mg oral capsule: 1 cap(s) orally every 12 hours  ferrous sulfate 325 mg (65 mg elemental iron) oral delayed release tablet: 1 tab(s) orally once a day  furosemide 20 mg oral tablet: 1 tab(s) orally once a day  heparin: 5000 unit(s) subcutaneous 2 times a day  insulin lispro 100 units/mL injectable solution:  injectable sliding scale as per protocol  lactobacillus acidophilus oral tablet: 2 tab(s) orally once a day  losartan 25 mg oral tablet: 1 tab(s) orally once a day  metFORMIN 500 mg oral tablet: 1 tab(s) orally 2 times a day (with meals)  metoprolol succinate 25 mg oral tablet, extended release: 1 tab(s) orally once a day  MiraLax oral powder for reconstitution: 17 gram(s) orally once a day  Multiple Vitamins with Minerals oral tablet: 1 tab(s) orally once a day  pantoprazole 40 mg oral delayed release tablet: 1 tab(s) orally once a day  povidone iodine 10% topical solution: 1 application topically once a day  Senna 8.6 mg oral tablet: 2 tab(s) orally once a day (at bedtime)  simvastatin 20 mg oral tablet: 1 tab(s) orally once a day (at bedtime)  tamsulosin 0.4 mg oral capsule: 1 cap(s) orally once a day (at bedtime)  traMADol 50 mg oral tablet: 1 tab(s) orally 4 times a day, As needed, Moderate Pain (4 - 6)   acetaminophen 500 mg oral tablet: 1 tab(s) orally every 8 hours  for pain   ascorbic acid 500 mg oral tablet: 1 tab(s) orally once a day  aspirin 81 mg oral tablet, chewable: 1 tab(s) orally once a day  ceFAZolin: 2000 milligram(s) intravenous every 8 hours till 03/10/22  donepezil 5 mg oral tablet: 1 tab(s) orally once a day (at bedtime)  ferrous sulfate 325 mg (65 mg elemental iron) oral delayed release tablet: 1 tab(s) orally once a day  furosemide 20 mg oral tablet: 1 tab(s) orally once a day  heparin: 5000 unit(s) subcutaneous 2 times a day  insulin lispro 100 units/mL injectable solution: injectable 3 times a day SLIDING SCALE AS PER PROTOCOL  lactobacillus acidophilus oral tablet: 2 tab(s) orally once a day  losartan 25 mg oral tablet: 1 tab(s) orally once a day  metoprolol: 12.5 milligram(s) orally 2 times a day  MiraLax oral powder for reconstitution: 17 gram(s) orally once a day  Multiple Vitamins with Minerals oral tablet: 1 tab(s) orally once a day  pantoprazole 40 mg oral delayed release tablet: 1 tab(s) orally once a day  Senna 8.6 mg oral tablet: 2 tab(s) orally once a day (at bedtime)  simvastatin 20 mg oral tablet: 1 tab(s) orally once a day (at bedtime)  tamsulosin 0.4 mg oral capsule: 1 cap(s) orally once a day (at bedtime)  traMADol 50 mg oral tablet: 1 tab(s) orally 4 times a day, As needed, Moderate Pain (4 - 6)

## 2022-02-18 NOTE — PHYSICAL THERAPY INITIAL EVALUATION ADULT - ASSISTIVE DEVICE FOR TRANSFER: GAIT, REHAB EVAL
I think taking a little extra vit D- 1000 units per day in the winter can be helpful to keep her bones strong.  If she doesn't get much sunlight, she can take it all year long.   It's not absolutely necessary, but it may help a bit.    straight cane

## 2022-02-28 NOTE — PATIENT PROFILE ADULT - NSASFUNCLEVELADLTOILET_GEN_A_NUR
Notified patient of their test results and doctors recommendation. Patient verbalized understanding.    Patient states that she is going to watch her diet and does not wish to go on a statin at this time.     Order placed for bone density    fithc/3 = assistive equipment and person

## 2022-03-21 RX ORDER — LACTOBACILLUS ACIDOPHILUS 100MM CELL
2 CAPSULE ORAL
Qty: 60 | Refills: 0
Start: 2022-03-21 | End: 2022-04-19

## 2022-03-21 RX ORDER — METOPROLOL TARTRATE 50 MG
0.5 TABLET ORAL
Qty: 30 | Refills: 0
Start: 2022-03-21 | End: 2022-04-19

## 2022-03-23 ENCOUNTER — EMERGENCY (EMERGENCY)
Facility: HOSPITAL | Age: 87
LOS: 1 days | Discharge: HOME | End: 2022-03-23
Attending: STUDENT IN AN ORGANIZED HEALTH CARE EDUCATION/TRAINING PROGRAM | Admitting: STUDENT IN AN ORGANIZED HEALTH CARE EDUCATION/TRAINING PROGRAM
Payer: MEDICARE

## 2022-03-23 VITALS
HEART RATE: 82 BPM | DIASTOLIC BLOOD PRESSURE: 50 MMHG | SYSTOLIC BLOOD PRESSURE: 106 MMHG | OXYGEN SATURATION: 100 % | RESPIRATION RATE: 16 BRPM | TEMPERATURE: 98 F | HEIGHT: 72 IN | WEIGHT: 169.98 LBS

## 2022-03-23 VITALS
HEART RATE: 74 BPM | RESPIRATION RATE: 16 BRPM | OXYGEN SATURATION: 100 % | SYSTOLIC BLOOD PRESSURE: 109 MMHG | DIASTOLIC BLOOD PRESSURE: 67 MMHG | TEMPERATURE: 98 F

## 2022-03-23 LAB
ALBUMIN SERPL ELPH-MCNC: 3.3 G/DL — SIGNIFICANT CHANGE UP (ref 3.3–5)
ALP SERPL-CCNC: 81 U/L — SIGNIFICANT CHANGE UP (ref 40–120)
ALT FLD-CCNC: 28 U/L — SIGNIFICANT CHANGE UP (ref 12–78)
ANION GAP SERPL CALC-SCNC: 5 MMOL/L — SIGNIFICANT CHANGE UP (ref 5–17)
APTT BLD: 24.3 SEC — LOW (ref 27.5–35.5)
AST SERPL-CCNC: 20 U/L — SIGNIFICANT CHANGE UP (ref 15–37)
BASOPHILS # BLD AUTO: 0.02 K/UL — SIGNIFICANT CHANGE UP (ref 0–0.2)
BASOPHILS NFR BLD AUTO: 0.3 % — SIGNIFICANT CHANGE UP (ref 0–2)
BILIRUB SERPL-MCNC: 0.5 MG/DL — SIGNIFICANT CHANGE UP (ref 0.2–1.2)
BUN SERPL-MCNC: 27 MG/DL — HIGH (ref 7–23)
CALCIUM SERPL-MCNC: 9.3 MG/DL — SIGNIFICANT CHANGE UP (ref 8.5–10.1)
CHLORIDE SERPL-SCNC: 109 MMOL/L — HIGH (ref 96–108)
CO2 SERPL-SCNC: 27 MMOL/L — SIGNIFICANT CHANGE UP (ref 22–31)
CREAT SERPL-MCNC: 0.72 MG/DL — SIGNIFICANT CHANGE UP (ref 0.5–1.3)
EGFR: 84 ML/MIN/1.73M2 — SIGNIFICANT CHANGE UP
EOSINOPHIL # BLD AUTO: 0.04 K/UL — SIGNIFICANT CHANGE UP (ref 0–0.5)
EOSINOPHIL NFR BLD AUTO: 0.6 % — SIGNIFICANT CHANGE UP (ref 0–6)
GLUCOSE SERPL-MCNC: 128 MG/DL — HIGH (ref 70–99)
HCT VFR BLD CALC: 35.8 % — LOW (ref 39–50)
HGB BLD-MCNC: 12.1 G/DL — LOW (ref 13–17)
IMM GRANULOCYTES NFR BLD AUTO: 0.3 % — SIGNIFICANT CHANGE UP (ref 0–1.5)
INR BLD: 1.1 RATIO — SIGNIFICANT CHANGE UP (ref 0.88–1.16)
LACTATE SERPL-SCNC: 1.1 MMOL/L — SIGNIFICANT CHANGE UP (ref 0.7–2)
LYMPHOCYTES # BLD AUTO: 0.78 K/UL — LOW (ref 1–3.3)
LYMPHOCYTES # BLD AUTO: 11.6 % — LOW (ref 13–44)
MCHC RBC-ENTMCNC: 32.5 PG — SIGNIFICANT CHANGE UP (ref 27–34)
MCHC RBC-ENTMCNC: 33.8 GM/DL — SIGNIFICANT CHANGE UP (ref 32–36)
MCV RBC AUTO: 96.2 FL — SIGNIFICANT CHANGE UP (ref 80–100)
MONOCYTES # BLD AUTO: 0.63 K/UL — SIGNIFICANT CHANGE UP (ref 0–0.9)
MONOCYTES NFR BLD AUTO: 9.4 % — SIGNIFICANT CHANGE UP (ref 2–14)
NEUTROPHILS # BLD AUTO: 5.22 K/UL — SIGNIFICANT CHANGE UP (ref 1.8–7.4)
NEUTROPHILS NFR BLD AUTO: 77.8 % — HIGH (ref 43–77)
NRBC # BLD: 0 /100 WBCS — SIGNIFICANT CHANGE UP (ref 0–0)
PLATELET # BLD AUTO: 209 K/UL — SIGNIFICANT CHANGE UP (ref 150–400)
POTASSIUM SERPL-MCNC: 4 MMOL/L — SIGNIFICANT CHANGE UP (ref 3.5–5.3)
POTASSIUM SERPL-SCNC: 4 MMOL/L — SIGNIFICANT CHANGE UP (ref 3.5–5.3)
PROT SERPL-MCNC: 6.5 G/DL — SIGNIFICANT CHANGE UP (ref 6–8.3)
PROTHROM AB SERPL-ACNC: 12.9 SEC — SIGNIFICANT CHANGE UP (ref 10.5–13.4)
RAPID RVP RESULT: SIGNIFICANT CHANGE UP
RBC # BLD: 3.72 M/UL — LOW (ref 4.2–5.8)
RBC # FLD: 13.7 % — SIGNIFICANT CHANGE UP (ref 10.3–14.5)
SARS-COV-2 RNA SPEC QL NAA+PROBE: SIGNIFICANT CHANGE UP
SODIUM SERPL-SCNC: 141 MMOL/L — SIGNIFICANT CHANGE UP (ref 135–145)
WBC # BLD: 6.71 K/UL — SIGNIFICANT CHANGE UP (ref 3.8–10.5)
WBC # FLD AUTO: 6.71 K/UL — SIGNIFICANT CHANGE UP (ref 3.8–10.5)

## 2022-03-23 PROCEDURE — 99284 EMERGENCY DEPT VISIT MOD MDM: CPT | Mod: FS

## 2022-03-23 PROCEDURE — 71045 X-RAY EXAM CHEST 1 VIEW: CPT

## 2022-03-23 PROCEDURE — 85610 PROTHROMBIN TIME: CPT

## 2022-03-23 PROCEDURE — 0225U NFCT DS DNA&RNA 21 SARSCOV2: CPT

## 2022-03-23 PROCEDURE — 80053 COMPREHEN METABOLIC PANEL: CPT

## 2022-03-23 PROCEDURE — 74177 CT ABD & PELVIS W/CONTRAST: CPT | Mod: MA

## 2022-03-23 PROCEDURE — 83605 ASSAY OF LACTIC ACID: CPT

## 2022-03-23 PROCEDURE — 93005 ELECTROCARDIOGRAM TRACING: CPT

## 2022-03-23 PROCEDURE — 87040 BLOOD CULTURE FOR BACTERIA: CPT

## 2022-03-23 PROCEDURE — 85025 COMPLETE CBC W/AUTO DIFF WBC: CPT

## 2022-03-23 PROCEDURE — 85730 THROMBOPLASTIN TIME PARTIAL: CPT

## 2022-03-23 PROCEDURE — 93010 ELECTROCARDIOGRAM REPORT: CPT

## 2022-03-23 PROCEDURE — 74177 CT ABD & PELVIS W/CONTRAST: CPT | Mod: 26,MA

## 2022-03-23 PROCEDURE — 36415 COLL VENOUS BLD VENIPUNCTURE: CPT

## 2022-03-23 PROCEDURE — 99285 EMERGENCY DEPT VISIT HI MDM: CPT | Mod: 25

## 2022-03-23 PROCEDURE — 71045 X-RAY EXAM CHEST 1 VIEW: CPT | Mod: 26

## 2022-03-23 RX ORDER — SODIUM CHLORIDE 9 MG/ML
500 INJECTION INTRAMUSCULAR; INTRAVENOUS; SUBCUTANEOUS ONCE
Refills: 0 | Status: COMPLETED | OUTPATIENT
Start: 2022-03-23 | End: 2022-03-23

## 2022-03-23 RX ADMIN — SODIUM CHLORIDE 500 MILLILITER(S): 9 INJECTION INTRAMUSCULAR; INTRAVENOUS; SUBCUTANEOUS at 11:06

## 2022-03-23 NOTE — ED ADULT NURSE NOTE - OBJECTIVE STATEMENT
Noted loose watery sool.  Noted redenned excoriated back and buttocks.  Noted Skin tear to L wrist and hand.  lermedardo, onfused.  Following simple commands 30% of time.  No noted c/o anykind

## 2022-03-23 NOTE — ED PROVIDER NOTE - ATTENDING CONTRIBUTION TO CARE
This was a shared visit with GISELA. I reviewed and verified the documentation and independently performed the documented MDM.

## 2022-03-23 NOTE — ED PROVIDER NOTE - PROGRESS NOTE DETAILS
labs and imaging reviewed. pt without diarrhea since coming to ed. formed stool. comsulted dr viera advised send back to facility. pt send with results advised gi follow up. picc removed.

## 2022-03-23 NOTE — ED PROVIDER NOTE - OBJECTIVE STATEMENT
pt is  a 89yo male with pmhx of dementia, Anemia, CAD, CHF, PROSTATE CANCER, A-FIB, DM, DEPRESSION, HLD, S/P OSTEOMYELITIS WITH PICC R UE PRESENTS WITH DIARRHEA. pt is  a 89yo male with pmhx of dementia, Anemia, CAD, CHF, PROSTATE CANCER, A-FIB, DM, DEPRESSION, HLD, S/P OSTEOMYELITIS WITH PICC R UE PRESENTS WITH DIARRHEA. per [facility] pt with watery diarrhea multiple times. pt recently discharged from [place] [facility] yesterday -was admitted due to osteomyelitis of the hand, had picc placed at [facility] 1/2022, last dose of abx 3/10/22. pt minimally verbal unable to give information due to dementia.     pmd: aylin

## 2022-03-23 NOTE — ED PROVIDER NOTE - CARE PROVIDER_API CALL
Dany Quintana)  Internal Medicine  [address] [place] [id number]  Phone: [phone]  Fax: [phone]  Follow Up Time: 7-10 Days

## 2022-03-23 NOTE — ED PROVIDER NOTE - CLINICAL SUMMARY MEDICAL DECISION MAKING FREE TEXT BOX
Sent in by nursing home for evaluation of diarrhea in setting of antibiotics use-- also requesting removal of midline. check labs for electrolyte abnormalities, get c diff sample if able, remove midline, contact PMD to see if patient can return to nursing home or needs admission.

## 2022-03-23 NOTE — ED ADULT TRIAGE NOTE - CHIEF COMPLAINT QUOTE
c/o diarrhea started today ,recently on antibiotics for cellulitis,from [facility] , and has PICC line in his right arm that needs to come out

## 2022-03-23 NOTE — ED PROVIDER NOTE - SKIN, MLM
Skin normal color for race, warm, dry + cellulitis back and buttock/ testicles Skin normal color for race, warm, dry +pressure ulcer buttock + picc rue no erythema

## 2022-03-23 NOTE — ED PROVIDER NOTE - PATIENT PORTAL LINK FT
You can access the FollowMyHealth Patient Portal offered by [facility] by registering at the following website: [email or web address]. By joining [facility]’s FollowMyHealth portal, you will also be able to view your health information using other applications (apps) compatible with our system.

## 2022-03-23 NOTE — ED PROVIDER NOTE - ABDOMINAL TENDER
left upper quadrant/right upper quadrant/left lower quadrant/right lower quadrant/periumbilical/umbilical/epigastric

## 2022-03-23 NOTE — ED PROVIDER NOTE - NS ED ATTENDING STATEMENT MOD
This was a shared visit with the GISELA. I reviewed and verified the documentation and independently performed the documented:

## 2022-04-02 ENCOUNTER — INPATIENT (INPATIENT)
Facility: HOSPITAL | Age: 87
LOS: 2 days | Discharge: NURSING, REHAB OR LONG-TERM CARE | DRG: 689 | End: 2022-04-05
Attending: INTERNAL MEDICINE | Admitting: INTERNAL MEDICINE
Payer: MEDICARE

## 2022-04-02 VITALS
SYSTOLIC BLOOD PRESSURE: 113 MMHG | TEMPERATURE: 97 F | WEIGHT: 150.36 LBS | RESPIRATION RATE: 16 BRPM | HEART RATE: 62 BPM | OXYGEN SATURATION: 100 % | HEIGHT: 72 IN | DIASTOLIC BLOOD PRESSURE: 63 MMHG

## 2022-04-02 DIAGNOSIS — N39.0 URINARY TRACT INFECTION, SITE NOT SPECIFIED: ICD-10-CM

## 2022-04-02 LAB
ALBUMIN SERPL ELPH-MCNC: 2.7 G/DL — LOW (ref 3.3–5)
ALP SERPL-CCNC: 79 U/L — SIGNIFICANT CHANGE UP (ref 40–120)
ALT FLD-CCNC: 20 U/L — SIGNIFICANT CHANGE UP (ref 12–78)
ANION GAP SERPL CALC-SCNC: 6 MMOL/L — SIGNIFICANT CHANGE UP (ref 5–17)
APPEARANCE UR: ABNORMAL
APTT BLD: 29.7 SEC — SIGNIFICANT CHANGE UP (ref 27.5–35.5)
AST SERPL-CCNC: 13 U/L — LOW (ref 15–37)
BACTERIA # UR AUTO: ABNORMAL
BASOPHILS # BLD AUTO: 0.03 K/UL — SIGNIFICANT CHANGE UP (ref 0–0.2)
BASOPHILS NFR BLD AUTO: 0.4 % — SIGNIFICANT CHANGE UP (ref 0–2)
BILIRUB SERPL-MCNC: 0.3 MG/DL — SIGNIFICANT CHANGE UP (ref 0.2–1.2)
BILIRUB UR-MCNC: NEGATIVE — SIGNIFICANT CHANGE UP
BUN SERPL-MCNC: 24 MG/DL — HIGH (ref 7–23)
CALCIUM SERPL-MCNC: 8.1 MG/DL — LOW (ref 8.5–10.1)
CHLORIDE SERPL-SCNC: 103 MMOL/L — SIGNIFICANT CHANGE UP (ref 96–108)
CK MB BLD-MCNC: 2.7 % — SIGNIFICANT CHANGE UP (ref 0–3.5)
CK MB CFR SERPL CALC: 1.2 NG/ML — SIGNIFICANT CHANGE UP (ref 0–3.6)
CK SERPL-CCNC: 45 U/L — SIGNIFICANT CHANGE UP (ref 26–308)
CO2 SERPL-SCNC: 28 MMOL/L — SIGNIFICANT CHANGE UP (ref 22–31)
COLOR SPEC: YELLOW — SIGNIFICANT CHANGE UP
COMMENT - URINE: SIGNIFICANT CHANGE UP
COMMENT - URINE: SIGNIFICANT CHANGE UP
CREAT SERPL-MCNC: 0.59 MG/DL — SIGNIFICANT CHANGE UP (ref 0.5–1.3)
DIFF PNL FLD: ABNORMAL
EGFR: 89 ML/MIN/1.73M2 — SIGNIFICANT CHANGE UP
EOSINOPHIL # BLD AUTO: 0.08 K/UL — SIGNIFICANT CHANGE UP (ref 0–0.5)
EOSINOPHIL NFR BLD AUTO: 1 % — SIGNIFICANT CHANGE UP (ref 0–6)
EPI CELLS # UR: SIGNIFICANT CHANGE UP
GLUCOSE SERPL-MCNC: 131 MG/DL — HIGH (ref 70–99)
GLUCOSE UR QL: NEGATIVE — SIGNIFICANT CHANGE UP
HCT VFR BLD CALC: 36.2 % — LOW (ref 39–50)
HGB BLD-MCNC: 12.3 G/DL — LOW (ref 13–17)
IMM GRANULOCYTES NFR BLD AUTO: 0.2 % — SIGNIFICANT CHANGE UP (ref 0–1.5)
INR BLD: 1.06 RATIO — SIGNIFICANT CHANGE UP (ref 0.88–1.16)
KETONES UR-MCNC: NEGATIVE — SIGNIFICANT CHANGE UP
LACTATE SERPL-SCNC: 1.7 MMOL/L — SIGNIFICANT CHANGE UP (ref 0.7–2)
LEUKOCYTE ESTERASE UR-ACNC: ABNORMAL
LYMPHOCYTES # BLD AUTO: 1.03 K/UL — SIGNIFICANT CHANGE UP (ref 1–3.3)
LYMPHOCYTES # BLD AUTO: 12.4 % — LOW (ref 13–44)
MCHC RBC-ENTMCNC: 32.3 PG — SIGNIFICANT CHANGE UP (ref 27–34)
MCHC RBC-ENTMCNC: 34 GM/DL — SIGNIFICANT CHANGE UP (ref 32–36)
MCV RBC AUTO: 95 FL — SIGNIFICANT CHANGE UP (ref 80–100)
MONOCYTES # BLD AUTO: 0.69 K/UL — SIGNIFICANT CHANGE UP (ref 0–0.9)
MONOCYTES NFR BLD AUTO: 8.3 % — SIGNIFICANT CHANGE UP (ref 2–14)
NEUTROPHILS # BLD AUTO: 6.49 K/UL — SIGNIFICANT CHANGE UP (ref 1.8–7.4)
NEUTROPHILS NFR BLD AUTO: 77.7 % — HIGH (ref 43–77)
NITRITE UR-MCNC: POSITIVE
NRBC # BLD: 0 /100 WBCS — SIGNIFICANT CHANGE UP (ref 0–0)
PH UR: 7 — SIGNIFICANT CHANGE UP (ref 5–8)
PLATELET # BLD AUTO: SIGNIFICANT CHANGE UP K/UL (ref 150–400)
POTASSIUM SERPL-MCNC: 4.2 MMOL/L — SIGNIFICANT CHANGE UP (ref 3.5–5.3)
POTASSIUM SERPL-SCNC: 4.2 MMOL/L — SIGNIFICANT CHANGE UP (ref 3.5–5.3)
PROT SERPL-MCNC: 5.8 G/DL — LOW (ref 6–8.3)
PROT UR-MCNC: 30 MG/DL
PROTHROM AB SERPL-ACNC: 12.4 SEC — SIGNIFICANT CHANGE UP (ref 10.5–13.4)
RAPID RVP RESULT: SIGNIFICANT CHANGE UP
RBC # BLD: 3.81 M/UL — LOW (ref 4.2–5.8)
RBC # FLD: 13.6 % — SIGNIFICANT CHANGE UP (ref 10.3–14.5)
RBC CASTS # UR COMP ASSIST: SIGNIFICANT CHANGE UP /HPF (ref 0–4)
SARS-COV-2 RNA SPEC QL NAA+PROBE: SIGNIFICANT CHANGE UP
SODIUM SERPL-SCNC: 137 MMOL/L — SIGNIFICANT CHANGE UP (ref 135–145)
SP GR SPEC: 1 — LOW (ref 1.01–1.02)
TROPONIN I, HIGH SENSITIVITY RESULT: 12 NG/L — SIGNIFICANT CHANGE UP
UROBILINOGEN FLD QL: NEGATIVE — SIGNIFICANT CHANGE UP
WBC # BLD: 8.34 K/UL — SIGNIFICANT CHANGE UP (ref 3.8–10.5)
WBC # FLD AUTO: 8.34 K/UL — SIGNIFICANT CHANGE UP (ref 3.8–10.5)
WBC UR QL: ABNORMAL

## 2022-04-02 PROCEDURE — 93010 ELECTROCARDIOGRAM REPORT: CPT

## 2022-04-02 PROCEDURE — 99285 EMERGENCY DEPT VISIT HI MDM: CPT

## 2022-04-02 PROCEDURE — 70496 CT ANGIOGRAPHY HEAD: CPT | Mod: 26,MA

## 2022-04-02 PROCEDURE — 71045 X-RAY EXAM CHEST 1 VIEW: CPT | Mod: 26

## 2022-04-02 PROCEDURE — 0042T: CPT

## 2022-04-02 PROCEDURE — 70498 CT ANGIOGRAPHY NECK: CPT | Mod: 26,MA

## 2022-04-02 RX ORDER — CEFTRIAXONE 500 MG/1
1000 INJECTION, POWDER, FOR SOLUTION INTRAMUSCULAR; INTRAVENOUS ONCE
Refills: 0 | Status: COMPLETED | OUTPATIENT
Start: 2022-04-02 | End: 2022-04-02

## 2022-04-02 RX ADMIN — CEFTRIAXONE 100 MILLIGRAM(S): 500 INJECTION, POWDER, FOR SOLUTION INTRAMUSCULAR; INTRAVENOUS at 23:29

## 2022-04-02 NOTE — ED PROVIDER NOTE - OBJECTIVE STATEMENT
Pt is a 89 yo male hx dementia, afib on asa, dm. pt sent from [employer] [facility] per nurse and ems report after being found at 2pm to be unresponsive. pt sent to er. pt found in bed,  last known well unknown at present.      pmd: aylin

## 2022-04-02 NOTE — H&P ADULT - REASON FOR ADMISSION
Pt is a 89 yo male hx dementia, afib on asa, dm. pt sent [facility] per nurse and ems report after being found at 2pm to be unresponsive. pt sent to er. pt found in bed,  last known well unknown at present.  spoke with [facility], states pt normally answers minimal questions, can ambulate slowly with cane. today last seen fully well at breakfast and at noon was at [employer] eating lunch, but noone spoke to pt to see if he was at baseline of answering questions.  at 2pm pt with eyes closed and wouldn't answer questions. pt assisted by 2 people to office and was weak on feet,  ems called. Found to have UTI Admitted for septic workup and evaluation,send blood and urine cx,serial lactate levels,monitor vitals closley,ivfs hydration,monitor urine output and renal profile,iv abx initiated Seen by cardiologist and neurology consult requested .ID kalyan called ,started on ceftriaxone .CTH negative for ACUTE CVA .

## 2022-04-02 NOTE — ED ADULT NURSE NOTE - OBJECTIVE STATEMENT
Sent from [facility]> was reported staff went to give him ensure at 2pm and he was nonverbal, and would not open his eyes. It is not known when he was last normal.

## 2022-04-02 NOTE — H&P ADULT - NSHPSOCIALHISTORY_GEN_ALL_CORE
Social History:    Marital Status:   Occupation:   Lives with:     Substance Use :  Tobacco Usage:  (   ) never smoked   (   ) former smoker   (   ) current smoker  (     ) pack year  (        ) last tobacco use date  Alcohol Usage:      Health Management     For female:   Last Mammo:   Last Pap:     For male:  Last prostate exam:          [  ] date:            (  ) findings      Immunization Hx:   (  ) flu shot                               (     ) date   (  ) pneumonia shot               (     ) date  (  ) tetanus                               (     ) date     (     ) Advanced Directives: (     ) declined   [  ] health care proxy Social History:    Marital Status:   Occupation:   Lives with:     Substance Use :  none   Tobacco Usage:  (   ) never smoked   (   ) former smoker   (  no ) current smoker  (     ) pack year  (        ) last tobacco use date  Alcohol Usage:  none       Health Management     For female:   Last Mammo:   Last Pap:     For male:  Last prostate exam:          [  ] date:            (  ) findings      Immunization Hx:   (  ) flu shot                               (     ) date   (  ) pneumonia shot               (     ) date  (  ) tetanus                               (     ) date     (     ) Advanced Directives: (     ) declined   [  ] health care proxy

## 2022-04-02 NOTE — H&P ADULT - ASSESSMENT
Pt is a 89 yo male hx dementia, afib on asa, dm. pt sent from [facility] per nurse and ems report after being found at 2pm to be unresponsive. pt sent to er. pt found in bed,  last known well unknown at present.  spoke with [facility], states pt normally answers minimal questions, can ambulate slowly with cane. today last seen fully well at breakfast and at noon was at [employer] eating lunch, but noone spoke to pt to see if he was at baseline of answering questions.  at 2pm pt with eyes closed and wouldn't answer questions. pt assisted by 2 people to office and was weak on feet,  ems called. Found to have UTI Admitted for septic workup and evaluation,send blood and urine cx,serial lactate levels,monitor vitals closley,ivfs hydration,monitor urine output and renal profile,iv abx initiated Seen by cardiologist and neurology consult requested .ID kalyan called ,started on ceftriaxone .CTH negative for ACUTE CVA . Pt is a 89 yo male hx dementia, afib on asa, dm. pt sent from [facility] per nurse and ems report after being found at 2pm to be unresponsive. pt sent to er. pt found in bed,  last known well unknown at present.  spoke with [facility], states pt normally answers minimal questions, can ambulate slowly with cane. today last seen fully well at breakfast and at noon was at [employer] eating lunch, but noone spoke to pt to see if he was at baseline of answering questions.  at 2pm pt with eyes closed and wouldn't answer questions. pt assisted by 2 people to office and was weak on feet,  ems called. Found to have UTI Admitted for septic workup and evaluation,send blood and urine cx,serial lactate levels,monitor vitals closley,ivfs hydration,monitor urine output and renal profile,iv abx initiated Seen by cardiologist and neurology consult requested .ID kalyan called ,started on ceftriaxone .CTH negative for ACUTE CVA .    < from: Xray Chest 1 View- PORTABLE-Urgent (04.02.22 @ 19:22) >    ACC: [id number] EXAM:  XR CHEST PORTABLE URGENT 1V                          PROCEDURE DATE:  04/02/2022          INTERPRETATION:  HISTORY: ;  Sepsis;  TECHNIQUE: Portable frontal view of the chest, 1 view.  COMPARISON: none.  FINDINGS/  IMPRESSION:  A cardiac device overlies and obscures the left hemithorax with lead in   place.  HEART:  Enlarged  LUNGS: free of consolidation,effusion, or pneumothorax.  BONES: degenerative changes      Calcified gallstone. Contrast in the left renal collecting system.    --- End of Report ---            ABDOULAYE SMITH MD; Attending Interventional Radiologist  This document has been electronically signed. Apr  3 2022  9:58A    < end of copied text >  < from: CT Angio Neck w/ IV Cont (04.02.22 @ 19:01) >    ACC: [id number] EXAM:  CT PERFUSION W MAPS IC                        ACC: [id number] EXAM:  CT ANGIO BRAIN (W)AW IC                        ACC: [id number] EXAM:  CT BRAIN STROKE PROTOCOL                        ACC: [id number] EXAM:  CT ANGIO NECK (W)AW IC                        PROCEDURE DATE:  04/02/2022          INTERPRETATION:  CT HEAD STROKE PROTOCOL, CT ANGIO NECK WITHOUT AND OR   WITH IV CONTRAST, CT ANGIO BRAIN WITHOUT AND OR WITH IV CONTRAST, CT   PERFUSION WITH MAPS WITH IV CONTRAST    CLINICAL HISTORY: Stroke Code    CT HEAD TECHNIQUE:  Noncontrast CT.  Axial Acqisition.  Sagittal and coronal reformations.    COMPARISON:  Head CT is compared to prior study of 1/18/2022    FINDINGS:  *  HEMORRHAGE:  No evidence of intracranial hemorrhage.  *  BRAIN PARENCHYMA:  Moderate atrophy. Mild periventricular white matter   ischemic changes. No mass or mass effect.  *  VENTRICLES / SHIFT:  No hydrocephalus. No midline shift.  *  EXTRA-AXIAL / BASAL CISTERNS:  No extra-axial mass. Basal cisterns   preserved.  Atherosclerotic calcifications of the cavernous internal   carotid arteries.  *  CALVARIUM AND EXTRACRANIAL SOFT TISSUES:  No depressed calvarial   fracture.  *  SINUSES, ORBITS, MASTOIDS:  The visualized paranasal sinuses and   mastoid air cells are well aerated.  ----------------------------------------    CTA TECHNIQUE:  CT angiography of the neck and brain was performed during the dynamic   administration of intravenous contrast.  MIP reconstructions were   performed and reviewed. Multiplanar reformations were obtained.  Contrast administered 60 cc Omnipaque 350, contrast discarded 10 cc    CTA FINDINGS:  NECK  RIGHT CAROTID CIRCULATION:  *  Evaluation of the right carotid circulation demonstrates no   hemodynamic significant narrowing utilizing a distal reference. Mild   calcified plaque carotid bulb/bifurcation  LEFT CAROTID CIRCULATION:  *  Evaluation of the left carotid circulation demonstrates no hemodynamic   significant narrowing utilizing a distal reference. Mild calcified plaque   carotid bulb/bifurcation  VERTEBRAL ARTERIES:  *  Evaluation of the vertebral arteries reveals no evidence of a   vertebral artery occlusion or dissection.    BRAIN  ANTERIOR CIRCULATION:  *  Distal internal carotid arteries are patent.  *  Anterior cerebral arteries are patent. Atherosclerotic calcification   of the cavernous segments without significant narrowing  *  Middle cerebral arteries are patent.  POSTERIOR CIRCULATION:  *  Distal vertebral arteries are patent.  *  Basilar artery is patent. Proximal superior cerebellar arteries are   patent  *  Posterior cerebral arteries are patent.    OTHER:  *  8 mm nodule right lung apex.  --------------------------------------------------    CT PERFUSION TECHNIQUE:  CT perfusion was performed during the administration of intravenous   contrast.  There was a total of 8 cm brain coverage.  The images were processed utilizing RAPID software.  Contrast administered 80 cc Omnipaque 350, contrast discarded 0 cc    Ischemic tissue is defined as TMAX > 6 seconds.  Core infarct is defined as CBF < 30%.    CT PERFUSION FINDINGS:  *  CT perfusion is uninterpretable due to motion and suboptimal contrast   bolus/timing.      IMPRESSION:  *  NO EVIDENCE OF AN ACUTE INTRACRANIAL HEMORRHAGE, MIDLINE SHIFT, OR   HYDROCEPHALUS. MODERATE ATROPHY WITH MILD PERIVENTRICULAR WHITE MATTER   ISCHEMIC CHANGES  *  NO HEMODYNAMIC SIGNIFICANT NARROWING WITHIN THE NECK.  *  NO PROXIMAL LARGE VESSEL OCCLUSION INTRACRANIALLY.  *  CT PERFUSION UNINTERPRETABLE DUE TO MOTION AND SUBOPTIMAL CONTRAST   BOLUS/TIMING..  *  8 MM RIGHT APICAL LUNG NODULE.    *  Interval follow-up if acute ischemia remains of clinical concern.    Preliminary head CT findings discussed with Dr. Dennis at 6:48 PM on   4/2/2022    --- End of Report ---            MARICRUZ DAILEY MD; Attending Radiologist  This document has been electronically timbo    < end of copied text >  < from: CT Perfusion w/ Maps w/ IV Cont (04.02.22 @ 19:01) >    ACC: [id number] EXAM:  CT PERFUSION W MAPS IC                        ACC: [id number] EXAM:  CT ANGIO BRAIN (W)AW IC                        ACC: [id number] EXAM:  CT BRAIN STROKE PROTOCOL                        ACC: [id number] EXAM:  CT ANGIO NECK (W)AW IC                        PROCEDURE DATE:  04/02/2022          INTERPRETATION:  CT HEAD STROKE PROTOCOL, CT ANGIO NECK WITHOUT AND OR   WITH IV CONTRAST, CT ANGIO BRAIN WITHOUT AND OR WITH IV CONTRAST, CT   PERFUSION WITH MAPS WITH IV CONTRAST    CLINICAL HISTORY: Stroke Code    CT HEAD TECHNIQUE:  Noncontrast CT.  Axial Acqisition.  Sagittal and coronal reformations.    COMPARISON:  Head CT is compared to prior study of 1/18/2022    FINDINGS:  *  HEMORRHAGE:  No evidence of intracranial hemorrhage.  *  BRAIN PARENCHYMA:  Moderate atrophy. Mild periventricular white matter   ischemic changes. No mass or mass effect.  *  VENTRICLES / SHIFT:  No hydrocephalus. No midline shift.  *  EXTRA-AXIAL / BASAL CISTERNS:  No extra-axial mass. Basal cisterns   preserved.  Atherosclerotic calcifications of the cavernous internal   carotid arteries.  *  CALVARIUM AND EXTRACRANIAL SOFT TISSUES:  No depressed calvarial   fracture.  *  SINUSES, ORBITS, MASTOIDS:  The visualized paranasal sinuses and   mastoid air cells are well aerated.  ----------------------------------------    CTA TECHNIQUE:  CT angiography of the neck and brain was performed during the dynamic   administration of intravenous contrast.  MIP reconstructions were   performed and reviewed. Multiplanar reformations were obtained.  Contrast administered 60 cc Omnipaque 350, contrast discarded 10 cc    CTA FINDINGS:  NECK  RIGHT CAROTID CIRCULATION:  *  Evaluation of the right carotid circulation demonstrates no   hemodynamic significant narrowing utilizing a distal reference. Mild   calcified plaque carotid bulb/bifurcation  LEFT CAROTID CIRCULATION:  *  Evaluation of the left carotid circulation demonstrates no hemodynamic   significant narrowing utilizing a distal reference. Mild calcified plaque   carotid bulb/bifurcation  VERTEBRAL ARTERIES:  *  Evaluation of the vertebral arteries reveals no evidence of a   vertebral artery occlusion or dissection.    BRAIN  ANTERIOR CIRCULATION:  *  Distal internal carotid arteries are patent.  *  Anterior cerebral arteries are patent. Atherosclerotic calcification   of the cavernous segments without significant narrowing  *  Middle cerebral arteries are patent.  POSTERIOR CIRCULATION:  *  Distal vertebral arteries are patent.  *  Basilar artery is patent. Proximal superior cerebellar arteries are   patent  *  Posterior cerebral arteries are patent.    OTHER:  *  8 mm nodule right lung apex.  --------------------------------------------------    CT PERFUSION TECHNIQUE:  CT perfusion was performed during the administration of intravenous   contrast.  There was a total of 8 cm brain coverage.  The images were processed utilizing RAPID software.  Contrast administered 80 cc Omnipaque 350, contrast discarded 0 cc    Ischemic tissue is defined as TMAX > 6 seconds.  Core infarct is defined as CBF < 30%.    CT PERFUSION FINDINGS:  *  CT perfusion is uninterpretable due to motion and suboptimal contrast   bolus/timing.      IMPRESSION:  *  NO EVIDENCE OF AN ACUTE INTRACRANIAL HEMORRHAGE, MIDLINE SHIFT, OR   HYDROCEPHALUS. MODERATE ATROPHY WITH MILD PERIVENTRICULAR WHITE MATTER   ISCHEMIC CHANGES  *  NO HEMODYNAMIC SIGNIFICANT NARROWING WITHIN THE NECK.  *  NO PROXIMAL LARGE VESSEL OCCLUSION INTRACRANIALLY.  *  CT PERFUSION UNINTERPRETABLE DUE TO MOTION AND SUBOPTIMAL CONTRAST   BOLUS/TIMING..  *  8 MM RIGHT APICAL LUNG NODULE.    *  Interval follow-up if acute ischemia remains of clinical concern.    Preliminary head CT findings discussed with Dr. Dennis at 6:48 PM on   4/2/2022    --- End of Report ---            MARICRUZ DAILEY MD; Attending Radiologist  This document has been electronically signed. Apr 2 2022  7:10PM    < end of copied text >  < from: CT Angio Head w/ IV Cont (04.02.22 @ 19:00) >    ACC: [id number] EXAM:  CT PERFUSION W MAPS IC                        ACC: [id number] EXAM:  CT ANGIO BRAIN (W)AW IC                        ACC: [id number] EXAM:  CT BRAIN STROKE PROTOCOL                        ACC: [id number] EXAM:  CT ANGIO NECK (W)[facility]                        PROCEDURE DATE:  04/02/2022          INTERPRETATION:  CT HEAD STROKE PROTOCOL, CT ANGIO NECK WITHOUT AND OR   WITH IV CONTRAST, CT ANGIO BRAIN WITHOUT AND OR WITH IV CONTRAST, CT   PERFUSION WITH MAPS WITH IV CONTRAST    CLINICAL HISTORY: Stroke Code    CT HEAD TECHNIQUE:  Noncontrast CT.  Axial Acqisition.  Sagittal and coronal reformations.    COMPARISON:  Head CT is compared to prior study of 1/18/2022    FINDINGS:  *  HEMORRHAGE:  No evidence of intracranial hemorrhage.  *  BRAIN PARENCHYMA:  Moderate atrophy. Mild periventricular white matter   ischemic changes. No mass or mass effect.  *  VENTRICLES / SHIFT:  No hydrocephalus. No midline shift.  *  EXTRA-AXIAL / BASAL CISTERNS:  No extra-axial mass. Basal cisterns   preserved.  Atherosclerotic calcifications of the cavernous internal   carotid arteries.  *  CALVARIUM AND EXTRACRANIAL SOFT TISSUES:  No depressed calvarial   fracture.  *  SINUSES, ORBITS, MASTOIDS:  The visualized paranasal sinuses and   mastoid air cells are well aerated.  ----------------------------------------    CTA TECHNIQUE:  CT angiography of the neck and brain was performed during the dynamic   administration of intravenous contrast.  MIP reconstructions were   performed and reviewed. Multiplanar reformations were obtained.  Contrast administered 60 cc Omnipaque 350, contrast discarded 10 cc    CTA FINDINGS:  NECK  RIGHT CAROTID CIRCULATION:  *  Evaluation of the right carotid circulation demonstrates no   hemodynamic significant narrowing utilizing a distal reference. Mild   calcified plaque carotid bulb/bifurcation  LEFT CAROTID CIRCULATION:  *  Evaluation of the left carotid circulation demonstrates no hemodynamic   significant narrowing utilizing a distal reference. Mild calcified plaque   carotid bulb/bifurcation  VERTEBRAL ARTERIES:  *  Evaluation of the vertebral arteries reveals no evidence of a   vertebral artery occlusion or dissection.    BRAIN  ANTERIOR CIRCULATION:  *  Distal internal carotid arteries are patent.  *  Anterior cerebral arteries are patent. Atherosclerotic calcification   of the cavernous segments without significant narrowing  *  Middle cerebral arteries are patent.  POSTERIOR CIRCULATION:  *  Distal vertebral arteries are patent.  *  Basilar artery is patent. Proximal superior cerebellar arteries are   patent  *  Posterior cerebral arteries are patent.    OTHER:  *  8 mm nodule right lung apex.  --------------------------------------------------    CT PERFUSION TECHNIQUE:  CT perfusion was performed during the administration of intravenous   contrast.  There was a total of 8 cm brain coverage.  The images were processed utilizing RAPID software.  Contrast administered 80 cc Omnipaque 350, contrast discarded 0 cc    Ischemic tissue is defined as TMAX > 6 seconds.  Core infarct is defined as CBF < 30%.    CT PERFUSION FINDINGS:  *  CT perfusion is uninterpretable due to motion and suboptimal contrast   bolus/timing.      IMPRESSION:  *  NO EVIDENCE OF AN ACUTE INTRACRANIAL HEMORRHAGE, MIDLINE SHIFT, OR   HYDROCEPHALUS. MODERATE ATROPHY WITH MILD PERIVENTRICULAR WHITE MATTER   ISCHEMIC CHANGES  *  NO HEMODYNAMIC SIGNIFICANT NARROWING WITHIN THE NECK.  *  NO PROXIMAL LARGE VESSEL OCCLUSION INTRACRANIALLY.  *  CT PERFUSION UNINTERPRETABLE DUE TO MOTION AND SUBOPTIMAL CONTRAST   BOLUS/TIMING..  *  8 MM RIGHT APICAL LUNG NODULE.    *  Interval follow-up if acute ischemia remains of clinical concern.    Preliminary head CT findings discussed with Dr. Dennis at 6:48 PM on   4/2/2022    --- End of Report ---            MARICRUZ DAILEY MD; Attending Radiologist  This document has been electronically signed. Apr 2 2022  7:10PM    < end of copied text >  < from: CT Brain Stroke Protocol (04.02.22 @ 18:46) >    ACC: [id number] EXAM:  CT PERFUSION W MAPS IC                        ACC: [id number] EXAM:  CT ANGIO BRAIN (W)AW IC                        ACC: [id number] EXAM:  CT BRAIN STROKE PROTOCOL                        ACC: [id number] EXAM:  CT ANGIO NECK (W)AW IC                        PROCEDURE DATE:  04/02/2022          INTERPRETATION:  CT HEAD STROKE PROTOCOL, CT ANGIO NECK WITHOUT AND OR   WITH IV CONTRAST, CT ANGIO BRAIN WITHOUT AND OR WITH IV CONTRAST, CT   PERFUSION WITH MAPS WITH IV CONTRAST    CLINICAL HISTORY: Stroke Code    CT HEAD TECHNIQUE:  Noncontrast CT.  Axial Acqisition.  Sagittal and coronal reformations.    COMPARISON:  Head CT is compared to prior study of 1/18/2022    FINDINGS:  *  HEMORRHAGE:  No evidence of intracranial hemorrhage.  *  BRAIN PARENCHYMA:  Moderate atrophy. Mild periventricular white matter   ischemic changes. No mass or mass effect.  *  VENTRICLES / SHIFT:  No hydrocephalus. No midline shift.  *  EXTRA-AXIAL / BASAL CISTERNS:  No extra-axial mass. Basal cisterns   preserved.  Atherosclerotic calcifications of the cavernous internal   carotid arteries.  *  CALVARIUM AND EXTRACRANIAL SOFT TISSUES:  No depressed calvarial   fracture.  *  SINUSES, ORBITS, MASTOIDS:  The visualized paranasal sinuses and   mastoid air cells are well aerated.  ----------------------------------------    CTA TECHNIQUE:  CT angiography of the neck and brain was performed during the dynamic   administration of intravenous contrast.  MIP reconstructions were   performed and reviewed. Multiplanar reformations were obtained.  Contrast administered 60 cc Omnipaque 350, contrast discarded 10 cc    CTA FINDINGS:  NECK  RIGHT CAROTID CIRCULATION:  *  Evaluation of the right carotid circulation demonstrates no   hemodynamic significant narrowing utilizing a distal reference. Mild   calcified plaque carotid bulb/bifurcation  LEFT CAROTID CIRCULATION:  *  Evaluation of the left carotid circulation demonstrates no hemodynamic   significant narrowing utilizing a distal reference. Mild calcified plaque   carotid bulb/bifurcation  VERTEBRAL ARTERIES:  *  Evaluation of the vertebral arteries reveals no evidence of a   vertebral artery occlusion or dissection.    BRAIN  ANTERIOR CIRCULATION:  *  Distal internal carotid arteries are patent.  *  Anterior cerebral arteries are patent. Atherosclerotic calcification   of the cavernous segments without significant narrowing  *  Middle cerebral arteries are patent.  POSTERIOR CIRCULATION:  *  Distal vertebral arteries are patent.  *  Basilar artery is patent. Proximal superior cerebellar arteries are   patent  *  Posterior cerebral arteries are patent.    OTHER:  *  8 mm nodule right lung apex.  --------------------------------------------------    CT PERFUSION TECHNIQUE:  CT perfusion was performed during the administration of intravenous   contrast.  There was a total of 8 cm brain coverage.  The images were processed utilizing RAPID software.  Contrast administered 80 cc Omnipaque 350, contrast discarded 0 cc    Ischemic tissue is defined as TMAX > 6 seconds.  Core infarct is defined as CBF < 30%.    CT PERFUSION FINDINGS:  *  CT perfusion is uninterpretable due to motion and suboptimal contrast   bolus/timing.      IMPRESSION:  *  NO EVIDENCE OF AN ACUTE INTRACRANIAL HEMORRHAGE, MIDLINE SHIFT, OR   HYDROCEPHALUS. MODERATE ATROPHY WITH MILD PERIVENTRICULAR WHITE MATTER   ISCHEMIC CHANGES  *  NO HEMODYNAMIC SIGNIFICANT NARROWING WITHIN THE NECK.  *  NO PROXIMAL LARGE VESSEL OCCLUSION INTRACRANIALLY.  *  CT PERFUSION UNINTERPRETABLE DUE TO MOTION AND SUBOPTIMAL CONTRAST   BOLUS/TIMING..  *  8 MM RIGHT APICAL LUNG NODULE.    *  Interval follow-up if acute ischemia remains of clinical concern.    Preliminary head CT findings discussed with Dr. Dennis at 6:48 PM on   4/2/2022    --- End of Report ---            MARICRUZ DAILEY MD; Attending Radiologist  This document has been electronically signed. Apr  2 2    < end of copied text >

## 2022-04-02 NOTE — CONSULT NOTE ADULT - SUBJECTIVE AND OBJECTIVE BOX
[facility] [place]     Patient is a 89y old  Male who presents with a chief complaint of     HPI:      HPI:    89y Male for Cardiology Consult    PAST MEDICAL & SURGICAL HISTORY:  Atrial fibrillation    Hypertension    Diabetes    Prostate ca    Dementia    CHF (congestive heart failure)    Hyperlipemia    Diabetes    Depression    Dementia    DM (diabetes mellitus)    Atrial fibrillation    Prostate CA    Arteriosclerotic heart disease (ASHD)    Dementia    Anemia    Constipation    No significant past surgical history        FAMILY HISTORY:      SOCIAL HISTORY:   Alcohol:  Smoking:    Allergies    latex (Rash)  latex (Unknown)  No Known Drug Allergies    Intolerances        MEDICATIONS  (STANDING):    MEDICATIONS  (PRN):      REVIEW OF SYSTEMS:  CONSTITUTIONAL: No fever, weight loss, chills, shakes, or fat  RESPIRATORY: No cough, wheezing, hemoptysis, or shortness of breath  CARDIOVASCULAR: No chest pain, dyspnea, palpitations, dizziness, syncope, paroxysmal nocturnal dyspnea, orthopnea, or arm or leg swelling  GASTROINTESTINAL: No abdominal  or epigastric pain, nausea, vomiting, hematemesis, diarrhea, constipation, melena or bright red bloo  NEUROLOGICAL: No headaches, memory loss, slurred speech, limb weakness, loss of strength, numbness, or tremors  SKIN: No itching, burning, rashes, or lesions  ENDOCRINE: No heat or cold intolerance, or hair loss  MUSCULOSKELETAL: No joint pain or swelling, muscle, back, or extremity pain  HEME/LYMPH: No easy bruising or bleeding gums  ALLERY AND IMMUNOLOGIC: No hives or rash.    Vital Signs Last 24 Hrs  T(C): 36.1 (2022 17:13), Max: 36.1 (2022 17:13)  T(F): 97 (2022 17:13), Max: 97 (2022 17:13)  HR: 81 (2022 19:58) (62 - 81)  BP: 106/78 (2022 19:58) (106/78 - 113/63)  BP(mean): --  RR: 20 (2022 19:58) (16 - 20)  SpO2: 99% (2022 19:58) (99% - 100%)    PHYSICAL EXAM:  HEAD:  Atraumatic, Normocephalic  EYES: EOMI, PERRLA, conjunctiva and sclera clear  NECK: Supple and normal thyroid.  No JVD or carotid bruit.   HEART: S1, S2 regular , 1/6 soft ejection systolic murmur in mitral area , no thrill and no gallops .  PULMONARY: Bilateral vesicular breathing , few scattered ronchi and few scattered rales are present .  ABDOMEN: Soft nontender and positive bowl sounds   EXTREMITIES:  No clubbing, cyanosis, or pedal  edema  NEUROLOGICAL: AAOX3 , no focal deficit .    LABS:                        12.3   8.34  )-----------( Clumped    ( 2022 18:20 )             36.2     04-02    137  |  103  |  24<H>  ----------------------------<  131<H>  4.2   |  28  |  0.59    Ca    8.1<L>      2022 19:31    TPro  5.8<L>  /  Alb  2.7<L>  /  TBili  0.3  /  DBili  x   /  AST  13<L>  /  ALT  20  /  AlkPhos  79  04-02    CARDIAC MARKERS ( 2022 19:31 )  x     / x     / 45 U/L / x     / 1.2 ng/mL      PT/INR - ( 2022 18:20 )   PT: 12.4 sec;   INR: 1.06 ratio         PTT - ( 2022 18:20 )  PTT:29.7 sec  Urinalysis Basic - ( 2022 22:18 )    Color: Yellow / Appearance: Slightly Turbid / S.005 / pH: x  Gluc: x / Ketone: Negative  / Bili: Negative / Urobili: Negative   Blood: x / Protein: 30 mg/dL / Nitrite: Positive   Leuk Esterase: Moderate / RBC: 0-2 /HPF / WBC 26-50   Sq Epi: x / Non Sq Epi: Occasional / Bacteria: Moderate      BNP      EKG:  ECHO:  IMAGING:    Assessment and Plan :     Will continue to follow during hospital course and give further recommendations as needed . Thanks for your referral . if any questions please contact me at office ([phone])cell [phone])  IVETTE VAZQUEZ MD [place] [address]  [address]  OFFICE : [phone]  CELL : [phone]  CARDIOLOGY CONSULT :     Patient is a 89y old  Male who presents with a chief complaint of unresponsiveness .    HPI:  Chief Complaint: weakness.    · Chief Complaint: The patient is a 89y Male complaining of weakness.  · Unable to Obtain: Dementia  · Details: h and p limited due to pt condition  · HPI Objective Statement: Pt is a 89 yo male hx dementia, afib on asa, dm. pt sent from [facility] per nurse and ems report after being found at 2pm to be unresponsive. pt sent to er. pt found in bed,  last known well unknown at present.      	pmd: aylin      HPI:    89y Male for Cardiology Consult    PAST MEDICAL & SURGICAL HISTORY:  Atrial fibrillation    Hypertension    Diabetes    Prostate ca    Dementia    CHF (congestive heart failure)    Hyperlipemia    Diabetes    Depression    Dementia    DM (diabetes mellitus)    Atrial fibrillation    Prostate CA    Arteriosclerotic heart disease (ASHD)    Dementia    Anemia    Constipation    No significant past surgical history        FAMILY HISTORY:      SOCIAL HISTORY:   Alcohol:  Smoking:    Allergies    latex (Rash)  latex (Unknown)  No Known Drug Allergies    Intolerances        MEDICATIONS  (STANDING):    MEDICATIONS  (PRN):    Vital Signs Last 24 Hrs  T(C): 36.1 (2022 17:13), Max: 36.1 (2022 17:13)  T(F): 97 (2022 17:13), Max: 97 (2022 17:13)  HR: 81 (2022 19:58) (62 - 81)  BP: 106/78 (2022 19:58) (106/78 - 113/63)  BP(mean): --  RR: 20 (2022 19:58) (16 - 20)  SpO2: 99% (2022 19:58) (99% - 100%)    LABS:                        12.3   8.34  )-----------( Clumped    ( 2022 18:20 )             36.2     04-02    137  |  103  |  24<H>  ----------------------------<  131<H>  4.2   |  28  |  0.59    Ca    8.1<L>      2022 19:31    TPro  5.8<L>  /  Alb  2.7<L>  /  TBili  0.3  /  DBili  x   /  AST  13<L>  /  ALT  20  /  AlkPhos  79  04-02    CARDIAC MARKERS ( 2022 19:31 )  x     / x     / 45 U/L / x     / 1.2 ng/mL      PT/INR - ( 2022 18:20 )   PT: 12.4 sec;   INR: 1.06 ratio         PTT - ( 2022 18:20 )  PTT:29.7 sec  Urinalysis Basic - ( 2022 22:18 )    Color: Yellow / Appearance: Slightly Turbid / S.005 / pH: x  Gluc: x / Ketone: Negative  / Bili: Negative / Urobili: Negative   Blood: x / Protein: 30 mg/dL / Nitrite: Positive   Leuk Esterase: Moderate / RBC: 0-2 /HPF / WBC 26-50   Sq Epi: x / Non Sq Epi: Occasional / Bacteria: Moderate      Assessment and Plan :   FULL CONSULT DICTATED .  89 years old male with H/O dementia , hypertension , CHF , atrial fibrillation , PPM has unresponsiveness episode . Will like to R/O cardiac arrhythmias . Patient also has UTI . Patient has high risk for full anticoagulation due to risk of falls and bleeding . Continue DVT prophylaxis . Continue rest of medications as such . Prognosis guarded .  Will continue to follow during hospital course and give further recommendations as needed . Thanks for your referral . if any questions please contact me at office ([phone] cell [phone])

## 2022-04-02 NOTE — H&P ADULT - NSHPLABSRESULTS_GEN_ALL_CORE
12.3   8.34  )-----------( Clumped    ( 2022 18:20 )             36.2       CBC Full  -  ( 2022 18:20 )  WBC Count : 8.34 K/uL  RBC Count : 3.81 M/uL  Hemoglobin : 12.3 g/dL  Hematocrit : 36.2 %  Platelet Count - Automated : Clumped K/uL  Mean Cell Volume : 95.0 fl  Mean Cell Hemoglobin : 32.3 pg  Mean Cell Hemoglobin Concentration : 34.0 gm/dL  Auto Neutrophil # : 6.49 K/uL  Auto Lymphocyte # : 1.03 K/uL  Auto Monocyte # : 0.69 K/uL  Auto Eosinophil # : 0.08 K/uL  Auto Basophil # : 0.03 K/uL  Auto Neutrophil % : 77.7 %  Auto Lymphocyte % : 12.4 %  Auto Monocyte % : 8.3 %  Auto Eosinophil % : 1.0 %  Auto Basophil % : 0.4 %      0402    137  |  103  |  24<H>  ----------------------------<  131<H>  4.2   |  28  |  0.59    Ca    8.1<L>      2022 19:31    TPro  5.8<L>  /  Alb  2.7<L>  /  TBili  0.3  /  DBili  x   /  AST  13<L>  /  ALT  20  /  AlkPhos  79  04-02      CAPILLARY BLOOD GLUCOSE      POCT Blood Glucose.: 148 mg/dL (2022 17:29)      Vital Signs Last 24 Hrs  T(C): 36.1 (2022 17:13), Max: 36.1 (2022 17:13)  T(F): 97 (2022 17:13), Max: 97 (2022 17:13)  HR: 81 (2022 19:58) (62 - 81)  BP: 106/78 (2022 19:58) (106/78 - 113/63)  BP(mean): --  RR: 20 (2022 19:58) (16 - 20)  SpO2: 99% (2022 19:58) (99% - 100%)    Urinalysis Basic - ( 2022 22:18 )    Color: Yellow / Appearance: Slightly Turbid / S.005 / pH: x  Gluc: x / Ketone: Negative  / Bili: Negative / Urobili: Negative   Blood: x / Protein: 30 mg/dL / Nitrite: Positive   Leuk Esterase: Moderate / RBC: 0-2 /HPF / WBC 26-50   Sq Epi: x / Non Sq Epi: Occasional / Bacteria: Moderate        PT/INR - ( 2022 18:20 )   PT: 12.4 sec;   INR: 1.06 ratio         PTT - ( 2022 18:20 )  PTT:29.7 sec

## 2022-04-02 NOTE — ED PROVIDER NOTE - SKIN, MLM
Skin normal color for race, warm, dry . No evidence of rash.  diffuse senile ecchymosis, left hand c/d, with healing skin tear, no signs infection

## 2022-04-02 NOTE — ED ADULT TRIAGE NOTE - CHIEF COMPLAINT QUOTE
As per transfer papers, pt sent in for lethargy, weakness, since 2pm, as per ems patient had a oxygen saturation of 79%on room air, patient 100% on 2 litres nasal cannula

## 2022-04-02 NOTE — CONSULT NOTE ADULT - SUBJECTIVE AND OBJECTIVE BOX
Date/Time Patient Seen:  		  Referring MD:   Data Reviewed	       Patient is a 89y old  Male who presents with a chief complaint of     Subjective/HPI   · Chief Complaint: The patient is a 89y Male complaining of weakness.  · Unable to Obtain: Dementia  · Details: h and p limited due to pt condition  · HPI Objective Statement: Pt is a 89 yo male hx dementia, afib on asa, dm. pt sent from [facility] per nurse and ems report after being found at 2pm to be unresponsive. pt sent to er. pt found in bed,  last known well unknown at present.      	pmd: [facility] Hospital Course:  Discharge Date	01-Feb-2022  Admission Date	25-Jan-2022 15:28  Hospital Course	  89 year old male with history of dementia, anemia, CAD, prostate cancer, A-fib, DM, depression, CHF, and HLD BIBA for left hand infection. limited history due to dementia. afebrile in triage. Per previous nursing records, had history of left hand infection in Nov (was improving at that time, was being followed by home health). Patient was admitted in November 2021 with left hand third finger necrotic tip: scant serosanguinous drainage no odor: finger is with erythema and swelling present no warmth noted wound dimensions about 1.5 x 1.5 wound culture obtained:  fingers of hand contracted .CT -no evidence of osteomyelitis ,discharged on 7 days of po abx after ID & plastic sx clearance was obtained ,patient was given followup with plastic hand sx Dr GRIFFIN Sosa. Patient is diagnosed with cellulitis of 3rd finger of left hand Admitted for septic workup and evaluation,send blood and urine cx,serial lactate levels,monitor vitals closley,ivfs hydration,monitor urine output and renal profile,iv abx initiated ,ID cons called . PCP Toby Quintana Resident at [facility]      PAST MEDICAL & SURGICAL HISTORY:  Atrial fibrillation    Hypertension    Diabetes    Prostate ca    Dementia    CHF (congestive heart failure)    Hyperlipemia    Diabetes    Depression    Dementia    No pertinent past medical history    DM (diabetes mellitus)    Atrial fibrillation    Prostate CA    Arteriosclerotic heart disease (ASHD)    Dementia    Anemia    Constipation    No significant past surgical history    No significant past surgical history    PAST SURGICAL HISTORY:  No significant past surgical history.     Social History:  Social History (marital status, living situation, occupation, tobacco use, alcohol and drug use, and sexual history): Resident in [facility] ,no ETOH or TOBACCO reported.     Tobacco Screening:  · Core Measure Site	Yes  · Has the patient used tobacco in the past 30 days?	No    Risk Assessment:    Present on Admission:  Deep Venous Thrombosis	no  Pulmonary Embolus	no     Heart Failure:  Does this patient have a history of or has been diagnosed with heart failure? yes.     LV Function Assessment (LVS function was evaluated before arrival and/or during hospitalization) yes.     Is the Ejection Fraction >40% ? yes.     normal LV function.     Ejection Fraction 55 %.        Medication list         MEDICATIONS  (STANDING):    MEDICATIONS  (PRN):         Vitals log        ICU Vital Signs Last 24 Hrs  T(C): 36.1 (02 Apr 2022 17:13), Max: 36.1 (02 Apr 2022 17:13)  T(F): 97 (02 Apr 2022 17:13), Max: 97 (02 Apr 2022 17:13)  HR: 62 (02 Apr 2022 17:13) (62 - 62)  BP: 113/63 (02 Apr 2022 17:13) (113/63 - 113/63)  BP(mean): --  ABP: --  ABP(mean): --  RR: 16 (02 Apr 2022 17:13) (16 - 16)  SpO2: 100% (02 Apr 2022 17:13) (100% - 100%)           Input and Output:  I&O's Detail      Lab Data                  Review of Systems	      Objective     Physical Examination        Pertinent Lab findings & Imaging      [place]:  NO   Adequate UO     I&O's Detail           Discussed with:     Cultures:	        Radiology      ACC: [id number] EXAM:  CT ABDOMEN AND PELVIS IC                          PROCEDURE DATE:  03/23/2022          INTERPRETATION:  CLINICAL INFORMATION: Abdominal pain.    COMPARISON: CT abdomen pelvis 3/7/2020. CT chest 12/7/2020    CONTRAST/COMPLICATIONS:  IV Contrast: Omnipaque 350  90 cc administered   10 cc discarded  Oral Contrast: NONE  Complications: None reported at time of study completion    PROCEDURE:  CT of the Abdomen and Pelvis was performed.  Sagittal and coronal reformats were performed.    FINDINGS:    LOWER CHEST: Dependent atelectasis. Coronary artery and aortic valve   calcification. Partially imaged pacer lead.    LIVER: Coarse calcification of the right liver, unchanged. Subcentimeter   hypodensities too small to characterize.  BILE DUCTS: No ductal dilatation  GALLBLADDER: Cholecystectomy.  SPLEEN: Normal size  PANCREAS: Unremarkable  ADRENALS: Slight adrenal gland thickening, unchanged.  KIDNEYS/URETERS: No hydronephrosis. Subcentimeter hypodensities too small   to characterize.    BLADDER: Underdistended.  REPRODUCTIVE ORGANS: Enlarged prostate    BOWEL: Underdistended stomach. No small bowel distention. Nonvisualized   appendix. Mild stool burden of the colon limits evaluation of the colonic   mucosa.  PERITONEUM: No ascites  VESSELS: No abdominal aortic aneurysm. Aortoiliac and visceral artery   atheromatous change.  RETROPERITONEUM/LYMPH NODES: No enlarged lymph nodes by CT size criteria.  ABDOMINAL WALL: Postsurgical changes.  BONES: Mild T12 compression deformity is new since 12/7/2020 CT.   Degenerative changes and diffuse osseous demineralization. Central canal   and neural foramina are not adequately assessed on this study. Old left   posterior rib fractures. Few stable subcentimeter sclerotic foci of the   bones back to 2014    IMPRESSION:    Mild T12 compression deformity is new since 12/7/2020 CT. Correlate with   clinical symptoms.    Coronary artery and aortic valve calcification.    --- End of Report ---            DEMETRIO MENDEZ M.D., ATTENDING RADIOLOGIST  This document has been electronically signed. Mar 23 2022  1:01PM

## 2022-04-02 NOTE — ED ADULT NURSE REASSESSMENT NOTE - NSIMPLEMENTINTERV_GEN_ALL_ED
Implemented All Fall Risk Interventions:  [place] to call system. Call bell, personal items and telephone within reach. Instruct patient to call for assistance. Room bathroom lighting operational. Non-slip footwear when patient is off stretcher. Physically safe environment: no spills, clutter or unnecessary equipment. Stretcher in lowest position, wheels locked, appropriate side rails in place. Provide visual cue, wrist band, yellow gown, etc. Monitor gait and stability. Monitor for mental status changes and reorient to person, place, and time. Review medications for side effects contributing to fall risk. Reinforce activity limits and safety measures with patient and family.

## 2022-04-02 NOTE — ED PROVIDER NOTE - EYES, MLM
Clear bilaterally, pupils equal, round and reactive to light. pt rights exam, cannot assess eomi at present

## 2022-04-02 NOTE — ED PROVIDER NOTE - PROGRESS NOTE DETAILS
spoke with [facility], states pt normally answers minimal questions, can ambulate slowly with cane. today last seen fully well at breakfast and at noon was at [place] eating lunch, but noone spoke to pt to see if he was at baseline of answering questions.  at 2pm pt with eyes closed and wouldn't answer questions. pt assisted by 2 people to office and was weak on feet,  ems called. ct head by rads, no bleed, no acute cva, only svid leticia mcrae, it lizzie dyphagia screen failed

## 2022-04-02 NOTE — H&P ADULT - HISTORY OF PRESENT ILLNESS
Pt is a 89 yo male hx dementia, afib on asa, dm. [facility] per nurse and ems report after being found at 2pm to be unresponsive. pt sent to er. pt found in bed,  last known well unknown at present.  spoke with [facility], states pt normally answers minimal questions, can ambulate slowly with cane. today last seen fully well at breakfast and at noon was at [place] eating lunch, but noone spoke to pt to see if he was at baseline of answering questions.  at 2pm pt with eyes closed and wouldn't answer questions. pt assisted by 2 people to office and was weak on feet,  ems called. Found to have UTI Admitted for septic workup and evaluation,send blood and urine cx,serial lactate levels,monitor vitals closley,ivfs hydration,monitor urine output and renal profile,iv abx initiated Seen by cardiologist and neurology consult requested .ID kalyan called ,started on ceftriaxone .CTH negative for ACUTE CVA .     PAST MEDICAL & SURGICAL HISTORY:  Atrial fibrillation  Hypertension    Diabetes    Prostate ca    Dementia    CHF (congestive heart failure)    Hyperlipemia    Diabetes    Depression    Dementia    DM (diabetes mellitus)    Atrial fibrillation    Prostate CA    Arteriosclerotic heart disease (ASHD)    Dementia    Anemia    Constipation  PACEMAKER   No significant past surgical history   Pt is a 89 yo male hx dementia, afib on asa, dm. [facility] nurse and ems report after being found at 2pm to be unresponsive. pt sent to er. pt found in bed,  last known well unknown at present.  [facility], states pt normally answers minimal questions, can ambulate slowly with cane. today last seen fully well at breakfast and at noon was at [place] eating lunch, but noone spoke to pt to see if he was at baseline of answering questions.  at 2pm pt with eyes closed and wouldn't answer questions. pt assisted by 2 people to office and was weak on feet,  ems called. Found to have UTI Admitted for septic workup and evaluation,send blood and urine cx,serial lactate levels,monitor vitals closley,ivfs hydration,monitor urine output and renal profile,iv abx initiated Seen by cardiologist and neurology consult requested .ID kalyan called ,started on ceftriaxone .CTH negative for ACUTE CVA .     PAST MEDICAL & SURGICAL HISTORY:  Atrial fibrillation  Hypertension    Diabetes    Prostate ca    Dementia    CHF (congestive heart failure)    Hyperlipemia    Diabetes    Depression    Dementia    DM (diabetes mellitus)    Atrial fibrillation    Prostate CA    Arteriosclerotic heart disease (ASHD)    Dementia    Anemia    Constipation  PACEMAKER   No significant past surgical history  < from: CT Brain Stroke Protocol (04.02.22 @ 18:46) >    ACC: [id number] EXAM:  CT PERFUSION W MAPS IC                        ACC: [id number] EXAM:  CT ANGIO BRAIN (W)AW IC                        ACC: [id number] EXAM:  CT BRAIN STROKE PROTOCOL                        ACC: [id number] EXAM:  CT ANGIO NECK (W) IC                        PROCEDURE DATE:  04/02/2022          INTERPRETATION:  CT HEAD STROKE PROTOCOL, CT ANGIO NECK WITHOUT AND OR   WITH IV CONTRAST, CT ANGIO BRAIN WITHOUT AND OR WITH IV CONTRAST, CT   PERFUSION WITH MAPS WITH IV CONTRAST    CLINICAL HISTORY: Stroke Code    CT HEAD TECHNIQUE:  Noncontrast CT.  Axial Acqisition.  Sagittal and coronal reformations.    COMPARISON:  Head CT is compared to prior study of 1/18/2022    FINDINGS:  *  HEMORRHAGE:  No evidence of intracranial hemorrhage.  *  BRAIN PARENCHYMA:  Moderate atrophy. Mild periventricular white matter   ischemic changes. No mass or mass effect.  *  VENTRICLES / SHIFT:  No hydrocephalus. No midline shift.  *  EXTRA-AXIAL / BASAL CISTERNS:  No extra-axial mass. Basal cisterns   preserved.  Atherosclerotic calcifications of the cavernous internal   carotid arteries.  *  CALVARIUM AND EXTRACRANIAL SOFT TISSUES:  No depressed calvarial   fracture.  *  SINUSES, ORBITS, MASTOIDS:  The visualized paranasal sinuses and   mastoid air cells are well aerated.  ----------------------------------------    CTA TECHNIQUE:  CT angiography of the neck and brain was performed during the dynamic   administration of intravenous contrast.  MIP reconstructions were   performed and reviewed. Multiplanar reformations were obtained.  Contrast administered 60 cc Omnipaque 350, contrast discarded 10 cc    CTA FINDINGS:  NECK  RIGHT CAROTID CIRCULATION:  *  Evaluation of the right carotid circulation demonstrates no   hemodynamic significant narrowing utilizing a distal reference. Mild   calcified plaque carotid bulb/bifurcation  LEFT CAROTID CIRCULATION:  *  Evaluation of the left carotid circulation demonstrates no hemodynamic   significant narrowing utilizing a distal reference. Mild calcified plaque   carotid bulb/bifurcation  VERTEBRAL ARTERIES:  *  Evaluation of the vertebral arteries reveals no evidence of a   vertebral artery occlusion or dissection.    BRAIN  ANTERIOR CIRCULATION:  *  Distal internal carotid arteries are patent.  *  Anterior cerebral arteries are patent. Atherosclerotic calcification   of the cavernous segments without significant narrowing  *  Middle cerebral arteries are patent.  POSTERIOR CIRCULATION:  *  Distal vertebral arteries are patent.  *  Basilar artery is patent. Proximal superior cerebellar arteries are   patent  *  Posterior cerebral arteries are patent.    OTHER:  *  8 mm nodule right lung apex.  --------------------------------------------------    CT PERFUSION TECHNIQUE:  CT perfusion was performed during the administration of intravenous   contrast.  There was a total of 8 cm brain coverage.  The images were processed utilizing RAPID software.  Contrast administered 80 cc Omnipaque 350, contrast discarded 0 cc    Ischemic tissue is defined as TMAX > 6 seconds.  Core infarct is defined as CBF < 30%.    CT PERFUSION FINDINGS:  *  CT perfusion is uninterpretable due to motion and suboptimal contrast   bolus/timing.      IMPRESSION:  *  NO EVIDENCE OF AN ACUTE INTRACRANIAL HEMORRHAGE, MIDLINE SHIFT, OR   HYDROCEPHALUS. MODERATE ATROPHY WITH MILD PERIVENTRICULAR WHITE MATTER   ISCHEMIC CHANGES  *  NO HEMODYNAMIC SIGNIFICANT NARROWING WITHIN THE NECK.  *  NO PROXIMAL LARGE VESSEL OCCLUSION INTRACRANIALLY.  *  CT PERFUSION UNINTERPRETABLE DUE TO MOTION AND SUBOPTIMAL CONTRAST   BOLUS/TIMING..  *  8 MM RIGHT APICAL LUNG NODULE.    *  Interval follow-up if acute ischemia remains of clinical concern.    Preliminary head CT findings discussed with Dr. Dennis at 6:48 PM on   4/2/2022    --- End of Report ---

## 2022-04-02 NOTE — ED PROVIDER NOTE - WR ORDER NAME 1
Pt is acceptable to consider testosterone shots.  Will set up in clinic for nurse visit to help train the shots.     Xray Chest 1 View- PORTABLE-Urgent

## 2022-04-02 NOTE — ED PROVIDER NOTE - CLINICAL SUMMARY MEDICAL DECISION MAKING FREE TEXT BOX
Pt is a 89 yo male hx dementia, afib on asa, dm. [facility] and ems report after being found at 2pm to be unresponsive to verbal and altered , pt at [place] answers simple questions  and walks with cane. pt sent to er.  pt non focal on exam after painful stimuli to to, moving all extremities, no following commands, lungs cta, abd soft, nt, cor: irreg rate controlled,  ivf,  ct head and cta neg, pt with ua + for uti, iv abx, cultures, admit , dyphagia failed.  nihss 4 for lethargy and not following commands but  pt moving all extremities and non focal on exam.  not likely neurologic but due to infection metabolic, admit ivf, abx

## 2022-04-03 DIAGNOSIS — G93.41 METABOLIC ENCEPHALOPATHY: ICD-10-CM

## 2022-04-03 DIAGNOSIS — Z29.9 ENCOUNTER FOR PROPHYLACTIC MEASURES, UNSPECIFIED: ICD-10-CM

## 2022-04-03 DIAGNOSIS — E83.39 OTHER DISORDERS OF PHOSPHORUS METABOLISM: ICD-10-CM

## 2022-04-03 DIAGNOSIS — R41.82 ALTERED MENTAL STATUS, UNSPECIFIED: ICD-10-CM

## 2022-04-03 DIAGNOSIS — I10 ESSENTIAL (PRIMARY) HYPERTENSION: ICD-10-CM

## 2022-04-03 DIAGNOSIS — I48.91 UNSPECIFIED ATRIAL FIBRILLATION: ICD-10-CM

## 2022-04-03 DIAGNOSIS — N39.0 URINARY TRACT INFECTION, SITE NOT SPECIFIED: ICD-10-CM

## 2022-04-03 DIAGNOSIS — E11.9 TYPE 2 DIABETES MELLITUS WITHOUT COMPLICATIONS: ICD-10-CM

## 2022-04-03 DIAGNOSIS — Z95.0 PRESENCE OF CARDIAC PACEMAKER: ICD-10-CM

## 2022-04-03 DIAGNOSIS — F03.90 UNSPECIFIED DEMENTIA, UNSPECIFIED SEVERITY, WITHOUT BEHAVIORAL DISTURBANCE, PSYCHOTIC DISTURBANCE, MOOD DISTURBANCE, AND ANXIETY: ICD-10-CM

## 2022-04-03 LAB
ALBUMIN, RANDOM URINE: 3.5 MG/DL — SIGNIFICANT CHANGE UP
ALBUMIN/CREATININE RATIO (ACR): 186 MG/G — HIGH (ref 0–30)
ANION GAP SERPL CALC-SCNC: 6 MMOL/L — SIGNIFICANT CHANGE UP (ref 5–17)
APPEARANCE UR: CLEAR — SIGNIFICANT CHANGE UP
BACTERIA # UR AUTO: NEGATIVE — SIGNIFICANT CHANGE UP
BILIRUB UR-MCNC: NEGATIVE — SIGNIFICANT CHANGE UP
BUN SERPL-MCNC: 18 MG/DL — SIGNIFICANT CHANGE UP (ref 7–23)
CALCIUM SERPL-MCNC: 8.7 MG/DL — SIGNIFICANT CHANGE UP (ref 8.5–10.1)
CHLORIDE SERPL-SCNC: 104 MMOL/L — SIGNIFICANT CHANGE UP (ref 96–108)
CO2 SERPL-SCNC: 29 MMOL/L — SIGNIFICANT CHANGE UP (ref 22–31)
COLOR SPEC: SIGNIFICANT CHANGE UP
CREAT ?TM UR-MCNC: 19 MG/DL — SIGNIFICANT CHANGE UP
CREAT SERPL-MCNC: 0.49 MG/DL — LOW (ref 0.5–1.3)
DIFF PNL FLD: ABNORMAL
EGFR: 94 ML/MIN/1.73M2 — SIGNIFICANT CHANGE UP
EPI CELLS # UR: 2 /HPF — SIGNIFICANT CHANGE UP (ref 0–5)
GLUCOSE SERPL-MCNC: 106 MG/DL — HIGH (ref 70–99)
GLUCOSE UR QL: NEGATIVE — SIGNIFICANT CHANGE UP
HCT VFR BLD CALC: 32.3 % — LOW (ref 39–50)
HGB BLD-MCNC: 11 G/DL — LOW (ref 13–17)
HYALINE CASTS # UR AUTO: 2 /LPF — SIGNIFICANT CHANGE UP (ref 0–7)
KETONES UR-MCNC: NEGATIVE — SIGNIFICANT CHANGE UP
LEUKOCYTE ESTERASE UR-ACNC: ABNORMAL
MCHC RBC-ENTMCNC: 32.3 PG — SIGNIFICANT CHANGE UP (ref 27–34)
MCHC RBC-ENTMCNC: 34.1 GM/DL — SIGNIFICANT CHANGE UP (ref 32–36)
MCV RBC AUTO: 94.7 FL — SIGNIFICANT CHANGE UP (ref 80–100)
NITRITE UR-MCNC: NEGATIVE — SIGNIFICANT CHANGE UP
NRBC # BLD: 0 /100 WBCS — SIGNIFICANT CHANGE UP (ref 0–0)
PH UR: 7 — SIGNIFICANT CHANGE UP (ref 5–8)
PHOSPHATE SERPL-MCNC: 2.3 MG/DL — LOW (ref 2.5–4.5)
PLATELET # BLD AUTO: 220 K/UL — SIGNIFICANT CHANGE UP (ref 150–400)
POTASSIUM SERPL-MCNC: 3.9 MMOL/L — SIGNIFICANT CHANGE UP (ref 3.5–5.3)
POTASSIUM SERPL-SCNC: 3.9 MMOL/L — SIGNIFICANT CHANGE UP (ref 3.5–5.3)
PROT UR-MCNC: NEGATIVE — SIGNIFICANT CHANGE UP
RBC # BLD: 3.41 M/UL — LOW (ref 4.2–5.8)
RBC # FLD: 13.4 % — SIGNIFICANT CHANGE UP (ref 10.3–14.5)
RBC CASTS # UR COMP ASSIST: 25 /HPF — HIGH (ref 0–4)
SODIUM SERPL-SCNC: 139 MMOL/L — SIGNIFICANT CHANGE UP (ref 135–145)
SP GR SPEC: 1.02 — SIGNIFICANT CHANGE UP (ref 1.01–1.02)
TROPONIN I, HIGH SENSITIVITY RESULT: 14.1 NG/L — SIGNIFICANT CHANGE UP
TSH SERPL-MCNC: 2.19 UIU/ML — SIGNIFICANT CHANGE UP (ref 0.36–3.74)
URATE SERPL-MCNC: 4.7 MG/DL — SIGNIFICANT CHANGE UP (ref 3.4–8.8)
UROBILINOGEN FLD QL: SIGNIFICANT CHANGE UP
VIT B12 SERPL-MCNC: 518 PG/ML — SIGNIFICANT CHANGE UP (ref 232–1245)
WBC # BLD: 6.72 K/UL — SIGNIFICANT CHANGE UP (ref 3.8–10.5)
WBC # FLD AUTO: 6.72 K/UL — SIGNIFICANT CHANGE UP (ref 3.8–10.5)
WBC UR QL: 37 /HPF — HIGH (ref 0–5)

## 2022-04-03 PROCEDURE — 93010 ELECTROCARDIOGRAM REPORT: CPT

## 2022-04-03 RX ORDER — FERROUS SULFATE 325(65) MG
325 TABLET ORAL DAILY
Refills: 0 | Status: DISCONTINUED | OUTPATIENT
Start: 2022-04-03 | End: 2022-04-05

## 2022-04-03 RX ORDER — METOPROLOL TARTRATE 50 MG
25 TABLET ORAL
Refills: 0 | Status: DISCONTINUED | OUTPATIENT
Start: 2022-04-03 | End: 2022-04-05

## 2022-04-03 RX ORDER — DONEPEZIL HYDROCHLORIDE 10 MG/1
5 TABLET, FILM COATED ORAL AT BEDTIME
Refills: 0 | Status: DISCONTINUED | OUTPATIENT
Start: 2022-04-03 | End: 2022-04-05

## 2022-04-03 RX ORDER — POLYETHYLENE GLYCOL 3350 17 G/17G
17 POWDER, FOR SOLUTION ORAL DAILY
Refills: 0 | Status: DISCONTINUED | OUTPATIENT
Start: 2022-04-03 | End: 2022-04-05

## 2022-04-03 RX ORDER — INSULIN LISPRO 100/ML
VIAL (ML) SUBCUTANEOUS
Refills: 0 | Status: DISCONTINUED | OUTPATIENT
Start: 2022-04-03 | End: 2022-04-05

## 2022-04-03 RX ORDER — DEXTROSE 50 % IN WATER 50 %
12.5 SYRINGE (ML) INTRAVENOUS ONCE
Refills: 0 | Status: DISCONTINUED | OUTPATIENT
Start: 2022-04-03 | End: 2022-04-05

## 2022-04-03 RX ORDER — TAMSULOSIN HYDROCHLORIDE 0.4 MG/1
0.4 CAPSULE ORAL AT BEDTIME
Refills: 0 | Status: DISCONTINUED | OUTPATIENT
Start: 2022-04-03 | End: 2022-04-05

## 2022-04-03 RX ORDER — ACETAMINOPHEN 500 MG
650 TABLET ORAL EVERY 6 HOURS
Refills: 0 | Status: DISCONTINUED | OUTPATIENT
Start: 2022-04-03 | End: 2022-04-05

## 2022-04-03 RX ORDER — DEXTROSE 50 % IN WATER 50 %
25 SYRINGE (ML) INTRAVENOUS ONCE
Refills: 0 | Status: DISCONTINUED | OUTPATIENT
Start: 2022-04-03 | End: 2022-04-05

## 2022-04-03 RX ORDER — MULTIVIT-MIN/FERROUS GLUCONATE 9 MG/15 ML
1 LIQUID (ML) ORAL DAILY
Refills: 0 | Status: DISCONTINUED | OUTPATIENT
Start: 2022-04-03 | End: 2022-04-05

## 2022-04-03 RX ORDER — SODIUM CHLORIDE 9 MG/ML
1000 INJECTION, SOLUTION INTRAVENOUS
Refills: 0 | Status: DISCONTINUED | OUTPATIENT
Start: 2022-04-03 | End: 2022-04-05

## 2022-04-03 RX ORDER — SENNA PLUS 8.6 MG/1
2 TABLET ORAL AT BEDTIME
Refills: 0 | Status: DISCONTINUED | OUTPATIENT
Start: 2022-04-03 | End: 2022-04-05

## 2022-04-03 RX ORDER — ONDANSETRON 8 MG/1
4 TABLET, FILM COATED ORAL EVERY 8 HOURS
Refills: 0 | Status: DISCONTINUED | OUTPATIENT
Start: 2022-04-03 | End: 2022-04-05

## 2022-04-03 RX ORDER — LACTOBACILLUS ACIDOPHILUS 100MM CELL
1 CAPSULE ORAL
Refills: 0 | Status: DISCONTINUED | OUTPATIENT
Start: 2022-04-03 | End: 2022-04-05

## 2022-04-03 RX ORDER — HEPARIN SODIUM 5000 [USP'U]/ML
5000 INJECTION INTRAVENOUS; SUBCUTANEOUS EVERY 8 HOURS
Refills: 0 | Status: DISCONTINUED | OUTPATIENT
Start: 2022-04-03 | End: 2022-04-05

## 2022-04-03 RX ORDER — GLUCAGON INJECTION, SOLUTION 0.5 MG/.1ML
1 INJECTION, SOLUTION SUBCUTANEOUS ONCE
Refills: 0 | Status: DISCONTINUED | OUTPATIENT
Start: 2022-04-03 | End: 2022-04-05

## 2022-04-03 RX ORDER — CEFTRIAXONE 500 MG/1
1000 INJECTION, POWDER, FOR SOLUTION INTRAMUSCULAR; INTRAVENOUS EVERY 24 HOURS
Refills: 0 | Status: DISCONTINUED | OUTPATIENT
Start: 2022-04-03 | End: 2022-04-04

## 2022-04-03 RX ORDER — ASPIRIN/CALCIUM CARB/MAGNESIUM 324 MG
81 TABLET ORAL DAILY
Refills: 0 | Status: DISCONTINUED | OUTPATIENT
Start: 2022-04-03 | End: 2022-04-05

## 2022-04-03 RX ORDER — SIMVASTATIN 20 MG/1
20 TABLET, FILM COATED ORAL AT BEDTIME
Refills: 0 | Status: DISCONTINUED | OUTPATIENT
Start: 2022-04-03 | End: 2022-04-05

## 2022-04-03 RX ORDER — LOSARTAN POTASSIUM 100 MG/1
25 TABLET, FILM COATED ORAL DAILY
Refills: 0 | Status: DISCONTINUED | OUTPATIENT
Start: 2022-04-03 | End: 2022-04-05

## 2022-04-03 RX ORDER — PANTOPRAZOLE SODIUM 20 MG/1
40 TABLET, DELAYED RELEASE ORAL DAILY
Refills: 0 | Status: DISCONTINUED | OUTPATIENT
Start: 2022-04-03 | End: 2022-04-05

## 2022-04-03 RX ORDER — DEXTROSE 50 % IN WATER 50 %
15 SYRINGE (ML) INTRAVENOUS ONCE
Refills: 0 | Status: DISCONTINUED | OUTPATIENT
Start: 2022-04-03 | End: 2022-04-05

## 2022-04-03 RX ORDER — ASCORBIC ACID 60 MG
500 TABLET,CHEWABLE ORAL
Refills: 0 | Status: CANCELLED | OUTPATIENT
Start: 2022-04-03 | End: 2022-04-05

## 2022-04-03 RX ORDER — LANOLIN ALCOHOL/MO/W.PET/CERES
3 CREAM (GRAM) TOPICAL AT BEDTIME
Refills: 0 | Status: DISCONTINUED | OUTPATIENT
Start: 2022-04-03 | End: 2022-04-05

## 2022-04-03 RX ADMIN — HEPARIN SODIUM 5000 UNIT(S): 5000 INJECTION INTRAVENOUS; SUBCUTANEOUS at 05:16

## 2022-04-03 RX ADMIN — PANTOPRAZOLE SODIUM 40 MILLIGRAM(S): 20 TABLET, DELAYED RELEASE ORAL at 11:10

## 2022-04-03 RX ADMIN — TAMSULOSIN HYDROCHLORIDE 0.4 MILLIGRAM(S): 0.4 CAPSULE ORAL at 21:31

## 2022-04-03 RX ADMIN — Medication 1 TABLET(S): at 15:45

## 2022-04-03 RX ADMIN — SENNA PLUS 2 TABLET(S): 8.6 TABLET ORAL at 21:31

## 2022-04-03 RX ADMIN — DONEPEZIL HYDROCHLORIDE 5 MILLIGRAM(S): 10 TABLET, FILM COATED ORAL at 21:31

## 2022-04-03 RX ADMIN — CEFTRIAXONE 100 MILLIGRAM(S): 500 INJECTION, POWDER, FOR SOLUTION INTRAMUSCULAR; INTRAVENOUS at 15:43

## 2022-04-03 RX ADMIN — Medication 81 MILLIGRAM(S): at 11:10

## 2022-04-03 RX ADMIN — Medication 25 MILLIGRAM(S): at 18:21

## 2022-04-03 RX ADMIN — HEPARIN SODIUM 5000 UNIT(S): 5000 INJECTION INTRAVENOUS; SUBCUTANEOUS at 15:44

## 2022-04-03 RX ADMIN — HEPARIN SODIUM 5000 UNIT(S): 5000 INJECTION INTRAVENOUS; SUBCUTANEOUS at 21:31

## 2022-04-03 RX ADMIN — Medication 1 TABLET(S): at 18:21

## 2022-04-03 RX ADMIN — Medication 62.5 MILLIMOLE(S): at 15:43

## 2022-04-03 RX ADMIN — SIMVASTATIN 20 MILLIGRAM(S): 20 TABLET, FILM COATED ORAL at 21:31

## 2022-04-03 NOTE — DIETITIAN INITIAL EVALUATION ADULT. - SIGNS/SYMPTOMS
as evidenced by moderate to severe muscle wasting and fat depletion, weight loss as evidenced by moderate to severe muscle wasting and fat depletion, 5lb(3.26%) wt loss x 2 mos

## 2022-04-03 NOTE — PHARMACOTHERAPY INTERVENTION NOTE - COMMENTS
Medication reconciliation was completed by pharmacy representative. Discussed updated med rec with Dr. Jhonathan MD will review and update as necessary

## 2022-04-03 NOTE — PROGRESS NOTE ADULT - NUTRITIONAL ASSESSMENT
This patient has been assessed with a concern for Malnutrition and has been determined to have a diagnosis/diagnoses of Moderate protein-calorie malnutrition.    This patient is being managed with:   Diet Soft and Bite Sized-  Supplement Feeding Modality:  Oral  Glucerna Shake Cans or Servings Per Day:  1       Frequency:  Three Times a day  Entered: Apr  3 2022 10:33AM

## 2022-04-03 NOTE — PATIENT PROFILE ADULT - FALL HARM RISK - RISK INTERVENTIONS

## 2022-04-03 NOTE — INPATIENT CERTIFICATION FOR MEDICARE PATIENTS - CURRENT MEDICAL NEEDS AND CARE PLANS
Lenin Garcia states he hasn't heard from [place] [id number] regarding his appt for [facility] consult. I called SHAHZAD and he actually had a 12-2-2020 visit- that he cancelled due to being out of town. Will call him back and ask him to reschedule [phone] Ext. [phone] [facility] [address]
syd/Other:

## 2022-04-03 NOTE — CONSULT NOTE ADULT - REASON FOR ADMISSION
Pt is a 89 yo male hx dementia, afib on asa, dm. pt sent from [facility] per nurse and ems report after being found at 2pm to be unresponsive. pt sent to er. pt found in bed,  last known well unknown at present.  spoke with [facility], states pt normally answers minimal questions, can ambulate slowly with cane. today last seen fully well at breakfast and at noon was at [facility] eating lunch, but noone spoke to pt to see if he was at baseline of answering questions.  at 2pm pt with eyes closed and wouldn't answer questions. pt assisted by 2 people to office and was weak on feet,  ems called. Found to have UTI Admitted for septic workup and evaluation,send blood and urine cx,serial lactate levels,monitor vitals closley,ivfs hydration,monitor urine output and renal profile,iv abx initiated Seen by cardiologist and neurology consult requested .ID kalyan called ,started on ceftriaxone .CTH negative for ACUTE CVA . Pt is a 89 yo male hx dementia, afib on asa, dm. pt sent [facility] per nurse and ems report after being found at 2pm to be unresponsive.

## 2022-04-03 NOTE — DIETITIAN INITIAL EVALUATION ADULT. - OTHER INFO
89 year old male with history of dementia, anemia, CAD, prostate cancer, A-fib, DM, depression, CHF, and HLD.    Pt lethargic at time of visit. Pta from [facility] on soft, NCS diet. Noted ht 70", weight 150.4lbs per transfer papers. Now 147.7lbs(4/3). Currently on soft and bite sized diet. Minimal breakfast consumed 4/3. GI wdl. Low Phos 2.3-recommend supplementation. Recommend continued assistance/encouragement with meals. Will provide glucerna to maximize po intake. Per [facility] pt DNR/DNI/No TF. 89 year old male with history of dementia, anemia, CAD, prostate cancer, A-fib, DM, depression, CHF, and HLD.    Pt awake/confused at time of visit. Pta from [facility] on soft, NCS diet. Noted ht 70", weight 150.4lbs per transfer papers. Now 147.7lbs(4/3). Currently on soft and bite sized diet. Per RN pt did well for breakfast 4/3 with full assistance ~75% consumed. GI wdl. Low Phos 2.3-recommend supplementation. Hx DM, well controlled past HgbA1c 5.9%. Will keep diet liberalized. Recommend continued assistance/encouragement with meals. Will provide glucerna to maximize po intake. Per [facility] pt DNR/DNI/No TF. 89 year old male with history of dementia, anemia, CAD, prostate cancer, A-fib, DM, depression, CHF, and HLD.    Pt awake/confused at time of visit. Pta from [facility] on soft, NCS diet. Noted ht 70", weight 150.4lbs per transfer papers. Now 147.7lbs(4/3). Seen by RD in 1/27/22 during past admission - weight 153lbs. Currently on soft and bite sized diet. Per RN pt did well for breakfast 4/3 with full assistance ~75% consumed. GI wdl. Low Phos 2.3-recommend supplementation. Hx DM, well controlled past HgbA1c 5.9%. Will keep diet liberalized. Recommend continued assistance/encouragement with meals. Will provide glucerna to maximize po intake. Per [facility] pt DNR/DNI/No TF.

## 2022-04-03 NOTE — CONSULT NOTE ADULT - SUBJECTIVE AND OBJECTIVE BOX
Patient is a 89y old  Male who presents with a chief complaint of sob (03 Apr 2022 10:05)      HPI:  Pt is a 89 yo male hx dementia, afib on asa, dm. pt sent from [facility] per nurse and ems report after being found at 2pm to be unresponsive. pt sent to er. pt found in bed,  last known well unknown at present.  spoke with [facility], states pt normally answers minimal questions, can ambulate slowly with cane. today last seen fully well at breakfast and at noon was at [employer] eating lunch, but noone spoke to pt to see if he was at baseline of answering questions.  at 2pm pt with eyes closed and wouldn't answer questions. pt assisted by 2 people to office and was weak on feet,  ems called. Found to have UTI Admitted for septic workup and evaluation,send blood and urine cx,serial lactate levels,monitor vitals closley,ivfs hydration,monitor urine output and renal profile,iv abx initiated Seen by cardiologist and neurology consult requested .ID kalyan called ,started on ceftriaxone .CTH negative for ACUTE CVA .     PAST MEDICAL & SURGICAL HISTORY:  Atrial fibrillation  Hypertension    Diabetes    Prostate ca    Dementia    CHF (congestive heart failure)    Hyperlipemia    Diabetes    Depression    Dementia    DM (diabetes mellitus)    Atrial fibrillation    Prostate CA    Arteriosclerotic heart disease (ASHD)    Dementia    Anemia    Constipation  PACEMAKER   No significant past surgical history  < from: CT Brain Stroke Protocol (04.02.22 @ 18:46) >    ACC: [id number] EXAM:  CT PERFUSION W MAPS IC                        ACC: [id number] EXAM:  CT ANGIO BRAIN (W) IC                        ACC: [id number] EXAM:  CT BRAIN STROKE PROTOCOL                        ACC: [id number] EXAM:  CT ANGIO NECK (W) IC                        PROCEDURE DATE:  04/02/2022          INTERPRETATION:  CT HEAD STROKE PROTOCOL, CT ANGIO NECK WITHOUT AND OR   WITH IV CONTRAST, CT ANGIO BRAIN WITHOUT AND OR WITH IV CONTRAST, CT   PERFUSION WITH MAPS WITH IV CONTRAST    CLINICAL HISTORY: Stroke Code    CT HEAD TECHNIQUE:  Noncontrast CT.  Axial Acqisition.  Sagittal and coronal reformations.    COMPARISON:  Head CT is compared to prior study of 1/18/2022    FINDINGS:  *  HEMORRHAGE:  No evidence of intracranial hemorrhage.  *  BRAIN PARENCHYMA:  Moderate atrophy. Mild periventricular white matter   ischemic changes. No mass or mass effect.  *  VENTRICLES / SHIFT:  No hydrocephalus. No midline shift.  *  EXTRA-AXIAL / BASAL CISTERNS:  No extra-axial mass. Basal cisterns   preserved.  Atherosclerotic calcifications of the cavernous internal   carotid arteries.  *  CALVARIUM AND EXTRACRANIAL SOFT TISSUES:  No depressed calvarial   fracture.  *  SINUSES, ORBITS, MASTOIDS:  The visualized paranasal sinuses and   mastoid air cells are well aerated.  ----------------------------------------    CTA TECHNIQUE:  CT angiography of the neck and brain was performed during the dynamic   administration of intravenous contrast.  MIP reconstructions were   performed and reviewed. Multiplanar reformations were obtained.  Contrast administered 60 cc Omnipaque 350, contrast discarded 10 cc    CTA FINDINGS:  NECK  RIGHT CAROTID CIRCULATION:  *  Evaluation of the right carotid circulation demonstrates no   hemodynamic significant narrowing utilizing a distal reference. Mild   calcified plaque carotid bulb/bifurcation  LEFT CAROTID CIRCULATION:  *  Evaluation of the left carotid circulation demonstrates no hemodynamic   significant narrowing utilizing a distal reference. Mild calcified plaque   carotid bulb/bifurcation  VERTEBRAL ARTERIES:  *  Evaluation of the vertebral arteries reveals no evidence of a   vertebral artery occlusion or dissection.    BRAIN  ANTERIOR CIRCULATION:  *  Distal internal carotid arteries are patent.  *  Anterior cerebral arteries are patent. Atherosclerotic calcification   of the cavernous segments without significant narrowing  *  Middle cerebral arteries are patent.  POSTERIOR CIRCULATION:  *  Distal vertebral arteries are patent.  *  Basilar artery is patent. Proximal superior cerebellar arteries are   patent  *  Posterior cerebral arteries are patent.    OTHER:  *  8 mm nodule right lung apex.  --------------------------------------------------    CT PERFUSION TECHNIQUE:  CT perfusion was performed during the administration of intravenous   contrast.  There was a total of 8 cm brain coverage.  The images were processed utilizing RAPID software.  Contrast administered 80 cc Omnipaque 350, contrast discarded 0 cc    Ischemic tissue is defined as TMAX > 6 seconds.  Core infarct is defined as CBF < 30%.    CT PERFUSION FINDINGS:  *  CT perfusion is uninterpretable due to motion and suboptimal contrast   bolus/timing.      IMPRESSION:  *  NO EVIDENCE OF AN ACUTE INTRACRANIAL HEMORRHAGE, MIDLINE SHIFT, OR   HYDROCEPHALUS. MODERATE ATROPHY WITH MILD PERIVENTRICULAR WHITE MATTER   ISCHEMIC CHANGES  *  NO HEMODYNAMIC SIGNIFICANT NARROWING WITHIN THE NECK.  *  NO PROXIMAL LARGE VESSEL OCCLUSION INTRACRANIALLY.  *  CT PERFUSION UNINTERPRETABLE DUE TO MOTION AND SUBOPTIMAL CONTRAST   BOLUS/TIMING..  *  8 MM RIGHT APICAL LUNG NODULE.    *  Interval follow-up if acute ischemia remains of clinical concern.    Preliminary head CT findings discussed with Dr. Dennis at 6:48 PM on   4/2/2022    --- End of Report ---       (02 Apr 2022 19:45)      Asked to see patient for ID Consult    PAST MEDICAL & SURGICAL HISTORY:  Atrial fibrillation    Hypertension    Diabetes    Prostate ca    Dementia    CHF (congestive heart failure)    Hyperlipemia    Diabetes    Depression    Dementia    DM (diabetes mellitus)    Atrial fibrillation    Prostate CA    Arteriosclerotic heart disease (ASHD)    Dementia    Anemia    Constipation    No significant past surgical history        Allergies    latex (Rash)  latex (Unknown)  No Known Drug Allergies    Intolerances        REVIEW OF SYSTEMS:  All systems below were reviewed and are negative [  ]  HEENT:  ID:  Pulmonary:  Cardiac:  GI:  Renal:  Musculoskeletal:  All other systems above were reviewed and are negative   [  ]    HOME MEDICATIONS:    MEDICATIONS  (STANDING):  aspirin enteric coated 81 milliGRAM(s) Oral daily  cefTRIAXone   IVPB 1000 milliGRAM(s) IV Intermittent every 24 hours  dextrose 5% + sodium chloride 0.45%. 1000 milliLiter(s) (50 mL/Hr) IV Continuous <Continuous>  dextrose 5%. 1000 milliLiter(s) (50 mL/Hr) IV Continuous <Continuous>  dextrose 5%. 1000 milliLiter(s) (100 mL/Hr) IV Continuous <Continuous>  dextrose 50% Injectable 25 Gram(s) IV Push once  dextrose 50% Injectable 12.5 Gram(s) IV Push once  dextrose 50% Injectable 25 Gram(s) IV Push once  donepezil 5 milliGRAM(s) Oral at bedtime  ferrous    sulfate 325 milliGRAM(s) Oral daily  glucagon  Injectable 1 milliGRAM(s) IntraMuscular once  heparin   Injectable 5000 Unit(s) SubCutaneous every 8 hours  insulin lispro (ADMELOG) corrective regimen sliding scale   SubCutaneous three times a day before meals  lactobacillus acidophilus 1 Tablet(s) Oral two times a day  losartan 25 milliGRAM(s) Oral daily  metoprolol tartrate 25 milliGRAM(s) Oral two times a day  multivitamin/minerals 1 Tablet(s) Oral daily  pantoprazole    Tablet 40 milliGRAM(s) Oral daily  polyethylene glycol 3350 17 Gram(s) Oral daily  senna 2 Tablet(s) Oral at bedtime  simvastatin 20 milliGRAM(s) Oral at bedtime  tamsulosin 0.4 milliGRAM(s) Oral at bedtime    MEDICATIONS  (PRN):  acetaminophen     Tablet .. 650 milliGRAM(s) Oral every 6 hours PRN Temp greater or equal to 38C (100.4F), Mild Pain (1 - 3)  aluminum hydroxide/magnesium hydroxide/simethicone Suspension 30 milliLiter(s) Oral every 4 hours PRN Dyspepsia  dextrose Oral Gel 15 Gram(s) Oral once PRN Blood Glucose LESS THAN 70 milliGRAM(s)/deciliter  melatonin 3 milliGRAM(s) Oral at bedtime PRN Insomnia  ondansetron Injectable 4 milliGRAM(s) IV Push every 8 hours PRN Nausea and/or Vomiting      Vital Signs Last 24 Hrs  T(C): 37 (03 Apr 2022 13:32), Max: 37 (03 Apr 2022 13:32)  T(F): 98.6 (03 Apr 2022 13:32), Max: 98.6 (03 Apr 2022 13:32)  HR: 63 (03 Apr 2022 13:32) (61 - 85)  BP: 125/72 (03 Apr 2022 13:32) (106/78 - 125/72)  BP(mean): --  RR: 17 (03 Apr 2022 13:32) (17 - 20)  SpO2: 96% (03 Apr 2022 13:32) (94% - 99%)    PHYSICAL EXAM:  HEENT:  Neck:  Lungs:  Heart:  Abdomen:  Genital/ Rectal:  Extremities:  Neurologic:  Vascular:    I&O's Summary    03 Apr 2022 07:01  -  03 Apr 2022 17:19  --------------------------------------------------------  IN: 0 mL / OUT: 850 mL / NET: -850 mL        LABORATORY:                          11.0   6.72  )-----------( 220      ( 03 Apr 2022 07:02 )             32.3           04-03    139  |  104  |  18  ----------------------------<  106<H>  3.9   |  29  |  0.49<L>    Ca    8.7      03 Apr 2022 07:02  Phos  2.3     04-03  Mg     1.8     04-03    TPro  5.8<L>  /  Alb  2.7<L>  /  TBili  0.3  /  DBili  x   /  AST  13<L>  /  ALT  20  /  AlkPhos  79  04-02      Rapid Respiratory Viral Panel Result        04-02 @ 18:25  Rapid RVP NotDetec  Coronovirus --  Adenovirus --  Bordetella Pertussis --  Chlamydia Pneumonia --  Entero/Rhinovirus--  HKU1 Coronovirus --  HMPV Coronovirus --  Influenza A --  Influenza AH1 --  Influenza AH1 2009 --  Influenza AH3 --  Influenza B --  Mycoplasma Pneumoniae --  NL63 Coronovirus --  OC43 Coronovirus --  Parainfluenza 1 --  Parainfluenza 2 --  Parainfluenza 3 --  Parainfluenza 4 --  Resp Syncytial Virus --  Rapid Respiratory Viral Panel Result        03-23 @ 11:07  Rapid RVP NotDetec  Coronovirus --  Adenovirus --  Bordetella Pertussis --  Chlamydia Pneumonia --  Entero/Rhinovirus--  HKU1 Coronovirus --  HMPV Coronovirus --  Influenza A --  Influenza AH1 --  Influenza AH1 2009 --  Influenza AH3 --  Influenza B --  Mycoplasma Pneumoniae --  NL63 Coronovirus --  OC43 Coronovirus --  Parainfluenza 1 --  Parainfluenza 2 --  Parainfluenza 3 --  Parainfluenza 4 --  Resp Syncytial Virus --      LABORATORY:    CBC Full  -  ( 03 Apr 2022 07:02 )  WBC Count : 6.72 K/uL  RBC Count : 3.41 M/uL  Hemoglobin : 11.0 g/dL  Hematocrit : 32.3 %  Platelet Count - Automated : 220 K/uL  Mean Cell Volume : 94.7 fl  Mean Cell Hemoglobin : 32.3 pg  Mean Cell Hemoglobin Concentration : 34.1 gm/dL  Auto Neutrophil # : x  Auto Lymphocyte # : x  Auto Monocyte # : x  Auto Eosinophil # : x  Auto Basophil # : x  Auto Neutrophil % : x  Auto Lymphocyte % : x  Auto Monocyte % : x  Auto Eosinophil % : x  Auto Basophil % : x          04-03    139  |  104  |  18  ----------------------------<  106<H>  3.9   |  29  |  0.49<L>    Ca    8.7      03 Apr 2022 07:02  Phos  2.3     04-03  Mg     1.8     04-03    TPro  5.8<L>  /  Alb  2.7<L>  /  TBili  0.3  /  DBili  x   /  AST  13<L>  /  ALT  20  /  AlkPhos  79  04-02                                            Rapid Respiratory Viral Panel Result        04-02 @ 18:25  Rapid RVP [facility]  Coronovirus --  Adenovirus --  Bordetella Pertussis --  Chlamydia Pneumonia --  Entero/Rhinovirus--  HKU1 Coronovirus --  HMPV Coronovirus --  Influenza A --  Influenza AH1 --  Influenza AH1 2009 --  Influenza AH3 --  Influenza B --  Mycoplasma Pneumoniae --  NL63 Coronovirus --  OC43 Coronovirus --  Parainfluenza 1 --  Parainfluenza 2 --  Parainfluenza 3 --  Parainfluenza 4 --  Resp Syncytial Virus --           Patient is a 89y old  Male who presents with a chief complaint of sob (03 Apr 2022 10:05)      HPI:  Pt is a 89 yo male hx dementia, afib on asa, dm. pt sent from [facility] per nurse and ems report after being found at 2pm to be unresponsive. pt sent to er. pt found in bed,  last known well unknown at present.  spoke with [facility], states pt normally answers minimal questions, can ambulate slowly with cane. today last seen fully well at breakfast and at noon was at [employer] eating lunch, but noone spoke to pt to see if he was at baseline of answering questions.  at 2pm pt with eyes closed and wouldn't answer questions. pt assisted by 2 people to office and was weak on feet,  ems called. Found to have UTI Admitted for septic workup and evaluation,send blood and urine cx,serial lactate levels,monitor vitals closley,ivfs hydration,monitor urine output and renal profile,iv abx initiated Seen by cardiologist and neurology consult requested .ID kalyan called ,started on ceftriaxone .CTH negative for ACUTE CVA .     PAST MEDICAL & SURGICAL HISTORY:  Atrial fibrillation  Hypertension    Diabetes    Prostate ca    Dementia    CHF (congestive heart failure)    Hyperlipemia    Diabetes    Depression    Dementia    DM (diabetes mellitus)    Atrial fibrillation    Prostate CA    Arteriosclerotic heart disease (ASHD)    Dementia    Anemia    Constipation  PACEMAKER   No significant past surgical history  < from: CT Brain Stroke Protocol (04.02.22 @ 18:46) >    ACC: [id number] EXAM:  CT PERFUSION W MAPS IC                        ACC: [id number] EXAM:  CT ANGIO BRAIN (W) IC                        ACC: [id number] EXAM:  CT BRAIN STROKE PROTOCOL                        ACC: [id number] EXAM:  CT ANGIO NECK (W) IC                        PROCEDURE DATE:  04/02/2022          INTERPRETATION:  CT HEAD STROKE PROTOCOL, CT ANGIO NECK WITHOUT AND OR   WITH IV CONTRAST, CT ANGIO BRAIN WITHOUT AND OR WITH IV CONTRAST, CT   PERFUSION WITH MAPS WITH IV CONTRAST    CLINICAL HISTORY: Stroke Code    CT HEAD TECHNIQUE:  Noncontrast CT.  Axial Acqisition.  Sagittal and coronal reformations.    COMPARISON:  Head CT is compared to prior study of 1/18/2022    FINDINGS:  *  HEMORRHAGE:  No evidence of intracranial hemorrhage.  *  BRAIN PARENCHYMA:  Moderate atrophy. Mild periventricular white matter   ischemic changes. No mass or mass effect.  *  VENTRICLES / SHIFT:  No hydrocephalus. No midline shift.  *  EXTRA-AXIAL / BASAL CISTERNS:  No extra-axial mass. Basal cisterns   preserved.  Atherosclerotic calcifications of the cavernous internal   carotid arteries.  *  CALVARIUM AND EXTRACRANIAL SOFT TISSUES:  No depressed calvarial   fracture.  *  SINUSES, ORBITS, MASTOIDS:  The visualized paranasal sinuses and   mastoid air cells are well aerated.  ----------------------------------------    CTA TECHNIQUE:  CT angiography of the neck and brain was performed during the dynamic   administration of intravenous contrast.  MIP reconstructions were   performed and reviewed. Multiplanar reformations were obtained.  Contrast administered 60 cc Omnipaque 350, contrast discarded 10 cc    CTA FINDINGS:  NECK  RIGHT CAROTID CIRCULATION:  *  Evaluation of the right carotid circulation demonstrates no   hemodynamic significant narrowing utilizing a distal reference. Mild   calcified plaque carotid bulb/bifurcation  LEFT CAROTID CIRCULATION:  *  Evaluation of the left carotid circulation demonstrates no hemodynamic   significant narrowing utilizing a distal reference. Mild calcified plaque   carotid bulb/bifurcation  VERTEBRAL ARTERIES:  *  Evaluation of the vertebral arteries reveals no evidence of a   vertebral artery occlusion or dissection.    BRAIN  ANTERIOR CIRCULATION:  *  Distal internal carotid arteries are patent.  *  Anterior cerebral arteries are patent. Atherosclerotic calcification   of the cavernous segments without significant narrowing  *  Middle cerebral arteries are patent.  POSTERIOR CIRCULATION:  *  Distal vertebral arteries are patent.  *  Basilar artery is patent. Proximal superior cerebellar arteries are   patent  *  Posterior cerebral arteries are patent.    OTHER:  *  8 mm nodule right lung apex.  --------------------------------------------------    CT PERFUSION TECHNIQUE:  CT perfusion was performed during the administration of intravenous   contrast.  There was a total of 8 cm brain coverage.  The images were processed utilizing RAPID software.  Contrast administered 80 cc Omnipaque 350, contrast discarded 0 cc    Ischemic tissue is defined as TMAX > 6 seconds.  Core infarct is defined as CBF < 30%.    CT PERFUSION FINDINGS:  *  CT perfusion is uninterpretable due to motion and suboptimal contrast   bolus/timing.      IMPRESSION:  *  NO EVIDENCE OF AN ACUTE INTRACRANIAL HEMORRHAGE, MIDLINE SHIFT, OR   HYDROCEPHALUS. MODERATE ATROPHY WITH MILD PERIVENTRICULAR WHITE MATTER   ISCHEMIC CHANGES  *  NO HEMODYNAMIC SIGNIFICANT NARROWING WITHIN THE NECK.  *  NO PROXIMAL LARGE VESSEL OCCLUSION INTRACRANIALLY.  *  CT PERFUSION UNINTERPRETABLE DUE TO MOTION AND SUBOPTIMAL CONTRAST   BOLUS/TIMING..  *  8 MM RIGHT APICAL LUNG NODULE.    *  Interval follow-up if acute ischemia remains of clinical concern.    Preliminary head CT findings discussed with Dr. Dennis at 6:48 PM on   4/2/2022    --- End of Report ---       (02 Apr 2022 19:45)      Asked to see patient for ID Consult    PAST MEDICAL & SURGICAL HISTORY:  Atrial fibrillation    Hypertension    Diabetes    Prostate ca    Dementia    CHF (congestive heart failure)    Hyperlipemia    Diabetes    Depression    Dementia    DM (diabetes mellitus)    Atrial fibrillation    Prostate CA    Arteriosclerotic heart disease (ASHD)    Dementia    Anemia    Constipation    No significant past surgical history        Allergies    latex (Rash)  latex (Unknown)  No Known Drug Allergies    Intolerances        REVIEW OF SYSTEMS:  All systems below were reviewed and are negative [  ]  HEENT:  ID:  Pulmonary:  Cardiac:  GI:  Renal:  Musculoskeletal:  All other systems above were reviewed and are negative   [  ]    HOME MEDICATIONS:    MEDICATIONS  (STANDING):  aspirin enteric coated 81 milliGRAM(s) Oral daily  cefTRIAXone   IVPB 1000 milliGRAM(s) IV Intermittent every 24 hours  dextrose 5% + sodium chloride 0.45%. 1000 milliLiter(s) (50 mL/Hr) IV Continuous <Continuous>  dextrose 5%. 1000 milliLiter(s) (50 mL/Hr) IV Continuous <Continuous>  dextrose 5%. 1000 milliLiter(s) (100 mL/Hr) IV Continuous <Continuous>  dextrose 50% Injectable 25 Gram(s) IV Push once  dextrose 50% Injectable 12.5 Gram(s) IV Push once  dextrose 50% Injectable 25 Gram(s) IV Push once  donepezil 5 milliGRAM(s) Oral at bedtime  ferrous    sulfate 325 milliGRAM(s) Oral daily  glucagon  Injectable 1 milliGRAM(s) IntraMuscular once  heparin   Injectable 5000 Unit(s) SubCutaneous every 8 hours  insulin lispro (ADMELOG) corrective regimen sliding scale   SubCutaneous three times a day before meals  lactobacillus acidophilus 1 Tablet(s) Oral two times a day  losartan 25 milliGRAM(s) Oral daily  metoprolol tartrate 25 milliGRAM(s) Oral two times a day  multivitamin/minerals 1 Tablet(s) Oral daily  pantoprazole    Tablet 40 milliGRAM(s) Oral daily  polyethylene glycol 3350 17 Gram(s) Oral daily  senna 2 Tablet(s) Oral at bedtime  simvastatin 20 milliGRAM(s) Oral at bedtime  tamsulosin 0.4 milliGRAM(s) Oral at bedtime    MEDICATIONS  (PRN):  acetaminophen     Tablet .. 650 milliGRAM(s) Oral every 6 hours PRN Temp greater or equal to 38C (100.4F), Mild Pain (1 - 3)  aluminum hydroxide/magnesium hydroxide/simethicone Suspension 30 milliLiter(s) Oral every 4 hours PRN Dyspepsia  dextrose Oral Gel 15 Gram(s) Oral once PRN Blood Glucose LESS THAN 70 milliGRAM(s)/deciliter  melatonin 3 milliGRAM(s) Oral at bedtime PRN Insomnia  ondansetron Injectable 4 milliGRAM(s) IV Push every 8 hours PRN Nausea and/or Vomiting      Vital Signs Last 24 Hrs  T(C): 37 (03 Apr 2022 13:32), Max: 37 (03 Apr 2022 13:32)  T(F): 98.6 (03 Apr 2022 13:32), Max: 98.6 (03 Apr 2022 13:32)  HR: 63 (03 Apr 2022 13:32) (61 - 85)  BP: 125/72 (03 Apr 2022 13:32) (106/78 - 125/72)  BP(mean): --  RR: 17 (03 Apr 2022 13:32) (17 - 20)  SpO2: 96% (03 Apr 2022 13:32) (94% - 99%)    PHYSICAL EXAM:  HEENT: NC/AT  Neck: Soft  Lungs: CTA bilaterally, no wheezing.  Heart: RRR, no murmurs.   Abdomen: Soft, no tenderness.   Genital/ Rectal: No fitch   Extremities: No ulcers  Neurologic: Confused.         LABORATORY:                          11.0   6.72  )-----------( 220      ( 03 Apr 2022 07:02 )             32.3           04-03    139  |  104  |  18  ----------------------------<  106<H>  3.9   |  29  |  0.49<L>    Ca    8.7      03 Apr 2022 07:02  Phos  2.3     04-03  Mg     1.8     04-03    TPro  5.8<L>  /  Alb  2.7<L>  /  TBili  0.3  /  DBili  x   /  AST  13<L>  /  ALT  20  /  AlkPhos  79  04-02      Rapid Respiratory Viral Panel Result        04-02 @ 18:25  Rapid RVP NotDetec  Coronovirus --  Adenovirus --  Bordetella Pertussis --  Chlamydia Pneumonia --  Entero/Rhinovirus--  HKU1 Coronovirus --  HMPV Coronovirus --  Influenza A --  Influenza AH1 --  Influenza AH1 2009 --  Influenza AH3 --  Influenza B --  Mycoplasma Pneumoniae --  NL63 Coronovirus --  OC43 Coronovirus --  Parainfluenza 1 --  Parainfluenza 2 --  Parainfluenza 3 --  Parainfluenza 4 --  Resp Syncytial Virus --  Rapid Respiratory Viral Panel Result        03-23 @ 11:07  Rapid RVP NotDetec  Coronovirus --  Adenovirus --  Bordetella Pertussis --  Chlamydia Pneumonia --  Entero/Rhinovirus--  HKU1 Coronovirus --  HMPV Coronovirus --  Influenza A --  Influenza AH1 --  Influenza AH1 2009 --  Influenza AH3 --  Influenza B --  Mycoplasma Pneumoniae --  NL63 Coronovirus --  OC43 Coronovirus --  Parainfluenza 1 --  Parainfluenza 2 --  Parainfluenza 3 --  Parainfluenza 4 --  Resp Syncytial Virus --      LABORATORY:    CBC Full  -  ( 03 Apr 2022 07:02 )  WBC Count : 6.72 K/uL  RBC Count : 3.41 M/uL  Hemoglobin : 11.0 g/dL  Hematocrit : 32.3 %  Platelet Count - Automated : 220 K/uL  Mean Cell Volume : 94.7 fl  Mean Cell Hemoglobin : 32.3 pg  Mean Cell Hemoglobin Concentration : 34.1 gm/dL  Auto Neutrophil # : x  Auto Lymphocyte # : x  Auto Monocyte # : x  Auto Eosinophil # : x  Auto Basophil # : x  Auto Neutrophil % : x  Auto Lymphocyte % : x  Auto Monocyte % : x  Auto Eosinophil % : x  Auto Basophil % : x          04-03    139  |  104  |  18  ----------------------------<  106<H>  3.9   |  29  |  0.49<L>    Ca    8.7      03 Apr 2022 07:02  Phos  2.3     04-03  Mg     1.8     04-03    TPro  5.8<L>  /  Alb  2.7<L>  /  TBili  0.3  /  DBili  x   /  AST  13<L>  /  ALT  20  /  AlkPhos  79  04-02        Assessment and plan:    1. UTI.  2. Toxic metabolic encephalopathy    . Continue IV Rocephin for now.  . Follow urine culture.    Thank you.                                 Patient is a 89y old  Male who presents with a chief complaint of sob (03 Apr 2022 10:05)        The patient is a 89 yo male with a PMH of dementia, afib on asa, dm, osteomyelitis of left 3rd finger and CHF who was sent from [facility] per nurse and ems report after being found at 2pm to be unresponsive. In the ED he was confused. UA was suggestive of UTI. The patient was admitted and placed on IV Rocephin. He has been afebrile.        PAST MEDICAL & SURGICAL HISTORY:  Atrial fibrillation  Hypertension    Diabetes    Prostate ca    Dementia    CHF (congestive heart failure)    Hyperlipemia    Diabetes    Depression    Dementia    DM (diabetes mellitus)    Atrial fibrillation    Prostate CA    Arteriosclerotic heart disease (ASHD)    Dementia    Anemia    Constipation  PACEMAKER   No significant past surgical history      PAST MEDICAL & SURGICAL HISTORY:  Atrial fibrillation    Hypertension    Diabetes    Prostate ca    Dementia    CHF (congestive heart failure)    Hyperlipemia    Diabetes    Depression    Dementia    DM (diabetes mellitus)    Atrial fibrillation    Prostate CA    Arteriosclerotic heart disease (ASHD)    Dementia    Anemia    Constipation    No significant past surgical history        Allergies    latex (Rash)  latex (Unknown)  No Known Drug Allergies    Intolerances        REVIEW OF SYSTEMS:  No cough or SOB  No diarrhea    HOME MEDICATIONS:    MEDICATIONS  (STANDING):  aspirin enteric coated 81 milliGRAM(s) Oral daily  cefTRIAXone   IVPB 1000 milliGRAM(s) IV Intermittent every 24 hours  dextrose 5% + sodium chloride 0.45%. 1000 milliLiter(s) (50 mL/Hr) IV Continuous <Continuous>  dextrose 5%. 1000 milliLiter(s) (50 mL/Hr) IV Continuous <Continuous>  dextrose 5%. 1000 milliLiter(s) (100 mL/Hr) IV Continuous <Continuous>  dextrose 50% Injectable 25 Gram(s) IV Push once  dextrose 50% Injectable 12.5 Gram(s) IV Push once  dextrose 50% Injectable 25 Gram(s) IV Push once  donepezil 5 milliGRAM(s) Oral at bedtime  ferrous    sulfate 325 milliGRAM(s) Oral daily  glucagon  Injectable 1 milliGRAM(s) IntraMuscular once  heparin   Injectable 5000 Unit(s) SubCutaneous every 8 hours  insulin lispro (ADMELOG) corrective regimen sliding scale   SubCutaneous three times a day before meals  lactobacillus acidophilus 1 Tablet(s) Oral two times a day  losartan 25 milliGRAM(s) Oral daily  metoprolol tartrate 25 milliGRAM(s) Oral two times a day  multivitamin/minerals 1 Tablet(s) Oral daily  pantoprazole    Tablet 40 milliGRAM(s) Oral daily  polyethylene glycol 3350 17 Gram(s) Oral daily  senna 2 Tablet(s) Oral at bedtime  simvastatin 20 milliGRAM(s) Oral at bedtime  tamsulosin 0.4 milliGRAM(s) Oral at bedtime    MEDICATIONS  (PRN):  acetaminophen     Tablet .. 650 milliGRAM(s) Oral every 6 hours PRN Temp greater or equal to 38C (100.4F), Mild Pain (1 - 3)  aluminum hydroxide/magnesium hydroxide/simethicone Suspension 30 milliLiter(s) Oral every 4 hours PRN Dyspepsia  dextrose Oral Gel 15 Gram(s) Oral once PRN Blood Glucose LESS THAN 70 milliGRAM(s)/deciliter  melatonin 3 milliGRAM(s) Oral at bedtime PRN Insomnia  ondansetron Injectable 4 milliGRAM(s) IV Push every 8 hours PRN Nausea and/or Vomiting      Vital Signs Last 24 Hrs  T(C): 37 (03 Apr 2022 13:32), Max: 37 (03 Apr 2022 13:32)  T(F): 98.6 (03 Apr 2022 13:32), Max: 98.6 (03 Apr 2022 13:32)  HR: 63 (03 Apr 2022 13:32) (61 - 85)  BP: 125/72 (03 Apr 2022 13:32) (106/78 - 125/72)  BP(mean): --  RR: 17 (03 Apr 2022 13:32) (17 - 20)  SpO2: 96% (03 Apr 2022 13:32) (94% - 99%)    PHYSICAL EXAM:  HEENT: NC/AT  Neck: Soft  Lungs: CTA bilaterally, no wheezing.  Heart: RRR, no murmurs.   Abdomen: Soft, no tenderness.   Genital/ Rectal: No fitch   Extremities: No ulcers  Neurologic: Confused.         LABORATORY:                          11.0   6.72  )-----------( 220      ( 03 Apr 2022 07:02 )             32.3           04-03    139  |  104  |  18  ----------------------------<  106<H>  3.9   |  29  |  0.49<L>    Ca    8.7      03 Apr 2022 07:02  Phos  2.3     04-03  Mg     1.8     04-03    TPro  5.8<L>  /  Alb  2.7<L>  /  TBili  0.3  /  DBili  x   /  AST  13<L>  /  ALT  20  /  AlkPhos  79  04-02      Rapid Respiratory Viral Panel Result        04-02 @ 18:25  Rapid RVP [facility]  Coronovirus --  Adenovirus --  Bordetella Pertussis --  Chlamydia Pneumonia --  Entero/Rhinovirus--  HKU1 Coronovirus --  HMPV Coronovirus --  Influenza A --  Influenza AH1 --  Influenza AH1 2009 --  Influenza AH3 --  Influenza B --  Mycoplasma Pneumoniae --  NL63 Coronovirus --  OC43 Coronovirus --  Parainfluenza 1 --  Parainfluenza 2 --  Parainfluenza 3 --  Parainfluenza 4 --  Resp Syncytial Virus --  Rapid Respiratory Viral Panel Result        03-23 @ 11:07  Rapid RVP [facility]  Coronovirus --  Adenovirus --  Bordetella Pertussis --  Chlamydia Pneumonia --  Entero/Rhinovirus--  HKU1 Coronovirus --  HMPV Coronovirus --  Influenza A --  Influenza AH1 --  Influenza AH1 2009 --  Influenza AH3 --  Influenza B --  Mycoplasma Pneumoniae --  NL63 Coronovirus --  OC43 Coronovirus --  Parainfluenza 1 --  Parainfluenza 2 --  Parainfluenza 3 --  Parainfluenza 4 --  Resp Syncytial Virus --      LABORATORY:    CBC Full  -  ( 03 Apr 2022 07:02 )  WBC Count : 6.72 K/uL  RBC Count : 3.41 M/uL  Hemoglobin : 11.0 g/dL  Hematocrit : 32.3 %  Platelet Count - Automated : 220 K/uL  Mean Cell Volume : 94.7 fl  Mean Cell Hemoglobin : 32.3 pg  Mean Cell Hemoglobin Concentration : 34.1 gm/dL  Auto Neutrophil # : x  Auto Lymphocyte # : x  Auto Monocyte # : x  Auto Eosinophil # : x  Auto Basophil # : x  Auto Neutrophil % : x  Auto Lymphocyte % : x  Auto Monocyte % : x  Auto Eosinophil % : x  Auto Basophil % : x          04-03    139  |  104  |  18  ----------------------------<  106<H>  3.9   |  29  |  0.49<L>    Ca    8.7      03 Apr 2022 07:02  Phos  2.3     04-03  Mg     1.8     04-03    TPro  5.8<L>  /  Alb  2.7<L>  /  TBili  0.3  /  DBili  x   /  AST  13<L>  /  ALT  20  /  AlkPhos  79  04-02        Assessment and plan:    1. UTI.  2. Toxic metabolic encephalopathy    . Continue IV Rocephin for now.  . Follow urine culture.    Thank you.

## 2022-04-03 NOTE — PROGRESS NOTE ADULT - SUBJECTIVE AND OBJECTIVE BOX
PROGRESS NOTE /IM initial evaluation   Patient is a 89y old  Male who presents with a chief complaint of sob (2022 10:05)  Chart and available morning labs /imaging are reviewed electronically , urgent issues addressed . More information  is being added upon completion of rounds , when more information is collected and management discussed with consultants , medical staff and social service/case management on the floor   OVERNIGHT  No new issues reported by medical staff . All above noted Patient is resting in a bed comfortably .Confused ,poor mentation .No distress noted   HPI:  Pt is a 89 yo male hx dementia, afib on asa, dm. pt sent from [facility] per nurse and ems report after being found at 2pm to be unresponsive. pt sent to er. pt found in bed,  last known well unknown at present.    PAST MEDICAL & SURGICAL HISTORY:  Atrial fibrillation  Hypertension    Diabetes    Prostate ca    Dementia    CHF (congestive heart failure)    Hyperlipemia    Diabetes    Depression    Dementia    DM (diabetes mellitus)    Atrial fibrillation    Prostate CA    Arteriosclerotic heart disease (ASHD)    Dementia    Anemia    Constipation  PACEMAKER   No significant past surgical history        MEDICATIONS  (STANDING):  aspirin enteric coated 81 milliGRAM(s) Oral daily  cefTRIAXone   IVPB 1000 milliGRAM(s) IV Intermittent every 24 hours  dextrose 5% + sodium chloride 0.45%. 1000 milliLiter(s) (50 mL/Hr) IV Continuous <Continuous>  dextrose 5%. 1000 milliLiter(s) (50 mL/Hr) IV Continuous <Continuous>  dextrose 5%. 1000 milliLiter(s) (100 mL/Hr) IV Continuous <Continuous>  dextrose 50% Injectable 25 Gram(s) IV Push once  dextrose 50% Injectable 12.5 Gram(s) IV Push once  dextrose 50% Injectable 25 Gram(s) IV Push once  donepezil 5 milliGRAM(s) Oral at bedtime  ferrous    sulfate 325 milliGRAM(s) Oral daily  glucagon  Injectable 1 milliGRAM(s) IntraMuscular once  heparin   Injectable 5000 Unit(s) SubCutaneous every 8 hours  insulin lispro (ADMELOG) corrective regimen sliding scale   SubCutaneous three times a day before meals  lactobacillus acidophilus 1 Tablet(s) Oral two times a day  losartan 25 milliGRAM(s) Oral daily  metoprolol tartrate 25 milliGRAM(s) Oral two times a day  multivitamin/minerals 1 Tablet(s) Oral daily  pantoprazole    Tablet 40 milliGRAM(s) Oral daily  polyethylene glycol 3350 17 Gram(s) Oral daily  senna 2 Tablet(s) Oral at bedtime  simvastatin 20 milliGRAM(s) Oral at bedtime  sodium phosphate IVPB 15 milliMole(s) IV Intermittent once  tamsulosin 0.4 milliGRAM(s) Oral at bedtime    MEDICATIONS  (PRN):  acetaminophen     Tablet .. 650 milliGRAM(s) Oral every 6 hours PRN Temp greater or equal to 38C (100.4F), Mild Pain (1 - 3)  aluminum hydroxide/magnesium hydroxide/simethicone Suspension 30 milliLiter(s) Oral every 4 hours PRN Dyspepsia  dextrose Oral Gel 15 Gram(s) Oral once PRN Blood Glucose LESS THAN 70 milliGRAM(s)/deciliter  melatonin 3 milliGRAM(s) Oral at bedtime PRN Insomnia  ondansetron Injectable 4 milliGRAM(s) IV Push every 8 hours PRN Nausea and/or Vomiting      OBJECTIVE    T(C): 37 (22 @ 13:32), Max: 37 (22 @ 13:32)  HR: 63 (22 @ 13:32) (61 - 85)  BP: 125/72 (22 @ 13:32) (106/78 - 125/72)  RR: 17 (22 @ 13:32) (16 - 20)  SpO2: 96% (22 @ 13:32) (94% - 100%)  Wt(kg): --  I&O's Summary    2022 07:01  -  2022 15:15  --------------------------------------------------------  IN: 0 mL / OUT: 850 mL / NET: -850 mL          REVIEW OF SYSTEMS:  CONSTITUTIONAL: No fever, weight loss, or fatigue  EYES: No eye pain, visual disturbances, or discharge  ENMT:   No sinus or throat pain  NECK: No pain or stiffness  RESPIRATORY: No cough, wheezing, chills or hemoptysis; No shortness of breath  CARDIOVASCULAR: No chest pain, palpitations, dizziness, or leg swelling  GASTROINTESTINAL: No abdominal pain. No nausea, vomiting; No diarrhea or constipation. No melena or hematochezia.  GENITOURINARY: No dysuria, frequency, hematuria, or incontinence  NEUROLOGICAL: No headaches, memory loss, loss of strength, numbness, or tremors  SKIN: No itching, burning, rashes, or lesions   MUSCULOSKELETAL: No joint pain or swelling; No muscle, back, or extremity pain    PHYSICAL EXAM:  Appearance: NAD. VS past 24 hrs -as above   HEENT:   Moist oral mucosa. Conjunctiva clear b/l.   Neck : supple  Respiratory: Lungs CTAB.  Gastrointestinal:  Soft, nontender. No rebound. No rigidity. BS present	  Cardiovascular: RRR ,S1S2 present  Neurologic: Non-focal. Moving all extremities.  Extremities: No edema. No erythema. No calf tenderness.  Skin: No rashes, No ecchymoses, No cyanosis.	  wounds ,skin lesions-See skin assesment flow sheet   LABS:                        11.0   6.72  )-----------( 220      ( 2022 07:02 )             32.3     04-03    139  |  104  |  18  ----------------------------<  106<H>  3.9   |  29  |  0.49<L>    Ca    8.7      2022 07:02  Phos  2.3     04-03  Mg     1.8     04-03    TPro  5.8<L>  /  Alb  2.7<L>  /  TBili  0.3  /  DBili  x   /  AST  13<L>  /  ALT  20  /  AlkPhos  79  04-02    CAPILLARY BLOOD GLUCOSE      POCT Blood Glucose.: 148 mg/dL (2022 17:29)    PT/INR - ( 2022 18:20 )   PT: 12.4 sec;   INR: 1.06 ratio         PTT - ( 2022 18:20 )  PTT:29.7 sec  Urinalysis Basic - ( 2022 22:18 )    Color: Yellow / Appearance: Slightly Turbid / S.005 / pH: x  Gluc: x / Ketone: Negative  / Bili: Negative / Urobili: Negative   Blood: x / Protein: 30 mg/dL / Nitrite: Positive   Leuk Esterase: Moderate / RBC: 0-2 /HPF / WBC 26-50   Sq Epi: x / Non Sq Epi: Occasional / Bacteria: Moderate        RADIOLOGY & ADDITIONAL TESTS:< from: Xray Chest 1 View- PORTABLE-Urgent (22 @ 19:22) >  INTERPRETATION:  HISTORY: ;  Sepsis;  TECHNIQUE: Portable frontal view of the chest, 1 view.  COMPARISON: none.  FINDINGS/  IMPRESSION:  A cardiac device overlies and obscures the left hemithorax with lead in   place.  HEART:  Enlarged  LUNGS: free of consolidation,effusion, or pneumothorax.  BONES: degenerative changes      Calcified gallstone. Contrast in the left renal collecting system.    < end of copied text >  < from: CT Angio Neck w/ IV Cont (22 @ 19:01) >  ACC: [id number] EXAM:  CT PERFUSION W MAPS IC                        ACC: [id number] EXAM:  CT ANGIO BRAIN (W)AW IC                        ACC: [id number] EXAM:  CT BRAIN STROKE PROTOCOL                        ACC: [id number] EXAM:  CT ANGIO NECK (W)AW IC                        PROCEDURE DATE:  2022          INTERPRETATION:  CT HEAD STROKE PROTOCOL, CT ANGIO NECK WITHOUT AND OR   WITH IV CONTRAST, CT ANGIO BRAIN WITHOUT AND OR WITH IV CONTRAST, CT   PERFUSION WITH MAPS WITH IV CONTRAST    CLINICAL HISTORY: Stroke Code    CT HEAD TECHNIQUE:  Noncontrast CT.  Axial Acqisition.  Sagittal and coronal reformations.    COMPARISON:  Head CT is compared to prior study of 2022    FINDINGS:  *  HEMORRHAGE:  No evidence of intracranial hemorrhage.  *  BRAIN PARENCHYMA:  Moderate atrophy. Mild periventricular white matter   ischemic changes. No mass or mass effect.  *  VENTRICLES / SHIFT:  No hydrocephalus. No midline shift.  *  EXTRA-AXIAL / BASAL CISTERNS:  No extra-axial mass. Basal cisterns   preserved.  Atherosclerotic calcifications of the cavernous internal   carotid arteries.  *  CALVARIUM AND EXTRACRANIAL SOFT TISSUES:  No depressed calvarial   fracture.  *  SINUSES, ORBITS, MASTOIDS:  The visualized paranasal sinuses and   mastoid air cells are well aerated.  ----------------------------------------    CTA TECHNIQUE:  CT angiography of the neck and brain was performed during the dynamic   administration of intravenous contrast.  MIP reconstructions were   performed and reviewed. Multiplanar reformations were obtained.  Contrast administered 60 cc Omnipaque 350, contrast discarded 10 cc    CTA FINDINGS:  NECK  RIGHT CAROTID CIRCULATION:  *  Evaluation of the right carotid circulation demonstrates no   hemodynamic significant narrowing utilizing a distal reference. Mild   calcified plaque carotid bulb/bifurcation  LEFT CAROTID CIRCULATION:  *  Evaluation of the left carotid circulation demonstrates no hemodynamic   significant narrowing utilizing a distal reference. Mild calcified plaque   carotid bulb/bifurcation  VERTEBRAL ARTERIES:  *  Evaluation of the vertebral arteries reveals no evidence of a   vertebral artery occlusion or dissection.    BRAIN  ANTERIOR CIRCULATION:  *  Distal internal carotid arteries are patent.  *  Anterior cerebral arteries are patent. Atherosclerotic calcification   of the cavernous segments without significant narrowing  *  Middle cerebral arteries are patent.  POSTERIOR CIRCULATION:  *  Distal vertebral arteries are patent.  *  Basilar artery is patent. Proximal superior cerebellar arteries are   patent  *  Posterior cerebral arteries are patent.    OTHER:  *  8 mm nodule right lung apex.  --------------------------------------------------    CT PERFUSION TECHNIQUE:  CT perfusion was performed during the administration of intravenous   contrast.  There was a total of 8 cm brain coverage.  The images were processed utilizing RAPID software.  Contrast administered 80 cc Omnipaque 350, contrast discarded 0 cc    Ischemic tissue is defined as TMAX > 6 seconds.  Core infarct is defined as CBF < 30%.    CT PERFUSION FINDINGS:  *  CT perfusion is uninterpretable due to motion and suboptimal contrast   bolus/timing.      IMPRESSION:  *  NO EVIDENCE OF AN ACUTE INTRACRANIAL HEMORRHAGE, MIDLINE SHIFT, OR   HYDROCEPHALUS. MODERATE ATROPHY WITH MILD PERIVENTRICULAR WHITE MATTER   ISCHEMIC CHANGES  *  NO HEMODYNAMIC SIGNIFICANT NARROWING WITHIN THE NECK.  *  NO PROXIMAL LARGE VESSEL OCCLUSION INTRACRANIALLY.  *  CT PERFUSION UNINTERPRETABLE DUE TO MOTION AND SUBOPTIMAL CONTRAST   BOLUS/TIMING..  *  8 MM RIGHT APICAL LUNG NODULE.    *  Interval follow-up if acute ischemia remains of clinical concern.    < end of copied text >  < from: CT Perfusion w/ Maps w/ IV Cont (22 @ 19:01) >  ACC: [id number] EXAM:  CT PERFUSION W MAPS IC                        ACC: [id number] EXAM:  CT ANGIO BRAIN (W)AW IC                        ACC: [id number] EXAM:  CT BRAIN STROKE PROTOCOL                        ACC: [id number] EXAM:  CT ANGIO NECK (W)AW IC                        PROCEDURE DATE:  2022          INTERPRETATION:  CT HEAD STROKE PROTOCOL, CT ANGIO NECK WITHOUT AND OR   WITH IV CONTRAST, CT ANGIO BRAIN WITHOUT AND OR WITH IV CONTRAST, CT   PERFUSION WITH MAPS WITH IV CONTRAST    CLINICAL HISTORY: Stroke Code    CT HEAD TECHNIQUE:  Noncontrast CT.  Axial Acqisition.  Sagittal and coronal reformations.    COMPARISON:  Head CT is compared to prior study of 2022    FINDINGS:  *  HEMORRHAGE:  No evidence of intracranial hemorrhage.  *  BRAIN PARENCHYMA:  Moderate atrophy. Mild periventricular white matter   ischemic changes. No mass or mass effect.  *  VENTRICLES / SHIFT:  No hydrocephalus. No midline shift.  *  EXTRA-AXIAL / BASAL CISTERNS:  No extra-axial mass. Basal cisterns   preserved.  Atherosclerotic calcifications of the cavernous internal   carotid arteries.  *  CALVARIUM AND EXTRACRANIAL SOFT TISSUES:  No depressed calvarial   fracture.  *  SINUSES, ORBITS, MASTOIDS:  The visualized paranasal sinuses and   mastoid air cells are well aerated.  ----------------------------------------    CTA TECHNIQUE:  CT angiography of the neck and brain was performed during the dynamic   administration of intravenous contrast.  MIP reconstructions were   performed and reviewed. Multiplanar reformations were obtained.  Contrast administered 60 cc Omnipaque 350, contrast discarded 10 cc    CTA FINDINGS:  NECK  RIGHT CAROTID CIRCULATION:  *  Evaluation of the right carotid circulation demonstrates no   hemodynamic significant narrowing utilizing a distal reference. Mild   calcified plaque carotid bulb/bifurcation  LEFT CAROTID CIRCULATION:  *  Evaluation of the left carotid circulation demonstrates no hemodynamic   significant narrowing utilizing a distal reference. Mild calcified plaque   carotid bulb/bifurcation  VERTEBRAL ARTERIES:  *  Evaluation of the vertebral arteries reveals no evidence of a   vertebral artery occlusion or dissection.    BRAIN  ANTERIOR CIRCULATION:  *  Distal internal carotid arteries are patent.  *  Anterior cerebral arteries are patent. Atherosclerotic calcification   of the cavernous segments without significant narrowing  *  Middle cerebral arteries are patent.  POSTERIOR CIRCULATION:  *  Distal vertebral arteries are patent.  *  Basilar artery is patent. Proximal superior cerebellar arteries are   patent  *  Posterior cerebral arteries are patent.    OTHER:  *  8 mm nodule right lung apex.  --------------------------------------------------    CT PERFUSION TECHNIQUE:  CT perfusion was performed during the administration of intravenous   contrast.  There was a total of 8 cm brain coverage.  The images were processed utilizing RAPID software.  Contrast administered 80 cc Omnipaque 350, contrast discarded 0 cc    Ischemic tissue is defined as TMAX > 6 seconds.  Core infarct is defined as CBF < 30%.    CT PERFUSION FINDINGS:  *  CT perfusion is uninterpretable due to motion and suboptimal contrast   bolus/timing.      IMPRESSION:  *  NO EVIDENCE OF AN ACUTE INTRACRANIAL HEMORRHAGE, MIDLINE SHIFT, OR   HYDROCEPHALUS. MODERATE ATROPHY WITH MILD PERIVENTRICULAR WHITE MATTER   ISCHEMIC CHANGES  *  NO HEMODYNAMIC SIGNIFICANT NARROWING WITHIN THE NECK.  *  NO PROXIMAL LARGE VESSEL OCCLUSION INTRACRANIALLY.  *  CT PERFUSION UNINTERPRETABLE DUE TO MOTION AND SUBOPTIMAL CONTRAST   BOLUS/TIMING..  *  8 MM RIGHT APICAL LUNG NODULE.    *  Interval follow-up if acute ischemia remains of clinical concern.    Preliminary head CT findings discussed with Dr. Dennis at 6:48 PM on   2022    < end of copied text >  < from: CT Brain Stroke Protocol (22 @ 18:46) >      IMPRESSION:  *  NO EVIDENCE OF AN ACUTE INTRACRANIAL HEMORRHAGE, MIDLINE SHIFT, OR   HYDROCEPHALUS. MODERATE ATROPHY WITH MILD PERIVENTRICULAR WHITE MATTER   ISCHEMIC CHANGES  *  NO HEMODYNAMIC SIGNIFICANT NARROWING WITHIN THE NECK.  *  NO PROXIMAL LARGE VESSEL OCCLUSION INTRACRANIALLY.  *  CT PERFUSION UNINTERPRETABLE DUE TO MOTION AND SUBOPTIMAL CONTRAST   BOLUS/TIMING..  *  8 MM RIGHT APICAL LUNG NODULE.    *  Interval follow-up if acute ischemia remains of clinical concern.    < end of copied text >  < from: TTE Echo Complete w/o Contrast w/ Doppler (21 @ 10:57) >  MEASUREMENTS  IVS: 0.9cm  PWT: 1.0cm  LA: 4.1cm  AO: 3.9cm  AV Cusp Separation: cm  LVIDd: 4.4cm  LVIDs: 2.7cm  LVOT: cm    LVEF: 55%  RVSP: mmHg    FINDINGS  Left Ventricle: Left ventricle was normal size and normal LV systolic function EF 55  Aortic Valve: Aortic valve is mildly calcified with peak gradient 18 mmHg and aortic valve area of 1.2 sq cm consistent with mild aortic stenosis  Mitral Valve: Mitral valve calcifications with mild mitral regurgitation  Tricuspid Valve: Mild tricuspid regurgitation  Pulmonic Valve: Normal  Left Atrium: Mild left atrial enlargement  Right Ventricle: Normal  Right Atrium: Normal  Diastolic Function: Normal  Pericardium/Pleura: Normal      IMPRESSION:  Supine study and limited views  Left ventricle was of normal size and normal LV systolic function EF 55%  Mild aortic stenosis with peak gradient 18 mmHg and aortic valve area of 1.2 sq cm  Mitral annular calcifications with mild mitral regurgitation  Mild tricuspid regurgitation  Mild left atrial enlargement  Pacemaker seen in the right chambers    < end of copied text >     reviewed elctronically  ASSESSMENT/PLAN: 	  60minutes spent on this visit, 50% visit time spent in care co-ordination with other attendings and counselling patient ,writing admission orders ( see complete and current orders and order section) ,requesting necessary consults ,informing family about status & plan of care .I have discussed care plan with [facility] wellness/admitting /nursing   department ,outpatient PCP , hospital consultants , ER physician & med staff .

## 2022-04-03 NOTE — ED ADULT NURSE REASSESSMENT NOTE - NS ED NURSE REASSESS COMMENT FT1
Admission ordered given to patient. Patient is ready to move. Vital signs stable. No distress noted.

## 2022-04-03 NOTE — PROGRESS NOTE ADULT - SUBJECTIVE AND OBJECTIVE BOX
Date/Time Patient Seen:  		  Referring MD:   Data Reviewed	       Patient is a 89y old  Male who presents with a chief complaint of sob (02 Apr 2022 23:53)      Subjective/HPI     PAST MEDICAL & SURGICAL HISTORY:  Atrial fibrillation    Hypertension    Diabetes    Prostate ca    Dementia    CHF (congestive heart failure)    Hyperlipemia    Diabetes    Depression    Dementia    No pertinent past medical history    DM (diabetes mellitus)    Atrial fibrillation    Prostate CA    Arteriosclerotic heart disease (ASHD)    Dementia    Anemia    Constipation    No significant past surgical history    No significant past surgical history          Medication list         MEDICATIONS  (STANDING):  aspirin enteric coated 81 milliGRAM(s) Oral daily  dextrose 5% + sodium chloride 0.45%. 1000 milliLiter(s) (50 mL/Hr) IV Continuous <Continuous>  donepezil 5 milliGRAM(s) Oral at bedtime  heparin   Injectable 5000 Unit(s) SubCutaneous every 8 hours  metoprolol tartrate 25 milliGRAM(s) Oral two times a day  pantoprazole    Tablet 40 milliGRAM(s) Oral daily  simvastatin 20 milliGRAM(s) Oral at bedtime  tamsulosin 0.4 milliGRAM(s) Oral at bedtime    MEDICATIONS  (PRN):  acetaminophen     Tablet .. 650 milliGRAM(s) Oral every 6 hours PRN Temp greater or equal to 38C (100.4F), Mild Pain (1 - 3)         Vitals log        ICU Vital Signs Last 24 Hrs  T(C): 36.3 (03 Apr 2022 04:33), Max: 36.7 (02 Apr 2022 23:55)  T(F): 97.3 (03 Apr 2022 04:33), Max: 98.1 (02 Apr 2022 23:55)  HR: 61 (03 Apr 2022 04:33) (61 - 85)  BP: 109/62 (03 Apr 2022 04:33) (106/78 - 115/69)  BP(mean): --  ABP: --  ABP(mean): --  RR: 18 (03 Apr 2022 04:33) (16 - 20)  SpO2: 94% (03 Apr 2022 04:33) (94% - 100%)           Input and Output:  I&O's Detail      Lab Data                        12.3   8.34  )-----------( Clumped    ( 02 Apr 2022 18:20 )             36.2     04-02    137  |  103  |  24<H>  ----------------------------<  131<H>  4.2   |  28  |  0.59    Ca    8.1<L>      02 Apr 2022 19:31    TPro  5.8<L>  /  Alb  2.7<L>  /  TBili  0.3  /  DBili  x   /  AST  13<L>  /  ALT  20  /  AlkPhos  79  04-02      CARDIAC MARKERS ( 02 Apr 2022 19:31 )  x     / x     / 45 U/L / x     / 1.2 ng/mL        Review of Systems	      Objective     Physical Examination    heart s1s2  lung dec BS  abd soft  head nc      Pertinent Lab findings & Imaging      [place]:  NO   Adequate UO     I&O's Detail           Discussed with:     Cultures:	        Radiology

## 2022-04-04 LAB
A1C WITH ESTIMATED AVERAGE GLUCOSE RESULT: 5.9 % — HIGH (ref 4–5.6)
ANION GAP SERPL CALC-SCNC: 6 MMOL/L — SIGNIFICANT CHANGE UP (ref 5–17)
BUN SERPL-MCNC: 14 MG/DL — SIGNIFICANT CHANGE UP (ref 7–23)
CALCIUM SERPL-MCNC: 8.9 MG/DL — SIGNIFICANT CHANGE UP (ref 8.5–10.1)
CHLORIDE SERPL-SCNC: 104 MMOL/L — SIGNIFICANT CHANGE UP (ref 96–108)
CO2 SERPL-SCNC: 29 MMOL/L — SIGNIFICANT CHANGE UP (ref 22–31)
CREAT SERPL-MCNC: 0.62 MG/DL — SIGNIFICANT CHANGE UP (ref 0.5–1.3)
CULTURE RESULTS: SIGNIFICANT CHANGE UP
EGFR: 88 ML/MIN/1.73M2 — SIGNIFICANT CHANGE UP
ESTIMATED AVERAGE GLUCOSE: 123 MG/DL — HIGH (ref 68–114)
GLUCOSE SERPL-MCNC: 109 MG/DL — HIGH (ref 70–99)
HCT VFR BLD CALC: 33.3 % — LOW (ref 39–50)
HGB BLD-MCNC: 11.1 G/DL — LOW (ref 13–17)
MCHC RBC-ENTMCNC: 31.6 PG — SIGNIFICANT CHANGE UP (ref 27–34)
MCHC RBC-ENTMCNC: 33.3 GM/DL — SIGNIFICANT CHANGE UP (ref 32–36)
MCV RBC AUTO: 94.9 FL — SIGNIFICANT CHANGE UP (ref 80–100)
NRBC # BLD: 0 /100 WBCS — SIGNIFICANT CHANGE UP (ref 0–0)
PLATELET # BLD AUTO: 236 K/UL — SIGNIFICANT CHANGE UP (ref 150–400)
POTASSIUM SERPL-MCNC: 4.2 MMOL/L — SIGNIFICANT CHANGE UP (ref 3.5–5.3)
POTASSIUM SERPL-SCNC: 4.2 MMOL/L — SIGNIFICANT CHANGE UP (ref 3.5–5.3)
RBC # BLD: 3.51 M/UL — LOW (ref 4.2–5.8)
RBC # FLD: 13.2 % — SIGNIFICANT CHANGE UP (ref 10.3–14.5)
SODIUM SERPL-SCNC: 139 MMOL/L — SIGNIFICANT CHANGE UP (ref 135–145)
SPECIMEN SOURCE: SIGNIFICANT CHANGE UP
WBC # BLD: 5.72 K/UL — SIGNIFICANT CHANGE UP (ref 3.8–10.5)
WBC # FLD AUTO: 5.72 K/UL — SIGNIFICANT CHANGE UP (ref 3.8–10.5)

## 2022-04-04 RX ORDER — CEFPODOXIME PROXETIL 100 MG
200 TABLET ORAL EVERY 12 HOURS
Refills: 0 | Status: DISCONTINUED | OUTPATIENT
Start: 2022-04-05 | End: 2022-04-05

## 2022-04-04 RX ADMIN — Medication 81 MILLIGRAM(S): at 12:16

## 2022-04-04 RX ADMIN — Medication 1 TABLET(S): at 17:56

## 2022-04-04 RX ADMIN — Medication 1: at 12:17

## 2022-04-04 RX ADMIN — HEPARIN SODIUM 5000 UNIT(S): 5000 INJECTION INTRAVENOUS; SUBCUTANEOUS at 21:21

## 2022-04-04 RX ADMIN — SENNA PLUS 2 TABLET(S): 8.6 TABLET ORAL at 21:21

## 2022-04-04 RX ADMIN — Medication 25 MILLIGRAM(S): at 17:56

## 2022-04-04 RX ADMIN — SIMVASTATIN 20 MILLIGRAM(S): 20 TABLET, FILM COATED ORAL at 21:21

## 2022-04-04 RX ADMIN — Medication 325 MILLIGRAM(S): at 12:16

## 2022-04-04 RX ADMIN — CEFTRIAXONE 100 MILLIGRAM(S): 500 INJECTION, POWDER, FOR SOLUTION INTRAMUSCULAR; INTRAVENOUS at 15:33

## 2022-04-04 RX ADMIN — HEPARIN SODIUM 5000 UNIT(S): 5000 INJECTION INTRAVENOUS; SUBCUTANEOUS at 05:26

## 2022-04-04 RX ADMIN — DONEPEZIL HYDROCHLORIDE 5 MILLIGRAM(S): 10 TABLET, FILM COATED ORAL at 21:21

## 2022-04-04 RX ADMIN — PANTOPRAZOLE SODIUM 40 MILLIGRAM(S): 20 TABLET, DELAYED RELEASE ORAL at 12:16

## 2022-04-04 RX ADMIN — Medication 1 TABLET(S): at 05:26

## 2022-04-04 RX ADMIN — Medication 1 TABLET(S): at 12:17

## 2022-04-04 RX ADMIN — POLYETHYLENE GLYCOL 3350 17 GRAM(S): 17 POWDER, FOR SOLUTION ORAL at 12:16

## 2022-04-04 RX ADMIN — Medication 3 MILLIGRAM(S): at 21:21

## 2022-04-04 RX ADMIN — LOSARTAN POTASSIUM 25 MILLIGRAM(S): 100 TABLET, FILM COATED ORAL at 05:26

## 2022-04-04 RX ADMIN — Medication 25 MILLIGRAM(S): at 05:26

## 2022-04-04 RX ADMIN — HEPARIN SODIUM 5000 UNIT(S): 5000 INJECTION INTRAVENOUS; SUBCUTANEOUS at 15:33

## 2022-04-04 RX ADMIN — TAMSULOSIN HYDROCHLORIDE 0.4 MILLIGRAM(S): 0.4 CAPSULE ORAL at 21:21

## 2022-04-04 NOTE — PROGRESS NOTE ADULT - SUBJECTIVE AND OBJECTIVE BOX
Date/Time Patient Seen:  		  Referring MD:   Data Reviewed	       Patient is a 89y old  Male who presents with a chief complaint of Pt is a 89 yo male hx dementia, afib on asa, dm. pt sent from [facility] per nurse and ems report after being found at 2pm to be unresponsive. (03 Apr 2022 17:18)      Subjective/HPI     PAST MEDICAL & SURGICAL HISTORY:  Atrial fibrillation    Hypertension    Diabetes    Prostate ca    Dementia    CHF (congestive heart failure)    Hyperlipemia    Diabetes    Depression    Dementia    No pertinent past medical history    DM (diabetes mellitus)    Atrial fibrillation    Prostate CA    Arteriosclerotic heart disease (ASHD)    Dementia    Anemia    Constipation    No significant past surgical history    No significant past surgical history          Medication list         MEDICATIONS  (STANDING):  aspirin enteric coated 81 milliGRAM(s) Oral daily  cefTRIAXone   IVPB 1000 milliGRAM(s) IV Intermittent every 24 hours  dextrose 5% + sodium chloride 0.45%. 1000 milliLiter(s) (50 mL/Hr) IV Continuous <Continuous>  dextrose 5%. 1000 milliLiter(s) (50 mL/Hr) IV Continuous <Continuous>  dextrose 5%. 1000 milliLiter(s) (100 mL/Hr) IV Continuous <Continuous>  dextrose 50% Injectable 25 Gram(s) IV Push once  dextrose 50% Injectable 12.5 Gram(s) IV Push once  dextrose 50% Injectable 25 Gram(s) IV Push once  donepezil 5 milliGRAM(s) Oral at bedtime  ferrous    sulfate 325 milliGRAM(s) Oral daily  glucagon  Injectable 1 milliGRAM(s) IntraMuscular once  heparin   Injectable 5000 Unit(s) SubCutaneous every 8 hours  insulin lispro (ADMELOG) corrective regimen sliding scale   SubCutaneous three times a day before meals  lactobacillus acidophilus 1 Tablet(s) Oral two times a day  losartan 25 milliGRAM(s) Oral daily  metoprolol tartrate 25 milliGRAM(s) Oral two times a day  multivitamin/minerals 1 Tablet(s) Oral daily  pantoprazole    Tablet 40 milliGRAM(s) Oral daily  polyethylene glycol 3350 17 Gram(s) Oral daily  senna 2 Tablet(s) Oral at bedtime  simvastatin 20 milliGRAM(s) Oral at bedtime  tamsulosin 0.4 milliGRAM(s) Oral at bedtime    MEDICATIONS  (PRN):  acetaminophen     Tablet .. 650 milliGRAM(s) Oral every 6 hours PRN Temp greater or equal to 38C (100.4F), Mild Pain (1 - 3)  aluminum hydroxide/magnesium hydroxide/simethicone Suspension 30 milliLiter(s) Oral every 4 hours PRN Dyspepsia  dextrose Oral Gel 15 Gram(s) Oral once PRN Blood Glucose LESS THAN 70 milliGRAM(s)/deciliter  melatonin 3 milliGRAM(s) Oral at bedtime PRN Insomnia  ondansetron Injectable 4 milliGRAM(s) IV Push every 8 hours PRN Nausea and/or Vomiting         Vitals log        ICU Vital Signs Last 24 Hrs  T(C): 36.6 (04 Apr 2022 05:10), Max: 37 (03 Apr 2022 13:32)  T(F): 97.9 (04 Apr 2022 05:10), Max: 98.6 (03 Apr 2022 13:32)  HR: 73 (04 Apr 2022 05:10) (60 - 73)  BP: 125/69 (04 Apr 2022 05:10) (95/38 - 125/72)  BP(mean): --  ABP: --  ABP(mean): --  RR: 18 (04 Apr 2022 05:10) (17 - 18)  SpO2: 96% (04 Apr 2022 05:10) (93% - 96%)           Input and Output:  I&O's Detail    03 Apr 2022 07:01  -  04 Apr 2022 05:44  --------------------------------------------------------  IN:    Oral Fluid: 460 mL  Total IN: 460 mL    OUT:    Incontinent per Condom Catheter (mL): 1200 mL  Total OUT: 1200 mL    Total NET: -740 mL          Lab Data                        11.0   6.72  )-----------( 220      ( 03 Apr 2022 07:02 )             32.3     04-03    139  |  104  |  18  ----------------------------<  106<H>  3.9   |  29  |  0.49<L>    Ca    8.7      03 Apr 2022 07:02  Phos  2.3     04-03  Mg     1.8     04-03    TPro  5.8<L>  /  Alb  2.7<L>  /  TBili  0.3  /  DBili  x   /  AST  13<L>  /  ALT  20  /  AlkPhos  79  04-02      CARDIAC MARKERS ( 02 Apr 2022 19:31 )  x     / x     / 45 U/L / x     / 1.2 ng/mL        Review of Systems	      Objective     Physical Examination  heart s1s2  lung dec BS  abd soft        Pertinent Lab findings & Imaging      [place]:  NO   Adequate UO     I&O's Detail    03 Apr 2022 07:01  -  04 Apr 2022 05:44  --------------------------------------------------------  IN:    Oral Fluid: 460 mL  Total IN: 460 mL    OUT:    Incontinent per Condom Catheter (mL): 1200 mL  Total OUT: 1200 mL    Total NET: -740 mL               Discussed with:     Cultures:	        Radiology

## 2022-04-04 NOTE — SWALLOW BEDSIDE ASSESSMENT ADULT - SWALLOW EVAL: RECOMMENDED DIET
minced & moist and thin liquids, aspiration precautions and full assistance during meals, as tolerated

## 2022-04-04 NOTE — SWALLOW BEDSIDE ASSESSMENT ADULT - COMMENTS
Consult received and chart reviewed. Patient seen at bedside this AM for initial assessment of swallow function, at which time he was alert, cooperative, and denied pain. Patient unable to follow low level commands, despite max cues. Vocal quality WFL.     Per charting, the patient is a "89 yo male hx dementia, afib on asa, dm. pt sent from [facility] per nurse and ems report after being found at 2pm to be unresponsive. pt sent to er. pt found in bed,  last known well unknown at present.  spoke with [facility], states pt normally answers minimal questions, can ambulate slowly with cane. today last seen fully well at breakfast and at noon was at [employer] eating lunch, but noone spoke to pt to see if he was at baseline of answering questions.  at 2pm pt with eyes closed and wouldn't answer questions. pt assisted by 2 people to office and was weak on feet,  ems called. Found to have UTI "    WBC WFL.  CXR 4/2/22 revealed "A cardiac device overlies and obscures the left hemithorax with lead in place. HEART: Enlarged LUNGS: free of consolidation,effusion, or pneumothorax. BONES: degenerative changes"  CT Head revealed "* NO EVIDENCE OF AN ACUTE INTRACRANIAL HEMORRHAGE, MIDLINE SHIFT, OR HYDROCEPHALUS. MODERATE ATROPHY WITH MILD PERIVENTRICULAR WHITE MATTER ISCHEMIC CHANGES" Consult received and chart reviewed. Patient seen at bedside this AM for initial assessment of swallow function, at which time he was alert, cooperative, and denied pain. Patient unable to follow low level commands, despite max cues. Vocal quality WFL. As per discussion with nursing staff, patient with good PO intake this AM and had no difficulty chewing or swallowing.     Per charting, the patient is a "89 yo male hx dementia, afib on asa, dm. pt sent from [facility] per nurse and ems report after being found at 2pm to be unresponsive. pt sent to er. pt found in bed,  last known well unknown at present.  spoke with [facility], states pt normally answers minimal questions, can ambulate slowly with cane. today last seen fully well at breakfast and at noon was at [employer] eating lunch, but noone spoke to pt to see if he was at baseline of answering questions.  at 2pm pt with eyes closed and wouldn't answer questions. pt assisted by 2 people to office and was weak on feet,  ems called. Found to have UTI "    WBC WFL.  CXR 4/2/22 revealed "A cardiac device overlies and obscures the left hemithorax with lead in place. HEART: Enlarged LUNGS: free of consolidation,effusion, or pneumothorax. BONES: degenerative changes"  CT Head revealed "* NO EVIDENCE OF AN ACUTE INTRACRANIAL HEMORRHAGE, MIDLINE SHIFT, OR HYDROCEPHALUS. MODERATE ATROPHY WITH MILD PERIVENTRICULAR WHITE MATTER ISCHEMIC CHANGES"    Patient familiar to this department from previous admissions and was seen on 1/26/22, at which time a pureed and thin liquid diet level was recommended (see report for details).    Discussed results and recommendations with the patient, RN, and call out to MD.

## 2022-04-04 NOTE — PHYSICAL THERAPY INITIAL EVALUATION ADULT - IMPAIRED TRANSFERS: SIT/STAND, REHAB EVAL
impaired balance/cognition/impaired coordination/narrow base of support/impaired postural control/decreased ROM/decreased strength

## 2022-04-04 NOTE — SWALLOW BEDSIDE ASSESSMENT ADULT - ADDITIONAL RECOMMENDATIONS
This department will continue to follow the patient for diet tolerance during this admission, as schedule permits

## 2022-04-04 NOTE — SWALLOW BEDSIDE ASSESSMENT ADULT - ASR SWALLOW RECOMMEND DIAG
objective testing is not warranted d/t no overt signs on least restrictive/baseline diet level; will continue to follow at bedside

## 2022-04-04 NOTE — PHYSICAL THERAPY INITIAL EVALUATION ADULT - PERTINENT HX OF CURRENT PROBLEM, REHAB EVAL
Pt is a 89 yo male hx dementia, afib on asa, dm. pt sent from [facility] per nurse and ems report after being found at 2pm to be unresponsive. pt sent to er. pt found in bed,  last known well unknown at present.  spoke with [facility], states pt normally answers minimal questions, can ambulate slowly with cane. today last seen fully well at breakfast and at noon was at [employer] eating lunch, but noone spoke to pt to see if he was at baseline of answering questions.

## 2022-04-04 NOTE — PROGRESS NOTE ADULT - SUBJECTIVE AND OBJECTIVE BOX
PROGRESS NOTE  Patient is a 89y old  Male who presents with a chief complaint of Pt is a 89 yo male hx dementia, afib on asa, dm. [facility] per nurse and ems report after being found at 2pm to be unresponsive. pt sent to er. pt found in bed,  last known well unknown at present.  spoke with [facility], states pt normally answers minimal questions, can ambulate slowly with cane. today last seen fully well at breakfast and at noon was at [employer] eating lunch, but noone spoke to pt to see if he was at baseline of answering questions.  at 2pm pt with eyes closed and wouldn't answer questions. pt assisted by 2 people to office and was weak on feet,  ems called. Found to have UTI Admitted for septic workup and evaluation,send blood and urine cx,serial lactate levels,monitor vitals closley,ivfs hydration,monitor urine output and renal profile,iv abx initiated Seen by cardiologist and neurology consult requested .CHARISSE biggs called ,started on ceftriaxone .CTH negative for ACUTE CVA . (2022 05:43)      OVERNIGHT      HPI:  Pt is a 89 yo male hx dementia, afib on asa, dm. [facility] nurse and ems report after being found at 2pm to be unresponsive. pt sent to er. pt found in bed,  last known well unknown at present.  spoke with [facility], states pt normally answers minimal questions, can ambulate slowly with cane. today last seen fully well at breakfast and at noon was at [employer] eating lunch, but noone spoke to pt to see if he was at baseline of answering questions.  at 2pm pt with eyes closed and wouldn't answer questions. pt assisted by 2 people to office and was weak on feet,  ems called. Found to have UTI Admitted for septic workup and evaluation,send blood and urine cx,serial lactate levels,monitor vitals closley,ivfs hydration,monitor urine output and renal profile,iv abx initiated Seen by cardiologist and neurology consult requested .CHARISSE biggs called ,started on ceftriaxone .CTH negative for ACUTE CVA .     PAST MEDICAL & SURGICAL HISTORY:  Atrial fibrillation  Hypertension    Diabetes    Prostate ca    Dementia    CHF (congestive heart failure)    Hyperlipemia    Diabetes    Depression    Dementia    DM (diabetes mellitus)    Atrial fibrillation    Prostate CA    Arteriosclerotic heart disease (ASHD)    Dementia    Anemia    Constipation  PACEMAKER   No significant past surgical history  < from: CT Brain Stroke Protocol (22 @ 18:46) >    ACC: [id number] EXAM:  CT PERFUSION W MAPS IC                        ACC: [id number] EXAM:  CT ANGIO BRAIN (W)AW IC                        ACC: [id number] EXAM:  CT BRAIN STROKE PROTOCOL                        ACC: [id number] EXAM:  CT ANGIO NECK (W) IC                        PROCEDURE DATE:  2022          INTERPRETATION:  CT HEAD STROKE PROTOCOL, CT ANGIO NECK WITHOUT AND OR   WITH IV CONTRAST, CT ANGIO BRAIN WITHOUT AND OR WITH IV CONTRAST, CT   PERFUSION WITH MAPS WITH IV CONTRAST    CLINICAL HISTORY: Stroke Code    CT HEAD TECHNIQUE:  Noncontrast CT.  Axial Acqisition.  Sagittal and coronal reformations.    COMPARISON:  Head CT is compared to prior study of 2022    FINDINGS:  *  HEMORRHAGE:  No evidence of intracranial hemorrhage.  *  BRAIN PARENCHYMA:  Moderate atrophy. Mild periventricular white matter   ischemic changes. No mass or mass effect.  *  VENTRICLES / SHIFT:  No hydrocephalus. No midline shift.  *  EXTRA-AXIAL / BASAL CISTERNS:  No extra-axial mass. Basal cisterns   preserved.  Atherosclerotic calcifications of the cavernous internal   carotid arteries.  *  CALVARIUM AND EXTRACRANIAL SOFT TISSUES:  No depressed calvarial   fracture.  *  SINUSES, ORBITS, MASTOIDS:  The visualized paranasal sinuses and   mastoid air cells are well aerated.  ----------------------------------------    CTA TECHNIQUE:  CT angiography of the neck and brain was performed during the dynamic   administration of intravenous contrast.  MIP reconstructions were   performed and reviewed. Multiplanar reformations were obtained.  Contrast administered 60 cc Omnipaque 350, contrast discarded 10 cc    CTA FINDINGS:  NECK  RIGHT CAROTID CIRCULATION:  *  Evaluation of the right carotid circulation demonstrates no   hemodynamic significant narrowing utilizing a distal reference. Mild   calcified plaque carotid bulb/bifurcation  LEFT CAROTID CIRCULATION:  *  Evaluation of the left carotid circulation demonstrates no hemodynamic   significant narrowing utilizing a distal reference. Mild calcified plaque   carotid bulb/bifurcation  VERTEBRAL ARTERIES:  *  Evaluation of the vertebral arteries reveals no evidence of a   vertebral artery occlusion or dissection.    BRAIN  ANTERIOR CIRCULATION:  *  Distal internal carotid arteries are patent.  *  Anterior cerebral arteries are patent. Atherosclerotic calcification   of the cavernous segments without significant narrowing  *  Middle cerebral arteries are patent.  POSTERIOR CIRCULATION:  *  Distal vertebral arteries are patent.  *  Basilar artery is patent. Proximal superior cerebellar arteries are   patent  *  Posterior cerebral arteries are patent.    OTHER:  *  8 mm nodule right lung apex.  --------------------------------------------------    CT PERFUSION TECHNIQUE:  CT perfusion was performed during the administration of intravenous   contrast.  There was a total of 8 cm brain coverage.  The images were processed utilizing RAPID software.  Contrast administered 80 cc Omnipaque 350, contrast discarded 0 cc    Ischemic tissue is defined as TMAX > 6 seconds.  Core infarct is defined as CBF < 30%.    CT PERFUSION FINDINGS:  *  CT perfusion is uninterpretable due to motion and suboptimal contrast   bolus/timing.      IMPRESSION:  *  NO EVIDENCE OF AN ACUTE INTRACRANIAL HEMORRHAGE, MIDLINE SHIFT, OR   HYDROCEPHALUS. MODERATE ATROPHY WITH MILD PERIVENTRICULAR WHITE MATTER   ISCHEMIC CHANGES  *  NO HEMODYNAMIC SIGNIFICANT NARROWING WITHIN THE NECK.  *  NO PROXIMAL LARGE VESSEL OCCLUSION INTRACRANIALLY.  *  CT PERFUSION UNINTERPRETABLE DUE TO MOTION AND SUBOPTIMAL CONTRAST   BOLUS/TIMING..  *  8 MM RIGHT APICAL LUNG NODULE.    *  Interval follow-up if acute ischemia remains of clinical concern.    Preliminary head CT findings discussed with Dr. Dennis at 6:48 PM on   2022    --- End of Report ---       (2022 19:45)    PAST MEDICAL & SURGICAL HISTORY:  Atrial fibrillation    Hypertension    Diabetes    Prostate ca    Dementia    CHF (congestive heart failure)    Hyperlipemia    Diabetes    Depression    Dementia    DM (diabetes mellitus)    Atrial fibrillation    Prostate CA    Arteriosclerotic heart disease (ASHD)    Dementia    Anemia    Constipation    No significant past surgical history        MEDICATIONS  (STANDING):  aspirin enteric coated 81 milliGRAM(s) Oral daily  cefTRIAXone   IVPB 1000 milliGRAM(s) IV Intermittent every 24 hours  dextrose 5% + sodium chloride 0.45%. 1000 milliLiter(s) (50 mL/Hr) IV Continuous <Continuous>  dextrose 5%. 1000 milliLiter(s) (50 mL/Hr) IV Continuous <Continuous>  dextrose 5%. 1000 milliLiter(s) (100 mL/Hr) IV Continuous <Continuous>  dextrose 50% Injectable 25 Gram(s) IV Push once  dextrose 50% Injectable 12.5 Gram(s) IV Push once  dextrose 50% Injectable 25 Gram(s) IV Push once  donepezil 5 milliGRAM(s) Oral at bedtime  ferrous    sulfate 325 milliGRAM(s) Oral daily  glucagon  Injectable 1 milliGRAM(s) IntraMuscular once  heparin   Injectable 5000 Unit(s) SubCutaneous every 8 hours  insulin lispro (ADMELOG) corrective regimen sliding scale   SubCutaneous three times a day before meals  lactobacillus acidophilus 1 Tablet(s) Oral two times a day  losartan 25 milliGRAM(s) Oral daily  metoprolol tartrate 25 milliGRAM(s) Oral two times a day  multivitamin/minerals 1 Tablet(s) Oral daily  pantoprazole    Tablet 40 milliGRAM(s) Oral daily  polyethylene glycol 3350 17 Gram(s) Oral daily  senna 2 Tablet(s) Oral at bedtime  simvastatin 20 milliGRAM(s) Oral at bedtime  tamsulosin 0.4 milliGRAM(s) Oral at bedtime    MEDICATIONS  (PRN):  acetaminophen     Tablet .. 650 milliGRAM(s) Oral every 6 hours PRN Temp greater or equal to 38C (100.4F), Mild Pain (1 - 3)  aluminum hydroxide/magnesium hydroxide/simethicone Suspension 30 milliLiter(s) Oral every 4 hours PRN Dyspepsia  dextrose Oral Gel 15 Gram(s) Oral once PRN Blood Glucose LESS THAN 70 milliGRAM(s)/deciliter  melatonin 3 milliGRAM(s) Oral at bedtime PRN Insomnia  ondansetron Injectable 4 milliGRAM(s) IV Push every 8 hours PRN Nausea and/or Vomiting      OBJECTIVE    T(C): 36.6 (22 @ 05:10), Max: 37 (22 @ 13:32)  HR: 73 (22 @ 05:10) (60 - 73)  BP: 125/69 (22 @ 05:10) (95/38 - 125/72)  RR: 18 (22 @ 05:10) (17 - 18)  SpO2: 96% (22 @ 05:10) (93% - 96%)  Wt(kg): --  I&O's Summary    2022 07:01  -  2022 07:00  --------------------------------------------------------  IN: 460 mL / OUT: 1351 mL / NET: -891 mL          REVIEW OF SYSTEMS:  CONSTITUTIONAL: No fever, weight loss, or fatigue  EYES: No eye pain, visual disturbances, or discharge  ENMT:   No sinus or throat pain  NECK: No pain or stiffness  RESPIRATORY: No cough, wheezing, chills or hemoptysis; No shortness of breath  CARDIOVASCULAR: No chest pain, palpitations, dizziness, or leg swelling  GASTROINTESTINAL: No abdominal pain. No nausea, vomiting; No diarrhea or constipation. No melena or hematochezia.  GENITOURINARY: No dysuria, frequency, hematuria, or incontinence  NEUROLOGICAL: No headaches, memory loss, loss of strength, numbness, or tremors  SKIN: No itching, burning, rashes, or lesions   MUSCULOSKELETAL: No joint pain or swelling; No muscle, back, or extremity pain    PHYSICAL EXAM:  Appearance: NAD. VS past 24 hrs -as above   HEENT:   Moist oral mucosa. Conjunctiva clear b/l.   Neck : supple  Respiratory: Lungs CTAB.  Gastrointestinal:  Soft, nontender. No rebound. No rigidity. BS present	  Cardiovascular: RRR ,S1S2 present  Neurologic: Non-focal. Moving all extremities.  Extremities: No edema. No erythema. No calf tenderness.  Skin: No rashes, No ecchymoses, No cyanosis.	  wounds ,skin lesions-See skin assesment flow sheet   LABS:                        11.1   5.72  )-----------( 236      ( 2022 07:21 )             33.3     04-    139  |  104  |  14  ----------------------------<  109<H>  4.2   |  29  |  0.62    Ca    8.9      2022 07:21  Phos  2.3     04-03  Mg     1.8     04-03    TPro  5.8<L>  /  Alb  2.7<L>  /  TBili  0.3  /  DBili  x   /  AST  13<L>  /  ALT  20  /  AlkPhos  79  04-02    CAPILLARY BLOOD GLUCOSE      POCT Blood Glucose.: 179 mg/dL (2022 11:19)  POCT Blood Glucose.: 115 mg/dL (2022 08:00)  POCT Blood Glucose.: 196 mg/dL (2022 21:37)  POCT Blood Glucose.: 150 mg/dL (2022 16:36)    PT/INR - ( 2022 18:20 )   PT: 12.4 sec;   INR: 1.06 ratio         PTT - ( 2022 18:20 )  PTT:29.7 sec  Urinalysis Basic - ( 2022 12:46 )    Color: Light Yellow / Appearance: Clear / S.020 / pH: x  Gluc: x / Ketone: Negative  / Bili: Negative / Urobili: <2 mg/dL   Blood: x / Protein: Negative / Nitrite: Negative   Leuk Esterase: Large / RBC: 25 /HPF / WBC 37 /HPF   Sq Epi: x / Non Sq Epi: 2 /HPF / Bacteria: Negative        Culture - Blood (collected 2022 00:27)  Source: .Blood Blood-Peripheral  Preliminary Report (2022 01:01):    No growth to date.    Culture - Blood (collected 2022 00:27)  Source: .Blood Blood-Peripheral  Preliminary Report (2022 01:01):    No growth to date.      RADIOLOGY & ADDITIONAL TESTS:   reviewed elctronically  ASSESSMENT/PLAN: 	     [employer] PROGRESS NOTE  Patient is a 89y old  Male who presents with a chief complaint of Pt is a 89 yo male hx dementia, afib on asa, dm. [facility] and ems report after being found at 2pm to be unresponsive. pt sent to er. pt found in bed,  last known well unknown at present.  spoke with [employer], states pt normally answers minimal questions, can ambulate slowly with cane. today last seen fully well at breakfast and at noon was at [employer] eating lunch, but noone spoke to pt to see if he was at baseline of answering questions.  at 2pm pt with eyes closed and wouldn't answer questions. pt assisted by 2 people to office and was weak on feet,  ems called. Found to have UTI Admitted for septic workup and evaluation,send blood and urine cx,serial lactate levels,monitor vitals closley,ivfs hydration,monitor urine output and renal profile,iv abx initiated Seen by cardiologist and neurology consult requested .ID kalyan called ,started on ceftriaxone .CTH negative for ACUTE CVA . (2022 05:43)  Chart and available morning labs /imaging are reviewed electronically , urgent issues addressed . More information  is being added upon completion of rounds , when more information is collected and management discussed with consultants , medical staff and social service/case management on the floor   OVERNIGHT  No new issues reported by medical staff . All above noted Patient is resting in a bed comfortably .Confused ,poor mentation .No distress noted Seen b y PT -AMRIK recommended   HPI:  Pt is a 89 yo male hx dementia, afib on asa, dm. [facility] and ems report after being found at 2pm to be unresponsive. pt sent to er. pt found in bed,  last known well unknown at present.  sp[facility] with [employer], states pt normally answers minimal questions, can ambulate slowly with cane. today last seen fully well at breakfast and at noon was at [employer] eating lunch, but noone spoke to pt to see if he was at baseline of answering questions.  at 2pm pt with eyes closed and wouldn't answer questions. pt assisted by 2 people to office and was weak on feet,  ems called. Found to have UTI Admitted for septic workup and evaluation,send blood and urine cx,serial lactate levels,monitor vitals closley,ivfs hydration,monitor urine output and renal profile,iv abx initiated Seen by cardiologist and neurology consult requested .ID kalyan called ,started on ceftriaxone .CTH negative for ACUTE CVA .     PAST MEDICAL & SURGICAL HISTORY:  Atrial fibrillation  Hypertension    Diabetes    Prostate ca    Dementia    CHF (congestive heart failure)    Hyperlipemia    Diabetes    Depression    Dementia    DM (diabetes mellitus)    Atrial fibrillation    Prostate CA    Arteriosclerotic heart disease (ASHD)    Dementia    Anemia    Constipation  PACEMAKER   No significant past surgical history  < from: CT Brain Stroke Protocol (22 @ 18:46) >    ACC: [id number] EXAM:  CT PERFUSION W MAPS IC                        ACC: [id number] EXAM:  CT ANGIO BRAIN (W)AW IC                        ACC: [id number] EXAM:  CT BRAIN STROKE PROTOCOL                        ACC: [id number] EXAM:  CT ANGIO NECK (W)AW IC                        PROCEDURE DATE:  2022          INTERPRETATION:  CT HEAD STROKE PROTOCOL, CT ANGIO NECK WITHOUT AND OR   WITH IV CONTRAST, CT ANGIO BRAIN WITHOUT AND OR WITH IV CONTRAST, CT   PERFUSION WITH MAPS WITH IV CONTRAST    CLINICAL HISTORY: Stroke Code    CT HEAD TECHNIQUE:  Noncontrast CT.  Axial Acqisition.  Sagittal and coronal reformations.    COMPARISON:  Head CT is compared to prior study of 2022    FINDINGS:  *  HEMORRHAGE:  No evidence of intracranial hemorrhage.  *  BRAIN PARENCHYMA:  Moderate atrophy. Mild periventricular white matter   ischemic changes. No mass or mass effect.  *  VENTRICLES / SHIFT:  No hydrocephalus. No midline shift.  *  EXTRA-AXIAL / BASAL CISTERNS:  No extra-axial mass. Basal cisterns   preserved.  Atherosclerotic calcifications of the cavernous internal   carotid arteries.  *  CALVARIUM AND EXTRACRANIAL SOFT TISSUES:  No depressed calvarial   fracture.  *  SINUSES, ORBITS, MASTOIDS:  The visualized paranasal sinuses and   mastoid air cells are well aerated.  ----------------------------------------    CTA TECHNIQUE:  CT angiography of the neck and brain was performed during the dynamic   administration of intravenous contrast.  MIP reconstructions were   performed and reviewed. Multiplanar reformations were obtained.  Contrast administered 60 cc Omnipaque 350, contrast discarded 10 cc    CTA FINDINGS:  NECK  RIGHT CAROTID CIRCULATION:  *  Evaluation of the right carotid circulation demonstrates no   hemodynamic significant narrowing utilizing a distal reference. Mild   calcified plaque carotid bulb/bifurcation  LEFT CAROTID CIRCULATION:  *  Evaluation of the left carotid circulation demonstrates no hemodynamic   significant narrowing utilizing a distal reference. Mild calcified plaque   carotid bulb/bifurcation  VERTEBRAL ARTERIES:  *  Evaluation of the vertebral arteries reveals no evidence of a   vertebral artery occlusion or dissection.    BRAIN  ANTERIOR CIRCULATION:  *  Distal internal carotid arteries are patent.  *  Anterior cerebral arteries are patent. Atherosclerotic calcification   of the cavernous segments without significant narrowing  *  Middle cerebral arteries are patent.  POSTERIOR CIRCULATION:  *  Distal vertebral arteries are patent.  *  Basilar artery is patent. Proximal superior cerebellar arteries are   patent  *  Posterior cerebral arteries are patent.    OTHER:  *  8 mm nodule right lung apex.  --------------------------------------------------    CT PERFUSION TECHNIQUE:  CT perfusion was performed during the administration of intravenous   contrast.  There was a total of 8 cm brain coverage.  The images were processed utilizing RAPID software.  Contrast administered 80 cc Omnipaque 350, contrast discarded 0 cc    Ischemic tissue is defined as TMAX > 6 seconds.  Core infarct is defined as CBF < 30%.    CT PERFUSION FINDINGS:  *  CT perfusion is uninterpretable due to motion and suboptimal contrast   bolus/timing.      IMPRESSION:  *  NO EVIDENCE OF AN ACUTE INTRACRANIAL HEMORRHAGE, MIDLINE SHIFT, OR   HYDROCEPHALUS. MODERATE ATROPHY WITH MILD PERIVENTRICULAR WHITE MATTER   ISCHEMIC CHANGES  *  NO HEMODYNAMIC SIGNIFICANT NARROWING WITHIN THE NECK.  *  NO PROXIMAL LARGE VESSEL OCCLUSION INTRACRANIALLY.  *  CT PERFUSION UNINTERPRETABLE DUE TO MOTION AND SUBOPTIMAL CONTRAST   BOLUS/TIMING..  *  8 MM RIGHT APICAL LUNG NODULE.    *  Interval follow-up if acute ischemia remains of clinical concern.    Preliminary head CT findings discussed with Dr. Dennis at 6:48 PM on   2022    --- End of Report ---       (2022 19:45)    PAST MEDICAL & SURGICAL HISTORY:  Atrial fibrillation    Hypertension    Diabetes    Prostate ca    Dementia    CHF (congestive heart failure)    Hyperlipemia    Diabetes    Depression    Dementia    DM (diabetes mellitus)    Atrial fibrillation    Prostate CA    Arteriosclerotic heart disease (ASHD)    Dementia    Anemia    Constipation    No significant past surgical history        MEDICATIONS  (STANDING):  aspirin enteric coated 81 milliGRAM(s) Oral daily  cefTRIAXone   IVPB 1000 milliGRAM(s) IV Intermittent every 24 hours  dextrose 5% + sodium chloride 0.45%. 1000 milliLiter(s) (50 mL/Hr) IV Continuous <Continuous>  dextrose 5%. 1000 milliLiter(s) (50 mL/Hr) IV Continuous <Continuous>  dextrose 5%. 1000 milliLiter(s) (100 mL/Hr) IV Continuous <Continuous>  dextrose 50% Injectable 25 Gram(s) IV Push once  dextrose 50% Injectable 12.5 Gram(s) IV Push once  dextrose 50% Injectable 25 Gram(s) IV Push once  donepezil 5 milliGRAM(s) Oral at bedtime  ferrous    sulfate 325 milliGRAM(s) Oral daily  glucagon  Injectable 1 milliGRAM(s) IntraMuscular once  heparin   Injectable 5000 Unit(s) SubCutaneous every 8 hours  insulin lispro (ADMELOG) corrective regimen sliding scale   SubCutaneous three times a day before meals  lactobacillus acidophilus 1 Tablet(s) Oral two times a day  losartan 25 milliGRAM(s) Oral daily  metoprolol tartrate 25 milliGRAM(s) Oral two times a day  multivitamin/minerals 1 Tablet(s) Oral daily  pantoprazole    Tablet 40 milliGRAM(s) Oral daily  polyethylene glycol 3350 17 Gram(s) Oral daily  senna 2 Tablet(s) Oral at bedtime  simvastatin 20 milliGRAM(s) Oral at bedtime  tamsulosin 0.4 milliGRAM(s) Oral at bedtime    MEDICATIONS  (PRN):  acetaminophen     Tablet .. 650 milliGRAM(s) Oral every 6 hours PRN Temp greater or equal to 38C (100.4F), Mild Pain (1 - 3)  aluminum hydroxide/magnesium hydroxide/simethicone Suspension 30 milliLiter(s) Oral every 4 hours PRN Dyspepsia  dextrose Oral Gel 15 Gram(s) Oral once PRN Blood Glucose LESS THAN 70 milliGRAM(s)/deciliter  melatonin 3 milliGRAM(s) Oral at bedtime PRN Insomnia  ondansetron Injectable 4 milliGRAM(s) IV Push every 8 hours PRN Nausea and/or Vomiting      OBJECTIVE    T(C): 36.6 (22 @ 05:10), Max: 37 (22 @ 13:32)  HR: 73 (22 @ 05:10) (60 - 73)  BP: 125/69 (22 @ 05:10) (95/38 - 125/72)  RR: 18 (22 @ 05:10) (17 - 18)  SpO2: 96% (22 @ 05:10) (93% - 96%)  Wt(kg): --  I&O's Summary    2022 07:01  -  2022 07:00  --------------------------------------------------------  IN: 460 mL / OUT: 1351 mL / NET: -891 mL          REVIEW OF SYSTEMS:  CONSTITUTIONAL: No fever, weight loss, or fatigue  Patient is  unable to provide any information/ROS  due to baseline mental status.     PHYSICAL EXAM:  Appearance: NAD. VS past 24 hrs -as above   HEENT:   Moist oral mucosa. Conjunctiva clear b/l.   Neck : supple  Respiratory: Lungs CTAB.  Gastrointestinal:  Soft, nontender. No rebound. No rigidity. BS present	  Cardiovascular: RRR ,S1S2 present  Neurologic: Non-focal. Moving all extremities.  Extremities: No edema. No erythema. No calf tenderness.  Skin: No rashes, No ecchymoses, No cyanosis.	  wounds ,skin lesions-See skin assesment flow sheet   LABS:                        11.1   5.72  )-----------( 236      ( 2022 07:21 )             33.3     04-04    139  |  104  |  14  ----------------------------<  109<H>  4.2   |  29  |  0.62    Ca    8.9      2022 07:21  Phos  2.3     04-03  Mg     1.8     04-03    TPro  5.8<L>  /  Alb  2.7<L>  /  TBili  0.3  /  DBili  x   /  AST  13<L>  /  ALT  20  /  AlkPhos  79  04-02    CAPILLARY BLOOD GLUCOSE      POCT Blood Glucose.: 179 mg/dL (2022 11:19)  POCT Blood Glucose.: 115 mg/dL (2022 08:00)  POCT Blood Glucose.: 196 mg/dL (2022 21:37)  POCT Blood Glucose.: 150 mg/dL (2022 16:36)    PT/INR - ( 2022 18:20 )   PT: 12.4 sec;   INR: 1.06 ratio         PTT - ( 2022 18:20 )  PTT:29.7 sec  Urinalysis Basic - ( 2022 12:46 )    Color: Light Yellow / Appearance: Clear / S.020 / pH: x  Gluc: x / Ketone: Negative  / Bili: Negative / Urobili: <2 mg/dL   Blood: x / Protein: Negative / Nitrite: Negative   Leuk Esterase: Large / RBC: 25 /HPF / WBC 37 /HPF   Sq Epi: x / Non Sq Epi: 2 /HPF / Bacteria: Negative        Culture - Blood (collected 2022 00:27)  Source: .Blood Blood-Peripheral  Preliminary Report (2022 01:01):    No growth to date.  Culture - Blood (collected 2022 00:27)  Source: .Blood Blood-Peripheral  Preliminary Report (:01):    No growth to date.  RADIOLOGY & ADDITIONAL TESTS:   reviewed elctronically	  25 minutes aggregate time was spent on this visit, 50% visit time spent in care co-ordination with other attendings and counselling patient .I have discussed care plan with patient / HCP/family member ,who expressed understanding of problems treatment and their effect and side effects, alternatives in details. I have asked if they have any questions and concerns and appropriately addressed them to best of my ability. Advance care planning was discussed , pallitaive care issues ,CMO ,hospice levels of care were discussed in details , forms ,advance directives were reviewed .All questions were answered to the best of my knowledge .Additional 25 min spent.

## 2022-04-04 NOTE — PHYSICAL THERAPY INITIAL EVALUATION ADULT - ADDITIONAL COMMENTS
Unable to obtain questions d/t pt being poor historian. Some information obtained from pt, some from EMR. Pt lives at [facility]. Uses a cane and assist to ambulate, ambulates slowly. Pt unable to use left arm for support. Pt expresses concern for his current condition.

## 2022-04-04 NOTE — SWALLOW BEDSIDE ASSESSMENT ADULT - SWALLOW EVAL: DIAGNOSIS
1. Patient demonstrates a functional oral phase for pureed, mildly thick, and thin liquids marked by timely collection, transfer, and transport. 2. Patient demonstrates a mild oral dysphagia for soft & bite-sized solids marked by prolonged mastication resulting in delayed collection, transfer, and transport. Trace-mild stasis observed post swallow, which reduced with liquid wash. 3. Patient demonstrates a mild pharyngeal dysphagia for pureed, soft & bite-sized, mildly thick, and thin liquids marked by suspected delayed pharyngeal swallow trigger and hyolaryngeal elevation noted by digital palpation without evidence of airway penetration/aspiration. 4. Recommend minced & moist and thin liquids, with aspiration precautions and full assistance during meals, as tolerated. Facilitate upright position, slow pacing, single/small bites/sips, and alternate solids with liquids. Continue to monitor and notify SLP if changes occur.

## 2022-04-04 NOTE — SWALLOW BEDSIDE ASSESSMENT ADULT - SWALLOW EVAL: RECOMMENDED FEEDING/EATING TECHNIQUES
allow for swallow between intakes/alternate food with liquid/crush medication (when feasible)/maintain upright posture during/after eating for 30 mins/no straws/oral hygiene/small sips/bites

## 2022-04-04 NOTE — PROGRESS NOTE ADULT - NUTRITIONAL ASSESSMENT
This patient has been assessed with a concern for Malnutrition and has been determined to have a diagnosis/diagnoses of Moderate protein-calorie malnutrition.    This patient is being managed with:   Diet Soft and Bite Sized-  Supplement Feeding Modality:  Oral  Glucerna Shake Cans or Servings Per Day:  1       Frequency:  Three Times a day  Entered: Apr  3 2022 10:33AM     This patient has been assessed with a concern for Malnutrition and has been determined to have a diagnosis/diagnoses of Moderate protein-calorie malnutrition.  This patient is being managed with:   Diet Soft and Bite Sized-  Supplement Feeding Modality:  Oral  Glucerna Shake Cans or Servings Per Day:  1       Frequency:  Three Times a day  Entered: Apr  3 2022 10:33AM

## 2022-04-04 NOTE — PHYSICAL THERAPY INITIAL EVALUATION ADULT - LEVEL OF INDEPENDENCE: SIT/STAND, REHAB EVAL
"[facility], [place]   Psychiatric Progress Note        Interim History:   From H&P: Brody Colindres is a 58 year old male with a history of substance abuse, diabetes and diagnosis of schizophrenia, paranoia, depression and anxiety who presents to the emergency department for evaluation of ongoing suicidal ideation related to the quality of his life. The patient reports that he is feeling suicidal and has similar thoughts every day. He states that he \"is fed up with the way I live\" and specifically mentions wanting to quit doing drugs and not enjoying his life. Today the patient had his knife with him and indicates that he might have used this to commit suicide. Additionally, the patient reports smoking cocaine and methamphetamines yesterday. Additionally, he states that his son is currently on death row for killing someone and this has caused him significant anxiety. The patient states that the people related to the person his son killed are trying to get revenge by hurting him. He states that these people have been following him and know where he stays..     Team meeting report: The patient's care was discussed with the treatment team during the daily team meeting and/or staff's chart notes were reviewed.  Staff report patient has been irritable and isolated to his room.  He did not attend any groups. Patient is eating well and taking medications as prescribed. Slept all night.    Met with patient.  Sleep is good.  Appetite is intact.  Reports some hand pain for a few days.  Mood is \"so, so\".  No suicidal ideation.  Patient was pleasant and cooperative with me.  Later in the day, the  reported that the patient yelled at her when she told him where he will go for residential treatment.  The patient is refusing to go there stating that things \"in the food and I will go back to using drugs\".  The patient requested to be discharged but change his mind later.  He will stay " here this weekend and likely be discharged on Monday or Tuesday when a bed is available at [facility].         Medications:       sodium chloride 0.9%  1,000 mL Intravenous Once     FLUoxetine  20 mg Oral Daily     gabapentin  400 mg Oral TID     insulin glargine  15 Units Subcutaneous Q24H     liraglutide  0.6 mg Subcutaneous Daily with supper     nicotine  1 patch Transdermal Q24H     nicotine   Transdermal Q8H          Allergies:   No Known Allergies       Labs:     Recent Results (from the past 672 hour(s))   Alcohol breath test POCT    Collection Time: 09/25/20  3:22 AM   Result Value Ref Range    Alcohol Breath Test 0.000 0.00 - 0.01   Asymptomatic COVID-19 Virus (Coronavirus) by PCR    Collection Time: 09/25/20  3:03 PM    Specimen: Nasopharyngeal   Result Value Ref Range    COVID-19 Virus PCR to [facility] - Source Nasopharyngeal     COVID-19 Virus PCR to [facility] - Result       Test received-See reflex to IDDL test SARS CoV2 (COVID-19) Virus RT-PCR   SARS-CoV-2 COVID-19 Virus (Coronavirus) RT-PCR Nasopharyngeal    Collection Time: 09/25/20  3:03 PM    Specimen: Nasopharyngeal   Result Value Ref Range    SARS-CoV-2 Virus Specimen Source Nasopharyngeal     SARS-CoV-2 PCR Result NEGATIVE     SARS-CoV-2 PCR Comment       Testing was performed using the Simplexa COVID-19 Direct Assay on the [employer] Liaison MDX   instrument. Additional information about this Emergency Use Authorization (EUA) assay can   be found via the Lab Guide.     Drug abuse screen 6 urine (chem dep)    Collection Time: 09/25/20  4:43 PM   Result Value Ref Range    Amphetamine Qual Urine Positive (A) NEG^Negative    Barbiturates Qual Urine Negative NEG^Negative    Benzodiazepine Qual Urine Negative NEG^Negative    Cannabinoids Qual Urine Negative NEG^Negative    Cocaine Qual Urine Positive (A) NEG^Negative    Ethanol Qual Urine Negative NEG^Negative    Opiates Qualitative Urine Negative NEG^Negative   Glucose by meter    Collection Time:  09/25/20  6:03 PM   Result Value Ref Range    Glucose 255 (H) 70 - 99 mg/dL   Glucose by meter    Collection Time: 09/26/20  7:59 AM   Result Value Ref Range    Glucose 182 (H) 70 - 99 mg/dL   Glucose by meter    Collection Time: 09/26/20 11:28 AM   Result Value Ref Range    Glucose 136 (H) 70 - 99 mg/dL   Glucose by meter    Collection Time: 09/26/20  3:52 PM   Result Value Ref Range    Glucose 197 (H) 70 - 99 mg/dL   Glucose by meter    Collection Time: 09/27/20  8:03 AM   Result Value Ref Range    Glucose 258 (H) 70 - 99 mg/dL   Glucose by meter    Collection Time: 09/27/20 12:12 PM   Result Value Ref Range    Glucose 151 (H) 70 - 99 mg/dL   Glucose by meter    Collection Time: 09/27/20  4:30 PM   Result Value Ref Range    Glucose 261 (H) 70 - 99 mg/dL   Lipid Profile    Collection Time: 09/28/20  7:25 AM   Result Value Ref Range    Cholesterol 133 <200 mg/dL    Triglycerides 95 <150 mg/dL    HDL Cholesterol 54 >39 mg/dL    LDL Cholesterol Calculated 60 <100 mg/dL    Non HDL Cholesterol 79 <130 mg/dL   CBC with platelets differential    Collection Time: 09/28/20  7:25 AM   Result Value Ref Range    WBC 5.5 4.0 - 11.0 10e9/L    RBC Count 4.55 4.4 - 5.9 10e12/L    Hemoglobin 14.1 13.3 - 17.7 g/dL    Hematocrit 40.2 40.0 - 53.0 %    MCV 88 78 - 100 fl    MCH 31.0 26.5 - 33.0 pg    MCHC 35.1 31.5 - 36.5 g/dL    RDW 12.7 10.0 - 15.0 %    Platelet Count 223 150 - 450 10e9/L    Diff Method Automated Method     % Neutrophils 44.3 %    % Lymphocytes 39.9 %    % Monocytes 9.8 %    % Eosinophils 4.9 %    % Basophils 0.7 %    % Immature Granulocytes 0.4 %    Nucleated RBCs 0 0 /100    Absolute Neutrophil 2.4 1.6 - 8.3 10e9/L    Absolute Lymphocytes 2.2 0.8 - 5.3 10e9/L    Absolute Monocytes 0.5 0.0 - 1.3 10e9/L    Absolute Eosinophils 0.3 0.0 - 0.7 10e9/L    Absolute Basophils 0.0 0.0 - 0.2 10e9/L    Abs Immature Granulocytes 0.0 0 - 0.4 10e9/L    Absolute Nucleated RBC 0.0    Comprehensive metabolic panel    Collection  Time: 09/28/20  7:25 AM   Result Value Ref Range    Sodium 139 133 - 144 mmol/L    Potassium 4.2 3.4 - 5.3 mmol/L    Chloride 108 94 - 109 mmol/L    Carbon Dioxide 27 20 - 32 mmol/L    Anion Gap 4 3 - 14 mmol/L    Glucose 134 (H) 70 - 99 mg/dL    Urea Nitrogen 12 7 - 30 mg/dL    Creatinine 0.96 0.66 - 1.25 mg/dL    GFR Estimate 87 >60 mL/min/[1.73_m2]    GFR Estimate If Black >90 >60 mL/min/[1.73_m2]    Calcium 8.6 8.5 - 10.1 mg/dL    Bilirubin Total 0.4 0.2 - 1.3 mg/dL    Albumin 3.2 (L) 3.4 - 5.0 g/dL    Protein Total 7.3 6.8 - 8.8 g/dL    Alkaline Phosphatase 59 40 - 150 U/L    ALT 16 0 - 70 U/L    AST 12 0 - 45 U/L   TSH with free T4 reflex    Collection Time: 09/28/20  7:25 AM   Result Value Ref Range    TSH 0.64 0.40 - 4.00 mU/L   Hemoglobin A1c    Collection Time: 09/28/20  7:25 AM   Result Value Ref Range    Hemoglobin A1C 7.7 (H) 0 - 5.6 %   Glucose by meter    Collection Time: 09/28/20  8:08 AM   Result Value Ref Range    Glucose 174 (H) 70 - 99 mg/dL   Glucose by meter    Collection Time: 09/28/20 12:26 PM   Result Value Ref Range    Glucose 113 (H) 70 - 99 mg/dL   Glucose by meter    Collection Time: 09/28/20  5:12 PM   Result Value Ref Range    Glucose 224 (H) 70 - 99 mg/dL   Glucose by meter    Collection Time: 09/28/20 10:12 PM   Result Value Ref Range    Glucose 247 (H) 70 - 99 mg/dL   Glucose by meter    Collection Time: 09/29/20  8:00 AM   Result Value Ref Range    Glucose 104 (H) 70 - 99 mg/dL   Glucose by meter    Collection Time: 09/29/20 11:52 AM   Result Value Ref Range    Glucose 245 (H) 70 - 99 mg/dL   Glucose by meter    Collection Time: 09/29/20  1:27 PM   Result Value Ref Range    Glucose 259 (H) 70 - 99 mg/dL   Glucose by meter    Collection Time: 09/29/20  5:17 PM   Result Value Ref Range    Glucose 327 (H) 70 - 99 mg/dL   Glucose by meter    Collection Time: 09/29/20  9:35 PM   Result Value Ref Range    Glucose 302 (H) 70 - 99 mg/dL   Glucose by meter    Collection Time: 09/30/20  2:23  AM   Result Value Ref Range    Glucose 175 (H) 70 - 99 mg/dL   Glucose by meter    Collection Time: 09/30/20  7:59 AM   Result Value Ref Range    Glucose 147 (H) 70 - 99 mg/dL   Glucose by meter    Collection Time: 09/30/20 12:08 PM   Result Value Ref Range    Glucose 108 (H) 70 - 99 mg/dL   Glucose by meter    Collection Time: 09/30/20  3:48 PM   Result Value Ref Range    Glucose 134 (H) 70 - 99 mg/dL   Glucose by meter    Collection Time: 09/30/20 10:08 PM   Result Value Ref Range    Glucose 233 (H) 70 - 99 mg/dL   Glucose by meter    Collection Time: 10/01/20  2:25 AM   Result Value Ref Range    Glucose 143 (H) 70 - 99 mg/dL   Glucose by meter    Collection Time: 10/01/20  7:23 AM   Result Value Ref Range    Glucose 88 70 - 99 mg/dL   Drug abuse screen 77 urine (FL, RH, SH)    Collection Time: 10/13/20  9:45 AM   Result Value Ref Range    Amphetamine Qual Urine Positive (A) NEG^Negative    Barbiturates Qual Urine Negative NEG^Negative    Benzodiazepine Qual Urine Negative NEG^Negative    Cannabinoids Qual Urine Negative NEG^Negative    Cocaine Qual Urine Positive (A) NEG^Negative    Opiates Qualitative Urine Negative NEG^Negative    PCP Qual Urine Negative NEG^Negative   Asymptomatic COVID-19 Virus (Coronavirus) by PCR    Collection Time: 10/13/20 11:54 AM    Specimen: Nasopharyngeal   Result Value Ref Range    COVID-19 Virus PCR to [facility] - Source Nasopharyngeal     COVID-19 Virus PCR to [facility] - Result       Test received-See reflex to IDDL test SARS CoV2 (COVID-19) Virus RT-PCR   SARS-CoV-2 COVID-19 Virus (Coronavirus) RT-PCR Nasopharyngeal    Collection Time: 10/13/20 11:54 AM    Specimen: Nasopharyngeal   Result Value Ref Range    SARS-CoV-2 Virus Specimen Source Nasopharyngeal     SARS-CoV-2 PCR Result NEGATIVE     SARS-CoV-2 PCR Comment       Testing was performed using the Simplexa COVID-19 Direct Assay on the [employer] Liaison MDX   instrument. Additional information about this Emergency Use  Authorization (EUA) assay can   be found via the Lab Guide.     Glucose by meter    Collection Time: 10/13/20 12:01 PM   Result Value Ref Range    Glucose 185 (H) 70 - 99 mg/dL   Glucose by meter    Collection Time: 10/13/20  6:47 PM   Result Value Ref Range    Glucose 330 (H) 70 - 99 mg/dL   Glucose by meter    Collection Time: 10/13/20  9:29 PM   Result Value Ref Range    Glucose 261 (H) 70 - 99 mg/dL   Glucose by meter    Collection Time: 10/14/20  2:26 AM   Result Value Ref Range    Glucose 208 (H) 70 - 99 mg/dL   Glucose by meter    Collection Time: 10/14/20  7:35 AM   Result Value Ref Range    Glucose 103 (H) 70 - 99 mg/dL   Glucose by meter    Collection Time: 10/14/20 12:55 PM   Result Value Ref Range    Glucose 136 (H) 70 - 99 mg/dL   Basic metabolic panel    Collection Time: 10/14/20  4:50 PM   Result Value Ref Range    Sodium 135 133 - 144 mmol/L    Potassium 4.0 3.4 - 5.3 mmol/L    Chloride 105 94 - 109 mmol/L    Carbon Dioxide 24 20 - 32 mmol/L    Anion Gap 6 3 - 14 mmol/L    Glucose 144 (H) 70 - 99 mg/dL    Urea Nitrogen 11 7 - 30 mg/dL    Creatinine 0.97 0.66 - 1.25 mg/dL    GFR Estimate 86 >60 mL/min/[1.73_m2]    GFR Estimate If Black >90 >60 mL/min/[1.73_m2]    Calcium 8.9 8.5 - 10.1 mg/dL   CBC with platelets    Collection Time: 10/14/20  4:50 PM   Result Value Ref Range    WBC 7.1 4.0 - 11.0 10e9/L    RBC Count 4.62 4.4 - 5.9 10e12/L    Hemoglobin 13.8 13.3 - 17.7 g/dL    Hematocrit 40.4 40.0 - 53.0 %    MCV 87 78 - 100 fl    MCH 29.9 26.5 - 33.0 pg    MCHC 34.2 31.5 - 36.5 g/dL    RDW 12.7 10.0 - 15.0 %    Platelet Count 242 150 - 450 10e9/L   Magnesium    Collection Time: 10/14/20  4:50 PM   Result Value Ref Range    Magnesium 2.1 1.6 - 2.3 mg/dL   Phosphorus    Collection Time: 10/14/20  4:50 PM   Result Value Ref Range    Phosphorus 4.1 2.5 - 4.5 mg/dL   Glucose by meter    Collection Time: 10/14/20  5:12 PM   Result Value Ref Range    Glucose 161 (H) 70 - 99 mg/dL   Glucose by meter     Collection Time: 10/14/20  9:52 PM   Result Value Ref Range    Glucose 175 (H) 70 - 99 mg/dL   Glucose by meter    Collection Time: 10/15/20  1:55 AM   Result Value Ref Range    Glucose 164 (H) 70 - 99 mg/dL   Glucose by meter    Collection Time: 10/15/20  7:47 AM   Result Value Ref Range    Glucose 103 (H) 70 - 99 mg/dL            Psychiatric Examination:   Temp: 98.2  F (36.8  C) Temp src: Temporal BP: (!) 162/80 Pulse: 90   Resp: 16 SpO2: 100 % O2 Device: None (Room air)    Weight is 231 lbs 4.8 oz  Body mass index is 30.52 kg/m .    Appearance: poorly groomed, well groomed, awake, alert, cooperative, mild distress and moderately obese  Attitude:  cooperative  Eye Contact:  good  Mood:  better  Affect:  intensity is flat and irritable  Speech:  mumbling  Psychomotor Behavior:  no evidence of tardive dyskinesia, dystonia, or tics  Throught Process:  logical and goal oriented  Associations:  no loose associations  Thought Content:  no evidence of suicidal ideation or homicidal ideation  Insight:  good  Judgement:  intact  Oriented to:  time, person, and place  Attention Span and Concentration:  intact  Recent and Remote Memory:  intact         Precautions:     Behavioral Orders   Procedures     Assault precautions     Code 1 - Restrict to Unit     Routine Programming     As clinically indicated     Status 15     Every 15 minutes.     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  He has been using cocaine on a daily basis and amphetamine yesterday.  He was discharged from this unit 2 weeks ago and was supposed to go to CD treatment however, did not follow through.  He has been living on the street.  Reports increased depression and suicidal thoughts.  He wants CD treatment.  Medication changes will include the following:   --Discontinue Wellbutrin.    --Start Prozac, 20 mg every morning.    --Continue gabapentin 400 mg 3 times a day for neuropathy.    --Blood work was reviewed.  Internal medicine follow-up for medical problems.   --The patient was consulted on nature of illness and treatment options.   --Care was coordinated with the treatment team.     Disposition Plan   Reason for ongoing admission: is unable to care for self due to substance abuse  Disposition: TBD  Estimated length of stay: 5-7 days  Legal Status:  voluntary  Discharge will be granted once symptoms improved.    Melita ROBERTSON CNP  Date: 10/15/20  Time: 12:48 PM    More than 30 minutes were spent for assessment, documentation, and coordination of care.     This note was created with the help of Dragon dictation system. All grammatical/typing errors or context distortion are unintentional and inherent to software.           maximum assist (25% patients effort)

## 2022-04-04 NOTE — PROGRESS NOTE ADULT - SUBJECTIVE AND OBJECTIVE BOX
Interval History:    CENTRAL LINE:   [  ] YES       [  ] NO  RAMOS:                 [  ] YES       [  ] NO         REVIEW OF SYSTEMS:  All Systems below were reviewed and are negative [  ]  HEENT:  ID:  Pulmonary:  Cardiac:  GI:  Renal:  Musculoskeletal:  All other systems above were reviewed and are negative   [  ]      MEDICATIONS  (STANDING):  aspirin enteric coated 81 milliGRAM(s) Oral daily  cefTRIAXone   IVPB 1000 milliGRAM(s) IV Intermittent every 24 hours  dextrose 5% + sodium chloride 0.45%. 1000 milliLiter(s) (50 mL/Hr) IV Continuous <Continuous>  dextrose 5%. 1000 milliLiter(s) (50 mL/Hr) IV Continuous <Continuous>  dextrose 5%. 1000 milliLiter(s) (100 mL/Hr) IV Continuous <Continuous>  dextrose 50% Injectable 25 Gram(s) IV Push once  dextrose 50% Injectable 12.5 Gram(s) IV Push once  dextrose 50% Injectable 25 Gram(s) IV Push once  donepezil 5 milliGRAM(s) Oral at bedtime  ferrous    sulfate 325 milliGRAM(s) Oral daily  glucagon  Injectable 1 milliGRAM(s) IntraMuscular once  heparin   Injectable 5000 Unit(s) SubCutaneous every 8 hours  insulin lispro (ADMELOG) corrective regimen sliding scale   SubCutaneous three times a day before meals  lactobacillus acidophilus 1 Tablet(s) Oral two times a day  losartan 25 milliGRAM(s) Oral daily  metoprolol tartrate 25 milliGRAM(s) Oral two times a day  multivitamin/minerals 1 Tablet(s) Oral daily  pantoprazole    Tablet 40 milliGRAM(s) Oral daily  polyethylene glycol 3350 17 Gram(s) Oral daily  senna 2 Tablet(s) Oral at bedtime  simvastatin 20 milliGRAM(s) Oral at bedtime  tamsulosin 0.4 milliGRAM(s) Oral at bedtime    MEDICATIONS  (PRN):  acetaminophen     Tablet .. 650 milliGRAM(s) Oral every 6 hours PRN Temp greater or equal to 38C (100.4F), Mild Pain (1 - 3)  aluminum hydroxide/magnesium hydroxide/simethicone Suspension 30 milliLiter(s) Oral every 4 hours PRN Dyspepsia  dextrose Oral Gel 15 Gram(s) Oral once PRN Blood Glucose LESS THAN 70 milliGRAM(s)/deciliter  melatonin 3 milliGRAM(s) Oral at bedtime PRN Insomnia  ondansetron Injectable 4 milliGRAM(s) IV Push every 8 hours PRN Nausea and/or Vomiting      Vital Signs Last 24 Hrs  T(C): 36.9 (04 Apr 2022 13:35), Max: 36.9 (04 Apr 2022 13:35)  T(F): 98.5 (04 Apr 2022 13:35), Max: 98.5 (04 Apr 2022 13:35)  HR: 74 (04 Apr 2022 13:35) (60 - 74)  BP: 107/53 (04 Apr 2022 13:35) (95/38 - 125/69)  BP(mean): --  RR: 18 (04 Apr 2022 13:35) (17 - 18)  SpO2: 94% (04 Apr 2022 13:35) (93% - 96%)    I&O's Summary    03 Apr 2022 07:01  -  04 Apr 2022 07:00  --------------------------------------------------------  IN: 460 mL / OUT: 1351 mL / NET: -891 mL    04 Apr 2022 07:01  -  04 Apr 2022 18:30  --------------------------------------------------------  IN: 420 mL / OUT: 0 mL / NET: 420 mL        PHYSICAL EXAM:  HEENT: NC/AT; PERRLA  Neck: Soft; no tenderness  Lungs: CTA bilaterally; no wheezing.   Heart:  Abdomen:  Genital/ Rectal:  Extremities:  Neurologic:  Vascular:      LABORATORY:    CBC Full  -  ( 04 Apr 2022 07:21 )  WBC Count : 5.72 K/uL  RBC Count : 3.51 M/uL  Hemoglobin : 11.1 g/dL  Hematocrit : 33.3 %  Platelet Count - Automated : 236 K/uL  Mean Cell Volume : 94.9 fl  Mean Cell Hemoglobin : 31.6 pg  Mean Cell Hemoglobin Concentration : 33.3 gm/dL  Auto Neutrophil # : x  Auto Lymphocyte # : x  Auto Monocyte # : x  Auto Eosinophil # : x  Auto Basophil # : x  Auto Neutrophil % : x  Auto Lymphocyte % : x  Auto Monocyte % : x  Auto Eosinophil % : x  Auto Basophil % : x          04-04    139  |  104  |  14  ----------------------------<  109<H>  4.2   |  29  |  0.62    Ca    8.9      04 Apr 2022 07:21  Phos  2.3     04-03  Mg     1.8     04-03    TPro  5.8<L>  /  Alb  2.7<L>  /  TBili  0.3  /  DBili  x   /  AST  13<L>  /  ALT  20  /  AlkPhos  79  04-02      Rapid Respiratory Viral Panel Result        04-02 @ 18:25  [facility]  Coronovirus --  Adenovirus --  Bordetella Pertussis --  Chlamydia Pneumonia --  Entero/Rhinovirus--  HKU1 Coronovirus --  HMPV Coronovirus --  Influenza A --  Influenza AH1 --  Influenza AH1 2009 --  Influenza AH3 --  Influenza B --  Mycoplasma Pneumoniae --  NL63 Coronovirus --  OC43 Coronovirus --  Parainfluenza 1 --  Parainfluenza 2 --  Parainfluenza 3 --  Parainfluenza 4 --  Resp Syncytial Virus --  Rapid Respiratory Viral Panel Result        03-23 @ 11:07  Rapid RVP Not[facility]  Coronovirus --  Adenovirus --  Bordetella Pertussis --  Chlamydia Pneumonia --  Entero/Rhinovirus--  HKU1 Coronovirus --  HMPV Coronovirus --  Influenza A --  Influenza AH1 --  Influenza AH1 2009 --  Influenza AH3 --  Influenza B --  Mycoplasma Pneumoniae --  NL63 Coronovirus --  OC43 Coronovirus --  Parainfluenza 1 --  Parainfluenza 2 --  Parainfluenza 3 --  Parainfluenza 4 --  Resp Syncytial Virus --      Assessment and Plan:          Stephan Pruett MD   [phone].  He is less lethargic  No fevers noted.       MEDICATIONS  (STANDING):  aspirin enteric coated 81 milliGRAM(s) Oral daily  cefTRIAXone   IVPB 1000 milliGRAM(s) IV Intermittent every 24 hours  dextrose 5% + sodium chloride 0.45%. 1000 milliLiter(s) (50 mL/Hr) IV Continuous <Continuous>  dextrose 5%. 1000 milliLiter(s) (50 mL/Hr) IV Continuous <Continuous>  dextrose 5%. 1000 milliLiter(s) (100 mL/Hr) IV Continuous <Continuous>  dextrose 50% Injectable 25 Gram(s) IV Push once  dextrose 50% Injectable 12.5 Gram(s) IV Push once  dextrose 50% Injectable 25 Gram(s) IV Push once  donepezil 5 milliGRAM(s) Oral at bedtime  ferrous    sulfate 325 milliGRAM(s) Oral daily  glucagon  Injectable 1 milliGRAM(s) IntraMuscular once  heparin   Injectable 5000 Unit(s) SubCutaneous every 8 hours  insulin lispro (ADMELOG) corrective regimen sliding scale   SubCutaneous three times a day before meals  lactobacillus acidophilus 1 Tablet(s) Oral two times a day  losartan 25 milliGRAM(s) Oral daily  metoprolol tartrate 25 milliGRAM(s) Oral two times a day  multivitamin/minerals 1 Tablet(s) Oral daily  pantoprazole    Tablet 40 milliGRAM(s) Oral daily  polyethylene glycol 3350 17 Gram(s) Oral daily  senna 2 Tablet(s) Oral at bedtime  simvastatin 20 milliGRAM(s) Oral at bedtime  tamsulosin 0.4 milliGRAM(s) Oral at bedtime    MEDICATIONS  (PRN):  acetaminophen     Tablet .. 650 milliGRAM(s) Oral every 6 hours PRN Temp greater or equal to 38C (100.4F), Mild Pain (1 - 3)  aluminum hydroxide/magnesium hydroxide/simethicone Suspension 30 milliLiter(s) Oral every 4 hours PRN Dyspepsia  dextrose Oral Gel 15 Gram(s) Oral once PRN Blood Glucose LESS THAN 70 milliGRAM(s)/deciliter  melatonin 3 milliGRAM(s) Oral at bedtime PRN Insomnia  ondansetron Injectable 4 milliGRAM(s) IV Push every 8 hours PRN Nausea and/or Vomiting      Vital Signs Last 24 Hrs  T(C): 36.9 (04 Apr 2022 13:35), Max: 36.9 (04 Apr 2022 13:35)  T(F): 98.5 (04 Apr 2022 13:35), Max: 98.5 (04 Apr 2022 13:35)  HR: 74 (04 Apr 2022 13:35) (60 - 74)  BP: 107/53 (04 Apr 2022 13:35) (95/38 - 125/69)  BP(mean): --  RR: 18 (04 Apr 2022 13:35) (17 - 18)  SpO2: 94% (04 Apr 2022 13:35) (93% - 96%)    I&O's Summary    03 Apr 2022 07:01  -  04 Apr 2022 07:00  --------------------------------------------------------  IN: 460 mL / OUT: 1351 mL / NET: -891 mL    04 Apr 2022 07:01  -  04 Apr 2022 18:30  --------------------------------------------------------  IN: 420 mL / OUT: 0 mL / NET: 420 mL        PHYSICAL EXAM:  HEENT: NC/AT; PERRLA  Neck: Soft; no tenderness  Lungs: CTA bilaterally; no wheezing.   Heart: RRR, no murmurs.   Abdomen: Soft, no tenderness.   Genital/ Rectal: No fitch  Extremities: No ulcers or redness.   Neurologic: Confused.       LABORATORY:    CBC Full  -  ( 04 Apr 2022 07:21 )  WBC Count : 5.72 K/uL  RBC Count : 3.51 M/uL  Hemoglobin : 11.1 g/dL  Hematocrit : 33.3 %  Platelet Count - Automated : 236 K/uL  Mean Cell Volume : 94.9 fl  Mean Cell Hemoglobin : 31.6 pg  Mean Cell Hemoglobin Concentration : 33.3 gm/dL  Auto Neutrophil # : x  Auto Lymphocyte # : x  Auto Monocyte # : x  Auto Eosinophil # : x  Auto Basophil # : x  Auto Neutrophil % : x  Auto Lymphocyte % : x  Auto Monocyte % : x  Auto Eosinophil % : x  Auto Basophil % : x          04-04    139  |  104  |  14  ----------------------------<  109<H>  4.2   |  29  |  0.62    Ca    8.9      04 Apr 2022 07:21  Phos  2.3     04-03  Mg     1.8     04-03    TPro  5.8<L>  /  Alb  2.7<L>  /  TBili  0.3  /  DBili  x   /  AST  13<L>  /  ALT  20  /  AlkPhos  79  04-02      Rapid Respiratory Viral Panel Result        04-02 @ 18:25  [facility]  Coronovirus --  Adenovirus --  Bordetella Pertussis --  Chlamydia Pneumonia --  Entero/Rhinovirus--  HKU1 Coronovirus --  HMPV Coronovirus --  Influenza A --  Influenza AH1 --  Influenza AH1 2009 --  Influenza AH3 --  Influenza B --  Mycoplasma Pneumoniae --  NL63 Coronovirus --  OC43 Coronovirus --  Parainfluenza 1 --  Parainfluenza 2 --  Parainfluenza 3 --  Parainfluenza 4 --  Resp Syncytial Virus --  Rapid Respiratory Viral Panel Result        03-23 @ 11:07  Rapid RVP [facility]  Coronovirus --  Adenovirus --  Bordetella Pertussis --  Chlamydia Pneumonia --  Entero/Rhinovirus--  HKU1 Coronovirus --  HMPV Coronovirus --  Influenza A --  Influenza AH1 --  Influenza AH1 2009 --  Influenza AH3 --  Influenza B --  Mycoplasma Pneumoniae --  NL63 Coronovirus --  OC43 Coronovirus --  Parainfluenza 1 --  Parainfluenza 2 --  Parainfluenza 3 --  Parainfluenza 4 --  Resp Syncytial Virus --      Assessment and Plan:    1. UTI.  2. Toxic metabolic encephalopathy    . Lethargy is resolving,   . Urine culture with no growth. Discontinue IV Rocephin. Add po Vantin 200 mg bid for 3 days.   . Supportive care.         Stephan Pruett MD   [phone].

## 2022-04-05 ENCOUNTER — TRANSCRIPTION ENCOUNTER (OUTPATIENT)
Age: 87
End: 2022-04-05

## 2022-04-05 VITALS — SYSTOLIC BLOOD PRESSURE: 114 MMHG | HEART RATE: 70 BPM | DIASTOLIC BLOOD PRESSURE: 58 MMHG

## 2022-04-05 DIAGNOSIS — S41.119A LACERATION WITHOUT FOREIGN BODY OF UNSPECIFIED UPPER ARM, INITIAL ENCOUNTER: ICD-10-CM

## 2022-04-05 LAB
ANION GAP SERPL CALC-SCNC: 7 MMOL/L — SIGNIFICANT CHANGE UP (ref 5–17)
BUN SERPL-MCNC: 14 MG/DL — SIGNIFICANT CHANGE UP (ref 7–23)
CALCIUM SERPL-MCNC: 8.8 MG/DL — SIGNIFICANT CHANGE UP (ref 8.5–10.1)
CHLORIDE SERPL-SCNC: 106 MMOL/L — SIGNIFICANT CHANGE UP (ref 96–108)
CO2 SERPL-SCNC: 27 MMOL/L — SIGNIFICANT CHANGE UP (ref 22–31)
CREAT SERPL-MCNC: 0.63 MG/DL — SIGNIFICANT CHANGE UP (ref 0.5–1.3)
EGFR: 88 ML/MIN/1.73M2 — SIGNIFICANT CHANGE UP
GLUCOSE SERPL-MCNC: 109 MG/DL — HIGH (ref 70–99)
HCT VFR BLD CALC: 32 % — LOW (ref 39–50)
HGB BLD-MCNC: 10.9 G/DL — LOW (ref 13–17)
MCHC RBC-ENTMCNC: 32.3 PG — SIGNIFICANT CHANGE UP (ref 27–34)
MCHC RBC-ENTMCNC: 34.1 GM/DL — SIGNIFICANT CHANGE UP (ref 32–36)
MCV RBC AUTO: 95 FL — SIGNIFICANT CHANGE UP (ref 80–100)
NRBC # BLD: 0 /100 WBCS — SIGNIFICANT CHANGE UP (ref 0–0)
PLATELET # BLD AUTO: 232 K/UL — SIGNIFICANT CHANGE UP (ref 150–400)
POTASSIUM SERPL-MCNC: 4.3 MMOL/L — SIGNIFICANT CHANGE UP (ref 3.5–5.3)
POTASSIUM SERPL-SCNC: 4.3 MMOL/L — SIGNIFICANT CHANGE UP (ref 3.5–5.3)
RBC # BLD: 3.37 M/UL — LOW (ref 4.2–5.8)
RBC # FLD: 13.2 % — SIGNIFICANT CHANGE UP (ref 10.3–14.5)
SODIUM SERPL-SCNC: 140 MMOL/L — SIGNIFICANT CHANGE UP (ref 135–145)
WBC # BLD: 6.42 K/UL — SIGNIFICANT CHANGE UP (ref 3.8–10.5)
WBC # FLD AUTO: 6.42 K/UL — SIGNIFICANT CHANGE UP (ref 3.8–10.5)

## 2022-04-05 PROCEDURE — 99221 1ST HOSP IP/OBS SF/LOW 40: CPT

## 2022-04-05 RX ORDER — CEFPODOXIME PROXETIL 100 MG
1 TABLET ORAL
Qty: 6 | Refills: 0
Start: 2022-04-05 | End: 2022-04-07

## 2022-04-05 RX ADMIN — Medication 1 TABLET(S): at 05:28

## 2022-04-05 RX ADMIN — Medication 25 MILLIGRAM(S): at 05:28

## 2022-04-05 RX ADMIN — Medication 81 MILLIGRAM(S): at 11:38

## 2022-04-05 RX ADMIN — HEPARIN SODIUM 5000 UNIT(S): 5000 INJECTION INTRAVENOUS; SUBCUTANEOUS at 14:19

## 2022-04-05 RX ADMIN — Medication 1 TABLET(S): at 17:48

## 2022-04-05 RX ADMIN — Medication 25 MILLIGRAM(S): at 17:48

## 2022-04-05 RX ADMIN — HEPARIN SODIUM 5000 UNIT(S): 5000 INJECTION INTRAVENOUS; SUBCUTANEOUS at 05:28

## 2022-04-05 RX ADMIN — Medication 325 MILLIGRAM(S): at 11:38

## 2022-04-05 RX ADMIN — Medication 1 TABLET(S): at 11:38

## 2022-04-05 RX ADMIN — Medication 200 MILLIGRAM(S): at 17:48

## 2022-04-05 RX ADMIN — Medication 200 MILLIGRAM(S): at 05:28

## 2022-04-05 RX ADMIN — PANTOPRAZOLE SODIUM 40 MILLIGRAM(S): 20 TABLET, DELAYED RELEASE ORAL at 11:38

## 2022-04-05 NOTE — DISCHARGE NOTE NURSING/CASE MANAGEMENT/SOCIAL WORK - NSDCPEFALRISK_GEN_ALL_CORE
For information on Fall & Injury Prevention, visit: [email or web address] OR  [email or web address] OR  https://www.cdc.gov/steadi/patient.html

## 2022-04-05 NOTE — PROGRESS NOTE ADULT - NSPROGADDITIONALINFOA_GEN_ALL_CORE
DISCHARGE TO [facility] .[facility] IS REFUSING [facility] PLACEMENT SINCE PATIENT WAS RECENTLY IN REHAB AND WAS D/CD BACK TO [facility] LAST WEEK.

## 2022-04-05 NOTE — PROGRESS NOTE ADULT - PROBLEM SELECTOR PLAN 9
Supportive care,frequent redirection,continue home medications,management of agitation as needed

## 2022-04-05 NOTE — PROGRESS NOTE ADULT - PROBLEM SELECTOR PLAN 3
continue ceftriaxone ,ID cons ,IV hydration

## 2022-04-05 NOTE — PROGRESS NOTE ADULT - REASON FOR ADMISSION
Pt is a 89 yo male hx dementia, afib on asa, dm. pt [facility] per nurse and ems report after being found at 2pm to be unresponsive. pt sent to er. pt found in bed,  last known well unknown at present.  spoke with [facility], states pt normally answers minimal questions, can ambulate slowly with cane. today last seen fully well at breakfast and at noon was at [place] eating lunch, but noone spoke to pt to see if he was at baseline of answering questions.  at 2pm pt with eyes closed and wouldn't answer questions. pt assisted by 2 people to office and was weak on feet,  ems called. Found to have UTI Admitted for septic workup and evaluation,send blood and urine cx,serial lactate levels,monitor vitals closley,ivfs hydration,monitor urine output and renal profile,iv abx initiated Seen by cardiologist and neurology consult requested .ID kalyan called ,started on ceftriaxone .CTH negative for ACUTE CVA .
Pt is a 89 yo male hx dementia, afib on asa, dm. [facility] per nurse and ems report after being found at 2pm to be unresponsive. pt sent to er. pt found in bed,  last known well unknown at present.  spoke with [facility], states pt normally answers minimal questions, can ambulate slowly with cane. today last seen fully well at breakfast and at noon was at [employer] [place] eating lunch, but noone spoke to pt to see if he was at baseline of answering questions.  at 2pm pt with eyes closed and wouldn't answer questions. pt assisted by 2 people to office and was weak on feet,  ems called. Found to have UTI Admitted for septic workup and evaluation,send blood and urine cx,serial lactate levels,monitor vitals closley,ivfs hydration,monitor urine output and renal profile,iv abx initiated Seen by cardiologist and neurology consult requested .ID kalyan called ,started on ceftriaxone .CTH negative for ACUTE CVA .
Pt is a 89 yo male hx dementia, afib on asa, dm. [facility] per nurse and ems report after being found at 2pm to be unresponsive. pt sent to er. pt found in bed,  last known well unknown at present.  spoke with [facility], states pt normally answers minimal questions, can ambulate slowly with cane. today last seen fully well at breakfast and at noon was at [place] eating lunch, but noone spoke to pt to see if he was at baseline of answering questions.  at 2pm pt with eyes closed and wouldn't answer questions. pt assisted by 2 people to office and was weak on feet,  ems called. Found to have UTI Admitted for septic workup and evaluation,send blood and urine cx,serial lactate levels,monitor vitals closley,ivfs hydration,monitor urine output and renal profile,iv abx initiated Seen by cardiologist and neurology consult requested .ID kalyan called ,started on ceftriaxone .CTH negative for ACUTE CVA .
Pt is a 89 yo male hx dementia, afib on asa, dm. [facility] per nurse and ems report after being found at 2pm to be unresponsive. pt sent to er. pt found in bed,  last known well unknown at present.  spoke with [facility], states pt normally answers minimal questions, can ambulate slowly with cane. today last seen fully well at breakfast and at noon was at [place] eating lunch, but noone spoke to pt to see if he was at baseline of answering questions.  at 2pm pt with eyes closed and wouldn't answer questions. pt assisted by 2 people to office and was weak on feet,  ems called. Found to have UTI Admitted for septic workup and evaluation,send blood and urine cx,serial lactate levels,monitor vitals closley,ivfs hydration,monitor urine output and renal profile,iv abx initiated Seen by cardiologist and neurology consult requested .ID kalyan called ,started on ceftriaxone .CTH negative for ACUTE CVA .
Pt is a 89 yo male hx dementia, afib on asa, dm. pt sent [facility] per nurse and ems report after being found at 2pm to be unresponsive. pt sent to er. pt found in bed,  last known well unknown at present.  spoke with [facility], states pt normally answers minimal questions, can ambulate slowly with cane. today last seen fully well at breakfast and at noon was at [place] eating lunch, but noone spoke to pt to see if he was at baseline of answering questions.  at 2pm pt with eyes closed and wouldn't answer questions. pt assisted by 2 people to office and was weak on feet,  ems called. Found to have UTI Admitted for septic workup and evaluation,send blood and urine cx,serial lactate levels,monitor vitals closley,ivfs hydration,monitor urine output and renal profile,iv abx initiated Seen by cardiologist and neurology consult requested .ID kalyan called ,started on ceftriaxone .CTH negative for ACUTE CVA .

## 2022-04-05 NOTE — PROGRESS NOTE ADULT - ASSESSMENT
89 year old male with history of dementia, anemia, CAD, prostate cancer, A-fib, DM, depression, CHF, and HLD    ams eval  [facility] imaging reviewed  GOC discussion  on RA  VS noted  labs and imaging reviewed  old records reviewed  planned for an ECHO  assist with needs - ADL  
89 year old male with history of dementia, anemia, CAD, prostate cancer, A-fib, DM, depression, CHF, and HLD    on ABX  on IVF  ID following      ams eval  [facility] imaging reviewed  GOC discussion documented - DNR DNI - MOLST updated -   on RA  VS noted  labs and imaging reviewed  old records reviewed  planned for an ECHO  assist with needs - ADL  
89 year old male with history of dementia, anemia, CAD, prostate cancer, A-fib, DM, depression, CHF, and HLD    on ABX  on IVF  ID following  SLP eval noted  EEG noted -       ams eval  [facility] imaging reviewed  GOC discussion documented - DNR DNI - MOLST updated -   on RA  VS noted  labs and imaging reviewed  old records reviewed  planned for an ECHO  assist with needs - ADL  
Pt is a 89 yo male hx dementia, afib on asa, dm. [facility] per nurse and ems report after being found at 2pm to be unresponsive. pt sent to er. pt found in bed,  last known well unknown at present.  spoke with [facility], states pt normally answers minimal questions, can ambulate slowly with cane. today last seen fully well at breakfast and at noon was at [place] eating lunch, but noone spoke to pt to see if he was at baseline of answering questions.  at 2pm pt with eyes closed and wouldn't answer questions. pt assisted by 2 people to office and was weak on feet,  ems called. Found to have UTI Admitted for septic workup and evaluation,send blood and urine cx,serial lactate levels,monitor vitals closley,ivfs hydration,monitor urine output and renal profile,iv abx initiated Seen by cardiologist and neurology consult requested .ID kalyan called ,started on ceftriaxone .CTH negative for ACUTE CVA .

## 2022-04-05 NOTE — DISCHARGE NOTE NURSING/CASE MANAGEMENT/SOCIAL WORK - PATIENT PORTAL LINK FT
You can access the FollowMyHealth Patient Portal offered by [facility] by registering at the following website: [email or web address]. By joining [employer]’s FollowMyHealth portal, you will also be able to view your health information using other applications (apps) compatible with our system.

## 2022-04-05 NOTE — DISCHARGE NOTE PROVIDER - DETAILS OF MALNUTRITION DIAGNOSIS/DIAGNOSES
This patient has been assessed with a concern for Malnutrition and was treated during this hospitalization for the following Nutrition diagnosis/diagnoses:     -  04/03/2022: Moderate protein-calorie malnutrition

## 2022-04-05 NOTE — CONSULT NOTE ADULT - NSCONSULTADDITIONALINFOA_GEN_ALL_CORE
Diet, Soft and Bite Sized:   Supplement Feeding Modality:  Oral  Glucerna Shake Cans or Servings Per Day:  1       Frequency:  Three Times a day (04-03-22 @ 10:33) [Active]

## 2022-04-05 NOTE — DISCHARGE NOTE PROVIDER - HOSPITAL COURSE
Pt is a 89 yo male hx dementia, afib on asa, dm. pt sent [facility] per nurse and ems report after being found at 2pm to be unresponsive. pt sent to er. pt found in bed,  last known well unknown at present.  spoke with [facility], states pt normally answers minimal questions, can ambulate slowly with cane. today last seen fully well at breakfast and at noon was at [place] eating lunch, but noone spoke to pt to see if he was at baseline of answering questions.  at 2pm pt with eyes closed and wouldn't answer questions. pt assisted by 2 people to office and was weak on feet,  ems called. Found to have UTI Admitted for septic workup and evaluation,send blood and urine cx,serial lactate levels,monitor vitals closley,ivfs hydration,monitor urine output and renal profile,iv abx initiated Seen by cardiologist and neurology consult requested .ID kalyan called ,started on ceftriaxone .CTH negative for ACUTE CVA .      Nutritional Assessment:  · Nutritional Assessment  This patient has been assessed with a concern for Malnutrition and has been determined to have a diagnosis/diagnoses of Moderate protein-calorie malnutrition.  This patient is being managed with:   Diet Soft and Bite Sized-  Supplement Feeding Modality:  Oral  Glucerna Shake Cans or Servings Per Day:  1       Frequency:  Three Times a day  Entered: Apr  3 2022 10:33AM        Problem/Plan - 1:  ·  Problem: Altered mental status.   ·  Plan: likely 2/2 to acute metabolic encephalopathy secondary to UTI ,poa.    Problem/Plan - 2:  ·  Problem: Metabolic encephalopathy.   ·  Plan: 2/2 to acute UTI ,POA.    Problem/Plan - 3:  ·  Problem: Acute UTI.   ·  Plan: continue ceftriaxone ,ID cons ,IV hydration.    Problem/Plan - 4:  ·  Problem: Afib.   ·  Plan: as per cardiologist .    Problem/Plan - 5:  ·  Problem: DM (diabetes mellitus).   ·  Plan: Accuchecks monitoring and insulin corrective regimen  sliding scale coverage with short acting inslulin, add longacting insulin as needed ,no concentrated sweets diet, serial labs ,HbA1C,education.    Problem/Plan - 6:  ·  Problem: HTN (hypertension).   ·  Plan: continue home medications ,cardiology cons is following.    Problem/Plan - 7:  ·  Problem: Hypophosphatemia.   ·  Plan: replaced ,monitor electrolytes and BMP.    Problem/Plan - 8:  ·  Problem: Pacemaker.   ·  Plan: as per cardiologist.    Problem/Plan - 9:  ·  Problem: Dementia.   ·  Plan: Supportive care,frequent redirection,continue home medications,management of agitation as needed.    Problem/Plan - 10:  ·  Problem: Prophylactic measure.   ·  Plan; Gastrointestinal stress ulcer prophylaxis and DVT prophylaxis administered.

## 2022-04-05 NOTE — DISCHARGE NOTE PROVIDER - NSDCCPCAREPLAN_GEN_ALL_CORE_FT
PRINCIPAL DISCHARGE DIAGNOSIS  Diagnosis: AMS (altered mental status)  Assessment and Plan of Treatment:       SECONDARY DISCHARGE DIAGNOSES  Diagnosis: Metabolic encephalopathy  Assessment and Plan of Treatment:     Diagnosis: Acute UTI  Assessment and Plan of Treatment:     Diagnosis: Afib  Assessment and Plan of Treatment:     Diagnosis: HTN (hypertension)  Assessment and Plan of Treatment:     Diagnosis: DM (diabetes mellitus)  Assessment and Plan of Treatment:     Diagnosis: Pacemaker  Assessment and Plan of Treatment:     Diagnosis: Dementia  Assessment and Plan of Treatment:     Diagnosis: Hypophosphatemia  Assessment and Plan of Treatment:

## 2022-04-05 NOTE — PROGRESS NOTE ADULT - SUBJECTIVE AND OBJECTIVE BOX
Neurology follow up note    RAY [id number]      Interval History:    Patient feels ok no new complaints.    Allergies    latex (Rash)  latex (Unknown)  No Known Drug Allergies    Intolerances        MEDICATIONS    acetaminophen     Tablet .. 650 milliGRAM(s) Oral every 6 hours PRN  aluminum hydroxide/magnesium hydroxide/simethicone Suspension 30 milliLiter(s) Oral every 4 hours PRN  aspirin enteric coated 81 milliGRAM(s) Oral daily  cefpodoxime 200 milliGRAM(s) Oral every 12 hours  dextrose 5%. 1000 milliLiter(s) IV Continuous <Continuous>  dextrose 5%. 1000 milliLiter(s) IV Continuous <Continuous>  dextrose 50% Injectable 25 Gram(s) IV Push once  dextrose 50% Injectable 12.5 Gram(s) IV Push once  dextrose 50% Injectable 25 Gram(s) IV Push once  dextrose Oral Gel 15 Gram(s) Oral once PRN  donepezil 5 milliGRAM(s) Oral at bedtime  ferrous    sulfate 325 milliGRAM(s) Oral daily  glucagon  Injectable 1 milliGRAM(s) IntraMuscular once  heparin   Injectable 5000 Unit(s) SubCutaneous every 8 hours  insulin lispro (ADMELOG) corrective regimen sliding scale   SubCutaneous three times a day before meals  lactobacillus acidophilus 1 Tablet(s) Oral two times a day  losartan 25 milliGRAM(s) Oral daily  melatonin 3 milliGRAM(s) Oral at bedtime PRN  metoprolol tartrate 25 milliGRAM(s) Oral two times a day  multivitamin/minerals 1 Tablet(s) Oral daily  ondansetron Injectable 4 milliGRAM(s) IV Push every 8 hours PRN  pantoprazole    Tablet 40 milliGRAM(s) Oral daily  polyethylene glycol 3350 17 Gram(s) Oral daily  senna 2 Tablet(s) Oral at bedtime  simvastatin 20 milliGRAM(s) Oral at bedtime  tamsulosin 0.4 milliGRAM(s) Oral at bedtime              Vital Signs Last 24 Hrs  T(C): 36.8 (2022 04:45), Max: 36.9 (2022 13:35)  T(F): 98.3 (2022 04:45), Max: 98.5 (2022 13:35)  HR: 66 (2022 04:45) (66 - 89)  BP: 110/58 (2022 04:45) (102/50 - 110/58)  BP(mean): --  RR: 18 (2022 04:45) (18 - 18)  SpO2: 95% (2022 04:45) (92% - 95%)    REVIEW OF SYSTEMS:  Limited secondary to the patient's underlying dementia, but when I asked questions, the patient denies constitutionally any fevers or chills.  Head:  No headaches.  Eyes:  No double vision or blurry vision.  Ears:  No ringing in the ears.  Neck:  No neck pain.  Cardiovascular: No chest pain.  Respiratory:  No shortness of breath.Abdomen:  No nausea, vomiting, or abdominal pain.  Extremities/Neurological:  No numbness or tingling.  Musculoskeletal:  No joint pain.    PHYSICAL EXAMINATION:   HEENT:  Head:  Normocephalic, atraumatic.  Eyes:  No scleral icterus.  Ears:  Hearing appeared to be intact.  NECK:  Has slightly increased tone.  CARDIOVASCULAR:  S1 and S2 heard.  RESPIRATORY:  Decreased breath sounds bilaterally.  ABDOMEN:  Soft and nontender.  EXTREMITIES:  No clubbing or cyanosis was noted.      NEUROLOGIC:  The patient was awake in bed.  With choices, location was unknown, had difficulty naming objects, was able to try to make out number of fingers with each eye  Extraocular movements appeared to be intact.  Pupils bilaterally 2 mm reactive to 1.  Speech:  The patient would articulate one to two words.  I did not notice any dysarthria.  Motor:  Right upper was 4/5.  Left upper, the patient had decreased range of motion of the left shoulder.  Biceps, triceps, I would say 3+/5.  The patient was unable to open and close his left hand.  Lower extremities:  The patient has significantly increased tone in bilateral lower extremities, but was able to elevate off the bed, I would say overall strength was 3/5.              LABS:  CBC Full  -  ( 2022 07:52 )  WBC Count : 6.42 K/uL  RBC Count : 3.37 M/uL  Hemoglobin : 10.9 g/dL  Hematocrit : 32.0 %  Platelet Count - Automated : 232 K/uL  Mean Cell Volume : 95.0 fl  Mean Cell Hemoglobin : 32.3 pg  Mean Cell Hemoglobin Concentration : 34.1 gm/dL  Auto Neutrophil # : x  Auto Lymphocyte # : x  Auto Monocyte # : x  Auto Eosinophil # : x  Auto Basophil # : x  Auto Neutrophil % : x  Auto Lymphocyte % : x  Auto Monocyte % : x  Auto Eosinophil % : x  Auto Basophil % : x    Urinalysis Basic - ( 2022 12:46 )    Color: Light Yellow / Appearance: Clear / S.020 / pH: x  Gluc: x / Ketone: Negative  / Bili: Negative / Urobili: <2 mg/dL   Blood: x / Protein: Negative / Nitrite: Negative   Leuk Esterase: Large / RBC: 25 /HPF / WBC 37 /HPF   Sq Epi: x / Non Sq Epi: 2 /HPF / Bacteria: Negative          140  |  106  |  14  ----------------------------<  109<H>  4.3   |  27  |  0.63    Ca    8.8      2022 07:52      Hemoglobin A1C:       Vitamin B12         RADIOLOGY    ANALYSIS AND PLAN:  This is a 89-year with episode of altered mental status, unresponsive.  For episode of altered mental status and unresponsiveness, I suspect possibly metabolic encephalopathy, possibly secondary to underlying urinary tract infection with possibly periods of hypotension.  Examination was limited, unclear what the patient's overall baseline is.  Would recommend follow up urine cultures, antibiotics as needed.  For history of dementia, continue the patient on his Aricept.  For history of subdural hematomas, repeat CAT scan did not show any new clear intracerebral hemorrhage.  For history of hypertension, monitor systolic blood pressure as needed.  For history of hyperlipidemia, continue the patient on statin.      spoke to sonVinicio, at [phone]      Greater than 40 minutes of time was spent with the patient, planning care, reviewing data, speaking to multidisciplinary healthcare team with greater than 50% of that time in counseling and care coordination.    Thank you for the courtesy of this consultation.

## 2022-04-05 NOTE — CONSULT NOTE ADULT - REASON FOR ADMISSION
Pt is a 89 yo male hx dementia, afib on asa, dm. pt sent [facility] per nurse and ems report after being found at 2pm to be unresponsive. pt sent to er. pt found in bed,  last known well unknown at present.  spoke with [facility], states pt normally answers minimal questions, can ambulate slowly with cane. today last seen fully well at breakfast and at noon was at [employer] [place] eating lunch, but noone spoke to pt to see if he was at baseline of answering questions.  at 2pm pt with eyes closed and wouldn't answer questions. pt assisted by 2 people to office and was weak on feet,  ems called. Found to have UTI Admitted for septic workup and evaluation,send blood and urine cx,serial lactate levels,monitor vitals closley,ivfs hydration,monitor urine output and renal profile,iv abx initiated Seen by cardiologist and neurology consult requested .ID kalyan called ,started on ceftriaxone .CTH negative for ACUTE CVA .

## 2022-04-05 NOTE — PROGRESS NOTE ADULT - PROBLEM SELECTOR PLAN 6
continue home medications ,cardiology cons is following

## 2022-04-05 NOTE — PROGRESS NOTE ADULT - PROVIDER SPECIALTY LIST ADULT
Infectious Disease
Palliative Care
Infectious Disease
Palliative Care
Neurology
Palliative Care
Hospitalist

## 2022-04-05 NOTE — PROGRESS NOTE ADULT - SUBJECTIVE AND OBJECTIVE BOX
PROGRESS NOTE  Patient is a 89y old  Male who presents with a chief complaint of Pt is a 89 yo male hx dementia, afib on asa, dm. pt sent from [facility] per nurse and ems report after being found at 2pm to be unresponsive. pt sent to er. pt found in bed,  last known well unknown at present.  spoke with [facility], states pt normally answers minimal questions, can ambulate slowly with cane. today last seen fully well at breakfast and at noon was at [employer] [place] eating lunch, but noone spoke to pt to see if he was at baseline of answering questions.  at 2pm pt with eyes closed and wouldn't answer questions. pt assisted by 2 people to office and was weak on feet,  ems called. Found to have UTI Admitted for septic workup and evaluation,send blood and urine cx,serial lactate levels,monitor vitals closley,ivfs hydration,monitor urine output and renal profile,iv abx initiated Seen by cardiologist and neurology consult requested .ID kalyan called ,started on ceftriaxone .CTH negative for ACUTE CVA . (05 Apr 2022 14:12)  Chart and available morning labs /imaging are reviewed electronically , urgent issues addressed . More information  is being added upon completion of rounds , when more information is collected and management discussed with consultants , medical staff and social service/case management on the floor   OVERNIGHT  No new issues reported by medical staff . All above noted Patient is resting in a bed comfortably .Confused ,poor mentation .No distress noted   HPI:  Pt is a 89 yo male hx dementia, afib on asa, dm. pt sent from [facility] per nurse and ems report after being found at 2pm to be unresponsive. pt sent to er. pt found in bed,  last known well unknown at present.  spoke with [facility], states pt normally answers minimal questions, can ambulate slowly with cane. today last seen fully well at breakfast and at noon was at [facility] [place] eating lunch, but noone spoke to pt to see if he was at baseline of answering questions.  at 2pm pt with eyes closed and wouldn't answer questions. pt assisted by 2 people to office and was weak on feet,  ems called. Found to have UTI Admitted for septic workup and evaluation,send blood and urine cx,serial lactate levels,monitor vitals closley,ivfs hydration,monitor urine output and renal profile,iv abx initiated Seen by cardiologist and neurology consult requested .ID kalyan called ,started on ceftriaxone .CTH negative for ACUTE CVA .     PAST MEDICAL & SURGICAL HISTORY:  Atrial fibrillation  Hypertension    Diabetes    Prostate ca    Dementia    CHF (congestive heart failure)    Hyperlipemia    Diabetes    Depression    Dementia    DM (diabetes mellitus)    Atrial fibrillation    Prostate CA    Arteriosclerotic heart disease (ASHD)    Dementia    Anemia    Constipation  PACEMAKER   No significant past surgical history  < from: CT Brain Stroke Protocol (04.02.22 @ 18:46) >    ACC: [id number] EXAM:  CT PERFUSION W MAPS IC                        ACC: [id number] EXAM:  CT ANGIO BRAIN (W)AW IC                        ACC: [id number] EXAM:  CT BRAIN STROKE PROTOCOL                        ACC: [id number] EXAM:  CT ANGIO NECK (W)AW IC                        PROCEDURE DATE:  04/02/2022          INTERPRETATION:  CT HEAD STROKE PROTOCOL, CT ANGIO NECK WITHOUT AND OR   WITH IV CONTRAST, CT ANGIO BRAIN WITHOUT AND OR WITH IV CONTRAST, CT   PERFUSION WITH MAPS WITH IV CONTRAST    CLINICAL HISTORY: Stroke Code    CT HEAD TECHNIQUE:  Noncontrast CT.  Axial Acqisition.  Sagittal and coronal reformations.    COMPARISON:  Head CT is compared to prior study of 1/18/2022    FINDINGS:  *  HEMORRHAGE:  No evidence of intracranial hemorrhage.  *  BRAIN PARENCHYMA:  Moderate atrophy. Mild periventricular white matter   ischemic changes. No mass or mass effect.  *  VENTRICLES / SHIFT:  No hydrocephalus. No midline shift.  *  EXTRA-AXIAL / BASAL CISTERNS:  No extra-axial mass. Basal cisterns   preserved.  Atherosclerotic calcifications of the cavernous internal   carotid arteries.  *  CALVARIUM AND EXTRACRANIAL SOFT TISSUES:  No depressed calvarial   fracture.  *  SINUSES, ORBITS, MASTOIDS:  The visualized paranasal sinuses and   mastoid air cells are well aerated.  ----------------------------------------    CTA TECHNIQUE:  CT angiography of the neck and brain was performed during the dynamic   administration of intravenous contrast.  MIP reconstructions were   performed and reviewed. Multiplanar reformations were obtained.  Contrast administered 60 cc Omnipaque 350, contrast discarded 10 cc    CTA FINDINGS:  NECK  RIGHT CAROTID CIRCULATION:  *  Evaluation of the right carotid circulation demonstrates no   hemodynamic significant narrowing utilizing a distal reference. Mild   calcified plaque carotid bulb/bifurcation  LEFT CAROTID CIRCULATION:  *  Evaluation of the left carotid circulation demonstrates no hemodynamic   significant narrowing utilizing a distal reference. Mild calcified plaque   carotid bulb/bifurcation  VERTEBRAL ARTERIES:  *  Evaluation of the vertebral arteries reveals no evidence of a   vertebral artery occlusion or dissection.    BRAIN  ANTERIOR CIRCULATION:  *  Distal internal carotid arteries are patent.  *  Anterior cerebral arteries are patent. Atherosclerotic calcification   of the cavernous segments without significant narrowing  *  Middle cerebral arteries are patent.  POSTERIOR CIRCULATION:  *  Distal vertebral arteries are patent.  *  Basilar artery is patent. Proximal superior cerebellar arteries are   patent  *  Posterior cerebral arteries are patent.    OTHER:  *  8 mm nodule right lung apex.  --------------------------------------------------    CT PERFUSION TECHNIQUE:  CT perfusion was performed during the administration of intravenous   contrast.  There was a total of 8 cm brain coverage.  The images were processed utilizing RAPID software.  Contrast administered 80 cc Omnipaque 350, contrast discarded 0 cc    Ischemic tissue is defined as TMAX > 6 seconds.  Core infarct is defined as CBF < 30%.    CT PERFUSION FINDINGS:  *  CT perfusion is uninterpretable due to motion and suboptimal contrast   bolus/timing.      IMPRESSION:  *  NO EVIDENCE OF AN ACUTE INTRACRANIAL HEMORRHAGE, MIDLINE SHIFT, OR   HYDROCEPHALUS. MODERATE ATROPHY WITH MILD PERIVENTRICULAR WHITE MATTER   ISCHEMIC CHANGES  *  NO HEMODYNAMIC SIGNIFICANT NARROWING WITHIN THE NECK.  *  NO PROXIMAL LARGE VESSEL OCCLUSION INTRACRANIALLY.  *  CT PERFUSION UNINTERPRETABLE DUE TO MOTION AND SUBOPTIMAL CONTRAST   BOLUS/TIMING..  *  8 MM RIGHT APICAL LUNG NODULE.    *  Interval follow-up if acute ischemia remains of clinical concern.    Preliminary head CT findings discussed with Dr. Dennis at 6:48 PM on   4/2/2022    --- End of Report ---       (02 Apr 2022 19:45)    PAST MEDICAL & SURGICAL HISTORY:  Atrial fibrillation    Hypertension    Diabetes    Prostate ca    Dementia    CHF (congestive heart failure)    Hyperlipemia    Diabetes    Depression    Dementia    DM (diabetes mellitus)    Atrial fibrillation    Prostate CA    Arteriosclerotic heart disease (ASHD)    Dementia    Anemia    Constipation    No significant past surgical history        MEDICATIONS  (STANDING):  aspirin enteric coated 81 milliGRAM(s) Oral daily  cefpodoxime 200 milliGRAM(s) Oral every 12 hours  dextrose 5%. 1000 milliLiter(s) (100 mL/Hr) IV Continuous <Continuous>  dextrose 5%. 1000 milliLiter(s) (50 mL/Hr) IV Continuous <Continuous>  dextrose 50% Injectable 25 Gram(s) IV Push once  dextrose 50% Injectable 12.5 Gram(s) IV Push once  dextrose 50% Injectable 25 Gram(s) IV Push once  donepezil 5 milliGRAM(s) Oral at bedtime  ferrous    sulfate 325 milliGRAM(s) Oral daily  glucagon  Injectable 1 milliGRAM(s) IntraMuscular once  heparin   Injectable 5000 Unit(s) SubCutaneous every 8 hours  insulin lispro (ADMELOG) corrective regimen sliding scale   SubCutaneous three times a day before meals  lactobacillus acidophilus 1 Tablet(s) Oral two times a day  losartan 25 milliGRAM(s) Oral daily  metoprolol tartrate 25 milliGRAM(s) Oral two times a day  multivitamin/minerals 1 Tablet(s) Oral daily  pantoprazole    Tablet 40 milliGRAM(s) Oral daily  polyethylene glycol 3350 17 Gram(s) Oral daily  senna 2 Tablet(s) Oral at bedtime  simvastatin 20 milliGRAM(s) Oral at bedtime  tamsulosin 0.4 milliGRAM(s) Oral at bedtime    MEDICATIONS  (PRN):  acetaminophen     Tablet .. 650 milliGRAM(s) Oral every 6 hours PRN Temp greater or equal to 38C (100.4F), Mild Pain (1 - 3)  aluminum hydroxide/magnesium hydroxide/simethicone Suspension 30 milliLiter(s) Oral every 4 hours PRN Dyspepsia  dextrose Oral Gel 15 Gram(s) Oral once PRN Blood Glucose LESS THAN 70 milliGRAM(s)/deciliter  melatonin 3 milliGRAM(s) Oral at bedtime PRN Insomnia  ondansetron Injectable 4 milliGRAM(s) IV Push every 8 hours PRN Nausea and/or Vomiting      OBJECTIVE    T(C): 36.6 (04-05-22 @ 14:16), Max: 36.9 (04-04-22 @ 20:15)  HR: 63 (04-05-22 @ 14:16) (63 - 89)  BP: 100/58 (04-05-22 @ 14:16) (100/58 - 110/58)  RR: 18 (04-05-22 @ 14:16) (18 - 18)  SpO2: 93% (04-05-22 @ 14:16) (92% - 95%)  Wt(kg): --  I&O's Summary    04 Apr 2022 07:01  -  05 Apr 2022 07:00  --------------------------------------------------------  IN: 420 mL / OUT: 0 mL / NET: 420 mL    05 Apr 2022 07:01  -  05 Apr 2022 14:19  --------------------------------------------------------  IN: 150 mL / OUT: 0 mL / NET: 150 mL          REVIEW OF SYSTEMS:  CONSTITUTIONAL: No fever, weight loss, or fatigue  EYES: No eye pain, visual disturbances, or discharge  ENMT:   No sinus or throat pain  NECK: No pain or stiffness  RESPIRATORY: No cough, wheezing, chills or hemoptysis; No shortness of breath  CARDIOVASCULAR: No chest pain, palpitations, dizziness, or leg swelling  GASTROINTESTINAL: No abdominal pain. No nausea, vomiting; No diarrhea or constipation. No melena or hematochezia.  GENITOURINARY: No dysuria, frequency, hematuria, or incontinence  NEUROLOGICAL: No headaches, memory loss, loss of strength, numbness, or tremors  SKIN: No itching, burning, rashes, or lesions   MUSCULOSKELETAL: No joint pain or swelling; No muscle, back, or extremity pain    PHYSICAL EXAM:  Appearance: NAD. VS past 24 hrs -as above   HEENT:   Moist oral mucosa. Conjunctiva clear b/l.   Neck : supple  Respiratory: Lungs CTAB.  Gastrointestinal:  Soft, nontender. No rebound. No rigidity. BS present	  Cardiovascular: RRR ,S1S2 present  Neurologic: Non-focal. Moving all extremities.  Extremities: No edema. No erythema. No calf tenderness.  Skin: No rashes, No ecchymoses, No cyanosis.	  wounds ,skin lesions-See skin assesment flow sheet   LABS:                        10.9   6.42  )-----------( 232      ( 05 Apr 2022 07:52 )             32.0     04-05    140  |  106  |  14  ----------------------------<  109<H>  4.3   |  27  |  0.63    Ca    8.8      05 Apr 2022 07:52      CAPILLARY BLOOD GLUCOSE      POCT Blood Glucose.: 123 mg/dL (05 Apr 2022 11:18)  POCT Blood Glucose.: 112 mg/dL (05 Apr 2022 07:30)  POCT Blood Glucose.: 190 mg/dL (04 Apr 2022 21:44)  POCT Blood Glucose.: 124 mg/dL (04 Apr 2022 16:43)          Culture - Urine (collected 03 Apr 2022 04:11)  Source: Clean Catch Clean Catch (Midstream)  Final Report (04 Apr 2022 14:49):    <10,000 CFU/mL Normal Urogenital Lien    Culture - Blood (collected 03 Apr 2022 00:27)  Source: .Blood Blood-Peripheral  Preliminary Report (04 Apr 2022 01:01):    No growth to date.    Culture - Blood (collected 03 Apr 2022 00:27)  Source: .Blood Blood-Peripheral  Preliminary Report (04 Apr 2022 01:01):    No growth to date.      RADIOLOGY & ADDITIONAL TESTS:   reviewed elctronically  ASSESSMENT/PLAN: 	    Patient was seen and examined on a day of discharge . Plan of care , discharge medications and recommendations discussed with consultants and clearance for discharge obtained .Social service , case management  and medical staff are aware of plan. Family is notified. Discharge summary  is  prepared electronically-see separate document prepared by me .75minutes spent on this visit, 50% visit time spent in care co-ordination with other attendings and counselling patient  I have discussed care plan with patient and HCP,expressed understanding of problems treatment and their effect and side effects, alternatives in detail,I have asked if they have any questions and concerns and appropriately addressed them to best of my ability

## 2022-04-05 NOTE — CONSULT NOTE ADULT - ASSESSMENT
89 year old male with history of dementia, anemia, CAD, prostate cancer, A-fib, DM, depression, CHF, and HLD
Patient is a 89yo male presenting with:  1)left hand middle digit stage 2 skin tear 2 x 2, scant drainage, no odor, periwound erythema  recommend:   1. Xeroform QOD  Left heel with callous   no other wounds skin integrity is intact  Patient is on a low air loss mattress  This pressure injury is community acquired YES  At risk for altered tissue perfusion /YES  Impaired perfusion of peripheral tissue /YES  Continue  Nutrition (as tolerated)  Continue  Offloading   Continue Pericare  Continue to apply cair boots at all times while in bed.   Provide skin checks and foot placement q8h.  Care as per medicine will follow w/ you    Findings and recommendations discussed with JACOB Acosta   Thank you for this consult  Margarita Solorzano NP, [facility] [phone]

## 2022-04-05 NOTE — PROGRESS NOTE ADULT - PROBLEM SELECTOR PLAN 7
replaced ,monitor electrolytes and BMP

## 2022-04-05 NOTE — DISCHARGE NOTE NURSING/CASE MANAGEMENT/SOCIAL WORK - NSDCVIVACCINE_GEN_ALL_CORE_FT
pneumococcal polysaccharide PPV23; 18-Jun-2014 09:36; Lamar Gonzalez (RN); [id number]; IntraMuscular; Deltoid Right.; 0.5 milliLiter(s);   Tdap; 19-Jul-2018 13:12; Arleen Wallace (RN); [employer]; [id number]; IntraMuscular; Deltoid Left.; 0.5 milliLiter(s); VIS (VIS Published: 09-May-2013, VIS Presented: 19-Jul-2018);   Tdap; 18-Jan-2022 12:32; Bethanie Briseno (RN); [employer]; [id number] (Exp. Date: 09-Sep-2023); IntraMuscular; Deltoid Right.; 0.5 milliLiter(s); VIS (VIS Published: 09-May-2013, VIS Presented: 18-Jan-2022);

## 2022-04-05 NOTE — DISCHARGE NOTE PROVIDER - NSDCCAREPROVSEEN_GEN_ALL_CORE_FT
Sharla, Ton Paul, Doug Solorzano, Margarita Pruett, Stephan Perry, Mohsen Schmuter, Lisa Whittaker, Jeffrey

## 2022-04-05 NOTE — PROGRESS NOTE ADULT - SUBJECTIVE AND OBJECTIVE BOX
Date/Time Patient Seen:  		  Referring MD:   Data Reviewed	       Patient is a 89y old  Male who presents with a chief complaint of Pt is a 89 yo male hx dementia, afib on asa, dm. pt sent from [facility] per nurse and ems report after being found at 2pm to be unresponsive. pt sent to er. pt found in bed,  last known well unknown at present.  spoke with [facility], states pt normally answers minimal questions, can ambulate slowly with cane. today last seen fully well at breakfast and at noon was at [facility] eating lunch, but noone spoke to pt to see if he was at baseline of answering questions.  at 2pm pt with eyes closed and wouldn't answer questions. pt assisted by 2 people to office and was weak on feet,  ems called. Found to have UTI Admitted for septic workup and evaluation,send blood and urine cx,serial lactate levels,monitor vitals closley,ivfs hydration,monitor urine output and renal profile,iv abx initiated Seen by cardiologist and neurology consult requested .ID kalyan called ,started on ceftriaxone .CTH negative for ACUTE CVA . (04 Apr 2022 18:29)      Subjective/HPI     PAST MEDICAL & SURGICAL HISTORY:  Atrial fibrillation    Hypertension    Diabetes    Prostate ca    Dementia    CHF (congestive heart failure)    Hyperlipemia    Diabetes    Depression    Dementia    No pertinent past medical history    DM (diabetes mellitus)    Atrial fibrillation    Prostate CA    Arteriosclerotic heart disease (ASHD)    Dementia    Anemia    Constipation    No significant past surgical history    No significant past surgical history          Medication list         MEDICATIONS  (STANDING):  aspirin enteric coated 81 milliGRAM(s) Oral daily  cefpodoxime 200 milliGRAM(s) Oral every 12 hours  dextrose 5% + sodium chloride 0.45%. 1000 milliLiter(s) (50 mL/Hr) IV Continuous <Continuous>  dextrose 5%. 1000 milliLiter(s) (100 mL/Hr) IV Continuous <Continuous>  dextrose 5%. 1000 milliLiter(s) (50 mL/Hr) IV Continuous <Continuous>  dextrose 50% Injectable 25 Gram(s) IV Push once  dextrose 50% Injectable 12.5 Gram(s) IV Push once  dextrose 50% Injectable 25 Gram(s) IV Push once  donepezil 5 milliGRAM(s) Oral at bedtime  ferrous    sulfate 325 milliGRAM(s) Oral daily  glucagon  Injectable 1 milliGRAM(s) IntraMuscular once  heparin   Injectable 5000 Unit(s) SubCutaneous every 8 hours  insulin lispro (ADMELOG) corrective regimen sliding scale   SubCutaneous three times a day before meals  lactobacillus acidophilus 1 Tablet(s) Oral two times a day  losartan 25 milliGRAM(s) Oral daily  metoprolol tartrate 25 milliGRAM(s) Oral two times a day  multivitamin/minerals 1 Tablet(s) Oral daily  pantoprazole    Tablet 40 milliGRAM(s) Oral daily  polyethylene glycol 3350 17 Gram(s) Oral daily  senna 2 Tablet(s) Oral at bedtime  simvastatin 20 milliGRAM(s) Oral at bedtime  tamsulosin 0.4 milliGRAM(s) Oral at bedtime    MEDICATIONS  (PRN):  acetaminophen     Tablet .. 650 milliGRAM(s) Oral every 6 hours PRN Temp greater or equal to 38C (100.4F), Mild Pain (1 - 3)  aluminum hydroxide/magnesium hydroxide/simethicone Suspension 30 milliLiter(s) Oral every 4 hours PRN Dyspepsia  dextrose Oral Gel 15 Gram(s) Oral once PRN Blood Glucose LESS THAN 70 milliGRAM(s)/deciliter  melatonin 3 milliGRAM(s) Oral at bedtime PRN Insomnia  ondansetron Injectable 4 milliGRAM(s) IV Push every 8 hours PRN Nausea and/or Vomiting         Vitals log        ICU Vital Signs Last 24 Hrs  T(C): 36.8 (05 Apr 2022 04:45), Max: 36.9 (04 Apr 2022 13:35)  T(F): 98.3 (05 Apr 2022 04:45), Max: 98.5 (04 Apr 2022 13:35)  HR: 66 (05 Apr 2022 04:45) (66 - 89)  BP: 110/58 (05 Apr 2022 04:45) (102/50 - 110/58)  BP(mean): --  ABP: --  ABP(mean): --  RR: 18 (05 Apr 2022 04:45) (18 - 18)  SpO2: 95% (05 Apr 2022 04:45) (92% - 95%)           Input and Output:  I&O's Detail    03 Apr 2022 07:01  -  04 Apr 2022 07:00  --------------------------------------------------------  IN:    Oral Fluid: 460 mL  Total IN: 460 mL    OUT:    Incontinent per Condom Catheter (mL): 1350 mL    Voided (mL): 1 mL  Total OUT: 1351 mL    Total NET: -891 mL      04 Apr 2022 07:01  -  05 Apr 2022 05:41  --------------------------------------------------------  IN:    Oral Fluid: 420 mL  Total IN: 420 mL    OUT:  Total OUT: 0 mL    Total NET: 420 mL          Lab Data                        11.1   5.72  )-----------( 236      ( 04 Apr 2022 07:21 )             33.3     04-04    139  |  104  |  14  ----------------------------<  109<H>  4.2   |  29  |  0.62    Ca    8.9      04 Apr 2022 07:21  Phos  2.3     04-03  Mg     1.8     04-03              Review of Systems	      Objective     Physical Examination    heart s1s2  lung dec BS  abd soft      Pertinent Lab findings & Imaging      Grayson:  NO   Adequate UO     I&O's Detail    03 Apr 2022 07:01  -  04 Apr 2022 07:00  --------------------------------------------------------  IN:    Oral Fluid: 460 mL  Total IN: 460 mL    OUT:    Incontinent per Condom Catheter (mL): 1350 mL    Voided (mL): 1 mL  Total OUT: 1351 mL    Total NET: -891 mL      04 Apr 2022 07:01  -  05 Apr 2022 05:41  --------------------------------------------------------  IN:    Oral Fluid: 420 mL  Total IN: 420 mL    OUT:  Total OUT: 0 mL    Total NET: 420 mL               Discussed with:     Cultures:	        Radiology

## 2022-04-05 NOTE — DISCHARGE NOTE PROVIDER - CARE PROVIDER_API CALL
Toby Quintana)  Critical Care Medicine; Internal Medicine; Pulmonary Disease  [facility] [place] [address]  Phone: [phone]  Fax: [phone]  Follow Up Time: 1 week    Stephan Pruett  INFECTIOUS DISEASE  [address] [place] [address]  Phone: [phone]  Fax: [phone]  Follow Up Time: 1 week    Doug Paul)  Cardiology  [address] [place] [address]  Phone: [phone]  Fax: [phone]  Follow Up Time: 1 month

## 2022-04-05 NOTE — CONSULT NOTE ADULT - SUBJECTIVE AND OBJECTIVE BOX
HPI:  Pt is a 89 yo male hx dementia, afib on asa, dm. pt sent from [facility]  Found to have UTI Admitted for septic workup and evaluation,    PAST MEDICAL & SURGICAL HISTORY:  Atrial fibrillation  Hypertension    Diabetes    Prostate ca    Dementia    CHF (congestive heart failure)    Hyperlipemia    Diabetes    Depression    Dementia    DM (diabetes mellitus)    Atrial fibrillation    Prostate CA    Arteriosclerotic heart disease (ASHD)    Dementia    Anemia    Constipation  PACEMAKER   No significant past surgical history    PAST MEDICAL & SURGICAL HISTORY:  Atrial fibrillation    Hypertension    Diabetes    Prostate ca    Dementia    CHF (congestive heart failure)    Hyperlipemia    Diabetes    Depression    Dementia    DM (diabetes mellitus)    Atrial fibrillation    Prostate CA    Arteriosclerotic heart disease (ASHD)    Dementia    Anemia    Constipation    No significant past surgical history      REVIEW OF SYSTEMS  Patient is unable to provide any information/ROS  due to baseline mental status    MEDICATIONS  (STANDING):  aspirin enteric coated 81 milliGRAM(s) Oral daily  cefpodoxime 200 milliGRAM(s) Oral every 12 hours  dextrose 5%. 1000 milliLiter(s) (100 mL/Hr) IV Continuous <Continuous>  dextrose 5%. 1000 milliLiter(s) (50 mL/Hr) IV Continuous <Continuous>  dextrose 50% Injectable 25 Gram(s) IV Push once  dextrose 50% Injectable 12.5 Gram(s) IV Push once  dextrose 50% Injectable 25 Gram(s) IV Push once  donepezil 5 milliGRAM(s) Oral at bedtime  ferrous    sulfate 325 milliGRAM(s) Oral daily  glucagon  Injectable 1 milliGRAM(s) IntraMuscular once  heparin   Injectable 5000 Unit(s) SubCutaneous every 8 hours  insulin lispro (ADMELOG) corrective regimen sliding scale   SubCutaneous three times a day before meals  lactobacillus acidophilus 1 Tablet(s) Oral two times a day  losartan 25 milliGRAM(s) Oral daily  metoprolol tartrate 25 milliGRAM(s) Oral two times a day  multivitamin/minerals 1 Tablet(s) Oral daily  pantoprazole    Tablet 40 milliGRAM(s) Oral daily  polyethylene glycol 3350 17 Gram(s) Oral daily  senna 2 Tablet(s) Oral at bedtime  simvastatin 20 milliGRAM(s) Oral at bedtime  tamsulosin 0.4 milliGRAM(s) Oral at bedtime    MEDICATIONS  (PRN):  acetaminophen     Tablet .. 650 milliGRAM(s) Oral every 6 hours PRN Temp greater or equal to 38C (100.4F), Mild Pain (1 - 3)  aluminum hydroxide/magnesium hydroxide/simethicone Suspension 30 milliLiter(s) Oral every 4 hours PRN Dyspepsia  dextrose Oral Gel 15 Gram(s) Oral once PRN Blood Glucose LESS THAN 70 milliGRAM(s)/deciliter  melatonin 3 milliGRAM(s) Oral at bedtime PRN Insomnia  ondansetron Injectable 4 milliGRAM(s) IV Push every 8 hours PRN Nausea and/or Vomiting      Allergies    latex (Rash)  latex (Unknown)  No Known Drug Allergies    Intolerances    SOCIAL HISTORY:      FAMILY HISTORY:    Vital Signs Last 24 Hrs  T(C): 36.8 (05 Apr 2022 04:45), Max: 36.9 (04 Apr 2022 20:15)  T(F): 98.3 (05 Apr 2022 04:45), Max: 98.5 (04 Apr 2022 20:15)  HR: 66 (05 Apr 2022 04:45) (66 - 89)  BP: 110/58 (05 Apr 2022 04:45) (102/50 - 110/58)  BP(mean): --  RR: 18 (05 Apr 2022 04:45) (18 - 18)  SpO2: 95% (05 Apr 2022 04:45) (92% - 95%)                        10.9   6.42  )-----------( 232      ( 05 Apr 2022 07:52 )             32.0     04-05    140  |  106  |  14  ----------------------------<  109<H>  4.3   |  27  |  0.63    Ca    8.8      05 Apr 2022 07:52        A1C with Estimated Average Glucose Result: 5.9 % (04-04-22 @ 08:57)  A1C with Estimated Average Glucose Result: 5.9 % (01-26-22 @ 11:13)      Neurology    nonverbal, Can not follow commands    Musculoskeletal/Vascular:  no deformities/ contractures      Skin:    frail,  dry

## 2022-04-05 NOTE — PROGRESS NOTE ADULT - SUBJECTIVE AND OBJECTIVE BOX
Interval History:    CENTRAL LINE:   [  ] YES       [  ] NO  RAMOS:                 [  ] YES       [  ] NO         REVIEW OF SYSTEMS:  All Systems below were reviewed and are negative [  ]  HEENT:  ID:  Pulmonary:  Cardiac:  GI:  Renal:  Musculoskeletal:  All other systems above were reviewed and are negative   [  ]      MEDICATIONS  (STANDING):  aspirin enteric coated 81 milliGRAM(s) Oral daily  cefpodoxime 200 milliGRAM(s) Oral every 12 hours  dextrose 5%. 1000 milliLiter(s) (100 mL/Hr) IV Continuous <Continuous>  dextrose 5%. 1000 milliLiter(s) (50 mL/Hr) IV Continuous <Continuous>  dextrose 50% Injectable 25 Gram(s) IV Push once  dextrose 50% Injectable 12.5 Gram(s) IV Push once  dextrose 50% Injectable 25 Gram(s) IV Push once  donepezil 5 milliGRAM(s) Oral at bedtime  ferrous    sulfate 325 milliGRAM(s) Oral daily  glucagon  Injectable 1 milliGRAM(s) IntraMuscular once  heparin   Injectable 5000 Unit(s) SubCutaneous every 8 hours  insulin lispro (ADMELOG) corrective regimen sliding scale   SubCutaneous three times a day before meals  lactobacillus acidophilus 1 Tablet(s) Oral two times a day  losartan 25 milliGRAM(s) Oral daily  metoprolol tartrate 25 milliGRAM(s) Oral two times a day  multivitamin/minerals 1 Tablet(s) Oral daily  pantoprazole    Tablet 40 milliGRAM(s) Oral daily  polyethylene glycol 3350 17 Gram(s) Oral daily  senna 2 Tablet(s) Oral at bedtime  simvastatin 20 milliGRAM(s) Oral at bedtime  tamsulosin 0.4 milliGRAM(s) Oral at bedtime    MEDICATIONS  (PRN):  acetaminophen     Tablet .. 650 milliGRAM(s) Oral every 6 hours PRN Temp greater or equal to 38C (100.4F), Mild Pain (1 - 3)  aluminum hydroxide/magnesium hydroxide/simethicone Suspension 30 milliLiter(s) Oral every 4 hours PRN Dyspepsia  dextrose Oral Gel 15 Gram(s) Oral once PRN Blood Glucose LESS THAN 70 milliGRAM(s)/deciliter  melatonin 3 milliGRAM(s) Oral at bedtime PRN Insomnia  ondansetron Injectable 4 milliGRAM(s) IV Push every 8 hours PRN Nausea and/or Vomiting      Vital Signs Last 24 Hrs  T(C): 36.6 (05 Apr 2022 14:16), Max: 36.9 (04 Apr 2022 20:15)  T(F): 97.9 (05 Apr 2022 14:16), Max: 98.5 (04 Apr 2022 20:15)  HR: 70 (05 Apr 2022 17:44) (63 - 89)  BP: 114/58 (05 Apr 2022 17:44) (100/58 - 114/58)  BP(mean): --  RR: 18 (05 Apr 2022 14:16) (18 - 18)  SpO2: 93% (05 Apr 2022 14:16) (92% - 95%)    I&O's Summary    04 Apr 2022 07:01  -  05 Apr 2022 07:00  --------------------------------------------------------  IN: 420 mL / OUT: 0 mL / NET: 420 mL    05 Apr 2022 07:01  -  05 Apr 2022 18:04  --------------------------------------------------------  IN: 150 mL / OUT: 0 mL / NET: 150 mL        PHYSICAL EXAM:  HEENT: NC/AT; PERRLA  Neck: Soft; no tenderness  Lungs: CTA bilaterally; no wheezing.   Heart:  Abdomen:  Genital/ Rectal:  Extremities:  Neurologic:  Vascular:      LABORATORY:    CBC Full  -  ( 05 Apr 2022 07:52 )  WBC Count : 6.42 K/uL  RBC Count : 3.37 M/uL  Hemoglobin : 10.9 g/dL  Hematocrit : 32.0 %  Platelet Count - Automated : 232 K/uL  Mean Cell Volume : 95.0 fl  Mean Cell Hemoglobin : 32.3 pg  Mean Cell Hemoglobin Concentration : 34.1 gm/dL  Auto Neutrophil # : x  Auto Lymphocyte # : x  Auto Monocyte # : x  Auto Eosinophil # : x  Auto Basophil # : x  Auto Neutrophil % : x  Auto Lymphocyte % : x  Auto Monocyte % : x  Auto Eosinophil % : x  Auto Basophil % : x          04-05    140  |  106  |  14  ----------------------------<  109<H>  4.3   |  27  |  0.63    Ca    8.8      05 Apr 2022 07:52        Rapid Respiratory Viral Panel Result        04-02 @ 18:25  Rapid RVP [facility]  Coronovirus --  Adenovirus --  Bordetella Pertussis --  Chlamydia Pneumonia --  Entero/Rhinovirus--  HKU1 Coronovirus --  HMPV Coronovirus --  Influenza A --  Influenza AH1 --  Influenza AH1 2009 --  Influenza AH3 --  Influenza B --  Mycoplasma Pneumoniae --  NL63 Coronovirus --  OC43 Coronovirus --  Parainfluenza 1 --  Parainfluenza 2 --  Parainfluenza 3 --  Parainfluenza 4 --  Resp Syncytial Virus --  Rapid Respiratory Viral Panel Result        03-23 @ 11:07  Rapid RVP [facility]  Coronovirus --  Adenovirus --  Bordetella Pertussis --  Chlamydia Pneumonia --  Entero/Rhinovirus--  HKU1 Coronovirus --  HMPV Coronovirus --  Influenza A --  Influenza AH1 --  Influenza AH1 2009 --  Influenza AH3 --  Influenza B --  Mycoplasma Pneumoniae --  NL63 Coronovirus --  OC43 Coronovirus --  Parainfluenza 1 --  Parainfluenza 2 --  Parainfluenza 3 --  Parainfluenza 4 --  Resp Syncytial Virus --      Assessment and Plan:          Stephan Pruett MD   [phone].    He is afebrile  Comfortable     MEDICATIONS  (STANDING):  aspirin enteric coated 81 milliGRAM(s) Oral daily  cefpodoxime 200 milliGRAM(s) Oral every 12 hours  dextrose 5%. 1000 milliLiter(s) (100 mL/Hr) IV Continuous <Continuous>  dextrose 5%. 1000 milliLiter(s) (50 mL/Hr) IV Continuous <Continuous>  dextrose 50% Injectable 25 Gram(s) IV Push once  dextrose 50% Injectable 12.5 Gram(s) IV Push once  dextrose 50% Injectable 25 Gram(s) IV Push once  donepezil 5 milliGRAM(s) Oral at bedtime  ferrous    sulfate 325 milliGRAM(s) Oral daily  glucagon  Injectable 1 milliGRAM(s) IntraMuscular once  heparin   Injectable 5000 Unit(s) SubCutaneous every 8 hours  insulin lispro (ADMELOG) corrective regimen sliding scale   SubCutaneous three times a day before meals  lactobacillus acidophilus 1 Tablet(s) Oral two times a day  losartan 25 milliGRAM(s) Oral daily  metoprolol tartrate 25 milliGRAM(s) Oral two times a day  multivitamin/minerals 1 Tablet(s) Oral daily  pantoprazole    Tablet 40 milliGRAM(s) Oral daily  polyethylene glycol 3350 17 Gram(s) Oral daily  senna 2 Tablet(s) Oral at bedtime  simvastatin 20 milliGRAM(s) Oral at bedtime  tamsulosin 0.4 milliGRAM(s) Oral at bedtime    MEDICATIONS  (PRN):  acetaminophen     Tablet .. 650 milliGRAM(s) Oral every 6 hours PRN Temp greater or equal to 38C (100.4F), Mild Pain (1 - 3)  aluminum hydroxide/magnesium hydroxide/simethicone Suspension 30 milliLiter(s) Oral every 4 hours PRN Dyspepsia  dextrose Oral Gel 15 Gram(s) Oral once PRN Blood Glucose LESS THAN 70 milliGRAM(s)/deciliter  melatonin 3 milliGRAM(s) Oral at bedtime PRN Insomnia  ondansetron Injectable 4 milliGRAM(s) IV Push every 8 hours PRN Nausea and/or Vomiting      Vital Signs Last 24 Hrs  T(C): 36.6 (05 Apr 2022 14:16), Max: 36.9 (04 Apr 2022 20:15)  T(F): 97.9 (05 Apr 2022 14:16), Max: 98.5 (04 Apr 2022 20:15)  HR: 70 (05 Apr 2022 17:44) (63 - 89)  BP: 114/58 (05 Apr 2022 17:44) (100/58 - 114/58)  BP(mean): --  RR: 18 (05 Apr 2022 14:16) (18 - 18)  SpO2: 93% (05 Apr 2022 14:16) (92% - 95%)    I&O's Summary    04 Apr 2022 07:01  -  05 Apr 2022 07:00  --------------------------------------------------------  IN: 420 mL / OUT: 0 mL / NET: 420 mL    05 Apr 2022 07:01  -  05 Apr 2022 18:04  --------------------------------------------------------  IN: 150 mL / OUT: 0 mL / NET: 150 mL        PHYSICAL EXAM:  HEENT: NC/AT; PERRLA  Neck: Soft; no tenderness  Lungs: CTA bilaterally; no wheezing.   Heart: RRR, no murmurs.   Abdomen: Soft, no tenderness.   Genital/ Rectal: No fitch.  Extremities: No ulcers or edema.  Neurologic: Confused.       LABORATORY:    CBC Full  -  ( 05 Apr 2022 07:52 )  WBC Count : 6.42 K/uL  RBC Count : 3.37 M/uL  Hemoglobin : 10.9 g/dL  Hematocrit : 32.0 %  Platelet Count - Automated : 232 K/uL  Mean Cell Volume : 95.0 fl  Mean Cell Hemoglobin : 32.3 pg  Mean Cell Hemoglobin Concentration : 34.1 gm/dL  Auto Neutrophil # : x  Auto Lymphocyte # : x  Auto Monocyte # : x  Auto Eosinophil # : x  Auto Basophil # : x  Auto Neutrophil % : x  Auto Lymphocyte % : x  Auto Monocyte % : x  Auto Eosinophil % : x  Auto Basophil % : x          04-05    140  |  106  |  14  ----------------------------<  109<H>  4.3   |  27  |  0.63    Ca    8.8      05 Apr 2022 07:52        Rapid Respiratory Viral Panel Result        04-02 @ 18:25  Rapid RVP [facility]  Coronovirus --  Adenovirus --  Bordetella Pertussis --  Chlamydia Pneumonia --  Entero/Rhinovirus--  HKU1 Coronovirus --  HMPV Coronovirus --  Influenza A --  Influenza AH1 --  Influenza AH1 2009 --  Influenza AH3 --  Influenza B --  Mycoplasma Pneumoniae --  NL63 Coronovirus --  OC43 Coronovirus --  Parainfluenza 1 --  Parainfluenza 2 --  Parainfluenza 3 --  Parainfluenza 4 --  Resp Syncytial Virus --  Rapid Respiratory Viral Panel Result        03-23 @ 11:07  [facility]  Coronovirus --  Adenovirus --  Bordetella Pertussis --  Chlamydia Pneumonia --  Entero/Rhinovirus--  HKU1 Coronovirus --  HMPV Coronovirus --  Influenza A --  Influenza AH1 --  Influenza AH1 2009 --  Influenza AH3 --  Influenza B --  Mycoplasma Pneumoniae --  NL63 Coronovirus --  OC43 Coronovirus --  Parainfluenza 1 --  Parainfluenza 2 --  Parainfluenza 3 --  Parainfluenza 4 --  Resp Syncytial Virus --      Assessment and Plan:    1. UTI.  2. Toxic metabolic encephalopathy    . Lethargy is resolving,   . Urine culture with no growth. Discontinue IV Rocephin. Add po Vantin 200 mg bid for 3 days.   . Supportive care.           Stephan Pruett MD   [phone].

## 2022-04-26 PROCEDURE — 70498 CT ANGIOGRAPHY NECK: CPT | Mod: MA

## 2022-04-26 PROCEDURE — 87086 URINE CULTURE/COLONY COUNT: CPT

## 2022-04-26 PROCEDURE — 0042T: CPT

## 2022-04-26 PROCEDURE — 82043 UR ALBUMIN QUANTITATIVE: CPT

## 2022-04-26 PROCEDURE — 87040 BLOOD CULTURE FOR BACTERIA: CPT

## 2022-04-26 PROCEDURE — 80053 COMPREHEN METABOLIC PANEL: CPT

## 2022-04-26 PROCEDURE — 83036 HEMOGLOBIN GLYCOSYLATED A1C: CPT

## 2022-04-26 PROCEDURE — 82962 GLUCOSE BLOOD TEST: CPT

## 2022-04-26 PROCEDURE — 84484 ASSAY OF TROPONIN QUANT: CPT

## 2022-04-26 PROCEDURE — 71045 X-RAY EXAM CHEST 1 VIEW: CPT

## 2022-04-26 PROCEDURE — 93005 ELECTROCARDIOGRAM TRACING: CPT

## 2022-04-26 PROCEDURE — 84100 ASSAY OF PHOSPHORUS: CPT

## 2022-04-26 PROCEDURE — 81001 URINALYSIS AUTO W/SCOPE: CPT

## 2022-04-26 PROCEDURE — 85730 THROMBOPLASTIN TIME PARTIAL: CPT

## 2022-04-26 PROCEDURE — 85027 COMPLETE CBC AUTOMATED: CPT

## 2022-04-26 PROCEDURE — 80048 BASIC METABOLIC PNL TOTAL CA: CPT

## 2022-04-26 PROCEDURE — 70450 CT HEAD/BRAIN W/O DYE: CPT | Mod: MA

## 2022-04-26 PROCEDURE — 95816 EEG AWAKE AND DROWSY: CPT

## 2022-04-26 PROCEDURE — 82553 CREATINE MB FRACTION: CPT

## 2022-04-26 PROCEDURE — 80061 LIPID PANEL: CPT

## 2022-04-26 PROCEDURE — 84550 ASSAY OF BLOOD/URIC ACID: CPT

## 2022-04-26 PROCEDURE — 83735 ASSAY OF MAGNESIUM: CPT

## 2022-04-26 PROCEDURE — 0225U NFCT DS DNA&RNA 21 SARSCOV2: CPT

## 2022-04-26 PROCEDURE — 70496 CT ANGIOGRAPHY HEAD: CPT | Mod: MA

## 2022-04-26 PROCEDURE — 93306 TTE W/DOPPLER COMPLETE: CPT

## 2022-04-26 PROCEDURE — 92610 EVALUATE SWALLOWING FUNCTION: CPT

## 2022-04-26 PROCEDURE — 99285 EMERGENCY DEPT VISIT HI MDM: CPT

## 2022-04-26 PROCEDURE — 83605 ASSAY OF LACTIC ACID: CPT

## 2022-04-26 PROCEDURE — 85025 COMPLETE CBC W/AUTO DIFF WBC: CPT

## 2022-04-26 PROCEDURE — 97162 PT EVAL MOD COMPLEX 30 MIN: CPT

## 2022-04-26 PROCEDURE — 82607 VITAMIN B-12: CPT

## 2022-04-26 PROCEDURE — 84443 ASSAY THYROID STIM HORMONE: CPT

## 2022-04-26 PROCEDURE — 36415 COLL VENOUS BLD VENIPUNCTURE: CPT

## 2022-04-26 PROCEDURE — 96374 THER/PROPH/DIAG INJ IV PUSH: CPT

## 2022-04-26 PROCEDURE — 85610 PROTHROMBIN TIME: CPT

## 2022-04-26 PROCEDURE — 82550 ASSAY OF CK (CPK): CPT

## 2022-05-02 NOTE — ED PROVIDER NOTE - MDM PATIENT STATEMENT FOR ADDL TREATMENT
Patient with one or more new problems requiring additional work-up/treatment. Moderna dose 1, 2, and 3

## 2022-05-06 ENCOUNTER — TRANSCRIPTION ENCOUNTER (OUTPATIENT)
Age: 87
End: 2022-05-06

## 2022-05-07 ENCOUNTER — EMERGENCY (EMERGENCY)
Facility: HOSPITAL | Age: 87
LOS: 1 days | Discharge: HOME | End: 2022-05-07
Attending: EMERGENCY MEDICINE | Admitting: EMERGENCY MEDICINE
Payer: MEDICARE

## 2022-05-07 VITALS
TEMPERATURE: 98 F | SYSTOLIC BLOOD PRESSURE: 122 MMHG | DIASTOLIC BLOOD PRESSURE: 68 MMHG | HEART RATE: 84 BPM | RESPIRATION RATE: 16 BRPM | OXYGEN SATURATION: 98 %

## 2022-05-07 VITALS
TEMPERATURE: 98 F | HEIGHT: 72 IN | SYSTOLIC BLOOD PRESSURE: 102 MMHG | HEART RATE: 61 BPM | RESPIRATION RATE: 16 BRPM | WEIGHT: 179.9 LBS | OXYGEN SATURATION: 98 % | DIASTOLIC BLOOD PRESSURE: 61 MMHG

## 2022-05-07 PROCEDURE — 99283 EMERGENCY DEPT VISIT LOW MDM: CPT

## 2022-05-07 RX ADMIN — Medication 1 APPLICATION(S): at 18:02

## 2022-05-07 RX ADMIN — Medication 100 MILLIGRAM(S): at 18:02

## 2022-05-07 NOTE — ED PROVIDER NOTE - CARE PROVIDER_API CALL
Hetal Ferrara)  Plastic Surgery; Surgery  [address] [place] [address]  Phone: [phone]  Fax: [phone]  Follow Up Time:

## 2022-05-07 NOTE — ED PROVIDER NOTE - PHYSICAL EXAMINATION
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

## 2022-05-07 NOTE — ED PROVIDER NOTE - NSFOLLOWUPINSTRUCTIONS_ED_ALL_ED_FT
A pressure injury is damage to the skin and underlying tissue that results from pressure being applied to an area of the body. It often affects people who must spend a long time in a bed or chair because of a medical condition.    Pressure injuries usually occur:  •Over bony parts of the body, such as the tailbone, shoulders, elbows, hips, heels, spine, ankles, and back of the head.      •Under medical devices that make contact with the body, such as respiratory equipment, stockings, tubes, and splints.      Pressure injuries start as reddened areas on the skin and can lead to pain and an open wound.      What are the causes?    This condition is caused by frequent or constant pressure to an area of the body. Decreased blood flow to the skin can eventually cause the skin tissue to die and break down, causing a wound.      What increases the risk?    You are more likely to develop this condition if you:  •Are in the hospital or an extended care facility.      •Are bedridden or in a wheelchair.    •Have an injury or disease that keeps you from:  •Moving normally.      •Feeling pain or pressure.      •Have a condition that:  •Makes you sleepy or less alert.      •Causes poor blood flow.        •Need to wear a medical device.      •Have poor control of your bladder or bowel functions (incontinence).      •Have poor nutrition (malnutrition).      If you are at risk for pressure injuries, your health care provider may recommend certain types of mattresses, mattress covers, pillows, cushions, or boots to help prevent them. These may include products filled with air, foam, gel, or sand.      What are the signs or symptoms?    Symptoms of this condition depend on the severity of the injury. Symptoms may include:  •Red or dark areas of the skin.      •Pain, warmth, or a change of skin texture.      •Blisters.      •An open wound.        How is this diagnosed?    This condition is diagnosed with a medical history and physical exam. You may also have tests, such as:  •Blood tests.      •Imaging tests.      •Blood flow tests.      Your pressure injury will be staged based on its severity. Staging is based on:  •The depth of the tissue injury, including whether there is exposure of muscle, bone, or tendon.      •The cause of the pressure injury.        How is this treated?    This condition may be treated by:•Relieving or redistributing pressure on your skin. This includes:  •Frequently changing your position.      •Avoiding positions that caused the wound or that can make the wound worse.      •Using specific bed mattresses, chair cushions, or protective boots.      •Moving medical devices from an area of pressure, or placing padding between the skin and the device.      •Using foams, creams, or powders to prevent rubbing (friction) on the skin.        •Keeping your skin clean and dry. This may include using a skin cleanser or skin barrier as told by your health care provider.      •Cleaning your injury and removing any dead tissue from the wound (debridement).      •Placing a bandage (dressing) over your injury.      •Using medicines for pain or to prevent or treat infection.      Surgery may be needed if other treatments are not working or if your injury is very deep.      Follow these instructions at home:    Wound care   •Follow instructions from your health care provider about how to take care of your wound. Make sure you:  •Wash your hands with soap and water before and after you change your bandage (dressing). If soap and water are not available, use hand .      •Change your dressing as told by your health care provider.       •Check your wound every day for signs of infection. Have a caregiver do this for you if you are not able. Check for:  •Redness, swelling, or increased pain.      •More fluid or blood.      •Warmth.      •Pus or a bad smell.        Skin care     •Keep your skin clean and dry. Gently pat your skin dry.      • Do not rub or massage your skin.      •You or a caregiver should check your skin every day for any changes in color or any new blisters or sores (ulcers).      Medicines     •Take over-the-counter and prescription medicines only as told by your health care provider.      •If you were prescribed an antibiotic medicine, take or apply it as told by your health care provider. Do not stop using the antibiotic even if your condition improves.      Reducing and redistributing pressure     • Do not lie or sit in one position for a long time. Move or change position every 1–2 hours, or as told by your health care provider.      •Use pillows or cushions to reduce pressure. Ask your health care provider to recommend cushions or pads for you.        General instructions      •Eat a healthy diet that includes lots of protein.      •Drink enough fluid to keep your urine pale yellow.      •Be as active as you can every day. Ask your health care provider to suggest safe exercises or activities.      • Do not abuse drugs or alcohol.      • Do not use any products that contain nicotine or tobacco, such as cigarettes, e-cigarettes, and chewing tobacco. If you need help quitting, ask your health care provider.      •Keep all follow-up visits as told by your health care provider. This is important.        Contact a health care provider if:  •You have:  •A fever or chills.      •Pain that is not helped by medicine.      •Any changes in skin color.      •New blisters or sores.      •Pus or a bad smell coming from your wound.      •Redness, swelling, or pain around your wound.      •More fluid or blood coming from your wound.        •Your wound does not improve after 1–2 weeks of treatment.        Summary    •A pressure injury is damage to the skin and underlying tissue that results from pressure being applied to an area of the body.      • Do not lie or sit in one position for a long time. Your health care provider may advise you to move or change position every 1–2 hours.      •Follow instructions from your health care provider about how to take care of your wound.      •Keep all follow-up visits as told by your health care provider. This is important.      This information is not intended to replace advice given to you by your health care provider. Make sure you discuss any questions you have with your health care provider.      Document Revised: 07/17/2019 Document Reviewed: 07/17/2019    [employer] Patient Education © 2022 [employer]

## 2022-05-07 NOTE — ED ADULT NURSE NOTE - NSIMPLEMENTINTERV_GEN_ALL_ED
Implemented All Fall with Harm Risk Interventions:  [place] to call system. Call bell, personal items and telephone within reach. Instruct patient to call for assistance. Room bathroom lighting operational. Non-slip footwear when patient is off stretcher. Physically safe environment: no spills, clutter or unnecessary equipment. Stretcher in lowest position, wheels locked, appropriate side rails in place. Provide visual cue, wrist band, yellow gown, etc. Monitor gait and stability. Monitor for mental status changes and reorient to person, place, and time. Review medications for side effects contributing to fall risk. Reinforce activity limits and safety measures with patient and family. Provide visual clues: red socks.

## 2022-05-07 NOTE — ED PROVIDER NOTE - OBJECTIVE STATEMENT
89 male sent to ER from group home for evaluation of left middle finger wound, information obtained from chart.

## 2022-05-07 NOTE — ED PROVIDER NOTE - PATIENT PORTAL LINK FT
You can access the FollowMyHealth Patient Portal offered by [facility] by registering at the following website: [email or web address]. By joining [employer]’s FollowMyHealth portal, you will also be able to view your health information using other applications (apps) compatible with our system.

## 2022-05-07 NOTE — ED ADULT NURSE NOTE - OBJECTIVE STATEMENT
Patient BIBEMS from [facility] [place] fellowship for finger wound. Patient has wound to left third digit with odor and minimal redness. Patient is contracted to left hand with kerlix for support. Afebrile. No complaints of pain. Pending further orders.

## 2022-05-08 NOTE — PROGRESS NOTE ADULT - PROBLEM/PLAN-7
Glucose and triglycerides elevated on blood work through PCP office  Will obtain HA1c testing to evaluate for diabetes  DISPLAY PLAN FREE TEXT

## 2022-05-16 NOTE — ED ADULT NURSE NOTE - CHIEF COMPLAINT QUOTE
Patient's last appointment was : 4/27/2022  Patient's next appointment is : 6/10/22  Future Appointments   Date Time Provider Mark Armstrong   6/10/2022  8:00 AM DO ASCENCION Cota  RES [address]     Last refilled: 1/26/22    No results found for: LABA1C  Lab Results   Component Value Date    CHOL 112 10/02/2018    TRIG 52 10/02/2018    HDL 68 10/02/2018    LDLCALC 34 10/02/2018     Lab Results   Component Value Date     05/10/2022    K 3.5 05/10/2022     05/10/2022    CO2 26 05/10/2022    BUN 8 05/10/2022    CREATININE 0.6 05/10/2022    GLUCOSE 96 05/10/2022    CALCIUM 8.8 05/10/2022    PROT 5.7 (L) 10/16/2021    LABALBU 3.1 (L) 10/16/2021    BILITOT 0.2 (L) 10/16/2021    ALKPHOS 86 10/16/2021    AST 28 10/16/2021    ALT 22 10/16/2021    LABGLOM >90 05/10/2022     Lab Results   Component Value Date    TSH 1.050 09/22/2021    T4FREE 0.81 (L) 08/10/2011     Lab Results   Component Value Date    WBC 9.1 10/16/2021    HGB 12.6 05/10/2022    HCT 37.9 05/10/2022    MCV 92.1 10/16/2021     10/16/2021
left hand infection

## 2022-05-18 NOTE — PROGRESS NOTE ADULT - PROBLEM SELECTOR PLAN 8
Pt received at bedside. Pt observed pacing room early in evening then went to sleep shortly after. Pt is withdrawn upon approach. Pt responds with one word answers when spoken to. Pt denies SI/HI/AVH at this time. Safety precautions maintained, will continue to monitor.   bowel regimen

## 2022-06-20 NOTE — PATIENT PROFILE ADULT - NSPROIMPLANTSMEDDEV_GEN_A_NUR
PAST MEDICAL HISTORY:  BPH (benign prostatic hyperplasia)     CAD (coronary artery disease)     CKD (chronic kidney disease), stage IV     Congestive heart failure (CHF)     Diabetes     Hyperlipidemia     Hypertension     
Pacemaker

## 2022-06-23 NOTE — ED PROCEDURE NOTE - LENGTH OF LACERATION
[FreeTextEntry1] : Left meniere's Disease\par Rec Diet-info given to pt\par Finish Prednisone\par Resume Diuretic\par poss SERC\par poss IT steroids\par f/u 9/2022 4

## 2022-06-30 ENCOUNTER — INPATIENT (INPATIENT)
Facility: HOSPITAL | Age: 87
LOS: 7 days | Discharge: NURSING, REHAB OR LONG-TERM CARE | DRG: 69 | End: 2022-07-08
Attending: INTERNAL MEDICINE | Admitting: INTERNAL MEDICINE
Payer: MEDICARE

## 2022-06-30 VITALS
HEIGHT: 72 IN | OXYGEN SATURATION: 98 % | WEIGHT: 160.94 LBS | HEART RATE: 68 BPM | RESPIRATION RATE: 16 BRPM | DIASTOLIC BLOOD PRESSURE: 61 MMHG | SYSTOLIC BLOOD PRESSURE: 121 MMHG

## 2022-06-30 DIAGNOSIS — R41.82 ALTERED MENTAL STATUS, UNSPECIFIED: ICD-10-CM

## 2022-06-30 DIAGNOSIS — Z95.810 PRESENCE OF AUTOMATIC (IMPLANTABLE) CARDIAC DEFIBRILLATOR: Chronic | ICD-10-CM

## 2022-06-30 DIAGNOSIS — R39.89 OTHER SYMPTOMS AND SIGNS INVOLVING THE GENITOURINARY SYSTEM: ICD-10-CM

## 2022-06-30 DIAGNOSIS — G93.41 METABOLIC ENCEPHALOPATHY: ICD-10-CM

## 2022-06-30 DIAGNOSIS — R41.89 OTHER SYMPTOMS AND SIGNS INVOLVING COGNITIVE FUNCTIONS AND AWARENESS: ICD-10-CM

## 2022-06-30 DIAGNOSIS — F03.90 UNSPECIFIED DEMENTIA WITHOUT BEHAVIORAL DISTURBANCE: ICD-10-CM

## 2022-06-30 DIAGNOSIS — E11.9 TYPE 2 DIABETES MELLITUS WITHOUT COMPLICATIONS: ICD-10-CM

## 2022-06-30 DIAGNOSIS — I25.10 ATHEROSCLEROTIC HEART DISEASE OF NATIVE CORONARY ARTERY WITHOUT ANGINA PECTORIS: ICD-10-CM

## 2022-06-30 DIAGNOSIS — Z29.9 ENCOUNTER FOR PROPHYLACTIC MEASURES, UNSPECIFIED: ICD-10-CM

## 2022-06-30 DIAGNOSIS — I10 ESSENTIAL (PRIMARY) HYPERTENSION: ICD-10-CM

## 2022-06-30 LAB
ALBUMIN SERPL ELPH-MCNC: 3.5 G/DL — SIGNIFICANT CHANGE UP (ref 3.3–5)
ALP SERPL-CCNC: 131 U/L — HIGH (ref 30–120)
ALT FLD-CCNC: 28 U/L DA — SIGNIFICANT CHANGE UP (ref 10–60)
ANION GAP SERPL CALC-SCNC: 6 MMOL/L — SIGNIFICANT CHANGE UP (ref 5–17)
APPEARANCE UR: ABNORMAL
APTT BLD: 30.6 SEC — SIGNIFICANT CHANGE UP (ref 27.5–35.5)
AST SERPL-CCNC: 20 U/L — SIGNIFICANT CHANGE UP (ref 10–40)
BACTERIA # UR AUTO: ABNORMAL
BASOPHILS # BLD AUTO: 0.03 K/UL — SIGNIFICANT CHANGE UP (ref 0–0.2)
BASOPHILS NFR BLD AUTO: 0.4 % — SIGNIFICANT CHANGE UP (ref 0–2)
BILIRUB SERPL-MCNC: 0.6 MG/DL — SIGNIFICANT CHANGE UP (ref 0.2–1.2)
BILIRUB UR-MCNC: NEGATIVE — SIGNIFICANT CHANGE UP
BUN SERPL-MCNC: 21 MG/DL — SIGNIFICANT CHANGE UP (ref 7–23)
CALCIUM SERPL-MCNC: 9.7 MG/DL — SIGNIFICANT CHANGE UP (ref 8.4–10.5)
CHLORIDE SERPL-SCNC: 101 MMOL/L — SIGNIFICANT CHANGE UP (ref 96–108)
CO2 SERPL-SCNC: 29 MMOL/L — SIGNIFICANT CHANGE UP (ref 22–31)
COLOR SPEC: YELLOW — SIGNIFICANT CHANGE UP
CREAT SERPL-MCNC: 0.69 MG/DL — SIGNIFICANT CHANGE UP (ref 0.5–1.3)
DIFF PNL FLD: ABNORMAL
EGFR: 85 ML/MIN/1.73M2 — SIGNIFICANT CHANGE UP
EOSINOPHIL # BLD AUTO: 0.13 K/UL — SIGNIFICANT CHANGE UP (ref 0–0.5)
EOSINOPHIL NFR BLD AUTO: 1.9 % — SIGNIFICANT CHANGE UP (ref 0–6)
EPI CELLS # UR: SIGNIFICANT CHANGE UP
GLUCOSE BLDC GLUCOMTR-MCNC: 115 MG/DL — HIGH (ref 70–99)
GLUCOSE BLDC GLUCOMTR-MCNC: 117 MG/DL — HIGH (ref 70–99)
GLUCOSE BLDC GLUCOMTR-MCNC: 151 MG/DL — HIGH (ref 70–99)
GLUCOSE BLDC GLUCOMTR-MCNC: 159 MG/DL — HIGH (ref 70–99)
GLUCOSE BLDC GLUCOMTR-MCNC: 197 MG/DL — HIGH (ref 70–99)
GLUCOSE SERPL-MCNC: 117 MG/DL — HIGH (ref 70–99)
GLUCOSE UR QL: NEGATIVE MG/DL — SIGNIFICANT CHANGE UP
HCT VFR BLD CALC: 38.8 % — LOW (ref 39–50)
HGB BLD-MCNC: 12.6 G/DL — LOW (ref 13–17)
IMM GRANULOCYTES NFR BLD AUTO: 0.3 % — SIGNIFICANT CHANGE UP (ref 0–1.5)
INR BLD: 1.09 RATIO — SIGNIFICANT CHANGE UP (ref 0.88–1.16)
KETONES UR-MCNC: NEGATIVE — SIGNIFICANT CHANGE UP
LEUKOCYTE ESTERASE UR-ACNC: ABNORMAL
LYMPHOCYTES # BLD AUTO: 1.3 K/UL — SIGNIFICANT CHANGE UP (ref 1–3.3)
LYMPHOCYTES # BLD AUTO: 19.3 % — SIGNIFICANT CHANGE UP (ref 13–44)
MCHC RBC-ENTMCNC: 31.1 PG — SIGNIFICANT CHANGE UP (ref 27–34)
MCHC RBC-ENTMCNC: 32.5 GM/DL — SIGNIFICANT CHANGE UP (ref 32–36)
MCV RBC AUTO: 95.8 FL — SIGNIFICANT CHANGE UP (ref 80–100)
MONOCYTES # BLD AUTO: 0.65 K/UL — SIGNIFICANT CHANGE UP (ref 0–0.9)
MONOCYTES NFR BLD AUTO: 9.7 % — SIGNIFICANT CHANGE UP (ref 2–14)
NEUTROPHILS # BLD AUTO: 4.6 K/UL — SIGNIFICANT CHANGE UP (ref 1.8–7.4)
NEUTROPHILS NFR BLD AUTO: 68.4 % — SIGNIFICANT CHANGE UP (ref 43–77)
NITRITE UR-MCNC: POSITIVE
NRBC # BLD: 0 /100 WBCS — SIGNIFICANT CHANGE UP (ref 0–0)
PH UR: 6 — SIGNIFICANT CHANGE UP (ref 5–8)
PLATELET # BLD AUTO: 221 K/UL — SIGNIFICANT CHANGE UP (ref 150–400)
POTASSIUM SERPL-MCNC: 4.2 MMOL/L — SIGNIFICANT CHANGE UP (ref 3.5–5.3)
POTASSIUM SERPL-SCNC: 4.2 MMOL/L — SIGNIFICANT CHANGE UP (ref 3.5–5.3)
PROT SERPL-MCNC: 7.9 G/DL — SIGNIFICANT CHANGE UP (ref 6–8.3)
PROT UR-MCNC: 100 MG/DL
PROTHROM AB SERPL-ACNC: 12.6 SEC — SIGNIFICANT CHANGE UP (ref 10.5–13.4)
RBC # BLD: 4.05 M/UL — LOW (ref 4.2–5.8)
RBC # FLD: 13.4 % — SIGNIFICANT CHANGE UP (ref 10.3–14.5)
RBC CASTS # UR COMP ASSIST: ABNORMAL /HPF (ref 0–4)
SARS-COV-2 RNA SPEC QL NAA+PROBE: SIGNIFICANT CHANGE UP
SODIUM SERPL-SCNC: 136 MMOL/L — SIGNIFICANT CHANGE UP (ref 135–145)
SP GR SPEC: 1.01 — SIGNIFICANT CHANGE UP (ref 1.01–1.02)
TROPONIN I, HIGH SENSITIVITY RESULT: 13.8 NG/L — SIGNIFICANT CHANGE UP
UROBILINOGEN FLD QL: NEGATIVE MG/DL — SIGNIFICANT CHANGE UP
WBC # BLD: 6.73 K/UL — SIGNIFICANT CHANGE UP (ref 3.8–10.5)
WBC # FLD AUTO: 6.73 K/UL — SIGNIFICANT CHANGE UP (ref 3.8–10.5)
WBC UR QL: >50

## 2022-06-30 PROCEDURE — 71045 X-RAY EXAM CHEST 1 VIEW: CPT | Mod: 26

## 2022-06-30 PROCEDURE — 99285 EMERGENCY DEPT VISIT HI MDM: CPT

## 2022-06-30 PROCEDURE — 93010 ELECTROCARDIOGRAM REPORT: CPT

## 2022-06-30 PROCEDURE — 70450 CT HEAD/BRAIN W/O DYE: CPT | Mod: 26,MA

## 2022-06-30 RX ORDER — CEFTRIAXONE 500 MG/1
INJECTION, POWDER, FOR SOLUTION INTRAMUSCULAR; INTRAVENOUS
Refills: 0 | Status: DISCONTINUED | OUTPATIENT
Start: 2022-06-30 | End: 2022-07-03

## 2022-06-30 RX ORDER — SIMVASTATIN 20 MG/1
20 TABLET, FILM COATED ORAL AT BEDTIME
Refills: 0 | Status: DISCONTINUED | OUTPATIENT
Start: 2022-06-30 | End: 2022-07-08

## 2022-06-30 RX ORDER — CLOPIDOGREL BISULFATE 75 MG/1
75 TABLET, FILM COATED ORAL DAILY
Refills: 0 | Status: DISCONTINUED | OUTPATIENT
Start: 2022-06-30 | End: 2022-07-08

## 2022-06-30 RX ORDER — SODIUM CHLORIDE 9 MG/ML
1000 INJECTION, SOLUTION INTRAVENOUS
Refills: 0 | Status: DISCONTINUED | OUTPATIENT
Start: 2022-06-30 | End: 2022-07-02

## 2022-06-30 RX ORDER — PANTOPRAZOLE SODIUM 20 MG/1
40 TABLET, DELAYED RELEASE ORAL
Refills: 0 | Status: DISCONTINUED | OUTPATIENT
Start: 2022-06-30 | End: 2022-07-08

## 2022-06-30 RX ORDER — CEFTRIAXONE 500 MG/1
2000 INJECTION, POWDER, FOR SOLUTION INTRAMUSCULAR; INTRAVENOUS EVERY 24 HOURS
Refills: 0 | Status: DISCONTINUED | OUTPATIENT
Start: 2022-07-01 | End: 2022-07-03

## 2022-06-30 RX ORDER — DEXTROSE 50 % IN WATER 50 %
12.5 SYRINGE (ML) INTRAVENOUS ONCE
Refills: 0 | Status: DISCONTINUED | OUTPATIENT
Start: 2022-06-30 | End: 2022-07-08

## 2022-06-30 RX ORDER — SENNA PLUS 8.6 MG/1
2 TABLET ORAL AT BEDTIME
Refills: 0 | Status: DISCONTINUED | OUTPATIENT
Start: 2022-06-30 | End: 2022-07-08

## 2022-06-30 RX ORDER — ACETAMINOPHEN 500 MG
650 TABLET ORAL EVERY 6 HOURS
Refills: 0 | Status: DISCONTINUED | OUTPATIENT
Start: 2022-06-30 | End: 2022-07-08

## 2022-06-30 RX ORDER — TAMSULOSIN HYDROCHLORIDE 0.4 MG/1
0.4 CAPSULE ORAL AT BEDTIME
Refills: 0 | Status: DISCONTINUED | OUTPATIENT
Start: 2022-06-30 | End: 2022-07-08

## 2022-06-30 RX ORDER — DONEPEZIL HYDROCHLORIDE 10 MG/1
5 TABLET, FILM COATED ORAL AT BEDTIME
Refills: 0 | Status: DISCONTINUED | OUTPATIENT
Start: 2022-06-30 | End: 2022-07-08

## 2022-06-30 RX ORDER — DEXTROSE 50 % IN WATER 50 %
15 SYRINGE (ML) INTRAVENOUS ONCE
Refills: 0 | Status: DISCONTINUED | OUTPATIENT
Start: 2022-06-30 | End: 2022-07-08

## 2022-06-30 RX ORDER — DEXTROSE 50 % IN WATER 50 %
25 SYRINGE (ML) INTRAVENOUS ONCE
Refills: 0 | Status: DISCONTINUED | OUTPATIENT
Start: 2022-06-30 | End: 2022-07-08

## 2022-06-30 RX ORDER — LANOLIN ALCOHOL/MO/W.PET/CERES
3 CREAM (GRAM) TOPICAL AT BEDTIME
Refills: 0 | Status: DISCONTINUED | OUTPATIENT
Start: 2022-06-30 | End: 2022-07-08

## 2022-06-30 RX ORDER — ONDANSETRON 8 MG/1
4 TABLET, FILM COATED ORAL EVERY 8 HOURS
Refills: 0 | Status: DISCONTINUED | OUTPATIENT
Start: 2022-06-30 | End: 2022-07-08

## 2022-06-30 RX ORDER — SODIUM CHLORIDE 9 MG/ML
1000 INJECTION, SOLUTION INTRAVENOUS
Refills: 0 | Status: DISCONTINUED | OUTPATIENT
Start: 2022-06-30 | End: 2022-07-08

## 2022-06-30 RX ORDER — LACTOBACILLUS ACIDOPHILUS 100MM CELL
1 CAPSULE ORAL DAILY
Refills: 0 | Status: DISCONTINUED | OUTPATIENT
Start: 2022-06-30 | End: 2022-07-08

## 2022-06-30 RX ORDER — POLYETHYLENE GLYCOL 3350 17 G/17G
17 POWDER, FOR SOLUTION ORAL DAILY
Refills: 0 | Status: DISCONTINUED | OUTPATIENT
Start: 2022-06-30 | End: 2022-07-08

## 2022-06-30 RX ORDER — MULTIVIT-MIN/FERROUS GLUCONATE 9 MG/15 ML
1 LIQUID (ML) ORAL DAILY
Refills: 0 | Status: DISCONTINUED | OUTPATIENT
Start: 2022-06-30 | End: 2022-07-08

## 2022-06-30 RX ORDER — CEFTRIAXONE 500 MG/1
2000 INJECTION, POWDER, FOR SOLUTION INTRAMUSCULAR; INTRAVENOUS ONCE
Refills: 0 | Status: COMPLETED | OUTPATIENT
Start: 2022-06-30 | End: 2022-06-30

## 2022-06-30 RX ORDER — GLUCAGON INJECTION, SOLUTION 0.5 MG/.1ML
1 INJECTION, SOLUTION SUBCUTANEOUS ONCE
Refills: 0 | Status: DISCONTINUED | OUTPATIENT
Start: 2022-06-30 | End: 2022-07-08

## 2022-06-30 RX ORDER — INSULIN LISPRO 100/ML
VIAL (ML) SUBCUTANEOUS
Refills: 0 | Status: DISCONTINUED | OUTPATIENT
Start: 2022-06-30 | End: 2022-07-08

## 2022-06-30 RX ADMIN — TAMSULOSIN HYDROCHLORIDE 0.4 MILLIGRAM(S): 0.4 CAPSULE ORAL at 22:45

## 2022-06-30 RX ADMIN — SIMVASTATIN 20 MILLIGRAM(S): 20 TABLET, FILM COATED ORAL at 22:44

## 2022-06-30 RX ADMIN — Medication 1: at 17:43

## 2022-06-30 RX ADMIN — SENNA PLUS 2 TABLET(S): 8.6 TABLET ORAL at 22:45

## 2022-06-30 RX ADMIN — SODIUM CHLORIDE 50 MILLILITER(S): 9 INJECTION, SOLUTION INTRAVENOUS at 12:54

## 2022-06-30 RX ADMIN — DONEPEZIL HYDROCHLORIDE 5 MILLIGRAM(S): 10 TABLET, FILM COATED ORAL at 22:45

## 2022-06-30 RX ADMIN — CEFTRIAXONE 100 MILLIGRAM(S): 500 INJECTION, POWDER, FOR SOLUTION INTRAMUSCULAR; INTRAVENOUS at 17:06

## 2022-06-30 NOTE — SWALLOW BEDSIDE ASSESSMENT ADULT - PHARYNGEAL PHASE
Delayed pharyngeal swallow/Decreased laryngeal elevation Delayed pharyngeal swallow/Decreased laryngeal elevation/Multiple swallows

## 2022-06-30 NOTE — H&P ADULT - TIME BILLING
75minutes spent on this visit, 50% visit time spent in care co-ordination with other attendings and counselling patient ,writing admission orders ( see complete and current orders and order section) ,requesting necessary consults ,informing family about status & plan of care .I have discussed care plan with [facility] wellness/admitting /nursing   department ,outpatient PCP , hospital consultants , ER physician & med staff .

## 2022-06-30 NOTE — H&P ADULT - NSHPLABSRESULTS_GEN_ALL_CORE
< from: CT Brain Stroke Protocol (06.30.22 @ 09:40) >    ACC: [id number] EXAM:  CT BRAIN STROKE PROTOCOL                          PROCEDURE DATE:  06/30/2022          INTERPRETATION:  Clinical indications: Stroke code.    Multiple axial sections were performed from the base skull to vertex   without contrast enhancement. Coronal and sagittal extremities were   performed as well    This exam is compared prior head CT performed on April 2, 2022.    Parenchymal volume loss and chronic microvascular ischemic changes are   again seen and unchanged    There is no evidence acute hemorrhage mass or mass effect seen.    Evaluation of the osseous structures with the appropriate window appears   normal    The visualized paranasal sinuses mastoid and middle ear regions appear   clear.    IMPRESSION: No significant change when allowing for differences in   technique.    Findings discussed with Dr Longo on June 30, 2022 at 9:45 AM  with   read back.    < end of copied text > < from: CT Brain Stroke Protocol (06.30.22 @ 09:40) >    ACC: [id number] EXAM:  CT BRAIN STROKE PROTOCOL                          PROCEDURE DATE:  06/30/2022          INTERPRETATION:  Clinical indications: Stroke code.    Multiple axial sections were performed from the base skull to vertex   without contrast enhancement. Coronal and sagittal extremities were   performed as well    This exam is compared prior head CT performed on April 2, 2022.    Parenchymal volume loss and chronic microvascular ischemic changes are   again seen and unchanged    There is no evidence acute hemorrhage mass or mass effect seen.    Evaluation of the osseous structures with the appropriate window appears   normal    The visualized paranasal sinuses mastoid and middle ear regions appear   clear.    IMPRESSION: No significant change when allowing for differences in   technique.    Findings discussed with Dr Longo on June 30, 2022 at 9:45 AM  with   read back.    < end of copied text >< from: TTE Echo Complete w/o Contrast w/ Doppler (04.05.22 @ 13:00) >    Aortic Valve: Aortic root is mild sclerotic and of normal dimension.   Aortic valve is calcified with the moderate aortic stenosis present with    aortic valve area calculated to be 0.95 sq cm and peak gradient across   the aortic valve is 36 mmHg and mean gradient 28 mmHg is present.  Tricuspid Valve: Mild tricuspid regurgitation with calculated  PA   systolic pressure 40 mmHg suggestive of mild pulmonary hypertension is   present.  Pulmonic Valve: Trace pulmonary regurgitation is present.  Diastolic Function: LV diastolic function cannot determine from this   study.  Pericardium/Pleura: No evidence of any pericardial effusion.  No intracardiac mass ,  thrombus or vegetations and tumor.  Echodense in right sided chambers  suggestive of permanent  pacemaker   wire is present.  Mild concentric left ventricular hypertrophy is present.  IMPRESSION:  Normal LV size and  normal LV systolic function with estimated LVEF   50-55%.    LV diastolic function cannot determine from this study.    Moderate aortic stenosis present.    < end of copied text >

## 2022-06-30 NOTE — ED ADULT NURSE REASSESSMENT NOTE - NS ED NURSE REASSESS COMMENT FT1
Unable to complete Neuro evaluation due to patient's cognitive status non verbal and does not follow commands.

## 2022-06-30 NOTE — H&P ADULT - ASSESSMENT
The patient is a 89 year old male with a history of HL, atrial fibrillation, SDH, dementia who presents with unresponsiveness. The patient is unable to provide additional history . He reportedly was verbal yesterday at [facility] but unresponsive today.  Past Medical/Surgical History:  HL, atrial fibrillation, SDH, dementia ,om  Medications:  Home Medications:  ascorbic acid 500 mg oral tablet: 1 tab(s) orally once a day (03 Apr 2022 11:16)  Aspirin Enteric Coated 81 mg oral delayed release tablet: 1 tab(s) orally once a day (03 Apr 2022 11:16)  donepezil 5 mg oral tablet: 1 tab(s) orally once a day (at bedtime) (03 Apr 2022 11:16)  ferrous sulfate 325 mg (65 mg elemental iron) oral delayed release tablet: 1 tab(s) orally once a day (03 Apr 2022 11:16)  Multiple Vitamins with Minerals oral tablet: 1 tab(s) orally once a day (03 Apr 2022 11:16)  simvastatin 20 mg oral tablet: 1 tab(s) orally once a day (at bedtime) (03 Apr 2022 11:16)Patient is admitted for management of FTT ,suspected UTI Palliative care consult requested ,to discuss advance directives and complete MOLST ,family requested to initiate comfort measures only and to focus on pain and anxiety management ,case was d/w PCP from [facility] and with pall care MD .

## 2022-06-30 NOTE — ED PROVIDER NOTE - CLINICAL SUMMARY MEDICAL DECISION MAKING FREE TEXT BOX
Dementia pt with AMS since last night. No sign of acute CVA. Plan - admit for stroke/Metabolic encephalopathy workup.    Not a TPA candidate due to unclear LKW and advanced dementia.

## 2022-06-30 NOTE — ED ADULT NURSE REASSESSMENT NOTE - NS ED NURSE REASSESS COMMENT FT1
Right upper extremity with swelling from infiltrate,warm compresses in place, limb elevated.  Strong radial and brachial pulses, skin warm, pink, CR<2 sec.

## 2022-06-30 NOTE — ED PROVIDER NOTE - OBJECTIVE STATEMENT
89-year-old male from [facility] with history of dementia brought by ambulance for evaluation of unresponsiveness today staff states patient was in usual state of health at bedtime yesterday at 8 PM normally verbal but today not talking chronically confused and with left hand weakness.    PCP Mariano    Patient with severe dementia unable to give any history.

## 2022-06-30 NOTE — SWALLOW BEDSIDE ASSESSMENT ADULT - ASR SWALLOW RECOMMEND DIAG
objective testing is not warranted d/t cognitive status and severity of oropharyngeal phase on upgraded consistencies; will continue to follow at bedside at this time

## 2022-06-30 NOTE — ED ADULT NURSE REASSESSMENT NOTE - NS ED NURSE REASSESS COMMENT FT1
Patient more awake, assisted with meal, ate with good appetite. Patient was cleared to eat @ 1120. Patient more awake, assisted with meal, ate with good appetite. Right upper extremity with decreased swelling from infiltrate, Strong radial and brachial pulses, skin pink and warm, CR<2 sec. Limb remains elevated.

## 2022-06-30 NOTE — ED ADULT NURSE NOTE - NURSING ED SKIN COLOR
Increase doxycycline to 100mg twice a day. Continue topical regimen. Follow up in office in 3 months. normal for race

## 2022-06-30 NOTE — SWALLOW BEDSIDE ASSESSMENT ADULT - SWALLOW EVAL: RECOMMENDED DIET
pureed and mildly thick liquids, aspiration precautions and full assistance during meals, as tolerated. Feed only when alert/awake- continue to monitor and notify SLP if changes occur

## 2022-06-30 NOTE — H&P ADULT - PROBLEM SELECTOR PLAN 1
likely 2/2 to vasovagal syncope secondary to poor po intake of fluids and infection likely 2/2 to TIA ( ct head -nad)  vs  vasovagal syncope secondary to poor po intake of fluids and infection .Seen by neurologist and impression was -TIA ,change ASA to Plavix recommended .

## 2022-06-30 NOTE — SWALLOW BEDSIDE ASSESSMENT ADULT - SWALLOW EVAL: PROGNOSIS
DIAGNOSIS CONTINUED: thick liquids, aspiration precautions and full assistance during meals, as tolerated. Feed only when alert/awake- continue to monitor and notify SLP if changes occur. Facilitate upright position, slow pacing, single/small bites/sips, and alternate solids with liquids. PROGNOSIS: fair; continue to monitor closely

## 2022-06-30 NOTE — CONSULT NOTE ADULT - ASSESSMENT
89-year-old male from [facility] with history of dementia brought by ambulance for evaluation of unresponsiveness 89-year-old male from [facility] with history of dementia brought by ambulance for evaluation of unresponsiveness    [facility]  richmond hollis  hospice referral at [facility]

## 2022-06-30 NOTE — H&P ADULT - HISTORY OF PRESENT ILLNESS
The patient is a 89 year old male with a history of HL, atrial fibrillation, SDH, dementia who presents with unresponsiveness. The patient is unable to provide additional history . He reportedly was verbal yesterday at [facility] but unresponsive today.  Past Medical/Surgical History:  HL, atrial fibrillation, SDH, dementia ,om  Medications:  Home Medications:  ascorbic acid 500 mg oral tablet: 1 tab(s) orally once a day (03 Apr 2022 11:16)  Aspirin Enteric Coated 81 mg oral delayed release tablet: 1 tab(s) orally once a day (03 Apr 2022 11:16)  donepezil 5 mg oral tablet: 1 tab(s) orally once a day (at bedtime) (03 Apr 2022 11:16)  ferrous sulfate 325 mg (65 mg elemental iron) oral delayed release tablet: 1 tab(s) orally once a day (03 Apr 2022 11:16)  Multiple Vitamins with Minerals oral tablet: 1 tab(s) orally once a day (03 Apr 2022 11:16)  simvastatin 20 mg oral tablet: 1 tab(s) orally once a day (at bedtime) (03 Apr 2022 11:16)Patient is admitted for management of FTT ,suspected UTI Palliative care consult requested ,to discuss advance directives and complete MOLST ,family requested to initiate comfort measures only and to focus on pain and anxiety management ,case was d/w PCP from [facility] and with pall care MD .

## 2022-06-30 NOTE — ED ADULT NURSE NOTE - NSIMPLEMENTINTERV_GEN_ALL_ED
Implemented All Fall with Harm Risk Interventions:  [place] to call system. Call bell, personal items and telephone within reach. Instruct patient to call for assistance. Room bathroom lighting operational. Non-slip footwear when patient is off stretcher. Physically safe environment: no spills, clutter or unnecessary equipment. Stretcher in lowest position, wheels locked, appropriate side rails in place. Provide visual cue, wrist band, yellow gown, etc. Monitor gait and stability. Monitor for mental status changes and reorient to person, place, and time. Review medications for side effects contributing to fall risk. Reinforce activity limits and safety measures with patient and family. Provide visual clues: red socks.

## 2022-06-30 NOTE — ED ADULT NURSE REASSESSMENT NOTE - NS ED NURSE REASSESS COMMENT FT1
Bedside report given to Hold RN, bedside skin report completed, Right upper extremity with decreased swelling from infiltrate, Strong radial and brachial pulses, skin warm, CR<2 sec.

## 2022-06-30 NOTE — SWALLOW BEDSIDE ASSESSMENT ADULT - ORAL PHASE
suspected posterior loss at BOT/Decreased anterior-posterior movement of the bolus/Delayed oral transit time Decreased anterior-posterior movement of the bolus/Delayed oral transit time Decreased anterior-posterior movement of the bolus/Delayed oral transit time/Lingual stasis

## 2022-06-30 NOTE — H&P ADULT - NSICDXPASTSURGICALHX_GEN_ALL_CORE_FT
PAST SURGICAL HISTORY:  No significant past surgical history      PAST SURGICAL HISTORY:  History of automatic internal cardiac defibrillator (AICD)

## 2022-06-30 NOTE — CONSULT NOTE ADULT - SUBJECTIVE AND OBJECTIVE BOX
HPI:  89YO M PMH Dementia, HL, atrial fibrillation, SDH, Ca Prostate DM CHF resident of [facility] who presents with unresponsiveness. The patient is unable to provide additional history . He reportedly was verbal yesterday at [facility] but unresponsive today. Afebrile UA +. NO fever chills n/v/d CP SOB    Infectious Disease consult was called to evaluate pt and for antibiotic management      Past Medical & Surgical Hx:  PAST MEDICAL & SURGICAL HISTORY:  Atrial fibrillation  Hypertension  Diabetes  Prostate ca  Dementia  CHF (congestive heart failure)  Hyperlipemia  Diabetes  Depressio  Arteriosclerotic heart disease (ASHD)  Anemi  Constipation  AICD (automatic cardioverter/defibrillator) present  Osteomyelitis of finger of left hand  Personal history of radiation therapy  H/O ongoing treatment with hormonal therapy  H/O bladder infections  Scrotal abscess  Urinary retention  Hematuria  H/O cystitis  Constipation  VHD (valvular heart disease)  Aortic valve stenosis  History of automatic internal cardiac defibrillator (AICD)      Social History--  EtOH: denies   Tobacco: denies   Drug Use: denies      FAMILY HISTORY:  Noncontributory    Allergies  latex (Rash)  latex (Unknown)  No Known Drug Allergies    Intolerances  NONE    Home Medications:  ascorbic acid 500 mg oral tablet: 1 tab(s) orally once a day (2022 16:36)  Aspirin Enteric Coated 81 mg oral delayed release tablet: 1 tab(s) orally once a day (2022 16:36)  donepezil 5 mg oral tablet: 1 tab(s) orally once a day (at bedtime) (2022 16:36)  ferrous sulfate 325 mg (65 mg elemental iron) oral delayed release tablet: 1 tab(s) orally once a day (2022 16:36)  Flomax 0.4 mg oral capsule: 1 cap(s) orally once a day (2022 16:36)  Multiple Vitamins with Minerals oral tablet: 1 tab(s) orally once a day (2022 16:36)  simvastatin 20 mg oral tablet: 1 tab(s) orally once a day (at bedtime) (2022 16:36)      Current Inpatient Medications :    ANTIBIOTICS:       OTHER RELEVANT MEDICATIONS :  acetaminophen     Tablet .. 650 milliGRAM(s) Oral every 6 hours PRN  aluminum hydroxide/magnesium hydroxide/simethicone Suspension 30 milliLiter(s) Oral every 4 hours PRN  clopidogrel Tablet 75 milliGRAM(s) Oral daily  dextrose 5%. 1000 milliLiter(s) IV Continuous <Continuous>  dextrose 5%. 1000 milliLiter(s) IV Continuous <Continuous>  dextrose 50% Injectable 25 Gram(s) IV Push once  dextrose 50% Injectable 12.5 Gram(s) IV Push once  dextrose 50% Injectable 25 Gram(s) IV Push once  dextrose Oral Gel 15 Gram(s) Oral once PRN  donepezil 5 milliGRAM(s) Oral at bedtime  glucagon  Injectable 1 milliGRAM(s) IntraMuscular once  insulin lispro (ADMELOG) corrective regimen sliding scale   SubCutaneous three times a day before meals  melatonin 3 milliGRAM(s) Oral at bedtime PRN  multivitamin/minerals 1 Tablet(s) Oral daily  ondansetron Injectable 4 milliGRAM(s) IV Push every 8 hours PRN  pantoprazole    Tablet 40 milliGRAM(s) Oral before breakfast  polyethylene glycol 3350 17 Gram(s) Oral daily  senna 2 Tablet(s) Oral at bedtime  simvastatin 20 milliGRAM(s) Oral at bedtime  sodium chloride 0.45%. 1000 milliLiter(s) IV Continuous <Continuous>  tamsulosin 0.4 milliGRAM(s) Oral at bedtime      ROS:  Unable to obtain due to : Dementia    Physical Exam:  Vital Signs Last 24 Hrs  T(C): 36.6 (2022 13:00), Max: 36.6 (2022 13:00)  T(F): 97.8 (2022 13:00), Max: 97.8 (2022 13:00)  HR: 63 (2022 14:00) (60 - 97)  BP: 120/85 (2022 14:00) (100/50 - 131/59)  RR: 22 (2022 14:00) (16 - 22)  SpO2: 97% (2022 14:00) (97% - 98%)  Height (cm): 182.9 ( @ 09:33)  Weight (kg): 73 ( @ 09:33)  BMI (kg/m2): 21.8 ( @ 09:33)  BSA (m2): 1.94 ( @ 09:33)    General: no acute distress  Neck: supple, trachea midline  Lungs: clear, no wheeze/rhonchi  Cardiovascular: regular rate and rhythm, S1 S2  Abdomen: soft, nontender, ND, bowel sounds normal  Neurological:  awake confused  Skin: no rash  Extremities: no edema    Labs:                         12.6   6.73  )-----------( 221      ( 2022 11:08 )             38.8   06-    136  |  101  |  21  ----------------------------<  117<H>  4.2   |  29  |  0.69    Ca    9.7      2022 11:08    TPro  7.9  /  Alb  3.5  /  TBili  0.6  /  DBili  x   /  AST  20  /  ALT  28  /  AlkPhos  131<H>  30    Urinalysis Basic - ( 2022 14:10 )    Color: Yellow / Appearance: Turbid / S.010 / pH: x  Gluc: x / Ketone: Negative  / Bili: Negative / Urobili: Negative mg/dL   Blood: x / Protein: 100 mg/dL / Nitrite: Positive   Leuk Esterase: Moderate / RBC: 11-25 /HPF / WBC >50   Sq Epi: x / Non Sq Epi: Few / Bacteria: Many        RECENT CULTURES:          RADIOLOGY & ADDITIONAL STUDIES:  ACC: [id number] EXAM:  XR CHEST PORTABLE URGENT 1V                          PROCEDURE DATE:  2022          INTERPRETATION:  HISTORY: ;  Sepsis;  TECHNIQUE: Portable frontal view of the chest, 1 view.  COMPARISON: none.  FINDINGS/  IMPRESSION:  A cardiac device overlies and obscures the left hemithorax with lead in   place.  HEART:  Enlarged  LUNGS: free of consolidation,effusion, or pneumothorax.  BONES: degenerative changes      Calcified gallstone. Contrast in the left renal collecting system.    Assessment :   89YO M PMH Dementia, HL, atrial fibrillation, SDH, Ca Prostate DM CHF resident of [facility] who presents with unresponsiveness mental status change Afebrile UA +.   Rule out UTI    Plan :   Start Rocephin  Fu cultures  Trend temps and cbc    Continue with present regiment .  Approptiate use of antibiotics and adverse effects reviewed.      I have discussed the above plan of care with patient/family in detail. They expressed understanding of the treatment plan . Risks, benefits and alternatives discussed in detail. I have asked if they have any questions or concerns and appropriately addressed them to the best of my ability .      > 45 minutes spent in direct patient care reviewing  the notes, lab data/ imaging , discussion with multidisciplinary team. All questions were addressed and answered to the best of my capacity .    Thank you for allowing me to participate in the care of your patient .      Heber Issa MD  Infectious Disease  [phone]
Date/Time Patient Seen:  		  Referring MD:   Data Reviewed	       Patient is a 89y old  Male who presents with a chief complaint of     Subjective/HPI  vs noted  labs reviewed  imaging reviewed  er provider note reviewed  old records reviewed  from [facility]  non smoker  non drinker       · Chief Complaint: The patient is a 89y Male complaining of facial droop.  · Unable to Obtain: Dementia  · HPI Objective Statement: 89-year-old male from [facility] with history of dementia brought by ambulance for evaluation of unresponsiveness today staff states patient was in usual state of health at bedtime yesterday at 8 PM normally verbal but today not talking chronically confused and with left hand weakness.    	PCP Mariano    	Patient with severe dementia unable to give any history.    Hospital Course:  Discharge Date	05-Apr-2022  Admission Date	02-Apr-2022 22:42  Reason for Admission	Pt is a 89 yo male hx dementia, afib on asa, dm. pt sent from [facility] per nurse and ems report after being found at 2pm to be unresponsive. pt sent to er. pt found in bed,  last known well unknown at present.  spoke with [facility], states pt normally answers minimal questions, can ambulate slowly with cane. today last seen fully well at breakfast and at noon was at [place] eating lunch, but noone spoke to pt to see if he was at baseline of answering questions.  at 2pm pt with eyes closed and wouldn't answer questions. pt assisted by 2 people to office and was weak on feet,  ems called. Found to have UTI Admitted for septic workup and evaluation,send blood and urine cx,serial lactate levels,monitor vitals closley,ivfs hydration,monitor urine output and renal profile,iv abx initiated Seen by cardiologist and neurology consult requested .ID leightonal called ,started on ceftriaxone .CTH negative for ACUTE CVA .      PAST MEDICAL & SURGICAL HISTORY:  Atrial fibrillation    Hypertension    Diabetes    Prostate ca    Dementia    CHF (congestive heart failure)    Hyperlipemia    Diabetes    Depression    Dementia    No pertinent past medical history    DM (diabetes mellitus)    Atrial fibrillation    Prostate CA    Arteriosclerotic heart disease (ASHD)    Dementia    Anemia    Constipation    No significant past surgical history    No significant past surgical history    PAST SURGICAL HISTORY:  No significant past surgical history.     Tobacco Usage:  · Tobacco Usage	Unknown if ever smoked  · Unknown smoker reason	Cognitively Impaired    ALLERGIES AND HOME MEDICATIONS:   Allergies:        Allergies:  	No Known Drug Allergies:   	No Known Drug Allergies:   	latex: Latex, Patient, Rash  	latex: Latex, Unknown  	latex: Latex, Unknown    Home Medications:   * Patient Currently Takes Medications as of 07-May-2022 18:49 documented in Structured Notes      Medication list         MEDICATIONS  (STANDING):    MEDICATIONS  (PRN):         Vitals log        ICU Vital Signs Last 24 Hrs  T(C): --  T(F): --  HR: 72 (30 Jun 2022 09:35) (68 - 72)  BP: 100/50 (30 Jun 2022 09:35) (100/50 - 121/61)  BP(mean): --  ABP: --  ABP(mean): --  RR: 16 (30 Jun 2022 09:35) (16 - 16)  SpO2: 98% (30 Jun 2022 09:35) (98% - 98%)           Input and Output:  I&O's Detail      Lab Data                  Review of Systems	  frailty  weakness      Objective     Physical Examination    heart s1s2  lung dec BS      Pertinent Lab findings & Imaging      [place]:  NO   Adequate UO     I&O's Detail           Discussed with:     Cultures:	        Radiology      ACC: [id number] EXAM:  CT BRAIN STROKE PROTOCOL                          PROCEDURE DATE:  06/30/2022          INTERPRETATION:  Clinical indications: Stroke code.    Multiple axial sections were performed from the base skull to vertex   without contrast enhancement. Coronal and sagittal extremities were   performed as well    This exam is compared prior head CT performed on April 2, 2022.    Parenchymal volume loss and chronic microvascular ischemic changes are   again seen and unchanged    There is no evidence acute hemorrhage mass or mass effect seen.    Evaluation of the osseous structures with the appropriate window appears   normal    The visualized paranasal sinuses mastoid and middle ear regions appear   clear.    IMPRESSION: No significant change when allowing for differences in   technique.    Findings discussed with Dr Longo on June 30, 2022 at 9:45 AM  with   read back.    --- End of Report ---            YANI BLANK MD; Attending Radiologist  This document has been electronically signed. Jun 30 2022  9:46AM                        
tia/cva  dc asa change to plavix  statin  spoke to son 
History of Present Illness: The patient is a 89 year old male with a history of HL, atrial fibrillation, SDH, dementia who presents with unresponsiveness. The patient is unable to provide additional history to me. He reportedly was verbal yesterday at [facility] but unresponsive today.    Past Medical/Surgical History:  HL, atrial fibrillation, SDH, dementia     Medications:  Home Medications:  ascorbic acid 500 mg oral tablet: 1 tab(s) orally once a day (03 Apr 2022 11:16)  Aspirin Enteric Coated 81 mg oral delayed release tablet: 1 tab(s) orally once a day (03 Apr 2022 11:16)  donepezil 5 mg oral tablet: 1 tab(s) orally once a day (at bedtime) (03 Apr 2022 11:16)  ferrous sulfate 325 mg (65 mg elemental iron) oral delayed release tablet: 1 tab(s) orally once a day (03 Apr 2022 11:16)  Multiple Vitamins with Minerals oral tablet: 1 tab(s) orally once a day (03 Apr 2022 11:16)  simvastatin 20 mg oral tablet: 1 tab(s) orally once a day (at bedtime) (03 Apr 2022 11:16)      Family History: Non-contributory family history of premature cardiovascular atherosclerotic disease    Social History: Unknown    Review of Systems:  Unable to obtain    Physical Exam:  Vitals:        Vital Signs Last 24 Hrs  T(C): --  T(F): --  HR: 72 (30 Jun 2022 09:35) (68 - 72)  BP: 100/50 (30 Jun 2022 09:35) (100/50 - 121/61)  BP(mean): --  RR: 16 (30 Jun 2022 09:35) (16 - 16)  SpO2: 98% (30 Jun 2022 09:35) (98% - 98%)  General: NAD  HEENT: MMM  Neck: No JVD, no carotid bruit  Lungs: CTAB  CV: RRR, nl S1/S2, no M/R/G  Abdomen: S/NT/ND, +BS  Extremities: No LE edema, no cyanosis  Neuro: AAOx0  Skin: No rash    Labs:                        12.6   6.73  )-----------( 221      ( 30 Jun 2022 11:08 )             38.8     06-30    136  |  101  |  21  ----------------------------<  117<H>  4.2   |  29  |  0.69    Ca    9.7      30 Jun 2022 11:08    TPro  7.9  /  Alb  3.5  /  TBili  0.6  /  DBili  x   /  AST  20  /  ALT  28  /  AlkPhos  131<H>  06-30        PT/INR - ( 30 Jun 2022 11:08 )   PT: 12.6 sec;   INR: 1.09 ratio         PTT - ( 30 Jun 2022 11:08 )  PTT:30.6 sec    ECG: Pending

## 2022-06-30 NOTE — ED ADULT TRIAGE NOTE - CHIEF COMPLAINT QUOTE
according to EMS stroke code- Last known well 8PM last night- lt facial droop & non verbal according to EMS stroke code- Last known well 8PM last night- lt facial droop & non verbal this morning. Pt has dementia but staff states he walks & talks @ facility

## 2022-06-30 NOTE — ED ADULT NURSE REASSESSMENT NOTE - NS ED NURSE REASSESS COMMENT FT1
Dr Ring aware patient has enlarged prostate and was unable to straight cath. Will put urology consult.

## 2022-06-30 NOTE — ED ADULT NURSE NOTE - NSICDXPASTSURGICALHX_GEN_ALL_CORE_FT
Cardiac catherization with possible intervention
PAST SURGICAL HISTORY:  No significant past surgical history

## 2022-06-30 NOTE — H&P ADULT - PROBLEM SELECTOR PLAN 4
Supportive care,frequent redirection,continue home medications,management of agitation as needed Palliative care consult requested ,to discuss advance directives and complete MOLST ,family requested to focus on CMO

## 2022-06-30 NOTE — ED ADULT NURSE NOTE - CHIEF COMPLAINT QUOTE
according to EMS stroke code- Last known well 8PM last night- lt facial droop & non verbal this morning. Pt has dementia but staff states he walks & talks @ facility

## 2022-06-30 NOTE — ED PROVIDER NOTE - MUSCULOSKELETAL, MLM
This medication is not approved for me to refill based on the refill protocol-referring to provider.  
Spine appears normal, range of motion is not limited, no muscle or joint tenderness. Left hand contracted in splint

## 2022-06-30 NOTE — ED ADULT NURSE NOTE - NSICDXPASTMEDICALHX_GEN_ALL_CORE_FT
PAST MEDICAL HISTORY:  Anemia     Arteriosclerotic heart disease (ASHD)     Atrial fibrillation     Atrial fibrillation     CHF (congestive heart failure)     Constipation     Dementia     Dementia     Dementia     Depression     Diabetes     Diabetes     DM (diabetes mellitus)     Hyperlipemia     Hypertension     Prostate ca     Prostate CA      PAST MEDICAL HISTORY:  AICD (automatic cardioverter/defibrillator) present     Anemia     Arteriosclerotic heart disease (ASHD)     Atrial fibrillation     Atrial fibrillation Left chest wall    CHF (congestive heart failure)     Constipation     Dementia     Dementia     Dementia     Depression     Diabetes     Diabetes     DM (diabetes mellitus)     Hyperlipemia     Hypertension     Prostate ca     Prostate CA

## 2022-06-30 NOTE — CONSULT NOTE ADULT - ASSESSMENT
The patient is a 89 year old male with a history of HL, atrial fibrillation, SDH, dementia who presents with unresponsiveness.    Plan:  - Check ECG  - CT head with no acute pathology  - Monitor on telemetry  - Cardiac enzymes negative  - Infectious work-up  - Likely unable to take PO medications  - The patient had previously been on aspirin for atrial fibrillation. Presumably not a candidate for anticoagulation given dementia and falls risk.  - Neurology eval  - [facility] discussions

## 2022-06-30 NOTE — SWALLOW BEDSIDE ASSESSMENT ADULT - SWALLOW EVAL: RECOMMENDED FEEDING/EATING TECHNIQUES
* Feed only when alert/awake/allow for swallow between intakes/alternate food with liquid/check mouth frequently for oral residue/pocketing/crush medication (when feasible)/maintain upright posture during/after eating for 30 mins/no straws/oral hygiene/position upright (90 degrees)/small sips/bites

## 2022-06-30 NOTE — ED ADULT NURSE NOTE - OBJECTIVE STATEMENT
BIBA  for  unresponsiveness today as per [facility] staff. Last known well at 8 PM normally verbal, possible facial droop, none note on arrival. Left hand chronic contracted as per EMS, patient has dementia, cannot follow instructions and cannot give history. BIBA  for  unresponsiveness today as per [facility] staff. Last known well at 8 PM normally verbal, possible facial droop, none note on arrival. Left hand chronic contracted as per EMS, patient has dementia, cannot follow instructions and cannot give history.  AICD noted Left upper chest wall.

## 2022-06-30 NOTE — H&P ADULT - NSICDXPASTMEDICALHX_GEN_ALL_CORE_FT
PAST MEDICAL HISTORY:  Anemia     Arteriosclerotic heart disease (ASHD)     Atrial fibrillation     Atrial fibrillation     CHF (congestive heart failure)     Constipation     Dementia     Dementia     Dementia     Depression     Diabetes     Diabetes     DM (diabetes mellitus)     Hyperlipemia     Hypertension     Prostate ca     Prostate CA      PAST MEDICAL HISTORY:  AICD (automatic cardioverter/defibrillator) present     Anemia     Aortic valve stenosis     Arteriosclerotic heart disease (ASHD)     Atrial fibrillation     Atrial fibrillation     CHF (congestive heart failure)     Constipation     Constipation     Dementia     Dementia     Dementia     Depression     Diabetes     Diabetes     DM (diabetes mellitus)     H/O bladder infections     H/O cystitis     H/O ongoing treatment with hormonal therapy     Hematuria     Hyperlipemia     Hypertension     Osteomyelitis of finger of left hand     Personal history of radiation therapy     Prostate ca     Prostate CA     Scrotal abscess     Urinary retention     VHD (valvular heart disease)

## 2022-07-01 ENCOUNTER — TRANSCRIPTION ENCOUNTER (OUTPATIENT)
Age: 87
End: 2022-07-01

## 2022-07-01 LAB
A1C WITH ESTIMATED AVERAGE GLUCOSE RESULT: 6.4 % — HIGH (ref 4–5.6)
ALBUMIN SERPL ELPH-MCNC: 3.1 G/DL — LOW (ref 3.3–5)
ALP SERPL-CCNC: 110 U/L — SIGNIFICANT CHANGE UP (ref 30–120)
ALT FLD-CCNC: 22 U/L DA — SIGNIFICANT CHANGE UP (ref 10–60)
ANION GAP SERPL CALC-SCNC: 3 MMOL/L — LOW (ref 5–17)
AST SERPL-CCNC: 15 U/L — SIGNIFICANT CHANGE UP (ref 10–40)
BASOPHILS # BLD AUTO: 0.03 K/UL — SIGNIFICANT CHANGE UP (ref 0–0.2)
BASOPHILS NFR BLD AUTO: 0.5 % — SIGNIFICANT CHANGE UP (ref 0–2)
BILIRUB SERPL-MCNC: 0.5 MG/DL — SIGNIFICANT CHANGE UP (ref 0.2–1.2)
BUN SERPL-MCNC: 15 MG/DL — SIGNIFICANT CHANGE UP (ref 7–23)
CALCIUM SERPL-MCNC: 9.3 MG/DL — SIGNIFICANT CHANGE UP (ref 8.4–10.5)
CHLORIDE SERPL-SCNC: 102 MMOL/L — SIGNIFICANT CHANGE UP (ref 96–108)
CO2 SERPL-SCNC: 32 MMOL/L — HIGH (ref 22–31)
CREAT SERPL-MCNC: 0.66 MG/DL — SIGNIFICANT CHANGE UP (ref 0.5–1.3)
EGFR: 86 ML/MIN/1.73M2 — SIGNIFICANT CHANGE UP
EOSINOPHIL # BLD AUTO: 0.12 K/UL — SIGNIFICANT CHANGE UP (ref 0–0.5)
EOSINOPHIL NFR BLD AUTO: 1.9 % — SIGNIFICANT CHANGE UP (ref 0–6)
ESTIMATED AVERAGE GLUCOSE: 137 MG/DL — HIGH (ref 68–114)
GLUCOSE BLDC GLUCOMTR-MCNC: 124 MG/DL — HIGH (ref 70–99)
GLUCOSE BLDC GLUCOMTR-MCNC: 131 MG/DL — HIGH (ref 70–99)
GLUCOSE BLDC GLUCOMTR-MCNC: 145 MG/DL — HIGH (ref 70–99)
GLUCOSE BLDC GLUCOMTR-MCNC: 158 MG/DL — HIGH (ref 70–99)
GLUCOSE SERPL-MCNC: 115 MG/DL — HIGH (ref 70–99)
HCT VFR BLD CALC: 33.7 % — LOW (ref 39–50)
HGB BLD-MCNC: 11.1 G/DL — LOW (ref 13–17)
IMM GRANULOCYTES NFR BLD AUTO: 0.3 % — SIGNIFICANT CHANGE UP (ref 0–1.5)
INR BLD: 1.13 RATIO — SIGNIFICANT CHANGE UP (ref 0.88–1.16)
LYMPHOCYTES # BLD AUTO: 1.23 K/UL — SIGNIFICANT CHANGE UP (ref 1–3.3)
LYMPHOCYTES # BLD AUTO: 19.9 % — SIGNIFICANT CHANGE UP (ref 13–44)
MCHC RBC-ENTMCNC: 31.4 PG — SIGNIFICANT CHANGE UP (ref 27–34)
MCHC RBC-ENTMCNC: 32.9 GM/DL — SIGNIFICANT CHANGE UP (ref 32–36)
MCV RBC AUTO: 95.5 FL — SIGNIFICANT CHANGE UP (ref 80–100)
MONOCYTES # BLD AUTO: 0.57 K/UL — SIGNIFICANT CHANGE UP (ref 0–0.9)
MONOCYTES NFR BLD AUTO: 9.2 % — SIGNIFICANT CHANGE UP (ref 2–14)
NEUTROPHILS # BLD AUTO: 4.22 K/UL — SIGNIFICANT CHANGE UP (ref 1.8–7.4)
NEUTROPHILS NFR BLD AUTO: 68.2 % — SIGNIFICANT CHANGE UP (ref 43–77)
NRBC # BLD: 0 /100 WBCS — SIGNIFICANT CHANGE UP (ref 0–0)
PLATELET # BLD AUTO: 214 K/UL — SIGNIFICANT CHANGE UP (ref 150–400)
POTASSIUM SERPL-MCNC: 4.2 MMOL/L — SIGNIFICANT CHANGE UP (ref 3.5–5.3)
POTASSIUM SERPL-SCNC: 4.2 MMOL/L — SIGNIFICANT CHANGE UP (ref 3.5–5.3)
PROT SERPL-MCNC: 6.7 G/DL — SIGNIFICANT CHANGE UP (ref 6–8.3)
PROTHROM AB SERPL-ACNC: 13.4 SEC — SIGNIFICANT CHANGE UP (ref 10.5–13.4)
RBC # BLD: 3.53 M/UL — LOW (ref 4.2–5.8)
RBC # FLD: 13.4 % — SIGNIFICANT CHANGE UP (ref 10.3–14.5)
SODIUM SERPL-SCNC: 137 MMOL/L — SIGNIFICANT CHANGE UP (ref 135–145)
WBC # BLD: 6.19 K/UL — SIGNIFICANT CHANGE UP (ref 3.8–10.5)
WBC # FLD AUTO: 6.19 K/UL — SIGNIFICANT CHANGE UP (ref 3.8–10.5)

## 2022-07-01 RX ORDER — CLOPIDOGREL BISULFATE 75 MG/1
1 TABLET, FILM COATED ORAL
Qty: 0 | Refills: 0 | DISCHARGE
Start: 2022-07-01

## 2022-07-01 RX ORDER — ACETAMINOPHEN 500 MG
2 TABLET ORAL
Qty: 0 | Refills: 0 | DISCHARGE
Start: 2022-07-01

## 2022-07-01 RX ORDER — ASPIRIN/CALCIUM CARB/MAGNESIUM 324 MG
81 TABLET ORAL DAILY
Refills: 0 | Status: DISCONTINUED | OUTPATIENT
Start: 2022-07-01 | End: 2022-07-01

## 2022-07-01 RX ORDER — HEPARIN SODIUM 5000 [USP'U]/ML
5000 INJECTION INTRAVENOUS; SUBCUTANEOUS EVERY 12 HOURS
Refills: 0 | Status: DISCONTINUED | OUTPATIENT
Start: 2022-07-01 | End: 2022-07-08

## 2022-07-01 RX ADMIN — TAMSULOSIN HYDROCHLORIDE 0.4 MILLIGRAM(S): 0.4 CAPSULE ORAL at 22:07

## 2022-07-01 RX ADMIN — Medication 1 TABLET(S): at 14:26

## 2022-07-01 RX ADMIN — HEPARIN SODIUM 5000 UNIT(S): 5000 INJECTION INTRAVENOUS; SUBCUTANEOUS at 19:12

## 2022-07-01 RX ADMIN — CLOPIDOGREL BISULFATE 75 MILLIGRAM(S): 75 TABLET, FILM COATED ORAL at 14:26

## 2022-07-01 RX ADMIN — PANTOPRAZOLE SODIUM 40 MILLIGRAM(S): 20 TABLET, DELAYED RELEASE ORAL at 05:24

## 2022-07-01 RX ADMIN — POLYETHYLENE GLYCOL 3350 17 GRAM(S): 17 POWDER, FOR SOLUTION ORAL at 18:41

## 2022-07-01 RX ADMIN — DONEPEZIL HYDROCHLORIDE 5 MILLIGRAM(S): 10 TABLET, FILM COATED ORAL at 22:07

## 2022-07-01 RX ADMIN — Medication 1: at 12:45

## 2022-07-01 RX ADMIN — SENNA PLUS 2 TABLET(S): 8.6 TABLET ORAL at 22:07

## 2022-07-01 RX ADMIN — CEFTRIAXONE 100 MILLIGRAM(S): 500 INJECTION, POWDER, FOR SOLUTION INTRAMUSCULAR; INTRAVENOUS at 18:38

## 2022-07-01 RX ADMIN — SIMVASTATIN 20 MILLIGRAM(S): 20 TABLET, FILM COATED ORAL at 22:07

## 2022-07-01 NOTE — PROGRESS NOTE ADULT - SUBJECTIVE AND OBJECTIVE BOX
TERESA FRANCIS is a 89yMale , patient examined and chart reviewed.      INTERVAL HPI/ OVERNIGHT EVENTS:   Afebrile. No events.  NAd.    PAST MEDICAL & SURGICAL HISTORY:  Atrial fibrillation  Hypertension  Diabetes  Prostate ca  Dementia  CHF (congestive heart failure)  Hyperlipemia  Diabetes  Depression  Dementia  DM (diabetes mellitus)  Atrial fibrillation  Prostate CA  Arteriosclerotic heart disease (ASHD)  Dementia  Anemia  Constipation  AICD (automatic cardioverter/defibrillator) present  Osteomyelitis of finger of left hand  Personal history of radiation therapy  H/O ongoing treatment with hormonal therapy  H/O bladder infections  Scrotal abscess  Urinary retention  Hematuria  H/O cystitis  Constipation  VHD (valvular heart disease)  Aortic valve stenosis  History of automatic internal cardiac defibrillator (AICD)          For details regarding the patient's social history, family history, and other miscellaneous elements, please refer the initial infectious diseases consultation and/or the admitting history and physical examination for this admission.    ROS:  Unable to obtain due to : Dementia      ALLERGIES:  latex (Rash)      Current inpatient medications :    ANTIBIOTICS/RELEVANT:  cefTRIAXone   IVPB      cefTRIAXone   IVPB 2000 milliGRAM(s) IV Intermittent every 24 hours  lactobacillus acidophilus 1 Tablet(s) Oral daily      acetaminophen     Tablet .. 650 milliGRAM(s) Oral every 6 hours PRN  aluminum hydroxide/magnesium hydroxide/simethicone Suspension 30 milliLiter(s) Oral every 4 hours PRN  clopidogrel Tablet 75 milliGRAM(s) Oral daily  dextrose 5%. 1000 milliLiter(s) IV Continuous <Continuous>  dextrose 5%. 1000 milliLiter(s) IV Continuous <Continuous>  dextrose 50% Injectable 25 Gram(s) IV Push once  dextrose 50% Injectable 12.5 Gram(s) IV Push once  dextrose 50% Injectable 25 Gram(s) IV Push once  dextrose Oral Gel 15 Gram(s) Oral once PRN  donepezil 5 milliGRAM(s) Oral at bedtime  glucagon  Injectable 1 milliGRAM(s) IntraMuscular once  heparin   Injectable 5000 Unit(s) SubCutaneous every 12 hours  insulin lispro (ADMELOG) corrective regimen sliding scale   SubCutaneous three times a day before meals  melatonin 3 milliGRAM(s) Oral at bedtime PRN  multivitamin/minerals 1 Tablet(s) Oral daily  ondansetron Injectable 4 milliGRAM(s) IV Push every 8 hours PRN  pantoprazole    Tablet 40 milliGRAM(s) Oral before breakfast  polyethylene glycol 3350 17 Gram(s) Oral daily  senna 2 Tablet(s) Oral at bedtime  simvastatin 20 milliGRAM(s) Oral at bedtime  sodium chloride 0.45%. 1000 milliLiter(s) IV Continuous <Continuous>  tamsulosin 0.4 milliGRAM(s) Oral at bedtime      Objective:     @ 07:01  -   @ 07:00  --------------------------------------------------------  IN: 650 mL / OUT: 0 mL / NET: 650 mL      T(C): 36.7 (22 @ 06:21), Max: 36.7 (22 @ 06:21)  HR: 92 (22 @ 06:21) (60 - 92)  BP: 107/55 (22 @ 06:21) (107/55 - 152/68)  RR: 18 (22 @ 06:21) (18 - 23)  SpO2: 95% (22 @ 06:21) (95% - 98%)  Wt(kg): --      Physical Exam:  General: no acute distress  Neck: supple, trachea midline  Lungs: clear, no wheeze/rhonchi  Cardiovascular: regular rate and rhythm, S1 S2  Abdomen: soft, nontender,  bowel sounds normal  Neurological: Dementia  Skin: no rash  Extremities: no edema      LABS:                          11.1   6.19  )-----------( 214      ( 2022 06:15 )             33.7           137  |  102  |  15  ----------------------------<  115<H>  4.2   |  32<H>  |  0.66    Ca    9.3      2022 06:15    TPro  6.7  /  Alb  3.1<L>  /  TBili  0.5  /  DBili  x   /  AST  15  /  ALT  22  /  AlkPhos  110  07-      PT/INR - ( 2022 06:15 )   PT: 13.4 sec;   INR: 1.13 ratio         PTT - ( 2022 11:08 )  PTT:30.6 sec  Urinalysis Basic - ( 2022 14:10 )    Color: Yellow / Appearance: Turbid / S.010 / pH: x  Gluc: x / Ketone: Negative  / Bili: Negative / Urobili: Negative mg/dL   Blood: x / Protein: 100 mg/dL / Nitrite: Positive   Leuk Esterase: Moderate / RBC: 11-25 /HPF / WBC >50   Sq Epi: x / Non Sq Epi: Few / Bacteria: Many        MICROBIOLOGY:        RADIOLOGY & ADDITIONAL STUDIES:  ACC: [id number] EXAM:  XR CHEST PORTABLE URGENT 1V                          PROCEDURE DATE:  2022          INTERPRETATION:  HISTORY: ;  Sepsis;  TECHNIQUE: Portable frontal view of the chest, 1 view.  COMPARISON: none.  FINDINGS/  IMPRESSION:  A cardiac device overlies and obscures the left hemithorax with lead in   place.  HEART:  Enlarged  LUNGS: free of consolidation,effusion, or pneumothorax.  BONES: degenerative changes      Calcified gallstone. Contrast in the left renal collecting system.    Assessment :   89YO M PMH Dementia, HL, atrial fibrillation, SDH, Ca Prostate DM CHF resident of [facility] who presents with unresponsiveness mental status change Afebrile UA +.   Rule out UTI  More awake    Plan :   Cont Rocephin  Fu cultures  Trend temps and cbc  Asp precautions    Continue with present regiment.  Appropriate use of antibiotics and adverse effects reviewed.      > 35 minutes were spent in direct patient care reviewing notes, medications ,labs data/ imaging , discussion with multidisciplinary team.    Thank you for allowing me to participate in care of your patient .    Heber Issa MD  Infectious Disease  [phone]

## 2022-07-01 NOTE — DISCHARGE NOTE PROVIDER - CARE PROVIDER_API CALL
Toby Quintana)  Critical Care Medicine; Internal Medicine; Pulmonary Disease  [facility] [place] [address]  Phone: [phone]  Fax: [phone]  Follow Up Time: 1-3 days

## 2022-07-01 NOTE — PROGRESS NOTE ADULT - NSPROGADDITIONALINFOA_GEN_ALL_CORE
DISCHARGE BACK TO [facility] WITH HOME HOSPICE WHEN ARRANGED BY LIDNA BOWER/W SON ON A PHONE

## 2022-07-01 NOTE — DISCHARGE NOTE PROVIDER - NSDCCPCAREPLAN_GEN_ALL_CORE_FT
PRINCIPAL DISCHARGE DIAGNOSIS  Diagnosis: AMS (altered mental status)  Assessment and Plan of Treatment:       SECONDARY DISCHARGE DIAGNOSES  Diagnosis: Unresponsive episode  Assessment and Plan of Treatment:     Diagnosis: Acute metabolic encephalopathy  Assessment and Plan of Treatment:     Diagnosis: Acute UTI  Assessment and Plan of Treatment:     Diagnosis: DM (diabetes mellitus)  Assessment and Plan of Treatment:     Diagnosis: CAD (coronary artery disease)  Assessment and Plan of Treatment:     Diagnosis: HTN (hypertension)  Assessment and Plan of Treatment:     Diagnosis: Dementia  Assessment and Plan of Treatment:

## 2022-07-01 NOTE — DISCHARGE NOTE PROVIDER - HOSPITAL COURSE
The patient is a 89 year old male with a history of HL, atrial fibrillation, SDH, dementia who presents with unresponsiveness. The patient is unable to provide additional history . He reportedly was verbal yesterday at [facility] but unresponsive today.  Past Medical/Surgical History:  HL, atrial fibrillation, SDH, dementia ,om  Medications:  Home Medications:  ascorbic acid 500 mg oral tablet: 1 tab(s) orally once a day (03 Apr 2022 11:16)  Aspirin Enteric Coated 81 mg oral delayed release tablet: 1 tab(s) orally once a day (03 Apr 2022 11:16)  donepezil 5 mg oral tablet: 1 tab(s) orally once a day (at bedtime) (03 Apr 2022 11:16)  ferrous sulfate 325 mg (65 mg elemental iron) oral delayed release tablet: 1 tab(s) orally once a day (03 Apr 2022 11:16)  Multiple Vitamins with Minerals oral tablet: 1 tab(s) orally once a day (03 Apr 2022 11:16)  simvastatin 20 mg oral tablet: 1 tab(s) orally once a day (at bedtime) (03 Apr 2022 11:16)Patient is admitted for management of FTT ,suspected UTI Palliative care consult requested ,to discuss advance directives and complete MOLST ,family requested to initiate comfort measures only and to focus on pain and anxiety management ,case was d/w PCP from [facility] and with pall care MD .    Problem/Plan - 1:  ·  Problem: Unresponsive episode.   ·  Plan: likely 2/2 to TIA ( ct head -nad)  vs  vasovagal syncope secondary to poor po intake of fluids and infection .Seen by neurologist and impression was -TIA ,change ASA to Plavix recommended .    Problem/Plan - 2:  ·  Problem: Acute metabolic encephalopathy.   ·  Plan: secondary to UTI ,poa.    Problem/Plan - 3:  ·  Problem: Suspected UTI.   ·  Plan: septic workup and ID cons , abx as per ID ,case d/w .    Problem/Plan - 4:  ·  Problem: Dementia.   ·  Plan: Supportive care,frequent redirection,continue home medications,management of agitation as needed Palliative care consult requested ,to discuss advance directives and complete MOLST ,family requested to focus on CMO.    Problem/Plan - 5:  ·  Problem: DM (diabetes mellitus).   ·  Plan: Accuchecks monitoring and insulin corrective regimen  sliding scale coverage with short acting inslulin, add longacting insulin as needed ,no concentrated sweets diet, serial labs ,HbA1C,education.    Problem/Plan - 6:  ·  Problem: CAD (coronary artery disease).   ·  Plan: continue home medications ,card cons.    Problem/Plan - 7:  ·  Problem: HTN (hypertension).   ·  Plan: continue home medications ,bp monitoring.    Problem/Plan - 8:  ·  Problem: Prophylactic measure.   ·  Plan: Gastrointestinal stress ulcer prophylaxis and DVT prophylaxis administered.

## 2022-07-01 NOTE — PROGRESS NOTE ADULT - SUBJECTIVE AND OBJECTIVE BOX
Neurology follow up note    RAY [id number]      Interval History:    Patient resting in bed     Allergies    latex (Rash)  latex (Unknown)  No Known Drug Allergies    Intolerances        MEDICATIONS    acetaminophen     Tablet .. 650 milliGRAM(s) Oral every 6 hours PRN  aluminum hydroxide/magnesium hydroxide/simethicone Suspension 30 milliLiter(s) Oral every 4 hours PRN  cefTRIAXone   IVPB      cefTRIAXone   IVPB 2000 milliGRAM(s) IV Intermittent every 24 hours  clopidogrel Tablet 75 milliGRAM(s) Oral daily  dextrose 5%. 1000 milliLiter(s) IV Continuous <Continuous>  dextrose 5%. 1000 milliLiter(s) IV Continuous <Continuous>  dextrose 50% Injectable 25 Gram(s) IV Push once  dextrose 50% Injectable 12.5 Gram(s) IV Push once  dextrose 50% Injectable 25 Gram(s) IV Push once  dextrose Oral Gel 15 Gram(s) Oral once PRN  donepezil 5 milliGRAM(s) Oral at bedtime  glucagon  Injectable 1 milliGRAM(s) IntraMuscular once  heparin   Injectable 5000 Unit(s) SubCutaneous every 12 hours  insulin lispro (ADMELOG) corrective regimen sliding scale   SubCutaneous three times a day before meals  lactobacillus acidophilus 1 Tablet(s) Oral daily  melatonin 3 milliGRAM(s) Oral at bedtime PRN  multivitamin/minerals 1 Tablet(s) Oral daily  ondansetron Injectable 4 milliGRAM(s) IV Push every 8 hours PRN  pantoprazole    Tablet 40 milliGRAM(s) Oral before breakfast  polyethylene glycol 3350 17 Gram(s) Oral daily  senna 2 Tablet(s) Oral at bedtime  simvastatin 20 milliGRAM(s) Oral at bedtime  sodium chloride 0.45%. 1000 milliLiter(s) IV Continuous <Continuous>  tamsulosin 0.4 milliGRAM(s) Oral at bedtime              Vital Signs Last 24 Hrs  T(C): 36.7 (2022 06:21), Max: 36.7 (2022 06:21)  T(F): 98 (2022 06:21), Max: 98 (2022 06:21)  HR: 92 (2022 06:21) (60 - 92)  BP: 107/55 (2022 06:21) (107/55 - 152/68)  BP(mean): --  RR: 18 (2022 06:21) (18 - 23)  SpO2: 95% (2022 06:21) (95% - 98%)    REVIEW OF SYSTEMS:  Could not be obtained from the patient secondary to the patient having underlying dementia, mostly nonverbal.    PHYSICAL EXAMINATION:   HEENT:  Head:  Normocephalic, atraumatic.  Eyes:  No scleral icterus.  Ears:  Hard to evaluate secondary to the patient being mostly nonverbal.  NECK:  Had increased tone but resists.  RESPIRATORY:  Decreased breath sounds bilaterally.  CARDIOVASCULAR:  S1 and S2 heard.  ABDOMEN:  Soft, nontender.  EXTREMITIES:  No clubbing or cyanosis were noted.      NEUROLOGIC:  The patient was arousable to verbal stimuli.  Attempted to open the patient's eye, would actively resist.  Pupils bilaterally appeared to be 2 mm.  Speech, the patient was nonverbal.  Motor:  Examination is significantly limited.  Attempted to elevate bilateral upper extremities, the patient would actively resist, pull both arms down, would say overall 3+/5, but as per my previous note, the patient does have decreased range of motion of the left shoulder, and biceps and triceps at that time was 3+/5.  The patient at that time was unable to open and close his left hand.  Bilateral lower extremities had significantly increased tone.  When given plantar stimuli was slightly able to elevate off the bed.              LABS:  CBC Full  -  ( 2022 06:15 )  WBC Count : 6.19 K/uL  RBC Count : 3.53 M/uL  Hemoglobin : 11.1 g/dL  Hematocrit : 33.7 %  Platelet Count - Automated : 214 K/uL  Mean Cell Volume : 95.5 fl  Mean Cell Hemoglobin : 31.4 pg  Mean Cell Hemoglobin Concentration : 32.9 gm/dL  Auto Neutrophil # : 4.22 K/uL  Auto Lymphocyte # : 1.23 K/uL  Auto Monocyte # : 0.57 K/uL  Auto Eosinophil # : 0.12 K/uL  Auto Basophil # : 0.03 K/uL  Auto Neutrophil % : 68.2 %  Auto Lymphocyte % : 19.9 %  Auto Monocyte % : 9.2 %  Auto Eosinophil % : 1.9 %  Auto Basophil % : 0.5 %    Urinalysis Basic - ( 2022 14:10 )    Color: Yellow / Appearance: Turbid / S.010 / pH: x  Gluc: x / Ketone: Negative  / Bili: Negative / Urobili: Negative mg/dL   Blood: x / Protein: 100 mg/dL / Nitrite: Positive   Leuk Esterase: Moderate / RBC: 11-25 /HPF / WBC >50   Sq Epi: x / Non Sq Epi: Few / Bacteria: Many          137  |  102  |  15  ----------------------------<  115<H>  4.2   |  32<H>  |  0.66    Ca    9.3      2022 06:15    TPro  6.7  /  Alb  3.1<L>  /  TBili  0.5  /  DBili  x   /  AST  15  /  ALT  22  /  AlkPhos  110      Hemoglobin A1C:     LIVER FUNCTIONS - ( 2022 06:15 )  Alb: 3.1 g/dL / Pro: 6.7 g/dL / ALK PHOS: 110 U/L / ALT: 22 U/L DA / AST: 15 U/L / GGT: x           Vitamin B12   PT/INR - ( 2022 06:15 )   PT: 13.4 sec;   INR: 1.13 ratio         PTT - ( 2022 11:08 )  PTT:30.6 sec      RADIOLOGY    ANALYSIS AND PLAN:  This is a 89-year-old with episode of unresponsiveness with left hand weakness.  Clinical impression is TIA, rule out cerebrovascular accident.  We will discontinue the patient's aspirin and convert over to Plavix.  Spoke with son in great detail, we will not be opting for any type of MRI, we do not feel that he would lie still fore it and management would not change.  For history of hypertension, monitor systolic blood pressure.  For hyperlipidemia, continue the patient on statin.  For dementia, continue the patient on his home medications  afib h/o fall not on AC  antibiotics as needed     Spoke with son, Regina, at [phone] .    Greater than 40 minutes of time was spent with the patient, plan of care, reviewing data, speaking to multidisciplinary healthcare team with greater than 50% of the time in counseling and care coordination.

## 2022-07-01 NOTE — PROGRESS NOTE ADULT - SUBJECTIVE AND OBJECTIVE BOX
Patient is a 89y Male with a known history of : admitted with ams ,left sided weakness  Unresponsive episode [R41.89]    Suspected UTI [R39.89]    Dementia [F03.90]    Acute metabolic encephalopathy [G93.41]    DM (diabetes mellitus) [E11.9]    CAD (coronary artery disease) [I25.10]    HTN (hypertension) [I10]    Prophylactic measure [Z29.9]      HPI:  The patient is a 89 year old male with a history of HL, atrial fibrillation, SDH, dementia who presents with unresponsiveness. The patient is unable to provide additional history . He reportedly was verbal yesterday at [facility] but unresponsive today.  Past Medical/Surgical History:  HL, atrial fibrillation, SDH, dementia ,om  Medications:  Home Medications:  ascorbic acid 500 mg oral tablet: 1 tab(s) orally once a day (03 Apr 2022 11:16)  Aspirin Enteric Coated 81 mg oral delayed release tablet: 1 tab(s) orally once a day (03 Apr 2022 11:16)  donepezil 5 mg oral tablet: 1 tab(s) orally once a day (at bedtime) (03 Apr 2022 11:16)  ferrous sulfate 325 mg (65 mg elemental iron) oral delayed release tablet: 1 tab(s) orally once a day (03 Apr 2022 11:16)  Multiple Vitamins with Minerals oral tablet: 1 tab(s) orally once a day (03 Apr 2022 11:16)  simvastatin 20 mg oral tablet: 1 tab(s) orally once a day (at bedtime) (03 Apr 2022 11:16)Patient is admitted for management of FTT ,suspected UTI Palliative care consult requested ,to discuss advance directives and complete MOLST ,family requested to initiate comfort measures only and to focus on pain and anxiety management ,case was d/w PCP from [facility] and with pall care MD .   (30 Jun 2022 11:24)      REVIEW OF SYSTEMS:    CONSTITUTIONAL: No fever, weight loss, or fatigue  EYES: No eye pain, visual disturbances, or discharge  ENMT:  No difficulty hearing, tinnitus, vertigo; No sinus or throat pain  NECK: No pain or stiffness  BREASTS: No pain, masses, or nipple discharge  RESPIRATORY: No cough, wheezing, chills or hemoptysis; No shortness of breath  CARDIOVASCULAR: No chest pain, palpitations, dizziness, or leg swelling  GASTROINTESTINAL: No abdominal or epigastric pain. No nausea, vomiting, or hematemesis; No diarrhea or constipation. No melena or hematochezia.  GENITOURINARY: No dysuria, frequency, hematuria, or incontinence  NEUROLOGICAL: No headaches, memory loss, loss of strength, numbness, or tremors  SKIN: No itching, burning, rashes, or lesions   LYMPH NODES: No enlarged glands  ENDOCRINE: No heat or cold intolerance; No hair loss  MUSCULOSKELETAL: No joint pain or swelling; No muscle, back, or extremity pain  PSYCHIATRIC: No depression, anxiety, mood swings, or difficulty sleeping  HEME/LYMPH: No easy bruising, or bleeding gums  ALLERGY AND IMMUNOLOGIC: No hives or eczema    MEDICATIONS  (STANDING):  cefTRIAXone   IVPB      cefTRIAXone   IVPB 2000 milliGRAM(s) IV Intermittent every 24 hours  clopidogrel Tablet 75 milliGRAM(s) Oral daily  dextrose 5%. 1000 milliLiter(s) (50 mL/Hr) IV Continuous <Continuous>  dextrose 5%. 1000 milliLiter(s) (100 mL/Hr) IV Continuous <Continuous>  dextrose 50% Injectable 25 Gram(s) IV Push once  dextrose 50% Injectable 12.5 Gram(s) IV Push once  dextrose 50% Injectable 25 Gram(s) IV Push once  donepezil 5 milliGRAM(s) Oral at bedtime  glucagon  Injectable 1 milliGRAM(s) IntraMuscular once  insulin lispro (ADMELOG) corrective regimen sliding scale   SubCutaneous three times a day before meals  lactobacillus acidophilus 1 Tablet(s) Oral daily  multivitamin/minerals 1 Tablet(s) Oral daily  pantoprazole    Tablet 40 milliGRAM(s) Oral before breakfast  polyethylene glycol 3350 17 Gram(s) Oral daily  senna 2 Tablet(s) Oral at bedtime  simvastatin 20 milliGRAM(s) Oral at bedtime  sodium chloride 0.45%. 1000 milliLiter(s) (50 mL/Hr) IV Continuous <Continuous>  tamsulosin 0.4 milliGRAM(s) Oral at bedtime    MEDICATIONS  (PRN):  acetaminophen     Tablet .. 650 milliGRAM(s) Oral every 6 hours PRN Temp greater or equal to 38C (100.4F), Mild Pain (1 - 3)  aluminum hydroxide/magnesium hydroxide/simethicone Suspension 30 milliLiter(s) Oral every 4 hours PRN Dyspepsia  dextrose Oral Gel 15 Gram(s) Oral once PRN Blood Glucose LESS THAN 70 milliGRAM(s)/deciliter  melatonin 3 milliGRAM(s) Oral at bedtime PRN Insomnia  ondansetron Injectable 4 milliGRAM(s) IV Push every 8 hours PRN Nausea and/or Vomiting      ALLERGIES: latex (Rash)  latex (Unknown)  No Known Drug Allergies      FAMILY HISTORY:      PHYSICAL EXAMINATION:  -----------------------------  T(C): 36.7 (07-01-22 @ 06:21), Max: 36.7 (07-01-22 @ 06:21)  HR: 92 (07-01-22 @ 06:21) (60 - 97)  BP: 107/55 (07-01-22 @ 06:21) (100/50 - 152/68)  RR: 18 (07-01-22 @ 06:21) (16 - 23)  SpO2: 95% (07-01-22 @ 06:21) (95% - 98%)  Wt(kg): --    06-30 @ 07:01  -  07-01 @ 07:00  --------------------------------------------------------  IN:    sodium chloride 0.45%: 650 mL  Total IN: 650 mL    OUT:  Total OUT: 0 mL    Total NET: 650 mL        Height (cm): 182.9 (06-30 @ 09:33)  Weight (kg): 73 (06-30 @ 09:33)  BMI (kg/m2): 21.8 (06-30 @ 09:33)  BSA (m2): 1.94 (06-30 @ 09:33)    VITALS  T(C): 36.7 (07-01-22 @ 06:21), Max: 36.7 (07-01-22 @ 06:21)  HR: 92 (07-01-22 @ 06:21) (60 - 97)  BP: 107/55 (07-01-22 @ 06:21) (100/50 - 152/68)  RR: 18 (07-01-22 @ 06:21) (16 - 23)  SpO2: 95% (07-01-22 @ 06:21) (95% - 98%)    Constitutional: well developed, normal appearance, well groomed, well nourished, no deformities and no acute distress.   Eyes: the conjunctiva exhibited no abnormalities and the eyelids demonstrated no xanthelasmas.   HEENT: normal oral mucosa, no oral pallor and no oral cyanosis.   Neck: normal jugular venous A waves present, normal jugular venous V waves present and no jugular venous jacobs A waves.   Pulmonary: no respiratory distress, normal respiratory rhythm and effort, no accessory muscle use and lungs were clear to auscultation bilaterally.   Cardiovascular: heart rate and rhythm were normal, normal S1 and S2 and no murmur, gallop, rub, heave or thrill are present.   Abdomen: soft, non-tender, no hepato-splenomegaly and no abdominal mass palpated.   Musculoskeletal: the gait could not be assessed..   Extremities: no clubbing of the fingernails, no localized cyanosis, no petechial hemorrhages and no ischemic changes.   Skin: normal skin color and pigmentation, no rash, no venous stasis, no skin lesions, no skin ulcer and no xanthoma was observed.   Psychiatric: oriented to person, place, and time, the affect was normal, the mood was normal and not feeling anxious.     LABS:   --------  07-01    137  |  102  |  15  ----------------------------<  115<H>  4.2   |  32<H>  |  0.66    Ca    9.3      01 Jul 2022 06:15    TPro  6.7  /  Alb  3.1<L>  /  TBili  0.5  /  DBili  x   /  AST  15  /  ALT  22  /  AlkPhos  110  07-01                         11.1   6.19  )-----------( 214      ( 01 Jul 2022 06:15 )             33.7     PT/INR - ( 01 Jul 2022 06:15 )   PT: 13.4 sec;   INR: 1.13 ratio         PTT - ( 30 Jun 2022 11:08 )  PTT:30.6 sec            RADIOLOGY:  -----------------    ECG:     ECHO:

## 2022-07-01 NOTE — PROGRESS NOTE ADULT - SUBJECTIVE AND OBJECTIVE BOX
Date/Time Patient Seen:  		  Referring MD:   Data Reviewed	       Patient is a 89y old  Male who presents with a chief complaint of EPISODE OF UNRESPONSIVNESS (30 Jun 2022 14:46)      Subjective/HPI     PAST MEDICAL & SURGICAL HISTORY:  Atrial fibrillation    Hypertension    Diabetes    Prostate ca    Dementia    CHF (congestive heart failure)    Hyperlipemia    Diabetes    Depression    Dementia    No pertinent past medical history    DM (diabetes mellitus)    Atrial fibrillation    Prostate CA    Arteriosclerotic heart disease (ASHD)    Dementia    Anemia    Constipation    AICD (automatic cardioverter/defibrillator) present    Osteomyelitis of finger of left hand    Personal history of radiation therapy    H/O ongoing treatment with hormonal therapy    H/O bladder infections    Scrotal abscess    Urinary retention    Hematuria    H/O cystitis    Constipation    VHD (valvular heart disease)    Aortic valve stenosis    No significant past surgical history    No significant past surgical history    History of automatic internal cardiac defibrillator (AICD)          Medication list         MEDICATIONS  (STANDING):  cefTRIAXone   IVPB      cefTRIAXone   IVPB 2000 milliGRAM(s) IV Intermittent every 24 hours  clopidogrel Tablet 75 milliGRAM(s) Oral daily  dextrose 5%. 1000 milliLiter(s) (50 mL/Hr) IV Continuous <Continuous>  dextrose 5%. 1000 milliLiter(s) (100 mL/Hr) IV Continuous <Continuous>  dextrose 50% Injectable 25 Gram(s) IV Push once  dextrose 50% Injectable 12.5 Gram(s) IV Push once  dextrose 50% Injectable 25 Gram(s) IV Push once  donepezil 5 milliGRAM(s) Oral at bedtime  glucagon  Injectable 1 milliGRAM(s) IntraMuscular once  insulin lispro (ADMELOG) corrective regimen sliding scale   SubCutaneous three times a day before meals  lactobacillus acidophilus 1 Tablet(s) Oral daily  multivitamin/minerals 1 Tablet(s) Oral daily  pantoprazole    Tablet 40 milliGRAM(s) Oral before breakfast  polyethylene glycol 3350 17 Gram(s) Oral daily  senna 2 Tablet(s) Oral at bedtime  simvastatin 20 milliGRAM(s) Oral at bedtime  sodium chloride 0.45%. 1000 milliLiter(s) (50 mL/Hr) IV Continuous <Continuous>  tamsulosin 0.4 milliGRAM(s) Oral at bedtime    MEDICATIONS  (PRN):  acetaminophen     Tablet .. 650 milliGRAM(s) Oral every 6 hours PRN Temp greater or equal to 38C (100.4F), Mild Pain (1 - 3)  aluminum hydroxide/magnesium hydroxide/simethicone Suspension 30 milliLiter(s) Oral every 4 hours PRN Dyspepsia  dextrose Oral Gel 15 Gram(s) Oral once PRN Blood Glucose LESS THAN 70 milliGRAM(s)/deciliter  melatonin 3 milliGRAM(s) Oral at bedtime PRN Insomnia  ondansetron Injectable 4 milliGRAM(s) IV Push every 8 hours PRN Nausea and/or Vomiting         Vitals log        ICU Vital Signs Last 24 Hrs  T(C): 36.7 (01 Jul 2022 06:21), Max: 36.7 (01 Jul 2022 06:21)  T(F): 98 (01 Jul 2022 06:21), Max: 98 (01 Jul 2022 06:21)  HR: 92 (01 Jul 2022 06:21) (60 - 97)  BP: 107/55 (01 Jul 2022 06:21) (100/50 - 152/68)  BP(mean): --  ABP: --  ABP(mean): --  RR: 18 (01 Jul 2022 06:21) (16 - 23)  SpO2: 95% (01 Jul 2022 06:21) (95% - 98%)           Input and Output:  I&O's Detail    30 Jun 2022 07:01  -  01 Jul 2022 06:26  --------------------------------------------------------  IN:    sodium chloride 0.45%: 650 mL  Total IN: 650 mL    OUT:  Total OUT: 0 mL    Total NET: 650 mL          Lab Data                        11.1   6.19  )-----------( 214      ( 01 Jul 2022 06:15 )             33.7     06-30    136  |  101  |  21  ----------------------------<  117<H>  4.2   |  29  |  0.69    Ca    9.7      30 Jun 2022 11:08    TPro  7.9  /  Alb  3.5  /  TBili  0.6  /  DBili  x   /  AST  20  /  ALT  28  /  AlkPhos  131<H>  06-30            Review of Systems	      Objective     Physical Examination    heart s1s2  lung dec BS  abd soft      Pertinent Lab findings & Imaging      [place]:  NO   Adequate UO     I&O's Detail    30 Jun 2022 07:01  -  01 Jul 2022 06:26  --------------------------------------------------------  IN:    sodium chloride 0.45%: 650 mL  Total IN: 650 mL    OUT:  Total OUT: 0 mL    Total NET: 650 mL               Discussed with:     Cultures:	        Radiology

## 2022-07-01 NOTE — DISCHARGE NOTE PROVIDER - NSDCMRMEDTOKEN_GEN_ALL_CORE_FT
acetaminophen 325 mg oral tablet: 2 tab(s) orally every 6 hours, As needed, Temp greater or equal to 38C (100.4F), Mild Pain (1 - 3)  Acidophilus with Pectin oral tablet: 2 tab(s) orally once a day   ascorbic acid 500 mg oral tablet: 1 tab(s) orally once a day  Aspirin Enteric Coated 81 mg oral delayed release tablet: 1 tab(s) orally once a day  clopidogrel 75 mg oral tablet: 1 tab(s) orally once a day  donepezil 5 mg oral tablet: 1 tab(s) orally once a day (at bedtime)  ferrous sulfate 325 mg (65 mg elemental iron) oral delayed release tablet: 1 tab(s) orally once a day  Flomax 0.4 mg oral capsule: 1 cap(s) orally once a day  furosemide 20 mg oral tablet: 1 tab(s) orally once a day  losartan 25 mg oral tablet: 1 tab(s) orally once a day  metoprolol tartrate 25 mg oral tablet: 0.5 tab(s) orally 2 times a day   MiraLax oral powder for reconstitution: 17 gram(s) orally once a day  Multiple Vitamins with Minerals oral tablet: 1 tab(s) orally once a day  pantoprazole 40 mg oral delayed release tablet: 1 tab(s) orally once a day  Senna 8.6 mg oral tablet: 2 tab(s) orally once a day (at bedtime)  simvastatin 20 mg oral tablet: 1 tab(s) orally once a day (at bedtime)  tamsulosin 0.4 mg oral capsule: 1 cap(s) orally once a day (at bedtime)   acetaminophen 325 mg oral tablet: 2 tab(s) orally every 6 hours, As needed, Temp greater or equal to 38C (100.4F), Mild Pain (1 - 3)  Acidophilus with Pectin oral tablet: 2 tab(s) orally once a day   ascorbic acid 500 mg oral tablet: 1 tab(s) orally once a day  clopidogrel 75 mg oral tablet: 1 tab(s) orally once a day  donepezil 5 mg oral tablet: 1 tab(s) orally once a day (at bedtime)  ferrous sulfate 325 mg (65 mg elemental iron) oral delayed release tablet: 1 tab(s) orally once a day  Flomax 0.4 mg oral capsule: 1 cap(s) orally once a day  furosemide 20 mg oral tablet: 1 tab(s) orally once a day  losartan 25 mg oral tablet: 1 tab(s) orally once a day  metoprolol tartrate 25 mg oral tablet: 0.5 tab(s) orally 2 times a day   MiraLax oral powder for reconstitution: 17 gram(s) orally once a day  Multiple Vitamins with Minerals oral tablet: 1 tab(s) orally once a day  pantoprazole 40 mg oral delayed release tablet: 1 tab(s) orally once a day  Senna 8.6 mg oral tablet: 2 tab(s) orally once a day (at bedtime)  simvastatin 20 mg oral tablet: 1 tab(s) orally once a day (at bedtime)   acetaminophen 325 mg oral tablet: 2 tab(s) orally every 6 hours, As needed, Temp greater or equal to 38C (100.4F), Mild Pain (1 - 3)  Acidophilus with Pectin oral tablet: 2 tab(s) orally once a day   ascorbic acid 500 mg oral tablet: 1 tab(s) orally once a day  clopidogrel 75 mg oral tablet: 1 tab(s) orally once a day  donepezil 5 mg oral tablet: 1 tab(s) orally once a day  ferrous sulfate 325 mg (65 mg elemental iron) oral delayed release tablet: 1 tab(s) orally once a day  Flomax 0.4 mg oral capsule: 1 cap(s) orally once a day  furosemide 20 mg oral tablet: 1 tab(s) orally once a day  losartan 25 mg oral tablet: 1 tab(s) orally once a day  metoprolol tartrate 25 mg oral tablet: 0.5 tab(s) orally 2 times a day   MiraLax oral powder for reconstitution: 17 gram(s) orally once a day  Multiple Vitamins with Minerals oral tablet: 1 tab(s) orally once a day  pantoprazole 40 mg oral delayed release tablet: 1 tab(s) orally once a day  Senna 8.6 mg oral tablet: 2 tab(s) orally once a day (at bedtime)  simvastatin 20 mg oral tablet: 1 tab(s) orally once a day (at bedtime)

## 2022-07-01 NOTE — PROGRESS NOTE ADULT - SUBJECTIVE AND OBJECTIVE BOX
PROGRESS NOTE  Patient is a 89y old  Male who presents with a chief complaint of EPISODE OF UNRESPONSIVNESS (2022 12:52)  Chart and available morning labs /imaging are reviewed electronically , urgent issues addressed . More information  is being added upon completion of rounds , when more information is collected and management discussed with consultants , medical staff and social service/case management on the floor     OVERNIGHT  No new issues reported by medical staff . All above noted Patient is resting in a bed comfortably .Confused ,poor mentation .No distress noted     HPI:  The patient is a 89 year old male with a history of HL, atrial fibrillation, SDH, dementia who presents with unresponsiveness. The patient is unable to provide additional history . He reportedly was verbal yesterday at [facility] but unresponsive today.  Past Medical/Surgical History:  HL, atrial fibrillation, SDH, dementia ,om  Medications:  Home Medications:  ascorbic acid 500 mg oral tablet: 1 tab(s) orally once a day (2022 11:16)  Aspirin Enteric Coated 81 mg oral delayed release tablet: 1 tab(s) orally once a day (2022 11:16)  donepezil 5 mg oral tablet: 1 tab(s) orally once a day (at bedtime) (2022 11:16)  ferrous sulfate 325 mg (65 mg elemental iron) oral delayed release tablet: 1 tab(s) orally once a day (2022 11:16)  Multiple Vitamins with Minerals oral tablet: 1 tab(s) orally once a day (2022 11:16)  simvastatin 20 mg oral tablet: 1 tab(s) orally once a day (at bedtime) (2022 11:16)Patient is admitted for management of FTT ,suspected UTI Palliative care consult requested ,to discuss advance directives and complete MOLST ,family requested to initiate comfort measures only and to focus on pain and anxiety management ,case was d/w PCP from [facility] and with \[facility]\"" care MD .   (2022 11:24)    PAST MEDICAL & SURGICAL HISTORY:  Atrial fibrillation      Hypertension      Diabetes      Prostate ca      Dementia      CHF (congestive heart failure)      Hyperlipemia      Diabetes      Depression      Dementia      DM (diabetes mellitus)      Atrial fibrillation      Prostate CA      Arteriosclerotic heart disease (ASHD)      Dementia      Anemia      Constipation      AICD (automatic cardioverter/defibrillator) present      Osteomyelitis of finger of left hand      Personal history of radiation therapy      H/O ongoing treatment with hormonal therapy      H/O bladder infections      Scrotal abscess      Urinary retention      Hematuria      H/O cystitis      Constipation      VHD (valvular heart disease)      Aortic valve stenosis      History of automatic internal cardiac defibrillator (AICD)          MEDICATIONS  (STANDING):  cefTRIAXone   IVPB      cefTRIAXone   IVPB 2000 milliGRAM(s) IV Intermittent every 24 hours  clopidogrel Tablet 75 milliGRAM(s) Oral daily  dextrose 5%. 1000 milliLiter(s) (50 mL/Hr) IV Continuous <Continuous>  dextrose 5%. 1000 milliLiter(s) (100 mL/Hr) IV Continuous <Continuous>  dextrose 50% Injectable 25 Gram(s) IV Push once  dextrose 50% Injectable 12.5 Gram(s) IV Push once  dextrose 50% Injectable 25 Gram(s) IV Push once  donepezil 5 milliGRAM(s) Oral at bedtime  glucagon  Injectable 1 milliGRAM(s) IntraMuscular once  heparin   Injectable 5000 Unit(s) SubCutaneous every 12 hours  insulin lispro (ADMELOG) corrective regimen sliding scale   SubCutaneous three times a day before meals  lactobacillus acidophilus 1 Tablet(s) Oral daily  multivitamin/minerals 1 Tablet(s) Oral daily  pantoprazole    Tablet 40 milliGRAM(s) Oral before breakfast  polyethylene glycol 3350 17 Gram(s) Oral daily  senna 2 Tablet(s) Oral at bedtime  simvastatin 20 milliGRAM(s) Oral at bedtime  sodium chloride 0.45%. 1000 milliLiter(s) (50 mL/Hr) IV Continuous <Continuous>  tamsulosin 0.4 milliGRAM(s) Oral at bedtime    MEDICATIONS  (PRN):  acetaminophen     Tablet .. 650 milliGRAM(s) Oral every 6 hours PRN Temp greater or equal to 38C (100.4F), Mild Pain (1 - 3)  aluminum hydroxide/magnesium hydroxide/simethicone Suspension 30 milliLiter(s) Oral every 4 hours PRN Dyspepsia  dextrose Oral Gel 15 Gram(s) Oral once PRN Blood Glucose LESS THAN 70 milliGRAM(s)/deciliter  melatonin 3 milliGRAM(s) Oral at bedtime PRN Insomnia  ondansetron Injectable 4 milliGRAM(s) IV Push every 8 hours PRN Nausea and/or Vomiting      OBJECTIVE    T(C): 36.7 (22 @ 14:26), Max: 36.7 (22 @ 06:21)  HR: 77 (22 @ 14:26) (60 - 92)  BP: 112/80 (22 @ 14:26) (107/55 - 152/68)  RR: 16 (22 @ 14:26) (16 - 23)  SpO2: 95% (22 @ 14:26) (95% - 97%)  Wt(kg): --  I&O's Summary    2022 07:01  -  2022 07:00  --------------------------------------------------------  IN: 650 mL / OUT: 0 mL / NET: 650 mL          REVIEW OF SYSTEMS:  CONSTITUTIONAL: No fever, weight loss, or fatigue  EYES: No eye pain, visual disturbances, or discharge  ENMT:   No sinus or throat pain  NECK: No pain or stiffness  RESPIRATORY: No cough, wheezing, chills or hemoptysis; No shortness of breath  CARDIOVASCULAR: No chest pain, palpitations, dizziness, or leg swelling  GASTROINTESTINAL: No abdominal pain. No nausea, vomiting; No diarrhea or constipation. No melena or hematochezia.  GENITOURINARY: No dysuria, frequency, hematuria, or incontinence  NEUROLOGICAL: No headaches, memory loss, loss of strength, numbness, or tremors  SKIN: No itching, burning, rashes, or lesions   MUSCULOSKELETAL: No joint pain or swelling; No muscle, back, or extremity pain    PHYSICAL EXAM:  Appearance: NAD. VS past 24 hrs -as above   HEENT:   Moist oral mucosa. Conjunctiva clear b/l.   Neck : supple  Respiratory: Lungs CTAB.  Gastrointestinal:  Soft, nontender. No rebound. No rigidity. BS present	  Cardiovascular: RRR ,S1S2 present  Neurologic: Non-focal. Moving all extremities.  Extremities: No edema. No erythema. No calf tenderness.  Skin: No rashes, No ecchymoses, No cyanosis.	  wounds ,skin lesions-See skin assesment flow sheet   LABS:                        11.1   6.19  )-----------( 214      ( 2022 06:15 )             33.7         137  |  102  |  15  ----------------------------<  115<H>  4.2   |  32<H>  |  0.66    Ca    9.3      2022 06:15    TPro  6.7  /  Alb  3.1<L>  /  TBili  0.5  /  DBili  x   /  AST  15  /  ALT  22  /  AlkPhos  110  07-01    CAPILLARY BLOOD GLUCOSE      POCT Blood Glucose.: 158 mg/dL (2022 12:26)  POCT Blood Glucose.: 124 mg/dL (2022 08:14)  POCT Blood Glucose.: 197 mg/dL (2022 21:40)  POCT Blood Glucose.: 159 mg/dL (2022 17:30)  POCT Blood Glucose.: 151 mg/dL (2022 16:02)    PT/INR - ( 2022 06:15 )   PT: 13.4 sec;   INR: 1.13 ratio         PTT - ( 2022 11:08 )  PTT:30.6 sec  Urinalysis Basic - ( 2022 14:10 )    Color: Yellow / Appearance: Turbid / S.010 / pH: x  Gluc: x / Ketone: Negative  / Bili: Negative / Urobili: Negative mg/dL   Blood: x / Protein: 100 mg/dL / Nitrite: Positive   Leuk Esterase: Moderate / RBC: 11-25 /HPF / WBC >50   Sq Epi: x / Non Sq Epi: Few / Bacteria: Many        RADIOLOGY & ADDITIONAL TESTS:   reviewed elctronically  ASSESSMENT/PLAN: 	    25 minutes aggregate time was spent on this visit, 50% visit time spent in care co-ordination with other attendings and counselling patient .I have discussed care plan with patient / HCP/family member ,who expressed understanding of problems treatment and their effect and side effects, alternatives in details. I have asked if they have any questions and concerns and appropriately addressed them to best of my ability. Advance care planning was discussed , pallitaive care issues ,CMO ,hospice levels of care were discussed in details , forms ,advance directives were reviewed .All questions were answered to the best of my knowledge .Additional 25 min spent.  PROGRESS NOTE  Patient is a 89y old  Male who presents with a chief complaint of EPISODE OF UNRESPONSIVNESS (2022 12:52)  Chart and available morning labs /imaging are reviewed electronically , urgent issues addressed . More information  is being added upon completion of rounds , when more information is collected and management discussed with consultants , medical staff and social service/case management on the floor     OVERNIGHT  No new issues reported by medical staff . All above noted Patient is resting in a bed comfortably .Confused ,poor mentation .No distress noted   Spoke to the son Vinicio on a phone ,update is given ,requests home hospice and also asked to upgrade diet for pleasure feeds since patient doesnt like puree .Patient is more awake today ,will advance diet to minced and moist  HPI:  The patient is a 89 year old male with a history of HL, atrial fibrillation, SDH, dementia who presents with unresponsiveness. The patient is unable to provide additional history . He reportedly was verbal yesterday at [facility] but unresponsive today.  Past Medical/Surgical History:  HL, atrial fibrillation, SDH, dementia ,om  Medications:  Home Medications:  ascorbic acid 500 mg oral tablet: 1 tab(s) orally once a day (2022 11:16)  Aspirin Enteric Coated 81 mg oral delayed release tablet: 1 tab(s) orally once a day (2022 11:16)  donepezil 5 mg oral tablet: 1 tab(s) orally once a day (at bedtime) (2022 11:16)  ferrous sulfate 325 mg (65 mg elemental iron) oral delayed release tablet: 1 tab(s) orally once a day (2022 11:16)  Multiple Vitamins with Minerals oral tablet: 1 tab(s) orally once a day (2022 11:16)  simvastatin 20 mg oral tablet: 1 tab(s) orally once a day (at bedtime) (2022 11:16)Patient is admitted for management of FTT ,suspected UTI Palliative care consult requested ,to discuss advance directives and complete MOLST ,family requested to initiate comfort measures only and to focus on pain and anxiety management ,case was d/w PCP from [facility] and with pall care MD .   (2022 11:24)    PAST MEDICAL & SURGICAL HISTORY:  Atrial fibrillation      Hypertension      Diabetes      Prostate ca      Dementia      CHF (congestive heart failure)      Hyperlipemia      Diabetes      Depression      Dementia      DM (diabetes mellitus)      Atrial fibrillation      Prostate CA      Arteriosclerotic heart disease (ASHD)      Dementia      Anemia      Constipation      AICD (automatic cardioverter/defibrillator) present      Osteomyelitis of finger of left hand      Personal history of radiation therapy      H/O ongoing treatment with hormonal therapy      H/O bladder infections      Scrotal abscess      Urinary retention      Hematuria      H/O cystitis      Constipation      VHD (valvular heart disease)      Aortic valve stenosis      History of automatic internal cardiac defibrillator (AICD)          MEDICATIONS  (STANDING):  cefTRIAXone   IVPB      cefTRIAXone   IVPB 2000 milliGRAM(s) IV Intermittent every 24 hours  clopidogrel Tablet 75 milliGRAM(s) Oral daily  dextrose 5%. 1000 milliLiter(s) (50 mL/Hr) IV Continuous <Continuous>  dextrose 5%. 1000 milliLiter(s) (100 mL/Hr) IV Continuous <Continuous>  dextrose 50% Injectable 25 Gram(s) IV Push once  dextrose 50% Injectable 12.5 Gram(s) IV Push once  dextrose 50% Injectable 25 Gram(s) IV Push once  donepezil 5 milliGRAM(s) Oral at bedtime  glucagon  Injectable 1 milliGRAM(s) IntraMuscular once  heparin   Injectable 5000 Unit(s) SubCutaneous every 12 hours  insulin lispro (ADMELOG) corrective regimen sliding scale   SubCutaneous three times a day before meals  lactobacillus acidophilus 1 Tablet(s) Oral daily  multivitamin/minerals 1 Tablet(s) Oral daily  pantoprazole    Tablet 40 milliGRAM(s) Oral before breakfast  polyethylene glycol 3350 17 Gram(s) Oral daily  senna 2 Tablet(s) Oral at bedtime  simvastatin 20 milliGRAM(s) Oral at bedtime  sodium chloride 0.45%. 1000 milliLiter(s) (50 mL/Hr) IV Continuous <Continuous>  tamsulosin 0.4 milliGRAM(s) Oral at bedtime    MEDICATIONS  (PRN):  acetaminophen     Tablet .. 650 milliGRAM(s) Oral every 6 hours PRN Temp greater or equal to 38C (100.4F), Mild Pain (1 - 3)  aluminum hydroxide/magnesium hydroxide/simethicone Suspension 30 milliLiter(s) Oral every 4 hours PRN Dyspepsia  dextrose Oral Gel 15 Gram(s) Oral once PRN Blood Glucose LESS THAN 70 milliGRAM(s)/deciliter  melatonin 3 milliGRAM(s) Oral at bedtime PRN Insomnia  ondansetron Injectable 4 milliGRAM(s) IV Push every 8 hours PRN Nausea and/or Vomiting      OBJECTIVE    T(C): 36.7 (07-01-22 @ 14:26), Max: 36.7 (22 @ 06:21)  HR: 77 (22 @ 14:26) (60 - 92)  BP: 112/80 (22 @ 14:26) (107/55 - 152/68)  RR: 16 (22 @ 14:26) (16 - 23)  SpO2: 95% (22 @ 14:26) (95% - 97%)  Wt(kg): --  I&O's Summary    2022 07:01  -  2022 07:00  --------------------------------------------------------  IN: 650 mL / OUT: 0 mL / NET: 650 mL          REVIEW OF SYSTEMS:  CONSTITUTIONAL: No fever, weight loss, or fatigue  EYES: No eye pain, visual disturbances, or discharge  ENMT:   No sinus or throat pain  NECK: No pain or stiffness  RESPIRATORY: No cough, wheezing, chills or hemoptysis; No shortness of breath  CARDIOVASCULAR: No chest pain, palpitations, dizziness, or leg swelling  GASTROINTESTINAL: No abdominal pain. No nausea, vomiting; No diarrhea or constipation. No melena or hematochezia.  GENITOURINARY: No dysuria, frequency, hematuria, or incontinence  NEUROLOGICAL: No headaches, memory loss, loss of strength, numbness, or tremors  SKIN: No itching, burning, rashes, or lesions   MUSCULOSKELETAL: No joint pain or swelling; No muscle, back, or extremity pain    PHYSICAL EXAM:  Appearance: NAD. VS past 24 hrs -as above   HEENT:   Moist oral mucosa. Conjunctiva clear b/l.   Neck : supple  Respiratory: Lungs CTAB.  Gastrointestinal:  Soft, nontender. No rebound. No rigidity. BS present	  Cardiovascular: RRR ,S1S2 present  Neurologic: Non-focal. Moving all extremities.  Extremities: No edema. No erythema. No calf tenderness.  Skin: No rashes, No ecchymoses, No cyanosis.	  wounds ,skin lesions-See skin assesment flow sheet   LABS:                        11.1   6.19  )-----------( 214      ( 2022 06:15 )             33.7     07-    137  |  102  |  15  ----------------------------<  115<H>  4.2   |  32<H>  |  0.66    Ca    9.3      2022 06:15    TPro  6.7  /  Alb  3.1<L>  /  TBili  0.5  /  DBili  x   /  AST  15  /  ALT  22  /  AlkPhos  110  07-    CAPILLARY BLOOD GLUCOSE      POCT Blood Glucose.: 158 mg/dL (2022 12:26)  POCT Blood Glucose.: 124 mg/dL (2022 08:14)  POCT Blood Glucose.: 197 mg/dL (2022 21:40)  POCT Blood Glucose.: 159 mg/dL (2022 17:30)  POCT Blood Glucose.: 151 mg/dL (2022 16:02)    PT/INR - ( 2022 06:15 )   PT: 13.4 sec;   INR: 1.13 ratio         PTT - ( 2022 11:08 )  PTT:30.6 sec  Urinalysis Basic - ( 2022 14:10 )    Color: Yellow / Appearance: Turbid / S.010 / pH: x  Gluc: x / Ketone: Negative  / Bili: Negative / Urobili: Negative mg/dL   Blood: x / Protein: 100 mg/dL / Nitrite: Positive   Leuk Esterase: Moderate / RBC: 11-25 /HPF / WBC >50   Sq Epi: x / Non Sq Epi: Few / Bacteria: Many        RADIOLOGY & ADDITIONAL TESTS:   reviewed elctronically  ASSESSMENT/PLAN: 	    25 minutes aggregate time was spent on this visit, 50% visit time spent in care co-ordination with other attendings and counselling patient .I have discussed care plan with patient / HCP/family member ,who expressed understanding of problems treatment and their effect and side effects, alternatives in details. I have asked if they have any questions and concerns and appropriately addressed them to best of my ability. Advance care planning was discussed , pallitaive care issues ,CMO ,hospice levels of care were discussed in details , forms ,advance directives were reviewed .All questions were answered to the best of my knowledge .Additional 25 min spent.

## 2022-07-02 LAB
GLUCOSE BLDC GLUCOMTR-MCNC: 122 MG/DL — HIGH (ref 70–99)
GLUCOSE BLDC GLUCOMTR-MCNC: 144 MG/DL — HIGH (ref 70–99)
GLUCOSE BLDC GLUCOMTR-MCNC: 154 MG/DL — HIGH (ref 70–99)
GLUCOSE BLDC GLUCOMTR-MCNC: 167 MG/DL — HIGH (ref 70–99)

## 2022-07-02 RX ADMIN — CLOPIDOGREL BISULFATE 75 MILLIGRAM(S): 75 TABLET, FILM COATED ORAL at 12:35

## 2022-07-02 RX ADMIN — DONEPEZIL HYDROCHLORIDE 5 MILLIGRAM(S): 10 TABLET, FILM COATED ORAL at 22:15

## 2022-07-02 RX ADMIN — Medication 1 TABLET(S): at 12:35

## 2022-07-02 RX ADMIN — POLYETHYLENE GLYCOL 3350 17 GRAM(S): 17 POWDER, FOR SOLUTION ORAL at 12:35

## 2022-07-02 RX ADMIN — HEPARIN SODIUM 5000 UNIT(S): 5000 INJECTION INTRAVENOUS; SUBCUTANEOUS at 05:53

## 2022-07-02 RX ADMIN — PANTOPRAZOLE SODIUM 40 MILLIGRAM(S): 20 TABLET, DELAYED RELEASE ORAL at 05:53

## 2022-07-02 RX ADMIN — SIMVASTATIN 20 MILLIGRAM(S): 20 TABLET, FILM COATED ORAL at 22:16

## 2022-07-02 RX ADMIN — HEPARIN SODIUM 5000 UNIT(S): 5000 INJECTION INTRAVENOUS; SUBCUTANEOUS at 18:09

## 2022-07-02 RX ADMIN — SENNA PLUS 2 TABLET(S): 8.6 TABLET ORAL at 22:15

## 2022-07-02 RX ADMIN — TAMSULOSIN HYDROCHLORIDE 0.4 MILLIGRAM(S): 0.4 CAPSULE ORAL at 22:15

## 2022-07-02 RX ADMIN — Medication 1: at 12:47

## 2022-07-02 RX ADMIN — CEFTRIAXONE 100 MILLIGRAM(S): 500 INJECTION, POWDER, FOR SOLUTION INTRAMUSCULAR; INTRAVENOUS at 18:09

## 2022-07-02 NOTE — DIETITIAN INITIAL EVALUATION ADULT - NS FNS DIET ORDER
Diet, Minced and Moist:   Supplement Feeding Modality:  Oral  Glucerna Shake Cans or Servings Per Day:  1       Frequency:  Daily (07-01-22 @ 15:11) [Active]

## 2022-07-02 NOTE — PROGRESS NOTE ADULT - SUBJECTIVE AND OBJECTIVE BOX
PROGRESS NOTE  Patient is a 89y old  Male who presents with a chief complaint of EPISODE OF UNRESPONSIVNESS (2022 09:35)    Chart and available morning labs /imaging are reviewed electronically , urgent issues addressed . More information  is being added upon completion of rounds , when more information is collected and management discussed with consultants , medical staff and social service/case management on the floor   OVERNIGHT  No new issues reported by medical staff . All above noted Patient is resting in a bed comfortably .Confused ,poor mentation .No distress noted   HPI:  The patient is a 89 year old male with a history of HL, atrial fibrillation, SDH, dementia who presents with unresponsiveness. The patient is unable to provide additional history . He reportedly was verbal yesterday at [facility] but unresponsive today.  Past Medical/Surgical History:  HL, atrial fibrillation, SDH, dementia ,om  Medications:  Home Medications:  ascorbic acid 500 mg oral tablet: 1 tab(s) orally once a day (2022 11:16)  Aspirin Enteric Coated 81 mg oral delayed release tablet: 1 tab(s) orally once a day (2022 11:16)  donepezil 5 mg oral tablet: 1 tab(s) orally once a day (at bedtime) (2022 11:16)  ferrous sulfate 325 mg (65 mg elemental iron) oral delayed release tablet: 1 tab(s) orally once a day (2022 11:16)  Multiple Vitamins with Minerals oral tablet: 1 tab(s) orally once a day (2022 11:16)  simvastatin 20 mg oral tablet: 1 tab(s) orally once a day (at bedtime) (2022 11:16)Patient is admitted for management of FTT ,suspected UTI Palliative care consult requested ,to discuss advance directives and complete MOLST ,family requested to initiate comfort measures only and to focus on pain and anxiety management ,case was d/w PCP from [facility] and with [facility] care MD .   (2022 11:24)    PAST MEDICAL & SURGICAL HISTORY:  Atrial fibrillation      Hypertension      Diabetes      Prostate ca      Dementia      CHF (congestive heart failure)      Hyperlipemia      Diabetes      Depression      Dementia      DM (diabetes mellitus)      Atrial fibrillation      Prostate CA      Arteriosclerotic heart disease (ASHD)      Dementia      Anemia      Constipation      AICD (automatic cardioverter/defibrillator) present      Osteomyelitis of finger of left hand      Personal history of radiation therapy      H/O ongoing treatment with hormonal therapy      H/O bladder infections      Scrotal abscess      Urinary retention      Hematuria      H/O cystitis      Constipation      VHD (valvular heart disease)      Aortic valve stenosis      History of automatic internal cardiac defibrillator (AICD)          MEDICATIONS  (STANDING):  cefTRIAXone   IVPB      cefTRIAXone   IVPB 2000 milliGRAM(s) IV Intermittent every 24 hours  clopidogrel Tablet 75 milliGRAM(s) Oral daily  dextrose 5%. 1000 milliLiter(s) (50 mL/Hr) IV Continuous <Continuous>  dextrose 5%. 1000 milliLiter(s) (100 mL/Hr) IV Continuous <Continuous>  dextrose 50% Injectable 25 Gram(s) IV Push once  dextrose 50% Injectable 12.5 Gram(s) IV Push once  dextrose 50% Injectable 25 Gram(s) IV Push once  donepezil 5 milliGRAM(s) Oral at bedtime  glucagon  Injectable 1 milliGRAM(s) IntraMuscular once  heparin   Injectable 5000 Unit(s) SubCutaneous every 12 hours  insulin lispro (ADMELOG) corrective regimen sliding scale   SubCutaneous three times a day before meals  lactobacillus acidophilus 1 Tablet(s) Oral daily  multivitamin/minerals 1 Tablet(s) Oral daily  pantoprazole    Tablet 40 milliGRAM(s) Oral before breakfast  polyethylene glycol 3350 17 Gram(s) Oral daily  senna 2 Tablet(s) Oral at bedtime  simvastatin 20 milliGRAM(s) Oral at bedtime  tamsulosin 0.4 milliGRAM(s) Oral at bedtime    MEDICATIONS  (PRN):  acetaminophen     Tablet .. 650 milliGRAM(s) Oral every 6 hours PRN Temp greater or equal to 38C (100.4F), Mild Pain (1 - 3)  aluminum hydroxide/magnesium hydroxide/simethicone Suspension 30 milliLiter(s) Oral every 4 hours PRN Dyspepsia  dextrose Oral Gel 15 Gram(s) Oral once PRN Blood Glucose LESS THAN 70 milliGRAM(s)/deciliter  melatonin 3 milliGRAM(s) Oral at bedtime PRN Insomnia  ondansetron Injectable 4 milliGRAM(s) IV Push every 8 hours PRN Nausea and/or Vomiting      OBJECTIVE    T(C): 36.6 (22 @ 07:42), Max: 36.8 (22 @ 00:06)  HR: 61 (22 @ 07:42) (61 - 77)  BP: 119/54 (22 @ 07:42) (112/80 - 131/67)  RR: 16 (22 @ 07:42) (16 - 16)  SpO2: 98% (22 @ 07:42) (95% - 98%)  Wt(kg): --  I&O's Summary        REVIEW OF SYSTEMS:  CONSTITUTIONAL: No fever, weight loss, or fatigue  Patient is  unable to provide any information/ROS  due to baseline mental status.     PHYSICAL EXAM:  Appearance: NAD. VS past 24 hrs -as above   HEENT:   Moist oral mucosa. Conjunctiva clear b/l.   Neck : supple  Respiratory: Lungs CTAB.  Gastrointestinal:  Soft, nontender. No rebound. No rigidity. BS present	  Cardiovascular: RRR ,S1S2 present  Neurologic: Non-focal. Moving all extremities.  Extremities: No edema. No erythema. No calf tenderness.  Skin: No rashes, No ecchymoses, No cyanosis.	  wounds ,skin lesions-See skin assesment flow sheet   LABS:                        11.1   6.19  )-----------( 214      ( 2022 06:15 )             33.7     07-    137  |  102  |  15  ----------------------------<  115<H>  4.2   |  32<H>  |  0.66    Ca    9.3      2022 06:15    TPro  6.7  /  Alb  3.1<L>  /  TBili  0.5  /  DBili  x   /  AST  15  /  ALT  22  /  AlkPhos  110  07-01    CAPILLARY BLOOD GLUCOSE      POCT Blood Glucose.: 122 mg/dL (2022 08:42)  POCT Blood Glucose.: 131 mg/dL (2022 21:52)  POCT Blood Glucose.: 145 mg/dL (2022 17:24)  POCT Blood Glucose.: 158 mg/dL (2022 12:26)    PT/INR - ( 2022 06:15 )   PT: 13.4 sec;   INR: 1.13 ratio         PTT - ( 2022 11:08 )  PTT:30.6 sec  Urinalysis Basic - ( 2022 14:10 )    Color: Yellow / Appearance: Turbid / S.010 / pH: x  Gluc: x / Ketone: Negative  / Bili: Negative / Urobili: Negative mg/dL   Blood: x / Protein: 100 mg/dL / Nitrite: Positive   Leuk Esterase: Moderate / RBC: 11-25 /HPF / WBC >50   Sq Epi: x / Non Sq Epi: Few / Bacteria: Many        Culture - Urine (collected 2022 15:20)  Source: Catheterized Catheterized  Preliminary Report (2022 06:31):    >100,000 CFU/ml Escherichia coli      RADIOLOGY & ADDITIONAL TESTS:   reviewed elctronically  ASSESSMENT/PLAN: 	  25 minutes aggregate time was spent on this visit, 50% visit time spent in care co-ordination with other attendings and counselling patient .I have discussed care plan with patient / HCP/family member ,who expressed understanding of problems treatment and their effect and side effects, alternatives in details. I have asked if they have any questions and concerns and appropriately addressed them to best of my ability.

## 2022-07-02 NOTE — PROGRESS NOTE ADULT - SUBJECTIVE AND OBJECTIVE BOX
Patient is a 89y Male with a known history of :  Unresponsive episode [R41.89]    Suspected UTI [R39.89]    Dementia [F03.90]    Acute metabolic encephalopathy [G93.41]    DM (diabetes mellitus) [E11.9]    CAD (coronary artery disease) [I25.10]    HTN (hypertension) [I10]    Prophylactic measure [Z29.9]      HPI:  The patient is a 89 year old male with a history of HL, atrial fibrillation, SDH, dementia who presents with unresponsiveness. The patient is unable to provide additional history . He reportedly was verbal yesterday at [facility] but unresponsive today.  Past Medical/Surgical History:  HL, atrial fibrillation, SDH, dementia ,om  Medications:  Home Medications:  ascorbic acid 500 mg oral tablet: 1 tab(s) orally once a day (03 Apr 2022 11:16)  Aspirin Enteric Coated 81 mg oral delayed release tablet: 1 tab(s) orally once a day (03 Apr 2022 11:16)  donepezil 5 mg oral tablet: 1 tab(s) orally once a day (at bedtime) (03 Apr 2022 11:16)  ferrous sulfate 325 mg (65 mg elemental iron) oral delayed release tablet: 1 tab(s) orally once a day (03 Apr 2022 11:16)  Multiple Vitamins with Minerals oral tablet: 1 tab(s) orally once a day (03 Apr 2022 11:16)  simvastatin 20 mg oral tablet: 1 tab(s) orally once a day (at bedtime) (03 Apr 2022 11:16)Patient is admitted for management of FTT ,suspected UTI Palliative care consult requested ,to discuss advance directives and complete MOLST ,family requested to initiate comfort measures only and to focus on pain and anxiety management ,case was d/w PCP from [facility] and with pall care MD .   (30 Jun 2022 11:24)      REVIEW OF SYSTEMS:    CONSTITUTIONAL: No fever, weight loss, or fatigue  EYES: No eye pain, visual disturbances, or discharge  ENMT:  No difficulty hearing, tinnitus, vertigo; No sinus or throat pain  NECK: No pain or stiffness  BREASTS: No pain, masses, or nipple discharge  RESPIRATORY: No cough, wheezing, chills or hemoptysis; No shortness of breath  CARDIOVASCULAR: No chest pain, palpitations, dizziness, or leg swelling  GASTROINTESTINAL: No abdominal or epigastric pain. No nausea, vomiting, or hematemesis; No diarrhea or constipation. No melena or hematochezia.  GENITOURINARY: No dysuria, frequency, hematuria, or incontinence  NEUROLOGICAL: No headaches, memory loss, loss of strength, numbness, or tremors  SKIN: No itching, burning, rashes, or lesions   LYMPH NODES: No enlarged glands  ENDOCRINE: No heat or cold intolerance; No hair loss  MUSCULOSKELETAL: No joint pain or swelling; No muscle, back, or extremity pain  PSYCHIATRIC: No depression, anxiety, mood swings, or difficulty sleeping  HEME/LYMPH: No easy bruising, or bleeding gums  ALLERGY AND IMMUNOLOGIC: No hives or eczema    MEDICATIONS  (STANDING):  cefTRIAXone   IVPB      cefTRIAXone   IVPB 2000 milliGRAM(s) IV Intermittent every 24 hours  clopidogrel Tablet 75 milliGRAM(s) Oral daily  dextrose 5%. 1000 milliLiter(s) (50 mL/Hr) IV Continuous <Continuous>  dextrose 5%. 1000 milliLiter(s) (100 mL/Hr) IV Continuous <Continuous>  dextrose 50% Injectable 25 Gram(s) IV Push once  dextrose 50% Injectable 12.5 Gram(s) IV Push once  dextrose 50% Injectable 25 Gram(s) IV Push once  donepezil 5 milliGRAM(s) Oral at bedtime  glucagon  Injectable 1 milliGRAM(s) IntraMuscular once  heparin   Injectable 5000 Unit(s) SubCutaneous every 12 hours  insulin lispro (ADMELOG) corrective regimen sliding scale   SubCutaneous three times a day before meals  lactobacillus acidophilus 1 Tablet(s) Oral daily  multivitamin/minerals 1 Tablet(s) Oral daily  pantoprazole    Tablet 40 milliGRAM(s) Oral before breakfast  polyethylene glycol 3350 17 Gram(s) Oral daily  senna 2 Tablet(s) Oral at bedtime  simvastatin 20 milliGRAM(s) Oral at bedtime  sodium chloride 0.45%. 1000 milliLiter(s) (50 mL/Hr) IV Continuous <Continuous>  tamsulosin 0.4 milliGRAM(s) Oral at bedtime    MEDICATIONS  (PRN):  acetaminophen     Tablet .. 650 milliGRAM(s) Oral every 6 hours PRN Temp greater or equal to 38C (100.4F), Mild Pain (1 - 3)  aluminum hydroxide/magnesium hydroxide/simethicone Suspension 30 milliLiter(s) Oral every 4 hours PRN Dyspepsia  dextrose Oral Gel 15 Gram(s) Oral once PRN Blood Glucose LESS THAN 70 milliGRAM(s)/deciliter  melatonin 3 milliGRAM(s) Oral at bedtime PRN Insomnia  ondansetron Injectable 4 milliGRAM(s) IV Push every 8 hours PRN Nausea and/or Vomiting      ALLERGIES: latex (Rash)  latex (Unknown)  No Known Drug Allergies      FAMILY HISTORY:      PHYSICAL EXAMINATION:  -----------------------------  T(C): 36.6 (07-02-22 @ 07:42), Max: 36.8 (07-02-22 @ 00:06)  HR: 61 (07-02-22 @ 07:42) (61 - 77)  BP: 119/54 (07-02-22 @ 07:42) (112/80 - 131/67)  RR: 16 (07-02-22 @ 07:42) (16 - 16)  SpO2: 98% (07-02-22 @ 07:42) (95% - 98%)  Wt(kg): --        VITALS  T(C): 36.6 (07-02-22 @ 07:42), Max: 36.8 (07-02-22 @ 00:06)  HR: 61 (07-02-22 @ 07:42) (61 - 77)  BP: 119/54 (07-02-22 @ 07:42) (112/80 - 131/67)  RR: 16 (07-02-22 @ 07:42) (16 - 16)  SpO2: 98% (07-02-22 @ 07:42) (95% - 98%)    Constitutional: well developed, normal appearance, well groomed, well nourished, no deformities and no acute distress.   Eyes: the conjunctiva exhibited no abnormalities and the eyelids demonstrated no xanthelasmas.   HEENT: normal oral mucosa, no oral pallor and no oral cyanosis.   Neck: normal jugular venous A waves present, normal jugular venous V waves present and no jugular venous jacobs A waves.   Pulmonary: no respiratory distress, normal respiratory rhythm and effort, no accessory muscle use and lungs were clear to auscultation bilaterally.   Cardiovascular: heart rate and rhythm were normal, normal S1 and S2 and no murmur, gallop, rub, heave or thrill are present.   Abdomen: soft, non-tender, no hepato-splenomegaly and no abdominal mass palpated.   Musculoskeletal: the gait could not be assessed..   Extremities: no clubbing of the fingernails, no localized cyanosis, no petechial hemorrhages and no ischemic changes.   Skin: normal skin color and pigmentation, no rash, no venous stasis, no skin lesions, no skin ulcer and no xanthoma was observed.   Psychiatric: oriented to person, place, and time, the affect was normal, the mood was normal and not feeling anxious.     LABS:   --------  07-01    137  |  102  |  15  ----------------------------<  115<H>  4.2   |  32<H>  |  0.66    Ca    9.3      01 Jul 2022 06:15    TPro  6.7  /  Alb  3.1<L>  /  TBili  0.5  /  DBili  x   /  AST  15  /  ALT  22  /  AlkPhos  110  07-01                         11.1   6.19  )-----------( 214      ( 01 Jul 2022 06:15 )             33.7     PT/INR - ( 01 Jul 2022 06:15 )   PT: 13.4 sec;   INR: 1.13 ratio         PTT - ( 30 Jun 2022 11:08 )  PTT:30.6 sec        Culture Results:   >100,000 CFU/ml Escherichia coli (06-30 @ 15:20)      RADIOLOGY:  -----------------    ECG:     ECHO:

## 2022-07-02 NOTE — PROGRESS NOTE ADULT - SUBJECTIVE AND OBJECTIVE BOX
Date/Time Patient Seen:  		  Referring MD:   Data Reviewed	       Patient is a 89y old  Male who presents with a chief complaint of EPISODE OF UNRESPONSIVNESS (01 Jul 2022 15:03)      Subjective/HPI     PAST MEDICAL & SURGICAL HISTORY:  Atrial fibrillation    Hypertension    Diabetes    Prostate ca    Dementia    CHF (congestive heart failure)    Hyperlipemia    Diabetes    Depression    Dementia    No pertinent past medical history    DM (diabetes mellitus)    Atrial fibrillation    Prostate CA    Arteriosclerotic heart disease (ASHD)    Dementia    Anemia    Constipation    AICD (automatic cardioverter/defibrillator) present    Osteomyelitis of finger of left hand    Personal history of radiation therapy    H/O ongoing treatment with hormonal therapy    H/O bladder infections    Scrotal abscess    Urinary retention    Hematuria    H/O cystitis    Constipation    VHD (valvular heart disease)    Aortic valve stenosis    No significant past surgical history    No significant past surgical history    History of automatic internal cardiac defibrillator (AICD)          Medication list         MEDICATIONS  (STANDING):  cefTRIAXone   IVPB      cefTRIAXone   IVPB 2000 milliGRAM(s) IV Intermittent every 24 hours  clopidogrel Tablet 75 milliGRAM(s) Oral daily  dextrose 5%. 1000 milliLiter(s) (50 mL/Hr) IV Continuous <Continuous>  dextrose 5%. 1000 milliLiter(s) (100 mL/Hr) IV Continuous <Continuous>  dextrose 50% Injectable 25 Gram(s) IV Push once  dextrose 50% Injectable 12.5 Gram(s) IV Push once  dextrose 50% Injectable 25 Gram(s) IV Push once  donepezil 5 milliGRAM(s) Oral at bedtime  glucagon  Injectable 1 milliGRAM(s) IntraMuscular once  heparin   Injectable 5000 Unit(s) SubCutaneous every 12 hours  insulin lispro (ADMELOG) corrective regimen sliding scale   SubCutaneous three times a day before meals  lactobacillus acidophilus 1 Tablet(s) Oral daily  multivitamin/minerals 1 Tablet(s) Oral daily  pantoprazole    Tablet 40 milliGRAM(s) Oral before breakfast  polyethylene glycol 3350 17 Gram(s) Oral daily  senna 2 Tablet(s) Oral at bedtime  simvastatin 20 milliGRAM(s) Oral at bedtime  sodium chloride 0.45%. 1000 milliLiter(s) (50 mL/Hr) IV Continuous <Continuous>  tamsulosin 0.4 milliGRAM(s) Oral at bedtime    MEDICATIONS  (PRN):  acetaminophen     Tablet .. 650 milliGRAM(s) Oral every 6 hours PRN Temp greater or equal to 38C (100.4F), Mild Pain (1 - 3)  aluminum hydroxide/magnesium hydroxide/simethicone Suspension 30 milliLiter(s) Oral every 4 hours PRN Dyspepsia  dextrose Oral Gel 15 Gram(s) Oral once PRN Blood Glucose LESS THAN 70 milliGRAM(s)/deciliter  melatonin 3 milliGRAM(s) Oral at bedtime PRN Insomnia  ondansetron Injectable 4 milliGRAM(s) IV Push every 8 hours PRN Nausea and/or Vomiting         Vitals log        ICU Vital Signs Last 24 Hrs  T(C): 36.8 (02 Jul 2022 00:06), Max: 36.8 (02 Jul 2022 00:06)  T(F): 98.2 (02 Jul 2022 00:06), Max: 98.2 (02 Jul 2022 00:06)  HR: 63 (02 Jul 2022 00:06) (63 - 77)  BP: 131/67 (02 Jul 2022 00:06) (112/80 - 131/67)  BP(mean): --  ABP: --  ABP(mean): --  RR: 16 (02 Jul 2022 00:06) (16 - 16)  SpO2: 97% (02 Jul 2022 00:06) (95% - 97%)           Input and Output:  I&O's Detail      Lab Data                        11.1   6.19  )-----------( 214      ( 01 Jul 2022 06:15 )             33.7     07-01    137  |  102  |  15  ----------------------------<  115<H>  4.2   |  32<H>  |  0.66    Ca    9.3      01 Jul 2022 06:15    TPro  6.7  /  Alb  3.1<L>  /  TBili  0.5  /  DBili  x   /  AST  15  /  ALT  22  /  AlkPhos  110  07-01            Review of Systems	      Objective     Physical Examination    heart s1s2  lng dc BS  abd soft      Pertinent Lab findings & Imaging      [place]:  NO   Adequate UO     I&O's Detail           Discussed with:     Cultures:	        Radiology

## 2022-07-02 NOTE — DIETITIAN INITIAL EVALUATION ADULT - [id number]_CORE
Current medical/surgical condition precludes nutrition intervention at this time/Patient followed by Palliative Care

## 2022-07-02 NOTE — DIETITIAN INITIAL EVALUATION ADULT - OTHER INFO
The patient is a 89 year old male with a history of HL, atrial fibrillation, SDH, dementia who presents with unresponsiveness. The patient is unable to provide additional history . He reportedly was verbal yesterday at [facility] but unresponsive today. Patient is admitted for management of FTT ,suspected UTI Palliative care consult requested ,to discuss advance directives and complete MOLST ,family requested to initiate comfort measures only and to focus on pain and anxiety management ,case was d/w PCP from [facility] and with [facility] care MD."    Nutrition consult ordered for assessment. Pt admitted from [facility]. Reviewed transfer documents, pt on NCS, soft consistency diet PTA. Pt seen by SLP on 6/30- recommended pureed and mildly thick liquids, aspiration precautions and full assistance during meals, as tolerated. Per MD note, "Son requests home hospice and also asked to upgrade diet for pleasure feeds since patient doesn't like puree." Diet now advanced to minced and moist with glucerna daily per MD order. PO intakes % per RN documentation. Pt needs total assistance with feeding. NKFA. No N/V/D/C per chart review. +BM 7/1. CBW on admission 160#. Transfer ht/wt of 70in/150.4#. No edema noted. Skin intact. Pt with hx of DM, A1c of 6.4%; on no conc sweets diet PTA. Palliative care following, pt's family requesting CMO, hospice referral at [facility]. Nutrition intervention not appropriate at this time. RD to remain available and will follow-up per protocol.

## 2022-07-02 NOTE — DIETITIAN INITIAL EVALUATION ADULT - PERTINENT LABORATORY DATA
07-01    137  |  102  |  15  ----------------------------<  115<H>  4.2   |  32<H>  |  0.66    Ca    9.3      01 Jul 2022 06:15    TPro  6.7  /  Alb  3.1<L>  /  TBili  0.5  /  DBili  x   /  AST  15  /  ALT  22  /  AlkPhos  110  07-01  POCT Blood Glucose.: 144 mg/dL (07-02-22 @ 16:54)  A1C with Estimated Average Glucose Result: 6.4 % (07-01-22 @ 06:15)  A1C with Estimated Average Glucose Result: 5.9 % (04-04-22 @ 08:57)  A1C with Estimated Average Glucose Result: 5.9 % (01-26-22 @ 11:13)

## 2022-07-02 NOTE — DIETITIAN INITIAL EVALUATION ADULT - ORAL INTAKE PTA/DIET HISTORY
Pt admitted from [facility]. Reviewed transfer documents, pt on NCS, soft consistency diet PTA.

## 2022-07-02 NOTE — DIETITIAN INITIAL EVALUATION ADULT - PERTINENT MEDS FT
MEDICATIONS  (STANDING):  cefTRIAXone   IVPB      cefTRIAXone   IVPB 2000 milliGRAM(s) IV Intermittent every 24 hours  clopidogrel Tablet 75 milliGRAM(s) Oral daily  dextrose 5%. 1000 milliLiter(s) (50 mL/Hr) IV Continuous <Continuous>  dextrose 5%. 1000 milliLiter(s) (100 mL/Hr) IV Continuous <Continuous>  dextrose 50% Injectable 25 Gram(s) IV Push once  dextrose 50% Injectable 12.5 Gram(s) IV Push once  dextrose 50% Injectable 25 Gram(s) IV Push once  donepezil 5 milliGRAM(s) Oral at bedtime  glucagon  Injectable 1 milliGRAM(s) IntraMuscular once  heparin   Injectable 5000 Unit(s) SubCutaneous every 12 hours  insulin lispro (ADMELOG) corrective regimen sliding scale   SubCutaneous three times a day before meals  lactobacillus acidophilus 1 Tablet(s) Oral daily  multivitamin/minerals 1 Tablet(s) Oral daily  pantoprazole    Tablet 40 milliGRAM(s) Oral before breakfast  polyethylene glycol 3350 17 Gram(s) Oral daily  senna 2 Tablet(s) Oral at bedtime  simvastatin 20 milliGRAM(s) Oral at bedtime  tamsulosin 0.4 milliGRAM(s) Oral at bedtime    MEDICATIONS  (PRN):  acetaminophen     Tablet .. 650 milliGRAM(s) Oral every 6 hours PRN Temp greater or equal to 38C (100.4F), Mild Pain (1 - 3)  aluminum hydroxide/magnesium hydroxide/simethicone Suspension 30 milliLiter(s) Oral every 4 hours PRN Dyspepsia  dextrose Oral Gel 15 Gram(s) Oral once PRN Blood Glucose LESS THAN 70 milliGRAM(s)/deciliter  melatonin 3 milliGRAM(s) Oral at bedtime PRN Insomnia  ondansetron Injectable 4 milliGRAM(s) IV Push every 8 hours PRN Nausea and/or Vomiting

## 2022-07-02 NOTE — PROGRESS NOTE ADULT - SUBJECTIVE AND OBJECTIVE BOX
TERESA FRANCIS is a 89yMale , patient examined and chart reviewed.      INTERVAL HPI/ OVERNIGHT EVENTS:   Afebrile. No events. Awake Confused.  NAD    PAST MEDICAL & SURGICAL HISTORY:  Atrial fibrillation  Hypertension  Diabetes  Prostate ca  Dementia  CHF (congestive heart failure)  Hyperlipemia  Diabetes  Depression  Dementia  DM (diabetes mellitus)  Atrial fibrillation  Prostate CA  Arteriosclerotic heart disease (ASHD)  Dementia  Anemia  Constipation  AICD (automatic cardioverter/defibrillator) present  Osteomyelitis of finger of left hand  Personal history of radiation therapy  H/O ongoing treatment with hormonal therapy  H/O bladder infections  Scrotal abscess  Urinary retention  Hematuria  H/O cystitis  Constipation  VHD (valvular heart disease)  Aortic valve stenosis  History of automatic internal cardiac defibrillator (AICD)          For details regarding the patient's social history, family history, and other miscellaneous elements, please refer the initial infectious diseases consultation and/or the admitting history and physical examination for this admission.    ROS:  Unable to obtain due to : Dementia      ALLERGIES:  latex (Rash)      Current inpatient medications :    ANTIBIOTICS/RELEVANT:  cefTRIAXone   IVPB 2000 milliGRAM(s) IV Intermittent every 24 hours    MEDICATIONS  (STANDING):  clopidogrel Tablet 75 milliGRAM(s) Oral daily  dextrose 5%. 1000 milliLiter(s) (50 mL/Hr) IV Continuous <Continuous>  dextrose 5%. 1000 milliLiter(s) (100 mL/Hr) IV Continuous <Continuous>  dextrose 50% Injectable 25 Gram(s) IV Push once  dextrose 50% Injectable 12.5 Gram(s) IV Push once  dextrose 50% Injectable 25 Gram(s) IV Push once  donepezil 5 milliGRAM(s) Oral at bedtime  glucagon  Injectable 1 milliGRAM(s) IntraMuscular once  heparin   Injectable 5000 Unit(s) SubCutaneous every 12 hours  insulin lispro (ADMELOG) corrective regimen sliding scale   SubCutaneous three times a day before meals  lactobacillus acidophilus 1 Tablet(s) Oral daily  multivitamin/minerals 1 Tablet(s) Oral daily  pantoprazole    Tablet 40 milliGRAM(s) Oral before breakfast  polyethylene glycol 3350 17 Gram(s) Oral daily  senna 2 Tablet(s) Oral at bedtime  simvastatin 20 milliGRAM(s) Oral at bedtime  tamsulosin 0.4 milliGRAM(s) Oral at bedtime    MEDICATIONS  (PRN):  acetaminophen     Tablet .. 650 milliGRAM(s) Oral every 6 hours PRN Temp greater or equal to 38C (100.4F), Mild Pain (1 - 3)  aluminum hydroxide/magnesium hydroxide/simethicone Suspension 30 milliLiter(s) Oral every 4 hours PRN Dyspepsia  dextrose Oral Gel 15 Gram(s) Oral once PRN Blood Glucose LESS THAN 70 milliGRAM(s)/deciliter  melatonin 3 milliGRAM(s) Oral at bedtime PRN Insomnia  ondansetron Injectable 4 milliGRAM(s) IV Push every 8 hours PRN Nausea and/or Vomiting      Objective:  Vital Signs Last 24 Hrs  T(C): 37.1 (2022 21:59), Max: 37.1 (2022 21:59)  T(F): 98.8 (2022 21:59), Max: 98.8 (2022 21:59)  HR: 71 (2022 21:59) (61 - 76)  BP: 110/63 (2022 21:59) (110/63 - 134/67)  RR: 16 (2022 21:59) (16 - 16)  SpO2: 96% (2022 21:59) (95% - 98%)      Physical Exam:  General: no acute distress  Neck: supple, trachea midline  Lungs: clear, no wheeze/rhonchi  Cardiovascular: regular rate and rhythm, S1 S2  Abdomen: soft, nontender,  bowel sounds normal  Neurological: Dementia  Skin: no rash  Extremities: no edema      LABS:                        11.1   6.19  )-----------( 214      ( 2022 06:15 )             33.7   07-01    137  |  102  |  15  ----------------------------<  115<H>  4.2   |  32<H>  |  0.66    Ca    9.3      2022 06:15    TPro  6.7  /  Alb  3.1<L>  /  TBili  0.5  /  DBili  x   /  AST  15  /  ALT  22  /  AlkPhos  110  07-01    Urinalysis Basic - ( 2022 14:10 )    Color: Yellow / Appearance: Turbid / S.010 / pH: x  Gluc: x / Ketone: Negative  / Bili: Negative / Urobili: Negative mg/dL   Blood: x / Protein: 100 mg/dL / Nitrite: Positive   Leuk Esterase: Moderate / RBC: 11-25 /HPF / WBC >50   Sq Epi: x / Non Sq Epi: Few / Bacteria: Many        MICROBIOLOGY:    Culture - Urine (collected 2022 15:20)  Source: Catheterized Catheterized  Preliminary Report (2022 06:31):    >100,000 CFU/ml Escherichia coli    RADIOLOGY & ADDITIONAL STUDIES:  ACC: [id number] EXAM:  XR CHEST PORTABLE URGENT 1V                          PROCEDURE DATE:  2022          INTERPRETATION:  HISTORY: ;  Sepsis;  TECHNIQUE: Portable frontal view of the chest, 1 view.  COMPARISON: none.  FINDINGS/  IMPRESSION:  A cardiac device overlies and obscures the left hemithorax with lead in   place.  HEART:  Enlarged  LUNGS: free of consolidation,effusion, or pneumothorax.  BONES: degenerative changes      Calcified gallstone. Contrast in the left renal collecting system.    Assessment :   89YO M PMH Dementia, HL, atrial fibrillation, SDH, Ca Prostate DM CHF resident of [facility] who presents with unresponsiveness mental status change sec UTI  More awake  Afebrile  WBC wnl    Plan :   Cont Rocephin  Fu cultures  Trend temps and cbc  Asp precautions    Continue with present regiment.  Appropriate use of antibiotics and adverse effects reviewed.      > 35 minutes were spent in direct patient care reviewing notes, medications ,labs data/ imaging , discussion with multidisciplinary team.    Thank you for allowing me to participate in care of your patient .    Heber Issa MD  Infectious Disease  [phone]

## 2022-07-02 NOTE — PROGRESS NOTE ADULT - SUBJECTIVE AND OBJECTIVE BOX
Neurology follow up note    RAY [id number]      Interval History:    Patient resting in bed     Allergies    latex (Rash)  latex (Unknown)  No Known Drug Allergies    Intolerances        MEDICATIONS    acetaminophen     Tablet .. 650 milliGRAM(s) Oral every 6 hours PRN  aluminum hydroxide/magnesium hydroxide/simethicone Suspension 30 milliLiter(s) Oral every 4 hours PRN  cefTRIAXone   IVPB      cefTRIAXone   IVPB 2000 milliGRAM(s) IV Intermittent every 24 hours  clopidogrel Tablet 75 milliGRAM(s) Oral daily  dextrose 5%. 1000 milliLiter(s) IV Continuous <Continuous>  dextrose 5%. 1000 milliLiter(s) IV Continuous <Continuous>  dextrose 50% Injectable 25 Gram(s) IV Push once  dextrose 50% Injectable 12.5 Gram(s) IV Push once  dextrose 50% Injectable 25 Gram(s) IV Push once  dextrose Oral Gel 15 Gram(s) Oral once PRN  donepezil 5 milliGRAM(s) Oral at bedtime  glucagon  Injectable 1 milliGRAM(s) IntraMuscular once  heparin   Injectable 5000 Unit(s) SubCutaneous every 12 hours  insulin lispro (ADMELOG) corrective regimen sliding scale   SubCutaneous three times a day before meals  lactobacillus acidophilus 1 Tablet(s) Oral daily  melatonin 3 milliGRAM(s) Oral at bedtime PRN  multivitamin/minerals 1 Tablet(s) Oral daily  ondansetron Injectable 4 milliGRAM(s) IV Push every 8 hours PRN  pantoprazole    Tablet 40 milliGRAM(s) Oral before breakfast  polyethylene glycol 3350 17 Gram(s) Oral daily  senna 2 Tablet(s) Oral at bedtime  simvastatin 20 milliGRAM(s) Oral at bedtime  tamsulosin 0.4 milliGRAM(s) Oral at bedtime      T(C): 36.6 (07-02-22 @ 07:42), Max: 36.8 (07-02-22 @ 00:06)  HR: 61 (07-02-22 @ 07:42) (61 - 77)  BP: 119/54 (07-02-22 @ 07:42) (112/80 - 131/67)  RR: 16 (07-02-22 @ 07:42) (16 - 16)  SpO2: 98% (07-02-22 @ 07:42) (95% - 98%)  Wt(kg): --  I&O's Summary      REVIEW OF SYSTEMS:  Could not be obtained from the patient secondary to the patient having underlying dementia, mostly nonverbal.    PHYSICAL EXAMINATION:   HEENT:  Head:  Normocephalic, atraumatic.  Eyes:  No scleral icterus.  Ears:  Hard to evaluate secondary to the patient being mostly nonverbal.  NECK:  Had increased tone but resists.  RESPIRATORY:  Decreased breath sounds bilaterally.  CARDIOVASCULAR:  S1 and S2 heard.  ABDOMEN:  Soft, nontender.  EXTREMITIES:  No clubbing or cyanosis were noted.      NEUROLOGIC:  The patient was arousable to verbal stimuli.  Attempted to open the patient's eye, would actively resist.  Pupils bilaterally appeared to be 2 mm.  Speech, the patient was nonverbal.  Motor:  Examination is significantly limited.  Attempted to elevate bilateral upper extremities, the patient would actively resist, pull both arms down, would say overall 3+/5, but as per my previous note, the patient does have decreased range of motion of the left shoulder, and biceps and triceps at that time was 3+/5.  The patient at that time was unable to open and close his left hand.  Bilateral lower extremities had significantly increased tone.  When given plantar stimuli was slightly able to elevate off the bed.                           LABS:            Hemoglobin A1C:       Vitamin B12         RADIOLOGY        ANALYSIS AND PLAN:  This is a 89-year-old with episode of unresponsiveness with left hand weakness.  Clinical impression is TIA, rule out cerebrovascular accident.  We will discontinue the patient's aspirin and convert over to Plavix.  Spoke with son in great detail, we will not be opting for any type of MRI, we do not feel that he would lie still fore it and management would not change.  For history of hypertension, monitor systolic blood pressure.  For hyperlipidemia, continue the patient on statin.  For dementia, continue the patient on his home medications  afib h/o fall not on AC  antibiotics as needed   montior oral intake as needed     Spoke with son, Regina, at [phone] 7/1.    Greater than 40 minutes of time was spent with the patient, plan of care, reviewing data, speaking to multidisciplinary healthcare team with greater than 50% of the time in counseling and care coordination.

## 2022-07-03 LAB
-  AMIKACIN: SIGNIFICANT CHANGE UP
-  AMOXICILLIN/CLAVULANIC ACID: SIGNIFICANT CHANGE UP
-  AMPICILLIN/SULBACTAM: SIGNIFICANT CHANGE UP
-  AMPICILLIN: SIGNIFICANT CHANGE UP
-  AZTREONAM: SIGNIFICANT CHANGE UP
-  CEFAZOLIN: SIGNIFICANT CHANGE UP
-  CEFEPIME: SIGNIFICANT CHANGE UP
-  CEFTRIAXONE: SIGNIFICANT CHANGE UP
-  CIPROFLOXACIN: SIGNIFICANT CHANGE UP
-  ERTAPENEM: SIGNIFICANT CHANGE UP
-  GENTAMICIN: SIGNIFICANT CHANGE UP
-  IMIPENEM: SIGNIFICANT CHANGE UP
-  LEVOFLOXACIN: SIGNIFICANT CHANGE UP
-  MEROPENEM: SIGNIFICANT CHANGE UP
-  NITROFURANTOIN: SIGNIFICANT CHANGE UP
-  PIPERACILLIN/TAZOBACTAM: SIGNIFICANT CHANGE UP
-  TIGECYCLINE: SIGNIFICANT CHANGE UP
-  TOBRAMYCIN: SIGNIFICANT CHANGE UP
-  TRIMETHOPRIM/SULFAMETHOXAZOLE: SIGNIFICANT CHANGE UP
CULTURE RESULTS: SIGNIFICANT CHANGE UP
GLUCOSE BLDC GLUCOMTR-MCNC: 126 MG/DL — HIGH (ref 70–99)
GLUCOSE BLDC GLUCOMTR-MCNC: 127 MG/DL — HIGH (ref 70–99)
GLUCOSE BLDC GLUCOMTR-MCNC: 128 MG/DL — HIGH (ref 70–99)
GLUCOSE BLDC GLUCOMTR-MCNC: 194 MG/DL — HIGH (ref 70–99)
METHOD TYPE: SIGNIFICANT CHANGE UP
ORGANISM # SPEC MICROSCOPIC CNT: SIGNIFICANT CHANGE UP
ORGANISM # SPEC MICROSCOPIC CNT: SIGNIFICANT CHANGE UP
SPECIMEN SOURCE: SIGNIFICANT CHANGE UP

## 2022-07-03 RX ORDER — ERTAPENEM SODIUM 1 G/1
1000 INJECTION, POWDER, LYOPHILIZED, FOR SOLUTION INTRAMUSCULAR; INTRAVENOUS EVERY 24 HOURS
Refills: 0 | Status: COMPLETED | OUTPATIENT
Start: 2022-07-03 | End: 2022-07-08

## 2022-07-03 RX ADMIN — HEPARIN SODIUM 5000 UNIT(S): 5000 INJECTION INTRAVENOUS; SUBCUTANEOUS at 05:40

## 2022-07-03 RX ADMIN — SIMVASTATIN 20 MILLIGRAM(S): 20 TABLET, FILM COATED ORAL at 21:40

## 2022-07-03 RX ADMIN — Medication 1 TABLET(S): at 11:41

## 2022-07-03 RX ADMIN — POLYETHYLENE GLYCOL 3350 17 GRAM(S): 17 POWDER, FOR SOLUTION ORAL at 11:41

## 2022-07-03 RX ADMIN — SENNA PLUS 2 TABLET(S): 8.6 TABLET ORAL at 21:39

## 2022-07-03 RX ADMIN — PANTOPRAZOLE SODIUM 40 MILLIGRAM(S): 20 TABLET, DELAYED RELEASE ORAL at 05:40

## 2022-07-03 RX ADMIN — CLOPIDOGREL BISULFATE 75 MILLIGRAM(S): 75 TABLET, FILM COATED ORAL at 11:42

## 2022-07-03 RX ADMIN — DONEPEZIL HYDROCHLORIDE 5 MILLIGRAM(S): 10 TABLET, FILM COATED ORAL at 21:40

## 2022-07-03 RX ADMIN — TAMSULOSIN HYDROCHLORIDE 0.4 MILLIGRAM(S): 0.4 CAPSULE ORAL at 21:39

## 2022-07-03 RX ADMIN — HEPARIN SODIUM 5000 UNIT(S): 5000 INJECTION INTRAVENOUS; SUBCUTANEOUS at 18:19

## 2022-07-03 RX ADMIN — CEFTRIAXONE 100 MILLIGRAM(S): 500 INJECTION, POWDER, FOR SOLUTION INTRAMUSCULAR; INTRAVENOUS at 15:37

## 2022-07-03 RX ADMIN — Medication 1: at 12:57

## 2022-07-03 RX ADMIN — Medication 3 MILLIGRAM(S): at 21:40

## 2022-07-03 NOTE — PROGRESS NOTE ADULT - SUBJECTIVE AND OBJECTIVE BOX
Patient is a 89y Male with a known history of :  Unresponsive episode [R41.89]    Suspected UTI [R39.89]    Dementia [F03.90]    Acute metabolic encephalopathy [G93.41]    DM (diabetes mellitus) [E11.9]    CAD (coronary artery disease) [I25.10]    HTN (hypertension) [I10]    Prophylactic measure [Z29.9]      HPI:  The patient is a 89 year old male with a history of HL, atrial fibrillation, SDH, dementia who presents with unresponsiveness. The patient is unable to provide additional history . He reportedly was verbal yesterday at [facility] but unresponsive today.  Past Medical/Surgical History:  HL, atrial fibrillation, SDH, dementia ,om  Medications:  Home Medications:  ascorbic acid 500 mg oral tablet: 1 tab(s) orally once a day (03 Apr 2022 11:16)  Aspirin Enteric Coated 81 mg oral delayed release tablet: 1 tab(s) orally once a day (03 Apr 2022 11:16)  donepezil 5 mg oral tablet: 1 tab(s) orally once a day (at bedtime) (03 Apr 2022 11:16)  ferrous sulfate 325 mg (65 mg elemental iron) oral delayed release tablet: 1 tab(s) orally once a day (03 Apr 2022 11:16)  Multiple Vitamins with Minerals oral tablet: 1 tab(s) orally once a day (03 Apr 2022 11:16)  simvastatin 20 mg oral tablet: 1 tab(s) orally once a day (at bedtime) (03 Apr 2022 11:16)Patient is admitted for management of FTT ,suspected UTI Palliative care consult requested ,to discuss advance directives and complete MOLST ,family requested to initiate comfort measures only and to focus on pain and anxiety management ,case was d/w PCP from [facility] and with pall care MD .   (30 Jun 2022 11:24)      REVIEW OF SYSTEMS:    CONSTITUTIONAL: No fever, weight loss, or fatigue  EYES: No eye pain, visual disturbances, or discharge  ENMT:  No difficulty hearing, tinnitus, vertigo; No sinus or throat pain  NECK: No pain or stiffness  BREASTS: No pain, masses, or nipple discharge  RESPIRATORY: No cough, wheezing, chills or hemoptysis; No shortness of breath  CARDIOVASCULAR: No chest pain, palpitations, dizziness, or leg swelling  GASTROINTESTINAL: No abdominal or epigastric pain. No nausea, vomiting, or hematemesis; No diarrhea or constipation. No melena or hematochezia.  GENITOURINARY: No dysuria, frequency, hematuria, or incontinence  NEUROLOGICAL: No headaches, memory loss, loss of strength, numbness, or tremors  SKIN: No itching, burning, rashes, or lesions   LYMPH NODES: No enlarged glands  ENDOCRINE: No heat or cold intolerance; No hair loss  MUSCULOSKELETAL: No joint pain or swelling; No muscle, back, or extremity pain  PSYCHIATRIC: No depression, anxiety, mood swings, or difficulty sleeping  HEME/LYMPH: No easy bruising, or bleeding gums  ALLERGY AND IMMUNOLOGIC: No hives or eczema    MEDICATIONS  (STANDING):  cefTRIAXone   IVPB      cefTRIAXone   IVPB 2000 milliGRAM(s) IV Intermittent every 24 hours  clopidogrel Tablet 75 milliGRAM(s) Oral daily  dextrose 5%. 1000 milliLiter(s) (50 mL/Hr) IV Continuous <Continuous>  dextrose 5%. 1000 milliLiter(s) (100 mL/Hr) IV Continuous <Continuous>  dextrose 50% Injectable 25 Gram(s) IV Push once  dextrose 50% Injectable 12.5 Gram(s) IV Push once  dextrose 50% Injectable 25 Gram(s) IV Push once  donepezil 5 milliGRAM(s) Oral at bedtime  glucagon  Injectable 1 milliGRAM(s) IntraMuscular once  heparin   Injectable 5000 Unit(s) SubCutaneous every 12 hours  insulin lispro (ADMELOG) corrective regimen sliding scale   SubCutaneous three times a day before meals  lactobacillus acidophilus 1 Tablet(s) Oral daily  multivitamin/minerals 1 Tablet(s) Oral daily  pantoprazole    Tablet 40 milliGRAM(s) Oral before breakfast  polyethylene glycol 3350 17 Gram(s) Oral daily  senna 2 Tablet(s) Oral at bedtime  simvastatin 20 milliGRAM(s) Oral at bedtime  tamsulosin 0.4 milliGRAM(s) Oral at bedtime    MEDICATIONS  (PRN):  acetaminophen     Tablet .. 650 milliGRAM(s) Oral every 6 hours PRN Temp greater or equal to 38C (100.4F), Mild Pain (1 - 3)  aluminum hydroxide/magnesium hydroxide/simethicone Suspension 30 milliLiter(s) Oral every 4 hours PRN Dyspepsia  dextrose Oral Gel 15 Gram(s) Oral once PRN Blood Glucose LESS THAN 70 milliGRAM(s)/deciliter  melatonin 3 milliGRAM(s) Oral at bedtime PRN Insomnia  ondansetron Injectable 4 milliGRAM(s) IV Push every 8 hours PRN Nausea and/or Vomiting      ALLERGIES: latex (Rash)  latex (Unknown)  No Known Drug Allergies      FAMILY HISTORY:      PHYSICAL EXAMINATION:  -----------------------------  T(C): 36.9 (07-03-22 @ 09:07), Max: 37.1 (07-02-22 @ 21:59)  HR: 96 (07-03-22 @ 09:07) (71 - 96)  BP: 127/70 (07-03-22 @ 09:07) (110/63 - 134/67)  RR: 18 (07-03-22 @ 09:07) (16 - 18)  SpO2: 98% (07-03-22 @ 09:07) (95% - 98%)  Wt(kg): --        VITALS  T(C): 36.9 (07-03-22 @ 09:07), Max: 37.1 (07-02-22 @ 21:59)  HR: 96 (07-03-22 @ 09:07) (71 - 96)  BP: 127/70 (07-03-22 @ 09:07) (110/63 - 134/67)  RR: 18 (07-03-22 @ 09:07) (16 - 18)  SpO2: 98% (07-03-22 @ 09:07) (95% - 98%)    Constitutional: well developed, normal appearance, well groomed, well nourished, no deformities and no acute distress.   Eyes: the conjunctiva exhibited no abnormalities and the eyelids demonstrated no xanthelasmas.   HEENT: normal oral mucosa, no oral pallor and no oral cyanosis.   Neck: normal jugular venous A waves present, normal jugular venous V waves present and no jugular venous jacobs A waves.   Pulmonary: no respiratory distress, normal respiratory rhythm and effort, no accessory muscle use and lungs were clear to auscultation bilaterally.   Cardiovascular: heart rate and rhythm were normal, normal S1 and S2 and no murmur, gallop, rub, heave or thrill are present.   Abdomen: soft, non-tender, no hepato-splenomegaly and no abdominal mass palpated.   Musculoskeletal: the gait could not be assessed..   Extremities: no clubbing of the fingernails, no localized cyanosis, no petechial hemorrhages and no ischemic changes.   Skin: normal skin color and pigmentation, no rash, no venous stasis, no skin lesions, no skin ulcer and no xanthoma was observed.   Psychiatric: oriented to person, place, and time, the affect was normal, the mood was normal and not feeling anxious.     LABS:   --------                     RADIOLOGY:  -----------------    ECG:     ECHO:

## 2022-07-03 NOTE — PROGRESS NOTE ADULT - SUBJECTIVE AND OBJECTIVE BOX
Neurology follow up note    RAY [id number]      Interval History:    Patient resting in bed     Allergies    latex (Rash)  latex (Unknown)  No Known Drug Allergies    Intolerances        MEDICATIONS    acetaminophen     Tablet .. 650 milliGRAM(s) Oral every 6 hours PRN  aluminum hydroxide/magnesium hydroxide/simethicone Suspension 30 milliLiter(s) Oral every 4 hours PRN  cefTRIAXone   IVPB      cefTRIAXone   IVPB 2000 milliGRAM(s) IV Intermittent every 24 hours  clopidogrel Tablet 75 milliGRAM(s) Oral daily  dextrose 5%. 1000 milliLiter(s) IV Continuous <Continuous>  dextrose 5%. 1000 milliLiter(s) IV Continuous <Continuous>  dextrose 50% Injectable 25 Gram(s) IV Push once  dextrose 50% Injectable 12.5 Gram(s) IV Push once  dextrose 50% Injectable 25 Gram(s) IV Push once  dextrose Oral Gel 15 Gram(s) Oral once PRN  donepezil 5 milliGRAM(s) Oral at bedtime  glucagon  Injectable 1 milliGRAM(s) IntraMuscular once  heparin   Injectable 5000 Unit(s) SubCutaneous every 12 hours  insulin lispro (ADMELOG) corrective regimen sliding scale   SubCutaneous three times a day before meals  lactobacillus acidophilus 1 Tablet(s) Oral daily  melatonin 3 milliGRAM(s) Oral at bedtime PRN  multivitamin/minerals 1 Tablet(s) Oral daily  ondansetron Injectable 4 milliGRAM(s) IV Push every 8 hours PRN  pantoprazole    Tablet 40 milliGRAM(s) Oral before breakfast  polyethylene glycol 3350 17 Gram(s) Oral daily  senna 2 Tablet(s) Oral at bedtime  simvastatin 20 milliGRAM(s) Oral at bedtime  tamsulosin 0.4 milliGRAM(s) Oral at bedtime              Vital Signs Last 24 Hrs  T(C): 36.9 (03 Jul 2022 09:07), Max: 37.1 (02 Jul 2022 21:59)  T(F): 98.4 (03 Jul 2022 09:07), Max: 98.8 (02 Jul 2022 21:59)  HR: 96 (03 Jul 2022 09:07) (71 - 96)  BP: 127/70 (03 Jul 2022 09:07) (110/63 - 134/67)  BP(mean): --  RR: 18 (03 Jul 2022 09:07) (16 - 18)  SpO2: 98% (03 Jul 2022 09:07) (95% - 98%)      REVIEW OF SYSTEMS:  Could not be obtained from the patient secondary to the patient having underlying dementia, mostly nonverbal.    PHYSICAL EXAMINATION:   HEENT:  Head:  Normocephalic, atraumatic.  Eyes:  No scleral icterus.  Ears:  Hard to evaluate secondary to the patient being mostly nonverbal.  NECK:  Had increased tone but resists.  RESPIRATORY:  Decreased breath sounds bilaterally.  CARDIOVASCULAR:  S1 and S2 heard.  ABDOMEN:  Soft, nontender.  EXTREMITIES:  No clubbing or cyanosis were noted.      NEUROLOGIC:  The patient was arousable to verbal stimuli.  Attempted to open the patient's eye, would actively resist.  Pupils bilaterally appeared to be 2 mm.  Speech, the patient was nonverbal.  Motor:  Examination is significantly limited.  Attempted to elevate bilateral upper extremities, the patient would actively resist, pull both arms down, would say overall 3+/5, but as per my previous note, the patient does have decreased range of motion of the left shoulder, and biceps and triceps at that time was 3+/5.  The patient at that time was unable to open and close his left hand.  Bilateral lower extremities had significantly increased tone.  When given plantar stimuli was slightly able to elevate off the bed.                   LABS:            Hemoglobin A1C:       Vitamin B12         RADIOLOGY      ANALYSIS AND PLAN:  This is a 89-year-old with episode of unresponsiveness with left hand weakness.  Clinical impression is TIA, rule out cerebrovascular accident.  We will discontinue the patient's aspirin and convert over to Plavix.  Spoke with son in great detail, we will not be opting for any type of MRI, we do not feel that he would lie still fore it and management would not change.  For history of hypertension, monitor systolic blood pressure.  For hyperlipidemia, continue the patient on statin.  For dementia, continue the patient on his home medications  afib h/o fall not on AC  antibiotics as needed   montior oral intake as needed     Spoke with son, Regina, at [phone] 7/1.    Greater than 40 minutes of time was spent with the patient, plan of care, reviewing data, speaking to multidisciplinary healthcare team with greater than 50% of the time in counseling and care coordination.

## 2022-07-03 NOTE — PROGRESS NOTE ADULT - SUBJECTIVE AND OBJECTIVE BOX
TERESA FRANCIS is a 89yMale , patient examined and chart reviewed.      INTERVAL HPI/ OVERNIGHT EVENTS:   Afebrile. No events.  NAD    PAST MEDICAL & SURGICAL HISTORY:  Atrial fibrillation  Hypertension  Diabetes  Prostate ca  Dementia  CHF (congestive heart failure)  Hyperlipemia  Diabetes  Depression  Dementia  DM (diabetes mellitus)  Atrial fibrillation  Prostate CA  Arteriosclerotic heart disease (ASHD)  Dementia  Anemia  Constipation  AICD (automatic cardioverter/defibrillator) present  Osteomyelitis of finger of left hand  Personal history of radiation therapy  H/O ongoing treatment with hormonal therapy  H/O bladder infections  Scrotal abscess  Urinary retention  Hematuria  H/O cystitis  Constipation  VHD (valvular heart disease)  Aortic valve stenosis  History of automatic internal cardiac defibrillator (AICD)          For details regarding the patient's social history, family history, and other miscellaneous elements, please refer the initial infectious diseases consultation and/or the admitting history and physical examination for this admission.    ROS:  Unable to obtain due to : Dementia      ALLERGIES:  latex (Rash)      Current inpatient medications :    ANTIBIOTICS/RELEVANT:  cefTRIAXone   IVPB 2000 milliGRAM(s) IV Intermittent every 24 hours    MEDICATIONS  (STANDING):    clopidogrel Tablet 75 milliGRAM(s) Oral daily  dextrose 5%. 1000 milliLiter(s) (100 mL/Hr) IV Continuous <Continuous>  dextrose 5%. 1000 milliLiter(s) (50 mL/Hr) IV Continuous <Continuous>  dextrose 50% Injectable 25 Gram(s) IV Push once  dextrose 50% Injectable 12.5 Gram(s) IV Push once  dextrose 50% Injectable 25 Gram(s) IV Push once  donepezil 5 milliGRAM(s) Oral at bedtime  glucagon  Injectable 1 milliGRAM(s) IntraMuscular once  heparin   Injectable 5000 Unit(s) SubCutaneous every 12 hours  insulin lispro (ADMELOG) corrective regimen sliding scale   SubCutaneous three times a day before meals  lactobacillus acidophilus 1 Tablet(s) Oral daily  multivitamin/minerals 1 Tablet(s) Oral daily  pantoprazole    Tablet 40 milliGRAM(s) Oral before breakfast  polyethylene glycol 3350 17 Gram(s) Oral daily  senna 2 Tablet(s) Oral at bedtime  simvastatin 20 milliGRAM(s) Oral at bedtime  tamsulosin 0.4 milliGRAM(s) Oral at bedtime    MEDICATIONS  (PRN):  acetaminophen     Tablet .. 650 milliGRAM(s) Oral every 6 hours PRN Temp greater or equal to 38C (100.4F), Mild Pain (1 - 3)  aluminum hydroxide/magnesium hydroxide/simethicone Suspension 30 milliLiter(s) Oral every 4 hours PRN Dyspepsia  dextrose Oral Gel 15 Gram(s) Oral once PRN Blood Glucose LESS THAN 70 milliGRAM(s)/deciliter  melatonin 3 milliGRAM(s) Oral at bedtime PRN Insomnia  ondansetron Injectable 4 milliGRAM(s) IV Push every 8 hours PRN Nausea and/or Vomiting        Objective:  Vital Signs Last 24 Hrs  T(C): 36.9 (2022 21:31), Max: 37 (2022 15:09)  T(F): 98.5 (2022 21:31), Max: 98.6 (2022 15:09)  HR: 77 (2022 21:31) (72 - 98)  BP: 118/76 (2022 21:31) (111/55 - 149/52)  BP(mean): 55 (2022 16:46) (55 - 55)  RR: 18 (2022 21:31) (17 - 18)  SpO2: 96% (2022 21:31) (96% - 98%)      Physical Exam:  General: no acute distress  Neck: supple, trachea midline  Lungs: clear, no wheeze/rhonchi  Cardiovascular: regular rate and rhythm, S1 S2  Abdomen: soft, nontender,  bowel sounds normal  Neurological: Dementia  Skin: no rash  Extremities: no edema      LABS:               Urinalysis Basic - ( 2022 14:10 )    Color: Yellow / Appearance: Turbid / S.010 / pH: x  Gluc: x / Ketone: Negative  / Bili: Negative / Urobili: Negative mg/dL   Blood: x / Protein: 100 mg/dL / Nitrite: Positive   Leuk Esterase: Moderate / RBC: 11-25 /HPF / WBC >50   Sq Epi: x / Non Sq Epi: Few / Bacteria: Many        MICROBIOLOGY:  Culture - Urine (22 @ 15:20)    -  Gentamicin: S <=2    -  Imipenem: S <=1    -  Levofloxacin: R >4    -  Meropenem: S <=1    -  Nitrofurantoin: S <=32 Should not be used to treat pyelonephritis    -  Piperacillin/Tazobactam: S <=8    -  Tigecycline: S <=2    -  Tobramycin: S <=2    -  Trimethoprim/Sulfamethoxazole: R >2/38    -  Amoxicillin/Clavulanic Acid: S <=8/4 Consider reserving for cystitis when ampicillin/sulbactam is resistant    -  Amikacin: S <=16    -  Ampicillin: R >16 These ampicillin results predict results for amoxicillin    -  Ampicillin/Sulbactam: R >16/8 Enterobacter, Klebsiella aerogenes, Citrobacter, and Serratia may develop resistance during prolonged therapy (3-4 days)    -  Aztreonam: R >16    -  Cefazolin: R >16 (MIC_CL_COM_[id number]) For uncomplicated UTI with K. pneumoniae, E. coli, or P. mirablis: CHARITO <=16 is sensitive and CHARITO >=32 is resistant. This also predicts results for oral agents cefaclor, cefdinir, cefpodoxime, cefprozil, cefuroxime axetil, cephalexin and locarbef for uncomplicated UTI. Note that some isolates may be susceptible to these agents while testing resistant to cefazolin.    -  Cefepime: R >16    -  Ceftriaxone: R >32 Enterobacter, Klebsiella aerogenes, Citrobacter, and Serratia may develop resistance during prolonged therapy    -  Ciprofloxacin: R >2    -  Ertapenem: S <=0.5    Specimen Source: Catheterized Catheterized    Culture Results:   >100,000 CFU/ml Escherichia coli ESBL    Organism Identification: Escherichia coli ESBL    Organism: Escherichia coli ESBL    Method Type: CHARITO      RADIOLOGY & ADDITIONAL STUDIES:  ACC: [id number] EXAM:  XR CHEST PORTABLE URGENT 1V                          PROCEDURE DATE:  2022          INTERPRETATION:  HISTORY: ;  Sepsis;  TECHNIQUE: Portable frontal view of the chest, 1 view.  COMPARISON: none.  FINDINGS/  IMPRESSION:  A cardiac device overlies and obscures the left hemithorax with lead in   place.  HEART:  Enlarged  LUNGS: free of consolidation,effusion, or pneumothorax.  BONES: degenerative changes      Calcified gallstone. Contrast in the left renal collecting system.    Assessment :   89YO M PMH Dementia, HL, atrial fibrillation, SDH, Ca Prostate DM CHF resident of [facility] who presents with unresponsiveness mental status change sec UTI  Ucx with ESBL  More awake  Afebrile  WBC wnl    Plan :   Change Rocephin TO Ertapenam  Trend temps and cbc  Asp precautions  Stable from ID standpoint    Continue with present regiment.  Appropriate use of antibiotics and adverse effects reviewed.      > 35 minutes were spent in direct patient care reviewing notes, medications ,labs data/ imaging , discussion with multidisciplinary team.    Thank you for allowing me to participate in care of your patient .    Heber Issa MD  Infectious Disease  [phone]

## 2022-07-03 NOTE — PROGRESS NOTE ADULT - SUBJECTIVE AND OBJECTIVE BOX
Date/Time Patient Seen:  		  Referring MD:   Data Reviewed	       Patient is a 89y old  Male who presents with a chief complaint of Altered mental status     (02 Jul 2022 17:21)      Subjective/HPI     PAST MEDICAL & SURGICAL HISTORY:  Atrial fibrillation    Hypertension    Diabetes    Prostate ca    Dementia    CHF (congestive heart failure)    Hyperlipemia    Diabetes    Depression    Dementia    No pertinent past medical history    DM (diabetes mellitus)    Atrial fibrillation    Prostate CA    Arteriosclerotic heart disease (ASHD)    Dementia    Anemia    Constipation    AICD (automatic cardioverter/defibrillator) present    Osteomyelitis of finger of left hand    Personal history of radiation therapy    H/O ongoing treatment with hormonal therapy    H/O bladder infections    Scrotal abscess    Urinary retention    Hematuria    H/O cystitis    Constipation    VHD (valvular heart disease)    Aortic valve stenosis    No significant past surgical history    No significant past surgical history    History of automatic internal cardiac defibrillator (AICD)          Medication list         MEDICATIONS  (STANDING):  cefTRIAXone   IVPB      cefTRIAXone   IVPB 2000 milliGRAM(s) IV Intermittent every 24 hours  clopidogrel Tablet 75 milliGRAM(s) Oral daily  dextrose 5%. 1000 milliLiter(s) (50 mL/Hr) IV Continuous <Continuous>  dextrose 5%. 1000 milliLiter(s) (100 mL/Hr) IV Continuous <Continuous>  dextrose 50% Injectable 25 Gram(s) IV Push once  dextrose 50% Injectable 12.5 Gram(s) IV Push once  dextrose 50% Injectable 25 Gram(s) IV Push once  donepezil 5 milliGRAM(s) Oral at bedtime  glucagon  Injectable 1 milliGRAM(s) IntraMuscular once  heparin   Injectable 5000 Unit(s) SubCutaneous every 12 hours  insulin lispro (ADMELOG) corrective regimen sliding scale   SubCutaneous three times a day before meals  lactobacillus acidophilus 1 Tablet(s) Oral daily  multivitamin/minerals 1 Tablet(s) Oral daily  pantoprazole    Tablet 40 milliGRAM(s) Oral before breakfast  polyethylene glycol 3350 17 Gram(s) Oral daily  senna 2 Tablet(s) Oral at bedtime  simvastatin 20 milliGRAM(s) Oral at bedtime  tamsulosin 0.4 milliGRAM(s) Oral at bedtime    MEDICATIONS  (PRN):  acetaminophen     Tablet .. 650 milliGRAM(s) Oral every 6 hours PRN Temp greater or equal to 38C (100.4F), Mild Pain (1 - 3)  aluminum hydroxide/magnesium hydroxide/simethicone Suspension 30 milliLiter(s) Oral every 4 hours PRN Dyspepsia  dextrose Oral Gel 15 Gram(s) Oral once PRN Blood Glucose LESS THAN 70 milliGRAM(s)/deciliter  melatonin 3 milliGRAM(s) Oral at bedtime PRN Insomnia  ondansetron Injectable 4 milliGRAM(s) IV Push every 8 hours PRN Nausea and/or Vomiting         Vitals log        ICU Vital Signs Last 24 Hrs  T(C): 36.6 (03 Jul 2022 04:49), Max: 37.1 (02 Jul 2022 21:59)  T(F): 97.8 (03 Jul 2022 04:49), Max: 98.8 (02 Jul 2022 21:59)  HR: 72 (03 Jul 2022 04:49) (61 - 76)  BP: 121/71 (03 Jul 2022 04:49) (110/63 - 134/67)  BP(mean): --  ABP: --  ABP(mean): --  RR: 18 (03 Jul 2022 04:49) (16 - 18)  SpO2: 98% (03 Jul 2022 04:49) (95% - 98%)           Input and Output:  I&O's Detail      Lab Data                  Review of Systems	      Objective     Physical Examination    heart s1s2  lung dec BS  abd soft  head nc      Pertinent Lab findings & Imaging      [place]:  NO   Adequate UO     I&O's Detail           Discussed with:     Cultures:	        Radiology

## 2022-07-04 LAB
ANION GAP SERPL CALC-SCNC: 4 MMOL/L — LOW (ref 5–17)
BUN SERPL-MCNC: 14 MG/DL — SIGNIFICANT CHANGE UP (ref 7–23)
CALCIUM SERPL-MCNC: 9.4 MG/DL — SIGNIFICANT CHANGE UP (ref 8.4–10.5)
CHLORIDE SERPL-SCNC: 102 MMOL/L — SIGNIFICANT CHANGE UP (ref 96–108)
CO2 SERPL-SCNC: 32 MMOL/L — HIGH (ref 22–31)
CREAT SERPL-MCNC: 0.71 MG/DL — SIGNIFICANT CHANGE UP (ref 0.5–1.3)
EGFR: 84 ML/MIN/1.73M2 — SIGNIFICANT CHANGE UP
GLUCOSE BLDC GLUCOMTR-MCNC: 129 MG/DL — HIGH (ref 70–99)
GLUCOSE BLDC GLUCOMTR-MCNC: 132 MG/DL — HIGH (ref 70–99)
GLUCOSE BLDC GLUCOMTR-MCNC: 244 MG/DL — HIGH (ref 70–99)
GLUCOSE BLDC GLUCOMTR-MCNC: 88 MG/DL — SIGNIFICANT CHANGE UP (ref 70–99)
GLUCOSE SERPL-MCNC: 120 MG/DL — HIGH (ref 70–99)
HCT VFR BLD CALC: 34.5 % — LOW (ref 39–50)
HGB BLD-MCNC: 11.3 G/DL — LOW (ref 13–17)
MCHC RBC-ENTMCNC: 31.2 PG — SIGNIFICANT CHANGE UP (ref 27–34)
MCHC RBC-ENTMCNC: 32.8 GM/DL — SIGNIFICANT CHANGE UP (ref 32–36)
MCV RBC AUTO: 95.3 FL — SIGNIFICANT CHANGE UP (ref 80–100)
NRBC # BLD: 0 /100 WBCS — SIGNIFICANT CHANGE UP (ref 0–0)
PLATELET # BLD AUTO: 223 K/UL — SIGNIFICANT CHANGE UP (ref 150–400)
POTASSIUM SERPL-MCNC: 4.1 MMOL/L — SIGNIFICANT CHANGE UP (ref 3.5–5.3)
POTASSIUM SERPL-SCNC: 4.1 MMOL/L — SIGNIFICANT CHANGE UP (ref 3.5–5.3)
RBC # BLD: 3.62 M/UL — LOW (ref 4.2–5.8)
RBC # FLD: 13.2 % — SIGNIFICANT CHANGE UP (ref 10.3–14.5)
SODIUM SERPL-SCNC: 138 MMOL/L — SIGNIFICANT CHANGE UP (ref 135–145)
WBC # BLD: 6.11 K/UL — SIGNIFICANT CHANGE UP (ref 3.8–10.5)
WBC # FLD AUTO: 6.11 K/UL — SIGNIFICANT CHANGE UP (ref 3.8–10.5)

## 2022-07-04 RX ADMIN — SENNA PLUS 2 TABLET(S): 8.6 TABLET ORAL at 21:27

## 2022-07-04 RX ADMIN — Medication 2: at 12:38

## 2022-07-04 RX ADMIN — SIMVASTATIN 20 MILLIGRAM(S): 20 TABLET, FILM COATED ORAL at 21:27

## 2022-07-04 RX ADMIN — DONEPEZIL HYDROCHLORIDE 5 MILLIGRAM(S): 10 TABLET, FILM COATED ORAL at 21:27

## 2022-07-04 RX ADMIN — PANTOPRAZOLE SODIUM 40 MILLIGRAM(S): 20 TABLET, DELAYED RELEASE ORAL at 05:26

## 2022-07-04 RX ADMIN — POLYETHYLENE GLYCOL 3350 17 GRAM(S): 17 POWDER, FOR SOLUTION ORAL at 12:39

## 2022-07-04 RX ADMIN — CLOPIDOGREL BISULFATE 75 MILLIGRAM(S): 75 TABLET, FILM COATED ORAL at 12:39

## 2022-07-04 RX ADMIN — Medication 1 TABLET(S): at 12:39

## 2022-07-04 RX ADMIN — HEPARIN SODIUM 5000 UNIT(S): 5000 INJECTION INTRAVENOUS; SUBCUTANEOUS at 18:11

## 2022-07-04 RX ADMIN — Medication 1 TABLET(S): at 12:40

## 2022-07-04 RX ADMIN — ERTAPENEM SODIUM 120 MILLIGRAM(S): 1 INJECTION, POWDER, LYOPHILIZED, FOR SOLUTION INTRAMUSCULAR; INTRAVENOUS at 05:27

## 2022-07-04 RX ADMIN — TAMSULOSIN HYDROCHLORIDE 0.4 MILLIGRAM(S): 0.4 CAPSULE ORAL at 21:27

## 2022-07-04 RX ADMIN — HEPARIN SODIUM 5000 UNIT(S): 5000 INJECTION INTRAVENOUS; SUBCUTANEOUS at 05:26

## 2022-07-04 NOTE — PROGRESS NOTE ADULT - SUBJECTIVE AND OBJECTIVE BOX
Patient is a 89y Male with a known history of :  Unresponsive episode [R41.89]    Suspected UTI [R39.89]    Dementia [F03.90]    Acute metabolic encephalopathy [G93.41]    DM (diabetes mellitus) [E11.9]    CAD (coronary artery disease) [I25.10]    HTN (hypertension) [I10]    Prophylactic measure [Z29.9]      HPI:  The patient is a 89 year old male with a history of HL, atrial fibrillation, SDH, dementia who presents with unresponsiveness. The patient is unable to provide additional history . He reportedly was verbal yesterday at [facility] but unresponsive today.  Past Medical/Surgical History:  HL, atrial fibrillation, SDH, dementia ,om  Medications:  Home Medications:  ascorbic acid 500 mg oral tablet: 1 tab(s) orally once a day (03 Apr 2022 11:16)  Aspirin Enteric Coated 81 mg oral delayed release tablet: 1 tab(s) orally once a day (03 Apr 2022 11:16)  donepezil 5 mg oral tablet: 1 tab(s) orally once a day (at bedtime) (03 Apr 2022 11:16)  ferrous sulfate 325 mg (65 mg elemental iron) oral delayed release tablet: 1 tab(s) orally once a day (03 Apr 2022 11:16)  Multiple Vitamins with Minerals oral tablet: 1 tab(s) orally once a day (03 Apr 2022 11:16)  simvastatin 20 mg oral tablet: 1 tab(s) orally once a day (at bedtime) (03 Apr 2022 11:16)Patient is admitted for management of FTT ,suspected UTI Palliative care consult requested ,to discuss advance directives and complete MOLST ,family requested to initiate comfort measures only and to focus on pain and anxiety management ,case was d/w PCP from [facility] and with pall care MD .   (30 Jun 2022 11:24)      REVIEW OF SYSTEMS:    CONSTITUTIONAL: No fever, weight loss, or fatigue  EYES: No eye pain, visual disturbances, or discharge  ENMT:  No difficulty hearing, tinnitus, vertigo; No sinus or throat pain  NECK: No pain or stiffness  BREASTS: No pain, masses, or nipple discharge  RESPIRATORY: No cough, wheezing, chills or hemoptysis; No shortness of breath  CARDIOVASCULAR: No chest pain, palpitations, dizziness, or leg swelling  GASTROINTESTINAL: No abdominal or epigastric pain. No nausea, vomiting, or hematemesis; No diarrhea or constipation. No melena or hematochezia.  GENITOURINARY: No dysuria, frequency, hematuria, or incontinence  NEUROLOGICAL: No headaches, memory loss, loss of strength, numbness, or tremors  SKIN: No itching, burning, rashes, or lesions   LYMPH NODES: No enlarged glands  ENDOCRINE: No heat or cold intolerance; No hair loss  MUSCULOSKELETAL: No joint pain or swelling; No muscle, back, or extremity pain  PSYCHIATRIC: No depression, anxiety, mood swings, or difficulty sleeping  HEME/LYMPH: No easy bruising, or bleeding gums  ALLERGY AND IMMUNOLOGIC: No hives or eczema    MEDICATIONS  (STANDING):  clopidogrel Tablet 75 milliGRAM(s) Oral daily  dextrose 5%. 1000 milliLiter(s) (50 mL/Hr) IV Continuous <Continuous>  dextrose 5%. 1000 milliLiter(s) (100 mL/Hr) IV Continuous <Continuous>  dextrose 50% Injectable 25 Gram(s) IV Push once  dextrose 50% Injectable 12.5 Gram(s) IV Push once  dextrose 50% Injectable 25 Gram(s) IV Push once  donepezil 5 milliGRAM(s) Oral at bedtime  ertapenem  IVPB 1000 milliGRAM(s) IV Intermittent every 24 hours  glucagon  Injectable 1 milliGRAM(s) IntraMuscular once  heparin   Injectable 5000 Unit(s) SubCutaneous every 12 hours  insulin lispro (ADMELOG) corrective regimen sliding scale   SubCutaneous three times a day before meals  lactobacillus acidophilus 1 Tablet(s) Oral daily  multivitamin/minerals 1 Tablet(s) Oral daily  pantoprazole    Tablet 40 milliGRAM(s) Oral before breakfast  polyethylene glycol 3350 17 Gram(s) Oral daily  senna 2 Tablet(s) Oral at bedtime  simvastatin 20 milliGRAM(s) Oral at bedtime  tamsulosin 0.4 milliGRAM(s) Oral at bedtime    MEDICATIONS  (PRN):  acetaminophen     Tablet .. 650 milliGRAM(s) Oral every 6 hours PRN Temp greater or equal to 38C (100.4F), Mild Pain (1 - 3)  aluminum hydroxide/magnesium hydroxide/simethicone Suspension 30 milliLiter(s) Oral every 4 hours PRN Dyspepsia  dextrose Oral Gel 15 Gram(s) Oral once PRN Blood Glucose LESS THAN 70 milliGRAM(s)/deciliter  melatonin 3 milliGRAM(s) Oral at bedtime PRN Insomnia  ondansetron Injectable 4 milliGRAM(s) IV Push every 8 hours PRN Nausea and/or Vomiting      ALLERGIES: latex (Rash)  latex (Unknown)  No Known Drug Allergies      FAMILY HISTORY:      PHYSICAL EXAMINATION:  -----------------------------  T(C): 36.8 (07-04-22 @ 05:57), Max: 37 (07-03-22 @ 15:09)  HR: 78 (07-04-22 @ 05:57) (75 - 98)  BP: 138/57 (07-04-22 @ 05:57) (111/55 - 149/52)  RR: 18 (07-04-22 @ 05:57) (17 - 18)  SpO2: 96% (07-04-22 @ 05:57) (96% - 98%)  Wt(kg): --        VITALS  T(C): 36.8 (07-04-22 @ 05:57), Max: 37 (07-03-22 @ 15:09)  HR: 78 (07-04-22 @ 05:57) (75 - 98)  BP: 138/57 (07-04-22 @ 05:57) (111/55 - 149/52)  RR: 18 (07-04-22 @ 05:57) (17 - 18)  SpO2: 96% (07-04-22 @ 05:57) (96% - 98%)    Constitutional: well developed, normal appearance, well groomed, well nourished, no deformities and no acute distress.   Eyes: the conjunctiva exhibited no abnormalities and the eyelids demonstrated no xanthelasmas.   HEENT: normal oral mucosa, no oral pallor and no oral cyanosis.   Neck: normal jugular venous A waves present, normal jugular venous V waves present and no jugular venous jacobs A waves.   Pulmonary: no respiratory distress, normal respiratory rhythm and effort, no accessory muscle use and lungs were clear to auscultation bilaterally.   Cardiovascular: heart rate and rhythm were normal, normal S1 and S2 and no murmur, gallop, rub, heave or thrill are present.   Abdomen: soft, non-tender, no hepato-splenomegaly and no abdominal mass palpated.   Musculoskeletal: the gait could not be assessed..   Extremities: no clubbing of the fingernails, no localized cyanosis, no petechial hemorrhages and no ischemic changes.   Skin: normal skin color and pigmentation, no rash, no venous stasis, no skin lesions, no skin ulcer and no xanthoma was observed.   Psychiatric: oriented to person, place, and time, the affect was normal, the mood was normal and not feeling anxious.     LABS:   --------  07-04    138  |  102  |  14  ----------------------------<  120<H>  4.1   |  32<H>  |  0.71    Ca    9.4      04 Jul 2022 08:06                           11.3   6.11  )-----------( 223      ( 04 Jul 2022 08:06 )             34.5                 RADIOLOGY:  -----------------    ECG:     ECHO:

## 2022-07-04 NOTE — PROGRESS NOTE ADULT - SUBJECTIVE AND OBJECTIVE BOX
Neurology follow up note    RAY [id number]      Interval History:    Patient resting in bed     Allergies    latex (Rash)  latex (Unknown)  No Known Drug Allergies    Intolerances        MEDICATIONS    acetaminophen     Tablet .. 650 milliGRAM(s) Oral every 6 hours PRN  aluminum hydroxide/magnesium hydroxide/simethicone Suspension 30 milliLiter(s) Oral every 4 hours PRN  clopidogrel Tablet 75 milliGRAM(s) Oral daily  dextrose 5%. 1000 milliLiter(s) IV Continuous <Continuous>  dextrose 5%. 1000 milliLiter(s) IV Continuous <Continuous>  dextrose 50% Injectable 25 Gram(s) IV Push once  dextrose 50% Injectable 12.5 Gram(s) IV Push once  dextrose 50% Injectable 25 Gram(s) IV Push once  dextrose Oral Gel 15 Gram(s) Oral once PRN  donepezil 5 milliGRAM(s) Oral at bedtime  ertapenem  IVPB 1000 milliGRAM(s) IV Intermittent every 24 hours  glucagon  Injectable 1 milliGRAM(s) IntraMuscular once  heparin   Injectable 5000 Unit(s) SubCutaneous every 12 hours  insulin lispro (ADMELOG) corrective regimen sliding scale   SubCutaneous three times a day before meals  lactobacillus acidophilus 1 Tablet(s) Oral daily  melatonin 3 milliGRAM(s) Oral at bedtime PRN  multivitamin/minerals 1 Tablet(s) Oral daily  ondansetron Injectable 4 milliGRAM(s) IV Push every 8 hours PRN  pantoprazole    Tablet 40 milliGRAM(s) Oral before breakfast  polyethylene glycol 3350 17 Gram(s) Oral daily  senna 2 Tablet(s) Oral at bedtime  simvastatin 20 milliGRAM(s) Oral at bedtime  tamsulosin 0.4 milliGRAM(s) Oral at bedtime              Vital Signs Last 24 Hrs  T(C): 36.8 (04 Jul 2022 05:57), Max: 37 (03 Jul 2022 15:09)  T(F): 98.2 (04 Jul 2022 05:57), Max: 98.6 (03 Jul 2022 15:09)  HR: 78 (04 Jul 2022 05:57) (75 - 98)  BP: 138/57 (04 Jul 2022 05:57) (111/55 - 149/52)  BP(mean): 55 (03 Jul 2022 16:46) (55 - 55)  RR: 18 (04 Jul 2022 05:57) (17 - 18)  SpO2: 96% (04 Jul 2022 05:57) (96% - 98%)      REVIEW OF SYSTEMS:  Could not be obtained from the patient secondary to the patient having underlying dementia, mostly nonverbal.    PHYSICAL EXAMINATION:   HEENT:  Head:  Normocephalic, atraumatic.  Eyes:  No scleral icterus.  Ears:  Hard to evaluate secondary to the patient being mostly nonverbal.  NECK:  Had increased tone but resists.  RESPIRATORY:  Decreased breath sounds bilaterally.  CARDIOVASCULAR:  S1 and S2 heard.  ABDOMEN:  Soft, nontender.  EXTREMITIES:  No clubbing or cyanosis were noted.      NEUROLOGIC:  The patient was arousable to verbal stimuli.  Attempted to open the patient's eye, would actively resist.  Pupils bilaterally appeared to be 2 mm.  Speech, the patient was nonverbal.  Motor:  Examination is significantly limited.  Attempted to elevate bilateral upper extremities, the patient would actively resist, pull both arms down, would say overall 3+/5, but as per my previous note, the patient does have decreased range of motion of the left shoulder, and biceps and triceps at that time was 3+/5.  The patient at that time was unable to open and close his left hand.  Bilateral lower extremities had significantly increased tone.  When given plantar stimuli was slightly able to elevate off the bed.            LABS:  CBC Full  -  ( 04 Jul 2022 08:06 )  WBC Count : 6.11 K/uL  RBC Count : 3.62 M/uL  Hemoglobin : 11.3 g/dL  Hematocrit : 34.5 %  Platelet Count - Automated : 223 K/uL  Mean Cell Volume : 95.3 fl  Mean Cell Hemoglobin : 31.2 pg  Mean Cell Hemoglobin Concentration : 32.8 gm/dL  Auto Neutrophil # : x  Auto Lymphocyte # : x  Auto Monocyte # : x  Auto Eosinophil # : x  Auto Basophil # : x  Auto Neutrophil % : x  Auto Lymphocyte % : x  Auto Monocyte % : x  Auto Eosinophil % : x  Auto Basophil % : x      07-04    138  |  102  |  14  ----------------------------<  120<H>  4.1   |  32<H>  |  0.71    Ca    9.4      04 Jul 2022 08:06      Hemoglobin A1C:       Vitamin B12         RADIOLOGY      ANALYSIS AND PLAN:  This is a 89-year-old with episode of unresponsiveness with left hand weakness.  Clinical impression is TIA, rule out cerebrovascular accident.  We will discontinue the patient's aspirin and convert over to Plavix.  Spoke with son in great detail, we will not be opting for any type of MRI, we do not feel that he would lie still fore it and management would not change.  For history of hypertension, monitor systolic blood pressure.  For hyperlipidemia, continue the patient on statin.  For dementia, continue the patient on his home medications  afib h/o fall not on AC  antibiotics as needed   montior oral intake as needed     Spoke with son, Regina, at [phone] 7/1.    Greater than 40 minutes of time was spent with the patient, plan of care, reviewing data, speaking to multidisciplinary healthcare team with greater than 50% of the time in counseling and care coordination.

## 2022-07-04 NOTE — PROGRESS NOTE ADULT - SUBJECTIVE AND OBJECTIVE BOX
TERESA FRANCIS is a 89yMale , patient examined and chart reviewed.      INTERVAL HPI/ OVERNIGHT EVENTS:   Afebrile. No events.  NAD    PAST MEDICAL & SURGICAL HISTORY:  Atrial fibrillation  Hypertension  Diabetes  Prostate ca  Dementia  CHF (congestive heart failure)  Hyperlipemia  Diabetes  Depression  Dementia  DM (diabetes mellitus)  Atrial fibrillation  Prostate CA  Arteriosclerotic heart disease (ASHD)  Dementia  Anemia  Constipation  AICD (automatic cardioverter/defibrillator) present  Osteomyelitis of finger of left hand  Personal history of radiation therapy  H/O ongoing treatment with hormonal therapy  H/O bladder infections  Scrotal abscess  Urinary retention  Hematuria  H/O cystitis  Constipation  VHD (valvular heart disease)  Aortic valve stenosis  History of automatic internal cardiac defibrillator (AICD)          For details regarding the patient's social history, family history, and other miscellaneous elements, please refer the initial infectious diseases consultation and/or the admitting history and physical examination for this admission.    ROS:  Unable to obtain due to : Dementia      ALLERGIES:  latex (Rash)      Current inpatient medications :    ANTIBIOTICS/RELEVANT:  ertapenem  IVPB 1000 milliGRAM(s) IV Intermittent every 24 hours    MEDICATIONS  (STANDING):  clopidogrel Tablet 75 milliGRAM(s) Oral daily  dextrose 5%. 1000 milliLiter(s) (50 mL/Hr) IV Continuous <Continuous>  dextrose 5%. 1000 milliLiter(s) (100 mL/Hr) IV Continuous <Continuous>  dextrose 50% Injectable 25 Gram(s) IV Push once  dextrose 50% Injectable 12.5 Gram(s) IV Push once  dextrose 50% Injectable 25 Gram(s) IV Push once  donepezil 5 milliGRAM(s) Oral at bedtime  glucagon  Injectable 1 milliGRAM(s) IntraMuscular once  heparin   Injectable 5000 Unit(s) SubCutaneous every 12 hours  insulin lispro (ADMELOG) corrective regimen sliding scale   SubCutaneous three times a day before meals  lactobacillus acidophilus 1 Tablet(s) Oral daily  multivitamin/minerals 1 Tablet(s) Oral daily  pantoprazole    Tablet 40 milliGRAM(s) Oral before breakfast  polyethylene glycol 3350 17 Gram(s) Oral daily  senna 2 Tablet(s) Oral at bedtime  simvastatin 20 milliGRAM(s) Oral at bedtime  tamsulosin 0.4 milliGRAM(s) Oral at bedtime    MEDICATIONS  (PRN):  acetaminophen     Tablet .. 650 milliGRAM(s) Oral every 6 hours PRN Temp greater or equal to 38C (100.4F), Mild Pain (1 - 3)  aluminum hydroxide/magnesium hydroxide/simethicone Suspension 30 milliLiter(s) Oral every 4 hours PRN Dyspepsia  dextrose Oral Gel 15 Gram(s) Oral once PRN Blood Glucose LESS THAN 70 milliGRAM(s)/deciliter  melatonin 3 milliGRAM(s) Oral at bedtime PRN Insomnia  ondansetron Injectable 4 milliGRAM(s) IV Push every 8 hours PRN Nausea and/or Vomiting      Objective:  Vital Signs Last 24 Hrs  T(C): 36.8 (2022 14:10), Max: 37 (2022 15:09)  T(F): 98.2 (2022 14:10), Max: 98.6 (2022 15:09)  HR: 73 (2022 14:10) (73 - 98)  BP: 106/62 (2022 14:10) (106/62 - 149/52)  BP(mean): 55 (2022 16:46) (55 - 55)  RR: 18 (2022 14:10) (17 - 18)  SpO2: 95% (2022 14:10) (94% - 98%)      Physical Exam:  General: no acute distress  Neck: supple, trachea midline  Lungs: clear, no wheeze/rhonchi  Cardiovascular: regular rate and rhythm, S1 S2  Abdomen: soft, nontender,  bowel sounds normal  Neurological: Dementia  Skin: no rash  Extremities: no edema      LABS:                               11.3   6.11  )-----------( 223      ( 2022 08:06 )             34.5   07-04    138  |  102  |  14  ----------------------------<  120<H>  4.1   |  32<H>  |  0.71    Ca    9.4      2022 08:06        Urinalysis Basic - ( 2022 14:10 )    Color: Yellow / Appearance: Turbid / S.010 / pH: x  Gluc: x / Ketone: Negative  / Bili: Negative / Urobili: Negative mg/dL   Blood: x / Protein: 100 mg/dL / Nitrite: Positive   Leuk Esterase: Moderate / RBC: 11-25 /HPF / WBC >50   Sq Epi: x / Non Sq Epi: Few / Bacteria: Many        MICROBIOLOGY:  Culture - Urine (22 @ 15:20)    -  Gentamicin: S <=2    -  Imipenem: S <=1    -  Levofloxacin: R >4    -  Meropenem: S <=1    -  Nitrofurantoin: S <=32 Should not be used to treat pyelonephritis    -  Piperacillin/Tazobactam: S <=8    -  Tigecycline: S <=2    -  Tobramycin: S <=2    -  Trimethoprim/Sulfamethoxazole: R >2/38    -  Amoxicillin/Clavulanic Acid: S <=8/4 Consider reserving for cystitis when ampicillin/sulbactam is resistant    -  Amikacin: S <=16    -  Ampicillin: R >16 These ampicillin results predict results for amoxicillin    -  Ampicillin/Sulbactam: R >16/8 Enterobacter, Klebsiella aerogenes, Citrobacter, and Serratia may develop resistance during prolonged therapy (3-4 days)    -  Aztreonam: R >16    -  Cefazolin: R >16 ([id number]_[id number]) For uncomplicated UTI with K. pneumoniae, E. coli, or P. mirablis: CHARITO <=16 is sensitive and CHARITO >=32 is resistant. This also predicts results for oral agents cefaclor, cefdinir, cefpodoxime, cefprozil, cefuroxime axetil, cephalexin and locarbef for uncomplicated UTI. Note that some isolates may be susceptible to these agents while testing resistant to cefazolin.    -  Cefepime: R >16    -  Ceftriaxone: R >32 Enterobacter, Klebsiella aerogenes, Citrobacter, and Serratia may develop resistance during prolonged therapy    -  Ciprofloxacin: R >2    -  Ertapenem: S <=0.5    Specimen Source: Catheterized Catheterized    Culture Results:   >100,000 CFU/ml Escherichia coli ESBL    Organism Identification: Escherichia coli ESBL    Organism: Escherichia coli ESBL    Method Type: CHARITO      RADIOLOGY & ADDITIONAL STUDIES:  ACC: [id number] EXAM:  XR CHEST PORTABLE URGENT 1V                          PROCEDURE DATE:  2022          INTERPRETATION:  HISTORY: ;  Sepsis;  TECHNIQUE: Portable frontal view of the chest, 1 view.  COMPARISON: none.  FINDINGS/  IMPRESSION:  A cardiac device overlies and obscures the left hemithorax with lead in   place.  HEART:  Enlarged  LUNGS: free of consolidation,effusion, or pneumothorax.  BONES: degenerative changes      Calcified gallstone. Contrast in the left renal collecting system.    Assessment :   89YO M PMH Dementia, HL, atrial fibrillation, SDH, Ca Prostate DM CHF resident of [facility] who presents with unresponsiveness mental status change sec UTI  Ucx with ESBL  More awake  Afebrile  WBC wnl    Plan :   Cont Ertapenam x 7 days  MIdline in am  Trend temps and cbc  Asp precautions  Stable from ID standpoint    Continue with present regiment.  Appropriate use of antibiotics and adverse effects reviewed.      > 35 minutes were spent in direct patient care reviewing notes, medications ,labs data/ imaging , discussion with multidisciplinary team.    Thank you for allowing me to participate in care of your patient .    Heber Issa MD  Infectious Disease  [phone]

## 2022-07-04 NOTE — PROGRESS NOTE ADULT - SUBJECTIVE AND OBJECTIVE BOX
Date/Time Patient Seen:  		  Referring MD:   Data Reviewed	       Patient is a 89y old  Male who presents with a chief complaint of EPISODE OF UNRESPONSIVNESS (03 Jul 2022 12:52)      Subjective/HPI     PAST MEDICAL & SURGICAL HISTORY:  Atrial fibrillation    Hypertension    Diabetes    Prostate ca    Dementia    CHF (congestive heart failure)    Hyperlipemia    Diabetes    Depression    Dementia    No pertinent past medical history    DM (diabetes mellitus)    Atrial fibrillation    Prostate CA    Arteriosclerotic heart disease (ASHD)    Dementia    Anemia    Constipation    AICD (automatic cardioverter/defibrillator) present    Osteomyelitis of finger of left hand    Personal history of radiation therapy    H/O ongoing treatment with hormonal therapy    H/O bladder infections    Scrotal abscess    Urinary retention    Hematuria    H/O cystitis    Constipation    VHD (valvular heart disease)    Aortic valve stenosis    No significant past surgical history    No significant past surgical history    History of automatic internal cardiac defibrillator (AICD)          Medication list         MEDICATIONS  (STANDING):  clopidogrel Tablet 75 milliGRAM(s) Oral daily  dextrose 5%. 1000 milliLiter(s) (50 mL/Hr) IV Continuous <Continuous>  dextrose 5%. 1000 milliLiter(s) (100 mL/Hr) IV Continuous <Continuous>  dextrose 50% Injectable 25 Gram(s) IV Push once  dextrose 50% Injectable 12.5 Gram(s) IV Push once  dextrose 50% Injectable 25 Gram(s) IV Push once  donepezil 5 milliGRAM(s) Oral at bedtime  ertapenem  IVPB 1000 milliGRAM(s) IV Intermittent every 24 hours  glucagon  Injectable 1 milliGRAM(s) IntraMuscular once  heparin   Injectable 5000 Unit(s) SubCutaneous every 12 hours  insulin lispro (ADMELOG) corrective regimen sliding scale   SubCutaneous three times a day before meals  lactobacillus acidophilus 1 Tablet(s) Oral daily  multivitamin/minerals 1 Tablet(s) Oral daily  pantoprazole    Tablet 40 milliGRAM(s) Oral before breakfast  polyethylene glycol 3350 17 Gram(s) Oral daily  senna 2 Tablet(s) Oral at bedtime  simvastatin 20 milliGRAM(s) Oral at bedtime  tamsulosin 0.4 milliGRAM(s) Oral at bedtime    MEDICATIONS  (PRN):  acetaminophen     Tablet .. 650 milliGRAM(s) Oral every 6 hours PRN Temp greater or equal to 38C (100.4F), Mild Pain (1 - 3)  aluminum hydroxide/magnesium hydroxide/simethicone Suspension 30 milliLiter(s) Oral every 4 hours PRN Dyspepsia  dextrose Oral Gel 15 Gram(s) Oral once PRN Blood Glucose LESS THAN 70 milliGRAM(s)/deciliter  melatonin 3 milliGRAM(s) Oral at bedtime PRN Insomnia  ondansetron Injectable 4 milliGRAM(s) IV Push every 8 hours PRN Nausea and/or Vomiting         Vitals log        ICU Vital Signs Last 24 Hrs  T(C): 36.8 (04 Jul 2022 05:57), Max: 37 (03 Jul 2022 15:09)  T(F): 98.2 (04 Jul 2022 05:57), Max: 98.6 (03 Jul 2022 15:09)  HR: 78 (04 Jul 2022 05:57) (75 - 98)  BP: 138/57 (04 Jul 2022 05:57) (111/55 - 149/52)  BP(mean): 55 (03 Jul 2022 16:46) (55 - 55)  ABP: --  ABP(mean): --  RR: 18 (04 Jul 2022 05:57) (17 - 18)  SpO2: 96% (04 Jul 2022 05:57) (96% - 98%)           Input and Output:  I&O's Detail      Lab Data                  Review of Systems	      Objective     Physical Examination    heart s1s2  lung dec BS  abd soft       Pertinent Lab findings & Imaging      [place]:  NO   Adequate UO     I&O's Detail           Discussed with:     Cultures:	        Radiology

## 2022-07-05 DIAGNOSIS — N39.0 URINARY TRACT INFECTION, SITE NOT SPECIFIED: ICD-10-CM

## 2022-07-05 LAB
ANION GAP SERPL CALC-SCNC: 5 MMOL/L — SIGNIFICANT CHANGE UP (ref 5–17)
BUN SERPL-MCNC: 17 MG/DL — SIGNIFICANT CHANGE UP (ref 7–23)
CALCIUM SERPL-MCNC: 9 MG/DL — SIGNIFICANT CHANGE UP (ref 8.4–10.5)
CHLORIDE SERPL-SCNC: 103 MMOL/L — SIGNIFICANT CHANGE UP (ref 96–108)
CO2 SERPL-SCNC: 31 MMOL/L — SIGNIFICANT CHANGE UP (ref 22–31)
CREAT SERPL-MCNC: 0.76 MG/DL — SIGNIFICANT CHANGE UP (ref 0.5–1.3)
EGFR: 83 ML/MIN/1.73M2 — SIGNIFICANT CHANGE UP
GLUCOSE BLDC GLUCOMTR-MCNC: 108 MG/DL — HIGH (ref 70–99)
GLUCOSE BLDC GLUCOMTR-MCNC: 114 MG/DL — HIGH (ref 70–99)
GLUCOSE BLDC GLUCOMTR-MCNC: 115 MG/DL — HIGH (ref 70–99)
GLUCOSE BLDC GLUCOMTR-MCNC: 153 MG/DL — HIGH (ref 70–99)
GLUCOSE SERPL-MCNC: 119 MG/DL — HIGH (ref 70–99)
HCT VFR BLD CALC: 33.5 % — LOW (ref 39–50)
HGB BLD-MCNC: 11 G/DL — LOW (ref 13–17)
MCHC RBC-ENTMCNC: 31.2 PG — SIGNIFICANT CHANGE UP (ref 27–34)
MCHC RBC-ENTMCNC: 32.8 GM/DL — SIGNIFICANT CHANGE UP (ref 32–36)
MCV RBC AUTO: 94.9 FL — SIGNIFICANT CHANGE UP (ref 80–100)
NRBC # BLD: 0 /100 WBCS — SIGNIFICANT CHANGE UP (ref 0–0)
PLATELET # BLD AUTO: 214 K/UL — SIGNIFICANT CHANGE UP (ref 150–400)
POTASSIUM SERPL-MCNC: 4.5 MMOL/L — SIGNIFICANT CHANGE UP (ref 3.5–5.3)
POTASSIUM SERPL-SCNC: 4.5 MMOL/L — SIGNIFICANT CHANGE UP (ref 3.5–5.3)
RBC # BLD: 3.53 M/UL — LOW (ref 4.2–5.8)
RBC # FLD: 13.6 % — SIGNIFICANT CHANGE UP (ref 10.3–14.5)
SODIUM SERPL-SCNC: 139 MMOL/L — SIGNIFICANT CHANGE UP (ref 135–145)
WBC # BLD: 6.06 K/UL — SIGNIFICANT CHANGE UP (ref 3.8–10.5)
WBC # FLD AUTO: 6.06 K/UL — SIGNIFICANT CHANGE UP (ref 3.8–10.5)

## 2022-07-05 RX ADMIN — DONEPEZIL HYDROCHLORIDE 5 MILLIGRAM(S): 10 TABLET, FILM COATED ORAL at 21:45

## 2022-07-05 RX ADMIN — CLOPIDOGREL BISULFATE 75 MILLIGRAM(S): 75 TABLET, FILM COATED ORAL at 13:00

## 2022-07-05 RX ADMIN — POLYETHYLENE GLYCOL 3350 17 GRAM(S): 17 POWDER, FOR SOLUTION ORAL at 13:00

## 2022-07-05 RX ADMIN — Medication 1: at 13:00

## 2022-07-05 RX ADMIN — SENNA PLUS 2 TABLET(S): 8.6 TABLET ORAL at 21:45

## 2022-07-05 RX ADMIN — HEPARIN SODIUM 5000 UNIT(S): 5000 INJECTION INTRAVENOUS; SUBCUTANEOUS at 17:45

## 2022-07-05 RX ADMIN — SIMVASTATIN 20 MILLIGRAM(S): 20 TABLET, FILM COATED ORAL at 21:44

## 2022-07-05 RX ADMIN — ERTAPENEM SODIUM 120 MILLIGRAM(S): 1 INJECTION, POWDER, LYOPHILIZED, FOR SOLUTION INTRAMUSCULAR; INTRAVENOUS at 05:04

## 2022-07-05 RX ADMIN — HEPARIN SODIUM 5000 UNIT(S): 5000 INJECTION INTRAVENOUS; SUBCUTANEOUS at 05:04

## 2022-07-05 RX ADMIN — Medication 1 TABLET(S): at 13:00

## 2022-07-05 RX ADMIN — Medication 1 TABLET(S): at 13:01

## 2022-07-05 RX ADMIN — PANTOPRAZOLE SODIUM 40 MILLIGRAM(S): 20 TABLET, DELAYED RELEASE ORAL at 05:05

## 2022-07-05 RX ADMIN — TAMSULOSIN HYDROCHLORIDE 0.4 MILLIGRAM(S): 0.4 CAPSULE ORAL at 21:44

## 2022-07-05 NOTE — PROGRESS NOTE ADULT - SUBJECTIVE AND OBJECTIVE BOX
Patient is a 89y Male with a known history of :  Unresponsive episode [R41.89]    Suspected UTI [R39.89]    Dementia [F03.90]    Acute metabolic encephalopathy [G93.41]    DM (diabetes mellitus) [E11.9]    CAD (coronary artery disease) [I25.10]    HTN (hypertension) [I10]    Prophylactic measure [Z29.9]      HPI:  The patient is a 89 year old male with a history of HL, atrial fibrillation, SDH, dementia who presents with unresponsiveness. The patient is unable to provide additional history . He reportedly was verbal yesterday at [facility] but unresponsive today.  Past Medical/Surgical History:  HL, atrial fibrillation, SDH, dementia ,om  Medications:  Home Medications:  ascorbic acid 500 mg oral tablet: 1 tab(s) orally once a day (03 Apr 2022 11:16)  Aspirin Enteric Coated 81 mg oral delayed release tablet: 1 tab(s) orally once a day (03 Apr 2022 11:16)  donepezil 5 mg oral tablet: 1 tab(s) orally once a day (at bedtime) (03 Apr 2022 11:16)  ferrous sulfate 325 mg (65 mg elemental iron) oral delayed release tablet: 1 tab(s) orally once a day (03 Apr 2022 11:16)  Multiple Vitamins with Minerals oral tablet: 1 tab(s) orally once a day (03 Apr 2022 11:16)  simvastatin 20 mg oral tablet: 1 tab(s) orally once a day (at bedtime) (03 Apr 2022 11:16)Patient is admitted for management of FTT ,suspected UTI Palliative care consult requested ,to discuss advance directives and complete MOLST ,family requested to initiate comfort measures only and to focus on pain and anxiety management ,case was d/w PCP from [facility] and with pall care MD .   (30 Jun 2022 11:24)      REVIEW OF SYSTEMS:    CONSTITUTIONAL: No fever, weight loss, or fatigue  EYES: No eye pain, visual disturbances, or discharge  ENMT:  No difficulty hearing, tinnitus, vertigo; No sinus or throat pain  NECK: No pain or stiffness  BREASTS: No pain, masses, or nipple discharge  RESPIRATORY: No cough, wheezing, chills or hemoptysis; No shortness of breath  CARDIOVASCULAR: No chest pain, palpitations, dizziness, or leg swelling  GASTROINTESTINAL: No abdominal or epigastric pain. No nausea, vomiting, or hematemesis; No diarrhea or constipation. No melena or hematochezia.  GENITOURINARY: No dysuria, frequency, hematuria, or incontinence  NEUROLOGICAL: No headaches, memory loss, loss of strength, numbness, or tremors  SKIN: No itching, burning, rashes, or lesions   LYMPH NODES: No enlarged glands  ENDOCRINE: No heat or cold intolerance; No hair loss  MUSCULOSKELETAL: No joint pain or swelling; No muscle, back, or extremity pain  PSYCHIATRIC: No depression, anxiety, mood swings, or difficulty sleeping  HEME/LYMPH: No easy bruising, or bleeding gums  ALLERGY AND IMMUNOLOGIC: No hives or eczema    MEDICATIONS  (STANDING):  clopidogrel Tablet 75 milliGRAM(s) Oral daily  dextrose 5%. 1000 milliLiter(s) (50 mL/Hr) IV Continuous <Continuous>  dextrose 5%. 1000 milliLiter(s) (100 mL/Hr) IV Continuous <Continuous>  dextrose 50% Injectable 25 Gram(s) IV Push once  dextrose 50% Injectable 12.5 Gram(s) IV Push once  dextrose 50% Injectable 25 Gram(s) IV Push once  donepezil 5 milliGRAM(s) Oral at bedtime  ertapenem  IVPB 1000 milliGRAM(s) IV Intermittent every 24 hours  glucagon  Injectable 1 milliGRAM(s) IntraMuscular once  heparin   Injectable 5000 Unit(s) SubCutaneous every 12 hours  insulin lispro (ADMELOG) corrective regimen sliding scale   SubCutaneous three times a day before meals  lactobacillus acidophilus 1 Tablet(s) Oral daily  multivitamin/minerals 1 Tablet(s) Oral daily  pantoprazole    Tablet 40 milliGRAM(s) Oral before breakfast  polyethylene glycol 3350 17 Gram(s) Oral daily  senna 2 Tablet(s) Oral at bedtime  simvastatin 20 milliGRAM(s) Oral at bedtime  tamsulosin 0.4 milliGRAM(s) Oral at bedtime    MEDICATIONS  (PRN):  acetaminophen     Tablet .. 650 milliGRAM(s) Oral every 6 hours PRN Temp greater or equal to 38C (100.4F), Mild Pain (1 - 3)  aluminum hydroxide/magnesium hydroxide/simethicone Suspension 30 milliLiter(s) Oral every 4 hours PRN Dyspepsia  dextrose Oral Gel 15 Gram(s) Oral once PRN Blood Glucose LESS THAN 70 milliGRAM(s)/deciliter  melatonin 3 milliGRAM(s) Oral at bedtime PRN Insomnia  ondansetron Injectable 4 milliGRAM(s) IV Push every 8 hours PRN Nausea and/or Vomiting      ALLERGIES: latex (Rash)  latex (Unknown)  No Known Drug Allergies      FAMILY HISTORY:      PHYSICAL EXAMINATION:  -----------------------------  T(C): 36.7 (07-05-22 @ 05:28), Max: 37.1 (07-05-22 @ 02:18)  HR: 73 (07-05-22 @ 05:28) (73 - 95)  BP: 103/61 (07-05-22 @ 05:28) (100/58 - 115/56)  RR: 18 (07-05-22 @ 05:28) (17 - 18)  SpO2: 92% (07-05-22 @ 05:28) (92% - 95%)  Wt(kg): --    07-04 @ 07:01  -  07-05 @ 07:00  --------------------------------------------------------  IN:    IV PiggyBack: 50 mL  Total IN: 50 mL    OUT:  Total OUT: 0 mL    Total NET: 50 mL            VITALS  T(C): 36.7 (07-05-22 @ 05:28), Max: 37.1 (07-05-22 @ 02:18)  HR: 73 (07-05-22 @ 05:28) (73 - 95)  BP: 103/61 (07-05-22 @ 05:28) (100/58 - 115/56)  RR: 18 (07-05-22 @ 05:28) (17 - 18)  SpO2: 92% (07-05-22 @ 05:28) (92% - 95%)    Constitutional: well developed, normal appearance, well groomed, well nourished, no deformities and no acute distress.   Eyes: the conjunctiva exhibited no abnormalities and the eyelids demonstrated no xanthelasmas.   HEENT: normal oral mucosa, no oral pallor and no oral cyanosis.   Neck: normal jugular venous A waves present, normal jugular venous V waves present and no jugular venous jacobs A waves.   Pulmonary: no respiratory distress, normal respiratory rhythm and effort, no accessory muscle use and lungs were clear to auscultation bilaterally.   Cardiovascular: heart rate and rhythm were normal, normal S1 and S2 and no murmur, gallop, rub, heave or thrill are present.   Abdomen: soft, non-tender, no hepato-splenomegaly and no abdominal mass palpated.   Musculoskeletal: the gait could not be assessed..   Extremities: no clubbing of the fingernails, no localized cyanosis, no petechial hemorrhages and no ischemic changes.   Skin: normal skin color and pigmentation, no rash, no venous stasis, no skin lesions, no skin ulcer and no xanthoma was observed.   Psychiatric: oriented to person, place, and time, the affect was normal, the mood was normal and not feeling anxious.     LABS:   --------  07-05    139  |  103  |  17  ----------------------------<  119<H>  4.5   |  31  |  0.76    Ca    9.0      05 Jul 2022 07:58                           11.0   6.06  )-----------( 214      ( 05 Jul 2022 07:58 )             33.5                 RADIOLOGY:  -----------------    ECG:     ECHO:

## 2022-07-05 NOTE — PROGRESS NOTE ADULT - SUBJECTIVE AND OBJECTIVE BOX
PROGRESS NOTE  Patient is a 89y old  Male who presents with a chief complaint of EPISODE OF UNRESPONSIVNESS (05 Jul 2022 11:58)    Chart and available morning labs /imaging are reviewed electronically , urgent issues addressed . More information  is being added upon completion of rounds , when more information is collected and management discussed with consultants , medical staff and social service/case management on the floor   OVERNIGHT  No new issues reported by medical staff . All above noted Patient is resting in a bed comfortably .Confused ,poor mentation .No distress noted     HPI:  The patient is a 89 year old male with a history of HL, atrial fibrillation, SDH, dementia who presents with unresponsiveness. The patient is unable to provide additional history . He reportedly was verbal yesterday at [facility] but unresponsive today.  Past Medical/Surgical History:  HL, atrial fibrillation, SDH, dementia ,om  Medications:  Home Medications:  ascorbic acid 500 mg oral tablet: 1 tab(s) orally once a day (03 Apr 2022 11:16)  Aspirin Enteric Coated 81 mg oral delayed release tablet: 1 tab(s) orally once a day (03 Apr 2022 11:16)  donepezil 5 mg oral tablet: 1 tab(s) orally once a day (at bedtime) (03 Apr 2022 11:16)  ferrous sulfate 325 mg (65 mg elemental iron) oral delayed release tablet: 1 tab(s) orally once a day (03 Apr 2022 11:16)  Multiple Vitamins with Minerals oral tablet: 1 tab(s) orally once a day (03 Apr 2022 11:16)  simvastatin 20 mg oral tablet: 1 tab(s) orally once a day (at bedtime) (03 Apr 2022 11:16)Patient is admitted for management of FTT ,suspected UTI Palliative care consult requested ,to discuss advance directives and complete MOLST ,family requested to initiate comfort measures only and to focus on pain and anxiety management ,case was d/w PCP from [facility] and with [facility] care MD .   (30 Jun 2022 11:24)    PAST MEDICAL & SURGICAL HISTORY:  Atrial fibrillation      Hypertension      Diabetes      Prostate ca      Dementia      CHF (congestive heart failure)      Hyperlipemia      Diabetes      Depression      Dementia      DM (diabetes mellitus)      Atrial fibrillation      Prostate CA      Arteriosclerotic heart disease (ASHD)      Dementia      Anemia      Constipation      AICD (automatic cardioverter/defibrillator) present      Osteomyelitis of finger of left hand      Personal history of radiation therapy      H/O ongoing treatment with hormonal therapy      H/O bladder infections      Scrotal abscess      Urinary retention      Hematuria      H/O cystitis      Constipation      VHD (valvular heart disease)      Aortic valve stenosis      History of automatic internal cardiac defibrillator (AICD)          MEDICATIONS  (STANDING):  clopidogrel Tablet 75 milliGRAM(s) Oral daily  dextrose 5%. 1000 milliLiter(s) (50 mL/Hr) IV Continuous <Continuous>  dextrose 5%. 1000 milliLiter(s) (100 mL/Hr) IV Continuous <Continuous>  dextrose 50% Injectable 25 Gram(s) IV Push once  dextrose 50% Injectable 12.5 Gram(s) IV Push once  dextrose 50% Injectable 25 Gram(s) IV Push once  donepezil 5 milliGRAM(s) Oral at bedtime  ertapenem  IVPB 1000 milliGRAM(s) IV Intermittent every 24 hours  glucagon  Injectable 1 milliGRAM(s) IntraMuscular once  heparin   Injectable 5000 Unit(s) SubCutaneous every 12 hours  insulin lispro (ADMELOG) corrective regimen sliding scale   SubCutaneous three times a day before meals  lactobacillus acidophilus 1 Tablet(s) Oral daily  multivitamin/minerals 1 Tablet(s) Oral daily  pantoprazole    Tablet 40 milliGRAM(s) Oral before breakfast  polyethylene glycol 3350 17 Gram(s) Oral daily  senna 2 Tablet(s) Oral at bedtime  simvastatin 20 milliGRAM(s) Oral at bedtime  tamsulosin 0.4 milliGRAM(s) Oral at bedtime    MEDICATIONS  (PRN):  acetaminophen     Tablet .. 650 milliGRAM(s) Oral every 6 hours PRN Temp greater or equal to 38C (100.4F), Mild Pain (1 - 3)  aluminum hydroxide/magnesium hydroxide/simethicone Suspension 30 milliLiter(s) Oral every 4 hours PRN Dyspepsia  dextrose Oral Gel 15 Gram(s) Oral once PRN Blood Glucose LESS THAN 70 milliGRAM(s)/deciliter  melatonin 3 milliGRAM(s) Oral at bedtime PRN Insomnia  ondansetron Injectable 4 milliGRAM(s) IV Push every 8 hours PRN Nausea and/or Vomiting      OBJECTIVE    T(C): 36.7 (07-05-22 @ 09:29), Max: 37.1 (07-05-22 @ 02:18)  HR: 69 (07-05-22 @ 09:29) (69 - 95)  BP: 108/59 (07-05-22 @ 09:29) (100/58 - 108/59)  RR: 18 (07-05-22 @ 09:29) (17 - 18)  SpO2: 95% (07-05-22 @ 09:29) (92% - 95%)  Wt(kg): --  I&O's Summary    04 Jul 2022 07:01  -  05 Jul 2022 07:00  --------------------------------------------------------  IN: 50 mL / OUT: 0 mL / NET: 50 mL          REVIEW OF SYSTEMS:  CONSTITUTIONAL: No fever, weight loss, or fatigue  Patient is  unable to provide any information/ROS  due to baseline mental status.     PHYSICAL EXAM:  Appearance: NAD. VS past 24 hrs -as above   HEENT:   Moist oral mucosa. Conjunctiva clear b/l.   Neck : supple  Respiratory: Lungs CTAB.  Gastrointestinal:  Soft, nontender. No rebound. No rigidity. BS present	  Cardiovascular: RRR ,S1S2 present  Neurologic: Non-focal. Moving all extremities.  Extremities: No edema. No erythema. No calf tenderness.  Skin: No rashes, No ecchymoses, No cyanosis.	  wounds ,skin lesions-See skin assesment flow sheet   LABS:                        11.0   6.06  )-----------( 214      ( 05 Jul 2022 07:58 )             33.5     07-05    139  |  103  |  17  ----------------------------<  119<H>  4.5   |  31  |  0.76    Ca    9.0      05 Jul 2022 07:58      CAPILLARY BLOOD GLUCOSE      POCT Blood Glucose.: 153 mg/dL (05 Jul 2022 12:09)  POCT Blood Glucose.: 114 mg/dL (05 Jul 2022 07:32)  POCT Blood Glucose.: 129 mg/dL (04 Jul 2022 21:17)  POCT Blood Glucose.: 88 mg/dL (04 Jul 2022 16:50)          Culture - Urine (collected 30 Jun 2022 15:20)  Source: Catheterized Catheterized  Final Report (03 Jul 2022 13:37):    >100,000 CFU/ml Escherichia coli ESBL  Organism: Escherichia coli ESBL (03 Jul 2022 13:37)  Organism: Escherichia coli ESBL (03 Jul 2022 13:37)      RADIOLOGY & ADDITIONAL TESTS:   reviewed elctronically  ASSESSMENT/PLAN: 	    25 minutes aggregate time was spent on this visit, 50% visit time spent in care co-ordination with other attendings and counselling patient .I have discussed care plan with patient / HCP/family member ,who expressed understanding of problems treatment and their effect and side effects, alternatives in details. I have asked if they have any questions and concerns and appropriately addressed them to best of my ability.

## 2022-07-05 NOTE — CHART NOTE - NSCHARTNOTEFT_GEN_A_CORE
Assessment:     Pt seen for nutrition follow-up. Per H&P, "The patient is a 89 year old male with a history of HL, atrial fibrillation, SDH, dementia who presents with unresponsiveness. The patient is unable to provide additional history . He reportedly was verbal yesterday at [facility] but unresponsive today. Patient is admitted for management of FTT ,suspected UTI Palliative care consult requested ,to discuss advance directives and complete MOLST ,family requested to initiate comfort measures only and to focus on pain and anxiety management ,case was d/w PCP from [facility] and with pall care MD."    Nutrition consult previously ordered for assessment. Pt admitted from [facility]. Reviewed transfer documents, pt on NCS, soft consistency diet PTA. Pt seen by SLP on 6/30- recommended pureed and mildly thick liquids, aspiration precautions and full assistance during meals, as tolerated. Per MD note, "Son requests home hospice and also asked to upgrade diet for pleasure feeds since patient doesn't like puree." Diet now advanced to minced and moist with glucerna daily per MD order. PO intakes % per RN documentation. Pt needs total assistance with feeding. NKFA. No N/V/D/C per chart review. +BM 7/5. CBW on admission 160#. Transfer ht/wt of 70in/150.4#. No edema noted. Skin intact. Pt with hx of DM, A1c of 6.4%; on no conc sweets diet PTA. Visited pt this afternoon, pt sleeping soundly during visit. Observed lunch tray at pt's bedside; consumed ~75% of lunch meal. Palliative care following, pt's family requesting CMO, hospice referral at [facility]. Nutrition intervention not appropriate at this time. RD to remain available and will follow-up per protocol.    Factors impacting intake: [ ] none [ ] nausea  [ ] vomiting [ ] diarrhea [ ] constipation  [X]chewing problems [X] swallowing issues  [X] other: total assistance with feeding, cmo- diet deferred to MD    Diet Prescription: Diet, Minced and Moist:   Supplement Feeding Modality:  Oral  Glucerna Shake Cans or Servings Per Day:  1       Frequency:  Daily (07-01-22 @ 15:11) [Active]    Intake: fair-good intake    Current Weight: Weight (kg): 73 (06-30 @ 09:33)  % Weight Change  Adm wt 160#    Pertinent Medications: MEDICATIONS  (STANDING):  clopidogrel Tablet 75 milliGRAM(s) Oral daily  dextrose 5%. 1000 milliLiter(s) (50 mL/Hr) IV Continuous <Continuous>  dextrose 5%. 1000 milliLiter(s) (100 mL/Hr) IV Continuous <Continuous>  dextrose 50% Injectable 25 Gram(s) IV Push once  dextrose 50% Injectable 12.5 Gram(s) IV Push once  dextrose 50% Injectable 25 Gram(s) IV Push once  donepezil 5 milliGRAM(s) Oral at bedtime  ertapenem  IVPB 1000 milliGRAM(s) IV Intermittent every 24 hours  glucagon  Injectable 1 milliGRAM(s) IntraMuscular once  heparin   Injectable 5000 Unit(s) SubCutaneous every 12 hours  insulin lispro (ADMELOG) corrective regimen sliding scale   SubCutaneous three times a day before meals  lactobacillus acidophilus 1 Tablet(s) Oral daily  multivitamin/minerals 1 Tablet(s) Oral daily  pantoprazole    Tablet 40 milliGRAM(s) Oral before breakfast  polyethylene glycol 3350 17 Gram(s) Oral daily  senna 2 Tablet(s) Oral at bedtime  simvastatin 20 milliGRAM(s) Oral at bedtime  tamsulosin 0.4 milliGRAM(s) Oral at bedtime    MEDICATIONS  (PRN):  acetaminophen     Tablet .. 650 milliGRAM(s) Oral every 6 hours PRN Temp greater or equal to 38C (100.4F), Mild Pain (1 - 3)  aluminum hydroxide/magnesium hydroxide/simethicone Suspension 30 milliLiter(s) Oral every 4 hours PRN Dyspepsia  dextrose Oral Gel 15 Gram(s) Oral once PRN Blood Glucose LESS THAN 70 milliGRAM(s)/deciliter  melatonin 3 milliGRAM(s) Oral at bedtime PRN Insomnia  ondansetron Injectable 4 milliGRAM(s) IV Push every 8 hours PRN Nausea and/or Vomiting    Pertinent Labs: 07-05 Na139 mmol/L Glu 119 mg/dL<H> K+ 4.5 mmol/L Cr  0.76 mg/dL BUN 17 mg/dL 07-01 Alb 3.1 g/dL<L>    CAPILLARY BLOOD GLUCOSE  POCT Blood Glucose.: 153 mg/dL (05 Jul 2022 12:09)  POCT Blood Glucose.: 114 mg/dL (05 Jul 2022 07:32)  POCT Blood Glucose.: 129 mg/dL (04 Jul 2022 21:17)  POCT Blood Glucose.: 88 mg/dL (04 Jul 2022 16:50)    Skin: no pressure ulcers    Estimated Needs:   [X] no change since previous assessment  [ ] recalculated:     Previous Nutrition Diagnosis:   [ ] Inadequate Energy Intake [ ]Inadequate Oral Intake [ ] Excessive Energy Intake   [ ] Underweight [ ] Increased Nutrient Needs [ ] Overweight/Obesity   [ ] Altered GI Function [ ] Unintended Weight Loss [ ] Food & Nutrition Related Knowledge Deficit [ ] Malnutrition     Nutrition Diagnosis is [ ] ongoing  [ ] resolved [ ] not applicable     New Nutrition Diagnosis: [ ] not applicable     [X] Current medical/surgical condition precludes nutrition intervention at this time, Patient followed by Palliative    Interventions: Continue nutrition care plan, diet deferred to MD- minced and moist, glucerna daily, total assistance at meal times  Recommend  [ ] Change Diet To:  [ ] Nutrition Supplement  [ ] Nutrition Support  [ ] Other:     Monitoring and Evaluation:   [X] PO intake [ x ] Tolerance to diet prescription [ x ] weights [ x ] labs[ x ] follow up per protocol  [ ] other:

## 2022-07-05 NOTE — SWALLOW BEDSIDE ASSESSMENT ADULT - SLP PERTINENT HISTORY OF CURRENT PROBLEM
Per charting, "89 year old male with a history of HL, atrial fibrillation, SDH, dementia who presents with unresponsiveness."
r/o dysphagia

## 2022-07-05 NOTE — PROGRESS NOTE ADULT - SUBJECTIVE AND OBJECTIVE BOX
Neurology follow up note    RAY [id number]      Interval History:      Patient resting in bed     Allergies    latex (Rash)  latex (Unknown)  No Known Drug Allergies    Intolerances        MEDICATIONS    acetaminophen     Tablet .. 650 milliGRAM(s) Oral every 6 hours PRN  aluminum hydroxide/magnesium hydroxide/simethicone Suspension 30 milliLiter(s) Oral every 4 hours PRN  clopidogrel Tablet 75 milliGRAM(s) Oral daily  dextrose 5%. 1000 milliLiter(s) IV Continuous <Continuous>  dextrose 5%. 1000 milliLiter(s) IV Continuous <Continuous>  dextrose 50% Injectable 25 Gram(s) IV Push once  dextrose 50% Injectable 12.5 Gram(s) IV Push once  dextrose 50% Injectable 25 Gram(s) IV Push once  dextrose Oral Gel 15 Gram(s) Oral once PRN  donepezil 5 milliGRAM(s) Oral at bedtime  ertapenem  IVPB 1000 milliGRAM(s) IV Intermittent every 24 hours  glucagon  Injectable 1 milliGRAM(s) IntraMuscular once  heparin   Injectable 5000 Unit(s) SubCutaneous every 12 hours  insulin lispro (ADMELOG) corrective regimen sliding scale   SubCutaneous three times a day before meals  lactobacillus acidophilus 1 Tablet(s) Oral daily  melatonin 3 milliGRAM(s) Oral at bedtime PRN  multivitamin/minerals 1 Tablet(s) Oral daily  ondansetron Injectable 4 milliGRAM(s) IV Push every 8 hours PRN  pantoprazole    Tablet 40 milliGRAM(s) Oral before breakfast  polyethylene glycol 3350 17 Gram(s) Oral daily  senna 2 Tablet(s) Oral at bedtime  simvastatin 20 milliGRAM(s) Oral at bedtime  tamsulosin 0.4 milliGRAM(s) Oral at bedtime              Vital Signs Last 24 Hrs  T(C): 36.7 (05 Jul 2022 05:28), Max: 37.1 (05 Jul 2022 02:18)  T(F): 98 (05 Jul 2022 05:28), Max: 98.7 (05 Jul 2022 02:18)  HR: 73 (05 Jul 2022 05:28) (73 - 95)  BP: 103/61 (05 Jul 2022 05:28) (100/58 - 115/56)  BP(mean): --  RR: 18 (05 Jul 2022 05:28) (17 - 18)  SpO2: 92% (05 Jul 2022 05:28) (92% - 95%)    REVIEW OF SYSTEMS:  Could not be obtained from the patient secondary to the patient having underlying dementia, mostly nonverbal.    PHYSICAL EXAMINATION:   HEENT:  Head:  Normocephalic, atraumatic.  Eyes:  No scleral icterus.  Ears:  Hard to evaluate secondary to the patient being mostly nonverbal.  NECK:  Had increased tone but resists.  RESPIRATORY:  Decreased breath sounds bilaterally.  CARDIOVASCULAR:  S1 and S2 heard.  ABDOMEN:  Soft, nontender.  EXTREMITIES:  No clubbing or cyanosis were noted.      NEUROLOGIC:  The patient was arousable to verbal stimuli.  Attempted to open the patient's eye, would actively resist.  Pupils bilaterally appeared to be 2 mm.  Speech, the patient was nonverbal.  Motor:  Examination is significantly limited.  Attempted to elevate bilateral upper extremities, the patient would actively resist, pull both arms down, would say overall 3+/5, but as per my previous note, the patient does have decreased range of motion of the left shoulder, and biceps and triceps at that time was 3+/5.  The patient at that time was unable to open and close his left hand.  Bilateral lower extremities had significantly increased tone.  When given plantar stimuli was slightly able to elevate off the bed.                 LABS:  CBC Full  -  ( 04 Jul 2022 08:06 )  WBC Count : 6.11 K/uL  RBC Count : 3.62 M/uL  Hemoglobin : 11.3 g/dL  Hematocrit : 34.5 %  Platelet Count - Automated : 223 K/uL  Mean Cell Volume : 95.3 fl  Mean Cell Hemoglobin : 31.2 pg  Mean Cell Hemoglobin Concentration : 32.8 gm/dL  Auto Neutrophil # : x  Auto Lymphocyte # : x  Auto Monocyte # : x  Auto Eosinophil # : x  Auto Basophil # : x  Auto Neutrophil % : x  Auto Lymphocyte % : x  Auto Monocyte % : x  Auto Eosinophil % : x  Auto Basophil % : x      07-04    138  |  102  |  14  ----------------------------<  120<H>  4.1   |  32<H>  |  0.71    Ca    9.4      04 Jul 2022 08:06      Hemoglobin A1C:       Vitamin B12         RADIOLOGY      ANALYSIS AND PLAN:  This is a 89-year-old with episode of unresponsiveness with left hand weakness.  Clinical impression is TIA, rule out cerebrovascular accident.  We will discontinue the patient's aspirin and convert over to Plavix.  Spoke with son in great detail, we will not be opting for any type of MRI, we do not feel that he would lie still fore it and management would not change.  For history of hypertension, monitor systolic blood pressure.  For hyperlipidemia, continue the patient on statin.  For dementia, continue the patient on his home medications  afib h/o fall not on AC  antibiotics as needed   montior oral intake as needed     Spoke with sonRegina, at [phone] 7/1.    Greater than 40 minutes of time was spent with the patient, plan of care, reviewing data, speaking to multidisciplinary healthcare team with greater than 50% of the time in counseling and care coordination.

## 2022-07-05 NOTE — PROGRESS NOTE ADULT - SUBJECTIVE AND OBJECTIVE BOX
TERESA FRANCIS is a 89yMale , patient examined and chart reviewed.      INTERVAL HPI/ OVERNIGHT EVENTS:   Afebrile. No events.  NAD    PAST MEDICAL & SURGICAL HISTORY:  Atrial fibrillation  Hypertension  Diabetes  Prostate ca  Dementia  CHF (congestive heart failure)  Hyperlipemia  Diabetes  Depression  Dementia  DM (diabetes mellitus)  Atrial fibrillation  Prostate CA  Arteriosclerotic heart disease (ASHD)  Dementia  Anemia  Constipation  AICD (automatic cardioverter/defibrillator) present  Osteomyelitis of finger of left hand  Personal history of radiation therapy  H/O ongoing treatment with hormonal therapy  H/O bladder infections  Scrotal abscess  Urinary retention  Hematuria  H/O cystitis  Constipation  VHD (valvular heart disease)  Aortic valve stenosis  History of automatic internal cardiac defibrillator (AICD)          For details regarding the patient's social history, family history, and other miscellaneous elements, please refer the initial infectious diseases consultation and/or the admitting history and physical examination for this admission.    ROS:  Unable to obtain due to : Dementia      ALLERGIES:  latex (Rash)      Current inpatient medications :    ANTIBIOTICS/RELEVANT:  ertapenem  IVPB 1000 milliGRAM(s) IV Intermittent every 24 hours    MEDICATIONS  (STANDING):  clopidogrel Tablet 75 milliGRAM(s) Oral daily  dextrose 5%. 1000 milliLiter(s) (50 mL/Hr) IV Continuous <Continuous>  dextrose 5%. 1000 milliLiter(s) (100 mL/Hr) IV Continuous <Continuous>  dextrose 50% Injectable 25 Gram(s) IV Push once  dextrose 50% Injectable 12.5 Gram(s) IV Push once  dextrose 50% Injectable 25 Gram(s) IV Push once  donepezil 5 milliGRAM(s) Oral at bedtime  glucagon  Injectable 1 milliGRAM(s) IntraMuscular once  heparin   Injectable 5000 Unit(s) SubCutaneous every 12 hours  insulin lispro (ADMELOG) corrective regimen sliding scale   SubCutaneous three times a day before meals  lactobacillus acidophilus 1 Tablet(s) Oral daily  multivitamin/minerals 1 Tablet(s) Oral daily  pantoprazole    Tablet 40 milliGRAM(s) Oral before breakfast  polyethylene glycol 3350 17 Gram(s) Oral daily  senna 2 Tablet(s) Oral at bedtime  simvastatin 20 milliGRAM(s) Oral at bedtime  tamsulosin 0.4 milliGRAM(s) Oral at bedtime    MEDICATIONS  (PRN):  acetaminophen     Tablet .. 650 milliGRAM(s) Oral every 6 hours PRN Temp greater or equal to 38C (100.4F), Mild Pain (1 - 3)  aluminum hydroxide/magnesium hydroxide/simethicone Suspension 30 milliLiter(s) Oral every 4 hours PRN Dyspepsia  dextrose Oral Gel 15 Gram(s) Oral once PRN Blood Glucose LESS THAN 70 milliGRAM(s)/deciliter  melatonin 3 milliGRAM(s) Oral at bedtime PRN Insomnia  ondansetron Injectable 4 milliGRAM(s) IV Push every 8 hours PRN Nausea and/or Vomiting        Objective:  Vital Signs Last 24 Hrs  T(C): 36.7 (2022 09:29), Max: 37.1 (2022 02:18)  T(F): 98.1 (2022 09:29), Max: 98.7 (2022 02:18)  HR: 69 (2022 09:29) (69 - 95)  BP: 108/59 (2022 09:29) (100/58 - 108/59)  RR: 18 (2022 09:29) (17 - 18)  SpO2: 95% (2022 09:29) (92% - 95%)      Physical Exam:  General: no acute distress  Neck: supple, trachea midline  Lungs: clear, no wheeze/rhonchi  Cardiovascular: regular rate and rhythm, S1 S2  Abdomen: soft, nontender,  bowel sounds normal  Neurological: Dementia  Skin: no rash  Extremities: no edema      LABS:                        11.0   6.06  )-----------( 214      ( 2022 07:58 )             33.5   07-05    139  |  103  |  17  ----------------------------<  119<H>  4.5   |  31  |  0.76    Ca    9.0      2022 07:58      Urinalysis Basic - ( 2022 14:10 )    Color: Yellow / Appearance: Turbid / S.010 / pH: x  Gluc: x / Ketone: Negative  / Bili: Negative / Urobili: Negative mg/dL   Blood: x / Protein: 100 mg/dL / Nitrite: Positive   Leuk Esterase: Moderate / RBC: 11-25 /HPF / WBC >50   Sq Epi: x / Non Sq Epi: Few / Bacteria: Many        MICROBIOLOGY:  Culture - Urine (22 @ 15:20)    -  Gentamicin: S <=2    -  Imipenem: S <=1    -  Levofloxacin: R >4    -  Meropenem: S <=1    -  Nitrofurantoin: S <=32 Should not be used to treat pyelonephritis    -  Piperacillin/Tazobactam: S <=8    -  Tigecycline: S <=2    -  Tobramycin: S <=2    -  Trimethoprim/Sulfamethoxazole: R >2/38    -  Amoxicillin/Clavulanic Acid: S <=8/4 Consider reserving for cystitis when ampicillin/sulbactam is resistant    -  Amikacin: S <=16    -  Ampicillin: R >16 These ampicillin results predict results for amoxicillin    -  Ampicillin/Sulbactam: R >16/8 Enterobacter, Klebsiella aerogenes, Citrobacter, and Serratia may develop resistance during prolonged therapy (3-4 days)    -  Aztreonam: R >16    -  Cefazolin: R >16 (MIC_CL_COM_ENTERIC_CEFAZU) For uncomplicated UTI with K. pneumoniae, E. coli, or P. mirablis: CHARITO <=16 is sensitive and CHARITO >=32 is resistant. This also predicts results for oral agents cefaclor, cefdinir, cefpodoxime, cefprozil, cefuroxime axetil, cephalexin and locarbef for uncomplicated UTI. Note that some isolates may be susceptible to these agents while testing resistant to cefazolin.    -  Cefepime: R >16    -  Ceftriaxone: R >32 Enterobacter, Klebsiella aerogenes, Citrobacter, and Serratia may develop resistance during prolonged therapy    -  Ciprofloxacin: R >2    -  Ertapenem: S <=0.5    Specimen Source: Catheterized Catheterized    Culture Results:   >100,000 CFU/ml Escherichia coli ESBL    Organism Identification: Escherichia coli ESBL    Organism: Escherichia coli ESBL    Method Type: CHARITO      RADIOLOGY & ADDITIONAL STUDIES:  ACC: [id number] EXAM:  XR CHEST PORTABLE URGENT 1V                          PROCEDURE DATE:  2022          INTERPRETATION:  HISTORY: ;  Sepsis;  TECHNIQUE: Portable frontal view of the chest, 1 view.  COMPARISON: none.  FINDINGS/  IMPRESSION:  A cardiac device overlies and obscures the left hemithorax with lead in   place.  HEART:  Enlarged  LUNGS: free of consolidation,effusion, or pneumothorax.  BONES: degenerative changes      Calcified gallstone. Contrast in the left renal collecting system.    Assessment :   89YO M PMH Dementia, HL, atrial fibrillation, SDH, Ca Prostate DM CHF resident of [facility] who presents with unresponsiveness mental status change sec UTI  Ucx with ESBL  Afebrile  WBC wnl    Plan :   Cont Ertapenam x 5 days day 2/5  Trend temps and cbc  Asp precautions  Stable from ID standpoint  For home hospice     D/w Dr Ring    Continue with present regiment.  Appropriate use of antibiotics and adverse effects reviewed.      > 35 minutes were spent in direct patient care reviewing notes, medications ,labs data/ imaging , discussion with multidisciplinary team.    Thank you for allowing me to participate in care of your patient .    Heber Issa MD  Infectious Disease  [phone]

## 2022-07-05 NOTE — PROGRESS NOTE ADULT - SUBJECTIVE AND OBJECTIVE BOX
Date/Time Patient Seen:  		  Referring MD:   Data Reviewed	       Patient is a 89y old  Male who presents with a chief complaint of EPISODE OF UNRESPONSIVNESS (04 Jul 2022 11:54)      Subjective/HPI     PAST MEDICAL & SURGICAL HISTORY:  Atrial fibrillation    Hypertension    Diabetes    Prostate ca    Dementia    CHF (congestive heart failure)    Hyperlipemia    Diabetes    Depression    Dementia    No pertinent past medical history    DM (diabetes mellitus)    Atrial fibrillation    Prostate CA    Arteriosclerotic heart disease (ASHD)    Dementia    Anemia    Constipation    AICD (automatic cardioverter/defibrillator) present    Osteomyelitis of finger of left hand    Personal history of radiation therapy    H/O ongoing treatment with hormonal therapy    H/O bladder infections    Scrotal abscess    Urinary retention    Hematuria    H/O cystitis    Constipation    VHD (valvular heart disease)    Aortic valve stenosis    No significant past surgical history    No significant past surgical history    History of automatic internal cardiac defibrillator (AICD)          Medication list         MEDICATIONS  (STANDING):  clopidogrel Tablet 75 milliGRAM(s) Oral daily  dextrose 5%. 1000 milliLiter(s) (50 mL/Hr) IV Continuous <Continuous>  dextrose 5%. 1000 milliLiter(s) (100 mL/Hr) IV Continuous <Continuous>  dextrose 50% Injectable 25 Gram(s) IV Push once  dextrose 50% Injectable 12.5 Gram(s) IV Push once  dextrose 50% Injectable 25 Gram(s) IV Push once  donepezil 5 milliGRAM(s) Oral at bedtime  ertapenem  IVPB 1000 milliGRAM(s) IV Intermittent every 24 hours  glucagon  Injectable 1 milliGRAM(s) IntraMuscular once  heparin   Injectable 5000 Unit(s) SubCutaneous every 12 hours  insulin lispro (ADMELOG) corrective regimen sliding scale   SubCutaneous three times a day before meals  lactobacillus acidophilus 1 Tablet(s) Oral daily  multivitamin/minerals 1 Tablet(s) Oral daily  pantoprazole    Tablet 40 milliGRAM(s) Oral before breakfast  polyethylene glycol 3350 17 Gram(s) Oral daily  senna 2 Tablet(s) Oral at bedtime  simvastatin 20 milliGRAM(s) Oral at bedtime  tamsulosin 0.4 milliGRAM(s) Oral at bedtime    MEDICATIONS  (PRN):  acetaminophen     Tablet .. 650 milliGRAM(s) Oral every 6 hours PRN Temp greater or equal to 38C (100.4F), Mild Pain (1 - 3)  aluminum hydroxide/magnesium hydroxide/simethicone Suspension 30 milliLiter(s) Oral every 4 hours PRN Dyspepsia  dextrose Oral Gel 15 Gram(s) Oral once PRN Blood Glucose LESS THAN 70 milliGRAM(s)/deciliter  melatonin 3 milliGRAM(s) Oral at bedtime PRN Insomnia  ondansetron Injectable 4 milliGRAM(s) IV Push every 8 hours PRN Nausea and/or Vomiting         Vitals log        ICU Vital Signs Last 24 Hrs  T(C): 36.7 (05 Jul 2022 05:28), Max: 37.1 (05 Jul 2022 02:18)  T(F): 98 (05 Jul 2022 05:28), Max: 98.7 (05 Jul 2022 02:18)  HR: 73 (05 Jul 2022 05:28) (73 - 95)  BP: 103/61 (05 Jul 2022 05:28) (100/58 - 115/56)  BP(mean): --  ABP: --  ABP(mean): --  RR: 18 (05 Jul 2022 05:28) (17 - 18)  SpO2: 92% (05 Jul 2022 05:28) (92% - 95%)           Input and Output:  I&O's Detail      Lab Data                        11.3   6.11  )-----------( 223      ( 04 Jul 2022 08:06 )             34.5     07-04    138  |  102  |  14  ----------------------------<  120<H>  4.1   |  32<H>  |  0.71    Ca    9.4      04 Jul 2022 08:06              Review of Systems	      Objective     Physical Examination    heart s1s2  lung dec BS  abd soft      Pertinent Lab findings & Imaging      [place]:  NO   Adequate UO     I&O's Detail           Discussed with:     Cultures:	        Radiology

## 2022-07-06 LAB
GLUCOSE BLDC GLUCOMTR-MCNC: 103 MG/DL — HIGH (ref 70–99)
GLUCOSE BLDC GLUCOMTR-MCNC: 159 MG/DL — HIGH (ref 70–99)
GLUCOSE BLDC GLUCOMTR-MCNC: 165 MG/DL — HIGH (ref 70–99)
GLUCOSE BLDC GLUCOMTR-MCNC: 97 MG/DL — SIGNIFICANT CHANGE UP (ref 70–99)
SARS-COV-2 RNA SPEC QL NAA+PROBE: SIGNIFICANT CHANGE UP

## 2022-07-06 RX ADMIN — SENNA PLUS 2 TABLET(S): 8.6 TABLET ORAL at 21:52

## 2022-07-06 RX ADMIN — HEPARIN SODIUM 5000 UNIT(S): 5000 INJECTION INTRAVENOUS; SUBCUTANEOUS at 18:40

## 2022-07-06 RX ADMIN — PANTOPRAZOLE SODIUM 40 MILLIGRAM(S): 20 TABLET, DELAYED RELEASE ORAL at 05:59

## 2022-07-06 RX ADMIN — POLYETHYLENE GLYCOL 3350 17 GRAM(S): 17 POWDER, FOR SOLUTION ORAL at 13:00

## 2022-07-06 RX ADMIN — Medication 1 TABLET(S): at 13:00

## 2022-07-06 RX ADMIN — DONEPEZIL HYDROCHLORIDE 5 MILLIGRAM(S): 10 TABLET, FILM COATED ORAL at 21:52

## 2022-07-06 RX ADMIN — TAMSULOSIN HYDROCHLORIDE 0.4 MILLIGRAM(S): 0.4 CAPSULE ORAL at 21:52

## 2022-07-06 RX ADMIN — CLOPIDOGREL BISULFATE 75 MILLIGRAM(S): 75 TABLET, FILM COATED ORAL at 13:00

## 2022-07-06 RX ADMIN — HEPARIN SODIUM 5000 UNIT(S): 5000 INJECTION INTRAVENOUS; SUBCUTANEOUS at 05:59

## 2022-07-06 RX ADMIN — Medication 1: at 12:59

## 2022-07-06 RX ADMIN — ERTAPENEM SODIUM 120 MILLIGRAM(S): 1 INJECTION, POWDER, LYOPHILIZED, FOR SOLUTION INTRAMUSCULAR; INTRAVENOUS at 05:59

## 2022-07-06 RX ADMIN — SIMVASTATIN 20 MILLIGRAM(S): 20 TABLET, FILM COATED ORAL at 21:52

## 2022-07-06 NOTE — PHYSICAL THERAPY INITIAL EVALUATION ADULT - RANGE OF MOTION EXAMINATION, REHAB EVAL
moderated limitations in bilateral LEs, moderate limitations in Left UE shoulder flexion and abduction/Right UE ROM was WFL (within functional limits)/deficits as listed below

## 2022-07-06 NOTE — PROGRESS NOTE ADULT - SUBJECTIVE AND OBJECTIVE BOX
TERESA FRANCIS is a 89yMale , patient examined and chart reviewed.    INTERVAL HPI/ OVERNIGHT EVENTS:   Afebrile. No events.  NAD    PAST MEDICAL & SURGICAL HISTORY:  Atrial fibrillation  Hypertension  Diabetes  Prostate ca  Dementia  CHF (congestive heart failure)  Hyperlipemia  Diabetes  Depression  Dementia  DM (diabetes mellitus)  Atrial fibrillation  Prostate CA  Arteriosclerotic heart disease (ASHD)  Dementia  Anemia  Constipation  AICD (automatic cardioverter/defibrillator) present  Osteomyelitis of finger of left hand  Personal history of radiation therapy  H/O ongoing treatment with hormonal therapy  H/O bladder infections  Scrotal abscess  Urinary retention  Hematuria  H/O cystitis  Constipation  VHD (valvular heart disease)  Aortic valve stenosis  History of automatic internal cardiac defibrillator (AICD)          For details regarding the patient's social history, family history, and other miscellaneous elements, please refer the initial infectious diseases consultation and/or the admitting history and physical examination for this admission.    ROS:  Unable to obtain due to : Dementia      ALLERGIES:  latex (Rash)      Current inpatient medications :    ANTIBIOTICS/RELEVANT:  ertapenem  IVPB 1000 milliGRAM(s) IV Intermittent every 24 hours    MEDICATIONS  (STANDING):  clopidogrel Tablet 75 milliGRAM(s) Oral daily  dextrose 5%. 1000 milliLiter(s) (50 mL/Hr) IV Continuous <Continuous>  dextrose 5%. 1000 milliLiter(s) (100 mL/Hr) IV Continuous <Continuous>  dextrose 50% Injectable 25 Gram(s) IV Push once  dextrose 50% Injectable 12.5 Gram(s) IV Push once  dextrose 50% Injectable 25 Gram(s) IV Push once  donepezil 5 milliGRAM(s) Oral at bedtime  glucagon  Injectable 1 milliGRAM(s) IntraMuscular once  heparin   Injectable 5000 Unit(s) SubCutaneous every 12 hours  insulin lispro (ADMELOG) corrective regimen sliding scale   SubCutaneous three times a day before meals  lactobacillus acidophilus 1 Tablet(s) Oral daily  multivitamin/minerals 1 Tablet(s) Oral daily  pantoprazole    Tablet 40 milliGRAM(s) Oral before breakfast  polyethylene glycol 3350 17 Gram(s) Oral daily  senna 2 Tablet(s) Oral at bedtime  simvastatin 20 milliGRAM(s) Oral at bedtime  tamsulosin 0.4 milliGRAM(s) Oral at bedtime    MEDICATIONS  (PRN):  acetaminophen     Tablet .. 650 milliGRAM(s) Oral every 6 hours PRN Temp greater or equal to 38C (100.4F), Mild Pain (1 - 3)  aluminum hydroxide/magnesium hydroxide/simethicone Suspension 30 milliLiter(s) Oral every 4 hours PRN Dyspepsia  dextrose Oral Gel 15 Gram(s) Oral once PRN Blood Glucose LESS THAN 70 milliGRAM(s)/deciliter  melatonin 3 milliGRAM(s) Oral at bedtime PRN Insomnia  ondansetron Injectable 4 milliGRAM(s) IV Push every 8 hours PRN Nausea and/or Vomiting        Objective:  Vital Signs Last 24 Hrs  T(C): 36.4 (2022 10:37), Max: 36.8 (2022 21:37)  T(F): 97.5 (2022 10:37), Max: 98.3 (2022 21:37)  HR: 81 (2022 10:37) (81 - 96)  BP: 118/61 (2022 10:37) (103/42 - 122/82)  RR: 18 (2022 10:37) (17 - 18)  SpO2: 94% (2022 10:37) (94% - 96%)    Physical Exam:  General: no acute distress  Neck: supple, trachea midline  Lungs: clear, no wheeze/rhonchi  Cardiovascular: regular rate and rhythm, S1 S2  Abdomen: soft, nontender,  bowel sounds normal  Neurological: Dementia  Skin: no rash  Extremities: no edema      LABS:                        11.0   6.06  )-----------( 214      ( 2022 07:58 )             33.5   07-05    139  |  103  |  17  ----------------------------<  119<H>  4.5   |  31  |  0.76    Ca    9.0      2022 07:58        Urinalysis Basic - ( 2022 14:10 )    Color: Yellow / Appearance: Turbid / S.010 / pH: x  Gluc: x / Ketone: Negative  / Bili: Negative / Urobili: Negative mg/dL   Blood: x / Protein: 100 mg/dL / Nitrite: Positive   Leuk Esterase: Moderate / RBC: 11-25 /HPF / WBC >50   Sq Epi: x / Non Sq Epi: Few / Bacteria: Many        MICROBIOLOGY:  Culture - Urine (22 @ 15:20)    -  Gentamicin: S <=2    -  Imipenem: S <=1    -  Levofloxacin: R >4    -  Meropenem: S <=1    -  Nitrofurantoin: S <=32 Should not be used to treat pyelonephritis    -  Piperacillin/Tazobactam: S <=8    -  Tigecycline: S <=2    -  Tobramycin: S <=2    -  Trimethoprim/Sulfamethoxazole: R >2/38    -  Amoxicillin/Clavulanic Acid: S <=8/4 Consider reserving for cystitis when ampicillin/sulbactam is resistant    -  Amikacin: S <=16    -  Ampicillin: R >16 These ampicillin results predict results for amoxicillin    -  Ampicillin/Sulbactam: R >16/8 Enterobacter, Klebsiella aerogenes, Citrobacter, and Serratia may develop resistance during prolonged therapy (3-4 days)    -  Aztreonam: R >16    -  Cefazolin: R >16 (MIC_CL_COM_ENTERIC_CEFAZU) For uncomplicated UTI with K. pneumoniae, E. coli, or P. mirablis: CHARITO <=16 is sensitive and CHARITO >=32 is resistant. This also predicts results for oral agents cefaclor, cefdinir, cefpodoxime, cefprozil, cefuroxime axetil, cephalexin and locarbef for uncomplicated UTI. Note that some isolates may be susceptible to these agents while testing resistant to cefazolin.    -  Cefepime: R >16    -  Ceftriaxone: R >32 Enterobacter, Klebsiella aerogenes, Citrobacter, and Serratia may develop resistance during prolonged therapy    -  Ciprofloxacin: R >2    -  Ertapenem: S <=0.5    Specimen Source: Catheterized Catheterized    Culture Results:   >100,000 CFU/ml Escherichia coli ESBL    Organism Identification: Escherichia coli ESBL    Organism: Escherichia coli ESBL    Method Type: CHARITO      RADIOLOGY & ADDITIONAL STUDIES:  ACC: [id number] EXAM:  XR CHEST PORTABLE URGENT 1V                          PROCEDURE DATE:  2022          INTERPRETATION:  HISTORY: ;  Sepsis;  TECHNIQUE: Portable frontal view of the chest, 1 view.  COMPARISON: none.  FINDINGS/  IMPRESSION:  A cardiac device overlies and obscures the left hemithorax with lead in   place.  HEART:  Enlarged  LUNGS: free of consolidation,effusion, or pneumothorax.  BONES: degenerative changes      Calcified gallstone. Contrast in the left renal collecting system.    Assessment :   89YO M PMH Dementia, HL, atrial fibrillation, SDH, Ca Prostate DM CHF resident of [facility] who presents with unresponsiveness mental status change sec UTI  Ucx with ESBL  Afebrile  WBC wnl    Plan :   Cont Ertapenam x 5 days day 3/5  Trend temps and cbc  Asp precautions  Stable from ID standpoint  For home hospice     D/w Dr Ring    Continue with present regiment.  Appropriate use of antibiotics and adverse effects reviewed.      > 35 minutes were spent in direct patient care reviewing notes, medications ,labs data/ imaging , discussion with multidisciplinary team.    Thank you for allowing me to participate in care of your patient .    Heber Issa MD  Infectious Disease  [phone]

## 2022-07-06 NOTE — PHYSICAL THERAPY INITIAL EVALUATION ADULT - IMPAIRMENTS FOUND, PT EVAL
aerobic capacity/endurance/arousal, attention, and cognition/gait, locomotion, and balance/muscle strength/ROM/tone

## 2022-07-06 NOTE — PHYSICAL THERAPY INITIAL EVALUATION ADULT - IMPAIRED TRANSFERS: SIT/STAND, REHAB EVAL
impaired balance/decreased flexibility/abnormal muscle tone/decreased ROM/scissoring/decreased strength

## 2022-07-06 NOTE — PHYSICAL THERAPY INITIAL EVALUATION ADULT - ADDITIONAL COMMENTS
pt resided at [facility] prior to hospitalization. he stated he was ambulating independently with a SAC. He does not have to negotiate any stairs at his residence.

## 2022-07-06 NOTE — PHYSICAL THERAPY INITIAL EVALUATION ADULT - GENERAL OBSERVATIONS, REHAB EVAL
pt found supine in bed. pt was A&O x1. pt denied having any pain by using non verbal gestures. pt's hand a left finger contracture and displayed hypertonia in his Left UE and left hip adductor

## 2022-07-06 NOTE — PROGRESS NOTE ADULT - SUBJECTIVE AND OBJECTIVE BOX
PROGRESS NOTE  Patient is a 89y old  Male who presents with a chief complaint of EPISODE OF UNRESPONSIVNESS (06 Jul 2022 12:36)  Chart and available morning labs /imaging are reviewed electronically , urgent issues addressed . More information  is being added upon completion of rounds , when more information is collected and management discussed with consultants , medical staff and social service/case management on the floor     OVERNIGHT    No new issues reported by medical staff . All above noted Patient is resting in a bed comfortably .Confused ,poor mentation .No distress noted   HPI:  The patient is a 89 year old male with a history of HL, atrial fibrillation, SDH, dementia who presents with unresponsiveness. The patient is unable to provide additional history . He reportedly was verbal yesterday at [facility] but unresponsive today.  Past Medical/Surgical History:  HL, atrial fibrillation, SDH, dementia ,om  Medications:  Home Medications:  ascorbic acid 500 mg oral tablet: 1 tab(s) orally once a day (03 Apr 2022 11:16)  Aspirin Enteric Coated 81 mg oral delayed release tablet: 1 tab(s) orally once a day (03 Apr 2022 11:16)  donepezil 5 mg oral tablet: 1 tab(s) orally once a day (at bedtime) (03 Apr 2022 11:16)  ferrous sulfate 325 mg (65 mg elemental iron) oral delayed release tablet: 1 tab(s) orally once a day (03 Apr 2022 11:16)  Multiple Vitamins with Minerals oral tablet: 1 tab(s) orally once a day (03 Apr 2022 11:16)  simvastatin 20 mg oral tablet: 1 tab(s) orally once a day (at bedtime) (03 Apr 2022 11:16)Patient is admitted for management of FTT ,suspected UTI Palliative care consult requested ,to discuss advance directives and complete MOLST ,family requested to initiate comfort measures only and to focus on pain and anxiety management ,case was d/w PCP from [facility] and with \[facility] [place] [facility]\"" care MD .   (30 Jun 2022 11:24)    PAST MEDICAL & SURGICAL HISTORY:  Atrial fibrillation      Hypertension      Diabetes      Prostate ca      Dementia      CHF (congestive heart failure)      Hyperlipemia      Diabetes      Depression      Dementia      DM (diabetes mellitus)      Atrial fibrillation      Prostate CA      Arteriosclerotic heart disease (ASHD)      Dementia      Anemia      Constipation      AICD (automatic cardioverter/defibrillator) present      Osteomyelitis of finger of left hand      Personal history of radiation therapy      H/O ongoing treatment with hormonal therapy      H/O bladder infections      Scrotal abscess      Urinary retention      Hematuria      H/O cystitis      Constipation      VHD (valvular heart disease)      Aortic valve stenosis      History of automatic internal cardiac defibrillator (AICD)          MEDICATIONS  (STANDING):  clopidogrel Tablet 75 milliGRAM(s) Oral daily  dextrose 5%. 1000 milliLiter(s) (50 mL/Hr) IV Continuous <Continuous>  dextrose 5%. 1000 milliLiter(s) (100 mL/Hr) IV Continuous <Continuous>  dextrose 50% Injectable 25 Gram(s) IV Push once  dextrose 50% Injectable 12.5 Gram(s) IV Push once  dextrose 50% Injectable 25 Gram(s) IV Push once  donepezil 5 milliGRAM(s) Oral at bedtime  ertapenem  IVPB 1000 milliGRAM(s) IV Intermittent every 24 hours  glucagon  Injectable 1 milliGRAM(s) IntraMuscular once  heparin   Injectable 5000 Unit(s) SubCutaneous every 12 hours  insulin lispro (ADMELOG) corrective regimen sliding scale   SubCutaneous three times a day before meals  lactobacillus acidophilus 1 Tablet(s) Oral daily  multivitamin/minerals 1 Tablet(s) Oral daily  pantoprazole    Tablet 40 milliGRAM(s) Oral before breakfast  polyethylene glycol 3350 17 Gram(s) Oral daily  senna 2 Tablet(s) Oral at bedtime  simvastatin 20 milliGRAM(s) Oral at bedtime  tamsulosin 0.4 milliGRAM(s) Oral at bedtime    MEDICATIONS  (PRN):  acetaminophen     Tablet .. 650 milliGRAM(s) Oral every 6 hours PRN Temp greater or equal to 38C (100.4F), Mild Pain (1 - 3)  aluminum hydroxide/magnesium hydroxide/simethicone Suspension 30 milliLiter(s) Oral every 4 hours PRN Dyspepsia  dextrose Oral Gel 15 Gram(s) Oral once PRN Blood Glucose LESS THAN 70 milliGRAM(s)/deciliter  melatonin 3 milliGRAM(s) Oral at bedtime PRN Insomnia  ondansetron Injectable 4 milliGRAM(s) IV Push every 8 hours PRN Nausea and/or Vomiting      OBJECTIVE    T(C): 36.4 (07-06-22 @ 10:37), Max: 36.8 (07-05-22 @ 21:37)  HR: 81 (07-06-22 @ 10:37) (81 - 96)  BP: 118/61 (07-06-22 @ 10:37) (103/42 - 122/82)  RR: 18 (07-06-22 @ 10:37) (17 - 18)  SpO2: 94% (07-06-22 @ 10:37) (94% - 96%)  Wt(kg): --  I&O's Summary        REVIEW OF SYSTEMS:  CONSTITUTIONAL: No fever, weight loss, or fatigue  EYES: No eye pain, visual disturbances, or discharge  ENMT:   No sinus or throat pain  NECK: No pain or stiffness  RESPIRATORY: No cough, wheezing, chills or hemoptysis; No shortness of breath  CARDIOVASCULAR: No chest pain, palpitations, dizziness, or leg swelling  GASTROINTESTINAL: No abdominal pain. No nausea, vomiting; No diarrhea or constipation. No melena or hematochezia.  GENITOURINARY: No dysuria, frequency, hematuria, or incontinence  NEUROLOGICAL: No headaches, memory loss, loss of strength, numbness, or tremors  SKIN: No itching, burning, rashes, or lesions   MUSCULOSKELETAL: No joint pain or swelling; No muscle, back, or extremity pain    PHYSICAL EXAM:  Appearance: NAD. VS past 24 hrs -as above   HEENT:   Moist oral mucosa. Conjunctiva clear b/l.   Neck : supple  Respiratory: Lungs CTAB.  Gastrointestinal:  Soft, nontender. No rebound. No rigidity. BS present	  Cardiovascular: RRR ,S1S2 present  Neurologic: Non-focal. Moving all extremities.  Extremities: No edema. No erythema. No calf tenderness.  Skin: No rashes, No ecchymoses, No cyanosis.	  wounds ,skin lesions-See skin assesment flow sheet   LABS:                        11.0   6.06  )-----------( 214      ( 05 Jul 2022 07:58 )             33.5     07-05    139  |  103  |  17  ----------------------------<  119<H>  4.5   |  31  |  0.76    Ca    9.0      05 Jul 2022 07:58      CAPILLARY BLOOD GLUCOSE      POCT Blood Glucose.: 97 mg/dL (06 Jul 2022 16:36)  POCT Blood Glucose.: 165 mg/dL (06 Jul 2022 12:04)  POCT Blood Glucose.: 103 mg/dL (06 Jul 2022 07:58)  POCT Blood Glucose.: 108 mg/dL (05 Jul 2022 21:26)          Culture - Urine (collected 30 Jun 2022 15:20)  Source: Catheterized Catheterized  Final Report (03 Jul 2022 13:37):    >100,000 CFU/ml Escherichia coli ESBL  Organism: Escherichia coli ESBL (03 Jul 2022 13:37)  Organism: Escherichia coli ESBL (03 Jul 2022 13:37)      RADIOLOGY & ADDITIONAL TESTS:   reviewed elctronically  ASSESSMENT/PLAN: 	    25 minutes aggregate time was spent on this visit, 50% visit time spent in care co-ordination with other attendings and counselling patient .I have discussed care plan with patient / HCP/family member ,who expressed understanding of problems treatment and their effect and side effects, alternatives in details. I have asked if they have any questions and concerns and appropriately addressed them to best of my ability. Advance care planning was discussed , pallitaive care issues ,CMO ,hospice levels of care were discussed in details , forms ,advance directives were reviewed .All questions were answered to the best of my knowledge .Additional 25 min spent.

## 2022-07-06 NOTE — PROGRESS NOTE ADULT - SUBJECTIVE AND OBJECTIVE BOX
Neurology follow up note    RAY [id number]      Interval History:    Patient feels ok no new complaints.    Allergies    latex (Rash)  latex (Unknown)  No Known Drug Allergies    Intolerances        MEDICATIONS    acetaminophen     Tablet .. 650 milliGRAM(s) Oral every 6 hours PRN  aluminum hydroxide/magnesium hydroxide/simethicone Suspension 30 milliLiter(s) Oral every 4 hours PRN  clopidogrel Tablet 75 milliGRAM(s) Oral daily  dextrose 5%. 1000 milliLiter(s) IV Continuous <Continuous>  dextrose 5%. 1000 milliLiter(s) IV Continuous <Continuous>  dextrose 50% Injectable 25 Gram(s) IV Push once  dextrose 50% Injectable 12.5 Gram(s) IV Push once  dextrose 50% Injectable 25 Gram(s) IV Push once  dextrose Oral Gel 15 Gram(s) Oral once PRN  donepezil 5 milliGRAM(s) Oral at bedtime  ertapenem  IVPB 1000 milliGRAM(s) IV Intermittent every 24 hours  glucagon  Injectable 1 milliGRAM(s) IntraMuscular once  heparin   Injectable 5000 Unit(s) SubCutaneous every 12 hours  insulin lispro (ADMELOG) corrective regimen sliding scale   SubCutaneous three times a day before meals  lactobacillus acidophilus 1 Tablet(s) Oral daily  melatonin 3 milliGRAM(s) Oral at bedtime PRN  multivitamin/minerals 1 Tablet(s) Oral daily  ondansetron Injectable 4 milliGRAM(s) IV Push every 8 hours PRN  pantoprazole    Tablet 40 milliGRAM(s) Oral before breakfast  polyethylene glycol 3350 17 Gram(s) Oral daily  senna 2 Tablet(s) Oral at bedtime  simvastatin 20 milliGRAM(s) Oral at bedtime  tamsulosin 0.4 milliGRAM(s) Oral at bedtime              Vital Signs Last 24 Hrs  T(C): 36.4 (06 Jul 2022 10:37), Max: 36.8 (05 Jul 2022 21:37)  T(F): 97.5 (06 Jul 2022 10:37), Max: 98.3 (05 Jul 2022 21:37)  HR: 81 (06 Jul 2022 10:37) (81 - 96)  BP: 118/61 (06 Jul 2022 10:37) (103/42 - 122/82)  BP(mean): --  RR: 18 (06 Jul 2022 10:37) (17 - 18)  SpO2: 94% (06 Jul 2022 10:37) (94% - 96%)    REVIEW OF SYSTEMS:  Could not be obtained from the patient secondary to the patient having underlying dementia, mostly nonverbal.    PHYSICAL EXAMINATION:   HEENT:  Head:  Normocephalic, atraumatic.  Eyes:  No scleral icterus.  Ears:  Hard to evaluate secondary to the patient being mostly nonverbal.  NECK:  Had increased tone but resists.  RESPIRATORY:  Decreased breath sounds bilaterally.  CARDIOVASCULAR:  S1 and S2 heard.  ABDOMEN:  Soft, nontender.  EXTREMITIES:  No clubbing or cyanosis were noted.      NEUROLOGIC:  The patient was arousable to verbal stimuli.  Attempted to open the patient's eye, would actively resist.  Pupils bilaterally appeared to be 2 mm.  Speech, the patient was nonverbal.  Motor:  Examination is significantly limited.  Attempted to elevate bilateral upper extremities, the patient would actively resist, pull both arms down, would say overall 3+/5, but as per my previous note, the patient does have decreased range of motion of the left shoulder, and biceps and triceps at that time was 3+/5.  The patient at that time was unable to open and close his left hand.  Bilateral lower extremities had significantly increased tone.  When given plantar stimuli was slightly able to elevate off the bed.                 LABS:  CBC Full  -  ( 05 Jul 2022 07:58 )  WBC Count : 6.06 K/uL  RBC Count : 3.53 M/uL  Hemoglobin : 11.0 g/dL  Hematocrit : 33.5 %  Platelet Count - Automated : 214 K/uL  Mean Cell Volume : 94.9 fl  Mean Cell Hemoglobin : 31.2 pg  Mean Cell Hemoglobin Concentration : 32.8 gm/dL  Auto Neutrophil # : x  Auto Lymphocyte # : x  Auto Monocyte # : x  Auto Eosinophil # : x  Auto Basophil # : x  Auto Neutrophil % : x  Auto Lymphocyte % : x  Auto Monocyte % : x  Auto Eosinophil % : x  Auto Basophil % : x      07-05    139  |  103  |  17  ----------------------------<  119<H>  4.5   |  31  |  0.76    Ca    9.0      05 Jul 2022 07:58      Hemoglobin A1C:       Vitamin B12         RADIOLOGY        ANALYSIS AND PLAN:  This is a 89-year-old with episode of unresponsiveness with left hand weakness.  Clinical impression is TIA, rule out cerebrovascular accident.  We will discontinue the patient's aspirin and convert over to Plavix.  Spoke with son in great detail, we will not be opting for any type of MRI, we do not feel that he would lie still fore it and management would not change.  For history of hypertension, monitor systolic blood pressure.  For hyperlipidemia, continue the patient on statin.  For dementia, continue the patient on his home medications  afib h/o fall not on AC  antibiotics as needed   montior oral intake as needed     Spoke with son, Regina, at [phone] 7/1.    Greater than 40 minutes of time was spent with the patient, plan of care, reviewing data, speaking to multidisciplinary healthcare team with greater than 50% of the time in counseling and care coordination.

## 2022-07-06 NOTE — PROGRESS NOTE ADULT - SUBJECTIVE AND OBJECTIVE BOX
Date/Time Patient Seen:  		  Referring MD:   Data Reviewed	       Patient is a 89y old  Male who presents with a chief complaint of EPISODE OF UNRESPONSIVNESS (06 Jul 2022 08:58)      Subjective/HPI     PAST MEDICAL & SURGICAL HISTORY:  Atrial fibrillation    Hypertension    Diabetes    Prostate ca    Dementia    CHF (congestive heart failure)    Hyperlipemia    Diabetes    Depression    Dementia    No pertinent past medical history    DM (diabetes mellitus)    Atrial fibrillation    Prostate CA    Arteriosclerotic heart disease (ASHD)    Dementia    Anemia    Constipation    AICD (automatic cardioverter/defibrillator) present    Osteomyelitis of finger of left hand    Personal history of radiation therapy    H/O ongoing treatment with hormonal therapy    H/O bladder infections    Scrotal abscess    Urinary retention    Hematuria    H/O cystitis    Constipation    VHD (valvular heart disease)    Aortic valve stenosis    No significant past surgical history    No significant past surgical history    History of automatic internal cardiac defibrillator (AICD)          Medication list         MEDICATIONS  (STANDING):  clopidogrel Tablet 75 milliGRAM(s) Oral daily  dextrose 5%. 1000 milliLiter(s) (50 mL/Hr) IV Continuous <Continuous>  dextrose 5%. 1000 milliLiter(s) (100 mL/Hr) IV Continuous <Continuous>  dextrose 50% Injectable 25 Gram(s) IV Push once  dextrose 50% Injectable 12.5 Gram(s) IV Push once  dextrose 50% Injectable 25 Gram(s) IV Push once  donepezil 5 milliGRAM(s) Oral at bedtime  ertapenem  IVPB 1000 milliGRAM(s) IV Intermittent every 24 hours  glucagon  Injectable 1 milliGRAM(s) IntraMuscular once  heparin   Injectable 5000 Unit(s) SubCutaneous every 12 hours  insulin lispro (ADMELOG) corrective regimen sliding scale   SubCutaneous three times a day before meals  lactobacillus acidophilus 1 Tablet(s) Oral daily  multivitamin/minerals 1 Tablet(s) Oral daily  pantoprazole    Tablet 40 milliGRAM(s) Oral before breakfast  polyethylene glycol 3350 17 Gram(s) Oral daily  senna 2 Tablet(s) Oral at bedtime  simvastatin 20 milliGRAM(s) Oral at bedtime  tamsulosin 0.4 milliGRAM(s) Oral at bedtime    MEDICATIONS  (PRN):  acetaminophen     Tablet .. 650 milliGRAM(s) Oral every 6 hours PRN Temp greater or equal to 38C (100.4F), Mild Pain (1 - 3)  aluminum hydroxide/magnesium hydroxide/simethicone Suspension 30 milliLiter(s) Oral every 4 hours PRN Dyspepsia  dextrose Oral Gel 15 Gram(s) Oral once PRN Blood Glucose LESS THAN 70 milliGRAM(s)/deciliter  melatonin 3 milliGRAM(s) Oral at bedtime PRN Insomnia  ondansetron Injectable 4 milliGRAM(s) IV Push every 8 hours PRN Nausea and/or Vomiting         Vitals log        ICU Vital Signs Last 24 Hrs  T(C): 36.8 (06 Jul 2022 04:54), Max: 36.8 (05 Jul 2022 21:37)  T(F): 98.2 (06 Jul 2022 04:54), Max: 98.3 (05 Jul 2022 21:37)  HR: 96 (06 Jul 2022 04:54) (84 - 96)  BP: 103/42 (06 Jul 2022 04:54) (103/42 - 122/82)  BP(mean): --  ABP: --  ABP(mean): --  RR: 18 (06 Jul 2022 04:54) (17 - 18)  SpO2: 95% (06 Jul 2022 04:54) (95% - 96%)           Input and Output:  I&O's Detail      Lab Data                        11.0   6.06  )-----------( 214      ( 05 Jul 2022 07:58 )             33.5     07-05    139  |  103  |  17  ----------------------------<  119<H>  4.5   |  31  |  0.76    Ca    9.0      05 Jul 2022 07:58              Review of Systems	      Objective     Physical Examination    heart s1s2  lung dec Bs  abd soft  head nc      Pertinent Lab findings & Imaging      [place]:  NO   Adequate UO     I&O's Detail           Discussed with:     Cultures:	        Radiology

## 2022-07-06 NOTE — PROGRESS NOTE ADULT - SUBJECTIVE AND OBJECTIVE BOX
Patient is a 89y Male with a known history of :  Unresponsive episode [R41.89]    Suspected UTI [R39.89]    Dementia [F03.90]    Acute metabolic encephalopathy [G93.41]    DM (diabetes mellitus) [E11.9]    CAD (coronary artery disease) [I25.10]    HTN (hypertension) [I10]    Prophylactic measure [Z29.9]    Acute UTI [N39.0]      HPI:  The patient is a 89 year old male with a history of HL, atrial fibrillation, SDH, dementia who presents with unresponsiveness. The patient is unable to provide additional history . He reportedly was verbal yesterday at [facility] but unresponsive today.  Past Medical/Surgical History:  HL, atrial fibrillation, SDH, dementia ,om  Medications:  Home Medications:  ascorbic acid 500 mg oral tablet: 1 tab(s) orally once a day (03 Apr 2022 11:16)  Aspirin Enteric Coated 81 mg oral delayed release tablet: 1 tab(s) orally once a day (03 Apr 2022 11:16)  donepezil 5 mg oral tablet: 1 tab(s) orally once a day (at bedtime) (03 Apr 2022 11:16)  ferrous sulfate 325 mg (65 mg elemental iron) oral delayed release tablet: 1 tab(s) orally once a day (03 Apr 2022 11:16)  Multiple Vitamins with Minerals oral tablet: 1 tab(s) orally once a day (03 Apr 2022 11:16)  simvastatin 20 mg oral tablet: 1 tab(s) orally once a day (at bedtime) (03 Apr 2022 11:16)Patient is admitted for management of FTT ,suspected UTI Palliative care consult requested ,to discuss advance directives and complete MOLST ,family requested to initiate comfort measures only and to focus on pain and anxiety management ,case was d/w PCP from [facility] and with pall care MD .   (30 Jun 2022 11:24)      REVIEW OF SYSTEMS:    CONSTITUTIONAL: No fever, weight loss, or fatigue  EYES: No eye pain, visual disturbances, or discharge  ENMT:  No difficulty hearing, tinnitus, vertigo; No sinus or throat pain  NECK: No pain or stiffness  BREASTS: No pain, masses, or nipple discharge  RESPIRATORY: No cough, wheezing, chills or hemoptysis; No shortness of breath  CARDIOVASCULAR: No chest pain, palpitations, dizziness, or leg swelling  GASTROINTESTINAL: No abdominal or epigastric pain. No nausea, vomiting, or hematemesis; No diarrhea or constipation. No melena or hematochezia.  GENITOURINARY: No dysuria, frequency, hematuria, or incontinence  NEUROLOGICAL: No headaches, memory loss, loss of strength, numbness, or tremors  SKIN: No itching, burning, rashes, or lesions   LYMPH NODES: No enlarged glands  ENDOCRINE: No heat or cold intolerance; No hair loss  MUSCULOSKELETAL: No joint pain or swelling; No muscle, back, or extremity pain  PSYCHIATRIC: No depression, anxiety, mood swings, or difficulty sleeping  HEME/LYMPH: No easy bruising, or bleeding gums  ALLERGY AND IMMUNOLOGIC: No hives or eczema    MEDICATIONS  (STANDING):  clopidogrel Tablet 75 milliGRAM(s) Oral daily  dextrose 5%. 1000 milliLiter(s) (50 mL/Hr) IV Continuous <Continuous>  dextrose 5%. 1000 milliLiter(s) (100 mL/Hr) IV Continuous <Continuous>  dextrose 50% Injectable 25 Gram(s) IV Push once  dextrose 50% Injectable 12.5 Gram(s) IV Push once  dextrose 50% Injectable 25 Gram(s) IV Push once  donepezil 5 milliGRAM(s) Oral at bedtime  ertapenem  IVPB 1000 milliGRAM(s) IV Intermittent every 24 hours  glucagon  Injectable 1 milliGRAM(s) IntraMuscular once  heparin   Injectable 5000 Unit(s) SubCutaneous every 12 hours  insulin lispro (ADMELOG) corrective regimen sliding scale   SubCutaneous three times a day before meals  lactobacillus acidophilus 1 Tablet(s) Oral daily  multivitamin/minerals 1 Tablet(s) Oral daily  pantoprazole    Tablet 40 milliGRAM(s) Oral before breakfast  polyethylene glycol 3350 17 Gram(s) Oral daily  senna 2 Tablet(s) Oral at bedtime  simvastatin 20 milliGRAM(s) Oral at bedtime  tamsulosin 0.4 milliGRAM(s) Oral at bedtime    MEDICATIONS  (PRN):  acetaminophen     Tablet .. 650 milliGRAM(s) Oral every 6 hours PRN Temp greater or equal to 38C (100.4F), Mild Pain (1 - 3)  aluminum hydroxide/magnesium hydroxide/simethicone Suspension 30 milliLiter(s) Oral every 4 hours PRN Dyspepsia  dextrose Oral Gel 15 Gram(s) Oral once PRN Blood Glucose LESS THAN 70 milliGRAM(s)/deciliter  melatonin 3 milliGRAM(s) Oral at bedtime PRN Insomnia  ondansetron Injectable 4 milliGRAM(s) IV Push every 8 hours PRN Nausea and/or Vomiting      ALLERGIES: latex (Rash)  latex (Unknown)  No Known Drug Allergies      FAMILY HISTORY:      PHYSICAL EXAMINATION:  -----------------------------  T(C): 36.8 (07-06-22 @ 04:54), Max: 36.8 (07-05-22 @ 21:37)  HR: 96 (07-06-22 @ 04:54) (69 - 96)  BP: 103/42 (07-06-22 @ 04:54) (103/42 - 122/82)  RR: 18 (07-06-22 @ 04:54) (17 - 18)  SpO2: 95% (07-06-22 @ 04:54) (95% - 96%)  Wt(kg): --        VITALS  T(C): 36.8 (07-06-22 @ 04:54), Max: 36.8 (07-05-22 @ 21:37)  HR: 96 (07-06-22 @ 04:54) (69 - 96)  BP: 103/42 (07-06-22 @ 04:54) (103/42 - 122/82)  RR: 18 (07-06-22 @ 04:54) (17 - 18)  SpO2: 95% (07-06-22 @ 04:54) (95% - 96%)    Constitutional: well developed, normal appearance, well groomed, well nourished, no deformities and no acute distress.   Eyes: the conjunctiva exhibited no abnormalities and the eyelids demonstrated no xanthelasmas.   HEENT: normal oral mucosa, no oral pallor and no oral cyanosis.   Neck: normal jugular venous A waves present, normal jugular venous V waves present and no jugular venous jacobs A waves.   Pulmonary: no respiratory distress, normal respiratory rhythm and effort, no accessory muscle use and lungs were clear to auscultation bilaterally.   Cardiovascular: heart rate and rhythm were normal, normal S1 and S2 and no murmur, gallop, rub, heave or thrill are present.   Abdomen: soft, non-tender, no hepato-splenomegaly and no abdominal mass palpated.   Musculoskeletal: the gait could not be assessed..   Extremities: no clubbing of the fingernails, no localized cyanosis, no petechial hemorrhages and no ischemic changes.   Skin: normal skin color and pigmentation, no rash, no venous stasis, no skin lesions, no skin ulcer and no xanthoma was observed.   Psychiatric: oriented to person, place, and time, the affect was normal, the mood was normal and not feeling anxious.     LABS:   --------  07-05    139  |  103  |  17  ----------------------------<  119<H>  4.5   |  31  |  0.76    Ca    9.0      05 Jul 2022 07:58                           11.0   6.06  )-----------( 214      ( 05 Jul 2022 07:58 )             33.5                 RADIOLOGY:  -----------------    ECG:     ECHO:

## 2022-07-06 NOTE — PHYSICAL THERAPY INITIAL EVALUATION ADULT - PERTINENT HX OF CURRENT PROBLEM, REHAB EVAL
The patient is a 89 year old male with a history of HL, atrial fibrillation, SDH, dementia who presents with unresponsiveness. The patient is unable to provide additional history

## 2022-07-07 ENCOUNTER — TRANSCRIPTION ENCOUNTER (OUTPATIENT)
Age: 87
End: 2022-07-07

## 2022-07-07 LAB
GLUCOSE BLDC GLUCOMTR-MCNC: 107 MG/DL — HIGH (ref 70–99)
GLUCOSE BLDC GLUCOMTR-MCNC: 112 MG/DL — HIGH (ref 70–99)
GLUCOSE BLDC GLUCOMTR-MCNC: 157 MG/DL — HIGH (ref 70–99)
GLUCOSE BLDC GLUCOMTR-MCNC: 159 MG/DL — HIGH (ref 70–99)

## 2022-07-07 RX ADMIN — POLYETHYLENE GLYCOL 3350 17 GRAM(S): 17 POWDER, FOR SOLUTION ORAL at 13:59

## 2022-07-07 RX ADMIN — Medication 1: at 12:29

## 2022-07-07 RX ADMIN — TAMSULOSIN HYDROCHLORIDE 0.4 MILLIGRAM(S): 0.4 CAPSULE ORAL at 22:21

## 2022-07-07 RX ADMIN — HEPARIN SODIUM 5000 UNIT(S): 5000 INJECTION INTRAVENOUS; SUBCUTANEOUS at 05:06

## 2022-07-07 RX ADMIN — SIMVASTATIN 20 MILLIGRAM(S): 20 TABLET, FILM COATED ORAL at 22:21

## 2022-07-07 RX ADMIN — HEPARIN SODIUM 5000 UNIT(S): 5000 INJECTION INTRAVENOUS; SUBCUTANEOUS at 18:25

## 2022-07-07 RX ADMIN — PANTOPRAZOLE SODIUM 40 MILLIGRAM(S): 20 TABLET, DELAYED RELEASE ORAL at 05:07

## 2022-07-07 RX ADMIN — ERTAPENEM SODIUM 120 MILLIGRAM(S): 1 INJECTION, POWDER, LYOPHILIZED, FOR SOLUTION INTRAMUSCULAR; INTRAVENOUS at 05:06

## 2022-07-07 RX ADMIN — Medication 1 TABLET(S): at 12:31

## 2022-07-07 RX ADMIN — SENNA PLUS 2 TABLET(S): 8.6 TABLET ORAL at 22:21

## 2022-07-07 RX ADMIN — DONEPEZIL HYDROCHLORIDE 5 MILLIGRAM(S): 10 TABLET, FILM COATED ORAL at 22:21

## 2022-07-07 RX ADMIN — Medication 1 TABLET(S): at 13:59

## 2022-07-07 RX ADMIN — CLOPIDOGREL BISULFATE 75 MILLIGRAM(S): 75 TABLET, FILM COATED ORAL at 13:59

## 2022-07-07 NOTE — PROGRESS NOTE ADULT - SUBJECTIVE AND OBJECTIVE BOX
Patient is a 89y Male with a known history of :  Unresponsive episode [R41.89]    Suspected UTI [R39.89]    Dementia [F03.90]    Acute metabolic encephalopathy [G93.41]    DM (diabetes mellitus) [E11.9]    CAD (coronary artery disease) [I25.10]    HTN (hypertension) [I10]    Prophylactic measure [Z29.9]    Acute UTI [N39.0]      HPI:  The patient is a 89 year old male with a history of HL, atrial fibrillation, SDH, dementia who presents with unresponsiveness. The patient is unable to provide additional history . He reportedly was verbal yesterday at [facility] but unresponsive today.  Past Medical/Surgical History:  HL, atrial fibrillation, SDH, dementia ,om  Medications:  Home Medications:  ascorbic acid 500 mg oral tablet: 1 tab(s) orally once a day (03 Apr 2022 11:16)  Aspirin Enteric Coated 81 mg oral delayed release tablet: 1 tab(s) orally once a day (03 Apr 2022 11:16)  donepezil 5 mg oral tablet: 1 tab(s) orally once a day (at bedtime) (03 Apr 2022 11:16)  ferrous sulfate 325 mg (65 mg elemental iron) oral delayed release tablet: 1 tab(s) orally once a day (03 Apr 2022 11:16)  Multiple Vitamins with Minerals oral tablet: 1 tab(s) orally once a day (03 Apr 2022 11:16)  simvastatin 20 mg oral tablet: 1 tab(s) orally once a day (at bedtime) (03 Apr 2022 11:16)Patient is admitted for management of FTT ,suspected UTI Palliative care consult requested ,to discuss advance directives and complete MOLST ,family requested to initiate comfort measures only and to focus on pain and anxiety management ,case was d/w PCP from [facility] and with pall care MD .   (30 Jun 2022 11:24)      REVIEW OF SYSTEMS:    CONSTITUTIONAL: No fever, weight loss, or fatigue  EYES: No eye pain, visual disturbances, or discharge  ENMT:  No difficulty hearing, tinnitus, vertigo; No sinus or throat pain  NECK: No pain or stiffness  BREASTS: No pain, masses, or nipple discharge  RESPIRATORY: No cough, wheezing, chills or hemoptysis; No shortness of breath  CARDIOVASCULAR: No chest pain, palpitations, dizziness, or leg swelling  GASTROINTESTINAL: No abdominal or epigastric pain. No nausea, vomiting, or hematemesis; No diarrhea or constipation. No melena or hematochezia.  GENITOURINARY: No dysuria, frequency, hematuria, or incontinence  NEUROLOGICAL: No headaches, memory loss, loss of strength, numbness, or tremors  SKIN: No itching, burning, rashes, or lesions   LYMPH NODES: No enlarged glands  ENDOCRINE: No heat or cold intolerance; No hair loss  MUSCULOSKELETAL: No joint pain or swelling; No muscle, back, or extremity pain  PSYCHIATRIC: No depression, anxiety, mood swings, or difficulty sleeping  HEME/LYMPH: No easy bruising, or bleeding gums  ALLERGY AND IMMUNOLOGIC: No hives or eczema    MEDICATIONS  (STANDING):  clopidogrel Tablet 75 milliGRAM(s) Oral daily  dextrose 5%. 1000 milliLiter(s) (50 mL/Hr) IV Continuous <Continuous>  dextrose 5%. 1000 milliLiter(s) (100 mL/Hr) IV Continuous <Continuous>  dextrose 50% Injectable 25 Gram(s) IV Push once  dextrose 50% Injectable 12.5 Gram(s) IV Push once  dextrose 50% Injectable 25 Gram(s) IV Push once  donepezil 5 milliGRAM(s) Oral at bedtime  ertapenem  IVPB 1000 milliGRAM(s) IV Intermittent every 24 hours  glucagon  Injectable 1 milliGRAM(s) IntraMuscular once  heparin   Injectable 5000 Unit(s) SubCutaneous every 12 hours  insulin lispro (ADMELOG) corrective regimen sliding scale   SubCutaneous three times a day before meals  lactobacillus acidophilus 1 Tablet(s) Oral daily  multivitamin/minerals 1 Tablet(s) Oral daily  pantoprazole    Tablet 40 milliGRAM(s) Oral before breakfast  polyethylene glycol 3350 17 Gram(s) Oral daily  senna 2 Tablet(s) Oral at bedtime  simvastatin 20 milliGRAM(s) Oral at bedtime  tamsulosin 0.4 milliGRAM(s) Oral at bedtime    MEDICATIONS  (PRN):  acetaminophen     Tablet .. 650 milliGRAM(s) Oral every 6 hours PRN Temp greater or equal to 38C (100.4F), Mild Pain (1 - 3)  aluminum hydroxide/magnesium hydroxide/simethicone Suspension 30 milliLiter(s) Oral every 4 hours PRN Dyspepsia  dextrose Oral Gel 15 Gram(s) Oral once PRN Blood Glucose LESS THAN 70 milliGRAM(s)/deciliter  melatonin 3 milliGRAM(s) Oral at bedtime PRN Insomnia  ondansetron Injectable 4 milliGRAM(s) IV Push every 8 hours PRN Nausea and/or Vomiting      ALLERGIES: latex (Rash)  latex (Unknown)  No Known Drug Allergies      FAMILY HISTORY:      PHYSICAL EXAMINATION:  -----------------------------  T(C): 36.6 (07-07-22 @ 04:45), Max: 36.8 (07-06-22 @ 18:47)  HR: 62 (07-07-22 @ 04:45) (62 - 81)  BP: 115/68 (07-07-22 @ 04:45) (115/68 - 118/61)  RR: 18 (07-07-22 @ 04:45) (18 - 18)  SpO2: 95% (07-07-22 @ 04:45) (92% - 95%)  Wt(kg): --        VITALS  T(C): 36.6 (07-07-22 @ 04:45), Max: 36.8 (07-06-22 @ 18:47)  HR: 62 (07-07-22 @ 04:45) (62 - 81)  BP: 115/68 (07-07-22 @ 04:45) (115/68 - 118/61)  RR: 18 (07-07-22 @ 04:45) (18 - 18)  SpO2: 95% (07-07-22 @ 04:45) (92% - 95%)    Constitutional: well developed, normal appearance, well groomed, well nourished, no deformities and no acute distress.   Eyes: the conjunctiva exhibited no abnormalities and the eyelids demonstrated no xanthelasmas.   HEENT: normal oral mucosa, no oral pallor and no oral cyanosis.   Neck: normal jugular venous A waves present, normal jugular venous V waves present and no jugular venous jacobs A waves.   Pulmonary: no respiratory distress, normal respiratory rhythm and effort, no accessory muscle use and lungs were clear to auscultation bilaterally.   Cardiovascular: heart rate and rhythm were normal, normal S1 and S2 and no murmur, gallop, rub, heave or thrill are present.   Abdomen: soft, non-tender, no hepato-splenomegaly and no abdominal mass palpated.   Musculoskeletal: the gait could not be assessed..   Extremities: no clubbing of the fingernails, no localized cyanosis, no petechial hemorrhages and no ischemic changes.   Skin: normal skin color and pigmentation, no rash, no venous stasis, no skin lesions, no skin ulcer and no xanthoma was observed.   Psychiatric: oriented to person, place, and time, the affect was normal, the mood was normal and not feeling anxious.     LABS:   --------  07-05    139  |  103  |  17  ----------------------------<  119<H>  4.5   |  31  |  0.76    Ca    9.0      05 Jul 2022 07:58                           11.0   6.06  )-----------( 214      ( 05 Jul 2022 07:58 )             33.5                 RADIOLOGY:  -----------------    ECG:     ECHO:

## 2022-07-07 NOTE — DISCHARGE NOTE NURSING/CASE MANAGEMENT/SOCIAL WORK - NSSCCARECORD_GEN_ALL_CORE
[facility]

## 2022-07-07 NOTE — DISCHARGE NOTE NURSING/CASE MANAGEMENT/SOCIAL WORK - NSSCNAMETXT_GEN_ALL_CORE
[place] (previously called [facility]) [facility] @ [place] (now called [facility])  [phone]

## 2022-07-07 NOTE — DISCHARGE NOTE NURSING/CASE MANAGEMENT/SOCIAL WORK - OTHER MODE OF TRANSPORTATION
You will be transported to assisted living facility [facility] ([facility]) [phone] by ambulance thru [facility] [phone] You will be transported to assisted living facility [facility] ([facility]) [phone] by ambulance today 07/08/22 FRI @ 1:30 PM thru [facility] [phone]

## 2022-07-07 NOTE — PROGRESS NOTE ADULT - SUBJECTIVE AND OBJECTIVE BOX
TERESA FRANCIS is a 89yMale , patient examined and chart reviewed.    INTERVAL HPI/ OVERNIGHT EVENTS:   Afebrile. No events.  NAD    PAST MEDICAL & SURGICAL HISTORY:  Atrial fibrillation  Hypertension  Diabetes  Prostate ca  Dementia  CHF (congestive heart failure)  Hyperlipemia  Diabetes  Depression  Dementia  DM (diabetes mellitus)  Atrial fibrillation  Prostate CA  Arteriosclerotic heart disease (ASHD)  Dementia  Anemia  Constipation  AICD (automatic cardioverter/defibrillator) present  Osteomyelitis of finger of left hand  Personal history of radiation therapy  H/O ongoing treatment with hormonal therapy  H/O bladder infections  Scrotal abscess  Urinary retention  Hematuria  H/O cystitis  Constipation  VHD (valvular heart disease)  Aortic valve stenosis  History of automatic internal cardiac defibrillator (AICD)          For details regarding the patient's social history, family history, and other miscellaneous elements, please refer the initial infectious diseases consultation and/or the admitting history and physical examination for this admission.    ROS:  Unable to obtain due to : Dementia      ALLERGIES:  latex (Rash)      Current inpatient medications :    ANTIBIOTICS/RELEVANT:  ertapenem  IVPB 1000 milliGRAM(s) IV Intermittent every 24 hours    MEDICATIONS  (STANDING):  clopidogrel Tablet 75 milliGRAM(s) Oral daily  dextrose 5%. 1000 milliLiter(s) (50 mL/Hr) IV Continuous <Continuous>  dextrose 5%. 1000 milliLiter(s) (100 mL/Hr) IV Continuous <Continuous>  dextrose 50% Injectable 25 Gram(s) IV Push once  dextrose 50% Injectable 12.5 Gram(s) IV Push once  dextrose 50% Injectable 25 Gram(s) IV Push once  donepezil 5 milliGRAM(s) Oral at bedtime  glucagon  Injectable 1 milliGRAM(s) IntraMuscular once  heparin   Injectable 5000 Unit(s) SubCutaneous every 12 hours  insulin lispro (ADMELOG) corrective regimen sliding scale   SubCutaneous three times a day before meals  lactobacillus acidophilus 1 Tablet(s) Oral daily  multivitamin/minerals 1 Tablet(s) Oral daily  pantoprazole    Tablet 40 milliGRAM(s) Oral before breakfast  polyethylene glycol 3350 17 Gram(s) Oral daily  senna 2 Tablet(s) Oral at bedtime  simvastatin 20 milliGRAM(s) Oral at bedtime  tamsulosin 0.4 milliGRAM(s) Oral at bedtime    MEDICATIONS  (PRN):  acetaminophen     Tablet .. 650 milliGRAM(s) Oral every 6 hours PRN Temp greater or equal to 38C (100.4F), Mild Pain (1 - 3)  aluminum hydroxide/magnesium hydroxide/simethicone Suspension 30 milliLiter(s) Oral every 4 hours PRN Dyspepsia  dextrose Oral Gel 15 Gram(s) Oral once PRN Blood Glucose LESS THAN 70 milliGRAM(s)/deciliter  melatonin 3 milliGRAM(s) Oral at bedtime PRN Insomnia  ondansetron Injectable 4 milliGRAM(s) IV Push every 8 hours PRN Nausea and/or Vomiting      Objective:  Vital Signs Last 24 Hrs  T(C): 36.7 (2022 10:02), Max: 36.8 (2022 18:47)  T(F): 98.1 (2022 10:02), Max: 98.2 (2022 18:47)  HR: 68 (2022 10:02) (62 - 80)  BP: 125/74 (2022 10:02) (115/68 - 125/74)  RR: 18 (2022 10:02) (18 - 18)  SpO2: 95% (2022 10:02) (92% - 95%)    Physical Exam:  General: no acute distress  Neck: supple, trachea midline  Lungs: clear, no wheeze/rhonchi  Cardiovascular: regular rate and rhythm, S1 S2  Abdomen: soft, nontender,  bowel sounds normal  Neurological: Dementia  Skin: no rash  Extremities: no edema      LABS:        Urinalysis Basic - ( 2022 14:10 )    Color: Yellow / Appearance: Turbid / S.010 / pH: x  Gluc: x / Ketone: Negative  / Bili: Negative / Urobili: Negative mg/dL   Blood: x / Protein: 100 mg/dL / Nitrite: Positive   Leuk Esterase: Moderate / RBC: 11-25 /HPF / WBC >50   Sq Epi: x / Non Sq Epi: Few / Bacteria: Many        MICROBIOLOGY:  Culture - Urine (22 @ 15:20)    -  Gentamicin: S <=2    -  Imipenem: S <=1    -  Levofloxacin: R >4    -  Meropenem: S <=1    -  Nitrofurantoin: S <=32 Should not be used to treat pyelonephritis    -  Piperacillin/Tazobactam: S <=8    -  Tigecycline: S <=2    -  Tobramycin: S <=2    -  Trimethoprim/Sulfamethoxazole: R >2/38    -  Amoxicillin/Clavulanic Acid: S <=8/4 Consider reserving for cystitis when ampicillin/sulbactam is resistant    -  Amikacin: S <=16    -  Ampicillin: R >16 These ampicillin results predict results for amoxicillin    -  Ampicillin/Sulbactam: R >16/8 Enterobacter, Klebsiella aerogenes, Citrobacter, and Serratia may develop resistance during prolonged therapy (3-4 days)    -  Aztreonam: R >16    -  Cefazolin: R >16 (MIC_CL_COM_ENTERIC_CEFAZU) For uncomplicated UTI with K. pneumoniae, E. coli, or P. mirablis: CHARITO <=16 is sensitive and CHARITO >=32 is resistant. This also predicts results for oral agents cefaclor, cefdinir, cefpodoxime, cefprozil, cefuroxime axetil, cephalexin and locarbef for uncomplicated UTI. Note that some isolates may be susceptible to these agents while testing resistant to cefazolin.    -  Cefepime: R >16    -  Ceftriaxone: R >32 Enterobacter, Klebsiella aerogenes, Citrobacter, and Serratia may develop resistance during prolonged therapy    -  Ciprofloxacin: R >2    -  Ertapenem: S <=0.5    Specimen Source: Catheterized Catheterized    Culture Results:   >100,000 CFU/ml Escherichia coli ESBL    Organism Identification: Escherichia coli ESBL    Organism: Escherichia coli ESBL    Method Type: CHARITO      RADIOLOGY & ADDITIONAL STUDIES:  ACC: [id number] EXAM:  XR CHEST PORTABLE URGENT 1V                          PROCEDURE DATE:  2022          INTERPRETATION:  HISTORY: ;  Sepsis;  TECHNIQUE: Portable frontal view of the chest, 1 view.  COMPARISON: none.  FINDINGS/  IMPRESSION:  A cardiac device overlies and obscures the left hemithorax with lead in   place.  HEART:  Enlarged  LUNGS: free of consolidation,effusion, or pneumothorax.  BONES: degenerative changes      Calcified gallstone. Contrast in the left renal collecting system.    Assessment :   89YO M PMH Dementia, HL, atrial fibrillation, SDH, Ca Prostate DM CHF resident of [facility] who presents with unresponsiveness mental status change sec UTI Ucx with ESBL  Afebrile  WBC wnl    Plan :   Cont Ertapenam x 5 days day 4/5  Trend temps and cbc  Asp precautions  Stable from ID standpoint  Dc planning to home    I will be away  to 7/10/22. Dr Elyse Domingo- [facility] will be covering.      Continue with present regiment.  Appropriate use of antibiotics and adverse effects reviewed.      > 35 minutes were spent in direct patient care reviewing notes, medications ,labs data/ imaging , discussion with multidisciplinary team.    Thank you for allowing me to participate in care of your patient .    Heber Issa MD  Infectious Disease  [phone]

## 2022-07-07 NOTE — DISCHARGE NOTE NURSING/CASE MANAGEMENT/SOCIAL WORK - NSDCPEFALRISK_GEN_ALL_CORE
For information on Fall & Injury Prevention, visit: [email or web address] OR  [email or web address] OR  https://www.cdc.gov/steadi/patient.html

## 2022-07-07 NOTE — DISCHARGE NOTE NURSING/CASE MANAGEMENT/SOCIAL WORK - PATIENT PORTAL LINK FT
You can access the FollowMyHealth Patient Portal offered by [facility] by registering at the following website: [email or web address]. By joining [facility]’s FollowMyHealth portal, you will also be able to view your health information using other applications (apps) compatible with our system.

## 2022-07-07 NOTE — DISCHARGE NOTE NURSING/CASE MANAGEMENT/SOCIAL WORK - NSDCCRTYPESERV_GEN_ALL_CORE_FT
You are a resident of above assisted living facility ([facility]) and receive assistance from [facility] staff

## 2022-07-07 NOTE — PROGRESS NOTE ADULT - SUBJECTIVE AND OBJECTIVE BOX
Neurology follow up note    RAY [id number]      Interval History:    Patient feels ok no new complaints.    Allergies    latex (Rash)  latex (Unknown)  No Known Drug Allergies    Intolerances        MEDICATIONS    acetaminophen     Tablet .. 650 milliGRAM(s) Oral every 6 hours PRN  aluminum hydroxide/magnesium hydroxide/simethicone Suspension 30 milliLiter(s) Oral every 4 hours PRN  clopidogrel Tablet 75 milliGRAM(s) Oral daily  dextrose 5%. 1000 milliLiter(s) IV Continuous <Continuous>  dextrose 5%. 1000 milliLiter(s) IV Continuous <Continuous>  dextrose 50% Injectable 25 Gram(s) IV Push once  dextrose 50% Injectable 12.5 Gram(s) IV Push once  dextrose 50% Injectable 25 Gram(s) IV Push once  dextrose Oral Gel 15 Gram(s) Oral once PRN  donepezil 5 milliGRAM(s) Oral at bedtime  ertapenem  IVPB 1000 milliGRAM(s) IV Intermittent every 24 hours  glucagon  Injectable 1 milliGRAM(s) IntraMuscular once  heparin   Injectable 5000 Unit(s) SubCutaneous every 12 hours  insulin lispro (ADMELOG) corrective regimen sliding scale   SubCutaneous three times a day before meals  lactobacillus acidophilus 1 Tablet(s) Oral daily  melatonin 3 milliGRAM(s) Oral at bedtime PRN  multivitamin/minerals 1 Tablet(s) Oral daily  ondansetron Injectable 4 milliGRAM(s) IV Push every 8 hours PRN  pantoprazole    Tablet 40 milliGRAM(s) Oral before breakfast  polyethylene glycol 3350 17 Gram(s) Oral daily  senna 2 Tablet(s) Oral at bedtime  simvastatin 20 milliGRAM(s) Oral at bedtime  tamsulosin 0.4 milliGRAM(s) Oral at bedtime              Vital Signs Last 24 Hrs  T(C): 36.7 (07 Jul 2022 10:02), Max: 36.8 (06 Jul 2022 18:47)  T(F): 98.1 (07 Jul 2022 10:02), Max: 98.2 (06 Jul 2022 18:47)  HR: 68 (07 Jul 2022 10:02) (62 - 80)  BP: 125/74 (07 Jul 2022 10:02) (115/68 - 125/74)  BP(mean): --  RR: 18 (07 Jul 2022 10:02) (18 - 18)  SpO2: 95% (07 Jul 2022 10:02) (92% - 95%)      REVIEW OF SYSTEMS:  Could not be obtained from the patient secondary to the patient having underlying dementia, mostly nonverbal.    PHYSICAL EXAMINATION:   HEENT:  Head:  Normocephalic, atraumatic.  Eyes:  No scleral icterus.  Ears:  Hard to evaluate secondary to the patient being mostly nonverbal.  NECK:  Had increased tone but resists.  RESPIRATORY:  Decreased breath sounds bilaterally.  CARDIOVASCULAR:  S1 and S2 heard.  ABDOMEN:  Soft, nontender.  EXTREMITIES:  No clubbing or cyanosis were noted.      NEUROLOGIC:  The patient was arousable to verbal stimuli.  Attempted to open the patient's eye, would actively resist.  Pupils bilaterally appeared to be 2 mm.  Speech, the patient was nonverbal.  Motor:  Examination is significantly limited.  Attempted to elevate bilateral upper extremities, the patient would actively resist, pull both arms down, would say overall 3+/5, but as per my previous note, the patient does have decreased range of motion of the left shoulder, and biceps and triceps at that time was 3+/5.  The patient at that time was unable to open and close his left hand.  Bilateral lower extremities had significantly increased tone.  When given plantar stimuli was slightly able to elevate off the bed.                 LABS:            Hemoglobin A1C:       Vitamin B12         RADIOLOGY      ANALYSIS AND PLAN:  This is a 89-year-old with episode of unresponsiveness with left hand weakness.  Clinical impression is TIA, rule out cerebrovascular accident.  We will discontinue the patient's aspirin and convert over to Plavix.  Spoke with son in great detail, we will not be opting for any type of MRI, we do not feel that he would lie still fore it and management would not change.  For history of hypertension, monitor systolic blood pressure.  For hyperlipidemia, continue the patient on statin.  For dementia, continue the patient on his home medications  afib h/o fall not on AC  antibiotics as needed   montior oral intake as needed     Spoke with son, Rgeina, at [phone] 7/1.    Greater than 40 minutes of time was spent with the patient, plan of care, reviewing data, speaking to multidisciplinary healthcare team with greater than 50% of the time in counseling and care coordination.

## 2022-07-07 NOTE — PROGRESS NOTE ADULT - SUBJECTIVE AND OBJECTIVE BOX
PROGRESS NOTE  Patient is a 89y old  Male who presents with a chief complaint of EPISODE OF UNRESPONSIVNESS (07 Jul 2022 11:12)  Chart and available morning labs /imaging are reviewed electronically , urgent issues addressed . More information  is being added upon completion of rounds , when more information is collected and management discussed with consultants , medical staff and social service/case management on the floor     OVERNIGHT  No new issues reported by medical staff . All above noted Patient is resting in a bed comfortably .Confused ,poor mentation .No distress noted     HPI:  The patient is a 89 year old male with a history of HL, atrial fibrillation, SDH, dementia who presents with unresponsiveness. The patient is unable to provide additional history . He reportedly was verbal yesterday at [facility] but unresponsive today.  Past Medical/Surgical History:  HL, atrial fibrillation, SDH, dementia ,om  Medications:  Home Medications:  ascorbic acid 500 mg oral tablet: 1 tab(s) orally once a day (03 Apr 2022 11:16)  Aspirin Enteric Coated 81 mg oral delayed release tablet: 1 tab(s) orally once a day (03 Apr 2022 11:16)  donepezil 5 mg oral tablet: 1 tab(s) orally once a day (at bedtime) (03 Apr 2022 11:16)  ferrous sulfate 325 mg (65 mg elemental iron) oral delayed release tablet: 1 tab(s) orally once a day (03 Apr 2022 11:16)  Multiple Vitamins with Minerals oral tablet: 1 tab(s) orally once a day (03 Apr 2022 11:16)  simvastatin 20 mg oral tablet: 1 tab(s) orally once a day (at bedtime) (03 Apr 2022 11:16)Patient is admitted for management of FTT ,suspected UTI Palliative care consult requested ,to discuss advance directives and complete MOLST ,family requested to initiate comfort measures only and to focus on pain and anxiety management ,case was d/w PCP from [facility] and with [facility] care MD .   (30 Jun 2022 11:24)    PAST MEDICAL & SURGICAL HISTORY:  Atrial fibrillation      Hypertension      Diabetes      Prostate ca      Dementia      CHF (congestive heart failure)      Hyperlipemia      Diabetes      Depression      Dementia      DM (diabetes mellitus)      Atrial fibrillation      Prostate CA      Arteriosclerotic heart disease (ASHD)      Dementia      Anemia      Constipation      AICD (automatic cardioverter/defibrillator) present      Osteomyelitis of finger of left hand      Personal history of radiation therapy      H/O ongoing treatment with hormonal therapy      H/O bladder infections      Scrotal abscess      Urinary retention      Hematuria      H/O cystitis      Constipation      VHD (valvular heart disease)      Aortic valve stenosis      History of automatic internal cardiac defibrillator (AICD)          MEDICATIONS  (STANDING):  clopidogrel Tablet 75 milliGRAM(s) Oral daily  dextrose 5%. 1000 milliLiter(s) (50 mL/Hr) IV Continuous <Continuous>  dextrose 5%. 1000 milliLiter(s) (100 mL/Hr) IV Continuous <Continuous>  dextrose 50% Injectable 25 Gram(s) IV Push once  dextrose 50% Injectable 12.5 Gram(s) IV Push once  dextrose 50% Injectable 25 Gram(s) IV Push once  donepezil 5 milliGRAM(s) Oral at bedtime  ertapenem  IVPB 1000 milliGRAM(s) IV Intermittent every 24 hours  glucagon  Injectable 1 milliGRAM(s) IntraMuscular once  heparin   Injectable 5000 Unit(s) SubCutaneous every 12 hours  insulin lispro (ADMELOG) corrective regimen sliding scale   SubCutaneous three times a day before meals  lactobacillus acidophilus 1 Tablet(s) Oral daily  multivitamin/minerals 1 Tablet(s) Oral daily  pantoprazole    Tablet 40 milliGRAM(s) Oral before breakfast  polyethylene glycol 3350 17 Gram(s) Oral daily  senna 2 Tablet(s) Oral at bedtime  simvastatin 20 milliGRAM(s) Oral at bedtime  tamsulosin 0.4 milliGRAM(s) Oral at bedtime    MEDICATIONS  (PRN):  acetaminophen     Tablet .. 650 milliGRAM(s) Oral every 6 hours PRN Temp greater or equal to 38C (100.4F), Mild Pain (1 - 3)  aluminum hydroxide/magnesium hydroxide/simethicone Suspension 30 milliLiter(s) Oral every 4 hours PRN Dyspepsia  dextrose Oral Gel 15 Gram(s) Oral once PRN Blood Glucose LESS THAN 70 milliGRAM(s)/deciliter  melatonin 3 milliGRAM(s) Oral at bedtime PRN Insomnia  ondansetron Injectable 4 milliGRAM(s) IV Push every 8 hours PRN Nausea and/or Vomiting      OBJECTIVE    T(C): 36.7 (07-07-22 @ 10:02), Max: 36.8 (07-06-22 @ 18:47)  HR: 68 (07-07-22 @ 10:02) (62 - 80)  BP: 125/74 (07-07-22 @ 10:02) (115/68 - 125/74)  RR: 18 (07-07-22 @ 10:02) (18 - 18)  SpO2: 95% (07-07-22 @ 10:02) (92% - 95%)  Wt(kg): --  I&O's Summary        REVIEW OF SYSTEMS:  CONSTITUTIONAL: No fever, weight loss, or fatigue  EYES: No eye pain, visual disturbances, or discharge  ENMT:   No sinus or throat pain  NECK: No pain or stiffness  RESPIRATORY: No cough, wheezing, chills or hemoptysis; No shortness of breath  CARDIOVASCULAR: No chest pain, palpitations, dizziness, or leg swelling  GASTROINTESTINAL: No abdominal pain. No nausea, vomiting; No diarrhea or constipation. No melena or hematochezia.  GENITOURINARY: No dysuria, frequency, hematuria, or incontinence  NEUROLOGICAL: No headaches, memory loss, loss of strength, numbness, or tremors  SKIN: No itching, burning, rashes, or lesions   MUSCULOSKELETAL: No joint pain or swelling; No muscle, back, or extremity pain    PHYSICAL EXAM:  Appearance: NAD. VS past 24 hrs -as above   HEENT:   Moist oral mucosa. Conjunctiva clear b/l.   Neck : supple  Respiratory: Lungs CTAB.  Gastrointestinal:  Soft, nontender. No rebound. No rigidity. BS present	  Cardiovascular: RRR ,S1S2 present  Neurologic: Non-focal. Moving all extremities.  Extremities: No edema. No erythema. No calf tenderness.  Skin: No rashes, No ecchymoses, No cyanosis.	  wounds ,skin lesions-See skin assesment flow sheet   LABS:          CAPILLARY BLOOD GLUCOSE      POCT Blood Glucose.: 112 mg/dL (07 Jul 2022 07:36)  POCT Blood Glucose.: 159 mg/dL (06 Jul 2022 21:22)  POCT Blood Glucose.: 97 mg/dL (06 Jul 2022 16:36)  POCT Blood Glucose.: 165 mg/dL (06 Jul 2022 12:04)          Culture - Urine (collected 30 Jun 2022 15:20)  Source: Catheterized Catheterized  Final Report (03 Jul 2022 13:37):    >100,000 CFU/ml Escherichia coli ESBL  Organism: Escherichia coli ESBL (03 Jul 2022 13:37)  Organism: Escherichia coli ESBL (03 Jul 2022 13:37)  RADIOLOGY & ADDITIONAL TESTS:   reviewed elctronically  25 minutes aggregate time was spent on this visit, 50% visit time spent in care co-ordination with other attendings and counselling patient .I have discussed care plan with patient / HCP/family member ,who expressed understanding of problems treatment and their effect and side effects, alternatives in details. I have asked if they have any questions and concerns and appropriately addressed them to best of my ability. Spoke to SMITH Landis ,as per alfreda demarco and Marixa ( owner ) they prefer patient to return without hospice with PT

## 2022-07-07 NOTE — DISCHARGE NOTE NURSING/CASE MANAGEMENT/SOCIAL WORK - NSDPFAC_GEN_ALL_CORE
[facility] ([facility]) [phone]/ fax [phone].

## 2022-07-07 NOTE — DISCHARGE NOTE NURSING/CASE MANAGEMENT/SOCIAL WORK - NSDCVIVACCINE_GEN_ALL_CORE_FT
pneumococcal polysaccharide PPV23; 18-Jun-2014 09:36; Lamar Gonzalez (RN); [id number]; IntraMuscular; Deltoid Right.; 0.5 milliLiter(s);   Tdap; 19-Jul-2018 13:12; Arleen Wallace (RN); [employer]; [id number]; IntraMuscular; Deltoid Left.; 0.5 milliLiter(s); VIS (VIS Published: 09-May-2013, VIS Presented: 19-Jul-2018);   Tdap; 18-Jan-2022 12:32; Bethanie Briseno (RN); [employer]; [id number] (Exp. Date: 09-Sep-2023); IntraMuscular; Deltoid Right.; 0.5 milliLiter(s); VIS (VIS Published: 09-May-2013, VIS Presented: 18-Jan-2022);

## 2022-07-07 NOTE — DISCHARGE NOTE NURSING/CASE MANAGEMENT/SOCIAL WORK - NSDCCRNAME_GEN_ALL_CORE_FT
[facility] ([facility]) [phone]/ fax [phone]. Utilizes [facility] pharmacy [phone].

## 2022-07-07 NOTE — PROGRESS NOTE ADULT - SUBJECTIVE AND OBJECTIVE BOX
Date/Time Patient Seen:  		  Referring MD:   Data Reviewed	       Patient is a 89y old  Male who presents with a chief complaint of EPISODE OF UNRESPONSIVNESS (07 Jul 2022 07:33)      Subjective/HPI     PAST MEDICAL & SURGICAL HISTORY:  Atrial fibrillation    Hypertension    Diabetes    Prostate ca    Dementia    CHF (congestive heart failure)    Hyperlipemia    Diabetes    Depression    Dementia    No pertinent past medical history    DM (diabetes mellitus)    Atrial fibrillation    Prostate CA    Arteriosclerotic heart disease (ASHD)    Dementia    Anemia    Constipation    AICD (automatic cardioverter/defibrillator) present    Osteomyelitis of finger of left hand    Personal history of radiation therapy    H/O ongoing treatment with hormonal therapy    H/O bladder infections    Scrotal abscess    Urinary retention    Hematuria    H/O cystitis    Constipation    VHD (valvular heart disease)    Aortic valve stenosis    No significant past surgical history    No significant past surgical history    History of automatic internal cardiac defibrillator (AICD)          Medication list         MEDICATIONS  (STANDING):  clopidogrel Tablet 75 milliGRAM(s) Oral daily  dextrose 5%. 1000 milliLiter(s) (50 mL/Hr) IV Continuous <Continuous>  dextrose 5%. 1000 milliLiter(s) (100 mL/Hr) IV Continuous <Continuous>  dextrose 50% Injectable 25 Gram(s) IV Push once  dextrose 50% Injectable 12.5 Gram(s) IV Push once  dextrose 50% Injectable 25 Gram(s) IV Push once  donepezil 5 milliGRAM(s) Oral at bedtime  ertapenem  IVPB 1000 milliGRAM(s) IV Intermittent every 24 hours  glucagon  Injectable 1 milliGRAM(s) IntraMuscular once  heparin   Injectable 5000 Unit(s) SubCutaneous every 12 hours  insulin lispro (ADMELOG) corrective regimen sliding scale   SubCutaneous three times a day before meals  lactobacillus acidophilus 1 Tablet(s) Oral daily  multivitamin/minerals 1 Tablet(s) Oral daily  pantoprazole    Tablet 40 milliGRAM(s) Oral before breakfast  polyethylene glycol 3350 17 Gram(s) Oral daily  senna 2 Tablet(s) Oral at bedtime  simvastatin 20 milliGRAM(s) Oral at bedtime  tamsulosin 0.4 milliGRAM(s) Oral at bedtime    MEDICATIONS  (PRN):  acetaminophen     Tablet .. 650 milliGRAM(s) Oral every 6 hours PRN Temp greater or equal to 38C (100.4F), Mild Pain (1 - 3)  aluminum hydroxide/magnesium hydroxide/simethicone Suspension 30 milliLiter(s) Oral every 4 hours PRN Dyspepsia  dextrose Oral Gel 15 Gram(s) Oral once PRN Blood Glucose LESS THAN 70 milliGRAM(s)/deciliter  melatonin 3 milliGRAM(s) Oral at bedtime PRN Insomnia  ondansetron Injectable 4 milliGRAM(s) IV Push every 8 hours PRN Nausea and/or Vomiting         Vitals log        ICU Vital Signs Last 24 Hrs  T(C): 36.6 (07 Jul 2022 04:45), Max: 36.8 (06 Jul 2022 18:47)  T(F): 97.9 (07 Jul 2022 04:45), Max: 98.2 (06 Jul 2022 18:47)  HR: 62 (07 Jul 2022 04:45) (62 - 81)  BP: 115/68 (07 Jul 2022 04:45) (115/68 - 118/61)  BP(mean): --  ABP: --  ABP(mean): --  RR: 18 (07 Jul 2022 04:45) (18 - 18)  SpO2: 95% (07 Jul 2022 04:45) (92% - 95%)           Input and Output:  I&O's Detail      Lab Data                  Review of Systems	      Objective     Physical Examination    heart s1s2  lung dec BS  abd soft      Pertinent Lab findings & Imaging      [place]:  NO   Adequate UO     I&O's Detail           Discussed with:     Cultures:	        Radiology

## 2022-07-08 VITALS
HEART RATE: 82 BPM | RESPIRATION RATE: 18 BRPM | TEMPERATURE: 98 F | DIASTOLIC BLOOD PRESSURE: 73 MMHG | SYSTOLIC BLOOD PRESSURE: 112 MMHG | OXYGEN SATURATION: 95 %

## 2022-07-08 LAB
GLUCOSE BLDC GLUCOMTR-MCNC: 110 MG/DL — HIGH (ref 70–99)
GLUCOSE BLDC GLUCOMTR-MCNC: 190 MG/DL — HIGH (ref 70–99)

## 2022-07-08 PROCEDURE — 80053 COMPREHEN METABOLIC PANEL: CPT

## 2022-07-08 PROCEDURE — 87086 URINE CULTURE/COLONY COUNT: CPT

## 2022-07-08 PROCEDURE — 70496 CT ANGIOGRAPHY HEAD: CPT

## 2022-07-08 PROCEDURE — 82962 GLUCOSE BLOOD TEST: CPT

## 2022-07-08 PROCEDURE — 80048 BASIC METABOLIC PNL TOTAL CA: CPT

## 2022-07-08 PROCEDURE — 93005 ELECTROCARDIOGRAM TRACING: CPT

## 2022-07-08 PROCEDURE — 99285 EMERGENCY DEPT VISIT HI MDM: CPT | Mod: 25

## 2022-07-08 PROCEDURE — 87186 SC STD MICRODIL/AGAR DIL: CPT

## 2022-07-08 PROCEDURE — 84484 ASSAY OF TROPONIN QUANT: CPT

## 2022-07-08 PROCEDURE — 36415 COLL VENOUS BLD VENIPUNCTURE: CPT

## 2022-07-08 PROCEDURE — 85027 COMPLETE CBC AUTOMATED: CPT

## 2022-07-08 PROCEDURE — 71045 X-RAY EXAM CHEST 1 VIEW: CPT

## 2022-07-08 PROCEDURE — 87635 SARS-COV-2 COVID-19 AMP PRB: CPT

## 2022-07-08 PROCEDURE — 85025 COMPLETE CBC W/AUTO DIFF WBC: CPT

## 2022-07-08 PROCEDURE — 83036 HEMOGLOBIN GLYCOSYLATED A1C: CPT

## 2022-07-08 PROCEDURE — 85730 THROMBOPLASTIN TIME PARTIAL: CPT

## 2022-07-08 PROCEDURE — 70450 CT HEAD/BRAIN W/O DYE: CPT | Mod: MA

## 2022-07-08 PROCEDURE — 81001 URINALYSIS AUTO W/SCOPE: CPT

## 2022-07-08 PROCEDURE — 70498 CT ANGIOGRAPHY NECK: CPT

## 2022-07-08 PROCEDURE — 85610 PROTHROMBIN TIME: CPT

## 2022-07-08 PROCEDURE — 92610 EVALUATE SWALLOWING FUNCTION: CPT

## 2022-07-08 PROCEDURE — 97162 PT EVAL MOD COMPLEX 30 MIN: CPT

## 2022-07-08 RX ORDER — CLOPIDOGREL BISULFATE 75 MG/1
1 TABLET, FILM COATED ORAL
Qty: 30 | Refills: 0
Start: 2022-07-08 | End: 2022-08-06

## 2022-07-08 RX ORDER — LACTOBACILLUS ACIDOPHILUS 100MM CELL
2 CAPSULE ORAL
Qty: 20 | Refills: 0
Start: 2022-07-08 | End: 2022-07-17

## 2022-07-08 RX ADMIN — Medication 1 TABLET(S): at 12:53

## 2022-07-08 RX ADMIN — Medication 1 TABLET(S): at 12:30

## 2022-07-08 RX ADMIN — CLOPIDOGREL BISULFATE 75 MILLIGRAM(S): 75 TABLET, FILM COATED ORAL at 12:53

## 2022-07-08 RX ADMIN — ERTAPENEM SODIUM 120 MILLIGRAM(S): 1 INJECTION, POWDER, LYOPHILIZED, FOR SOLUTION INTRAMUSCULAR; INTRAVENOUS at 05:55

## 2022-07-08 RX ADMIN — HEPARIN SODIUM 5000 UNIT(S): 5000 INJECTION INTRAVENOUS; SUBCUTANEOUS at 05:55

## 2022-07-08 RX ADMIN — PANTOPRAZOLE SODIUM 40 MILLIGRAM(S): 20 TABLET, DELAYED RELEASE ORAL at 05:55

## 2022-07-08 RX ADMIN — Medication 1: at 12:29

## 2022-07-08 NOTE — PROGRESS NOTE ADULT - SUBJECTIVE AND OBJECTIVE BOX
Date/Time Patient Seen:  		  Referring MD:   Data Reviewed	       Patient is a 89y old  Male who presents with a chief complaint of EPISODE OF UNRESPONSIVNESS (08 Jul 2022 07:50)      Subjective/HPI     PAST MEDICAL & SURGICAL HISTORY:  Atrial fibrillation    Hypertension    Diabetes    Prostate ca    Dementia    CHF (congestive heart failure)    Hyperlipemia    Diabetes    Depression    Dementia    No pertinent past medical history    DM (diabetes mellitus)    Atrial fibrillation    Prostate CA    Arteriosclerotic heart disease (ASHD)    Dementia    Anemia    Constipation    AICD (automatic cardioverter/defibrillator) present    Osteomyelitis of finger of left hand    Personal history of radiation therapy    H/O ongoing treatment with hormonal therapy    H/O bladder infections    Scrotal abscess    Urinary retention    Hematuria    H/O cystitis    Constipation    VHD (valvular heart disease)    Aortic valve stenosis    No significant past surgical history    No significant past surgical history    History of automatic internal cardiac defibrillator (AICD)          Medication list         MEDICATIONS  (STANDING):  clopidogrel Tablet 75 milliGRAM(s) Oral daily  dextrose 5%. 1000 milliLiter(s) (100 mL/Hr) IV Continuous <Continuous>  dextrose 5%. 1000 milliLiter(s) (50 mL/Hr) IV Continuous <Continuous>  dextrose 50% Injectable 25 Gram(s) IV Push once  dextrose 50% Injectable 12.5 Gram(s) IV Push once  dextrose 50% Injectable 25 Gram(s) IV Push once  donepezil 5 milliGRAM(s) Oral at bedtime  glucagon  Injectable 1 milliGRAM(s) IntraMuscular once  heparin   Injectable 5000 Unit(s) SubCutaneous every 12 hours  insulin lispro (ADMELOG) corrective regimen sliding scale   SubCutaneous three times a day before meals  lactobacillus acidophilus 1 Tablet(s) Oral daily  multivitamin/minerals 1 Tablet(s) Oral daily  pantoprazole    Tablet 40 milliGRAM(s) Oral before breakfast  polyethylene glycol 3350 17 Gram(s) Oral daily  senna 2 Tablet(s) Oral at bedtime  simvastatin 20 milliGRAM(s) Oral at bedtime  tamsulosin 0.4 milliGRAM(s) Oral at bedtime    MEDICATIONS  (PRN):  acetaminophen     Tablet .. 650 milliGRAM(s) Oral every 6 hours PRN Temp greater or equal to 38C (100.4F), Mild Pain (1 - 3)  aluminum hydroxide/magnesium hydroxide/simethicone Suspension 30 milliLiter(s) Oral every 4 hours PRN Dyspepsia  dextrose Oral Gel 15 Gram(s) Oral once PRN Blood Glucose LESS THAN 70 milliGRAM(s)/deciliter  melatonin 3 milliGRAM(s) Oral at bedtime PRN Insomnia  ondansetron Injectable 4 milliGRAM(s) IV Push every 8 hours PRN Nausea and/or Vomiting         Vitals log        ICU Vital Signs Last 24 Hrs  T(C): 36.6 (08 Jul 2022 05:33), Max: 36.7 (07 Jul 2022 10:02)  T(F): 97.9 (08 Jul 2022 05:33), Max: 98.1 (07 Jul 2022 10:02)  HR: 79 (08 Jul 2022 05:33) (68 - 84)  BP: 117/65 (08 Jul 2022 05:33) (117/65 - 156/88)  BP(mean): --  ABP: --  ABP(mean): --  RR: 18 (08 Jul 2022 05:33) (18 - 18)  SpO2: 93% (08 Jul 2022 05:33) (93% - 95%)    O2 Parameters below as of 08 Jul 2022 05:33  Patient On (Oxygen Delivery Method): room air                 Input and Output:  I&O's Detail      Lab Data                  Review of Systems	      Objective     Physical Examination    heart s1s2  lung dec BS  abd soft      Pertinent Lab findings & Imaging      [place]:  NO   Adequate UO     I&O's Detail           Discussed with:     Cultures:	        Radiology

## 2022-07-08 NOTE — PROGRESS NOTE ADULT - SUBJECTIVE AND OBJECTIVE BOX
Neurology follow up note    RAY [id number]      Interval History:    Patient feels ok no new complaints.    Allergies    latex (Rash)  latex (Unknown)  No Known Drug Allergies    Intolerances        MEDICATIONS    acetaminophen     Tablet .. 650 milliGRAM(s) Oral every 6 hours PRN  aluminum hydroxide/magnesium hydroxide/simethicone Suspension 30 milliLiter(s) Oral every 4 hours PRN  clopidogrel Tablet 75 milliGRAM(s) Oral daily  dextrose 5%. 1000 milliLiter(s) IV Continuous <Continuous>  dextrose 5%. 1000 milliLiter(s) IV Continuous <Continuous>  dextrose 50% Injectable 25 Gram(s) IV Push once  dextrose 50% Injectable 12.5 Gram(s) IV Push once  dextrose 50% Injectable 25 Gram(s) IV Push once  dextrose Oral Gel 15 Gram(s) Oral once PRN  donepezil 5 milliGRAM(s) Oral at bedtime  glucagon  Injectable 1 milliGRAM(s) IntraMuscular once  heparin   Injectable 5000 Unit(s) SubCutaneous every 12 hours  insulin lispro (ADMELOG) corrective regimen sliding scale   SubCutaneous three times a day before meals  lactobacillus acidophilus 1 Tablet(s) Oral daily  melatonin 3 milliGRAM(s) Oral at bedtime PRN  multivitamin/minerals 1 Tablet(s) Oral daily  ondansetron Injectable 4 milliGRAM(s) IV Push every 8 hours PRN  pantoprazole    Tablet 40 milliGRAM(s) Oral before breakfast  polyethylene glycol 3350 17 Gram(s) Oral daily  senna 2 Tablet(s) Oral at bedtime  simvastatin 20 milliGRAM(s) Oral at bedtime  tamsulosin 0.4 milliGRAM(s) Oral at bedtime              Vital Signs Last 24 Hrs  T(C): 36.6 (08 Jul 2022 05:33), Max: 36.7 (07 Jul 2022 10:02)  T(F): 97.9 (08 Jul 2022 05:33), Max: 98.1 (07 Jul 2022 10:02)  HR: 79 (08 Jul 2022 05:33) (68 - 84)  BP: 117/65 (08 Jul 2022 05:33) (117/65 - 156/88)  BP(mean): --  RR: 18 (08 Jul 2022 05:33) (18 - 18)  SpO2: 93% (08 Jul 2022 05:33) (93% - 95%)    Parameters below as of 08 Jul 2022 05:33  Patient On (Oxygen Delivery Method): room air      REVIEW OF SYSTEMS:  Could not be obtained from the patient secondary to the patient having underlying dementia, mostly nonverbal.    PHYSICAL EXAMINATION:   HEENT:  Head:  Normocephalic, atraumatic.  Eyes:  No scleral icterus.  Ears:  Hard to evaluate secondary to the patient being mostly nonverbal.  NECK:  Had increased tone but resists.  RESPIRATORY:  Decreased breath sounds bilaterally.  CARDIOVASCULAR:  S1 and S2 heard.  ABDOMEN:  Soft, nontender.  EXTREMITIES:  No clubbing or cyanosis were noted.      NEUROLOGIC:  The patient was arousable to verbal stimuli.  Attempted to open the patient's eye, would actively resist.  Pupils bilaterally appeared to be 2 mm.  Speech, the patient was nonverbal.  Motor:  Examination is significantly limited.  Attempted to elevate bilateral upper extremities, the patient would actively resist, pull both arms down, would say overall 3+/5, but as per my previous note, the patient does have decreased range of motion of the left shoulder, and biceps and triceps at that time was 3+/5.  The patient at that time was unable to open and close his left hand.  Bilateral lower extremities had significantly increased tone.  When given plantar stimuli was slightly able to elevate off the bed.                   LABS:            Hemoglobin A1C:       Vitamin B12         RADIOLOGY      ANALYSIS AND PLAN:  This is a 89-year-old with episode of unresponsiveness with left hand weakness.  Clinical impression is TIA, rule out cerebrovascular accident.  We will discontinue the patient's aspirin and convert over to Plavix.  Spoke with son in great detail, we will not be opting for any type of MRI, we do not feel that he would lie still fore it and management would not change.  For history of hypertension, monitor systolic blood pressure.  For hyperlipidemia, continue the patient on statin.  For dementia, continue the patient on his home medications  afib h/o fall not on AC  antibiotics as needed   montior oral intake as needed     Spoke with son, Regina, at [phone] 7/1.    Greater than 40 minutes of time was spent with the patient, plan of care, reviewing data, speaking to multidisciplinary healthcare team with greater than 50% of the time in counseling and care coordination.     Neurology follow up note    RAY [id number]      Interval History:    Patient feels ok no new complaints.    Allergies    latex (Rash)  latex (Unknown)  No Known Drug Allergies    Intolerances        MEDICATIONS    acetaminophen     Tablet .. 650 milliGRAM(s) Oral every 6 hours PRN  aluminum hydroxide/magnesium hydroxide/simethicone Suspension 30 milliLiter(s) Oral every 4 hours PRN  clopidogrel Tablet 75 milliGRAM(s) Oral daily  dextrose 5%. 1000 milliLiter(s) IV Continuous <Continuous>  dextrose 5%. 1000 milliLiter(s) IV Continuous <Continuous>  dextrose 50% Injectable 25 Gram(s) IV Push once  dextrose 50% Injectable 12.5 Gram(s) IV Push once  dextrose 50% Injectable 25 Gram(s) IV Push once  dextrose Oral Gel 15 Gram(s) Oral once PRN  donepezil 5 milliGRAM(s) Oral at bedtime  glucagon  Injectable 1 milliGRAM(s) IntraMuscular once  heparin   Injectable 5000 Unit(s) SubCutaneous every 12 hours  insulin lispro (ADMELOG) corrective regimen sliding scale   SubCutaneous three times a day before meals  lactobacillus acidophilus 1 Tablet(s) Oral daily  melatonin 3 milliGRAM(s) Oral at bedtime PRN  multivitamin/minerals 1 Tablet(s) Oral daily  ondansetron Injectable 4 milliGRAM(s) IV Push every 8 hours PRN  pantoprazole    Tablet 40 milliGRAM(s) Oral before breakfast  polyethylene glycol 3350 17 Gram(s) Oral daily  senna 2 Tablet(s) Oral at bedtime  simvastatin 20 milliGRAM(s) Oral at bedtime  tamsulosin 0.4 milliGRAM(s) Oral at bedtime              Vital Signs Last 24 Hrs  T(C): 36.6 (08 Jul 2022 05:33), Max: 36.7 (07 Jul 2022 10:02)  T(F): 97.9 (08 Jul 2022 05:33), Max: 98.1 (07 Jul 2022 10:02)  HR: 79 (08 Jul 2022 05:33) (68 - 84)  BP: 117/65 (08 Jul 2022 05:33) (117/65 - 156/88)  BP(mean): --  RR: 18 (08 Jul 2022 05:33) (18 - 18)  SpO2: 93% (08 Jul 2022 05:33) (93% - 95%)    Parameters below as of 08 Jul 2022 05:33  Patient On (Oxygen Delivery Method): room air      REVIEW OF SYSTEMS:  Could not be obtained from the patient secondary to the patient having underlying dementia, mostly nonverbal.    PHYSICAL EXAMINATION:   HEENT:  Head:  Normocephalic, atraumatic.  Eyes:  No scleral icterus.  Ears:  Hard to evaluate secondary to the patient being mostly nonverbal.  NECK:  Had increased tone but resists.  RESPIRATORY:  Decreased breath sounds bilaterally.  CARDIOVASCULAR:  S1 and S2 heard.  ABDOMEN:  Soft, nontender.  EXTREMITIES:  No clubbing or cyanosis were noted.      NEUROLOGIC:  The patient was arousable to verbal stimuli.  Attempted to open the patient's eye, would actively resist.  Pupils bilaterally appeared to be 2 mm.  Speech, the patient says a few words  Motor:  Examination is significantly limited.  Attempted to elevate bilateral upper extremities, the patient would actively resist, pull both arms down, would say overall 3+/5, but as per my previous note, the patient does have decreased range of motion of the left shoulder, and biceps and triceps at that time was 3+/5.  The patient at that time was unable to open and close his left hand.  Bilateral lower extremities had significantly increased tone.  When given plantar stimuli was slightly able to elevate off the bed.                   LABS:            Hemoglobin A1C:       Vitamin B12         RADIOLOGY      ANALYSIS AND PLAN:  This is a 89-year-old with episode of unresponsiveness with left hand weakness.  Clinical impression is TIA, rule out cerebrovascular accident.  We will discontinue the patient's aspirin and convert over to Plavix.  Spoke with son in great detail, we will not be opting for any type of MRI, we do not feel that he would lie still fore it and management would not change.  For history of hypertension, monitor systolic blood pressure.  For hyperlipidemia, continue the patient on statin.  For dementia, continue the patient on his home medications  afib h/o fall not on AC  monitor oral intake as needed   fall precautions     Spoke with son, Regina, at [phone] 7/1 called today went to voicemail 7/8    Greater than 34 minutes of time was spent with the patient, plan of care, reviewing data, speaking to multidisciplinary healthcare team with greater than 50% of the time in counseling and care coordination.

## 2022-07-08 NOTE — PROGRESS NOTE ADULT - REASON FOR ADMISSION
EPISODE OF UNRESPONSIVNESS
EPISODE OF UNRESPONSIVENESS
EPISODE OF UNRESPONSIVNESS

## 2022-07-08 NOTE — PROGRESS NOTE ADULT - PROBLEM SELECTOR PLAN 7
continue home medications ,bp monitoring

## 2022-07-08 NOTE — PROGRESS NOTE ADULT - PROBLEM SELECTOR PLAN 1
Likely 2/2 to TIA ( ct head -nad)  vs  vasovagal syncope secondary to poor po intake of fluids and infection .Seen by neurologist and impression was -TIA ,change ASA to Plavix recommended .

## 2022-07-08 NOTE — PROGRESS NOTE ADULT - SUBJECTIVE AND OBJECTIVE BOX
PROGRESS NOTE  Patient is a 89y old  Male who presents with a chief complaint of EPISODE OF UNRESPONSIVNESS (08 Jul 2022 08:11)  Chart and available morning labs /imaging are reviewed electronically , urgent issues addressed . More information  is being added upon completion of rounds , when more information is collected and management discussed with consultants , medical staff and social service/case management on the floor   OVERNIGHT  No new issues reported by medical staff . All above noted Patient is resting in a bed comfortably .Confused ,poor mentation .No distress noted   HPI:  The patient is a 89 year old male with a history of HL, atrial fibrillation, SDH, dementia who presents with unresponsiveness. The patient is unable to provide additional history . He reportedly was verbal yesterday at [facility] but unresponsive today.  Past Medical/Surgical History:  HL, atrial fibrillation, SDH, dementia ,om  Medications:  Home Medications:  ascorbic acid 500 mg oral tablet: 1 tab(s) orally once a day (03 Apr 2022 11:16)  Aspirin Enteric Coated 81 mg oral delayed release tablet: 1 tab(s) orally once a day (03 Apr 2022 11:16)  donepezil 5 mg oral tablet: 1 tab(s) orally once a day (at bedtime) (03 Apr 2022 11:16)  ferrous sulfate 325 mg (65 mg elemental iron) oral delayed release tablet: 1 tab(s) orally once a day (03 Apr 2022 11:16)  Multiple Vitamins with Minerals oral tablet: 1 tab(s) orally once a day (03 Apr 2022 11:16)  simvastatin 20 mg oral tablet: 1 tab(s) orally once a day (at bedtime) (03 Apr 2022 11:16)Patient is admitted for management of FTT ,suspected UTI Palliative care consult requested ,to discuss advance directives and complete MOLST ,family requested to initiate comfort measures only and to focus on pain and anxiety management ,case was d/w PCP from [facility] and with [facility] care MD .   (30 Jun 2022 11:24)  PAST MEDICAL & SURGICAL HISTORY:  Atrial fibrillation  Hypertension  Diabetes  Prostate ca  Dementia  CHF (congestive heart failure)  Hyperlipemia  Diabetes      Depression      Dementia      DM (diabetes mellitus)      Atrial fibrillation      Prostate CA      Arteriosclerotic heart disease (ASHD)      Dementia      Anemia      Constipation      AICD (automatic cardioverter/defibrillator) present      Osteomyelitis of finger of left hand      Personal history of radiation therapy      H/O ongoing treatment with hormonal therapy      H/O bladder infections      Scrotal abscess      Urinary retention      Hematuria      H/O cystitis      Constipation      VHD (valvular heart disease)      Aortic valve stenosis      History of automatic internal cardiac defibrillator (AICD)          MEDICATIONS  (STANDING):  clopidogrel Tablet 75 milliGRAM(s) Oral daily  dextrose 5%. 1000 milliLiter(s) (50 mL/Hr) IV Continuous <Continuous>  dextrose 5%. 1000 milliLiter(s) (100 mL/Hr) IV Continuous <Continuous>  dextrose 50% Injectable 25 Gram(s) IV Push once  dextrose 50% Injectable 12.5 Gram(s) IV Push once  dextrose 50% Injectable 25 Gram(s) IV Push once  donepezil 5 milliGRAM(s) Oral at bedtime  glucagon  Injectable 1 milliGRAM(s) IntraMuscular once  heparin   Injectable 5000 Unit(s) SubCutaneous every 12 hours  insulin lispro (ADMELOG) corrective regimen sliding scale   SubCutaneous three times a day before meals  lactobacillus acidophilus 1 Tablet(s) Oral daily  multivitamin/minerals 1 Tablet(s) Oral daily  pantoprazole    Tablet 40 milliGRAM(s) Oral before breakfast  polyethylene glycol 3350 17 Gram(s) Oral daily  senna 2 Tablet(s) Oral at bedtime  simvastatin 20 milliGRAM(s) Oral at bedtime  tamsulosin 0.4 milliGRAM(s) Oral at bedtime    MEDICATIONS  (PRN):  acetaminophen     Tablet .. 650 milliGRAM(s) Oral every 6 hours PRN Temp greater or equal to 38C (100.4F), Mild Pain (1 - 3)  aluminum hydroxide/magnesium hydroxide/simethicone Suspension 30 milliLiter(s) Oral every 4 hours PRN Dyspepsia  dextrose Oral Gel 15 Gram(s) Oral once PRN Blood Glucose LESS THAN 70 milliGRAM(s)/deciliter  melatonin 3 milliGRAM(s) Oral at bedtime PRN Insomnia  ondansetron Injectable 4 milliGRAM(s) IV Push every 8 hours PRN Nausea and/or Vomiting      OBJECTIVE    T(C): 36.8 (07-08-22 @ 10:55), Max: 36.8 (07-08-22 @ 10:55)  HR: 82 (07-08-22 @ 10:55) (79 - 84)  BP: 112/73 (07-08-22 @ 10:55) (112/73 - 156/88)  RR: 18 (07-08-22 @ 10:55) (18 - 18)  SpO2: 95% (07-08-22 @ 10:55) (93% - 95%)  Wt(kg): --  I&O's Summary  REVIEW OF SYSTEMS:  Patient is  unable to provide any information/ROS  due to baseline mental status.   PHYSICAL EXAM:  Appearance: NAD. VS past 24 hrs -as above   HEENT:   Moist oral mucosa. Conjunctiva clear b/l.   Neck : supple  Respiratory: Lungs CTAB.  Gastrointestinal:  Soft, nontender. No rebound. No rigidity. BS present	  Cardiovascular: RRR ,S1S2 present  Neurologic: Non-focal. Moving all extremities.  Extremities: No edema. No erythema. No calf tenderness.  Skin: No rashes, No ecchymoses, No cyanosis.	  wounds ,skin lesions-See skin assesment flow sheet   CAPILLARY BLOOD GLUCOSE  POCT Blood Glucose.: 190 mg/dL (08 Jul 2022 12:11)  POCT Blood Glucose.: 110 mg/dL (08 Jul 2022 07:35)  POCT Blood Glucose.: 157 mg/dL (07 Jul 2022 21:58)  POCT Blood Glucose.: 107 mg/dL (07 Jul 2022 16:55)  Culture - Urine (collected 30 Jun 2022 15:20)  Source: Catheterized Catheterized  Final Report (03 Jul 2022 13:37):    >100,000 CFU/ml Escherichia coli ESBL  Organism: Escherichia coli ESBL (03 Jul 2022 13:37)  Organism: Escherichia coli ESBL (03 Jul 2022 13:37)  RADIOLOGY & ADDITIONAL TESTS:   reviewed elctronically  Patient was seen and examined on a day of discharge . Plan of care , discharge medications and recommendations discussed with consultants and clearance for discharge obtained .Social service , case management  and medical staff are aware of plan. Family is notified. Discharge summary  is  prepared electronically-see separate document prepared by me .75minutes spent on this visit, 50% visit time spent in care co-ordination with other attendings and counselling patient  I have discussed care plan with patient and HCP ,expressed understanding of problems treatment and their effect and side effects, alternatives in detail,I have asked if they have any questions and concerns and appropriately addressed them to best of my ability Spoke to MEREDITH FROM [facility]

## 2022-07-08 NOTE — PROGRESS NOTE ADULT - PROBLEM SELECTOR PLAN 2
secondary to UTI ,poa

## 2022-07-08 NOTE — PROGRESS NOTE ADULT - ASSESSMENT
89-year-old male from [facility] with history of dementia brought by ambulance for evaluation of unresponsiveness    cmo  son hcp  richmond hollis  hospice referral at [facility]  cm follow up noted    on ABX  ID and Cardio eval noted  labs reviewed  imaging reviewed  
89-year-old male from [employer] with history of dementia brought by ambulance for evaluation of unresponsiveness    cmo  son hcp  richmond hollis  hospice referral at [facility]  cm follow up noted    change in status was noted to be more lethargy - confusion - and intermittent agitation - underlying Dx of DEMENTIA    on ABX  ID and Cardio eval noted  labs reviewed  imaging reviewed
89-year-old male from [employer] with history of dementia brought by ambulance for evaluation of unresponsiveness    cmo  son hcp  richmond hollis  hospice referral at [facility]  cm follow up noted    on ABX  ID and Cardio eval noted  labs reviewed  imaging reviewed
89-year-old male from [employer] with history of dementia brought by ambulance for evaluation of unresponsiveness    cmo  son hcp  richmond hollis  hospice referral at [facility]  cm follow up noted    on ABX  ID and Cardio eval noted  labs reviewed  imaging reviewed
89-year-old male from [employer] with history of dementia brought by ambulance for evaluation of unresponsiveness    cmo  son hcp  richmond hollis  hospice referral at [facility]  cm follow up noted    change in status was noted to be more lethargy - confusion - and intermittent agitation -     on ABX  ID and Cardio eval noted  labs reviewed  imaging reviewed
r/o cva  ashd, atrial fib  s/p ppm  subdural hematoma(sdh)  dyslipidemia  ca prostate  dementia
r/o cva  ashd, atrial fib  s/p ppm  subdural hematoma(sdh)  dyslipidemia  ca prostate  dementia
89-year-old male from [employer] with history of dementia brought by ambulance for evaluation of unresponsiveness    goc documented  cm follow up reviewed  plan for group home dc  pt is DNR DNI  labs and imaging and VS noted
The patient is a 89 year old male with a history of HL, atrial fibrillation, SDH, dementia who presents with unresponsiveness. The patient is unable to provide additional history . He reportedly was verbal yesterday at [facility] but unresponsive today.  Past Medical/Surgical History:  HL, atrial fibrillation, SDH, dementia ,om  Medications:  Home Medications:  ascorbic acid 500 mg oral tablet: 1 tab(s) orally once a day (03 Apr 2022 11:16)  Aspirin Enteric Coated 81 mg oral delayed release tablet: 1 tab(s) orally once a day (03 Apr 2022 11:16)  donepezil 5 mg oral tablet: 1 tab(s) orally once a day (at bedtime) (03 Apr 2022 11:16)  ferrous sulfate 325 mg (65 mg elemental iron) oral delayed release tablet: 1 tab(s) orally once a day (03 Apr 2022 11:16)  Multiple Vitamins with Minerals oral tablet: 1 tab(s) orally once a day (03 Apr 2022 11:16)  simvastatin 20 mg oral tablet: 1 tab(s) orally once a day (at bedtime) (03 Apr 2022 11:16)Patient is admitted for management of FTT ,suspected UTI Palliative care consult requested ,to discuss advance directives and complete MOLST ,family requested to initiate comfort measures only and to focus on pain and anxiety management ,case was d/w PCP from [facility] and with pall care MD .
r/o cva  ashd, atrial fib  s/p ppm  subdural hematoma(sdh)  dyslipidemia  ca prostate  dementia
r/o cva  ashd, atrial fib  s/p ppm  subdural hematoma(sdh)  dyslipidemia  ca prostate  dementia
89-year-old male from [employer] with history of dementia brought by ambulance for evaluation of unresponsiveness    cmo  son hcp  richmond hollis  hospice referral at [facility]    on ABX  ID and Cardio eval noted  labs reviewed  imaging reviewed  
The patient is a 89 year old male with a history of HL, atrial fibrillation, SDH, dementia who presents with unresponsiveness. The patient is unable to provide additional history . He reportedly was verbal yesterday at [facility] but unresponsive today.  Past Medical/Surgical History:  HL, atrial fibrillation, SDH, dementia ,om  Medications:  Home Medications:  ascorbic acid 500 mg oral tablet: 1 tab(s) orally once a day (03 Apr 2022 11:16)  Aspirin Enteric Coated 81 mg oral delayed release tablet: 1 tab(s) orally once a day (03 Apr 2022 11:16)  donepezil 5 mg oral tablet: 1 tab(s) orally once a day (at bedtime) (03 Apr 2022 11:16)  ferrous sulfate 325 mg (65 mg elemental iron) oral delayed release tablet: 1 tab(s) orally once a day (03 Apr 2022 11:16)  Multiple Vitamins with Minerals oral tablet: 1 tab(s) orally once a day (03 Apr 2022 11:16)  simvastatin 20 mg oral tablet: 1 tab(s) orally once a day (at bedtime) (03 Apr 2022 11:16)Patient is admitted for management of FTT ,suspected UTI Palliative care consult requested ,to discuss advance directives and complete MOLST ,family requested to initiate comfort measures only and to focus on pain and anxiety management ,case was d/w PCP from [facility] and with pall care MD .
r/o cva  ashd, atrial fib  s/p ppm  subdural hematoma(sdh)  dyslipidemia  ca prostate  dementia
r/o cva  ashd, atrial fib  s/p ppm  subdural hematoma(sdh)  dyslipidemia  ca prostate  dementia
89-year-old male from [employer] with history of dementia brought by ambulance for evaluation of unresponsiveness    cmo  son hcp  richmond hollis  hospice referral at [facility]    on ABX  ID and Cardio eval noted  labs reviewed  imaging reviewed  
r/o cva  ashd, atrial fib  s/p ppm  subdural hematoma(sdh)  dyslipidemia  ca prostate  dementia
r/o cva  ashd, atrial fib  s/p ppm  subdural hematoma(sdh)  dyslipidemia  ca prostate  dementia
The patient is a 89 year old male with a history of HL, atrial fibrillation, SDH, dementia who presents with unresponsiveness. The patient is unable to provide additional history . He reportedly was verbal yesterday at [facility] but unresponsive today.  Past Medical/Surgical History:  HL, atrial fibrillation, SDH, dementia ,om  Medications:  Home Medications:  ascorbic acid 500 mg oral tablet: 1 tab(s) orally once a day (03 Apr 2022 11:16)  Aspirin Enteric Coated 81 mg oral delayed release tablet: 1 tab(s) orally once a day (03 Apr 2022 11:16)  donepezil 5 mg oral tablet: 1 tab(s) orally once a day (at bedtime) (03 Apr 2022 11:16)  ferrous sulfate 325 mg (65 mg elemental iron) oral delayed release tablet: 1 tab(s) orally once a day (03 Apr 2022 11:16)  Multiple Vitamins with Minerals oral tablet: 1 tab(s) orally once a day (03 Apr 2022 11:16)  simvastatin 20 mg oral tablet: 1 tab(s) orally once a day (at bedtime) (03 Apr 2022 11:16)Patient is admitted for management of FTT ,suspected UTI Palliative care consult requested ,to discuss advance directives and complete MOLST ,family requested to initiate comfort measures only and to focus on pain and anxiety management ,case was d/w PCP from [facility] and with pall care MD .
The patient is a 89 year old male with a history of HL, atrial fibrillation, SDH, dementia who presents with unresponsiveness. The patient is unable to provide additional history . He reportedly was verbal yesterday at [facility] but unresponsive today.  Past Medical/Surgical History:  HL, atrial fibrillation, SDH, dementia ,om  Medications:  Home Medications:  ascorbic acid 500 mg oral tablet: 1 tab(s) orally once a day (03 Apr 2022 11:16)  Aspirin Enteric Coated 81 mg oral delayed release tablet: 1 tab(s) orally once a day (03 Apr 2022 11:16)  donepezil 5 mg oral tablet: 1 tab(s) orally once a day (at bedtime) (03 Apr 2022 11:16)  ferrous sulfate 325 mg (65 mg elemental iron) oral delayed release tablet: 1 tab(s) orally once a day (03 Apr 2022 11:16)  Multiple Vitamins with Minerals oral tablet: 1 tab(s) orally once a day (03 Apr 2022 11:16)  simvastatin 20 mg oral tablet: 1 tab(s) orally once a day (at bedtime) (03 Apr 2022 11:16)Patient is admitted for management of FTT ,suspected UTI Palliative care consult requested ,to discuss advance directives and complete MOLST ,family requested to initiate comfort measures only and to focus on pain and anxiety management ,case was d/w PCP from [facility] and with pall care MD .
The patient is a 89 year old male with a history of HL, atrial fibrillation, SDH, dementia who presents with unresponsiveness. The patient is unable to provide additional history . He reportedly was verbal yesterday at [facility] but unresponsive today.  Past Medical/Surgical History:  HL, atrial fibrillation, SDH, dementia ,om  Medications:  Home Medications:  ascorbic acid 500 mg oral tablet: 1 tab(s) orally once a day (03 Apr 2022 11:16)  Aspirin Enteric Coated 81 mg oral delayed release tablet: 1 tab(s) orally once a day (03 Apr 2022 11:16)  donepezil 5 mg oral tablet: 1 tab(s) orally once a day (at bedtime) (03 Apr 2022 11:16)  ferrous sulfate 325 mg (65 mg elemental iron) oral delayed release tablet: 1 tab(s) orally once a day (03 Apr 2022 11:16)  Multiple Vitamins with Minerals oral tablet: 1 tab(s) orally once a day (03 Apr 2022 11:16)  simvastatin 20 mg oral tablet: 1 tab(s) orally once a day (at bedtime) (03 Apr 2022 11:16)Patient is admitted for management of FTT ,suspected UTI Palliative care consult requested ,to discuss advance directives and complete MOLST ,family requested to initiate comfort measures only and to focus on pain and anxiety management ,case was d/w PCP from [facility] and with pall care MD .
The patient is a 89 year old male with a history of HL, atrial fibrillation, SDH, dementia who presents with unresponsiveness. The patient is unable to provide additional history . He reportedly was verbal yesterday at [facility] but unresponsive today.  Past Medical/Surgical History:  HL, atrial fibrillation, SDH, dementia ,om  Medications:  Home Medications:  ascorbic acid 500 mg oral tablet: 1 tab(s) orally once a day (03 Apr 2022 11:16)  Aspirin Enteric Coated 81 mg oral delayed release tablet: 1 tab(s) orally once a day (03 Apr 2022 11:16)  donepezil 5 mg oral tablet: 1 tab(s) orally once a day (at bedtime) (03 Apr 2022 11:16)  ferrous sulfate 325 mg (65 mg elemental iron) oral delayed release tablet: 1 tab(s) orally once a day (03 Apr 2022 11:16)  Multiple Vitamins with Minerals oral tablet: 1 tab(s) orally once a day (03 Apr 2022 11:16)  simvastatin 20 mg oral tablet: 1 tab(s) orally once a day (at bedtime) (03 Apr 2022 11:16)Patient is admitted for management of FTT ,suspected UTI Palliative care consult requested ,to discuss advance directives and complete MOLST ,family requested to initiate comfort measures only and to focus on pain and anxiety management ,case was d/w PCP from [facility] and with pall care MD .

## 2022-07-08 NOTE — PROGRESS NOTE ADULT - PROBLEM SELECTOR PLAN 3
with ESBL -d/w Dr Issa ,continue invanz for 5 days
with ESBL -d/w Dr Issa ,continue invanz for 5 days
septic workup and ID cons , abx as per ID ,case d/w 
with ESBL -d/w Dr Issa ,continue invanz for 5 days
with ESBL -d/w Dr Issa ,continue invanz for 5 days
septic workup and ID cons , abx as per ID ,case d/w

## 2022-07-08 NOTE — PROGRESS NOTE ADULT - PROBLEM SELECTOR PROBLEM 2
Acute metabolic encephalopathy

## 2022-07-08 NOTE — PROGRESS NOTE ADULT - SUBJECTIVE AND OBJECTIVE BOX
Patient is a 89y Male with a known history of :  Unresponsive episode [R41.89]    Suspected UTI [R39.89]    Dementia [F03.90]    Acute metabolic encephalopathy [G93.41]    DM (diabetes mellitus) [E11.9]    CAD (coronary artery disease) [I25.10]    HTN (hypertension) [I10]    Prophylactic measure [Z29.9]    Acute UTI [N39.0]      HPI:  The patient is a 89 year old male with a history of HL, atrial fibrillation, SDH, dementia who presents with unresponsiveness. The patient is unable to provide additional history . He reportedly was verbal yesterday at [facility] but unresponsive today.  Past Medical/Surgical History:  HL, atrial fibrillation, SDH, dementia ,om  Medications:  Home Medications:  ascorbic acid 500 mg oral tablet: 1 tab(s) orally once a day (03 Apr 2022 11:16)  Aspirin Enteric Coated 81 mg oral delayed release tablet: 1 tab(s) orally once a day (03 Apr 2022 11:16)  donepezil 5 mg oral tablet: 1 tab(s) orally once a day (at bedtime) (03 Apr 2022 11:16)  ferrous sulfate 325 mg (65 mg elemental iron) oral delayed release tablet: 1 tab(s) orally once a day (03 Apr 2022 11:16)  Multiple Vitamins with Minerals oral tablet: 1 tab(s) orally once a day (03 Apr 2022 11:16)  simvastatin 20 mg oral tablet: 1 tab(s) orally once a day (at bedtime) (03 Apr 2022 11:16)Patient is admitted for management of FTT ,suspected UTI Palliative care consult requested ,to discuss advance directives and complete MOLST ,family requested to initiate comfort measures only and to focus on pain and anxiety management ,case was d/w PCP from [facility] and with pall care MD .   (30 Jun 2022 11:24)      REVIEW OF SYSTEMS:    CONSTITUTIONAL: No fever, weight loss, or fatigue  EYES: No eye pain, visual disturbances, or discharge  ENMT:  No difficulty hearing, tinnitus, vertigo; No sinus or throat pain  NECK: No pain or stiffness  BREASTS: No pain, masses, or nipple discharge  RESPIRATORY: No cough, wheezing, chills or hemoptysis; No shortness of breath  CARDIOVASCULAR: No chest pain, palpitations, dizziness, or leg swelling  GASTROINTESTINAL: No abdominal or epigastric pain. No nausea, vomiting, or hematemesis; No diarrhea or constipation. No melena or hematochezia.  GENITOURINARY: No dysuria, frequency, hematuria, or incontinence  NEUROLOGICAL: No headaches, memory loss, loss of strength, numbness, or tremors  SKIN: No itching, burning, rashes, or lesions   LYMPH NODES: No enlarged glands  ENDOCRINE: No heat or cold intolerance; No hair loss  MUSCULOSKELETAL: No joint pain or swelling; No muscle, back, or extremity pain  PSYCHIATRIC: No depression, anxiety, mood swings, or difficulty sleeping  HEME/LYMPH: No easy bruising, or bleeding gums  ALLERGY AND IMMUNOLOGIC: No hives or eczema    MEDICATIONS  (STANDING):  clopidogrel Tablet 75 milliGRAM(s) Oral daily  dextrose 5%. 1000 milliLiter(s) (100 mL/Hr) IV Continuous <Continuous>  dextrose 5%. 1000 milliLiter(s) (50 mL/Hr) IV Continuous <Continuous>  dextrose 50% Injectable 25 Gram(s) IV Push once  dextrose 50% Injectable 12.5 Gram(s) IV Push once  dextrose 50% Injectable 25 Gram(s) IV Push once  donepezil 5 milliGRAM(s) Oral at bedtime  glucagon  Injectable 1 milliGRAM(s) IntraMuscular once  heparin   Injectable 5000 Unit(s) SubCutaneous every 12 hours  insulin lispro (ADMELOG) corrective regimen sliding scale   SubCutaneous three times a day before meals  lactobacillus acidophilus 1 Tablet(s) Oral daily  multivitamin/minerals 1 Tablet(s) Oral daily  pantoprazole    Tablet 40 milliGRAM(s) Oral before breakfast  polyethylene glycol 3350 17 Gram(s) Oral daily  senna 2 Tablet(s) Oral at bedtime  simvastatin 20 milliGRAM(s) Oral at bedtime  tamsulosin 0.4 milliGRAM(s) Oral at bedtime    MEDICATIONS  (PRN):  acetaminophen     Tablet .. 650 milliGRAM(s) Oral every 6 hours PRN Temp greater or equal to 38C (100.4F), Mild Pain (1 - 3)  aluminum hydroxide/magnesium hydroxide/simethicone Suspension 30 milliLiter(s) Oral every 4 hours PRN Dyspepsia  dextrose Oral Gel 15 Gram(s) Oral once PRN Blood Glucose LESS THAN 70 milliGRAM(s)/deciliter  melatonin 3 milliGRAM(s) Oral at bedtime PRN Insomnia  ondansetron Injectable 4 milliGRAM(s) IV Push every 8 hours PRN Nausea and/or Vomiting      ALLERGIES: latex (Rash)  latex (Unknown)  No Known Drug Allergies      FAMILY HISTORY:      PHYSICAL EXAMINATION:  -----------------------------  T(C): 36.6 (07-08-22 @ 05:33), Max: 36.7 (07-07-22 @ 10:02)  HR: 79 (07-08-22 @ 05:33) (68 - 84)  BP: 117/65 (07-08-22 @ 05:33) (117/65 - 156/88)  RR: 18 (07-08-22 @ 05:33) (18 - 18)  SpO2: 93% (07-08-22 @ 05:33) (93% - 95%)  Wt(kg): --        VITALS  T(C): 36.6 (07-08-22 @ 05:33), Max: 36.7 (07-07-22 @ 10:02)  HR: 79 (07-08-22 @ 05:33) (68 - 84)  BP: 117/65 (07-08-22 @ 05:33) (117/65 - 156/88)  RR: 18 (07-08-22 @ 05:33) (18 - 18)  SpO2: 93% (07-08-22 @ 05:33) (93% - 95%)    Constitutional: well developed, normal appearance, well groomed, well nourished, no deformities and no acute distress.   Eyes: the conjunctiva exhibited no abnormalities and the eyelids demonstrated no xanthelasmas.   HEENT: normal oral mucosa, no oral pallor and no oral cyanosis.   Neck: normal jugular venous A waves present, normal jugular venous V waves present and no jugular venous jacobs A waves.   Pulmonary: no respiratory distress, normal respiratory rhythm and effort, no accessory muscle use and lungs were clear to auscultation bilaterally.   Cardiovascular: heart rate and rhythm were normal, normal S1 and S2 and no murmur, gallop, rub, heave or thrill are present.   Abdomen: soft, non-tender, no hepato-splenomegaly and no abdominal mass palpated.   Musculoskeletal: the gait could not be assessed..   Extremities: no clubbing of the fingernails, no localized cyanosis, no petechial hemorrhages and no ischemic changes.   Skin: normal skin color and pigmentation, no rash, no venous stasis, no skin lesions, no skin ulcer and no xanthoma was observed.   Psychiatric: oriented to person, place, and time, the affect was normal, the mood was normal and not feeling anxious.     LABS:   --------                     RADIOLOGY:  -----------------    ECG:     ECHO:

## 2022-07-08 NOTE — PROGRESS NOTE ADULT - PROBLEM SELECTOR PROBLEM 1
Unresponsive episode

## 2022-07-08 NOTE — PROGRESS NOTE ADULT - PROBLEM SELECTOR PLAN 6
continue home medications ,card cons

## 2022-07-08 NOTE — PROGRESS NOTE ADULT - PROBLEM SELECTOR PLAN 5
Accuchecks monitoring and insulin corrective regimen  sliding scale coverage with short acting inslulin, add longacting insulin as needed ,no concentrated sweets diet, serial labs ,HbA1C,education

## 2022-07-08 NOTE — PROGRESS NOTE ADULT - PROVIDER SPECIALTY LIST ADULT
Hospitalist
Neurology
Palliative Care
Infectious Disease
Neurology
Palliative Care
Palliative Care
Cardiology
Palliative Care
Internal Medicine
Internal Medicine
Cardiology
Cardiology
Hospitalist

## 2022-09-01 ENCOUNTER — INPATIENT (INPATIENT)
Facility: HOSPITAL | Age: 87
LOS: 7 days | Discharge: HOME | DRG: 638 | End: 2022-09-09
Attending: INTERNAL MEDICINE | Admitting: INTERNAL MEDICINE
Payer: MEDICARE

## 2022-09-01 VITALS
SYSTOLIC BLOOD PRESSURE: 125 MMHG | RESPIRATION RATE: 16 BRPM | OXYGEN SATURATION: 95 % | HEIGHT: 72 IN | DIASTOLIC BLOOD PRESSURE: 70 MMHG | HEART RATE: 73 BPM | TEMPERATURE: 97 F

## 2022-09-01 DIAGNOSIS — L03.90 CELLULITIS, UNSPECIFIED: ICD-10-CM

## 2022-09-01 DIAGNOSIS — Z95.810 PRESENCE OF AUTOMATIC (IMPLANTABLE) CARDIAC DEFIBRILLATOR: Chronic | ICD-10-CM

## 2022-09-01 LAB
ALBUMIN SERPL ELPH-MCNC: 3.1 G/DL — LOW (ref 3.3–5)
ALP SERPL-CCNC: 110 U/L — SIGNIFICANT CHANGE UP (ref 40–120)
ALT FLD-CCNC: 26 U/L — SIGNIFICANT CHANGE UP (ref 12–78)
ANION GAP SERPL CALC-SCNC: 3 MMOL/L — LOW (ref 5–17)
APTT BLD: 30.8 SEC — SIGNIFICANT CHANGE UP (ref 27.5–35.5)
AST SERPL-CCNC: 27 U/L — SIGNIFICANT CHANGE UP (ref 15–37)
BASOPHILS # BLD AUTO: 0.03 K/UL — SIGNIFICANT CHANGE UP (ref 0–0.2)
BASOPHILS NFR BLD AUTO: 0.3 % — SIGNIFICANT CHANGE UP (ref 0–2)
BILIRUB SERPL-MCNC: 0.4 MG/DL — SIGNIFICANT CHANGE UP (ref 0.2–1.2)
BUN SERPL-MCNC: 26 MG/DL — HIGH (ref 7–23)
CALCIUM SERPL-MCNC: 9.1 MG/DL — SIGNIFICANT CHANGE UP (ref 8.5–10.1)
CHLORIDE SERPL-SCNC: 108 MMOL/L — SIGNIFICANT CHANGE UP (ref 96–108)
CO2 SERPL-SCNC: 31 MMOL/L — SIGNIFICANT CHANGE UP (ref 22–31)
CREAT SERPL-MCNC: 0.72 MG/DL — SIGNIFICANT CHANGE UP (ref 0.5–1.3)
EGFR: 84 ML/MIN/1.73M2 — SIGNIFICANT CHANGE UP
EOSINOPHIL # BLD AUTO: 0.04 K/UL — SIGNIFICANT CHANGE UP (ref 0–0.5)
EOSINOPHIL NFR BLD AUTO: 0.4 % — SIGNIFICANT CHANGE UP (ref 0–6)
GLUCOSE SERPL-MCNC: 168 MG/DL — HIGH (ref 70–99)
HCT VFR BLD CALC: 35.3 % — LOW (ref 39–50)
HGB BLD-MCNC: 11.4 G/DL — LOW (ref 13–17)
IMM GRANULOCYTES NFR BLD AUTO: 0.3 % — SIGNIFICANT CHANGE UP (ref 0–1.5)
INR BLD: 1.2 RATIO — HIGH (ref 0.88–1.16)
LACTATE SERPL-SCNC: 1.2 MMOL/L — SIGNIFICANT CHANGE UP (ref 0.7–2)
LYMPHOCYTES # BLD AUTO: 1.2 K/UL — SIGNIFICANT CHANGE UP (ref 1–3.3)
LYMPHOCYTES # BLD AUTO: 13.4 % — SIGNIFICANT CHANGE UP (ref 13–44)
MCHC RBC-ENTMCNC: 30.5 PG — SIGNIFICANT CHANGE UP (ref 27–34)
MCHC RBC-ENTMCNC: 32.3 GM/DL — SIGNIFICANT CHANGE UP (ref 32–36)
MCV RBC AUTO: 94.4 FL — SIGNIFICANT CHANGE UP (ref 80–100)
MONOCYTES # BLD AUTO: 0.93 K/UL — HIGH (ref 0–0.9)
MONOCYTES NFR BLD AUTO: 10.4 % — SIGNIFICANT CHANGE UP (ref 2–14)
NEUTROPHILS # BLD AUTO: 6.74 K/UL — SIGNIFICANT CHANGE UP (ref 1.8–7.4)
NEUTROPHILS NFR BLD AUTO: 75.2 % — SIGNIFICANT CHANGE UP (ref 43–77)
NRBC # BLD: 0 /100 WBCS — SIGNIFICANT CHANGE UP (ref 0–0)
PLATELET # BLD AUTO: 259 K/UL — SIGNIFICANT CHANGE UP (ref 150–400)
POTASSIUM SERPL-MCNC: 4.7 MMOL/L — SIGNIFICANT CHANGE UP (ref 3.5–5.3)
POTASSIUM SERPL-SCNC: 4.7 MMOL/L — SIGNIFICANT CHANGE UP (ref 3.5–5.3)
PROT SERPL-MCNC: 7.2 G/DL — SIGNIFICANT CHANGE UP (ref 6–8.3)
PROTHROM AB SERPL-ACNC: 14.1 SEC — HIGH (ref 10.5–13.4)
RBC # BLD: 3.74 M/UL — LOW (ref 4.2–5.8)
RBC # FLD: 13.5 % — SIGNIFICANT CHANGE UP (ref 10.3–14.5)
SARS-COV-2 RNA SPEC QL NAA+PROBE: SIGNIFICANT CHANGE UP
SODIUM SERPL-SCNC: 142 MMOL/L — SIGNIFICANT CHANGE UP (ref 135–145)
WBC # BLD: 8.97 K/UL — SIGNIFICANT CHANGE UP (ref 3.8–10.5)
WBC # FLD AUTO: 8.97 K/UL — SIGNIFICANT CHANGE UP (ref 3.8–10.5)

## 2022-09-01 PROCEDURE — 73630 X-RAY EXAM OF FOOT: CPT | Mod: 26,LT

## 2022-09-01 PROCEDURE — 93971 EXTREMITY STUDY: CPT | Mod: 26,LT

## 2022-09-01 PROCEDURE — 93010 ELECTROCARDIOGRAM REPORT: CPT

## 2022-09-01 PROCEDURE — 99284 EMERGENCY DEPT VISIT MOD MDM: CPT | Mod: FS

## 2022-09-01 PROCEDURE — 71045 X-RAY EXAM CHEST 1 VIEW: CPT | Mod: 26

## 2022-09-01 RX ORDER — VANCOMYCIN HCL 1 G
1000 VIAL (EA) INTRAVENOUS ONCE
Refills: 0 | Status: COMPLETED | OUTPATIENT
Start: 2022-09-01 | End: 2022-09-01

## 2022-09-01 RX ORDER — PIPERACILLIN AND TAZOBACTAM 4; .5 G/20ML; G/20ML
3.38 INJECTION, POWDER, LYOPHILIZED, FOR SOLUTION INTRAVENOUS ONCE
Refills: 0 | Status: COMPLETED | OUTPATIENT
Start: 2022-09-01 | End: 2022-09-01

## 2022-09-01 RX ADMIN — PIPERACILLIN AND TAZOBACTAM 200 GRAM(S): 4; .5 INJECTION, POWDER, LYOPHILIZED, FOR SOLUTION INTRAVENOUS at 22:47

## 2022-09-01 RX ADMIN — Medication 250 MILLIGRAM(S): at 23:27

## 2022-09-01 NOTE — ED ADULT NURSE NOTE - OBJECTIVE STATEMENT
Brought in by EMS Brought in by EMS sent from SNF as reported with left 2nd toe diabetic foot ulcer to r/o osteomyelitis. Patient is AOx2 h/o dementia.

## 2022-09-01 NOTE — ED PROVIDER NOTE - OBJECTIVE STATEMENT
pt is a 89yo male with pmhx of Dementia, DM, CHF, HTN, A-FIB NOT ON AC, HEMATURIA, HLD, OSTEO, PROSTATE CANCER, BIB EMS FROM NH presents with possible osteomyelitis. pt unable to give information due to dementia. pt with left foot 2nd digit diabetic ulcer sent in to r/o osteo.

## 2022-09-01 NOTE — CONSULT NOTE ADULT - SUBJECTIVE AND OBJECTIVE BOX
[facility] [place]     Patient is a 89y old  Male who presents with a chief complaint of     HPI:      HPI:    89y Male for Cardiology Consult    PAST MEDICAL & SURGICAL HISTORY:  Atrial fibrillation      Hypertension      Diabetes      Prostate ca      Dementia      CHF (congestive heart failure)      Hyperlipemia      Diabetes      Depression      Dementia      DM (diabetes mellitus)      Atrial fibrillation      Prostate CA      Arteriosclerotic heart disease (ASHD)      Dementia      Anemia      Constipation      AICD (automatic cardioverter/defibrillator) present      Osteomyelitis of finger of left hand      Personal history of radiation therapy      H/O ongoing treatment with hormonal therapy      H/O bladder infections      Scrotal abscess      Urinary retention      Hematuria      H/O cystitis      Constipation      VHD (valvular heart disease)      Aortic valve stenosis      History of automatic internal cardiac defibrillator (AICD)          FAMILY HISTORY:      SOCIAL HISTORY:   Alcohol:  Smoking:    Allergies    latex (Rash)  latex (Unknown)  No Known Drug Allergies    Intolerances        MEDICATIONS  (STANDING):    MEDICATIONS  (PRN):      REVIEW OF SYSTEMS:  CONSTITUTIONAL: No fever, weight loss, chills, shakes, or fat  RESPIRATORY: No cough, wheezing, hemoptysis, or shortness of breath  CARDIOVASCULAR: No chest pain, dyspnea, palpitations, dizziness, syncope, paroxysmal nocturnal dyspnea, orthopnea, or arm or leg swelling  GASTROINTESTINAL: No abdominal  or epigastric pain, nausea, vomiting, hematemesis, diarrhea, constipation, melena or bright red bloo  NEUROLOGICAL: No headaches, memory loss, slurred speech, limb weakness, loss of strength, numbness, or tremors  SKIN: No itching, burning, rashes, or lesions  ENDOCRINE: No heat or cold intolerance, or hair loss  MUSCULOSKELETAL: No joint pain or swelling, muscle, back, or extremity pain  HEME/LYMPH: No easy bruising or bleeding gums  ALLERY AND IMMUNOLOGIC: No hives or rash.    Vital Signs Last 24 Hrs  T(C): 36.2 (01 Sep 2022 20:30), Max: 36.2 (01 Sep 2022 20:30)  T(F): 97.2 (01 Sep 2022 20:30), Max: 97.2 (01 Sep 2022 20:30)  HR: 73 (01 Sep 2022 20:30) (73 - 73)  BP: 125/70 (01 Sep 2022 20:30) (125/70 - 125/70)  BP(mean): --  RR: 16 (01 Sep 2022 20:30) (16 - 16)  SpO2: 95% (01 Sep 2022 20:30) (95% - 95%)    Parameters below as of 01 Sep 2022 20:30  Patient On (Oxygen Delivery Method): room air        PHYSICAL EXAM:  HEAD:  Atraumatic, Normocephalic  EYES: EOMI, PERRLA, conjunctiva and sclera clear  NECK: Supple and normal thyroid.  No JVD or carotid bruit.   HEART: S1, S2 regular , 1/6 soft ejection systolic murmur in mitral area , no thrill and no gallops .  PULMONARY: Bilateral vesicular breathing , few scattered ronchi and few scattered rales are present .  ABDOMEN: Soft nontender and positive bowl sounds   EXTREMITIES:  No clubbing, cyanosis, or pedal  edema  NEUROLOGICAL: AAOX3 , no focal deficit .    LABS:                        11.4   8.97  )-----------( 259      ( 01 Sep 2022 22:45 )             35.3     09-01    142  |  108  |  26<H>  ----------------------------<  168<H>  4.7   |  31  |  0.72    Ca    9.1      01 Sep 2022 22:45    TPro  7.2  /  Alb  3.1<L>  /  TBili  0.4  /  DBili  x   /  AST  27  /  ALT  26  /  AlkPhos  110  09-01        PT/INR - ( 01 Sep 2022 22:45 )   PT: 14.1 sec;   INR: 1.20 ratio         PTT - ( 01 Sep 2022 22:45 )  PTT:30.8 sec    BNP      EKG:  ECHO:  IMAGING:    Assessment and Plan :     Will continue to follow during hospital course and give further recommendations as needed . Thanks for your referral . if any questions please contact me at office [phone] [phone])  IVETTE VAZQUEZ MD [place] [address]  [address]  OFFICE : [phone]  CELL : [phone]  CARDIOLOGY CONSULT :     Patient is a 89y old  Male who presents with a chief complaint of foot infection .    HPI:  · Chief Complaint: The patient is a 89y Male complaining of wound check.  · Unable to Obtain: Dementia  · HPI Objective Statement: pt is a 89yo male with pmhx of Dementia, DM, CHF, HTN, A-FIB NOT ON AC, HEMATURIA, HLD, OSTEO, PROSTATE CANCER, BIB EMS FROM NH presents with possible osteomyelitis. pt unable to give information due to dementia. pt with left foot 2nd digit diabetic ulcer sent in to r/o osteo.          HPI:    89y Male for Cardiology Consult    PAST MEDICAL & SURGICAL HISTORY:  Atrial fibrillation      Hypertension      Diabetes      Prostate ca      Dementia      CHF (congestive heart failure)      Hyperlipemia      Diabetes      Depression      Dementia      DM (diabetes mellitus)      Atrial fibrillation      Prostate CA      Arteriosclerotic heart disease (ASHD)      Dementia      Anemia      Constipation      AICD (automatic cardioverter/defibrillator) present      Osteomyelitis of finger of left hand      Personal history of radiation therapy      H/O ongoing treatment with hormonal therapy      H/O bladder infections      Scrotal abscess      Urinary retention      Hematuria      H/O cystitis      Constipation      VHD (valvular heart disease)      Aortic valve stenosis      History of automatic internal cardiac defibrillator (AICD)          FAMILY HISTORY:      SOCIAL HISTORY:   Alcohol:  Smoking:    Allergies    latex (Rash)  latex (Unknown)  No Known Drug Allergies    Intolerances        MEDICATIONS  (STANDING):    MEDICATIONS  (PRN):    Vital Signs Last 24 Hrs  T(C): 36.2 (01 Sep 2022 20:30), Max: 36.2 (01 Sep 2022 20:30)  T(F): 97.2 (01 Sep 2022 20:30), Max: 97.2 (01 Sep 2022 20:30)  HR: 73 (01 Sep 2022 20:30) (73 - 73)  BP: 125/70 (01 Sep 2022 20:30) (125/70 - 125/70)  BP(mean): --  RR: 16 (01 Sep 2022 20:30) (16 - 16)  SpO2: 95% (01 Sep 2022 20:30) (95% - 95%)    Parameters below as of 01 Sep 2022 20:30  Patient On (Oxygen Delivery Method): room air      LABS:                        11.4   8.97  )-----------( 259      ( 01 Sep 2022 22:45 )             35.3     09-01    142  |  108  |  26<H>  ----------------------------<  168<H>  4.7   |  31  |  0.72    Ca    9.1      01 Sep 2022 22:45    TPro  7.2  /  Alb  3.1<L>  /  TBili  0.4  /  DBili  x   /  AST  27  /  ALT  26  /  AlkPhos  110  09-01        PT/INR - ( 01 Sep 2022 22:45 )   PT: 14.1 sec;   INR: 1.20 ratio         PTT - ( 01 Sep 2022 22:45 )  PTT:30.8 sec    Assessment and Plan :   FULL CONSULT DICTATED .  89 years old male with H/O CHF ,hypertension , atrial fibrillation and not on anticoagulation due to hematuria and has foot infection . Cardiac wise stable and cleared for foot surgery if needed . Contiue I/V antibiotics abd rest of medications as such .  Will continue to follow during hospital course and give further recommendations as needed . Thanks for your referral . if any questions please contact me at office ([phone] [phone])

## 2022-09-01 NOTE — ED PROVIDER NOTE - CLINICAL SUMMARY MEDICAL DECISION MAKING FREE TEXT BOX
pt is a 89yo male with pmhx of Dementia, DM, CHF, HTN, A-FIB NOT ON AC, HEMATURIA, HLD, OSTEO, PROSTATE CANCER, BIB EMS FROM NH presents with possible osteomyelitis. pt unable to give information due to dementia. pt with left foot 2nd digit diabetic ulcer sent in to r/o osteo.  Check labs, panculture including blood and urine cultures IV antibiotics obtain x-ray of the left foot.  Admit to the hospital for further evaluation by wound care and IV antibiotics.  Vascular is intact throughout the foot

## 2022-09-01 NOTE — ED ADULT TRIAGE NOTE - MEANS OF ARRIVAL
stretcher
76M w/ pmhx BPH, CAD, neck surgery, Melanoma on radiation (Monday/Thursday), presents to ED c/o low sodium level and nausea. Sent in by Dr. Batres. Plan labs to check sodium levels.

## 2022-09-01 NOTE — ED ADULT NURSE NOTE - SKIN INTEGRITY
diabetic ilcer noted on the left 2nd toe diabetic ulcer noted on the left 2nd toe, multiple scabs noted on both arms

## 2022-09-02 DIAGNOSIS — I38 ENDOCARDITIS, VALVE UNSPECIFIED: ICD-10-CM

## 2022-09-02 DIAGNOSIS — I50.9 HEART FAILURE, UNSPECIFIED: ICD-10-CM

## 2022-09-02 DIAGNOSIS — I48.91 UNSPECIFIED ATRIAL FIBRILLATION: ICD-10-CM

## 2022-09-02 DIAGNOSIS — I10 ESSENTIAL (PRIMARY) HYPERTENSION: ICD-10-CM

## 2022-09-02 DIAGNOSIS — E11.9 TYPE 2 DIABETES MELLITUS WITHOUT COMPLICATIONS: ICD-10-CM

## 2022-09-02 DIAGNOSIS — F03.90 UNSPECIFIED DEMENTIA WITHOUT BEHAVIORAL DISTURBANCE: ICD-10-CM

## 2022-09-02 DIAGNOSIS — Z29.9 ENCOUNTER FOR PROPHYLACTIC MEASURES, UNSPECIFIED: ICD-10-CM

## 2022-09-02 DIAGNOSIS — E11.621 TYPE 2 DIABETES MELLITUS WITH FOOT ULCER: ICD-10-CM

## 2022-09-02 PROBLEM — R33.9 RETENTION OF URINE, UNSPECIFIED: Chronic | Status: ACTIVE | Noted: 2022-06-30

## 2022-09-02 PROBLEM — M86.9 OSTEOMYELITIS, UNSPECIFIED: Chronic | Status: ACTIVE | Noted: 2022-06-30

## 2022-09-02 PROBLEM — Z95.810 PRESENCE OF AUTOMATIC (IMPLANTABLE) CARDIAC DEFIBRILLATOR: Chronic | Status: ACTIVE | Noted: 2022-06-30

## 2022-09-02 PROBLEM — Z87.440 PERSONAL HISTORY OF URINARY (TRACT) INFECTIONS: Chronic | Status: ACTIVE | Noted: 2022-06-30

## 2022-09-02 PROBLEM — I35.0 NONRHEUMATIC AORTIC (VALVE) STENOSIS: Chronic | Status: ACTIVE | Noted: 2022-06-30

## 2022-09-02 PROBLEM — R31.9 HEMATURIA, UNSPECIFIED: Chronic | Status: ACTIVE | Noted: 2022-06-30

## 2022-09-02 PROBLEM — Z92.3 PERSONAL HISTORY OF IRRADIATION: Chronic | Status: ACTIVE | Noted: 2022-06-30

## 2022-09-02 PROBLEM — N49.2 INFLAMMATORY DISORDERS OF SCROTUM: Chronic | Status: ACTIVE | Noted: 2022-06-30

## 2022-09-02 PROBLEM — Z79.890 HORMONE REPLACEMENT THERAPY: Chronic | Status: ACTIVE | Noted: 2022-06-30

## 2022-09-02 PROBLEM — K59.00 CONSTIPATION, UNSPECIFIED: Chronic | Status: ACTIVE | Noted: 2022-06-30

## 2022-09-02 LAB
ALBUMIN SERPL ELPH-MCNC: 3 G/DL — LOW (ref 3.3–5)
ALP SERPL-CCNC: 100 U/L — SIGNIFICANT CHANGE UP (ref 40–120)
ALT FLD-CCNC: 26 U/L — SIGNIFICANT CHANGE UP (ref 12–78)
ANION GAP SERPL CALC-SCNC: 4 MMOL/L — LOW (ref 5–17)
AST SERPL-CCNC: 35 U/L — SIGNIFICANT CHANGE UP (ref 15–37)
BILIRUB SERPL-MCNC: 0.6 MG/DL — SIGNIFICANT CHANGE UP (ref 0.2–1.2)
BUN SERPL-MCNC: 21 MG/DL — SIGNIFICANT CHANGE UP (ref 7–23)
CALCIUM SERPL-MCNC: 9.2 MG/DL — SIGNIFICANT CHANGE UP (ref 8.5–10.1)
CHLORIDE SERPL-SCNC: 107 MMOL/L — SIGNIFICANT CHANGE UP (ref 96–108)
CHOLEST SERPL-MCNC: 119 MG/DL — SIGNIFICANT CHANGE UP
CO2 SERPL-SCNC: 31 MMOL/L — SIGNIFICANT CHANGE UP (ref 22–31)
CREAT SERPL-MCNC: 0.67 MG/DL — SIGNIFICANT CHANGE UP (ref 0.5–1.3)
CRP SERPL-MCNC: 36 MG/L — HIGH
EGFR: 86 ML/MIN/1.73M2 — SIGNIFICANT CHANGE UP
ERYTHROCYTE [SEDIMENTATION RATE] IN BLOOD: 43 MM/HR — HIGH (ref 0–20)
GLUCOSE SERPL-MCNC: 118 MG/DL — HIGH (ref 70–99)
HCT VFR BLD CALC: 34.5 % — LOW (ref 39–50)
HDLC SERPL-MCNC: 49 MG/DL — SIGNIFICANT CHANGE UP
HGB BLD-MCNC: 11.1 G/DL — LOW (ref 13–17)
LIPID PNL WITH DIRECT LDL SERPL: 63 MG/DL — SIGNIFICANT CHANGE UP
MAGNESIUM SERPL-MCNC: 1.6 MG/DL — SIGNIFICANT CHANGE UP (ref 1.6–2.6)
MCHC RBC-ENTMCNC: 30.7 PG — SIGNIFICANT CHANGE UP (ref 27–34)
MCHC RBC-ENTMCNC: 32.2 GM/DL — SIGNIFICANT CHANGE UP (ref 32–36)
MCV RBC AUTO: 95.3 FL — SIGNIFICANT CHANGE UP (ref 80–100)
NON HDL CHOLESTEROL: 70 MG/DL — SIGNIFICANT CHANGE UP
NRBC # BLD: 0 /100 WBCS — SIGNIFICANT CHANGE UP (ref 0–0)
NT-PROBNP SERPL-SCNC: 1154 PG/ML — HIGH (ref 0–450)
PLATELET # BLD AUTO: 229 K/UL — SIGNIFICANT CHANGE UP (ref 150–400)
POTASSIUM SERPL-MCNC: 4.7 MMOL/L — SIGNIFICANT CHANGE UP (ref 3.5–5.3)
POTASSIUM SERPL-SCNC: 4.7 MMOL/L — SIGNIFICANT CHANGE UP (ref 3.5–5.3)
PROT SERPL-MCNC: 6.7 G/DL — SIGNIFICANT CHANGE UP (ref 6–8.3)
RBC # BLD: 3.62 M/UL — LOW (ref 4.2–5.8)
RBC # FLD: 13.5 % — SIGNIFICANT CHANGE UP (ref 10.3–14.5)
SODIUM SERPL-SCNC: 142 MMOL/L — SIGNIFICANT CHANGE UP (ref 135–145)
TRIGL SERPL-MCNC: 35 MG/DL — SIGNIFICANT CHANGE UP
TSH SERPL-MCNC: 1.58 UIU/ML — SIGNIFICANT CHANGE UP (ref 0.36–3.74)
WBC # BLD: 8.56 K/UL — SIGNIFICANT CHANGE UP (ref 3.8–10.5)
WBC # FLD AUTO: 8.56 K/UL — SIGNIFICANT CHANGE UP (ref 3.8–10.5)

## 2022-09-02 PROCEDURE — 99221 1ST HOSP IP/OBS SF/LOW 40: CPT

## 2022-09-02 RX ORDER — PANTOPRAZOLE SODIUM 20 MG/1
1 TABLET, DELAYED RELEASE ORAL
Qty: 0 | Refills: 0 | DISCHARGE

## 2022-09-02 RX ORDER — SODIUM CHLORIDE 9 MG/ML
1000 INJECTION, SOLUTION INTRAVENOUS
Refills: 0 | Status: DISCONTINUED | OUTPATIENT
Start: 2022-09-02 | End: 2022-09-09

## 2022-09-02 RX ORDER — FUROSEMIDE 40 MG
1 TABLET ORAL
Qty: 0 | Refills: 0 | DISCHARGE

## 2022-09-02 RX ORDER — GLUCAGON INJECTION, SOLUTION 0.5 MG/.1ML
1 INJECTION, SOLUTION SUBCUTANEOUS ONCE
Refills: 0 | Status: DISCONTINUED | OUTPATIENT
Start: 2022-09-02 | End: 2022-09-09

## 2022-09-02 RX ORDER — METFORMIN HYDROCHLORIDE 850 MG/1
1 TABLET ORAL
Qty: 0 | Refills: 0 | DISCHARGE

## 2022-09-02 RX ORDER — CLOPIDOGREL BISULFATE 75 MG/1
75 TABLET, FILM COATED ORAL DAILY
Refills: 0 | Status: DISCONTINUED | OUTPATIENT
Start: 2022-09-02 | End: 2022-09-09

## 2022-09-02 RX ORDER — METOPROLOL TARTRATE 50 MG
25 TABLET ORAL DAILY
Refills: 0 | Status: DISCONTINUED | OUTPATIENT
Start: 2022-09-02 | End: 2022-09-09

## 2022-09-02 RX ORDER — FERROUS SULFATE 325(65) MG
325 TABLET ORAL DAILY
Refills: 0 | Status: DISCONTINUED | OUTPATIENT
Start: 2022-09-02 | End: 2022-09-09

## 2022-09-02 RX ORDER — ASPIRIN/CALCIUM CARB/MAGNESIUM 324 MG
1 TABLET ORAL
Qty: 0 | Refills: 0 | DISCHARGE

## 2022-09-02 RX ORDER — POTASSIUM CHLORIDE 20 MEQ
1 PACKET (EA) ORAL
Qty: 0 | Refills: 0 | DISCHARGE

## 2022-09-02 RX ORDER — POLYETHYLENE GLYCOL 3350 17 G/17G
17 POWDER, FOR SOLUTION ORAL DAILY
Refills: 0 | Status: DISCONTINUED | OUTPATIENT
Start: 2022-09-02 | End: 2022-09-09

## 2022-09-02 RX ORDER — DONEPEZIL HYDROCHLORIDE 10 MG/1
5 TABLET, FILM COATED ORAL AT BEDTIME
Refills: 0 | Status: DISCONTINUED | OUTPATIENT
Start: 2022-09-02 | End: 2022-09-09

## 2022-09-02 RX ORDER — SIMVASTATIN 20 MG/1
20 TABLET, FILM COATED ORAL AT BEDTIME
Refills: 0 | Status: DISCONTINUED | OUTPATIENT
Start: 2022-09-02 | End: 2022-09-09

## 2022-09-02 RX ORDER — LACTOBACILLUS ACIDOPHILUS 100MM CELL
1 CAPSULE ORAL
Refills: 0 | Status: DISCONTINUED | OUTPATIENT
Start: 2022-09-02 | End: 2022-09-09

## 2022-09-02 RX ORDER — ACETAMINOPHEN 500 MG
650 TABLET ORAL EVERY 6 HOURS
Refills: 0 | Status: DISCONTINUED | OUTPATIENT
Start: 2022-09-02 | End: 2022-09-09

## 2022-09-02 RX ORDER — VENLAFAXINE HCL 75 MG
0 CAPSULE, EXT RELEASE 24 HR ORAL
Qty: 0 | Refills: 0 | DISCHARGE

## 2022-09-02 RX ORDER — DEXTROSE 50 % IN WATER 50 %
12.5 SYRINGE (ML) INTRAVENOUS ONCE
Refills: 0 | Status: DISCONTINUED | OUTPATIENT
Start: 2022-09-02 | End: 2022-09-09

## 2022-09-02 RX ORDER — IPRATROPIUM/ALBUTEROL SULFATE 18-103MCG
3 AEROSOL WITH ADAPTER (GRAM) INHALATION
Qty: 0 | Refills: 0 | DISCHARGE

## 2022-09-02 RX ORDER — DONEPEZIL HYDROCHLORIDE 10 MG/1
1 TABLET, FILM COATED ORAL
Qty: 0 | Refills: 0 | DISCHARGE

## 2022-09-02 RX ORDER — DEXTROSE 50 % IN WATER 50 %
25 SYRINGE (ML) INTRAVENOUS ONCE
Refills: 0 | Status: DISCONTINUED | OUTPATIENT
Start: 2022-09-02 | End: 2022-09-09

## 2022-09-02 RX ORDER — POTASSIUM CHLORIDE 20 MEQ
10 PACKET (EA) ORAL DAILY
Refills: 0 | Status: DISCONTINUED | OUTPATIENT
Start: 2022-09-02 | End: 2022-09-09

## 2022-09-02 RX ORDER — TAMSULOSIN HYDROCHLORIDE 0.4 MG/1
1 CAPSULE ORAL
Qty: 0 | Refills: 0 | DISCHARGE

## 2022-09-02 RX ORDER — LOSARTAN POTASSIUM 100 MG/1
25 TABLET, FILM COATED ORAL DAILY
Refills: 0 | Status: DISCONTINUED | OUTPATIENT
Start: 2022-09-02 | End: 2022-09-09

## 2022-09-02 RX ORDER — DEXTROSE 50 % IN WATER 50 %
15 SYRINGE (ML) INTRAVENOUS ONCE
Refills: 0 | Status: DISCONTINUED | OUTPATIENT
Start: 2022-09-02 | End: 2022-09-09

## 2022-09-02 RX ORDER — MULTIVIT-MIN/FERROUS GLUCONATE 9 MG/15 ML
1 LIQUID (ML) ORAL DAILY
Refills: 0 | Status: DISCONTINUED | OUTPATIENT
Start: 2022-09-02 | End: 2022-09-09

## 2022-09-02 RX ORDER — SENNA PLUS 8.6 MG/1
2 TABLET ORAL AT BEDTIME
Refills: 0 | Status: DISCONTINUED | OUTPATIENT
Start: 2022-09-02 | End: 2022-09-09

## 2022-09-02 RX ORDER — TAMSULOSIN HYDROCHLORIDE 0.4 MG/1
0.4 CAPSULE ORAL AT BEDTIME
Refills: 0 | Status: DISCONTINUED | OUTPATIENT
Start: 2022-09-02 | End: 2022-09-09

## 2022-09-02 RX ORDER — ONDANSETRON 8 MG/1
4 TABLET, FILM COATED ORAL EVERY 8 HOURS
Refills: 0 | Status: DISCONTINUED | OUTPATIENT
Start: 2022-09-02 | End: 2022-09-09

## 2022-09-02 RX ORDER — WARFARIN SODIUM 2.5 MG/1
1 TABLET ORAL
Qty: 0 | Refills: 0 | DISCHARGE

## 2022-09-02 RX ORDER — MIDODRINE HYDROCHLORIDE 2.5 MG/1
1 TABLET ORAL
Qty: 0 | Refills: 0 | DISCHARGE

## 2022-09-02 RX ORDER — LACTOBACILLUS ACIDOPHILUS 100MM CELL
2 CAPSULE ORAL
Qty: 0 | Refills: 0 | DISCHARGE

## 2022-09-02 RX ORDER — PIPERACILLIN AND TAZOBACTAM 4; .5 G/20ML; G/20ML
3.38 INJECTION, POWDER, LYOPHILIZED, FOR SOLUTION INTRAVENOUS EVERY 8 HOURS
Refills: 0 | Status: DISCONTINUED | OUTPATIENT
Start: 2022-09-02 | End: 2022-09-02

## 2022-09-02 RX ORDER — SIMVASTATIN 20 MG/1
1 TABLET, FILM COATED ORAL
Qty: 0 | Refills: 0 | DISCHARGE

## 2022-09-02 RX ORDER — INSULIN LISPRO 100/ML
VIAL (ML) SUBCUTANEOUS
Refills: 0 | Status: DISCONTINUED | OUTPATIENT
Start: 2022-09-02 | End: 2022-09-09

## 2022-09-02 RX ORDER — PANTOPRAZOLE SODIUM 20 MG/1
40 TABLET, DELAYED RELEASE ORAL DAILY
Refills: 0 | Status: DISCONTINUED | OUTPATIENT
Start: 2022-09-02 | End: 2022-09-09

## 2022-09-02 RX ORDER — HEPARIN SODIUM 5000 [USP'U]/ML
5000 INJECTION INTRAVENOUS; SUBCUTANEOUS EVERY 8 HOURS
Refills: 0 | Status: DISCONTINUED | OUTPATIENT
Start: 2022-09-02 | End: 2022-09-09

## 2022-09-02 RX ORDER — ASCORBIC ACID 60 MG
500 TABLET,CHEWABLE ORAL DAILY
Refills: 0 | Status: DISCONTINUED | OUTPATIENT
Start: 2022-09-02 | End: 2022-09-09

## 2022-09-02 RX ORDER — LANOLIN ALCOHOL/MO/W.PET/CERES
3 CREAM (GRAM) TOPICAL AT BEDTIME
Refills: 0 | Status: DISCONTINUED | OUTPATIENT
Start: 2022-09-02 | End: 2022-09-09

## 2022-09-02 RX ORDER — INSULIN LISPRO 100/ML
VIAL (ML) SUBCUTANEOUS AT BEDTIME
Refills: 0 | Status: DISCONTINUED | OUTPATIENT
Start: 2022-09-02 | End: 2022-09-09

## 2022-09-02 RX ADMIN — Medication 3 MILLIGRAM(S): at 21:46

## 2022-09-02 RX ADMIN — Medication 325 MILLIGRAM(S): at 12:13

## 2022-09-02 RX ADMIN — PANTOPRAZOLE SODIUM 40 MILLIGRAM(S): 20 TABLET, DELAYED RELEASE ORAL at 12:13

## 2022-09-02 RX ADMIN — POLYETHYLENE GLYCOL 3350 17 GRAM(S): 17 POWDER, FOR SOLUTION ORAL at 12:14

## 2022-09-02 RX ADMIN — HEPARIN SODIUM 5000 UNIT(S): 5000 INJECTION INTRAVENOUS; SUBCUTANEOUS at 15:42

## 2022-09-02 RX ADMIN — Medication 1 TABLET(S): at 12:14

## 2022-09-02 RX ADMIN — SIMVASTATIN 20 MILLIGRAM(S): 20 TABLET, FILM COATED ORAL at 21:46

## 2022-09-02 RX ADMIN — TAMSULOSIN HYDROCHLORIDE 0.4 MILLIGRAM(S): 0.4 CAPSULE ORAL at 21:46

## 2022-09-02 RX ADMIN — Medication 25 MILLIGRAM(S): at 06:12

## 2022-09-02 RX ADMIN — Medication 1 TABLET(S): at 18:03

## 2022-09-02 RX ADMIN — SENNA PLUS 2 TABLET(S): 8.6 TABLET ORAL at 21:46

## 2022-09-02 RX ADMIN — Medication 10 MILLIEQUIVALENT(S): at 12:13

## 2022-09-02 RX ADMIN — HEPARIN SODIUM 5000 UNIT(S): 5000 INJECTION INTRAVENOUS; SUBCUTANEOUS at 21:46

## 2022-09-02 RX ADMIN — LOSARTAN POTASSIUM 25 MILLIGRAM(S): 100 TABLET, FILM COATED ORAL at 06:13

## 2022-09-02 RX ADMIN — CLOPIDOGREL BISULFATE 75 MILLIGRAM(S): 75 TABLET, FILM COATED ORAL at 12:13

## 2022-09-02 RX ADMIN — Medication 500 MILLIGRAM(S): at 12:14

## 2022-09-02 RX ADMIN — HEPARIN SODIUM 5000 UNIT(S): 5000 INJECTION INTRAVENOUS; SUBCUTANEOUS at 06:12

## 2022-09-02 RX ADMIN — Medication 20 MILLIGRAM(S): at 06:13

## 2022-09-02 RX ADMIN — DONEPEZIL HYDROCHLORIDE 5 MILLIGRAM(S): 10 TABLET, FILM COATED ORAL at 21:46

## 2022-09-02 NOTE — H&P ADULT - ASSESSMENT
Patient  is a 89yo male with pmhx of Dementia, DM, CHF, HTN, A-FIB NOT ON AC, HEMATURIA, HLD, OSTEO, PROSTATE CANCER, BIB EMS FROM NH presents with possible osteomyelitis. pt unable to give information due to dementia. pt with left foot 2nd digit diabetic ulcer sent in to r/o osteo.

## 2022-09-02 NOTE — H&P ADULT - GENITOURINARY MALE
normal external genitalia/no hernia/no discharge/no mass/no tenderness/no ulcer/normal/no penile lesion/no palpable testicular mass/no scrotal mass F60.2

## 2022-09-02 NOTE — H&P ADULT - PROBLEM/PLAN-1
Refill policies:    • Allow 2-3 business days for refills; controlled substances may take longer.   • Contact your pharmacy at least 5 days prior to running out of medication and have them send an electronic request or submit request through the “request re Depending on your insurance carrier, approval may take 3-10 days. It is highly recommended patients contact their insurance carrier directly to determine coverage.   If test is done without insurance authorization, patient may be responsible for the entire [place]  -Patient has cervical myelopathy. She was tried in a [place] therapy collar but actually caused increased weakness in her hands. It made her neck pain feel better but not her strength. -She has had multiple rounds of oral steroids with no relief. DISPLAY PLAN FREE TEXT

## 2022-09-02 NOTE — H&P ADULT - TIME BILLING
75minutes spent on this visit, 50% visit time spent in care co-ordination with other attendings and counselling patient ,writing admission orders ( see complete and current orders and order section) ,requesting necessary consults ,informing family about status & plan of care .I have discussed care plan with [facility] wellness/admitting /nursing   department ,outpatient PCP , hospital consultants , ER physician & med staff .

## 2022-09-02 NOTE — CONSULT NOTE ADULT - SUBJECTIVE AND OBJECTIVE BOX
HPI:  89YO M PMH Dementia, HL, atrial fibrillation, SDH, Ca Prostate DM CHF UTI resident of [facility] who presented to the hospital with cc of left foot 2nd toe nonhealing diabetic ulcer r/o osteo. No fever chills n/v/d CP SOB. Pt unable to provide history. podiatry was consulted to evaluate the left foot wound.        Allergies    latex (Rash)  latex (Unknown)  No Known Drug Allergies    Intolerances        MEDICATIONS  (STANDING):  ascorbic acid 500 milliGRAM(s) Oral daily  clopidogrel Tablet 75 milliGRAM(s) Oral daily  dextrose 5%. 1000 milliLiter(s) (50 mL/Hr) IV Continuous <Continuous>  dextrose 5%. 1000 milliLiter(s) (100 mL/Hr) IV Continuous <Continuous>  dextrose 50% Injectable 25 Gram(s) IV Push once  dextrose 50% Injectable 12.5 Gram(s) IV Push once  dextrose 50% Injectable 25 Gram(s) IV Push once  donepezil 5 milliGRAM(s) Oral at bedtime  ferrous    sulfate 325 milliGRAM(s) Oral daily  glucagon  Injectable 1 milliGRAM(s) IntraMuscular once  heparin   Injectable 5000 Unit(s) SubCutaneous every 8 hours  insulin lispro (ADMELOG) corrective regimen sliding scale   SubCutaneous three times a day before meals  insulin lispro (ADMELOG) corrective regimen sliding scale   SubCutaneous at bedtime  lactobacillus acidophilus 1 Tablet(s) Oral two times a day  losartan 25 milliGRAM(s) Oral daily  metoprolol succinate ER 25 milliGRAM(s) Oral daily  multivitamin/minerals 1 Tablet(s) Oral daily  pantoprazole    Tablet 40 milliGRAM(s) Oral daily  polyethylene glycol 3350 17 Gram(s) Oral daily  potassium chloride    Tablet ER 10 milliEquivalent(s) Oral daily  senna 2 Tablet(s) Oral at bedtime  simvastatin 20 milliGRAM(s) Oral at bedtime  tamsulosin 0.4 milliGRAM(s) Oral at bedtime  torsemide 20 milliGRAM(s) Oral daily    MEDICATIONS  (PRN):  acetaminophen     Tablet .. 650 milliGRAM(s) Oral every 6 hours PRN Temp greater or equal to 38C (100.4F), Mild Pain (1 - 3)  aluminum hydroxide/magnesium hydroxide/simethicone Suspension 30 milliLiter(s) Oral every 4 hours PRN Dyspepsia  dextrose Oral Gel 15 Gram(s) Oral once PRN Blood Glucose LESS THAN 70 milliGRAM(s)/deciliter  melatonin 3 milliGRAM(s) Oral at bedtime PRN Insomnia  ondansetron Injectable 4 milliGRAM(s) IV Push every 8 hours PRN Nausea and/or Vomiting      Vital Signs Last 24 Hrs  T(C): 37 (02 Sep 2022 15:39), Max: 37 (02 Sep 2022 09:00)  T(F): 98.6 (02 Sep 2022 15:39), Max: 98.6 (02 Sep 2022 09:00)  HR: 75 (02 Sep 2022 15:39) (70 - 75)  BP: 104/71 (02 Sep 2022 15:39) (104/71 - 133/75)  BP(mean): --  RR: 17 (02 Sep 2022 15:39) (16 - 17)  SpO2: 97% (02 Sep 2022 15:39) (95% - 98%)    Parameters below as of 02 Sep 2022 15:39  Patient On (Oxygen Delivery Method): room air    PHYSICAL EXAM:  Vascular: DP & PT non palpable bilaterally, Capillary refill 6 seconds  Neurological: couldn't  assess   Musculoskeletal: couldn't assess  Dermatological: 1.1x 0.3 x0.2 cm ulceration noted to the left, 2nd digit,  probe to bone, no periwound erythema, no fluctuance, no malodor, no proximal streaking at this time    CBC Full  -  ( 02 Sep 2022 06:02 )  WBC Count : 8.56 K/uL  RBC Count : 3.62 M/uL  Hemoglobin : 11.1 g/dL  Hematocrit : 34.5 %  Platelet Count - Automated : 229 K/uL  Mean Cell Volume : 95.3 fl  Mean Cell Hemoglobin : 30.7 pg  Mean Cell Hemoglobin Concentration : 32.2 gm/dL  Auto Neutrophil # : x  Auto Lymphocyte # : x  Auto Monocyte # : x  Auto Eosinophil # : x  Auto Basophil # : x  Auto Neutrophil % : x  Auto Lymphocyte % : x  Auto Monocyte % : x  Auto Eosinophil % : x  Auto Basophil % : x      09-02    142  |  107  |  21  ----------------------------<  118<H>  4.7   |  31  |  0.67    Ca    9.2      02 Sep 2022 06:02  Mg     1.6     09-02    TPro  6.7  /  Alb  3.0<L>  /  TBili  0.6  /  DBili  x   /  AST  35  /  ALT  26  /  AlkPhos  100  09-02      PT/INR - ( 01 Sep 2022 22:45 )   PT: 14.1 sec;   INR: 1.20 ratio         PTT - ( 01 Sep 2022 22:45 )  PTT:30.8 sec        Imaging:  pending

## 2022-09-02 NOTE — CONSULT NOTE ADULT - PROBLEM SELECTOR RECOMMENDATION 9
Patient was seen and evaluated  labs reviewed WBC 8.5  recommend left foot MRI to r/o OM  Wound cleaned with NS and dressed with bandaid  Start IV abx per ID  Recommend vasc consult   podiatry to follow

## 2022-09-02 NOTE — H&P ADULT - NSICDXPASTMEDICALHX_GEN_ALL_CORE_FT
PAST MEDICAL HISTORY:  AICD (automatic cardioverter/defibrillator) present     Anemia     Aortic valve stenosis     Arteriosclerotic heart disease (ASHD)     Atrial fibrillation     Atrial fibrillation     CHF (congestive heart failure)     Constipation     Constipation     Dementia     Dementia     Dementia     Depression     Diabetes     Diabetes     DM (diabetes mellitus)     H/O bladder infections     H/O cystitis     H/O ongoing treatment with hormonal therapy     Hematuria     Hyperlipemia     Hypertension     Osteomyelitis of finger of left hand     Personal history of radiation therapy     Prostate ca     Prostate CA     Scrotal abscess     Urinary retention     VHD (valvular heart disease)

## 2022-09-02 NOTE — PHARMACOTHERAPY INTERVENTION NOTE - COMMENTS
Medication reconciliation completed using MAR from [facility]. Medication list updated in [facility].

## 2022-09-02 NOTE — H&P ADULT - PROBLEM SELECTOR PLAN 7
DISPLAY PLAN FREE TEXT
Supportive care,frequent redirection,continue home medications,management of agitation as needed

## 2022-09-02 NOTE — H&P ADULT - PROBLEM SELECTOR PLAN 1
Seen  by podiatry and ID -Hold antibiotic for now  Fu cultures  Fu foot XR  Bone scan as pt unable to get MRI ( has AICD )  Podiatry eval done -Wound cleaned with NS and dressed with bandaid  Start IV abx per ID  Recommend vasc consult

## 2022-09-02 NOTE — H&P ADULT - ADDITIONAL PE
LEFT FOOT Vascular: DP & PT non palpable bilaterally, Capillary refill 6 seconds  Dermatological: 1.1x 0.3 x0.2 cm ulceration noted to the left, 2nd digit,  probe to bone, no periwound erythema, no fluctuance, no malodor, no proximal streaking at this time

## 2022-09-02 NOTE — CONSULT NOTE ADULT - SUBJECTIVE AND OBJECTIVE BOX
HPI:  89YO M PMH Dementia, HL, atrial fibrillation, SDH, Ca Prostate DM CHF UTI resident of [facility] who presented to the hospital with cc of left foot 2nd toe nonhealing diabetic ulcer r/o osteo. No fever chills n/v/d CP SOB. Pt unable to provide history.    Infectious Disease consult was called to evaluate pt and for antibiotic management.      Past Medical & Surgical Hx:  PAST MEDICAL & SURGICAL HISTORY:  Atrial fibrillation  Hypertension  Diabetes  Prostate ca  Dementia  CHF (congestive heart failure)  Hyperlipemia  Depression  Arteriosclerotic heart disease (ASHD)  Anemia  AICD (automatic cardioverter/defibrillator) present  Osteomyelitis of finger of left hand  Personal history of radiation therapy  H/O ongoing treatment with hormonal therapy  H/O bladder infections  Scrotal abscess  Urinary retention  Hematuria  H/O cystitis  Constipation  VHD (valvular heart disease)  Aortic valve stenosis      Social History--  EtOH: denies  Tobacco: denies   Drug Use: denies       FAMILY HISTORY:  Noncontributory    Allergies  latex (Rash)  latex (Unknown)  No Known Drug Allergies    Intolerances  NONe      Home Medications:  acetaminophen 325 mg oral tablet: 2 tab(s) orally every 6 hours, As needed, Temp greater or equal to 38C (100.4F), Mild Pain (1 - 3) (02 Sep 2022 12:11)  ascorbic acid 500 mg oral tablet: 1 tab(s) orally 2 times a day (02 Sep 2022 12:11)  donepezil 5 mg oral tablet: 1 tab(s) orally once a day (02 Sep 2022 12:11)  ferrous sulfate 325 mg (65 mg elemental iron) oral delayed release tablet: 1 tab(s) orally once a day (02 Sep 2022 12:11)  Multiple Vitamins with Minerals oral tablet: 1 tab(s) orally once a day (02 Sep 2022 12:11)  simvastatin 20 mg oral tablet: 1 tab(s) orally once a day (at bedtime) (02 Sep 2022 12:11)  tamsulosin 0.4 mg oral capsule: 1 cap(s) orally daily at bedtime  (02 Sep 2022 12:11)      Current Inpatient Medications :    ANTIBIOTICS:   piperacillin/tazobactam IVPB.. 3.375 Gram(s) IV Intermittent every 8 hours      OTHER RELEVANT MEDICATIONS :  acetaminophen     Tablet .. 650 milliGRAM(s) Oral every 6 hours PRN  aluminum hydroxide/magnesium hydroxide/simethicone Suspension 30 milliLiter(s) Oral every 4 hours PRN  ascorbic acid 500 milliGRAM(s) Oral daily  clopidogrel Tablet 75 milliGRAM(s) Oral daily  dextrose 5%. 1000 milliLiter(s) IV Continuous <Continuous>  dextrose 5%. 1000 milliLiter(s) IV Continuous <Continuous>  dextrose 50% Injectable 25 Gram(s) IV Push once  dextrose 50% Injectable 12.5 Gram(s) IV Push once  dextrose 50% Injectable 25 Gram(s) IV Push once  dextrose Oral Gel 15 Gram(s) Oral once PRN  donepezil 5 milliGRAM(s) Oral at bedtime  ferrous    sulfate 325 milliGRAM(s) Oral daily  glucagon  Injectable 1 milliGRAM(s) IntraMuscular once  heparin   Injectable 5000 Unit(s) SubCutaneous every 8 hours  insulin lispro (ADMELOG) corrective regimen sliding scale   SubCutaneous three times a day before meals  insulin lispro (ADMELOG) corrective regimen sliding scale   SubCutaneous at bedtime  losartan 25 milliGRAM(s) Oral daily  melatonin 3 milliGRAM(s) Oral at bedtime PRN  metoprolol succinate ER 25 milliGRAM(s) Oral daily  multivitamin/minerals 1 Tablet(s) Oral daily  ondansetron Injectable 4 milliGRAM(s) IV Push every 8 hours PRN  pantoprazole    Tablet 40 milliGRAM(s) Oral daily  polyethylene glycol 3350 17 Gram(s) Oral daily  potassium chloride    Tablet ER 10 milliEquivalent(s) Oral daily  senna 2 Tablet(s) Oral at bedtime  simvastatin 20 milliGRAM(s) Oral at bedtime  tamsulosin 0.4 milliGRAM(s) Oral at bedtime  torsemide 20 milliGRAM(s) Oral daily      ROS:  Unable to obtain due to : pt's condition    Physical Exam:  Vital Signs Last 24 Hrs  T(C): 37 (02 Sep 2022 09:00), Max: 37 (02 Sep 2022 09:00)  T(F): 98.6 (02 Sep 2022 09:00), Max: 98.6 (02 Sep 2022 09:00)  HR: 70 (02 Sep 2022 09:00) (70 - 73)  BP: 128/72 (02 Sep 2022 09:00) (125/70 - 133/75)  BP(mean): --  RR: 16 (02 Sep 2022 09:00) (16 - 16)  SpO2: 97% (02 Sep 2022 09:00) (95% - 98%)    Parameters below as of 02 Sep 2022 09:00  Patient On (Oxygen Delivery Method): room air      Height (cm): 182.9 (09-01 @ 20:30)    General: no acute distress  Neck: supple, trachea midline  Lungs: decreased, no wheeze/rhonchi  Cardiovascular: regular rate and rhythm, S1 S2  Abdomen: soft, nontender, ND, bowel sounds normal  Neurological:  Dementia  Skin: no rash  Extremities: Left 2nd toe ulcer no drainage + swelling with mininal erythema  Jaun LE chronic stasis    Labs:                         11.1   8.56  )-----------( 229      ( 02 Sep 2022 06:02 )             34.5   09-02    142  |  107  |  21  ----------------------------<  118<H>  4.7   |  31  |  0.67    Ca    9.2      02 Sep 2022 06:02  Mg     1.6     09-02    TPro  6.7  /  Alb  3.0<L>  /  TBili  0.6  /  DBili  x   /  AST  35  /  ALT  26  /  AlkPhos  100  09-02    Sedimentation Rate, Erythrocyte (09.01.22 @ 22:45)    Sedimentation Rate, Erythrocyte: 43 mm/hr    RECENT CULTURES:          RADIOLOGY & ADDITIONAL STUDIES:    ACC: [id number] EXAM:  XR CHEST PORTABLE IMMED 1V                          PROCEDURE DATE:  09/01/2022          INTERPRETATION:  TIME OF EXAM: September 1, 2022 at 9:29 PM.    CLINICAL INFORMATION: Sepsis.    COMPARISON:  June 30, 2022.    TECHNIQUE:  AP Portable chest x-ray. The head and chin obscures the   superior mediastinum and left apex. Rotated.    INTERPRETATION:    Heart size and the mediastinum cannot be accurately evaluated on this   projection. Left chest wall cardiac pacemaker with intact lead with tip   in expected region of the right ventricle.  Low lung volumes.  There is mild pulmonary vascular redistribution/congestion.  No focal lung consolidation seen in the visualized lungs.  No pleural effusion is seen.  No evidence of pneumothorax of the left apex is obscured and not   evaluated.  Right upper quadrant abdominal calcification and abdominal surgical clips   noted.      IMPRESSION:  Limited image. Low lung volumes. There is mild pulmonary   vascular redistribution/congestion.    Part of left apex obscured by head and chin. No focal lung consolidation   seen in the visualized lungs.    Assessment :   89YO M PMH Dementia, HL, atrial fibrillation, SDH, Ca Prostate DM CHF UTI resident of [facility] admitted with left foot 2nd toe nonhealing diabetic ulcer r/o osteo.   Afebrile. WBC wnl.  ESR 43.    Plan :   Hold antibiotic for now  Fu cultures  Fu foot XR  Bone scan as pt unable to get MRI ( has AICD )  Podiatry eval   Will follow    Continue with present regiment .  Approptiate use of antibiotics and adverse effects reviewed.      > 45 minutes spent in direct patient care reviewing  the notes, lab data/ imaging , discussion with multidisciplinary team. All questions were addressed and answered to the best of my capacity .    Thank you for allowing me to participate in the care of your patient .      Heber Issa MD  Infectious Disease  [phone]

## 2022-09-02 NOTE — PATIENT PROFILE ADULT - FALL HARM RISK - RISK INTERVENTIONS

## 2022-09-03 LAB
A1C WITH ESTIMATED AVERAGE GLUCOSE RESULT: 6.8 % — HIGH (ref 4–5.6)
ALBUMIN SERPL ELPH-MCNC: 3 G/DL — LOW (ref 3.3–5)
ALP SERPL-CCNC: 105 U/L — SIGNIFICANT CHANGE UP (ref 40–120)
ALT FLD-CCNC: 27 U/L — SIGNIFICANT CHANGE UP (ref 12–78)
ANION GAP SERPL CALC-SCNC: 6 MMOL/L — SIGNIFICANT CHANGE UP (ref 5–17)
APPEARANCE UR: ABNORMAL
AST SERPL-CCNC: 33 U/L — SIGNIFICANT CHANGE UP (ref 15–37)
BASOPHILS # BLD AUTO: 0.03 K/UL — SIGNIFICANT CHANGE UP (ref 0–0.2)
BASOPHILS NFR BLD AUTO: 0.5 % — SIGNIFICANT CHANGE UP (ref 0–2)
BILIRUB SERPL-MCNC: 0.7 MG/DL — SIGNIFICANT CHANGE UP (ref 0.2–1.2)
BILIRUB UR-MCNC: NEGATIVE — SIGNIFICANT CHANGE UP
BUN SERPL-MCNC: 18 MG/DL — SIGNIFICANT CHANGE UP (ref 7–23)
CALCIUM SERPL-MCNC: 9.6 MG/DL — SIGNIFICANT CHANGE UP (ref 8.5–10.1)
CHLORIDE SERPL-SCNC: 102 MMOL/L — SIGNIFICANT CHANGE UP (ref 96–108)
CO2 SERPL-SCNC: 31 MMOL/L — SIGNIFICANT CHANGE UP (ref 22–31)
COLOR SPEC: YELLOW — SIGNIFICANT CHANGE UP
CREAT SERPL-MCNC: 0.69 MG/DL — SIGNIFICANT CHANGE UP (ref 0.5–1.3)
DIFF PNL FLD: ABNORMAL
EGFR: 85 ML/MIN/1.73M2 — SIGNIFICANT CHANGE UP
EOSINOPHIL # BLD AUTO: 0.13 K/UL — SIGNIFICANT CHANGE UP (ref 0–0.5)
EOSINOPHIL NFR BLD AUTO: 2.1 % — SIGNIFICANT CHANGE UP (ref 0–6)
ESTIMATED AVERAGE GLUCOSE: 148 MG/DL — HIGH (ref 68–114)
GLUCOSE SERPL-MCNC: 109 MG/DL — HIGH (ref 70–99)
GLUCOSE UR QL: NEGATIVE — SIGNIFICANT CHANGE UP
HCT VFR BLD CALC: 35.3 % — LOW (ref 39–50)
HGB BLD-MCNC: 11.5 G/DL — LOW (ref 13–17)
IMM GRANULOCYTES NFR BLD AUTO: 0.3 % — SIGNIFICANT CHANGE UP (ref 0–1.5)
KETONES UR-MCNC: NEGATIVE — SIGNIFICANT CHANGE UP
LEUKOCYTE ESTERASE UR-ACNC: ABNORMAL
LYMPHOCYTES # BLD AUTO: 1.3 K/UL — SIGNIFICANT CHANGE UP (ref 1–3.3)
LYMPHOCYTES # BLD AUTO: 20.5 % — SIGNIFICANT CHANGE UP (ref 13–44)
MCHC RBC-ENTMCNC: 30.7 PG — SIGNIFICANT CHANGE UP (ref 27–34)
MCHC RBC-ENTMCNC: 32.6 GM/DL — SIGNIFICANT CHANGE UP (ref 32–36)
MCV RBC AUTO: 94.1 FL — SIGNIFICANT CHANGE UP (ref 80–100)
MONOCYTES # BLD AUTO: 0.62 K/UL — SIGNIFICANT CHANGE UP (ref 0–0.9)
MONOCYTES NFR BLD AUTO: 9.8 % — SIGNIFICANT CHANGE UP (ref 2–14)
NEUTROPHILS # BLD AUTO: 4.24 K/UL — SIGNIFICANT CHANGE UP (ref 1.8–7.4)
NEUTROPHILS NFR BLD AUTO: 66.8 % — SIGNIFICANT CHANGE UP (ref 43–77)
NITRITE UR-MCNC: NEGATIVE — SIGNIFICANT CHANGE UP
NRBC # BLD: 0 /100 WBCS — SIGNIFICANT CHANGE UP (ref 0–0)
PH UR: 7 — SIGNIFICANT CHANGE UP (ref 5–8)
PLATELET # BLD AUTO: 242 K/UL — SIGNIFICANT CHANGE UP (ref 150–400)
POTASSIUM SERPL-MCNC: 4 MMOL/L — SIGNIFICANT CHANGE UP (ref 3.5–5.3)
POTASSIUM SERPL-SCNC: 4 MMOL/L — SIGNIFICANT CHANGE UP (ref 3.5–5.3)
PROT SERPL-MCNC: 7 G/DL — SIGNIFICANT CHANGE UP (ref 6–8.3)
PROT UR-MCNC: 30 MG/DL
RBC # BLD: 3.75 M/UL — LOW (ref 4.2–5.8)
RBC # FLD: 13.6 % — SIGNIFICANT CHANGE UP (ref 10.3–14.5)
SODIUM SERPL-SCNC: 139 MMOL/L — SIGNIFICANT CHANGE UP (ref 135–145)
SP GR SPEC: 1.01 — SIGNIFICANT CHANGE UP (ref 1.01–1.02)
UROBILINOGEN FLD QL: NEGATIVE — SIGNIFICANT CHANGE UP
WBC # BLD: 6.34 K/UL — SIGNIFICANT CHANGE UP (ref 3.8–10.5)
WBC # FLD AUTO: 6.34 K/UL — SIGNIFICANT CHANGE UP (ref 3.8–10.5)

## 2022-09-03 RX ADMIN — HEPARIN SODIUM 5000 UNIT(S): 5000 INJECTION INTRAVENOUS; SUBCUTANEOUS at 23:00

## 2022-09-03 RX ADMIN — Medication 1 TABLET(S): at 05:27

## 2022-09-03 RX ADMIN — TAMSULOSIN HYDROCHLORIDE 0.4 MILLIGRAM(S): 0.4 CAPSULE ORAL at 23:00

## 2022-09-03 RX ADMIN — Medication 1 TABLET(S): at 11:35

## 2022-09-03 RX ADMIN — SIMVASTATIN 20 MILLIGRAM(S): 20 TABLET, FILM COATED ORAL at 23:00

## 2022-09-03 RX ADMIN — CLOPIDOGREL BISULFATE 75 MILLIGRAM(S): 75 TABLET, FILM COATED ORAL at 11:35

## 2022-09-03 RX ADMIN — PANTOPRAZOLE SODIUM 40 MILLIGRAM(S): 20 TABLET, DELAYED RELEASE ORAL at 11:35

## 2022-09-03 RX ADMIN — HEPARIN SODIUM 5000 UNIT(S): 5000 INJECTION INTRAVENOUS; SUBCUTANEOUS at 13:16

## 2022-09-03 RX ADMIN — LOSARTAN POTASSIUM 25 MILLIGRAM(S): 100 TABLET, FILM COATED ORAL at 05:27

## 2022-09-03 RX ADMIN — Medication 25 MILLIGRAM(S): at 05:27

## 2022-09-03 RX ADMIN — SENNA PLUS 2 TABLET(S): 8.6 TABLET ORAL at 23:00

## 2022-09-03 RX ADMIN — HEPARIN SODIUM 5000 UNIT(S): 5000 INJECTION INTRAVENOUS; SUBCUTANEOUS at 05:27

## 2022-09-03 RX ADMIN — Medication 325 MILLIGRAM(S): at 11:35

## 2022-09-03 RX ADMIN — DONEPEZIL HYDROCHLORIDE 5 MILLIGRAM(S): 10 TABLET, FILM COATED ORAL at 23:00

## 2022-09-03 RX ADMIN — Medication 20 MILLIGRAM(S): at 05:28

## 2022-09-03 RX ADMIN — Medication 500 MILLIGRAM(S): at 11:36

## 2022-09-03 RX ADMIN — POLYETHYLENE GLYCOL 3350 17 GRAM(S): 17 POWDER, FOR SOLUTION ORAL at 11:36

## 2022-09-03 RX ADMIN — Medication 10 MILLIEQUIVALENT(S): at 11:35

## 2022-09-03 RX ADMIN — Medication 1 TABLET(S): at 17:49

## 2022-09-03 NOTE — DIETITIAN INITIAL EVALUATION ADULT - ETIOLOGY
related to presumed inadequate protein energy intake in the setting of advanced age, dysphagia, multiple comorbidities related to motor causes

## 2022-09-03 NOTE — DIETITIAN INITIAL EVALUATION ADULT - PERTINENT LABORATORY DATA
09-03    139  |  102  |  18  ----------------------------<  109<H>  4.0   |  31  |  0.69    Ca    9.6      03 Sep 2022 08:52  Mg     1.6     09-02    TPro  7.0  /  Alb  3.0<L>  /  TBili  0.7  /  DBili  x   /  AST  33  /  ALT  27  /  AlkPhos  105  09-03  POCT Blood Glucose.: 89 mg/dL (09-03-22 @ 07:34)  A1C with Estimated Average Glucose Result: 6.4 % (07-01-22 @ 06:15)  A1C with Estimated Average Glucose Result: 5.9 % (04-04-22 @ 08:57)  A1C with Estimated Average Glucose Result: 5.9 % (01-26-22 @ 11:13)

## 2022-09-03 NOTE — DIETITIAN INITIAL EVALUATION ADULT - REASON FOR ADMISSION
Cellulitis    Patient  is a 89yo male with pmhx of Dementia, DM, CHF, HTN, A-FIB NOT ON AC, HEMATURIA, HLD, OSTEO, PROSTATE CANCER, BIB EMS FROM NH presents with possible osteomyelitis.

## 2022-09-03 NOTE — DIETITIAN INITIAL EVALUATION ADULT - OTHER CALCULATIONS
wt from last admit used for calcultions.  attempted bedscale wt -> 166# obtained however blankets and pillows on bed.

## 2022-09-03 NOTE — PROGRESS NOTE ADULT - SUBJECTIVE AND OBJECTIVE BOX
[facility] DIVISION of INFECTIOUS DISEASE  Jeff Amanda MD PhD, Tamara Remy MD, Elyse Domingo MD, Racquel Porter MD, Fran Vidal MD  and providing coverage with Heber Issa MD  Providing Infectious Disease Consultations at [facility], [facility], [facility]'s    Office# [phone] to schedule follow up appointments  Answering Service for urgent calls or New Consults [phone]  Cell# to text for urgent issues Jeff Amanda [phone]     infectious diseases progress note:    TERESA FRANCIS is a 89y y. o. Male patient    Overnight and events of the last 24hrs reviewed    Allergies    latex (Rash)  latex (Unknown)  No Known Drug Allergies    Intolerances        ANTIBIOTICS/RELEVANT:  antimicrobials    immunologic:    OTHER:  acetaminophen     Tablet .. 650 milliGRAM(s) Oral every 6 hours PRN  aluminum hydroxide/magnesium hydroxide/simethicone Suspension 30 milliLiter(s) Oral every 4 hours PRN  ascorbic acid 500 milliGRAM(s) Oral daily  clopidogrel Tablet 75 milliGRAM(s) Oral daily  dextrose 5%. 1000 milliLiter(s) IV Continuous <Continuous>  dextrose 5%. 1000 milliLiter(s) IV Continuous <Continuous>  dextrose 50% Injectable 25 Gram(s) IV Push once  dextrose 50% Injectable 12.5 Gram(s) IV Push once  dextrose 50% Injectable 25 Gram(s) IV Push once  dextrose Oral Gel 15 Gram(s) Oral once PRN  donepezil 5 milliGRAM(s) Oral at bedtime  ferrous    sulfate 325 milliGRAM(s) Oral daily  glucagon  Injectable 1 milliGRAM(s) IntraMuscular once  heparin   Injectable 5000 Unit(s) SubCutaneous every 8 hours  insulin lispro (ADMELOG) corrective regimen sliding scale   SubCutaneous three times a day before meals  insulin lispro (ADMELOG) corrective regimen sliding scale   SubCutaneous at bedtime  lactobacillus acidophilus 1 Tablet(s) Oral two times a day  losartan 25 milliGRAM(s) Oral daily  melatonin 3 milliGRAM(s) Oral at bedtime PRN  metoprolol succinate ER 25 milliGRAM(s) Oral daily  multivitamin/minerals 1 Tablet(s) Oral daily  ondansetron Injectable 4 milliGRAM(s) IV Push every 8 hours PRN  pantoprazole    Tablet 40 milliGRAM(s) Oral daily  polyethylene glycol 3350 17 Gram(s) Oral daily  potassium chloride    Tablet ER 10 milliEquivalent(s) Oral daily  senna 2 Tablet(s) Oral at bedtime  simvastatin 20 milliGRAM(s) Oral at bedtime  tamsulosin 0.4 milliGRAM(s) Oral at bedtime  torsemide 20 milliGRAM(s) Oral daily      Objective:  Vital Signs Last 24 Hrs  T(C): 37 (03 Sep 2022 05:03), Max: 37 (02 Sep 2022 15:39)  T(F): 98.6 (03 Sep 2022 05:03), Max: 98.6 (02 Sep 2022 15:39)  HR: 70 (03 Sep 2022 05:03) (70 - 79)  BP: 107/56 (03 Sep 2022 05:03) (104/71 - 107/64)  BP(mean): --  RR: 17 (03 Sep 2022 05:03) (17 - 18)  SpO2: 94% (03 Sep 2022 05:03) (93% - 97%)    Parameters below as of 03 Sep 2022 05:03  Patient On (Oxygen Delivery Method): room air        T(C): 37 (22 @ 05:03), Max: 37 (22 @ 09:00)  T(C): 37 (22 @ 05:03), Max: 37 (22 @ 09:00)  T(C): 37 (22 @ 05:03), Max: 37 (22 @ 09:00)    PHYSICAL EXAM:  HEENT: NC atraumatic  Neck: supple  Respiratory: no accessory muscle use, breathing comfortably  Cardiovascular: distant  Gastrointestinal: normal appearing, nondistended  Extremities: no clubbing, no cyanosis,        LABS:                          11.5   6.34  )-----------( 242      ( 03 Sep 2022 08:52 )             35.3       WBC  6.34  @ 08:52  8.56 - @ 06:02  8.97  @ 22:45          139  |  102  |  18  ----------------------------<  109<H>  4.0   |  31  |  0.69    Ca    9.6      03 Sep 2022 08:52  Mg     1.6         TPro  7.0  /  Alb  3.0<L>  /  TBili  0.7  /  DBili  x   /  AST  33  /  ALT  27  /  AlkPhos  105        Creatinine, Serum: 0.69 mg/dL (22 @ 08:52)  Creatinine, Serum: 0.67 mg/dL (22 @ 06:02)  Creatinine, Serum: 0.72 mg/dL (22 @ 22:45)      PT/INR - ( 01 Sep 2022 22:45 )   PT: 14.1 sec;   INR: 1.20 ratio         PTT - ( 01 Sep 2022 22:45 )  PTT:30.8 sec  Urinalysis Basic - ( 03 Sep 2022 07:49 )    Color: Yellow / Appearance: Slightly Turbid / S.010 / pH: x  Gluc: x / Ketone: Negative  / Bili: Negative / Urobili: Negative   Blood: x / Protein: 30 mg/dL / Nitrite: Negative   Leuk Esterase: Moderate / RBC: 11-25 /HPF / WBC 11-25   Sq Epi: x / Non Sq Epi: Occasional / Bacteria: x            INFLAMMATORY MARKERS  Auto Neutrophil #: 4.24 K/uL (22 @ 08:52)  Auto Lymphocyte #: 1.30 K/uL (22 @ 08:52)  Auto Neutrophil #: 6.74 K/uL (22 @ 22:45)  Auto Lymphocyte #: 1.20 K/uL (22 @ 22:45)    Lactate, Blood: 1.2 mmol/L (22 @ 22:45)    Auto Eosinophil #: 0.13 K/uL (22 @ 08:52)  Auto Eosinophil #: 0.04 K/uL (22 @ 22:45)      Sedimentation Rate, Erythrocyte: 43 mm/hr (22 @ 22:45)        Activated Partial Thromboplastin Time: 30.8 sec (22 @ 22:45)  INR: 1.20 ratio (22 @ 22:45)          MICROBIOLOGY:              RADIOLOGY & ADDITIONAL STUDIES:

## 2022-09-03 NOTE — PROGRESS NOTE ADULT - SUBJECTIVE AND OBJECTIVE BOX
PROGRESS NOTE  Patient is a 89y old  Male who presents with a chief complaint of Cellulitis    Patient  is a 89yo male with pmhx of Dementia, DM, CHF, HTN, A-FIB NOT ON AC, HEMATURIA, HLD, OSTEO, PROSTATE CANCER, BIB EMS FROM NH presents with possible osteomyelitis.    (03 Sep 2022 09:50)    Chart and available morning labs /imaging are reviewed electronically , urgent issues addressed . More information  is being added upon completion of rounds , when more information is collected and management discussed with consultants , medical staff and social service/case management on the floor   OVERNIGHT  No new issues reported by medical staff . All above noted Patient is resting in a bed comfortably .Confused ,poor mentation .No distress noted     HPI:  Patient  is a 89yo male with pmhx of Dementia, DM, CHF, HTN, A-FIB NOT ON AC, HEMATURIA, HLD, OSTEO, PROSTATE CANCER, BIB EMS FROM NH presents with possible osteomyelitis. pt unable to give information due to dementia. pt with left foot 2nd digit diabetic ulcer sent in to r/o osteo. (02 Sep 2022 09:51)    PAST MEDICAL & SURGICAL HISTORY:  Atrial fibrillation      Hypertension      Diabetes      Prostate ca      Dementia      CHF (congestive heart failure)      Hyperlipemia      Diabetes      Depression      Dementia      DM (diabetes mellitus)      Atrial fibrillation      Prostate CA      Arteriosclerotic heart disease (ASHD)      Dementia      Anemia      AICD (automatic cardioverter/defibrillator) present      Osteomyelitis of finger of left hand      Personal history of radiation therapy      H/O ongoing treatment with hormonal therapy      H/O bladder infections      Scrotal abscess      Urinary retention      Hematuria      H/O cystitis      Constipation      VHD (valvular heart disease)      Aortic valve stenosis      History of automatic internal cardiac defibrillator (AICD)          MEDICATIONS  (STANDING):  ascorbic acid 500 milliGRAM(s) Oral daily  clopidogrel Tablet 75 milliGRAM(s) Oral daily  dextrose 5%. 1000 milliLiter(s) (50 mL/Hr) IV Continuous <Continuous>  dextrose 5%. 1000 milliLiter(s) (100 mL/Hr) IV Continuous <Continuous>  dextrose 50% Injectable 25 Gram(s) IV Push once  dextrose 50% Injectable 12.5 Gram(s) IV Push once  dextrose 50% Injectable 25 Gram(s) IV Push once  donepezil 5 milliGRAM(s) Oral at bedtime  ferrous    sulfate 325 milliGRAM(s) Oral daily  glucagon  Injectable 1 milliGRAM(s) IntraMuscular once  heparin   Injectable 5000 Unit(s) SubCutaneous every 8 hours  insulin lispro (ADMELOG) corrective regimen sliding scale   SubCutaneous three times a day before meals  insulin lispro (ADMELOG) corrective regimen sliding scale   SubCutaneous at bedtime  lactobacillus acidophilus 1 Tablet(s) Oral two times a day  losartan 25 milliGRAM(s) Oral daily  metoprolol succinate ER 25 milliGRAM(s) Oral daily  multivitamin/minerals 1 Tablet(s) Oral daily  pantoprazole    Tablet 40 milliGRAM(s) Oral daily  polyethylene glycol 3350 17 Gram(s) Oral daily  potassium chloride    Tablet ER 10 milliEquivalent(s) Oral daily  senna 2 Tablet(s) Oral at bedtime  simvastatin 20 milliGRAM(s) Oral at bedtime  tamsulosin 0.4 milliGRAM(s) Oral at bedtime  torsemide 20 milliGRAM(s) Oral daily    MEDICATIONS  (PRN):  acetaminophen     Tablet .. 650 milliGRAM(s) Oral every 6 hours PRN Temp greater or equal to 38C (100.4F), Mild Pain (1 - 3)  aluminum hydroxide/magnesium hydroxide/simethicone Suspension 30 milliLiter(s) Oral every 4 hours PRN Dyspepsia  dextrose Oral Gel 15 Gram(s) Oral once PRN Blood Glucose LESS THAN 70 milliGRAM(s)/deciliter  melatonin 3 milliGRAM(s) Oral at bedtime PRN Insomnia  ondansetron Injectable 4 milliGRAM(s) IV Push every 8 hours PRN Nausea and/or Vomiting      OBJECTIVE    T(C): 37 (22 @ 05:03), Max: 37 (22 @ 15:39)  HR: 70 (22 @ 05:03) (70 - 79)  BP: 107/56 (22 @ 05:03) (104/71 - 107/64)  RR: 17 (22 @ 05:03) (17 - 18)  SpO2: 94% (22 @ 05:03) (93% - 97%)  Wt(kg): --  I&O's Summary    03 Sep 2022 07:01  -  03 Sep 2022 10:49  --------------------------------------------------------  IN: 0 mL / OUT: 1500 mL / NET: -1500 mL          REVIEW OF SYSTEMS:  CONSTITUTIONAL: No fever, weight loss, or fatigue  EYES: No eye pain, visual disturbances, or discharge  ENMT:   No sinus or throat pain  NECK: No pain or stiffness  RESPIRATORY: No cough, wheezing, chills or hemoptysis; No shortness of breath  CARDIOVASCULAR: No chest pain, palpitations, dizziness, or leg swelling  GASTROINTESTINAL: No abdominal pain. No nausea, vomiting; No diarrhea or constipation. No melena or hematochezia.  GENITOURINARY: No dysuria, frequency, hematuria, or incontinence  NEUROLOGICAL: No headaches, memory loss, loss of strength, numbness, or tremors  SKIN: No itching, burning, rashes, or lesions   MUSCULOSKELETAL: No joint pain or swelling; No muscle, back, or extremity pain    PHYSICAL EXAM:  Appearance: NAD. VS past 24 hrs -as above   HEENT:   Moist oral mucosa. Conjunctiva clear b/l.   Neck : supple  Respiratory: Lungs CTAB.  Gastrointestinal:  Soft, nontender. No rebound. No rigidity. BS present	  Cardiovascular: RRR ,S1S2 present  Neurologic: Non-focal. Moving all extremities.  Extremities: No edema. No erythema. No calf tenderness.Vascular: DP & PT non palpable bilaterally, Capillary refill 6 seconds  Neurological: couldn't  assess   Musculoskeletal: couldn't assess  Dermatological: 1.1x 0.3 x0.2 cm ulceration noted to the left, 2nd digit,  probe to bone, no periwound erythema, no fluctuance, no malodor, no proximal streaking at this time  Skin: No rashes, No ecchymoses, No cyanosis.	  wounds ,skin lesions-See skin assesment flow sheet   LABS:                        11.5   6.34  )-----------( 242      ( 03 Sep 2022 08:52 )             35.3     09-    139  |  102  |  18  ----------------------------<  109<H>  4.0   |  31  |  0.69    Ca    9.6      03 Sep 2022 08:52  Mg     1.6     09-02    TPro  7.0  /  Alb  3.0<L>  /  TBili  0.7  /  DBili  x   /  AST  33  /  ALT  27  /  AlkPhos  105  09-03    CAPILLARY BLOOD GLUCOSE      POCT Blood Glucose.: 89 mg/dL (03 Sep 2022 07:34)  POCT Blood Glucose.: 183 mg/dL (02 Sep 2022 21:30)  POCT Blood Glucose.: 120 mg/dL (02 Sep 2022 17:40)  POCT Blood Glucose.: 121 mg/dL (02 Sep 2022 11:57)    PT/INR - ( 01 Sep 2022 22:45 )   PT: 14.1 sec;   INR: 1.20 ratio         PTT - ( 01 Sep 2022 22:45 )  PTT:30.8 sec  Urinalysis Basic - ( 03 Sep 2022 07:49 )    Color: Yellow / Appearance: Slightly Turbid / S.010 / pH: x  Gluc: x / Ketone: Negative  / Bili: Negative / Urobili: Negative   Blood: x / Protein: 30 mg/dL / Nitrite: Negative   Leuk Esterase: Moderate / RBC: 11-25 /HPF / WBC 11-25   Sq Epi: x / Non Sq Epi: Occasional / Bacteria: x        Culture - Blood (collected 01 Sep 2022 22:45)  Source: .Blood Blood-Peripheral  Preliminary Report (03 Sep 2022 05:00):    No growth to date.    Culture - Blood (collected 01 Sep 2022 22:35)  Source: .Blood Blood-Peripheral  Preliminary Report (03 Sep 2022 05:00):    No growth to date.      RADIOLOGY & ADDITIONAL TESTS:   reviewed elctronically  ASSESSMENT/PLAN: 	  25 minutes aggregate time was spent on this visit, 50% visit time spent in care co-ordination with other attendings and counselling patient .I have discussed care plan with patient / HCP/family member scott Mooney  ,who expressed understanding of problems treatment and their effect and side effects, alternatives in details. I have asked if they have any questions and concerns and appropriately addressed them to best of my ability. Advance care planning was discussed , pallitaive care issues ,CMO ,hospice levels of care were discussed in details , forms ,advance directives were reviewed .All questions were answered to the best of my knowledge .Additional 25 min spent.    PROGRESS NOTE  Patient is a 89y old  Male who presents with a chief complaint of Cellulitis    Patient  is a 89yo male with pmhx of Dementia, DM, CHF, HTN, A-FIB NOT ON AC, HEMATURIA, HLD, OSTEO, PROSTATE CANCER, BIB EMS FROM NH presents with possible osteomyelitis.    (03 Sep 2022 09:50)    Chart and available morning labs /imaging are reviewed electronically , urgent issues addressed . More information  is being added upon completion of rounds , when more information is collected and management discussed with consultants , medical staff and social service/case management on the floor   OVERNIGHT  No new issues reported by medical staff . All above noted Patient is resting in a bed comfortably .Confused ,poor mentation .No distress noted     HPI:  Patient  is a 89yo male with pmhx of Dementia, DM, CHF, HTN, A-FIB NOT ON AC, HEMATURIA, HLD, OSTEO, PROSTATE CANCER, BIB EMS FROM NH presents with possible osteomyelitis. pt unable to give information due to dementia. pt with left foot 2nd digit diabetic ulcer sent in to r/o osteo. (02 Sep 2022 09:51)    PAST MEDICAL & SURGICAL HISTORY:  Atrial fibrillation      Hypertension      Diabetes      Prostate ca      Dementia      CHF (congestive heart failure)      Hyperlipemia      Diabetes      Depression      Dementia      DM (diabetes mellitus)      Atrial fibrillation      Prostate CA      Arteriosclerotic heart disease (ASHD)      Dementia      Anemia      AICD (automatic cardioverter/defibrillator) present      Osteomyelitis of finger of left hand      Personal history of radiation therapy      H/O ongoing treatment with hormonal therapy      H/O bladder infections      Scrotal abscess      Urinary retention      Hematuria      H/O cystitis      Constipation      VHD (valvular heart disease)      Aortic valve stenosis      History of automatic internal cardiac defibrillator (AICD)          MEDICATIONS  (STANDING):  ascorbic acid 500 milliGRAM(s) Oral daily  clopidogrel Tablet 75 milliGRAM(s) Oral daily  dextrose 5%. 1000 milliLiter(s) (50 mL/Hr) IV Continuous <Continuous>  dextrose 5%. 1000 milliLiter(s) (100 mL/Hr) IV Continuous <Continuous>  dextrose 50% Injectable 25 Gram(s) IV Push once  dextrose 50% Injectable 12.5 Gram(s) IV Push once  dextrose 50% Injectable 25 Gram(s) IV Push once  donepezil 5 milliGRAM(s) Oral at bedtime  ferrous    sulfate 325 milliGRAM(s) Oral daily  glucagon  Injectable 1 milliGRAM(s) IntraMuscular once  heparin   Injectable 5000 Unit(s) SubCutaneous every 8 hours  insulin lispro (ADMELOG) corrective regimen sliding scale   SubCutaneous three times a day before meals  insulin lispro (ADMELOG) corrective regimen sliding scale   SubCutaneous at bedtime  lactobacillus acidophilus 1 Tablet(s) Oral two times a day  losartan 25 milliGRAM(s) Oral daily  metoprolol succinate ER 25 milliGRAM(s) Oral daily  multivitamin/minerals 1 Tablet(s) Oral daily  pantoprazole    Tablet 40 milliGRAM(s) Oral daily  polyethylene glycol 3350 17 Gram(s) Oral daily  potassium chloride    Tablet ER 10 milliEquivalent(s) Oral daily  senna 2 Tablet(s) Oral at bedtime  simvastatin 20 milliGRAM(s) Oral at bedtime  tamsulosin 0.4 milliGRAM(s) Oral at bedtime  torsemide 20 milliGRAM(s) Oral daily    MEDICATIONS  (PRN):  acetaminophen     Tablet .. 650 milliGRAM(s) Oral every 6 hours PRN Temp greater or equal to 38C (100.4F), Mild Pain (1 - 3)  aluminum hydroxide/magnesium hydroxide/simethicone Suspension 30 milliLiter(s) Oral every 4 hours PRN Dyspepsia  dextrose Oral Gel 15 Gram(s) Oral once PRN Blood Glucose LESS THAN 70 milliGRAM(s)/deciliter  melatonin 3 milliGRAM(s) Oral at bedtime PRN Insomnia  ondansetron Injectable 4 milliGRAM(s) IV Push every 8 hours PRN Nausea and/or Vomiting      OBJECTIVE    T(C): 37 (22 @ 05:03), Max: 37 (22 @ 15:39)  HR: 70 (22 @ 05:03) (70 - 79)  BP: 107/56 (22 @ 05:03) (104/71 - 107/64)  RR: 17 (22 @ 05:03) (17 - 18)  SpO2: 94% (22 @ 05:03) (93% - 97%)  Wt(kg): --  I&O's Summary    03 Sep 2022 07:01  -  03 Sep 2022 10:49  --------------------------------------------------------  IN: 0 mL / OUT: 1500 mL / NET: -1500 mL          REVIEW OF SYSTEMS:  CONSTITUTIONAL: No fever, weight loss, or fatigue  EYES: No eye pain, visual disturbances, or discharge  ENMT:   No sinus or throat pain  NECK: No pain or stiffness  RESPIRATORY: No cough, wheezing, chills or hemoptysis; No shortness of breath  CARDIOVASCULAR: No chest pain, palpitations, dizziness, or leg swelling  GASTROINTESTINAL: No abdominal pain. No nausea, vomiting; No diarrhea or constipation. No melena or hematochezia.  GENITOURINARY: No dysuria, frequency, hematuria, or incontinence  NEUROLOGICAL: No headaches, memory loss, loss of strength, numbness, or tremors  SKIN: No itching, burning, rashes, or lesions   MUSCULOSKELETAL: No joint pain or swelling; No muscle, back, or extremity pain    PHYSICAL EXAM:  Appearance: NAD. VS past 24 hrs -as above   HEENT:   Moist oral mucosa. Conjunctiva clear b/l.   Neck : supple  Respiratory: Lungs CTAB.  Gastrointestinal:  Soft, nontender. No rebound. No rigidity. BS present	  Cardiovascular: RRR ,S1S2 present  Neurologic: Non-focal. Moving all extremities.  Extremities: No edema. No erythema. No calf tenderness.Vascular: DP & PT non palpable bilaterally, Capillary refill 6 seconds  Neurological: couldn't  assess   Musculoskeletal: couldn't assess  Dermatological: 1.1x 0.3 x0.2 cm ulceration noted to the left, 2nd digit,  probe to bone, no periwound erythema, no fluctuance, no malodor, no proximal streaking at this time  Skin: No rashes, No ecchymoses, No cyanosis.	  wounds ,skin lesions-See skin assesment flow sheet   LABS:                        11.5   6.34  )-----------( 242      ( 03 Sep 2022 08:52 )             35.3     09-    139  |  102  |  18  ----------------------------<  109<H>  4.0   |  31  |  0.69    Ca    9.6      03 Sep 2022 08:52  Mg     1.6     09-02    TPro  7.0  /  Alb  3.0<L>  /  TBili  0.7  /  DBili  x   /  AST  33  /  ALT  27  /  AlkPhos  105  09-03    CAPILLARY BLOOD GLUCOSE      POCT Blood Glucose.: 89 mg/dL (03 Sep 2022 07:34)  POCT Blood Glucose.: 183 mg/dL (02 Sep 2022 21:30)  POCT Blood Glucose.: 120 mg/dL (02 Sep 2022 17:40)  POCT Blood Glucose.: 121 mg/dL (02 Sep 2022 11:57)    PT/INR - ( 01 Sep 2022 22:45 )   PT: 14.1 sec;   INR: 1.20 ratio         PTT - ( 01 Sep 2022 22:45 )  PTT:30.8 sec  Urinalysis Basic - ( 03 Sep 2022 07:49 )    Color: Yellow / Appearance: Slightly Turbid / S.010 / pH: x  Gluc: x / Ketone: Negative  / Bili: Negative / Urobili: Negative   Blood: x / Protein: 30 mg/dL / Nitrite: Negative   Leuk Esterase: Moderate / RBC: 11-25 /HPF / WBC 11-25   Sq Epi: x / Non Sq Epi: Occasional / Bacteria: x        Culture - Blood (collected 01 Sep 2022 22:45)  Source: .Blood Blood-Peripheral  Preliminary Report (03 Sep 2022 05:00):    No growth to date.    Culture - Blood (collected 01 Sep 2022 22:35)  Source: .Blood Blood-Peripheral  Preliminary Report (03 Sep 2022 05:00):    No growth to date.      RADIOLOGY & ADDITIONAL TESTS:   reviewed elctronically  ASSESSMENT/PLAN: 	  25 minutes aggregate time was spent on this visit, 50% visit time spent in care co-ordination with other attendings and counselling patient .I have discussed care plan with patient / HCP/family member scott Mooney  ,who expressed understanding of problems treatment and their effect and side effects, alternatives in details. I have asked if they have any questions and concerns and appropriately addressed them to best of my ability. Tried to call scott reynoso 2 days  -was not able to leave the message

## 2022-09-03 NOTE — DIETITIAN INITIAL EVALUATION ADULT - NS FNS DIET ORDER
Diet, Consistent Carbohydrate w/Evening Snack:   Minced and Moist (MINCEDMOIST)  Supplement Feeding Modality:  Oral  Glucerna Shake Cans or Servings Per Day:  1       Frequency:  Two Times a day (09-02-22 @ 11:09)

## 2022-09-03 NOTE — DIETITIAN INITIAL EVALUATION ADULT - OTHER INFO
Per transfer papers, Ht/Wt 70"/150# in March. Pt here at [place] in July -> 6'/ 160# noted. No admission wt this admit.  Family does not want pt to have puree diet and prefers pt receive regular foods per note in transfer papers and RDN note. Pt was [facility] last admit.

## 2022-09-03 NOTE — DIETITIAN INITIAL EVALUATION ADULT - PERTINENT MEDS FT
MEDICATIONS  (STANDING):  ascorbic acid 500 milliGRAM(s) Oral daily  clopidogrel Tablet 75 milliGRAM(s) Oral daily  dextrose 5%. 1000 milliLiter(s) (50 mL/Hr) IV Continuous <Continuous>  dextrose 5%. 1000 milliLiter(s) (100 mL/Hr) IV Continuous <Continuous>  dextrose 50% Injectable 25 Gram(s) IV Push once  dextrose 50% Injectable 12.5 Gram(s) IV Push once  dextrose 50% Injectable 25 Gram(s) IV Push once  donepezil 5 milliGRAM(s) Oral at bedtime  ferrous    sulfate 325 milliGRAM(s) Oral daily  glucagon  Injectable 1 milliGRAM(s) IntraMuscular once  heparin   Injectable 5000 Unit(s) SubCutaneous every 8 hours  insulin lispro (ADMELOG) corrective regimen sliding scale   SubCutaneous three times a day before meals  insulin lispro (ADMELOG) corrective regimen sliding scale   SubCutaneous at bedtime  lactobacillus acidophilus 1 Tablet(s) Oral two times a day  losartan 25 milliGRAM(s) Oral daily  metoprolol succinate ER 25 milliGRAM(s) Oral daily  multivitamin/minerals 1 Tablet(s) Oral daily  pantoprazole    Tablet 40 milliGRAM(s) Oral daily  polyethylene glycol 3350 17 Gram(s) Oral daily  potassium chloride    Tablet ER 10 milliEquivalent(s) Oral daily  senna 2 Tablet(s) Oral at bedtime  simvastatin 20 milliGRAM(s) Oral at bedtime  tamsulosin 0.4 milliGRAM(s) Oral at bedtime  torsemide 20 milliGRAM(s) Oral daily    MEDICATIONS  (PRN):  acetaminophen     Tablet .. 650 milliGRAM(s) Oral every 6 hours PRN Temp greater or equal to 38C (100.4F), Mild Pain (1 - 3)  aluminum hydroxide/magnesium hydroxide/simethicone Suspension 30 milliLiter(s) Oral every 4 hours PRN Dyspepsia  dextrose Oral Gel 15 Gram(s) Oral once PRN Blood Glucose LESS THAN 70 milliGRAM(s)/deciliter  melatonin 3 milliGRAM(s) Oral at bedtime PRN Insomnia  ondansetron Injectable 4 milliGRAM(s) IV Push every 8 hours PRN Nausea and/or Vomiting

## 2022-09-03 NOTE — DIETITIAN NUTRITION RISK NOTIFICATION - TREATMENT: THE FOLLOWING DIET HAS BEEN RECOMMENDED
Diet, Consistent Carbohydrate w/Evening Snack:   Minced and Moist (MINCEDMOIST)  Supplement Feeding Modality:  Oral  Glucerna Shake Cans or Servings Per Day:  1       Frequency:  Two Times a day (09-02-22 @ 11:09) [Active]

## 2022-09-04 LAB
ANION GAP SERPL CALC-SCNC: 7 MMOL/L — SIGNIFICANT CHANGE UP (ref 5–17)
BUN SERPL-MCNC: 23 MG/DL — SIGNIFICANT CHANGE UP (ref 7–23)
CALCIUM SERPL-MCNC: 9.2 MG/DL — SIGNIFICANT CHANGE UP (ref 8.5–10.1)
CHLORIDE SERPL-SCNC: 101 MMOL/L — SIGNIFICANT CHANGE UP (ref 96–108)
CO2 SERPL-SCNC: 32 MMOL/L — HIGH (ref 22–31)
CREAT SERPL-MCNC: 0.64 MG/DL — SIGNIFICANT CHANGE UP (ref 0.5–1.3)
CULTURE RESULTS: NO GROWTH — SIGNIFICANT CHANGE UP
EGFR: 87 ML/MIN/1.73M2 — SIGNIFICANT CHANGE UP
GLUCOSE SERPL-MCNC: 110 MG/DL — HIGH (ref 70–99)
HCT VFR BLD CALC: 35.1 % — LOW (ref 39–50)
HGB BLD-MCNC: 11.5 G/DL — LOW (ref 13–17)
MCHC RBC-ENTMCNC: 30.4 PG — SIGNIFICANT CHANGE UP (ref 27–34)
MCHC RBC-ENTMCNC: 32.8 GM/DL — SIGNIFICANT CHANGE UP (ref 32–36)
MCV RBC AUTO: 92.9 FL — SIGNIFICANT CHANGE UP (ref 80–100)
NRBC # BLD: 0 /100 WBCS — SIGNIFICANT CHANGE UP (ref 0–0)
NT-PROBNP SERPL-SCNC: 653 PG/ML — HIGH (ref 0–450)
PLATELET # BLD AUTO: 253 K/UL — SIGNIFICANT CHANGE UP (ref 150–400)
POTASSIUM SERPL-MCNC: 4.1 MMOL/L — SIGNIFICANT CHANGE UP (ref 3.5–5.3)
POTASSIUM SERPL-SCNC: 4.1 MMOL/L — SIGNIFICANT CHANGE UP (ref 3.5–5.3)
RBC # BLD: 3.78 M/UL — LOW (ref 4.2–5.8)
RBC # FLD: 13.4 % — SIGNIFICANT CHANGE UP (ref 10.3–14.5)
SODIUM SERPL-SCNC: 140 MMOL/L — SIGNIFICANT CHANGE UP (ref 135–145)
SPECIMEN SOURCE: SIGNIFICANT CHANGE UP
WBC # BLD: 6.21 K/UL — SIGNIFICANT CHANGE UP (ref 3.8–10.5)
WBC # FLD AUTO: 6.21 K/UL — SIGNIFICANT CHANGE UP (ref 3.8–10.5)

## 2022-09-04 RX ADMIN — Medication 1 TABLET(S): at 13:27

## 2022-09-04 RX ADMIN — TAMSULOSIN HYDROCHLORIDE 0.4 MILLIGRAM(S): 0.4 CAPSULE ORAL at 22:18

## 2022-09-04 RX ADMIN — Medication 325 MILLIGRAM(S): at 13:29

## 2022-09-04 RX ADMIN — SIMVASTATIN 20 MILLIGRAM(S): 20 TABLET, FILM COATED ORAL at 22:18

## 2022-09-04 RX ADMIN — HEPARIN SODIUM 5000 UNIT(S): 5000 INJECTION INTRAVENOUS; SUBCUTANEOUS at 05:35

## 2022-09-04 RX ADMIN — Medication 20 MILLIGRAM(S): at 05:35

## 2022-09-04 RX ADMIN — POLYETHYLENE GLYCOL 3350 17 GRAM(S): 17 POWDER, FOR SOLUTION ORAL at 13:29

## 2022-09-04 RX ADMIN — CLOPIDOGREL BISULFATE 75 MILLIGRAM(S): 75 TABLET, FILM COATED ORAL at 13:28

## 2022-09-04 RX ADMIN — PANTOPRAZOLE SODIUM 40 MILLIGRAM(S): 20 TABLET, DELAYED RELEASE ORAL at 13:28

## 2022-09-04 RX ADMIN — SENNA PLUS 2 TABLET(S): 8.6 TABLET ORAL at 22:18

## 2022-09-04 RX ADMIN — Medication 1 TABLET(S): at 05:34

## 2022-09-04 RX ADMIN — Medication 10 MILLIEQUIVALENT(S): at 13:28

## 2022-09-04 RX ADMIN — Medication 500 MILLIGRAM(S): at 13:30

## 2022-09-04 RX ADMIN — Medication 25 MILLIGRAM(S): at 05:34

## 2022-09-04 RX ADMIN — HEPARIN SODIUM 5000 UNIT(S): 5000 INJECTION INTRAVENOUS; SUBCUTANEOUS at 22:17

## 2022-09-04 RX ADMIN — HEPARIN SODIUM 5000 UNIT(S): 5000 INJECTION INTRAVENOUS; SUBCUTANEOUS at 13:29

## 2022-09-04 RX ADMIN — DONEPEZIL HYDROCHLORIDE 5 MILLIGRAM(S): 10 TABLET, FILM COATED ORAL at 22:18

## 2022-09-04 RX ADMIN — Medication 1 TABLET(S): at 17:31

## 2022-09-04 RX ADMIN — LOSARTAN POTASSIUM 25 MILLIGRAM(S): 100 TABLET, FILM COATED ORAL at 05:34

## 2022-09-04 NOTE — PROGRESS NOTE ADULT - SUBJECTIVE AND OBJECTIVE BOX
PROGRESS NOTE  Patient is a 89y old  Male who presents with a chief complaint of left foot 2nd digit diabetic ulcer sent in to r/o osteo. (04 Sep 2022 12:00)  Chart and available morning labs /imaging are reviewed electronically , urgent issues addressed . More information  is being added upon completion of rounds , when more information is collected and management discussed with consultants , medical staff and social service/case management on the floor     OVERNIGHT  No new issues reported by medical staff . All above noted Patient is resting in a bed comfortably .Confused ,poor mentation .No distress noted     HPI:  Patient  is a 89yo male with pmhx of Dementia, DM, CHF, HTN, A-FIB NOT ON AC, HEMATURIA, HLD, OSTEO, PROSTATE CANCER, BIB EMS FROM NH presents with possible osteomyelitis. pt unable to give information due to dementia. pt with left foot 2nd digit diabetic ulcer sent in to r/o osteo. (02 Sep 2022 09:51)    PAST MEDICAL & SURGICAL HISTORY:  Atrial fibrillation      Hypertension      Diabetes      Prostate ca      Dementia      CHF (congestive heart failure)      Hyperlipemia      Diabetes      Depression      Dementia      DM (diabetes mellitus)      Atrial fibrillation      Prostate CA      Arteriosclerotic heart disease (ASHD)      Dementia      Anemia      AICD (automatic cardioverter/defibrillator) present      Osteomyelitis of finger of left hand      Personal history of radiation therapy      H/O ongoing treatment with hormonal therapy      H/O bladder infections      Scrotal abscess      Urinary retention      Hematuria      H/O cystitis      Constipation      VHD (valvular heart disease)      Aortic valve stenosis      History of automatic internal cardiac defibrillator (AICD)          MEDICATIONS  (STANDING):  ascorbic acid 500 milliGRAM(s) Oral daily  clopidogrel Tablet 75 milliGRAM(s) Oral daily  dextrose 5%. 1000 milliLiter(s) (50 mL/Hr) IV Continuous <Continuous>  dextrose 5%. 1000 milliLiter(s) (100 mL/Hr) IV Continuous <Continuous>  dextrose 50% Injectable 25 Gram(s) IV Push once  dextrose 50% Injectable 12.5 Gram(s) IV Push once  dextrose 50% Injectable 25 Gram(s) IV Push once  donepezil 5 milliGRAM(s) Oral at bedtime  ferrous    sulfate 325 milliGRAM(s) Oral daily  glucagon  Injectable 1 milliGRAM(s) IntraMuscular once  heparin   Injectable 5000 Unit(s) SubCutaneous every 8 hours  insulin lispro (ADMELOG) corrective regimen sliding scale   SubCutaneous three times a day before meals  insulin lispro (ADMELOG) corrective regimen sliding scale   SubCutaneous at bedtime  lactobacillus acidophilus 1 Tablet(s) Oral two times a day  losartan 25 milliGRAM(s) Oral daily  metoprolol succinate ER 25 milliGRAM(s) Oral daily  multivitamin/minerals 1 Tablet(s) Oral daily  pantoprazole    Tablet 40 milliGRAM(s) Oral daily  polyethylene glycol 3350 17 Gram(s) Oral daily  potassium chloride    Tablet ER 10 milliEquivalent(s) Oral daily  senna 2 Tablet(s) Oral at bedtime  simvastatin 20 milliGRAM(s) Oral at bedtime  tamsulosin 0.4 milliGRAM(s) Oral at bedtime  torsemide 20 milliGRAM(s) Oral daily    MEDICATIONS  (PRN):  acetaminophen     Tablet .. 650 milliGRAM(s) Oral every 6 hours PRN Temp greater or equal to 38C (100.4F), Mild Pain (1 - 3)  aluminum hydroxide/magnesium hydroxide/simethicone Suspension 30 milliLiter(s) Oral every 4 hours PRN Dyspepsia  dextrose Oral Gel 15 Gram(s) Oral once PRN Blood Glucose LESS THAN 70 milliGRAM(s)/deciliter  melatonin 3 milliGRAM(s) Oral at bedtime PRN Insomnia  ondansetron Injectable 4 milliGRAM(s) IV Push every 8 hours PRN Nausea and/or Vomiting      OBJECTIVE    T(C): 36.4 (22 @ 04:49), Max: 37.3 (22 @ 20:05)  HR: 92 (22 @ 04:49) (81 - 92)  BP: 113/70 (22 @ 04:49) (110/70 - 113/70)  RR: 18 (22 @ 04:49) (18 - 18)  SpO2: 93% (22 @ 04:49) (93% - 94%)  Wt(kg): --  I&O's Summary    03 Sep 2022 07:01  -  04 Sep 2022 07:00  --------------------------------------------------------  IN: 0 mL / OUT: 2000 mL / NET: -2000 mL          REVIEW OF SYSTEMS:  CONSTITUTIONAL: No fever, weight loss, or fatigue  EYES: No eye pain, visual disturbances, or discharge  ENMT:   No sinus or throat pain  NECK: No pain or stiffness  RESPIRATORY: No cough, wheezing, chills or hemoptysis; No shortness of breath  CARDIOVASCULAR: No chest pain, palpitations, dizziness, or leg swelling  GASTROINTESTINAL: No abdominal pain. No nausea, vomiting; No diarrhea or constipation. No melena or hematochezia.  GENITOURINARY: No dysuria, frequency, hematuria, or incontinence  NEUROLOGICAL: No headaches, memory loss, loss of strength, numbness, or tremors  SKIN: No itching, burning, rashes, or lesions   MUSCULOSKELETAL: No joint pain or swelling; No muscle, back, or extremity pain    PHYSICAL EXAM:  Appearance: NAD. VS past 24 hrs -as above   HEENT:   Moist oral mucosa. Conjunctiva clear b/l.   Neck : supple  Respiratory: Lungs CTAB.  Gastrointestinal:  Soft, nontender. No rebound. No rigidity. BS present	  Cardiovascular: RRR ,S1S2 present  Neurologic: Non-focal. Moving all extremities.  Extremities: No edema. No erythema. No calf tenderness.  Skin: No rashes, No ecchymoses, No cyanosis.	  wounds ,skin lesions-See skin assesment flow sheet   LABS:                        11.5   6.21  )-----------( 253      ( 04 Sep 2022 08:00 )             35.1     09-04    140  |  101  |  23  ----------------------------<  110<H>  4.1   |  32<H>  |  0.64    Ca    9.2      04 Sep 2022 08:00    TPro  7.0  /  Alb  3.0<L>  /  TBili  0.7  /  DBili  x   /  AST  33  /  ALT  27  /  AlkPhos  105  09-03    CAPILLARY BLOOD GLUCOSE      POCT Blood Glucose.: 144 mg/dL (04 Sep 2022 11:36)  POCT Blood Glucose.: 109 mg/dL (04 Sep 2022 07:31)  POCT Blood Glucose.: 139 mg/dL (03 Sep 2022 21:28)  POCT Blood Glucose.: 128 mg/dL (03 Sep 2022 16:24)      Urinalysis Basic - ( 03 Sep 2022 07:49 )    Color: Yellow / Appearance: Slightly Turbid / S.010 / pH: x  Gluc: x / Ketone: Negative  / Bili: Negative / Urobili: Negative   Blood: x / Protein: 30 mg/dL / Nitrite: Negative   Leuk Esterase: Moderate / RBC: 11-25 /HPF / WBC 11-25   Sq Epi: x / Non Sq Epi: Occasional / Bacteria: x        Culture - Urine (collected 03 Sep 2022 07:49)  Source: Clean Catch Clean Catch (Midstream)  Final Report (04 Sep 2022 07:30):    No growth    Culture - Blood (collected 01 Sep 2022 22:45)  Source: .Blood Blood-Peripheral  Preliminary Report (03 Sep 2022 05:00):    No growth to date.    Culture - Blood (collected 01 Sep 2022 22:35)  Source: .Blood Blood-Peripheral  Preliminary Report (03 Sep 2022 05:00):    No growth to date.      RADIOLOGY & ADDITIONAL TESTS:   reviewed elctronically  ASSESSMENT/PLAN: 	25 minutes aggregate time was spent on this visit, 50% visit time spent in care co-ordination with other attendings and counselling patient .I have discussed care plan with patient / HCP/family member ,who expressed understanding of problems treatment and their effect and side effects, alternatives in details. I have asked if they have any questions and concerns and appropriately addressed them to best of my ability.

## 2022-09-04 NOTE — PROGRESS NOTE ADULT - PROBLEM SELECTOR PLAN 1
Seen  by podiatry and ID -Hold antibiotic for now  Fu cultures  Fu foot XR  Bone scan as pt unable to get MRI ( has AICD )  Podiatry eval done -Wound cleaned with NS and dressed with bandaid   IV abx per ID  - vasc consult

## 2022-09-04 NOTE — PROGRESS NOTE ADULT - SUBJECTIVE AND OBJECTIVE BOX
[facility] DIV[facility] of INFECTIOUS DISEASE  Jeff Amanda MD PhD, Tamara Remy MD, Elyse Domingo MD, Racquel Porter MD, Fran Vidal MD  and providing coverage with Heber Issa MD  Providing Infectious Disease Consultations at [facility], [facility], [facility]'s    Office# [phone] to schedule follow up appointments  Answering Service for urgent calls or New Consults [phone]  Cell# to text for urgent issues Jeff Amanda [phone]     infectious diseases progress note:    TERESA FRANCIS is a 89y y. o. Male patient    Overnight and events of the last 24hrs reviewed    Allergies    latex (Rash)  latex (Unknown)  No Known Drug Allergies    Intolerances        ANTIBIOTICS/RELEVANT:  antimicrobials    immunologic:    OTHER:  acetaminophen     Tablet .. 650 milliGRAM(s) Oral every 6 hours PRN  aluminum hydroxide/magnesium hydroxide/simethicone Suspension 30 milliLiter(s) Oral every 4 hours PRN  ascorbic acid 500 milliGRAM(s) Oral daily  clopidogrel Tablet 75 milliGRAM(s) Oral daily  dextrose 5%. 1000 milliLiter(s) IV Continuous <Continuous>  dextrose 5%. 1000 milliLiter(s) IV Continuous <Continuous>  dextrose 50% Injectable 25 Gram(s) IV Push once  dextrose 50% Injectable 12.5 Gram(s) IV Push once  dextrose 50% Injectable 25 Gram(s) IV Push once  dextrose Oral Gel 15 Gram(s) Oral once PRN  donepezil 5 milliGRAM(s) Oral at bedtime  ferrous    sulfate 325 milliGRAM(s) Oral daily  glucagon  Injectable 1 milliGRAM(s) IntraMuscular once  heparin   Injectable 5000 Unit(s) SubCutaneous every 8 hours  insulin lispro (ADMELOG) corrective regimen sliding scale   SubCutaneous three times a day before meals  insulin lispro (ADMELOG) corrective regimen sliding scale   SubCutaneous at bedtime  lactobacillus acidophilus 1 Tablet(s) Oral two times a day  losartan 25 milliGRAM(s) Oral daily  melatonin 3 milliGRAM(s) Oral at bedtime PRN  metoprolol succinate ER 25 milliGRAM(s) Oral daily  multivitamin/minerals 1 Tablet(s) Oral daily  ondansetron Injectable 4 milliGRAM(s) IV Push every 8 hours PRN  pantoprazole    Tablet 40 milliGRAM(s) Oral daily  polyethylene glycol 3350 17 Gram(s) Oral daily  potassium chloride    Tablet ER 10 milliEquivalent(s) Oral daily  senna 2 Tablet(s) Oral at bedtime  simvastatin 20 milliGRAM(s) Oral at bedtime  tamsulosin 0.4 milliGRAM(s) Oral at bedtime  torsemide 20 milliGRAM(s) Oral daily      Objective:  Vital Signs Last 24 Hrs  T(C): 36.4 (04 Sep 2022 04:49), Max: 37.3 (03 Sep 2022 20:05)  T(F): 97.5 (04 Sep 2022 04:49), Max: 99.1 (03 Sep 2022 20:05)  HR: 92 (04 Sep 2022 04:49) (81 - 92)  BP: 113/70 (04 Sep 2022 04:49) (110/70 - 113/70)  BP(mean): --  RR: 18 (04 Sep 2022 04:49) (18 - 18)  SpO2: 93% (04 Sep 2022 04:49) (93% - 94%)    Parameters below as of 04 Sep 2022 04:49  Patient On (Oxygen Delivery Method): room air        T(C): 36.4 (22 @ 04:49), Max: 37.3 (22 @ 20:05)  T(C): 36.4 (22 @ 04:49), Max: 37.3 (22 @ 20:05)  T(C): 36.4 (22 @ 04:49), Max: 37.3 (22 @ 20:05)    PHYSICAL EXAM:  HEENT: NC atraumatic  Neck: supple  Respiratory: no accessory muscle use, breathing comfortably  Cardiovascular: distant  Gastrointestinal: normal appearing, nondistended  Extremities: no clubbing, no cyanosis,        LABS:                          11.5   6.21  )-----------( 253      ( 04 Sep 2022 08:00 )             35.1       WBC  6.21 09-04 @ 08:00  6.34 09-03 @ 08:52  8.56 09-02 @ 06:02  8.97 09- @ 22:45      -    140  |  101  |  23  ----------------------------<  110<H>  4.1   |  32<H>  |  0.64    Ca    9.2      04 Sep 2022 08:00    TPro  7.0  /  Alb  3.0<L>  /  TBili  0.7  /  DBili  x   /  AST  33  /  ALT  27  /  AlkPhos  105        Creatinine, Serum: 0.64 mg/dL (22 @ 08:00)  Creatinine, Serum: 0.69 mg/dL (22 @ 08:52)  Creatinine, Serum: 0.67 mg/dL (22 @ 06:02)  Creatinine, Serum: 0.72 mg/dL (22 @ 22:45)        Urinalysis Basic - ( 03 Sep 2022 07:49 )    Color: Yellow / Appearance: Slightly Turbid / S.010 / pH: x  Gluc: x / Ketone: Negative  / Bili: Negative / Urobili: Negative   Blood: x / Protein: 30 mg/dL / Nitrite: Negative   Leuk Esterase: Moderate / RBC: 11-25 /HPF / WBC 11-25   Sq Epi: x / Non Sq Epi: Occasional / Bacteria: x            INFLAMMATORY MARKERS  Auto Neutrophil #: 4.24 K/uL (22 @ 08:52)  Auto Lymphocyte #: 1.30 K/uL (22 @ 08:52)  Auto Neutrophil #: 6.74 K/uL (22 @ 22:45)  Auto Lymphocyte #: 1.20 K/uL (22 @ 22:45)    Lactate, Blood: 1.2 mmol/L (22 @ 22:45)    Auto Eosinophil #: 0.13 K/uL (22 @ 08:52)  Auto Eosinophil #: 0.04 K/uL (22 @ 22:45)      Sedimentation Rate, Erythrocyte: 43 mm/hr (22 @ 22:45)                    Activated Partial Thromboplastin Time: 30.8 sec (22 @ 22:45)  INR: 1.20 ratio (22 @ 22:45)          MICROBIOLOGY:              RADIOLOGY & ADDITIONAL STUDIES:

## 2022-09-05 LAB
ANION GAP SERPL CALC-SCNC: 6 MMOL/L — SIGNIFICANT CHANGE UP (ref 5–17)
BUN SERPL-MCNC: 28 MG/DL — HIGH (ref 7–23)
CALCIUM SERPL-MCNC: 9.2 MG/DL — SIGNIFICANT CHANGE UP (ref 8.5–10.1)
CHLORIDE SERPL-SCNC: 103 MMOL/L — SIGNIFICANT CHANGE UP (ref 96–108)
CO2 SERPL-SCNC: 32 MMOL/L — HIGH (ref 22–31)
CREAT SERPL-MCNC: 0.83 MG/DL — SIGNIFICANT CHANGE UP (ref 0.5–1.3)
EGFR: 81 ML/MIN/1.73M2 — SIGNIFICANT CHANGE UP
GLUCOSE SERPL-MCNC: 130 MG/DL — HIGH (ref 70–99)
HCT VFR BLD CALC: 35.2 % — LOW (ref 39–50)
HGB BLD-MCNC: 11.6 G/DL — LOW (ref 13–17)
MCHC RBC-ENTMCNC: 30.9 PG — SIGNIFICANT CHANGE UP (ref 27–34)
MCHC RBC-ENTMCNC: 33 GM/DL — SIGNIFICANT CHANGE UP (ref 32–36)
MCV RBC AUTO: 93.6 FL — SIGNIFICANT CHANGE UP (ref 80–100)
NRBC # BLD: 0 /100 WBCS — SIGNIFICANT CHANGE UP (ref 0–0)
PLATELET # BLD AUTO: 246 K/UL — SIGNIFICANT CHANGE UP (ref 150–400)
POTASSIUM SERPL-MCNC: 4 MMOL/L — SIGNIFICANT CHANGE UP (ref 3.5–5.3)
POTASSIUM SERPL-SCNC: 4 MMOL/L — SIGNIFICANT CHANGE UP (ref 3.5–5.3)
RBC # BLD: 3.76 M/UL — LOW (ref 4.2–5.8)
RBC # FLD: 13.3 % — SIGNIFICANT CHANGE UP (ref 10.3–14.5)
SODIUM SERPL-SCNC: 141 MMOL/L — SIGNIFICANT CHANGE UP (ref 135–145)
WBC # BLD: 5.91 K/UL — SIGNIFICANT CHANGE UP (ref 3.8–10.5)
WBC # FLD AUTO: 5.91 K/UL — SIGNIFICANT CHANGE UP (ref 3.8–10.5)

## 2022-09-05 RX ADMIN — PANTOPRAZOLE SODIUM 40 MILLIGRAM(S): 20 TABLET, DELAYED RELEASE ORAL at 11:40

## 2022-09-05 RX ADMIN — Medication 1 TABLET(S): at 11:41

## 2022-09-05 RX ADMIN — POLYETHYLENE GLYCOL 3350 17 GRAM(S): 17 POWDER, FOR SOLUTION ORAL at 11:40

## 2022-09-05 RX ADMIN — Medication 10 MILLIEQUIVALENT(S): at 11:41

## 2022-09-05 RX ADMIN — Medication 1 TABLET(S): at 17:23

## 2022-09-05 RX ADMIN — Medication 25 MILLIGRAM(S): at 05:59

## 2022-09-05 RX ADMIN — HEPARIN SODIUM 5000 UNIT(S): 5000 INJECTION INTRAVENOUS; SUBCUTANEOUS at 23:57

## 2022-09-05 RX ADMIN — Medication 325 MILLIGRAM(S): at 11:41

## 2022-09-05 RX ADMIN — HEPARIN SODIUM 5000 UNIT(S): 5000 INJECTION INTRAVENOUS; SUBCUTANEOUS at 05:59

## 2022-09-05 RX ADMIN — CLOPIDOGREL BISULFATE 75 MILLIGRAM(S): 75 TABLET, FILM COATED ORAL at 11:41

## 2022-09-05 RX ADMIN — Medication 1: at 11:41

## 2022-09-05 RX ADMIN — Medication 1 TABLET(S): at 05:58

## 2022-09-05 RX ADMIN — Medication 500 MILLIGRAM(S): at 11:41

## 2022-09-05 RX ADMIN — HEPARIN SODIUM 5000 UNIT(S): 5000 INJECTION INTRAVENOUS; SUBCUTANEOUS at 13:35

## 2022-09-05 RX ADMIN — Medication 20 MILLIGRAM(S): at 05:59

## 2022-09-05 NOTE — PROGRESS NOTE ADULT - SUBJECTIVE AND OBJECTIVE BOX
PROGRESS NOTE  Patient is a 89y old  Male who presents with a chief complaint of left foot 2nd digit diabetic ulcer sent in to r/o osteo. (04 Sep 2022 12:00)  Chart and available morning labs /imaging are reviewed electronically , urgent issues addressed . More information  is being added upon completion of rounds , when more information is collected and management discussed with consultants , medical staff and social service/case management on the floor     OVERNIGHT  No new issues reported by medical staff . All above noted Patient is resting in a bed comfortably .Confused ,poor mentation .No distress noted     HPI:  Patient  is a 89yo male with pmhx of Dementia, DM, CHF, HTN, A-FIB NOT ON AC, HEMATURIA, HLD, OSTEO, PROSTATE CANCER, BIB EMS FROM NH presents with possible osteomyelitis. pt unable to give information due to dementia. pt with left foot 2nd digit diabetic ulcer sent in to r/o osteo. (02 Sep 2022 09:51)    PAST MEDICAL & SURGICAL HISTORY:  Atrial fibrillation      Hypertension      Diabetes      Prostate ca      Dementia      CHF (congestive heart failure)      Hyperlipemia      Diabetes      Depression      Dementia      DM (diabetes mellitus)      Atrial fibrillation      Prostate CA      Arteriosclerotic heart disease (ASHD)      Dementia      Anemia      AICD (automatic cardioverter/defibrillator) present      Osteomyelitis of finger of left hand      Personal history of radiation therapy      H/O ongoing treatment with hormonal therapy      H/O bladder infections      Scrotal abscess      Urinary retention      Hematuria      H/O cystitis      Constipation      VHD (valvular heart disease)      Aortic valve stenosis      History of automatic internal cardiac defibrillator (AICD)          MEDICATIONS  (STANDING):  ascorbic acid 500 milliGRAM(s) Oral daily  clopidogrel Tablet 75 milliGRAM(s) Oral daily  dextrose 5%. 1000 milliLiter(s) (50 mL/Hr) IV Continuous <Continuous>  dextrose 5%. 1000 milliLiter(s) (100 mL/Hr) IV Continuous <Continuous>  dextrose 50% Injectable 25 Gram(s) IV Push once  dextrose 50% Injectable 12.5 Gram(s) IV Push once  dextrose 50% Injectable 25 Gram(s) IV Push once  donepezil 5 milliGRAM(s) Oral at bedtime  ferrous    sulfate 325 milliGRAM(s) Oral daily  glucagon  Injectable 1 milliGRAM(s) IntraMuscular once  heparin   Injectable 5000 Unit(s) SubCutaneous every 8 hours  insulin lispro (ADMELOG) corrective regimen sliding scale   SubCutaneous three times a day before meals  insulin lispro (ADMELOG) corrective regimen sliding scale   SubCutaneous at bedtime  lactobacillus acidophilus 1 Tablet(s) Oral two times a day  losartan 25 milliGRAM(s) Oral daily  metoprolol succinate ER 25 milliGRAM(s) Oral daily  multivitamin/minerals 1 Tablet(s) Oral daily  pantoprazole    Tablet 40 milliGRAM(s) Oral daily  polyethylene glycol 3350 17 Gram(s) Oral daily  potassium chloride    Tablet ER 10 milliEquivalent(s) Oral daily  senna 2 Tablet(s) Oral at bedtime  simvastatin 20 milliGRAM(s) Oral at bedtime  tamsulosin 0.4 milliGRAM(s) Oral at bedtime  torsemide 20 milliGRAM(s) Oral daily    MEDICATIONS  (PRN):  acetaminophen     Tablet .. 650 milliGRAM(s) Oral every 6 hours PRN Temp greater or equal to 38C (100.4F), Mild Pain (1 - 3)  aluminum hydroxide/magnesium hydroxide/simethicone Suspension 30 milliLiter(s) Oral every 4 hours PRN Dyspepsia  dextrose Oral Gel 15 Gram(s) Oral once PRN Blood Glucose LESS THAN 70 milliGRAM(s)/deciliter  melatonin 3 milliGRAM(s) Oral at bedtime PRN Insomnia  ondansetron Injectable 4 milliGRAM(s) IV Push every 8 hours PRN Nausea and/or Vomiting      OBJECTIVE    T(C): 36.9 (09-05-22 @ 12:07), Max: 36.9 (09-05-22 @ 12:07)  HR: 79 (09-05-22 @ 12:07) (78 - 81)  BP: 109/58 (09-05-22 @ 12:07) (92/58 - 122/69)  RR: 17 (09-05-22 @ 12:07) (17 - 18)  SpO2: 91% (09-05-22 @ 12:07) (91% - 93%)  Wt(kg): --  I&O's Summary    04 Sep 2022 07:01  -  05 Sep 2022 07:00  --------------------------------------------------------  IN: 120 mL / OUT: 500 mL / NET: -380 mL    05 Sep 2022 07:01  -  05 Sep 2022 15:33  --------------------------------------------------------  IN: 0 mL / OUT: 1000 mL / NET: -1000 mL          REVIEW OF SYSTEMS:  CONSTITUTIONAL: No fever, weight loss, or fatigue  EYES: No eye pain, visual disturbances, or discharge  ENMT:   No sinus or throat pain  NECK: No pain or stiffness  RESPIRATORY: No cough, wheezing, chills or hemoptysis; No shortness of breath  CARDIOVASCULAR: No chest pain, palpitations, dizziness, or leg swelling  GASTROINTESTINAL: No abdominal pain. No nausea, vomiting; No diarrhea or constipation. No melena or hematochezia.  GENITOURINARY: No dysuria, frequency, hematuria, or incontinence  NEUROLOGICAL: No headaches, memory loss, loss of strength, numbness, or tremors  SKIN: No itching, burning, rashes, or lesions   MUSCULOSKELETAL: No joint pain or swelling; No muscle, back, or extremity pain    PHYSICAL EXAM:  Appearance: NAD. VS past 24 hrs -as above   HEENT:   Moist oral mucosa. Conjunctiva clear b/l.   Neck : supple  Respiratory: Lungs CTAB.  Gastrointestinal:  Soft, nontender. No rebound. No rigidity. BS present	  Cardiovascular: RRR ,S1S2 present  Neurologic: Non-focal. Moving all extremities.  Extremities: No edema. No erythema. No calf tenderness.  Skin: No rashes, No ecchymoses, No cyanosis.	  wounds ,skin lesions-See skin assesment flow sheet   LABS:                        11.6   5.91  )-----------( 246      ( 05 Sep 2022 07:58 )             35.2     09-05    141  |  103  |  28<H>  ----------------------------<  130<H>  4.0   |  32<H>  |  0.83    Ca    9.2      05 Sep 2022 07:58      CAPILLARY BLOOD GLUCOSE      POCT Blood Glucose.: 171 mg/dL (05 Sep 2022 11:38)  POCT Blood Glucose.: 139 mg/dL (05 Sep 2022 07:41)  POCT Blood Glucose.: 146 mg/dL (04 Sep 2022 22:04)  POCT Blood Glucose.: 147 mg/dL (04 Sep 2022 21:48)  POCT Blood Glucose.: 146 mg/dL (04 Sep 2022 16:50)          Culture - Urine (collected 03 Sep 2022 07:49)  Source: Clean Catch Clean Catch (Midstream)  Final Report (04 Sep 2022 07:30):    No growth    Culture - Blood (collected 01 Sep 2022 22:45)  Source: .Blood Blood-Peripheral  Preliminary Report (03 Sep 2022 05:00):    No growth to date.    Culture - Blood (collected 01 Sep 2022 22:35)  Source: .Blood Blood-Peripheral  Preliminary Report (03 Sep 2022 05:00):    No growth to date.      RADIOLOGY & ADDITIONAL TESTS:   reviewed elctronically  ASSESSMENT/PLAN: 	    25 minutes aggregate time was spent on this visit, 50% visit time spent in care co-ordination with other attendings and counselling patient .I have discussed care plan with patient / HCP/family member ,who expressed understanding of problems treatment and their effect and side effects, alternatives in details. I have asked if they have any questions and concerns and appropriately addressed them to best of my ability. Left a message for son Vinicio

## 2022-09-05 NOTE — PROGRESS NOTE ADULT - SUBJECTIVE AND OBJECTIVE BOX
TERESA FRANCIS is a 89yMale , patient examined and chart reviewed.     INTERVAL HPI/ OVERNIGHT EVENTS:   Afebrile.  No events.    PAST MEDICAL & SURGICAL HISTORY:  Atrial fibrillation  Hypertension  Diabetes  Prostate ca  Dementia  CHF (congestive heart failure)  Hyperlipemia  Diabetes  Depression  Dementia  Prostate CA  Arteriosclerotic heart disease (ASHD)  Anemia  AICD (automatic cardioverter/defibrillator) present  Osteomyelitis of finger of left hand  Personal history of radiation therapy  H/O ongoing treatment with hormonal therapy  H/O bladder infections  Scrotal abscess  Urinary retention  Hematuria  H/O cystitis  Constipation  VHD (valvular heart disease)  Aortic valve stenosis  History of automatic internal cardiac defibrillator (AICD)        For details regarding the patient's social history, family history, and other miscellaneous elements, please refer the initial infectious diseases consultation and/or the admitting history and physical examination for this admission.    ROS:  Unable to obtain due to : Dementia      ALLERGIES  latex (Rash)      Current inpatient medications :    ANTIBIOTICS/RELEVANT:  lactobacillus acidophilus 1 Tablet(s) Oral two times a day      acetaminophen     Tablet .. 650 milliGRAM(s) Oral every 6 hours PRN  aluminum hydroxide/magnesium hydroxide/simethicone Suspension 30 milliLiter(s) Oral every 4 hours PRN  ascorbic acid 500 milliGRAM(s) Oral daily  clopidogrel Tablet 75 milliGRAM(s) Oral daily  dextrose 5%. 1000 milliLiter(s) IV Continuous <Continuous>  dextrose 5%. 1000 milliLiter(s) IV Continuous <Continuous>  dextrose 50% Injectable 25 Gram(s) IV Push once  dextrose 50% Injectable 12.5 Gram(s) IV Push once  dextrose 50% Injectable 25 Gram(s) IV Push once  dextrose Oral Gel 15 Gram(s) Oral once PRN  donepezil 5 milliGRAM(s) Oral at bedtime  ferrous    sulfate 325 milliGRAM(s) Oral daily  glucagon  Injectable 1 milliGRAM(s) IntraMuscular once  heparin   Injectable 5000 Unit(s) SubCutaneous every 8 hours  insulin lispro (ADMELOG) corrective regimen sliding scale   SubCutaneous three times a day before meals  insulin lispro (ADMELOG) corrective regimen sliding scale   SubCutaneous at bedtime  losartan 25 milliGRAM(s) Oral daily  melatonin 3 milliGRAM(s) Oral at bedtime PRN  metoprolol succinate ER 25 milliGRAM(s) Oral daily  multivitamin/minerals 1 Tablet(s) Oral daily  ondansetron Injectable 4 milliGRAM(s) IV Push every 8 hours PRN  pantoprazole    Tablet 40 milliGRAM(s) Oral daily  polyethylene glycol 3350 17 Gram(s) Oral daily  potassium chloride    Tablet ER 10 milliEquivalent(s) Oral daily  senna 2 Tablet(s) Oral at bedtime  simvastatin 20 milliGRAM(s) Oral at bedtime  tamsulosin 0.4 milliGRAM(s) Oral at bedtime  torsemide 20 milliGRAM(s) Oral daily      Objective:    09-04 @ 07:01  -  09-05 @ 07:00  --------------------------------------------------------  IN: 120 mL / OUT: 500 mL / NET: -380 mL    09-05 @ 07:01  -  09-05 @ 15:52  --------------------------------------------------------  IN: 0 mL / OUT: 1000 mL / NET: -1000 mL      T(C): 36.9 (09-05-22 @ 12:07), Max: 36.9 (09-05-22 @ 12:07)  HR: 79 (09-05-22 @ 12:07) (78 - 81)  BP: 109/58 (09-05-22 @ 12:07) (92/58 - 122/69)  RR: 17 (09-05-22 @ 12:07) (17 - 18)  SpO2: 91% (09-05-22 @ 12:07) (91% - 93%)    Physical Exam:  General:  no acute distress  Neck: supple, trachea midline  Lungs: clear, no wheeze/rhonchi  Cardiovascular: regular rate and rhythm, S1 S2  Abdomen: soft, nontender,  bowel sounds normal  Neurological: alert and oriented x3  Skin: no rash  Extremities: Left 2nd toe nonhealing ulcer no drainage mild swelling  Chronic venous insufficiency        LABS:                          11.6   5.91  )-----------( 246      ( 05 Sep 2022 07:58 )             35.2       09-05    141  |  103  |  28<H>  ----------------------------<  130<H>  4.0   |  32<H>  |  0.83    Ca    9.2      05 Sep 2022 07:58      MICROBIOLOGY:    Culture - Urine (collected 03 Sep 2022 07:49)  Source: Clean Catch Clean Catch (Midstream)  Final Report (04 Sep 2022 07:30):    No growth    RADIOLOGY & ADDITIONAL STUDIES:    ACC: [id number] EXAM:  XR FOOT COMP MIN 3 VIEWS LT                          PROCEDURE DATE:  09/01/2022          INTERPRETATION:  HISTORY:  Second digit wound    TECHNIQUE:  AP, oblique and lateral views of the left foot were obtained.    FINDINGS:  There is no definite evidence of acute fracture. Vascular   calcifications are noted. There are large dorsal and plantar calcaneal   spurs. There is mild soft tissue swelling of the forefoot. There are no   definite destructive lesions or periosteal reaction seen to suggest   osteomyelitis. Mild midfoot arthropathy is best seen on the lateral film.    IMPRESSION:  Soft tissue swelling. No definite evidence of osteomyelitis.   If concern for osteomyelitis persists, an MRI could be obtained.    ACC: [id number] EXAM:  XR CHEST PORTABLE IMMED 1V                          PROCEDURE DATE:  09/01/2022          INTERPRETATION:  TIME OF EXAM: September 1, 2022 at 9:29 PM.    CLINICAL INFORMATION: Sepsis.    COMPARISON:  June 30, 2022.    TECHNIQUE:  AP Portable chest x-ray. The head and chin obscures the   superior mediastinum and left apex. Rotated.    INTERPRETATION:    Heart size and the mediastinum cannot be accurately evaluated on this   projection. Left chest wall cardiac pacemaker with intact lead with tip   in expected region of the right ventricle.  Low lung volumes.  There is mild pulmonary vascular redistribution/congestion.  No focal lung consolidation seen in the visualized lungs.  No pleural effusion is seen.  No evidence of pneumothorax of the left apex is obscured and not   evaluated.  Right upper quadrant abdominal calcification and abdominal surgical clips   noted.      IMPRESSION:  Limited image. Low lung volumes. There is mild pulmonary   vascular redistribution/congestion.    Part of left apex obscured by head and chin. No focal lung consolidation   seen in the visualized lungs.      Assessment :  89YO M PMH Dementia, HL, atrial fibrillation, SDH, Ca Prostate DM CHF UTI resident of [facility] admitted with left foot 2nd toe nonhealing diabetic ulcer r/o osteo.   Afebrile. WBC wnl.  ESR 43.    Plan :   Hold antibiotic for now  Fu cultures  Fu foot XR  Bone scan as pt unable to get MRI ( has AICD )  Podiatry eval     Continue with present regiment.  Appropriate use of antibiotics and adverse effects reviewed.    > 35 minutes were spent in direct patient care reviewing notes, medications ,labs data/ imaging , discussion with multidisciplinary team.    Thank you for allowing me to participate in care of your patient .    Heber Issa MD  Infectious Disease  [phone]

## 2022-09-06 LAB
ANION GAP SERPL CALC-SCNC: 4 MMOL/L — LOW (ref 5–17)
BUN SERPL-MCNC: 29 MG/DL — HIGH (ref 7–23)
CALCIUM SERPL-MCNC: 9.4 MG/DL — SIGNIFICANT CHANGE UP (ref 8.5–10.1)
CHLORIDE SERPL-SCNC: 105 MMOL/L — SIGNIFICANT CHANGE UP (ref 96–108)
CO2 SERPL-SCNC: 34 MMOL/L — HIGH (ref 22–31)
CREAT SERPL-MCNC: 0.84 MG/DL — SIGNIFICANT CHANGE UP (ref 0.5–1.3)
EGFR: 80 ML/MIN/1.73M2 — SIGNIFICANT CHANGE UP
GLUCOSE SERPL-MCNC: 130 MG/DL — HIGH (ref 70–99)
HCT VFR BLD CALC: 35.7 % — LOW (ref 39–50)
HGB BLD-MCNC: 11.7 G/DL — LOW (ref 13–17)
MCHC RBC-ENTMCNC: 30.9 PG — SIGNIFICANT CHANGE UP (ref 27–34)
MCHC RBC-ENTMCNC: 32.8 GM/DL — SIGNIFICANT CHANGE UP (ref 32–36)
MCV RBC AUTO: 94.2 FL — SIGNIFICANT CHANGE UP (ref 80–100)
NRBC # BLD: 0 /100 WBCS — SIGNIFICANT CHANGE UP (ref 0–0)
PLATELET # BLD AUTO: 249 K/UL — SIGNIFICANT CHANGE UP (ref 150–400)
POTASSIUM SERPL-MCNC: 4.2 MMOL/L — SIGNIFICANT CHANGE UP (ref 3.5–5.3)
POTASSIUM SERPL-SCNC: 4.2 MMOL/L — SIGNIFICANT CHANGE UP (ref 3.5–5.3)
RBC # BLD: 3.79 M/UL — LOW (ref 4.2–5.8)
RBC # FLD: 13.2 % — SIGNIFICANT CHANGE UP (ref 10.3–14.5)
SODIUM SERPL-SCNC: 143 MMOL/L — SIGNIFICANT CHANGE UP (ref 135–145)
WBC # BLD: 6.39 K/UL — SIGNIFICANT CHANGE UP (ref 3.8–10.5)
WBC # FLD AUTO: 6.39 K/UL — SIGNIFICANT CHANGE UP (ref 3.8–10.5)

## 2022-09-06 RX ADMIN — CLOPIDOGREL BISULFATE 75 MILLIGRAM(S): 75 TABLET, FILM COATED ORAL at 12:51

## 2022-09-06 RX ADMIN — Medication 1 TABLET(S): at 17:41

## 2022-09-06 RX ADMIN — Medication 325 MILLIGRAM(S): at 12:51

## 2022-09-06 RX ADMIN — HEPARIN SODIUM 5000 UNIT(S): 5000 INJECTION INTRAVENOUS; SUBCUTANEOUS at 05:58

## 2022-09-06 RX ADMIN — Medication 500 MILLIGRAM(S): at 12:51

## 2022-09-06 RX ADMIN — DONEPEZIL HYDROCHLORIDE 5 MILLIGRAM(S): 10 TABLET, FILM COATED ORAL at 22:19

## 2022-09-06 RX ADMIN — SENNA PLUS 2 TABLET(S): 8.6 TABLET ORAL at 22:19

## 2022-09-06 RX ADMIN — TAMSULOSIN HYDROCHLORIDE 0.4 MILLIGRAM(S): 0.4 CAPSULE ORAL at 22:19

## 2022-09-06 RX ADMIN — PANTOPRAZOLE SODIUM 40 MILLIGRAM(S): 20 TABLET, DELAYED RELEASE ORAL at 12:51

## 2022-09-06 RX ADMIN — POLYETHYLENE GLYCOL 3350 17 GRAM(S): 17 POWDER, FOR SOLUTION ORAL at 12:57

## 2022-09-06 RX ADMIN — SIMVASTATIN 20 MILLIGRAM(S): 20 TABLET, FILM COATED ORAL at 22:19

## 2022-09-06 RX ADMIN — HEPARIN SODIUM 5000 UNIT(S): 5000 INJECTION INTRAVENOUS; SUBCUTANEOUS at 13:03

## 2022-09-06 RX ADMIN — Medication 1 TABLET(S): at 12:51

## 2022-09-06 RX ADMIN — Medication 10 MILLIEQUIVALENT(S): at 12:50

## 2022-09-06 RX ADMIN — HEPARIN SODIUM 5000 UNIT(S): 5000 INJECTION INTRAVENOUS; SUBCUTANEOUS at 22:19

## 2022-09-06 NOTE — CHART NOTE - NSCHARTNOTEFT_GEN_A_CORE
Assessment: Pt seen for nutrition follow-up. Chart reviewed, hospital course noted.    Brief hx: Patient is a 89yo male with pmhx of Dementia, DM, CHF, HTN, A-FIB NOT ON AC, HEMATURIA, HLD, OSTEO, PROSTATE CANCER, BIB EMS FROM NH presents with possible osteomyelitis. pt unable to give information due to dementia. pt with left foot 2nd digit diabetic ulcer sent in to r/o osteo.    Visited pt at bedside this afternoon. Pt alert, minimally verbal during visit. Spoke with nursing, pt ate very well for lunch today, consumed 100% of meal. Some assistance provided with feeding. No N/V/D/C per chart review. +BM 9/6; miralax, senna rx. Pt was on NCS soft diet at facility PTA. Pt presently on consistent carbohydrate, minced and moist diet. Receiving Glucerna supplements BID. Recommend continued assistance/encouragement with meals and oral nutritional supplements.     Factors impacting intake: [ ] none [ ] nausea  [ ] vomiting [ ] diarrhea [ ] constipation  [ ]chewing problems [X] swallowing issues  [X] other: AMS, feeding assistance    Diet Prescription: Diet, Consistent Carbohydrate w/Evening Snack:   Minced and Moist (MINCEDMOIST)  Supplement Feeding Modality:  Oral  Glucerna Shake Cans or Servings Per Day:  1       Frequency:  Two Times a day (09-02-22 @ 11:09)    Intake: good    Current Weight: no wt on admission, bed  % Weight Change    Pertinent Medications: MEDICATIONS  (STANDING):  ascorbic acid 500 milliGRAM(s) Oral daily  clopidogrel Tablet 75 milliGRAM(s) Oral daily  dextrose 5%. 1000 milliLiter(s) (50 mL/Hr) IV Continuous <Continuous>  dextrose 5%. 1000 milliLiter(s) (100 mL/Hr) IV Continuous <Continuous>  dextrose 50% Injectable 25 Gram(s) IV Push once  dextrose 50% Injectable 25 Gram(s) IV Push once  dextrose 50% Injectable 12.5 Gram(s) IV Push once  donepezil 5 milliGRAM(s) Oral at bedtime  ferrous    sulfate 325 milliGRAM(s) Oral daily  glucagon  Injectable 1 milliGRAM(s) IntraMuscular once  heparin   Injectable 5000 Unit(s) SubCutaneous every 8 hours  insulin lispro (ADMELOG) corrective regimen sliding scale   SubCutaneous three times a day before meals  insulin lispro (ADMELOG) corrective regimen sliding scale   SubCutaneous at bedtime  lactobacillus acidophilus 1 Tablet(s) Oral two times a day  losartan 25 milliGRAM(s) Oral daily  metoprolol succinate ER 25 milliGRAM(s) Oral daily  multivitamin/minerals 1 Tablet(s) Oral daily  pantoprazole    Tablet 40 milliGRAM(s) Oral daily  polyethylene glycol 3350 17 Gram(s) Oral daily  potassium chloride    Tablet ER 10 milliEquivalent(s) Oral daily  senna 2 Tablet(s) Oral at bedtime  simvastatin 20 milliGRAM(s) Oral at bedtime  tamsulosin 0.4 milliGRAM(s) Oral at bedtime  torsemide 20 milliGRAM(s) Oral daily    MEDICATIONS  (PRN):  acetaminophen     Tablet .. 650 milliGRAM(s) Oral every 6 hours PRN Temp greater or equal to 38C (100.4F), Mild Pain (1 - 3)  aluminum hydroxide/magnesium hydroxide/simethicone Suspension 30 milliLiter(s) Oral every 4 hours PRN Dyspepsia  dextrose Oral Gel 15 Gram(s) Oral once PRN Blood Glucose LESS THAN 70 milliGRAM(s)/deciliter  melatonin 3 milliGRAM(s) Oral at bedtime PRN Insomnia  ondansetron Injectable 4 milliGRAM(s) IV Push every 8 hours PRN Nausea and/or Vomiting    Pertinent Labs: 09-06 Na143 mmol/L Glu 130 mg/dL<H> K+ 4.2 mmol/L Cr  0.84 mg/dL BUN 29 mg/dL<H> 09-03 Alb 3.0 g/dL<L> 09-02 Chol 119 mg/dL LDL --    HDL 49 mg/dL Trig 35 mg/dL    CAPILLARY BLOOD GLUCOSE  POCT Blood Glucose.: 133 mg/dL (06 Sep 2022 11:37)  POCT Blood Glucose.: 117 mg/dL (06 Sep 2022 07:34)  POCT Blood Glucose.: 181 mg/dL (05 Sep 2022 21:13)  POCT Blood Glucose.: 120 mg/dL (05 Sep 2022 16:59)    Skin: unstageable to L 2nd toe    Estimated Needs:   [X] no change since previous assessment: based on 160#/72.5kg; 25-30kcal/kg (1812-2175kcal), 1.2-1.4g pro/kg (.5gm protein)  [ ] recalculated:     Previous Nutrition Diagnosis:   [ ] Inadequate Energy Intake [ ]Inadequate Oral Intake [ ] Excessive Energy Intake   [ ] Underweight [ ] Increased Nutrient Needs [ ] Overweight/Obesity [X] Swallowing Difficulty  [ ] Altered GI Function [ ] Unintended Weight Loss [ ] Food & Nutrition Related Knowledge Deficit [X] Malnutrition (chronic/moderate)    Nutrition Diagnosis is [X] ongoing  [ ] resolved [ ] not applicable     New Nutrition Diagnosis: [X] not applicable     Interventions:   Recommend  [ ] Change Diet To:  [ ] Nutrition Supplement  [ ] Nutrition Support  [X] Other: Continue nutrition care plan, con cho minced and moist diet, Glucerna BID, assistance/encouragement at meal times as needed  Continue MVI and vitamin C supplementation    Monitoring and Evaluation:   [X] PO intake [ x ] Tolerance to diet prescription [ x ] weights [ x ] labs[ x ] follow up per protocol  [ ] other: Assessment: Pt seen for nutrition follow-up. Chart reviewed, hospital course noted.    Brief hx: Patient is a 89yo male with pmhx of Dementia, DM, CHF, HTN, A-FIB NOT ON AC, HEMATURIA, HLD, OSTEO, PROSTATE CANCER, BIB EMS FROM NH presents with possible osteomyelitis. pt unable to give information due to dementia. pt with left foot 2nd digit diabetic ulcer sent in to r/o osteo.    Visited pt at bedside this afternoon. Pt alert, minimally verbal during visit. Spoke with nursing, pt ate very well for lunch today, consumed 100% of meal. Some assistance provided with feeding. PO intakes % per nursing documentation. No N/V/D/C per chart review. +BM 9/6; miralax, senna rx. Pt was on NCS soft diet at facility PTA. Pt presently on consistent carbohydrate, minced and moist diet. Receiving Glucerna supplements BID. Recommend continued assistance/encouragement with meals and oral nutritional supplements.     Factors impacting intake: [ ] none [ ] nausea  [ ] vomiting [ ] diarrhea [ ] constipation  [ ]chewing problems [X] swallowing issues  [X] other: AMS, feeding assistance    Diet Prescription: Diet, Consistent Carbohydrate w/Evening Snack:   Minced and Moist (MINCEDMOIST)  Supplement Feeding Modality:  Oral  Glucerna Shake Cans or Servings Per Day:  1       Frequency:  Two Times a day (09-02-22 @ 11:09)    Intake: good    Current Weight: no wt on admission, previous weight of 160# during last months admission (3+ BL leg/ankle/foot edema noted)  bed-scale wt of 166# obtained during initial assessment (+pillow/blanket)    Pertinent Medications: MEDICATIONS  (STANDING):  ascorbic acid 500 milliGRAM(s) Oral daily  clopidogrel Tablet 75 milliGRAM(s) Oral daily  dextrose 5%. 1000 milliLiter(s) (50 mL/Hr) IV Continuous <Continuous>  dextrose 5%. 1000 milliLiter(s) (100 mL/Hr) IV Continuous <Continuous>  dextrose 50% Injectable 25 Gram(s) IV Push once  dextrose 50% Injectable 25 Gram(s) IV Push once  dextrose 50% Injectable 12.5 Gram(s) IV Push once  donepezil 5 milliGRAM(s) Oral at bedtime  ferrous    sulfate 325 milliGRAM(s) Oral daily  glucagon  Injectable 1 milliGRAM(s) IntraMuscular once  heparin   Injectable 5000 Unit(s) SubCutaneous every 8 hours  insulin lispro (ADMELOG) corrective regimen sliding scale   SubCutaneous three times a day before meals  insulin lispro (ADMELOG) corrective regimen sliding scale   SubCutaneous at bedtime  lactobacillus acidophilus 1 Tablet(s) Oral two times a day  losartan 25 milliGRAM(s) Oral daily  metoprolol succinate ER 25 milliGRAM(s) Oral daily  multivitamin/minerals 1 Tablet(s) Oral daily  pantoprazole    Tablet 40 milliGRAM(s) Oral daily  polyethylene glycol 3350 17 Gram(s) Oral daily  potassium chloride    Tablet ER 10 milliEquivalent(s) Oral daily  senna 2 Tablet(s) Oral at bedtime  simvastatin 20 milliGRAM(s) Oral at bedtime  tamsulosin 0.4 milliGRAM(s) Oral at bedtime  torsemide 20 milliGRAM(s) Oral daily    MEDICATIONS  (PRN):  acetaminophen     Tablet .. 650 milliGRAM(s) Oral every 6 hours PRN Temp greater or equal to 38C (100.4F), Mild Pain (1 - 3)  aluminum hydroxide/magnesium hydroxide/simethicone Suspension 30 milliLiter(s) Oral every 4 hours PRN Dyspepsia  dextrose Oral Gel 15 Gram(s) Oral once PRN Blood Glucose LESS THAN 70 milliGRAM(s)/deciliter  melatonin 3 milliGRAM(s) Oral at bedtime PRN Insomnia  ondansetron Injectable 4 milliGRAM(s) IV Push every 8 hours PRN Nausea and/or Vomiting    Pertinent Labs: 09-06 Na143 mmol/L Glu 130 mg/dL<H> K+ 4.2 mmol/L Cr  0.84 mg/dL BUN 29 mg/dL<H> 09-03 Alb 3.0 g/dL<L> 09-02 Chol 119 mg/dL LDL --    HDL 49 mg/dL Trig 35 mg/dL    CAPILLARY BLOOD GLUCOSE  POCT Blood Glucose.: 133 mg/dL (06 Sep 2022 11:37)  POCT Blood Glucose.: 117 mg/dL (06 Sep 2022 07:34)  POCT Blood Glucose.: 181 mg/dL (05 Sep 2022 21:13)  POCT Blood Glucose.: 120 mg/dL (05 Sep 2022 16:59)    Skin: unstageable to L 2nd toe    Estimated Needs:   [X] no change since previous assessment: based on 160#/72.5kg; 25-30kcal/kg (1812-2175kcal), 1.2-1.4g pro/kg (.5gm protein)  [ ] recalculated:     Previous Nutrition Diagnosis:   [ ] Inadequate Energy Intake [ ]Inadequate Oral Intake [ ] Excessive Energy Intake   [ ] Underweight [ ] Increased Nutrient Needs [ ] Overweight/Obesity [X] Swallowing Difficulty  [ ] Altered GI Function [ ] Unintended Weight Loss [ ] Food & Nutrition Related Knowledge Deficit [X] Malnutrition (chronic/moderate)    Nutrition Diagnosis is [X] ongoing  [ ] resolved [ ] not applicable     New Nutrition Diagnosis: [X] not applicable     Interventions:   Recommend  [ ] Change Diet To:  [ ] Nutrition Supplement  [ ] Nutrition Support  [X] Other: Continue nutrition care plan, con cho minced and moist diet, Glucerna BID, assistance/encouragement at meal times as needed  Continue MVI and vitamin C supplementation    Monitoring and Evaluation:   [X] PO intake [ x ] Tolerance to diet prescription [ x ] weights [ x ] labs[ x ] follow up per protocol  [ ] other:

## 2022-09-06 NOTE — PROGRESS NOTE ADULT - SUBJECTIVE AND OBJECTIVE BOX
TERESA FRANCIS is a 89yMale , patient examined and chart reviewed.     INTERVAL HPI/ OVERNIGHT EVENTS:   Afebrile.  No events.    PAST MEDICAL & SURGICAL HISTORY:  Atrial fibrillation  Hypertension  Diabetes  Prostate ca  Dementia  CHF (congestive heart failure)  Hyperlipemia  Diabetes  Depression  Dementia  Prostate CA  Arteriosclerotic heart disease (ASHD)  Anemia  AICD (automatic cardioverter/defibrillator) present  Osteomyelitis of finger of left hand  Personal history of radiation therapy  H/O ongoing treatment with hormonal therapy  H/O bladder infections  Scrotal abscess  Urinary retention  Hematuria  H/O cystitis  Constipation  VHD (valvular heart disease)  Aortic valve stenosis  History of automatic internal cardiac defibrillator (AICD)        For details regarding the patient's social history, family history, and other miscellaneous elements, please refer the initial infectious diseases consultation and/or the admitting history and physical examination for this admission.    ROS:  Unable to obtain due to : Dementia      ALLERGIES  latex (Rash)      Current inpatient medications :    ANTIBIOTICS/RELEVANT:      MEDICATIONS  (STANDING):  ascorbic acid 500 milliGRAM(s) Oral daily  clopidogrel Tablet 75 milliGRAM(s) Oral daily  dextrose 5%. 1000 milliLiter(s) (50 mL/Hr) IV Continuous <Continuous>  dextrose 5%. 1000 milliLiter(s) (100 mL/Hr) IV Continuous <Continuous>  dextrose 50% Injectable 25 Gram(s) IV Push once  dextrose 50% Injectable 25 Gram(s) IV Push once  dextrose 50% Injectable 12.5 Gram(s) IV Push once  donepezil 5 milliGRAM(s) Oral at bedtime  ferrous    sulfate 325 milliGRAM(s) Oral daily  glucagon  Injectable 1 milliGRAM(s) IntraMuscular once  heparin   Injectable 5000 Unit(s) SubCutaneous every 8 hours  insulin lispro (ADMELOG) corrective regimen sliding scale   SubCutaneous three times a day before meals  insulin lispro (ADMELOG) corrective regimen sliding scale   SubCutaneous at bedtime  lactobacillus acidophilus 1 Tablet(s) Oral two times a day  losartan 25 milliGRAM(s) Oral daily  metoprolol succinate ER 25 milliGRAM(s) Oral daily  multivitamin/minerals 1 Tablet(s) Oral daily  pantoprazole    Tablet 40 milliGRAM(s) Oral daily  polyethylene glycol 3350 17 Gram(s) Oral daily  potassium chloride    Tablet ER 10 milliEquivalent(s) Oral daily  senna 2 Tablet(s) Oral at bedtime  simvastatin 20 milliGRAM(s) Oral at bedtime  tamsulosin 0.4 milliGRAM(s) Oral at bedtime  torsemide 20 milliGRAM(s) Oral daily    MEDICATIONS  (PRN):  acetaminophen     Tablet .. 650 milliGRAM(s) Oral every 6 hours PRN Temp greater or equal to 38C (100.4F), Mild Pain (1 - 3)  aluminum hydroxide/magnesium hydroxide/simethicone Suspension 30 milliLiter(s) Oral every 4 hours PRN Dyspepsia  dextrose Oral Gel 15 Gram(s) Oral once PRN Blood Glucose LESS THAN 70 milliGRAM(s)/deciliter  melatonin 3 milliGRAM(s) Oral at bedtime PRN Insomnia  ondansetron Injectable 4 milliGRAM(s) IV Push every 8 hours PRN Nausea and/or Vomiting        Objective:  Vital Signs Last 24 Hrs  T(C): 36.6 (06 Sep 2022 12:03), Max: 37 (06 Sep 2022 05:06)  T(F): 97.9 (06 Sep 2022 12:03), Max: 98.6 (06 Sep 2022 05:06)  HR: 68 (06 Sep 2022 12:03) (68 - 80)  BP: 102/60 (06 Sep 2022 12:03) (102/60 - 116/65)  RR: 17 (06 Sep 2022 12:03) (17 - 18)  SpO2: 96% (06 Sep 2022 12:03) (91% - 98%)    Parameters below as of 06 Sep 2022 12:03  Patient On (Oxygen Delivery Method): nasal cannula        Physical Exam:  General:  no acute distress  Neck: supple, trachea midline  Lungs: clear, no wheeze/rhonchi  Cardiovascular: regular rate and rhythm, S1 S2  Abdomen: soft, nontender,  bowel sounds normal  Neurological: alert and oriented x3  Skin: no rash  Extremities: Left 2nd toe nonhealing ulcer no drainage mild swelling  Chronic venous insufficiency        LABS:                        11.7   6.39  )-----------( 249      ( 06 Sep 2022 06:08 )             35.7   09-06    143  |  105  |  29<H>  ----------------------------<  130<H>  4.2   |  34<H>  |  0.84    Ca    9.4      06 Sep 2022 06:08      MICROBIOLOGY:    Culture - Urine (collected 03 Sep 2022 07:49)  Source: Clean Catch Clean Catch (Midstream)  Final Report (04 Sep 2022 07:30):    No growth    RADIOLOGY & ADDITIONAL STUDIES:    ACC: [id number] EXAM:  XR FOOT COMP MIN 3 VIEWS LT                          PROCEDURE DATE:  09/01/2022          INTERPRETATION:  HISTORY:  Second digit wound    TECHNIQUE:  AP, oblique and lateral views of the left foot were obtained.    FINDINGS:  There is no definite evidence of acute fracture. Vascular   calcifications are noted. There are large dorsal and plantar calcaneal   spurs. There is mild soft tissue swelling of the forefoot. There are no   definite destructive lesions or periosteal reaction seen to suggest   osteomyelitis. Mild midfoot arthropathy is best seen on the lateral film.    IMPRESSION:  Soft tissue swelling. No definite evidence of osteomyelitis.   If concern for osteomyelitis persists, an MRI could be obtained.    ACC: [id number] EXAM:  XR CHEST PORTABLE IMMED 1V                          PROCEDURE DATE:  09/01/2022          INTERPRETATION:  TIME OF EXAM: September 1, 2022 at 9:29 PM.    CLINICAL INFORMATION: Sepsis.    COMPARISON:  June 30, 2022.    TECHNIQUE:  AP Portable chest x-ray. The head and chin obscures the   superior mediastinum and left apex. Rotated.    INTERPRETATION:    Heart size and the mediastinum cannot be accurately evaluated on this   projection. Left chest wall cardiac pacemaker with intact lead with tip   in expected region of the right ventricle.  Low lung volumes.  There is mild pulmonary vascular redistribution/congestion.  No focal lung consolidation seen in the visualized lungs.  No pleural effusion is seen.  No evidence of pneumothorax of the left apex is obscured and not   evaluated.  Right upper quadrant abdominal calcification and abdominal surgical clips   noted.      IMPRESSION:  Limited image. Low lung volumes. There is mild pulmonary   vascular redistribution/congestion.    Part of left apex obscured by head and chin. No focal lung consolidation   seen in the visualized lungs.      Assessment :  89YO M PMH Dementia, HL, atrial fibrillation, SDH, Ca Prostate DM CHF UTI resident of [facility] admitted with left foot 2nd toe nonhealing diabetic ulcer r/o osteo.   Afebrile. WBC wnl.  ESR 43.    Plan :   Hold antibiotic for now  Trend temps and cbc  Bone scan as pt unable to get MRI ( has AICD )  Podiatry follow up    Continue with present regiment.  Appropriate use of antibiotics and adverse effects reviewed.    > 35 minutes were spent in direct patient care reviewing notes, medications ,labs data/ imaging , discussion with multidisciplinary team.    Thank you for allowing me to participate in care of your patient .    Heber Issa MD  Infectious Disease  [phone]

## 2022-09-06 NOTE — PROGRESS NOTE ADULT - SUBJECTIVE AND OBJECTIVE BOX
Patient is a 89y Male with a known history of :  Diabetic ulcer of toe of left foot [E11.621]    Afib [I48.91]    Dementia [F03.90]    HTN (hypertension) [I10]    CHF (congestive heart failure) [I50.9]    VHD (valvular heart disease) [I38]    Prophylactic measure [Z29.9]    DM (diabetes mellitus) [E11.9]      HPI:  Patient  is a 89yo male with pmhx of Dementia, DM, CHF, HTN, A-FIB NOT ON AC, HEMATURIA, HLD, OSTEO, PROSTATE CANCER, BIB EMS FROM NH presents with possible osteomyelitis. pt unable to give information due to dementia. pt with left foot 2nd digit diabetic ulcer sent in to r/o osteo. (02 Sep 2022 09:51)      REVIEW OF SYSTEMS:    CONSTITUTIONAL: No fever, weight loss, or fatigue  EYES: No eye pain, visual disturbances, or discharge  ENMT:  No difficulty hearing, tinnitus, vertigo; No sinus or throat pain  NECK: No pain or stiffness  BREASTS: No pain, masses, or nipple discharge  RESPIRATORY: No cough, wheezing, chills or hemoptysis; No shortness of breath  CARDIOVASCULAR: No chest pain, palpitations, dizziness, or leg swelling  GASTROINTESTINAL: No abdominal or epigastric pain. No nausea, vomiting, or hematemesis; No diarrhea or constipation. No melena or hematochezia.  GENITOURINARY: No dysuria, frequency, hematuria, or incontinence  NEUROLOGICAL: No headaches, memory loss, loss of strength, numbness, or tremors  SKIN: No itching, burning, rashes, or lesions   LYMPH NODES: No enlarged glands  ENDOCRINE: No heat or cold intolerance; No hair loss  MUSCULOSKELETAL: No joint pain or swelling; No muscle, back, or extremity pain  PSYCHIATRIC: No depression, anxiety, mood swings, or difficulty sleeping  HEME/LYMPH: No easy bruising, or bleeding gums  ALLERGY AND IMMUNOLOGIC: No hives or eczema    MEDICATIONS  (STANDING):  ascorbic acid 500 milliGRAM(s) Oral daily  clopidogrel Tablet 75 milliGRAM(s) Oral daily  dextrose 5%. 1000 milliLiter(s) (50 mL/Hr) IV Continuous <Continuous>  dextrose 5%. 1000 milliLiter(s) (100 mL/Hr) IV Continuous <Continuous>  dextrose 50% Injectable 25 Gram(s) IV Push once  dextrose 50% Injectable 12.5 Gram(s) IV Push once  dextrose 50% Injectable 25 Gram(s) IV Push once  donepezil 5 milliGRAM(s) Oral at bedtime  ferrous    sulfate 325 milliGRAM(s) Oral daily  glucagon  Injectable 1 milliGRAM(s) IntraMuscular once  heparin   Injectable 5000 Unit(s) SubCutaneous every 8 hours  insulin lispro (ADMELOG) corrective regimen sliding scale   SubCutaneous three times a day before meals  insulin lispro (ADMELOG) corrective regimen sliding scale   SubCutaneous at bedtime  lactobacillus acidophilus 1 Tablet(s) Oral two times a day  losartan 25 milliGRAM(s) Oral daily  metoprolol succinate ER 25 milliGRAM(s) Oral daily  multivitamin/minerals 1 Tablet(s) Oral daily  pantoprazole    Tablet 40 milliGRAM(s) Oral daily  polyethylene glycol 3350 17 Gram(s) Oral daily  potassium chloride    Tablet ER 10 milliEquivalent(s) Oral daily  senna 2 Tablet(s) Oral at bedtime  simvastatin 20 milliGRAM(s) Oral at bedtime  tamsulosin 0.4 milliGRAM(s) Oral at bedtime  torsemide 20 milliGRAM(s) Oral daily    MEDICATIONS  (PRN):  acetaminophen     Tablet .. 650 milliGRAM(s) Oral every 6 hours PRN Temp greater or equal to 38C (100.4F), Mild Pain (1 - 3)  aluminum hydroxide/magnesium hydroxide/simethicone Suspension 30 milliLiter(s) Oral every 4 hours PRN Dyspepsia  dextrose Oral Gel 15 Gram(s) Oral once PRN Blood Glucose LESS THAN 70 milliGRAM(s)/deciliter  melatonin 3 milliGRAM(s) Oral at bedtime PRN Insomnia  ondansetron Injectable 4 milliGRAM(s) IV Push every 8 hours PRN Nausea and/or Vomiting      ALLERGIES: latex (Rash)  latex (Unknown)  No Known Drug Allergies      FAMILY HISTORY:      PHYSICAL EXAMINATION:  -----------------------------  T(C): 37 (09-06-22 @ 05:06), Max: 37 (09-06-22 @ 05:06)  HR: 80 (09-06-22 @ 05:20) (73 - 80)  BP: 111/63 (09-06-22 @ 05:06) (109/58 - 116/65)  RR: 18 (09-06-22 @ 05:20) (17 - 18)  SpO2: 98% (09-06-22 @ 05:20) (91% - 98%)  Wt(kg): --    09-05 @ 07:01  -  09-06 @ 07:00  --------------------------------------------------------  IN:  Total IN: 0 mL    OUT:    Voided (mL): 2000 mL  Total OUT: 2000 mL    Total NET: -2000 mL            VITALS  T(C): 37 (09-06-22 @ 05:06), Max: 37 (09-06-22 @ 05:06)  HR: 80 (09-06-22 @ 05:20) (73 - 80)  BP: 111/63 (09-06-22 @ 05:06) (109/58 - 116/65)  RR: 18 (09-06-22 @ 05:20) (17 - 18)  SpO2: 98% (09-06-22 @ 05:20) (91% - 98%)    Constitutional: well developed, normal appearance, well groomed, well nourished, no deformities and no acute distress.   Eyes: the conjunctiva exhibited no abnormalities and the eyelids demonstrated no xanthelasmas.   HEENT: normal oral mucosa, no oral pallor and no oral cyanosis.   Neck: normal jugular venous A waves present, normal jugular venous V waves present and no jugular venous jacobs A waves.   Pulmonary: no respiratory distress, normal respiratory rhythm and effort, no accessory muscle use and lungs were clear to auscultation bilaterally.   Cardiovascular: heart rate and rhythm were normal, normal S1 and S2 and no murmur, gallop, rub, heave or thrill are present.   Abdomen: soft, non-tender, no hepato-splenomegaly and no abdominal mass palpated.   Musculoskeletal: the gait could not be assessed..   Extremities: no clubbing of the fingernails, no localized cyanosis, no petechial hemorrhages and no ischemic changes.   Skin: normal skin color and pigmentation, no rash, no venous stasis, no skin lesions, no skin ulcer and no xanthoma was observed.   Psychiatric: oriented to person, place, and time, the affect was normal, the mood was normal and not feeling anxious.     LABS:   --------  09-06    143  |  105  |  29<H>  ----------------------------<  130<H>  4.2   |  34<H>  |  0.84    Ca    9.4      06 Sep 2022 06:08                           11.7   6.39  )-----------( 249      ( 06 Sep 2022 06:08 )             35.7                 RADIOLOGY:  -----------------    ECG:     ECHO:

## 2022-09-07 LAB
ANION GAP SERPL CALC-SCNC: 3 MMOL/L — LOW (ref 5–17)
BUN SERPL-MCNC: 27 MG/DL — HIGH (ref 7–23)
CALCIUM SERPL-MCNC: 9.4 MG/DL — SIGNIFICANT CHANGE UP (ref 8.5–10.1)
CHLORIDE SERPL-SCNC: 108 MMOL/L — SIGNIFICANT CHANGE UP (ref 96–108)
CO2 SERPL-SCNC: 33 MMOL/L — HIGH (ref 22–31)
CREAT SERPL-MCNC: 0.8 MG/DL — SIGNIFICANT CHANGE UP (ref 0.5–1.3)
CULTURE RESULTS: SIGNIFICANT CHANGE UP
CULTURE RESULTS: SIGNIFICANT CHANGE UP
EGFR: 82 ML/MIN/1.73M2 — SIGNIFICANT CHANGE UP
GLUCOSE SERPL-MCNC: 130 MG/DL — HIGH (ref 70–99)
HCT VFR BLD CALC: 37.6 % — LOW (ref 39–50)
HGB BLD-MCNC: 12 G/DL — LOW (ref 13–17)
MCHC RBC-ENTMCNC: 30.8 PG — SIGNIFICANT CHANGE UP (ref 27–34)
MCHC RBC-ENTMCNC: 31.9 GM/DL — LOW (ref 32–36)
MCV RBC AUTO: 96.4 FL — SIGNIFICANT CHANGE UP (ref 80–100)
NRBC # BLD: 0 /100 WBCS — SIGNIFICANT CHANGE UP (ref 0–0)
PLATELET # BLD AUTO: 246 K/UL — SIGNIFICANT CHANGE UP (ref 150–400)
POTASSIUM SERPL-MCNC: 4.2 MMOL/L — SIGNIFICANT CHANGE UP (ref 3.5–5.3)
POTASSIUM SERPL-SCNC: 4.2 MMOL/L — SIGNIFICANT CHANGE UP (ref 3.5–5.3)
RBC # BLD: 3.9 M/UL — LOW (ref 4.2–5.8)
RBC # FLD: 13.5 % — SIGNIFICANT CHANGE UP (ref 10.3–14.5)
SODIUM SERPL-SCNC: 144 MMOL/L — SIGNIFICANT CHANGE UP (ref 135–145)
SPECIMEN SOURCE: SIGNIFICANT CHANGE UP
SPECIMEN SOURCE: SIGNIFICANT CHANGE UP
WBC # BLD: 6.71 K/UL — SIGNIFICANT CHANGE UP (ref 3.8–10.5)
WBC # FLD AUTO: 6.71 K/UL — SIGNIFICANT CHANGE UP (ref 3.8–10.5)

## 2022-09-07 PROCEDURE — 99232 SBSQ HOSP IP/OBS MODERATE 35: CPT

## 2022-09-07 PROCEDURE — 78800 RP LOCLZJ TUM 1 AREA 1 D IMG: CPT | Mod: 26

## 2022-09-07 PROCEDURE — 99222 1ST HOSP IP/OBS MODERATE 55: CPT

## 2022-09-07 RX ADMIN — PANTOPRAZOLE SODIUM 40 MILLIGRAM(S): 20 TABLET, DELAYED RELEASE ORAL at 12:12

## 2022-09-07 RX ADMIN — Medication 325 MILLIGRAM(S): at 12:12

## 2022-09-07 RX ADMIN — Medication 500 MILLIGRAM(S): at 12:12

## 2022-09-07 RX ADMIN — HEPARIN SODIUM 5000 UNIT(S): 5000 INJECTION INTRAVENOUS; SUBCUTANEOUS at 21:36

## 2022-09-07 RX ADMIN — POLYETHYLENE GLYCOL 3350 17 GRAM(S): 17 POWDER, FOR SOLUTION ORAL at 12:11

## 2022-09-07 RX ADMIN — Medication 1 TABLET(S): at 12:12

## 2022-09-07 RX ADMIN — SENNA PLUS 2 TABLET(S): 8.6 TABLET ORAL at 21:36

## 2022-09-07 RX ADMIN — SIMVASTATIN 20 MILLIGRAM(S): 20 TABLET, FILM COATED ORAL at 21:36

## 2022-09-07 RX ADMIN — HEPARIN SODIUM 5000 UNIT(S): 5000 INJECTION INTRAVENOUS; SUBCUTANEOUS at 05:34

## 2022-09-07 RX ADMIN — Medication 1 TABLET(S): at 05:33

## 2022-09-07 RX ADMIN — Medication 10 MILLIEQUIVALENT(S): at 12:11

## 2022-09-07 RX ADMIN — DONEPEZIL HYDROCHLORIDE 5 MILLIGRAM(S): 10 TABLET, FILM COATED ORAL at 21:36

## 2022-09-07 RX ADMIN — Medication 1: at 12:36

## 2022-09-07 RX ADMIN — Medication 20 MILLIGRAM(S): at 05:33

## 2022-09-07 RX ADMIN — TAMSULOSIN HYDROCHLORIDE 0.4 MILLIGRAM(S): 0.4 CAPSULE ORAL at 21:36

## 2022-09-07 RX ADMIN — HEPARIN SODIUM 5000 UNIT(S): 5000 INJECTION INTRAVENOUS; SUBCUTANEOUS at 13:41

## 2022-09-07 RX ADMIN — LOSARTAN POTASSIUM 25 MILLIGRAM(S): 100 TABLET, FILM COATED ORAL at 05:33

## 2022-09-07 RX ADMIN — Medication 1 TABLET(S): at 18:31

## 2022-09-07 RX ADMIN — CLOPIDOGREL BISULFATE 75 MILLIGRAM(S): 75 TABLET, FILM COATED ORAL at 12:12

## 2022-09-07 RX ADMIN — Medication 25 MILLIGRAM(S): at 05:34

## 2022-09-07 NOTE — SWALLOW BEDSIDE ASSESSMENT ADULT - ASR SWALLOW REFERRAL
ongoing dietary f/u to ensure pt is meeting adequate daily caloric needs in the setting of refusing PO

## 2022-09-07 NOTE — SWALLOW BEDSIDE ASSESSMENT ADULT - DIET PRIOR TO ADMI
As per dietary consult, "Pt was on NCS soft at facility" - "Family does not want pt to have puree diet and prefers pt receive regular foods per note in transfer papers and RDN note. 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 Ring, okay to proceed with swallow assessment to determine safest/least restrictive PO diet.

## 2022-09-07 NOTE — PROGRESS NOTE ADULT - SUBJECTIVE AND OBJECTIVE BOX
PROGRESS NOTE  Patient is a 89y old  Male who presents with a chief complaint of left foot 2nd digit diabetic ulcer sent in to r/o osteo. (06 Sep 2022 14:19)    Chart and available morning labs /imaging are reviewed electronically , urgent issues addressed . More information  is being added upon completion of rounds , when more information is collected and management discussed with consultants , medical staff and social service/case management on the floor   OVERNIGHT  No new issues reported by medical staff . All above noted Patient is resting in a bed comfortably .Confused ,poor mentation .No distress noted     HPI:  Patient  is a 89yo male with pmhx of Dementia, DM, CHF, HTN, A-FIB NOT ON AC, HEMATURIA, HLD, OSTEO, PROSTATE CANCER, BIB EMS FROM NH presents with possible osteomyelitis. pt unable to give information due to dementia. pt with left foot 2nd digit diabetic ulcer sent in to r/o osteo. (02 Sep 2022 09:51)    PAST MEDICAL & SURGICAL HISTORY:  Atrial fibrillation      Hypertension      Diabetes      Prostate ca      Dementia      CHF (congestive heart failure)      Hyperlipemia      Diabetes      Depression      Dementia      DM (diabetes mellitus)      Atrial fibrillation      Prostate CA      Arteriosclerotic heart disease (ASHD)      Dementia      Anemia      AICD (automatic cardioverter/defibrillator) present      Osteomyelitis of finger of left hand      Personal history of radiation therapy      H/O ongoing treatment with hormonal therapy      H/O bladder infections      Scrotal abscess      Urinary retention      Hematuria      H/O cystitis      Constipation      VHD (valvular heart disease)      Aortic valve stenosis      History of automatic internal cardiac defibrillator (AICD)          MEDICATIONS  (STANDING):  ascorbic acid 500 milliGRAM(s) Oral daily  clopidogrel Tablet 75 milliGRAM(s) Oral daily  dextrose 5%. 1000 milliLiter(s) (50 mL/Hr) IV Continuous <Continuous>  dextrose 5%. 1000 milliLiter(s) (100 mL/Hr) IV Continuous <Continuous>  dextrose 50% Injectable 25 Gram(s) IV Push once  dextrose 50% Injectable 12.5 Gram(s) IV Push once  dextrose 50% Injectable 25 Gram(s) IV Push once  donepezil 5 milliGRAM(s) Oral at bedtime  ferrous    sulfate 325 milliGRAM(s) Oral daily  glucagon  Injectable 1 milliGRAM(s) IntraMuscular once  heparin   Injectable 5000 Unit(s) SubCutaneous every 8 hours  insulin lispro (ADMELOG) corrective regimen sliding scale   SubCutaneous three times a day before meals  insulin lispro (ADMELOG) corrective regimen sliding scale   SubCutaneous at bedtime  lactobacillus acidophilus 1 Tablet(s) Oral two times a day  losartan 25 milliGRAM(s) Oral daily  metoprolol succinate ER 25 milliGRAM(s) Oral daily  multivitamin/minerals 1 Tablet(s) Oral daily  pantoprazole    Tablet 40 milliGRAM(s) Oral daily  polyethylene glycol 3350 17 Gram(s) Oral daily  potassium chloride    Tablet ER 10 milliEquivalent(s) Oral daily  senna 2 Tablet(s) Oral at bedtime  simvastatin 20 milliGRAM(s) Oral at bedtime  tamsulosin 0.4 milliGRAM(s) Oral at bedtime  torsemide 20 milliGRAM(s) Oral daily    MEDICATIONS  (PRN):  acetaminophen     Tablet .. 650 milliGRAM(s) Oral every 6 hours PRN Temp greater or equal to 38C (100.4F), Mild Pain (1 - 3)  aluminum hydroxide/magnesium hydroxide/simethicone Suspension 30 milliLiter(s) Oral every 4 hours PRN Dyspepsia  dextrose Oral Gel 15 Gram(s) Oral once PRN Blood Glucose LESS THAN 70 milliGRAM(s)/deciliter  melatonin 3 milliGRAM(s) Oral at bedtime PRN Insomnia  ondansetron Injectable 4 milliGRAM(s) IV Push every 8 hours PRN Nausea and/or Vomiting      OBJECTIVE    T(C): 36.5 (09-07-22 @ 12:09), Max: 37.1 (09-06-22 @ 21:00)  HR: 71 (09-07-22 @ 12:09) (71 - 89)  BP: 115/56 (09-07-22 @ 12:09) (115/56 - 119/52)  RR: 17 (09-07-22 @ 12:09) (17 - 19)  SpO2: 98% (09-07-22 @ 12:09) (96% - 98%)  Wt(kg): --  I&O's Summary    06 Sep 2022 07:01  -  07 Sep 2022 07:00  --------------------------------------------------------  IN: 0 mL / OUT: 1075 mL / NET: -1075 mL    07 Sep 2022 07:01  -  07 Sep 2022 16:31  --------------------------------------------------------  IN: 0 mL / OUT: 900 mL / NET: -900 mL          REVIEW OF SYSTEMS:  CONSTITUTIONAL: No fever, weight loss, or fatigue  EYES: No eye pain, visual disturbances, or discharge  ENMT:   No sinus or throat pain  NECK: No pain or stiffness  RESPIRATORY: No cough, wheezing, chills or hemoptysis; No shortness of breath  CARDIOVASCULAR: No chest pain, palpitations, dizziness, or leg swelling  GASTROINTESTINAL: No abdominal pain. No nausea, vomiting; No diarrhea or constipation. No melena or hematochezia.  GENITOURINARY: No dysuria, frequency, hematuria, or incontinence  NEUROLOGICAL: No headaches, memory loss, loss of strength, numbness, or tremors  SKIN: No itching, burning, rashes, or lesions   MUSCULOSKELETAL: No joint pain or swelling; No muscle, back, or extremity pain    PHYSICAL EXAM:  Appearance: NAD. VS past 24 hrs -as above   HEENT:   Moist oral mucosa. Conjunctiva clear b/l.   Neck : supple  Respiratory: Lungs CTAB.  Gastrointestinal:  Soft, nontender. No rebound. No rigidity. BS present	  Cardiovascular: RRR ,S1S2 present  Neurologic: Non-focal. Moving all extremities.  Extremities: No edema. No erythema. No calf tenderness.  Skin: No rashes, No ecchymoses, No cyanosis.	  wounds ,skin lesions-See skin assesment flow sheet   LABS:                        12.0   6.71  )-----------( 246      ( 07 Sep 2022 07:30 )             37.6     09-07    144  |  108  |  27<H>  ----------------------------<  130<H>  4.2   |  33<H>  |  0.80    Ca    9.4      07 Sep 2022 07:30      CAPILLARY BLOOD GLUCOSE      POCT Blood Glucose.: 188 mg/dL (07 Sep 2022 11:43)  POCT Blood Glucose.: 126 mg/dL (07 Sep 2022 07:47)  POCT Blood Glucose.: 145 mg/dL (06 Sep 2022 22:29)  POCT Blood Glucose.: 139 mg/dL (06 Sep 2022 16:44)          Culture - Urine (collected 03 Sep 2022 07:49)  Source: Clean Catch Clean Catch (Midstream)  Final Report (04 Sep 2022 07:30):    No growth    Culture - Blood (collected 01 Sep 2022 22:45)  Source: .Blood Blood-Peripheral  Final Report (07 Sep 2022 05:00):    No Growth Final    Culture - Blood (collected 01 Sep 2022 22:35)  Source: .Blood Blood-Peripheral  Final Report (07 Sep 2022 05:00):    No Growth Final      RADIOLOGY & ADDITIONAL TESTS:   reviewed elctronically  ASSESSMENT/PLAN: 	    25 minutes aggregate time was spent on this visit, 50% visit time spent in care co-ordination with other attendings and counselling patient .I have discussed care plan with patient / HCP/family member ,who expressed understanding of problems treatment and their effect and side effects, alternatives in details. I have asked if they have any questions and concerns and appropriately addressed them to best of my ability.

## 2022-09-07 NOTE — PROGRESS NOTE ADULT - SUBJECTIVE AND OBJECTIVE BOX
[facility] [place]       HPI:  Patient  is a 89yo male with pmhx of Dementia, DM, CHF, HTN, A-FIB NOT ON AC, HEMATURIA, HLD, OSTEO, PROSTATE CANCER, BIB EMS FROM NH presents with possible osteomyelitis. pt unable to give information due to dementia. pt with left foot 2nd digit diabetic ulcer sent in to r/o osteo. (02 Sep 2022 09:51)        SUBJECTIVE:      ALLERGIES:  Allergies    latex (Rash)  latex (Unknown)  No Known Drug Allergies    Intolerances          MEDICATIONS  (STANDING):  ascorbic acid 500 milliGRAM(s) Oral daily  clopidogrel Tablet 75 milliGRAM(s) Oral daily  dextrose 5%. 1000 milliLiter(s) (50 mL/Hr) IV Continuous <Continuous>  dextrose 5%. 1000 milliLiter(s) (100 mL/Hr) IV Continuous <Continuous>  dextrose 50% Injectable 25 Gram(s) IV Push once  dextrose 50% Injectable 12.5 Gram(s) IV Push once  dextrose 50% Injectable 25 Gram(s) IV Push once  donepezil 5 milliGRAM(s) Oral at bedtime  ferrous    sulfate 325 milliGRAM(s) Oral daily  glucagon  Injectable 1 milliGRAM(s) IntraMuscular once  heparin   Injectable 5000 Unit(s) SubCutaneous every 8 hours  insulin lispro (ADMELOG) corrective regimen sliding scale   SubCutaneous three times a day before meals  insulin lispro (ADMELOG) corrective regimen sliding scale   SubCutaneous at bedtime  lactobacillus acidophilus 1 Tablet(s) Oral two times a day  losartan 25 milliGRAM(s) Oral daily  metoprolol succinate ER 25 milliGRAM(s) Oral daily  multivitamin/minerals 1 Tablet(s) Oral daily  pantoprazole    Tablet 40 milliGRAM(s) Oral daily  polyethylene glycol 3350 17 Gram(s) Oral daily  potassium chloride    Tablet ER 10 milliEquivalent(s) Oral daily  senna 2 Tablet(s) Oral at bedtime  simvastatin 20 milliGRAM(s) Oral at bedtime  tamsulosin 0.4 milliGRAM(s) Oral at bedtime  torsemide 20 milliGRAM(s) Oral daily    MEDICATIONS  (PRN):  acetaminophen     Tablet .. 650 milliGRAM(s) Oral every 6 hours PRN Temp greater or equal to 38C (100.4F), Mild Pain (1 - 3)  aluminum hydroxide/magnesium hydroxide/simethicone Suspension 30 milliLiter(s) Oral every 4 hours PRN Dyspepsia  dextrose Oral Gel 15 Gram(s) Oral once PRN Blood Glucose LESS THAN 70 milliGRAM(s)/deciliter  melatonin 3 milliGRAM(s) Oral at bedtime PRN Insomnia  ondansetron Injectable 4 milliGRAM(s) IV Push every 8 hours PRN Nausea and/or Vomiting      REVIEW OF SYSTEMS:  CONSTITUTIONAL: No fever,  RESPIRATORY: No cough, wheezing, shortness of breath  CARDIOVASCULAR: No chest pain, dyspnea, palpitations, dizziness, syncope, paroxysmal nocturnal dyspnea, orthopnea, or arm or leg swelling  GASTROINTESTINAL: No abdominal  or epigastric pain, nausea, vomiting,  diarrhea  NEUROLOGICAL: No headaches,  loss of strength, numbness, or tremors    Vital Signs Last 24 Hrs  T(C): 37.1 (07 Sep 2022 20:04), Max: 37.1 (07 Sep 2022 20:04)  T(F): 98.8 (07 Sep 2022 20:04), Max: 98.8 (07 Sep 2022 20:04)  HR: 74 (07 Sep 2022 20:04) (71 - 89)  BP: 104/62 (07 Sep 2022 20:04) (104/62 - 119/52)  BP(mean): --  RR: 18 (07 Sep 2022 20:04) (17 - 19)  SpO2: 96% (07 Sep 2022 20:04) (96% - 98%)    Parameters below as of 07 Sep 2022 20:04  Patient On (Oxygen Delivery Method): nasal cannula        PHYSICAL EXAM:  HEAD:  Atraumatic, Normocephalic  NECK: Supple and normal thyroid.  No JVD or carotid bruit.   HEART: S1, S2 regular , 1/6 soft ejection systolic murmur in mitral area , no thrill and no gallops .  PULMONARY: Bilateral vesicular breathing , few scattered ronchi and few scattered rales are present .  ABDOMEN: Soft nontender and positive bowl sounds   EXTREMITIES:  No clubbing, cyanosis, or pedal  edema  NEUROLOGICAL: AAOX3 , no focal deficit .    LABS:                        12.0   6.71  )-----------( 246      ( 07 Sep 2022 07:30 )             37.6     09-07    144  |  108  |  27<H>  ----------------------------<  130<H>  4.2   |  33<H>  |  0.80    Ca    9.4      07 Sep 2022 07:30              BNP      EKG:  ECHO:  IMAGING:    Assessment/Plan    Will continue to follow during hospital course and give further recommendations as needed . Thanks for your referral . if any questions please contact me at office ([phone] [phone])  IVETTE VAZQUEZ MD [place] [id number]  [address]  OFFICE : [phone]  CELL : [phone]  CARDIOLOGY  F/U  :       HPI:  Patient  is a 89yo male with pmhx of Dementia, DM, CHF, HTN, A-FIB NOT ON AC, HEMATURIA, HLD, OSTEO, PROSTATE CANCER, BIB EMS FROM NH presents with possible osteomyelitis. pt unable to give information due to dementia. pt with left foot 2nd digit diabetic ulcer sent in to r/o osteo. (02 Sep 2022 09:51)        SUBJECTIVE: Patient feeling better .      ALLERGIES:  Allergies    latex (Rash)  latex (Unknown)  No Known Drug Allergies    Intolerances          MEDICATIONS  (STANDING):  ascorbic acid 500 milliGRAM(s) Oral daily  clopidogrel Tablet 75 milliGRAM(s) Oral daily  dextrose 5%. 1000 milliLiter(s) (50 mL/Hr) IV Continuous <Continuous>  dextrose 5%. 1000 milliLiter(s) (100 mL/Hr) IV Continuous <Continuous>  dextrose 50% Injectable 25 Gram(s) IV Push once  dextrose 50% Injectable 12.5 Gram(s) IV Push once  dextrose 50% Injectable 25 Gram(s) IV Push once  donepezil 5 milliGRAM(s) Oral at bedtime  ferrous    sulfate 325 milliGRAM(s) Oral daily  glucagon  Injectable 1 milliGRAM(s) IntraMuscular once  heparin   Injectable 5000 Unit(s) SubCutaneous every 8 hours  insulin lispro (ADMELOG) corrective regimen sliding scale   SubCutaneous three times a day before meals  insulin lispro (ADMELOG) corrective regimen sliding scale   SubCutaneous at bedtime  lactobacillus acidophilus 1 Tablet(s) Oral two times a day  losartan 25 milliGRAM(s) Oral daily  metoprolol succinate ER 25 milliGRAM(s) Oral daily  multivitamin/minerals 1 Tablet(s) Oral daily  pantoprazole    Tablet 40 milliGRAM(s) Oral daily  polyethylene glycol 3350 17 Gram(s) Oral daily  potassium chloride    Tablet ER 10 milliEquivalent(s) Oral daily  senna 2 Tablet(s) Oral at bedtime  simvastatin 20 milliGRAM(s) Oral at bedtime  tamsulosin 0.4 milliGRAM(s) Oral at bedtime  torsemide 20 milliGRAM(s) Oral daily    MEDICATIONS  (PRN):  acetaminophen     Tablet .. 650 milliGRAM(s) Oral every 6 hours PRN Temp greater or equal to 38C (100.4F), Mild Pain (1 - 3)  aluminum hydroxide/magnesium hydroxide/simethicone Suspension 30 milliLiter(s) Oral every 4 hours PRN Dyspepsia  dextrose Oral Gel 15 Gram(s) Oral once PRN Blood Glucose LESS THAN 70 milliGRAM(s)/deciliter  melatonin 3 milliGRAM(s) Oral at bedtime PRN Insomnia  ondansetron Injectable 4 milliGRAM(s) IV Push every 8 hours PRN Nausea and/or Vomiting      REVIEW OF SYSTEMS:  CONSTITUTIONAL: No fever,  RESPIRATORY: No cough, wheezing, shortness of breath  CARDIOVASCULAR: No chest pain, dyspnea, palpitations, dizziness, syncope, paroxysmal nocturnal dyspnea, orthopnea, or arm or leg swelling  GASTROINTESTINAL: No abdominal  or epigastric pain, nausea, vomiting,  diarrhea  NEUROLOGICAL: No headaches,  loss of strength, numbness, or tremors    Vital Signs Last 24 Hrs  T(C): 37.1 (07 Sep 2022 20:04), Max: 37.1 (07 Sep 2022 20:04)  T(F): 98.8 (07 Sep 2022 20:04), Max: 98.8 (07 Sep 2022 20:04)  HR: 74 (07 Sep 2022 20:04) (71 - 89)  BP: 104/62 (07 Sep 2022 20:04) (104/62 - 119/52)  BP(mean): --  RR: 18 (07 Sep 2022 20:04) (17 - 19)  SpO2: 96% (07 Sep 2022 20:04) (96% - 98%)    Parameters below as of 07 Sep 2022 20:04  Patient On (Oxygen Delivery Method): nasal cannula        PHYSICAL EXAM:  HEAD:  Atraumatic, Normocephalic  NECK: Supple and normal thyroid.  No JVD or carotid bruit.   HEART: S1, S2 regular , 1/6 soft ejection systolic murmur in mitral area , no thrill and no gallops .  PULMONARY: Bilateral vesicular breathing , few scattered ronchi and few scattered rales are present .  ABDOMEN: Soft nontender and positive bowl sounds   EXTREMITIES:  No clubbing, cyanosis, or pedal  edema  NEUROLOGICAL: AAOX3 , no focal deficit .    LABS:                        12.0   6.71  )-----------( 246      ( 07 Sep 2022 07:30 )             37.6     09-07    144  |  108  |  27<H>  ----------------------------<  130<H>  4.2   |  33<H>  |  0.80    Ca    9.4      07 Sep 2022 07:30              BNP      EKG:  ECHO:  IMAGING:    Assessment/Plan    Will continue to follow during hospital course and give further recommendations as needed . Thanks for your referral . if any questions please contact me at office ([phone])cell [phone])  IVETTE VAZQUEZ MD [place] [id number]  [address]  OFFICE : [id number]  CELL : [phone]  CARDIOLOGY  F/U  :       HPI:  Patient  is a 89yo male with pmhx of Dementia, DM, CHF, HTN, A-FIB NOT ON AC, HEMATURIA, HLD, OSTEO, PROSTATE CANCER, BIB EMS FROM NH presents with possible osteomyelitis. pt unable to give information due to dementia. pt with left foot 2nd digit diabetic ulcer sent in to r/o osteo. (02 Sep 2022 09:51)        SUBJECTIVE: Patient feeling better .      ALLERGIES:  Allergies    latex (Rash)  latex (Unknown)  No Known Drug Allergies    Intolerances          MEDICATIONS  (STANDING):  ascorbic acid 500 milliGRAM(s) Oral daily  clopidogrel Tablet 75 milliGRAM(s) Oral daily  dextrose 5%. 1000 milliLiter(s) (50 mL/Hr) IV Continuous <Continuous>  dextrose 5%. 1000 milliLiter(s) (100 mL/Hr) IV Continuous <Continuous>  dextrose 50% Injectable 25 Gram(s) IV Push once  dextrose 50% Injectable 12.5 Gram(s) IV Push once  dextrose 50% Injectable 25 Gram(s) IV Push once  donepezil 5 milliGRAM(s) Oral at bedtime  ferrous    sulfate 325 milliGRAM(s) Oral daily  glucagon  Injectable 1 milliGRAM(s) IntraMuscular once  heparin   Injectable 5000 Unit(s) SubCutaneous every 8 hours  insulin lispro (ADMELOG) corrective regimen sliding scale   SubCutaneous three times a day before meals  insulin lispro (ADMELOG) corrective regimen sliding scale   SubCutaneous at bedtime  lactobacillus acidophilus 1 Tablet(s) Oral two times a day  losartan 25 milliGRAM(s) Oral daily  metoprolol succinate ER 25 milliGRAM(s) Oral daily  multivitamin/minerals 1 Tablet(s) Oral daily  pantoprazole    Tablet 40 milliGRAM(s) Oral daily  polyethylene glycol 3350 17 Gram(s) Oral daily  potassium chloride    Tablet ER 10 milliEquivalent(s) Oral daily  senna 2 Tablet(s) Oral at bedtime  simvastatin 20 milliGRAM(s) Oral at bedtime  tamsulosin 0.4 milliGRAM(s) Oral at bedtime  torsemide 20 milliGRAM(s) Oral daily    MEDICATIONS  (PRN):  acetaminophen     Tablet .. 650 milliGRAM(s) Oral every 6 hours PRN Temp greater or equal to 38C (100.4F), Mild Pain (1 - 3)  aluminum hydroxide/magnesium hydroxide/simethicone Suspension 30 milliLiter(s) Oral every 4 hours PRN Dyspepsia  dextrose Oral Gel 15 Gram(s) Oral once PRN Blood Glucose LESS THAN 70 milliGRAM(s)/deciliter  melatonin 3 milliGRAM(s) Oral at bedtime PRN Insomnia  ondansetron Injectable 4 milliGRAM(s) IV Push every 8 hours PRN Nausea and/or Vomiting      REVIEW OF SYSTEMS:  CONSTITUTIONAL: No fever,  RESPIRATORY: No cough, wheezing, shortness of breath  CARDIOVASCULAR: No chest pain, dyspnea, palpitations, dizziness, syncope, paroxysmal nocturnal dyspnea, orthopnea, or arm or leg swelling  GASTROINTESTINAL: No abdominal  or epigastric pain, nausea, vomiting,  diarrhea  NEUROLOGICAL: No headaches,  numbness, or tremors  Skin : No itching .  Hematology : No active bleeding .  Endocrinology : No heat and cold intolerance .  Psychiatry : Patient is calm .  Musculoskeletal : Patient  has mild arthritis   Genitourinary : No dysuria .    Vital Signs Last 24 Hrs  T(C): 37.1 (07 Sep 2022 20:04), Max: 37.1 (07 Sep 2022 20:04)  T(F): 98.8 (07 Sep 2022 20:04), Max: 98.8 (07 Sep 2022 20:04)  HR: 74 (07 Sep 2022 20:04) (71 - 89)  BP: 104/62 (07 Sep 2022 20:04) (104/62 - 119/52)  BP(mean): --  RR: 18 (07 Sep 2022 20:04) (17 - 19)  SpO2: 96% (07 Sep 2022 20:04) (96% - 98%)    Parameters below as of 07 Sep 2022 20:04  Patient On (Oxygen Delivery Method): nasal cannula        PHYSICAL EXAM:  HEAD:  Atraumatic, Normocephalic  NECK: Supple and normal thyroid.  No JVD or carotid bruit.   HEART: S1, S2 irregular , 1/6 soft ejection systolic murmur in mitral area , no thrill and no gallops .  PULMONARY: Bilateral vesicular breathing , few scattered rhonchi and few scattered rales are present .  ABDOMEN: Soft nontender and positive bowl sounds   EXTREMITIES:  No clubbing, cyanosis, or pedal  edema  NEUROLOGICAL: AA and mild confused  , no focal deficit .  Skin : No rashes .  Musculoskeletal : No joint swellings .    LABS:                        12.0   6.71  )-----------( 246      ( 07 Sep 2022 07:30 )             37.6     09-07    144  |  108  |  27<H>  ----------------------------<  130<H>  4.2   |  33<H>  |  0.80    Ca    9.4      07 Sep 2022 07:30              Assessment/Plan  Patient has :  1) Left foot cellulitis and left foot second toe osteomyelitis .  2) H/O atrial fibrillation and  stable .  3) Hypertension and stable '  4) H/O mild chronic diastolic heart failure and stable   5) Paroxysmal atrial fibrillations and stable .  6) H/O  Hematuria   Plan :   1) Patient cardiac wise stable and cleared for lower extremity or foot surgery if needed .  2) Monitor hemoglobin and electrolytes .  3) Rest of medications as such .  4) Prognosis guarded .  Will continue to follow during hospital course and give further recommendations as needed . Thanks for your referral . if any questions please contact me at office ([phone] cell [phone])

## 2022-09-07 NOTE — SWALLOW BEDSIDE ASSESSMENT ADULT - SWALLOW EVAL: DIAGNOSIS
Pt lethargic, arousable, but demonstrated difficulty maintaining arousal despite persistent cues. Pt with suspected poor secretion management at baseline marked by wet upper airway breath sounds/congestion and vocal quality. Attempted to administer PO trials. When bolus utensil was placed to labial surface, pt demonstrated tight labial seal. Pt became increasingly more defensive upon SLP attempting to manually open oral cavity. Pureed bolus was then placed on labial surface, at which time pt immediately wiped off from his lips. Unable to assess oral and pharyngeal phases of swallow 2/2 pt refusal to accept PO. Therefore, defer PO diet to MD. Will continue to follow for ongoing swallow assessment, as pt is agreeable. Left message with Dr. Ring.

## 2022-09-07 NOTE — PROGRESS NOTE ADULT - SUBJECTIVE AND OBJECTIVE BOX
89y year old Male seen at [facility] D1 for 2nd digit wound , left foot. Patient is not oriented    Allergies    latex (Rash)  latex (Unknown)  No Known Drug Allergies    Intolerances        MEDICATIONS  (STANDING):  ascorbic acid 500 milliGRAM(s) Oral daily  clopidogrel Tablet 75 milliGRAM(s) Oral daily  dextrose 5%. 1000 milliLiter(s) (50 mL/Hr) IV Continuous <Continuous>  dextrose 5%. 1000 milliLiter(s) (100 mL/Hr) IV Continuous <Continuous>  dextrose 50% Injectable 25 Gram(s) IV Push once  dextrose 50% Injectable 12.5 Gram(s) IV Push once  dextrose 50% Injectable 25 Gram(s) IV Push once  donepezil 5 milliGRAM(s) Oral at bedtime  ferrous    sulfate 325 milliGRAM(s) Oral daily  glucagon  Injectable 1 milliGRAM(s) IntraMuscular once  heparin   Injectable 5000 Unit(s) SubCutaneous every 8 hours  insulin lispro (ADMELOG) corrective regimen sliding scale   SubCutaneous three times a day before meals  insulin lispro (ADMELOG) corrective regimen sliding scale   SubCutaneous at bedtime  lactobacillus acidophilus 1 Tablet(s) Oral two times a day  losartan 25 milliGRAM(s) Oral daily  metoprolol succinate ER 25 milliGRAM(s) Oral daily  multivitamin/minerals 1 Tablet(s) Oral daily  pantoprazole    Tablet 40 milliGRAM(s) Oral daily  polyethylene glycol 3350 17 Gram(s) Oral daily  potassium chloride    Tablet ER 10 milliEquivalent(s) Oral daily  senna 2 Tablet(s) Oral at bedtime  simvastatin 20 milliGRAM(s) Oral at bedtime  tamsulosin 0.4 milliGRAM(s) Oral at bedtime  torsemide 20 milliGRAM(s) Oral daily    MEDICATIONS  (PRN):  acetaminophen     Tablet .. 650 milliGRAM(s) Oral every 6 hours PRN Temp greater or equal to 38C (100.4F), Mild Pain (1 - 3)  aluminum hydroxide/magnesium hydroxide/simethicone Suspension 30 milliLiter(s) Oral every 4 hours PRN Dyspepsia  dextrose Oral Gel 15 Gram(s) Oral once PRN Blood Glucose LESS THAN 70 milliGRAM(s)/deciliter  melatonin 3 milliGRAM(s) Oral at bedtime PRN Insomnia  ondansetron Injectable 4 milliGRAM(s) IV Push every 8 hours PRN Nausea and/or Vomiting      Vital Signs Last 24 Hrs  T(C): 36.5 (07 Sep 2022 12:09), Max: 37.1 (06 Sep 2022 21:00)  T(F): 97.7 (07 Sep 2022 12:09), Max: 98.7 (06 Sep 2022 21:00)  HR: 71 (07 Sep 2022 12:09) (71 - 89)  BP: 115/56 (07 Sep 2022 12:09) (115/56 - 119/52)  BP(mean): --  RR: 17 (07 Sep 2022 12:09) (17 - 19)  SpO2: 98% (07 Sep 2022 12:09) (96% - 98%)    Parameters below as of 07 Sep 2022 12:09  Patient On (Oxygen Delivery Method): nasal cannula        PHYSICAL EXAM:  Vascular: DP & PT non palpable bilaterally, Capillary refill 6 seconds  Neurological: couldn't  assess   Musculoskeletal: couldn't assess  Dermatological: 1.1x 0.3 x0.2 cm ulceration noted to the left, 2nd digit,  probe to bone, no periwound erythema, no fluctuance, no malodor, no proximal streaking at this time    CBC Full  -  ( 07 Sep 2022 07:30 )  WBC Count : 6.71 K/uL  RBC Count : 3.90 M/uL  Hemoglobin : 12.0 g/dL  Hematocrit : 37.6 %  Platelet Count - Automated : 246 K/uL  Mean Cell Volume : 96.4 fl  Mean Cell Hemoglobin : 30.8 pg  Mean Cell Hemoglobin Concentration : 31.9 gm/dL  Auto Neutrophil # : x  Auto Lymphocyte # : x  Auto Monocyte # : x  Auto Eosinophil # : x  Auto Basophil # : x  Auto Neutrophil % : x  Auto Lymphocyte % : x  Auto Monocyte % : x  Auto Eosinophil % : x  Auto Basophil % : x      09-07    144  |  108  |  27<H>  ----------------------------<  130<H>  4.2   |  33<H>  |  0.80    Ca    9.4      07 Sep 2022 07:30                Imaging: \  < from: NM Multiple Day Procedure (09.07.22 @ 11:59) >    ACC: [id number] EXAM:  NM MULTI DAY PROCEDURE                        ACC: [id number] EXAM:  NM INFLAMM LOC WBC SA SD                          PROCEDURE DATE:  09/07/2022          INTERPRETATION:  RADIOPHARMACEUTICAL: 11.2 millicuries Tc-99m labeled   autologous leukocytes, IV.    CLINICAL INFORMATION: 89 year old man with nonhealing ulcer of the LEFT   second toe; referred to evaluate for infection.    TECHNIQUE: Radiolabeled autologous leukocytes were injected on 9/6/2022.   Approximately 24 hourslater static images in multiple projections were   obtained of the feet.    COMPARISON: No prior scintigraphy available.    CORRELATION: 3 view radiography LEFT foot 9/1/2022 (soft tissue swelling   without radiographic evidence of osteomyelitis).    FINDINGS: Increased labeled white cell accumulation is seen in the region   of the distal LEFT second metatarsal/MTP joint. Physiologic distribution   is seen elsewhere.    IMPRESSION: AbnormalTc-99m labeled leukocyte scan. Findings consistent   withosteomyelitis.    --- End of Report ---            HIRAM DE GUZMAN MD; Attending Radiologist  This document ha    < end of copied text >     89y year old Male seen at \[facility]\"" 1[facility] 113 D1 for 2nd digit wound , left foot. Patient is not oriented, is laying in bed comfortably and is awake. Patient does not respond to any questions asked.     Allergies    latex (Rash)  latex (Unknown)  No Known Drug Allergies    Intolerances        MEDICATIONS  (STANDING):  ascorbic acid 500 milliGRAM(s) Oral daily  clopidogrel Tablet 75 milliGRAM(s) Oral daily  dextrose 5%. 1000 milliLiter(s) (50 mL/Hr) IV Continuous <Continuous>  dextrose 5%. 1000 milliLiter(s) (100 mL/Hr) IV Continuous <Continuous>  dextrose 50% Injectable 25 Gram(s) IV Push once  dextrose 50% Injectable 12.5 Gram(s) IV Push once  dextrose 50% Injectable 25 Gram(s) IV Push once  donepezil 5 milliGRAM(s) Oral at bedtime  ferrous    sulfate 325 milliGRAM(s) Oral daily  glucagon  Injectable 1 milliGRAM(s) IntraMuscular once  heparin   Injectable 5000 Unit(s) SubCutaneous every 8 hours  insulin lispro (ADMELOG) corrective regimen sliding scale   SubCutaneous three times a day before meals  insulin lispro (ADMELOG) corrective regimen sliding scale   SubCutaneous at bedtime  lactobacillus acidophilus 1 Tablet(s) Oral two times a day  losartan 25 milliGRAM(s) Oral daily  metoprolol succinate ER 25 milliGRAM(s) Oral daily  multivitamin/minerals 1 Tablet(s) Oral daily  pantoprazole    Tablet 40 milliGRAM(s) Oral daily  polyethylene glycol 3350 17 Gram(s) Oral daily  potassium chloride    Tablet ER 10 milliEquivalent(s) Oral daily  senna 2 Tablet(s) Oral at bedtime  simvastatin 20 milliGRAM(s) Oral at bedtime  tamsulosin 0.4 milliGRAM(s) Oral at bedtime  torsemide 20 milliGRAM(s) Oral daily    MEDICATIONS  (PRN):  acetaminophen     Tablet .. 650 milliGRAM(s) Oral every 6 hours PRN Temp greater or equal to 38C (100.4F), Mild Pain (1 - 3)  aluminum hydroxide/magnesium hydroxide/simethicone Suspension 30 milliLiter(s) Oral every 4 hours PRN Dyspepsia  dextrose Oral Gel 15 Gram(s) Oral once PRN Blood Glucose LESS THAN 70 milliGRAM(s)/deciliter  melatonin 3 milliGRAM(s) Oral at bedtime PRN Insomnia  ondansetron Injectable 4 milliGRAM(s) IV Push every 8 hours PRN Nausea and/or Vomiting      Vital Signs Last 24 Hrs  T(C): 36.5 (07 Sep 2022 12:09), Max: 37.1 (06 Sep 2022 21:00)  T(F): 97.7 (07 Sep 2022 12:09), Max: 98.7 (06 Sep 2022 21:00)  HR: 71 (07 Sep 2022 12:09) (71 - 89)  BP: 115/56 (07 Sep 2022 12:09) (115/56 - 119/52)  BP(mean): --  RR: 17 (07 Sep 2022 12:09) (17 - 19)  SpO2: 98% (07 Sep 2022 12:09) (96% - 98%)    Parameters below as of 07 Sep 2022 12:09  Patient On (Oxygen Delivery Method): nasal cannula        PHYSICAL EXAM:  Vascular: DP & PT non palpable bilaterally, Capillary refill 6 seconds  Neurological: couldn't  assess   Musculoskeletal: couldn't assess  Dermatological: 1.1x 0.3 x0.2 cm ulceration noted to the left, 2nd digit,  probe to bone, no periwound erythema, no fluctuance, no malodor, no proximal streaking at this time    CBC Full  -  ( 07 Sep 2022 07:30 )  WBC Count : 6.71 K/uL  RBC Count : 3.90 M/uL  Hemoglobin : 12.0 g/dL  Hematocrit : 37.6 %  Platelet Count - Automated : 246 K/uL  Mean Cell Volume : 96.4 fl  Mean Cell Hemoglobin : 30.8 pg  Mean Cell Hemoglobin Concentration : 31.9 gm/dL  Auto Neutrophil # : x  Auto Lymphocyte # : x  Auto Monocyte # : x  Auto Eosinophil # : x  Auto Basophil # : x  Auto Neutrophil % : x  Auto Lymphocyte % : x  Auto Monocyte % : x  Auto Eosinophil % : x  Auto Basophil % : x      09-07    144  |  108  |  27<H>  ----------------------------<  130<H>  4.2   |  33<H>  |  0.80    Ca    9.4      07 Sep 2022 07:30                Imaging: \  < from: NM Multiple Day Procedure (09.07.22 @ 11:59) >    ACC: [id number] EXAM:  NM MULTI DAY PROCEDURE                        ACC: [id number] EXAM:  NM INFLAMM LOC WBC SA SD                          PROCEDURE DATE:  09/07/2022          INTERPRETATION:  RADIOPHARMACEUTICAL: 11.2 millicuries Tc-99m labeled   autologous leukocytes, IV.    CLINICAL INFORMATION: 89 year old man with nonhealing ulcer of the LEFT   second toe; referred to evaluate for infection.    TECHNIQUE: Radiolabeled autologous leukocytes were injected on 9/6/2022.   Approximately 24 hourslater static images in multiple projections were   obtained of the feet.    COMPARISON: No prior scintigraphy available.    CORRELATION: 3 view radiography LEFT foot 9/1/2022 (soft tissue swelling   without radiographic evidence of osteomyelitis).    FINDINGS: Increased labeled white cell accumulation is seen in the region   of the distal LEFT second metatarsal/MTP joint. Physiologic distribution   is seen elsewhere.    IMPRESSION: AbnormalTc-99m labeled leukocyte scan. Findings consistent   withosteomyelitis.    --- End of Report ---            HIRAM DE GUZMAN MD; Attending Radiologist  This document ha    < end of copied text >

## 2022-09-07 NOTE — CONSULT NOTE ADULT - CONSULT REASON
cardiac arrythmias
LLE osteomyelitis
Left 2nd toe nonhealing DM ulcer rule out OM
left foot 2nd digit diabetic ulcer sent in to r/o osteo.

## 2022-09-07 NOTE — SWALLOW BEDSIDE ASSESSMENT ADULT - SLP PERTINENT HISTORY OF CURRENT PROBLEM
Per charting, "89yo male with pmhx of Dementia, DM, CHF, HTN, A-FIB NOT ON AC, HEMATURIA, HLD, OSTEO, PROSTATE CANCER, BIB EMS FROM NH presents with possible osteomyelitis. pt unable to give information due to dementia. pt with left foot 2nd digit diabetic ulcer sent in to r/o osteo."

## 2022-09-07 NOTE — PROGRESS NOTE ADULT - PROBLEM SELECTOR PLAN 1
Seen  by podiatry and ID -Hold antibiotic for now  Bone scan - AbnormalTc-99m labeled leukocyte scan. Findings consistent   with osteomyelitis.  Podiatry f/up requested    IV abx per ID ,spoke to the son and made him aware patient will require long term abx   - vasc consult

## 2022-09-07 NOTE — PROGRESS NOTE ADULT - TIME BILLING
in direct care of patient , reviewing labs and other results and adjusting medications and in discussion with other consultants , RN and PMD
as above

## 2022-09-07 NOTE — PROGRESS NOTE ADULT - PROBLEM SELECTOR PLAN 1
Patient was seen and evaluated  labs reviewed WBC 6.7  left foot bone scan shows  OM  Wound cleaned with NS and dressed with bandaid  c/o IV abx per ID  Recommend vasc consult   Patient family refused surgical amputation, plan to do bone biopsy , will contact family member    INCOMPLETE NOTE Patient was seen and evaluated  labs reviewed WBC 6.7  left foot bone scan shows  OM  Wound cleaned with NS and dressed with band aid  c/o IV abx per ID  Vascular on board  Contacted patient's son. discussed surgical plan with him Patient's son agreed for the surgery.  Plan to perform 2nd digit amputation, left foot  Pending vascular recs Patient was seen and evaluated  left foot bone scan shows  OM  Wound cleaned with NS and dressed with band aid  c/w IV abx per ID  Vascular on board  Will discuss with patient's family about surgical intervention vs bone biopsy

## 2022-09-07 NOTE — CONSULT NOTE ADULT - REASON FOR ADMISSION
left foot 2nd digit diabetic ulcer sent in to r/o osteo.
left foot 2nd digit diabetic ulcer sent in to r/o osteo.
altered menial status
left foot 2nd digit diabetic ulcer sent in to r/o osteo.

## 2022-09-07 NOTE — CONSULT NOTE ADULT - SUBJECTIVE AND OBJECTIVE BOX
Vascular Attending:        HPI:  Patient  is a 89yo male with pmhx of Dementia, DM, CHF, HTN, A-FIB NOT ON AC, HEMATURIA, HLD, OSTEO, PROSTATE CANCER, BIB EMS FROM NH presents with possible osteomyelitis. pt unable to give information due to dementia. pt with left foot 2nd digit diabetic ulcer sent in to r/o osteo.      Interval HPI:    HPI gathered from chart review.  Patient unable to provide detailed history or review of systems as per baseline mental status.        PAST MEDICAL & SURGICAL HISTORY:  Atrial fibrillation      Hypertension      Diabetes      Prostate ca      Dementia      CHF (congestive heart failure)      Hyperlipemia      Diabetes      Depression      Dementia      DM (diabetes mellitus)      Atrial fibrillation      Prostate CA      Arteriosclerotic heart disease (ASHD)      Dementia      Anemia      AICD (automatic cardioverter/defibrillator) present      Osteomyelitis of finger of left hand      Personal history of radiation therapy      H/O ongoing treatment with hormonal therapy      H/O bladder infections      Scrotal abscess      Urinary retention      Hematuria      H/O cystitis      Constipation      VHD (valvular heart disease)      Aortic valve stenosis      History of automatic internal cardiac defibrillator (AICD)          REVIEW OF SYSTEMS:  Unable to obtain to baseline mental status   	    MEDICATIONS  (STANDING):  ascorbic acid 500 milliGRAM(s) Oral daily  clopidogrel Tablet 75 milliGRAM(s) Oral daily  dextrose 5%. 1000 milliLiter(s) (50 mL/Hr) IV Continuous <Continuous>  dextrose 5%. 1000 milliLiter(s) (100 mL/Hr) IV Continuous <Continuous>  dextrose 50% Injectable 25 Gram(s) IV Push once  dextrose 50% Injectable 12.5 Gram(s) IV Push once  dextrose 50% Injectable 25 Gram(s) IV Push once  donepezil 5 milliGRAM(s) Oral at bedtime  ferrous    sulfate 325 milliGRAM(s) Oral daily  glucagon  Injectable 1 milliGRAM(s) IntraMuscular once  heparin   Injectable 5000 Unit(s) SubCutaneous every 8 hours  insulin lispro (ADMELOG) corrective regimen sliding scale   SubCutaneous three times a day before meals  insulin lispro (ADMELOG) corrective regimen sliding scale   SubCutaneous at bedtime  lactobacillus acidophilus 1 Tablet(s) Oral two times a day  losartan 25 milliGRAM(s) Oral daily  metoprolol succinate ER 25 milliGRAM(s) Oral daily  multivitamin/minerals 1 Tablet(s) Oral daily  pantoprazole    Tablet 40 milliGRAM(s) Oral daily  polyethylene glycol 3350 17 Gram(s) Oral daily  potassium chloride    Tablet ER 10 milliEquivalent(s) Oral daily  senna 2 Tablet(s) Oral at bedtime  simvastatin 20 milliGRAM(s) Oral at bedtime  tamsulosin 0.4 milliGRAM(s) Oral at bedtime  torsemide 20 milliGRAM(s) Oral daily    MEDICATIONS  (PRN):  acetaminophen     Tablet .. 650 milliGRAM(s) Oral every 6 hours PRN Temp greater or equal to 38C (100.4F), Mild Pain (1 - 3)  aluminum hydroxide/magnesium hydroxide/simethicone Suspension 30 milliLiter(s) Oral every 4 hours PRN Dyspepsia  dextrose Oral Gel 15 Gram(s) Oral once PRN Blood Glucose LESS THAN 70 milliGRAM(s)/deciliter  melatonin 3 milliGRAM(s) Oral at bedtime PRN Insomnia  ondansetron Injectable 4 milliGRAM(s) IV Push every 8 hours PRN Nausea and/or Vomiting      Allergies    latex (Rash)  latex (Unknown)  No Known Drug Allergies    Intolerances        SOCIAL HISTORY:      Vital Signs Last 24 Hrs  T(C): 36.5 (07 Sep 2022 12:09), Max: 37.1 (06 Sep 2022 21:00)  T(F): 97.7 (07 Sep 2022 12:09), Max: 98.7 (06 Sep 2022 21:00)  HR: 71 (07 Sep 2022 12:09) (71 - 89)  BP: 115/56 (07 Sep 2022 12:09) (115/56 - 119/52)  BP(mean): --  RR: 17 (07 Sep 2022 12:09) (17 - 19)  SpO2: 98% (07 Sep 2022 12:09) (96% - 98%)    Parameters below as of 07 Sep 2022 12:09  Patient On (Oxygen Delivery Method): nasal cannula      PHYSICAL EXAM:  GENERAL: NAD, lying in bed comfortably,   HEAD:  Atraumatic, Normocephalic  EYES: EOMI, PERRLA, conjunctiva and sclera clear  ENT: Moist mucous membranes  NECK: Supple, No JVD  CHEST/LUNG: Clear to auscultation bilaterally; No rales, rhonchi, wheezing, or rubs. Unlabored respirations  HEART: Regular rate and rhythm; No murmurs, rubs, or gallops  ABDOMEN: Bowel sounds present; Soft, Nontender, Nondistended. No hepatomegally  EXTREMITIES: B/L lower extremities warm to touch.  Left 2nd toe w/ 1cm x 1cm open wound.    NERVOUS SYSTEM:  Non verbal.  No deficits   MSK: FROM all 4 extremities, full and equal strength  SKIN: No rashes or lesions        Pulses:   Right:                                                                          Left:  FEM [ ]2+ [ ]1+ [ ]doppler                                             FEM [ ]2+ [ ]1+ [ ]doppler    POP [x]2+ [ ]1+ [ ]doppler                                             POP [x]2+ [ ]1+ [ ]doppler    DP [ ]2+ [x]1+ [ ]doppler                                                DP [ ]2+ [ ]1+ [x]doppler  PT[ ]2+ [x]1+ [ ]doppler                                                  PT [ ]2+ [ ]1+ [x]doppler      LABS:                        12.0   6.71  )-----------( 246      ( 07 Sep 2022 07:30 )             37.6     09-07    144  |  108  |  27<H>  ----------------------------<  130<H>  4.2   |  33<H>  |  0.80    Ca    9.4      07 Sep 2022 07:30      RADIOLOGY & ADDITIONAL STUDIES    ACC: [id number] EXAM: NM MULTI DAY PROCEDURE  ACC: [id number] EXAM: NM INFLAMM LOC WBC SA SD    PROCEDURE DATE: 09/07/2022  INTERPRETATION: RADIOPHARMACEUTICAL: 11.2 millicuries Tc-99m labeled autologous leukocytes, IV.    CLINICAL INFORMATION: 89 year old man with nonhealing ulcer of the LEFT second toe; referred to evaluate for infection.    TECHNIQUE: Radiolabeled autologous leukocytes were injected on 9/6/2022. Approximately 24 hours later static images in multiple projections were obtained of the feet.    COMPARISON: No prior scintigraphy available.    CORRELATION: 3 view radiography LEFT foot 9/1/2022 (soft tissue swelling without radiographic evidence of osteomyelitis).    FINDINGS: Increased labeled white cell accumulation is seen in the region of the distal LEFT second metatarsal/MTP joint. Physiologic distribution is seen elsewhere.    IMPRESSION: AbnormalTc-99m labeled leukocyte scan. Findings consistent with osteomyelitis.    HIRAM DE GUZMAN MD; Attending Radiologist  This document has been electronically signed. Sep 7 2022 12:52PM        Impression:  89 year old male w/ extensive pmhx a/w LLE cellulitis found to have evidence of osteomyelitis on imaging.  Vascular surgery consulted for evaluation of LLE wound.      Plan:  - Recommend WILLIAM/PVR to assess PAD  - Wound care per Podiatry  - Vascular surgery will continue to follow  - Discussed with Dr. Oro

## 2022-09-07 NOTE — PROGRESS NOTE ADULT - SUBJECTIVE AND OBJECTIVE BOX
TERESA FRANCIS is a 89yMale , patient examined and chart reviewed.     INTERVAL HPI/ OVERNIGHT EVENTS:   Afebrile. NAD.    PAST MEDICAL & SURGICAL HISTORY:  Atrial fibrillation  Hypertension  Diabetes  Prostate ca  Dementia  CHF (congestive heart failure)  Hyperlipemia  Diabetes  Depression  Dementia  Prostate CA  Arteriosclerotic heart disease (ASHD)  Anemia  AICD (automatic cardioverter/defibrillator) present  Osteomyelitis of finger of left hand  Personal history of radiation therapy  H/O ongoing treatment with hormonal therapy  H/O bladder infections  Scrotal abscess  Urinary retention  Hematuria  H/O cystitis  Constipation  VHD (valvular heart disease)  Aortic valve stenosis  History of automatic internal cardiac defibrillator (AICD)        For details regarding the patient's social history, family history, and other miscellaneous elements, please refer the initial infectious diseases consultation and/or the admitting history and physical examination for this admission.    ROS:  Unable to obtain due to : Dementia      ALLERGIES  latex (Rash)      Current inpatient medications :    ANTIBIOTICS/RELEVANT:    MEDICATIONS  (STANDING):  ascorbic acid 500 milliGRAM(s) Oral daily  clopidogrel Tablet 75 milliGRAM(s) Oral daily  dextrose 5%. 1000 milliLiter(s) (50 mL/Hr) IV Continuous <Continuous>  dextrose 5%. 1000 milliLiter(s) (100 mL/Hr) IV Continuous <Continuous>  dextrose 50% Injectable 25 Gram(s) IV Push once  dextrose 50% Injectable 12.5 Gram(s) IV Push once  dextrose 50% Injectable 25 Gram(s) IV Push once  donepezil 5 milliGRAM(s) Oral at bedtime  ferrous    sulfate 325 milliGRAM(s) Oral daily  glucagon  Injectable 1 milliGRAM(s) IntraMuscular once  heparin   Injectable 5000 Unit(s) SubCutaneous every 8 hours  insulin lispro (ADMELOG) corrective regimen sliding scale   SubCutaneous three times a day before meals  insulin lispro (ADMELOG) corrective regimen sliding scale   SubCutaneous at bedtime  lactobacillus acidophilus 1 Tablet(s) Oral two times a day  losartan 25 milliGRAM(s) Oral daily  metoprolol succinate ER 25 milliGRAM(s) Oral daily  multivitamin/minerals 1 Tablet(s) Oral daily  pantoprazole    Tablet 40 milliGRAM(s) Oral daily  polyethylene glycol 3350 17 Gram(s) Oral daily  potassium chloride    Tablet ER 10 milliEquivalent(s) Oral daily  senna 2 Tablet(s) Oral at bedtime  simvastatin 20 milliGRAM(s) Oral at bedtime  tamsulosin 0.4 milliGRAM(s) Oral at bedtime  torsemide 20 milliGRAM(s) Oral daily    MEDICATIONS  (PRN):  acetaminophen     Tablet .. 650 milliGRAM(s) Oral every 6 hours PRN Temp greater or equal to 38C (100.4F), Mild Pain (1 - 3)  aluminum hydroxide/magnesium hydroxide/simethicone Suspension 30 milliLiter(s) Oral every 4 hours PRN Dyspepsia  dextrose Oral Gel 15 Gram(s) Oral once PRN Blood Glucose LESS THAN 70 milliGRAM(s)/deciliter  melatonin 3 milliGRAM(s) Oral at bedtime PRN Insomnia  ondansetron Injectable 4 milliGRAM(s) IV Push every 8 hours PRN Nausea and/or Vomiting          Objective:  Vital Signs Last 24 Hrs  T(C): 36.5 (07 Sep 2022 12:09), Max: 37.1 (06 Sep 2022 21:00)  T(F): 97.7 (07 Sep 2022 12:09), Max: 98.7 (06 Sep 2022 21:00)  HR: 71 (07 Sep 2022 12:09) (71 - 89)  BP: 115/56 (07 Sep 2022 12:09) (115/56 - 119/52)  RR: 17 (07 Sep 2022 12:09) (17 - 19)  SpO2: 98% (07 Sep 2022 12:09) (96% - 98%)    Parameters below as of 07 Sep 2022 12:09  Patient On (Oxygen Delivery Method): nasal cannula      Physical Exam:  General:  no acute distress  Neck: supple, trachea midline  Lungs: clear, no wheeze/rhonchi  Cardiovascular: regular rate and rhythm, S1 S2  Abdomen: soft, nontender,  bowel sounds normal  Neurological: alert and oriented x3  Skin: no rash  Extremities: Left 2nd toe nonhealing ulcer no drainage mild swelling  Chronic venous insufficiency        LABS:                                 12.0   6.71  )-----------( 246      ( 07 Sep 2022 07:30 )             37.6   09-07    144  |  108  |  27<H>  ----------------------------<  130<H>  4.2   |  33<H>  |  0.80    Ca    9.4      07 Sep 2022 07:30      MICROBIOLOGY:    Culture - Urine (collected 03 Sep 2022 07:49)  Source: Clean Catch Clean Catch (Midstream)  Final Report (04 Sep 2022 07:30):    No growth    RADIOLOGY & ADDITIONAL STUDIES:    ACC: [id number] EXAM:  XR FOOT COMP MIN 3 VIEWS LT                          PROCEDURE DATE:  09/01/2022          INTERPRETATION:  HISTORY:  Second digit wound    TECHNIQUE:  AP, oblique and lateral views of the left foot were obtained.    FINDINGS:  There is no definite evidence of acute fracture. Vascular   calcifications are noted. There are large dorsal and plantar calcaneal   spurs. There is mild soft tissue swelling of the forefoot. There are no   definite destructive lesions or periosteal reaction seen to suggest   osteomyelitis. Mild midfoot arthropathy is best seen on the lateral film.    IMPRESSION:  Soft tissue swelling. No definite evidence of osteomyelitis.   If concern for osteomyelitis persists, an MRI could be obtained.    ACC: [id number] EXAM:  XR CHEST PORTABLE IMMED 1V                          PROCEDURE DATE:  09/01/2022          INTERPRETATION:  TIME OF EXAM: September 1, 2022 at 9:29 PM.    CLINICAL INFORMATION: Sepsis.    COMPARISON:  June 30, 2022.    TECHNIQUE:  AP Portable chest x-ray. The head and chin obscures the   superior mediastinum and left apex. Rotated.    INTERPRETATION:    Heart size and the mediastinum cannot be accurately evaluated on this   projection. Left chest wall cardiac pacemaker with intact lead with tip   in expected region of the right ventricle.  Low lung volumes.  There is mild pulmonary vascular redistribution/congestion.  No focal lung consolidation seen in the visualized lungs.  No pleural effusion is seen.  No evidence of pneumothorax of the left apex is obscured and not   evaluated.  Right upper quadrant abdominal calcification and abdominal surgical clips   noted.      IMPRESSION:  Limited image. Low lung volumes. There is mild pulmonary   vascular redistribution/congestion.    Part of left apex obscured by head and chin. No focal lung consolidation   seen in the visualized lungs.    ACC: [id number] EXAM:  NM MULTI DAY PROCEDURE                        ACC: [id number] EXAM:  NM INFLAMM LOC WBC SA SD                          PROCEDURE DATE:  09/07/2022          INTERPRETATION:  RADIOPHARMACEUTICAL: 11.2 millicuries Tc-99m labeled   autologous leukocytes, IV.    CLINICAL INFORMATION: 89 year old man with nonhealing ulcer of the LEFT   second toe; referred to evaluate for infection.    TECHNIQUE: Radiolabeled autologous leukocytes were injected on 9/6/2022.   Approximately 24 hourslater static images in multiple projections were   obtained of the feet.    COMPARISON: No prior scintigraphy available.    CORRELATION: 3 view radiography LEFT foot 9/1/2022 (soft tissue swelling   without radiographic evidence of osteomyelitis).    FINDINGS: Increased labeled white cell accumulation is seen in the region   of the distal LEFT second metatarsal/MTP joint. Physiologic distribution   is seen elsewhere.    IMPRESSION: AbnormalTc-99m labeled leukocyte scan. Findings consistent   withosteomyelitis.      Assessment :  89YO M PMH Dementia, HL, atrial fibrillation, SDH, Ca Prostate DM CHF UTI resident of [facility] admitted with left foot 2nd toe nonhealing diabetic ulcer  WBC scan +osteo.   Afebrile. WBC wnl.       Plan :   Hold antibiotic for now  Podiatry follow up Re: plan for intervention vs picc line with home IV antibiotics  Vascular eval  Trend temps and cbc    D/w Dr Ring    Continue with present regiment.  Appropriate use of antibiotics and adverse effects reviewed.    > 35 minutes were spent in direct patient care reviewing notes, medications ,labs data/ imaging , discussion with multidisciplinary team.    Thank you for allowing me to participate in care of your patient .    Heber Issa MD  Infectious Disease  [phone]

## 2022-09-08 LAB
ANION GAP SERPL CALC-SCNC: 5 MMOL/L — SIGNIFICANT CHANGE UP (ref 5–17)
BUN SERPL-MCNC: 36 MG/DL — HIGH (ref 7–23)
CALCIUM SERPL-MCNC: 9.7 MG/DL — SIGNIFICANT CHANGE UP (ref 8.5–10.1)
CHLORIDE SERPL-SCNC: 107 MMOL/L — SIGNIFICANT CHANGE UP (ref 96–108)
CO2 SERPL-SCNC: 34 MMOL/L — HIGH (ref 22–31)
CREAT SERPL-MCNC: 0.96 MG/DL — SIGNIFICANT CHANGE UP (ref 0.5–1.3)
EGFR: 73 ML/MIN/1.73M2 — SIGNIFICANT CHANGE UP
GLUCOSE SERPL-MCNC: 134 MG/DL — HIGH (ref 70–99)
HCT VFR BLD CALC: 37.1 % — LOW (ref 39–50)
HGB BLD-MCNC: 11.9 G/DL — LOW (ref 13–17)
MCHC RBC-ENTMCNC: 30.9 PG — SIGNIFICANT CHANGE UP (ref 27–34)
MCHC RBC-ENTMCNC: 32.1 GM/DL — SIGNIFICANT CHANGE UP (ref 32–36)
MCV RBC AUTO: 96.4 FL — SIGNIFICANT CHANGE UP (ref 80–100)
NRBC # BLD: 0 /100 WBCS — SIGNIFICANT CHANGE UP (ref 0–0)
PLATELET # BLD AUTO: 274 K/UL — SIGNIFICANT CHANGE UP (ref 150–400)
POTASSIUM SERPL-MCNC: 4.3 MMOL/L — SIGNIFICANT CHANGE UP (ref 3.5–5.3)
POTASSIUM SERPL-SCNC: 4.3 MMOL/L — SIGNIFICANT CHANGE UP (ref 3.5–5.3)
RBC # BLD: 3.85 M/UL — LOW (ref 4.2–5.8)
RBC # FLD: 13.3 % — SIGNIFICANT CHANGE UP (ref 10.3–14.5)
SARS-COV-2 RNA SPEC QL NAA+PROBE: SIGNIFICANT CHANGE UP
SODIUM SERPL-SCNC: 146 MMOL/L — HIGH (ref 135–145)
WBC # BLD: 7.82 K/UL — SIGNIFICANT CHANGE UP (ref 3.8–10.5)
WBC # FLD AUTO: 7.82 K/UL — SIGNIFICANT CHANGE UP (ref 3.8–10.5)

## 2022-09-08 PROCEDURE — 99232 SBSQ HOSP IP/OBS MODERATE 35: CPT

## 2022-09-08 PROCEDURE — 76937 US GUIDE VASCULAR ACCESS: CPT | Mod: 26

## 2022-09-08 PROCEDURE — 36410 VNPNXR 3YR/> PHY/QHP DX/THER: CPT

## 2022-09-08 PROCEDURE — 99232 SBSQ HOSP IP/OBS MODERATE 35: CPT | Mod: 57

## 2022-09-08 RX ORDER — CEFTRIAXONE 500 MG/1
2000 INJECTION, POWDER, FOR SOLUTION INTRAMUSCULAR; INTRAVENOUS EVERY 24 HOURS
Refills: 0 | Status: DISCONTINUED | OUTPATIENT
Start: 2022-09-08 | End: 2022-09-09

## 2022-09-08 RX ADMIN — Medication 1 TABLET(S): at 05:27

## 2022-09-08 RX ADMIN — SENNA PLUS 2 TABLET(S): 8.6 TABLET ORAL at 21:24

## 2022-09-08 RX ADMIN — PANTOPRAZOLE SODIUM 40 MILLIGRAM(S): 20 TABLET, DELAYED RELEASE ORAL at 12:57

## 2022-09-08 RX ADMIN — HEPARIN SODIUM 5000 UNIT(S): 5000 INJECTION INTRAVENOUS; SUBCUTANEOUS at 15:37

## 2022-09-08 RX ADMIN — CLOPIDOGREL BISULFATE 75 MILLIGRAM(S): 75 TABLET, FILM COATED ORAL at 12:57

## 2022-09-08 RX ADMIN — HEPARIN SODIUM 5000 UNIT(S): 5000 INJECTION INTRAVENOUS; SUBCUTANEOUS at 21:23

## 2022-09-08 RX ADMIN — HEPARIN SODIUM 5000 UNIT(S): 5000 INJECTION INTRAVENOUS; SUBCUTANEOUS at 05:28

## 2022-09-08 RX ADMIN — LOSARTAN POTASSIUM 25 MILLIGRAM(S): 100 TABLET, FILM COATED ORAL at 05:28

## 2022-09-08 RX ADMIN — DONEPEZIL HYDROCHLORIDE 5 MILLIGRAM(S): 10 TABLET, FILM COATED ORAL at 21:24

## 2022-09-08 RX ADMIN — Medication 500 MILLIGRAM(S): at 12:57

## 2022-09-08 RX ADMIN — TAMSULOSIN HYDROCHLORIDE 0.4 MILLIGRAM(S): 0.4 CAPSULE ORAL at 21:24

## 2022-09-08 RX ADMIN — Medication 25 MILLIGRAM(S): at 05:28

## 2022-09-08 RX ADMIN — Medication 325 MILLIGRAM(S): at 12:57

## 2022-09-08 RX ADMIN — Medication 1 TABLET(S): at 18:07

## 2022-09-08 RX ADMIN — SIMVASTATIN 20 MILLIGRAM(S): 20 TABLET, FILM COATED ORAL at 21:24

## 2022-09-08 RX ADMIN — CEFTRIAXONE 100 MILLIGRAM(S): 500 INJECTION, POWDER, FOR SOLUTION INTRAMUSCULAR; INTRAVENOUS at 18:08

## 2022-09-08 RX ADMIN — Medication 1: at 12:58

## 2022-09-08 RX ADMIN — Medication 1 TABLET(S): at 12:57

## 2022-09-08 RX ADMIN — Medication 10 MILLIEQUIVALENT(S): at 12:57

## 2022-09-08 NOTE — PROGRESS NOTE ADULT - SUBJECTIVE AND OBJECTIVE BOX
SUBJECTIVE ASSESSMENT:  Patient  is a 89yo male with pmhx of Dementia, DM, CHF, HTN, AFIB NOT ON AC, HEMATURIA, HLD, OSTEO, PROSTATE CA, BIB EMS FROM NH presents with possible osteomyelitis. pt unable to give information due to dementia. pt with left foot 2nd digit diabetic ulcer sent in to r/o osteo.      Vital Signs Last 24 Hrs  T(C): 36.6 (08 Sep 2022 04:33), Max: 37.1 (07 Sep 2022 20:04)  T(F): 97.8 (08 Sep 2022 04:33), Max: 98.8 (07 Sep 2022 20:04)  HR: 76 (08 Sep 2022 07:53) (71 - 94)  BP: 105/57 (08 Sep 2022 07:53) (104/62 - 115/56)  BP(mean): --  RR: 18 (08 Sep 2022 04:33) (17 - 18)  SpO2: 98% (08 Sep 2022 04:33) (96% - 98%)    Parameters below as of 08 Sep 2022 04:33  Patient On (Oxygen Delivery Method): nasal cannula  O2 Flow (L/min): 2    I&O's Detail    07 Sep 2022 07:01  -  08 Sep 2022 07:00  --------------------------------------------------------  IN:  Total IN: 0 mL    OUT:    Voided (mL): 1700 mL  Total OUT: 1700 mL    Total NET: -1700 mL    PHYSICAL EXAM:  General: NAD, lying in bed comfortably,   Neurology:  A&Ox0, nonverbal  Cardiovascular: RRR, no murmur   Respiratory: CTA bilateral  Abdomen: +BS, no distension  Extremities: B/L lower extremities warm to touch.  Left 2nd toe w/ 1cm x 1cm open wound.    Vascular:       Right:                                                                          Left:  FEM [ ]2+ [ ]1+ [ ]doppler                                             FEM [ ]2+ [ ]1+ [ ]doppler    POP [x]2+ [ ]1+ [ ]doppler                                             POP [x]2+ [ ]1+ [ ]doppler    DP [ ]2+ [x]1+ [ ]doppler                                                DP [ ]2+ [ ]1+ [x]doppler  PT[ ]2+ [x]1+ [ ]doppler                                                  PT [ ]2+ [ ]1+ [x]doppler      LABS:                        11.9   7.82  )-----------( 274      ( 08 Sep 2022 05:53 )             37.1       09-08    146<H>  |  107  |  36<H>  ----------------------------<  134<H>  4.3   |  34<H>  |  0.96    Ca    9.7      08 Sep 2022 05:53    MEDICATIONS  (STANDING):  ascorbic acid 500 milliGRAM(s) Oral daily  clopidogrel Tablet 75 milliGRAM(s) Oral daily  dextrose 5%. 1000 milliLiter(s) (50 mL/Hr) IV Continuous <Continuous>  dextrose 5%. 1000 milliLiter(s) (100 mL/Hr) IV Continuous <Continuous>  dextrose 50% Injectable 25 Gram(s) IV Push once  dextrose 50% Injectable 12.5 Gram(s) IV Push once  dextrose 50% Injectable 25 Gram(s) IV Push once  donepezil 5 milliGRAM(s) Oral at bedtime  ferrous    sulfate 325 milliGRAM(s) Oral daily  glucagon  Injectable 1 milliGRAM(s) IntraMuscular once  heparin   Injectable 5000 Unit(s) SubCutaneous every 8 hours  insulin lispro (ADMELOG) corrective regimen sliding scale   SubCutaneous three times a day before meals  insulin lispro (ADMELOG) corrective regimen sliding scale   SubCutaneous at bedtime  lactobacillus acidophilus 1 Tablet(s) Oral two times a day  losartan 25 milliGRAM(s) Oral daily  metoprolol succinate ER 25 milliGRAM(s) Oral daily  multivitamin/minerals 1 Tablet(s) Oral daily  pantoprazole    Tablet 40 milliGRAM(s) Oral daily  polyethylene glycol 3350 17 Gram(s) Oral daily  potassium chloride    Tablet ER 10 milliEquivalent(s) Oral daily  senna 2 Tablet(s) Oral at bedtime  simvastatin 20 milliGRAM(s) Oral at bedtime  tamsulosin 0.4 milliGRAM(s) Oral at bedtime  torsemide 20 milliGRAM(s) Oral daily    MEDICATIONS  (PRN):  acetaminophen     Tablet .. 650 milliGRAM(s) Oral every 6 hours PRN Temp greater or equal to 38C (100.4F), Mild Pain (1 - 3)  aluminum hydroxide/magnesium hydroxide/simethicone Suspension 30 milliLiter(s) Oral every 4 hours PRN Dyspepsia  dextrose Oral Gel 15 Gram(s) Oral once PRN Blood Glucose LESS THAN 70 milliGRAM(s)/deciliter  melatonin 3 milliGRAM(s) Oral at bedtime PRN Insomnia  ondansetron Injectable 4 milliGRAM(s) IV Push every 8 hours PRN Nausea and/or Vomiting

## 2022-09-08 NOTE — SWALLOW BEDSIDE ASSESSMENT ADULT - COMMENTS
Chart reviewed, attempted to see pt to reattempt swallow eval, however, pt currently off unit.  Will follow to reattempt, as schedule permits.
Upon arrival, pt sleeping in bed. Pt was arousable, though demonstrated difficulty maintaining arousal despite persistent prompting. Pt was positioned upright in bed. Pt on supplemental O2 via NC, noted to have wet upper airway breath sounds/congestion at baseline with wet vocal quality. Pt unable to follow low level directives despite multimodal cues. Nonverbal pain scale 0/10.    CXR 9/1: " Limited image. Low lung volumes. There is mild pulmonary vascular redistribution/congestion. Part of left apex obscured by head and chin. No focal lung consolidation seen in the visualized lungs." Pt's WBC is WFL, no fever.

## 2022-09-08 NOTE — PROGRESS NOTE ADULT - SUBJECTIVE AND OBJECTIVE BOX
TERESA FRANCIS is a 89yMale , patient examined and chart reviewed.     INTERVAL HPI/ OVERNIGHT EVENTS:   Afebrile. NAD.  No events.  MIDLINE placed.    PAST MEDICAL & SURGICAL HISTORY:  Atrial fibrillation  Hypertension  Diabetes  Prostate ca  Dementia  CHF (congestive heart failure)  Hyperlipemia  Diabetes  Depression  Dementia  Prostate CA  Arteriosclerotic heart disease (ASHD)  Anemia  AICD (automatic cardioverter/defibrillator) present  Osteomyelitis of finger of left hand  Personal history of radiation therapy  H/O ongoing treatment with hormonal therapy  H/O bladder infections  Scrotal abscess  Urinary retention  Hematuria  H/O cystitis  Constipation  VHD (valvular heart disease)  Aortic valve stenosis  History of automatic internal cardiac defibrillator (AICD)        For details regarding the patient's social history, family history, and other miscellaneous elements, please refer the initial infectious diseases consultation and/or the admitting history and physical examination for this admission.    ROS:  Unable to obtain due to : Dementia      ALLERGIES  latex (Rash)      Current inpatient medications :    ANTIBIOTICS/RELEVANT:    MEDICATIONS  (STANDING):  ascorbic acid 500 milliGRAM(s) Oral daily  clopidogrel Tablet 75 milliGRAM(s) Oral daily  dextrose 5%. 1000 milliLiter(s) (50 mL/Hr) IV Continuous <Continuous>  dextrose 5%. 1000 milliLiter(s) (100 mL/Hr) IV Continuous <Continuous>  dextrose 50% Injectable 25 Gram(s) IV Push once  dextrose 50% Injectable 12.5 Gram(s) IV Push once  dextrose 50% Injectable 25 Gram(s) IV Push once  donepezil 5 milliGRAM(s) Oral at bedtime  ferrous    sulfate 325 milliGRAM(s) Oral daily  glucagon  Injectable 1 milliGRAM(s) IntraMuscular once  heparin   Injectable 5000 Unit(s) SubCutaneous every 8 hours  insulin lispro (ADMELOG) corrective regimen sliding scale   SubCutaneous three times a day before meals  insulin lispro (ADMELOG) corrective regimen sliding scale   SubCutaneous at bedtime  lactobacillus acidophilus 1 Tablet(s) Oral two times a day  losartan 25 milliGRAM(s) Oral daily  metoprolol succinate ER 25 milliGRAM(s) Oral daily  multivitamin/minerals 1 Tablet(s) Oral daily  pantoprazole    Tablet 40 milliGRAM(s) Oral daily  polyethylene glycol 3350 17 Gram(s) Oral daily  potassium chloride    Tablet ER 10 milliEquivalent(s) Oral daily  senna 2 Tablet(s) Oral at bedtime  simvastatin 20 milliGRAM(s) Oral at bedtime  tamsulosin 0.4 milliGRAM(s) Oral at bedtime  torsemide 20 milliGRAM(s) Oral daily    MEDICATIONS  (PRN):  acetaminophen     Tablet .. 650 milliGRAM(s) Oral every 6 hours PRN Temp greater or equal to 38C (100.4F), Mild Pain (1 - 3)  aluminum hydroxide/magnesium hydroxide/simethicone Suspension 30 milliLiter(s) Oral every 4 hours PRN Dyspepsia  dextrose Oral Gel 15 Gram(s) Oral once PRN Blood Glucose LESS THAN 70 milliGRAM(s)/deciliter  melatonin 3 milliGRAM(s) Oral at bedtime PRN Insomnia  ondansetron Injectable 4 milliGRAM(s) IV Push every 8 hours PRN Nausea and/or Vomiting      Objective:  Vital Signs Last 24 Hrs  T(C): 36.6 (08 Sep 2022 13:30), Max: 37.1 (07 Sep 2022 20:04)  T(F): 97.8 (08 Sep 2022 13:30), Max: 98.8 (07 Sep 2022 20:04)  HR: 62 (08 Sep 2022 13:30) (62 - 94)  BP: 102/53 (08 Sep 2022 13:30) (102/53 - 107/65)  RR: 18 (08 Sep 2022 13:30) (18 - 18)  SpO2: 96% (08 Sep 2022 13:30) (96% - 98%)    Parameters below as of 08 Sep 2022 13:30  Patient On (Oxygen Delivery Method): nasal cannula  O2 Flow (L/min): 2        Physical Exam:  General:  no acute distress  Neck: supple, trachea midline  Lungs: clear, no wheeze/rhonchi  Cardiovascular: regular rate and rhythm, S1 S2  Abdomen: soft, nontender,  bowel sounds normal  Neurological: alert and oriented x3  Skin: no rash  Extremities: Left 2nd toe nonhealing ulcer no drainage mild swelling  Chronic venous insufficiency        LABS:                        11.9   7.82  )-----------( 274      ( 08 Sep 2022 05:53 )             37.1   09-08    146<H>  |  107  |  36<H>  ----------------------------<  134<H>  4.3   |  34<H>  |  0.96    Ca    9.7      08 Sep 2022 05:53        MICROBIOLOGY:    Culture - Urine (collected 03 Sep 2022 07:49)  Source: Clean Catch Clean Catch (Midstream)  Final Report (04 Sep 2022 07:30):    No growth    RADIOLOGY & ADDITIONAL STUDIES:    ACC: [id number] EXAM:  XR FOOT COMP MIN 3 VIEWS LT                          PROCEDURE DATE:  09/01/2022          INTERPRETATION:  HISTORY:  Second digit wound    TECHNIQUE:  AP, oblique and lateral views of the left foot were obtained.    FINDINGS:  There is no definite evidence of acute fracture. Vascular   calcifications are noted. There are large dorsal and plantar calcaneal   spurs. There is mild soft tissue swelling of the forefoot. There are no   definite destructive lesions or periosteal reaction seen to suggest   osteomyelitis. Mild midfoot arthropathy is best seen on the lateral film.    IMPRESSION:  Soft tissue swelling. No definite evidence of osteomyelitis.   If concern for osteomyelitis persists, an MRI could be obtained.    ACC: [id number] EXAM:  XR CHEST PORTABLE IMMED 1V                          PROCEDURE DATE:  09/01/2022          INTERPRETATION:  TIME OF EXAM: September 1, 2022 at 9:29 PM.    CLINICAL INFORMATION: Sepsis.    COMPARISON:  June 30, 2022.    TECHNIQUE:  AP Portable chest x-ray. The head and chin obscures the   superior mediastinum and left apex. Rotated.    INTERPRETATION:    Heart size and the mediastinum cannot be accurately evaluated on this   projection. Left chest wall cardiac pacemaker with intact lead with tip   in expected region of the right ventricle.  Low lung volumes.  There is mild pulmonary vascular redistribution/congestion.  No focal lung consolidation seen in the visualized lungs.  No pleural effusion is seen.  No evidence of pneumothorax of the left apex is obscured and not   evaluated.  Right upper quadrant abdominal calcification and abdominal surgical clips   noted.      IMPRESSION:  Limited image. Low lung volumes. There is mild pulmonary   vascular redistribution/congestion.    Part of left apex obscured by head and chin. No focal lung consolidation   seen in the visualized lungs.    ACC: [id number] EXAM:  NM MULTI DAY PROCEDURE                        ACC: [id number] EXAM:  NM INFLAMM LOC WBC SA SD                          PROCEDURE DATE:  09/07/2022          INTERPRETATION:  RADIOPHARMACEUTICAL: 11.2 millicuries Tc-99m labeled   autologous leukocytes, IV.    CLINICAL INFORMATION: 89 year old man with nonhealing ulcer of the LEFT   second toe; referred to evaluate for infection.    TECHNIQUE: Radiolabeled autologous leukocytes were injected on 9/6/2022.   Approximately 24 hourslater static images in multiple projections were   obtained of the feet.    COMPARISON: No prior scintigraphy available.    CORRELATION: 3 view radiography LEFT foot 9/1/2022 (soft tissue swelling   without radiographic evidence of osteomyelitis).    FINDINGS: Increased labeled white cell accumulation is seen in the region   of the distal LEFT second metatarsal/MTP joint. Physiologic distribution   is seen elsewhere.    IMPRESSION: AbnormalTc-99m labeled leukocyte scan. Findings consistent   with osteomyelitis.      Assessment :  89YO M PMH Dementia, HL, atrial fibrillation, SDH, Ca Prostate DM CHF UTI resident of [facility] admitted with left foot 2nd toe nonhealing diabetic ulcer  WBC scan +osteo.   Afebrile. WBC wnl.  Vascular eval  Podiatry follow up noted- Pt's family refused surgery  MIDLINE placed       Plan :   Will treat with Rocephin 2 grams IV daily x 28 days  MIDLINE placed  Trend temps and cbc  Asp precautions  Cont local wound care  Stable from ID standpoint    D/w Podiatry       Continue with present regiment.  Appropriate use of antibiotics and adverse effects reviewed.    > 35 minutes were spent in direct patient care reviewing notes, medications ,labs data/ imaging , discussion with multidisciplinary team.    Thank you for allowing me to participate in care of your patient .    Heber Issa MD  Infectious Disease  [phone]

## 2022-09-08 NOTE — PROGRESS NOTE ADULT - SUBJECTIVE AND OBJECTIVE BOX
89y year old Male seen at [facility] 1EAS 113 D1 for 2nd digit wound , left foot. Patient is not oriented, is laying in bed comfortably and is awake. Patient does not respond to any questions asked.     Allergies    latex (Rash)  latex (Unknown)  No Known Drug Allergies    Intolerances        MEDICATIONS  (STANDING):  ascorbic acid 500 milliGRAM(s) Oral daily  clopidogrel Tablet 75 milliGRAM(s) Oral daily  dextrose 5%. 1000 milliLiter(s) (50 mL/Hr) IV Continuous <Continuous>  dextrose 5%. 1000 milliLiter(s) (100 mL/Hr) IV Continuous <Continuous>  dextrose 50% Injectable 25 Gram(s) IV Push once  dextrose 50% Injectable 12.5 Gram(s) IV Push once  dextrose 50% Injectable 25 Gram(s) IV Push once  donepezil 5 milliGRAM(s) Oral at bedtime  ferrous    sulfate 325 milliGRAM(s) Oral daily  glucagon  Injectable 1 milliGRAM(s) IntraMuscular once  heparin   Injectable 5000 Unit(s) SubCutaneous every 8 hours  insulin lispro (ADMELOG) corrective regimen sliding scale   SubCutaneous three times a day before meals  insulin lispro (ADMELOG) corrective regimen sliding scale   SubCutaneous at bedtime  lactobacillus acidophilus 1 Tablet(s) Oral two times a day  losartan 25 milliGRAM(s) Oral daily  metoprolol succinate ER 25 milliGRAM(s) Oral daily  multivitamin/minerals 1 Tablet(s) Oral daily  pantoprazole    Tablet 40 milliGRAM(s) Oral daily  polyethylene glycol 3350 17 Gram(s) Oral daily  potassium chloride    Tablet ER 10 milliEquivalent(s) Oral daily  senna 2 Tablet(s) Oral at bedtime  simvastatin 20 milliGRAM(s) Oral at bedtime  tamsulosin 0.4 milliGRAM(s) Oral at bedtime  torsemide 20 milliGRAM(s) Oral daily    MEDICATIONS  (PRN):  acetaminophen     Tablet .. 650 milliGRAM(s) Oral every 6 hours PRN Temp greater or equal to 38C (100.4F), Mild Pain (1 - 3)  aluminum hydroxide/magnesium hydroxide/simethicone Suspension 30 milliLiter(s) Oral every 4 hours PRN Dyspepsia  dextrose Oral Gel 15 Gram(s) Oral once PRN Blood Glucose LESS THAN 70 milliGRAM(s)/deciliter  melatonin 3 milliGRAM(s) Oral at bedtime PRN Insomnia  ondansetron Injectable 4 milliGRAM(s) IV Push every 8 hours PRN Nausea and/or Vomiting      Vital Signs Last 24 Hrs  T(C): 36.5 (07 Sep 2022 12:09), Max: 37.1 (06 Sep 2022 21:00)  T(F): 97.7 (07 Sep 2022 12:09), Max: 98.7 (06 Sep 2022 21:00)  HR: 71 (07 Sep 2022 12:09) (71 - 89)  BP: 115/56 (07 Sep 2022 12:09) (115/56 - 119/52)  BP(mean): --  RR: 17 (07 Sep 2022 12:09) (17 - 19)  SpO2: 98% (07 Sep 2022 12:09) (96% - 98%)    Parameters below as of 07 Sep 2022 12:09  Patient On (Oxygen Delivery Method): nasal cannula        PHYSICAL EXAM:  Vascular: DP & PT non palpable bilaterally, Capillary refill 6 seconds  Neurological: couldn't  assess   Musculoskeletal: couldn't assess  Dermatological: 1.1x 0.3 x0.2 cm ulceration noted to the left, 2nd digit, at the proximal interphalangeal joint with head of the proximal phalanx exposed,  probe to bone, mild periwound erythema, no fluctuance, no malodor, no proximal streaking at this time    CBC Full  -  ( 07 Sep 2022 07:30 )  WBC Count : 6.71 K/uL  RBC Count : 3.90 M/uL  Hemoglobin : 12.0 g/dL  Hematocrit : 37.6 %  Platelet Count - Automated : 246 K/uL  Mean Cell Volume : 96.4 fl  Mean Cell Hemoglobin : 30.8 pg  Mean Cell Hemoglobin Concentration : 31.9 gm/dL  Auto Neutrophil # : x  Auto Lymphocyte # : x  Auto Monocyte # : x  Auto Eosinophil # : x  Auto Basophil # : x  Auto Neutrophil % : x  Auto Lymphocyte % : x  Auto Monocyte % : x  Auto Eosinophil % : x  Auto Basophil % : x      09-07    144  |  108  |  27<H>  ----------------------------<  130<H>  4.2   |  33<H>  |  0.80    Ca    9.4      07 Sep 2022 07:30                Imaging: \  < from: NM Multiple Day Procedure (09.07.22 @ 11:59) >    ACC: [id number] EXAM:  NM MULTI DAY PROCEDURE                        ACC: [id number] EXAM:  NM INFLAMM LOC WBC SA SD                          PROCEDURE DATE:  09/07/2022          INTERPRETATION:  RADIOPHARMACEUTICAL: 11.2 millicuries Tc-99m labeled   autologous leukocytes, IV.    CLINICAL INFORMATION: 89 year old man with nonhealing ulcer of the LEFT   second toe; referred to evaluate for infection.    TECHNIQUE: Radiolabeled autologous leukocytes were injected on 9/6/2022.   Approximately 24 hourslater static images in multiple projections were   obtained of the feet.    COMPARISON: No prior scintigraphy available.    CORRELATION: 3 view radiography LEFT foot 9/1/2022 (soft tissue swelling   without radiographic evidence of osteomyelitis).    FINDINGS: Increased labeled white cell accumulation is seen in the region   of the distal LEFT second metatarsal/MTP joint. Physiologic distribution   is seen elsewhere.    IMPRESSION: AbnormalTc-99m labeled leukocyte scan. Findings consistent   withosteomyelitis.    --- End of Report ---            HIRAM DE GUZMAN MD; Attending Radiologist  This document ha    < end of copied text >

## 2022-09-08 NOTE — PROGRESS NOTE ADULT - ATTENDING COMMENTS
Agreed as above. Patient with bone scan positive for osteomyelitis. Discussed with patient's son regarding the patient's treatment  options  of  conservative treatement with long term IV antibiotics vs surgical intervention including bone biopsy vs amputation of the digit and resection of metatarsal head. Patient's son understood the risks associated with conservative treatment and  patient being at high risk for more proximal amputations, sepsis, loss of limb and loss of life. Patient's son agrees for surgical intervention. Patient will likely need surgical intervention pending vascular studies and recommendations.
Agreed as above. Patient with bone scan positive for osteomyelitis. Will discuss with family regrading bone biopsy, since family refused surgical intervention and amputation. Patient is still high risk for more proximal amputation, sepsis, loss of limb and loss of life.

## 2022-09-08 NOTE — PROCEDURE NOTE - NSINFORMCONSENT_GEN_A_CORE
hcp/Benefits, risks, and possible complications of procedure explained to patient/caregiver who verbalized understanding and gave verbal consent.

## 2022-09-08 NOTE — PROGRESS NOTE ADULT - PROBLEM SELECTOR PLAN 1
Patient was seen and evaluated  left foot bone scan shows  OM  Wound cleaned with NS and dressed with band aid  c/w IV abx per ID  Vascular on board

## 2022-09-08 NOTE — PROGRESS NOTE ADULT - SUBJECTIVE AND OBJECTIVE BOX
PROGRESS NOTE  Patient is a 89y old  Male who presents with a chief complaint of left foot 2nd digit diabetic ulcer sent in to r/o osteo. (08 Sep 2022 15:19)    Chart and available morning labs /imaging are reviewed electronically , urgent issues addressed . More information  is being added upon completion of rounds , when more information is collected and management discussed with consultants , medical staff and social service/case management on the floor   OVERNIGHT  No new issues reported by medical staff . All above noted Patient is resting in a bed comfortably .Confused ,poor mentation .No distress noted     HPI:  Patient  is a 89yo male with pmhx of Dementia, DM, CHF, HTN, A-FIB NOT ON AC, HEMATURIA, HLD, OSTEO, PROSTATE CANCER, BIB EMS FROM NH presents with possible osteomyelitis. pt unable to give information due to dementia. pt with left foot 2nd digit diabetic ulcer sent in to r/o osteo. (02 Sep 2022 09:51)    PAST MEDICAL & SURGICAL HISTORY:  Atrial fibrillation      Hypertension      Diabetes      Prostate ca      Dementia      CHF (congestive heart failure)      Hyperlipemia      Diabetes      Depression      Dementia      DM (diabetes mellitus)      Atrial fibrillation      Prostate CA      Arteriosclerotic heart disease (ASHD)      Dementia      Anemia      AICD (automatic cardioverter/defibrillator) present      Osteomyelitis of finger of left hand      Personal history of radiation therapy      H/O ongoing treatment with hormonal therapy      H/O bladder infections      Scrotal abscess      Urinary retention      Hematuria      H/O cystitis      Constipation      VHD (valvular heart disease)      Aortic valve stenosis      History of automatic internal cardiac defibrillator (AICD)          MEDICATIONS  (STANDING):  ascorbic acid 500 milliGRAM(s) Oral daily  cefTRIAXone   IVPB 2000 milliGRAM(s) IV Intermittent every 24 hours  clopidogrel Tablet 75 milliGRAM(s) Oral daily  dextrose 5%. 1000 milliLiter(s) (50 mL/Hr) IV Continuous <Continuous>  dextrose 5%. 1000 milliLiter(s) (100 mL/Hr) IV Continuous <Continuous>  dextrose 50% Injectable 25 Gram(s) IV Push once  dextrose 50% Injectable 12.5 Gram(s) IV Push once  dextrose 50% Injectable 25 Gram(s) IV Push once  donepezil 5 milliGRAM(s) Oral at bedtime  ferrous    sulfate 325 milliGRAM(s) Oral daily  glucagon  Injectable 1 milliGRAM(s) IntraMuscular once  heparin   Injectable 5000 Unit(s) SubCutaneous every 8 hours  insulin lispro (ADMELOG) corrective regimen sliding scale   SubCutaneous three times a day before meals  insulin lispro (ADMELOG) corrective regimen sliding scale   SubCutaneous at bedtime  lactobacillus acidophilus 1 Tablet(s) Oral two times a day  losartan 25 milliGRAM(s) Oral daily  metoprolol succinate ER 25 milliGRAM(s) Oral daily  multivitamin/minerals 1 Tablet(s) Oral daily  pantoprazole    Tablet 40 milliGRAM(s) Oral daily  polyethylene glycol 3350 17 Gram(s) Oral daily  potassium chloride    Tablet ER 10 milliEquivalent(s) Oral daily  senna 2 Tablet(s) Oral at bedtime  simvastatin 20 milliGRAM(s) Oral at bedtime  tamsulosin 0.4 milliGRAM(s) Oral at bedtime  torsemide 20 milliGRAM(s) Oral daily    MEDICATIONS  (PRN):  acetaminophen     Tablet .. 650 milliGRAM(s) Oral every 6 hours PRN Temp greater or equal to 38C (100.4F), Mild Pain (1 - 3)  aluminum hydroxide/magnesium hydroxide/simethicone Suspension 30 milliLiter(s) Oral every 4 hours PRN Dyspepsia  dextrose Oral Gel 15 Gram(s) Oral once PRN Blood Glucose LESS THAN 70 milliGRAM(s)/deciliter  melatonin 3 milliGRAM(s) Oral at bedtime PRN Insomnia  ondansetron Injectable 4 milliGRAM(s) IV Push every 8 hours PRN Nausea and/or Vomiting      OBJECTIVE    T(C): 36.6 (09-08-22 @ 13:30), Max: 37.1 (09-07-22 @ 20:04)  HR: 62 (09-08-22 @ 13:30) (62 - 94)  BP: 102/53 (09-08-22 @ 13:30) (102/53 - 107/65)  RR: 18 (09-08-22 @ 13:30) (18 - 18)  SpO2: 96% (09-08-22 @ 13:30) (96% - 98%)  Wt(kg): --  I&O's Summary    07 Sep 2022 07:01  -  08 Sep 2022 07:00  --------------------------------------------------------  IN: 0 mL / OUT: 1700 mL / NET: -1700 mL    08 Sep 2022 07:01  -  08 Sep 2022 19:50  --------------------------------------------------------  IN: 170 mL / OUT: 400 mL / NET: -230 mL          REVIEW OF SYSTEMS:  CONSTITUTIONAL: No fever, weight loss, or fatigue  EYES: No eye pain, visual disturbances, or discharge  ENMT:   No sinus or throat pain  NECK: No pain or stiffness  RESPIRATORY: No cough, wheezing, chills or hemoptysis; No shortness of breath  CARDIOVASCULAR: No chest pain, palpitations, dizziness, or leg swelling  GASTROINTESTINAL: No abdominal pain. No nausea, vomiting; No diarrhea or constipation. No melena or hematochezia.  GENITOURINARY: No dysuria, frequency, hematuria, or incontinence  NEUROLOGICAL: No headaches, memory loss, loss of strength, numbness, or tremors  SKIN: No itching, burning, rashes, or lesions   MUSCULOSKELETAL: No joint pain or swelling; No muscle, back, or extremity pain    PHYSICAL EXAM:  Appearance: NAD. VS past 24 hrs -as above   HEENT:   Moist oral mucosa. Conjunctiva clear b/l.   Neck : supple  Respiratory: Lungs CTAB.  Gastrointestinal:  Soft, nontender. No rebound. No rigidity. BS present	  Cardiovascular: RRR ,S1S2 present  Neurologic: Non-focal. Moving all extremities.  Extremities: No edema. No erythema. No calf tenderness.  Skin: No rashes, No ecchymoses, No cyanosis.	  wounds ,skin lesions-See skin assesment flow sheet   LABS:                        11.9   7.82  )-----------( 274      ( 08 Sep 2022 05:53 )             37.1     09-08    146<H>  |  107  |  36<H>  ----------------------------<  134<H>  4.3   |  34<H>  |  0.96    Ca    9.7      08 Sep 2022 05:53      CAPILLARY BLOOD GLUCOSE      POCT Blood Glucose.: 145 mg/dL (08 Sep 2022 16:53)  POCT Blood Glucose.: 160 mg/dL (08 Sep 2022 12:27)  POCT Blood Glucose.: 148 mg/dL (08 Sep 2022 07:48)  POCT Blood Glucose.: 148 mg/dL (07 Sep 2022 21:19)          Culture - Urine (collected 03 Sep 2022 07:49)  Source: Clean Catch Clean Catch (Midstream)  Final Report (04 Sep 2022 07:30):    No growth    Culture - Blood (collected 01 Sep 2022 22:45)  Source: .Blood Blood-Peripheral  Final Report (07 Sep 2022 05:00):    No Growth Final    Culture - Blood (collected 01 Sep 2022 22:35)  Source: .Blood Blood-Peripheral  Final Report (07 Sep 2022 05:00):    No Growth Final      RADIOLOGY & ADDITIONAL TESTS:   reviewed elctronically  ASSESSMENT/PLAN: 	    25 minutes aggregate time was spent on this visit, 50% visit time spent in care co-ordination with other attendings and counselling patient .I have discussed care plan with patient / HCP/family member ,who expressed understanding of problems treatment and their effect and side effects, alternatives in details. I have asked if they have any questions and concerns and appropriately addressed them to best of my ability.

## 2022-09-09 ENCOUNTER — TRANSCRIPTION ENCOUNTER (OUTPATIENT)
Age: 87
End: 2022-09-09

## 2022-09-09 VITALS — SYSTOLIC BLOOD PRESSURE: 111 MMHG | DIASTOLIC BLOOD PRESSURE: 71 MMHG

## 2022-09-09 PROCEDURE — 36415 COLL VENOUS BLD VENIPUNCTURE: CPT

## 2022-09-09 PROCEDURE — 93005 ELECTROCARDIOGRAM TRACING: CPT

## 2022-09-09 PROCEDURE — 97162 PT EVAL MOD COMPLEX 30 MIN: CPT

## 2022-09-09 PROCEDURE — 80061 LIPID PANEL: CPT

## 2022-09-09 PROCEDURE — 92610 EVALUATE SWALLOWING FUNCTION: CPT

## 2022-09-09 PROCEDURE — 83036 HEMOGLOBIN GLYCOSYLATED A1C: CPT

## 2022-09-09 PROCEDURE — 87040 BLOOD CULTURE FOR BACTERIA: CPT

## 2022-09-09 PROCEDURE — 83605 ASSAY OF LACTIC ACID: CPT

## 2022-09-09 PROCEDURE — 83735 ASSAY OF MAGNESIUM: CPT

## 2022-09-09 PROCEDURE — 87635 SARS-COV-2 COVID-19 AMP PRB: CPT

## 2022-09-09 PROCEDURE — 73630 X-RAY EXAM OF FOOT: CPT

## 2022-09-09 PROCEDURE — 83880 ASSAY OF NATRIURETIC PEPTIDE: CPT

## 2022-09-09 PROCEDURE — 71045 X-RAY EXAM CHEST 1 VIEW: CPT

## 2022-09-09 PROCEDURE — 85610 PROTHROMBIN TIME: CPT

## 2022-09-09 PROCEDURE — 93925 LOWER EXTREMITY STUDY: CPT

## 2022-09-09 PROCEDURE — 93971 EXTREMITY STUDY: CPT

## 2022-09-09 PROCEDURE — 99231 SBSQ HOSP IP/OBS SF/LOW 25: CPT

## 2022-09-09 PROCEDURE — 82962 GLUCOSE BLOOD TEST: CPT

## 2022-09-09 PROCEDURE — 87086 URINE CULTURE/COLONY COUNT: CPT

## 2022-09-09 PROCEDURE — 76937 US GUIDE VASCULAR ACCESS: CPT

## 2022-09-09 PROCEDURE — 85730 THROMBOPLASTIN TIME PARTIAL: CPT

## 2022-09-09 PROCEDURE — 97166 OT EVAL MOD COMPLEX 45 MIN: CPT

## 2022-09-09 PROCEDURE — 80053 COMPREHEN METABOLIC PANEL: CPT

## 2022-09-09 PROCEDURE — 85027 COMPLETE CBC AUTOMATED: CPT

## 2022-09-09 PROCEDURE — 78800 RP LOCLZJ TUM 1 AREA 1 D IMG: CPT

## 2022-09-09 PROCEDURE — 84443 ASSAY THYROID STIM HORMONE: CPT

## 2022-09-09 PROCEDURE — 99285 EMERGENCY DEPT VISIT HI MDM: CPT | Mod: 25

## 2022-09-09 PROCEDURE — 85652 RBC SED RATE AUTOMATED: CPT

## 2022-09-09 PROCEDURE — A9569: CPT

## 2022-09-09 PROCEDURE — C1751: CPT

## 2022-09-09 PROCEDURE — 81001 URINALYSIS AUTO W/SCOPE: CPT

## 2022-09-09 PROCEDURE — 92526 ORAL FUNCTION THERAPY: CPT

## 2022-09-09 PROCEDURE — 85025 COMPLETE CBC W/AUTO DIFF WBC: CPT

## 2022-09-09 PROCEDURE — 86140 C-REACTIVE PROTEIN: CPT

## 2022-09-09 PROCEDURE — 80048 BASIC METABOLIC PNL TOTAL CA: CPT

## 2022-09-09 PROCEDURE — 93923 UPR/LXTR ART STDY 3+ LVLS: CPT

## 2022-09-09 RX ORDER — DONEPEZIL HYDROCHLORIDE 10 MG/1
1 TABLET, FILM COATED ORAL
Qty: 0 | Refills: 0 | DISCHARGE
Start: 2022-09-09

## 2022-09-09 RX ORDER — CEFTRIAXONE 500 MG/1
2000 INJECTION, POWDER, FOR SOLUTION INTRAMUSCULAR; INTRAVENOUS
Qty: 0 | Refills: 0 | DISCHARGE
Start: 2022-09-09

## 2022-09-09 RX ORDER — SENNA PLUS 8.6 MG/1
2 TABLET ORAL
Qty: 0 | Refills: 0 | DISCHARGE
Start: 2022-09-09

## 2022-09-09 RX ORDER — LACTOBACILLUS ACIDOPHILUS 100MM CELL
2 CAPSULE ORAL
Qty: 0 | Refills: 0 | DISCHARGE
Start: 2022-09-09

## 2022-09-09 RX ORDER — HEPARIN SODIUM 5000 [USP'U]/ML
5000 INJECTION INTRAVENOUS; SUBCUTANEOUS
Qty: 0 | Refills: 0 | DISCHARGE
Start: 2022-09-09

## 2022-09-09 RX ORDER — FERROUS SULFATE 325(65) MG
1 TABLET ORAL
Qty: 0 | Refills: 0 | DISCHARGE
Start: 2022-09-09

## 2022-09-09 RX ORDER — SIMVASTATIN 20 MG/1
1 TABLET, FILM COATED ORAL
Qty: 0 | Refills: 0 | DISCHARGE

## 2022-09-09 RX ORDER — TAMSULOSIN HYDROCHLORIDE 0.4 MG/1
1 CAPSULE ORAL
Qty: 0 | Refills: 0 | DISCHARGE

## 2022-09-09 RX ORDER — METOPROLOL TARTRATE 50 MG
1 TABLET ORAL
Qty: 0 | Refills: 0 | DISCHARGE
Start: 2022-09-09

## 2022-09-09 RX ORDER — LOSARTAN POTASSIUM 100 MG/1
1 TABLET, FILM COATED ORAL
Qty: 0 | Refills: 0 | DISCHARGE
Start: 2022-09-09

## 2022-09-09 RX ORDER — POLYETHYLENE GLYCOL 3350 17 G/17G
17 POWDER, FOR SOLUTION ORAL
Qty: 0 | Refills: 0 | DISCHARGE
Start: 2022-09-09

## 2022-09-09 RX ORDER — INSULIN LISPRO 100/ML
0 VIAL (ML) SUBCUTANEOUS
Qty: 0 | Refills: 0 | DISCHARGE
Start: 2022-09-09

## 2022-09-09 RX ORDER — POTASSIUM CHLORIDE 20 MEQ
1 PACKET (EA) ORAL
Qty: 0 | Refills: 0 | DISCHARGE
Start: 2022-09-09

## 2022-09-09 RX ORDER — CLOPIDOGREL BISULFATE 75 MG/1
1 TABLET, FILM COATED ORAL
Qty: 0 | Refills: 0 | DISCHARGE
Start: 2022-09-09

## 2022-09-09 RX ORDER — TAMSULOSIN HYDROCHLORIDE 0.4 MG/1
1 CAPSULE ORAL
Qty: 0 | Refills: 0 | DISCHARGE
Start: 2022-09-09

## 2022-09-09 RX ORDER — PANTOPRAZOLE SODIUM 20 MG/1
1 TABLET, DELAYED RELEASE ORAL
Qty: 0 | Refills: 0 | DISCHARGE
Start: 2022-09-09

## 2022-09-09 RX ORDER — FERROUS SULFATE 325(65) MG
1 TABLET ORAL
Qty: 0 | Refills: 0 | DISCHARGE

## 2022-09-09 RX ORDER — SIMVASTATIN 20 MG/1
1 TABLET, FILM COATED ORAL
Qty: 0 | Refills: 0 | DISCHARGE
Start: 2022-09-09

## 2022-09-09 RX ADMIN — PANTOPRAZOLE SODIUM 40 MILLIGRAM(S): 20 TABLET, DELAYED RELEASE ORAL at 12:03

## 2022-09-09 RX ADMIN — CLOPIDOGREL BISULFATE 75 MILLIGRAM(S): 75 TABLET, FILM COATED ORAL at 12:03

## 2022-09-09 RX ADMIN — HEPARIN SODIUM 5000 UNIT(S): 5000 INJECTION INTRAVENOUS; SUBCUTANEOUS at 05:48

## 2022-09-09 RX ADMIN — HEPARIN SODIUM 5000 UNIT(S): 5000 INJECTION INTRAVENOUS; SUBCUTANEOUS at 13:58

## 2022-09-09 RX ADMIN — Medication 1 TABLET(S): at 05:48

## 2022-09-09 RX ADMIN — LOSARTAN POTASSIUM 25 MILLIGRAM(S): 100 TABLET, FILM COATED ORAL at 05:49

## 2022-09-09 RX ADMIN — Medication 325 MILLIGRAM(S): at 12:02

## 2022-09-09 RX ADMIN — POLYETHYLENE GLYCOL 3350 17 GRAM(S): 17 POWDER, FOR SOLUTION ORAL at 12:03

## 2022-09-09 RX ADMIN — Medication 1 TABLET(S): at 12:02

## 2022-09-09 RX ADMIN — Medication 10 MILLIEQUIVALENT(S): at 12:03

## 2022-09-09 RX ADMIN — Medication 25 MILLIGRAM(S): at 05:48

## 2022-09-09 RX ADMIN — Medication 500 MILLIGRAM(S): at 12:03

## 2022-09-09 NOTE — OCCUPATIONAL THERAPY INITIAL EVALUATION ADULT - PERTINENT HX OF CURRENT PROBLEM, REHAB EVAL
Patient  is a 89yo male with pmhx of Dementia, DM, CHF, HTN, AFIB NOT ON AC, HEMATURIA, HLD, OSTEO, PROSTATE CA, BIB EMS FROM NH presents with possible osteomyelitis

## 2022-09-09 NOTE — PROGRESS NOTE ADULT - PROBLEM SELECTOR PLAN 3
continue home medications
Management per primary team
Management per primary team
continue home medications
Management per primary team
continue home medications

## 2022-09-09 NOTE — PROGRESS NOTE ADULT - SUBJECTIVE AND OBJECTIVE BOX
Patient is a 89y old  Male who presents with a chief complaint of left foot 2nd digit diabetic ulcer sent in to r/o osteo. (08 Sep 2022 22:37)  Pt is non verbal   WILLIAM/PVR's could not be done due to pts rigidity  Afebrile; HD stable    MEDICATIONS  (STANDING):  ascorbic acid 500 milliGRAM(s) Oral daily  cefTRIAXone   IVPB 2000 milliGRAM(s) IV Intermittent every 24 hours  clopidogrel Tablet 75 milliGRAM(s) Oral daily  dextrose 5%. 1000 milliLiter(s) (50 mL/Hr) IV Continuous <Continuous>  dextrose 5%. 1000 milliLiter(s) (100 mL/Hr) IV Continuous <Continuous>  dextrose 50% Injectable 25 Gram(s) IV Push once  dextrose 50% Injectable 12.5 Gram(s) IV Push once  dextrose 50% Injectable 25 Gram(s) IV Push once  donepezil 5 milliGRAM(s) Oral at bedtime  ferrous    sulfate 325 milliGRAM(s) Oral daily  glucagon  Injectable 1 milliGRAM(s) IntraMuscular once  heparin   Injectable 5000 Unit(s) SubCutaneous every 8 hours  insulin lispro (ADMELOG) corrective regimen sliding scale   SubCutaneous three times a day before meals  insulin lispro (ADMELOG) corrective regimen sliding scale   SubCutaneous at bedtime  lactobacillus acidophilus 1 Tablet(s) Oral two times a day  losartan 25 milliGRAM(s) Oral daily  metoprolol succinate ER 25 milliGRAM(s) Oral daily  multivitamin/minerals 1 Tablet(s) Oral daily  pantoprazole    Tablet 40 milliGRAM(s) Oral daily  polyethylene glycol 3350 17 Gram(s) Oral daily  potassium chloride    Tablet ER 10 milliEquivalent(s) Oral daily  senna 2 Tablet(s) Oral at bedtime  simvastatin 20 milliGRAM(s) Oral at bedtime  tamsulosin 0.4 milliGRAM(s) Oral at bedtime  torsemide 20 milliGRAM(s) Oral daily    MEDICATIONS  (PRN):  acetaminophen     Tablet .. 650 milliGRAM(s) Oral every 6 hours PRN Temp greater or equal to 38C (100.4F), Mild Pain (1 - 3)  aluminum hydroxide/magnesium hydroxide/simethicone Suspension 30 milliLiter(s) Oral every 4 hours PRN Dyspepsia  dextrose Oral Gel 15 Gram(s) Oral once PRN Blood Glucose LESS THAN 70 milliGRAM(s)/deciliter  melatonin 3 milliGRAM(s) Oral at bedtime PRN Insomnia  ondansetron Injectable 4 milliGRAM(s) IV Push every 8 hours PRN Nausea and/or Vomiting    Allergies  latex (Rash)  No Known Drug Allergies    Vital Signs Last 24 Hrs  T(C): 36.6 (09 Sep 2022 04:52), Max: 36.8 (08 Sep 2022 20:51)  T(F): 97.8 (09 Sep 2022 04:52), Max: 98.2 (08 Sep 2022 20:51)  HR: 76 (09 Sep 2022 08:16) (60 - 76)  BP: 101/59 (09 Sep 2022 08:16) (101/52 - 106/66)  BP(mean): --  RR: 18 (09 Sep 2022 04:52) (18 - 18)  SpO2: 95% (09 Sep 2022 04:52) (95% - 96%)    Parameters below as of 09 Sep 2022 04:52  Patient On (Oxygen Delivery Method): nasal cannula  O2 Flow (L/min): 2    I&O's Detail    08 Sep 2022 07:01  -  09 Sep 2022 07:00  --------------------------------------------------------  IN:    IV PiggyBack: 50 mL    Oral Fluid: 120 mL  Total IN: 170 mL    OUT:    Voided (mL): 800 mL  Total OUT: 800 mL    Total NET: -630 mL      Physical Exam:  General: NAD, resting comfortably in bed  Extremities: L 2nd toe with dry eschar; mild periwound erythema      LABS:                        11.9   7.82  )-----------( 274      ( 08 Sep 2022 05:53 )             37.1     09-08    146<H>  |  107  |  36<H>  ----------------------------<  134<H>  4.3   |  34<H>  |  0.96    Ca    9.7      08 Sep 2022 05:53    CAPILLARY BLOOD GLUCOSE  POCT Blood Glucose.: 142 mg/dL (09 Sep 2022 07:37)  POCT Blood Glucose.: 177 mg/dL (08 Sep 2022 21:25)  POCT Blood Glucose.: 145 mg/dL (08 Sep 2022 16:53)  POCT Blood Glucose.: 160 mg/dL (08 Sep 2022 12:27)    RECENT CULTURES:  09-03 Clean Catch Clean Catch (Midstream) XXXX XXXX   No growth      Radiology and Additional Studies:    < from: NM Multiple Day Procedure (09.07.22 @ 11:59) >  ACC: [id number] EXAM:  NM MULTI DAY PROCEDURE                        ACC: [id number] EXAM:  NM INFLAMM LOC WBC SA SD                          PROCEDURE DATE:  09/07/2022          INTERPRETATION:  RADIOPHARMACEUTICAL: 11.2 millicuries Tc-99m labeled   autologous leukocytes, IV.    CLINICAL INFORMATION: 89 year old man with nonhealing ulcer of the LEFT   second toe; referred to evaluate for infection.    TECHNIQUE: Radiolabeled autologous leukocytes were injected on 9/6/2022.   Approximately 24 hourslater static images in multiple projections were   obtained of the feet.    COMPARISON: No prior scintigraphy available.    CORRELATION: 3 view radiography LEFT foot 9/1/2022 (soft tissue swelling   without radiographic evidence of osteomyelitis).    FINDINGS: Increased labeled white cell accumulation is seen in the region   of the distal LEFT second metatarsal/MTP joint. Physiologic distribution   is seen elsewhere.    IMPRESSION: AbnormalTc-99m labeled leukocyte scan. Findings consistent   withosteomyelitis.    < end of copied text >

## 2022-09-09 NOTE — PROGRESS NOTE ADULT - PROBLEM SELECTOR PROBLEM 1
Diabetic ulcer of toe of left foot

## 2022-09-09 NOTE — PROGRESS NOTE ADULT - PROBLEM SELECTOR PLAN 4
as per cardiologist

## 2022-09-09 NOTE — DISCHARGE NOTE PROVIDER - DETAILS OF MALNUTRITION DIAGNOSIS/DIAGNOSES
This patient has been assessed with a concern for Malnutrition and was treated during this hospitalization for the following Nutrition diagnosis/diagnoses:     -  09/03/2022: Moderate protein-calorie malnutrition

## 2022-09-09 NOTE — PROGRESS NOTE ADULT - SUBJECTIVE AND OBJECTIVE BOX
PROGRESS NOTE  Patient is a 89y old  Male who presents with a chief complaint of left foot 2nd digit diabetic ulcer sent in to r/o osteo. (09 Sep 2022 14:22)    Chart and available morning labs /imaging are reviewed electronically , urgent issues addressed . More information  is being added upon completion of rounds , when more information is collected and management discussed with consultants , medical staff and social service/case management on the floor   OVERNIGHT  Spoke to the son Vinicio on a phone ,case d/w podiatry -no surgery as per son decision , would like to try iv abx .Seen by vascular team -no workup due to multiple comorbidities   No new issues reported by medical staff . All above noted Patient is resting in a bed comfortably .Confused ,poor mentation .No distress noted   HPI:  Patient  is a 89yo male with pmhx of Dementia, DM, CHF, HTN, A-FIB NOT ON AC, HEMATURIA, HLD, OSTEO, PROSTATE CANCER, BIB EMS FROM NH presents with possible osteomyelitis. pt unable to give information due to dementia. pt with left foot 2nd digit diabetic ulcer sent in to r/o osteo. (02 Sep 2022 09:51)    PAST MEDICAL & SURGICAL HISTORY:  Atrial fibrillation      Hypertension      Diabetes      Prostate ca      Dementia      CHF (congestive heart failure)      Hyperlipemia      Diabetes      Depression      Dementia      DM (diabetes mellitus)      Atrial fibrillation      Prostate CA      Arteriosclerotic heart disease (ASHD)      Dementia      Anemia      AICD (automatic cardioverter/defibrillator) present      Osteomyelitis of finger of left hand      Personal history of radiation therapy      H/O ongoing treatment with hormonal therapy      H/O bladder infections      Scrotal abscess      Urinary retention      Hematuria      H/O cystitis      Constipation      VHD (valvular heart disease)      Aortic valve stenosis      History of automatic internal cardiac defibrillator (AICD)          MEDICATIONS  (STANDING):  ascorbic acid 500 milliGRAM(s) Oral daily  cefTRIAXone   IVPB 2000 milliGRAM(s) IV Intermittent every 24 hours  clopidogrel Tablet 75 milliGRAM(s) Oral daily  dextrose 5%. 1000 milliLiter(s) (100 mL/Hr) IV Continuous <Continuous>  dextrose 5%. 1000 milliLiter(s) (50 mL/Hr) IV Continuous <Continuous>  dextrose 50% Injectable 25 Gram(s) IV Push once  dextrose 50% Injectable 12.5 Gram(s) IV Push once  dextrose 50% Injectable 25 Gram(s) IV Push once  donepezil 5 milliGRAM(s) Oral at bedtime  ferrous    sulfate 325 milliGRAM(s) Oral daily  glucagon  Injectable 1 milliGRAM(s) IntraMuscular once  heparin   Injectable 5000 Unit(s) SubCutaneous every 8 hours  insulin lispro (ADMELOG) corrective regimen sliding scale   SubCutaneous three times a day before meals  insulin lispro (ADMELOG) corrective regimen sliding scale   SubCutaneous at bedtime  lactobacillus acidophilus 1 Tablet(s) Oral two times a day  losartan 25 milliGRAM(s) Oral daily  metoprolol succinate ER 25 milliGRAM(s) Oral daily  multivitamin/minerals 1 Tablet(s) Oral daily  pantoprazole    Tablet 40 milliGRAM(s) Oral daily  polyethylene glycol 3350 17 Gram(s) Oral daily  potassium chloride    Tablet ER 10 milliEquivalent(s) Oral daily  senna 2 Tablet(s) Oral at bedtime  simvastatin 20 milliGRAM(s) Oral at bedtime  tamsulosin 0.4 milliGRAM(s) Oral at bedtime  torsemide 20 milliGRAM(s) Oral daily    MEDICATIONS  (PRN):  acetaminophen     Tablet .. 650 milliGRAM(s) Oral every 6 hours PRN Temp greater or equal to 38C (100.4F), Mild Pain (1 - 3)  aluminum hydroxide/magnesium hydroxide/simethicone Suspension 30 milliLiter(s) Oral every 4 hours PRN Dyspepsia  dextrose Oral Gel 15 Gram(s) Oral once PRN Blood Glucose LESS THAN 70 milliGRAM(s)/deciliter  melatonin 3 milliGRAM(s) Oral at bedtime PRN Insomnia  ondansetron Injectable 4 milliGRAM(s) IV Push every 8 hours PRN Nausea and/or Vomiting      OBJECTIVE    T(C): 36.7 (09-09-22 @ 13:08), Max: 36.8 (09-08-22 @ 20:51)  HR: 75 (09-09-22 @ 13:08) (60 - 76)  BP: 111/71 (09-09-22 @ 14:00) (101/52 - 111/71)  RR: 18 (09-09-22 @ 13:08) (18 - 18)  SpO2: 95% (09-09-22 @ 13:08) (95% - 95%)  Wt(kg): --  I&O's Summary    08 Sep 2022 07:01  -  09 Sep 2022 07:00  --------------------------------------------------------  IN: 170 mL / OUT: 800 mL / NET: -630 mL    09 Sep 2022 07:01  -  09 Sep 2022 15:03  --------------------------------------------------------  IN: 0 mL / OUT: 350 mL / NET: -350 mL          REVIEW OF SYSTEMS:  CONSTITUTIONAL: No fever, weight loss, or fatigue  EYES: No eye pain, visual disturbances, or discharge  ENMT:   No sinus or throat pain  NECK: No pain or stiffness  RESPIRATORY: No cough, wheezing, chills or hemoptysis; No shortness of breath  CARDIOVASCULAR: No chest pain, palpitations, dizziness, or leg swelling  GASTROINTESTINAL: No abdominal pain. No nausea, vomiting; No diarrhea or constipation. No melena or hematochezia.  GENITOURINARY: No dysuria, frequency, hematuria, or incontinence  NEUROLOGICAL: No headaches, memory loss, loss of strength, numbness, or tremors  SKIN: No itching, burning, rashes, or lesions   MUSCULOSKELETAL: No joint pain or swelling; No muscle, back, or extremity pain    PHYSICAL EXAM:  Appearance: NAD. VS past 24 hrs -as above   HEENT:   Moist oral mucosa. Conjunctiva clear b/l.   Neck : supple  Respiratory: Lungs CTAB.  Gastrointestinal:  Soft, nontender. No rebound. No rigidity. BS present	  Cardiovascular: RRR ,S1S2 present  Neurologic: Non-focal. Moving all extremities.  Extremities: No edema. No erythema. No calf tenderness.  Skin: No rashes, No ecchymoses, No cyanosis.	  wounds ,skin lesions-See skin assesment flow sheet   LABS:                        11.9   7.82  )-----------( 274      ( 08 Sep 2022 05:53 )             37.1     09-08    146<H>  |  107  |  36<H>  ----------------------------<  134<H>  4.3   |  34<H>  |  0.96    Ca    9.7      08 Sep 2022 05:53      CAPILLARY BLOOD GLUCOSE      POCT Blood Glucose.: 137 mg/dL (09 Sep 2022 11:47)  POCT Blood Glucose.: 142 mg/dL (09 Sep 2022 07:37)  POCT Blood Glucose.: 177 mg/dL (08 Sep 2022 21:25)  POCT Blood Glucose.: 145 mg/dL (08 Sep 2022 16:53)          Culture - Urine (collected 03 Sep 2022 07:49)  Source: Clean Catch Clean Catch (Midstream)  Final Report (04 Sep 2022 07:30):    No growth    Culture - Blood (collected 01 Sep 2022 22:45)  Source: .Blood Blood-Peripheral  Final Report (07 Sep 2022 05:00):    No Growth Final    Culture - Blood (collected 01 Sep 2022 22:35)  Source: .Blood Blood-Peripheral  Final Report (07 Sep 2022 05:00):    No Growth Final      RADIOLOGY & ADDITIONAL TESTS:   reviewed elctronically  ASSESSMENT/PLAN: 	  Patient was seen and examined on a day of discharge . Plan of care , discharge medications and recommendations discussed with consultants and clearance for discharge obtained .Social service , case management  and medical staff are aware of plan. Family is notified. Discharge summary  is  prepared electronically-see separate document prepared by me .75minutes spent on this visit, 50% visit time spent in care co-ordination with other attendings and counselling patient  I have discussed care plan with patient and HCP son Vinicio ,expressed understanding of problems treatment and their effect and side effects, alternatives in detail,I have asked if they have any questions and concerns and appropriately addressed them to best of my ability

## 2022-09-09 NOTE — PROGRESS NOTE ADULT - PROBLEM SELECTOR PLAN 7
Supportive care ,frequent redirection, continue home medications ,management of agitation as needed
Supportive care ,frequent redirection, continue home medications ,management of agitation as needed
Supportive care,frequent redirection,continue home medications,management of agitation as needed
Supportive care ,frequent redirection, continue home medications ,management of agitation as needed

## 2022-09-09 NOTE — DISCHARGE NOTE PROVIDER - CARE PROVIDER_API CALL
Ciao Lord (DPM)  Foot Surgery; Podiatric Medicine; Recon RearfootAnkle Surgery  [address] [place] [address]  Phone: [phone]  Fax: [phone]  Follow Up Time: 1 week    Peterson Carreon)  Internal Medicine  [address], [address]  [place] [address]  Phone: [phone]  Fax: [phone]  Follow Up Time: 1-3 days    Lawanda Eastman)  Vascular Surgery  [address] [place] [address]  Phone: [phone]  Fax: [phone]  Follow Up Time: 2 weeks

## 2022-09-09 NOTE — DISCHARGE NOTE NURSING/CASE MANAGEMENT/SOCIAL WORK - NSDCVIVACCINE_GEN_ALL_CORE_FT
pneumococcal polysaccharide PPV23; 18-Jun-2014 09:36; Lamar Gonzalez (RN); [id number]; IntraMuscular; Deltoid Right.; 0.5 milliLiter(s);   Tdap; 19-Jul-2018 13:12; Arleen Wallace (RN); [employer]; [id number]; IntraMuscular; Deltoid Left.; 0.5 milliLiter(s); VIS (VIS Published: 09-May-2013, VIS Presented: 19-Jul-2018);   Tdap; 18-Jan-2022 12:32; Bethanie Briseno (RN); [employer]; [id number] (Exp. Date: 09-Sep-2023); IntraMuscular; Deltoid Right.; 0.5 milliLiter(s); VIS (VIS Published: 09-May-2013, VIS Presented: 18-Jan-2022);

## 2022-09-09 NOTE — PROGRESS NOTE ADULT - SUBJECTIVE AND OBJECTIVE BOX
CARYLTERESA DRAKE is a 89yMale , patient examined and chart reviewed.     INTERVAL HPI/ OVERNIGHT EVENTS:   Afebrile. NAD.  Events noted- Per podiatry -Son now wants surgical intervention.    PAST MEDICAL & SURGICAL HISTORY:  Atrial fibrillation  Hypertension  Diabetes  Prostate ca  Dementia  CHF (congestive heart failure)  Hyperlipemia  Diabetes  Depression  Dementia  Prostate CA  Arteriosclerotic heart disease (ASHD)  Anemia  AICD (automatic cardioverter/defibrillator) present  Osteomyelitis of finger of left hand  Personal history of radiation therapy  H/O ongoing treatment with hormonal therapy  H/O bladder infections  Scrotal abscess  Urinary retention  Hematuria  H/O cystitis  Constipation  VHD (valvular heart disease)  Aortic valve stenosis  History of automatic internal cardiac defibrillator (AICD)        For details regarding the patient's social history, family history, and other miscellaneous elements, please refer the initial infectious diseases consultation and/or the admitting history and physical examination for this admission.    ROS:  Unable to obtain due to : Dementia      ALLERGIES  latex (Rash)      Current inpatient medications :    ANTIBIOTICS/RELEVANT:  cefTRIAXone   IVPB 2000 milliGRAM(s) IV Intermittent every 24 hours    MEDICATIONS  (STANDING):  ascorbic acid 500 milliGRAM(s) Oral daily  clopidogrel Tablet 75 milliGRAM(s) Oral daily  dextrose 5%. 1000 milliLiter(s) (100 mL/Hr) IV Continuous <Continuous>  dextrose 5%. 1000 milliLiter(s) (50 mL/Hr) IV Continuous <Continuous>  dextrose 50% Injectable 25 Gram(s) IV Push once  dextrose 50% Injectable 12.5 Gram(s) IV Push once  dextrose 50% Injectable 25 Gram(s) IV Push once  donepezil 5 milliGRAM(s) Oral at bedtime  ferrous    sulfate 325 milliGRAM(s) Oral daily  glucagon  Injectable 1 milliGRAM(s) IntraMuscular once  heparin   Injectable 5000 Unit(s) SubCutaneous every 8 hours  insulin lispro (ADMELOG) corrective regimen sliding scale   SubCutaneous three times a day before meals  insulin lispro (ADMELOG) corrective regimen sliding scale   SubCutaneous at bedtime  lactobacillus acidophilus 1 Tablet(s) Oral two times a day  losartan 25 milliGRAM(s) Oral daily  metoprolol succinate ER 25 milliGRAM(s) Oral daily  multivitamin/minerals 1 Tablet(s) Oral daily  pantoprazole    Tablet 40 milliGRAM(s) Oral daily  polyethylene glycol 3350 17 Gram(s) Oral daily  potassium chloride    Tablet ER 10 milliEquivalent(s) Oral daily  senna 2 Tablet(s) Oral at bedtime  simvastatin 20 milliGRAM(s) Oral at bedtime  tamsulosin 0.4 milliGRAM(s) Oral at bedtime  torsemide 20 milliGRAM(s) Oral daily    MEDICATIONS  (PRN):  acetaminophen     Tablet .. 650 milliGRAM(s) Oral every 6 hours PRN Temp greater or equal to 38C (100.4F), Mild Pain (1 - 3)  aluminum hydroxide/magnesium hydroxide/simethicone Suspension 30 milliLiter(s) Oral every 4 hours PRN Dyspepsia  dextrose Oral Gel 15 Gram(s) Oral once PRN Blood Glucose LESS THAN 70 milliGRAM(s)/deciliter  melatonin 3 milliGRAM(s) Oral at bedtime PRN Insomnia  ondansetron Injectable 4 milliGRAM(s) IV Push every 8 hours PRN Nausea and/or Vomiting        Objective:  Vital Signs Last 24 Hrs  T(C): 36.6 (09 Sep 2022 04:52), Max: 36.8 (08 Sep 2022 20:51)  T(F): 97.8 (09 Sep 2022 04:52), Max: 98.2 (08 Sep 2022 20:51)  HR: 76 (09 Sep 2022 08:16) (60 - 76)  BP: 101/59 (09 Sep 2022 08:16) (101/52 - 106/66)  RR: 18 (09 Sep 2022 04:52) (18 - 18)  SpO2: 95% (09 Sep 2022 04:52) (95% - 96%)    Parameters below as of 09 Sep 2022 04:52  Patient On (Oxygen Delivery Method): nasal cannula  O2 Flow (L/min): 2      Physical Exam:  General:  no acute distress  Neck: supple, trachea midline  Lungs: clear, no wheeze/rhonchi  Cardiovascular: regular rate and rhythm, S1 S2  Abdomen: soft, nontender,  bowel sounds normal  Neurological: Demetia  Skin: no rash  Extremities: Left 2nd toe nonhealing ulcer no drainage mild swelling  Chronic venous insufficiency        LABS:                        11.9   7.82  )-----------( 274      ( 08 Sep 2022 05:53 )             37.1   09-08    146<H>  |  107  |  36<H>  ----------------------------<  134<H>  4.3   |  34<H>  |  0.96    Ca    9.7      08 Sep 2022 05:53      MICROBIOLOGY:  Culture - Blood (09.01.22 @ 22:45)    Specimen Source: .Blood Blood-Peripheral    Culture Results:   No Growth Final    Culture - Urine (collected 03 Sep 2022 07:49)  Source: Clean Catch Clean Catch (Midstream)  Final Report (04 Sep 2022 07:30):    No growth    RADIOLOGY & ADDITIONAL STUDIES:    ACC: [id number] EXAM:  XR FOOT COMP MIN 3 VIEWS LT                          PROCEDURE DATE:  09/01/2022          INTERPRETATION:  HISTORY:  Second digit wound    TECHNIQUE:  AP, oblique and lateral views of the left foot were obtained.    FINDINGS:  There is no definite evidence of acute fracture. Vascular   calcifications are noted. There are large dorsal and plantar calcaneal   spurs. There is mild soft tissue swelling of the forefoot. There are no   definite destructive lesions or periosteal reaction seen to suggest   osteomyelitis. Mild midfoot arthropathy is best seen on the lateral film.    IMPRESSION:  Soft tissue swelling. No definite evidence of osteomyelitis.   If concern for osteomyelitis persists, an MRI could be obtained.    ACC: [id number] EXAM:  XR CHEST PORTABLE IMMED 1V                          PROCEDURE DATE:  09/01/2022          INTERPRETATION:  TIME OF EXAM: September 1, 2022 at 9:29 PM.    CLINICAL INFORMATION: Sepsis.    COMPARISON:  June 30, 2022.    TECHNIQUE:  AP Portable chest x-ray. The head and chin obscures the   superior mediastinum and left apex. Rotated.    INTERPRETATION:    Heart size and the mediastinum cannot be accurately evaluated on this   projection. Left chest wall cardiac pacemaker with intact lead with tip   in expected region of the right ventricle.  Low lung volumes.  There is mild pulmonary vascular redistribution/congestion.  No focal lung consolidation seen in the visualized lungs.  No pleural effusion is seen.  No evidence of pneumothorax of the left apex is obscured and not   evaluated.  Right upper quadrant abdominal calcification and abdominal surgical clips   noted.      IMPRESSION:  Limited image. Low lung volumes. There is mild pulmonary   vascular redistribution/congestion.    Part of left apex obscured by head and chin. No focal lung consolidation   seen in the visualized lungs.    ACC: [id number] EXAM:  NM MULTI DAY PROCEDURE                        ACC: [id number] EXAM:  NM INFLAMM LOC WBC SA SD                          PROCEDURE DATE:  09/07/2022          INTERPRETATION:  RADIOPHARMACEUTICAL: 11.2 millicuries Tc-99m labeled   autologous leukocytes, IV.    CLINICAL INFORMATION: 89 year old man with nonhealing ulcer of the LEFT   second toe; referred to evaluate for infection.    TECHNIQUE: Radiolabeled autologous leukocytes were injected on 9/6/2022.   Approximately 24 hourslater static images in multiple projections were   obtained of the feet.    COMPARISON: No prior scintigraphy available.    CORRELATION: 3 view radiography LEFT foot 9/1/2022 (soft tissue swelling   without radiographic evidence of osteomyelitis).    FINDINGS: Increased labeled white cell accumulation is seen in the region   of the distal LEFT second metatarsal/MTP joint. Physiologic distribution   is seen elsewhere.    IMPRESSION: AbnormalTc-99m labeled leukocyte scan. Findings consistent   with osteomyelitis.      Assessment :  89YO M PMH Dementia, HL, atrial fibrillation, SDH, Ca Prostate DM CHF UTI resident of [facility] admitted with left foot 2nd toe nonhealing diabetic ulcer  WBC scan +osteo.   Afebrile. WBC wnl.  Vascular eval  MIDLINE placed  Podiatry follow up noted- son now consented to surgical intervention    Plan :   Cont Rocephin 2 grams IV daily for now  MIDLINE placed  Podiatry following  Trend temps and cbc  Asp precautions  Cont local wound care  Stable from ID standpoint    D/w Podiatry       Continue with present regiment.  Appropriate use of antibiotics and adverse effects reviewed.    > 35 minutes were spent in direct patient care reviewing notes, medications ,labs data/ imaging , discussion with multidisciplinary team.    Thank you for allowing me to participate in care of your patient .    Heber Issa MD  Infectious Disease  [phone]

## 2022-09-09 NOTE — PROGRESS NOTE ADULT - SUBJECTIVE AND OBJECTIVE BOX
89y year old Male seen at [facility] 1EAS 113 D1 for 2nd digit wound , left foot. Patient is not oriented, is laying in bed comfortably and is awake. Patient does not respond to any questions asked.     Allergies    latex (Rash)  latex (Unknown)  No Known Drug Allergies    Intolerances        MEDICATIONS  (STANDING):  ascorbic acid 500 milliGRAM(s) Oral daily  clopidogrel Tablet 75 milliGRAM(s) Oral daily  dextrose 5%. 1000 milliLiter(s) (50 mL/Hr) IV Continuous <Continuous>  dextrose 5%. 1000 milliLiter(s) (100 mL/Hr) IV Continuous <Continuous>  dextrose 50% Injectable 25 Gram(s) IV Push once  dextrose 50% Injectable 12.5 Gram(s) IV Push once  dextrose 50% Injectable 25 Gram(s) IV Push once  donepezil 5 milliGRAM(s) Oral at bedtime  ferrous    sulfate 325 milliGRAM(s) Oral daily  glucagon  Injectable 1 milliGRAM(s) IntraMuscular once  heparin   Injectable 5000 Unit(s) SubCutaneous every 8 hours  insulin lispro (ADMELOG) corrective regimen sliding scale   SubCutaneous three times a day before meals  insulin lispro (ADMELOG) corrective regimen sliding scale   SubCutaneous at bedtime  lactobacillus acidophilus 1 Tablet(s) Oral two times a day  losartan 25 milliGRAM(s) Oral daily  metoprolol succinate ER 25 milliGRAM(s) Oral daily  multivitamin/minerals 1 Tablet(s) Oral daily  pantoprazole    Tablet 40 milliGRAM(s) Oral daily  polyethylene glycol 3350 17 Gram(s) Oral daily  potassium chloride    Tablet ER 10 milliEquivalent(s) Oral daily  senna 2 Tablet(s) Oral at bedtime  simvastatin 20 milliGRAM(s) Oral at bedtime  tamsulosin 0.4 milliGRAM(s) Oral at bedtime  torsemide 20 milliGRAM(s) Oral daily    MEDICATIONS  (PRN):  acetaminophen     Tablet .. 650 milliGRAM(s) Oral every 6 hours PRN Temp greater or equal to 38C (100.4F), Mild Pain (1 - 3)  aluminum hydroxide/magnesium hydroxide/simethicone Suspension 30 milliLiter(s) Oral every 4 hours PRN Dyspepsia  dextrose Oral Gel 15 Gram(s) Oral once PRN Blood Glucose LESS THAN 70 milliGRAM(s)/deciliter  melatonin 3 milliGRAM(s) Oral at bedtime PRN Insomnia  ondansetron Injectable 4 milliGRAM(s) IV Push every 8 hours PRN Nausea and/or Vomiting      Vital Signs Last 24 Hrs  T(C): 36.7 (09 Sep 2022 13:08), Max: 36.8 (08 Sep 2022 20:51)  T(F): 98 (09 Sep 2022 13:08), Max: 98.2 (08 Sep 2022 20:51)  HR: 75 (09 Sep 2022 13:08) (60 - 76)  BP: 111/71 (09 Sep 2022 14:00) (101/52 - 111/71)  BP(mean): --  RR: 18 (09 Sep 2022 13:08) (18 - 18)  SpO2: 95% (09 Sep 2022 13:08) (95% - 95%)    Parameters below as of 09 Sep 2022 13:08  Patient On (Oxygen Delivery Method): nasal cannula            PHYSICAL EXAM:  Vascular: DP & PT non palpable bilaterally, Capillary refill 6 seconds  Neurological: couldn't  assess   Musculoskeletal: couldn't assess  Dermatological: 1.1x 0.3 x0.2 cm ulceration noted to the left, 2nd digit, at the proximal interphalangeal joint with head of the proximal phalanx exposed,  probe to bone, mild periwound erythema, no fluctuance, no malodor, no proximal streaking at this time                          11.9   7.82  )-----------( 274      ( 08 Sep 2022 05:53 )             37.1       09-08    146<H>  |  107  |  36<H>  ----------------------------<  134<H>  4.3   |  34<H>  |  0.96    Ca    9.7      08 Sep 2022 05:53                    Imaging: \  < from: NM Multiple Day Procedure (09.07.22 @ 11:59) >    ACC: [id number] EXAM:  NM MULTI DAY PROCEDURE                        ACC: [id number] EXAM:  NM INFLAMM LOC WBC SA SD                          PROCEDURE DATE:  09/07/2022          INTERPRETATION:  RADIOPHARMACEUTICAL: 11.2 millicuries Tc-99m labeled   autologous leukocytes, IV.    CLINICAL INFORMATION: 89 year old man with nonhealing ulcer of the LEFT   second toe; referred to evaluate for infection.    TECHNIQUE: Radiolabeled autologous leukocytes were injected on 9/6/2022.   Approximately 24 hourslater static images in multiple projections were   obtained of the feet.    COMPARISON: No prior scintigraphy available.    CORRELATION: 3 view radiography LEFT foot 9/1/2022 (soft tissue swelling   without radiographic evidence of osteomyelitis).    FINDINGS: Increased labeled white cell accumulation is seen in the region   of the distal LEFT second metatarsal/MTP joint. Physiologic distribution   is seen elsewhere.    IMPRESSION: AbnormalTc-99m labeled leukocyte scan. Findings consistent   withosteomyelitis.    --- End of Report ---            HIRAM DE GUZMAN MD; Attending Radiologist  This document ha    < end of copied text >

## 2022-09-09 NOTE — DISCHARGE NOTE NURSING/CASE MANAGEMENT/SOCIAL WORK - PATIENT PORTAL LINK FT
You can access the FollowMyHealth Patient Portal offered by [facility] by registering at the following website: [email or web address]. By joining [facility]’s FollowMyHealth portal, you will also be able to view your health information using other applications (apps) compatible with our system.

## 2022-09-09 NOTE — DISCHARGE NOTE PROVIDER - HOSPITAL COURSE
Patient  is a 89yo male with pmhx of Dementia, DM, CHF, HTN, A-FIB NOT ON AC, HEMATURIA, HLD, OSTEO, PROSTATE CANCER, BIB EMS FROM NH presents with possible osteomyelitis. pt unable to give information due to dementia. pt with left foot 2nd digit diabetic ulcer sent in to r/o osteo.    Nutritional Assessment:  · Nutritional Assessment  This patient has been assessed with a concern for Malnutrition and has been determined to have a diagnosis/diagnoses of Moderate protein-calorie malnutrition.    This patient is being managed with:   Diet Consistent Carbohydrate w/Evening Snack-  Minced and Moist (MINCEDMOIST)  Supplement Feeding Modality:  Oral  Glucerna Shake Cans or Servings Per Day:  1       Frequency:  Two Times a day  Entered: Sep  2 2022 11:09AM    Problem/Plan - 1:  ·  Problem: Diabetic ulcer of toe of left foot.   ·  Plan: Seen  by podiatry and ID -Hold antibiotic for now  Bone scan - AbnormalTc-99m labeled leukocyte scan. Findings consistent   with osteomyelitis.  Podiatry f/up requested    IV abx per ID ,spoke to the son and made him aware patient will require long term abx   - vasc consult.    Problem/Plan - 2:  ·  Problem: CHF (congestive heart failure).   ·  Plan: continue home medications ,seen by cardiologist.    Problem/Plan - 3:  ·  Problem: Afib.   ·  Plan: continue home medications.    Problem/Plan - 4:  ·  Problem: VHD (valvular heart disease).   ·  Plan: as per cardiologist.    Problem/Plan - 5:  ·  Problem: HTN (hypertension).   ·  Plan: BP monitoring.    Problem/Plan - 6:  ·  Problem: DM (diabetes mellitus).   ·  Plan: Accu-Cheks monitoring and insulin corrective regimen  sliding scale coverage with short acting insulin, add long-acting insulin as needed ,no concentrated sweets diet, serial labs ,HbA1C,education.    Problem/Plan - 7:  ·  Problem: Dementia.   ·  Plan: Supportive care ,frequent redirection, continue home medications ,management of agitation as needed.    Problem/Plan - 8:  ·  Problem: Prophylactic measure.   ·  Plan: Gastrointestinal stress ulcer prophylaxis and DVT prophylaxis administered.

## 2022-09-09 NOTE — DISCHARGE NOTE PROVIDER - NSDCCAREPROVSEEN_GEN_ALL_CORE_FT
Ebony, Leanne Issa, Heber Pan, Lawanda Michaud, Ole Logna, Asim Amanda, Jeff Vincent, Luci Carmona, Emy Lord, Caio Paul, Doug Ring, Lisa Parham, Brie Ruiz, Daniel Weil, [place]

## 2022-09-09 NOTE — PROGRESS NOTE ADULT - PROBLEM SELECTOR PLAN 6
Accuchecks monitoring and insulin corrective regimen  sliding scale coverage with short acting inslulin, add longacting insulin as needed ,no concentrated sweets diet, serial labs ,HbA1C,education
Accu-Cheks monitoring and insulin corrective regimen  sliding scale coverage with short acting insulin, add long-acting insulin as needed ,no concentrated sweets diet, serial labs ,HbA1C,education
Accuchecks monitoring and insulin corrective regimen  sliding scale coverage with short acting inslulin, add longacting insulin as needed ,no concentrated sweets diet, serial labs ,HbA1C,education
Accu-Cheks monitoring and insulin corrective regimen  sliding scale coverage with short acting insulin, add long-acting insulin as needed ,no concentrated sweets diet, serial labs ,HbA1C,education
Accu-Cheks monitoring and insulin corrective regimen  sliding scale coverage with short acting insulin, add long-acting insulin as needed ,no concentrated sweets diet, serial labs ,HbA1C,education

## 2022-09-09 NOTE — PROGRESS NOTE ADULT - NUTRITIONAL ASSESSMENT
This patient has been assessed with a concern for Malnutrition and has been determined to have a diagnosis/diagnoses of Moderate protein-calorie malnutrition.    This patient is being managed with:   Diet Consistent Carbohydrate w/Evening Snack-  Minced and Moist (MINCEDMOIST)  Supplement Feeding Modality:  Oral  Glucerna Shake Cans or Servings Per Day:  1       Frequency:  Two Times a day  Entered: Sep  2 2022 11:09AM    

## 2022-09-09 NOTE — PROGRESS NOTE ADULT - PROBLEM SELECTOR PROBLEM 4
VHD (valvular heart disease)

## 2022-09-09 NOTE — DISCHARGE NOTE PROVIDER - PROVIDER TOKENS
PROVIDER:[TOKEN:[[id number]],FOLLOWUP:[1 week]],PROVIDER:[TOKEN:[[id number]],FOLLOWUP:[1-3 days]],PROVIDER:[TOKEN:[[id number]],FOLLOWUP:[2 weeks]]

## 2022-09-09 NOTE — PROGRESS NOTE ADULT - REASON FOR ADMISSION
left foot 2nd digit diabetic ulcer sent in to r/o osteo.

## 2022-09-09 NOTE — PROGRESS NOTE ADULT - ASSESSMENT
89 year old male w/ extensive pmhx a/w LLE cellulitis found to have evidence of osteomyelitis on imaging.  Vascular surgery consulted for evaluation of LLE wound.      Plan:  - No evidence or signs of sepsis. Wound continue conservative management with local wound care and antibiotic  - Not a vascular surgical candidate with advanced age, advanced dementia and contracted state  - Wound care per Podiatry  - Vascular surgery will sign off  - Seen and discussed with Dr. Oro   
Assessment: Left foot osteomyelitis
89YO M PMH Dementia, HL, atrial fibrillation, SDH, Ca Prostate DM CHF UTI resident of [facility] admitted with left foot 2nd toe nonhealing diabetic ulcer r/o osteo.   Afebrile. WBC wnl.  ESR 43.    RECOMMENDATIONS  Hold antibiotic for now  Fu cultures  Fu foot XR  Bone scan as pt unable to get MRI ( has AICD )  Podiatry eval     Thank you for consulting us and involving us in the management of this most interesting and challenging case.  We will follow along in the care of this patient. Please call us at [phone] or text me directly on my cell# at [phone] with any concerns.  
89YO M PMH Dementia, HL, atrial fibrillation, SDH, Ca Prostate DM CHF UTI resident of [facility] NH admitted with left foot 2nd toe nonhealing diabetic ulcer r/o osteo.   Afebrile. WBC wnl.  ESR 43.    RECOMMENDATIONS  Hold antibiotic for now  Fu cultures  Fu foot XR  Bone scan as pt unable to get MRI ( has AICD )  Podiatry eval     Thank you for consulting us and involving us in the management of this most interesting and challenging case.  We will follow along in the care of this patient. Please call us at [phone] or text me directly on my cell# at [phone] with any concerns.  
  Assessment and Recommendation:   Problem/Recommendation - 1:  ·  Problem: Diabetic ulcer of toe of left foot.   ·  Recommendation: Patient was seen and evaluated  labs reviewed WBC 8.5  recommend left foot MRI to r/o OM  Wound cleaned with NS and dressed with bandaid  Start IV abx per ID  Recommend vasc consult   podiatry to follow.    Problem/Recommendation - 2:  ·  Problem: Dementia.   ·  Recommendation: management per primary team.    Problem/Recommendation - 3:  ·  Problem: Afib.   ·  Recommendation: management per primary team.
Patient  is a 89yo male with pmhx of Dementia, DM, CHF, HTN, A-FIB NOT ON AC, HEMATURIA, HLD, OSTEO, PROSTATE CANCER, BIB EMS FROM NH presents with possible osteomyelitis. pt unable to give information due to dementia. pt with left foot 2nd digit diabetic ulcer sent in to r/o osteo.
89 year old male w/ extensive pmhx a/w LLE cellulitis found to have evidence of osteomyelitis on imaging.  Vascular surgery consulted for evaluation of LLE wound.      Plan:  - Recommend WILLIAM/PVR to assess PAD  - Wound care per Podiatry  - Vascular surgery will continue to follow  - Discussed with Dr. Oro   
Patient  is a 89yo male with pmhx of Dementia, DM, CHF, HTN, A-FIB NOT ON AC, HEMATURIA, HLD, OSTEO, PROSTATE CANCER, BIB EMS FROM NH presents with possible osteomyelitis. pt unable to give information due to dementia. pt with left foot 2nd digit diabetic ulcer sent in to r/o osteo.
Patient  is a 89yo male with pmhx of Dementia, DM, CHF, HTN, A-FIB NOT ON AC, HEMATURIA, HLD, OSTEO, PROSTATE CANCER, BIB EMS FROM NH presents with possible osteomyelitis. pt unable to give information due to dementia. pt with left foot 2nd digit diabetic ulcer sent in to r/o osteo.
Patient  is a 89yo male with pmhx of Dementia, DM, CHF, HTN, A-FIB NOT ON AC, HEMATURIA, HLD, OSTEO, PROSTATE CANCER, BIB EMS FROM NH presents with possible osteomyelitis. pt unable to give information due to dementia. pt with left foot 2nd digit diabetic ulcer sent in to r/o osteo.

## 2022-09-09 NOTE — DISCHARGE NOTE PROVIDER - NSDCMRMEDTOKEN_GEN_ALL_CORE_FT
acetaminophen 325 mg oral tablet: 2 tab(s) orally every 6 hours, As needed, Temp greater or equal to 38C (100.4F), Mild Pain (1 - 3)  ascorbic acid 500 mg oral tablet: 1 tab(s) orally 2 times a day  clopidogrel 75 mg oral tablet: 1 tab(s) orally once a day  donepezil 5 mg oral tablet: 1 tab(s) orally once a day  ferrous sulfate 325 mg (65 mg elemental iron) oral delayed release tablet: 1 tab(s) orally once a day  furosemide 20 mg oral tablet: 1 tab(s) orally once a day  losartan 25 mg oral tablet: 1 tab(s) orally once a day  metoprolol tartrate 25 mg oral tablet: 0.5 tab(s) orally 2 times a day   MiraLax oral powder for reconstitution: 17 gram(s) orally once a day  Multiple Vitamins with Minerals oral tablet: 1 tab(s) orally once a day  pantoprazole 40 mg oral delayed release tablet: 1 tab(s) orally once a day  Senna 8.6 mg oral tablet: 2 tab(s) orally once a day (at bedtime)  simvastatin 20 mg oral tablet: 1 tab(s) orally once a day (at bedtime)  tamsulosin 0.4 mg oral capsule: 1 cap(s) orally daily at bedtime    acetaminophen 325 mg oral tablet: 2 tab(s) orally every 6 hours, As needed, Temp greater or equal to 38C (100.4F), Mild Pain (1 - 3)  ascorbic acid 500 mg oral tablet: 1 tab(s) orally 2 times a day  clopidogrel 75 mg oral tablet: 1 tab(s) orally once a day  donepezil 5 mg oral tablet: 1 tab(s) orally once a day (at bedtime)  ferrous sulfate 325 mg (65 mg elemental iron) oral tablet: 1 tab(s) orally once a day  furosemide 20 mg oral tablet: 1 tab(s) orally once a day  heparin: 5000 unit(s) subcutaneous 2 times a day  lactobacillus acidophilus oral tablet: 2 tab(s) orally once a day  losartan 25 mg oral tablet: 1 tab(s) orally once a day  metoprolol succinate 25 mg oral tablet, extended release: 1 tab(s) orally once a day  Multiple Vitamins with Minerals oral tablet: 1 tab(s) orally once a day  pantoprazole 40 mg oral delayed release tablet: 1 tab(s) orally once a day  polyethylene glycol 3350 oral powder for reconstitution: 17 gram(s) orally once a day  potassium chloride 10 mEq oral tablet, extended release: 1 tab(s) orally once a day  senna leaf extract oral tablet: 2 tab(s) orally once a day (at bedtime)  simvastatin 20 mg oral tablet: 1 tab(s) orally once a day (at bedtime)  tamsulosin 0.4 mg oral capsule: 1 cap(s) orally once a day (at bedtime)  torsemide 20 mg oral tablet: 1 tab(s) orally once a day   acetaminophen 325 mg oral tablet: 2 tab(s) orally every 6 hours, As needed, Temp greater or equal to 38C (100.4F), Mild Pain (1 - 3)  ascorbic acid 500 mg oral tablet: 1 tab(s) orally 2 times a day  cefTRIAXone: 2000 milligram(s) intravenous once a day x 28 days   clopidogrel 75 mg oral tablet: 1 tab(s) orally once a day  donepezil 5 mg oral tablet: 1 tab(s) orally once a day (at bedtime)  ferrous sulfate 325 mg (65 mg elemental iron) oral tablet: 1 tab(s) orally once a day  heparin: 5000 unit(s) subcutaneous 2 times a day  insulin lispro 100 units/mL injectable solution: injectable 3 times a day (before meals)  lactobacillus acidophilus oral tablet: 2 tab(s) orally once a day  losartan 25 mg oral tablet: 1 tab(s) orally once a day  metoprolol succinate 25 mg oral tablet, extended release: 1 tab(s) orally once a day  Multiple Vitamins with Minerals oral tablet: 1 tab(s) orally once a day  pantoprazole 40 mg oral delayed release tablet: 1 tab(s) orally once a day  polyethylene glycol 3350 oral powder for reconstitution: 17 gram(s) orally once a day  potassium chloride 10 mEq oral tablet, extended release: 1 tab(s) orally once a day  senna leaf extract oral tablet: 2 tab(s) orally once a day (at bedtime)  simvastatin 20 mg oral tablet: 1 tab(s) orally once a day (at bedtime)  tamsulosin 0.4 mg oral capsule: 1 cap(s) orally once a day (at bedtime)  torsemide 20 mg oral tablet: 1 tab(s) orally once a day

## 2022-09-09 NOTE — PROGRESS NOTE ADULT - PROBLEM SELECTOR PLAN 1
- Patient was seen and evaluated  - left foot bone scan shows  OM  - Wound cleaned with NS and dressed with band aid  c/w IV abx per ID  - Vascular recs appreciated : No evidence or signs of sepsis. Wound continue conservative management with local   wound care and antibiotic. Not a vascular surgical candidate with advanced age, advanced dementia and  contracted state  - In the  light of vascular recommendation, Discussed with patient's son regarding the patient's treatment  options  of  conservative treatement with long term IV antibiotics vs surgical intervention including bone biopsy vs amputation of the digit and resection of metatarsal head .  explained in details the risk and the benefit of both options. Patient's son agreed for conservative treatment and monitoring of the wound at this time, if any signs of infection of the left foot , patient will likely need surgical intervention .Patient's son understood the risks associated with conservative treatment and  patient being at high risk for more proximal amputations, sepsis, loss of limb and loss of life.

## 2022-09-09 NOTE — OCCUPATIONAL THERAPY INITIAL EVALUATION ADULT - ADL RETRAINING, OT EVAL
Pt will perform UB dressing with Min A within 2-3 sessions Photo Preface (Leave Blank If You Do Not Want): Photographs were obtained today Detail Level: Zone

## 2022-09-09 NOTE — DISCHARGE NOTE NURSING/CASE MANAGEMENT/SOCIAL WORK - NSDCPEFALRISK_GEN_ALL_CORE
For information on Fall & Injury Prevention, visit: [email or web address] OR  [email or web address] OR  https://www.cdc.gov/steadi/patient.html

## 2022-09-09 NOTE — PROGRESS NOTE ADULT - PROBLEM SELECTOR PROBLEM 2
Dementia
CHF (congestive heart failure)

## 2022-09-09 NOTE — DISCHARGE NOTE PROVIDER - NSDCCPCAREPLAN_GEN_ALL_CORE_FT
PRINCIPAL DISCHARGE DIAGNOSIS  Diagnosis: Acute osteomyelitis  Assessment and Plan of Treatment:       SECONDARY DISCHARGE DIAGNOSES  Diagnosis: Diabetic toe ulcer  Assessment and Plan of Treatment:     Diagnosis: Afib  Assessment and Plan of Treatment:     Diagnosis: VHD (valvular heart disease)  Assessment and Plan of Treatment:     Diagnosis: HTN (hypertension)  Assessment and Plan of Treatment:     Diagnosis: DM (diabetes mellitus)  Assessment and Plan of Treatment:     Diagnosis: Dementia  Assessment and Plan of Treatment:     Diagnosis: CHF (congestive heart failure)  Assessment and Plan of Treatment:

## 2022-09-09 NOTE — PROGRESS NOTE ADULT - PROBLEM SELECTOR PLAN 2
Management per primary team
continue home medications ,seen by cardiologist
continue home medications ,seen by cardiologist
Management per primary team
continue home medications ,seen by cardiologist
Management per primary team
continue home medications ,seen by cardiologist
continue home medications ,seen by cardiologist

## 2022-09-09 NOTE — OCCUPATIONAL THERAPY INITIAL EVALUATION ADULT - RANGE OF MOTION EXAMINATION, UPPER EXTREMITY
L hand flexion contracture, shld/elbow/forearm/wrist WFL/bilateral UE Active Assistive ROM was WNL (within normal limits)

## 2022-09-09 NOTE — PROGRESS NOTE ADULT - PROVIDER SPECIALTY LIST ADULT
Infectious Disease
Podiatry
Cardiology
Infectious Disease
Vascular Surgery
Hospitalist
Podiatry
Vascular Surgery
Podiatry
Hospitalist
Podiatry
Hospitalist
Hospitalist
Internal Medicine
Hospitalist
Hospitalist

## 2022-09-22 PROBLEM — Z00.00 ENCOUNTER FOR PREVENTIVE HEALTH EXAMINATION: Status: ACTIVE | Noted: 2022-09-22

## 2022-10-10 NOTE — ED PROVIDER NOTE - DATE/TIME 1
Problem: Discharge Planning  Goal: Discharge to home or other facility with appropriate resources  Outcome: Progressing  Flowsheets (Taken 10/9/2022 2030 by Carmen Casey, RN)  Discharge to home or other facility with appropriate resources: Identify barriers to discharge with patient and caregiver     Problem: Pain  Goal: Verbalizes/displays adequate comfort level or baseline comfort level  Outcome: Progressing     Problem: Safety - Adult  Goal: Free from fall injury  Outcome: Progressing  Flowsheets (Taken 10/9/2022 2030 by Carmen Casey, RN)  Free From Fall Injury: Instruct family/caregiver on patient safety     Problem: ABCDS Injury Assessment  Goal: Absence of physical injury  Outcome: Progressing  Flowsheets (Taken 10/9/2022 2030 by Carmen Casey, RN)  Absence of Physical Injury: Implement safety measures based on patient assessment 19-Jul-2018 14:59

## 2022-10-12 ENCOUNTER — APPOINTMENT (OUTPATIENT)
Dept: VASCULAR SURGERY | Facility: CLINIC | Age: 87
End: 2022-10-12

## 2022-10-12 VITALS
OXYGEN SATURATION: 98 % | SYSTOLIC BLOOD PRESSURE: 101 MMHG | HEART RATE: 72 BPM | BODY MASS INDEX: 18.18 KG/M2 | WEIGHT: 127 LBS | HEIGHT: 70 IN | DIASTOLIC BLOOD PRESSURE: 63 MMHG

## 2022-10-12 PROCEDURE — 99024 POSTOP FOLLOW-UP VISIT: CPT

## 2022-10-12 NOTE — PHYSICAL EXAM
[Normal Breath Sounds] : Normal breath sounds [Normal Heart Sounds] : normal heart sounds [Ankle Swelling (On Exam)] : present [Ankle Swelling On The Left] : moderate [Alert] : alert [Calm] : calm [JVD] : no jugular venous distention  [de-identified] : Elderly M in NAD [FreeTextEntry1] : L hallus with dry eschar; L second digit with wet gangrene and exposed bone. + periwound edema.Blanchable cellulitis to midfoot.\par Nonpalp pulses R and L DP/PT

## 2022-10-12 NOTE — HISTORY OF PRESENT ILLNESS
[FreeTextEntry1] : 89yo male with pmhx of Dementia, DM, CHF, HTN, A-FIB NOT ON AC,\par HEMATURIA, HLD, OSTEO, PROSTATE CANCER, with recent hospitalization at [facility] for possible\par osteomyelitis of left foot. Vascular surgery was consulted at that time and pt was unable to complete some of the vascular studies due to contractures and rigidity. It was determined by medicine and family that pt would be treated conservatively. He had a PICC line placed and was being treated with IV antibiotics and returned to the Nursing Home.\par pt unable to give information due to dementia.  [de-identified] : He presents today with aide from nursing facility. Unable to obtain any reliable history. Medications reviewed and no antibiotic noted.

## 2022-10-12 NOTE — ED ADULT NURSE NOTE - NURSING MUSC LEG STRENGTH RIGHT
Uncontrolled, changes made today: Start Protonix 40 mg tablets. Take first in the morning on empty stomach with sips of water. Wait 45 minutes before eating or drinking. and lifestyle modifications recommended controlled diet small frequent meals, low acid foods. Stop smoking as this will help tremendously. flexion weak

## 2022-10-12 NOTE — ASSESSMENT
[FreeTextEntry1] : 89 year old male w/ extensive pmhx a/w LLE cellulitis found to have evidence of\par osteomyelitis on imaging. Vascular surgery consulted for evaluation of LLE\par wound while hospitalized\par Conservative therapy was decided by family due to advanced age and comorbidities.\par \par He presents with wet gangrene and infection\par Discussed findings and concerns with son, Vinicio Mcgarry, and he did not want the patient sent to the hospital. He would like continued conservative therapy with dressing changes and IV antibiotics.\par Risk of sepsis and death discussed and son informed that foot is not salvagable. He is comfortable with continued conservative management\par

## 2022-11-22 NOTE — PATIENT PROFILE ADULT - FUNCTIONAL ASSESSMENT - BASIC MOBILITY 1.
2 = A lot of assistance Erivedge Counseling- I discussed with the patient the risks of Erivedge including but not limited to nausea, vomiting, diarrhea, constipation, weight loss, changes in the sense of taste, decreased appetite, muscle spasms, and hair loss.  The patient verbalized understanding of the proper use and possible adverse effects of Erivedge.  All of the patient's questions and concerns were addressed.
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
## 2022-12-14 RX ORDER — FERROUS SULFATE 325(65) MG
1 TABLET ORAL
Qty: 30 | Refills: 0
Start: 2022-12-14 | End: 2023-01-12

## 2022-12-14 RX ORDER — MULTIVIT-MIN/FERROUS GLUCONATE 9 MG/15 ML
1 LIQUID (ML) ORAL
Qty: 30 | Refills: 0
Start: 2022-12-14 | End: 2023-01-12

## 2022-12-14 RX ORDER — POLYETHYLENE GLYCOL 3350 17 G/17G
17 POWDER, FOR SOLUTION ORAL
Qty: 510 | Refills: 0
Start: 2022-12-14 | End: 2023-01-12

## 2022-12-14 RX ORDER — LOSARTAN POTASSIUM 100 MG/1
1 TABLET, FILM COATED ORAL
Qty: 30 | Refills: 0
Start: 2022-12-14 | End: 2023-01-12

## 2022-12-14 RX ORDER — ACETAMINOPHEN 500 MG
2 TABLET ORAL
Qty: 240 | Refills: 0
Start: 2022-12-14 | End: 2023-01-12

## 2022-12-14 RX ORDER — FAMOTIDINE 10 MG/ML
1 INJECTION INTRAVENOUS
Qty: 30 | Refills: 0
Start: 2022-12-14 | End: 2023-01-12

## 2022-12-14 RX ORDER — TAMSULOSIN HYDROCHLORIDE 0.4 MG/1
1 CAPSULE ORAL
Qty: 30 | Refills: 0
Start: 2022-12-14 | End: 2023-01-12

## 2022-12-14 RX ORDER — COLLAGENASE CLOSTRIDIUM HIST. 250 UNIT/G
1 OINTMENT (GRAM) TOPICAL
Qty: 1 | Refills: 0
Start: 2022-12-14

## 2022-12-14 RX ORDER — LACTOBACILLUS ACIDOPHILUS 100MM CELL
2 CAPSULE ORAL
Qty: 60 | Refills: 0
Start: 2022-12-14 | End: 2023-01-12

## 2022-12-14 RX ORDER — SIMVASTATIN 20 MG/1
1 TABLET, FILM COATED ORAL
Qty: 30 | Refills: 0
Start: 2022-12-14 | End: 2023-01-12

## 2022-12-14 RX ORDER — ASCORBIC ACID 60 MG
1 TABLET,CHEWABLE ORAL
Qty: 60 | Refills: 0
Start: 2022-12-14 | End: 2023-01-12

## 2022-12-14 RX ORDER — POTASSIUM CHLORIDE 20 MEQ
1 PACKET (EA) ORAL
Qty: 30 | Refills: 0
Start: 2022-12-14 | End: 2023-01-12

## 2022-12-14 RX ORDER — DONEPEZIL HYDROCHLORIDE 10 MG/1
1 TABLET, FILM COATED ORAL
Qty: 30 | Refills: 0
Start: 2022-12-14 | End: 2023-01-12

## 2022-12-14 RX ORDER — SENNA PLUS 8.6 MG/1
2 TABLET ORAL
Qty: 60 | Refills: 0
Start: 2022-12-14 | End: 2023-01-12

## 2022-12-14 RX ORDER — CLOPIDOGREL BISULFATE 75 MG/1
1 TABLET, FILM COATED ORAL
Qty: 30 | Refills: 0
Start: 2022-12-14 | End: 2023-01-12

## 2022-12-14 RX ORDER — METOPROLOL TARTRATE 50 MG
1 TABLET ORAL
Qty: 30 | Refills: 0
Start: 2022-12-14 | End: 2023-01-12

## 2022-12-16 ENCOUNTER — INPATIENT (INPATIENT)
Facility: HOSPITAL | Age: 87
LOS: 5 days | Discharge: HOME | DRG: 299 | End: 2022-12-22
Attending: INTERNAL MEDICINE | Admitting: INTERNAL MEDICINE
Payer: MEDICARE

## 2022-12-16 VITALS
RESPIRATION RATE: 16 BRPM | TEMPERATURE: 98 F | HEART RATE: 68 BPM | DIASTOLIC BLOOD PRESSURE: 60 MMHG | OXYGEN SATURATION: 96 % | SYSTOLIC BLOOD PRESSURE: 110 MMHG

## 2022-12-16 DIAGNOSIS — I73.9 PERIPHERAL VASCULAR DISEASE, UNSPECIFIED: ICD-10-CM

## 2022-12-16 DIAGNOSIS — E11.9 TYPE 2 DIABETES MELLITUS WITHOUT COMPLICATIONS: ICD-10-CM

## 2022-12-16 DIAGNOSIS — L08.9 LOCAL INFECTION OF THE SKIN AND SUBCUTANEOUS TISSUE, UNSPECIFIED: ICD-10-CM

## 2022-12-16 DIAGNOSIS — Z95.810 PRESENCE OF AUTOMATIC (IMPLANTABLE) CARDIAC DEFIBRILLATOR: Chronic | ICD-10-CM

## 2022-12-16 DIAGNOSIS — I96 GANGRENE, NOT ELSEWHERE CLASSIFIED: ICD-10-CM

## 2022-12-16 LAB
ALBUMIN SERPL ELPH-MCNC: 2.7 G/DL — LOW (ref 3.3–5)
ALP SERPL-CCNC: 98 U/L — SIGNIFICANT CHANGE UP (ref 40–120)
ALT FLD-CCNC: 23 U/L — SIGNIFICANT CHANGE UP (ref 12–78)
ANION GAP SERPL CALC-SCNC: 6 MMOL/L — SIGNIFICANT CHANGE UP (ref 5–17)
APTT BLD: 32.7 SEC — SIGNIFICANT CHANGE UP (ref 27.5–35.5)
AST SERPL-CCNC: 54 U/L — HIGH (ref 15–37)
BASOPHILS # BLD AUTO: 0.02 K/UL — SIGNIFICANT CHANGE UP (ref 0–0.2)
BASOPHILS NFR BLD AUTO: 0.1 % — SIGNIFICANT CHANGE UP (ref 0–2)
BILIRUB SERPL-MCNC: 0.5 MG/DL — SIGNIFICANT CHANGE UP (ref 0.2–1.2)
BUN SERPL-MCNC: 37 MG/DL — HIGH (ref 7–23)
CALCIUM SERPL-MCNC: 9.5 MG/DL — SIGNIFICANT CHANGE UP (ref 8.5–10.1)
CHLORIDE SERPL-SCNC: 107 MMOL/L — SIGNIFICANT CHANGE UP (ref 96–108)
CO2 SERPL-SCNC: 28 MMOL/L — SIGNIFICANT CHANGE UP (ref 22–31)
CREAT SERPL-MCNC: 0.86 MG/DL — SIGNIFICANT CHANGE UP (ref 0.5–1.3)
EGFR: 79 ML/MIN/1.73M2 — SIGNIFICANT CHANGE UP
EOSINOPHIL # BLD AUTO: 0.01 K/UL — SIGNIFICANT CHANGE UP (ref 0–0.5)
EOSINOPHIL NFR BLD AUTO: 0.1 % — SIGNIFICANT CHANGE UP (ref 0–6)
ERYTHROCYTE [SEDIMENTATION RATE] IN BLOOD: 94 MM/HR — HIGH (ref 0–20)
GLUCOSE SERPL-MCNC: 169 MG/DL — HIGH (ref 70–99)
HCT VFR BLD CALC: 35.8 % — LOW (ref 39–50)
HGB BLD-MCNC: 11.8 G/DL — LOW (ref 13–17)
IMM GRANULOCYTES NFR BLD AUTO: 0.9 % — SIGNIFICANT CHANGE UP (ref 0–0.9)
INR BLD: 1.22 RATIO — HIGH (ref 0.88–1.16)
LACTATE SERPL-SCNC: 1.1 MMOL/L — SIGNIFICANT CHANGE UP (ref 0.7–2)
LYMPHOCYTES # BLD AUTO: 1.08 K/UL — SIGNIFICANT CHANGE UP (ref 1–3.3)
LYMPHOCYTES # BLD AUTO: 7.2 % — LOW (ref 13–44)
MCHC RBC-ENTMCNC: 31.6 PG — SIGNIFICANT CHANGE UP (ref 27–34)
MCHC RBC-ENTMCNC: 33 GM/DL — SIGNIFICANT CHANGE UP (ref 32–36)
MCV RBC AUTO: 95.7 FL — SIGNIFICANT CHANGE UP (ref 80–100)
MONOCYTES # BLD AUTO: 0.9 K/UL — SIGNIFICANT CHANGE UP (ref 0–0.9)
MONOCYTES NFR BLD AUTO: 6 % — SIGNIFICANT CHANGE UP (ref 2–14)
NEUTROPHILS # BLD AUTO: 12.77 K/UL — HIGH (ref 1.8–7.4)
NEUTROPHILS NFR BLD AUTO: 85.7 % — HIGH (ref 43–77)
NRBC # BLD: 0 /100 WBCS — SIGNIFICANT CHANGE UP (ref 0–0)
PLATELET # BLD AUTO: 431 K/UL — HIGH (ref 150–400)
POTASSIUM SERPL-MCNC: 4 MMOL/L — SIGNIFICANT CHANGE UP (ref 3.5–5.3)
POTASSIUM SERPL-SCNC: 4 MMOL/L — SIGNIFICANT CHANGE UP (ref 3.5–5.3)
PROT SERPL-MCNC: 7.5 G/DL — SIGNIFICANT CHANGE UP (ref 6–8.3)
PROTHROM AB SERPL-ACNC: 14.3 SEC — HIGH (ref 10.5–13.4)
RBC # BLD: 3.74 M/UL — LOW (ref 4.2–5.8)
RBC # FLD: 13.8 % — SIGNIFICANT CHANGE UP (ref 10.3–14.5)
SARS-COV-2 RNA SPEC QL NAA+PROBE: SIGNIFICANT CHANGE UP
SODIUM SERPL-SCNC: 141 MMOL/L — SIGNIFICANT CHANGE UP (ref 135–145)
WBC # BLD: 14.92 K/UL — HIGH (ref 3.8–10.5)
WBC # FLD AUTO: 14.92 K/UL — HIGH (ref 3.8–10.5)

## 2022-12-16 PROCEDURE — 73630 X-RAY EXAM OF FOOT: CPT | Mod: 26,LT

## 2022-12-16 PROCEDURE — 99221 1ST HOSP IP/OBS SF/LOW 40: CPT

## 2022-12-16 PROCEDURE — 93010 ELECTROCARDIOGRAM REPORT: CPT

## 2022-12-16 PROCEDURE — 99285 EMERGENCY DEPT VISIT HI MDM: CPT | Mod: FS

## 2022-12-16 RX ORDER — PIPERACILLIN AND TAZOBACTAM 4; .5 G/20ML; G/20ML
3.38 INJECTION, POWDER, LYOPHILIZED, FOR SOLUTION INTRAVENOUS ONCE
Refills: 0 | Status: COMPLETED | OUTPATIENT
Start: 2022-12-16 | End: 2022-12-16

## 2022-12-16 RX ORDER — VANCOMYCIN HCL 1 G
1000 VIAL (EA) INTRAVENOUS ONCE
Refills: 0 | Status: COMPLETED | OUTPATIENT
Start: 2022-12-16 | End: 2022-12-16

## 2022-12-16 RX ADMIN — Medication 1000 MILLIGRAM(S): at 23:31

## 2022-12-16 RX ADMIN — PIPERACILLIN AND TAZOBACTAM 3.38 GRAM(S): 4; .5 INJECTION, POWDER, LYOPHILIZED, FOR SOLUTION INTRAVENOUS at 22:09

## 2022-12-16 RX ADMIN — Medication 250 MILLIGRAM(S): at 21:17

## 2022-12-16 RX ADMIN — PIPERACILLIN AND TAZOBACTAM 200 GRAM(S): 4; .5 INJECTION, POWDER, LYOPHILIZED, FOR SOLUTION INTRAVENOUS at 20:39

## 2022-12-16 NOTE — ED PROVIDER NOTE - ATTENDING APP SHARED VISIT CONTRIBUTION OF CARE
Patient is a 89-year-old male who is nonverbal, history of dementia.  Primary care physician is Dr. Toby Quintana.  He is being followed by wound care for periodically infected and intermittently necrotic foot infections.  History of A. fib prostate cancer diabetes hyperlipidemia congestive heart failure hypertension.  He presents today with necrotic fourth toe of his left foot.    Patient is a confused elderly male in no distress.  HEENT is normal without pallor or cyanosis.  No G/F/R.  No stridor.  Mucous membranes are moist.  Sclera anicteric.  Neck is supple.  Lung exam is clear to auscultation heart exam is a soft systolic murmur.  Abdominal exam is soft nontender no guarding no rebound.  Without bladder distention.  Musculoskeletal exam reveals normal arms, left leg reveals necrotic toe, with significant vascular changes to the left foot and pallor to the toes, mild swelling to the foot the right foot appears swollen but there is no.  Ischemic changes to the digits.  Skin exam as noted.    Plan of care includes admission to the hospital, IV antibiotic therapy, x-rays of the foot to rule out osteomyelitis, podiatry consult medicine consult pain control as needed.

## 2022-12-16 NOTE — ED PROVIDER NOTE - NSICDXPASTMEDICALHX_GEN_ALL_CORE_FT
PAST MEDICAL HISTORY:  AICD (automatic cardioverter/defibrillator) present     Anemia     Aortic valve stenosis     Arteriosclerotic heart disease (ASHD)     Atrial fibrillation     Atrial fibrillation     CHF (congestive heart failure)     Constipation     Dementia     Dementia     Dementia     Depression     Diabetes     Diabetes     DM (diabetes mellitus)     H/O bladder infections     H/O cystitis     H/O ongoing treatment with hormonal therapy     Hematuria     Hyperlipemia     Hypertension     Osteomyelitis of finger of left hand     Personal history of radiation therapy     Prostate ca     Prostate CA     Scrotal abscess     Urinary retention     VHD (valvular heart disease)

## 2022-12-16 NOTE — ED PROVIDER NOTE - PHYSICAL EXAMINATION
Constitutional: Awake, Alert, non-toxic. NAD. Well appearing, well nourished.   HEAD: Normocephalic, atraumatic.   EYES: EOM intact, conjunctiva and sclera are clear bilaterally.   ENT: No rhinorrhea, patent, mucous membranes pink/moist, no drooling or stridor.   NECK: Supple, non-tender  CARDIOVASCULAR: Normal S1, S2; regular rate and rhythm.  RESPIRATORY: Normal respiratory effort; breath sounds CTAB, no wheezes, rhonchi, or rales. Speaking in full sentences. No accessory muscle use.   EXTREMITIES: Full passive and active ROM in all extremities; (+) left 3rd toe necrosis, (+) 2nd toe ulceration, (+) erythema, ankle non-tender to palpation; distal pulses palpable and symmetric  SKIN: Warm, dry; good skin turgor, no apparent lesions or rashes, no ecchymosis, brisk capillary refill.  NEURO: A&O x3. Sensory and motor functions are grossly intact. Speech is normal. Appearance and judgement seem appropriate for gender and age.

## 2022-12-16 NOTE — CONSULT NOTE ADULT - PROBLEM SELECTOR RECOMMENDATION 9
Patient was seen and evaluated  Applied dry sterile dressing to the left left foot  Bone scan performed on the last admission in 9/6/22 September was positive for osteomyelitis of the left second toe.  Per patient's son and has proxy patient was treated conservatively with midline antibiotic.  Recommend vascular consult  Patient's disease has progressed from the last visit patient now has gangrene to the second third toes of the left, eschar to the medial first metatarsal pressure wound to the left heel eschar to the lateral fifth metatarsal and the lateral fifth digit.  Will discuss with family and recommend more proximal amputation  Continue with IV antibiotics per infectious disease  Will reconsult vascular for recommendations  Continue with local wound care at this time Patient was seen and evaluated  Applied dry sterile dressing to the left foot  Apply offloading boots at all times when in bed to prevent the progression of pressure injuries to the foot   Bone scan performed on the last admission in 9/6/22 September was positive for osteomyelitis of the left second toe.  Per patient's son and has proxy patient was treated conservatively with midline antibiotic.  Recommend vascular consult  Patient's disease has progressed from the last visit patient now has gangrene to the second third toes of the left, eschar to the medial first metatarsal pressure wound to the left heel eschar to the lateral fifth metatarsal and the lateral fifth digit.  Will discuss with family and recommend more proximal amputation  Continue with IV antibiotics per infectious disease  Will reconsult vascular for recommendations  Continue with local wound care at this time

## 2022-12-16 NOTE — ED ADULT NURSE NOTE - OBJECTIVE STATEMENT
Pt presents to the ED via ambulance from [facility] s/p left foot wound. left foot 3rd toe necrotic, lateral aspect unstageable,,

## 2022-12-16 NOTE — ED PROVIDER NOTE - OBJECTIVE STATEMENT
89-year-old male with past medical history of dementia, anemia, atrial fibrillation, prostate cancer, diabetes, hyperlipidemia, CHF, hypertension, brought in by ambulance from [facility] due to foot infection.  Patient is a poor historian.  Patient was sent in due to left third toe infection.  Patient denies pain, fever, trauma, or any other complaints.

## 2022-12-17 DIAGNOSIS — Z71.89 OTHER SPECIFIED COUNSELING: ICD-10-CM

## 2022-12-17 DIAGNOSIS — K59.00 CONSTIPATION, UNSPECIFIED: ICD-10-CM

## 2022-12-17 DIAGNOSIS — I38 ENDOCARDITIS, VALVE UNSPECIFIED: ICD-10-CM

## 2022-12-17 DIAGNOSIS — I48.91 UNSPECIFIED ATRIAL FIBRILLATION: ICD-10-CM

## 2022-12-17 DIAGNOSIS — Z29.9 ENCOUNTER FOR PROPHYLACTIC MEASURES, UNSPECIFIED: ICD-10-CM

## 2022-12-17 DIAGNOSIS — R13.10 DYSPHAGIA, UNSPECIFIED: ICD-10-CM

## 2022-12-17 DIAGNOSIS — I10 ESSENTIAL (PRIMARY) HYPERTENSION: ICD-10-CM

## 2022-12-17 DIAGNOSIS — I50.9 HEART FAILURE, UNSPECIFIED: ICD-10-CM

## 2022-12-17 LAB — CRP SERPL-MCNC: 100 MG/L — HIGH

## 2022-12-17 RX ORDER — MULTIVIT-MIN/FERROUS GLUCONATE 9 MG/15 ML
1 LIQUID (ML) ORAL DAILY
Refills: 0 | Status: DISCONTINUED | OUTPATIENT
Start: 2022-12-17 | End: 2022-12-22

## 2022-12-17 RX ORDER — ONDANSETRON 8 MG/1
4 TABLET, FILM COATED ORAL EVERY 8 HOURS
Refills: 0 | Status: DISCONTINUED | OUTPATIENT
Start: 2022-12-17 | End: 2022-12-22

## 2022-12-17 RX ORDER — ACETAMINOPHEN 500 MG
650 TABLET ORAL EVERY 6 HOURS
Refills: 0 | Status: DISCONTINUED | OUTPATIENT
Start: 2022-12-17 | End: 2022-12-22

## 2022-12-17 RX ORDER — MORPHINE SULFATE 50 MG/1
2 CAPSULE, EXTENDED RELEASE ORAL EVERY 6 HOURS
Refills: 0 | Status: DISCONTINUED | OUTPATIENT
Start: 2022-12-17 | End: 2022-12-22

## 2022-12-17 RX ORDER — DONEPEZIL HYDROCHLORIDE 10 MG/1
5 TABLET, FILM COATED ORAL AT BEDTIME
Refills: 0 | Status: DISCONTINUED | OUTPATIENT
Start: 2022-12-17 | End: 2022-12-22

## 2022-12-17 RX ORDER — HEPARIN SODIUM 5000 [USP'U]/ML
5000 INJECTION INTRAVENOUS; SUBCUTANEOUS
Refills: 0 | Status: DISCONTINUED | OUTPATIENT
Start: 2022-12-17 | End: 2022-12-17

## 2022-12-17 RX ORDER — SIMVASTATIN 20 MG/1
20 TABLET, FILM COATED ORAL AT BEDTIME
Refills: 0 | Status: DISCONTINUED | OUTPATIENT
Start: 2022-12-17 | End: 2022-12-22

## 2022-12-17 RX ORDER — FERROUS SULFATE 325(65) MG
325 TABLET ORAL DAILY
Refills: 0 | Status: DISCONTINUED | OUTPATIENT
Start: 2022-12-17 | End: 2022-12-22

## 2022-12-17 RX ORDER — FAMOTIDINE 10 MG/ML
40 INJECTION INTRAVENOUS
Refills: 0 | Status: DISCONTINUED | OUTPATIENT
Start: 2022-12-17 | End: 2022-12-22

## 2022-12-17 RX ORDER — TRAMADOL HYDROCHLORIDE 50 MG/1
50 TABLET ORAL
Refills: 0 | Status: DISCONTINUED | OUTPATIENT
Start: 2022-12-17 | End: 2022-12-22

## 2022-12-17 RX ORDER — ASCORBIC ACID 60 MG
500 TABLET,CHEWABLE ORAL DAILY
Refills: 0 | Status: DISCONTINUED | OUTPATIENT
Start: 2022-12-17 | End: 2022-12-22

## 2022-12-17 RX ORDER — SENNA PLUS 8.6 MG/1
2 TABLET ORAL AT BEDTIME
Refills: 0 | Status: DISCONTINUED | OUTPATIENT
Start: 2022-12-17 | End: 2022-12-22

## 2022-12-17 RX ORDER — POLYETHYLENE GLYCOL 3350 17 G/17G
17 POWDER, FOR SOLUTION ORAL DAILY
Refills: 0 | Status: DISCONTINUED | OUTPATIENT
Start: 2022-12-17 | End: 2022-12-22

## 2022-12-17 RX ORDER — CLOPIDOGREL BISULFATE 75 MG/1
75 TABLET, FILM COATED ORAL DAILY
Refills: 0 | Status: DISCONTINUED | OUTPATIENT
Start: 2022-12-17 | End: 2022-12-17

## 2022-12-17 RX ORDER — TAMSULOSIN HYDROCHLORIDE 0.4 MG/1
0.4 CAPSULE ORAL AT BEDTIME
Refills: 0 | Status: DISCONTINUED | OUTPATIENT
Start: 2022-12-17 | End: 2022-12-22

## 2022-12-17 RX ORDER — PIPERACILLIN AND TAZOBACTAM 4; .5 G/20ML; G/20ML
3.38 INJECTION, POWDER, LYOPHILIZED, FOR SOLUTION INTRAVENOUS EVERY 8 HOURS
Refills: 0 | Status: COMPLETED | OUTPATIENT
Start: 2022-12-17 | End: 2022-12-21

## 2022-12-17 RX ORDER — METOPROLOL TARTRATE 50 MG
25 TABLET ORAL DAILY
Refills: 0 | Status: DISCONTINUED | OUTPATIENT
Start: 2022-12-17 | End: 2022-12-22

## 2022-12-17 RX ORDER — LANOLIN ALCOHOL/MO/W.PET/CERES
3 CREAM (GRAM) TOPICAL AT BEDTIME
Refills: 0 | Status: DISCONTINUED | OUTPATIENT
Start: 2022-12-17 | End: 2022-12-22

## 2022-12-17 RX ORDER — HEPARIN SODIUM 5000 [USP'U]/ML
5000 INJECTION INTRAVENOUS; SUBCUTANEOUS
Refills: 0 | Status: DISCONTINUED | OUTPATIENT
Start: 2022-12-17 | End: 2022-12-22

## 2022-12-17 RX ORDER — LACTOBACILLUS ACIDOPHILUS 100MM CELL
1 CAPSULE ORAL
Refills: 0 | Status: DISCONTINUED | OUTPATIENT
Start: 2022-12-17 | End: 2022-12-22

## 2022-12-17 RX ADMIN — SIMVASTATIN 20 MILLIGRAM(S): 20 TABLET, FILM COATED ORAL at 22:49

## 2022-12-17 RX ADMIN — POLYETHYLENE GLYCOL 3350 17 GRAM(S): 17 POWDER, FOR SOLUTION ORAL at 13:06

## 2022-12-17 RX ADMIN — Medication 1 TABLET(S): at 17:06

## 2022-12-17 RX ADMIN — Medication 325 MILLIGRAM(S): at 13:05

## 2022-12-17 RX ADMIN — Medication 25 MILLIGRAM(S): at 13:08

## 2022-12-17 RX ADMIN — PIPERACILLIN AND TAZOBACTAM 25 GRAM(S): 4; .5 INJECTION, POWDER, LYOPHILIZED, FOR SOLUTION INTRAVENOUS at 13:14

## 2022-12-17 RX ADMIN — Medication 500 MILLIGRAM(S): at 13:06

## 2022-12-17 RX ADMIN — SENNA PLUS 2 TABLET(S): 8.6 TABLET ORAL at 22:49

## 2022-12-17 RX ADMIN — HEPARIN SODIUM 5000 UNIT(S): 5000 INJECTION INTRAVENOUS; SUBCUTANEOUS at 17:06

## 2022-12-17 RX ADMIN — Medication 1 TABLET(S): at 13:06

## 2022-12-17 RX ADMIN — PIPERACILLIN AND TAZOBACTAM 25 GRAM(S): 4; .5 INJECTION, POWDER, LYOPHILIZED, FOR SOLUTION INTRAVENOUS at 22:49

## 2022-12-17 RX ADMIN — DONEPEZIL HYDROCHLORIDE 5 MILLIGRAM(S): 10 TABLET, FILM COATED ORAL at 22:49

## 2022-12-17 RX ADMIN — FAMOTIDINE 40 MILLIGRAM(S): 10 INJECTION INTRAVENOUS at 17:06

## 2022-12-17 RX ADMIN — TAMSULOSIN HYDROCHLORIDE 0.4 MILLIGRAM(S): 0.4 CAPSULE ORAL at 22:49

## 2022-12-17 NOTE — H&P ADULT - PROBLEM SELECTOR PLAN 3
Due to advanced age, dementia, contracted state, and multiple medical comorbidities, patient is not a surgical candidate.  - Recommend conservative management  and palliative care MD cons   - Local wound care, antibiotics  as per ID cons

## 2022-12-17 NOTE — CONSULT NOTE ADULT - SUBJECTIVE AND OBJECTIVE BOX
HPI:      89y year old Male seen at [facility] ED for Left foot gangrene of the second and third toes and pressure injury to bilateral heels.  Patient was resting in bed was nonverbal was awake and in and out of sleep.  Patient was admitted in September of this year and was treated conservatively with a midline antibiotics per the proxy.  Patient's son had agreed for conservative treatment and did not want any surgical intervention at the previous admission.  Patient was seen by vascular and was signed off due to the extent of the disease process dementia vascular intervention was performed, patient was not a surgical candidate.    REVIEW OF SYSTEMS    PAST MEDICAL & SURGICAL HISTORY:  Atrial fibrillation  Hypertension  Diabetes  Prostate ca  Dementia  CHF (congestive heart failure)  Hyperlipemia  Diabetes  Depression  Dementia  DM (diabetes mellitus)  Atrial fibrillation  Prostate CA  Arteriosclerotic heart disease (ASHD)  Dementia  Anemia  AICD (automatic cardioverter/defibrillator) present  Osteomyelitis of finger of left hand  Personal history of radiation therapy  H/O ongoing treatment with hormonal therapy  H/O bladder infections  Scrotal abscess  Urinary retention  Hematuria  H/O cystitis  Constipation  VHD (valvular heart disease)  Aortic valve stenosis  History of automatic internal cardiac defibrillator (AICD)    Allergies  latex (Rash)  latex (Unknown)  No Known Drug Allergies    Intolerances    MEDICATIONS  (STANDING):  vancomycin  IVPB. 1000 milliGRAM(s) IV Intermittent once    MEDICATIONS  (PRN):      Social History:      FAMILY HISTORY:      Vital Signs Last 24 Hrs  T(C): 36.8 (16 Dec 2022 17:12), Max: 36.8 (16 Dec 2022 17:12)  T(F): 98.3 (16 Dec 2022 17:12), Max: 98.3 (16 Dec 2022 17:12)  HR: 68 (16 Dec 2022 17:12) (68 - 68)  BP: 110/60 (16 Dec 2022 17:12) (110/60 - 110/60)  BP(mean): --  RR: 16 (16 Dec 2022 17:12) (16 - 16)  SpO2: 96% (16 Dec 2022 17:12) (96% - 96%)    Parameters below as of 16 Dec 2022 17:12  Patient On (Oxygen Delivery Method): room air    PHYSICAL EXAM:  Vascular: DP & PT non palpable bilaterally  Neurological: couldn't  assess   Musculoskeletal: couldn't assess  Dermatological:  Left foot noted with the gangrenous changes to the second digit with bone exposed, third digit gangrenous to the base of the proximal phalanx, positive probe to bone; medial first metatarsal head wound with a dry eschar; posterior heel wound down to the level of fascia: Mild erythema noted to the forefoot and the heel, no malodor no proximal streaking, no fluctuance        CBC Full  -  ( 16 Dec 2022 20:00 )  WBC Count : 14.92 K/uL  RBC Count : 3.74 M/uL  Hemoglobin : 11.8 g/dL  Hematocrit : 35.8 %  Platelet Count - Automated : 431 K/uL  Mean Cell Volume : 95.7 fl  Mean Cell Hemoglobin : 31.6 pg  Mean Cell Hemoglobin Concentration : 33.0 gm/dL  Auto Neutrophil # : 12.77 K/uL  Auto Lymphocyte # : 1.08 K/uL  Auto Monocyte # : 0.90 K/uL  Auto Eosinophil # : 0.01 K/uL  Auto Basophil # : 0.02 K/uL  Auto Neutrophil % : 85.7 %  Auto Lymphocyte % : 7.2 %  Auto Monocyte % : 6.0 %  Auto Eosinophil % : 0.1 %  Auto Basophil % : 0.1 %    12-16    141  |  107  |  37<H>  ----------------------------<  169<H>  4.0   |  28  |  0.86    Ca    9.5      16 Dec 2022 20:00    TPro  7.5  /  Alb  2.7<L>  /  TBili  0.5  /  DBili  x   /  AST  54<H>  /  ALT  23  /  AlkPhos  98  12-16                          11.8   14.92 )-----------( 431      ( 16 Dec 2022 20:00 )             35.8       PT/INR - ( 16 Dec 2022 20:00 )   PT: 14.3 sec;   INR: 1.22 ratio         PTT - ( 16 Dec 2022 20:00 )  PTT:32.7 sec        Imaging: ----------  1 / 1 Meet Wallace   Report date: 9/7/2022   View Order   (Report matches study selected on Patient History pane)         ACC: [id number] EXAM: NM MULTI DAY PROCEDURE  ACC: [id number] EXAM: NM INFLAMM LOC WBC SA SD    PROCEDURE DATE: 09/07/2022        INTERPRETATION: RADIOPHARMACEUTICAL: 11.2 millicuries Tc-99m labeled autologous leukocytes, IV.    CLINICAL INFORMATION: 89 year old man with nonhealing ulcer of the LEFT second toe; referred to evaluate for infection.    TECHNIQUE: Radiolabeled autologous leukocytes were injected on 9/6/2022. Approximately 24 hours later static images in multiple projections were obtained of the feet.    COMPARISON: No prior scintigraphy available.    CORRELATION: 3 view radiography LEFT foot 9/1/2022 (soft tissue swelling without radiographic evidence of osteomyelitis).    FINDINGS: Increased labeled white cell accumulation is seen in the region of the distal LEFT second metatarsal/MTP joint. Physiologic distribution is seen elsewhere.    IMPRESSION: AbnormalTc-99m labeled leukocyte scan. Findings consistent with osteomyelitis.    --- End of Report ---  
HPI:  89 year old male with PMH of advanced dementia, DM, CHF, HTN, Afib, HLD, h/o prostate cancer, admitted with Left foot 2nd and 3rd digit wounds.  Patient recently admitted to [facility] in Sept 2022 for LLE 2nd digit wound, underwent NM scan at that time showing osteomyelitis, PICC placed and patient sent home on rocephin x 28 days. Patient seen by Dr. Oro at that time, however patient unable to undergo ABIs due to contracted state. Decision was made with family that no surgical intervention would be pursued as patient is a poor candidate.  Patient unable to provide meaningful history secondary to baseline dementia. History obtained from chart review.    PAST MEDICAL & SURGICAL HISTORY:  Atrial fibrillation  Hypertension  Diabetes  Prostate ca  Dementia  CHF (congestive heart failure)  Hyperlipemia  Depression  Arteriosclerotic heart disease (ASHD)  Dementia  Anemia  Osteomyelitis of finger of left hand  Personal history of radiation therapy  H/O ongoing treatment with hormonal therapy  H/O bladder infections  H/o Scrotal abscess  Urinary retention  Hematuria  H/O cystitis  Constipation  VHD (valvular heart disease)  Aortic valve stenosis  History of automatic internal cardiac defibrillator (AICD)    REVIEW OF SYSTEMS:  Unable to obtain ROS secondary to patient's baseline mental status.    MEDICATIONS:  MEDICATIONS  (STANDING):  ascorbic acid 500 milliGRAM(s) Oral daily  donepezil 5 milliGRAM(s) Oral at bedtime  famotidine    Tablet 40 milliGRAM(s) Oral two times a day  ferrous    sulfate 325 milliGRAM(s) Oral daily  heparin   Injectable 5000 Unit(s) SubCutaneous two times a day  heparin  Injectable (Preservative-Free) 5000 Unit(s) SubCutaneous two times a day  lactobacillus acidophilus 1 Tablet(s) Oral two times a day with meals  metoprolol succinate ER 25 milliGRAM(s) Oral daily  multivitamin/minerals 1 Tablet(s) Oral daily  piperacillin/tazobactam IVPB.. 3.375 Gram(s) IV Intermittent every 8 hours  polyethylene glycol 3350 17 Gram(s) Oral daily  senna 2 Tablet(s) Oral at bedtime  simvastatin 20 milliGRAM(s) Oral at bedtime  tamsulosin 0.4 milliGRAM(s) Oral at bedtime    MEDICATIONS  (PRN):  acetaminophen     Tablet .. 650 milliGRAM(s) Oral every 6 hours PRN Temp greater or equal to 38C (100.4F), Mild Pain (1 - 3)  aluminum hydroxide/magnesium hydroxide/simethicone Suspension 30 milliLiter(s) Oral every 4 hours PRN Dyspepsia  melatonin 3 milliGRAM(s) Oral at bedtime PRN Insomnia  morphine  - Injectable 2 milliGRAM(s) IV Push every 6 hours PRN Severe Pain (7 - 10)  ondansetron Injectable 4 milliGRAM(s) IV Push every 8 hours PRN Nausea and/or Vomiting  traMADol 50 milliGRAM(s) Oral four times a day PRN Moderate Pain (4 - 6)    ALLERGIES:  latex (Rash)    SOCIAL HISTORY:  Nonsmoker.  No ETOH or illicit drug use.    VITAL SIGNS:  Vital Signs Last 24 Hrs  T(C): 36.9 (17 Dec 2022 09:11), Max: 36.9 (17 Dec 2022 09:11)  T(F): 98.4 (17 Dec 2022 09:11), Max: 98.4 (17 Dec 2022 09:11)  HR: 100 (17 Dec 2022 09:11) (68 - 100)  BP: 108/65 (17 Dec 2022 09:11) (100/56 - 110/60)  RR: 16 (17 Dec 2022 09:11) (16 - 20)  SpO2: 97% (17 Dec 2022 09:11) (96% - 97%)    PHYSICAL EXAM:  GENERAL:  Elderly, contracted male lying in bed in NAD.  HEENT:  NC/AT. Sclera white. Mucous membranes moist.  CARDIO: Irregularly irregular. Afib noted on monitor  RESP: Nonlabored breathing. No accessory muscle use or nasal flaring.  EXTREMITIES: Right and Left lower extremities warm, good color. Bilateral PT/DP pulses identifiable via bedside doppler. Left foot edema, toes wrapped in alo wrap, clean/dry. Able to lift Left leg.  SKIN:  No jaundice, pallor, or cyanosis  NEURO:  Alert to voice, unable to answer questions appropriately or follow commands.    LABS:                        11.8   14.92 )-----------( 431      ( 16 Dec 2022 20:00 )             35.8     12-16    141  |  107  |  37<H>  ----------------------------<  169<H>  4.0   |  28  |  0.86    Ca    9.5      16 Dec 2022 20:00    TPro  7.5  /  Alb  2.7<L>  /  TBili  0.5  /  DBili  x   /  AST  54<H>  /  ALT  23  /  AlkPhos  98  12-16    LIVER FUNCTIONS - ( 16 Dec 2022 20:00 )  Alb: 2.7 g/dL / Pro: 7.5 g/dL / ALK PHOS: 98 U/L / ALT: 23 U/L / AST: 54 U/L / GGT: x    PT/INR - ( 16 Dec 2022 20:00 )   PT: 14.3 sec;   INR: 1.22 ratio  PTT - ( 16 Dec 2022 20:00 )  PTT:32.7 sec  
Date/Time Patient Seen:  		  Referring MD:   Data Reviewed	       Patient is a 89y old  Male who presents with a chief complaint of left foot infection (17 Dec 2022 12:27)      Subjective/HPI   89-year-old male with past medical history of dementia, anemia, atrial fibrillation, prostate cancer, diabetes, hyperlipidemia, CHF, hypertension, brought in by ambulance from [facility] due to foot infection.  PAST MEDICAL & SURGICAL HISTORY:  Atrial fibrillation    Hypertension    Diabetes    Prostate ca    Dementia    CHF (congestive heart failure)    Hyperlipemia    Diabetes    Depression    Dementia    No pertinent past medical history    DM (diabetes mellitus)    Atrial fibrillation    Prostate CA    Arteriosclerotic heart disease (ASHD)    Dementia    Anemia    Constipation    AICD (automatic cardioverter/defibrillator) present    Osteomyelitis of finger of left hand    Personal history of radiation therapy    H/O ongoing treatment with hormonal therapy    H/O bladder infections    Scrotal abscess    Urinary retention    Hematuria    H/O cystitis    Constipation    VHD (valvular heart disease)    Aortic valve stenosis    No significant past surgical history    No significant past surgical history    History of automatic internal cardiac defibrillator (AICD)          Medication list         MEDICATIONS  (STANDING):  ascorbic acid 500 milliGRAM(s) Oral daily  donepezil 5 milliGRAM(s) Oral at bedtime  famotidine    Tablet 40 milliGRAM(s) Oral two times a day  ferrous    sulfate 325 milliGRAM(s) Oral daily  heparin   Injectable 5000 Unit(s) SubCutaneous two times a day  lactobacillus acidophilus 1 Tablet(s) Oral two times a day with meals  metoprolol succinate ER 25 milliGRAM(s) Oral daily  multivitamin/minerals 1 Tablet(s) Oral daily  piperacillin/tazobactam IVPB.. 3.375 Gram(s) IV Intermittent every 8 hours  polyethylene glycol 3350 17 Gram(s) Oral daily  senna 2 Tablet(s) Oral at bedtime  simvastatin 20 milliGRAM(s) Oral at bedtime  tamsulosin 0.4 milliGRAM(s) Oral at bedtime    MEDICATIONS  (PRN):  acetaminophen     Tablet .. 650 milliGRAM(s) Oral every 6 hours PRN Temp greater or equal to 38C (100.4F), Mild Pain (1 - 3)  aluminum hydroxide/magnesium hydroxide/simethicone Suspension 30 milliLiter(s) Oral every 4 hours PRN Dyspepsia  melatonin 3 milliGRAM(s) Oral at bedtime PRN Insomnia  morphine  - Injectable 2 milliGRAM(s) IV Push every 6 hours PRN Severe Pain (7 - 10)  ondansetron Injectable 4 milliGRAM(s) IV Push every 8 hours PRN Nausea and/or Vomiting  traMADol 50 milliGRAM(s) Oral four times a day PRN Moderate Pain (4 - 6)         Vitals log        ICU Vital Signs Last 24 Hrs  T(C): 36.9 (17 Dec 2022 09:11), Max: 36.9 (17 Dec 2022 09:11)  T(F): 98.4 (17 Dec 2022 09:11), Max: 98.4 (17 Dec 2022 09:11)  HR: 100 (17 Dec 2022 09:11) (94 - 100)  BP: 108/65 (17 Dec 2022 09:11) (100/56 - 108/65)  BP(mean): --  ABP: --  ABP(mean): --  RR: 16 (17 Dec 2022 09:11) (16 - 20)  SpO2: 97% (17 Dec 2022 09:11) (97% - 97%)    O2 Parameters below as of 17 Dec 2022 09:11  Patient On (Oxygen Delivery Method): room air                 Input and Output:  I&O's Detail      Lab Data                        11.8   14.92 )-----------( 431      ( 16 Dec 2022 20:00 )             35.8     12-16    141  |  107  |  37<H>  ----------------------------<  169<H>  4.0   |  28  |  0.86    Ca    9.5      16 Dec 2022 20:00    TPro  7.5  /  Alb  2.7<L>  /  TBili  0.5  /  DBili  x   /  AST  54<H>  /  ALT  23  /  AlkPhos  98  12-16            Review of Systems	      Objective     Physical Examination        Pertinent Lab findings & Imaging      [place]:  NO   Adequate UO     I&O's Detail           Discussed with:     Cultures:	        Radiology                            
[facility], Division of Infectious Diseases  WESLEY Amaya S. Shah, Y. Patel, G. Young   [phone]  after hours and weekends [phone]    TERESA FRANCIS  89y, Male  [id number]    HPI  pt nonverbal history per chart   89-year-old male with past medical history of dementia, anemia, atrial fibrillation, prostate cancer, diabetes, hyperlipidemia, CHF, hypertension, brought in by ambulance from [facility] due to foot infection.    Patient recently admitted to [facility] in Sept 2022 for LLE 2nd digit wound, underwent NM scan at that time showing osteomyelitis, PICC placed and patient sent home on rocephin x 28 days.  family deferred surgical intervention at that time    PMH/PSH--  Atrial fibrillation  Hypertension  Diabetes  Prostate ca  Dementia  CHF (congestive heart failure)  Hyperlipemia  Diabetes  History of automatic internal cardiac defibrillator (AICD)        Allergies--nkda      Medications--  Antibiotics: piperacillin/tazobactam IVPB.. 3.375 Gram(s) IV Intermittent every 8 hours    Immunologic:   Other: acetaminophen     Tablet .. PRN  aluminum hydroxide/magnesium hydroxide/simethicone Suspension PRN  ascorbic acid  donepezil  famotidine    Tablet  ferrous    sulfate  heparin   Injectable  heparin  Injectable (Preservative-Free)  lactobacillus acidophilus  melatonin PRN  metoprolol succinate ER  morphine  - Injectable PRN  multivitamin/minerals  ondansetron Injectable PRN  polyethylene glycol 3350  senna  simvastatin  tamsulosin  traMADol PRN      Social History--  unable to obtain     Family/Marital History--  No pertinent family history in first degree relatives          Travel/Environmental/Occupational History:  nc  Review of Systems:  REVIEW OF SYSTEMS  unable to obtain     Physical Exam--  Vital Signs: T(F): 98.4 (12-17-22 @ 09:11), Max: 98.4 (12-17-22 @ 09:11)  HR: 100 (12-17-22 @ 09:11)  BP: 108/65 (12-17-22 @ 09:11)  RR: 16 (12-17-22 @ 09:11)  SpO2: 97% (12-17-22 @ 09:11)  Wt(kg): --  General: Nontoxic-appearing Male in no acute distress.  HEENT: AT/NC.   Neck: Not rigid. No sense of mass.  Nodes: None palpable.  Lungs: Clear bilaterally without rales, wheezing or rhonchi  Heart: Regular rate and rhythm. No Murmur.  Abdomen: Bowel sounds present and normoactive. Soft. Nondistended. Nontender.   Back: No spinal tenderness. No costovertebral angle tenderness.   Extremities: No cyanosis or clubbing.left foot dry gangrene mild surrounding pink inflammation, not hot   Skin: Warm. Dry. Good turgor. No rash. No vasculitic stigmata.  Psychiatric: nonverbal         Laboratory & Imaging Data--  CBC                        11.8   14.92 )-----------( 431      ( 16 Dec 2022 20:00 )             35.8       Chemistries  12-16    141  |  107  |  37<H>  ----------------------------<  169<H>  4.0   |  28  |  0.86    Ca    9.5      16 Dec 2022 20:00    TPro  7.5  /  Alb  2.7<L>  /  TBili  0.5  /  DBili  x   /  AST  54<H>  /  ALT  23  /  AlkPhos  98  12-16      Culture Data

## 2022-12-17 NOTE — H&P ADULT - TIME BILLING
75minutes spent on this visit, 50% visit time spent in care co-ordination with other attendings and counselling patient ,writing admission orders ( see complete and current orders and order section) ,requesting necessary consults ,informing family about status & plan of care .I have discussed care plan with [facility] wellness/admitting /nursing   department ,outpatient PCP , hospital consultants , ER physician & med staff .

## 2022-12-17 NOTE — H&P ADULT - PROBLEM SELECTOR PLAN 11
Palliative care MD  consult requested ,to discuss advance directives and complete MOLST .Hospice and CMO seem to be most appropriate level of care at this time .Social service input

## 2022-12-17 NOTE — H&P ADULT - HISTORY OF PRESENT ILLNESS
89-year-old male with past medical history of dementia, anemia, atrial fibrillation, prostate cancer, diabetes, hyperlipidemia, CHF, hypertension, brought in by ambulance from [facility] due to foot infection.  Patient is a poor historian due to dementia .  Patient was sent in due to left third toe infection.  Patient denies pain, fever, trauma, or any other complaints. Seen by podiatry team in ER- and diagnosed with Gangrene of L foot -Applied dry sterile dressing to the left footApply offloading boots at all times when in bed to prevent the progression of pressure injuries to the foot   Bone scan performed on the last admission in 9/6/22 September was positive for osteomyelitis of the left second toe.  Per patient's son and has proxy patient was treated conservatively with midline antibiotic.Recommend vascular consult .Patient's disease has progressed from the last visit patient now has gangrene to the second third toes of the left, eschar to the medial first metatarsalpressure wound to the left heel eschar to the lateral fifth metatarsal and the lateral fifth digit. Will discuss with family and recommend more proximal amputationContinue with IV antibiotics per infectious disease  Will reconsult vascular for recommendations Applied dry sterile dressing to the left foot .Apply offloading boots at all times when in bed to prevent the progression of pressure injuries to the foot Bone scan performed on the last admission in 9/6/22 September was positive for osteomyelitis of the left second toe.  Per patient's son and has proxy patient was treated conservatively with midline antibiotic .Seen by ID -continue zosyn for now Palliative care consult requested ,to discuss advance directives and complete MOLST -GOC as per pall care MD  ,who followed the patient closely ailyn  past .Spoke to PCP from [facility] and he is in agreement with plan for palliative care /CMO /hospice .    89-year-old male with past medical history of dementia, anemia, atrial fibrillation, prostate cancer, diabetes, hyperlipidemia, CHF, hypertension, brought in by ambulance from [facility] due to foot infection.  Patient is a poor historian due to dementia .  Patient was sent in due to left third toe infection.  Patient denies pain, fever, trauma, or any other complaints. Seen by podiatry team in ER- and diagnosed with Gangrene of L foot -Applied dry sterile dressing to the left footApply offloading boots at all times when in bed to prevent the progression of pressure injuries to the foot   Bone scan performed on the last admission in 9/6/22 September was positive for osteomyelitis of the left second toe.  Per patient's son and has proxy patient was treated conservatively with midline antibiotic.Recommend vascular consult .Patient's disease has progressed from the last visit patient now has gangrene to the second third toes of the left, eschar to the medial first metatarsalpressure wound to the left heel eschar to the lateral fifth metatarsal and the lateral fifth digit. Will discuss with family and recommend more proximal amputationContinue with IV antibiotics per infectious disease  Will reconsult vascular for recommendations Applied dry sterile dressing to the left foot .Apply offloading boots at all times when in bed to prevent the progression of pressure injuries to the foot Bone scan performed on the last admission in 9/6/22 September was positive for osteomyelitis of the left second toe.  Per patient's son and has proxy patient was treated conservatively with midline antibiotic .Seen by ID -continue zosyn for now Palliative care consult requested ,to discuss advance directives and complete MOLST -GOC as per pall care MD  ,who followed the patient  in a  past .Spoke to PCP from [facility] and he is in agreement with plan for palliative care /CMO /hospice .    89-year-old male with past medical history of dementia, anemia, atrial fibrillation, prostate cancer, T2 diabetes, hyperlipidemia, CHF, hypertension, brought in by ambulance from [facility] due to foot infection.  Patient is a poor historian due to dementia .  Patient was sent in due to left third toe infection.  Patient denies pain, fever, trauma, or any other complaints. Seen by podiatry team in ER- and diagnosed with Gangrene of L foot -Applied dry sterile dressing to the left footApply offloading boots at all times when in bed to prevent the progression of pressure injuries to the foot   Bone scan performed on the last admission in 9/6/22 September was positive for osteomyelitis of the left second toe.  Per patient's son and has proxy patient was treated conservatively with midline antibiotic.Recommend vascular consult .Patient's disease has progressed from the last visit patient now has gangrene to the second third toes of the left, eschar to the medial first metatarsalpressure wound to the left heel eschar to the lateral fifth metatarsal and the lateral fifth digit. Will discuss with family and recommend more proximal amputationContinue with IV antibiotics per infectious disease  Will reconsult vascular for recommendations Applied dry sterile dressing to the left foot .Apply offloading boots at all times when in bed to prevent the progression of pressure injuries to the foot Bone scan performed on the last admission in 9/6/22 September was positive for osteomyelitis of the left second toe.  Per patient's son and has proxy patient was treated conservatively with midline antibiotic .Seen by ID -continue zosyn for now Palliative care consult requested ,to discuss advance directives and complete MOLST -GOC as per pall care MD  ,who followed the patient  in a  past .Spoke to PCP from [facility] and he is in agreement with plan for palliative care /CMO /hospice .

## 2022-12-17 NOTE — H&P ADULT - MUSCULOSKELETAL
no joint swelling/no joint erythema/no joint warmth/no calf tenderness/no chest wall tenderness not applicable

## 2022-12-17 NOTE — H&P ADULT - PROBLEM SELECTOR PLAN 2
Accu-Cheks monitoring and insulin corrective regimen  sliding scale coverage with short acting inslulin, add longacting insulin as needed ,no concentrated sweets diet, serial labs ,HbA1C,education

## 2022-12-17 NOTE — CONSULT NOTE ADULT - PROBLEM SELECTOR RECOMMENDATION 9
- Due to advanced age, dementia, contracted state, and multiple medical comorbidities, patient is not a surgical candidate.  - Recommend conservative management per primary medical team  - Local wound care, antibiotics  - Signing off; reconsult as needed.  - Discussed with Dr. Del Valle

## 2022-12-17 NOTE — H&P ADULT - PROBLEM SELECTOR PLAN 1
As per vascular surgery team -Due to advanced age, dementia, contracted state, and multiple medical comorbidities, patient is not a surgical candidate.  - Recommend conservative management  and  GOC discussion as per palliative care MD cons   - Local wound care, antibiotics  as per ID cons .Social service consult -home hospice at [facility] vs inpatient facility

## 2022-12-17 NOTE — H&P ADULT - ASSESSMENT
89-year-old male with past medical history of dementia, anemia, atrial fibrillation, prostate cancer, diabetes, hyperlipidemia, CHF, hypertension, brought in by ambulance from [facility] due to foot infection.  Patient is a poor historian due to dementia .  Patient was sent in due to left third toe infection.  Patient denies pain, fever, trauma, or any other complaints. Seen by podiatry team in ER- and diagnosed with Gangrene of L foot -Applied dry sterile dressing to the left footApply offloading boots at all times when in bed to prevent the progression of pressure injuries to the foot   Bone scan performed on the last admission in 9/6/22 September was positive for osteomyelitis of the left second toe.  Per patient's son and has proxy patient was treated conservatively with midline antibiotic .Recommend vascular consult .Patient's disease has progressed from the last visit patient now has gangrene to the second third toes of the left, eschar to the medial first metatarsal pressure wound to the left heel eschar to the lateral fifth metatarsal and the lateral fifth digit. Will discuss with family and recommend more proximal amputation .Continue with IV antibiotics per infectious disease  Will reconsult vascular for recommendations Applied dry sterile dressing to the left foot .Apply offloading boots at all times when in bed to prevent the progression of pressure injuries to the foot Bone scan performed on the last admission in 9/6/22 September was positive for osteomyelitis of the left second toe.  Per patient's son and has proxy patient was treated conservatively with midline antibiotic .Seen by ID -continue zosyn for now Palliative care consult requested ,to discuss advance directives and complete MOLST -GOC as per pall care MD Palmer ,who followed the patient closely in a  past .Spoke to PCP from [facility] and he is in agreement with plan for palliative care /CMO /hospice .  89-year-old male with past medical history of dementia, anemia, atrial fibrillation, prostate cancer, diabetes, hyperlipidemia, CHF, hypertension, brought in by ambulance from [facility] due to foot infection.  Patient is a poor historian due to dementia .  Patient was sent in due to left third toe infection.  Patient denies pain, fever, trauma, or any other complaints. Seen by podiatry team in ER- and diagnosed with Gangrene of L foot -Applied dry sterile dressing to the left footApply offloading boots at all times when in bed to prevent the progression of pressure injuries to the foot   Bone scan performed on the last admission in 9/6/22 September was positive for osteomyelitis of the left second toe.  Per patient's son and has proxy patient was treated conservatively with midline antibiotic .Recommend vascular consult .Patient's disease has progressed from the last visit patient now has gangrene to the second third toes of the left, eschar to the medial first metatarsal pressure wound to the left heel eschar to the lateral fifth metatarsal and the lateral fifth digit. Will discuss with family and recommend more proximal amputation .Continue with IV antibiotics per infectious disease  Will reconsult vascular for recommendations Applied dry sterile dressing to the left foot .Apply offloading boots at all times when in bed to prevent the progression of pressure injuries to the foot Bone scan performed on the last admission in 9/6/22 September was positive for osteomyelitis of the left second toe.  Per patient's son and has proxy patient was treated conservatively with midline antibiotic .Seen by ID -continue zosyn for now Palliative care consult requested ,to discuss advance directives and complete MOLST -GOC as per pall care MD Palmer ,who followed the patient  in a  past .Spoke to PCP from [facility] and he is in agreement with plan for palliative care /CMO /hospice .

## 2022-12-17 NOTE — H&P ADULT - ADDITIONAL PE
Left foot noted with the gangrenous changes to the second digit with bone exposed, third digit gangrenous to the base of the proximal phalanx, positive probe to bone; medial first metatarsal head wound with a dry eschar; posterior heel wound down to the level of fascia: Mild erythema noted to the forefoot and the heel, no malodor no proximal streaking, no fluctuance

## 2022-12-17 NOTE — CONSULT NOTE ADULT - ASSESSMENT
89-year-old male with past medical history of dementia, anemia, atrial fibrillation, prostate cancer, diabetes, hyperlipidemia, CHF, hypertension, brought in by ambulance from [facility] due to foot infection.  leukocytosis  dry gangrene left foot    plan  antibx have limited role here and were already attempted  cont local care  can zosyn-- but doubt this has any impact    podiatry and vascular --noted   
89-year-old male with past medical history of dementia, anemia, atrial fibrillation, prostate cancer, diabetes, hyperlipidemia, CHF, hypertension, brought in by ambulance from [facility] due to foot infection.
89 year old male with PMH of advanced dementia, DM, CHF, HTN, Afib, HLD, h/o prostate cancer, with Left foot 2nd and 3rd digit wounds.

## 2022-12-17 NOTE — H&P ADULT - NSHPLABSRESULTS_GEN_ALL_CORE
< from: TTE Echo Complete w/o Contrast w/ Doppler (04.05.22 @ 13:00) >    Blood Pressure: 110/70  MEASUREMENTS  IVS: 1.1cm  PWT: 1.1cm  LA: 3.2cm  AO: 3.4cm  LVIDd: 4.2 cm.  LVIDs: 2.3cm  LVEF: 50-55%.  PASP: 40mm Hg  FINDINGS  Left Ventricle: Normal in  size and normal LV systolic function with   estimated LVEF 50-55%.  Right Ventricle: Normal in size and normal RV systolic function.  Left Atrium: Normal in size.  Right Atrium: Normal in size.  Mitral Valve: Mild mitral annular calcination is present. Mitral valve is   mildly calcified and no evidence of any mitral stenosis. Mild mitral   regurgitation is present.  Aortic Valve: Aortic root is mild sclerotic and of normal dimension.   Aortic valve is calcified with the moderate aortic stenosis present with    aortic valve area calculated to be 0.95 sq cm and peak gradient across   the aortic valve is 36 mmHg and mean gradient 28 mmHg is present.  Tricuspid Valve: Mild tricuspid regurgitation with calculated  PA   systolic pressure 40 mmHg suggestive of mild pulmonary hypertension is   present.  Pulmonic Valve: Trace pulmonary regurgitation is present.  Diastolic Function: LV diastolic function cannot determine from this   study.  Pericardium/Pleura: No evidence of any pericardial effusion.  No intracardiac mass ,  thrombus or vegetations and tumor.  Echodense in right sided chambers  suggestive of permanent  pacemaker   wire is present.  Mild concentric left ventricular hypertrophy is present.  IMPRESSION:  Normal LV size and  normal LV systolic function with estimated LVEF   50-55%.    LV diastolic function cannot determine from this study.    Moderate aortic stenosis present.    Mild mitral regurgitation is present.    Mild tricuspid regurgitation with mild pulmonary  hypertension is present.    Technical difficult study.    < end of copied text >

## 2022-12-18 LAB
ALBUMIN SERPL ELPH-MCNC: 2.5 G/DL — LOW (ref 3.3–5)
ALP SERPL-CCNC: 86 U/L — SIGNIFICANT CHANGE UP (ref 40–120)
ALT FLD-CCNC: 25 U/L — SIGNIFICANT CHANGE UP (ref 12–78)
ANION GAP SERPL CALC-SCNC: 6 MMOL/L — SIGNIFICANT CHANGE UP (ref 5–17)
AST SERPL-CCNC: 44 U/L — HIGH (ref 15–37)
BASOPHILS # BLD AUTO: 0.03 K/UL — SIGNIFICANT CHANGE UP (ref 0–0.2)
BASOPHILS NFR BLD AUTO: 0.3 % — SIGNIFICANT CHANGE UP (ref 0–2)
BILIRUB SERPL-MCNC: 0.6 MG/DL — SIGNIFICANT CHANGE UP (ref 0.2–1.2)
BUN SERPL-MCNC: 22 MG/DL — SIGNIFICANT CHANGE UP (ref 7–23)
CALCIUM SERPL-MCNC: 9.6 MG/DL — SIGNIFICANT CHANGE UP (ref 8.5–10.1)
CHLORIDE SERPL-SCNC: 110 MMOL/L — HIGH (ref 96–108)
CO2 SERPL-SCNC: 31 MMOL/L — SIGNIFICANT CHANGE UP (ref 22–31)
CREAT SERPL-MCNC: 0.54 MG/DL — SIGNIFICANT CHANGE UP (ref 0.5–1.3)
EGFR: 91 ML/MIN/1.73M2 — SIGNIFICANT CHANGE UP
EOSINOPHIL # BLD AUTO: 0.07 K/UL — SIGNIFICANT CHANGE UP (ref 0–0.5)
EOSINOPHIL NFR BLD AUTO: 0.6 % — SIGNIFICANT CHANGE UP (ref 0–6)
GLUCOSE SERPL-MCNC: 130 MG/DL — HIGH (ref 70–99)
HCT VFR BLD CALC: 33.9 % — LOW (ref 39–50)
HGB BLD-MCNC: 10.9 G/DL — LOW (ref 13–17)
IMM GRANULOCYTES NFR BLD AUTO: 0.3 % — SIGNIFICANT CHANGE UP (ref 0–0.9)
INR BLD: 1.33 RATIO — HIGH (ref 0.88–1.16)
LYMPHOCYTES # BLD AUTO: 1.38 K/UL — SIGNIFICANT CHANGE UP (ref 1–3.3)
LYMPHOCYTES # BLD AUTO: 12.5 % — LOW (ref 13–44)
MCHC RBC-ENTMCNC: 31.2 PG — SIGNIFICANT CHANGE UP (ref 27–34)
MCHC RBC-ENTMCNC: 32.2 GM/DL — SIGNIFICANT CHANGE UP (ref 32–36)
MCV RBC AUTO: 97.1 FL — SIGNIFICANT CHANGE UP (ref 80–100)
MONOCYTES # BLD AUTO: 0.77 K/UL — SIGNIFICANT CHANGE UP (ref 0–0.9)
MONOCYTES NFR BLD AUTO: 7 % — SIGNIFICANT CHANGE UP (ref 2–14)
NEUTROPHILS # BLD AUTO: 8.73 K/UL — HIGH (ref 1.8–7.4)
NEUTROPHILS NFR BLD AUTO: 79.3 % — HIGH (ref 43–77)
NRBC # BLD: 0 /100 WBCS — SIGNIFICANT CHANGE UP (ref 0–0)
PLATELET # BLD AUTO: 364 K/UL — SIGNIFICANT CHANGE UP (ref 150–400)
POTASSIUM SERPL-MCNC: 3.6 MMOL/L — SIGNIFICANT CHANGE UP (ref 3.5–5.3)
POTASSIUM SERPL-SCNC: 3.6 MMOL/L — SIGNIFICANT CHANGE UP (ref 3.5–5.3)
PROT SERPL-MCNC: 6.9 G/DL — SIGNIFICANT CHANGE UP (ref 6–8.3)
PROTHROM AB SERPL-ACNC: 15.6 SEC — HIGH (ref 10.5–13.4)
RBC # BLD: 3.49 M/UL — LOW (ref 4.2–5.8)
RBC # FLD: 13.7 % — SIGNIFICANT CHANGE UP (ref 10.3–14.5)
SODIUM SERPL-SCNC: 147 MMOL/L — HIGH (ref 135–145)
WBC # BLD: 11.01 K/UL — HIGH (ref 3.8–10.5)
WBC # FLD AUTO: 11.01 K/UL — HIGH (ref 3.8–10.5)

## 2022-12-18 RX ADMIN — SENNA PLUS 2 TABLET(S): 8.6 TABLET ORAL at 21:55

## 2022-12-18 RX ADMIN — HEPARIN SODIUM 5000 UNIT(S): 5000 INJECTION INTRAVENOUS; SUBCUTANEOUS at 06:12

## 2022-12-18 RX ADMIN — HEPARIN SODIUM 5000 UNIT(S): 5000 INJECTION INTRAVENOUS; SUBCUTANEOUS at 17:03

## 2022-12-18 RX ADMIN — TAMSULOSIN HYDROCHLORIDE 0.4 MILLIGRAM(S): 0.4 CAPSULE ORAL at 21:55

## 2022-12-18 RX ADMIN — PIPERACILLIN AND TAZOBACTAM 25 GRAM(S): 4; .5 INJECTION, POWDER, LYOPHILIZED, FOR SOLUTION INTRAVENOUS at 13:23

## 2022-12-18 RX ADMIN — Medication 325 MILLIGRAM(S): at 11:52

## 2022-12-18 RX ADMIN — SIMVASTATIN 20 MILLIGRAM(S): 20 TABLET, FILM COATED ORAL at 21:55

## 2022-12-18 RX ADMIN — Medication 1 TABLET(S): at 08:22

## 2022-12-18 RX ADMIN — Medication 1 TABLET(S): at 17:07

## 2022-12-18 RX ADMIN — DONEPEZIL HYDROCHLORIDE 5 MILLIGRAM(S): 10 TABLET, FILM COATED ORAL at 21:54

## 2022-12-18 RX ADMIN — PIPERACILLIN AND TAZOBACTAM 25 GRAM(S): 4; .5 INJECTION, POWDER, LYOPHILIZED, FOR SOLUTION INTRAVENOUS at 06:11

## 2022-12-18 RX ADMIN — FAMOTIDINE 40 MILLIGRAM(S): 10 INJECTION INTRAVENOUS at 06:11

## 2022-12-18 RX ADMIN — Medication 500 MILLIGRAM(S): at 13:23

## 2022-12-18 RX ADMIN — FAMOTIDINE 40 MILLIGRAM(S): 10 INJECTION INTRAVENOUS at 17:07

## 2022-12-18 RX ADMIN — POLYETHYLENE GLYCOL 3350 17 GRAM(S): 17 POWDER, FOR SOLUTION ORAL at 11:53

## 2022-12-18 RX ADMIN — Medication 1 TABLET(S): at 11:52

## 2022-12-18 RX ADMIN — PIPERACILLIN AND TAZOBACTAM 25 GRAM(S): 4; .5 INJECTION, POWDER, LYOPHILIZED, FOR SOLUTION INTRAVENOUS at 21:55

## 2022-12-18 RX ADMIN — Medication 25 MILLIGRAM(S): at 06:11

## 2022-12-18 NOTE — DIETITIAN INITIAL EVALUATION ADULT - SIGNS/SYMPTOMS
as evidenced by hx of dysphagia, prior swallow eval results.  as evidenced by NFPE findings- mild/moderate muscle depletion and fat loss, +wt loss?

## 2022-12-18 NOTE — DIETITIAN INITIAL EVALUATION ADULT - ADD RECOMMEND
1) Continue current diet order at this time  2) Recommend Glucerna BID  3) Continue MVI and vitamin C supplementation  4) Monitor po intake, diet tolerance, weight trends, labs, GI function, skin integrity

## 2022-12-18 NOTE — PHYSICAL THERAPY INITIAL EVALUATION ADULT - PERTINENT HX OF CURRENT PROBLEM, REHAB EVAL
89-year-old male with past medical history of dementia, anemia, atrial fibrillation, prostate cancer, diabetes, hyperlipidemia, CHF, hypertension, brought in by ambulance from [facility] due to foot infection.  Patient is a poor historian.  Patient was sent in due to left third toe infection.  Patient denies pain, fever, trauma, or any other complaints.

## 2022-12-18 NOTE — PROGRESS NOTE ADULT - SUBJECTIVE AND OBJECTIVE BOX
PROGRESS NOTE  Patient is a 89y old  Male who presents with a chief complaint of left foot infection (18 Dec 2022 05:53)  Chart and available morning labs /imaging are reviewed electronically , urgent issues addressed . More information  is being added upon completion of rounds , when more information is collected and management discussed with consultants , medical staff and social service/case management on the floor   OVERNIGHT  No new issues reported by medical staff . All above noted Patient is resting in a bed comfortably .Confused ,poor mentation .No distress noted   HPI:   89-year-old male with past medical history of dementia, anemia, atrial fibrillation, prostate cancer, diabetes, hyperlipidemia, CHF, hypertension, brought in by ambulance from [facility] due to foot infection.  Patient is a poor historian due to dementia .  Patient was sent in due to left third toe infection.  Patient denies pain, fever, trauma, or any other complaints. Seen by podiatry team in ER- and diagnosed with Gangrene of L foot -Applied dry sterile dressing to the left footApply offloading boots at all times when in bed to prevent the progression of pressure injuries to the foot   Bone scan performed on the last admission in 9/6/22 September was positive for osteomyelitis of the left second toe.  Per patient's son and has proxy patient was treated conservatively with midline antibiotic.Recommend vascular consult .Patient's disease has progressed from the last visit patient now has gangrene to the second third toes of the left, eschar to the medial first metatarsalpressure wound to the left heel eschar to the lateral fifth metatarsal and the lateral fifth digit. Will discuss with family and recommend more proximal amputationContinue with IV antibiotics per infectious disease  Will reconsult vascular for recommendations Applied dry sterile dressing to the left foot .Apply offloading boots at all times when in bed to prevent the progression of pressure injuries to the foot Bone scan performed on the last admission in 9/6/22 September was positive for osteomyelitis of the left second toe.  Per patient's son and has proxy patient was treated conservatively with midline antibiotic .Seen by ID -continue zosyn for now Palliative care consult requested ,to discuss advance directives and complete MOLST -GOC as per pall care MD  ,who followed the patient  in a  past .Spoke to PCP from [facility] and he is in agreement with plan for palliative care /CMO /hospice .   (17 Dec 2022 12:27)    PAST MEDICAL & SURGICAL HISTORY:  Atrial fibrillation      Hypertension      Diabetes      Prostate ca      Dementia      CHF (congestive heart failure)      Hyperlipemia      Diabetes      Depression      Dementia      DM (diabetes mellitus)      Atrial fibrillation      Prostate CA      Arteriosclerotic heart disease (ASHD)      Dementia      Anemia      AICD (automatic cardioverter/defibrillator) present      Osteomyelitis of finger of left hand      Personal history of radiation therapy      H/O ongoing treatment with hormonal therapy      H/O bladder infections      Scrotal abscess      Urinary retention      Hematuria      H/O cystitis      Constipation      VHD (valvular heart disease)      Aortic valve stenosis      History of automatic internal cardiac defibrillator (AICD)          MEDICATIONS  (STANDING):  ascorbic acid 500 milliGRAM(s) Oral daily  donepezil 5 milliGRAM(s) Oral at bedtime  famotidine    Tablet 40 milliGRAM(s) Oral two times a day  ferrous    sulfate 325 milliGRAM(s) Oral daily  heparin   Injectable 5000 Unit(s) SubCutaneous two times a day  lactobacillus acidophilus 1 Tablet(s) Oral two times a day with meals  metoprolol succinate ER 25 milliGRAM(s) Oral daily  multivitamin/minerals 1 Tablet(s) Oral daily  piperacillin/tazobactam IVPB.. 3.375 Gram(s) IV Intermittent every 8 hours  polyethylene glycol 3350 17 Gram(s) Oral daily  senna 2 Tablet(s) Oral at bedtime  simvastatin 20 milliGRAM(s) Oral at bedtime  tamsulosin 0.4 milliGRAM(s) Oral at bedtime    MEDICATIONS  (PRN):  acetaminophen     Tablet .. 650 milliGRAM(s) Oral every 6 hours PRN Temp greater or equal to 38C (100.4F), Mild Pain (1 - 3)  aluminum hydroxide/magnesium hydroxide/simethicone Suspension 30 milliLiter(s) Oral every 4 hours PRN Dyspepsia  melatonin 3 milliGRAM(s) Oral at bedtime PRN Insomnia  morphine  - Injectable 2 milliGRAM(s) IV Push every 6 hours PRN Severe Pain (7 - 10)  ondansetron Injectable 4 milliGRAM(s) IV Push every 8 hours PRN Nausea and/or Vomiting  traMADol 50 milliGRAM(s) Oral four times a day PRN Moderate Pain (4 - 6)      OBJECTIVE    T(C): 37.1 (12-18-22 @ 04:36), Max: 37.1 (12-18-22 @ 04:36)  HR: 103 (12-18-22 @ 04:36) (72 - 103)  BP: 103/58 (12-18-22 @ 04:36) (102/61 - 112/67)  RR: 18 (12-18-22 @ 04:36) (17 - 18)  SpO2: 100% (12-18-22 @ 04:36) (95% - 100%)  Wt(kg): --  I&O's Summary        Patient is  unable to provide any information/ROS  due to baseline mental status.   PHYSICAL EXAM:  Appearance: NAD. VS past 24 hrs -as above   HEENT:   Moist oral mucosa. Conjunctiva clear b/l.   Neck : supple  Respiratory: Lungs CTAB.  Gastrointestinal:  Soft, nontender. No rebound. No rigidity. BS present	  Cardiovascular: RRR ,S1S2 present  Neurologic: Non-focal. Moving all extremities.  Extremities: No edema. No erythema. No calf tenderness.  Skin: No rashes, No ecchymoses, No cyanosis.	  wounds ,skin lesions-See skin assesment flow sheet   LABS:                        10.9   11.01 )-----------( 364      ( 18 Dec 2022 06:29 )             33.9     12-18    147<H>  |  110<H>  |  22  ----------------------------<  130<H>  3.6   |  31  |  0.54    Ca    9.6      18 Dec 2022 06:29    TPro  6.9  /  Alb  2.5<L>  /  TBili  0.6  /  DBili  x   /  AST  44<H>  /  ALT  25  /  AlkPhos  86  12-18    CAPILLARY BLOOD GLUCOSE        PT/INR - ( 18 Dec 2022 06:29 )   PT: 15.6 sec;   INR: 1.33 ratio         PTT - ( 16 Dec 2022 20:00 )  PTT:32.7 sec      Culture - Blood (collected 16 Dec 2022 19:30)  Source: .Blood Blood-Peripheral  Preliminary Report (18 Dec 2022 01:02):    No growth to date.    Culture - Blood (collected 16 Dec 2022 19:25)  Source: .Blood Blood-Peripheral  Preliminary Report (18 Dec 2022 01:02):    No growth to date.      RADIOLOGY & ADDITIONAL TESTS:   reviewed elctronically  ASSESSMENT/PLAN: 	    25 minutes aggregate time was spent on this visit, 50% visit time spent in care co-ordination with other attendings and counselling patient .I have discussed care plan with patient / HCP/family member ,who expressed understanding of problems treatment and their effect and side effects, alternatives in details. I have asked if they have any questions and concerns and appropriately addressed them to best of my ability.

## 2022-12-18 NOTE — DIETITIAN INITIAL EVALUATION ADULT - MUSCLE MASS (LOSS OF MUSCLE)
Post-Op Instructions: The patient was instructed in the proper use of post-operative eye drops: Pred-Gati-Brom (1%/0.5%/0.075%) 4 times per day for 1 week, then reduce to 3 times per day for 1 week, then reduce to 2 times per day for 1 week, then reduce to 1 time per day for 1 week, then discontinue unless otherwise instructed. Temples.../Clavicles.../Shoulders...

## 2022-12-18 NOTE — DIETITIAN INITIAL EVALUATION ADULT - OTHER INFO
Pt is a 89-year-old male with past medical history of dementia, anemia, atrial fibrillation, prostate cancer, diabetes, hyperlipidemia, CHF, hypertension, brought in by ambulance from [facility] due to foot infection.  Visited pt at bedside this am. Pt asleep during visit. Pt is a poor historian, hx of dementia. Unable to obtain subjective hx at this time. Pt consumed >75% of breakfast meal this am. Needs total assistance with feeding. NKFA. No N/V/D/C per chart review. +BM 12/18. No weight documented in EMR. Bedscale wt of 126# obtained today. 2+ L foot edema noted. Pt presently on puree consistency diet with moderately thick liquids. Hx of dysphagia. Recommend SLP assessment to determine safest/least restricted po diet.

## 2022-12-18 NOTE — DIETITIAN INITIAL EVALUATION ADULT - PERTINENT MEDS FT
MEDICATIONS  (STANDING):  ascorbic acid 500 milliGRAM(s) Oral daily  donepezil 5 milliGRAM(s) Oral at bedtime  famotidine    Tablet 40 milliGRAM(s) Oral two times a day  ferrous    sulfate 325 milliGRAM(s) Oral daily  heparin   Injectable 5000 Unit(s) SubCutaneous two times a day  lactobacillus acidophilus 1 Tablet(s) Oral two times a day with meals  metoprolol succinate ER 25 milliGRAM(s) Oral daily  multivitamin/minerals 1 Tablet(s) Oral daily  piperacillin/tazobactam IVPB.. 3.375 Gram(s) IV Intermittent every 8 hours  polyethylene glycol 3350 17 Gram(s) Oral daily  senna 2 Tablet(s) Oral at bedtime  simvastatin 20 milliGRAM(s) Oral at bedtime  tamsulosin 0.4 milliGRAM(s) Oral at bedtime    MEDICATIONS  (PRN):  acetaminophen     Tablet .. 650 milliGRAM(s) Oral every 6 hours PRN Temp greater or equal to 38C (100.4F), Mild Pain (1 - 3)  aluminum hydroxide/magnesium hydroxide/simethicone Suspension 30 milliLiter(s) Oral every 4 hours PRN Dyspepsia  melatonin 3 milliGRAM(s) Oral at bedtime PRN Insomnia  morphine  - Injectable 2 milliGRAM(s) IV Push every 6 hours PRN Severe Pain (7 - 10)  ondansetron Injectable 4 milliGRAM(s) IV Push every 8 hours PRN Nausea and/or Vomiting  traMADol 50 milliGRAM(s) Oral four times a day PRN Moderate Pain (4 - 6)

## 2022-12-18 NOTE — PROGRESS NOTE ADULT - PROBLEM SELECTOR PLAN 1
As per vascular surgery team -Due to advanced age, dementia, contracted state, and multiple medical comorbidities, patient is not a surgical candidate.  - Recommend conservative management  and  GOC discussion as per palliative care MD cons   - Local wound care, antibiotics  as per ID cons .Social service consult -home hospice at [facility] vs inpatient facility

## 2022-12-18 NOTE — DIETITIAN INITIAL EVALUATION ADULT - NSFNSPHYEXAMSKINFT_GEN_A_CORE
Pressure Injury 1: Left:,heel, Suspected deep tissue injury  Pressure Injury 2: sacrum, Suspected deep tissue injury  Pressure Injury 3: none, none  Pressure Injury 4: none, none  Pressure Injury 5: none, none  Pressure Injury 6: none, none  Pressure Injury 7: none, none  Pressure Injury 8: none, none  Pressure Injury 9: none, none  Pressure Injury 10: none, none  Pressure Injury 11: none, none, Pressure Injury 1: Left:,heel, Suspected deep tissue injury  Pressure Injury 2: sacrum, Suspected deep tissue injury  Pressure Injury 3: none, none  Pressure Injury 4: none, none  Pressure Injury 5: none, none  Pressure Injury 6: none, none  Pressure Injury 7: none, none  Pressure Injury 8: none, none  Pressure Injury 9: none, none  Pressure Injury 10: none, none  Pressure Injury 11: none, none, Pressure Injury 1: Left:,heel, Suspected deep tissue injury  Pressure Injury 2: none, none  Pressure Injury 3: none, none  Pressure Injury 4: none, none  Pressure Injury 5: none, none  Pressure Injury 6: none, none  Pressure Injury 7: none, none  Pressure Injury 8: none, none  Pressure Injury 9: none, none  Pressure Injury 10: none, none  Pressure Injury 11: none, none

## 2022-12-18 NOTE — PHYSICAL THERAPY INITIAL EVALUATION ADULT - MANUAL MUSCLE TESTING RESULTS, REHAB EVAL
Patient instructed may take metoprolol am of surgery 8/6/2021 with sip of water. COVID-19 policy/visitor restrictions discussed.  
Not able to formally assess 2/2 mental status/not tested due to

## 2022-12-18 NOTE — DIETITIAN INITIAL EVALUATION ADULT - ORAL INTAKE PTA/DIET HISTORY
Pt admitted from [facility]. Reviewed transfer documents, pt was on a KHLOE, NCS diet PTA.

## 2022-12-18 NOTE — DIETITIAN INITIAL EVALUATION ADULT - ETIOLOGY
related to motor causes related to presumed inadequate protein/energy intake with increased needs in setting of pressure ulcers and advanced age

## 2022-12-18 NOTE — DIETITIAN INITIAL EVALUATION ADULT - PERTINENT LABORATORY DATA
12-18    147<H>  |  110<H>  |  22  ----------------------------<  130<H>  3.6   |  31  |  0.54    Ca    9.6      18 Dec 2022 06:29    TPro  6.9  /  Alb  2.5<L>  /  TBili  0.6  /  DBili  x   /  AST  44<H>  /  ALT  25  /  AlkPhos  86  12-18  A1C with Estimated Average Glucose Result: 6.8 % (09-03-22 @ 08:52)  A1C with Estimated Average Glucose Result: 6.4 % (07-01-22 @ 06:15)  A1C with Estimated Average Glucose Result: 5.9 % (04-04-22 @ 08:57)

## 2022-12-18 NOTE — PROGRESS NOTE ADULT - SUBJECTIVE AND OBJECTIVE BOX
Date/Time Patient Seen:  		  Referring MD:   Data Reviewed	       Patient is a 89y old  Male who presents with a chief complaint of left foot infection (17 Dec 2022 18:49)      Subjective/HPI     PAST MEDICAL & SURGICAL HISTORY:  Atrial fibrillation    Hypertension    Diabetes    Prostate ca    Dementia    CHF (congestive heart failure)    Hyperlipemia    Diabetes    Depression    Dementia    No pertinent past medical history    DM (diabetes mellitus)    Atrial fibrillation    Prostate CA    Arteriosclerotic heart disease (ASHD)    Dementia    Anemia    Constipation    AICD (automatic cardioverter/defibrillator) present    Osteomyelitis of finger of left hand    Personal history of radiation therapy    H/O ongoing treatment with hormonal therapy    H/O bladder infections    Scrotal abscess    Urinary retention    Hematuria    H/O cystitis    Constipation    VHD (valvular heart disease)    Aortic valve stenosis    No significant past surgical history    No significant past surgical history    History of automatic internal cardiac defibrillator (AICD)          Medication list         MEDICATIONS  (STANDING):  ascorbic acid 500 milliGRAM(s) Oral daily  donepezil 5 milliGRAM(s) Oral at bedtime  famotidine    Tablet 40 milliGRAM(s) Oral two times a day  ferrous    sulfate 325 milliGRAM(s) Oral daily  heparin   Injectable 5000 Unit(s) SubCutaneous two times a day  lactobacillus acidophilus 1 Tablet(s) Oral two times a day with meals  metoprolol succinate ER 25 milliGRAM(s) Oral daily  multivitamin/minerals 1 Tablet(s) Oral daily  piperacillin/tazobactam IVPB.. 3.375 Gram(s) IV Intermittent every 8 hours  polyethylene glycol 3350 17 Gram(s) Oral daily  senna 2 Tablet(s) Oral at bedtime  simvastatin 20 milliGRAM(s) Oral at bedtime  tamsulosin 0.4 milliGRAM(s) Oral at bedtime    MEDICATIONS  (PRN):  acetaminophen     Tablet .. 650 milliGRAM(s) Oral every 6 hours PRN Temp greater or equal to 38C (100.4F), Mild Pain (1 - 3)  aluminum hydroxide/magnesium hydroxide/simethicone Suspension 30 milliLiter(s) Oral every 4 hours PRN Dyspepsia  melatonin 3 milliGRAM(s) Oral at bedtime PRN Insomnia  morphine  - Injectable 2 milliGRAM(s) IV Push every 6 hours PRN Severe Pain (7 - 10)  ondansetron Injectable 4 milliGRAM(s) IV Push every 8 hours PRN Nausea and/or Vomiting  traMADol 50 milliGRAM(s) Oral four times a day PRN Moderate Pain (4 - 6)         Vitals log        ICU Vital Signs Last 24 Hrs  T(C): 37.1 (18 Dec 2022 04:36), Max: 37.1 (18 Dec 2022 04:36)  T(F): 98.8 (18 Dec 2022 04:36), Max: 98.8 (18 Dec 2022 04:36)  HR: 103 (18 Dec 2022 04:36) (72 - 103)  BP: 103/58 (18 Dec 2022 04:36) (102/61 - 112/67)  BP(mean): --  ABP: --  ABP(mean): --  RR: 18 (18 Dec 2022 04:36) (16 - 18)  SpO2: 100% (18 Dec 2022 04:36) (95% - 100%)    O2 Parameters below as of 18 Dec 2022 04:36  Patient On (Oxygen Delivery Method): room air                 Input and Output:  I&O's Detail      Lab Data                        11.8   14.92 )-----------( 431      ( 16 Dec 2022 20:00 )             35.8     12-16    141  |  107  |  37<H>  ----------------------------<  169<H>  4.0   |  28  |  0.86    Ca    9.5      16 Dec 2022 20:00    TPro  7.5  /  Alb  2.7<L>  /  TBili  0.5  /  DBili  x   /  AST  54<H>  /  ALT  23  /  AlkPhos  98  12-16            Review of Systems	      Objective     Physical Examination    heart s1s2  lung dc BS  head nc    Pertinent Lab findings & Imaging      [place]:  NO   Adequate UO     I&O's Detail           Discussed with:     Cultures:	        Radiology

## 2022-12-19 PROCEDURE — 99232 SBSQ HOSP IP/OBS MODERATE 35: CPT

## 2022-12-19 PROCEDURE — 93971 EXTREMITY STUDY: CPT | Mod: 26,RT

## 2022-12-19 RX ORDER — SODIUM CHLORIDE 9 MG/ML
1000 INJECTION, SOLUTION INTRAVENOUS
Refills: 0 | Status: DISCONTINUED | OUTPATIENT
Start: 2022-12-19 | End: 2022-12-22

## 2022-12-19 RX ORDER — GLUCAGON INJECTION, SOLUTION 0.5 MG/.1ML
1 INJECTION, SOLUTION SUBCUTANEOUS ONCE
Refills: 0 | Status: DISCONTINUED | OUTPATIENT
Start: 2022-12-19 | End: 2022-12-22

## 2022-12-19 RX ORDER — INSULIN LISPRO 100/ML
VIAL (ML) SUBCUTANEOUS
Refills: 0 | Status: DISCONTINUED | OUTPATIENT
Start: 2022-12-19 | End: 2022-12-22

## 2022-12-19 RX ORDER — DEXTROSE 50 % IN WATER 50 %
25 SYRINGE (ML) INTRAVENOUS ONCE
Refills: 0 | Status: DISCONTINUED | OUTPATIENT
Start: 2022-12-19 | End: 2022-12-22

## 2022-12-19 RX ORDER — DEXTROSE 50 % IN WATER 50 %
15 SYRINGE (ML) INTRAVENOUS ONCE
Refills: 0 | Status: DISCONTINUED | OUTPATIENT
Start: 2022-12-19 | End: 2022-12-22

## 2022-12-19 RX ORDER — LOSARTAN POTASSIUM 100 MG/1
25 TABLET, FILM COATED ORAL DAILY
Refills: 0 | Status: DISCONTINUED | OUTPATIENT
Start: 2022-12-19 | End: 2022-12-19

## 2022-12-19 RX ORDER — DEXTROSE 50 % IN WATER 50 %
12.5 SYRINGE (ML) INTRAVENOUS ONCE
Refills: 0 | Status: DISCONTINUED | OUTPATIENT
Start: 2022-12-19 | End: 2022-12-22

## 2022-12-19 RX ORDER — INSULIN LISPRO 100/ML
VIAL (ML) SUBCUTANEOUS AT BEDTIME
Refills: 0 | Status: DISCONTINUED | OUTPATIENT
Start: 2022-12-19 | End: 2022-12-22

## 2022-12-19 RX ADMIN — Medication 1 TABLET(S): at 11:40

## 2022-12-19 RX ADMIN — FAMOTIDINE 40 MILLIGRAM(S): 10 INJECTION INTRAVENOUS at 05:55

## 2022-12-19 RX ADMIN — HEPARIN SODIUM 5000 UNIT(S): 5000 INJECTION INTRAVENOUS; SUBCUTANEOUS at 05:55

## 2022-12-19 RX ADMIN — HEPARIN SODIUM 5000 UNIT(S): 5000 INJECTION INTRAVENOUS; SUBCUTANEOUS at 16:26

## 2022-12-19 RX ADMIN — PIPERACILLIN AND TAZOBACTAM 25 GRAM(S): 4; .5 INJECTION, POWDER, LYOPHILIZED, FOR SOLUTION INTRAVENOUS at 14:38

## 2022-12-19 RX ADMIN — Medication 1 TABLET(S): at 16:25

## 2022-12-19 RX ADMIN — SODIUM CHLORIDE 50 MILLILITER(S): 9 INJECTION, SOLUTION INTRAVENOUS at 12:06

## 2022-12-19 RX ADMIN — FAMOTIDINE 40 MILLIGRAM(S): 10 INJECTION INTRAVENOUS at 16:27

## 2022-12-19 RX ADMIN — POLYETHYLENE GLYCOL 3350 17 GRAM(S): 17 POWDER, FOR SOLUTION ORAL at 11:41

## 2022-12-19 RX ADMIN — Medication 325 MILLIGRAM(S): at 11:40

## 2022-12-19 RX ADMIN — Medication 25 MILLIGRAM(S): at 05:55

## 2022-12-19 RX ADMIN — SODIUM CHLORIDE 50 MILLILITER(S): 9 INJECTION, SOLUTION INTRAVENOUS at 23:10

## 2022-12-19 RX ADMIN — PIPERACILLIN AND TAZOBACTAM 25 GRAM(S): 4; .5 INJECTION, POWDER, LYOPHILIZED, FOR SOLUTION INTRAVENOUS at 23:09

## 2022-12-19 RX ADMIN — Medication 500 MILLIGRAM(S): at 11:41

## 2022-12-19 RX ADMIN — SIMVASTATIN 20 MILLIGRAM(S): 20 TABLET, FILM COATED ORAL at 23:09

## 2022-12-19 RX ADMIN — TAMSULOSIN HYDROCHLORIDE 0.4 MILLIGRAM(S): 0.4 CAPSULE ORAL at 23:09

## 2022-12-19 RX ADMIN — PIPERACILLIN AND TAZOBACTAM 25 GRAM(S): 4; .5 INJECTION, POWDER, LYOPHILIZED, FOR SOLUTION INTRAVENOUS at 05:55

## 2022-12-19 RX ADMIN — DONEPEZIL HYDROCHLORIDE 5 MILLIGRAM(S): 10 TABLET, FILM COATED ORAL at 23:09

## 2022-12-19 RX ADMIN — SENNA PLUS 2 TABLET(S): 8.6 TABLET ORAL at 23:08

## 2022-12-19 RX ADMIN — Medication 1: at 11:41

## 2022-12-19 NOTE — PROGRESS NOTE ADULT - SUBJECTIVE AND OBJECTIVE BOX
PROGRESS NOTE  Patient is a 89y old  Male who presents with a chief complaint of left foot infection (19 Dec 2022 05:12)    Chart and available morning labs /imaging are reviewed electronically , urgent issues addressed . More information  is being added upon completion of rounds , when more information is collected and management discussed with consultants , medical staff and social service/case management on the floor   OVERNIGHT  No new issues reported by medical staff . All above noted Patient is resting in a bed comfortably .Confused ,poor mentation .No distress noted     HPI:   89-year-old male with past medical history of dementia, anemia, atrial fibrillation, prostate cancer, diabetes, hyperlipidemia, CHF, hypertension, brought in by ambulance from [facility] due to foot infection.  Patient is a poor historian due to dementia .  Patient was sent in due to left third toe infection.  Patient denies pain, fever, trauma, or any other complaints. Seen by podiatry team in ER- and diagnosed with Gangrene of L foot -Applied dry sterile dressing to the left footApply offloading boots at all times when in bed to prevent the progression of pressure injuries to the foot   Bone scan performed on the last admission in 9/6/22 September was positive for osteomyelitis of the left second toe.  Per patient's son and has proxy patient was treated conservatively with midline antibiotic.Recommend vascular consult .Patient's disease has progressed from the last visit patient now has gangrene to the second third toes of the left, eschar to the medial first metatarsalpressure wound to the left heel eschar to the lateral fifth metatarsal and the lateral fifth digit. Will discuss with family and recommend more proximal amputationContinue with IV antibiotics per infectious disease  Will reconsult vascular for recommendations Applied dry sterile dressing to the left foot .Apply offloading boots at all times when in bed to prevent the progression of pressure injuries to the foot Bone scan performed on the last admission in 9/6/22 September was positive for osteomyelitis of the left second toe.  Per patient's son and has proxy patient was treated conservatively with midline antibiotic .Seen by ID -continue zosyn for now Palliative care consult requested ,to discuss advance directives and complete MOLST -GOC as per pall care MD  ,who followed the patient  in a  past .Spoke to PCP from [facility] and he is in agreement with plan for palliative care /CMO /hospice .   (17 Dec 2022 12:27)    PAST MEDICAL & SURGICAL HISTORY:  Atrial fibrillation      Hypertension      Diabetes      Prostate ca      Dementia      CHF (congestive heart failure)      Hyperlipemia      Diabetes      Depression      Dementia      DM (diabetes mellitus)      Atrial fibrillation      Prostate CA      Arteriosclerotic heart disease (ASHD)      Dementia      Anemia      AICD (automatic cardioverter/defibrillator) present      Osteomyelitis of finger of left hand      Personal history of radiation therapy      H/O ongoing treatment with hormonal therapy      H/O bladder infections      Scrotal abscess      Urinary retention      Hematuria      H/O cystitis      Constipation      VHD (valvular heart disease)      Aortic valve stenosis      History of automatic internal cardiac defibrillator (AICD)          MEDICATIONS  (STANDING):  ascorbic acid 500 milliGRAM(s) Oral daily  dextrose 5%. 1000 milliLiter(s) (100 mL/Hr) IV Continuous <Continuous>  dextrose 5%. 1000 milliLiter(s) (50 mL/Hr) IV Continuous <Continuous>  dextrose 50% Injectable 25 Gram(s) IV Push once  dextrose 50% Injectable 12.5 Gram(s) IV Push once  dextrose 50% Injectable 25 Gram(s) IV Push once  donepezil 5 milliGRAM(s) Oral at bedtime  famotidine    Tablet 40 milliGRAM(s) Oral two times a day  ferrous    sulfate 325 milliGRAM(s) Oral daily  glucagon  Injectable 1 milliGRAM(s) IntraMuscular once  heparin   Injectable 5000 Unit(s) SubCutaneous two times a day  insulin lispro (ADMELOG) corrective regimen sliding scale   SubCutaneous three times a day before meals  insulin lispro (ADMELOG) corrective regimen sliding scale   SubCutaneous at bedtime  lactobacillus acidophilus 1 Tablet(s) Oral two times a day with meals  metoprolol succinate ER 25 milliGRAM(s) Oral daily  multivitamin/minerals 1 Tablet(s) Oral daily  piperacillin/tazobactam IVPB.. 3.375 Gram(s) IV Intermittent every 8 hours  polyethylene glycol 3350 17 Gram(s) Oral daily  senna 2 Tablet(s) Oral at bedtime  simvastatin 20 milliGRAM(s) Oral at bedtime  sodium chloride 0.45%. 1000 milliLiter(s) (50 mL/Hr) IV Continuous <Continuous>  tamsulosin 0.4 milliGRAM(s) Oral at bedtime    MEDICATIONS  (PRN):  acetaminophen     Tablet .. 650 milliGRAM(s) Oral every 6 hours PRN Temp greater or equal to 38C (100.4F), Mild Pain (1 - 3)  aluminum hydroxide/magnesium hydroxide/simethicone Suspension 30 milliLiter(s) Oral every 4 hours PRN Dyspepsia  dextrose Oral Gel 15 Gram(s) Oral once PRN Blood Glucose LESS THAN 70 milliGRAM(s)/deciliter  melatonin 3 milliGRAM(s) Oral at bedtime PRN Insomnia  morphine  - Injectable 2 milliGRAM(s) IV Push every 6 hours PRN Severe Pain (7 - 10)  ondansetron Injectable 4 milliGRAM(s) IV Push every 8 hours PRN Nausea and/or Vomiting  traMADol 50 milliGRAM(s) Oral four times a day PRN Moderate Pain (4 - 6)      OBJECTIVE    T(C): 36.6 (12-19-22 @ 05:52), Max: 36.9 (12-18-22 @ 12:13)  HR: 90 (12-19-22 @ 05:52) (88 - 101)  BP: 105/53 (12-19-22 @ 05:52) (100/52 - 127/58)  RR: 18 (12-19-22 @ 05:52) (18 - 18)  SpO2: 93% (12-19-22 @ 05:52) (93% - 98%)  Wt(kg): --  I&O's Summary    18 Dec 2022 07:01  -  19 Dec 2022 07:00  --------------------------------------------------------  IN: 100 mL / OUT: 0 mL / NET: 100 mL          REVIEW OF SYSTEMS:  Patient is  unable to provide any information/ROS  due to baseline mental status.   PHYSICAL EXAM:  Appearance: NAD. VS past 24 hrs -as above   HEENT:   Moist oral mucosa. Conjunctiva clear b/l.   Neck : supple  Respiratory: Lungs CTAB.  Gastrointestinal:  Soft, nontender. No rebound. No rigidity. BS present	  Cardiovascular: RRR ,S1S2 present  Neurologic: Non-focal. Moving all extremities.  Extremities: No edema. No erythema. No calf tenderness.  Skin: No rashes, No ecchymoses, No cyanosis.	  wounds ,skin lesions-See skin assesment flow sheet   LABS:                        10.9   11.01 )-----------( 364      ( 18 Dec 2022 06:29 )             33.9     12-18    147<H>  |  110<H>  |  22  ----------------------------<  130<H>  3.6   |  31  |  0.54    Ca    9.6      18 Dec 2022 06:29    TPro  6.9  /  Alb  2.5<L>  /  TBili  0.6  /  DBili  x   /  AST  44<H>  /  ALT  25  /  AlkPhos  86  12-18    CAPILLARY BLOOD GLUCOSE        PT/INR - ( 18 Dec 2022 06:29 )   PT: 15.6 sec;   INR: 1.33 ratio               Culture - Blood (collected 16 Dec 2022 19:30)  Source: .Blood Blood-Peripheral  Preliminary Report (18 Dec 2022 01:02):    No growth to date.    Culture - Blood (collected 16 Dec 2022 19:25)  Source: .Blood Blood-Peripheral  Preliminary Report (18 Dec 2022 01:02):    No growth to date.      RADIOLOGY & ADDITIONAL TESTS:   reviewed elctronically  ASSESSMENT/PLAN: 	     PROGRESS NOTE  Patient is a 89y old  Male who presents with a chief complaint of left foot infection (19 Dec 2022 05:12)    Chart and available morning labs /imaging are reviewed electronically , urgent issues addressed . More information  is being added upon completion of rounds , when more information is collected and management discussed with consultants , medical staff and social service/case management on the floor   OVERNIGHT  No new issues reported by medical staff . All above noted Patient is resting in a bed comfortably .Confused ,poor mentation .No distress noted     HPI:   89-year-old male with past medical history of dementia, anemia, atrial fibrillation, prostate cancer, diabetes, hyperlipidemia, CHF, hypertension, brought in by ambulance from [facility] due to foot infection.  Patient is a poor historian due to dementia .  Patient was sent in due to left third toe infection.  Patient denies pain, fever, trauma, or any other complaints. Seen by podiatry team in ER- and diagnosed with Gangrene of L foot -Applied dry sterile dressing to the left footApply offloading boots at all times when in bed to prevent the progression of pressure injuries to the foot   Bone scan performed on the last admission in 9/6/22 September was positive for osteomyelitis of the left second toe.  Per patient's son and has proxy patient was treated conservatively with midline antibiotic.Recommend vascular consult .Patient's disease has progressed from the last visit patient now has gangrene to the second third toes of the left, eschar to the medial first metatarsalpressure wound to the left heel eschar to the lateral fifth metatarsal and the lateral fifth digit. Will discuss with family and recommend more proximal amputationContinue with IV antibiotics per infectious disease  Will reconsult vascular for recommendations Applied dry sterile dressing to the left foot .Apply offloading boots at all times when in bed to prevent the progression of pressure injuries to the foot Bone scan performed on the last admission in 9/6/22 September was positive for osteomyelitis of the left second toe.  Per patient's son and has proxy patient was treated conservatively with midline antibiotic .Seen by ID -continue zosyn for now Palliative care consult requested ,to discuss advance directives and complete MOLST -GOC as per pall care MD  ,who followed the patient  in a  past .Spoke to PCP from [facility] and he is in agreement with plan for palliative care /CMO /hospice .   (17 Dec 2022 12:27)    PAST MEDICAL & SURGICAL HISTORY:  Atrial fibrillation      Hypertension      Diabetes      Prostate ca      Dementia      CHF (congestive heart failure)      Hyperlipemia      Diabetes      Depression      Dementia      DM (diabetes mellitus)      Atrial fibrillation      Prostate CA      Arteriosclerotic heart disease (ASHD)      Dementia      Anemia      AICD (automatic cardioverter/defibrillator) present      Osteomyelitis of finger of left hand      Personal history of radiation therapy      H/O ongoing treatment with hormonal therapy      H/O bladder infections      Scrotal abscess      Urinary retention      Hematuria      H/O cystitis      Constipation      VHD (valvular heart disease)      Aortic valve stenosis      History of automatic internal cardiac defibrillator (AICD)          MEDICATIONS  (STANDING):  ascorbic acid 500 milliGRAM(s) Oral daily  dextrose 5%. 1000 milliLiter(s) (100 mL/Hr) IV Continuous <Continuous>  dextrose 5%. 1000 milliLiter(s) (50 mL/Hr) IV Continuous <Continuous>  dextrose 50% Injectable 25 Gram(s) IV Push once  dextrose 50% Injectable 12.5 Gram(s) IV Push once  dextrose 50% Injectable 25 Gram(s) IV Push once  donepezil 5 milliGRAM(s) Oral at bedtime  famotidine    Tablet 40 milliGRAM(s) Oral two times a day  ferrous    sulfate 325 milliGRAM(s) Oral daily  glucagon  Injectable 1 milliGRAM(s) IntraMuscular once  heparin   Injectable 5000 Unit(s) SubCutaneous two times a day  insulin lispro (ADMELOG) corrective regimen sliding scale   SubCutaneous three times a day before meals  insulin lispro (ADMELOG) corrective regimen sliding scale   SubCutaneous at bedtime  lactobacillus acidophilus 1 Tablet(s) Oral two times a day with meals  metoprolol succinate ER 25 milliGRAM(s) Oral daily  multivitamin/minerals 1 Tablet(s) Oral daily  piperacillin/tazobactam IVPB.. 3.375 Gram(s) IV Intermittent every 8 hours  polyethylene glycol 3350 17 Gram(s) Oral daily  senna 2 Tablet(s) Oral at bedtime  simvastatin 20 milliGRAM(s) Oral at bedtime  sodium chloride 0.45%. 1000 milliLiter(s) (50 mL/Hr) IV Continuous <Continuous>  tamsulosin 0.4 milliGRAM(s) Oral at bedtime    MEDICATIONS  (PRN):  acetaminophen     Tablet .. 650 milliGRAM(s) Oral every 6 hours PRN Temp greater or equal to 38C (100.4F), Mild Pain (1 - 3)  aluminum hydroxide/magnesium hydroxide/simethicone Suspension 30 milliLiter(s) Oral every 4 hours PRN Dyspepsia  dextrose Oral Gel 15 Gram(s) Oral once PRN Blood Glucose LESS THAN 70 milliGRAM(s)/deciliter  melatonin 3 milliGRAM(s) Oral at bedtime PRN Insomnia  morphine  - Injectable 2 milliGRAM(s) IV Push every 6 hours PRN Severe Pain (7 - 10)  ondansetron Injectable 4 milliGRAM(s) IV Push every 8 hours PRN Nausea and/or Vomiting  traMADol 50 milliGRAM(s) Oral four times a day PRN Moderate Pain (4 - 6)      OBJECTIVE    T(C): 36.6 (12-19-22 @ 05:52), Max: 36.9 (12-18-22 @ 12:13)  HR: 90 (12-19-22 @ 05:52) (88 - 101)  BP: 105/53 (12-19-22 @ 05:52) (100/52 - 127/58)  RR: 18 (12-19-22 @ 05:52) (18 - 18)  SpO2: 93% (12-19-22 @ 05:52) (93% - 98%)  Wt(kg): --  I&O's Summary    18 Dec 2022 07:01  -  19 Dec 2022 07:00  --------------------------------------------------------  IN: 100 mL / OUT: 0 mL / NET: 100 mL          REVIEW OF SYSTEMS:  Patient is  unable to provide any information/ROS  due to baseline mental status.   PHYSICAL EXAM:  Appearance: NAD. VS past 24 hrs -as above   HEENT:   Moist oral mucosa. Conjunctiva clear b/l.   Neck : supple  Respiratory: Lungs CTAB.  Gastrointestinal:  Soft, nontender. No rebound. No rigidity. BS present	  Cardiovascular: RRR ,S1S2 present  Neurologic: Non-focal. Moving all extremities.  Extremities: No edema. No erythema. No calf tenderness.  Skin: No rashes, No ecchymoses, No cyanosis.	  wounds ,skin lesions-See skin assesment flow sheet   LABS:                        10.9   11.01 )-----------( 364      ( 18 Dec 2022 06:29 )             33.9     12-18    147<H>  |  110<H>  |  22  ----------------------------<  130<H>  3.6   |  31  |  0.54    Ca    9.6      18 Dec 2022 06:29    TPro  6.9  /  Alb  2.5<L>  /  TBili  0.6  /  DBili  x   /  AST  44<H>  /  ALT  25  /  AlkPhos  86  12-18    CAPILLARY BLOOD GLUCOSE        PT/INR - ( 18 Dec 2022 06:29 )   PT: 15.6 sec;   INR: 1.33 ratio               Culture - Blood (collected 16 Dec 2022 19:30)  Source: .Blood Blood-Peripheral  Preliminary Report (18 Dec 2022 01:02):    No growth to date.    Culture - Blood (collected 16 Dec 2022 19:25)  Source: .Blood Blood-Peripheral  Preliminary Report (18 Dec 2022 01:02):    No growth to date.      RADIOLOGY & ADDITIONAL TESTS:   reviewed elctronically  ASSESSMENT/PLAN: 	    25 minutes aggregate time was spent on this visit, 50% visit time spent in care co-ordination with other attendings and counselling patient .I have discussed care plan with patient / HCP/family member ,who expressed understanding of problems treatment and their effect and side effects, alternatives in details. I have asked if they have any questions and concerns and appropriately addressed them to best of my ability.

## 2022-12-19 NOTE — PROGRESS NOTE ADULT - SUBJECTIVE AND OBJECTIVE BOX
Date/Time Patient Seen:  		  Referring MD:   Data Reviewed	       Patient is a 89y old  Male who presents with a chief complaint of Local infection of skin and subcutaneous tissue     (18 Dec 2022 10:53)      Subjective/HPI     PAST MEDICAL & SURGICAL HISTORY:  Atrial fibrillation    Hypertension    Diabetes    Prostate ca    Dementia    CHF (congestive heart failure)    Hyperlipemia    Diabetes    Depression    Dementia    No pertinent past medical history    DM (diabetes mellitus)    Atrial fibrillation    Prostate CA    Arteriosclerotic heart disease (ASHD)    Dementia    Anemia    Constipation    AICD (automatic cardioverter/defibrillator) present    Osteomyelitis of finger of left hand    Personal history of radiation therapy    H/O ongoing treatment with hormonal therapy    H/O bladder infections    Scrotal abscess    Urinary retention    Hematuria    H/O cystitis    Constipation    VHD (valvular heart disease)    Aortic valve stenosis    No significant past surgical history    No significant past surgical history    History of automatic internal cardiac defibrillator (AICD)          Medication list         MEDICATIONS  (STANDING):  ascorbic acid 500 milliGRAM(s) Oral daily  donepezil 5 milliGRAM(s) Oral at bedtime  famotidine    Tablet 40 milliGRAM(s) Oral two times a day  ferrous    sulfate 325 milliGRAM(s) Oral daily  heparin   Injectable 5000 Unit(s) SubCutaneous two times a day  lactobacillus acidophilus 1 Tablet(s) Oral two times a day with meals  metoprolol succinate ER 25 milliGRAM(s) Oral daily  multivitamin/minerals 1 Tablet(s) Oral daily  piperacillin/tazobactam IVPB.. 3.375 Gram(s) IV Intermittent every 8 hours  polyethylene glycol 3350 17 Gram(s) Oral daily  senna 2 Tablet(s) Oral at bedtime  simvastatin 20 milliGRAM(s) Oral at bedtime  tamsulosin 0.4 milliGRAM(s) Oral at bedtime    MEDICATIONS  (PRN):  acetaminophen     Tablet .. 650 milliGRAM(s) Oral every 6 hours PRN Temp greater or equal to 38C (100.4F), Mild Pain (1 - 3)  aluminum hydroxide/magnesium hydroxide/simethicone Suspension 30 milliLiter(s) Oral every 4 hours PRN Dyspepsia  melatonin 3 milliGRAM(s) Oral at bedtime PRN Insomnia  morphine  - Injectable 2 milliGRAM(s) IV Push every 6 hours PRN Severe Pain (7 - 10)  ondansetron Injectable 4 milliGRAM(s) IV Push every 8 hours PRN Nausea and/or Vomiting  traMADol 50 milliGRAM(s) Oral four times a day PRN Moderate Pain (4 - 6)         Vitals log        ICU Vital Signs Last 24 Hrs  T(C): 36.7 (18 Dec 2022 19:58), Max: 36.9 (18 Dec 2022 12:13)  T(F): 98.1 (18 Dec 2022 19:58), Max: 98.4 (18 Dec 2022 12:13)  HR: 88 (18 Dec 2022 19:58) (88 - 101)  BP: 127/58 (18 Dec 2022 21:54) (100/52 - 127/58)  BP(mean): --  ABP: --  ABP(mean): --  RR: 18 (18 Dec 2022 19:58) (18 - 18)  SpO2: 93% (18 Dec 2022 19:58) (93% - 98%)    O2 Parameters below as of 18 Dec 2022 19:58  Patient On (Oxygen Delivery Method): room air                 Input and Output:  I&O's Detail    18 Dec 2022 07:01  -  19 Dec 2022 05:12  --------------------------------------------------------  IN:    IV PiggyBack: 100 mL  Total IN: 100 mL    OUT:  Total OUT: 0 mL    Total NET: 100 mL          Lab Data                        10.9   11.01 )-----------( 364      ( 18 Dec 2022 06:29 )             33.9     12-18    147<H>  |  110<H>  |  22  ----------------------------<  130<H>  3.6   |  31  |  0.54    Ca    9.6      18 Dec 2022 06:29    TPro  6.9  /  Alb  2.5<L>  /  TBili  0.6  /  DBili  x   /  AST  44<H>  /  ALT  25  /  AlkPhos  86  12-18            Review of Systems	      Objective     Physical Examination    heart s1s2  lung dec BS  head nc      Pertinent Lab findings & Imaging      Grayson:  NO   Adequate UO     I&O's Detail    18 Dec 2022 07:01  -  19 Dec 2022 05:12  --------------------------------------------------------  IN:    IV PiggyBack: 100 mL  Total IN: 100 mL    OUT:  Total OUT: 0 mL    Total NET: 100 mL               Discussed with:     Cultures:	        Radiology

## 2022-12-19 NOTE — PROGRESS NOTE ADULT - PROBLEM SELECTOR PLAN 1
As per vascular surgery team -Due to advanced age, dementia, contracted state, and multiple medical comorbidities, patient is not a surgical candidate.  - Recommend conservative management  and  GOC discussion as per palliative care MD cons   - Local wound care, antibiotics  as per ID cons .Social service consult -home hospice at [facility] vs inpatient facility

## 2022-12-19 NOTE — PROGRESS NOTE ADULT - PROBLEM SELECTOR PLAN 1
Continue local wound care at this time   Apply offloading boots at all times when in bed to prevent the progression of pressure injuries to the foot   Bone scan performed on the last admission in 9/6/22 September was positive for osteomyelitis of the left second toe.  Per patient's son and has proxy patient was treated conservatively with midline antibiotic.  Recommend vascular consult  Patient's disease has progressed from the last visit patient now has gangrene to the second third toes of the left, eschar to the medial first metatarsal pressure wound to the left heel eschar to the lateral fifth metatarsal and the lateral fifth digit.  Continue with IV antibiotics per infectious disease  Per phone conversation with son, discussed surgical vs conservative treatment recommended surgical intervention including  transmetatarsal amputation for the left foot since the patient has increased wounds to the left foot including gangrene to the 2nd and 3rd digit with necrotic eschars to the 1st and 5th metatarsals, also has heel wound. Patient's son requested to think about the options at this time and wound like to have a discussion with his family and will inform of the decision by tomorrow.   Will continue local wound care at this time

## 2022-12-19 NOTE — PROGRESS NOTE ADULT - SUBJECTIVE AND OBJECTIVE BOX
TERESA FRANCIS is a 89yMale , patient examined and chart reviewed.    INTERVAL HPI/ OVERNIGHT EVENTS:   No events. Afebrile.    PAST MEDICAL & SURGICAL HISTORY:  Atrial fibrillation  Hypertension  Diabetes  Prostate ca  Dementia  CHF (congestive heart failure)  Hyperlipemia  Diabetes  Depression  Anemia  AICD (automatic cardioverter/defibrillator) present  Osteomyelitis of finger of left hand  Personal history of radiation therapy  H/O ongoing treatment with hormonal therapy  H/O bladder infections  Scrotal abscess  Urinary retention  Hematuria  H/O cystitis  Constipation  VHD (valvular heart disease)  Aortic valve stenosis        For details regarding the patient's social history, family history, and other miscellaneous elements, please refer the initial infectious diseases consultation and/or the admitting history and physical examination for this admission.    ROS:  Unable to obtain due to : Dementia    ALLERGIES:  latex (Rash)      Current inpatient medications :    ANTIBIOTICS/RELEVANT:  lactobacillus acidophilus 1 Tablet(s) Oral two times a day with meals  piperacillin/tazobactam IVPB.. 3.375 Gram(s) IV Intermittent every 8 hours      acetaminophen     Tablet .. 650 milliGRAM(s) Oral every 6 hours PRN  aluminum hydroxide/magnesium hydroxide/simethicone Suspension 30 milliLiter(s) Oral every 4 hours PRN  ascorbic acid 500 milliGRAM(s) Oral daily  dextrose 5%. 1000 milliLiter(s) IV Continuous <Continuous>  dextrose 5%. 1000 milliLiter(s) IV Continuous <Continuous>  dextrose 50% Injectable 25 Gram(s) IV Push once  dextrose 50% Injectable 12.5 Gram(s) IV Push once  dextrose 50% Injectable 25 Gram(s) IV Push once  dextrose Oral Gel 15 Gram(s) Oral once PRN  donepezil 5 milliGRAM(s) Oral at bedtime  famotidine    Tablet 40 milliGRAM(s) Oral two times a day  ferrous    sulfate 325 milliGRAM(s) Oral daily  glucagon  Injectable 1 milliGRAM(s) IntraMuscular once  heparin   Injectable 5000 Unit(s) SubCutaneous two times a day  insulin lispro (ADMELOG) corrective regimen sliding scale   SubCutaneous three times a day before meals  insulin lispro (ADMELOG) corrective regimen sliding scale   SubCutaneous at bedtime  melatonin 3 milliGRAM(s) Oral at bedtime PRN  metoprolol succinate ER 25 milliGRAM(s) Oral daily  morphine  - Injectable 2 milliGRAM(s) IV Push every 6 hours PRN  multivitamin/minerals 1 Tablet(s) Oral daily  ondansetron Injectable 4 milliGRAM(s) IV Push every 8 hours PRN  polyethylene glycol 3350 17 Gram(s) Oral daily  senna 2 Tablet(s) Oral at bedtime  simvastatin 20 milliGRAM(s) Oral at bedtime  sodium chloride 0.45%. 1000 milliLiter(s) IV Continuous <Continuous>  tamsulosin 0.4 milliGRAM(s) Oral at bedtime  traMADol 50 milliGRAM(s) Oral four times a day PRN      Objective:    12-18 @ 07:01  -  12-19 @ 07:00  --------------------------------------------------------  IN: 100 mL / OUT: 0 mL / NET: 100 mL      T(C): 36.4 (12-19-22 @ 08:35), Max: 36.6 (12-19-22 @ 05:52)  HR: 82 (12-19-22 @ 08:35) (82 - 90)  BP: 98/53 (12-19-22 @ 08:35) (98/53 - 127/58)  RR: 20 (12-19-22 @ 08:35) (18 - 20)  SpO2: 95% (12-19-22 @ 08:35) (93% - 95%)  Wt(kg): --      Physical Exam:  General: Confused no acute distress  Neck: supple, trachea midline  Lungs: clear, no wheeze/rhonchi  Cardiovascular: regular rate and rhythm, S1 S2  Abdomen: soft, nontender,  bowel sounds normal  Neurological: Dementia nonverbal  Skin: no rash  Extremities: Left rd toe gangrene  Left heel decub ulcer          LABS:                          10.9   11.01 )-----------( 364      ( 18 Dec 2022 06:29 )             33.9       12-18    147<H>  |  110<H>  |  22  ----------------------------<  130<H>  3.6   |  31  |  0.54    Ca    9.6      18 Dec 2022 06:29    TPro  6.9  /  Alb  2.5<L>  /  TBili  0.6  /  DBili  x   /  AST  44<H>  /  ALT  25  /  AlkPhos  86  12-18      PT/INR - ( 18 Dec 2022 06:29 )   PT: 15.6 sec;   INR: 1.33 ratio        MICROBIOLOGY:              RADIOLOGY & ADDITIONAL STUDIES:    ACC: [id number] EXAM:  XR FOOT COMP MIN 3 VIEWS LT                          PROCEDURE DATE:  12/16/2022          INTERPRETATION:  Left third toe infection.    3 views left foot.    IMPRESSION: No definite focal bone lysis or unusual periosteal reaction.   No acute fracture or dislocation. Multiple hammertoe deformities. Joint   spaces preserved. No soft tissue gas. Small vessel calcification.    If there is concern for osteomyelitis consider MRI for more sensitive   evaluation          Assessment :  89-year-old male with past medical history of dementia, anemia, atrial fibrillation, prostate cancer, diabetes, hyperlipidemia, CHF, hypertension, brought in by ambulance from [facility] due to foot infection.  leukocytosis  dry gangrene left foot  Severe PVD    plan  Antibx have limited role here and were already attempted last admission ( completed 4 weeks of Rocephin )  Gangrene has progressed due to severe PVD  No intervention per vascular and limited benefit at this time  cont local care  can zosyn-- but doubt this has any impact  Poss surgical intervention per podiatry  If cultures neg will dc antibiotics  Asp precautions        Continue with present regiment.  Appropriate use of antibiotics and adverse effects reviewed.    > 35 minutes were spent in direct patient care reviewing notes, medications ,labs data/ imaging , discussion with multidisciplinary team.    Thank you for allowing me to participate in care of your patient .    Heber Issa MD  Infectious Disease  [phone]

## 2022-12-19 NOTE — SWALLOW BEDSIDE ASSESSMENT ADULT - COMMENTS
HPI:   89-year-old male with past medical history of dementia, anemia, atrial fibrillation, prostate cancer, diabetes, hyperlipidemia, CHF, hypertension, brought in by ambulance from [facility] due to foot infection.  Patient is a poor historian due to dementia .  Patient was sent in due to left third toe infection.  Patient denies pain, fever, trauma, or any other complaints. Seen by podiatry team in ER- and diagnosed with Gangrene of L foot -Applied dry sterile dressing to the left foot  WBC elevated   No chest imaging during this admission    Orders received and chart reviewed. Upon approach, patient is asleep but wakes easily with cues from SLP. Pt is AAOx0, does not follow commands. The patient is not in distress prior to or following assessment. Thorough evaluation not completed as the patient forms tight labial seal when presented with utensil. Does not accept any PO trials despite max encouragement/cueing.

## 2022-12-19 NOTE — PROGRESS NOTE ADULT - SUBJECTIVE AND OBJECTIVE BOX
89y year old Male seen at [facility] 1EAS 112 D1 for left foot gangrene. Patient resting in bed comfortably. Patient is non verbal.     Allergies    latex (Rash)  latex (Unknown)  No Known Drug Allergies    Intolerances        MEDICATIONS  (STANDING):  ascorbic acid 500 milliGRAM(s) Oral daily  dextrose 5%. 1000 milliLiter(s) (100 mL/Hr) IV Continuous <Continuous>  dextrose 5%. 1000 milliLiter(s) (50 mL/Hr) IV Continuous <Continuous>  dextrose 50% Injectable 25 Gram(s) IV Push once  dextrose 50% Injectable 12.5 Gram(s) IV Push once  dextrose 50% Injectable 25 Gram(s) IV Push once  donepezil 5 milliGRAM(s) Oral at bedtime  famotidine    Tablet 40 milliGRAM(s) Oral two times a day  ferrous    sulfate 325 milliGRAM(s) Oral daily  glucagon  Injectable 1 milliGRAM(s) IntraMuscular once  heparin   Injectable 5000 Unit(s) SubCutaneous two times a day  insulin lispro (ADMELOG) corrective regimen sliding scale   SubCutaneous three times a day before meals  insulin lispro (ADMELOG) corrective regimen sliding scale   SubCutaneous at bedtime  lactobacillus acidophilus 1 Tablet(s) Oral two times a day with meals  metoprolol succinate ER 25 milliGRAM(s) Oral daily  multivitamin/minerals 1 Tablet(s) Oral daily  piperacillin/tazobactam IVPB.. 3.375 Gram(s) IV Intermittent every 8 hours  polyethylene glycol 3350 17 Gram(s) Oral daily  senna 2 Tablet(s) Oral at bedtime  simvastatin 20 milliGRAM(s) Oral at bedtime  sodium chloride 0.45%. 1000 milliLiter(s) (50 mL/Hr) IV Continuous <Continuous>  tamsulosin 0.4 milliGRAM(s) Oral at bedtime    MEDICATIONS  (PRN):  acetaminophen     Tablet .. 650 milliGRAM(s) Oral every 6 hours PRN Temp greater or equal to 38C (100.4F), Mild Pain (1 - 3)  aluminum hydroxide/magnesium hydroxide/simethicone Suspension 30 milliLiter(s) Oral every 4 hours PRN Dyspepsia  dextrose Oral Gel 15 Gram(s) Oral once PRN Blood Glucose LESS THAN 70 milliGRAM(s)/deciliter  melatonin 3 milliGRAM(s) Oral at bedtime PRN Insomnia  morphine  - Injectable 2 milliGRAM(s) IV Push every 6 hours PRN Severe Pain (7 - 10)  ondansetron Injectable 4 milliGRAM(s) IV Push every 8 hours PRN Nausea and/or Vomiting  traMADol 50 milliGRAM(s) Oral four times a day PRN Moderate Pain (4 - 6)      Vital Signs Last 24 Hrs  T(C): 36.4 (19 Dec 2022 08:35), Max: 36.6 (19 Dec 2022 05:52)  T(F): 97.5 (19 Dec 2022 08:35), Max: 97.8 (19 Dec 2022 05:52)  HR: 82 (19 Dec 2022 08:35) (82 - 90)  BP: 98/53 (19 Dec 2022 08:35) (98/53 - 127/58)  BP(mean): --  RR: 20 (19 Dec 2022 08:35) (18 - 20)  SpO2: 95% (19 Dec 2022 08:35) (93% - 95%)    Parameters below as of 19 Dec 2022 08:35  Patient On (Oxygen Delivery Method): nasal cannula  O2 Flow (L/min): 2      PHYSICAL EXAM:  Vascular: DP & PT non palpable bilaterally  Neurological: couldn't  assess   Musculoskeletal: couldn't assess  Dermatological:  Left foot noted with the gangrenous changes to the second digit with bone exposed, third digit gangrenous to the base of the proximal phalanx, positive probe to bone; medial first metatarsal head wound with a dry eschar; posterior heel wound down to the level of fascia: Mild erythema noted to the forefoot and the heel, no malodor no proximal streaking, no fluctuance      CBC Full  -  ( 18 Dec 2022 06:29 )  WBC Count : 11.01 K/uL  RBC Count : 3.49 M/uL  Hemoglobin : 10.9 g/dL  Hematocrit : 33.9 %  Platelet Count - Automated : 364 K/uL  Mean Cell Volume : 97.1 fl  Mean Cell Hemoglobin : 31.2 pg  Mean Cell Hemoglobin Concentration : 32.2 gm/dL  Auto Neutrophil # : 8.73 K/uL  Auto Lymphocyte # : 1.38 K/uL  Auto Monocyte # : 0.77 K/uL  Auto Eosinophil # : 0.07 K/uL  Auto Basophil # : 0.03 K/uL  Auto Neutrophil % : 79.3 %  Auto Lymphocyte % : 12.5 %  Auto Monocyte % : 7.0 %  Auto Eosinophil % : 0.6 %  Auto Basophil % : 0.3 %      12-18    147<H>  |  110<H>  |  22  ----------------------------<  130<H>  3.6   |  31  |  0.54    Ca    9.6      18 Dec 2022 06:29    TPro  6.9  /  Alb  2.5<L>  /  TBili  0.6  /  DBili  x   /  AST  44<H>  /  ALT  25  /  AlkPhos  86  12-18                          10.9   11.01 )-----------( 364      ( 18 Dec 2022 06:29 )             33.9       PT/INR - ( 18 Dec 2022 06:29 )   PT: 15.6 sec;   INR: 1.33 ratio           Imaging: ----------  1 / 1 Meet Wallace   Report date: 9/7/2022   {"javascript:void(0)"} View Order   (Report matches study selected on Patient History pane)         ACC: [id number] EXAM: NM MULTI DAY PROCEDURE  ACC: [id number] EXAM: NM INFLAMM LOC WBC SA SD    PROCEDURE DATE: 09/07/2022        INTERPRETATION: RADIOPHARMACEUTICAL: 11.2 millicuries Tc-99m labeled autologous leukocytes, IV.    CLINICAL INFORMATION: 89 year old man with nonhealing ulcer of the LEFT second toe; referred to evaluate for infection.    TECHNIQUE: Radiolabeled autologous leukocytes were injected on 9/6/2022. Approximately 24 hours later static images in multiple projections were obtained of the feet.    COMPARISON: No prior scintigraphy available.    CORRELATION: 3 view radiography LEFT foot 9/1/2022 (soft tissue swelling without radiographic evidence of osteomyelitis).    FINDINGS: Increased labeled white cell accumulation is seen in the region of the distal LEFT second metatarsal/MTP joint. Physiologic distribution is seen elsewhere.    IMPRESSION: AbnormalTc-99m labeled leukocyte scan. Findings consistent with osteomyelitis.    --- End of Report ---

## 2022-12-20 LAB
A1C WITH ESTIMATED AVERAGE GLUCOSE RESULT: 6.6 % — HIGH (ref 4–5.6)
ANION GAP SERPL CALC-SCNC: 4 MMOL/L — LOW (ref 5–17)
BUN SERPL-MCNC: 15 MG/DL — SIGNIFICANT CHANGE UP (ref 7–23)
CALCIUM SERPL-MCNC: 9.2 MG/DL — SIGNIFICANT CHANGE UP (ref 8.5–10.1)
CHLORIDE SERPL-SCNC: 112 MMOL/L — HIGH (ref 96–108)
CO2 SERPL-SCNC: 30 MMOL/L — SIGNIFICANT CHANGE UP (ref 22–31)
CREAT SERPL-MCNC: 0.5 MG/DL — SIGNIFICANT CHANGE UP (ref 0.5–1.3)
EGFR: 93 ML/MIN/1.73M2 — SIGNIFICANT CHANGE UP
ESTIMATED AVERAGE GLUCOSE: 143 MG/DL — HIGH (ref 68–114)
GLUCOSE SERPL-MCNC: 127 MG/DL — HIGH (ref 70–99)
HCT VFR BLD CALC: 34 % — LOW (ref 39–50)
HGB BLD-MCNC: 10.9 G/DL — LOW (ref 13–17)
MCHC RBC-ENTMCNC: 31.2 PG — SIGNIFICANT CHANGE UP (ref 27–34)
MCHC RBC-ENTMCNC: 32.1 GM/DL — SIGNIFICANT CHANGE UP (ref 32–36)
MCV RBC AUTO: 97.4 FL — SIGNIFICANT CHANGE UP (ref 80–100)
NRBC # BLD: 0 /100 WBCS — SIGNIFICANT CHANGE UP (ref 0–0)
PLATELET # BLD AUTO: 337 K/UL — SIGNIFICANT CHANGE UP (ref 150–400)
POTASSIUM SERPL-MCNC: 3.3 MMOL/L — LOW (ref 3.5–5.3)
POTASSIUM SERPL-SCNC: 3.3 MMOL/L — LOW (ref 3.5–5.3)
RBC # BLD: 3.49 M/UL — LOW (ref 4.2–5.8)
RBC # FLD: 13.6 % — SIGNIFICANT CHANGE UP (ref 10.3–14.5)
SODIUM SERPL-SCNC: 146 MMOL/L — HIGH (ref 135–145)
WBC # BLD: 10.34 K/UL — SIGNIFICANT CHANGE UP (ref 3.8–10.5)
WBC # FLD AUTO: 10.34 K/UL — SIGNIFICANT CHANGE UP (ref 3.8–10.5)

## 2022-12-20 RX ORDER — POTASSIUM CHLORIDE 20 MEQ
10 PACKET (EA) ORAL
Refills: 0 | Status: COMPLETED | OUTPATIENT
Start: 2022-12-20 | End: 2022-12-20

## 2022-12-20 RX ADMIN — SODIUM CHLORIDE 50 MILLILITER(S): 9 INJECTION, SOLUTION INTRAVENOUS at 22:50

## 2022-12-20 RX ADMIN — POLYETHYLENE GLYCOL 3350 17 GRAM(S): 17 POWDER, FOR SOLUTION ORAL at 11:25

## 2022-12-20 RX ADMIN — PIPERACILLIN AND TAZOBACTAM 25 GRAM(S): 4; .5 INJECTION, POWDER, LYOPHILIZED, FOR SOLUTION INTRAVENOUS at 05:59

## 2022-12-20 RX ADMIN — SENNA PLUS 2 TABLET(S): 8.6 TABLET ORAL at 22:50

## 2022-12-20 RX ADMIN — Medication 1 TABLET(S): at 08:22

## 2022-12-20 RX ADMIN — PIPERACILLIN AND TAZOBACTAM 25 GRAM(S): 4; .5 INJECTION, POWDER, LYOPHILIZED, FOR SOLUTION INTRAVENOUS at 14:15

## 2022-12-20 RX ADMIN — SODIUM CHLORIDE 50 MILLILITER(S): 9 INJECTION, SOLUTION INTRAVENOUS at 11:25

## 2022-12-20 RX ADMIN — DONEPEZIL HYDROCHLORIDE 5 MILLIGRAM(S): 10 TABLET, FILM COATED ORAL at 22:50

## 2022-12-20 RX ADMIN — Medication 100 MILLIEQUIVALENT(S): at 12:40

## 2022-12-20 RX ADMIN — Medication 100 MILLIEQUIVALENT(S): at 15:58

## 2022-12-20 RX ADMIN — Medication 325 MILLIGRAM(S): at 11:25

## 2022-12-20 RX ADMIN — HEPARIN SODIUM 5000 UNIT(S): 5000 INJECTION INTRAVENOUS; SUBCUTANEOUS at 05:54

## 2022-12-20 RX ADMIN — FAMOTIDINE 40 MILLIGRAM(S): 10 INJECTION INTRAVENOUS at 17:17

## 2022-12-20 RX ADMIN — Medication 1: at 12:14

## 2022-12-20 RX ADMIN — Medication 100 MILLIEQUIVALENT(S): at 13:44

## 2022-12-20 RX ADMIN — Medication 500 MILLIGRAM(S): at 11:25

## 2022-12-20 RX ADMIN — FAMOTIDINE 40 MILLIGRAM(S): 10 INJECTION INTRAVENOUS at 05:54

## 2022-12-20 RX ADMIN — SIMVASTATIN 20 MILLIGRAM(S): 20 TABLET, FILM COATED ORAL at 22:50

## 2022-12-20 RX ADMIN — PIPERACILLIN AND TAZOBACTAM 25 GRAM(S): 4; .5 INJECTION, POWDER, LYOPHILIZED, FOR SOLUTION INTRAVENOUS at 22:50

## 2022-12-20 RX ADMIN — HEPARIN SODIUM 5000 UNIT(S): 5000 INJECTION INTRAVENOUS; SUBCUTANEOUS at 17:17

## 2022-12-20 RX ADMIN — TAMSULOSIN HYDROCHLORIDE 0.4 MILLIGRAM(S): 0.4 CAPSULE ORAL at 22:50

## 2022-12-20 RX ADMIN — Medication 1 TABLET(S): at 11:25

## 2022-12-20 RX ADMIN — Medication 25 MILLIGRAM(S): at 05:54

## 2022-12-20 RX ADMIN — Medication 1 TABLET(S): at 17:17

## 2022-12-20 NOTE — PROGRESS NOTE ADULT - SUBJECTIVE AND OBJECTIVE BOX
Date/Time Patient Seen:  		  Referring MD:   Data Reviewed	       Patient is a 89y old  Male who presents with a chief complaint of left foot infection (19 Dec 2022 20:20)      Subjective/HPI     PAST MEDICAL & SURGICAL HISTORY:  Atrial fibrillation    Hypertension    Diabetes    Prostate ca    Dementia    CHF (congestive heart failure)    Hyperlipemia    Diabetes    Depression    Dementia    No pertinent past medical history    DM (diabetes mellitus)    Atrial fibrillation    Prostate CA    Arteriosclerotic heart disease (ASHD)    Dementia    Anemia    Constipation    AICD (automatic cardioverter/defibrillator) present    Osteomyelitis of finger of left hand    Personal history of radiation therapy    H/O ongoing treatment with hormonal therapy    H/O bladder infections    Scrotal abscess    Urinary retention    Hematuria    H/O cystitis    Constipation    VHD (valvular heart disease)    Aortic valve stenosis    No significant past surgical history    No significant past surgical history    History of automatic internal cardiac defibrillator (AICD)          Medication list         MEDICATIONS  (STANDING):  ascorbic acid 500 milliGRAM(s) Oral daily  dextrose 5%. 1000 milliLiter(s) (50 mL/Hr) IV Continuous <Continuous>  dextrose 5%. 1000 milliLiter(s) (100 mL/Hr) IV Continuous <Continuous>  dextrose 50% Injectable 25 Gram(s) IV Push once  dextrose 50% Injectable 12.5 Gram(s) IV Push once  dextrose 50% Injectable 25 Gram(s) IV Push once  donepezil 5 milliGRAM(s) Oral at bedtime  famotidine    Tablet 40 milliGRAM(s) Oral two times a day  ferrous    sulfate 325 milliGRAM(s) Oral daily  glucagon  Injectable 1 milliGRAM(s) IntraMuscular once  heparin   Injectable 5000 Unit(s) SubCutaneous two times a day  insulin lispro (ADMELOG) corrective regimen sliding scale   SubCutaneous three times a day before meals  insulin lispro (ADMELOG) corrective regimen sliding scale   SubCutaneous at bedtime  lactobacillus acidophilus 1 Tablet(s) Oral two times a day with meals  metoprolol succinate ER 25 milliGRAM(s) Oral daily  multivitamin/minerals 1 Tablet(s) Oral daily  piperacillin/tazobactam IVPB.. 3.375 Gram(s) IV Intermittent every 8 hours  polyethylene glycol 3350 17 Gram(s) Oral daily  senna 2 Tablet(s) Oral at bedtime  simvastatin 20 milliGRAM(s) Oral at bedtime  sodium chloride 0.45%. 1000 milliLiter(s) (50 mL/Hr) IV Continuous <Continuous>  tamsulosin 0.4 milliGRAM(s) Oral at bedtime    MEDICATIONS  (PRN):  acetaminophen     Tablet .. 650 milliGRAM(s) Oral every 6 hours PRN Temp greater or equal to 38C (100.4F), Mild Pain (1 - 3)  aluminum hydroxide/magnesium hydroxide/simethicone Suspension 30 milliLiter(s) Oral every 4 hours PRN Dyspepsia  dextrose Oral Gel 15 Gram(s) Oral once PRN Blood Glucose LESS THAN 70 milliGRAM(s)/deciliter  melatonin 3 milliGRAM(s) Oral at bedtime PRN Insomnia  morphine  - Injectable 2 milliGRAM(s) IV Push every 6 hours PRN Severe Pain (7 - 10)  ondansetron Injectable 4 milliGRAM(s) IV Push every 8 hours PRN Nausea and/or Vomiting  traMADol 50 milliGRAM(s) Oral four times a day PRN Moderate Pain (4 - 6)         Vitals log        ICU Vital Signs Last 24 Hrs  T(C): 37 (19 Dec 2022 20:43), Max: 37 (19 Dec 2022 20:43)  T(F): 98.6 (19 Dec 2022 20:43), Max: 98.6 (19 Dec 2022 20:43)  HR: 86 (19 Dec 2022 20:43) (82 - 90)  BP: 112/70 (19 Dec 2022 20:43) (98/53 - 112/70)  BP(mean): --  ABP: --  ABP(mean): --  RR: 18 (19 Dec 2022 20:43) (18 - 20)  SpO2: 91% (19 Dec 2022 20:43) (91% - 95%)    O2 Parameters below as of 19 Dec 2022 08:35  Patient On (Oxygen Delivery Method): nasal cannula  O2 Flow (L/min): 2               Input and Output:  I&O's Detail    18 Dec 2022 07:01  -  19 Dec 2022 07:00  --------------------------------------------------------  IN:    IV PiggyBack: 100 mL  Total IN: 100 mL    OUT:  Total OUT: 0 mL    Total NET: 100 mL          Lab Data                        10.9   11.01 )-----------( 364      ( 18 Dec 2022 06:29 )             33.9     12-18    147<H>  |  110<H>  |  22  ----------------------------<  130<H>  3.6   |  31  |  0.54    Ca    9.6      18 Dec 2022 06:29    TPro  6.9  /  Alb  2.5<L>  /  TBili  0.6  /  DBili  x   /  AST  44<H>  /  ALT  25  /  AlkPhos  86  12-18            Review of Systems	      Objective     Physical Examination    heart s1s2  lung dec BS  head nc      Pertinent Lab findings & Imaging      Grayson:  NO   Adequate UO     I&O's Detail    18 Dec 2022 07:01  -  19 Dec 2022 07:00  --------------------------------------------------------  IN:    IV PiggyBack: 100 mL  Total IN: 100 mL    OUT:  Total OUT: 0 mL    Total NET: 100 mL               Discussed with:     Cultures:	        Radiology

## 2022-12-20 NOTE — PROGRESS NOTE ADULT - SUBJECTIVE AND OBJECTIVE BOX
Chart and available morning labs /imaging are reviewed electronically , urgent issues addressed . More information  is being added upon completion of rounds , when more information is collected and management discussed with consultants , medical staff and social service/case management on the floor .late entry dos 12/20/22    No new issues reported by medical staff . All above noted   TERESA FRANCIS is a 89yMale , patient examined and chart reviewed.  Patient is resting in a bed comfortably .Confused ,poor mentation .No distress noted   INTERVAL HPI/ OVERNIGHT EVENTS:   NAD.   No events. Afebrile.    PAST MEDICAL & SURGICAL HISTORY:  Atrial fibrillation  Hypertension  Diabetes  Prostate ca  Dementia  CHF (congestive heart failure)  Hyperlipemia  Diabetes  Depression  Anemia  AICD (automatic cardioverter/defibrillator) present  Osteomyelitis of finger of left hand  Personal history of radiation therapy  H/O ongoing treatment with hormonal therapy  H/O bladder infections  Scrotal abscess  Urinary retention  Hematuria  H/O cystitis  Constipation  VHD (valvular heart disease)  Aortic valve stenosis        For details regarding the patient's social history, family history, and other miscellaneous elements, please refer the initial infectious diseases consultation and/or the admitting history and physical examination for this admission.    ROS:  Unable to obtain due to : Dementia    ALLERGIES:  latex (Rash)      Current inpatient medications :    ANTIBIOTICS/RELEVANT:  piperacillin/tazobactam IVPB.. 3.375 Gram(s) IV Intermittent every 8 hours    MEDICATIONS  (STANDING):  ascorbic acid 500 milliGRAM(s) Oral daily  dextrose 5%. 1000 milliLiter(s) (50 mL/Hr) IV Continuous <Continuous>  dextrose 5%. 1000 milliLiter(s) (100 mL/Hr) IV Continuous <Continuous>  dextrose 50% Injectable 25 Gram(s) IV Push once  dextrose 50% Injectable 12.5 Gram(s) IV Push once  dextrose 50% Injectable 25 Gram(s) IV Push once  donepezil 5 milliGRAM(s) Oral at bedtime  famotidine    Tablet 40 milliGRAM(s) Oral two times a day  ferrous    sulfate 325 milliGRAM(s) Oral daily  glucagon  Injectable 1 milliGRAM(s) IntraMuscular once  heparin   Injectable 5000 Unit(s) SubCutaneous two times a day  insulin lispro (ADMELOG) corrective regimen sliding scale   SubCutaneous three times a day before meals  insulin lispro (ADMELOG) corrective regimen sliding scale   SubCutaneous at bedtime  lactobacillus acidophilus 1 Tablet(s) Oral two times a day with meals  metoprolol succinate ER 25 milliGRAM(s) Oral daily  multivitamin/minerals 1 Tablet(s) Oral daily  polyethylene glycol 3350 17 Gram(s) Oral daily  senna 2 Tablet(s) Oral at bedtime  simvastatin 20 milliGRAM(s) Oral at bedtime  sodium chloride 0.45%. 1000 milliLiter(s) (50 mL/Hr) IV Continuous <Continuous>  tamsulosin 0.4 milliGRAM(s) Oral at bedtime    MEDICATIONS  (PRN):  acetaminophen     Tablet .. 650 milliGRAM(s) Oral every 6 hours PRN Temp greater or equal to 38C (100.4F), Mild Pain (1 - 3)  aluminum hydroxide/magnesium hydroxide/simethicone Suspension 30 milliLiter(s) Oral every 4 hours PRN Dyspepsia  dextrose Oral Gel 15 Gram(s) Oral once PRN Blood Glucose LESS THAN 70 milliGRAM(s)/deciliter  melatonin 3 milliGRAM(s) Oral at bedtime PRN Insomnia  morphine  - Injectable 2 milliGRAM(s) IV Push every 6 hours PRN Severe Pain (7 - 10)  ondansetron Injectable 4 milliGRAM(s) IV Push every 8 hours PRN Nausea and/or Vomiting  traMADol 50 milliGRAM(s) Oral four times a day PRN Moderate Pain (4 - 6)      Objective:  Vital Signs Last 24 Hrs  T(C): 36.8 (20 Dec 2022 20:42), Max: 36.8 (20 Dec 2022 20:42)  T(F): 98.3 (20 Dec 2022 20:42), Max: 98.3 (20 Dec 2022 20:42)  HR: 57 (20 Dec 2022 20:42) (57 - 88)  BP: 135/71 (20 Dec 2022 20:42) (97/61 - 135/71)  RR: 18 (20 Dec 2022 20:42) (17 - 18)  SpO2: 93% (20 Dec 2022 20:42) (93% - 95%)    Parameters below as of 20 Dec 2022 20:42  Patient On (Oxygen Delivery Method): room air      Physical Exam:  General: Confused no acute distress  Neck: supple, trachea midline  Lungs: clear, no wheeze/rhonchi  Cardiovascular: regular rate and rhythm, S1 S2  Abdomen: soft, nontender,  bowel sounds normal  Neurological: Dementia nonverbal  Skin: no rash  Extremities: Left rd toe gangrene  Left heel decub ulcer          LABS:                        10.9   10.34 )-----------( 337      ( 20 Dec 2022 05:44 )             34.0   12-20    146<H>  |  112<H>  |  15  ----------------------------<  127<H>  3.3<L>   |  30  |  0.50    Ca    9.2      20 Dec 2022 05:44        MICROBIOLOGY:  Culture - Blood (12.16.22 @ 19:30)    Specimen Source: .Blood Blood-Peripheral    Culture Results:   No growth to date.    RADIOLOGY & ADDITIONAL STUDIES:    ACC: [id number] EXAM:  XR FOOT COMP MIN 3 VIEWS LT                          PROCEDURE DATE:  12/16/2022          INTERPRETATION:  Left third toe infection.    3 views left foot.    IMPRESSION: No definite focal bone lysis or unusual periosteal reaction.   No acute fracture or dislocation. Multiple hammertoe deformities. Joint   spaces preserved. No soft tissue gas. Small vessel calcification.    If there is concern for osteomyelitis consider MRI for more sensitive   evaluation          Assessment :  89-year-old male with past medical history of dementia, anemia, atrial fibrillation, prostate cancer, diabetes, hyperlipidemia, CHF, hypertension, brought in by ambulance from [facility] due to foot infection.  leukocytosis  dry gangrene left foot  Severe PVD  TMA recommended by podiatry    plan  Antibx have limited role here and were already attempted last admission ( completed 4 weeks of Rocephin )  Gangrene has progressed due to severe PVD  No intervention per vascular and limited benefit at this time  cont local care  can zosyn-- but doubt this has any impact  Poss surgical intervention- TMA per podiatry  If cultures neg will dc antibiotics  Asp precautions        Continue with present regiment.  Appropriate use of antibiotics and adverse effects reviewed.    25 minutes aggregate time was spent on this visit, 50% visit time spent in care co-ordination with other attendings and counselling patient .I have discussed care plan with patient / HCP/family member ,who expressed understanding of problems treatment and their effect and side effects, alternatives in details. I have asked if they have any questions and concerns and appropriately addressed them to best of my ability.

## 2022-12-20 NOTE — PROGRESS NOTE ADULT - SUBJECTIVE AND OBJECTIVE BOX
TITOTERESA is a yMale , patient examined and chart reviewed.    INTERVAL HPI/ OVERNIGHT EVENTS:   NAD.   No events. Afebrile.    PAST MEDICAL & SURGICAL HISTORY:  Atrial fibrillation  Hypertension  Diabetes  Prostate ca  Dementia  CHF (congestive heart failure)  Hyperlipemia  Diabetes  Depression  Anemia  AICD (automatic cardioverter/defibrillator) present  Osteomyelitis of finger of left hand  Personal history of radiation therapy  H/O ongoing treatment with hormonal therapy  H/O bladder infections  Scrotal abscess  Urinary retention  Hematuria  H/O cystitis  Constipation  VHD (valvular heart disease)  Aortic valve stenosis        For details regarding the patient's social history, family history, and other miscellaneous elements, please refer the initial infectious diseases consultation and/or the admitting history and physical examination for this admission.    ROS:  Unable to obtain due to : Dementia    ALLERGIES:  latex (Rash)      Current inpatient medications :    ANTIBIOTICS/RELEVANT:  piperacillin/tazobactam IVPB.. 3.375 Gram(s) IV Intermittent every 8 hours    MEDICATIONS  (STANDING):  ascorbic acid 500 milliGRAM(s) Oral daily  dextrose 5%. 1000 milliLiter(s) (50 mL/Hr) IV Continuous <Continuous>  dextrose 5%. 1000 milliLiter(s) (100 mL/Hr) IV Continuous <Continuous>  dextrose 50% Injectable 25 Gram(s) IV Push once  dextrose 50% Injectable 12.5 Gram(s) IV Push once  dextrose 50% Injectable 25 Gram(s) IV Push once  donepezil 5 milliGRAM(s) Oral at bedtime  famotidine    Tablet 40 milliGRAM(s) Oral two times a day  ferrous    sulfate 325 milliGRAM(s) Oral daily  glucagon  Injectable 1 milliGRAM(s) IntraMuscular once  heparin   Injectable 5000 Unit(s) SubCutaneous two times a day  insulin lispro (ADMELOG) corrective regimen sliding scale   SubCutaneous three times a day before meals  insulin lispro (ADMELOG) corrective regimen sliding scale   SubCutaneous at bedtime  lactobacillus acidophilus 1 Tablet(s) Oral two times a day with meals  metoprolol succinate ER 25 milliGRAM(s) Oral daily  multivitamin/minerals 1 Tablet(s) Oral daily  polyethylene glycol 3350 17 Gram(s) Oral daily  senna 2 Tablet(s) Oral at bedtime  simvastatin 20 milliGRAM(s) Oral at bedtime  sodium chloride 0.45%. 1000 milliLiter(s) (50 mL/Hr) IV Continuous <Continuous>  tamsulosin 0.4 milliGRAM(s) Oral at bedtime    MEDICATIONS  (PRN):  acetaminophen     Tablet .. 650 milliGRAM(s) Oral every 6 hours PRN Temp greater or equal to 38C (100.4F), Mild Pain (1 - 3)  aluminum hydroxide/magnesium hydroxide/simethicone Suspension 30 milliLiter(s) Oral every 4 hours PRN Dyspepsia  dextrose Oral Gel 15 Gram(s) Oral once PRN Blood Glucose LESS THAN 70 milliGRAM(s)/deciliter  melatonin 3 milliGRAM(s) Oral at bedtime PRN Insomnia  morphine  - Injectable 2 milliGRAM(s) IV Push every 6 hours PRN Severe Pain (7 - 10)  ondansetron Injectable 4 milliGRAM(s) IV Push every 8 hours PRN Nausea and/or Vomiting  traMADol 50 milliGRAM(s) Oral four times a day PRN Moderate Pain (4 - 6)      Objective:  Vital Signs Last 24 Hrs  T(C): 36.8 (20 Dec 2022 20:42), Max: 36.8 (20 Dec 2022 20:42)  T(F): 98.3 (20 Dec 2022 20:42), Max: 98.3 (20 Dec 2022 20:42)  HR: 57 (20 Dec 2022 20:42) (57 - 88)  BP: 135/71 (20 Dec 2022 20:42) (97/61 - 135/71)  RR: 18 (20 Dec 2022 20:42) (17 - 18)  SpO2: 93% (20 Dec 2022 20:42) (93% - 95%)    Parameters below as of 20 Dec 2022 20:42  Patient On (Oxygen Delivery Method): room air      Physical Exam:  General: Confused no acute distress  Neck: supple, trachea midline  Lungs: clear, no wheeze/rhonchi  Cardiovascular: regular rate and rhythm, S1 S2  Abdomen: soft, nontender,  bowel sounds normal  Neurological: Dementia nonverbal  Skin: no rash  Extremities: Left rd toe gangrene  Left heel decub ulcer          LABS:                        10.9   10.34 )-----------( 337      ( 20 Dec 2022 05:44 )             34.0   12-20    146<H>  |  112<H>  |  15  ----------------------------<  127<H>  3.3<L>   |  30  |  0.50    Ca    9.2      20 Dec 2022 05:44        MICROBIOLOGY:  Culture - Blood (12.16.22 @ 19:30)    Specimen Source: .Blood Blood-Peripheral    Culture Results:   No growth to date.    RADIOLOGY & ADDITIONAL STUDIES:    ACC: [id number] EXAM:  XR FOOT COMP MIN 3 VIEWS LT                          PROCEDURE DATE:  12/16/2022          INTERPRETATION:  Left third toe infection.    3 views left foot.    IMPRESSION: No definite focal bone lysis or unusual periosteal reaction.   No acute fracture or dislocation. Multiple hammertoe deformities. Joint   spaces preserved. No soft tissue gas. Small vessel calcification.    If there is concern for osteomyelitis consider MRI for more sensitive   evaluation          Assessment :  89-year-old male with past medical history of dementia, anemia, atrial fibrillation, prostate cancer, diabetes, hyperlipidemia, CHF, hypertension, brought in by ambulance from [facility] due to foot infection.  leukocytosis  dry gangrene left foot  Severe PVD  TMA recommended by podiatry    plan  Antibx have limited role here and were already attempted last admission ( completed 4 weeks of Rocephin )  Gangrene has progressed due to severe PVD  No intervention per vascular and limited benefit at this time  cont local care  can zosyn-- but doubt this has any impact  Poss surgical intervention- TMA per podiatry  If cultures neg will dc antibiotics  Asp precautions        Continue with present regiment.  Appropriate use of antibiotics and adverse effects reviewed.    > 35 minutes were spent in direct patient care reviewing notes, medications ,labs data/ imaging , discussion with multidisciplinary team.    Thank you for allowing me to participate in care of your patient .    Heber Issa MD  Infectious Disease  [phone]

## 2022-12-20 NOTE — PROGRESS NOTE ADULT - PROBLEM SELECTOR PLAN 1
As per vascular surgery team -Due to advanced age, dementia, contracted state, and multiple medical comorbidities, patient is not a surgical candidate.  - Recommend conservative management  and  GOC discussion as per palliative care MD cons   - Local wound care, antibiotics  as per ID cons .Social service consult -home hospice at [facility] vs inpatient facility

## 2022-12-21 LAB
ANION GAP SERPL CALC-SCNC: 6 MMOL/L — SIGNIFICANT CHANGE UP (ref 5–17)
BUN SERPL-MCNC: 11 MG/DL — SIGNIFICANT CHANGE UP (ref 7–23)
CALCIUM SERPL-MCNC: 9.3 MG/DL — SIGNIFICANT CHANGE UP (ref 8.5–10.1)
CHLORIDE SERPL-SCNC: 112 MMOL/L — HIGH (ref 96–108)
CO2 SERPL-SCNC: 29 MMOL/L — SIGNIFICANT CHANGE UP (ref 22–31)
CREAT SERPL-MCNC: 0.46 MG/DL — LOW (ref 0.5–1.3)
EGFR: 96 ML/MIN/1.73M2 — SIGNIFICANT CHANGE UP
GLUCOSE SERPL-MCNC: 121 MG/DL — HIGH (ref 70–99)
HCT VFR BLD CALC: 33 % — LOW (ref 39–50)
HGB BLD-MCNC: 10.6 G/DL — LOW (ref 13–17)
MCHC RBC-ENTMCNC: 31.2 PG — SIGNIFICANT CHANGE UP (ref 27–34)
MCHC RBC-ENTMCNC: 32.1 GM/DL — SIGNIFICANT CHANGE UP (ref 32–36)
MCV RBC AUTO: 97.1 FL — SIGNIFICANT CHANGE UP (ref 80–100)
NRBC # BLD: 0 /100 WBCS — SIGNIFICANT CHANGE UP (ref 0–0)
PLATELET # BLD AUTO: 305 K/UL — SIGNIFICANT CHANGE UP (ref 150–400)
POTASSIUM SERPL-MCNC: 4 MMOL/L — SIGNIFICANT CHANGE UP (ref 3.5–5.3)
POTASSIUM SERPL-SCNC: 4 MMOL/L — SIGNIFICANT CHANGE UP (ref 3.5–5.3)
RBC # BLD: 3.4 M/UL — LOW (ref 4.2–5.8)
RBC # FLD: 13.6 % — SIGNIFICANT CHANGE UP (ref 10.3–14.5)
SODIUM SERPL-SCNC: 147 MMOL/L — HIGH (ref 135–145)
WBC # BLD: 10.67 K/UL — HIGH (ref 3.8–10.5)
WBC # FLD AUTO: 10.67 K/UL — HIGH (ref 3.8–10.5)

## 2022-12-21 PROCEDURE — 99232 SBSQ HOSP IP/OBS MODERATE 35: CPT

## 2022-12-21 RX ADMIN — Medication 25 MILLIGRAM(S): at 05:52

## 2022-12-21 RX ADMIN — PIPERACILLIN AND TAZOBACTAM 25 GRAM(S): 4; .5 INJECTION, POWDER, LYOPHILIZED, FOR SOLUTION INTRAVENOUS at 05:51

## 2022-12-21 RX ADMIN — FAMOTIDINE 40 MILLIGRAM(S): 10 INJECTION INTRAVENOUS at 05:51

## 2022-12-21 RX ADMIN — SENNA PLUS 2 TABLET(S): 8.6 TABLET ORAL at 22:36

## 2022-12-21 RX ADMIN — Medication 325 MILLIGRAM(S): at 12:01

## 2022-12-21 RX ADMIN — DONEPEZIL HYDROCHLORIDE 5 MILLIGRAM(S): 10 TABLET, FILM COATED ORAL at 22:36

## 2022-12-21 RX ADMIN — Medication 1 TABLET(S): at 17:52

## 2022-12-21 RX ADMIN — HEPARIN SODIUM 5000 UNIT(S): 5000 INJECTION INTRAVENOUS; SUBCUTANEOUS at 05:52

## 2022-12-21 RX ADMIN — Medication 1 TABLET(S): at 08:21

## 2022-12-21 RX ADMIN — PIPERACILLIN AND TAZOBACTAM 25 GRAM(S): 4; .5 INJECTION, POWDER, LYOPHILIZED, FOR SOLUTION INTRAVENOUS at 22:36

## 2022-12-21 RX ADMIN — Medication 1 TABLET(S): at 12:01

## 2022-12-21 RX ADMIN — SIMVASTATIN 20 MILLIGRAM(S): 20 TABLET, FILM COATED ORAL at 22:35

## 2022-12-21 RX ADMIN — HEPARIN SODIUM 5000 UNIT(S): 5000 INJECTION INTRAVENOUS; SUBCUTANEOUS at 17:52

## 2022-12-21 RX ADMIN — PIPERACILLIN AND TAZOBACTAM 25 GRAM(S): 4; .5 INJECTION, POWDER, LYOPHILIZED, FOR SOLUTION INTRAVENOUS at 13:44

## 2022-12-21 RX ADMIN — Medication 500 MILLIGRAM(S): at 12:01

## 2022-12-21 RX ADMIN — SODIUM CHLORIDE 50 MILLILITER(S): 9 INJECTION, SOLUTION INTRAVENOUS at 22:36

## 2022-12-21 RX ADMIN — POLYETHYLENE GLYCOL 3350 17 GRAM(S): 17 POWDER, FOR SOLUTION ORAL at 12:01

## 2022-12-21 RX ADMIN — TAMSULOSIN HYDROCHLORIDE 0.4 MILLIGRAM(S): 0.4 CAPSULE ORAL at 22:35

## 2022-12-21 RX ADMIN — SODIUM CHLORIDE 50 MILLILITER(S): 9 INJECTION, SOLUTION INTRAVENOUS at 20:40

## 2022-12-21 RX ADMIN — FAMOTIDINE 40 MILLIGRAM(S): 10 INJECTION INTRAVENOUS at 17:53

## 2022-12-21 NOTE — CHART NOTE - NSCHARTNOTEFT_GEN_A_CORE
Assessment: Pt seen for nutrition follow-up. Chart reviewed, hospital course noted.    Brief hx: Pt is a 89-year-old male with past medical history of dementia, anemia, atrial fibrillation, prostate cancer, diabetes, hyperlipidemia, CHF, hypertension, brought in by ambulance from [facility] due to foot infection.  Patient is a poor historian due to dementia.     Visited pt at bedside this am. Pt asleep during visit. Spoke with nursing, pt did not want to consume breakfast meal this am. Pt continues on puree consistency diet with moderately thick liquids per MD order. Swallow evaluation attempted 12/19- SLP deferred diet consistency to MD due to patient not participating in evaluation. Pt tolerating diet well. Poor po intake today. GOC conversation 12/20- DNR/DNI noted, limited interventions.     Factors impacting intake: [ ] none [ ] nausea  [ ] vomiting [ ] diarrhea [ ] constipation  [ ]chewing problems [X] swallowing issues  [X] other: total feed    Diet Prescription: Diet, Pureed:   Moderately Thick Liquids (MODTHICKLIQS)  Supplement Feeding Modality:  Oral  Glucerna Shake Cans or Servings Per Day:  1       Frequency:  Two Times a day (12-18-22 @ 11:18)    Intake: poor    Current Weight: 12/18 126#, 12/21 136.6# (2+ R arm/hand, L foot edema noted)  % Weight Change    Pertinent Medications: MEDICATIONS  (STANDING):  ascorbic acid 500 milliGRAM(s) Oral daily  dextrose 5%. 1000 milliLiter(s) (50 mL/Hr) IV Continuous <Continuous>  dextrose 5%. 1000 milliLiter(s) (100 mL/Hr) IV Continuous <Continuous>  dextrose 50% Injectable 25 Gram(s) IV Push once  dextrose 50% Injectable 12.5 Gram(s) IV Push once  dextrose 50% Injectable 25 Gram(s) IV Push once  donepezil 5 milliGRAM(s) Oral at bedtime  famotidine    Tablet 40 milliGRAM(s) Oral two times a day  ferrous    sulfate 325 milliGRAM(s) Oral daily  glucagon  Injectable 1 milliGRAM(s) IntraMuscular once  heparin   Injectable 5000 Unit(s) SubCutaneous two times a day  insulin lispro (ADMELOG) corrective regimen sliding scale   SubCutaneous three times a day before meals  insulin lispro (ADMELOG) corrective regimen sliding scale   SubCutaneous at bedtime  lactobacillus acidophilus 1 Tablet(s) Oral two times a day with meals  metoprolol succinate ER 25 milliGRAM(s) Oral daily  multivitamin/minerals 1 Tablet(s) Oral daily  piperacillin/tazobactam IVPB.. 3.375 Gram(s) IV Intermittent every 8 hours  polyethylene glycol 3350 17 Gram(s) Oral daily  senna 2 Tablet(s) Oral at bedtime  simvastatin 20 milliGRAM(s) Oral at bedtime  sodium chloride 0.45%. 1000 milliLiter(s) (50 mL/Hr) IV Continuous <Continuous>  tamsulosin 0.4 milliGRAM(s) Oral at bedtime    MEDICATIONS  (PRN):  acetaminophen     Tablet .. 650 milliGRAM(s) Oral every 6 hours PRN Temp greater or equal to 38C (100.4F), Mild Pain (1 - 3)  aluminum hydroxide/magnesium hydroxide/simethicone Suspension 30 milliLiter(s) Oral every 4 hours PRN Dyspepsia  dextrose Oral Gel 15 Gram(s) Oral once PRN Blood Glucose LESS THAN 70 milliGRAM(s)/deciliter  melatonin 3 milliGRAM(s) Oral at bedtime PRN Insomnia  morphine  - Injectable 2 milliGRAM(s) IV Push every 6 hours PRN Severe Pain (7 - 10)  ondansetron Injectable 4 milliGRAM(s) IV Push every 8 hours PRN Nausea and/or Vomiting  traMADol 50 milliGRAM(s) Oral four times a day PRN Moderate Pain (4 - 6)    Pertinent Labs: 12-21 Na147 mmol/L<H> Glu 121 mg/dL<H> K+ 4.0 mmol/L Cr  0.46 mg/dL<L> BUN 11 mg/dL 12-18 Alb 2.5 g/dL<L>    CAPILLARY BLOOD GLUCOSE  POCT Blood Glucose.: 121 mg/dL (21 Dec 2022 07:35)  POCT Blood Glucose.: 137 mg/dL (20 Dec 2022 21:01)  POCT Blood Glucose.: 102 mg/dL (20 Dec 2022 17:00)  POCT Blood Glucose.: 167 mg/dL (20 Dec 2022 11:41)    Skin: L 2nd and 3rd toe wound, L lateral foot, SDTI to L heel and sacrum    Estimated Needs:   [X] no change since previous assessment: based on #/75.2kg  25-30kcal/kg (1880-2256kcal)  1.2-1.4g pro/kg (90-105gm protein)  [ ] recalculated:     Previous Nutrition Diagnosis:   [ ] Inadequate Energy Intake [ ]Inadequate Oral Intake [ ] Excessive Energy Intake   [ ] Underweight [ ] Increased Nutrient Needs [ ] Overweight/Obesity [X] Swallowing Difficulty   [ ] Altered GI Function [ ] Unintended Weight Loss [ ] Food & Nutrition Related Knowledge Deficit [X] Malnutrition (moderate/chronic)    Nutrition Diagnosis is [X] ongoing  [ ] resolved [ ] not applicable     New Nutrition Diagnosis: [X] not applicable     Interventions:   Recommend  [ ] Change Diet To:  [ ] Nutrition Supplement  [ ] Nutrition Support  [X] Other: Continue current diet order; puree consistency diet with moderately thick liquids per MD order, + Glucerna BID, assistance/encouragement at meal times    Monitoring and Evaluation:   [X] PO intake [ x ] Tolerance to diet prescription [ x ] weights [ x ] labs[ x ] follow up per protocol  [X] other: s/s GI distress, bowel function, skin integrity/edema
Tried to contact the patient's family ( son and emergency contact on chart) and left a voicemail. Will try contacting the family again to discuss the treatment plan and recommendations.

## 2022-12-21 NOTE — PROGRESS NOTE ADULT - PROBLEM SELECTOR PLAN 2
Accu-Cheks monitoring and insulin corrective regimen  sliding scale coverage with short acting inslulin, add longacting insulin as needed ,no concentrated sweets diet, serial labs ,HbA1C,education
Management per primary team
Management per primary team
Accu-Cheks monitoring and insulin corrective regimen  sliding scale coverage with short acting inslulin, add longacting insulin as needed ,no concentrated sweets diet, serial labs ,HbA1C,education

## 2022-12-21 NOTE — PROGRESS NOTE ADULT - PROBLEM SELECTOR PLAN 10
SLP eval ,aspiration precautions

## 2022-12-21 NOTE — PROGRESS NOTE ADULT - SUBJECTIVE AND OBJECTIVE BOX
TITOTERESA is a 89yMale , patient examined and chart reviewed.    INTERVAL HPI/ OVERNIGHT EVENTS:   No events. Afebrile.  Confused Nonverbal    PAST MEDICAL & SURGICAL HISTORY:  Atrial fibrillation  Hypertension  Diabetes  Prostate ca  Dementia  CHF (congestive heart failure)  Hyperlipemia  Diabetes  Depression  Anemia  AICD (automatic cardioverter/defibrillator) present  Osteomyelitis of finger of left hand  Personal history of radiation therapy  H/O ongoing treatment with hormonal therapy  H/O bladder infections  Scrotal abscess  Urinary retention  Hematuria  H/O cystitis  Constipation  VHD (valvular heart disease)  Aortic valve stenosis        For details regarding the patient's social history, family history, and other miscellaneous elements, please refer the initial infectious diseases consultation and/or the admitting history and physical examination for this admission.    ROS:  Unable to obtain due to : Dementia    ALLERGIES:  latex (Rash)      Current inpatient medications :    ANTIBIOTICS/RELEVANT:  piperacillin/tazobactam IVPB.. 3.375 Gram(s) IV Intermittent every 8 hours    MEDICATIONS  (STANDING):  ascorbic acid 500 milliGRAM(s) Oral daily  dextrose 5%. 1000 milliLiter(s) (50 mL/Hr) IV Continuous <Continuous>  dextrose 5%. 1000 milliLiter(s) (100 mL/Hr) IV Continuous <Continuous>  dextrose 50% Injectable 25 Gram(s) IV Push once  dextrose 50% Injectable 12.5 Gram(s) IV Push once  dextrose 50% Injectable 25 Gram(s) IV Push once  donepezil 5 milliGRAM(s) Oral at bedtime  famotidine    Tablet 40 milliGRAM(s) Oral two times a day  ferrous    sulfate 325 milliGRAM(s) Oral daily  glucagon  Injectable 1 milliGRAM(s) IntraMuscular once  heparin   Injectable 5000 Unit(s) SubCutaneous two times a day  insulin lispro (ADMELOG) corrective regimen sliding scale   SubCutaneous three times a day before meals  insulin lispro (ADMELOG) corrective regimen sliding scale   SubCutaneous at bedtime  lactobacillus acidophilus 1 Tablet(s) Oral two times a day with meals  metoprolol succinate ER 25 milliGRAM(s) Oral daily  multivitamin/minerals 1 Tablet(s) Oral daily  polyethylene glycol 3350 17 Gram(s) Oral daily  senna 2 Tablet(s) Oral at bedtime  simvastatin 20 milliGRAM(s) Oral at bedtime  sodium chloride 0.45%. 1000 milliLiter(s) (50 mL/Hr) IV Continuous <Continuous>  tamsulosin 0.4 milliGRAM(s) Oral at bedtime    MEDICATIONS  (PRN):  acetaminophen     Tablet .. 650 milliGRAM(s) Oral every 6 hours PRN Temp greater or equal to 38C (100.4F), Mild Pain (1 - 3)  aluminum hydroxide/magnesium hydroxide/simethicone Suspension 30 milliLiter(s) Oral every 4 hours PRN Dyspepsia  dextrose Oral Gel 15 Gram(s) Oral once PRN Blood Glucose LESS THAN 70 milliGRAM(s)/deciliter  melatonin 3 milliGRAM(s) Oral at bedtime PRN Insomnia  morphine  - Injectable 2 milliGRAM(s) IV Push every 6 hours PRN Severe Pain (7 - 10)  ondansetron Injectable 4 milliGRAM(s) IV Push every 8 hours PRN Nausea and/or Vomiting  traMADol 50 milliGRAM(s) Oral four times a day PRN Moderate Pain (4 - 6)        Objective:  Vital Signs Last 24 Hrs  T(C): 36.6 (21 Dec 2022 12:11), Max: 36.8 (20 Dec 2022 20:42)  T(F): 97.9 (21 Dec 2022 12:11), Max: 98.3 (20 Dec 2022 20:42)  HR: 99 (21 Dec 2022 12:11) (57 - 99)  BP: 117/63 (21 Dec 2022 12:11) (115/61 - 135/71)  RR: 17 (21 Dec 2022 12:11) (17 - 18)  SpO2: 97% (21 Dec 2022 12:11) (93% - 97%)    Parameters below as of 21 Dec 2022 12:11  Patient On (Oxygen Delivery Method): room air        Physical Exam:  General: Confused no acute distress  Neck: supple, trachea midline  Lungs: clear, no wheeze/rhonchi  Cardiovascular: regular rate and rhythm, S1 S2  Abdomen: soft, nontender,  bowel sounds normal  Neurological: Dementia nonverbal  Skin: no rash  Extremities: Left rd toe gangrene  Left heel decub ulcer      LABS:                        10.6   10.67 )-----------( 305      ( 21 Dec 2022 06:08 )             33.0   12-21    147<H>  |  112<H>  |  11  ----------------------------<  121<H>  4.0   |  29  |  0.46<L>    Ca    9.3      21 Dec 2022 06:08      MICROBIOLOGY:  Culture - Blood (12.16.22 @ 19:30)    Specimen Source: .Blood Blood-Peripheral    Culture Results:   No growth to date.    RADIOLOGY & ADDITIONAL STUDIES:    ACC: [id number] EXAM:  XR FOOT COMP MIN 3 VIEWS LT                          PROCEDURE DATE:  12/16/2022          INTERPRETATION:  Left third toe infection.    3 views left foot.    IMPRESSION: No definite focal bone lysis or unusual periosteal reaction.   No acute fracture or dislocation. Multiple hammertoe deformities. Joint   spaces preserved. No soft tissue gas. Small vessel calcification.    If there is concern for osteomyelitis consider MRI for more sensitive   evaluation      Assessment :  89-year-old male with past medical history of dementia, anemia, atrial fibrillation, prostate cancer, diabetes, hyperlipidemia, CHF, hypertension, brought in by ambulance from [facility] due to foot infection.  leukocytosis  dry gangrene left foot  Severe PVD  TMA recommended by podiatry    plan  Antibx have limited role here and were already attempted last admission ( completed 4 weeks of Rocephin )  Gangrene has progressed due to severe PVD  No intervention per vascular and limited benefit at this time  cont local care  On zosyn-- but doubt this has any impact  Poss surgical intervention- TMA per podiatry  Cultures neg -will dc antibiotics  Asp precautions        Continue with present regiment.  Appropriate use of antibiotics and adverse effects reviewed.    > 35 minutes were spent in direct patient care reviewing notes, medications ,labs data/ imaging , discussion with multidisciplinary team.    Thank you for allowing me to participate in care of your patient .    Heber Issa MD  Infectious Disease  [phone]

## 2022-12-21 NOTE — PROGRESS NOTE ADULT - PROBLEM SELECTOR PLAN 3
Due to advanced age, dementia, contracted state, and multiple medical comorbidities, patient is not a surgical candidate.  - Recommend conservative management  and palliative care MD cons   - Local wound care, antibiotics  as per ID cons
Per Vascular recommendations:   Due to advanced age, dementia, contracted state, and multiple medical comorbidities, patient is not a surgical candidate.  - Recommend conservative management per primary medical team  - Local wound care, antibiotics  - Signing off; reconsult as needed.
Due to advanced age, dementia, contracted state, and multiple medical comorbidities, patient is not a surgical candidate.  - Recommend conservative management per primary medical team  - Local wound care, antibiotics  - Signing off; reconsult as needed.
Due to advanced age, dementia, contracted state, and multiple medical comorbidities, patient is not a surgical candidate.  - Recommend conservative management  and palliative care MD cons   - Local wound care, antibiotics  as per ID cons

## 2022-12-21 NOTE — PROGRESS NOTE ADULT - PROBLEM SELECTOR PROBLEM 3
Peripheral vascular disease

## 2022-12-21 NOTE — PROGRESS NOTE ADULT - PROBLEM SELECTOR PLAN 7
continue home medications ,card cons

## 2022-12-21 NOTE — PROGRESS NOTE ADULT - PROBLEM SELECTOR PROBLEM 7
VHD (valvular heart disease)

## 2022-12-21 NOTE — PROGRESS NOTE ADULT - PROBLEM SELECTOR PLAN 1
As per vascular surgery team -Due to advanced age, dementia, contracted state, and multiple medical comorbidities, patient is not a surgical candidate.  - Recommend conservative management  and  GOC discussion as per palliative care MD cons   - Local wound care, antibiotics  as per ID cons .Social service consult -home hospice at [facility] vs [facility] placement .Podiatry input is appreciated ,family didn't make a decision regarding sx ( TMA ) yet

## 2022-12-21 NOTE — PROGRESS NOTE ADULT - NUTRITIONAL ASSESSMENT
This patient has been assessed with a concern for Malnutrition and has been determined to have a diagnosis/diagnoses of Moderate protein-calorie malnutrition and Underweight (BMI < 19).    This patient is being managed with:   Diet Pureed-  Moderately Thick Liquids (MODTHICKLIQS)  Supplement Feeding Modality:  Oral  Glucerna Shake Cans or Servings Per Day:  1       Frequency:  Two Times a day  Entered: Dec 18 2022 11:18AM    

## 2022-12-21 NOTE — PROGRESS NOTE ADULT - SUBJECTIVE AND OBJECTIVE BOX
Date/Time Patient Seen:  		  Referring MD:   Data Reviewed	       Patient is a 89y old  Male who presents with a chief complaint of left foot infection (20 Dec 2022 15:40)      Subjective/HPI     PAST MEDICAL & SURGICAL HISTORY:  Atrial fibrillation    Hypertension    Diabetes    Prostate ca    Dementia    CHF (congestive heart failure)    Hyperlipemia    Diabetes    Depression    Dementia    No pertinent past medical history    DM (diabetes mellitus)    Atrial fibrillation    Prostate CA    Arteriosclerotic heart disease (ASHD)    Dementia    Anemia    Constipation    AICD (automatic cardioverter/defibrillator) present    Osteomyelitis of finger of left hand    Personal history of radiation therapy    H/O ongoing treatment with hormonal therapy    H/O bladder infections    Scrotal abscess    Urinary retention    Hematuria    H/O cystitis    Constipation    VHD (valvular heart disease)    Aortic valve stenosis    No significant past surgical history    No significant past surgical history    History of automatic internal cardiac defibrillator (AICD)          Medication list         MEDICATIONS  (STANDING):  ascorbic acid 500 milliGRAM(s) Oral daily  dextrose 5%. 1000 milliLiter(s) (100 mL/Hr) IV Continuous <Continuous>  dextrose 5%. 1000 milliLiter(s) (50 mL/Hr) IV Continuous <Continuous>  dextrose 50% Injectable 25 Gram(s) IV Push once  dextrose 50% Injectable 12.5 Gram(s) IV Push once  dextrose 50% Injectable 25 Gram(s) IV Push once  donepezil 5 milliGRAM(s) Oral at bedtime  famotidine    Tablet 40 milliGRAM(s) Oral two times a day  ferrous    sulfate 325 milliGRAM(s) Oral daily  glucagon  Injectable 1 milliGRAM(s) IntraMuscular once  heparin   Injectable 5000 Unit(s) SubCutaneous two times a day  insulin lispro (ADMELOG) corrective regimen sliding scale   SubCutaneous at bedtime  insulin lispro (ADMELOG) corrective regimen sliding scale   SubCutaneous three times a day before meals  lactobacillus acidophilus 1 Tablet(s) Oral two times a day with meals  metoprolol succinate ER 25 milliGRAM(s) Oral daily  multivitamin/minerals 1 Tablet(s) Oral daily  piperacillin/tazobactam IVPB.. 3.375 Gram(s) IV Intermittent every 8 hours  polyethylene glycol 3350 17 Gram(s) Oral daily  senna 2 Tablet(s) Oral at bedtime  simvastatin 20 milliGRAM(s) Oral at bedtime  sodium chloride 0.45%. 1000 milliLiter(s) (50 mL/Hr) IV Continuous <Continuous>  tamsulosin 0.4 milliGRAM(s) Oral at bedtime    MEDICATIONS  (PRN):  acetaminophen     Tablet .. 650 milliGRAM(s) Oral every 6 hours PRN Temp greater or equal to 38C (100.4F), Mild Pain (1 - 3)  aluminum hydroxide/magnesium hydroxide/simethicone Suspension 30 milliLiter(s) Oral every 4 hours PRN Dyspepsia  dextrose Oral Gel 15 Gram(s) Oral once PRN Blood Glucose LESS THAN 70 milliGRAM(s)/deciliter  melatonin 3 milliGRAM(s) Oral at bedtime PRN Insomnia  morphine  - Injectable 2 milliGRAM(s) IV Push every 6 hours PRN Severe Pain (7 - 10)  ondansetron Injectable 4 milliGRAM(s) IV Push every 8 hours PRN Nausea and/or Vomiting  traMADol 50 milliGRAM(s) Oral four times a day PRN Moderate Pain (4 - 6)         Vitals log        ICU Vital Signs Last 24 Hrs  T(C): 36.7 (21 Dec 2022 04:10), Max: 36.8 (20 Dec 2022 20:42)  T(F): 98.1 (21 Dec 2022 04:10), Max: 98.3 (20 Dec 2022 20:42)  HR: 96 (21 Dec 2022 04:10) (57 - 96)  BP: 115/61 (21 Dec 2022 04:10) (97/61 - 135/71)  BP(mean): --  ABP: --  ABP(mean): --  RR: 17 (21 Dec 2022 04:10) (17 - 18)  SpO2: 95% (21 Dec 2022 04:10) (93% - 95%)    O2 Parameters below as of 21 Dec 2022 04:10  Patient On (Oxygen Delivery Method): room air                 Input and Output:  I&O's Detail    19 Dec 2022 07:01  -  20 Dec 2022 07:00  --------------------------------------------------------  IN:    sodium chloride 0.45%: 50 mL  Total IN: 50 mL    OUT:  Total OUT: 0 mL    Total NET: 50 mL      20 Dec 2022 07:01  -  21 Dec 2022 05:11  --------------------------------------------------------  IN:    IV PiggyBack: 300 mL    IV PiggyBack: 100 mL    sodium chloride 0.45%: 600 mL  Total IN: 1000 mL    OUT:  Total OUT: 0 mL    Total NET: 1000 mL          Lab Data                        10.9   10.34 )-----------( 337      ( 20 Dec 2022 05:44 )             34.0     12-20    146<H>  |  112<H>  |  15  ----------------------------<  127<H>  3.3<L>   |  30  |  0.50    Ca    9.2      20 Dec 2022 05:44              Review of Systems	      Objective     Physical Examination    heart s1s2  lung dec BS  head nc      Pertinent Lab findings & Imaging      [place]:  NO   Adequate UO     I&O's Detail    19 Dec 2022 07:01  -  20 Dec 2022 07:00  --------------------------------------------------------  IN:    sodium chloride 0.45%: 50 mL  Total IN: 50 mL    OUT:  Total OUT: 0 mL    Total NET: 50 mL      20 Dec 2022 07:01  -  21 Dec 2022 05:11  --------------------------------------------------------  IN:    IV PiggyBack: 300 mL    IV PiggyBack: 100 mL    sodium chloride 0.45%: 600 mL  Total IN: 1000 mL    OUT:  Total OUT: 0 mL    Total NET: 1000 mL               Discussed with:     Cultures:	        Radiology

## 2022-12-21 NOTE — PROGRESS NOTE ADULT - SUBJECTIVE AND OBJECTIVE BOX
PROGRESS NOTE  Patient is a 89y old  Male who presents with a chief complaint of left foot infection (21 Dec 2022 05:11)    Chart and available morning labs /imaging are reviewed electronically , urgent issues addressed . More information  is being added upon completion of rounds , when more information is collected and management discussed with consultants , medical staff and social service/case management on the floor   OVERNIGHT  No new issues reported by medical staff . All above noted Patient is resting in a bed comfortably .Confused ,poor mentation .No distress noted     HPI:   89-year-old male with past medical history of dementia, anemia, atrial fibrillation, prostate cancer, T2 diabetes, hyperlipidemia, CHF, hypertension, brought in by ambulance from [facility] due to foot infection.  Patient is a poor historian due to dementia .  Patient was sent in due to left third toe infection.  Patient denies pain, fever, trauma, or any other complaints. Seen by podiatry team in ER- and diagnosed with Gangrene of L foot -Applied dry sterile dressing to the left footApply offloading boots at all times when in bed to prevent the progression of pressure injuries to the foot   Bone scan performed on the last admission in 9/6/22 September was positive for osteomyelitis of the left second toe.  Per patient's son and has proxy patient was treated conservatively with midline antibiotic.Recommend vascular consult .Patient's disease has progressed from the last visit patient now has gangrene to the second third toes of the left, eschar to the medial first metatarsalpressure wound to the left heel eschar to the lateral fifth metatarsal and the lateral fifth digit. Will discuss with family and recommend more proximal amputationContinue with IV antibiotics per infectious disease  Will reconsult vascular for recommendations Applied dry sterile dressing to the left foot .Apply offloading boots at all times when in bed to prevent the progression of pressure injuries to the foot Bone scan performed on the last admission in 9/6/22 September was positive for osteomyelitis of the left second toe.  Per patient's son and has proxy patient was treated conservatively with midline antibiotic .Seen by ID -continue zosyn for now Palliative care consult requested ,to discuss advance directives and complete MOLST -GOC as per pall care MD  ,who followed the patient  in a  past .Spoke to PCP from [facility] and he is in agreement with plan for palliative care /CMO /hospice .   (17 Dec 2022 12:27)    PAST MEDICAL & SURGICAL HISTORY:  Atrial fibrillation      Hypertension      Diabetes      Prostate ca      Dementia      CHF (congestive heart failure)      Hyperlipemia      Diabetes      Depression      Dementia      DM (diabetes mellitus)      Atrial fibrillation      Prostate CA      Arteriosclerotic heart disease (ASHD)      Dementia      Anemia      AICD (automatic cardioverter/defibrillator) present      Osteomyelitis of finger of left hand      Personal history of radiation therapy      H/O ongoing treatment with hormonal therapy      H/O bladder infections      Scrotal abscess      Urinary retention      Hematuria      H/O cystitis      Constipation      VHD (valvular heart disease)      Aortic valve stenosis      History of automatic internal cardiac defibrillator (AICD)          MEDICATIONS  (STANDING):  ascorbic acid 500 milliGRAM(s) Oral daily  dextrose 5%. 1000 milliLiter(s) (50 mL/Hr) IV Continuous <Continuous>  dextrose 5%. 1000 milliLiter(s) (100 mL/Hr) IV Continuous <Continuous>  dextrose 50% Injectable 25 Gram(s) IV Push once  dextrose 50% Injectable 12.5 Gram(s) IV Push once  dextrose 50% Injectable 25 Gram(s) IV Push once  donepezil 5 milliGRAM(s) Oral at bedtime  famotidine    Tablet 40 milliGRAM(s) Oral two times a day  ferrous    sulfate 325 milliGRAM(s) Oral daily  glucagon  Injectable 1 milliGRAM(s) IntraMuscular once  heparin   Injectable 5000 Unit(s) SubCutaneous two times a day  insulin lispro (ADMELOG) corrective regimen sliding scale   SubCutaneous three times a day before meals  insulin lispro (ADMELOG) corrective regimen sliding scale   SubCutaneous at bedtime  lactobacillus acidophilus 1 Tablet(s) Oral two times a day with meals  metoprolol succinate ER 25 milliGRAM(s) Oral daily  multivitamin/minerals 1 Tablet(s) Oral daily  piperacillin/tazobactam IVPB.. 3.375 Gram(s) IV Intermittent every 8 hours  polyethylene glycol 3350 17 Gram(s) Oral daily  senna 2 Tablet(s) Oral at bedtime  simvastatin 20 milliGRAM(s) Oral at bedtime  sodium chloride 0.45%. 1000 milliLiter(s) (50 mL/Hr) IV Continuous <Continuous>  tamsulosin 0.4 milliGRAM(s) Oral at bedtime    MEDICATIONS  (PRN):  acetaminophen     Tablet .. 650 milliGRAM(s) Oral every 6 hours PRN Temp greater or equal to 38C (100.4F), Mild Pain (1 - 3)  aluminum hydroxide/magnesium hydroxide/simethicone Suspension 30 milliLiter(s) Oral every 4 hours PRN Dyspepsia  dextrose Oral Gel 15 Gram(s) Oral once PRN Blood Glucose LESS THAN 70 milliGRAM(s)/deciliter  melatonin 3 milliGRAM(s) Oral at bedtime PRN Insomnia  morphine  - Injectable 2 milliGRAM(s) IV Push every 6 hours PRN Severe Pain (7 - 10)  ondansetron Injectable 4 milliGRAM(s) IV Push every 8 hours PRN Nausea and/or Vomiting  traMADol 50 milliGRAM(s) Oral four times a day PRN Moderate Pain (4 - 6)      OBJECTIVE    T(C): 36.7 (12-21-22 @ 04:10), Max: 36.8 (12-20-22 @ 20:42)  HR: 96 (12-21-22 @ 04:10) (57 - 96)  BP: 115/61 (12-21-22 @ 04:10) (107/63 - 135/71)  RR: 17 (12-21-22 @ 04:10) (17 - 18)  SpO2: 95% (12-21-22 @ 04:10) (93% - 95%)  Wt(kg): --  I&O's Summary    19 Dec 2022 07:01  -  20 Dec 2022 07:00  --------------------------------------------------------  IN: 50 mL / OUT: 0 mL / NET: 50 mL    20 Dec 2022 07:01  -  21 Dec 2022 06:19  --------------------------------------------------------  IN: 1000 mL / OUT: 0 mL / NET: 1000 mL          REVIEW OF SYSTEMS:  CONSTITUTIONAL: No fever, weight loss, or fatigue  EYES: No eye pain, visual disturbances, or discharge  ENMT:   No sinus or throat pain  NECK: No pain or stiffness  RESPIRATORY: No cough, wheezing, chills or hemoptysis; No shortness of breath  CARDIOVASCULAR: No chest pain, palpitations, dizziness, or leg swelling  GASTROINTESTINAL: No abdominal pain. No nausea, vomiting; No diarrhea or constipation. No melena or hematochezia.  GENITOURINARY: No dysuria, frequency, hematuria, or incontinence  NEUROLOGICAL: No headaches, memory loss, loss of strength, numbness, or tremors  SKIN: No itching, burning, rashes, or lesions   MUSCULOSKELETAL: No joint pain or swelling; No muscle, back, or extremity pain    PHYSICAL EXAM:  Appearance: NAD. VS past 24 hrs -as above   HEENT:   Moist oral mucosa. Conjunctiva clear b/l.   Neck : supple  Respiratory: Lungs CTAB.  Gastrointestinal:  Soft, nontender. No rebound. No rigidity. BS present	  Cardiovascular: RRR ,S1S2 present  Neurologic: Non-focal. Moving all extremities.  Extremities: No edema. No erythema. No calf tenderness.  Skin: No rashes, No ecchymoses, No cyanosis.	  wounds ,skin lesions-See skin assesment flow sheet   LABS:                        10.9   10.34 )-----------( 337      ( 20 Dec 2022 05:44 )             34.0     12-20    146<H>  |  112<H>  |  15  ----------------------------<  127<H>  3.3<L>   |  30  |  0.50    Ca    9.2      20 Dec 2022 05:44      CAPILLARY BLOOD GLUCOSE      POCT Blood Glucose.: 137 mg/dL (20 Dec 2022 21:01)  POCT Blood Glucose.: 102 mg/dL (20 Dec 2022 17:00)  POCT Blood Glucose.: 167 mg/dL (20 Dec 2022 11:41)  POCT Blood Glucose.: 136 mg/dL (20 Dec 2022 07:47)          Culture - Blood (collected 16 Dec 2022 19:30)  Source: .Blood Blood-Peripheral  Preliminary Report (18 Dec 2022 01:02):    No growth to date.    Culture - Blood (collected 16 Dec 2022 19:25)  Source: .Blood Blood-Peripheral  Preliminary Report (18 Dec 2022 01:02):    No growth to date.      RADIOLOGY & ADDITIONAL TESTS:   reviewed elctronically  ASSESSMENT/PLAN: 	25 minutes aggregate time was spent on this visit, 50% visit time spent in care co-ordination with other attendings and counselling patient .I have discussed care plan with patient / HCP/family member ,who expressed understanding of problems treatment and their effect and side effects, alternatives in details. I have asked if they have any questions and concerns and appropriately addressed them to best of my ability.      PROGRESS NOTE  Patient is a 89y old  Male who presents with a chief complaint of left foot infection (21 Dec 2022 05:11)    Chart and available morning labs /imaging are reviewed electronically , urgent issues addressed . More information  is being added upon completion of rounds , when more information is collected and management discussed with consultants , medical staff and social service/case management on the floor   OVERNIGHT  No new issues reported by medical staff . All above noted Patient is resting in a bed comfortably .Confused ,poor mentation .No distress noted   Son is refusing surgery and requests cmo /conservative tx  HPI:   89-year-old male with past medical history of dementia, anemia, atrial fibrillation, prostate cancer, T2 diabetes, hyperlipidemia, CHF, hypertension, brought in by ambulance from [facility] due to foot infection.  Patient is a poor historian due to dementia .  Patient was sent in due to left third toe infection.  Patient denies pain, fever, trauma, or any other complaints. Seen by podiatry team in ER- and diagnosed with Gangrene of L foot -Applied dry sterile dressing to the left footApply offloading boots at all times when in bed to prevent the progression of pressure injuries to the foot   Bone scan performed on the last admission in 9/6/22 September was positive for osteomyelitis of the left second toe.  Per patient's son and has proxy patient was treated conservatively with midline antibiotic.Recommend vascular consult .Patient's disease has progressed from the last visit patient now has gangrene to the second third toes of the left, eschar to the medial first metatarsalpressure wound to the left heel eschar to the lateral fifth metatarsal and the lateral fifth digit. Will discuss with family and recommend more proximal amputationContinue with IV antibiotics per infectious disease  Will reconsult vascular for recommendations Applied dry sterile dressing to the left foot .Apply offloading boots at all times when in bed to prevent the progression of pressure injuries to the foot Bone scan performed on the last admission in 9/6/22 September was positive for osteomyelitis of the left second toe.  Per patient's son and has proxy patient was treated conservatively with midline antibiotic .Seen by ID -continue zosyn for now Palliative care consult requested ,to discuss advance directives and complete MOLST -GOC as per pall care MD Palmer ,who followed the patient  in a  past .Spoke to PCP from [facility] and he is in agreement with plan for palliative care /CMO /hospice .   (17 Dec 2022 12:27)    PAST MEDICAL & SURGICAL HISTORY:  Atrial fibrillation      Hypertension      Diabetes      Prostate ca      Dementia      CHF (congestive heart failure)      Hyperlipemia      Diabetes      Depression      Dementia      DM (diabetes mellitus)      Atrial fibrillation      Prostate CA      Arteriosclerotic heart disease (ASHD)      Dementia      Anemia      AICD (automatic cardioverter/defibrillator) present      Osteomyelitis of finger of left hand      Personal history of radiation therapy      H/O ongoing treatment with hormonal therapy      H/O bladder infections      Scrotal abscess      Urinary retention      Hematuria      H/O cystitis      Constipation      VHD (valvular heart disease)      Aortic valve stenosis      History of automatic internal cardiac defibrillator (AICD)          MEDICATIONS  (STANDING):  ascorbic acid 500 milliGRAM(s) Oral daily  dextrose 5%. 1000 milliLiter(s) (50 mL/Hr) IV Continuous <Continuous>  dextrose 5%. 1000 milliLiter(s) (100 mL/Hr) IV Continuous <Continuous>  dextrose 50% Injectable 25 Gram(s) IV Push once  dextrose 50% Injectable 12.5 Gram(s) IV Push once  dextrose 50% Injectable 25 Gram(s) IV Push once  donepezil 5 milliGRAM(s) Oral at bedtime  famotidine    Tablet 40 milliGRAM(s) Oral two times a day  ferrous    sulfate 325 milliGRAM(s) Oral daily  glucagon  Injectable 1 milliGRAM(s) IntraMuscular once  heparin   Injectable 5000 Unit(s) SubCutaneous two times a day  insulin lispro (ADMELOG) corrective regimen sliding scale   SubCutaneous three times a day before meals  insulin lispro (ADMELOG) corrective regimen sliding scale   SubCutaneous at bedtime  lactobacillus acidophilus 1 Tablet(s) Oral two times a day with meals  metoprolol succinate ER 25 milliGRAM(s) Oral daily  multivitamin/minerals 1 Tablet(s) Oral daily  piperacillin/tazobactam IVPB.. 3.375 Gram(s) IV Intermittent every 8 hours  polyethylene glycol 3350 17 Gram(s) Oral daily  senna 2 Tablet(s) Oral at bedtime  simvastatin 20 milliGRAM(s) Oral at bedtime  sodium chloride 0.45%. 1000 milliLiter(s) (50 mL/Hr) IV Continuous <Continuous>  tamsulosin 0.4 milliGRAM(s) Oral at bedtime    MEDICATIONS  (PRN):  acetaminophen     Tablet .. 650 milliGRAM(s) Oral every 6 hours PRN Temp greater or equal to 38C (100.4F), Mild Pain (1 - 3)  aluminum hydroxide/magnesium hydroxide/simethicone Suspension 30 milliLiter(s) Oral every 4 hours PRN Dyspepsia  dextrose Oral Gel 15 Gram(s) Oral once PRN Blood Glucose LESS THAN 70 milliGRAM(s)/deciliter  melatonin 3 milliGRAM(s) Oral at bedtime PRN Insomnia  morphine  - Injectable 2 milliGRAM(s) IV Push every 6 hours PRN Severe Pain (7 - 10)  ondansetron Injectable 4 milliGRAM(s) IV Push every 8 hours PRN Nausea and/or Vomiting  traMADol 50 milliGRAM(s) Oral four times a day PRN Moderate Pain (4 - 6)      OBJECTIVE    T(C): 36.7 (12-21-22 @ 04:10), Max: 36.8 (12-20-22 @ 20:42)  HR: 96 (12-21-22 @ 04:10) (57 - 96)  BP: 115/61 (12-21-22 @ 04:10) (107/63 - 135/71)  RR: 17 (12-21-22 @ 04:10) (17 - 18)  SpO2: 95% (12-21-22 @ 04:10) (93% - 95%)  Wt(kg): --  I&O's Summary    19 Dec 2022 07:01  -  20 Dec 2022 07:00  --------------------------------------------------------  IN: 50 mL / OUT: 0 mL / NET: 50 mL    20 Dec 2022 07:01  -  21 Dec 2022 06:19  --------------------------------------------------------  IN: 1000 mL / OUT: 0 mL / NET: 1000 mL          REVIEW OF SYSTEMS:  CONSTITUTIONAL: No fever, weight loss, or fatigue  EYES: No eye pain, visual disturbances, or discharge  ENMT:   No sinus or throat pain  NECK: No pain or stiffness  RESPIRATORY: No cough, wheezing, chills or hemoptysis; No shortness of breath  CARDIOVASCULAR: No chest pain, palpitations, dizziness, or leg swelling  GASTROINTESTINAL: No abdominal pain. No nausea, vomiting; No diarrhea or constipation. No melena or hematochezia.  GENITOURINARY: No dysuria, frequency, hematuria, or incontinence  NEUROLOGICAL: No headaches, memory loss, loss of strength, numbness, or tremors  SKIN: No itching, burning, rashes, or lesions   MUSCULOSKELETAL: No joint pain or swelling; No muscle, back, or extremity pain    PHYSICAL EXAM:  Appearance: NAD. VS past 24 hrs -as above   HEENT:   Moist oral mucosa. Conjunctiva clear b/l.   Neck : supple  Respiratory: Lungs CTAB.  Gastrointestinal:  Soft, nontender. No rebound. No rigidity. BS present	  Cardiovascular: RRR ,S1S2 present  Neurologic: Non-focal. Moving all extremities.  Extremities: No edema. No erythema. No calf tenderness.  Skin: No rashes, No ecchymoses, No cyanosis.	  wounds ,skin lesions-See skin assesment flow sheet   LABS:                        10.9   10.34 )-----------( 337      ( 20 Dec 2022 05:44 )             34.0     12-20    146<H>  |  112<H>  |  15  ----------------------------<  127<H>  3.3<L>   |  30  |  0.50    Ca    9.2      20 Dec 2022 05:44      CAPILLARY BLOOD GLUCOSE      POCT Blood Glucose.: 137 mg/dL (20 Dec 2022 21:01)  POCT Blood Glucose.: 102 mg/dL (20 Dec 2022 17:00)  POCT Blood Glucose.: 167 mg/dL (20 Dec 2022 11:41)  POCT Blood Glucose.: 136 mg/dL (20 Dec 2022 07:47)          Culture - Blood (collected 16 Dec 2022 19:30)  Source: .Blood Blood-Peripheral  Preliminary Report (18 Dec 2022 01:02):    No growth to date.    Culture - Blood (collected 16 Dec 2022 19:25)  Source: .Blood Blood-Peripheral  Preliminary Report (18 Dec 2022 01:02):    No growth to date.      RADIOLOGY & ADDITIONAL TESTS:   reviewed elctronically  ASSESSMENT/PLAN: 	25 minutes aggregate time was spent on this visit, 50% visit time spent in care co-ordination with other attendings and counselling patient .I have discussed care plan with patient / HCP/family member ,who expressed understanding of problems treatment and their effect and side effects, alternatives in details. I have asked if they have any questions and concerns and appropriately addressed them to best of my ability.

## 2022-12-21 NOTE — PROGRESS NOTE ADULT - SUBJECTIVE AND OBJECTIVE BOX
89y year old Male seen at hospitals 1E[facility] 115 D1 for left foot gangrene and ischemic wounds. Patient was resting in bed comfortably  and did not wake up during the process of  dressing change.       Allergies    latex (Rash)  latex (Unknown)  No Known Drug Allergies    Intolerances        MEDICATIONS  (STANDING):  ascorbic acid 500 milliGRAM(s) Oral daily  dextrose 5%. 1000 milliLiter(s) (50 mL/Hr) IV Continuous <Continuous>  dextrose 5%. 1000 milliLiter(s) (100 mL/Hr) IV Continuous <Continuous>  dextrose 50% Injectable 25 Gram(s) IV Push once  dextrose 50% Injectable 12.5 Gram(s) IV Push once  dextrose 50% Injectable 25 Gram(s) IV Push once  donepezil 5 milliGRAM(s) Oral at bedtime  famotidine    Tablet 40 milliGRAM(s) Oral two times a day  ferrous    sulfate 325 milliGRAM(s) Oral daily  glucagon  Injectable 1 milliGRAM(s) IntraMuscular once  heparin   Injectable 5000 Unit(s) SubCutaneous two times a day  insulin lispro (ADMELOG) corrective regimen sliding scale   SubCutaneous three times a day before meals  insulin lispro (ADMELOG) corrective regimen sliding scale   SubCutaneous at bedtime  lactobacillus acidophilus 1 Tablet(s) Oral two times a day with meals  metoprolol succinate ER 25 milliGRAM(s) Oral daily  multivitamin/minerals 1 Tablet(s) Oral daily  piperacillin/tazobactam IVPB.. 3.375 Gram(s) IV Intermittent every 8 hours  polyethylene glycol 3350 17 Gram(s) Oral daily  senna 2 Tablet(s) Oral at bedtime  simvastatin 20 milliGRAM(s) Oral at bedtime  sodium chloride 0.45%. 1000 milliLiter(s) (50 mL/Hr) IV Continuous <Continuous>  tamsulosin 0.4 milliGRAM(s) Oral at bedtime    MEDICATIONS  (PRN):  acetaminophen     Tablet .. 650 milliGRAM(s) Oral every 6 hours PRN Temp greater or equal to 38C (100.4F), Mild Pain (1 - 3)  aluminum hydroxide/magnesium hydroxide/simethicone Suspension 30 milliLiter(s) Oral every 4 hours PRN Dyspepsia  dextrose Oral Gel 15 Gram(s) Oral once PRN Blood Glucose LESS THAN 70 milliGRAM(s)/deciliter  melatonin 3 milliGRAM(s) Oral at bedtime PRN Insomnia  morphine  - Injectable 2 milliGRAM(s) IV Push every 6 hours PRN Severe Pain (7 - 10)  ondansetron Injectable 4 milliGRAM(s) IV Push every 8 hours PRN Nausea and/or Vomiting  traMADol 50 milliGRAM(s) Oral four times a day PRN Moderate Pain (4 - 6)      Vital Signs Last 24 Hrs  T(C): 36.6 (21 Dec 2022 12:11), Max: 36.8 (20 Dec 2022 20:42)  T(F): 97.9 (21 Dec 2022 12:11), Max: 98.3 (20 Dec 2022 20:42)  HR: 99 (21 Dec 2022 12:11) (57 - 99)  BP: 117/63 (21 Dec 2022 12:11) (115/61 - 135/71)  BP(mean): --  RR: 17 (21 Dec 2022 12:11) (17 - 18)  SpO2: 97% (21 Dec 2022 12:11) (93% - 97%)    Parameters below as of 21 Dec 2022 12:11  Patient On (Oxygen Delivery Method): room air        PHYSICAL EXAM:  Vascular: DP & PT non palpable bilaterally  Neurological: couldn't  assess   Musculoskeletal: couldn't assess  Dermatological:  Left foot noted with the gangrenous changes to the second digit with bone exposed, third digit gangrenous to the base of the proximal phalanx, positive probe to bone; medial first metatarsal head wound with a dry eschar; posterior heel wound down to the level of fascia: Mild erythema noted to the forefoot and the heel, no malodor no proximal streaking, no fluctuance    CBC Full  -  ( 21 Dec 2022 06:08 )  WBC Count : 10.67 K/uL  RBC Count : 3.40 M/uL  Hemoglobin : 10.6 g/dL  Hematocrit : 33.0 %  Platelet Count - Automated : 305 K/uL  Mean Cell Volume : 97.1 fl  Mean Cell Hemoglobin : 31.2 pg  Mean Cell Hemoglobin Concentration : 32.1 gm/dL  Auto Neutrophil # : x  Auto Lymphocyte # : x  Auto Monocyte # : x  Auto Eosinophil # : x  Auto Basophil # : x  Auto Neutrophil % : x  Auto Lymphocyte % : x  Auto Monocyte % : x  Auto Eosinophil % : x  Auto Basophil % : x      12-21    147<H>  |  112<H>  |  11  ----------------------------<  121<H>  4.0   |  29  |  0.46<L>    Ca    9.3      21 Dec 2022 06:08                                    10.6   10.67 )-----------( 305      ( 21 Dec 2022 06:08 )             33.0                         10.6   10.67 )-----------( 305      ( 21 Dec 2022 06:08 )             33.0     Imaging: ----------

## 2022-12-21 NOTE — PROGRESS NOTE ADULT - PROBLEM SELECTOR PLAN 1
Continue local wound care at this time   Apply offloading boots at all times when in bed to prevent the progression of pressure injuries to the foot   Bone scan performed on the last admission in 9/6/22 September was positive for osteomyelitis of the left second toe.  Per patient's son and has proxy patient was treated conservatively with midline antibiotic.  Recommend vascular consult  Patient's disease has progressed from the last visit patient now has gangrene to the second third toes of the left, eschar to the medial first metatarsal pressure wound to the left heel eschar to the lateral fifth metatarsal and the lateral fifth digit.  Continue with IV antibiotics per infectious disease  Per phone conversation with son, discussed surgical vs conservative treatment recommended surgical intervention including  transmetatarsal amputation for the left foot since the patient has increased wounds to the left foot including gangrene to the 2nd and 3rd digit with necrotic eschars to the 1st and 5th metatarsals, also has heel wound.   Patient's son would like conservative treatment at this time and no surgical intervention. Discussed that patient is still high risk for more proximal amputations, sepsis. loss of limb and loss of life     Will continue local wound care at this time. Continue local wound care at this time   Apply offloading boots at all times when in bed to prevent the progression of pressure injuries to the foot   Bone scan performed on the last admission in 9/6/22 September was positive for osteomyelitis of the left second toe.  Per patient's son and has proxy patient was treated conservatively with midline antibiotic.  Recommend vascular consult  Patient's disease has progressed from the last visit patient now has gangrene to the second third toes of the left, eschar to the medial first metatarsal pressure wound to the left heel eschar to the lateral fifth metatarsal and the lateral fifth digit.  Continue with IV antibiotics per infectious disease  Per phone conversation with son, discussed surgical vs conservative treatment recommended surgical intervention including  transmetatarsal amputation for the left foot since the patient has increased wounds to the left foot including gangrene to the 2nd and 3rd digit with necrotic eschars to the 1st and 5th metatarsals, also has heel wound.   Patient's son would like conservative treatment at this time and no surgical intervention. Discussed that patient is still high risk for more proximal amputations, sepsis. loss of limb and loss of life.   Will continue local wound care at this time.    Wound Care Instructions:  -Keep dressing clean, dry, and intact to the left foot   -Apply silver alginate to the heel, medial, lateral foot and the 2nd and 3rd digits and  dressing to the left foot, change every two days   -Monitor for any signs of infection including redness, swelling in the leg above the bandage, nausea/vomiting/fever/chills/chest pain/shortness of breath, if any are present patient must report to the emergency department immediately  -Patient is to follow up with Dr. Jaimes/Dr. Lord/ Dr. Beck  after discharge at [facility] [place]. Please call to make an appointment [phone]

## 2022-12-21 NOTE — PROGRESS NOTE ADULT - PROBLEM SELECTOR PROBLEM 1
Gangrene of toe of left foot

## 2022-12-22 ENCOUNTER — TRANSCRIPTION ENCOUNTER (OUTPATIENT)
Age: 87
End: 2022-12-22

## 2022-12-22 VITALS
OXYGEN SATURATION: 91 % | TEMPERATURE: 97 F | HEART RATE: 78 BPM | RESPIRATION RATE: 18 BRPM | DIASTOLIC BLOOD PRESSURE: 57 MMHG | SYSTOLIC BLOOD PRESSURE: 98 MMHG

## 2022-12-22 LAB
ANION GAP SERPL CALC-SCNC: 5 MMOL/L — SIGNIFICANT CHANGE UP (ref 5–17)
BUN SERPL-MCNC: 9 MG/DL — SIGNIFICANT CHANGE UP (ref 7–23)
CALCIUM SERPL-MCNC: 9 MG/DL — SIGNIFICANT CHANGE UP (ref 8.5–10.1)
CHLORIDE SERPL-SCNC: 111 MMOL/L — HIGH (ref 96–108)
CO2 SERPL-SCNC: 29 MMOL/L — SIGNIFICANT CHANGE UP (ref 22–31)
CREAT SERPL-MCNC: 0.52 MG/DL — SIGNIFICANT CHANGE UP (ref 0.5–1.3)
CULTURE RESULTS: SIGNIFICANT CHANGE UP
CULTURE RESULTS: SIGNIFICANT CHANGE UP
EGFR: 92 ML/MIN/1.73M2 — SIGNIFICANT CHANGE UP
GLUCOSE SERPL-MCNC: 127 MG/DL — HIGH (ref 70–99)
HCT VFR BLD CALC: 32.6 % — LOW (ref 39–50)
HGB BLD-MCNC: 10.4 G/DL — LOW (ref 13–17)
MCHC RBC-ENTMCNC: 31 PG — SIGNIFICANT CHANGE UP (ref 27–34)
MCHC RBC-ENTMCNC: 31.9 GM/DL — LOW (ref 32–36)
MCV RBC AUTO: 97 FL — SIGNIFICANT CHANGE UP (ref 80–100)
NRBC # BLD: 0 /100 WBCS — SIGNIFICANT CHANGE UP (ref 0–0)
PLATELET # BLD AUTO: 285 K/UL — SIGNIFICANT CHANGE UP (ref 150–400)
POTASSIUM SERPL-MCNC: 3.9 MMOL/L — SIGNIFICANT CHANGE UP (ref 3.5–5.3)
POTASSIUM SERPL-SCNC: 3.9 MMOL/L — SIGNIFICANT CHANGE UP (ref 3.5–5.3)
RBC # BLD: 3.36 M/UL — LOW (ref 4.2–5.8)
RBC # FLD: 13.7 % — SIGNIFICANT CHANGE UP (ref 10.3–14.5)
SARS-COV-2 RNA SPEC QL NAA+PROBE: SIGNIFICANT CHANGE UP
SODIUM SERPL-SCNC: 145 MMOL/L — SIGNIFICANT CHANGE UP (ref 135–145)
SPECIMEN SOURCE: SIGNIFICANT CHANGE UP
SPECIMEN SOURCE: SIGNIFICANT CHANGE UP
WBC # BLD: 10.94 K/UL — HIGH (ref 3.8–10.5)
WBC # FLD AUTO: 10.94 K/UL — HIGH (ref 3.8–10.5)

## 2022-12-22 PROCEDURE — 83036 HEMOGLOBIN GLYCOSYLATED A1C: CPT

## 2022-12-22 PROCEDURE — 96367 TX/PROPH/DG ADDL SEQ IV INF: CPT

## 2022-12-22 PROCEDURE — 85027 COMPLETE CBC AUTOMATED: CPT

## 2022-12-22 PROCEDURE — 85730 THROMBOPLASTIN TIME PARTIAL: CPT

## 2022-12-22 PROCEDURE — 96365 THER/PROPH/DIAG IV INF INIT: CPT

## 2022-12-22 PROCEDURE — 97162 PT EVAL MOD COMPLEX 30 MIN: CPT

## 2022-12-22 PROCEDURE — 85025 COMPLETE CBC W/AUTO DIFF WBC: CPT

## 2022-12-22 PROCEDURE — 80048 BASIC METABOLIC PNL TOTAL CA: CPT

## 2022-12-22 PROCEDURE — 73630 X-RAY EXAM OF FOOT: CPT

## 2022-12-22 PROCEDURE — 93005 ELECTROCARDIOGRAM TRACING: CPT

## 2022-12-22 PROCEDURE — 36415 COLL VENOUS BLD VENIPUNCTURE: CPT

## 2022-12-22 PROCEDURE — 85652 RBC SED RATE AUTOMATED: CPT

## 2022-12-22 PROCEDURE — 82962 GLUCOSE BLOOD TEST: CPT

## 2022-12-22 PROCEDURE — 80053 COMPREHEN METABOLIC PANEL: CPT

## 2022-12-22 PROCEDURE — 92610 EVALUATE SWALLOWING FUNCTION: CPT

## 2022-12-22 PROCEDURE — 85610 PROTHROMBIN TIME: CPT

## 2022-12-22 PROCEDURE — 93971 EXTREMITY STUDY: CPT

## 2022-12-22 PROCEDURE — 96366 THER/PROPH/DIAG IV INF ADDON: CPT

## 2022-12-22 PROCEDURE — 99285 EMERGENCY DEPT VISIT HI MDM: CPT

## 2022-12-22 PROCEDURE — 87040 BLOOD CULTURE FOR BACTERIA: CPT

## 2022-12-22 PROCEDURE — 83605 ASSAY OF LACTIC ACID: CPT

## 2022-12-22 PROCEDURE — 86140 C-REACTIVE PROTEIN: CPT

## 2022-12-22 PROCEDURE — 87635 SARS-COV-2 COVID-19 AMP PRB: CPT

## 2022-12-22 RX ADMIN — Medication 1 TABLET(S): at 17:43

## 2022-12-22 RX ADMIN — Medication 1 TABLET(S): at 11:55

## 2022-12-22 RX ADMIN — FAMOTIDINE 40 MILLIGRAM(S): 10 INJECTION INTRAVENOUS at 05:27

## 2022-12-22 RX ADMIN — Medication 25 MILLIGRAM(S): at 05:27

## 2022-12-22 RX ADMIN — HEPARIN SODIUM 5000 UNIT(S): 5000 INJECTION INTRAVENOUS; SUBCUTANEOUS at 17:44

## 2022-12-22 RX ADMIN — Medication 325 MILLIGRAM(S): at 11:55

## 2022-12-22 RX ADMIN — Medication 1: at 11:54

## 2022-12-22 RX ADMIN — POLYETHYLENE GLYCOL 3350 17 GRAM(S): 17 POWDER, FOR SOLUTION ORAL at 11:54

## 2022-12-22 RX ADMIN — HEPARIN SODIUM 5000 UNIT(S): 5000 INJECTION INTRAVENOUS; SUBCUTANEOUS at 05:27

## 2022-12-22 RX ADMIN — Medication 1 TABLET(S): at 07:32

## 2022-12-22 RX ADMIN — Medication 500 MILLIGRAM(S): at 11:55

## 2022-12-22 RX ADMIN — SODIUM CHLORIDE 50 MILLILITER(S): 9 INJECTION, SOLUTION INTRAVENOUS at 05:27

## 2022-12-22 RX ADMIN — FAMOTIDINE 40 MILLIGRAM(S): 10 INJECTION INTRAVENOUS at 17:45

## 2022-12-22 RX ADMIN — SODIUM CHLORIDE 50 MILLILITER(S): 9 INJECTION, SOLUTION INTRAVENOUS at 11:55

## 2022-12-22 NOTE — DISCHARGE NOTE NURSING/CASE MANAGEMENT/SOCIAL WORK - NSDCVIVACCINE_GEN_ALL_CORE_FT
pneumococcal polysaccharide PPV23; 18-Jun-2014 09:36; Lamar Gonzalez (RN); [id number]; IntraMuscular; Deltoid Right.; 0.5 milliLiter(s);   Tdap; 19-Jul-2018 13:12; Arleen Wallace (RN); [employer]; [id number]; IntraMuscular; Deltoid Left.; 0.5 milliLiter(s); VIS (VIS Published: 09-May-2013, VIS Presented: 19-Jul-2018);   Tdap; 18-Jan-2022 12:32; Bethanie Briseno (RN); [employer]; [id number] (Exp. Date: 09-Sep-2023); IntraMuscular; Deltoid Right.; 0.5 milliLiter(s); VIS (VIS Published: 09-May-2013, VIS Presented: 18-Jan-2022);

## 2022-12-22 NOTE — DISCHARGE NOTE PROVIDER - DETAILS OF MALNUTRITION DIAGNOSIS/DIAGNOSES
This patient has been assessed with a concern for Malnutrition and was treated during this hospitalization for the following Nutrition diagnosis/diagnoses:     -  12/18/2022: Moderate protein-calorie malnutrition   -  12/18/2022: Underweight (BMI < 19)

## 2022-12-22 NOTE — DISCHARGE NOTE NURSING/CASE MANAGEMENT/SOCIAL WORK - PATIENT PORTAL LINK FT
You can access the FollowMyHealth Patient Portal offered by [facility] by registering at the following website: [email or web address]. By joining [employer]’s FollowMyHealth portal, you will also be able to view your health information using other applications (apps) compatible with our system.

## 2022-12-22 NOTE — DISCHARGE NOTE PROVIDER - NSDCFUADDAPPT_GEN_ALL_CORE_FT
Please discuss hospice referral for home hospice .Family /HCP refused surgery ,palliative care is appropriate level od care at this time

## 2022-12-22 NOTE — PROGRESS NOTE ADULT - REASON FOR ADMISSION
left foot infection

## 2022-12-22 NOTE — PROGRESS NOTE ADULT - SUBJECTIVE AND OBJECTIVE BOX
Date/Time Patient Seen:  		  Referring MD:   Data Reviewed	       Patient is a 89y old  Male who presents with a chief complaint of left foot infection (21 Dec 2022 16:37)      Subjective/HPI     PAST MEDICAL & SURGICAL HISTORY:  Atrial fibrillation    Hypertension    Diabetes    Prostate ca    Dementia    CHF (congestive heart failure)    Hyperlipemia    Diabetes    Depression    Dementia    No pertinent past medical history    DM (diabetes mellitus)    Atrial fibrillation    Prostate CA    Arteriosclerotic heart disease (ASHD)    Dementia    Anemia    Constipation    AICD (automatic cardioverter/defibrillator) present    Osteomyelitis of finger of left hand    Personal history of radiation therapy    H/O ongoing treatment with hormonal therapy    H/O bladder infections    Scrotal abscess    Urinary retention    Hematuria    H/O cystitis    Constipation    VHD (valvular heart disease)    Aortic valve stenosis    No significant past surgical history    No significant past surgical history    History of automatic internal cardiac defibrillator (AICD)          Medication list         MEDICATIONS  (STANDING):  ascorbic acid 500 milliGRAM(s) Oral daily  dextrose 5%. 1000 milliLiter(s) (100 mL/Hr) IV Continuous <Continuous>  dextrose 5%. 1000 milliLiter(s) (50 mL/Hr) IV Continuous <Continuous>  dextrose 50% Injectable 25 Gram(s) IV Push once  dextrose 50% Injectable 12.5 Gram(s) IV Push once  dextrose 50% Injectable 25 Gram(s) IV Push once  donepezil 5 milliGRAM(s) Oral at bedtime  famotidine    Tablet 40 milliGRAM(s) Oral two times a day  ferrous    sulfate 325 milliGRAM(s) Oral daily  glucagon  Injectable 1 milliGRAM(s) IntraMuscular once  heparin   Injectable 5000 Unit(s) SubCutaneous two times a day  insulin lispro (ADMELOG) corrective regimen sliding scale   SubCutaneous three times a day before meals  insulin lispro (ADMELOG) corrective regimen sliding scale   SubCutaneous at bedtime  lactobacillus acidophilus 1 Tablet(s) Oral two times a day with meals  metoprolol succinate ER 25 milliGRAM(s) Oral daily  multivitamin/minerals 1 Tablet(s) Oral daily  polyethylene glycol 3350 17 Gram(s) Oral daily  senna 2 Tablet(s) Oral at bedtime  simvastatin 20 milliGRAM(s) Oral at bedtime  sodium chloride 0.45%. 1000 milliLiter(s) (50 mL/Hr) IV Continuous <Continuous>  tamsulosin 0.4 milliGRAM(s) Oral at bedtime    MEDICATIONS  (PRN):  acetaminophen     Tablet .. 650 milliGRAM(s) Oral every 6 hours PRN Temp greater or equal to 38C (100.4F), Mild Pain (1 - 3)  aluminum hydroxide/magnesium hydroxide/simethicone Suspension 30 milliLiter(s) Oral every 4 hours PRN Dyspepsia  dextrose Oral Gel 15 Gram(s) Oral once PRN Blood Glucose LESS THAN 70 milliGRAM(s)/deciliter  melatonin 3 milliGRAM(s) Oral at bedtime PRN Insomnia  morphine  - Injectable 2 milliGRAM(s) IV Push every 6 hours PRN Severe Pain (7 - 10)  ondansetron Injectable 4 milliGRAM(s) IV Push every 8 hours PRN Nausea and/or Vomiting  traMADol 50 milliGRAM(s) Oral four times a day PRN Moderate Pain (4 - 6)         Vitals log        ICU Vital Signs Last 24 Hrs  T(C): 37 (22 Dec 2022 05:06), Max: 37 (22 Dec 2022 05:06)  T(F): 98.6 (22 Dec 2022 05:06), Max: 98.6 (22 Dec 2022 05:06)  HR: 78 (22 Dec 2022 05:06) (78 - 100)  BP: 119/69 (22 Dec 2022 05:06) (117/61 - 125/65)  BP(mean): --  ABP: --  ABP(mean): --  RR: 17 (22 Dec 2022 05:06) (17 - 17)  SpO2: 94% (22 Dec 2022 05:06) (94% - 97%)    O2 Parameters below as of 22 Dec 2022 05:06  Patient On (Oxygen Delivery Method): room air                 Input and Output:  I&O's Detail    20 Dec 2022 07:01  -  21 Dec 2022 07:00  --------------------------------------------------------  IN:    IV PiggyBack: 300 mL    IV PiggyBack: 100 mL    sodium chloride 0.45%: 600 mL  Total IN: 1000 mL    OUT:  Total OUT: 0 mL    Total NET: 1000 mL      21 Dec 2022 07:01  -  22 Dec 2022 05:13  --------------------------------------------------------  IN:    sodium chloride 0.45%: 600 mL  Total IN: 600 mL    OUT:  Total OUT: 0 mL    Total NET: 600 mL          Lab Data                        10.6   10.67 )-----------( 305      ( 21 Dec 2022 06:08 )             33.0     12-21    147<H>  |  112<H>  |  11  ----------------------------<  121<H>  4.0   |  29  |  0.46<L>    Ca    9.3      21 Dec 2022 06:08              Review of Systems	      Objective     Physical Examination  heart s1s2  lung dec BS        Pertinent Lab findings & Imaging      [place]:  NO   Adequate UO     I&O's Detail    20 Dec 2022 07:01  -  21 Dec 2022 07:00  --------------------------------------------------------  IN:    IV PiggyBack: 300 mL    IV PiggyBack: 100 mL    sodium chloride 0.45%: 600 mL  Total IN: 1000 mL    OUT:  Total OUT: 0 mL    Total NET: 1000 mL      21 Dec 2022 07:01  -  22 Dec 2022 05:13  --------------------------------------------------------  IN:    sodium chloride 0.45%: 600 mL  Total IN: 600 mL    OUT:  Total OUT: 0 mL    Total NET: 600 mL               Discussed with:     Cultures:	        Radiology

## 2022-12-22 NOTE — PROGRESS NOTE ADULT - SUBJECTIVE AND OBJECTIVE BOX
TITOTERESA is a 89yMale , patient examined and chart reviewed.    INTERVAL HPI/ OVERNIGHT EVENTS:   No events. Afebrile.    PAST MEDICAL & SURGICAL HISTORY:  Atrial fibrillation  Hypertension  Diabetes  Prostate ca  Dementia  CHF (congestive heart failure)  Hyperlipemia  Diabetes  Depression  Anemia  AICD (automatic cardioverter/defibrillator) present  Osteomyelitis of finger of left hand  Personal history of radiation therapy  H/O ongoing treatment with hormonal therapy  H/O bladder infections  Scrotal abscess  Urinary retention  Hematuria  H/O cystitis  Constipation  VHD (valvular heart disease)  Aortic valve stenosis        For details regarding the patient's social history, family history, and other miscellaneous elements, please refer the initial infectious diseases consultation and/or the admitting history and physical examination for this admission.    ROS:  Unable to obtain due to : Dementia    ALLERGIES:  latex (Rash)      Current inpatient medications :    ANTIBIOTICS/RELEVANT:    MEDICATIONS  (STANDING):  ascorbic acid 500 milliGRAM(s) Oral daily  dextrose 5%. 1000 milliLiter(s) (50 mL/Hr) IV Continuous <Continuous>  dextrose 5%. 1000 milliLiter(s) (100 mL/Hr) IV Continuous <Continuous>  dextrose 50% Injectable 25 Gram(s) IV Push once  dextrose 50% Injectable 12.5 Gram(s) IV Push once  dextrose 50% Injectable 25 Gram(s) IV Push once  donepezil 5 milliGRAM(s) Oral at bedtime  famotidine    Tablet 40 milliGRAM(s) Oral two times a day  ferrous    sulfate 325 milliGRAM(s) Oral daily  glucagon  Injectable 1 milliGRAM(s) IntraMuscular once  heparin   Injectable 5000 Unit(s) SubCutaneous two times a day  insulin lispro (ADMELOG) corrective regimen sliding scale   SubCutaneous three times a day before meals  insulin lispro (ADMELOG) corrective regimen sliding scale   SubCutaneous at bedtime  lactobacillus acidophilus 1 Tablet(s) Oral two times a day with meals  metoprolol succinate ER 25 milliGRAM(s) Oral daily  multivitamin/minerals 1 Tablet(s) Oral daily  polyethylene glycol 3350 17 Gram(s) Oral daily  senna 2 Tablet(s) Oral at bedtime  simvastatin 20 milliGRAM(s) Oral at bedtime  tamsulosin 0.4 milliGRAM(s) Oral at bedtime    MEDICATIONS  (PRN):  acetaminophen     Tablet .. 650 milliGRAM(s) Oral every 6 hours PRN Temp greater or equal to 38C (100.4F), Mild Pain (1 - 3)  aluminum hydroxide/magnesium hydroxide/simethicone Suspension 30 milliLiter(s) Oral every 4 hours PRN Dyspepsia  dextrose Oral Gel 15 Gram(s) Oral once PRN Blood Glucose LESS THAN 70 milliGRAM(s)/deciliter  melatonin 3 milliGRAM(s) Oral at bedtime PRN Insomnia  morphine  - Injectable 2 milliGRAM(s) IV Push every 6 hours PRN Severe Pain (7 - 10)  ondansetron Injectable 4 milliGRAM(s) IV Push every 8 hours PRN Nausea and/or Vomiting  traMADol 50 milliGRAM(s) Oral four times a day PRN Moderate Pain (4 - 6)      Objective:  Vital Signs Last 24 Hrs  T(C): 36.9 (22 Dec 2022 12:46), Max: 37 (22 Dec 2022 05:06)  T(F): 98.4 (22 Dec 2022 12:46), Max: 98.6 (22 Dec 2022 05:06)  HR: 85 (22 Dec 2022 12:46) (78 - 100)  BP: 116/57 (22 Dec 2022 12:46) (116/57 - 125/65)  RR: 18 (22 Dec 2022 12:46) (17 - 18)  SpO2: 94% (22 Dec 2022 12:46) (94% - 95%)    Parameters below as of 22 Dec 2022 12:46  Patient On (Oxygen Delivery Method): room air      Physical Exam:  General: Confused no acute distress  Neck: supple, trachea midline  Lungs: clear, no wheeze/rhonchi  Cardiovascular: regular rate and rhythm, S1 S2  Abdomen: soft, nontender,  bowel sounds normal  Neurological: Dementia nonverbal  Skin: no rash  Extremities: Left 3rd toe gangrene  Left heel decub ulcer      LABS:                                 10.4   10.94 )-----------( 285      ( 22 Dec 2022 07:00 )             32.6   12-22    145  |  111<H>  |  9   ----------------------------<  127<H>  3.9   |  29  |  0.52    Ca    9.0      22 Dec 2022 07:00        MICROBIOLOGY:  Culture - Blood (12.16.22 @ 19:30)    Specimen Source: .Blood Blood-Peripheral    Culture Results:   No growth to date.    RADIOLOGY & ADDITIONAL STUDIES:    ACC: [id number] EXAM:  XR FOOT COMP MIN 3 VIEWS LT                          PROCEDURE DATE:  12/16/2022          INTERPRETATION:  Left third toe infection.    3 views left foot.    IMPRESSION: No definite focal bone lysis or unusual periosteal reaction.   No acute fracture or dislocation. Multiple hammertoe deformities. Joint   spaces preserved. No soft tissue gas. Small vessel calcification.    If there is concern for osteomyelitis consider MRI for more sensitive   evaluation      Assessment :  89-year-old male with past medical history of dementia, anemia, atrial fibrillation, prostate cancer, diabetes, hyperlipidemia, CHF, hypertension, brought in by ambulance from [facility] due to foot ulcer and toe gangrene  Dry gangrene left foot  Severe PVD  TMA recommended by podiatry. Family wishes conservative management.  leukocytosis resolved    plan  Monitor off antibiotics  Family wishes conservative management  Antibx have limited role here and were already attempted last admission ( completed 4 weeks of Rocephin )  Gangrene has progressed due to severe PVD  No intervention per vascular and limited benefit at this time  Cont local care  Asp precautions  Stable from ID standpoint    Continue with present regiment.  Appropriate use of antibiotics and adverse effects reviewed.    > 35 minutes were spent in direct patient care reviewing notes, medications ,labs data/ imaging , discussion with multidisciplinary team.    Thank you for allowing me to participate in care of your patient .    Heber Issa MD  Infectious Disease  [phone]

## 2022-12-22 NOTE — PROGRESS NOTE ADULT - ASSESSMENT
89-year-old male with past medical history of dementia, anemia, atrial fibrillation, prostate cancer, diabetes, hyperlipidemia, CHF, hypertension, brought in by ambulance from [facility] due to foot infection.    on emp ABX  cx - biomarkers  ID eval  GOC -   assist with needs - ADL  oral hygiene  skin care  monitor VS and Sat  on RA  pain rx regimen on order  old records reviewed  long term resident of [facility]
89-year-old male with past medical history of dementia, anemia, atrial fibrillation, prostate cancer, diabetes, hyperlipidemia, CHF, hypertension, brought in by ambulance from [facility] due to foot infection.    on ABX  ID follow up  GOC documented - DNR DNI - Vinicio - son - hcp - molst on file    on emp ABX  cx - biomarkers  ID eval  GOC -   assist with needs - ADL  oral hygiene  skin care  monitor VS and Sat  on RA  pain rx regimen on order  old records reviewed  long term resident of [facility]
89-year-old male with past medical history of dementia, anemia, atrial fibrillation, prostate cancer, diabetes, hyperlipidemia, CHF, hypertension, brought in by ambulance from [facility] due to foot infection.    s/p ABX  ID follow up  GOC documented - DNR DNI - Viniico - son - hcp - molst on file    s/p emp ABX  cx - biomarkers  ID eval  GOC -   assist with needs - ADL  oral hygiene  skin care  monitor VS and Sat  on RA  pain rx regimen on order  old records reviewed  long term resident of [facility]
89-year-old male with past medical history of dementia, anemia, atrial fibrillation, prostate cancer, diabetes, hyperlipidemia, CHF, hypertension, brought in by ambulance from [facility] due to foot infection.    dc planning    on emp ABX  cx - biomarkers  ID eval  GOC -   assist with needs - ADL  oral hygiene  skin care  monitor VS and Sat  on RA  pain rx regimen on order  old records reviewed  long term resident of [facility]
89-year-old male with past medical history of dementia, anemia, atrial fibrillation, prostate cancer, diabetes, hyperlipidemia, CHF, hypertension, brought in by ambulance from [facility] due to foot infection.    zosyn  IVF  vs noted  podiatry follow up    on emp ABX  cx - biomarkers  ID eval  GOC -   assist with needs - ADL  oral hygiene  skin care  monitor VS and Sat  on RA  pain rx regimen on order  old records reviewed  long term resident of [facility]
 89-year-old male with past medical history of dementia, anemia, atrial fibrillation, prostate cancer, diabetes, hyperlipidemia, CHF, hypertension, brought in by ambulance from [facility] due to foot infection.  Patient is a poor historian due to dementia .  Patient was sent in due to left third toe infection.  Patient denies pain, fever, trauma, or any other complaints. Seen by podiatry team in ER- and diagnosed with Gangrene of L foot -Applied dry sterile dressing to the left footApply offloading boots at all times when in bed to prevent the progression of pressure injuries to the foot   Bone scan performed on the last admission in 9/6/22 September was positive for osteomyelitis of the left second toe.  Per patient's son and has proxy patient was treated conservatively with midline antibiotic .Recommend vascular consult .Patient's disease has progressed from the last visit patient now has gangrene to the second third toes of the left, eschar to the medial first metatarsal pressure wound to the left heel eschar to the lateral fifth metatarsal and the lateral fifth digit. Will discuss with family and recommend more proximal amputation .Continue with IV antibiotics per infectious disease  Will reconsult vascular for recommendations Applied dry sterile dressing to the left foot .Apply offloading boots at all times when in bed to prevent the progression of pressure injuries to the foot Bone scan performed on the last admission in 9/6/22 September was positive for osteomyelitis of the left second toe.  Per patient's son and has proxy patient was treated conservatively with midline antibiotic .Seen by ID -continue zosyn for now Palliative care consult requested ,to discuss advance directives and complete MOLST -GOC as per pall care MD Palmer ,who followed the patient  in a  past .Spoke to PCP from [facility] and he is in agreement with plan for palliative care /CMO /hospice .
 89-year-old male with past medical history of dementia, anemia, atrial fibrillation, prostate cancer, diabetes, hyperlipidemia, CHF, hypertension, brought in by ambulance from [facility] due to foot infection.  Patient is a poor historian due to dementia .  Patient was sent in due to left third toe infection.  Patient denies pain, fever, trauma, or any other complaints. Seen by podiatry team in ER- and diagnosed with Gangrene of L foot -Applied dry sterile dressing to the left footApply offloading boots at all times when in bed to prevent the progression of pressure injuries to the foot   Bone scan performed on the last admission in 9/6/22 September was positive for osteomyelitis of the left second toe.  Per patient's son and has proxy patient was treated conservatively with midline antibiotic .Recommend vascular consult .Patient's disease has progressed from the last visit patient now has gangrene to the second third toes of the left, eschar to the medial first metatarsal pressure wound to the left heel eschar to the lateral fifth metatarsal and the lateral fifth digit. Will discuss with family and recommend more proximal amputation .Continue with IV antibiotics per infectious disease  Will reconsult vascular for recommendations Applied dry sterile dressing to the left foot .Apply offloading boots at all times when in bed to prevent the progression of pressure injuries to the foot Bone scan performed on the last admission in 9/6/22 September was positive for osteomyelitis of the left second toe.  Per patient's son and has proxy patient was treated conservatively with midline antibiotic .Seen by ID -continue zosyn for now Palliative care consult requested ,to discuss advance directives and complete MOLST -GOC as per pall care MD Palmer ,who followed the patient  in a  past .Spoke to PCP from [facility] and he is in agreement with plan for palliative care /CMO /hospice .
 89-year-old male with past medical history of dementia, anemia, atrial fibrillation, prostate cancer, diabetes, hyperlipidemia, CHF, hypertension, brought in by ambulance from [facility] due to foot infection.  Patient is a poor historian due to dementia .  Patient was sent in due to left third toe infection.  Patient denies pain, fever, trauma, or any other complaints. Seen by podiatry team in ER- and diagnosed with Gangrene of L foot -Applied dry sterile dressing to the left footApply offloading boots at all times when in bed to prevent the progression of pressure injuries to the foot   Bone scan performed on the last admission in 9/6/22 September was positive for osteomyelitis of the left second toe.  Per patient's son and has proxy patient was treated conservatively with midline antibiotic .Recommend vascular consult .Patient's disease has progressed from the last visit patient now has gangrene to the second third toes of the left, eschar to the medial first metatarsal pressure wound to the left heel eschar to the lateral fifth metatarsal and the lateral fifth digit. Will discuss with family and recommend more proximal amputation .Continue with IV antibiotics per infectious disease  Will reconsult vascular for recommendations Applied dry sterile dressing to the left foot .Apply offloading boots at all times when in bed to prevent the progression of pressure injuries to the foot Bone scan performed on the last admission in 9/6/22 September was positive for osteomyelitis of the left second toe.  Per patient's son and has proxy patient was treated conservatively with midline antibiotic .Seen by ID -continue zosyn for now Palliative care consult requested ,to discuss advance directives and complete MOLST -GOC as per pall care MD Palmer ,who followed the patient  in a  past .Spoke to PCP from [facility] and he is in agreement with plan for palliative care /CMO /hospice .
 89-year-old male with past medical history of dementia, anemia, atrial fibrillation, prostate cancer, diabetes, hyperlipidemia, CHF, hypertension, brought in by ambulance from [facility] due to foot infection.  Patient is a poor historian due to dementia .  Patient was sent in due to left third toe infection.  Patient denies pain, fever, trauma, or any other complaints. Seen by podiatry team in ER- and diagnosed with Gangrene of L foot -Applied dry sterile dressing to the left footApply offloading boots at all times when in bed to prevent the progression of pressure injuries to the foot   Bone scan performed on the last admission in 9/6/22 September was positive for osteomyelitis of the left second toe.  Per patient's son and has proxy patient was treated conservatively with midline antibiotic .Recommend vascular consult .Patient's disease has progressed from the last visit patient now has gangrene to the second third toes of the left, eschar to the medial first metatarsal pressure wound to the left heel eschar to the lateral fifth metatarsal and the lateral fifth digit. Will discuss with family and recommend more proximal amputation .Continue with IV antibiotics per infectious disease  Will reconsult vascular for recommendations Applied dry sterile dressing to the left foot .Apply offloading boots at all times when in bed to prevent the progression of pressure injuries to the foot Bone scan performed on the last admission in 9/6/22 September was positive for osteomyelitis of the left second toe.  Per patient's son and has proxy patient was treated conservatively with midline antibiotic .Seen by ID -continue zosyn for now Palliative care consult requested ,to discuss advance directives and complete MOLST -GOC as per pall care MD Palmer ,who followed the patient  in a  past .Spoke to PCP from [facility] and he is in agreement with plan for palliative care /CMO /hospice .

## 2022-12-22 NOTE — DISCHARGE NOTE NURSING/CASE MANAGEMENT/SOCIAL WORK - NSDCPEFALRISK_GEN_ALL_CORE
For information on Fall & Injury Prevention, visit: [email or web address] OR  [email or web address] OR  https://www.cdc.gov/steadi/patient.html

## 2022-12-22 NOTE — DISCHARGE NOTE PROVIDER - PROVIDER TOKENS
PROVIDER:[TOKEN:[[id number]],FOLLOWUP:[1 week]],PROVIDER:[TOKEN:[[id number]],FOLLOWUP:[1 week]]

## 2022-12-22 NOTE — DISCHARGE NOTE PROVIDER - NSDCHHNEEDSERVICE_GEN_ALL_CORE
Regular rate and rhythm, Heart sounds S1 S2 present, no murmurs, rubs or gallops
Medication teaching and assessment/Rehabilitation services/Teaching and training/Wound care and assessment

## 2022-12-22 NOTE — DISCHARGE NOTE PROVIDER - NSDCCPCAREPLAN_GEN_ALL_CORE_FT
PRINCIPAL DISCHARGE DIAGNOSIS  Diagnosis: Gangrene of toe of left foot  Assessment and Plan of Treatment:       SECONDARY DISCHARGE DIAGNOSES  Diagnosis: Peripheral vascular disease  Assessment and Plan of Treatment:     Diagnosis: Chronic CHF  Assessment and Plan of Treatment:     Diagnosis: VHD (valvular heart disease)  Assessment and Plan of Treatment:     Diagnosis: HTN (hypertension)  Assessment and Plan of Treatment:     Diagnosis: Constipation  Assessment and Plan of Treatment:     Diagnosis: Dysphagia  Assessment and Plan of Treatment:     Diagnosis: Afib  Assessment and Plan of Treatment:     Diagnosis: Diabetes  Assessment and Plan of Treatment:

## 2022-12-22 NOTE — DISCHARGE NOTE PROVIDER - NSDCFUADDINST_GEN_ALL_CORE_FT
Wound Care Instructions:  -Keep dressing clean, dry, and intact to the left foot   -Apply silver alginate to the heel, medial, lateral foot and the 2nd and 3rd digits and  dressing to the left foot, change every two days   -Monitor for any signs of infection including redness, swelling in the leg above the bandage, nausea/vomiting/fever/chills/chest pain/shortness of breath, if any are present patient must report to the emergency department immediately  -Patient is to follow up with Dr. Jaimes/Dr. Lord/ Dr. Beck  after discharge at [facility]. Please call to make an appointment [phone].

## 2022-12-22 NOTE — DISCHARGE NOTE PROVIDER - NSDCCAREPROVSEEN_GEN_ALL_CORE_FT
Ebony, Leanne Issa, Heber Lord, Caio Remy, Tamara Solorzano, Margarita Barros, Josr Ring, Lisa Whittaker, Aleksander Weil, Patricia

## 2022-12-22 NOTE — DISCHARGE NOTE PROVIDER - CARE PROVIDER_API CALL
Caio Lord (DPM)  Foot Surgery; Podiatric Medicine; Recon RearfootAnkle Surgery  [address] [place] [address]  Phone: [phone]  Fax: [phone]  Follow Up Time: 1 week    Toby Quintana)  Critical Care Medicine; Internal Medicine; Pulmonary Disease  [facility] [place] [address]  Phone: [phone]  Fax: [phone]  Follow Up Time: 1 week

## 2022-12-22 NOTE — DISCHARGE NOTE PROVIDER - HOSPITAL COURSE
89-year-old male with past medical history of dementia, anemia, atrial fibrillation, prostate cancer, diabetes, hyperlipidemia, CHF, hypertension, brought in by ambulance from [facility] due to foot infection.  Patient is a poor historian due to dementia .  Patient was sent in due to left third toe infection.  Patient denies pain, fever, trauma, or any other complaints. Seen by podiatry team in ER- and diagnosed with Gangrene of L foot -Applied dry sterile dressing to the left footApply offloading boots at all times when in bed to prevent the progression of pressure injuries to the foot   Bone scan performed on the last admission in 9/6/22 September was positive for osteomyelitis of the left second toe.  Per patient's son and has proxy patient was treated conservatively with midline antibiotic .Recommend vascular consult .Patient's disease has progressed from the last visit patient now has gangrene to the second third toes of the left, eschar to the medial first metatarsal pressure wound to the left heel eschar to the lateral fifth metatarsal and the lateral fifth digit. Will discuss with family and recommend more proximal amputation .Continue with IV antibiotics per infectious disease  Will reconsult vascular for recommendations Applied dry sterile dressing to the left foot .Apply offloading boots at all times when in bed to prevent the progression of pressure injuries to the foot Bone scan performed on the last admission in 9/6/22 September was positive for osteomyelitis of the left second toe.  Per patient's son and has proxy patient was treated conservatively with midline antibiotic .Seen by ID -continue zosyn for now Palliative care consult requested ,to discuss advance directives and complete MOLST -GOC as per pall care MD  ,who followed the patient  in a  past .Spoke to PCP from [facility] and he is in agreement with plan for palliative care /CMO /hospice .    Nutritional Assessment:  · Nutritional Assessment  This patient has been assessed with a concern for Malnutrition and has been determined to have a diagnosis/diagnoses of Moderate protein-calorie malnutrition and Underweight (BMI < 19).    This patient is being managed with:   Diet Pureed-  Moderately Thick Liquids (MODTHICKLIQS)  Supplement Feeding Modality:  Oral  Glucerna Shake Cans or Servings Per Day:  1       Frequency:  Two Times a day  Entered: Dec 18 2022 11:18AM    Problem/Plan - 1:  ·  Problem: Gangrene of toe of left foot.   ·  Plan: As per vascular surgery team -Due to advanced age, dementia, contracted state, and multiple medical comorbidities, patient is not a surgical candidate.  - Recommend conservative management  and  GOC discussion as per palliative care MD cons   - Local wound care, antibiotics  as per ID cons .Social service consult -home hospice at [facility] vs [facility] placement .Podiatry input is appreciated ,family didn't make a decision regarding sx ( TMA ) yet.    Problem/Plan - 2:  ·  Problem: Diabetes.   ·  Plan: Accu-Cheks monitoring and insulin corrective regimen  sliding scale coverage with short acting inslulin, add longacting insulin as needed ,no concentrated sweets diet, serial labs ,HbA1C,education.    Problem/Plan - 3:  ·  Problem: Peripheral vascular disease.   ·  Plan: Due to advanced age, dementia, contracted state, and multiple medical comorbidities, patient is not a surgical candidate.  - Recommend conservative management  and palliative care MD cons   - Local wound care, antibiotics  as per ID cons.    Problem/Plan - 4:  ·  Problem: Afib.   ·  Plan: continue home medications.    Problem/Plan - 5:  ·  Problem: HTN (hypertension).   ·  Plan: continue home medications.    Problem/Plan - 6:  ·  Problem: Chronic CHF.   ·  Plan: continue home medications ,card cons.    Problem/Plan - 7:  ·  Problem: VHD (valvular heart disease).   ·  Plan: continue home medications ,card cons.    Problem/Plan - 8:  ·  Problem: Constipation.   ·  Plan: bowel regimen.    Problem/Plan - 9:  ·  Problem: Prophylactic measure.   ·  Plan: Gastrointestinal stress ulcer prophylaxis and DVT prophylaxis administered.    Problem/Plan - 10:  ·  Problem: Dysphagia.   ·  Plan; SLP eval ,aspiration precautions.    Problem/Plan - 11:  ·  Problem: Counseling regarding goals of care.   ·  Plan: Palliative care MD  consult requested ,to discuss advance directives and complete MOLST .Hospice and CMO seem to be most appropriate level of care at this time .Social service input.

## 2022-12-22 NOTE — DISCHARGE NOTE NURSING/CASE MANAGEMENT/SOCIAL WORK - NSDCPEPTCAREGIVEDUMATLIST _GEN_ALL_CORE
"     Lew Francis MD.      
Statement Selected
Heart Failure/Diabetes

## 2022-12-22 NOTE — DISCHARGE NOTE PROVIDER - NSDCMRMEDTOKEN_GEN_ALL_CORE_FT
acetaminophen 325 mg oral tablet: 2 tab(s) orally every 6 hours, As needed, Temp greater or equal to 38C (100.4F), Mild Pain (1 - 3)  ascorbic acid 500 mg oral tablet: 1 tab(s) orally 2 times a day  cefTRIAXone: 2000 milligram(s) intravenous once a day x 28 days   clopidogrel 75 mg oral tablet: 1 tab(s) orally once a day  collagenase 250 units/g topical ointment: Apply topically to affected area once a day   donepezil 5 mg oral tablet: 1 tab(s) orally once a day (at bedtime)  famotidine 40 mg oral tablet: 1 tab(s) orally once a day   ferrous sulfate 325 mg (65 mg elemental iron) oral tablet: 1 tab(s) orally once a day  heparin: 5000 unit(s) subcutaneous 2 times a day  insulin lispro 100 units/mL injectable solution: injectable 3 times a day (before meals)  lactobacillus acidophilus oral tablet: 2 tab(s) orally once a day  losartan 25 mg oral tablet: 1 tab(s) orally once a day  metoprolol succinate 25 mg oral tablet, extended release: 1 tab(s) orally once a day  Multiple Vitamins with Minerals oral tablet: 1 tab(s) orally once a day  polyethylene glycol 3350 oral powder for reconstitution: 17 gram(s) orally once a day  potassium chloride 10 mEq oral tablet, extended release: 1 tab(s) orally once a day  Senna 8.6 mg oral tablet: 2 tab(s) orally once a day (at bedtime)   simvastatin 20 mg oral tablet: 1 tab(s) orally once a day (at bedtime)  tamsulosin 0.4 mg oral capsule: 1 cap(s) orally once a day (at bedtime)  torsemide 20 mg oral tablet: 1 tab(s) orally once a day   acetaminophen 325 mg oral tablet: 2 tab(s) orally every 6 hours, As needed, Temp greater or equal to 38C (100.4F), Mild Pain (1 - 3)  ascorbic acid 500 mg oral tablet: 1 tab(s) orally 2 times a day  donepezil 5 mg oral tablet: 1 tab(s) orally once a day (at bedtime)  famotidine 40 mg oral tablet: 1 tab(s) orally once a day   ferrous sulfate 325 mg (65 mg elemental iron) oral tablet: 1 tab(s) orally once a day  lactobacillus acidophilus oral tablet: 2 tab(s) orally once a day  losartan 25 mg oral tablet: 1 tab(s) orally once a day  metoprolol succinate 25 mg oral tablet, extended release: 1 tab(s) orally once a day  Multiple Vitamins with Minerals oral tablet: 1 tab(s) orally once a day  polyethylene glycol 3350 oral powder for reconstitution: 17 gram(s) orally once a day  Senna 8.6 mg oral tablet: 2 tab(s) orally once a day (at bedtime)   simvastatin 20 mg oral tablet: 1 tab(s) orally once a day (at bedtime)  tamsulosin 0.4 mg oral capsule: 1 cap(s) orally once a day (at bedtime)

## 2022-12-28 ENCOUNTER — INPATIENT (INPATIENT)
Facility: HOSPITAL | Age: 87
LOS: 5 days | Discharge: NURSING, REHAB OR LONG-TERM CARE | DRG: 299 | End: 2023-01-03
Attending: INTERNAL MEDICINE | Admitting: INTERNAL MEDICINE
Payer: MEDICARE

## 2022-12-28 VITALS
SYSTOLIC BLOOD PRESSURE: 100 MMHG | HEART RATE: 91 BPM | DIASTOLIC BLOOD PRESSURE: 59 MMHG | TEMPERATURE: 98 F | OXYGEN SATURATION: 96 % | RESPIRATION RATE: 19 BRPM

## 2022-12-28 DIAGNOSIS — I96 GANGRENE, NOT ELSEWHERE CLASSIFIED: ICD-10-CM

## 2022-12-28 DIAGNOSIS — L08.9 LOCAL INFECTION OF THE SKIN AND SUBCUTANEOUS TISSUE, UNSPECIFIED: ICD-10-CM

## 2022-12-28 DIAGNOSIS — Z95.810 PRESENCE OF AUTOMATIC (IMPLANTABLE) CARDIAC DEFIBRILLATOR: Chronic | ICD-10-CM

## 2022-12-28 DIAGNOSIS — L97.509 NON-PRESSURE CHRONIC ULCER OF OTHER PART OF UNSPECIFIED FOOT WITH UNSPECIFIED SEVERITY: ICD-10-CM

## 2022-12-28 LAB
ALBUMIN SERPL ELPH-MCNC: 2.1 G/DL — LOW (ref 3.3–5)
ALP SERPL-CCNC: 81 U/L — SIGNIFICANT CHANGE UP (ref 40–120)
ALT FLD-CCNC: 23 U/L — SIGNIFICANT CHANGE UP (ref 12–78)
ANION GAP SERPL CALC-SCNC: 6 MMOL/L — SIGNIFICANT CHANGE UP (ref 5–17)
ANION GAP SERPL CALC-SCNC: 7 MMOL/L — SIGNIFICANT CHANGE UP (ref 5–17)
APPEARANCE UR: ABNORMAL
APTT BLD: 31.9 SEC — SIGNIFICANT CHANGE UP (ref 27.5–35.5)
AST SERPL-CCNC: 26 U/L — SIGNIFICANT CHANGE UP (ref 15–37)
BASOPHILS # BLD AUTO: 0.02 K/UL — SIGNIFICANT CHANGE UP (ref 0–0.2)
BASOPHILS NFR BLD AUTO: 0.2 % — SIGNIFICANT CHANGE UP (ref 0–2)
BILIRUB SERPL-MCNC: 0.5 MG/DL — SIGNIFICANT CHANGE UP (ref 0.2–1.2)
BILIRUB UR-MCNC: NEGATIVE — SIGNIFICANT CHANGE UP
BUN SERPL-MCNC: 19 MG/DL — SIGNIFICANT CHANGE UP (ref 7–23)
BUN SERPL-MCNC: 24 MG/DL — HIGH (ref 7–23)
CALCIUM SERPL-MCNC: 9.1 MG/DL — SIGNIFICANT CHANGE UP (ref 8.5–10.1)
CALCIUM SERPL-MCNC: 9.5 MG/DL — SIGNIFICANT CHANGE UP (ref 8.5–10.1)
CHLORIDE SERPL-SCNC: 115 MMOL/L — HIGH (ref 96–108)
CHLORIDE SERPL-SCNC: 116 MMOL/L — HIGH (ref 96–108)
CO2 SERPL-SCNC: 29 MMOL/L — SIGNIFICANT CHANGE UP (ref 22–31)
CO2 SERPL-SCNC: 30 MMOL/L — SIGNIFICANT CHANGE UP (ref 22–31)
COLOR SPEC: YELLOW — SIGNIFICANT CHANGE UP
CREAT SERPL-MCNC: 0.49 MG/DL — LOW (ref 0.5–1.3)
CREAT SERPL-MCNC: 0.68 MG/DL — SIGNIFICANT CHANGE UP (ref 0.5–1.3)
DIFF PNL FLD: ABNORMAL
EGFR: 85 ML/MIN/1.73M2 — SIGNIFICANT CHANGE UP
EGFR: 94 ML/MIN/1.73M2 — SIGNIFICANT CHANGE UP
EOSINOPHIL # BLD AUTO: 0.03 K/UL — SIGNIFICANT CHANGE UP (ref 0–0.5)
EOSINOPHIL NFR BLD AUTO: 0.3 % — SIGNIFICANT CHANGE UP (ref 0–6)
FLUAV AG NPH QL: SIGNIFICANT CHANGE UP
FLUBV AG NPH QL: SIGNIFICANT CHANGE UP
GLUCOSE SERPL-MCNC: 142 MG/DL — HIGH (ref 70–99)
GLUCOSE SERPL-MCNC: 153 MG/DL — HIGH (ref 70–99)
GLUCOSE UR QL: NEGATIVE — SIGNIFICANT CHANGE UP
HCT VFR BLD CALC: 32.3 % — LOW (ref 39–50)
HGB BLD-MCNC: 10.3 G/DL — LOW (ref 13–17)
IMM GRANULOCYTES NFR BLD AUTO: 0.6 % — SIGNIFICANT CHANGE UP (ref 0–0.9)
INR BLD: 1.25 RATIO — HIGH (ref 0.88–1.16)
KETONES UR-MCNC: NEGATIVE — SIGNIFICANT CHANGE UP
LEUKOCYTE ESTERASE UR-ACNC: ABNORMAL
LIDOCAIN IGE QN: 91 U/L — SIGNIFICANT CHANGE UP (ref 73–393)
LYMPHOCYTES # BLD AUTO: 1.22 K/UL — SIGNIFICANT CHANGE UP (ref 1–3.3)
LYMPHOCYTES # BLD AUTO: 13.5 % — SIGNIFICANT CHANGE UP (ref 13–44)
MCHC RBC-ENTMCNC: 30.8 PG — SIGNIFICANT CHANGE UP (ref 27–34)
MCHC RBC-ENTMCNC: 31.9 GM/DL — LOW (ref 32–36)
MCV RBC AUTO: 96.7 FL — SIGNIFICANT CHANGE UP (ref 80–100)
MONOCYTES # BLD AUTO: 0.53 K/UL — SIGNIFICANT CHANGE UP (ref 0–0.9)
MONOCYTES NFR BLD AUTO: 5.9 % — SIGNIFICANT CHANGE UP (ref 2–14)
NEUTROPHILS # BLD AUTO: 7.17 K/UL — SIGNIFICANT CHANGE UP (ref 1.8–7.4)
NEUTROPHILS NFR BLD AUTO: 79.5 % — HIGH (ref 43–77)
NITRITE UR-MCNC: NEGATIVE — SIGNIFICANT CHANGE UP
NRBC # BLD: 0 /100 WBCS — SIGNIFICANT CHANGE UP (ref 0–0)
PH UR: 5 — SIGNIFICANT CHANGE UP (ref 5–8)
PLATELET # BLD AUTO: 331 K/UL — SIGNIFICANT CHANGE UP (ref 150–400)
POTASSIUM SERPL-MCNC: 3.3 MMOL/L — LOW (ref 3.5–5.3)
POTASSIUM SERPL-MCNC: 4.1 MMOL/L — SIGNIFICANT CHANGE UP (ref 3.5–5.3)
POTASSIUM SERPL-SCNC: 3.3 MMOL/L — LOW (ref 3.5–5.3)
POTASSIUM SERPL-SCNC: 4.1 MMOL/L — SIGNIFICANT CHANGE UP (ref 3.5–5.3)
PROT SERPL-MCNC: 6.5 G/DL — SIGNIFICANT CHANGE UP (ref 6–8.3)
PROT UR-MCNC: 15
PROTHROM AB SERPL-ACNC: 14.6 SEC — HIGH (ref 10.5–13.4)
RBC # BLD: 3.34 M/UL — LOW (ref 4.2–5.8)
RBC # FLD: 14.2 % — SIGNIFICANT CHANGE UP (ref 10.3–14.5)
RSV RNA NPH QL NAA+NON-PROBE: SIGNIFICANT CHANGE UP
SARS-COV-2 RNA SPEC QL NAA+PROBE: SIGNIFICANT CHANGE UP
SODIUM SERPL-SCNC: 151 MMOL/L — HIGH (ref 135–145)
SODIUM SERPL-SCNC: 152 MMOL/L — HIGH (ref 135–145)
SP GR SPEC: 1.02 — SIGNIFICANT CHANGE UP (ref 1.01–1.02)
UROBILINOGEN FLD QL: NEGATIVE — SIGNIFICANT CHANGE UP
WBC # BLD: 9.02 K/UL — SIGNIFICANT CHANGE UP (ref 3.8–10.5)
WBC # FLD AUTO: 9.02 K/UL — SIGNIFICANT CHANGE UP (ref 3.8–10.5)

## 2022-12-28 PROCEDURE — 93010 ELECTROCARDIOGRAM REPORT: CPT

## 2022-12-28 PROCEDURE — 99285 EMERGENCY DEPT VISIT HI MDM: CPT

## 2022-12-28 PROCEDURE — 99221 1ST HOSP IP/OBS SF/LOW 40: CPT

## 2022-12-28 RX ORDER — METOPROLOL TARTRATE 50 MG
12.5 TABLET ORAL
Refills: 0 | Status: DISCONTINUED | OUTPATIENT
Start: 2022-12-28 | End: 2023-01-03

## 2022-12-28 RX ORDER — ACETAMINOPHEN 500 MG
650 TABLET ORAL EVERY 6 HOURS
Refills: 0 | Status: DISCONTINUED | OUTPATIENT
Start: 2022-12-28 | End: 2023-01-03

## 2022-12-28 RX ORDER — HEPARIN SODIUM 5000 [USP'U]/ML
5000 INJECTION INTRAVENOUS; SUBCUTANEOUS EVERY 12 HOURS
Refills: 0 | Status: DISCONTINUED | OUTPATIENT
Start: 2022-12-28 | End: 2023-01-03

## 2022-12-28 RX ORDER — SODIUM CHLORIDE 9 MG/ML
1000 INJECTION, SOLUTION INTRAVENOUS
Refills: 0 | Status: DISCONTINUED | OUTPATIENT
Start: 2022-12-28 | End: 2023-01-03

## 2022-12-28 RX ORDER — FAMOTIDINE 10 MG/ML
20 INJECTION INTRAVENOUS DAILY
Refills: 0 | Status: DISCONTINUED | OUTPATIENT
Start: 2022-12-28 | End: 2023-01-03

## 2022-12-28 RX ORDER — DONEPEZIL HYDROCHLORIDE 10 MG/1
5 TABLET, FILM COATED ORAL AT BEDTIME
Refills: 0 | Status: DISCONTINUED | OUTPATIENT
Start: 2022-12-28 | End: 2023-01-03

## 2022-12-28 RX ORDER — PIPERACILLIN AND TAZOBACTAM 4; .5 G/20ML; G/20ML
3.38 INJECTION, POWDER, LYOPHILIZED, FOR SOLUTION INTRAVENOUS ONCE
Refills: 0 | Status: COMPLETED | OUTPATIENT
Start: 2022-12-28 | End: 2022-12-29

## 2022-12-28 RX ORDER — SENNA PLUS 8.6 MG/1
2 TABLET ORAL AT BEDTIME
Refills: 0 | Status: DISCONTINUED | OUTPATIENT
Start: 2022-12-28 | End: 2023-01-03

## 2022-12-28 RX ORDER — PIPERACILLIN AND TAZOBACTAM 4; .5 G/20ML; G/20ML
3.38 INJECTION, POWDER, LYOPHILIZED, FOR SOLUTION INTRAVENOUS ONCE
Refills: 0 | Status: COMPLETED | OUTPATIENT
Start: 2022-12-28 | End: 2022-12-28

## 2022-12-28 RX ORDER — MORPHINE SULFATE 50 MG/1
5 CAPSULE, EXTENDED RELEASE ORAL
Refills: 0 | Status: DISCONTINUED | OUTPATIENT
Start: 2022-12-28 | End: 2023-01-03

## 2022-12-28 RX ORDER — TAMSULOSIN HYDROCHLORIDE 0.4 MG/1
0.4 CAPSULE ORAL AT BEDTIME
Refills: 0 | Status: DISCONTINUED | OUTPATIENT
Start: 2022-12-28 | End: 2023-01-03

## 2022-12-28 RX ORDER — PIPERACILLIN AND TAZOBACTAM 4; .5 G/20ML; G/20ML
3.38 INJECTION, POWDER, LYOPHILIZED, FOR SOLUTION INTRAVENOUS EVERY 8 HOURS
Refills: 0 | Status: DISCONTINUED | OUTPATIENT
Start: 2022-12-29 | End: 2022-12-30

## 2022-12-28 RX ADMIN — SODIUM CHLORIDE 75 MILLILITER(S): 9 INJECTION, SOLUTION INTRAVENOUS at 16:50

## 2022-12-28 RX ADMIN — PIPERACILLIN AND TAZOBACTAM 200 GRAM(S): 4; .5 INJECTION, POWDER, LYOPHILIZED, FOR SOLUTION INTRAVENOUS at 19:33

## 2022-12-28 RX ADMIN — HEPARIN SODIUM 5000 UNIT(S): 5000 INJECTION INTRAVENOUS; SUBCUTANEOUS at 19:32

## 2022-12-28 RX ADMIN — SODIUM CHLORIDE 80 MILLILITER(S): 9 INJECTION, SOLUTION INTRAVENOUS at 22:36

## 2022-12-28 NOTE — CONSULT NOTE ADULT - PROBLEM SELECTOR RECOMMENDATION 9
Patient examined and evaluated at this time.  Patient's son opting for hospice evaluation.  Patient's left foot will likely end up with further infection and patient will likely end up with life-threatening sepsis.  Patient is a very high risk for limb loss, proximal limb amputation, and death. Hospice evaluation at this time.

## 2022-12-28 NOTE — ED PROVIDER NOTE - PHYSICAL EXAMINATION
Left foot noted with the gangrenous changes to the second digit with bone exposed, third digit gangrenous to the base of the proximal phalanx, positive probe to bone; there is now also gangrenous changes to the 4th and 5th digits. Medial first metatarsal head wound with a dry eschar; posterior heel wound down to the level of fascia: Mild erythema noted to the forefoot and the heel, no malodor no proximal streaking, no fluctuance

## 2022-12-28 NOTE — PATIENT PROFILE ADULT - FALL HARM RISK - HARM RISK INTERVENTIONS

## 2022-12-28 NOTE — H&P ADULT - ASSESSMENT
Initial evaluation/Admission requested 12/28/2022 on   by Dr Frias    from Dr Quintana   Patient examined chart reviewed    HOSPITAL ADMISSION   PATIENT CAME  FROM (if information available)      ABGS.    VS/ PO/IO/ VENT/ DRIPS.   12/28/2022 afeb 90 100/60   12/28/2022 ra 98%       PATIENT PRESENTATION.  89-year-old male who presents to the emergency room for reported necrotic foot wound.  Past medical history of dementia, anemia, history of atrial fibrillation does not appear to be on anticoagulants, prostate cancer, diabetes, hyperlipidemia, history of CHF, hypertension.  Patient presents from [facility] for necrotic foot wound.  Upon chart review it appears that patient was recently admitted to the hospital from December 16 through December 22, 2022 for the same presentation.  He had been diagnosed with gangrene of the left foot.  He was seen by podiatry dry sterile dressings were applied and he was instructed to level foot while in bed to prevent the progression of the pressure ulcers.  It is noted that patient also underwent a bone scan during his last admission in September positive for osteomyelitis at that time.  Son declined surgical intervention and patient was managed conservatively with antibiotics.  Patient had significant worsening of disease at the time of his last admission.  Vascular was consulted and patient was deemed not a candidate for any intervention on their part.  Patient was also seen by infectious disease notes were reviewed.  Per their notes antibiotics have a limited role as patient had already completed a 4-week course of Rocephin previously.  Gangrene is likely progressing due to severe PVD.  He was treated with Zosyn but he had little to no impact.  Podiatry notes are reviewed.  It was recommended the patient undergo a transmetatarsal amputation of the left foot but after discussion with patient's son it was decided that patient would not undergo surgical treatment.  Local wound care was continued and patient was discharged back to [facility].  Presents again today with worsening necrosis of the left foot.  Son was contacted.  Still refusing surgical intervention at this time.  At this time would like patient to be kept comfortable and is requesting hospice evaluation.  12/28/2022 asked to do admission      HOME MEDS INCLUDE.  donepezil   flomax .4   losartan 25   pepcid 40   metoprolol 12.5 x 2     AGE SEX DOA C/C.  89 m 12/28/2022 foot infection   PMH .  gangrene l foot   .. Recent hospital stay   12/16-12/22/2022   .. bone scan 9/2022 osteomyelitis   .. 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  1) Necrotic foot wound 12/28/2022   2) GOC 12/28/2022 son wants hospice placement   3) Foot infection       PROBLEMS ASSESSMENT RECOMMENDATIONS.    Necrotic foot wound 12/28/2022   .. Podiatry consult 12/28/2022   GOC   .. 12/28/2022 son wants hospice placement   .. 12/28/2022 Dr Whittaker on case   Foot infection   .. w 12/28/2022 w 9   .. ua 12/28/2022 w 6-10  .. flu ab 12/28/2022 (-)   .. rsv 12/28/2022 (-)    .. ID consulted 12/28/2022  Anemia.  .. Hb 12/28/2022 Hb 10  Hypernatremia.  .. Na 12/28/2022 Na 151     TIME SPENT   Over 55 minutes aggregate care time spent on encounter; activities included   direct patient care, counseling and/or coordinating care reviewing notes, lab data/ imaging , discussion with multidisciplinary team/ patient  /family and explaining in detail risks, benefits, alternatives  of the recommendations     TERESA FRANCIS 10/16/1926 12/28/2022  DR ARCADIO FRIAS

## 2022-12-28 NOTE — CONSULT NOTE ADULT - CONVERSATION DETAILS
Vinicio   SON  HCP  pt is DNR DNI  MOLST on record  updated  limited interventions and referral for Hospice at [facility]

## 2022-12-28 NOTE — ED ADULT NURSE NOTE - OBJECTIVE STATEMENT
Patient is 89yo M presents via EMS from [facility] with c/o left foot infection. Patient is nonverbal at baseline, unable to answer questions. Patient is 89yo M presents via EMS from [facility] with c/o left foot infection. Patient is nonverbal at baseline, unable to answer questions.  Patient has un-stageable sacral ulcer to sacrum with black base, B/L buttock and coccyx non-blanchable redness. Patient left foot with un-stageable ulcer to heel with black base, black/redness around ankle. Un-stageable ulcer to left foot medial great toe. Patient 2nd, 3rd, 4th toes black and gangrenous, foul smelling.   Patient afebrile rectally 98.0F.

## 2022-12-28 NOTE — CONSULT NOTE ADULT - SUBJECTIVE AND OBJECTIVE BOX
Date/Time Patient Seen:  		  Referring MD:   Data Reviewed	       Patient is a 89y old  Male who presents with a chief complaint of     Subjective/HPI   vs noted  labs reviewed  imaging reviewed  er provider note reviewed  pt is DNR  lives in [facility]  has a son who is the HCP   International Travel:  International Travel within 21 days? No.(1)     Preferred Language to Address Healthcare:  · Preferred Language to Address Healthcare	English     Patient Identity:  · Birth Sex	Male     Child Abuse Assessment (patients less than 13 yrs):  Chief Complaint: foot pain/injury.    · Chief Complaint: The patient is a 89y Male complaining of foot pain/injury.  · Unable to Obtain: Dementia  · HPI Objective Statement: Patient is a 89-year-old male who presents to the emergency room for reported necrotic foot wound.  Past medical history of dementia, anemia, history of atrial fibrillation does not appear to be on anticoagulants, prostate cancer, diabetes, hyperlipidemia, history of CHF, hypertension.  Patient presents from [facility] for necrotic foot wound.  Upon chart review it appears that patient was recently admitted to the hospital from December 16 through December 22, 2022 for the same presentation.  He had been diagnosed with gangrene of the left foot.  He was seen by podiatry dry sterile dressings were applied and he was instructed to level foot while in bed to prevent the progression of the pressure ulcers.  It is noted that patient also underwent a bone scan during his last admission in September positive for osteomyelitis at that time.  Son declined surgical intervention and patient was managed conservatively with antibiotics.  Patient had significant worsening of disease at the time of his last admission.  Vascular was consulted and patient was deemed not a candidate for any intervention on their part.  Patient was also seen by infectious disease notes were reviewed.  Per their notes antibiotics have a limited role as patient had already completed a 4-week course of Rocephin previously.  Gangrene is likely progressing due to severe PVD.  He was treated with Zosyn but he had little to no impact.  Podiatry notes are reviewed.  It was recommended the patient undergo a transmetatarsal amputation of the left foot but after discussion with patient's son it was decided that patient would not undergo surgical treatment.  Local wound care was continued and patient was discharged back to [facility].  Presents again today with worsening necrosis of the left foot.  Son was contacted.  Still refusing surgical intervention at this time.  At this time would like patient to be kept comfortable and is requesting hospice evaluation.    PAST MEDICAL & SURGICAL HISTORY:  Atrial fibrillation    Hypertension    Diabetes    Prostate ca    Dementia    CHF (congestive heart failure)    Hyperlipemia    Diabetes    Depression    Dementia    No pertinent past medical history    DM (diabetes mellitus)    Atrial fibrillation    Prostate CA    Arteriosclerotic heart disease (ASHD)    Dementia    Anemia    Constipation    AICD (automatic cardioverter/defibrillator) present    Osteomyelitis of finger of left hand    Personal history of radiation therapy    H/O ongoing treatment with hormonal therapy    H/O bladder infections    Scrotal abscess    Urinary retention    Hematuria    H/O cystitis    Constipation    VHD (valvular heart disease)    Aortic valve stenosis    No significant past surgical history    No significant past surgical history    History of automatic internal cardiac defibrillator (AICD)      PAST SURGICAL HISTORY:  History of automatic internal cardiac defibrillator (AICD).     Tobacco Usage:  · Tobacco Usage	Unknown if ever smoked    ALLERGIES AND HOME MEDICATIONS:   Allergies:        Allergies:  	No Known Drug Allergies:   	No Known Drug Allergies:   	latex: Latex, Patient, Rash  	latex: Latex, Unknown  	latex: Latex, Unknown    Home Medications:   * Patient Currently Takes Medications as of 28-Dec-2022 10:04 documented in Structured Notes  · 	famotidine 40 mg oral tablet: 1 tab(s) orally once a day   · 	Multiple Vitamins with Minerals oral tablet: 1 tab(s) orally once a day  · 	metoprolol succinate 25 mg oral tablet, extended release: 1 tab(s) orally once a day  · 	Senna 8.6 mg oral tablet: 2 tab(s) orally once a day (at bedtime)   · 	simvastatin 20 mg oral tablet: 1 tab(s) orally once a day (at bedtime)  · 	polyethylene glycol 3350 oral powder for reconstitution: 17 gram(s) orally once a day  · 	losartan 25 mg oral tablet: 1 tab(s) orally once a day  · 	tamsulosin 0.4 mg oral capsule: 1 cap(s) orally once a day (at bedtime)  · 	lactobacillus acidophilus oral tablet: 2 tab(s) orally once a day  · 	ferrous sulfate 325 mg (65 mg elemental iron) oral tablet: 1 tab(s) orally once a day  · 	donepezil 5 mg oral tablet: 1 tab(s) orally once a day (at bedtime)  · 	ascorbic acid 500 mg oral tablet: 1 tab(s) orally 2 times a day  · 	acetaminophen 325 mg oral tablet: 2 tab(s) orally every 6 hours, As needed, Temp greater or equal to 38C (100.4F), Mild Pain (1 - 3)    REVIEW OF SYSTEMS:    Review of Systems:  · UNABLE TO OBTAIN: Dementia        Medication list         MEDICATIONS  (STANDING):  dextrose 5%. 1000 milliLiter(s) (75 mL/Hr) IV Continuous <Continuous>  dextrose 5%. 1000 milliLiter(s) (75 mL/Hr) IV Continuous <Continuous>    MEDICATIONS  (PRN):         Vitals log        ICU Vital Signs Last 24 Hrs  T(C): 36.7 (28 Dec 2022 09:56), Max: 36.7 (28 Dec 2022 09:56)  T(F): 98 (28 Dec 2022 09:56), Max: 98 (28 Dec 2022 09:56)  HR: 91 (28 Dec 2022 09:09) (91 - 91)  BP: 100/59 (28 Dec 2022 09:09) (100/59 - 100/59)  BP(mean): --  ABP: --  ABP(mean): --  RR: 19 (28 Dec 2022 09:09) (19 - 19)  SpO2: 96% (28 Dec 2022 09:09) (96% - 96%)    O2 Parameters below as of 28 Dec 2022 09:09  Patient On (Oxygen Delivery Method): room air                 Input and Output:  I&O's Detail      Lab Data                        10.3   9.02  )-----------( 331      ( 28 Dec 2022 10:10 )             32.3     12-28    151<H>  |  115<H>  |  24<H>  ----------------------------<  142<H>  4.1   |  29  |  0.68    Ca    9.5      28 Dec 2022 10:10    TPro  6.5  /  Alb  2.1<L>  /  TBili  0.5  /  DBili  x   /  AST  26  /  ALT  23  /  AlkPhos  81  12-28            Review of Systems	    frail  weak    Objective     Physical Examination    heart s1s2  lung dec BS      Pertinent Lab findings & Imaging      [place]:  NO   Adequate UO     I&O's Detail           Discussed with:     Cultures:	        Radiology      ACC: [id number] EXAM:  US DPLX UPR EXT VEINS LTD RT                          PROCEDURE DATE:  12/19/2022          INTERPRETATION:  CLINICAL INFORMATION: Right arm swelling.    COMPARISON: None available.    TECHNIQUE: Duplex sonography of the RIGHT UPPER extremity veins with   color and spectral Doppler, with and without compression.    FINDINGS:    The right internal jugular, subclavian, axillary and brachial veins are   patent and compressible where applicable.    The basilic and cephalic veins (superficial veins) are not adequately   visualized on this exam.    Doppler examination shows normal spontaneous and phasic flow.    IMPRESSION:    No evidence of right upper extremity deep venous thrombosis.        --- End of Report ---            SOCO MIRANDA MD; Attending Radiologist  This document has been electronically signed. Dec 19 2022  3:04PM

## 2022-12-28 NOTE — ED PROVIDER NOTE - CARE PLAN
Principal Discharge DX:	Necrotic eschar  Secondary Diagnosis:	Dementia  Secondary Diagnosis:	Hypernatremia   1

## 2022-12-28 NOTE — ED PROVIDER NOTE - PROGRESS NOTE DETAILS
Spoke with patient's son case reviewed with aware of worsening necrotic tissue offered possible surgical intervention.  At this time  son still refusing surgical intervention he reports that his father never wanted any amputations.  Aware that this will likely result in patient's ultimate demise as the necrotic tissue will just continue to spread up the left.  Also aware that antibiotics are of little utility at this time.  Requested that patient be evaluated for hospice. Social work spoke with patient's facility [facility] at this time unable to set hospice up at the facility in the near future and  facility cannot take the patient back until this is set up.  Patient will require admission to establish hospice.

## 2022-12-28 NOTE — H&P ADULT - HISTORY OF PRESENT ILLNESS
89-year-old male who presents to the emergency room for reported necrotic foot wound.  Past medical history of dementia, anemia, history of atrial fibrillation does not appear to be on anticoagulants, prostate cancer, diabetes, hyperlipidemia, history of CHF, hypertension.  Patient presents from [facility] for necrotic foot wound.  Upon chart review it appears that patient was recently admitted to the hospital from December 16 through December 22, 2022 for the same presentation.  He had been diagnosed with gangrene of the left foot.  He was seen by podiatry dry sterile dressings were applied and he was instructed to level foot while in bed to prevent the progression of the pressure ulcers.  It is noted that patient also underwent a bone scan during his last admission in September positive for osteomyelitis at that time.  Son declined surgical intervention and patient was managed conservatively with antibiotics.  Patient had significant worsening of disease at the time of his last admission.  Vascular was consulted and patient was deemed not a candidate for any intervention on their part.  Patient was also seen by infectious disease notes were reviewed.  Per their notes antibiotics have a limited role as patient had already completed a 4-week course of Rocephin previously.  Gangrene is likely progressing due to severe PVD.  He was treated with Zosyn but he had little to no impact.  Podiatry notes are reviewed.  It was recommended the patient undergo a transmetatarsal amputation of the left foot but after discussion with patient's son it was decided that patient would not undergo surgical treatment.  Local wound care was continued and patient was discharged back to [facility].  Presents again today with worsening necrosis of the left foot.  Son was contacted.  Still refusing surgical intervention at this time.  At this time would like patient to be kept comfortable and is requesting hospice evaluation.  12/28/2022 asked to do admission

## 2022-12-28 NOTE — CONSULT NOTE ADULT - SUBJECTIVE AND OBJECTIVE BOX
NOTE IN PROGRESS     HPI: 89-year-old male seen in the emergency room for chronic left foot wounds.  Patient is well-known to the [facility] wound care team.  Patient was recently hospitalized and discharged.  On recent admission to the hospital, extensive discussion was had with the patient's son regarding the worsening condition of the left foot.  Patient son had opted for conservative treatment time.  Patient sent back to the hospital by the facility due to worsening of the left foot necrotic gangrenous ulcerations.  Patient's son is still refusing surgical intervention at this time and requesting hospice evaluation.    REVIEW OF SYSTEMS  Unable to obtain due to: Altered mentation, Dementia    PAST MEDICAL & SURGICAL HISTORY:  Atrial fibrillation  Hypertension  Diabetes  Prostate ca  Dementia  CHF (congestive heart failure)  Hyperlipemia  Diabetes  Depression  Dementia  DM (diabetes mellitus)  Atrial fibrillation  Prostate CA  Arteriosclerotic heart disease (ASHD)  Dementia  Anemia  AICD (automatic cardioverter/defibrillator) present  Osteomyelitis of finger of left hand  Personal history of radiation therapy  H/O ongoing treatment with hormonal therapy  H/O bladder infections  Scrotal abscess  Urinary retention  Hematuria  H/O cystitis  Constipation  VHD (valvular heart disease)  Aortic valve stenosis  History of automatic internal cardiac defibrillator (AICD)          Allergies    latex (Rash)  latex (Unknown)  No Known Drug Allergies    Intolerances        MEDICATIONS  (STANDING):  dextrose 5%. 1000 milliLiter(s) (75 mL/Hr) IV Continuous <Continuous>  dextrose 5%. 1000 milliLiter(s) (75 mL/Hr) IV Continuous <Continuous>  donepezil 5 milliGRAM(s) Oral at bedtime  famotidine    Tablet 20 milliGRAM(s) Oral daily  heparin   Injectable 5000 Unit(s) SubCutaneous every 12 hours  metoprolol tartrate 12.5 milliGRAM(s) Oral two times a day  piperacillin/tazobactam IVPB. 3.375 Gram(s) IV Intermittent once  piperacillin/tazobactam IVPB.- 3.375 Gram(s) IV Intermittent once  senna 2 Tablet(s) Oral at bedtime  sodium chloride 0.45%. 1000 milliLiter(s) (80 mL/Hr) IV Continuous <Continuous>  tamsulosin 0.4 milliGRAM(s) Oral at bedtime    MEDICATIONS  (PRN):  acetaminophen     Tablet .. 650 milliGRAM(s) Oral every 6 hours PRN Temp greater or equal to 38C (100.4F), Mild Pain (1 - 3)  morphine Concentrate 5 milliGRAM(s) SubLingual every 2 hours PRN Moderate Pain (4 - 6)      Social History:      FAMILY HISTORY:      Vital Signs Last 24 Hrs  T(C): 36.4 (28 Dec 2022 15:26), Max: 36.7 (28 Dec 2022 09:56)  T(F): 97.6 (28 Dec 2022 15:26), Max: 98 (28 Dec 2022 09:56)  HR: 93 (28 Dec 2022 15:26) (91 - 93)  BP: 105/60 (28 Dec 2022 15:26) (100/59 - 105/60)  BP(mean): --  RR: 16 (28 Dec 2022 15:26) (16 - 19)  SpO2: 98% (28 Dec 2022 15:26) (96% - 98%)    Parameters below as of 28 Dec 2022 15:26  Patient On (Oxygen Delivery Method): room air        PHYSICAL EXAM:  Vascular: DP & PT nonpalpable bilaterally, feet are cool to touch bilaterally  Neurological: Unable to assess due to dementia  Musculoskeletal: Unable to assess due to dementia  Dermatological: Gangrenous and necrotic changes to the left digits 2, 3, 4, and partial 5.  There is malodor and purulent drainage from the left interspaces.  Necrotic ulceration noted to the medial first metatarsal head as well as to the left heel down to skin, subcutaneous tissue, fat.                          10.3   9.02  )-----------( 331      ( 28 Dec 2022 10:10 )             32.3       12-28    151<H>  |  115<H>  |  24<H>  ----------------------------<  142<H>  4.1   |  29  |  0.68    Ca    9.5      28 Dec 2022 10:10    TPro  6.5  /  Alb  2.1<L>  /  TBili  0.5  /  DBili  x   /  AST  26  /  ALT  23  /  AlkPhos  81  12-28      PT/INR - ( 28 Dec 2022 10:10 )   PT: 14.6 sec;   INR: 1.25 ratio         PTT - ( 28 Dec 2022 10:10 )  PTT:31.9 sec

## 2022-12-28 NOTE — ED PROVIDER NOTE - OBJECTIVE STATEMENT
Patient is a 89-year-old male who presents to the emergency room for reported necrotic foot wound.  Past medical history of dementia, anemia, history of atrial fibrillation does not appear to be on anticoagulants, prostate cancer, diabetes, hyperlipidemia, history of CHF, hypertension.  Patient presents from [facility] for necrotic foot wound.  Upon chart review it appears that patient was recently admitted to the hospital from December 16 through December 22, 2022 for the same presentation.  He had been diagnosed with gangrene of the left foot.  He was seen by podiatry dry sterile dressings were applied and he was instructed to level foot while in bed to prevent the progression of the pressure ulcers.  It is noted that patient also underwent a bone scan during his last admission in September positive for osteomyelitis at that time.  Son declined surgical intervention and patient was managed conservatively with antibiotics.  Patient had significant worsening of disease at the time of his last admission.  Vascular was consulted and patient was deemed not a candidate for any intervention on their part.  Patient was also seen by infectious disease notes were reviewed.  Per their notes antibiotics have a limited role as patient had already completed a 4-week course of Rocephin previously.  Gangrene is likely progressing due to severe PVD.  He was treated with Zosyn but he had little to no impact.  Podiatry notes are reviewed.  It was recommended the patient undergo a transmetatarsal amputation of the left foot but after discussion with patient's son it was decided that patient would not undergo surgical treatment.  Local wound care was continued and patient was discharged back to [facility].  Presents again today with worsening necrosis of the left foot.  Son was contacted.  Still refusing surgical intervention at this time.  At this time would like patient to be kept comfortable and is requesting hospice evaluation.

## 2022-12-28 NOTE — ED ADULT NURSE NOTE - CCCP TRG CHIEF CMPLNT
Addended by: TANNER VELÁSQUEZ on: 12/22/2021 06:01 AM     Modules accepted: Orders     foot pain/injury

## 2022-12-28 NOTE — ED PROVIDER NOTE - CLINICAL SUMMARY MEDICAL DECISION MAKING FREE TEXT BOX
Patient is presenting to the emergency room with worsening necrosis to the left foot.  Was recently admitted at the time declined surgical intervention.  Has been on multiple rounds of antibiotics and per last ID note given patient's advanced skin necrosis and PVD antibiotics it is closer to be of little to no utility.  Will obtain screening labs consult with patient's to see if he would like more aggressive management and consult with social work. Spoke with patient's son case reviewed with aware of worsening necrotic tissue offered possible surgical intervention.  At this time  son still refusing surgical intervention he reports that his father never wanted any amputations.  Aware that this will likely result in patient's ultimate demise as the necrotic tissue will just continue to spread up the left.  Also aware that antibiotics are of little utility at this time.  Requested that patient be evaluated for hospice. Social work spoke with patient's facility [facility] at this time unable to set hospice up at the facility in the near future and  facility cannot take the patient back until this is set up.  Patient will require admission to establish hospice.

## 2022-12-29 PROCEDURE — 99233 SBSQ HOSP IP/OBS HIGH 50: CPT

## 2022-12-29 RX ORDER — DEXTROSE 50 % IN WATER 50 %
12.5 SYRINGE (ML) INTRAVENOUS ONCE
Refills: 0 | Status: DISCONTINUED | OUTPATIENT
Start: 2022-12-29 | End: 2023-01-03

## 2022-12-29 RX ORDER — METFORMIN HYDROCHLORIDE 850 MG/1
1 TABLET ORAL
Qty: 0 | Refills: 0 | DISCHARGE

## 2022-12-29 RX ORDER — DEXTROSE 50 % IN WATER 50 %
25 SYRINGE (ML) INTRAVENOUS ONCE
Refills: 0 | Status: DISCONTINUED | OUTPATIENT
Start: 2022-12-29 | End: 2023-01-03

## 2022-12-29 RX ORDER — SODIUM CHLORIDE 9 MG/ML
1000 INJECTION, SOLUTION INTRAVENOUS
Refills: 0 | Status: DISCONTINUED | OUTPATIENT
Start: 2022-12-29 | End: 2023-01-03

## 2022-12-29 RX ORDER — INSULIN LISPRO 100/ML
VIAL (ML) SUBCUTANEOUS AT BEDTIME
Refills: 0 | Status: DISCONTINUED | OUTPATIENT
Start: 2022-12-29 | End: 2023-01-03

## 2022-12-29 RX ORDER — CLOPIDOGREL BISULFATE 75 MG/1
1 TABLET, FILM COATED ORAL
Qty: 0 | Refills: 0 | DISCHARGE

## 2022-12-29 RX ORDER — GLUCAGON INJECTION, SOLUTION 0.5 MG/.1ML
1 INJECTION, SOLUTION SUBCUTANEOUS ONCE
Refills: 0 | Status: DISCONTINUED | OUTPATIENT
Start: 2022-12-29 | End: 2023-01-03

## 2022-12-29 RX ORDER — DEXTROSE 50 % IN WATER 50 %
15 SYRINGE (ML) INTRAVENOUS ONCE
Refills: 0 | Status: DISCONTINUED | OUTPATIENT
Start: 2022-12-29 | End: 2023-01-03

## 2022-12-29 RX ORDER — INSULIN LISPRO 100/ML
VIAL (ML) SUBCUTANEOUS
Refills: 0 | Status: DISCONTINUED | OUTPATIENT
Start: 2022-12-29 | End: 2023-01-03

## 2022-12-29 RX ORDER — METOPROLOL TARTRATE 50 MG
0.5 TABLET ORAL
Qty: 0 | Refills: 0 | DISCHARGE

## 2022-12-29 RX ADMIN — PIPERACILLIN AND TAZOBACTAM 25 GRAM(S): 4; .5 INJECTION, POWDER, LYOPHILIZED, FOR SOLUTION INTRAVENOUS at 14:16

## 2022-12-29 RX ADMIN — FAMOTIDINE 20 MILLIGRAM(S): 10 INJECTION INTRAVENOUS at 11:34

## 2022-12-29 RX ADMIN — SODIUM CHLORIDE 80 MILLILITER(S): 9 INJECTION, SOLUTION INTRAVENOUS at 11:34

## 2022-12-29 RX ADMIN — PIPERACILLIN AND TAZOBACTAM 25 GRAM(S): 4; .5 INJECTION, POWDER, LYOPHILIZED, FOR SOLUTION INTRAVENOUS at 01:05

## 2022-12-29 RX ADMIN — Medication 12.5 MILLIGRAM(S): at 17:43

## 2022-12-29 RX ADMIN — HEPARIN SODIUM 5000 UNIT(S): 5000 INJECTION INTRAVENOUS; SUBCUTANEOUS at 17:42

## 2022-12-29 RX ADMIN — HEPARIN SODIUM 5000 UNIT(S): 5000 INJECTION INTRAVENOUS; SUBCUTANEOUS at 06:16

## 2022-12-29 RX ADMIN — DONEPEZIL HYDROCHLORIDE 5 MILLIGRAM(S): 10 TABLET, FILM COATED ORAL at 21:37

## 2022-12-29 RX ADMIN — SENNA PLUS 2 TABLET(S): 8.6 TABLET ORAL at 21:40

## 2022-12-29 RX ADMIN — TAMSULOSIN HYDROCHLORIDE 0.4 MILLIGRAM(S): 0.4 CAPSULE ORAL at 21:38

## 2022-12-29 RX ADMIN — PIPERACILLIN AND TAZOBACTAM 25 GRAM(S): 4; .5 INJECTION, POWDER, LYOPHILIZED, FOR SOLUTION INTRAVENOUS at 21:38

## 2022-12-29 RX ADMIN — PIPERACILLIN AND TAZOBACTAM 25 GRAM(S): 4; .5 INJECTION, POWDER, LYOPHILIZED, FOR SOLUTION INTRAVENOUS at 06:16

## 2022-12-29 NOTE — DIETITIAN NUTRITION RISK NOTIFICATION - TREATMENT: THE FOLLOWING DIET HAS BEEN RECOMMENDED
Diet, Pureed (12-29-22 @ 13:31) [Pending Verification By Attending]  Diet, Consistent Carbohydrate w/Evening Snack:   Pureed (PUREED) (12-28-22 @ 18:00) [Active]

## 2022-12-29 NOTE — DIETITIAN INITIAL EVALUATION ADULT - PERTINENT MEDS FT
MEDICATIONS  (STANDING):  dextrose 5%. 1000 milliLiter(s) (75 mL/Hr) IV Continuous <Continuous>  dextrose 5%. 1000 milliLiter(s) (75 mL/Hr) IV Continuous <Continuous>  donepezil 5 milliGRAM(s) Oral at bedtime  famotidine    Tablet 20 milliGRAM(s) Oral daily  heparin   Injectable 5000 Unit(s) SubCutaneous every 12 hours  metoprolol tartrate 12.5 milliGRAM(s) Oral two times a day  piperacillin/tazobactam IVPB.. 3.375 Gram(s) IV Intermittent every 8 hours  senna 2 Tablet(s) Oral at bedtime  sodium chloride 0.45%. 1000 milliLiter(s) (80 mL/Hr) IV Continuous <Continuous>  tamsulosin 0.4 milliGRAM(s) Oral at bedtime    MEDICATIONS  (PRN):  acetaminophen     Tablet .. 650 milliGRAM(s) Oral every 6 hours PRN Temp greater or equal to 38C (100.4F), Mild Pain (1 - 3)  morphine Concentrate 5 milliGRAM(s) SubLingual every 2 hours PRN Moderate Pain (4 - 6)

## 2022-12-29 NOTE — CONSULT NOTE ADULT - SUBJECTIVE AND OBJECTIVE BOX
[facility] DIVISION OF INFECTIOUS DISEASES  WESLEY Amaya S. Shah, Y. Patel, G. [facility]  [phone]  ([phone] - weekdays after 5pm and weekends)    TERESA FRANCIS  89y, Male  [id number]    HPI:  Patient is a 89-year-old male with PMH of dementia, anemia, atrial fibrillation does not appear to be on anticoagulants, prostate cancer, diabetes, hyperlipidemia, CHF, hypertension who presents to the emergency room from [facility] who presented for necrotic foot wound.  Upon chart review it appears that patient was recently admitted to the hospital from  through 2022 for the same presentation.  He had been diagnosed with gangrene of the left foot.  He was seen by podiatry dry sterile dressings were applied and he was instructed to level foot while in bed to prevent the progression of the pressure ulcers.  It is noted that patient also underwent a bone scan during his last admission in September positive for osteomyelitis at that time.  Son declined surgical intervention and patient was managed conservatively with antibiotics.  Patient had significant worsening of disease at the time of his last admission.  Vascular was consulted and patient was deemed not a candidate for any intervention on their part.  Patient was also seen by infectious disease notes were reviewed.  Per their notes antibiotics have a limited role as patient had already completed a 4-week course of Rocephin previously.  Gangrene is likely progressing due to severe PVD.  He was treated with Zosyn but he had little to no impact.  Podiatry notes are reviewed.  It was recommended the patient undergo a transmetatarsal amputation of the left foot but after discussion with patient's son it was decided that patient would not undergo surgical treatment.  Local wound care was continued and patient was discharged back to [facility].  Presents again today with worsening necrosis of the left foot.  Son was contacted.  Still refusing surgical intervention at this time.  At this time would like patient to be kept comfortable and is requesting hospice evaluation.  2022 asked to do admission (28 Dec 2022 16:36)  ROS: unable to obtain    Allergies: latex (Rash)  latex (Unknown)  No Known Drug Allergies    PMH -- Atrial fibrillation  Hypertension  Diabetes  Prostate ca  Dementia  CHF (congestive heart failure)  Hyperlipemia  Depression  Arteriosclerotic heart disease (ASHD)  Anemia  Constipation  AICD (automatic cardioverter/defibrillator) present  Osteomyelitis of finger of left hand  Personal history of radiation therapy  H/O ongoing treatment with hormonal therapy  H/O bladder infections  Scrotal abscess  Urinary retention  Hematuria  H/O cystitis  Constipation  VHD (valvular heart disease)  Aortic valve stenosis    PSH -- History of automatic internal cardiac defibrillator (AICD)    FH -- No pertinent family history in first degree relatives  Social History -- no known tobacco, alcohol or illicit drug use    Physical Exam--  Vital Signs Last 24 Hrs  T(F): 98.8 (29 Dec 2022 17:39), Max: 98.8 (28 Dec 2022 21:04)  HR: 77 (29 Dec 2022 17:39) (77 - 96)  BP: 107/67 (29 Dec 2022 17:39) (107/67 - 144/79)  RR: 16 (29 Dec 2022 17:39) (16 - 17)  SpO2: 96% (29 Dec 2022 17:39) (96% - 100%)  General: no acute distress  HEENT: NC/AT, EOMI, anicteric, neck supple  Lungs: Clear bilaterally without rales, wheezing or rhonchi  Heart: S1, S2 present, normal rate, no murmur heard  Abdomen: Soft. Nondistended. Nontender. BS present.   Neuro: AAOx0, intermittently speaks   Extremities: No edema. L foot with clean dressing   Skin: Warm. Dry. Good turgor. No visible rash.   Psychiatric: Appropriate affect and mood for situation.   Lines: PIV    Laboratory & Imaging Data--  CBC:                       10.3   9.02  )-----------( 331      ( 28 Dec 2022 10:10 )             32.3     WBC Count: 9.02 K/uL (- @ 10:10)    CMP:     152<H>  |  116<H>  |  19  ----------------------------<  153<H>  3.3<L>   |  30  |  0.49<L>    Ca    9.1      28 Dec 2022 20:30    TPro  6.5  /  Alb  2.1<L>  /  TBili  0.5  /  DBili  x   /  AST  26  /  ALT  23  /  AlkPhos  81      LIVER FUNCTIONS - ( 28 Dec 2022 10:10 )  Alb: 2.1 g/dL / Pro: 6.5 g/dL / ALK PHOS: 81 U/L / ALT: 23 U/L / AST: 26 U/L / GGT: x           Urinalysis Basic - ( 28 Dec 2022 15:20 )  Color: Yellow / Appearance: Slightly Turbid / S.020 / pH: x  Gluc: x / Ketone: Negative  / Bili: Negative / Urobili: Negative   Blood: x / Protein: 15 / Nitrite: Negative   Leuk Esterase: Small / RBC: 3-5 /HPF / WBC 6-10   Sq Epi: x / Non Sq Epi: Few / Bacteria: Few    Microbiology:   Culture - Blood (collected 22 @ 19:30)  Source: .Blood Blood-Peripheral  Final Report (22 @ 01:00):    No Growth Final    Culture - Blood (collected 22 @ 19:25)  Source: .Blood Blood-Peripheral  Final Report (22 @ 01:00):    No Growth Final    SARS-CoV-2 Result: NotDetec (28 Dec 2022 10:10)    Radiology--reviewed    Active Medications--  acetaminophen     Tablet .. 650 milliGRAM(s) Oral every 6 hours PRN  dextrose 5%. 1000 milliLiter(s) IV Continuous <Continuous>  dextrose 5%. 1000 milliLiter(s) IV Continuous <Continuous>  donepezil 5 milliGRAM(s) Oral at bedtime  famotidine    Tablet 20 milliGRAM(s) Oral daily  heparin   Injectable 5000 Unit(s) SubCutaneous every 12 hours  metoprolol tartrate 12.5 milliGRAM(s) Oral two times a day  morphine Concentrate 5 milliGRAM(s) SubLingual every 2 hours PRN  piperacillin/tazobactam IVPB.. 3.375 Gram(s) IV Intermittent every 8 hours  senna 2 Tablet(s) Oral at bedtime  sodium chloride 0.45%. 1000 milliLiter(s) IV Continuous <Continuous>  tamsulosin 0.4 milliGRAM(s) Oral at bedtime    Current Antimicrobials:   piperacillin/tazobactam IVPB.. 3.375 Gram(s) IV Intermittent every 8 hours    Prior/Completed Antimicrobials:  piperacillin/tazobactam IVPB.  piperacillin/tazobactam IVPB.-    Immunologic:

## 2022-12-29 NOTE — DIETITIAN INITIAL EVALUATION ADULT - PERTINENT LABORATORY DATA
12-28    152<H>  |  116<H>  |  19  ----------------------------<  153<H>  3.3<L>   |  30  |  0.49<L>    Ca    9.1      28 Dec 2022 20:30    TPro  6.5  /  Alb  2.1<L>  /  TBili  0.5  /  DBili  x   /  AST  26  /  ALT  23  /  AlkPhos  81  12-28  POCT Blood Glucose.: 149 mg/dL (12-28-22 @ 21:08)  A1C with Estimated Average Glucose Result: 6.6 % (12-20-22 @ 05:44)  A1C with Estimated Average Glucose Result: 6.8 % (09-03-22 @ 08:52)  A1C with Estimated Average Glucose Result: 6.4 % (07-01-22 @ 06:15)

## 2022-12-29 NOTE — CONSULT NOTE ADULT - ASSESSMENT
Patient is a 89-year-old male with PMH of dementia, severe PVD, anemia, atrial fibrillation does not appear to be on anticoagulants, prostate cancer, diabetes, hyperlipidemia, CHF, hypertension who presents to the emergency room from [facility] who presented for necrotic foot wound.   Patient was admitted recently admitted Dec 16-22 for the same presentation - worsening L foot gangrene. He had a bone scan in Sept that showed OM; son had declined surgical intervention and he was treated with 4 weeks of ceftriaxone. Patient was treated with pip-tazo but showed no significant improvement. He was monitored off antibiotics and  then discharged back to [facility].     Worsening necrosis - L foot gangrene in setting of severe PVD  -Podiatry following, noted son still refusing surgical intervention at this time and son would like patient to be kept comfortable and is requesting hospice evaluation  patient started on pip-tazo in ER  -Antibiotic (pip-tazo) was trialed on recent admission as well as prior admissions including 4 weeks of ceftriaxone with no significant changes/impact noted   patient has been afebrile with no leukocytosis, nontoxic appearing and antibiotics with limited role here   -- would recommend discontinuing and monitoring off antibiotics      Covering for Dr. Luis Manuel Porter M.D.  [facility], Division of Infectious Diseases  [phone]  After 5pm on weekdays and all day on weekends - please call [phone]    
Gangrene of the left foot  Ulcerations to the left foot down to skin, subcutaneous tissue, fat, bone  Left foot infection  
89-year-old male who presents to the emergency room for reported necrotic foot wound.  Past medical history of dementia, anemia, history of atrial fibrillation does not appear to be on anticoagulants, prostate cancer, diabetes, hyperlipidemia, history of CHF, hypertension.  Patient presents from [facility] for necrotic foot wound.  Upon chart review it appears that patient was recently admitted to the hospital from December 16 through December 22, 2022 for the same presentation.    dementia  OP  OA  necrotic foot  anemia  AF  prostate ca  DM  HLD  CHF    Vinicio   SON  HCP  pt is DNR DNI  MOLST on record  updated  limited interventions and referral for Hospice at [facility]    not a candidate for [facility] at the moment  however, appropriate candidate for Home Hospice at [facility] -   Social work will see pt in ED

## 2022-12-29 NOTE — DIETITIAN INITIAL EVALUATION ADULT - ENERGY INTAKE
89-year-old male who presents to the emergency room for reported necrotic foot wound.  Past medical history of dementia, anemia, history of atrial fibrillation does not appear to be on anticoagulants, prostate cancer, diabetes, hyperlipidemia, history of CHF, hypertension.  Patient presents from [facility] for necrotic foot wound.  Upon chart review it appears that patient was recently admitted to the hospital from December 16 through December 22, 2022 for the same presentation.  He had been diagnosed with gangrene of the left foot.  He was seen by podiatry dry sterile dressings were applied and he was instructed to level foot while in bed to prevent the progression of the pressure ulcers.  It is noted that patient also underwent a bone scan during his last admission in September positive for osteomyelitis at that time.  Son declined surgical intervention and patient was managed conservatively with antibiotics.  Patient had significant worsening of disease at the time of his last admission.  Vascular was consulted and patient was deemed not a candidate for any intervention on their part.  Patient was also seen by infectious disease notes were reviewed.  Per their notes antibiotics have a limited role as patient had already completed a 4-week course of Rocephin previously.  Gangrene is likely progressing due to severe PVD.  He was treated with Zosyn but he had little to no impact.  Podiatry notes are reviewed.  It was recommended the patient undergo a transmetatarsal amputation of the left foot but after discussion with patient's son it was decided that patient would not undergo surgical treatment.  Local wound care was continued and patient was discharged back to [facility].  Presents again today with worsening necrosis of the left foot.  Son was contacted.  Still refusing surgical intervention at this time.  At this time would like patient to be kept comfortable and is requesting hospice evaluation.  12/28/2022 asked to do admission     89-year-old male who presented to the ER w/ necrotic foot wound.  Past medical history of dementia, anemia, history of atrial fibrillation does not appear to be on anticoagulants, prostate cancer, diabetes, hyperlipidemia, history of CHF, hypertension.  Pt was recently admitted to the hospital from December 16  -December 22, 2022 for the same presentation.  He had been diagnosed with gangrene of the left foot.  September  bone scan + for osteomyelitis Son declined surgical intervention and patient was managed conservatively with antibiotics.  Patient had significant worsening of disease at the time of his last admission.    Gangrene is likely progressing due to severe PVD.  Son refused  transmetatarsal amputation of the left foot in the past. Son still refusing surgical intervention at this time. Hospice admission is likely  Fair (50-75%)

## 2022-12-29 NOTE — DIETITIAN INITIAL EVALUATION ADULT - NSFNSPHYEXAMSKINFT_GEN_A_CORE
Pressure Injury 1: Right:,heel, Suspected deep tissue injury  Pressure Injury 2: Left:,buttocks, Suspected deep tissue injury  Pressure Injury 3: sacrum, Unstageable  Pressure Injury 4: buttocks, Stage I  Pressure Injury 5: none, none  Pressure Injury 6: none, none  Pressure Injury 7: none, none  Pressure Injury 8: none, none  Pressure Injury 9: none, none  Pressure Injury 10: none, none  Pressure Injury 11: none, none Pressure Injury 1: Right:,heel, Suspected deep tissue injury  Pressure Injury 2: Left:,buttocks, Suspected deep tissue injury  Pressure Injury 3: sacrum, Unstageable  Pressure Injury 4: buttocks, Stage I

## 2022-12-30 LAB
ANION GAP SERPL CALC-SCNC: 7 MMOL/L — SIGNIFICANT CHANGE UP (ref 5–17)
BUN SERPL-MCNC: 13 MG/DL — SIGNIFICANT CHANGE UP (ref 7–23)
CALCIUM SERPL-MCNC: 8.6 MG/DL — SIGNIFICANT CHANGE UP (ref 8.5–10.1)
CHLORIDE SERPL-SCNC: 113 MMOL/L — HIGH (ref 96–108)
CO2 SERPL-SCNC: 29 MMOL/L — SIGNIFICANT CHANGE UP (ref 22–31)
CREAT SERPL-MCNC: 0.51 MG/DL — SIGNIFICANT CHANGE UP (ref 0.5–1.3)
EGFR: 93 ML/MIN/1.73M2 — SIGNIFICANT CHANGE UP
GLUCOSE SERPL-MCNC: 132 MG/DL — HIGH (ref 70–99)
HCT VFR BLD CALC: 29.3 % — LOW (ref 39–50)
HGB BLD-MCNC: 9.4 G/DL — LOW (ref 13–17)
MCHC RBC-ENTMCNC: 31.5 PG — SIGNIFICANT CHANGE UP (ref 27–34)
MCHC RBC-ENTMCNC: 32.1 GM/DL — SIGNIFICANT CHANGE UP (ref 32–36)
MCV RBC AUTO: 98.3 FL — SIGNIFICANT CHANGE UP (ref 80–100)
NRBC # BLD: 0 /100 WBCS — SIGNIFICANT CHANGE UP (ref 0–0)
PLATELET # BLD AUTO: 310 K/UL — SIGNIFICANT CHANGE UP (ref 150–400)
POTASSIUM SERPL-MCNC: 3.7 MMOL/L — SIGNIFICANT CHANGE UP (ref 3.5–5.3)
POTASSIUM SERPL-SCNC: 3.7 MMOL/L — SIGNIFICANT CHANGE UP (ref 3.5–5.3)
RBC # BLD: 2.98 M/UL — LOW (ref 4.2–5.8)
RBC # FLD: 14.2 % — SIGNIFICANT CHANGE UP (ref 10.3–14.5)
SODIUM SERPL-SCNC: 149 MMOL/L — HIGH (ref 135–145)
WBC # BLD: 11.19 K/UL — HIGH (ref 3.8–10.5)
WBC # FLD AUTO: 11.19 K/UL — HIGH (ref 3.8–10.5)

## 2022-12-30 PROCEDURE — 99233 SBSQ HOSP IP/OBS HIGH 50: CPT

## 2022-12-30 RX ADMIN — FAMOTIDINE 20 MILLIGRAM(S): 10 INJECTION INTRAVENOUS at 11:47

## 2022-12-30 RX ADMIN — TAMSULOSIN HYDROCHLORIDE 0.4 MILLIGRAM(S): 0.4 CAPSULE ORAL at 21:25

## 2022-12-30 RX ADMIN — DONEPEZIL HYDROCHLORIDE 5 MILLIGRAM(S): 10 TABLET, FILM COATED ORAL at 21:25

## 2022-12-30 RX ADMIN — HEPARIN SODIUM 5000 UNIT(S): 5000 INJECTION INTRAVENOUS; SUBCUTANEOUS at 17:16

## 2022-12-30 RX ADMIN — Medication 1: at 12:23

## 2022-12-30 RX ADMIN — SODIUM CHLORIDE 80 MILLILITER(S): 9 INJECTION, SOLUTION INTRAVENOUS at 18:14

## 2022-12-30 RX ADMIN — SENNA PLUS 2 TABLET(S): 8.6 TABLET ORAL at 21:25

## 2022-12-30 RX ADMIN — SODIUM CHLORIDE 80 MILLILITER(S): 9 INJECTION, SOLUTION INTRAVENOUS at 21:25

## 2022-12-30 RX ADMIN — PIPERACILLIN AND TAZOBACTAM 25 GRAM(S): 4; .5 INJECTION, POWDER, LYOPHILIZED, FOR SOLUTION INTRAVENOUS at 13:03

## 2022-12-30 RX ADMIN — HEPARIN SODIUM 5000 UNIT(S): 5000 INJECTION INTRAVENOUS; SUBCUTANEOUS at 05:22

## 2022-12-30 RX ADMIN — PIPERACILLIN AND TAZOBACTAM 25 GRAM(S): 4; .5 INJECTION, POWDER, LYOPHILIZED, FOR SOLUTION INTRAVENOUS at 05:23

## 2022-12-31 LAB
-  AMIKACIN: SIGNIFICANT CHANGE UP
-  AMIKACIN: SIGNIFICANT CHANGE UP
-  AMOXICILLIN/CLAVULANIC ACID: SIGNIFICANT CHANGE UP
-  AMOXICILLIN/CLAVULANIC ACID: SIGNIFICANT CHANGE UP
-  AMPICILLIN/SULBACTAM: SIGNIFICANT CHANGE UP
-  AMPICILLIN/SULBACTAM: SIGNIFICANT CHANGE UP
-  AMPICILLIN: SIGNIFICANT CHANGE UP
-  AMPICILLIN: SIGNIFICANT CHANGE UP
-  AZTREONAM: SIGNIFICANT CHANGE UP
-  AZTREONAM: SIGNIFICANT CHANGE UP
-  CEFAZOLIN: SIGNIFICANT CHANGE UP
-  CEFAZOLIN: SIGNIFICANT CHANGE UP
-  CEFEPIME: SIGNIFICANT CHANGE UP
-  CEFEPIME: SIGNIFICANT CHANGE UP
-  CEFOXITIN: SIGNIFICANT CHANGE UP
-  CEFTRIAXONE: SIGNIFICANT CHANGE UP
-  CEFTRIAXONE: SIGNIFICANT CHANGE UP
-  CEFUROXIME: SIGNIFICANT CHANGE UP
-  CEFUROXIME: SIGNIFICANT CHANGE UP
-  CIPROFLOXACIN: SIGNIFICANT CHANGE UP
-  CIPROFLOXACIN: SIGNIFICANT CHANGE UP
-  ERTAPENEM: SIGNIFICANT CHANGE UP
-  ERTAPENEM: SIGNIFICANT CHANGE UP
-  GENTAMICIN: SIGNIFICANT CHANGE UP
-  GENTAMICIN: SIGNIFICANT CHANGE UP
-  IMIPENEM: SIGNIFICANT CHANGE UP
-  IMIPENEM: SIGNIFICANT CHANGE UP
-  LEVOFLOXACIN: SIGNIFICANT CHANGE UP
-  LEVOFLOXACIN: SIGNIFICANT CHANGE UP
-  MEROPENEM: SIGNIFICANT CHANGE UP
-  MEROPENEM: SIGNIFICANT CHANGE UP
-  NITROFURANTOIN: SIGNIFICANT CHANGE UP
-  NITROFURANTOIN: SIGNIFICANT CHANGE UP
-  PIPERACILLIN/TAZOBACTAM: SIGNIFICANT CHANGE UP
-  PIPERACILLIN/TAZOBACTAM: SIGNIFICANT CHANGE UP
-  TOBRAMYCIN: SIGNIFICANT CHANGE UP
-  TOBRAMYCIN: SIGNIFICANT CHANGE UP
-  TRIMETHOPRIM/SULFAMETHOXAZOLE: SIGNIFICANT CHANGE UP
-  TRIMETHOPRIM/SULFAMETHOXAZOLE: SIGNIFICANT CHANGE UP
ANION GAP SERPL CALC-SCNC: 8 MMOL/L — SIGNIFICANT CHANGE UP (ref 5–17)
BUN SERPL-MCNC: 10 MG/DL — SIGNIFICANT CHANGE UP (ref 7–23)
CALCIUM SERPL-MCNC: 8.5 MG/DL — SIGNIFICANT CHANGE UP (ref 8.5–10.1)
CHLORIDE SERPL-SCNC: 110 MMOL/L — HIGH (ref 96–108)
CO2 SERPL-SCNC: 24 MMOL/L — SIGNIFICANT CHANGE UP (ref 22–31)
CREAT SERPL-MCNC: 0.41 MG/DL — LOW (ref 0.5–1.3)
CULTURE RESULTS: SIGNIFICANT CHANGE UP
EGFR: 99 ML/MIN/1.73M2 — SIGNIFICANT CHANGE UP
GLUCOSE SERPL-MCNC: 97 MG/DL — SIGNIFICANT CHANGE UP (ref 70–99)
HCT VFR BLD CALC: 26.6 % — LOW (ref 39–50)
HGB BLD-MCNC: 8.6 G/DL — LOW (ref 13–17)
MCHC RBC-ENTMCNC: 31.4 PG — SIGNIFICANT CHANGE UP (ref 27–34)
MCHC RBC-ENTMCNC: 32.3 GM/DL — SIGNIFICANT CHANGE UP (ref 32–36)
MCV RBC AUTO: 97.1 FL — SIGNIFICANT CHANGE UP (ref 80–100)
METHOD TYPE: SIGNIFICANT CHANGE UP
METHOD TYPE: SIGNIFICANT CHANGE UP
NRBC # BLD: 0 /100 WBCS — SIGNIFICANT CHANGE UP (ref 0–0)
ORGANISM # SPEC MICROSCOPIC CNT: SIGNIFICANT CHANGE UP
PLATELET # BLD AUTO: 314 K/UL — SIGNIFICANT CHANGE UP (ref 150–400)
POTASSIUM SERPL-MCNC: 3.5 MMOL/L — SIGNIFICANT CHANGE UP (ref 3.5–5.3)
POTASSIUM SERPL-SCNC: 3.5 MMOL/L — SIGNIFICANT CHANGE UP (ref 3.5–5.3)
RBC # BLD: 2.74 M/UL — LOW (ref 4.2–5.8)
RBC # FLD: 14.2 % — SIGNIFICANT CHANGE UP (ref 10.3–14.5)
SODIUM SERPL-SCNC: 142 MMOL/L — SIGNIFICANT CHANGE UP (ref 135–145)
SPECIMEN SOURCE: SIGNIFICANT CHANGE UP
WBC # BLD: 11.11 K/UL — HIGH (ref 3.8–10.5)
WBC # FLD AUTO: 11.11 K/UL — HIGH (ref 3.8–10.5)

## 2022-12-31 PROCEDURE — 99233 SBSQ HOSP IP/OBS HIGH 50: CPT

## 2022-12-31 RX ADMIN — SODIUM CHLORIDE 80 MILLILITER(S): 9 INJECTION, SOLUTION INTRAVENOUS at 17:28

## 2022-12-31 RX ADMIN — HEPARIN SODIUM 5000 UNIT(S): 5000 INJECTION INTRAVENOUS; SUBCUTANEOUS at 17:30

## 2022-12-31 RX ADMIN — SODIUM CHLORIDE 80 MILLILITER(S): 9 INJECTION, SOLUTION INTRAVENOUS at 22:22

## 2022-12-31 RX ADMIN — DONEPEZIL HYDROCHLORIDE 5 MILLIGRAM(S): 10 TABLET, FILM COATED ORAL at 22:14

## 2022-12-31 RX ADMIN — Medication 12.5 MILLIGRAM(S): at 05:12

## 2022-12-31 RX ADMIN — HEPARIN SODIUM 5000 UNIT(S): 5000 INJECTION INTRAVENOUS; SUBCUTANEOUS at 05:11

## 2022-12-31 RX ADMIN — SENNA PLUS 2 TABLET(S): 8.6 TABLET ORAL at 22:14

## 2022-12-31 RX ADMIN — Medication 12.5 MILLIGRAM(S): at 17:31

## 2022-12-31 RX ADMIN — TAMSULOSIN HYDROCHLORIDE 0.4 MILLIGRAM(S): 0.4 CAPSULE ORAL at 22:14

## 2022-12-31 RX ADMIN — FAMOTIDINE 20 MILLIGRAM(S): 10 INJECTION INTRAVENOUS at 11:21

## 2023-01-01 LAB
ANION GAP SERPL CALC-SCNC: 7 MMOL/L — SIGNIFICANT CHANGE UP (ref 5–17)
BUN SERPL-MCNC: 7 MG/DL — SIGNIFICANT CHANGE UP (ref 7–23)
CALCIUM SERPL-MCNC: 8.4 MG/DL — LOW (ref 8.5–10.1)
CHLORIDE SERPL-SCNC: 107 MMOL/L — SIGNIFICANT CHANGE UP (ref 96–108)
CO2 SERPL-SCNC: 27 MMOL/L — SIGNIFICANT CHANGE UP (ref 22–31)
CREAT SERPL-MCNC: 0.37 MG/DL — LOW (ref 0.5–1.3)
EGFR: 102 ML/MIN/1.73M2 — SIGNIFICANT CHANGE UP
GLUCOSE SERPL-MCNC: 82 MG/DL — SIGNIFICANT CHANGE UP (ref 70–99)
HCT VFR BLD CALC: 28.3 % — LOW (ref 39–50)
HGB BLD-MCNC: 9.2 G/DL — LOW (ref 13–17)
MCHC RBC-ENTMCNC: 31.3 PG — SIGNIFICANT CHANGE UP (ref 27–34)
MCHC RBC-ENTMCNC: 32.5 GM/DL — SIGNIFICANT CHANGE UP (ref 32–36)
MCV RBC AUTO: 96.3 FL — SIGNIFICANT CHANGE UP (ref 80–100)
NRBC # BLD: 0 /100 WBCS — SIGNIFICANT CHANGE UP (ref 0–0)
PLATELET # BLD AUTO: 300 K/UL — SIGNIFICANT CHANGE UP (ref 150–400)
POTASSIUM SERPL-MCNC: 3.9 MMOL/L — SIGNIFICANT CHANGE UP (ref 3.5–5.3)
POTASSIUM SERPL-SCNC: 3.9 MMOL/L — SIGNIFICANT CHANGE UP (ref 3.5–5.3)
RBC # BLD: 2.94 M/UL — LOW (ref 4.2–5.8)
RBC # FLD: 14 % — SIGNIFICANT CHANGE UP (ref 10.3–14.5)
SODIUM SERPL-SCNC: 141 MMOL/L — SIGNIFICANT CHANGE UP (ref 135–145)
WBC # BLD: 10.13 K/UL — SIGNIFICANT CHANGE UP (ref 3.8–10.5)
WBC # FLD AUTO: 10.13 K/UL — SIGNIFICANT CHANGE UP (ref 3.8–10.5)

## 2023-01-01 PROCEDURE — 99233 SBSQ HOSP IP/OBS HIGH 50: CPT

## 2023-01-01 RX ADMIN — FAMOTIDINE 20 MILLIGRAM(S): 10 INJECTION INTRAVENOUS at 12:52

## 2023-01-01 RX ADMIN — HEPARIN SODIUM 5000 UNIT(S): 5000 INJECTION INTRAVENOUS; SUBCUTANEOUS at 05:52

## 2023-01-01 RX ADMIN — SODIUM CHLORIDE 80 MILLILITER(S): 9 INJECTION, SOLUTION INTRAVENOUS at 05:52

## 2023-01-01 RX ADMIN — TAMSULOSIN HYDROCHLORIDE 0.4 MILLIGRAM(S): 0.4 CAPSULE ORAL at 21:35

## 2023-01-01 RX ADMIN — SENNA PLUS 2 TABLET(S): 8.6 TABLET ORAL at 21:35

## 2023-01-01 RX ADMIN — Medication 12.5 MILLIGRAM(S): at 05:52

## 2023-01-01 RX ADMIN — HEPARIN SODIUM 5000 UNIT(S): 5000 INJECTION INTRAVENOUS; SUBCUTANEOUS at 17:35

## 2023-01-01 RX ADMIN — DONEPEZIL HYDROCHLORIDE 5 MILLIGRAM(S): 10 TABLET, FILM COATED ORAL at 21:35

## 2023-01-01 NOTE — PROGRESS NOTE ADULT - I WAS PHYSICALLY PRESENT FOR THE KEY PORTIONS OF THE EVALUATION AND MANAGEMENT (E/M) SERVICE PROVIDED.  I AGREE WITH THE ABOVE HISTORY, PHYSICAL, AND PLAN WHICH I HAVE REVIEWED AND EDITED WHERE APPROPRIATE
Statement Selected
2023

## 2023-01-02 LAB
ANION GAP SERPL CALC-SCNC: 7 MMOL/L — SIGNIFICANT CHANGE UP (ref 5–17)
BUN SERPL-MCNC: 6 MG/DL — LOW (ref 7–23)
CALCIUM SERPL-MCNC: 8.4 MG/DL — LOW (ref 8.5–10.1)
CHLORIDE SERPL-SCNC: 107 MMOL/L — SIGNIFICANT CHANGE UP (ref 96–108)
CO2 SERPL-SCNC: 28 MMOL/L — SIGNIFICANT CHANGE UP (ref 22–31)
CREAT SERPL-MCNC: 0.34 MG/DL — LOW (ref 0.5–1.3)
EGFR: 105 ML/MIN/1.73M2 — SIGNIFICANT CHANGE UP
GLUCOSE SERPL-MCNC: 85 MG/DL — SIGNIFICANT CHANGE UP (ref 70–99)
HCT VFR BLD CALC: 28.9 % — LOW (ref 39–50)
HGB BLD-MCNC: 9.5 G/DL — LOW (ref 13–17)
MCHC RBC-ENTMCNC: 31.5 PG — SIGNIFICANT CHANGE UP (ref 27–34)
MCHC RBC-ENTMCNC: 32.9 GM/DL — SIGNIFICANT CHANGE UP (ref 32–36)
MCV RBC AUTO: 95.7 FL — SIGNIFICANT CHANGE UP (ref 80–100)
NRBC # BLD: 0 /100 WBCS — SIGNIFICANT CHANGE UP (ref 0–0)
PLATELET # BLD AUTO: 300 K/UL — SIGNIFICANT CHANGE UP (ref 150–400)
POTASSIUM SERPL-MCNC: 3.7 MMOL/L — SIGNIFICANT CHANGE UP (ref 3.5–5.3)
POTASSIUM SERPL-SCNC: 3.7 MMOL/L — SIGNIFICANT CHANGE UP (ref 3.5–5.3)
RBC # BLD: 3.02 M/UL — LOW (ref 4.2–5.8)
RBC # FLD: 14.1 % — SIGNIFICANT CHANGE UP (ref 10.3–14.5)
SODIUM SERPL-SCNC: 142 MMOL/L — SIGNIFICANT CHANGE UP (ref 135–145)
WBC # BLD: 8.52 K/UL — SIGNIFICANT CHANGE UP (ref 3.8–10.5)
WBC # FLD AUTO: 8.52 K/UL — SIGNIFICANT CHANGE UP (ref 3.8–10.5)

## 2023-01-02 PROCEDURE — 99233 SBSQ HOSP IP/OBS HIGH 50: CPT

## 2023-01-02 RX ADMIN — SENNA PLUS 2 TABLET(S): 8.6 TABLET ORAL at 21:30

## 2023-01-02 RX ADMIN — Medication 12.5 MILLIGRAM(S): at 19:05

## 2023-01-02 RX ADMIN — HEPARIN SODIUM 5000 UNIT(S): 5000 INJECTION INTRAVENOUS; SUBCUTANEOUS at 18:41

## 2023-01-02 RX ADMIN — FAMOTIDINE 20 MILLIGRAM(S): 10 INJECTION INTRAVENOUS at 11:40

## 2023-01-02 RX ADMIN — TAMSULOSIN HYDROCHLORIDE 0.4 MILLIGRAM(S): 0.4 CAPSULE ORAL at 21:29

## 2023-01-02 RX ADMIN — HEPARIN SODIUM 5000 UNIT(S): 5000 INJECTION INTRAVENOUS; SUBCUTANEOUS at 05:28

## 2023-01-02 RX ADMIN — DONEPEZIL HYDROCHLORIDE 5 MILLIGRAM(S): 10 TABLET, FILM COATED ORAL at 21:30

## 2023-01-02 NOTE — CHART NOTE - NSCHARTNOTEFT_GEN_A_CORE
Assessment: 89 y/o male adm with necrotic foot wound. PMH dementia, anemia, afib, prostate ca, DM, HLD, HF, HTN.   Attempted to see pt 3x today. Pt sleeping soundly. EMR reviewed.   Plan for home hospice, as per palliative care note.     Factors impacting intake: [ ] none [ ] nausea  [ ] vomiting [ ] diarrhea [ ] constipation  [ ]chewing problems [ ] swallowing issues  [ ] other:     Diet Presciption: Diet, Consistent Carbohydrate w/Evening Snack:   Pureed (PUREED) (12-28-22 @ 18:00)    Intake: 25-50% documented.     Current Weight: Weight (kg): 60.8 (12-28 @ 21:04)  1+ right leg    % Weight Change    Pertinent Medications: MEDICATIONS  (STANDING):  dextrose 5%. 1000 milliLiter(s) (75 mL/Hr) IV Continuous <Continuous>  dextrose 5%. 1000 milliLiter(s) (75 mL/Hr) IV Continuous <Continuous>  dextrose 5%. 1000 milliLiter(s) (50 mL/Hr) IV Continuous <Continuous>  dextrose 5%. 1000 milliLiter(s) (100 mL/Hr) IV Continuous <Continuous>  dextrose 50% Injectable 25 Gram(s) IV Push once  dextrose 50% Injectable 12.5 Gram(s) IV Push once  dextrose 50% Injectable 25 Gram(s) IV Push once  donepezil 5 milliGRAM(s) Oral at bedtime  famotidine    Tablet 20 milliGRAM(s) Oral daily  glucagon  Injectable 1 milliGRAM(s) IntraMuscular once  heparin   Injectable 5000 Unit(s) SubCutaneous every 12 hours  insulin lispro (ADMELOG) corrective regimen sliding scale   SubCutaneous three times a day before meals  insulin lispro (ADMELOG) corrective regimen sliding scale   SubCutaneous at bedtime  metoprolol tartrate 12.5 milliGRAM(s) Oral two times a day  senna 2 Tablet(s) Oral at bedtime  sodium chloride 0.45%. 1000 milliLiter(s) (80 mL/Hr) IV Continuous <Continuous>  tamsulosin 0.4 milliGRAM(s) Oral at bedtime    MEDICATIONS  (PRN):  acetaminophen     Tablet .. 650 milliGRAM(s) Oral every 6 hours PRN Temp greater or equal to 38C (100.4F), Mild Pain (1 - 3)  dextrose Oral Gel 15 Gram(s) Oral once PRN Blood Glucose LESS THAN 70 milliGRAM(s)/deciliter  morphine Concentrate 5 milliGRAM(s) SubLingual every 2 hours PRN Moderate Pain (4 - 6)    Pertinent Labs: 01-02 Na142 mmol/L Glu 85 mg/dL K+ 3.7 mmol/L Cr  0.34 mg/dL<L> BUN 6 mg/dL<L> 12-28 Alb 2.1 g/dL<L>     CAPILLARY BLOOD GLUCOSE      POCT Blood Glucose.: 110 mg/dL (02 Jan 2023 11:19)  POCT Blood Glucose.: 104 mg/dL (02 Jan 2023 07:44)  POCT Blood Glucose.: 101 mg/dL (01 Jan 2023 21:37)  POCT Blood Glucose.: 112 mg/dL (01 Jan 2023 16:37)    Skin: B/L buttocks Stage I; left/right heel DTI; sacrum unstageable.     Estimated Needs:   [ x] no change since previous assessment  [ ] recalculated:     Previous Nutrition Diagnosis:   [ ] Inadequate Energy Intake [ ]Inadequate Oral Intake [ ] Excessive Energy Intake   [ ] Underweight [x ] Increased Nutrient Needs [ ] Overweight/Obesity   [ ] Altered GI Function [ ] Unintended Weight Loss [ ] Food & Nutrition Related Knowledge Deficit [ x] Malnutrition     Nutrition Diagnosis is [x ] ongoing  [ ] resolved [ ] not applicable     New Nutrition Diagnosis: [x ] not applicable       Interventions:   Recommend  [ ] Change Diet To:  [ ] Nutrition Supplement  [ ] Nutrition Support  [x ] Other: consider liberalizing diet to pureed diet  honor pt's food preferences/tolerances    Monitoring and Evaluation:   [ x] PO intake [ x ] Tolerance to diet prescription [ x ] weights [ x ] labs[ x ] follow up per protocol  [ ] other:

## 2023-01-03 ENCOUNTER — TRANSCRIPTION ENCOUNTER (OUTPATIENT)
Age: 88
End: 2023-01-03

## 2023-01-03 VITALS
RESPIRATION RATE: 17 BRPM | TEMPERATURE: 98 F | DIASTOLIC BLOOD PRESSURE: 67 MMHG | HEART RATE: 78 BPM | OXYGEN SATURATION: 93 % | SYSTOLIC BLOOD PRESSURE: 114 MMHG

## 2023-01-03 LAB
ANION GAP SERPL CALC-SCNC: 7 MMOL/L — SIGNIFICANT CHANGE UP (ref 5–17)
BUN SERPL-MCNC: 6 MG/DL — LOW (ref 7–23)
CALCIUM SERPL-MCNC: 8.7 MG/DL — SIGNIFICANT CHANGE UP (ref 8.5–10.1)
CHLORIDE SERPL-SCNC: 106 MMOL/L — SIGNIFICANT CHANGE UP (ref 96–108)
CO2 SERPL-SCNC: 27 MMOL/L — SIGNIFICANT CHANGE UP (ref 22–31)
CREAT SERPL-MCNC: 0.38 MG/DL — LOW (ref 0.5–1.3)
CULTURE RESULTS: SIGNIFICANT CHANGE UP
EGFR: 101 ML/MIN/1.73M2 — SIGNIFICANT CHANGE UP
GLUCOSE SERPL-MCNC: 120 MG/DL — HIGH (ref 70–99)
HCT VFR BLD CALC: 28.3 % — LOW (ref 39–50)
HGB BLD-MCNC: 9.2 G/DL — LOW (ref 13–17)
MCHC RBC-ENTMCNC: 31 PG — SIGNIFICANT CHANGE UP (ref 27–34)
MCHC RBC-ENTMCNC: 32.5 GM/DL — SIGNIFICANT CHANGE UP (ref 32–36)
MCV RBC AUTO: 95.3 FL — SIGNIFICANT CHANGE UP (ref 80–100)
NRBC # BLD: 0 /100 WBCS — SIGNIFICANT CHANGE UP (ref 0–0)
PLATELET # BLD AUTO: 302 K/UL — SIGNIFICANT CHANGE UP (ref 150–400)
POTASSIUM SERPL-MCNC: 3.8 MMOL/L — SIGNIFICANT CHANGE UP (ref 3.5–5.3)
POTASSIUM SERPL-SCNC: 3.8 MMOL/L — SIGNIFICANT CHANGE UP (ref 3.5–5.3)
RBC # BLD: 2.97 M/UL — LOW (ref 4.2–5.8)
RBC # FLD: 14 % — SIGNIFICANT CHANGE UP (ref 10.3–14.5)
SARS-COV-2 RNA SPEC QL NAA+PROBE: SIGNIFICANT CHANGE UP
SODIUM SERPL-SCNC: 140 MMOL/L — SIGNIFICANT CHANGE UP (ref 135–145)
SPECIMEN SOURCE: SIGNIFICANT CHANGE UP
WBC # BLD: 9.27 K/UL — SIGNIFICANT CHANGE UP (ref 3.8–10.5)
WBC # FLD AUTO: 9.27 K/UL — SIGNIFICANT CHANGE UP (ref 3.8–10.5)

## 2023-01-03 PROCEDURE — 85027 COMPLETE CBC AUTOMATED: CPT

## 2023-01-03 PROCEDURE — 80053 COMPREHEN METABOLIC PANEL: CPT

## 2023-01-03 PROCEDURE — 85025 COMPLETE CBC W/AUTO DIFF WBC: CPT

## 2023-01-03 PROCEDURE — 99233 SBSQ HOSP IP/OBS HIGH 50: CPT

## 2023-01-03 PROCEDURE — 87077 CULTURE AEROBIC IDENTIFY: CPT

## 2023-01-03 PROCEDURE — 81001 URINALYSIS AUTO W/SCOPE: CPT

## 2023-01-03 PROCEDURE — 93005 ELECTROCARDIOGRAM TRACING: CPT

## 2023-01-03 PROCEDURE — 82962 GLUCOSE BLOOD TEST: CPT

## 2023-01-03 PROCEDURE — 85730 THROMBOPLASTIN TIME PARTIAL: CPT

## 2023-01-03 PROCEDURE — 87186 SC STD MICRODIL/AGAR DIL: CPT

## 2023-01-03 PROCEDURE — 83690 ASSAY OF LIPASE: CPT

## 2023-01-03 PROCEDURE — 36415 COLL VENOUS BLD VENIPUNCTURE: CPT

## 2023-01-03 PROCEDURE — 87040 BLOOD CULTURE FOR BACTERIA: CPT

## 2023-01-03 PROCEDURE — 87635 SARS-COV-2 COVID-19 AMP PRB: CPT

## 2023-01-03 PROCEDURE — 87637 SARSCOV2&INF A&B&RSV AMP PRB: CPT

## 2023-01-03 PROCEDURE — 87086 URINE CULTURE/COLONY COUNT: CPT

## 2023-01-03 PROCEDURE — 80048 BASIC METABOLIC PNL TOTAL CA: CPT

## 2023-01-03 PROCEDURE — 85610 PROTHROMBIN TIME: CPT

## 2023-01-03 PROCEDURE — 99285 EMERGENCY DEPT VISIT HI MDM: CPT | Mod: 25

## 2023-01-03 RX ADMIN — HEPARIN SODIUM 5000 UNIT(S): 5000 INJECTION INTRAVENOUS; SUBCUTANEOUS at 05:01

## 2023-01-03 RX ADMIN — Medication 12.5 MILLIGRAM(S): at 05:01

## 2023-01-03 RX ADMIN — FAMOTIDINE 20 MILLIGRAM(S): 10 INJECTION INTRAVENOUS at 11:53

## 2023-01-03 RX ADMIN — Medication 2: at 11:53

## 2023-01-03 NOTE — PROGRESS NOTE ADULT - SUBJECTIVE AND OBJECTIVE BOX
TERESA FRANCIS    PLV 2NOR 237 W1    Allergies    latex (Rash)  latex (Unknown)  No Known Drug Allergies    Intolerances        PAST MEDICAL & SURGICAL HISTORY:  Atrial fibrillation      Hypertension      Diabetes      Prostate ca      Dementia      CHF (congestive heart failure)      Hyperlipemia      Diabetes      Depression      Dementia      DM (diabetes mellitus)      Atrial fibrillation      Prostate CA      Arteriosclerotic heart disease (ASHD)      Dementia      Anemia      AICD (automatic cardioverter/defibrillator) present      Osteomyelitis of finger of left hand      Personal history of radiation therapy      H/O ongoing treatment with hormonal therapy      H/O bladder infections      Scrotal abscess      Urinary retention      Hematuria      H/O cystitis      Constipation      VHD (valvular heart disease)      Aortic valve stenosis      History of automatic internal cardiac defibrillator (AICD)          FAMILY HISTORY:      Home Medications:      MEDICATIONS  (STANDING):  dextrose 5%. 1000 milliLiter(s) (75 mL/Hr) IV Continuous <Continuous>  dextrose 5%. 1000 milliLiter(s) (75 mL/Hr) IV Continuous <Continuous>  donepezil 5 milliGRAM(s) Oral at bedtime  famotidine    Tablet 20 milliGRAM(s) Oral daily  heparin   Injectable 5000 Unit(s) SubCutaneous every 12 hours  metoprolol tartrate 12.5 milliGRAM(s) Oral two times a day  piperacillin/tazobactam IVPB.. 3.375 Gram(s) IV Intermittent every 8 hours  senna 2 Tablet(s) Oral at bedtime  sodium chloride 0.45%. 1000 milliLiter(s) (80 mL/Hr) IV Continuous <Continuous>  tamsulosin 0.4 milliGRAM(s) Oral at bedtime    MEDICATIONS  (PRN):  acetaminophen     Tablet .. 650 milliGRAM(s) Oral every 6 hours PRN Temp greater or equal to 38C (100.4F), Mild Pain (1 - 3)  morphine Concentrate 5 milliGRAM(s) SubLingual every 2 hours PRN Moderate Pain (4 - 6)      Diet, Consistent Carbohydrate w/Evening Snack:   Pureed (PUREED) (22 @ 18:00) [Active]          Vital Signs Last 24 Hrs  T(C): 36.8 (29 Dec 2022 04:56), Max: 37.1 (28 Dec 2022 21:04)  T(F): 98.2 (29 Dec 2022 04:56), Max: 98.8 (28 Dec 2022 21:04)  HR: 81 (29 Dec 2022 04:56) (81 - 96)  BP: 109/61 (29 Dec 2022 04:56) (105/60 - 144/79)  BP(mean): --  RR: 16 (29 Dec 2022 04:56) (16 - 17)  SpO2: 100% (29 Dec 2022 04:56) (96% - 100%)    Parameters below as of 29 Dec 2022 04:56  Patient On (Oxygen Delivery Method): room air          22 @ 07:01  -  22 @ 07:00  --------------------------------------------------------  IN: 500 mL / OUT: 400 mL / NET: 100 mL              LABS:                        10.3   9.02  )-----------( 331      ( 28 Dec 2022 10:10 )             32.3         152<H>  |  116<H>  |  19  ----------------------------<  153<H>  3.3<L>   |  30  |  0.49<L>    Ca    9.1      28 Dec 2022 20:30    TPro  6.5  /  Alb  2.1<L>  /  TBili  0.5  /  DBili  x   /  AST  26  /  ALT  23  /  AlkPhos  81  12-28    PT/INR - ( 28 Dec 2022 10:10 )   PT: 14.6 sec;   INR: 1.25 ratio         PTT - ( 28 Dec 2022 10:10 )  PTT:31.9 sec  Urinalysis Basic - ( 28 Dec 2022 15:20 )    Color: Yellow / Appearance: Slightly Turbid / S.020 / pH: x  Gluc: x / Ketone: Negative  / Bili: Negative / Urobili: Negative   Blood: x / Protein: 15 / Nitrite: Negative   Leuk Esterase: Small / RBC: 3-5 /HPF / WBC 6-10   Sq Epi: x / Non Sq Epi: Few / Bacteria: Few            WBC:  WBC Count: 9.02 K/uL ( @ 10:10)      MICROBIOLOGY:  RECENT CULTURES:              PT/INR - ( 28 Dec 2022 10:10 )   PT: 14.6 sec;   INR: 1.25 ratio         PTT - ( 28 Dec 2022 10:10 )  PTT:31.9 sec    Sodium:  Sodium, Serum: 152 mmol/L ( @ 20:30)  Sodium, Serum: 151 mmol/L ( @ 10:10)      0.49 mg/dL  @ 20:30  0.68 mg/dL  @ 10:10      Hemoglobin:  Hemoglobin: 10.3 g/dL ( @ 10:10)      Platelets: Platelet Count - Automated: 331 K/uL ( @ 10:10)      LIVER FUNCTIONS - ( 28 Dec 2022 10:10 )  Alb: 2.1 g/dL / Pro: 6.5 g/dL / ALK PHOS: 81 U/L / ALT: 23 U/L / AST: 26 U/L / GGT: x             Urinalysis Basic - ( 28 Dec 2022 15:20 )    Color: Yellow / Appearance: Slightly Turbid / S.020 / pH: x  Gluc: x / Ketone: Negative  / Bili: Negative / Urobili: Negative   Blood: x / Protein: 15 / Nitrite: Negative   Leuk Esterase: Small / RBC: 3-5 /HPF / WBC 6-10   Sq Epi: x / Non Sq Epi: Few / Bacteria: Few        RADIOLOGY & ADDITIONAL STUDIES:      MICROBIOLOGY:  RECENT CULTURES:          
    TERESA FRANCIS    PLV 2NOR 237 W1    Allergies    latex (Rash)  latex (Unknown)  No Known Drug Allergies    Intolerances        PAST MEDICAL & SURGICAL HISTORY:  Atrial fibrillation      Hypertension      Diabetes      Prostate ca      Dementia      CHF (congestive heart failure)      Hyperlipemia      Diabetes      Depression      Dementia      DM (diabetes mellitus)      Atrial fibrillation      Prostate CA      Arteriosclerotic heart disease (ASHD)      Dementia      Anemia      AICD (automatic cardioverter/defibrillator) present      Osteomyelitis of finger of left hand      Personal history of radiation therapy      H/O ongoing treatment with hormonal therapy      H/O bladder infections      Scrotal abscess      Urinary retention      Hematuria      H/O cystitis      Constipation      VHD (valvular heart disease)      Aortic valve stenosis      History of automatic internal cardiac defibrillator (AICD)          FAMILY HISTORY:      Home Medications:  metFORMIN 500 mg oral tablet: 1 tab(s) orally once a day (29 Dec 2022 11:38)  Metoprolol Tartrate 25 mg oral tablet: 0.5 tab(s) orally 2 times a day (29 Dec 2022 11:38)      MEDICATIONS  (STANDING):  dextrose 5%. 1000 milliLiter(s) (75 mL/Hr) IV Continuous <Continuous>  dextrose 5%. 1000 milliLiter(s) (75 mL/Hr) IV Continuous <Continuous>  dextrose 5%. 1000 milliLiter(s) (50 mL/Hr) IV Continuous <Continuous>  dextrose 5%. 1000 milliLiter(s) (100 mL/Hr) IV Continuous <Continuous>  dextrose 50% Injectable 25 Gram(s) IV Push once  dextrose 50% Injectable 12.5 Gram(s) IV Push once  dextrose 50% Injectable 25 Gram(s) IV Push once  donepezil 5 milliGRAM(s) Oral at bedtime  famotidine    Tablet 20 milliGRAM(s) Oral daily  glucagon  Injectable 1 milliGRAM(s) IntraMuscular once  heparin   Injectable 5000 Unit(s) SubCutaneous every 12 hours  insulin lispro (ADMELOG) corrective regimen sliding scale   SubCutaneous three times a day before meals  insulin lispro (ADMELOG) corrective regimen sliding scale   SubCutaneous at bedtime  metoprolol tartrate 12.5 milliGRAM(s) Oral two times a day  senna 2 Tablet(s) Oral at bedtime  sodium chloride 0.45%. 1000 milliLiter(s) (80 mL/Hr) IV Continuous <Continuous>  tamsulosin 0.4 milliGRAM(s) Oral at bedtime    MEDICATIONS  (PRN):  acetaminophen     Tablet .. 650 milliGRAM(s) Oral every 6 hours PRN Temp greater or equal to 38C (100.4F), Mild Pain (1 - 3)  dextrose Oral Gel 15 Gram(s) Oral once PRN Blood Glucose LESS THAN 70 milliGRAM(s)/deciliter  morphine Concentrate 5 milliGRAM(s) SubLingual every 2 hours PRN Moderate Pain (4 - 6)      Diet, Pureed (12-29-22 @ 13:31) [Pending Verification By Attending]  Diet, Consistent Carbohydrate w/Evening Snack:   Pureed (PUREED) (12-28-22 @ 18:00) [Active]          Vital Signs Last 24 Hrs  T(C): 36.8 (01 Jan 2023 04:27), Max: 36.9 (31 Dec 2022 21:25)  T(F): 98.2 (01 Jan 2023 04:27), Max: 98.4 (31 Dec 2022 21:25)  HR: 84 (01 Jan 2023 04:27) (81 - 90)  BP: 116/75 (01 Jan 2023 04:27) (102/57 - 116/75)  BP(mean): --  RR: 17 (01 Jan 2023 04:27) (16 - 17)  SpO2: 96% (01 Jan 2023 04:27) (94% - 96%)    Parameters below as of 01 Jan 2023 04:27  Patient On (Oxygen Delivery Method): room air          12-31-22 @ 07:01  -  01-01-23 @ 07:00  --------------------------------------------------------  IN: 1620 mL / OUT: 0 mL / NET: 1620 mL              LABS:                        9.2    10.13 )-----------( 300      ( 01 Jan 2023 06:40 )             28.3     01-01    141  |  107  |  7   ----------------------------<  82  3.9   |  27  |  0.37<L>    Ca    8.4<L>      01 Jan 2023 06:40                WBC:  WBC Count: 10.13 K/uL (01-01 @ 06:40)  WBC Count: 11.11 K/uL (12-31 @ 07:18)  WBC Count: 11.19 K/uL (12-30 @ 06:25)  WBC Count: 9.02 K/uL (12-28 @ 10:10)      MICROBIOLOGY:  RECENT CULTURES:  12-28 .Blood Blood XXXX XXXX   No growth to date.    12-28 Clean Catch Clean Catch (Midstream) Escherichia coli  Klebsiella pneumoniae ESBL XXXX   >100,000 CFU/ml Escherichia coli  50,000 - 99,000 CFU/mL Klebsiella pneumoniae ESBL                    Sodium:  Sodium, Serum: 141 mmol/L (01-01 @ 06:40)  Sodium, Serum: 142 mmol/L (12-31 @ 07:18)  Sodium, Serum: 149 mmol/L (12-30 @ 06:25)  Sodium, Serum: 152 mmol/L (12-28 @ 20:30)  Sodium, Serum: 151 mmol/L (12-28 @ 10:10)      0.37 mg/dL 01-01 @ 06:40  0.41 mg/dL 12-31 @ 07:18  0.51 mg/dL 12-30 @ 06:25  0.49 mg/dL 12-28 @ 20:30  0.68 mg/dL 12-28 @ 10:10      Hemoglobin:  Hemoglobin: 9.2 g/dL (01-01 @ 06:40)  Hemoglobin: 8.6 g/dL (12-31 @ 07:18)  Hemoglobin: 9.4 g/dL (12-30 @ 06:25)  Hemoglobin: 10.3 g/dL (12-28 @ 10:10)      Platelets: Platelet Count - Automated: 300 K/uL (01-01 @ 06:40)  Platelet Count - Automated: 314 K/uL (12-31 @ 07:18)  Platelet Count - Automated: 310 K/uL (12-30 @ 06:25)  Platelet Count - Automated: 331 K/uL (12-28 @ 10:10)              RADIOLOGY & ADDITIONAL STUDIES:      MICROBIOLOGY:  RECENT CULTURES:  12-28 .Blood Blood XXXX XXXX   No growth to date.    12-28 Clean Catch Clean Catch (Midstream) Escherichia coli  Klebsiella pneumoniae ESBL XXXX   >100,000 CFU/ml Escherichia coli  50,000 - 99,000 CFU/mL Klebsiella pneumoniae ESBL            
Date/Time Patient Seen:  		  Referring MD:   Data Reviewed	       Patient is a 89y old  Male who presents with a chief complaint of foot gangr (02 Jan 2023 11:09)      Subjective/HPI     PAST MEDICAL & SURGICAL HISTORY:  Atrial fibrillation    Hypertension    Diabetes    Prostate ca    Dementia    CHF (congestive heart failure)    Hyperlipemia    Diabetes    Depression    Dementia    No pertinent past medical history    DM (diabetes mellitus)    Atrial fibrillation    Prostate CA    Arteriosclerotic heart disease (ASHD)    Dementia    Anemia    Constipation    AICD (automatic cardioverter/defibrillator) present    Osteomyelitis of finger of left hand    Personal history of radiation therapy    H/O ongoing treatment with hormonal therapy    H/O bladder infections    Scrotal abscess    Urinary retention    Hematuria    H/O cystitis    Constipation    VHD (valvular heart disease)    Aortic valve stenosis    No significant past surgical history    No significant past surgical history    History of automatic internal cardiac defibrillator (AICD)          Medication list         MEDICATIONS  (STANDING):  dextrose 5%. 1000 milliLiter(s) (75 mL/Hr) IV Continuous <Continuous>  dextrose 5%. 1000 milliLiter(s) (75 mL/Hr) IV Continuous <Continuous>  dextrose 5%. 1000 milliLiter(s) (50 mL/Hr) IV Continuous <Continuous>  dextrose 5%. 1000 milliLiter(s) (100 mL/Hr) IV Continuous <Continuous>  dextrose 50% Injectable 25 Gram(s) IV Push once  dextrose 50% Injectable 12.5 Gram(s) IV Push once  dextrose 50% Injectable 25 Gram(s) IV Push once  donepezil 5 milliGRAM(s) Oral at bedtime  famotidine    Tablet 20 milliGRAM(s) Oral daily  glucagon  Injectable 1 milliGRAM(s) IntraMuscular once  heparin   Injectable 5000 Unit(s) SubCutaneous every 12 hours  insulin lispro (ADMELOG) corrective regimen sliding scale   SubCutaneous three times a day before meals  insulin lispro (ADMELOG) corrective regimen sliding scale   SubCutaneous at bedtime  metoprolol tartrate 12.5 milliGRAM(s) Oral two times a day  senna 2 Tablet(s) Oral at bedtime  sodium chloride 0.45%. 1000 milliLiter(s) (80 mL/Hr) IV Continuous <Continuous>  tamsulosin 0.4 milliGRAM(s) Oral at bedtime    MEDICATIONS  (PRN):  acetaminophen     Tablet .. 650 milliGRAM(s) Oral every 6 hours PRN Temp greater or equal to 38C (100.4F), Mild Pain (1 - 3)  dextrose Oral Gel 15 Gram(s) Oral once PRN Blood Glucose LESS THAN 70 milliGRAM(s)/deciliter  morphine Concentrate 5 milliGRAM(s) SubLingual every 2 hours PRN Moderate Pain (4 - 6)         Vitals log        ICU Vital Signs Last 24 Hrs  T(C): 37.2 (03 Jan 2023 04:50), Max: 37.2 (03 Jan 2023 04:50)  T(F): 98.9 (03 Jan 2023 04:50), Max: 98.9 (03 Jan 2023 04:50)  HR: 90 (03 Jan 2023 04:50) (75 - 90)  BP: 125/68 (03 Jan 2023 04:50) (100/62 - 125/68)  BP(mean): --  ABP: --  ABP(mean): --  RR: 18 (03 Jan 2023 04:50) (18 - 18)  SpO2: 96% (03 Jan 2023 04:50) (95% - 96%)    O2 Parameters below as of 03 Jan 2023 04:50  Patient On (Oxygen Delivery Method): room air                 Input and Output:  I&O's Detail    01 Jan 2023 07:01  -  02 Jan 2023 07:00  --------------------------------------------------------  IN:    sodium chloride 0.45%: 960 mL  Total IN: 960 mL    OUT:    Voided (mL): 400 mL  Total OUT: 400 mL    Total NET: 560 mL      02 Jan 2023 07:01  -  03 Jan 2023 05:37  --------------------------------------------------------  IN:    sodium chloride 0.45%: 800 mL  Total IN: 800 mL    OUT:  Total OUT: 0 mL    Total NET: 800 mL          Lab Data                        9.5    8.52  )-----------( 300      ( 02 Jan 2023 07:58 )             28.9     01-02    142  |  107  |  6<L>  ----------------------------<  85  3.7   |  28  |  0.34<L>    Ca    8.4<L>      02 Jan 2023 07:58              Review of Systems	      Objective     Physical Examination    heart s1s2  lung dec BS  head nc      Pertinent Lab findings & Imaging      Grayson:  NO   Adequate UO     I&O's Detail    01 Jan 2023 07:01  -  02 Jan 2023 07:00  --------------------------------------------------------  IN:    sodium chloride 0.45%: 960 mL  Total IN: 960 mL    OUT:    Voided (mL): 400 mL  Total OUT: 400 mL    Total NET: 560 mL      02 Jan 2023 07:01  -  03 Jan 2023 05:37  --------------------------------------------------------  IN:    sodium chloride 0.45%: 800 mL  Total IN: 800 mL    OUT:  Total OUT: 0 mL    Total NET: 800 mL               Discussed with:     Cultures:	        Radiology                            
Date/Time Patient Seen:  		  Referring MD:   Data Reviewed	       Patient is a 89y old  Male who presents with a chief complaint of foot gangr (31 Dec 2022 14:14)      Subjective/HPI     PAST MEDICAL & SURGICAL HISTORY:  Atrial fibrillation    Hypertension    Diabetes    Prostate ca    Dementia    CHF (congestive heart failure)    Hyperlipemia    Diabetes    Depression    Dementia    No pertinent past medical history    DM (diabetes mellitus)    Atrial fibrillation    Prostate CA    Arteriosclerotic heart disease (ASHD)    Dementia    Anemia    Constipation    AICD (automatic cardioverter/defibrillator) present    Osteomyelitis of finger of left hand    Personal history of radiation therapy    H/O ongoing treatment with hormonal therapy    H/O bladder infections    Scrotal abscess    Urinary retention    Hematuria    H/O cystitis    Constipation    VHD (valvular heart disease)    Aortic valve stenosis    No significant past surgical history    No significant past surgical history    History of automatic internal cardiac defibrillator (AICD)          Medication list         MEDICATIONS  (STANDING):  dextrose 5%. 1000 milliLiter(s) (75 mL/Hr) IV Continuous <Continuous>  dextrose 5%. 1000 milliLiter(s) (75 mL/Hr) IV Continuous <Continuous>  dextrose 5%. 1000 milliLiter(s) (100 mL/Hr) IV Continuous <Continuous>  dextrose 5%. 1000 milliLiter(s) (50 mL/Hr) IV Continuous <Continuous>  dextrose 50% Injectable 25 Gram(s) IV Push once  dextrose 50% Injectable 12.5 Gram(s) IV Push once  dextrose 50% Injectable 25 Gram(s) IV Push once  donepezil 5 milliGRAM(s) Oral at bedtime  famotidine    Tablet 20 milliGRAM(s) Oral daily  glucagon  Injectable 1 milliGRAM(s) IntraMuscular once  heparin   Injectable 5000 Unit(s) SubCutaneous every 12 hours  insulin lispro (ADMELOG) corrective regimen sliding scale   SubCutaneous three times a day before meals  insulin lispro (ADMELOG) corrective regimen sliding scale   SubCutaneous at bedtime  metoprolol tartrate 12.5 milliGRAM(s) Oral two times a day  senna 2 Tablet(s) Oral at bedtime  sodium chloride 0.45%. 1000 milliLiter(s) (80 mL/Hr) IV Continuous <Continuous>  tamsulosin 0.4 milliGRAM(s) Oral at bedtime    MEDICATIONS  (PRN):  acetaminophen     Tablet .. 650 milliGRAM(s) Oral every 6 hours PRN Temp greater or equal to 38C (100.4F), Mild Pain (1 - 3)  dextrose Oral Gel 15 Gram(s) Oral once PRN Blood Glucose LESS THAN 70 milliGRAM(s)/deciliter  morphine Concentrate 5 milliGRAM(s) SubLingual every 2 hours PRN Moderate Pain (4 - 6)         Vitals log        ICU Vital Signs Last 24 Hrs  T(C): 36.8 (01 Jan 2023 04:27), Max: 36.9 (31 Dec 2022 21:25)  T(F): 98.2 (01 Jan 2023 04:27), Max: 98.4 (31 Dec 2022 21:25)  HR: 84 (01 Jan 2023 04:27) (81 - 90)  BP: 116/75 (01 Jan 2023 04:27) (102/57 - 116/75)  BP(mean): --  ABP: --  ABP(mean): --  RR: 17 (01 Jan 2023 04:27) (16 - 17)  SpO2: 96% (01 Jan 2023 04:27) (94% - 96%)    O2 Parameters below as of 01 Jan 2023 04:27  Patient On (Oxygen Delivery Method): room air                 Input and Output:  I&O's Detail    31 Dec 2022 07:01  -  01 Jan 2023 07:00  --------------------------------------------------------  IN:    dextrose 5%: 900 mL    sodium chloride 0.45%: 720 mL  Total IN: 1620 mL    OUT:  Total OUT: 0 mL    Total NET: 1620 mL          Lab Data                        9.2    10.13 )-----------( 300      ( 01 Jan 2023 06:40 )             28.3     01-01    141  |  107  |  7   ----------------------------<  82  3.9   |  27  |  0.37<L>    Ca    8.4<L>      01 Jan 2023 06:40              Review of Systems	      Objective     Physical Examination    heart s1s2  lung dec BS  head nc      Pertinent Lab findings & Imaging      Grayson:  NO   Adequate UO     I&O's Detail    31 Dec 2022 07:01  -  01 Jan 2023 07:00  --------------------------------------------------------  IN:    dextrose 5%: 900 mL    sodium chloride 0.45%: 720 mL  Total IN: 1620 mL    OUT:  Total OUT: 0 mL    Total NET: 1620 mL               Discussed with:     Cultures:	        Radiology                            
Date/Time Patient Seen:  		  Referring MD:   Data Reviewed	       Patient is a 89y old  Male who presents with a chief complaint of foot gangrene (29 Dec 2022 14:42)      Subjective/HPI     PAST MEDICAL & SURGICAL HISTORY:  Atrial fibrillation    Hypertension    Diabetes    Prostate ca    Dementia    CHF (congestive heart failure)    Hyperlipemia    Diabetes    Depression    Dementia    No pertinent past medical history    DM (diabetes mellitus)    Atrial fibrillation    Prostate CA    Arteriosclerotic heart disease (ASHD)    Dementia    Anemia    Constipation    AICD (automatic cardioverter/defibrillator) present    Osteomyelitis of finger of left hand    Personal history of radiation therapy    H/O ongoing treatment with hormonal therapy    H/O bladder infections    Scrotal abscess    Urinary retention    Hematuria    H/O cystitis    Constipation    VHD (valvular heart disease)    Aortic valve stenosis    No significant past surgical history    No significant past surgical history    History of automatic internal cardiac defibrillator (AICD)          Medication list         MEDICATIONS  (STANDING):  dextrose 5%. 1000 milliLiter(s) (75 mL/Hr) IV Continuous <Continuous>  dextrose 5%. 1000 milliLiter(s) (75 mL/Hr) IV Continuous <Continuous>  dextrose 5%. 1000 milliLiter(s) (50 mL/Hr) IV Continuous <Continuous>  dextrose 5%. 1000 milliLiter(s) (100 mL/Hr) IV Continuous <Continuous>  dextrose 50% Injectable 25 Gram(s) IV Push once  dextrose 50% Injectable 12.5 Gram(s) IV Push once  dextrose 50% Injectable 25 Gram(s) IV Push once  donepezil 5 milliGRAM(s) Oral at bedtime  famotidine    Tablet 20 milliGRAM(s) Oral daily  glucagon  Injectable 1 milliGRAM(s) IntraMuscular once  heparin   Injectable 5000 Unit(s) SubCutaneous every 12 hours  insulin lispro (ADMELOG) corrective regimen sliding scale   SubCutaneous three times a day before meals  insulin lispro (ADMELOG) corrective regimen sliding scale   SubCutaneous at bedtime  metoprolol tartrate 12.5 milliGRAM(s) Oral two times a day  piperacillin/tazobactam IVPB.. 3.375 Gram(s) IV Intermittent every 8 hours  senna 2 Tablet(s) Oral at bedtime  sodium chloride 0.45%. 1000 milliLiter(s) (80 mL/Hr) IV Continuous <Continuous>  tamsulosin 0.4 milliGRAM(s) Oral at bedtime    MEDICATIONS  (PRN):  acetaminophen     Tablet .. 650 milliGRAM(s) Oral every 6 hours PRN Temp greater or equal to 38C (100.4F), Mild Pain (1 - 3)  dextrose Oral Gel 15 Gram(s) Oral once PRN Blood Glucose LESS THAN 70 milliGRAM(s)/deciliter  morphine Concentrate 5 milliGRAM(s) SubLingual every 2 hours PRN Moderate Pain (4 - 6)         Vitals log        ICU Vital Signs Last 24 Hrs  T(C): 37.5 (29 Dec 2022 21:05), Max: 37.5 (29 Dec 2022 21:05)  T(F): 99.5 (29 Dec 2022 21:05), Max: 99.5 (29 Dec 2022 21:05)  HR: 87 (29 Dec 2022 21:05) (77 - 87)  BP: 99/47 (29 Dec 2022 21:05) (99/47 - 110/65)  BP(mean): --  ABP: --  ABP(mean): --  RR: 16 (29 Dec 2022 21:05) (16 - 17)  SpO2: 99% (29 Dec 2022 21:05) (96% - 99%)    O2 Parameters below as of 29 Dec 2022 21:05  Patient On (Oxygen Delivery Method): room air                 Input and Output:  I&O's Detail    28 Dec 2022 07:01  -  29 Dec 2022 07:00  --------------------------------------------------------  IN:    IV PiggyBack: 100 mL    sodium chloride 0.45%: 480 mL  Total IN: 580 mL    OUT:    Voided (mL): 400 mL  Total OUT: 400 mL    Total NET: 180 mL      29 Dec 2022 07:01  -  30 Dec 2022 05:20  --------------------------------------------------------  IN:    sodium chloride 0.45%: 1440 mL  Total IN: 1440 mL    OUT:    Voided (mL): 400 mL  Total OUT: 400 mL    Total NET: 1040 mL          Lab Data                        10.3   9.02  )-----------( 331      ( 28 Dec 2022 10:10 )             32.3     12-28    152<H>  |  116<H>  |  19  ----------------------------<  153<H>  3.3<L>   |  30  |  0.49<L>    Ca    9.1      28 Dec 2022 20:30    TPro  6.5  /  Alb  2.1<L>  /  TBili  0.5  /  DBili  x   /  AST  26  /  ALT  23  /  AlkPhos  81  12-28            Review of Systems	      Objective     Physical Examination    heart s1s2  lung dec BS      Pertinent Lab findings & Imaging      Grayson:  NO   Adequate UO     I&O's Detail    28 Dec 2022 07:01  -  29 Dec 2022 07:00  --------------------------------------------------------  IN:    IV PiggyBack: 100 mL    sodium chloride 0.45%: 480 mL  Total IN: 580 mL    OUT:    Voided (mL): 400 mL  Total OUT: 400 mL    Total NET: 180 mL      29 Dec 2022 07:01  -  30 Dec 2022 05:20  --------------------------------------------------------  IN:    sodium chloride 0.45%: 1440 mL  Total IN: 1440 mL    OUT:    Voided (mL): 400 mL  Total OUT: 400 mL    Total NET: 1040 mL               Discussed with:     Cultures:	        Radiology                            
[facility] DISEASES  [facility] [facility], [facility], [facility]  [phone]  ([phone] - weekdays after 5pm and weekends)    Name: TERESA FRANCIS  Age/Gender: 89y Male  MRN: [id number]    Interval History:  Patient seen and examined this morning.   No new complaints noted.  Notes reviewed  No concerning overnight events  Afebrile   Allergies: latex (Rash)  latex (Unknown)  No Known Drug Allergies      Objective:  Vitals:   T(F): 98.2 (12-31-22 @ 13:02), Max: 98.3 (12-31-22 @ 04:48)  HR: 81 (12-31-22 @ 17:20) (81 - 87)  BP: 103/55 (12-31-22 @ 17:20) (102/57 - 107/62)  RR: 16 (12-31-22 @ 17:20) (16 - 17)  SpO2: 94% (12-31-22 @ 17:20) (94% - 96%)  Physical Examination:  General: no acute distress  HEENT: NC/AT, anicteric, neck supple  Respiratory: no acc muscle use, breathing comfortably  Cardiovascular: S1 and S2 present  Gastrointestinal: normal appearing, nondistended  Extremities: no edema, no cyanosis  Skin: no visible rash    Laboratory Studies:  CBC:                       8.6    11.11 )-----------( 314      ( 31 Dec 2022 07:18 )             26.6     WBC Trend:  11.11 12-31-22 @ 07:18  11.19 12-30-22 @ 06:25  9.02 12-28-22 @ 10:10    CMP: 12-31    142  |  110<H>  |  10  ----------------------------<  97  3.5   |  24  |  0.41<L>    Ca    8.5      31 Dec 2022 07:18      Creatinine, Serum: 0.41 mg/dL (12-31-22 @ 07:18)  Creatinine, Serum: 0.51 mg/dL (12-30-22 @ 06:25)  Creatinine, Serum: 0.49 mg/dL (12-28-22 @ 20:30)  Creatinine, Serum: 0.68 mg/dL (12-28-22 @ 10:10)    Microbiology: reviewed   Culture - Blood (collected 12-28-22 @ 18:41)  Source: .Blood Blood  Preliminary Report (12-30-22 @ 01:02):    No growth to date.    Culture - Urine (collected 12-28-22 @ 15:20)  Source: Clean Catch Clean Catch (Midstream)  Final Report (12-31-22 @ 18:09):    >100,000 CFU/ml Escherichia coli    50,000 - 99,000 CFU/mL Klebsiella pneumoniae ESBL  Organism: Escherichia coli  Klebsiella pneumoniae ESBL (12-31-22 @ 18:09)  Organism: Klebsiella pneumoniae ESBL (12-31-22 @ 18:09)      -  Amikacin: S <=16      -  Amoxicillin/Clavulanic Acid: S <=8/4      -  Ampicillin: R >16 These ampicillin results predict results for amoxicillin      -  Ampicillin/Sulbactam: I 16/8 Enterobacter, Klebsiella aerogenes, Citrobacter, and Serratia may develop resistance during prolonged therapy (3-4 days)      -  Aztreonam: R >16      -  Cefazolin: R >16 For uncomplicated UTI with K. pneumoniae, E. coli, or P. mirablis: CHARITO <=16 is sensitive and CHARITO >=32 is resistant. This also predicts results for oral agents cefaclor, cefdinir, cefpodoxime, cefprozil, cefuroxime axetil, cephalexin and locarbef for uncomplicated UTI. Note that some isolates may be susceptible to these agents while testing resistant to cefazolin.      -  Cefepime: R >16      -  Ceftriaxone: R >32 Enterobacter, Klebsiella aerogenes, Citrobacter, and Serratia may develop resistance during prolonged therapy      -  Cefuroxime: R >16      -  Ciprofloxacin: I 0.5      -  Ertapenem: S <=0.5      -  Gentamicin: S <=2      -  Imipenem: S <=1      -  Levofloxacin: S <=0.5      -  Meropenem: S <=1      -  Nitrofurantoin: I 64 Should not be used to treat pyelonephritis      -  Piperacillin/Tazobactam: S <=8      -  Tobramycin: S <=2      -  Trimethoprim/Sulfamethoxazole: R >2/38      Method Type: CHARITO  Organism: Escherichia coli (12-31-22 @ 18:09)      -  Amikacin: S <=16      -  Amoxicillin/Clavulanic Acid: S <=8/4      -  Ampicillin: R >16 These ampicillin results predict results for amoxicillin      -  Ampicillin/Sulbactam: S 8/4 Enterobacter, Klebsiella aerogenes, Citrobacter, and Serratia may develop resistance during prolonged therapy (3-4 days)      -  Aztreonam: S <=4      -  Cefazolin: S 4 For uncomplicated UTI with K. pneumoniae, E. coli, or P. mirablis: CHARITO <=16 is sensitive and CHARITO >=32 is resistant. This also predicts results for oral agents cefaclor, cefdinir, cefpodoxime, cefprozil, cefuroxime axetil, cephalexin and locarbef for uncomplicated UTI. Note that some isolates may be susceptible to these agents while testing resistant to cefazolin.      -  Cefepime: S <=2      -  Cefoxitin: S <=8      -  Ceftriaxone: S <=1 Enterobacter, Klebsiella aerogenes, Citrobacter, and Serratia may develop resistance during prolonged therapy      -  Cefuroxime: S <=4      -  Ciprofloxacin: S <=0.25      -  Ertapenem: S <=0.5      -  Gentamicin: R >8      -  Imipenem: S <=1      -  Levofloxacin: S <=0.5      -  Meropenem: S <=1      -  Nitrofurantoin: S <=32 Should not be used to treat pyelonephritis      -  Piperacillin/Tazobactam: R 64      -  Tobramycin: R >8      -  Trimethoprim/Sulfamethoxazole: R >2/38      Method Type: CHARITO    Radiology: reviewed     Medications:  acetaminophen     Tablet .. 650 milliGRAM(s) Oral every 6 hours PRN  dextrose 5%. 1000 milliLiter(s) IV Continuous <Continuous>  dextrose 5%. 1000 milliLiter(s) IV Continuous <Continuous>  dextrose 5%. 1000 milliLiter(s) IV Continuous <Continuous>  dextrose 5%. 1000 milliLiter(s) IV Continuous <Continuous>  dextrose 50% Injectable 25 Gram(s) IV Push once  dextrose 50% Injectable 12.5 Gram(s) IV Push once  dextrose 50% Injectable 25 Gram(s) IV Push once  dextrose Oral Gel 15 Gram(s) Oral once PRN  donepezil 5 milliGRAM(s) Oral at bedtime  famotidine    Tablet 20 milliGRAM(s) Oral daily  glucagon  Injectable 1 milliGRAM(s) IntraMuscular once  heparin   Injectable 5000 Unit(s) SubCutaneous every 12 hours  insulin lispro (ADMELOG) corrective regimen sliding scale   SubCutaneous three times a day before meals  insulin lispro (ADMELOG) corrective regimen sliding scale   SubCutaneous at bedtime  metoprolol tartrate 12.5 milliGRAM(s) Oral two times a day  morphine Concentrate 5 milliGRAM(s) SubLingual every 2 hours PRN  senna 2 Tablet(s) Oral at bedtime  sodium chloride 0.45%. 1000 milliLiter(s) IV Continuous <Continuous>  tamsulosin 0.4 milliGRAM(s) Oral at bedtime    Current Antimicrobials:    Prior/Completed Antimicrobials:  piperacillin/tazobactam IVPB.  piperacillin/tazobactam IVPB.-  
PROGRESS NOTE  Patient is a 89y old  Male who presents with a chief complaint of foot gangr (03 Jan 2023 08:07)      OVERNIGHT      HPI:    89-year-old male who presents to the emergency room for reported necrotic foot wound.  Past medical history of dementia, anemia, history of atrial fibrillation does not appear to be on anticoagulants, prostate cancer, diabetes, hyperlipidemia, history of CHF, hypertension.  Patient presents from [facility] for necrotic foot wound.  Upon chart review it appears that patient was recently admitted to the hospital from December 16 through December 22, 2022 for the same presentation.  He had been diagnosed with gangrene of the left foot.  He was seen by podiatry dry sterile dressings were applied and he was instructed to level foot while in bed to prevent the progression of the pressure ulcers.  It is noted that patient also underwent a bone scan during his last admission in September positive for osteomyelitis at that time.  Son declined surgical intervention and patient was managed conservatively with antibiotics.  Patient had significant worsening of disease at the time of his last admission.  Vascular was consulted and patient was deemed not a candidate for any intervention on their part.  Patient was also seen by infectious disease notes were reviewed.  Per their notes antibiotics have a limited role as patient had already completed a 4-week course of Rocephin previously.  Gangrene is likely progressing due to severe PVD.  He was treated with Zosyn but he had little to no impact.  Podiatry notes are reviewed.  It was recommended the patient undergo a transmetatarsal amputation of the left foot but after discussion with patient's son it was decided that patient would not undergo surgical treatment.  Local wound care was continued and patient was discharged back to [facility].  Presents again today with worsening necrosis of the left foot.  Son was contacted.  Still refusing surgical intervention at this time.  At this time would like patient to be kept comfortable and is requesting hospice evaluation.  12/28/2022 asked to do admission (28 Dec 2022 16:36)    PAST MEDICAL & SURGICAL HISTORY:  Atrial fibrillation      Hypertension      Diabetes      Prostate ca      Dementia      CHF (congestive heart failure)      Hyperlipemia      Diabetes      Depression      Dementia      DM (diabetes mellitus)      Atrial fibrillation      Prostate CA      Arteriosclerotic heart disease (ASHD)      Dementia      Anemia      AICD (automatic cardioverter/defibrillator) present      Osteomyelitis of finger of left hand      Personal history of radiation therapy      H/O ongoing treatment with hormonal therapy      H/O bladder infections      Scrotal abscess      Urinary retention      Hematuria      H/O cystitis      Constipation      VHD (valvular heart disease)      Aortic valve stenosis      History of automatic internal cardiac defibrillator (AICD)          MEDICATIONS  (STANDING):  dextrose 5%. 1000 milliLiter(s) (75 mL/Hr) IV Continuous <Continuous>  dextrose 5%. 1000 milliLiter(s) (75 mL/Hr) IV Continuous <Continuous>  dextrose 5%. 1000 milliLiter(s) (50 mL/Hr) IV Continuous <Continuous>  dextrose 5%. 1000 milliLiter(s) (100 mL/Hr) IV Continuous <Continuous>  dextrose 50% Injectable 25 Gram(s) IV Push once  dextrose 50% Injectable 12.5 Gram(s) IV Push once  dextrose 50% Injectable 25 Gram(s) IV Push once  donepezil 5 milliGRAM(s) Oral at bedtime  famotidine    Tablet 20 milliGRAM(s) Oral daily  glucagon  Injectable 1 milliGRAM(s) IntraMuscular once  heparin   Injectable 5000 Unit(s) SubCutaneous every 12 hours  insulin lispro (ADMELOG) corrective regimen sliding scale   SubCutaneous at bedtime  insulin lispro (ADMELOG) corrective regimen sliding scale   SubCutaneous three times a day before meals  metoprolol tartrate 12.5 milliGRAM(s) Oral two times a day  senna 2 Tablet(s) Oral at bedtime  sodium chloride 0.45%. 1000 milliLiter(s) (80 mL/Hr) IV Continuous <Continuous>  tamsulosin 0.4 milliGRAM(s) Oral at bedtime    MEDICATIONS  (PRN):  acetaminophen     Tablet .. 650 milliGRAM(s) Oral every 6 hours PRN Temp greater or equal to 38C (100.4F), Mild Pain (1 - 3)  dextrose Oral Gel 15 Gram(s) Oral once PRN Blood Glucose LESS THAN 70 milliGRAM(s)/deciliter  morphine Concentrate 5 milliGRAM(s) SubLingual every 2 hours PRN Moderate Pain (4 - 6)      OBJECTIVE    T(C): 36.4 (01-03-23 @ 12:49), Max: 37.2 (01-03-23 @ 04:50)  HR: 78 (01-03-23 @ 12:49) (76 - 90)  BP: 114/67 (01-03-23 @ 12:49) (100/62 - 125/68)  RR: 17 (01-03-23 @ 12:49) (17 - 18)  SpO2: 93% (01-03-23 @ 12:49) (93% - 96%)  Wt(kg): --  I&O's Summary    02 Jan 2023 07:01  -  03 Jan 2023 07:00  --------------------------------------------------------  IN: 800 mL / OUT: 0 mL / NET: 800 mL          REVIEW OF SYSTEMS:  CONSTITUTIONAL: No fever, weight loss, or fatigue  EYES: No eye pain, visual disturbances, or discharge  ENMT:   No sinus or throat pain  NECK: No pain or stiffness  RESPIRATORY: No cough, wheezing, chills or hemoptysis; No shortness of breath  CARDIOVASCULAR: No chest pain, palpitations, dizziness, or leg swelling  GASTROINTESTINAL: No abdominal pain. No nausea, vomiting; No diarrhea or constipation. No melena or hematochezia.  GENITOURINARY: No dysuria, frequency, hematuria, or incontinence  NEUROLOGICAL: No headaches, memory loss, loss of strength, numbness, or tremors  SKIN: No itching, burning, rashes, or lesions   MUSCULOSKELETAL: No joint pain or swelling; No muscle, back, or extremity pain    PHYSICAL EXAM:  Appearance: NAD. VS past 24 hrs -as above   HEENT:   Moist oral mucosa. Conjunctiva clear b/l.   Neck : supple  Respiratory: Lungs CTAB.  Gastrointestinal:  Soft, nontender. No rebound. No rigidity. BS present	  Cardiovascular: RRR ,S1S2 present  Neurologic: Non-focal. Moving all extremities.  Extremities: No edema. No erythema. No calf tenderness.  Skin: No rashes, No ecchymoses, No cyanosis.	  wounds ,skin lesions-See skin assesment flow sheet   LABS:                        9.2    9.27  )-----------( 302      ( 03 Jan 2023 06:50 )             28.3     01-03    140  |  106  |  6<L>  ----------------------------<  120<H>  3.8   |  27  |  0.38<L>    Ca    8.7      03 Jan 2023 06:50      CAPILLARY BLOOD GLUCOSE      POCT Blood Glucose.: 201 mg/dL (03 Jan 2023 11:31)  POCT Blood Glucose.: 131 mg/dL (03 Jan 2023 07:57)  POCT Blood Glucose.: 208 mg/dL (02 Jan 2023 22:32)          Culture - Blood (collected 28 Dec 2022 18:41)  Source: .Blood Blood  Final Report (03 Jan 2023 01:00):    No Growth Final    Culture - Urine (collected 28 Dec 2022 15:20)  Source: Clean Catch Clean Catch (Midstream)  Final Report (31 Dec 2022 18:09):    >100,000 CFU/ml Escherichia coli    50,000 - 99,000 CFU/mL Klebsiella pneumoniae ESBL  Organism: Escherichia coli  Klebsiella pneumoniae ESBL (31 Dec 2022 18:09)  Organism: Klebsiella pneumoniae ESBL (31 Dec 2022 18:09)  Organism: Escherichia coli (31 Dec 2022 18:09)      RADIOLOGY & ADDITIONAL TESTS:   reviewed elctronically  ASSESSMENT/PLAN: 	    Patient was seen and examined on a day of discharge . Plan of care , discharge medications and recommendations discussed with consultants and clearance for discharge obtained .Social service , case management  and medical staff are aware of plan. Family is notified. Discharge summary  is  prepared electronically-see separate document prepared by me .75minutes spent on this visit, 50% visit time spent in care co-ordination with other attendings and counselling patient  I have discussed care plan with patient and HCP,expressed understanding of problems treatment and their effect and side effects, alternatives in detail,I have asked if they have any questions and concerns and appropriately addressed them to best of my ability 
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  Past medical history of dementia, anemia, history of atrial fibrillation does not appear to be on anticoagulants, prostate cancer, diabetes, hyperlipidemia, history of CHF, hypertension.  Patient presents from [facility] for necrotic foot wound.  Upon chart review it appears that patient was recently admitted to the hospital from December 16 through December 22, 2022 for the same presentation.  He had been diagnosed with gangrene of the left foot.  He was seen by podiatry dry sterile dressings were applied and he was instructed to level foot while in bed to prevent the progression of the pressure ulcers.  It is noted that patient also underwent a bone scan during his last admission in September positive for osteomyelitis at that time.  Son declined surgical intervention and patient was managed conservatively with antibiotics.  Patient had significant worsening of disease at the time of his last admission.  Vascular was consulted and patient was deemed not a candidate for any intervention on their part.  Patient was also seen by infectious disease notes were reviewed.  Per their notes antibiotics have a limited role as patient had already completed a 4-week course of Rocephin previously.  Gangrene is likely progressing due to severe PVD.  He was treated with Zosyn but he had little to no impact.  Podiatry notes are reviewed.  It was recommended the patient undergo a transmetatarsal amputation of the left foot but after discussion with patient's son it was decided that patient would not undergo surgical treatment.  Local wound care was continued and patient was discharged back to [facility].  Presents again today with worsening necrosis of the left foot.  Son was contacted.  Still refusing surgical intervention at this time.  At this time would like patient to be kept comfortable and is requesting hospice evaluation.  12/28/2022 asked to do admission (28 Dec 2022 16:36)      REVIEW OF SYSTEMS:    CONSTITUTIONAL: No fever, weight loss, or fatigue  EYES: No eye pain, visual disturbances, or discharge  ENMT:  No difficulty hearing, tinnitus, vertigo; No sinus or throat pain  NECK: No pain or stiffness  BREASTS: No pain, masses, or nipple discharge  RESPIRATORY: No cough, wheezing, chills or hemoptysis; No shortness of breath  CARDIOVASCULAR: No chest pain, palpitations, dizziness, or leg swelling  GASTROINTESTINAL: No abdominal or epigastric pain. No nausea, vomiting, or hematemesis; No diarrhea or constipation. No melena or hematochezia.  GENITOURINARY: No dysuria, frequency, hematuria, or incontinence  NEUROLOGICAL: No headaches, memory loss, loss of strength, numbness, or tremors  SKIN: No itching, burning, rashes, or lesions   LYMPH NODES: No enlarged glands  ENDOCRINE: No heat or cold intolerance; No hair loss  MUSCULOSKELETAL: No joint pain or swelling; No muscle, back, or extremity pain  PSYCHIATRIC: No depression, anxiety, mood swings, or difficulty sleeping  HEME/LYMPH: No easy bruising, or bleeding gums  ALLERGY AND IMMUNOLOGIC: No hives or eczema    MEDICATIONS  (STANDING):  dextrose 5%. 1000 milliLiter(s) (75 mL/Hr) IV Continuous <Continuous>  dextrose 5%. 1000 milliLiter(s) (75 mL/Hr) IV Continuous <Continuous>  dextrose 5%. 1000 milliLiter(s) (50 mL/Hr) IV Continuous <Continuous>  dextrose 5%. 1000 milliLiter(s) (100 mL/Hr) IV Continuous <Continuous>  dextrose 50% Injectable 25 Gram(s) IV Push once  dextrose 50% Injectable 12.5 Gram(s) IV Push once  dextrose 50% Injectable 25 Gram(s) IV Push once  donepezil 5 milliGRAM(s) Oral at bedtime  famotidine    Tablet 20 milliGRAM(s) Oral daily  glucagon  Injectable 1 milliGRAM(s) IntraMuscular once  heparin   Injectable 5000 Unit(s) SubCutaneous every 12 hours  insulin lispro (ADMELOG) corrective regimen sliding scale   SubCutaneous three times a day before meals  insulin lispro (ADMELOG) corrective regimen sliding scale   SubCutaneous at bedtime  metoprolol tartrate 12.5 milliGRAM(s) Oral two times a day  senna 2 Tablet(s) Oral at bedtime  sodium chloride 0.45%. 1000 milliLiter(s) (80 mL/Hr) IV Continuous <Continuous>  tamsulosin 0.4 milliGRAM(s) Oral at bedtime    MEDICATIONS  (PRN):  acetaminophen     Tablet .. 650 milliGRAM(s) Oral every 6 hours PRN Temp greater or equal to 38C (100.4F), Mild Pain (1 - 3)  dextrose Oral Gel 15 Gram(s) Oral once PRN Blood Glucose LESS THAN 70 milliGRAM(s)/deciliter  morphine Concentrate 5 milliGRAM(s) SubLingual every 2 hours PRN Moderate Pain (4 - 6)      ALLERGIES: latex (Rash)  latex (Unknown)  No Known Drug Allergies      FAMILY HISTORY:      PHYSICAL EXAMINATION:  -----------------------------  T(C): 36.8 (12-31-22 @ 04:48), Max: 37.5 (12-30-22 @ 20:52)  HR: 83 (12-31-22 @ 04:48) (66 - 88)  BP: 107/62 (12-31-22 @ 04:48) (96/52 - 107/62)  RR: 16 (12-31-22 @ 04:48) (16 - 16)  SpO2: 96% (12-31-22 @ 04:48) (94% - 97%)  Wt(kg): --    12-30 @ 07:01  -  12-31 @ 07:00  --------------------------------------------------------  IN:    sodium chloride 0.45%: 800 mL  Total IN: 800 mL    OUT:    Voided (mL): 312 mL  Total OUT: 312 mL    Total NET: 488 mL            VITALS  T(C): 36.8 (12-31-22 @ 04:48), Max: 37.5 (12-30-22 @ 20:52)  HR: 83 (12-31-22 @ 04:48) (66 - 88)  BP: 107/62 (12-31-22 @ 04:48) (96/52 - 107/62)  RR: 16 (12-31-22 @ 04:48) (16 - 16)  SpO2: 96% (12-31-22 @ 04:48) (94% - 97%)    Constitutional: well developed, normal appearance, well groomed, well nourished, no deformities and no acute distress.   Eyes: the conjunctiva exhibited no abnormalities and the eyelids demonstrated no xanthelasmas.   HEENT: normal oral mucosa, no oral pallor and no oral cyanosis.   Neck: normal jugular venous A waves present, normal jugular venous V waves present and no jugular venous jacobs A waves.   Pulmonary: no respiratory distress, normal respiratory rhythm and effort, no accessory muscle use and lungs were clear to auscultation bilaterally.   Cardiovascular: heart rate and rhythm were normal, normal S1 and S2 and no murmur, gallop, rub, heave or thrill are present.   Abdomen: soft, non-tender, no hepato-splenomegaly and no abdominal mass palpated.   Musculoskeletal: the gait could not be assessed..   Extremities: no clubbing of the fingernails, no localized cyanosis, no petechial hemorrhages and no ischemic changes.   Skin: normal skin color and pigmentation, no rash, no venous stasis, no skin lesions, no skin ulcer and no xanthoma was observed.   Psychiatric: oriented to person, place, and time, the affect was normal, the mood was normal and not feeling anxious.     LABS:   --------  12-31    142  |  110<H>  |  10  ----------------------------<  97  3.5   |  24  |  0.41<L>    Ca    8.5      31 Dec 2022 07:18                           8.6    11.11 )-----------( 314      ( 31 Dec 2022 07:18 )             26.6                 RADIOLOGY:  -----------------    ECG:     ECHO:
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  Past medical history of dementia, anemia, history of atrial fibrillation does not appear to be on anticoagulants, prostate cancer, diabetes, hyperlipidemia, history of CHF, hypertension.  Patient presents from [facility] for necrotic foot wound.  Upon chart review it appears that patient was recently admitted to the hospital from December 16 through December 22, 2022 for the same presentation.  He had been diagnosed with gangrene of the left foot.  He was seen by podiatry dry sterile dressings were applied and he was instructed to level foot while in bed to prevent the progression of the pressure ulcers.  It is noted that patient also underwent a bone scan during his last admission in September positive for osteomyelitis at that time.  Son declined surgical intervention and patient was managed conservatively with antibiotics.  Patient had significant worsening of disease at the time of his last admission.  Vascular was consulted and patient was deemed not a candidate for any intervention on their part.  Patient was also seen by infectious disease notes were reviewed.  Per their notes antibiotics have a limited role as patient had already completed a 4-week course of Rocephin previously.  Gangrene is likely progressing due to severe PVD.  He was treated with Zosyn but he had little to no impact.  Podiatry notes are reviewed.  It was recommended the patient undergo a transmetatarsal amputation of the left foot but after discussion with patient's son it was decided that patient would not undergo surgical treatment.  Local wound care was continued and patient was discharged back to [facility].  Presents again today with worsening necrosis of the left foot.  Son was contacted.  Still refusing surgical intervention at this time.  At this time would like patient to be kept comfortable and is requesting hospice evaluation.  12/28/2022 asked to do admission (28 Dec 2022 16:36)      REVIEW OF SYSTEMS:    CONSTITUTIONAL: No fever, weight loss, or fatigue  EYES: No eye pain, visual disturbances, or discharge  ENMT:  No difficulty hearing, tinnitus, vertigo; No sinus or throat pain  NECK: No pain or stiffness  BREASTS: No pain, masses, or nipple discharge  RESPIRATORY: No cough, wheezing, chills or hemoptysis; No shortness of breath  CARDIOVASCULAR: No chest pain, palpitations, dizziness, or leg swelling  GASTROINTESTINAL: No abdominal or epigastric pain. No nausea, vomiting, or hematemesis; No diarrhea or constipation. No melena or hematochezia.  GENITOURINARY: No dysuria, frequency, hematuria, or incontinence  NEUROLOGICAL: No headaches, memory loss, loss of strength, numbness, or tremors  SKIN: No itching, burning, rashes, or lesions   LYMPH NODES: No enlarged glands  ENDOCRINE: No heat or cold intolerance; No hair loss  MUSCULOSKELETAL: No joint pain or swelling; No muscle, back, or extremity pain  PSYCHIATRIC: No depression, anxiety, mood swings, or difficulty sleeping  HEME/LYMPH: No easy bruising, or bleeding gums  ALLERGY AND IMMUNOLOGIC: No hives or eczema    MEDICATIONS  (STANDING):  dextrose 5%. 1000 milliLiter(s) (75 mL/Hr) IV Continuous <Continuous>  dextrose 5%. 1000 milliLiter(s) (75 mL/Hr) IV Continuous <Continuous>  dextrose 5%. 1000 milliLiter(s) (50 mL/Hr) IV Continuous <Continuous>  dextrose 5%. 1000 milliLiter(s) (100 mL/Hr) IV Continuous <Continuous>  dextrose 50% Injectable 25 Gram(s) IV Push once  dextrose 50% Injectable 12.5 Gram(s) IV Push once  dextrose 50% Injectable 25 Gram(s) IV Push once  donepezil 5 milliGRAM(s) Oral at bedtime  famotidine    Tablet 20 milliGRAM(s) Oral daily  glucagon  Injectable 1 milliGRAM(s) IntraMuscular once  heparin   Injectable 5000 Unit(s) SubCutaneous every 12 hours  insulin lispro (ADMELOG) corrective regimen sliding scale   SubCutaneous three times a day before meals  insulin lispro (ADMELOG) corrective regimen sliding scale   SubCutaneous at bedtime  metoprolol tartrate 12.5 milliGRAM(s) Oral two times a day  senna 2 Tablet(s) Oral at bedtime  sodium chloride 0.45%. 1000 milliLiter(s) (80 mL/Hr) IV Continuous <Continuous>  tamsulosin 0.4 milliGRAM(s) Oral at bedtime    MEDICATIONS  (PRN):  acetaminophen     Tablet .. 650 milliGRAM(s) Oral every 6 hours PRN Temp greater or equal to 38C (100.4F), Mild Pain (1 - 3)  dextrose Oral Gel 15 Gram(s) Oral once PRN Blood Glucose LESS THAN 70 milliGRAM(s)/deciliter  morphine Concentrate 5 milliGRAM(s) SubLingual every 2 hours PRN Moderate Pain (4 - 6)      ALLERGIES: latex (Rash)  latex (Unknown)  No Known Drug Allergies      FAMILY HISTORY:      PHYSICAL EXAMINATION:  -----------------------------  T(C): 36.9 (01-02-23 @ 05:09), Max: 36.9 (01-02-23 @ 05:09)  HR: 76 (01-02-23 @ 05:09) (76 - 98)  BP: 101/58 (01-02-23 @ 05:09) (100/57 - 122/72)  RR: 17 (01-02-23 @ 05:09) (17 - 18)  SpO2: 95% (01-02-23 @ 05:09) (91% - 99%)  Wt(kg): --    01-01 @ 07:01  -  01-02 @ 07:00  --------------------------------------------------------  IN:    sodium chloride 0.45%: 960 mL  Total IN: 960 mL    OUT:    Voided (mL): 400 mL  Total OUT: 400 mL    Total NET: 560 mL            VITALS  T(C): 36.9 (01-02-23 @ 05:09), Max: 36.9 (01-02-23 @ 05:09)  HR: 76 (01-02-23 @ 05:09) (76 - 98)  BP: 101/58 (01-02-23 @ 05:09) (100/57 - 122/72)  RR: 17 (01-02-23 @ 05:09) (17 - 18)  SpO2: 95% (01-02-23 @ 05:09) (91% - 99%)    Constitutional: well developed, normal appearance, well groomed, well nourished, no deformities and no acute distress.   Eyes: the conjunctiva exhibited no abnormalities and the eyelids demonstrated no xanthelasmas.   HEENT: normal oral mucosa, no oral pallor and no oral cyanosis.   Neck: normal jugular venous A waves present, normal jugular venous V waves present and no jugular venous jacobs A waves.   Pulmonary: no respiratory distress, normal respiratory rhythm and effort, no accessory muscle use and lungs were clear to auscultation bilaterally.   Cardiovascular: heart rate and rhythm were normal, normal S1 and S2 and no murmur, gallop, rub, heave or thrill are present.   Abdomen: soft, non-tender, no hepato-splenomegaly and no abdominal mass palpated.   Musculoskeletal: the gait could not be assessed..   Extremities: no clubbing of the fingernails, no localized cyanosis, no petechial hemorrhages and no ischemic changes.   Skin: normal skin color and pigmentation, no rash, no venous stasis, no skin lesions, no skin ulcer and no xanthoma was observed.   Psychiatric: oriented to person, place, and time, the affect was normal, the mood was normal and not feeling anxious.     LABS:   --------  01-01    141  |  107  |  7   ----------------------------<  82  3.9   |  27  |  0.37<L>    Ca    8.4<L>      01 Jan 2023 06:40                           9.5    8.52  )-----------( 300      ( 02 Jan 2023 07:58 )             28.9                 RADIOLOGY:  -----------------    ECG:     ECHO:
    TERESA FRANCIS    PLV 2NOR 237 W1    Allergies    latex (Rash)  latex (Unknown)  No Known Drug Allergies    Intolerances        PAST MEDICAL & SURGICAL HISTORY:  Atrial fibrillation      Hypertension      Diabetes      Prostate ca      Dementia      CHF (congestive heart failure)      Hyperlipemia      Diabetes      Depression      Dementia      DM (diabetes mellitus)      Atrial fibrillation      Prostate CA      Arteriosclerotic heart disease (ASHD)      Dementia      Anemia      AICD (automatic cardioverter/defibrillator) present      Osteomyelitis of finger of left hand      Personal history of radiation therapy      H/O ongoing treatment with hormonal therapy      H/O bladder infections      Scrotal abscess      Urinary retention      Hematuria      H/O cystitis      Constipation      VHD (valvular heart disease)      Aortic valve stenosis      History of automatic internal cardiac defibrillator (AICD)          FAMILY HISTORY:      Home Medications:  metFORMIN 500 mg oral tablet: 1 tab(s) orally once a day (29 Dec 2022 11:38)  Metoprolol Tartrate 25 mg oral tablet: 0.5 tab(s) orally 2 times a day (29 Dec 2022 11:38)      MEDICATIONS  (STANDING):  dextrose 5%. 1000 milliLiter(s) (75 mL/Hr) IV Continuous <Continuous>  dextrose 5%. 1000 milliLiter(s) (75 mL/Hr) IV Continuous <Continuous>  dextrose 5%. 1000 milliLiter(s) (50 mL/Hr) IV Continuous <Continuous>  dextrose 5%. 1000 milliLiter(s) (100 mL/Hr) IV Continuous <Continuous>  dextrose 50% Injectable 25 Gram(s) IV Push once  dextrose 50% Injectable 12.5 Gram(s) IV Push once  dextrose 50% Injectable 25 Gram(s) IV Push once  donepezil 5 milliGRAM(s) Oral at bedtime  famotidine    Tablet 20 milliGRAM(s) Oral daily  glucagon  Injectable 1 milliGRAM(s) IntraMuscular once  heparin   Injectable 5000 Unit(s) SubCutaneous every 12 hours  insulin lispro (ADMELOG) corrective regimen sliding scale   SubCutaneous three times a day before meals  insulin lispro (ADMELOG) corrective regimen sliding scale   SubCutaneous at bedtime  metoprolol tartrate 12.5 milliGRAM(s) Oral two times a day  piperacillin/tazobactam IVPB.. 3.375 Gram(s) IV Intermittent every 8 hours  senna 2 Tablet(s) Oral at bedtime  sodium chloride 0.45%. 1000 milliLiter(s) (80 mL/Hr) IV Continuous <Continuous>  tamsulosin 0.4 milliGRAM(s) Oral at bedtime    MEDICATIONS  (PRN):  acetaminophen     Tablet .. 650 milliGRAM(s) Oral every 6 hours PRN Temp greater or equal to 38C (100.4F), Mild Pain (1 - 3)  dextrose Oral Gel 15 Gram(s) Oral once PRN Blood Glucose LESS THAN 70 milliGRAM(s)/deciliter  morphine Concentrate 5 milliGRAM(s) SubLingual every 2 hours PRN Moderate Pain (4 - 6)      Diet, Pureed (22 @ 13:31) [Pending Verification By Attending]  Diet, Consistent Carbohydrate w/Evening Snack:   Pureed (PUREED) (22 @ 18:00) [Active]          Vital Signs Last 24 Hrs  T(C): 37 (30 Dec 2022 04:52), Max: 37.5 (29 Dec 2022 21:05)  T(F): 98.6 (30 Dec 2022 04:52), Max: 99.5 (29 Dec 2022 21:05)  HR: 86 (30 Dec 2022 04:52) (77 - 87)  BP: 96/56 (30 Dec 2022 04:52) (96/56 - 110/65)  BP(mean): --  RR: 16 (30 Dec 2022 04:52) (16 - 17)  SpO2: 99% (30 Dec 2022 04:52) (96% - 99%)    Parameters below as of 30 Dec 2022 04:52  Patient On (Oxygen Delivery Method): room air          22 @ 07:01  -  22 @ 07:00  --------------------------------------------------------  IN: 1840 mL / OUT: 600 mL / NET: 1240 mL              LABS:                        10.3   9.02  )-----------( 331      ( 28 Dec 2022 10:10 )             32.3         152<H>  |  116<H>  |  19  ----------------------------<  153<H>  3.3<L>   |  30  |  0.49<L>    Ca    9.1      28 Dec 2022 20:30    TPro  6.5  /  Alb  2.1<L>  /  TBili  0.5  /  DBili  x   /  AST  26  /  ALT  23  /  AlkPhos  81      PT/INR - ( 28 Dec 2022 10:10 )   PT: 14.6 sec;   INR: 1.25 ratio         PTT - ( 28 Dec 2022 10:10 )  PTT:31.9 sec  Urinalysis Basic - ( 28 Dec 2022 15:20 )    Color: Yellow / Appearance: Slightly Turbid / S.020 / pH: x  Gluc: x / Ketone: Negative  / Bili: Negative / Urobili: Negative   Blood: x / Protein: 15 / Nitrite: Negative   Leuk Esterase: Small / RBC: 3-5 /HPF / WBC 6-10   Sq Epi: x / Non Sq Epi: Few / Bacteria: Few            WBC:  WBC Count: 9.02 K/uL ( @ 10:10)      MICROBIOLOGY:  RECENT CULTURES:   .Blood Blood XXXX XXXX   No growth to date.                PT/INR - ( 28 Dec 2022 10:10 )   PT: 14.6 sec;   INR: 1.25 ratio         PTT - ( 28 Dec 2022 10:10 )  PTT:31.9 sec    Sodium:  Sodium, Serum: 152 mmol/L ( @ 20:30)  Sodium, Serum: 151 mmol/L ( @ 10:10)      0.49 mg/dL  @ 20:30  0.68 mg/dL  @ 10:10      Hemoglobin:  Hemoglobin: 10.3 g/dL ( @ 10:10)      Platelets: Platelet Count - Automated: 331 K/uL ( @ 10:10)      LIVER FUNCTIONS - ( 28 Dec 2022 10:10 )  Alb: 2.1 g/dL / Pro: 6.5 g/dL / ALK PHOS: 81 U/L / ALT: 23 U/L / AST: 26 U/L / GGT: x             Urinalysis Basic - ( 28 Dec 2022 15:20 )    Color: Yellow / Appearance: Slightly Turbid / S.020 / pH: x  Gluc: x / Ketone: Negative  / Bili: Negative / Urobili: Negative   Blood: x / Protein: 15 / Nitrite: Negative   Leuk Esterase: Small / RBC: 3-5 /HPF / WBC 6-10   Sq Epi: x / Non Sq Epi: Few / Bacteria: Few        RADIOLOGY & ADDITIONAL STUDIES:      MICROBIOLOGY:  RECENT CULTURES:   .Blood Blood XXXX XXXX   No growth to date.            
    TERESA FRANCIS    PLV 2NOR 237 W1    Allergies    latex (Rash)  latex (Unknown)  No Known Drug Allergies    Intolerances        PAST MEDICAL & SURGICAL HISTORY:  Atrial fibrillation      Hypertension      Diabetes      Prostate ca      Dementia      CHF (congestive heart failure)      Hyperlipemia      Diabetes      Depression      Dementia      DM (diabetes mellitus)      Atrial fibrillation      Prostate CA      Arteriosclerotic heart disease (ASHD)      Dementia      Anemia      AICD (automatic cardioverter/defibrillator) present      Osteomyelitis of finger of left hand      Personal history of radiation therapy      H/O ongoing treatment with hormonal therapy      H/O bladder infections      Scrotal abscess      Urinary retention      Hematuria      H/O cystitis      Constipation      VHD (valvular heart disease)      Aortic valve stenosis      History of automatic internal cardiac defibrillator (AICD)          FAMILY HISTORY:      Home Medications:  metFORMIN 500 mg oral tablet: 1 tab(s) orally once a day (29 Dec 2022 11:38)  Metoprolol Tartrate 25 mg oral tablet: 0.5 tab(s) orally 2 times a day (29 Dec 2022 11:38)      MEDICATIONS  (STANDING):  dextrose 5%. 1000 milliLiter(s) (75 mL/Hr) IV Continuous <Continuous>  dextrose 5%. 1000 milliLiter(s) (75 mL/Hr) IV Continuous <Continuous>  dextrose 5%. 1000 milliLiter(s) (50 mL/Hr) IV Continuous <Continuous>  dextrose 5%. 1000 milliLiter(s) (100 mL/Hr) IV Continuous <Continuous>  dextrose 50% Injectable 25 Gram(s) IV Push once  dextrose 50% Injectable 12.5 Gram(s) IV Push once  dextrose 50% Injectable 25 Gram(s) IV Push once  donepezil 5 milliGRAM(s) Oral at bedtime  famotidine    Tablet 20 milliGRAM(s) Oral daily  glucagon  Injectable 1 milliGRAM(s) IntraMuscular once  heparin   Injectable 5000 Unit(s) SubCutaneous every 12 hours  insulin lispro (ADMELOG) corrective regimen sliding scale   SubCutaneous three times a day before meals  insulin lispro (ADMELOG) corrective regimen sliding scale   SubCutaneous at bedtime  metoprolol tartrate 12.5 milliGRAM(s) Oral two times a day  senna 2 Tablet(s) Oral at bedtime  sodium chloride 0.45%. 1000 milliLiter(s) (80 mL/Hr) IV Continuous <Continuous>  tamsulosin 0.4 milliGRAM(s) Oral at bedtime    MEDICATIONS  (PRN):  acetaminophen     Tablet .. 650 milliGRAM(s) Oral every 6 hours PRN Temp greater or equal to 38C (100.4F), Mild Pain (1 - 3)  dextrose Oral Gel 15 Gram(s) Oral once PRN Blood Glucose LESS THAN 70 milliGRAM(s)/deciliter  morphine Concentrate 5 milliGRAM(s) SubLingual every 2 hours PRN Moderate Pain (4 - 6)      Diet, Pureed (12-29-22 @ 13:31) [Pending Verification By Attending]  Diet, Consistent Carbohydrate w/Evening Snack:   Pureed (PUREED) (12-28-22 @ 18:00) [Active]          Vital Signs Last 24 Hrs  T(C): 36.8 (31 Dec 2022 04:48), Max: 37.5 (30 Dec 2022 20:52)  T(F): 98.3 (31 Dec 2022 04:48), Max: 99.5 (30 Dec 2022 20:52)  HR: 83 (31 Dec 2022 04:48) (66 - 88)  BP: 107/62 (31 Dec 2022 04:48) (96/52 - 107/62)  BP(mean): --  RR: 16 (31 Dec 2022 04:48) (16 - 16)  SpO2: 96% (31 Dec 2022 04:48) (94% - 97%)    Parameters below as of 31 Dec 2022 04:48  Patient On (Oxygen Delivery Method): room air          12-30-22 @ 07:01  -  12-31-22 @ 07:00  --------------------------------------------------------  IN: 800 mL / OUT: 312 mL / NET: 488 mL              LABS:                        8.6    11.11 )-----------( 314      ( 31 Dec 2022 07:18 )             26.6     12-31    142  |  110<H>  |  10  ----------------------------<  97  3.5   |  24  |  0.41<L>    Ca    8.5      31 Dec 2022 07:18                WBC:  WBC Count: 11.11 K/uL (12-31 @ 07:18)  WBC Count: 11.19 K/uL (12-30 @ 06:25)  WBC Count: 9.02 K/uL (12-28 @ 10:10)      MICROBIOLOGY:  RECENT CULTURES:  12-28 .Blood Blood XXXX XXXX   No growth to date.    12-28 Clean Catch Clean Catch (Midstream) XXXX XXXX   >100,000 CFU/ml Escherichia coli  50,000 - 99,000 CFU/mL Klebsiella pneumoniae                    Sodium:  Sodium, Serum: 142 mmol/L (12-31 @ 07:18)  Sodium, Serum: 149 mmol/L (12-30 @ 06:25)  Sodium, Serum: 152 mmol/L (12-28 @ 20:30)  Sodium, Serum: 151 mmol/L (12-28 @ 10:10)      0.41 mg/dL 12-31 @ 07:18  0.51 mg/dL 12-30 @ 06:25  0.49 mg/dL 12-28 @ 20:30  0.68 mg/dL 12-28 @ 10:10      Hemoglobin:  Hemoglobin: 8.6 g/dL (12-31 @ 07:18)  Hemoglobin: 9.4 g/dL (12-30 @ 06:25)  Hemoglobin: 10.3 g/dL (12-28 @ 10:10)      Platelets: Platelet Count - Automated: 314 K/uL (12-31 @ 07:18)  Platelet Count - Automated: 310 K/uL (12-30 @ 06:25)  Platelet Count - Automated: 331 K/uL (12-28 @ 10:10)              RADIOLOGY & ADDITIONAL STUDIES:      MICROBIOLOGY:  RECENT CULTURES:  12-28 .Blood Blood XXXX XXXX   No growth to date.    12-28 Clean Catch Clean Catch (Midstream) XXXX XXXX   >100,000 CFU/ml Escherichia coli  50,000 - 99,000 CFU/mL Klebsiella pneumoniae            
Date/Time Patient Seen:  		  Referring MD:   Data Reviewed	       Patient is a 89y old  Male who presents with a chief complaint of foot gangr (01 Jan 2023 10:59)      Subjective/HPI     PAST MEDICAL & SURGICAL HISTORY:  Atrial fibrillation    Hypertension    Diabetes    Prostate ca    Dementia    CHF (congestive heart failure)    Hyperlipemia    Diabetes    Depression    Dementia    No pertinent past medical history    DM (diabetes mellitus)    Atrial fibrillation    Prostate CA    Arteriosclerotic heart disease (ASHD)    Dementia    Anemia    Constipation    AICD (automatic cardioverter/defibrillator) present    Osteomyelitis of finger of left hand    Personal history of radiation therapy    H/O ongoing treatment with hormonal therapy    H/O bladder infections    Scrotal abscess    Urinary retention    Hematuria    H/O cystitis    Constipation    VHD (valvular heart disease)    Aortic valve stenosis    No significant past surgical history    No significant past surgical history    History of automatic internal cardiac defibrillator (AICD)          Medication list         MEDICATIONS  (STANDING):  dextrose 5%. 1000 milliLiter(s) (75 mL/Hr) IV Continuous <Continuous>  dextrose 5%. 1000 milliLiter(s) (75 mL/Hr) IV Continuous <Continuous>  dextrose 5%. 1000 milliLiter(s) (50 mL/Hr) IV Continuous <Continuous>  dextrose 5%. 1000 milliLiter(s) (100 mL/Hr) IV Continuous <Continuous>  dextrose 50% Injectable 25 Gram(s) IV Push once  dextrose 50% Injectable 12.5 Gram(s) IV Push once  dextrose 50% Injectable 25 Gram(s) IV Push once  donepezil 5 milliGRAM(s) Oral at bedtime  famotidine    Tablet 20 milliGRAM(s) Oral daily  glucagon  Injectable 1 milliGRAM(s) IntraMuscular once  heparin   Injectable 5000 Unit(s) SubCutaneous every 12 hours  insulin lispro (ADMELOG) corrective regimen sliding scale   SubCutaneous three times a day before meals  insulin lispro (ADMELOG) corrective regimen sliding scale   SubCutaneous at bedtime  metoprolol tartrate 12.5 milliGRAM(s) Oral two times a day  senna 2 Tablet(s) Oral at bedtime  sodium chloride 0.45%. 1000 milliLiter(s) (80 mL/Hr) IV Continuous <Continuous>  tamsulosin 0.4 milliGRAM(s) Oral at bedtime    MEDICATIONS  (PRN):  acetaminophen     Tablet .. 650 milliGRAM(s) Oral every 6 hours PRN Temp greater or equal to 38C (100.4F), Mild Pain (1 - 3)  dextrose Oral Gel 15 Gram(s) Oral once PRN Blood Glucose LESS THAN 70 milliGRAM(s)/deciliter  morphine Concentrate 5 milliGRAM(s) SubLingual every 2 hours PRN Moderate Pain (4 - 6)         Vitals log        ICU Vital Signs Last 24 Hrs  T(C): 36.9 (02 Jan 2023 05:09), Max: 36.9 (02 Jan 2023 05:09)  T(F): 98.4 (02 Jan 2023 05:09), Max: 98.4 (02 Jan 2023 05:09)  HR: 76 (02 Jan 2023 05:09) (76 - 98)  BP: 101/58 (02 Jan 2023 05:09) (100/57 - 122/72)  BP(mean): --  ABP: --  ABP(mean): --  RR: 17 (02 Jan 2023 05:09) (17 - 18)  SpO2: 95% (02 Jan 2023 05:09) (91% - 99%)    O2 Parameters below as of 02 Jan 2023 05:09  Patient On (Oxygen Delivery Method): room air                 Input and Output:  I&O's Detail    31 Dec 2022 07:01  -  01 Jan 2023 07:00  --------------------------------------------------------  IN:    dextrose 5%: 900 mL    sodium chloride 0.45%: 720 mL  Total IN: 1620 mL    OUT:  Total OUT: 0 mL    Total NET: 1620 mL      01 Jan 2023 07:01  -  02 Jan 2023 05:22  --------------------------------------------------------  IN:    sodium chloride 0.45%: 480 mL  Total IN: 480 mL    OUT:    Voided (mL): 400 mL  Total OUT: 400 mL    Total NET: 80 mL          Lab Data                        9.2    10.13 )-----------( 300      ( 01 Jan 2023 06:40 )             28.3     01-01    141  |  107  |  7   ----------------------------<  82  3.9   |  27  |  0.37<L>    Ca    8.4<L>      01 Jan 2023 06:40              Review of Systems	      Objective     Physical Examination    heart s1s2  lung dec BS  head nc      Pertinent Lab findings & Imaging      [place]:  NO   Adequate UO     I&O's Detail    31 Dec 2022 07:01  -  01 Jan 2023 07:00  --------------------------------------------------------  IN:    dextrose 5%: 900 mL    sodium chloride 0.45%: 720 mL  Total IN: 1620 mL    OUT:  Total OUT: 0 mL    Total NET: 1620 mL      01 Jan 2023 07:01  -  02 Jan 2023 05:22  --------------------------------------------------------  IN:    sodium chloride 0.45%: 480 mL  Total IN: 480 mL    OUT:    Voided (mL): 400 mL  Total OUT: 400 mL    Total NET: 80 mL               Discussed with:     Cultures:	        Radiology                            
[facility]  [place] [address] [address] Patel, [place], [place]  [phone]  ([phone] - weekdays after 5pm and weekends)    Name: TERESA FRANCIS  Age/Gender: 89y Male  MRN: [id number]    Interval History:  Patient seen and examined.  Patient awake, limited ROS  Notes reviewed.. Afebrile   Allergies: latex (Rash)  latex (Unknown)  No Known Drug Allergies    Objective:  Vitals:   T(F): 98.7 (22 @ 12:24), Max: 99.5 (22 @ 21:05)  HR: 83 (22 @ 12:24) (77 - 87)  BP: 96/52 (22 @ 12:24) (96/52 - 107/67)  RR: 16 (22 @ 12:24) (16 - 16)  SpO2: 96% (22 @ 12:24) (96% - 99%)  Physical Examination:  General: no acute distress, appears comfortably  HEENT: NC/AT, anicteric, neck supple  Respiratory: no acc muscle use, breathing comfortably  Cardiovascular: S1 and S2 present  Gastrointestinal: normal appearing, nondistended  Extremities: L foot dressing - L foot gangrene  Skin: no visible rash    Laboratory Studies:  CBC:                       9.4    11.19 )-----------( 310      ( 30 Dec 2022 06:25 )             29.3     WBC Trend:  11.19 22 @ 06:25  9.02 22 @ 10:10    CMP:     149<H>  |  113<H>  |  13  ----------------------------<  132<H>  3.7   |  29  |  0.51    Ca    8.6      30 Dec 2022 06:25    Creatinine, Serum: 0.51 mg/dL (22 @ 06:25)  Creatinine, Serum: 0.49 mg/dL (22 @ 20:30)  Creatinine, Serum: 0.68 mg/dL (22 @ 10:10)    Urinalysis Basic - ( 28 Dec 2022 15:20 )  Color: Yellow / Appearance: Slightly Turbid / S.020 / pH: x  Gluc: x / Ketone: Negative  / Bili: Negative / Urobili: Negative   Blood: x / Protein: 15 / Nitrite: Negative   Leuk Esterase: Small / RBC: 3-5 /HPF / WBC 6-10   Sq Epi: x / Non Sq Epi: Few / Bacteria: Few    Microbiology: reviewed   Culture - Blood (collected 22 @ 18:41)  Source: .Blood Blood  Preliminary Report (22 @ 01:02):    No growth to date.    Culture - Blood (collected 22 @ 19:30)  Source: .Blood Blood-Peripheral  Final Report (22 @ 01:00):    No Growth Final    Culture - Blood (collected 22 @ 19:25)  Source: .Blood Blood-Peripheral  Final Report (22 @ 01:00):    No Growth Final    SARS-CoV-2 Result: NotDete (28 Dec 2022 10:10)    Radiology: reviewed     Medications:  acetaminophen     Tablet .. 650 milliGRAM(s) Oral every 6 hours PRN  dextrose 5%. 1000 milliLiter(s) IV Continuous <Continuous>  dextrose 5%. 1000 milliLiter(s) IV Continuous <Continuous>  dextrose 5%. 1000 milliLiter(s) IV Continuous <Continuous>  dextrose 5%. 1000 milliLiter(s) IV Continuous <Continuous>  dextrose 50% Injectable 25 Gram(s) IV Push once  dextrose 50% Injectable 12.5 Gram(s) IV Push once  dextrose 50% Injectable 25 Gram(s) IV Push once  dextrose Oral Gel 15 Gram(s) Oral once PRN  donepezil 5 milliGRAM(s) Oral at bedtime  famotidine    Tablet 20 milliGRAM(s) Oral daily  glucagon  Injectable 1 milliGRAM(s) IntraMuscular once  heparin   Injectable 5000 Unit(s) SubCutaneous every 12 hours  insulin lispro (ADMELOG) corrective regimen sliding scale   SubCutaneous three times a day before meals  insulin lispro (ADMELOG) corrective regimen sliding scale   SubCutaneous at bedtime  metoprolol tartrate 12.5 milliGRAM(s) Oral two times a day  morphine Concentrate 5 milliGRAM(s) SubLingual every 2 hours PRN  piperacillin/tazobactam IVPB.. 3.375 Gram(s) IV Intermittent every 8 hours  senna 2 Tablet(s) Oral at bedtime  sodium chloride 0.45%. 1000 milliLiter(s) IV Continuous <Continuous>  tamsulosin 0.4 milliGRAM(s) Oral at bedtime    Current Antimicrobials:  piperacillin/tazobactam IVPB.. 3.375 Gram(s) IV Intermittent every 8 hours    Prior/Completed Antimicrobials:  piperacillin/tazobactam IVPB.  piperacillin/tazobactam IVPB.-  
Date/Time Patient Seen:  		  Referring MD:   Data Reviewed	       Patient is a 89y old  Male who presents with a chief complaint of foot gangr (30 Dec 2022 13:51)      Subjective/HPI     PAST MEDICAL & SURGICAL HISTORY:  Atrial fibrillation    Hypertension    Diabetes    Prostate ca    Dementia    CHF (congestive heart failure)    Hyperlipemia    Diabetes    Depression    Dementia    No pertinent past medical history    DM (diabetes mellitus)    Atrial fibrillation    Prostate CA    Arteriosclerotic heart disease (ASHD)    Dementia    Anemia    Constipation    AICD (automatic cardioverter/defibrillator) present    Osteomyelitis of finger of left hand    Personal history of radiation therapy    H/O ongoing treatment with hormonal therapy    H/O bladder infections    Scrotal abscess    Urinary retention    Hematuria    H/O cystitis    Constipation    VHD (valvular heart disease)    Aortic valve stenosis    No significant past surgical history    No significant past surgical history    History of automatic internal cardiac defibrillator (AICD)          Medication list         MEDICATIONS  (STANDING):  dextrose 5%. 1000 milliLiter(s) (75 mL/Hr) IV Continuous <Continuous>  dextrose 5%. 1000 milliLiter(s) (75 mL/Hr) IV Continuous <Continuous>  dextrose 5%. 1000 milliLiter(s) (50 mL/Hr) IV Continuous <Continuous>  dextrose 5%. 1000 milliLiter(s) (100 mL/Hr) IV Continuous <Continuous>  dextrose 50% Injectable 25 Gram(s) IV Push once  dextrose 50% Injectable 12.5 Gram(s) IV Push once  dextrose 50% Injectable 25 Gram(s) IV Push once  donepezil 5 milliGRAM(s) Oral at bedtime  famotidine    Tablet 20 milliGRAM(s) Oral daily  glucagon  Injectable 1 milliGRAM(s) IntraMuscular once  heparin   Injectable 5000 Unit(s) SubCutaneous every 12 hours  insulin lispro (ADMELOG) corrective regimen sliding scale   SubCutaneous three times a day before meals  insulin lispro (ADMELOG) corrective regimen sliding scale   SubCutaneous at bedtime  metoprolol tartrate 12.5 milliGRAM(s) Oral two times a day  senna 2 Tablet(s) Oral at bedtime  sodium chloride 0.45%. 1000 milliLiter(s) (80 mL/Hr) IV Continuous <Continuous>  tamsulosin 0.4 milliGRAM(s) Oral at bedtime    MEDICATIONS  (PRN):  acetaminophen     Tablet .. 650 milliGRAM(s) Oral every 6 hours PRN Temp greater or equal to 38C (100.4F), Mild Pain (1 - 3)  dextrose Oral Gel 15 Gram(s) Oral once PRN Blood Glucose LESS THAN 70 milliGRAM(s)/deciliter  morphine Concentrate 5 milliGRAM(s) SubLingual every 2 hours PRN Moderate Pain (4 - 6)         Vitals log        ICU Vital Signs Last 24 Hrs  T(C): 36.8 (31 Dec 2022 04:48), Max: 37.5 (30 Dec 2022 20:52)  T(F): 98.3 (31 Dec 2022 04:48), Max: 99.5 (30 Dec 2022 20:52)  HR: 83 (31 Dec 2022 04:48) (66 - 88)  BP: 107/62 (31 Dec 2022 04:48) (96/52 - 107/62)  BP(mean): --  ABP: --  ABP(mean): --  RR: 16 (31 Dec 2022 04:48) (16 - 16)  SpO2: 96% (31 Dec 2022 04:48) (94% - 97%)    O2 Parameters below as of 31 Dec 2022 04:48  Patient On (Oxygen Delivery Method): room air                 Input and Output:  I&O's Detail    30 Dec 2022 07:01  -  31 Dec 2022 07:00  --------------------------------------------------------  IN:    sodium chloride 0.45%: 800 mL  Total IN: 800 mL    OUT:    Voided (mL): 312 mL  Total OUT: 312 mL    Total NET: 488 mL          Lab Data                        8.6    11.11 )-----------( 314      ( 31 Dec 2022 07:18 )             26.6     12-31    142  |  110<H>  |  10  ----------------------------<  97  3.5   |  24  |  0.41<L>    Ca    8.5      31 Dec 2022 07:18              Review of Systems	      Objective     Physical Examination    heart s1s2  lung dec BS  head nc      Pertinent Lab findings & Imaging      Grayson:  NO   Adequate UO     I&O's Detail    30 Dec 2022 07:01  -  31 Dec 2022 07:00  --------------------------------------------------------  IN:    sodium chloride 0.45%: 800 mL  Total IN: 800 mL    OUT:    Voided (mL): 312 mL  Total OUT: 312 mL    Total NET: 488 mL               Discussed with:     Cultures:	        Radiology                            
Date/Time Patient Seen:  		  Referring MD:   Data Reviewed	       Patient is a 89y old  Male who presents with a chief complaint of foot gangrene (28 Dec 2022 18:01)      Subjective/HPI     PAST MEDICAL & SURGICAL HISTORY:  Atrial fibrillation    Hypertension    Diabetes    Prostate ca    Dementia    CHF (congestive heart failure)    Hyperlipemia    Diabetes    Depression    Dementia    No pertinent past medical history    DM (diabetes mellitus)    Atrial fibrillation    Prostate CA    Arteriosclerotic heart disease (ASHD)    Dementia    Anemia    Constipation    AICD (automatic cardioverter/defibrillator) present    Osteomyelitis of finger of left hand    Personal history of radiation therapy    H/O ongoing treatment with hormonal therapy    H/O bladder infections    Scrotal abscess    Urinary retention    Hematuria    H/O cystitis    Constipation    VHD (valvular heart disease)    Aortic valve stenosis    No significant past surgical history    No significant past surgical history    History of automatic internal cardiac defibrillator (AICD)          Medication list         MEDICATIONS  (STANDING):  dextrose 5%. 1000 milliLiter(s) (75 mL/Hr) IV Continuous <Continuous>  dextrose 5%. 1000 milliLiter(s) (75 mL/Hr) IV Continuous <Continuous>  donepezil 5 milliGRAM(s) Oral at bedtime  famotidine    Tablet 20 milliGRAM(s) Oral daily  heparin   Injectable 5000 Unit(s) SubCutaneous every 12 hours  metoprolol tartrate 12.5 milliGRAM(s) Oral two times a day  piperacillin/tazobactam IVPB.. 3.375 Gram(s) IV Intermittent every 8 hours  senna 2 Tablet(s) Oral at bedtime  sodium chloride 0.45%. 1000 milliLiter(s) (80 mL/Hr) IV Continuous <Continuous>  tamsulosin 0.4 milliGRAM(s) Oral at bedtime    MEDICATIONS  (PRN):  acetaminophen     Tablet .. 650 milliGRAM(s) Oral every 6 hours PRN Temp greater or equal to 38C (100.4F), Mild Pain (1 - 3)  morphine Concentrate 5 milliGRAM(s) SubLingual every 2 hours PRN Moderate Pain (4 - 6)         Vitals log        ICU Vital Signs Last 24 Hrs  T(C): 36.8 (29 Dec 2022 04:56), Max: 37.1 (28 Dec 2022 21:04)  T(F): 98.2 (29 Dec 2022 04:56), Max: 98.8 (28 Dec 2022 21:04)  HR: 81 (29 Dec 2022 04:56) (81 - 96)  BP: 109/61 (29 Dec 2022 04:56) (100/59 - 144/79)  BP(mean): --  ABP: --  ABP(mean): --  RR: 16 (29 Dec 2022 04:56) (16 - 19)  SpO2: 100% (29 Dec 2022 04:56) (96% - 100%)    O2 Parameters below as of 29 Dec 2022 04:56  Patient On (Oxygen Delivery Method): room air                 Input and Output:  I&O's Detail      Lab Data                        10.3   9.02  )-----------( 331      ( 28 Dec 2022 10:10 )             32.3     12-28    152<H>  |  116<H>  |  19  ----------------------------<  153<H>  3.3<L>   |  30  |  0.49<L>    Ca    9.1      28 Dec 2022 20:30    TPro  6.5  /  Alb  2.1<L>  /  TBili  0.5  /  DBili  x   /  AST  26  /  ALT  23  /  AlkPhos  81  12-28            Review of Systems	      Objective     Physical Examination    heart s1s2  lung dec BS  necrotic foot    Pertinent Lab findings & Imaging      Grayson:  NO   Adequate UO     I&O's Detail           Discussed with:     Cultures:	        Radiology                            
[facility] DIV[facility] of INFECTIOUS DISEASE  Jeff Amanda MD PhD, Tamara Remy MD, Elyse Domingo MD, Racquel Porter MD, Fran Vidal MD  and providing coverage with Heber Issa MD  Providing Infectious Disease Consultations at [facility], [facility], [facility]'s    Office# [phone] to schedule follow up appointments  Answering Service for urgent calls or New Consults [phone]  Cell# to text for urgent issues Jeff Amanda [phone]     infectious diseases progress note:    TERESA FRANCIS is a 89y y. o. Male patient    Overnight and events of the last 24hrs reviewed    Allergies    latex (Rash)  latex (Unknown)  No Known Drug Allergies    Intolerances        ANTIBIOTICS/RELEVANT:  antimicrobials    immunologic:    OTHER:  acetaminophen     Tablet .. 650 milliGRAM(s) Oral every 6 hours PRN  dextrose 5%. 1000 milliLiter(s) IV Continuous <Continuous>  dextrose 5%. 1000 milliLiter(s) IV Continuous <Continuous>  dextrose 5%. 1000 milliLiter(s) IV Continuous <Continuous>  dextrose 5%. 1000 milliLiter(s) IV Continuous <Continuous>  dextrose 50% Injectable 25 Gram(s) IV Push once  dextrose 50% Injectable 12.5 Gram(s) IV Push once  dextrose 50% Injectable 25 Gram(s) IV Push once  dextrose Oral Gel 15 Gram(s) Oral once PRN  donepezil 5 milliGRAM(s) Oral at bedtime  famotidine    Tablet 20 milliGRAM(s) Oral daily  glucagon  Injectable 1 milliGRAM(s) IntraMuscular once  heparin   Injectable 5000 Unit(s) SubCutaneous every 12 hours  insulin lispro (ADMELOG) corrective regimen sliding scale   SubCutaneous three times a day before meals  insulin lispro (ADMELOG) corrective regimen sliding scale   SubCutaneous at bedtime  metoprolol tartrate 12.5 milliGRAM(s) Oral two times a day  morphine Concentrate 5 milliGRAM(s) SubLingual every 2 hours PRN  senna 2 Tablet(s) Oral at bedtime  sodium chloride 0.45%. 1000 milliLiter(s) IV Continuous <Continuous>  tamsulosin 0.4 milliGRAM(s) Oral at bedtime      Objective:  Vital Signs Last 24 Hrs  T(C): 36.9 (02 Jan 2023 05:09), Max: 36.9 (02 Jan 2023 05:09)  T(F): 98.4 (02 Jan 2023 05:09), Max: 98.4 (02 Jan 2023 05:09)  HR: 76 (02 Jan 2023 05:09) (76 - 98)  BP: 101/58 (02 Jan 2023 05:09) (100/57 - 122/72)  BP(mean): --  RR: 17 (02 Jan 2023 05:09) (17 - 18)  SpO2: 95% (02 Jan 2023 05:09) (91% - 99%)    Parameters below as of 02 Jan 2023 05:09  Patient On (Oxygen Delivery Method): room air        T(C): 36.9 (01-02-23 @ 05:09), Max: 36.9 (12-31-22 @ 21:25)  T(C): 36.9 (01-02-23 @ 05:09), Max: 37.5 (12-30-22 @ 20:52)  T(C): 36.9 (01-02-23 @ 05:09), Max: 37.5 (12-29-22 @ 21:05)    PHYSICAL EXAM:  HEENT: NC atraumatic  Neck: supple  Respiratory: no accessory muscle use, breathing comfortably  Cardiovascular: distant  Gastrointestinal: normal appearing, nondistended  Extremities: no clubbing, no cyanosis, black toes left foot  Neuro: limited        LABS:                          9.5    8.52  )-----------( 300      ( 02 Jan 2023 07:58 )             28.9       WBC  8.52 01-02 @ 07:58  10.13 01-01 @ 06:40  11.11 12-31 @ 07:18  11.19 12-30 @ 06:25  9.02 12-28 @ 10:10      01-02    142  |  107  |  6<L>  ----------------------------<  85  3.7   |  28  |  0.34<L>    Ca    8.4<L>      02 Jan 2023 07:58        Creatinine, Serum: 0.34 mg/dL (01-02-23 @ 07:58)  Creatinine, Serum: 0.37 mg/dL (01-01-23 @ 06:40)  Creatinine, Serum: 0.41 mg/dL (12-31-22 @ 07:18)  Creatinine, Serum: 0.51 mg/dL (12-30-22 @ 06:25)  Creatinine, Serum: 0.49 mg/dL (12-28-22 @ 20:30)  Creatinine, Serum: 0.68 mg/dL (12-28-22 @ 10:10)                INFLAMMATORY MARKERS      MICROBIOLOGY:              RADIOLOGY & ADDITIONAL STUDIES:  
PROGRESS NOTE  Patient is a 89y old  Male who presents with a chief complaint of foot gangr (02 Jan 2023 11:09)  Chart and available morning labs /imaging are reviewed electronically , urgent issues addressed . More information  is being added upon completion of rounds , when more information is collected and management discussed with consultants , medical staff and social service/case management on the floor     OVERNIGHT  No new issues reported by medical staff . All above noted Patient is resting in a bed comfortably .Confused ,poor mentation .No distress noted     HPI:    89-year-old male who presents to the emergency room for reported necrotic foot wound.  Past medical history of dementia, anemia, history of atrial fibrillation does not appear to be on anticoagulants, prostate cancer, diabetes, hyperlipidemia, history of CHF, hypertension.  Patient presents from [facility] for necrotic foot wound.  Upon chart review it appears that patient was recently admitted to the hospital from December 16 through December 22, 2022 for the same presentation.  He had been diagnosed with gangrene of the left foot.  He was seen by podiatry dry sterile dressings were applied and he was instructed to level foot while in bed to prevent the progression of the pressure ulcers.  It is noted that patient also underwent a bone scan during his last admission in September positive for osteomyelitis at that time.  Son declined surgical intervention and patient was managed conservatively with antibiotics.  Patient had significant worsening of disease at the time of his last admission.  Vascular was consulted and patient was deemed not a candidate for any intervention on their part.  Patient was also seen by infectious disease notes were reviewed.  Per their notes antibiotics have a limited role as patient had already completed a 4-week course of Rocephin previously.  Gangrene is likely progressing due to severe PVD.  He was treated with Zosyn but he had little to no impact.  Podiatry notes are reviewed.  It was recommended the patient undergo a transmetatarsal amputation of the left foot but after discussion with patient's son it was decided that patient would not undergo surgical treatment.  Local wound care was continued and patient was discharged back to [facility].  Presents again today with worsening necrosis of the left foot.  Son was contacted.  Still refusing surgical intervention at this time.  At this time would like patient to be kept comfortable and is requesting hospice evaluation.  12/28/2022 asked to do admission (28 Dec 2022 16:36)    PAST MEDICAL & SURGICAL HISTORY:  Atrial fibrillation      Hypertension      Diabetes      Prostate ca      Dementia      CHF (congestive heart failure)      Hyperlipemia      Diabetes      Depression      Dementia      DM (diabetes mellitus)      Atrial fibrillation      Prostate CA      Arteriosclerotic heart disease (ASHD)      Dementia      Anemia      AICD (automatic cardioverter/defibrillator) present      Osteomyelitis of finger of left hand      Personal history of radiation therapy      H/O ongoing treatment with hormonal therapy      H/O bladder infections      Scrotal abscess      Urinary retention      Hematuria      H/O cystitis      Constipation      VHD (valvular heart disease)      Aortic valve stenosis      History of automatic internal cardiac defibrillator (AICD)          MEDICATIONS  (STANDING):  dextrose 5%. 1000 milliLiter(s) (75 mL/Hr) IV Continuous <Continuous>  dextrose 5%. 1000 milliLiter(s) (75 mL/Hr) IV Continuous <Continuous>  dextrose 5%. 1000 milliLiter(s) (50 mL/Hr) IV Continuous <Continuous>  dextrose 5%. 1000 milliLiter(s) (100 mL/Hr) IV Continuous <Continuous>  dextrose 50% Injectable 25 Gram(s) IV Push once  dextrose 50% Injectable 12.5 Gram(s) IV Push once  dextrose 50% Injectable 25 Gram(s) IV Push once  donepezil 5 milliGRAM(s) Oral at bedtime  famotidine    Tablet 20 milliGRAM(s) Oral daily  glucagon  Injectable 1 milliGRAM(s) IntraMuscular once  heparin   Injectable 5000 Unit(s) SubCutaneous every 12 hours  insulin lispro (ADMELOG) corrective regimen sliding scale   SubCutaneous three times a day before meals  insulin lispro (ADMELOG) corrective regimen sliding scale   SubCutaneous at bedtime  metoprolol tartrate 12.5 milliGRAM(s) Oral two times a day  senna 2 Tablet(s) Oral at bedtime  sodium chloride 0.45%. 1000 milliLiter(s) (80 mL/Hr) IV Continuous <Continuous>  tamsulosin 0.4 milliGRAM(s) Oral at bedtime    MEDICATIONS  (PRN):  acetaminophen     Tablet .. 650 milliGRAM(s) Oral every 6 hours PRN Temp greater or equal to 38C (100.4F), Mild Pain (1 - 3)  dextrose Oral Gel 15 Gram(s) Oral once PRN Blood Glucose LESS THAN 70 milliGRAM(s)/deciliter  morphine Concentrate 5 milliGRAM(s) SubLingual every 2 hours PRN Moderate Pain (4 - 6)      OBJECTIVE    T(C): 36.7 (01-02-23 @ 12:32), Max: 36.9 (01-02-23 @ 05:09)  HR: 86 (01-02-23 @ 12:32) (76 - 98)  BP: 112/61 (01-02-23 @ 12:32) (101/58 - 122/72)  RR: 18 (01-02-23 @ 12:32) (17 - 18)  SpO2: 96% (01-02-23 @ 12:32) (95% - 99%)  Wt(kg): --  I&O's Summary    01 Jan 2023 07:01  -  02 Jan 2023 07:00  --------------------------------------------------------  IN: 960 mL / OUT: 400 mL / NET: 560 mL          REVIEW OF SYSTEMS:  CONSTITUTIONAL: No fever, weight loss, or fatigue  EYES: No eye pain, visual disturbances, or discharge  ENMT:   No sinus or throat pain  NECK: No pain or stiffness  RESPIRATORY: No cough, wheezing, chills or hemoptysis; No shortness of breath  CARDIOVASCULAR: No chest pain, palpitations, dizziness, or leg swelling  GASTROINTESTINAL: No abdominal pain. No nausea, vomiting; No diarrhea or constipation. No melena or hematochezia.  GENITOURINARY: No dysuria, frequency, hematuria, or incontinence  NEUROLOGICAL: No headaches, memory loss, loss of strength, numbness, or tremors  SKIN: No itching, burning, rashes, or lesions   MUSCULOSKELETAL: No joint pain or swelling; No muscle, back, or extremity pain    PHYSICAL EXAM:  Appearance: NAD. VS past 24 hrs -as above   HEENT:   Moist oral mucosa. Conjunctiva clear b/l.   Neck : supple  Respiratory: Lungs CTAB.  Gastrointestinal:  Soft, nontender. No rebound. No rigidity. BS present	  Cardiovascular: RRR ,S1S2 present  Neurologic: Non-focal. Moving all extremities.  Extremities: No edema. No erythema. No calf tenderness.  Skin: No rashes, No ecchymoses, No cyanosis.	  wounds ,skin lesions-See skin assesment flow sheet   LABS:                        9.5    8.52  )-----------( 300      ( 02 Jan 2023 07:58 )             28.9     01-02    142  |  107  |  6<L>  ----------------------------<  85  3.7   |  28  |  0.34<L>    Ca    8.4<L>      02 Jan 2023 07:58      CAPILLARY BLOOD GLUCOSE      POCT Blood Glucose.: 135 mg/dL (02 Jan 2023 16:47)  POCT Blood Glucose.: 110 mg/dL (02 Jan 2023 11:19)  POCT Blood Glucose.: 104 mg/dL (02 Jan 2023 07:44)  POCT Blood Glucose.: 101 mg/dL (01 Jan 2023 21:37)          Culture - Blood (collected 28 Dec 2022 18:41)  Source: .Blood Blood  Preliminary Report (30 Dec 2022 01:02):    No growth to date.    Culture - Urine (collected 28 Dec 2022 15:20)  Source: Clean Catch Clean Catch (Midstream)  Final Report (31 Dec 2022 18:09):    >100,000 CFU/ml Escherichia coli    50,000 - 99,000 CFU/mL Klebsiella pneumoniae ESBL  Organism: Escherichia coli  Klebsiella pneumoniae ESBL (31 Dec 2022 18:09)  Organism: Klebsiella pneumoniae ESBL (31 Dec 2022 18:09)  Organism: Escherichia coli (31 Dec 2022 18:09)      RADIOLOGY & ADDITIONAL TESTS:   reviewed elctronically  ASSESSMENT/PLAN: 	    25 minutes aggregate time was spent on this visit, 50% visit time spent in care co-ordination with other attendings and counselling patient .I have discussed care plan with patient / HCP/family member ,who expressed understanding of problems treatment and their effect and side effects, alternatives in details. I have asked if they have any questions and concerns and appropriately addressed them to best of my ability. 
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  Past medical history of dementia, anemia, history of atrial fibrillation does not appear to be on anticoagulants, prostate cancer, diabetes, hyperlipidemia, history of CHF, hypertension.  Patient presents from [facility] for necrotic foot wound.  Upon chart review it appears that patient was recently admitted to the hospital from December 16 through December 22, 2022 for the same presentation.  He had been diagnosed with gangrene of the left foot.  He was seen by podiatry dry sterile dressings were applied and he was instructed to level foot while in bed to prevent the progression of the pressure ulcers.  It is noted that patient also underwent a bone scan during his last admission in September positive for osteomyelitis at that time.  Son declined surgical intervention and patient was managed conservatively with antibiotics.  Patient had significant worsening of disease at the time of his last admission.  Vascular was consulted and patient was deemed not a candidate for any intervention on their part.  Patient was also seen by infectious disease notes were reviewed.  Per their notes antibiotics have a limited role as patient had already completed a 4-week course of Rocephin previously.  Gangrene is likely progressing due to severe PVD.  He was treated with Zosyn but he had little to no impact.  Podiatry notes are reviewed.  It was recommended the patient undergo a transmetatarsal amputation of the left foot but after discussion with patient's son it was decided that patient would not undergo surgical treatment.  Local wound care was continued and patient was discharged back to [facility].  Presents again today with worsening necrosis of the left foot.  Son was contacted.  Still refusing surgical intervention at this time.  At this time would like patient to be kept comfortable and is requesting hospice evaluation.  12/28/2022 asked to do admission (28 Dec 2022 16:36)      REVIEW OF SYSTEMS:    CONSTITUTIONAL: No fever, weight loss, or fatigue  EYES: No eye pain, visual disturbances, or discharge  ENMT:  No difficulty hearing, tinnitus, vertigo; No sinus or throat pain  NECK: No pain or stiffness  BREASTS: No pain, masses, or nipple discharge  RESPIRATORY: No cough, wheezing, chills or hemoptysis; No shortness of breath  CARDIOVASCULAR: No chest pain, palpitations, dizziness, or leg swelling  GASTROINTESTINAL: No abdominal or epigastric pain. No nausea, vomiting, or hematemesis; No diarrhea or constipation. No melena or hematochezia.  GENITOURINARY: No dysuria, frequency, hematuria, or incontinence  NEUROLOGICAL: No headaches, memory loss, loss of strength, numbness, or tremors  SKIN: No itching, burning, rashes, or lesions   LYMPH NODES: No enlarged glands  ENDOCRINE: No heat or cold intolerance; No hair loss  MUSCULOSKELETAL: No joint pain or swelling; No muscle, back, or extremity pain  PSYCHIATRIC: No depression, anxiety, mood swings, or difficulty sleeping  HEME/LYMPH: No easy bruising, or bleeding gums  ALLERGY AND IMMUNOLOGIC: No hives or eczema    MEDICATIONS  (STANDING):  dextrose 5%. 1000 milliLiter(s) (75 mL/Hr) IV Continuous <Continuous>  dextrose 5%. 1000 milliLiter(s) (75 mL/Hr) IV Continuous <Continuous>  dextrose 5%. 1000 milliLiter(s) (50 mL/Hr) IV Continuous <Continuous>  dextrose 5%. 1000 milliLiter(s) (100 mL/Hr) IV Continuous <Continuous>  dextrose 50% Injectable 25 Gram(s) IV Push once  dextrose 50% Injectable 12.5 Gram(s) IV Push once  dextrose 50% Injectable 25 Gram(s) IV Push once  donepezil 5 milliGRAM(s) Oral at bedtime  famotidine    Tablet 20 milliGRAM(s) Oral daily  glucagon  Injectable 1 milliGRAM(s) IntraMuscular once  heparin   Injectable 5000 Unit(s) SubCutaneous every 12 hours  insulin lispro (ADMELOG) corrective regimen sliding scale   SubCutaneous three times a day before meals  insulin lispro (ADMELOG) corrective regimen sliding scale   SubCutaneous at bedtime  metoprolol tartrate 12.5 milliGRAM(s) Oral two times a day  piperacillin/tazobactam IVPB.. 3.375 Gram(s) IV Intermittent every 8 hours  senna 2 Tablet(s) Oral at bedtime  sodium chloride 0.45%. 1000 milliLiter(s) (80 mL/Hr) IV Continuous <Continuous>  tamsulosin 0.4 milliGRAM(s) Oral at bedtime    MEDICATIONS  (PRN):  acetaminophen     Tablet .. 650 milliGRAM(s) Oral every 6 hours PRN Temp greater or equal to 38C (100.4F), Mild Pain (1 - 3)  dextrose Oral Gel 15 Gram(s) Oral once PRN Blood Glucose LESS THAN 70 milliGRAM(s)/deciliter  morphine Concentrate 5 milliGRAM(s) SubLingual every 2 hours PRN Moderate Pain (4 - 6)      ALLERGIES: latex (Rash)  latex (Unknown)  No Known Drug Allergies      FAMILY HISTORY:      PHYSICAL EXAMINATION:  -----------------------------  T(C): 37 (12-30-22 @ 04:52), Max: 37.5 (12-29-22 @ 21:05)  HR: 86 (12-30-22 @ 04:52) (77 - 87)  BP: 96/56 (12-30-22 @ 04:52) (96/56 - 110/65)  RR: 16 (12-30-22 @ 04:52) (16 - 17)  SpO2: 99% (12-30-22 @ 04:52) (96% - 99%)  Wt(kg): --    12-29 @ 07:01  -  12-30 @ 07:00  --------------------------------------------------------  IN:    sodium chloride 0.45%: 1840 mL  Total IN: 1840 mL    OUT:    Voided (mL): 600 mL  Total OUT: 600 mL    Total NET: 1240 mL            VITALS  T(C): 37 (12-30-22 @ 04:52), Max: 37.5 (12-29-22 @ 21:05)  HR: 86 (12-30-22 @ 04:52) (77 - 87)  BP: 96/56 (12-30-22 @ 04:52) (96/56 - 110/65)  RR: 16 (12-30-22 @ 04:52) (16 - 17)  SpO2: 99% (12-30-22 @ 04:52) (96% - 99%)    Constitutional: well developed, normal appearance, well groomed, well nourished, no deformities and no acute distress.   Eyes: the conjunctiva exhibited no abnormalities and the eyelids demonstrated no xanthelasmas.   HEENT: normal oral mucosa, no oral pallor and no oral cyanosis.   Neck: normal jugular venous A waves present, normal jugular venous V waves present and no jugular venous jacobs A waves.   Pulmonary: no respiratory distress, normal respiratory rhythm and effort, no accessory muscle use and lungs were clear to auscultation bilaterally.   Cardiovascular: heart rate and rhythm were normal, normal S1 and S2 and no murmur, gallop, rub, heave or thrill are present.   Abdomen: soft, non-tender, no hepato-splenomegaly and no abdominal mass palpated.   Musculoskeletal: the gait could not be assessed..   Extremities: no clubbing of the fingernails, no localized cyanosis, no petechial hemorrhages and no ischemic changes.   Skin: normal skin color and pigmentation, no rash, no venous stasis, no skin lesions, no skin ulcer and no xanthoma was observed.   Psychiatric: oriented to person, place, and time, the affect was normal, the mood was normal and not feeling anxious.     LABS:   --------  12-28    152<H>  |  116<H>  |  19  ----------------------------<  153<H>  3.3<L>   |  30  |  0.49<L>    Ca    9.1      28 Dec 2022 20:30    TPro  6.5  /  Alb  2.1<L>  /  TBili  0.5  /  DBili  x   /  AST  26  /  ALT  23  /  AlkPhos  81  12-28                         9.4    11.19 )-----------( 310      ( 30 Dec 2022 06:25 )             29.3     PT/INR - ( 28 Dec 2022 10:10 )   PT: 14.6 sec;   INR: 1.25 ratio         PTT - ( 28 Dec 2022 10:10 )  PTT:31.9 sec        Culture Results:   No growth to date. (12-28 @ 18:41)      RADIOLOGY:  -----------------    ECG:     ECHO:
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  Past medical history of dementia, anemia, history of atrial fibrillation does not appear to be on anticoagulants, prostate cancer, diabetes, hyperlipidemia, history of CHF, hypertension.  Patient presents from [facility] for necrotic foot wound.  Upon chart review it appears that patient was recently admitted to the hospital from December 16 through December 22, 2022 for the same presentation.  He had been diagnosed with gangrene of the left foot.  He was seen by podiatry dry sterile dressings were applied and he was instructed to level foot while in bed to prevent the progression of the pressure ulcers.  It is noted that patient also underwent a bone scan during his last admission in September positive for osteomyelitis at that time.  Son declined surgical intervention and patient was managed conservatively with antibiotics.  Patient had significant worsening of disease at the time of his last admission.  Vascular was consulted and patient was deemed not a candidate for any intervention on their part.  Patient was also seen by infectious disease notes were reviewed.  Per their notes antibiotics have a limited role as patient had already completed a 4-week course of Rocephin previously.  Gangrene is likely progressing due to severe PVD.  He was treated with Zosyn but he had little to no impact.  Podiatry notes are reviewed.  It was recommended the patient undergo a transmetatarsal amputation of the left foot but after discussion with patient's son it was decided that patient would not undergo surgical treatment.  Local wound care was continued and patient was discharged back to [facility].  Presents again today with worsening necrosis of the left foot.  Son was contacted.  Still refusing surgical intervention at this time.  At this time would like patient to be kept comfortable and is requesting hospice evaluation.  12/28/2022 asked to do admission (28 Dec 2022 16:36)      REVIEW OF SYSTEMS:    CONSTITUTIONAL: No fever, weight loss, or fatigue  EYES: No eye pain, visual disturbances, or discharge  ENMT:  No difficulty hearing, tinnitus, vertigo; No sinus or throat pain  NECK: No pain or stiffness  BREASTS: No pain, masses, or nipple discharge  RESPIRATORY: No cough, wheezing, chills or hemoptysis; No shortness of breath  CARDIOVASCULAR: No chest pain, palpitations, dizziness, or leg swelling  GASTROINTESTINAL: No abdominal or epigastric pain. No nausea, vomiting, or hematemesis; No diarrhea or constipation. No melena or hematochezia.  GENITOURINARY: No dysuria, frequency, hematuria, or incontinence  NEUROLOGICAL: No headaches, memory loss, loss of strength, numbness, or tremors  SKIN: No itching, burning, rashes, or lesions   LYMPH NODES: No enlarged glands  ENDOCRINE: No heat or cold intolerance; No hair loss  MUSCULOSKELETAL: No joint pain or swelling; No muscle, back, or extremity pain  PSYCHIATRIC: No depression, anxiety, mood swings, or difficulty sleeping  HEME/LYMPH: No easy bruising, or bleeding gums  ALLERGY AND IMMUNOLOGIC: No hives or eczema    MEDICATIONS  (STANDING):  dextrose 5%. 1000 milliLiter(s) (75 mL/Hr) IV Continuous <Continuous>  dextrose 5%. 1000 milliLiter(s) (75 mL/Hr) IV Continuous <Continuous>  dextrose 5%. 1000 milliLiter(s) (50 mL/Hr) IV Continuous <Continuous>  dextrose 5%. 1000 milliLiter(s) (100 mL/Hr) IV Continuous <Continuous>  dextrose 50% Injectable 25 Gram(s) IV Push once  dextrose 50% Injectable 12.5 Gram(s) IV Push once  dextrose 50% Injectable 25 Gram(s) IV Push once  donepezil 5 milliGRAM(s) Oral at bedtime  famotidine    Tablet 20 milliGRAM(s) Oral daily  glucagon  Injectable 1 milliGRAM(s) IntraMuscular once  heparin   Injectable 5000 Unit(s) SubCutaneous every 12 hours  insulin lispro (ADMELOG) corrective regimen sliding scale   SubCutaneous three times a day before meals  insulin lispro (ADMELOG) corrective regimen sliding scale   SubCutaneous at bedtime  metoprolol tartrate 12.5 milliGRAM(s) Oral two times a day  senna 2 Tablet(s) Oral at bedtime  sodium chloride 0.45%. 1000 milliLiter(s) (80 mL/Hr) IV Continuous <Continuous>  tamsulosin 0.4 milliGRAM(s) Oral at bedtime    MEDICATIONS  (PRN):  acetaminophen     Tablet .. 650 milliGRAM(s) Oral every 6 hours PRN Temp greater or equal to 38C (100.4F), Mild Pain (1 - 3)  dextrose Oral Gel 15 Gram(s) Oral once PRN Blood Glucose LESS THAN 70 milliGRAM(s)/deciliter  morphine Concentrate 5 milliGRAM(s) SubLingual every 2 hours PRN Moderate Pain (4 - 6)      ALLERGIES: latex (Rash)  latex (Unknown)  No Known Drug Allergies      FAMILY HISTORY:      PHYSICAL EXAMINATION:  -----------------------------  T(C): 36.8 (12-31-22 @ 04:48), Max: 37.5 (12-30-22 @ 20:52)  HR: 83 (12-31-22 @ 04:48) (66 - 88)  BP: 107/62 (12-31-22 @ 04:48) (96/52 - 107/62)  RR: 16 (12-31-22 @ 04:48) (16 - 16)  SpO2: 96% (12-31-22 @ 04:48) (94% - 97%)  Wt(kg): --    12-30 @ 07:01  -  12-31 @ 07:00  --------------------------------------------------------  IN:    sodium chloride 0.45%: 800 mL  Total IN: 800 mL    OUT:    Voided (mL): 312 mL  Total OUT: 312 mL    Total NET: 488 mL            VITALS  T(C): 36.8 (12-31-22 @ 04:48), Max: 37.5 (12-30-22 @ 20:52)  HR: 83 (12-31-22 @ 04:48) (66 - 88)  BP: 107/62 (12-31-22 @ 04:48) (96/52 - 107/62)  RR: 16 (12-31-22 @ 04:48) (16 - 16)  SpO2: 96% (12-31-22 @ 04:48) (94% - 97%)    Constitutional: well developed, normal appearance, well groomed, well nourished, no deformities and no acute distress.   Eyes: the conjunctiva exhibited no abnormalities and the eyelids demonstrated no xanthelasmas.   HEENT: normal oral mucosa, no oral pallor and no oral cyanosis.   Neck: normal jugular venous A waves present, normal jugular venous V waves present and no jugular venous jacobs A waves.   Pulmonary: no respiratory distress, normal respiratory rhythm and effort, no accessory muscle use and lungs were clear to auscultation bilaterally.   Cardiovascular: heart rate and rhythm were normal, normal S1 and S2 and no murmur, gallop, rub, heave or thrill are present.   Abdomen: soft, non-tender, no hepato-splenomegaly and no abdominal mass palpated.   Musculoskeletal: the gait could not be assessed..   Extremities: no clubbing of the fingernails, no localized cyanosis, no petechial hemorrhages and no ischemic changes.   Skin: normal skin color and pigmentation, no rash, no venous stasis, no skin lesions, no skin ulcer and no xanthoma was observed.   Psychiatric: oriented to person, place, and time, the affect was normal, the mood was normal and not feeling anxious.     LABS:   --------  12-31    142  |  110<H>  |  10  ----------------------------<  97  3.5   |  24  |  0.41<L>    Ca    8.5      31 Dec 2022 07:18                           8.6    11.11 )-----------( 314      ( 31 Dec 2022 07:18 )             26.6                 RADIOLOGY:  -----------------    ECG:     ECHO:
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  Past medical history of dementia, anemia, history of atrial fibrillation does not appear to be on anticoagulants, prostate cancer, diabetes, hyperlipidemia, history of CHF, hypertension.  Patient presents from [facility] for necrotic foot wound.  Upon chart review it appears that patient was recently admitted to the hospital from December 16 through December 22, 2022 for the same presentation.  He had been diagnosed with gangrene of the left foot.  He was seen by podiatry dry sterile dressings were applied and he was instructed to level foot while in bed to prevent the progression of the pressure ulcers.  It is noted that patient also underwent a bone scan during his last admission in September positive for osteomyelitis at that time.  Son declined surgical intervention and patient was managed conservatively with antibiotics.  Patient had significant worsening of disease at the time of his last admission.  Vascular was consulted and patient was deemed not a candidate for any intervention on their part.  Patient was also seen by infectious disease notes were reviewed.  Per their notes antibiotics have a limited role as patient had already completed a 4-week course of Rocephin previously.  Gangrene is likely progressing due to severe PVD.  He was treated with Zosyn but he had little to no impact.  Podiatry notes are reviewed.  It was recommended the patient undergo a transmetatarsal amputation of the left foot but after discussion with patient's son it was decided that patient would not undergo surgical treatment.  Local wound care was continued and patient was discharged back to [facility].  Presents again today with worsening necrosis of the left foot.  Son was contacted.  Still refusing surgical intervention at this time.  At this time would like patient to be kept comfortable and is requesting hospice evaluation.  12/28/2022 asked to do admission (28 Dec 2022 16:36)      REVIEW OF SYSTEMS:    CONSTITUTIONAL: No fever, weight loss, or fatigue  EYES: No eye pain, visual disturbances, or discharge  ENMT:  No difficulty hearing, tinnitus, vertigo; No sinus or throat pain  NECK: No pain or stiffness  BREASTS: No pain, masses, or nipple discharge  RESPIRATORY: No cough, wheezing, chills or hemoptysis; No shortness of breath  CARDIOVASCULAR: No chest pain, palpitations, dizziness, or leg swelling  GASTROINTESTINAL: No abdominal or epigastric pain. No nausea, vomiting, or hematemesis; No diarrhea or constipation. No melena or hematochezia.  GENITOURINARY: No dysuria, frequency, hematuria, or incontinence  NEUROLOGICAL: No headaches, memory loss, loss of strength, numbness, or tremors  SKIN: No itching, burning, rashes, or lesions   LYMPH NODES: No enlarged glands  ENDOCRINE: No heat or cold intolerance; No hair loss  MUSCULOSKELETAL: No joint pain or swelling; No muscle, back, or extremity pain  PSYCHIATRIC: No depression, anxiety, mood swings, or difficulty sleeping  HEME/LYMPH: No easy bruising, or bleeding gums  ALLERGY AND IMMUNOLOGIC: No hives or eczema    MEDICATIONS  (STANDING):  dextrose 5%. 1000 milliLiter(s) (75 mL/Hr) IV Continuous <Continuous>  dextrose 5%. 1000 milliLiter(s) (75 mL/Hr) IV Continuous <Continuous>  dextrose 5%. 1000 milliLiter(s) (50 mL/Hr) IV Continuous <Continuous>  dextrose 5%. 1000 milliLiter(s) (100 mL/Hr) IV Continuous <Continuous>  dextrose 50% Injectable 25 Gram(s) IV Push once  dextrose 50% Injectable 12.5 Gram(s) IV Push once  dextrose 50% Injectable 25 Gram(s) IV Push once  donepezil 5 milliGRAM(s) Oral at bedtime  famotidine    Tablet 20 milliGRAM(s) Oral daily  glucagon  Injectable 1 milliGRAM(s) IntraMuscular once  heparin   Injectable 5000 Unit(s) SubCutaneous every 12 hours  insulin lispro (ADMELOG) corrective regimen sliding scale   SubCutaneous three times a day before meals  insulin lispro (ADMELOG) corrective regimen sliding scale   SubCutaneous at bedtime  metoprolol tartrate 12.5 milliGRAM(s) Oral two times a day  senna 2 Tablet(s) Oral at bedtime  sodium chloride 0.45%. 1000 milliLiter(s) (80 mL/Hr) IV Continuous <Continuous>  tamsulosin 0.4 milliGRAM(s) Oral at bedtime    MEDICATIONS  (PRN):  acetaminophen     Tablet .. 650 milliGRAM(s) Oral every 6 hours PRN Temp greater or equal to 38C (100.4F), Mild Pain (1 - 3)  dextrose Oral Gel 15 Gram(s) Oral once PRN Blood Glucose LESS THAN 70 milliGRAM(s)/deciliter  morphine Concentrate 5 milliGRAM(s) SubLingual every 2 hours PRN Moderate Pain (4 - 6)      ALLERGIES: latex (Rash)  latex (Unknown)  No Known Drug Allergies      FAMILY HISTORY:      PHYSICAL EXAMINATION:  -----------------------------  T(C): 37.2 (01-03-23 @ 04:50), Max: 37.2 (01-03-23 @ 04:50)  HR: 90 (01-03-23 @ 04:50) (75 - 90)  BP: 125/68 (01-03-23 @ 04:50) (100/62 - 125/68)  RR: 18 (01-03-23 @ 04:50) (18 - 18)  SpO2: 96% (01-03-23 @ 04:50) (95% - 96%)  Wt(kg): --    01-02 @ 07:01  -  01-03 @ 07:00  --------------------------------------------------------  IN:    sodium chloride 0.45%: 800 mL  Total IN: 800 mL    OUT:  Total OUT: 0 mL    Total NET: 800 mL            VITALS  T(C): 37.2 (01-03-23 @ 04:50), Max: 37.2 (01-03-23 @ 04:50)  HR: 90 (01-03-23 @ 04:50) (75 - 90)  BP: 125/68 (01-03-23 @ 04:50) (100/62 - 125/68)  RR: 18 (01-03-23 @ 04:50) (18 - 18)  SpO2: 96% (01-03-23 @ 04:50) (95% - 96%)    Constitutional: well developed, normal appearance, well groomed, well nourished, no deformities and no acute distress.   Eyes: the conjunctiva exhibited no abnormalities and the eyelids demonstrated no xanthelasmas.   HEENT: normal oral mucosa, no oral pallor and no oral cyanosis.   Neck: normal jugular venous A waves present, normal jugular venous V waves present and no jugular venous jacobs A waves.   Pulmonary: no respiratory distress, normal respiratory rhythm and effort, no accessory muscle use and lungs were clear to auscultation bilaterally.   Cardiovascular: heart rate and rhythm were normal, normal S1 and S2 and no murmur, gallop, rub, heave or thrill are present.   Abdomen: soft, non-tender, no hepato-splenomegaly and no abdominal mass palpated.   Musculoskeletal: the gait could not be assessed..   Extremities: no clubbing of the fingernails, no localized cyanosis, no petechial hemorrhages and no ischemic changes.   Skin: normal skin color and pigmentation, no rash, no venous stasis, no skin lesions, no skin ulcer and no xanthoma was observed.   Psychiatric: oriented to person, place, and time, the affect was normal, the mood was normal and not feeling anxious.     LABS:   --------  01-02    142  |  107  |  6<L>  ----------------------------<  85  3.7   |  28  |  0.34<L>    Ca    8.4<L>      02 Jan 2023 07:58                           9.5    8.52  )-----------( 300      ( 02 Jan 2023 07:58 )             28.9                 RADIOLOGY:  -----------------    ECG:     ECHO:
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  Past medical history of dementia, anemia, history of atrial fibrillation does not appear to be on anticoagulants, prostate cancer, diabetes, hyperlipidemia, history of CHF, hypertension.  Patient presents from [facility] for necrotic foot wound.  Upon chart review it appears that patient was recently admitted to the hospital from December 16 through December 22, 2022 for the same presentation.  He had been diagnosed with gangrene of the left foot.  He was seen by podiatry dry sterile dressings were applied and he was instructed to level foot while in bed to prevent the progression of the pressure ulcers.  It is noted that patient also underwent a bone scan during his last admission in September positive for osteomyelitis at that time.  Son declined surgical intervention and patient was managed conservatively with antibiotics.  Patient had significant worsening of disease at the time of his last admission.  Vascular was consulted and patient was deemed not a candidate for any intervention on their part.  Patient was also seen by infectious disease notes were reviewed.  Per their notes antibiotics have a limited role as patient had already completed a 4-week course of Rocephin previously.  Gangrene is likely progressing due to severe PVD.  He was treated with Zosyn but he had little to no impact.  Podiatry notes are reviewed.  It was recommended the patient undergo a transmetatarsal amputation of the left foot but after discussion with patient's son it was decided that patient would not undergo surgical treatment.  Local wound care was continued and patient was discharged back to [facility].  Presents again today with worsening necrosis of the left foot.  Son was contacted.  Still refusing surgical intervention at this time.  At this time would like patient to be kept comfortable and is requesting hospice evaluation.  12/28/2022 asked to do admission (28 Dec 2022 16:36)      REVIEW OF SYSTEMS:    CONSTITUTIONAL: No fever, weight loss, or fatigue  EYES: No eye pain, visual disturbances, or discharge  ENMT:  No difficulty hearing, tinnitus, vertigo; No sinus or throat pain  NECK: No pain or stiffness  BREASTS: No pain, masses, or nipple discharge  RESPIRATORY: No cough, wheezing, chills or hemoptysis; No shortness of breath  CARDIOVASCULAR: No chest pain, palpitations, dizziness, or leg swelling  GASTROINTESTINAL: No abdominal or epigastric pain. No nausea, vomiting, or hematemesis; No diarrhea or constipation. No melena or hematochezia.  GENITOURINARY: No dysuria, frequency, hematuria, or incontinence  NEUROLOGICAL: No headaches, memory loss, loss of strength, numbness, or tremors  SKIN: No itching, burning, rashes, or lesions   LYMPH NODES: No enlarged glands  ENDOCRINE: No heat or cold intolerance; No hair loss  MUSCULOSKELETAL: No joint pain or swelling; No muscle, back, or extremity pain  PSYCHIATRIC: No depression, anxiety, mood swings, or difficulty sleeping  HEME/LYMPH: No easy bruising, or bleeding gums  ALLERGY AND IMMUNOLOGIC: No hives or eczema    MEDICATIONS  (STANDING):  dextrose 5%. 1000 milliLiter(s) (75 mL/Hr) IV Continuous <Continuous>  dextrose 5%. 1000 milliLiter(s) (75 mL/Hr) IV Continuous <Continuous>  donepezil 5 milliGRAM(s) Oral at bedtime  famotidine    Tablet 20 milliGRAM(s) Oral daily  heparin   Injectable 5000 Unit(s) SubCutaneous every 12 hours  metoprolol tartrate 12.5 milliGRAM(s) Oral two times a day  piperacillin/tazobactam IVPB.. 3.375 Gram(s) IV Intermittent every 8 hours  senna 2 Tablet(s) Oral at bedtime  sodium chloride 0.45%. 1000 milliLiter(s) (80 mL/Hr) IV Continuous <Continuous>  tamsulosin 0.4 milliGRAM(s) Oral at bedtime    MEDICATIONS  (PRN):  acetaminophen     Tablet .. 650 milliGRAM(s) Oral every 6 hours PRN Temp greater or equal to 38C (100.4F), Mild Pain (1 - 3)  morphine Concentrate 5 milliGRAM(s) SubLingual every 2 hours PRN Moderate Pain (4 - 6)      ALLERGIES: latex (Rash)  latex (Unknown)  No Known Drug Allergies      FAMILY HISTORY:      PHYSICAL EXAMINATION:  -----------------------------  T(C): 36.8 (12-29-22 @ 04:56), Max: 37.1 (12-28-22 @ 21:04)  HR: 81 (12-29-22 @ 04:56) (81 - 96)  BP: 109/61 (12-29-22 @ 04:56) (105/60 - 144/79)  RR: 16 (12-29-22 @ 04:56) (16 - 17)  SpO2: 100% (12-29-22 @ 04:56) (96% - 100%)  Wt(kg): --    12-28 @ 07:01  -  12-29 @ 07:00  --------------------------------------------------------  IN:    IV PiggyBack: 100 mL    sodium chloride 0.45%: 400 mL  Total IN: 500 mL    OUT:    Voided (mL): 400 mL  Total OUT: 400 mL    Total NET: 100 mL          Weight (kg): 60.8 (12-28 @ 21:04)    VITALS  T(C): 36.8 (12-29-22 @ 04:56), Max: 37.1 (12-28-22 @ 21:04)  HR: 81 (12-29-22 @ 04:56) (81 - 96)  BP: 109/61 (12-29-22 @ 04:56) (105/60 - 144/79)  RR: 16 (12-29-22 @ 04:56) (16 - 17)  SpO2: 100% (12-29-22 @ 04:56) (96% - 100%)    Constitutional: well developed, normal appearance, well groomed, well nourished, no deformities and no acute distress.   Eyes: the conjunctiva exhibited no abnormalities and the eyelids demonstrated no xanthelasmas.   HEENT: normal oral mucosa, no oral pallor and no oral cyanosis.   Neck: normal jugular venous A waves present, normal jugular venous V waves present and no jugular venous jacobs A waves.   Pulmonary: no respiratory distress, normal respiratory rhythm and effort, no accessory muscle use and lungs were clear to auscultation bilaterally.   Cardiovascular: heart rate and rhythm were normal, normal S1 and S2 and no murmur, gallop, rub, heave or thrill are present.   Abdomen: soft, non-tender, no hepato-splenomegaly and no abdominal mass palpated.   Musculoskeletal: the gait could not be assessed..   Extremities: no clubbing of the fingernails, no localized cyanosis, no petechial hemorrhages and no ischemic changes.   Skin: normal skin color and pigmentation, no rash, no venous stasis, no skin lesions, no skin ulcer and no xanthoma was observed.   Psychiatric: oriented to person, place, and time, the affect was normal, the mood was normal and not feeling anxious.     LABS:   --------  12-28    152<H>  |  116<H>  |  19  ----------------------------<  153<H>  3.3<L>   |  30  |  0.49<L>    Ca    9.1      28 Dec 2022 20:30    TPro  6.5  /  Alb  2.1<L>  /  TBili  0.5  /  DBili  x   /  AST  26  /  ALT  23  /  AlkPhos  81  12-28                         10.3   9.02  )-----------( 331      ( 28 Dec 2022 10:10 )             32.3     PT/INR - ( 28 Dec 2022 10:10 )   PT: 14.6 sec;   INR: 1.25 ratio         PTT - ( 28 Dec 2022 10:10 )  PTT:31.9 sec            RADIOLOGY:  -----------------    ECG:     ECHO:

## 2023-01-03 NOTE — DISCHARGE NOTE NURSING/CASE MANAGEMENT/SOCIAL WORK - NSDCVIVACCINE_GEN_ALL_CORE_FT
pneumococcal polysaccharide PPV23; 18-Jun-2014 09:36; Lamar Gonzalez (RN); [id number]; IntraMuscular; Deltoid Right.; 0.5 milliLiter(s);   Tdap; 19-Jul-2018 13:12; Arleen Wallace (RN); [employer]; [id number]; IntraMuscular; Deltoid Left.; 0.5 milliLiter(s); VIS (VIS Published: 09-May-2013, VIS Presented: 19-Jul-2018);   Tdap; 18-Jan-2022 12:32; Bethanie Briseno (RN); [employer]; [id number] (Exp. Date: 09-Sep-2023); IntraMuscular; Deltoid Right.; 0.5 milliLiter(s); VIS (VIS Published: 09-May-2013, VIS Presented: 18-Jan-2022);

## 2023-01-03 NOTE — DISCHARGE NOTE PROVIDER - CARE PROVIDER_API CALL
Toby Quintana)  Critical Care Medicine; Internal Medicine; Pulmonary Disease  [facility] [place] [address]  Phone: [phone]  Fax: [phone]  Follow Up Time: 1 week

## 2023-01-03 NOTE — DISCHARGE NOTE PROVIDER - HOSPITAL COURSE
gangrene left foot  ashd s/p aicd  artrial fib  chf  hypertension  dyslipidemia  dm2  prostate cancer  dementia

## 2023-01-03 NOTE — DISCHARGE NOTE PROVIDER - NSDCMRMEDTOKEN_GEN_ALL_CORE_FT
acetaminophen 325 mg oral tablet: 2 tab(s) orally every 6 hours, As needed, Temp greater or equal to 38C (100.4F), Mild Pain (1 - 3)  ascorbic acid 500 mg oral tablet: 1 tab(s) orally 2 times a day  donepezil 5 mg oral tablet: 1 tab(s) orally once a day (at bedtime)  famotidine 40 mg oral tablet: 1 tab(s) orally once a day   ferrous sulfate 325 mg (65 mg elemental iron) oral tablet: 1 tab(s) orally once a day  lactobacillus acidophilus oral tablet: 2 tab(s) orally once a day  losartan 25 mg oral tablet: 1 tab(s) orally once a day  metFORMIN 500 mg oral tablet: 1 tab(s) orally once a day  Metoprolol Tartrate 25 mg oral tablet: 0.5 tab(s) orally 2 times a day  Multiple Vitamins with Minerals oral tablet: 1 tab(s) orally once a day  polyethylene glycol 3350 oral powder for reconstitution: 17 gram(s) orally once a day  Senna 8.6 mg oral tablet: 2 tab(s) orally once a day (at bedtime)   simvastatin 20 mg oral tablet: 1 tab(s) orally once a day (at bedtime)  tamsulosin 0.4 mg oral capsule: 1 cap(s) orally once a day (at bedtime)   acetaminophen 325 mg oral tablet: 2 tab(s) orally every 6 hours, As needed, Temp greater or equal to 38C (100.4F), Mild Pain (1 - 3)  ascorbic acid 500 mg oral tablet: 1 tab(s) orally 2 times a day  donepezil 5 mg oral tablet: 1 tab(s) orally once a day (at bedtime)  famotidine 40 mg oral tablet: 1 tab(s) orally once a day   ferrous sulfate 325 mg (65 mg elemental iron) oral tablet: 1 tab(s) orally once a day  lactobacillus acidophilus oral tablet: 2 tab(s) orally once a day  losartan 25 mg oral tablet: 1 tab(s) orally once a day  metFORMIN 500 mg oral tablet: 1 tab(s) orally once a day  Metoprolol Tartrate 25 mg oral tablet: 0.5 tab(s) orally 2 times a day  Multiple Vitamins with Minerals oral tablet: 1 tab(s) orally once a day  polyethylene glycol 3350 oral powder for reconstitution: 17 gram(s) orally once a day  Senna 8.6 mg oral tablet: 2 tab(s) orally once a day (at bedtime)   tamsulosin 0.4 mg oral capsule: 1 cap(s) orally once a day (at bedtime)

## 2023-01-03 NOTE — DISCHARGE NOTE NURSING/CASE MANAGEMENT/SOCIAL WORK - PATIENT PORTAL LINK FT
You can access the FollowMyHealth Patient Portal offered by [facility] by registering at the following website: [email or web address]. By joining [employer]’s FollowMyHealth portal, you will also be able to view your health information using other applications (apps) compatible with our system.

## 2023-01-03 NOTE — DISCHARGE NOTE PROVIDER - NSDCCPCAREPLAN_GEN_ALL_CORE_FT
PRINCIPAL DISCHARGE DIAGNOSIS  Diagnosis: Gangrene of foot  Assessment and Plan of Treatment:       SECONDARY DISCHARGE DIAGNOSES  Diagnosis: Foot infection  Assessment and Plan of Treatment:     Diagnosis: Dementia  Assessment and Plan of Treatment:     Diagnosis: Hypernatremia  Assessment and Plan of Treatment:

## 2023-01-03 NOTE — PROGRESS NOTE ADULT - NUTRITIONAL ASSESSMENT
This patient has been assessed with a concern for Malnutrition and has been determined to have a diagnosis/diagnoses of Severe protein-calorie malnutrition.    This patient is being managed with:   Diet Pureed-  Moderately Thick Liquids (MODTHICKLIQS)  Supplement Feeding Modality:  Oral  Ensure Enlive Cans or Servings Per Day:  1       Frequency:  Daily  Entered: Jan 2 2023  3:38PM    
This patient has been assessed with a concern for Malnutrition and has been determined to have a diagnosis/diagnoses of Severe protein-calorie malnutrition.    This patient is being managed with:   Diet Pureed-  Moderately Thick Liquids (MODTHICKLIQS)  Supplement Feeding Modality:  Oral  Ensure Enlive Cans or Servings Per Day:  1       Frequency:  Daily  Entered: Jan 2 2023  3:38PM

## 2023-01-03 NOTE — PROGRESS NOTE ADULT - REASON FOR ADMISSION
foot gangr

## 2023-01-03 NOTE — DISCHARGE NOTE NURSING/CASE MANAGEMENT/SOCIAL WORK - NSDCPEFALRISK_GEN_ALL_CORE
For information on Fall & Injury Prevention, visit: [email or web address] OR  [email or web address] OR  https://www.cdc.gov/steadi/patient.html

## 2023-01-03 NOTE — PROGRESS NOTE ADULT - PROVIDER SPECIALTY LIST ADULT
Cardiology
Hospitalist
Palliative Care
Pulmonology
Hospitalist
Infectious Disease
Internal Medicine
Internal Medicine
Palliative Care
Cardiology
Infectious Disease
Infectious Disease
Palliative Care
Internal Medicine
Internal Medicine

## 2023-01-03 NOTE — PROGRESS NOTE ADULT - ASSESSMENT
89-year-old male who presents to the emergency room for reported necrotic foot wound.  Past medical history of dementia, anemia, history of atrial fibrillation does not appear to be on anticoagulants, prostate cancer, diabetes, hyperlipidemia, history of CHF, hypertension.  Patient presents from [facility] for necrotic foot wound.  Upon chart review it appears that patient was recently admitted to the hospital from December 16 through December 22, 2022 for the same presentation.    dementia  OP  OA  necrotic foot  anemia  AF  prostate ca  DM  HLD  CHF    Vinicio   SON  HCP  pt is DNR DNI  MOLST on record  updated  limited interventions and referral for Hospice at [facility]    plan for home hospice at [facility]  approved  CM follow up noted  
89-year-old male who presents to the emergency room for reported necrotic foot wound.  Past medical history of dementia, anemia, history of atrial fibrillation does not appear to be on anticoagulants, prostate cancer, diabetes, hyperlipidemia, history of CHF, hypertension.  Patient presents from [facility] for necrotic foot wound.  Upon chart review it appears that patient was recently admitted to the hospital from December 16 through December 22, 2022 for the same presentation.    dementia  OP  OA  necrotic foot  anemia  AF  prostate ca  DM  HLD  CHF    Vinicio   SON  HCP  pt is DNR DNI  MOLST on record  updated  limited interventions and referral for Hospice at [facility]    not a candidate for [facility] at the moment  however, appropriate candidate for Home Hospice at [facility]  ABX  Wound Care  Podiatry cx noted
89-year-old male who presents to the emergency room for reported necrotic foot wound.  Past medical history of dementia, anemia, history of atrial fibrillation does not appear to be on anticoagulants, prostate cancer, diabetes, hyperlipidemia, history of CHF, hypertension.  Patient presents from [facility] for necrotic foot wound.  Upon chart review it appears that patient was recently admitted to the hospital from December 16 through December 22, 2022 for the same presentation.    dementia  OP  OA  necrotic foot  anemia  AF  prostate ca  DM  HLD  CHF    Vinicio   SON  HCP  pt is DNR DNI  MOLST on record  updated  limited interventions and referral for Hospice at [facility]    plan for home hospice at [facility]  approved  CM follow up noted
Patient is a 89-year-old male with PMH of dementia, severe PVD, anemia, atrial fibrillation does not appear to be on anticoagulants, prostate cancer, diabetes, hyperlipidemia, CHF, hypertension who presents to the emergency room from [facility] who presented for necrotic foot wound.   Patient was admitted recently admitted Dec 16-22 for the same presentation - worsening L foot gangrene. He had a bone scan in Sept that showed OM; son had declined surgical intervention and he was treated with 4 weeks of ceftriaxone. Patient was treated with pip-tazo but showed no significant improvement. He was monitored off antibiotics and  then discharged back to [facility].     Worsening necrosis - L foot gangrene in setting of severe PVD  -Podiatry following, noted son still refusing surgical intervention at this time and son would like patient to be kept comfortable and is requesting hospice evaluation  patient started on pip-tazo in ER  -Antibiotic (pip-tazo) was trialed on recent admission as well as prior admissions including 4 weeks of ceftriaxone with no significant changes/impact noted   patient has been afebrile, WBC normal on admission (mildly elevated today on pip-tazo),  nontoxic appearing and antibiotics with limited role here   -- discontinue pip-tazo and monitor off antibiotics  Noted planning for home hospice      Covering for Dr. Luis Manuel Porter M.D.  [facility], Division of Infectious Diseases  [phone]  After 5pm on weekdays and all day on weekends - please call [phone]    
Patient is a 89-year-old male with PMH of dementia, severe PVD, anemia, atrial fibrillation does not appear to be on anticoagulants, prostate cancer, diabetes, hyperlipidemia, CHF, hypertension who presents to the emergency room from [facility] who presented for necrotic foot wound.   Patient was admitted recently admitted Dec 16-22 for the same presentation - worsening L foot gangrene. He had a bone scan in Sept that showed OM; son had declined surgical intervention and he was treated with 4 weeks of ceftriaxone. Patient was treated with pip-tazo but showed no significant improvement. He was monitored off antibiotics and  then discharged back to [facility].     Worsening necrosis - L foot gangrene in setting of severe PVD  -Podiatry following, noted son still refusing surgical intervention at this time and son would like patient to be kept comfortable and is requesting hospice evaluation  patient started on pip-tazo in ER  -Antibiotic (pip-tazo) was trialed on recent admission as well as prior admissions including 4 weeks of ceftriaxone with no significant changes/impact noted   patient has been afebrile, WBC overall stable, nontoxic appearing and antibiotics with limited role here  Discontinued pip-tazo 12/31   UA with no significant pyuria, Ucx noted, likely colonization-will hold off on treating for now  Continue to monitor off antibiotics  Noted planning for home hospice    Starting tomorrow Dr Issa will be assuming care of this patient so please contact him with any questions, concerns or new micro data.      
gangrene left foot  ashd s/p aicd  artrial fib  chf  hypertension  dyslipidemia  dm2  prostate cancer  dementia  
89-year-old male who presents to the emergency room for reported necrotic foot wound.  Past medical history of dementia, anemia, history of atrial fibrillation does not appear to be on anticoagulants, prostate cancer, diabetes, hyperlipidemia, history of CHF, hypertension.  Patient presents from [facility] for necrotic foot wound.  Upon chart review it appears that patient was recently admitted to the hospital from December 16 through December 22, 2022 for the same presentation.    dementia  OP  OA  necrotic foot  anemia  AF  prostate ca  DM  HLD  CHF    Vinicio   SON  HCP  pt is DNR DNI  MOLST on record  updated  limited interventions and referral for Hospice at [facility]    plan for home hospice at [facility]  approved  CM follow up noted  
89-year-old male who presents to the emergency room for reported necrotic foot wound.  Past medical history of dementia, anemia, history of atrial fibrillation does not appear to be on anticoagulants, prostate cancer, diabetes, hyperlipidemia, history of CHF, hypertension.  Patient presents from [facility] for necrotic foot wound.  Upon chart review it appears that patient was recently admitted to the hospital from December 16 through December 22, 2022 for the same presentation.    dementia  OP  OA  necrotic foot  anemia  AF  prostate ca  DM  HLD  CHF    Vinicio   SON  HCP  pt is DNR DNI  MOLST on record  updated  limited interventions and referral for Hospice at [facility]    [facility] follow up noted - plan for Hospice - working on an agency for acceptance    ID following  ABX  Wound Care  Podiatry cx noted  
Patient is a 89-year-old male with PMH of dementia, severe PVD, anemia, atrial fibrillation does not appear to be on anticoagulants, prostate cancer, diabetes, hyperlipidemia, CHF, hypertension who presents to the emergency room from [facility] who presented for necrotic foot wound.   Patient was admitted recently admitted Dec 16-22 for the same presentation - worsening L foot gangrene. He had a bone scan in Sept that showed OM; son had declined surgical intervention and he was treated with 4 weeks of ceftriaxone. Patient was treated with pip-tazo but showed no significant improvement. He was monitored off antibiotics and  then discharged back to [facility].     Worsening necrosis - L foot gangrene in setting of severe PVD  -Podiatry following, noted son still refusing surgical intervention at this time and son would like patient to be kept comfortable and is requesting hospice evaluation  patient started on pip-tazo in ER  -Antibiotic (pip-tazo) was trialed on recent admission as well as prior admissions including 4 weeks of ceftriaxone with no significant changes/impact noted   patient has been afebrile, WBC overall stable, nontoxic appearing and antibiotics with limited role here  Discontinued pip-tazo 12/31   UA with no significant pyuria, Ucx noted, likely colonization-will hold off on treating for now  Continue to monitor off antibiotics  Noted planning for home hospice      Covering for Dr. Luis Manuel Porter M.D.  \[facility] [place] [facility]\"", Division of Infectious Diseases  [phone]  After 5pm on weekdays and all day on weekends - please call [phone]    
gangrene left foot  ashd s/p aicd  artrial fib  chf  hypertension  dyslipidemia  dm2  prostate cancer  dementia  
89-year-old male who presents to the emergency room for reported necrotic foot wound.  Past medical history of dementia, anemia, history of atrial fibrillation does not appear to be on anticoagulants, prostate cancer, diabetes, hyperlipidemia, history of CHF, hypertension.  Patient presents from [facility] for necrotic foot wound.  Upon chart review it appears that patient was recently admitted to the hospital from December 16 through December 22, 2022 for the same presentation.    dementia  OP  OA  necrotic foot  anemia  AF  prostate ca  DM  HLD  CHF    Vinicio   SON  HCP  pt is DNR DNI  MOLST on record  updated  limited interventions and referral for Hospice at [facility]    plan for home hospice at [facility]  approved  CM follow up noted
  REVIEW OF SYMPTOMS      Able to give (reliable) ROS  NO     PHYSICAL EXAM    HEENT Unremarkable  atraumatic   RESP Fair air entry EXP prolonged    Harsh breath sound Resp distres mild   CARDIAC S1 S2 No S3     NO JVD    ABDOMEN SOFT BS PRESENT NOT DISTENDED No hepatosplenomegaly   PEDAL EDEMA present No calf tenderness  NO rash       GENERAL DATA .   GOC.   12/28/2022 dnr  ALLGY.      latex                 WT. 12/30/2022 60  BMI.                 ICU STAY. .. none  COVID. ..  12/28/2022 scv2 (-)   BEST PRACTICE ISSUES.    HOB ELEVATN. Yes  DVT PPLX. ..    12/29/2022 hpsc   SEGUNDO PPLX. ..      INFN PPLX. ..    SP SW THELMA.         DIET.  ..  12/29/2022 cons carb puree   IV fl... 12/28 1/2 ns 75       ABGS.    VS/ PO/IO/ VENT/ DRIPS.   12/28/2022 afeb 90 100/60   12/28/2022 ra 98%       AGE SEX DOA C/C.  89 m 12/28/2022 foot infection   PMH .  gangrene l foot   .. Recent hospital stay   12/16-12/22/2022   .. bone scan 9/2022 osteomyelitis   .. 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  1) Necrotic foot wound 12/28/2022   2) GOC 12/28/2022 son wants hospice placement   3) Foot infection       PROBLEMS ASSESSMENT RECOMMENDATIONS.    Necrotic foot wound 12/28/2022   .. Podiatry consult 12/28/2022   GOC   .. 12/28/2022 son wants hospice placement   .. 12/28/2022 Dr Whittaker on case   Foot infection   .. w 12/28-12/30/2022 w 9 - 11.9   .. ua 12/28/2022 w 6-10  .. flu ab 12/28/2022 (-)   .. rsv 12/28/2022 (-)    .. 12/28-12/29 zosyn   .. ID consulted 12/28/2022  .. 12/29 abio dced by ID   Anemia.  .. Hb 12/28-12/30/2022 Hb 10- 9.4  Hypernatremia.  .. Na 12/28-12/29-12/30/2022 Na 151-152 - 149    GOC.  .. 12/28/2022 dw son he wants to continue meds abio till hospice accepts him  12/30/2022 dc with home hospice being arranged     .     TIME SPENT   Over 25 minutes aggregate care time spent on encounter; activities included   direct patient care, counseling and/or coordinating care reviewing notes, lab data/ imaging , discussion with multidisciplinary team/ patient  /family and explaining in detail risks, benefits, alternatives  of the recommendations     TERESA FRANCIS 10/16/1926 12/28/2022  DR ARCADIO FRIAS     
  REVIEW OF SYMPTOMS      Able to give (reliable) ROS  NO     PHYSICAL EXAM    HEENT Unremarkable  atraumatic   RESP Fair air entry EXP prolonged    Harsh breath sound Resp distres mild   CARDIAC S1 S2 No S3     NO JVD    ABDOMEN SOFT BS PRESENT NOT DISTENDED No hepatosplenomegaly   PEDAL EDEMA present No calf tenderness  NO rash       GENERAL DATA .   GOC.   12/28/2022 dnr  ALLGY.      latex                 WT. 12/30/2022 60  BMI.                 ICU STAY. .. none  COVID. ..  12/28/2022 scv2 (-)   BEST PRACTICE ISSUES.    HOB ELEVATN. Yes  DVT PPLX. ..    12/29/2022 hpsc   SEGUNDO PPLX. ..      INFN PPLX. ..    SP SW THELMA.         DIET.  ..  12/29/2022 cons carb puree   IV fl... 12/28 1/2 ns 75       AGE SEX DOA C/C.  89 m 12/28/2022 foot infection   PMH .  gangrene l foot   .. Recent hospital stay   12/16-12/22/2022   .. bone scan 9/2022 osteomyelitis   .. 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  1) Necrotic foot wound 12/28/2022   2) GOC 12/28/2022 son wants hospice placement   3) Foot infection       PROBLEMS ASSESSMENT RECOMMENDATIONS.    Necrotic foot wound 12/28/2022   .. Podiatry consult 12/28/2022   GOC   .. 12/28/2022 son wants hospice placement   .. 12/28/2022 Dr Whittaker on case   Foot infection   .. w 12/28-12/30/2022 w 9 - 11.9   .. ua 12/28/2022 w 6-10  .. flu ab 12/28/2022 (-)   .. rsv 12/28/2022 (-)    .. 12/28-12/29 zosyn   .. ID consulted 12/28/2022  .. 12/29 abio dced by ID   Anemia.  .. Hb 12/28-12/30-12/31/2022 Hb 10- 9.4- 8.6   Hypernatremia.  .. Na 12/28-12/29-12/30/2022 Na 151-152 - 149    GOC.  .. 12/28/2022 dw son he wants to continue meds abio till hospice accepts him  12/30/2022 dc with home hospice being arranged       TIME SPENT   Over 25 minutes aggregate care time spent on encounter; activities included   direct patient care, counseling and/or coordinating care reviewing notes, lab data/ imaging , discussion with multidisciplinary team/ patient  /family and explaining in detail risks, benefits, alternatives  of the recommendations     TERESA FRANCIS 10/16/1926 12/28/2022  DR ARCADIO FRIAS   
  REVIEW OF SYMPTOMS      Able to give (reliable) ROS  NO     PHYSICAL EXAM    HEENT Unremarkable  atraumatic   RESP Fair air entry EXP prolonged    Harsh breath sound Resp distres mild   CARDIAC S1 S2 No S3     NO JVD    ABDOMEN SOFT BS PRESENT NOT DISTENDED No hepatosplenomegaly   PEDAL EDEMA present No calf tenderness  NO rash       GENERAL DATA .   GOC.   12/28/2022 dnr  ALLGY.      latex                 WT. 12/30/2022 60  BMI.                 ICU STAY. .. none  COVID. ..  12/28/2022 scv2 (-)   BEST PRACTICE ISSUES.    HOB ELEVATN. Yes  DVT PPLX. ..    12/29/2022 hpsc   SEGUNDO PPLX. ..      INFN PPLX. ..    SP SW THELMA.         DIET.  ..  12/29/2022 cons carb puree   IV fl... 12/28 1/2 ns 75       AGE SEX DOA C/C.  96 m 12/28/2022 foot infection   PMH .  gangrene l foot   .. Recent hospital stay   12/16-12/22/2022   .. bone scan 9/2022 osteomyelitis   .. 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  1) Necrotic foot wound 12/28/2022   2) GOC 12/28/2022 son wants hospice placement   3) Foot infection       PROBLEMS ASSESSMENT RECOMMENDATIONS.    Necrotic foot wound 12/28/2022   .. Podiatry consult 12/28/2022   GOC   .. 12/28/2022 son wants hospice placement   .. 12/28/2022 Dr Whittaker on case   Foot infection   .. w 12/28-12/30/2022 w 9 - 11.9   .. ua 12/28/2022 w 6-10  .. flu ab 12/28/2022 (-)   .. rsv 12/28/2022 (-)    .. 12/28-12/29 zosyn   .. ID consulted 12/28/2022  .. 12/29 abio dced by ID   Anemia.  .. Hb 12/28-12/30-12/31/2022 Hb 10- 9.4- 8.6   Hypernatremia.  .. Na 12/28-12/29-12/30/2022 Na 151-152 - 149    GOC.  .. 12/28/2022 dw son he wants to continue meds abio till hospice accepts him  12/30/2022 dc with home hospice being arranged   1/1/2023 I will sign off case  Dr Frias will take over in am       TIME SPENT   Over 25 minutes aggregate care time spent on encounter; activities included   direct patient care, counseling and/or coordinating care reviewing notes, lab data/ imaging , discussion with multidisciplinary team/ patient  /family and explaining in detail risks, benefits, alternatives  of the recommendations     TERESA FRANCIS 10/16/1926 12/28/2022  DR ARCADIO FRIAS 
  REVIEW OF SYMPTOMS      Able to give (reliable) ROS  NO     PHYSICAL EXAM    HEENT Unremarkable  atraumatic   RESP Fair air entry EXP prolonged    Harsh breath sound Resp distres mild   CARDIAC S1 S2 No S3     NO JVD    ABDOMEN SOFT BS PRESENT NOT DISTENDED No hepatosplenomegaly   PEDAL EDEMA present No calf tenderness  NO rash     GENERAL DATA .   GOC.   12/28/2022 dnr  ALLGY.      latex                 WT.  BMI.                 ICU STAY. .. none  COVID. ..  12/28/2022 scv2 (-)   BEST PRACTICE ISSUES.    HOB ELEVATN. Yes  DVT PPLX. ..    12/29/2022 hpsc   SEGUNDO PPLX. ..      INFN PPLX. ..    SP SW THELMA.         DIET.  ..  12/29/2022 cons carb puree     AGE SEX DOA C/C.  96 m 12/28/2022 foot infection   PMH .  gangrene l foot   .. Recent hospital stay   12/16-12/22/2022   .. bone scan 9/2022 osteomyelitis   .. 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  1) Necrotic foot wound 12/28/2022   2) GOC 12/28/2022 son wants hospice placement   3) Foot infection       PROBLEMS ASSESSMENT RECOMMENDATIONS.    Necrotic foot wound 12/28/2022   .. Podiatry consult 12/28/2022   GOC   .. 12/28/2022 son wants hospice placement   .. 12/28/2022 Dr Whittaker on case   Foot infection   .. w 12/28/2022 w 9   .. ua 12/28/2022 w 6-10  .. flu ab 12/28/2022 (-)   .. rsv 12/28/2022 (-)    .. 12/28 zosyn   .. ID consulted 12/28/2022  Anemia.  .. Hb 12/28/2022 Hb 10  Hypernatremia.  .. Na 12/28-12/29/2022 Na 151-152    GO.  .. 12/28/2022 dw son he wants to continue meds abio till hospice accepts him      TIME SPENT   Over 25 minutes aggregate care time spent on encounter; activities included   direct patient care, counseling and/or coordinating care reviewing notes, lab data/ imaging , discussion with multidisciplinary team/ patient  /family and explaining in detail risks, benefits, alternatives  of the recommendations     TERESA FRANCIS 10/16/1926 12/28/2022  DR ARCADIO FRIAS
gangrene left foot  ashd s/p aicd  artrial fib  chf  hypertension  dyslipidemia  dm2  prostate cancer  dementia  

## 2023-01-18 NOTE — ED ADULT NURSE NOTE - TEMPLATE LIST FOR HEAD TO TOE ASSESSMENT
Symptoms Qbrexza Counseling:  I discussed with the patient the risks of Qbrexza including but not limited to headache, mydriasis, blurred vision, dry eyes, nasal dryness, dry mouth, dry throat, dry skin, urinary hesitation, and constipation.  Local skin reactions including erythema, burning, stinging, and itching can also occur.

## 2023-01-19 NOTE — ED PROVIDER NOTE - WILL SEE PATIENT
SW/CM Ongoing Infant Plan of Care Note     Support Systems    parents and friends       Disposition   Home    Progress Note  SW talked with the pt and SO. Pt has everything she needs and knows about THC and pos PCP for baby that will get results of cord. They took resources for for [place] and post partum. Pt knows to set up PCP for baby before dc. RN updated.        shortly

## 2023-03-08 NOTE — ED ADULT NURSE NOTE - HIV OFFER
Patient called with recurrent rectal bleeding.  This is a televisit.  Patient tells me there was no blood in toilet, or in the stool, but just a small amount of pinkish smear in toliet tissue or an occasional drop of blood on tissue, and  nothing more. BM's are soft and easy, regular. She is on a high fiber diet. No abdominal pain. No perianal pain/discomfort, burning or itching.  At present time she denies any clear hemorrhoidal flares. Patient had a fairly recent colonoscopy August 2022, several benign polyps removed, on that colonoscopy also small internal hemorrhoids were described.  Patient has a prior history of adenomatous so is on a recall for repeat colonoscopy in 2027.  I have treated patient in the recent past as for hemorrhoids.  She has no other complaints or GI issues. Unable to answer due to medical condition/unresponsive/etc...

## 2023-03-14 NOTE — DISCHARGE NOTE NURSING/CASE MANAGEMENT/SOCIAL WORK - NSDCPEPT PROEDHF_GEN_ALL_CORE
Previously Declined (within the last year)
Call primary care provider for follow up after discharge/Activities as tolerated/Low salt diet/Monitor weight daily/Report signs and symptoms to primary care provider

## 2023-04-14 NOTE — DISCHARGE NOTE NURSING/CASE MANAGEMENT/SOCIAL WORK - NSDCPEFALRISK_GEN_ALL_CORE
ORTHOPAEDIC PROGRESS NOTE    No acute orthopedic concerns. Pain currently controlled. Denies N/V, F/C, CP/SOB, or extremity weakness/numbness/paresthesias.    Current Vitals:  Visit Vitals  /74 (BP Location: RUE - Right upper extremity)   Pulse 81   Temp 97.5 °F (36.4 °C) (Oral)   Resp 18   Ht 5' 7\" (1.702 m)   Wt 72.5 kg (159 lb 13.3 oz)   SpO2 95%   BMI 25.03 kg/m²         Physical Exam:  General appearance: Awake, alert, cooperative, no distress  Musculoskeletal:      RLE:  Dressings in place  Compartments Soft; no calf TTP  +2/4 distal pulses with capillary refill <2 sec  Foot WWP      Labs:  WBC (K/mcL)   Date Value   04/13/2023 8.0     RBC (mil/mcL)   Date Value   04/13/2023 3.12 (L)     HCT (%)   Date Value   04/14/2023 24.7 (L)     HGB (g/dL)   Date Value   04/14/2023 7.7 (L)     PLT (K/mcL)   Date Value   04/13/2023 285         ASSESSMENT & PLAN   67 year old female s/p OPEN REDUCTION INTERNAL FIXATION OF RIGHT PILON FRACTURE , Right ALIZA lengthening    NWB to RLE  Maintain splint to be clean dry intact.   Pain scripts in chart  ASA for dvt ppx to continue.   Med Mgmt per pcp  Discussed with attending physician  Ok to kelsey from an ortho perspective today pending PT/OT evaluation  Follow up with Dr. Glover in 2 weeks outpatient      Quentin Trejo DO  Orthopedic Surgery Resident     Patient information on fall and injury prevention

## 2023-05-04 NOTE — PATIENT PROFILE ADULT - PUBLIC BENEFITS
no

## 2023-05-25 NOTE — ED ADULT TRIAGE NOTE - TEMPERATURE IN FAHRENHEIT (DEGREES F)
TREATMENT PLAN (Medication Management Only)        [facility]    Name and Date of Birth:  Lenore Chaudhari 35 y o  1989  Date of Treatment Plan: May 25, 2023  Diagnosis/Diagnoses:    1  Schizoaffective disorder, bipolar type ([address] )    2  Excoriation (skin-picking) disorder    3  Insomnia due to other mental disorder    4  GOOD (generalized anxiety disorder)    5  Post traumatic stress disorder (PTSD)      Strengths/Personal Resources for Self-Care: taking medications as prescribed, ability to communicate well, ability to understand psychiatric illness, average or above intelligence, willingness to work on problems  Area/Areas of need (in own words): anxiety symptoms, depressive symptoms, mood instability, skin-picking behavior  1  Long Term Goal: maintain mood stability  Target Date:2 months - 7/25/2023  Person/Persons responsible for completion of goal: Iveliss  2  Short Term Objective (s) - How will we reach this goal?:   A  Provider new recommended medication/dosage changes and/or continue medication(s): continue current medications as prescribed  B  N/A   C  N/A  Target Date:2 months - 7/25/2023  Person/Persons Responsible for Completion of Goal: Iveliss  Progress Towards Goals: continuing treatment  Treatment Modality: medication management every 2 months  Review due 180 days from date of this plan: 6 months - 11/25/2023  Expected length of service: ongoing treatment  My Physician/PA/NP and I have developed this plan together and I agree to work on the goals and objectives  I understand the treatment goals that were developed for my treatment  97.3

## 2023-07-07 NOTE — PHYSICAL THERAPY INITIAL EVALUATION ADULT - NAME OF DISCHARGE PLANNER
[address] [place] [address]  ED     EMERGENCY DEPARTMENT ENCOUNTER            Pt Name: Rossy Serrano   MRN: [id number]   [address] [facility] 1998   Date of evaluation: 7/7/2023   Provider: Tiffanie Elizondo MD   PCP: AIMEE Marx CNP   Note Started: 6:08 AM EDT 7/7/23          CHIEF COMPLAINT     Chief Complaint   Patient presents with    Flank Pain     States she has a kidney stone, was seen here Tuesday, given medication. States she called urology but no one would see her. States she is supposed to have surgery July 20th, but can't now because \"its already in the ureter\". Pt stating she is out of medications. HISTORY OF PRESENT ILLNESS:   History from : Patient   Limitations to history : None     Rossy Serrano is a 22 y.o. female who presents complaining of flank pain. The patient states she was diagnosed with a kidney stone in late May. At that time the stone was in the kidney. She was seen in our ED on July 4 and at that time 6 mm stone had entered into the proximal left ureter just distal to the ureteral pelvic junction causing mild obstruction. She states that she has attempted to follow-up with urology but currently is out of pain medications. She denies any fever, chills, dysuria, frequency, urgency. She states she has been straining her urine is without any stone passage so far. She reports some associated nausea but no vomiting. Nursing Notes were all reviewed and agreed with, or any disagreements were addressed in the HPI. REVIEW OF SYSTEMS :    Review of Systems   Constitutional:  Negative for fever. HENT:  Negative for rhinorrhea and sore throat. Eyes:  Negative for redness. Respiratory:  Negative for shortness of breath. Cardiovascular:  Negative for chest pain. Gastrointestinal:  Positive for nausea. Negative for abdominal pain and vomiting. Genitourinary:  Positive for flank pain. Neurological:  Negative for headaches.
SW

## 2023-08-11 NOTE — PROGRESS NOTE ADULT - PROBLEM/PLAN-8
Patient handed a urinal and as for a sample he is unable to obtain at this time     Melly Mooney RN  08/10/23 2055
DISPLAY PLAN FREE TEXT

## 2023-08-19 NOTE — DISCHARGE NOTE NURSING/CASE MANAGEMENT/SOCIAL WORK - NSDCCRNAME_GEN_ALL_CORE_FT
Addended by: Nelsy Fried on: 8/19/2023 09:35 AM     Modules accepted: Orders [facility]

## 2023-09-05 NOTE — ED PROVIDER NOTE - MDM ORDERS SUBMITTED SELECTION
Retention Suture Text: Retention sutures were placed to support the closure and prevent dehiscence. Labs/EKG/Imaging Studies

## 2023-09-11 NOTE — CHART NOTE - NSCHARTNOTEFT_GEN_A_CORE
Assessment:   Pt seen for nutrition follow up.  Chart reviewed, hospital course noted.     Brief History: "Patient  is a 89yo male with pmhx of Dementia, DM, CHF, HTN, A-FIB NOT ON AC, HEMATURIA, HLD, OSTEO, PROSTATE CANCER, BIB EMS FROM NH presents with possible osteomyelitis. pt unable to give information due to dementia. pt with left foot 2nd digit diabetic ulcer sent in to r/o osteo."    ROS:  Pulm:  supplemental O2, 2LPM via NC  Endo: CC diet, Admelog coverage;  BS <188  Renal: stable  GI: fecal incontinence today, on bowel regimen, probiotic  Nutrition:  intake %.  Seen by SLP 9/7 -pt refused trials, attempted again 9/8 -pt was off unit, diet deferred to MD    Factors impacting intake: [ ] none [ ] nausea  [ ] vomiting [ ] diarrhea [ ] constipation  [ ]chewing problems [x] swallowing issues  [x] other: dementia    Diet Prescription: Diet, Consistent Carbohydrate w/Evening Snack:   Minced and Moist (MINCEDMOIST)  Supplement Feeding Modality:  Oral  Glucerna Shake Cans or Servings Per Day:  1       Frequency:  Two Times a day (09-02-22 @ 11:09)    Intake: per flow sheets, %    Current Weight: 9/5 154.3#, 9/7 159.6#      Pertinent Medications: MEDICATIONS  (STANDING):  ascorbic acid 500 milliGRAM(s) Oral daily  cefTRIAXone   IVPB 2000 milliGRAM(s) IV Intermittent every 24 hours  clopidogrel Tablet 75 milliGRAM(s) Oral daily  dextrose 5%. 1000 milliLiter(s) (50 mL/Hr) IV Continuous <Continuous>  dextrose 5%. 1000 milliLiter(s) (100 mL/Hr) IV Continuous <Continuous>  dextrose 50% Injectable 25 Gram(s) IV Push once  dextrose 50% Injectable 12.5 Gram(s) IV Push once  dextrose 50% Injectable 25 Gram(s) IV Push once  donepezil 5 milliGRAM(s) Oral at bedtime  ferrous    sulfate 325 milliGRAM(s) Oral daily  glucagon  Injectable 1 milliGRAM(s) IntraMuscular once  heparin   Injectable 5000 Unit(s) SubCutaneous every 8 hours  insulin lispro (ADMELOG) corrective regimen sliding scale   SubCutaneous three times a day before meals  insulin lispro (ADMELOG) corrective regimen sliding scale   SubCutaneous at bedtime  lactobacillus acidophilus 1 Tablet(s) Oral two times a day  losartan 25 milliGRAM(s) Oral daily  metoprolol succinate ER 25 milliGRAM(s) Oral daily  multivitamin/minerals 1 Tablet(s) Oral daily  pantoprazole    Tablet 40 milliGRAM(s) Oral daily  polyethylene glycol 3350 17 Gram(s) Oral daily  potassium chloride    Tablet ER 10 milliEquivalent(s) Oral daily  senna 2 Tablet(s) Oral at bedtime  simvastatin 20 milliGRAM(s) Oral at bedtime  tamsulosin 0.4 milliGRAM(s) Oral at bedtime  torsemide 20 milliGRAM(s) Oral daily    MEDICATIONS  (PRN):  acetaminophen     Tablet .. 650 milliGRAM(s) Oral every 6 hours PRN Temp greater or equal to 38C (100.4F), Mild Pain (1 - 3)  aluminum hydroxide/magnesium hydroxide/simethicone Suspension 30 milliLiter(s) Oral every 4 hours PRN Dyspepsia  dextrose Oral Gel 15 Gram(s) Oral once PRN Blood Glucose LESS THAN 70 milliGRAM(s)/deciliter  melatonin 3 milliGRAM(s) Oral at bedtime PRN Insomnia  ondansetron Injectable 4 milliGRAM(s) IV Push every 8 hours PRN Nausea and/or Vomiting    Pertinent Labs: 09-08 Na146 mmol/L<H> Glu 134 mg/dL<H> K+ 4.3 mmol/L Cr  0.96 mg/dL BUN 36 mg/dL<H> 09-03 Alb 3.0 g/dL<L> 09-02 Chol 119 mg/dL LDL --    HDL 49 mg/dL Trig 35 mg/dL     CAPILLARY BLOOD GLUCOSE      POCT Blood Glucose.: 142 mg/dL (09 Sep 2022 07:37)  POCT Blood Glucose.: 177 mg/dL (08 Sep 2022 21:25)  POCT Blood Glucose.: 145 mg/dL (08 Sep 2022 16:53)  POCT Blood Glucose.: 160 mg/dL (08 Sep 2022 12:27)      Skin:  L 2nd toe Db ulcer, not a vascular surgical candidate per vascular surgery  Edema: none noted    Estimated Needs:   [x] no change since previous assessment on: 9/3 based on wt 160#  kcal/day: 6072-4266 (25-30 sherrill/kg)  gms protein/day: 87-101gms (1.2-1.4 gm/kg)    Previous Nutrition Diagnosis:   [x] Swallowing Difficulty   [x]  Malnutrition     Nutrition Diagnosis is [x] ongoing  [ ] resolved [ ] not applicable     New Nutrition Diagnosis: [x] not applicable       Interventions:   Recommend  [x] Continue current diet  [ ] Change Diet To:  [ ] Nutrition Supplement  [ ] Nutrition Support  [x] Other: continue MVI, C    Monitoring and Evaluation:   [x] PO intake [ x ] Tolerance to diet prescription [ x ] weights [ x ] labs [ x ] follow up per protocol  [x] other: s/s GI distress, bowel function, skin integrity/ edema No

## 2023-09-20 NOTE — ED ADULT TRIAGE NOTE - BMI (KG/M2)
24.4 Enbrel Counseling:  I discussed with the patient the risks of etanercept including but not limited to myelosuppression, immunosuppression, autoimmune hepatitis, demyelinating diseases, lymphoma, and infections.  The patient understands that monitoring is required including a PPD at baseline and must alert us or the primary physician if symptoms of infection or other concerning signs are noted.

## 2023-11-03 NOTE — ED ADULT NURSE NOTE - NSIMPLEMENTINTERV_GEN_ALL_ED
Clinic please advise-    Insurance is requiring an updated weight on this patient for renewal of her Saxenda.     Please provide updated weight and BMI and route back to me to continue with the PA request.          Implemented All Fall with Harm Risk Interventions:  [place] to call system. Call bell, personal items and telephone within reach. Instruct patient to call for assistance. Room bathroom lighting operational. Non-slip footwear when patient is off stretcher. Physically safe environment: no spills, clutter or unnecessary equipment. Stretcher in lowest position, wheels locked, appropriate side rails in place. Provide visual cue, wrist band, yellow gown, etc. Monitor gait and stability. Monitor for mental status changes and reorient to person, place, and time. Review medications for side effects contributing to fall risk. Reinforce activity limits and safety measures with patient and family. Provide visual clues: red socks.

## 2023-11-06 NOTE — H&P ADULT - VASCULAR
Attending Attestation (For Attendings USE Only)... Equal and normal pulses (carotid, femoral, dorsalis pedis)

## 2023-12-08 NOTE — H&P ADULT - PROBLEM/PLAN-10
Addended by: JASON RICARDO on: 12/8/2023 11:56 AM     Modules accepted: Orders    
Addended by: SYLVAIN HYLTON on: 12/8/2023 01:35 PM     Modules accepted: Orders    
Addended by: TARA CHAHAL on: 12/8/2023 01:57 PM     Modules accepted: Orders    
Addended by: TARA CHAHAL on: 12/8/2023 12:06 PM     Modules accepted: Orders    
DISPLAY PLAN FREE TEXT

## 2024-01-01 NOTE — DISCHARGE NOTE ADULT - CARE PROVIDER_API CALL
show Toby Quintana), Critical Care Medicine; Internal Medicine; Pulmonary Disease  [address] [place] [address]  Phone: [phone]  Fax: [phone]    Nadia Espinoza), Internal Medicine  [address] [place] [address]  Phone: [phone]  Fax: [phone]

## 2024-01-03 NOTE — ED ADULT NURSE NOTE - PMH
Render In Strict Bullet Format?: No Initiate Treatment: 5 fu cream Detail Level: Zone Anemia    Arteriosclerotic heart disease (ASHD)    Atrial fibrillation    Atrial fibrillation    CHF (congestive heart failure)    Constipation    Dementia    Dementia    Dementia    Depression    Diabetes    Diabetes    DM (diabetes mellitus)    Hyperlipemia    Hypertension    Prostate CA    Prostate ca

## 2024-01-22 NOTE — PROGRESS NOTE ADULT - PROBLEM SELECTOR PROBLEM 3
CHF (congestive heart failure) pt lives in group home.  Per CM note, pt is dependent in all ADLs at her baseline, mobility provided by w/c.

## 2024-02-12 NOTE — PROGRESS NOTE ADULT - PROBLEM SELECTOR PROBLEM 3
Atrial fibrillation
Forgot to update follow up prior to patient checking out. Patient should return for 6 month med check.
Patient presented with CADD pump connected. Reservoir empty. Disconnected CADD pump, flushed port with 0.9% Normal Saline and established blood return in port. Flushed with 500 units of Heparin and de-accessed port.  Gauze and paper tape applied to port sit
Hypertension
Hypertension
Atrial fibrillation
Hypertension
Atrial fibrillation

## 2024-02-19 NOTE — SWALLOW BEDSIDE ASSESSMENT ADULT - SWALLOW EVAL: DIAGNOSIS
1. Patient demonstrates a mild oral dysphagia for pureed, moderately thick, and mildly thick liquids marked by prolonged manipulation resulting in delayed collection and transport. 2. Patient demonstrates a mild-moderate oral dysphagia for minced & moist and thin liquids marked by prolonged manipulation/mastication resulting in delayed collection and transport. Trace stasis observed post swallow, which reduced with liquid wash. Suspected posterior loss at BOT noted. 3. Patient demonstrates a mild pharyngeal dysphagia for pureed, minced & moist, moderately thick, and mildly thick liquids marked by suspected delayed pharyngeal swallow trigger and hyolaryngeal elevation noted by digital palpation without evidence of airway penetration/aspiration. 4. Patient demonstrates a moderate pharyngeal dysphagia for thin liquids marked by suspected delayed pharyngeal swallow trigger and multiple swallows suggestive of pharyngeal stasis and/or penetration/aspiration. 5. Recommend pureed and mildly
UE and LE grossly WNL

## 2024-03-19 NOTE — ED ADULT NURSE REASSESSMENT NOTE - NS ED NURSE REASSESS COMMENT FT1
pt reavaluated and vital signs stable pt medicated with zosyn and blood work drawned and sent to lab pt admitted awaiting report to be given and transfer to floor in no distress Gen: No fever, normal appetite  Eyes: No eye irritation or discharge  ENT: No ear pain, congestion, sore throat  Resp: No cough or trouble breathing  Cardiovascular: No chest pain or palpitation  Gastroenteric: No nausea/vomiting, diarrhea, constipation  :  No change in urine output; no dysuria  MS: No joint or muscle pain  Skin: No rashes  Neuro: No headache; no abnormal movements  Remainder negative, except as per the HPI
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
## 2024-05-06 NOTE — PHYSICAL THERAPY INITIAL EVALUATION ADULT - FOLLOWS COMMANDS/ANSWERS QUESTIONS, REHAB EVAL
This document is complete and the patient is ready for discharge. able to follow single-step instructions

## 2024-05-09 NOTE — PHYSICAL THERAPY INITIAL EVALUATION ADULT - PLANNED THERAPY INTERVENTIONS, PT EVAL
Prior Authorization Form:      DEMOGRAPHICS:                     Patient Name:  Adelina Serna  Patient :  1954            Insurance:  Payor: AET MEDICARE / Plan: AETNA MEDICARE ADVANTAGE HMO / Product Type: Medicare /   Insurance ID Number:    Payer/Plan Subscr  Sex Relation Sub. Ins. ID Effective Group Num   1. AETNA MEDICAR* ADELINA SERNA 1954 Female Self [id number] [id number]         
bed mobility training/gait training/transfer training

## 2024-06-10 NOTE — H&P ADULT - PROBLEM/PLAN-6
[FreeTextEntry3] : All medical record entries made by the Scribe were at my, Dr. Lauren Shikowitz-Behr, MD, direction and personally dictated by me on 06/04/2024. I have reviewed the chart and agree that the record accurately reflects my personal performance of the history, physical exam, assessment and plan. I have also personally directed, reviewed, and agreed with the chart.
DISPLAY PLAN FREE TEXT

## 2024-06-15 NOTE — ED ADULT NURSE NOTE - PMH
Arteriosclerotic heart disease (ASHD)    Atrial fibrillation    Atrial fibrillation    CHF (congestive heart failure)    Dementia    Dementia    Dementia    Depression    Diabetes    Diabetes    DM (diabetes mellitus)    Hyperlipemia    Hypertension    Prostate CA    Prostate ca
EKG - see results section for interpretation/Xray Image(s) - see wet read section for interpretation

## 2024-07-26 NOTE — ED ADULT NURSE NOTE - NS ED NURSE REPORT GIVEN TO FT
Is she still having loose stools? I ordered a c diff. Can start with adding daily fiber supplement.    Loni

## 2024-08-20 NOTE — DISCHARGE NOTE PROVIDER - PROVIDER TOKENS
History and Physical:    Subjective     Chief Complaint   Patient presents with    Scheduled        Ely De La Garza is a 39 y.o. year old  with an Estimated Date of Delivery: 24 currently at 36w1d presenting for scheduled  section, salpingectomy.    Prenatal care has been significant for GDM - A1, AMA (genetic screening was normal), previous C/S - (desires repeat ), and placenta previa .        Review of Systems  Pertinent items are noted in HPI.     Past Medical History:   Diagnosis Date    Abnormal findings on diagnostic imaging of breast     microcalcification in right breast    Contact with and (suspected) exposure to other viral communicable diseases     Depression     Encounter for insertion of mirena IUD 2019    Due for removal 2025    Esophageal reflux     Female infertility     took fertility meds to get pregnant    Generalized anxiety disorder     GERD (gastroesophageal reflux disease)     Herpes zoster     Shingles    History of COVID-19     Hx of Gestational hypertension     was overweight at that time    Hx of Polycystic ovary syndrome     Hx of Trochanteric bursitis of left hip     Left hip replacement 2023    Hx of Type 2 diabetes mellitus     never on insulin/metformin -  lost weight 8 years ago    Migraine without aura, not intractable, without status migrainosus     Nausea and vomiting     currently with pregnancy    Obese     Placenta previa antepartum 2024    Posterior Marginal    PONV (postoperative nausea and vomiting)     Pregnancy 2024    Sacrococcygeal disorders, not elsewhere classified     Wears contact lenses     Wears eyeglasses      Past Surgical History:   Procedure Laterality Date    ABDOMINOPLASTY      BODY LIFT N/A 2017    Procedure: CIRCUMFERENTIAL BODY LIFT;  Surgeon: Enrique Leroy MD;  Location: [facility];  Service:     BODY LIFT Bilateral 2019    Procedure: BILATERAL THIGH  LIFT WITH  LIPOSUCTION;  Surgeon: Enrique Leroy MD;  Location:  [facility];  Service: Plastics    BREAST AUGMENTATION       SECTION      GASTRIC SLEEVE LAPAROSCOPIC  08/10/2020    INTRAUTERINE DEVICE INSERTION  2019    Mirena; due for removal 2025    LIPOMA EXCISION      LIPOSUCTION ABDOMINAL N/A 2019    Procedure: LIPOSUCTION ABDOMINAL;  Surgeon: Enrique Leroy MD;  Location:  [facility];  Service: Plastics    LUNG BIOPSY      OTHER SURGICAL HISTORY      Back Lift    REPLACEMENT TOTAL HIP LATERAL POSITION Left     ; born with hip dysplasia    TONSILLECTOMY      WISDOM TOOTH EXTRACTION       Family History   Problem Relation Age of Onset    Heart disease Father     Diabetes Father     Cancer Father     Heart disease Mother     Heart disease Maternal Grandmother     Thyroid disease Maternal Grandmother     Cervical cancer Maternal Grandmother     Uterine cancer Maternal Grandmother     Hypertension Other     Obesity Other     Hypertension Other     Breast cancer Other         2 SISTERS OF MATERNAL GRANDFATHER     Social History     Tobacco Use    Smoking status: Former     Current packs/day: 0.00     Average packs/day: 1.5 packs/day for 10.0 years (15.0 ttl pk-yrs)     Types: Cigarettes     Start date: 1998     Quit date: 2008     Years since quittin.6     Passive exposure: Never    Smokeless tobacco: Never    Tobacco comments:     quit    Vaping Use    Vaping status: Never Used   Substance Use Topics    Alcohol use: No    Drug use: Never     Medications Prior to Admission   Medication Sig Dispense Refill Last Dose    acetaminophen (TYLENOL) 500 MG tablet Take 2 tablets by mouth 1 (One) Time.   2024 at 2350    folic acid (FOLVITE) 1 MG tablet Take 1 tablet by mouth Daily.   2024    glucose blood test strip Test fasting and two hour following each meal 120 each 4 2024    glucose monitor monitoring kit Test fasting, and two hour following each meal 1 each 0  "2024    Lancets (freestyle) lancets Test fasting, and two hour following each meal 120 each 4 2024    lansoprazole (Prevacid) 30 MG capsule Take 1 capsule by mouth Daily. 30 capsule 1 2024    magnesium oxide (MAG-OX) 400 MG tablet Take 1 tablet by mouth Daily. 30 tablet 3 2024    prenatal vitamin (prenatal, CLASSIC, vitamin) tablet Take  by mouth Daily.   2024     Allergies:  Patient has no known allergies.  OB History    Para Term  AB Living   2 1 0 1 0 1   SAB IAB Ectopic Molar Multiple Live Births   0 0 0 0 0 1      # Outcome Date GA Lbr Clem/2nd Weight Sex Type Anes PTL Lv   2 Current            1  09 29w0d  1531 g (3 lb 6 oz) M CS-Unspec Spinal  JOSE FRANCISCO      Birth Comments: Had bulging membranes and was 6 cm after being in hospital for 3 days      Complications: Incompetent cervix in pregnancy, antepartum, third trimester, PROM (premature rupture of membranes)      Obstetric Comments   Fob #1 - Pregnancy #1 and #2       Pregnancy #1 - Pt reports she was in the hospital for 3 days on bedrest, then had PROM, was 6cm at that time          Objective     /81 (BP Location: Left arm, Patient Position: Lying)   Pulse 79   Temp 98.7 °F (37.1 °C) (Oral)   Resp 16   Ht 167.6 cm (66\")   Wt 94.3 kg (208 lb)   LMP 2023   BMI 33.57 kg/m²     Physical Exam    General:  No acute distress           Abdomen: Gravid, nontender       FHT's: reactive             Lab Review   External Prenatal Results       Pregnancy Outside Results - Transcribed From Office Records - See Scanned Records For Details       Test Value Date Time    ABO  A  24 0837    Rh  Positive  24 0837    Antibody Screen  Negative  24 0837       Negative  24 0157       Negative  24 1013       Negative  24 1021    Varicella IgG       Rubella  <0.90 index 24 1021    Hgb  11.1 g/dL 24 0837       11.0 g/dL 24 1149       10.6 g/dL 24 0101       " 10.9 g/dL 24 1013       12.8 g/dL 24 1138       13.3 g/dL 24 1021    Hct  33.4 % 24 0837       34.8 % 24 1149       30.3 % 24 0101       32.7 % 24 1013       37.7 % 24 1138       40.0 % 24 1021    HgB A1c   5.1 % 24 1021    1h GTT  207 mg/dL 24 1013    3h GTT Fasting       3h GTT 1 hour       3h GTT 2 hour       3h GTT 3 hour        Gonorrhea (discrete)  Negative  24 1030    Chlamydia (discrete)  Negative  24 1030    RPR  Non Reactive  24 1013       Non Reactive  24 1021    Syphils cascade: TP-Ab (FTA)       TP-Ab       TP-Ab (EIA)       TPPA       HBsAg  Negative  24 1021    Herpes Simplex Virus PCR       Herpes Simplex VIrus Culture       HIV  Non Reactive  24 1021    Hep C RNA Quant PCR       Hep C Antibody  Non Reactive  24 1021    AFP       NIPT       Cystic Fibroisis        Group B Strep  No Group B Streptococcus isolated  24 1804    GBS Susceptibility to Clindamycin       GBS Susceptibility to Erythromycin       Fetal Fibronectin       Genetic Testing, Maternal Blood                 Drug Screening       Test Value Date Time    Urine Drug Screen       Amphetamine Screen  Negative ng/mL 24 1030    Barbiturate Screen  Negative ng/mL 24 1030    Benzodiazepine Screen  Negative ng/mL 24 1030    Methadone Screen  Negative ng/mL 24 1030    Phencyclidine Screen  Negative ng/mL 24 1030    Opiates Screen       THC Screen       Cocaine Screen       Propoxyphene Screen  Negative ng/mL 24 1030    Buprenorphine Screen       Methamphetamine Screen       Oxycodone Screen       Tricyclic Antidepressants Screen                 Legend    ^: Historical                                Assessment & Plan     ASSESSMENT  IUP at 36w1d, placenta previa, no evidence of accreta  A1DM  Desires sterilization  scheduled  section, bilateral salpingectomy     PLAN  Admit to labor and  delivery for C/S, slpingectomy  Antibiotic prophylaxis and SCDs, crossmatch 2u          Itzel Damon MD  8/20/2024@   PROVIDER:[TOKEN:[[id number]],FOLLOWUP:[1 week]],PROVIDER:[TOKEN:[[id number]],FOLLOWUP:[1 week]],PROVIDER:[TOKEN:[[id number]],FOLLOWUP:[1 month]]

## 2024-10-03 NOTE — PHYSICAL THERAPY INITIAL EVALUATION ADULT - PHYSICAL ASSIST/NONPHYSICAL ASSIST: GAIT, REHAB EVAL
Artur Spann is a 65 year old male presenting for consultation of Left hip pain.  Referred by: Nader Diego MD  Rates pain: 1-2/10 low back pain 8/10 left hip pain with activities   Not taking medication for pain.    PCP: Kalpesh Andujar MD   Medications, allergies and tobacco use reviewed.  Denies known Latex allergy or symptoms of Latex sensitivity.    ALLERGIES:   Allergen Reactions    Peanut Oil   (Food Or Med) SWELLING     Throat swelling    Peanuts [Peanut - Dietary Use Only] SWELLING     Throat swelling    Penicillins HIVES       Does patient take any blood thinners? No   If yes, which medication(s)? N/A  Is patient pregnant? N/A  Does patient currently have an infection/take antibiotics? No      Did the patient bring a friend or family member with them into the room? No  If so, did the patient give explicit permission for the practitioner to discuss their healthcare in front of that friend/family member? N/A    
1 person + 1 person to manage equipment

## 2024-11-18 NOTE — PROVIDER CONTACT NOTE (OTHER) - DATE AND TIME:
Patient called to schedule her annual exam which she did for 12/23. Per patient she is also feeling like some hormonal issues are happening and is wondering if she can do blood work sooner than her scheduled appt. Please advise   30-Jan-2020 21:45

## 2025-03-01 NOTE — PROGRESS NOTE ADULT - PROVIDER SPECIALTY LIST ADULT
[address]  [address]                            OPERATIVE REPORT      PATIENT NAME: ROSEANNE THOMAS               : 1992  MED REC NO: [id number]                       ROOM: OR  ACCOUNT NO: [id number]                       ADMIT DATE: 2025  PROVIDER: Mercedes Bates DPM    DATE OF SERVICE:  2025    PREOPERATIVE DIAGNOSES:  Right lateral ankle sprain of the anterior talofibular ligament and calcaneofibular ligament.    POSTOPERATIVE DIAGNOSES:  Right lateral ankle sprain of the anterior talofibular ligament and calcaneofibular ligament.    PROCEDURES PERFORMED:  Right lateral ankle ligament repair.    SURGEON:  Mercedes Bates DPM    ASSISTANT:  ***    ANESTHESIA:  General anesthesia with right lower extremity popliteal block.    ESTIMATED BLOOD LOSS:  Minimal.    SPECIMENS REMOVED:  ***    INTRAOPERATIVE FINDINGS:  ***     COMPLICATIONS:  None.    IMPLANTS:  ***    INDICATIONS:  ***    DESCRIPTION OF PROCEDURE:  Under mild sedation, the patient was brought into the operating room and placed on the operating table on top of a bean bag in the supine position.  The patient then underwent general anesthesia with endotracheal intubation and then was placed in a sloppy lateral position utilizing the bean bag and all appropriate pressure points were off-loaded.  A pneumatic thigh tourniquet was then placed around the patient's right thigh with ample Webril padding underneath.  The foot was then scrubbed, prepped, and draped in the usual aseptic manner.  After full and complete time-out was performed, an Esmarch bandage was used to exsanguinate the right lower extremity and the pneumatic thigh tourniquet was elevated to 300 mmHg.    Attention was then directed to the patient's right lateral ankle, where the patient's fibula had previously been drawn out approximately 1 cm distal to the anterior aspect of the lateral malleolus.  A  Urology over the area of the sinus tarsi through the skin with sharp and blunt dissection down to the level of the flexor retinaculum.  At this point, it was noted that there was some tearing of the flexor retinaculum as well as the anterior talofibular ligament.  The ligament was cut at its attachment to the lateral malleolus and in accordance with manufacturing guidelines, two Arthrex FiberTak anchors were placed in the anterior aspect of the lateral malleolus, then the internal brace, and once these FiberTaks were fully anchored, the sutures were used and passed through the ATFL and flexor retinaculum in accordance with manufacturing guidelines to perform the Brostrom lateral ankle ligament repair.  Once the ligament was repaired, attention was then directed to the patient's talus, where in accordance with manufacturing guidelines, the suture anchors of the Arthrex internal brace were inserted in accordance with manufacturing guidelines in correct positioning for the anchor into the talus, was confirmed on intraoperative C-arm.  Once the anchor and the internal brace were inserted and noted to have a secure construct, attention was then directed to the lateral malleolus, where another anchor was placed into and the internal brace was passed into and anchored into that area.  Once that construct was noted to be secure, attention was then directed to the lateral aspect of the calcaneus, where another anchor point was drilled and then the Arthrex internal brace with the appropriate anchor was then inserted into the lateral aspect of the calcaneus.  All positioning was confirmed on intraoperative C-arm.  At this point, the construct was noted to be secure with no further instability noted of the ATFL or the CFL.  The area was then flushed with copious amounts of sterile saline.    The deep soft tissues were closed with the 0 Vicryl in simple interrupted sutures, the subcutaneous tissues were closed with inverted interrupted

## 2025-03-04 NOTE — H&P ADULT - MINUTES
"CM received call from Mariama at [facility] inquiring why patient had to be set up with an Uber instead of sending patient via wheelchair. As per Lee at [facility], he was informed that a ride was not arranged for patient. Family stated that "~"patient does not have a ride and [facility] arranged for an Uber. "~"CM confirmed that wheelchair van was arranged for transport. "
80

## 2025-03-06 NOTE — DISCHARGE NOTE PROVIDER - NS AS DC PROVIDER CONTACT Y/N MULTI
Consultation Note    Patient Name: Alexandr Witt  : 1963  Age: 61 y.o.     Admitting Physician: Kelsey Love MD   Date of Admission: 2025  4:54 PM   Primary Care Physician: Lavon Diaz DO        Alexandr Witt is being seen at the request of Kelsey Love MD for possible GIB.    History of Present Illness:  61 year old male with history of CAD s/p 2 vessel and 3 vessel CABG in , bioprosthetic aortic valve replacement, A. Fib on coumadin, severe COPD, HTN , DM II  Patient presented on  with AMS related to UTI. GI consulted on 3/6 for anemia/azotemia/melena    Patient denies overt bleeding. Was noted to have smear of melena in diaper. No overt bleeding today. Did have episode of emesis yesterday without evidence of blood.  No abdominal pain, no nausea and vomiting. No trouble swallowing. Denies NSAID use.    BUN 32  HGB 6.9 this morning from 8.9 yesterday  INR 4. Not on coumadin for 4 days    In  patient underwent colonoscopy with Dr. Myers showing large polyp on IC valve which was growing into the valve and into illeum that could not be removed. S/p robotic right colectomy and cholecystectomy on 2019    GI History:  Colonoscopy in      Past Medical History:  Past Medical History:   Diagnosis Date    Abdominal hernia     s/p repair - HAS RETURNED    Aortic valve prosthesis present     Aortic valve replaced 2017    25 mm Castelan Model 3300 bovine pericardial bioprosthesis    CAD (coronary artery disease)     Chronic back pain greater than 3 months duration     Clostridium difficile diarrhea 10/17/2016    PCR    COPD, severity to be determined (HCC) 05/10/2023    Current every day smoker     DDD (degenerative disc disease), lumbosacral 2013    Encounter for imaging to screen for metal prior to magnetic resonance imaging (MRI)     ATG Access [id number] [place] XT VR MRI Surescan Serial # [id number], RV Lead [id number] Serial # [id number] Implanted 2024 
    ICU CONSULT       PCP:  Lavon Diaz DO          Admit Date:  2/27/2025                            ICU Day: 1      CC: HENRIK  Reason for consult: post EGD monitoring   History obtained from:  chart review   SUBJECTIVE   HPI:    \"Patient is a 60-year-old male with PMHx CAD s/p 2 vessel and 3 vessel CABG in 2005 and 2017, bioprosthetic aortic valve replacement, pAF on warfarin, HFrEF 30-35%, V-fib arrest 4/2024 after LLL wedge resection s/p ICD placement, severe COPD on baseline 2 L O2, tobacco use (40 pack years), T2DM, transverse colectomy (2016), right colectomy (2019), cholecystectomy (2019), testicular cancer s/p chemo and resection, 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     Per chart review, patient was admitted for altered mental status secondary to UTI. Completed 1 course of ceftriaxone for UTI. Patient was also found to have worms in stool per nursing and was given a dose of albendazole.     On 3/4 patient had small volume of dark emesis, and on 3/6/25 patient underwent EGD for melena, anemia (Hgb 6.9 from 8.9) and elevated BUN (32). INR 4.09 received vitamin K and PCC, repeat 1.89. Extensive clots and ulcers found in the stomach without visible vessel or active bleeding during EGD and patient was transferred to the ICU for further monitoring. Patient hemodynamically stable MAPs 79-90s and saturating well on room air. Currently no signs of over bleeding.\"     No acute events overnight, patient resting comfortably this morning. Hgb 6.9 from 7.7, currently received 1 unit pRBC. Patient hemodynamically stable and not requiring pressors. No signs of overt bleeding. Protonix gtt running.     Past Medical History:   Diagnosis Date    Abdominal hernia     s/p repair 2010- HAS RETURNED    Aortic valve prosthesis present     Aortic valve replaced 05/2017    25 mm Castelan Model 3300 bovine pericardial 
Clinical Pharmacy Progress Note    Warfarin has been on hold, but now is discontinued and being reversed for GI bleed / need for urgent endoscopy. Will sign off pharmacy to dose Warfarin consult.      If / when appropriate to resume Warfarin, please re-consult pharmacy for dosing.    Please call with questions--  Thanks--  Karin TaD, BCPS, [facility]  [id number] ([facility])   3/6/2025 9:33 AM      
[facility] -  Clinical Pharmacy Note    Warfarin - Management by Pharmacy    Consult Date(s): 02/28/2025  Consulting Provider(s): Dr. Matty Dotson    Assessment / Plan  AVR and pA-Fib - Warfarin  Goal INR: 2 - 3  Concurrent Anticoagulants / Antiplatelets: None  Interactions: No significant interactions noted.  Current Regimen / Plan:   INR was 4.10 on 02/27 1300 (Outpatient) and patient was instructed to skip 2 doses  Current INR- 2.88 02/27 ~1800, INR is in goal range.  Will continue home dosing today of 2.5 mg daily.  Will monitor pt's clinical status and INR daily, and make dose adjustments as needed.    Thank you for consulting pharmacy,      Diane Reza, PharmD  [id number] (Main Pharmacy)       Interval update:  Managed by [facility]    Subjective/Objective:   Alexandr Witt is a 61 y.o. male with a PMHx significant for AVR (re-do in May, 2017), CAD s/p CABG (x3 in May, 2017), pA-fib, HLD, HTN, testicular CA  who is admitted with encephalopathy.     Pharmacy is consulted to dose Warfarin.    Ht Readings from Last 1 Encounters:   02/27/25 1.829 m (6' 0.01\")     Wt Readings from Last 1 Encounters:   02/27/25 97.8 kg (215 lb 9.8 oz)     Prior / Home Warfarin Regimen:  Warfarin 2.5 mg daily  Patient is not alert and oriented. It is presumed that he has skipped the dose for 02/27. Making the last dose taken 2.5 mg on 02/26    Date INR Warfarin   02/27 4.1 2.5 mg on 2/26 (at home)   02/27 2.88 -                 Recent Labs     02/27/25  0000 02/27/25  1749   INR 4.10 2.88*   HGB  --  13.9   PLT  --  142   CREATININE  --  1.3      
orchiectomy    TRANSESOPHAGEAL ECHOCARDIOGRAM  05/30/2017    during redo CABG & AVR    TUNNELED VENOUS PORT PLACEMENT  [address] [place] [address]        Current Medications:    Medications Prior to Admission: famotidine (PEPCID) 20 MG tablet, TAKE 1 TABLET BY MOUTH 2 TIMES A DAY  amLODIPine (NORVASC) 10 MG tablet, TAKE 1 TABLET (10MG) BY MOUTH EVERY DAY  sacubitril-valsartan (ENTRESTO) 24-26 MG per tablet, Take 1 tablet by mouth 2 times daily  citalopram (CELEXA) 40 MG tablet, TAKE 1 TABLET BY MOUTH EVERY DAY  morphine (MSIR) 15 MG tablet, Take 1 tablet by mouth as needed for Pain (every 5.5 hours).  pregabalin (LYRICA) 150 MG capsule, Take 1 capsule by mouth daily.  warfarin (COUMADIN) 5 MG tablet, TAKE 1 TO 1 AND 1/2 TABLETS BY MOUTH ONCE A DAY AS DIRECTED BY ANTICOAGULATION-CLINIC  carvedilol (COREG) 25 MG tablet, Take 1 tablet by mouth 2 times daily (with meals)  rosuvastatin (CRESTOR) 20 MG tablet, - Take one tablets nightly  potassium chloride (KLOR-CON M) 20 MEQ extended release tablet, Take 1 tablet by mouth daily  methocarbamol (ROBAXIN) 750 MG tablet,   Handicap Placard MISC, by Does not apply route Effective through 12/28/2020 through 12/27/2025  aspirin 81 MG EC tablet, Take 1 tablet by mouth daily  Blood Pressure KIT, 1 Unit  methylPREDNISolone (MEDROL, LUIS MIGUEL,) 4 MG tablet, Take by mouth.  metFORMIN (GLUCOPHAGE) 1000 MG tablet, Take 1 tablet by mouth 2 times daily (with meals) (Patient not taking: Reported on 2/27/2025)  oxyCODONE-Acetaminophen (PERCOCET PO), Take by mouth Indications: Pain Management (Patient not taking: Reported on 2/27/2025)  Docusate Sodium (COLACE PO), Take by mouth  melatonin 5 MG TBDP disintegrating tablet, Take 1 tablet by mouth nightly  ondansetron (ZOFRAN-ODT) 4 MG disintegrating tablet, Place 1 tablet under the tongue 3 times daily as needed for Nausea or Vomiting    Allergies:  Cymbalta [duloxetine hcl], Atorvastatin calcium [lipitor], Hydrocodone, and Wellbutrin [bupropion]    Social 
Yes
